# Patient Record
Sex: MALE | Race: WHITE | NOT HISPANIC OR LATINO | Employment: OTHER | URBAN - METROPOLITAN AREA
[De-identification: names, ages, dates, MRNs, and addresses within clinical notes are randomized per-mention and may not be internally consistent; named-entity substitution may affect disease eponyms.]

---

## 2020-06-05 ENCOUNTER — OFFICE VISIT (OUTPATIENT)
Dept: NEUROLOGY | Facility: CLINIC | Age: 45
End: 2020-06-05
Payer: MEDICARE

## 2020-06-05 VITALS — WEIGHT: 285 LBS | BODY MASS INDEX: 44.73 KG/M2 | HEIGHT: 67 IN

## 2020-06-05 DIAGNOSIS — G51.0 RIGHT-SIDED BELL'S PALSY: ICD-10-CM

## 2020-06-05 DIAGNOSIS — G40.309 NONINTRACTABLE GENERALIZED IDIOPATHIC EPILEPSY WITHOUT STATUS EPILEPTICUS (H): Primary | ICD-10-CM

## 2020-06-05 DIAGNOSIS — E66.01 MORBID OBESITY (H): ICD-10-CM

## 2020-06-05 PROCEDURE — 99214 OFFICE O/P EST MOD 30 MIN: CPT | Mod: 95 | Performed by: PSYCHIATRY & NEUROLOGY

## 2020-06-05 RX ORDER — LEVOCARNITINE 330 MG/1
TABLET ORAL
COMMUNITY
Start: 2020-02-25 | End: 2020-10-28

## 2020-06-05 ASSESSMENT — MIFFLIN-ST. JEOR: SCORE: 2136.38

## 2020-06-05 NOTE — LETTER
6/5/2020         RE: Tre Vazquez  [address] [place] [address]        Dear Colleague,    Thank you for referring your patient, Tre Vazquez, to the [facility] NEUROLOG[facility] [place]. Please see a copy of my visit note below.    NEUROLOGY NOTE      Assessment/Plan     Epilepsy. We didn't get outside records, and he deferred EEG study. Will check drug level on Depakote, Keppra, Lamictal, and zonisamide, LFT, and CBC. F/u in 6 months.    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 But seems to be highly functional but needs supervision for daily routines. No falls or injuries.    Obesity encouraged to exercise and diet control.    Left blindness.    Right Belll's palsy 2/2020    Psych issue need to follow-up with psychiatrist.     .    .     This is a virtual visit due to COVID-19 Pandemic to mitigate potential disease spreading. Consent obtained for charge with this visit. Total time spent about 30 minutes.       SUBJECTIVE     Tre Vazquez is a 44 year old male seen for seizure and other neurological issues  .   The patient is with manager from the group home where he esides.    Some behavior issues f/u with psych.     Developed right Bell's palsy 2/2020, mri brain ok at [facility]. No improvements yet.     He has a history of cerebral palsy, epilepsy, psychiatric condition. He is his own power of  but has his mom visited him regularly.    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    Epilepsy: He could not remember which neurologist he sees unaware he used has been managed. But he used to follow up neurologist regularly. The new group home care manager does not have a whole lot of information by reviewing his history he realized that he needs to see a neurologist.No seizures.     Seizure medications: Could not remember what type of seizure he has had and went with the last seizure spells. But he is taking  lamotrigine 100 mg in the morning and 400 at bedtime.    Keppra 500 mg 2 tablets in the morning and 3 tablets in the evening.   levocarnitine 330 mg 1 tablet 3 times a day.   zonisamide 100 mg 2 tablets at bedtime.      He has obesity but not exercising regularly. Eating well.    Psych: He has a history of psychiatric condition including oppositional defiant disorder, depression, PTSD. Mild intellectual disability, psychosis. Is a follow-up with a psychologist regularly.    Cerebral palsy: According to no documentation he had cerebral ventriculomegaly, cerebral palsy with mental retardation and spastic diplegia. He has some baseline of hand tremor as well. But he is living in a group home able to take care of himself independently except that he needs supervision of daily routines.    Psych medication: Is on BuSpar 10 mg twice a day, citalopram 40 mg a day.    Otherwise he has obesity, he is prediabetic, has asthma.    HCT 2/2018 stable compared to 1 month ago, enlarged ventricles. Otherwise NAD.      Problem List           Patient Active Problem List    Diagnosis Date Noted     Advanced directives, counseling/discussion 08/02/2012     Priority: High     Polst completed at [facility] 7/30/2012      Depression 03/10/2014     Priority: Medium     History of sepsis 12/10/2013     Priority: Medium     Septic shock (H) 11/27/2013     Priority: Medium     Problem list name updated by automated process. Provider to review      Pneumococcal septicemia(038.2) (H) 11/27/2013     Priority: Medium     Non-refractory epilepsy (H) 07/18/2012     Priority: Medium     Cerebral palsy (H) 07/18/2012     Priority: Medium     Encephalomalacia 07/18/2012     Priority: Medium     Benign hypertension 07/18/2012     Priority: Medium     Past medical history     Past Surgical History         Past Surgical History:   Procedure Laterality Date     ORTHOPEDIC SURGERY      pt stated past surgery on both ankles     Past Medical History        Past Medical History:   Diagnosis Date     Blindness of  left eye      Depression 3/10/2014     Hypertension      Pneumococcal septicemia(038.2) (H) 11/26/2013    Hospitalized     Pneumonia, organism unspecified(486) 11/26/2013    Hospitalized     Unspecified epilepsy without mention of intractable epilepsy      Family history     Family History   History reviewed. No pertinent family history.     Social history     Social History   Social History         Socioeconomic History     Marital status: Single     Spouse name: Not on file     Number of children: Not on file     Years of education: Not on file     Highest education level: Not on file   Occupational History     Not on file   Social Needs     Financial resource strain: Not on file     Food insecurity     Worry: Not on file     Inability: Not on file     Transportation needs     Medical: Not on file     Non-medical: Not on file   Tobacco Use     Smoking status: Former Smoker     Smokeless tobacco: Never Used   Substance and Sexual Activity     Alcohol use: No     Drug use: No     Sexual activity: Never   Lifestyle     Physical activity     Days per week: Not on file     Minutes per session: Not on file     Stress: Not on file   Relationships     Social connections     Talks on phone: Not on file     Gets together: Not on file     Attends Episcopalian service: Not on file     Active member of club or organization: Not on file     Attends meetings of clubs or organizations: Not on file     Relationship status: Not on file     Intimate partner violence     Fear of current or ex partner: Not on file     Emotionally abused: Not on file     Physically abused: Not on file     Forced sexual activity: Not on file   Other Topics Concern     Parent/sibling w/ CABG, MI or angioplasty before 65F 55M? Not Asked   Social History Narrative     Not on file     Allergy   Tomato and Vicodin [hydrocodone-acetaminophen]   MEDICATIONS List     Current Outpatient Prescriptions          Current Outpatient Medications   Medication Sig Dispense  "Refill     acetaminophen (TYLENOL) 500 MG tablet Take 500 mg by mouth every 4 hours as needed       ARTIFICIAL TEARS 0.1-0.3 % SOLN Apply 2 drops to eye every hour. As needed. 1 Bottle 11     citalopram (CELEXA) 20 MG tablet Take 1 tablet (20 mg) by mouth daily 30 tablet 5     clonazePAM (KLONOPIN) 0.5 MG tablet Take 1 tablet (0.5 mg) by mouth 3 times daily 90 tablet 1     divalproex (DEPAKOTE ER) 500 MG 24 hr tablet Take 1 tablet (500 mg) by mouth daily 500mg am every am 1000mg every pm 90 tablet 3     lamoTRIgine (LAMICTAL) 100 MG tablet Take 2 tablets (200 mg) by mouth daily In the AM 60 tablet 3     lamoTRIgine (LAMICTAL) 200 MG tablet Take 2 tablets (400 mg) by mouth daily IN the PM 60 tablet 3     levETIRAcetam (KEPPRA) 500 MG tablet Take 3 tablets (1,500 mg) by mouth 2 times daily 540 tablet 1     levOCARNitine (CARNITOR) 330 MG tablet Take by mouth 3 times daily (with meals)       lisinopril (PRINIVIL,ZESTRIL) 20 MG tablet Take 1 tablet (20 mg) by mouth daily 90 tablet 1     multivitamin, therapeutic with minerals (MULTI-VITAMIN) TABS Take 1 tablet by mouth daily. 100 tablet 3     zonisamide (ZONEGRAN) 100 MG capsule Take 2 capsules (200 mg) by mouth daily 60 capsule 3     Review of system   10 point system review o/w negative  PHYSICAL EXAMINATION   Vital signs in last 24 hours:   Vitals       Vitals:    06/05/20 1030   Weight: 129.3 kg (285 lb)   Height: 1.702 m (5' 7\")   New right complete VII palsy. Left side blind, Otherwise exam no change from my last note.    per patient report, similar to last visit note. Please refer to my last clinic note and and subjective report documentation of the current note      RESULTS REVIEW   MRI brain 2/2020 report reviewed.         Siobhan Ballard MD, MD, PhD   Neurology   Office tel: [phone]       Again, thank you for allowing me to participate in the care of your patient.        Sincerely,        Siobhan Ballard MD    "

## 2020-06-05 NOTE — PROGRESS NOTES
NEUROLOGY NOTE      Assessment/Plan     Epilepsy. We didn't get outside records, and he deferred EEG study. Will check drug level on Depakote, Keppra, Lamictal, and zonisamide, LFT, and CBC. F/u in 6 months.    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 But seems to be highly functional but needs supervision for daily routines. No falls or injuries.    Obesity encouraged to exercise and diet control.    Left blindness.    Right Belll's palsy 2/2020    Psych issue need to follow-up with psychiatrist.     .    .     This is a virtual visit due to COVID-19 Pandemic to mitigate potential disease spreading. Consent obtained for charge with this visit. Total time spent about 30 minutes.       SUBJECTIVE     Tre Vazquez is a 44 year old male seen for seizure and other neurological issues  .   The patient is with manager from the group home where he esides.    Some behavior issues f/u with psych.     Developed right Bell's palsy 2/2020, mri brain ok at [facility]. No improvements yet.     He has a history of cerebral palsy, epilepsy, psychiatric condition. He is his own power of  but has his mom visited him regularly.    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    Epilepsy: He could not remember which neurologist he sees unaware he used has been managed. But he used to follow up neurologist regularly. The new group home care manager does not have a whole lot of information by reviewing his history he realized that he needs to see a neurologist.No seizures.     Seizure medications: Could not remember what type of seizure he has had and went with the last seizure spells. But he is taking  lamotrigine 100 mg in the morning and 400 at bedtime.   Keppra 500 mg 2 tablets in the morning and 3 tablets in the evening.   levocarnitine 330 mg 1 tablet 3 times a day.   zonisamide 100 mg 2 tablets at bedtime.      He has obesity but not exercising regularly. Eating well.    Psych: He has a history of  psychiatric condition including oppositional defiant disorder, depression, PTSD. Mild intellectual disability, psychosis. Is a follow-up with a psychologist regularly.    Cerebral palsy: According to no documentation he had cerebral ventriculomegaly, cerebral palsy with mental retardation and spastic diplegia. He has some baseline of hand tremor as well. But he is living in a group home able to take care of himself independently except that he needs supervision of daily routines.    Psych medication: Is on BuSpar 10 mg twice a day, citalopram 40 mg a day.    Otherwise he has obesity, he is prediabetic, has asthma.    HCT 2/2018 stable compared to 1 month ago, enlarged ventricles. Otherwise NAD.      Problem List           Patient Active Problem List    Diagnosis Date Noted     Advanced directives, counseling/discussion 08/02/2012     Priority: High     Polst completed at [facility] 7/30/2012      Depression 03/10/2014     Priority: Medium     History of sepsis 12/10/2013     Priority: Medium     Septic shock (H) 11/27/2013     Priority: Medium     Problem list name updated by automated process. Provider to review      Pneumococcal septicemia(038.2) (H) 11/27/2013     Priority: Medium     Non-refractory epilepsy (H) 07/18/2012     Priority: Medium     Cerebral palsy (H) 07/18/2012     Priority: Medium     Encephalomalacia 07/18/2012     Priority: Medium     Benign hypertension 07/18/2012     Priority: Medium     Past medical history     Past Surgical History         Past Surgical History:   Procedure Laterality Date     ORTHOPEDIC SURGERY      pt stated past surgery on both ankles     Past Medical History        Past Medical History:   Diagnosis Date     Blindness of left eye      Depression 3/10/2014     Hypertension      Pneumococcal septicemia(038.2) (H) 11/26/2013    Hospitalized     Pneumonia, organism unspecified(486) 11/26/2013    Hospitalized     Unspecified epilepsy without mention of intractable epilepsy       Family history     Family History   History reviewed. No pertinent family history.     Social history     Social History   Social History         Socioeconomic History     Marital status: Single     Spouse name: Not on file     Number of children: Not on file     Years of education: Not on file     Highest education level: Not on file   Occupational History     Not on file   Social Needs     Financial resource strain: Not on file     Food insecurity     Worry: Not on file     Inability: Not on file     Transportation needs     Medical: Not on file     Non-medical: Not on file   Tobacco Use     Smoking status: Former Smoker     Smokeless tobacco: Never Used   Substance and Sexual Activity     Alcohol use: No     Drug use: No     Sexual activity: Never   Lifestyle     Physical activity     Days per week: Not on file     Minutes per session: Not on file     Stress: Not on file   Relationships     Social connections     Talks on phone: Not on file     Gets together: Not on file     Attends Taoist service: Not on file     Active member of club or organization: Not on file     Attends meetings of clubs or organizations: Not on file     Relationship status: Not on file     Intimate partner violence     Fear of current or ex partner: Not on file     Emotionally abused: Not on file     Physically abused: Not on file     Forced sexual activity: Not on file   Other Topics Concern     Parent/sibling w/ CABG, MI or angioplasty before 65F 55M? Not Asked   Social History Narrative     Not on file     Allergy   Tomato and Vicodin [hydrocodone-acetaminophen]   MEDICATIONS List     Current Outpatient Prescriptions          Current Outpatient Medications   Medication Sig Dispense Refill     acetaminophen (TYLENOL) 500 MG tablet Take 500 mg by mouth every 4 hours as needed       ARTIFICIAL TEARS 0.1-0.3 % SOLN Apply 2 drops to eye every hour. As needed. 1 Bottle 11     citalopram (CELEXA) 20 MG tablet Take 1 tablet (20 mg) by  "mouth daily 30 tablet 5     clonazePAM (KLONOPIN) 0.5 MG tablet Take 1 tablet (0.5 mg) by mouth 3 times daily 90 tablet 1     divalproex (DEPAKOTE ER) 500 MG 24 hr tablet Take 1 tablet (500 mg) by mouth daily 500mg am every am 1000mg every pm 90 tablet 3     lamoTRIgine (LAMICTAL) 100 MG tablet Take 2 tablets (200 mg) by mouth daily In the AM 60 tablet 3     lamoTRIgine (LAMICTAL) 200 MG tablet Take 2 tablets (400 mg) by mouth daily IN the PM 60 tablet 3     levETIRAcetam (KEPPRA) 500 MG tablet Take 3 tablets (1,500 mg) by mouth 2 times daily 540 tablet 1     levOCARNitine (CARNITOR) 330 MG tablet Take by mouth 3 times daily (with meals)       lisinopril (PRINIVIL,ZESTRIL) 20 MG tablet Take 1 tablet (20 mg) by mouth daily 90 tablet 1     multivitamin, therapeutic with minerals (MULTI-VITAMIN) TABS Take 1 tablet by mouth daily. 100 tablet 3     zonisamide (ZONEGRAN) 100 MG capsule Take 2 capsules (200 mg) by mouth daily 60 capsule 3     Review of system   10 point system review o/w negative  PHYSICAL EXAMINATION   Vital signs in last 24 hours:   Vitals       Vitals:    06/05/20 1030   Weight: 129.3 kg (285 lb)   Height: 1.702 m (5' 7\")   New right complete VII palsy. Left side blind, Otherwise exam no change from my last note.    per patient report, similar to last visit note. Please refer to my last clinic note and and subjective report documentation of the current note      RESULTS REVIEW   MRI brain 2/2020 report reviewed.         Siobhan Ballard MD, MD, PhD   Neurology   Office tel: [phone]     "

## 2020-09-28 DIAGNOSIS — G40.309 NONINTRACTABLE GENERALIZED IDIOPATHIC EPILEPSY WITHOUT STATUS EPILEPTICUS (H): Primary | ICD-10-CM

## 2020-09-28 DIAGNOSIS — G40.909 EPILEPSY (H): ICD-10-CM

## 2020-09-28 RX ORDER — ZONISAMIDE 100 MG/1
200 CAPSULE ORAL DAILY
Qty: 60 CAPSULE | Refills: 3 | Status: SHIPPED | OUTPATIENT
Start: 2020-09-28 | End: 2020-12-21

## 2020-10-27 DIAGNOSIS — G40.309 NONINTRACTABLE GENERALIZED IDIOPATHIC EPILEPSY WITHOUT STATUS EPILEPTICUS (H): ICD-10-CM

## 2020-10-28 RX ORDER — LEVETIRACETAM 500 MG/1
TABLET ORAL
Qty: 140 TABLET | Refills: 1 | Status: SHIPPED | OUTPATIENT
Start: 2020-10-28 | End: 2020-11-20

## 2020-10-28 RX ORDER — LEVOCARNITINE 330 MG/1
TABLET ORAL
Qty: 84 TABLET | Refills: 7 | Status: SHIPPED | OUTPATIENT
Start: 2020-10-28 | End: 2021-05-11

## 2020-11-20 DIAGNOSIS — G40.309 NONINTRACTABLE GENERALIZED IDIOPATHIC EPILEPSY WITHOUT STATUS EPILEPTICUS (H): ICD-10-CM

## 2020-11-20 RX ORDER — LEVETIRACETAM 500 MG/1
TABLET ORAL
Qty: 140 TABLET | Refills: 1 | Status: SHIPPED | OUTPATIENT
Start: 2020-11-20 | End: 2021-01-18

## 2020-12-18 DIAGNOSIS — G40.309 NONINTRACTABLE GENERALIZED IDIOPATHIC EPILEPSY WITHOUT STATUS EPILEPTICUS (H): ICD-10-CM

## 2020-12-21 RX ORDER — ZONISAMIDE 100 MG/1
CAPSULE ORAL
Qty: 56 CAPSULE | Refills: 3 | Status: SHIPPED | OUTPATIENT
Start: 2020-12-21 | End: 2021-04-19

## 2021-01-15 DIAGNOSIS — G40.309 NONINTRACTABLE GENERALIZED IDIOPATHIC EPILEPSY WITHOUT STATUS EPILEPTICUS (H): ICD-10-CM

## 2021-01-18 RX ORDER — LEVETIRACETAM 500 MG/1
TABLET ORAL
Qty: 140 TABLET | Refills: 1 | Status: SHIPPED | OUTPATIENT
Start: 2021-01-18 | End: 2021-03-17

## 2021-03-16 DIAGNOSIS — G40.309 NONINTRACTABLE GENERALIZED IDIOPATHIC EPILEPSY WITHOUT STATUS EPILEPTICUS (H): ICD-10-CM

## 2021-03-17 RX ORDER — LEVETIRACETAM 500 MG/1
TABLET ORAL
Qty: 140 TABLET | Refills: 4 | Status: ON HOLD | OUTPATIENT
Start: 2021-03-17 | End: 2021-08-31

## 2021-04-19 DIAGNOSIS — G40.309 NONINTRACTABLE GENERALIZED IDIOPATHIC EPILEPSY WITHOUT STATUS EPILEPTICUS (H): ICD-10-CM

## 2021-04-19 RX ORDER — ZONISAMIDE 100 MG/1
CAPSULE ORAL
Qty: 56 CAPSULE | Refills: 0 | Status: SHIPPED | OUTPATIENT
Start: 2021-04-19 | End: 2021-05-19

## 2021-04-19 NOTE — TELEPHONE ENCOUNTER
Refill request for Angelique  Previous Dr. Ballard patient seeing Dr. Huff 5/3/21  Medication T'd for review and signature  Rosa Corado CMA on 4/19/2021 at 4:21 PM

## 2021-05-11 DIAGNOSIS — G40.309 NONINTRACTABLE GENERALIZED IDIOPATHIC EPILEPSY WITHOUT STATUS EPILEPTICUS (H): ICD-10-CM

## 2021-05-11 RX ORDER — LEVOCARNITINE 330 MG/1
TABLET ORAL
Qty: 84 TABLET | Refills: 7 | Status: SHIPPED | OUTPATIENT
Start: 2021-05-11 | End: 2022-01-20

## 2021-05-18 RX ORDER — ZONISAMIDE 100 MG/1
CAPSULE ORAL
Qty: 56 CAPSULE | Refills: 0 | OUTPATIENT
Start: 2021-05-18

## 2021-05-18 NOTE — TELEPHONE ENCOUNTER
Refill request for zonisamide 100 mg.  Patient has a follow up appointment 05/28/2021.  28 day supply with 0 refills.   Medication T'd for review and signature    Davi Diop MA on 5/18/2021 at 2:30 PM

## 2021-05-19 RX ORDER — ZONISAMIDE 100 MG/1
CAPSULE ORAL
Qty: 56 CAPSULE | Refills: 0 | Status: ON HOLD | OUTPATIENT
Start: 2021-05-19 | End: 2021-08-31

## 2021-07-30 DIAGNOSIS — G40.309 NONINTRACTABLE GENERALIZED IDIOPATHIC EPILEPSY WITHOUT STATUS EPILEPTICUS (H): Primary | ICD-10-CM

## 2021-08-02 RX ORDER — LEVETIRACETAM 500 MG/1
TABLET ORAL
Qty: 35 TABLET | Refills: 0 | Status: ON HOLD | OUTPATIENT
Start: 2021-08-02 | End: 2021-08-31

## 2021-08-02 NOTE — TELEPHONE ENCOUNTER
Refill request for Keppra  Patient has no showed x3 and 1 cancel for our clinic  7 day supply with 0 refills Medication T'd for review and signature  Rosa Corado CMA on 8/2/2021 at 9:04 AM

## 2021-08-30 ENCOUNTER — HOSPITAL ENCOUNTER (OUTPATIENT)
Facility: CLINIC | Age: 46
Setting detail: OBSERVATION
Discharge: NURSING, REHAB OR LONG-TERM CARE | End: 2021-09-02
Attending: EMERGENCY MEDICINE | Admitting: STUDENT IN AN ORGANIZED HEALTH CARE EDUCATION/TRAINING PROGRAM
Payer: MEDICARE

## 2021-08-30 ENCOUNTER — APPOINTMENT (OUTPATIENT)
Dept: GENERAL RADIOLOGY | Facility: CLINIC | Age: 46
End: 2021-08-30
Attending: EMERGENCY MEDICINE
Payer: MEDICARE

## 2021-08-30 ENCOUNTER — APPOINTMENT (OUTPATIENT)
Dept: CT IMAGING | Facility: CLINIC | Age: 46
End: 2021-08-30
Attending: EMERGENCY MEDICINE
Payer: MEDICARE

## 2021-08-30 DIAGNOSIS — G40.309 NONINTRACTABLE GENERALIZED IDIOPATHIC EPILEPSY WITHOUT STATUS EPILEPTICUS (H): ICD-10-CM

## 2021-08-30 DIAGNOSIS — G40.919 BREAKTHROUGH SEIZURE (H): Primary | ICD-10-CM

## 2021-08-30 DIAGNOSIS — Z11.52 ENCOUNTER FOR SCREENING LABORATORY TESTING FOR SEVERE ACUTE RESPIRATORY SYNDROME CORONAVIRUS 2 (SARS-COV-2): ICD-10-CM

## 2021-08-30 DIAGNOSIS — G40.909 NON-REFRACTORY EPILEPSY (H): ICD-10-CM

## 2021-08-30 LAB
ALBUMIN UR-MCNC: 30 MG/DL
ANION GAP SERPL CALCULATED.3IONS-SCNC: 2 MMOL/L (ref 3–14)
APPEARANCE UR: CLEAR
BASOPHILS # BLD AUTO: 0 10E3/UL (ref 0–0.2)
BASOPHILS NFR BLD AUTO: 0 %
BILIRUB UR QL STRIP: NEGATIVE
BUN SERPL-MCNC: 12 MG/DL (ref 7–30)
CALCIUM SERPL-MCNC: 8.6 MG/DL (ref 8.5–10.1)
CHLORIDE BLD-SCNC: 104 MMOL/L (ref 94–109)
CO2 SERPL-SCNC: 33 MMOL/L (ref 20–32)
COLOR UR AUTO: YELLOW
CREAT SERPL-MCNC: 0.74 MG/DL (ref 0.66–1.25)
EOSINOPHIL # BLD AUTO: 0 10E3/UL (ref 0–0.7)
EOSINOPHIL NFR BLD AUTO: 0 %
ERYTHROCYTE [DISTWIDTH] IN BLOOD BY AUTOMATED COUNT: 13 % (ref 10–15)
GFR SERPL CREATININE-BSD FRML MDRD: >90 ML/MIN/1.73M2
GLUCOSE BLD-MCNC: 99 MG/DL (ref 70–99)
GLUCOSE UR STRIP-MCNC: NEGATIVE MG/DL
HCT VFR BLD AUTO: 41.4 % (ref 40–53)
HGB BLD-MCNC: 13.5 G/DL (ref 13.3–17.7)
HGB UR QL STRIP: NEGATIVE
IMM GRANULOCYTES # BLD: 0.1 10E3/UL
IMM GRANULOCYTES NFR BLD: 1 %
KETONES UR STRIP-MCNC: 100 MG/DL
LEUKOCYTE ESTERASE UR QL STRIP: NEGATIVE
LYMPHOCYTES # BLD AUTO: 1.3 10E3/UL (ref 0.8–5.3)
LYMPHOCYTES NFR BLD AUTO: 18 %
MAGNESIUM SERPL-MCNC: 1.5 MG/DL (ref 1.6–2.3)
MCH RBC QN AUTO: 29.8 PG (ref 26.5–33)
MCHC RBC AUTO-ENTMCNC: 32.6 G/DL (ref 31.5–36.5)
MCV RBC AUTO: 91 FL (ref 78–100)
MONOCYTES # BLD AUTO: 0.8 10E3/UL (ref 0–1.3)
MONOCYTES NFR BLD AUTO: 11 %
MUCOUS THREADS #/AREA URNS LPF: PRESENT /LPF
NEUTROPHILS # BLD AUTO: 5.1 10E3/UL (ref 1.6–8.3)
NEUTROPHILS NFR BLD AUTO: 70 %
NITRATE UR QL: NEGATIVE
NRBC # BLD AUTO: 0 10E3/UL
NRBC BLD AUTO-RTO: 0 /100
PH UR STRIP: 6.5 [PH] (ref 5–7)
PLATELET # BLD AUTO: 182 10E3/UL (ref 150–450)
POTASSIUM BLD-SCNC: 3.7 MMOL/L (ref 3.4–5.3)
RBC # BLD AUTO: 4.53 10E6/UL (ref 4.4–5.9)
RBC URINE: 3 /HPF
SARS-COV-2 RNA RESP QL NAA+PROBE: NEGATIVE
SODIUM SERPL-SCNC: 139 MMOL/L (ref 133–144)
SP GR UR STRIP: 1.02 (ref 1–1.03)
UROBILINOGEN UR STRIP-MCNC: NORMAL MG/DL
VALPROATE SERPL-MCNC: 30 MG/L
WBC # BLD AUTO: 7.3 10E3/UL (ref 4–11)
WBC URINE: 1 /HPF

## 2021-08-30 PROCEDURE — C9803 HOPD COVID-19 SPEC COLLECT: HCPCS | Performed by: EMERGENCY MEDICINE

## 2021-08-30 PROCEDURE — U0005 INFEC AGEN DETEC AMPLI PROBE: HCPCS | Performed by: EMERGENCY MEDICINE

## 2021-08-30 PROCEDURE — 96366 THER/PROPH/DIAG IV INF ADDON: CPT | Performed by: EMERGENCY MEDICINE

## 2021-08-30 PROCEDURE — 85025 COMPLETE CBC W/AUTO DIFF WBC: CPT | Performed by: EMERGENCY MEDICINE

## 2021-08-30 PROCEDURE — 81003 URINALYSIS AUTO W/O SCOPE: CPT | Performed by: EMERGENCY MEDICINE

## 2021-08-30 PROCEDURE — 80175 DRUG SCREEN QUAN LAMOTRIGINE: CPT | Performed by: EMERGENCY MEDICINE

## 2021-08-30 PROCEDURE — 70450 CT HEAD/BRAIN W/O DYE: CPT | Mod: 26 | Performed by: RADIOLOGY

## 2021-08-30 PROCEDURE — 99285 EMERGENCY DEPT VISIT HI MDM: CPT | Mod: 25 | Performed by: EMERGENCY MEDICINE

## 2021-08-30 PROCEDURE — 80177 DRUG SCRN QUAN LEVETIRACETAM: CPT | Performed by: EMERGENCY MEDICINE

## 2021-08-30 PROCEDURE — 80164 ASSAY DIPROPYLACETIC ACD TOT: CPT | Performed by: EMERGENCY MEDICINE

## 2021-08-30 PROCEDURE — G1004 CDSM NDSC: HCPCS | Performed by: RADIOLOGY

## 2021-08-30 PROCEDURE — 71045 X-RAY EXAM CHEST 1 VIEW: CPT

## 2021-08-30 PROCEDURE — 71045 X-RAY EXAM CHEST 1 VIEW: CPT | Mod: 26 | Performed by: RADIOLOGY

## 2021-08-30 PROCEDURE — 93005 ELECTROCARDIOGRAM TRACING: CPT | Performed by: EMERGENCY MEDICINE

## 2021-08-30 PROCEDURE — 250N000013 HC RX MED GY IP 250 OP 250 PS 637: Performed by: EMERGENCY MEDICINE

## 2021-08-30 PROCEDURE — 80048 BASIC METABOLIC PNL TOTAL CA: CPT | Performed by: EMERGENCY MEDICINE

## 2021-08-30 PROCEDURE — 70450 CT HEAD/BRAIN W/O DYE: CPT | Mod: ME

## 2021-08-30 PROCEDURE — 258N000003 HC RX IP 258 OP 636: Performed by: EMERGENCY MEDICINE

## 2021-08-30 PROCEDURE — G0378 HOSPITAL OBSERVATION PER HR: HCPCS

## 2021-08-30 PROCEDURE — 250N000011 HC RX IP 250 OP 636: Performed by: EMERGENCY MEDICINE

## 2021-08-30 PROCEDURE — 36415 COLL VENOUS BLD VENIPUNCTURE: CPT | Performed by: EMERGENCY MEDICINE

## 2021-08-30 PROCEDURE — 80203 DRUG SCREEN QUANT ZONISAMIDE: CPT | Performed by: EMERGENCY MEDICINE

## 2021-08-30 PROCEDURE — 93010 ELECTROCARDIOGRAM REPORT: CPT | Performed by: EMERGENCY MEDICINE

## 2021-08-30 PROCEDURE — 83735 ASSAY OF MAGNESIUM: CPT | Performed by: EMERGENCY MEDICINE

## 2021-08-30 PROCEDURE — 96365 THER/PROPH/DIAG IV INF INIT: CPT | Performed by: EMERGENCY MEDICINE

## 2021-08-30 PROCEDURE — 96367 TX/PROPH/DG ADDL SEQ IV INF: CPT | Performed by: EMERGENCY MEDICINE

## 2021-08-30 RX ORDER — IBUPROFEN 600 MG/1
600 TABLET, FILM COATED ORAL ONCE
Status: COMPLETED | OUTPATIENT
Start: 2021-08-30 | End: 2021-08-30

## 2021-08-30 RX ORDER — LEVETIRACETAM 15 MG/ML
1500 INJECTION INTRAVASCULAR ONCE
Status: COMPLETED | OUTPATIENT
Start: 2021-08-30 | End: 2021-08-30

## 2021-08-30 RX ORDER — ACETAMINOPHEN 325 MG/1
650 TABLET ORAL ONCE
Status: COMPLETED | OUTPATIENT
Start: 2021-08-30 | End: 2021-08-30

## 2021-08-30 RX ORDER — MAGNESIUM SULFATE HEPTAHYDRATE 500 MG/ML
2 INJECTION, SOLUTION INTRAMUSCULAR; INTRAVENOUS ONCE
Status: DISCONTINUED | OUTPATIENT
Start: 2021-08-30 | End: 2021-08-30

## 2021-08-30 RX ORDER — LORAZEPAM 2 MG/ML
2 INJECTION INTRAMUSCULAR ONCE
Status: COMPLETED | OUTPATIENT
Start: 2021-08-30 | End: 2021-08-30

## 2021-08-30 RX ORDER — MAGNESIUM SULFATE HEPTAHYDRATE 40 MG/ML
2 INJECTION, SOLUTION INTRAVENOUS ONCE
Status: COMPLETED | OUTPATIENT
Start: 2021-08-30 | End: 2021-08-30

## 2021-08-30 RX ORDER — SODIUM CHLORIDE 9 MG/ML
INJECTION, SOLUTION INTRAVENOUS CONTINUOUS
Status: DISCONTINUED | OUTPATIENT
Start: 2021-08-30 | End: 2021-09-02

## 2021-08-30 RX ADMIN — ACETAMINOPHEN 650 MG: 325 TABLET, FILM COATED ORAL at 17:05

## 2021-08-30 RX ADMIN — LORAZEPAM 2 MG: 2 INJECTION INTRAMUSCULAR; INTRAVENOUS at 20:52

## 2021-08-30 RX ADMIN — SODIUM CHLORIDE: 900 INJECTION, SOLUTION INTRAVENOUS at 22:03

## 2021-08-30 RX ADMIN — LEVETIRACETAM 1500 MG: 15 INJECTION INTRAVENOUS at 22:03

## 2021-08-30 RX ADMIN — MAGNESIUM SULFATE IN WATER 2 G: 40 INJECTION, SOLUTION INTRAVENOUS at 17:39

## 2021-08-30 RX ADMIN — IBUPROFEN 600 MG: 600 TABLET ORAL at 19:43

## 2021-08-30 ASSESSMENT — MIFFLIN-ST. JEOR: SCORE: 2131.38

## 2021-08-30 NOTE — ED TRIAGE NOTES
"BIBA from   Pt found unresponsive with seizurelike activity  H/o epilepsy, last known seizure years ago  EMS called, pt postictal and incontinent of urine  Admits to falling 3-4 times today in his room and also in the shower    Arrives alert, oriented but gives inappropriate responses. When asked the year, he states, \"payday\"  "
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
## 2021-08-31 ENCOUNTER — APPOINTMENT (OUTPATIENT)
Dept: PHYSICAL THERAPY | Facility: CLINIC | Age: 46
End: 2021-08-31
Attending: STUDENT IN AN ORGANIZED HEALTH CARE EDUCATION/TRAINING PROGRAM
Payer: MEDICARE

## 2021-08-31 LAB
ALBUMIN SERPL-MCNC: 2.8 G/DL (ref 3.4–5)
ALP SERPL-CCNC: 58 U/L (ref 40–150)
ALT SERPL W P-5'-P-CCNC: 28 U/L (ref 0–70)
ANION GAP SERPL CALCULATED.3IONS-SCNC: 1 MMOL/L (ref 3–14)
AST SERPL W P-5'-P-CCNC: 10 U/L (ref 0–45)
ATRIAL RATE - MUSE: 89 BPM
BILIRUB DIRECT SERPL-MCNC: <0.1 MG/DL (ref 0–0.2)
BILIRUB SERPL-MCNC: 0.4 MG/DL (ref 0.2–1.3)
BUN SERPL-MCNC: 11 MG/DL (ref 7–30)
CALCIUM SERPL-MCNC: 8.6 MG/DL (ref 8.5–10.1)
CHLORIDE BLD-SCNC: 106 MMOL/L (ref 94–109)
CO2 SERPL-SCNC: 34 MMOL/L (ref 20–32)
CREAT SERPL-MCNC: 0.59 MG/DL (ref 0.66–1.25)
DIASTOLIC BLOOD PRESSURE - MUSE: NORMAL MMHG
ERYTHROCYTE [DISTWIDTH] IN BLOOD BY AUTOMATED COUNT: 13.1 % (ref 10–15)
GFR SERPL CREATININE-BSD FRML MDRD: >90 ML/MIN/1.73M2
GLUCOSE BLD-MCNC: 101 MG/DL (ref 70–99)
HCT VFR BLD AUTO: 40 % (ref 40–53)
HGB BLD-MCNC: 13.1 G/DL (ref 13.3–17.7)
INTERPRETATION ECG - MUSE: NORMAL
MCH RBC QN AUTO: 30.2 PG (ref 26.5–33)
MCHC RBC AUTO-ENTMCNC: 32.8 G/DL (ref 31.5–36.5)
MCV RBC AUTO: 92 FL (ref 78–100)
P AXIS - MUSE: 63 DEGREES
PLATELET # BLD AUTO: 182 10E3/UL (ref 150–450)
POTASSIUM BLD-SCNC: 3.6 MMOL/L (ref 3.4–5.3)
PR INTERVAL - MUSE: 192 MS
PROT SERPL-MCNC: 5.9 G/DL (ref 6.8–8.8)
QRS DURATION - MUSE: 84 MS
QT - MUSE: 400 MS
QTC - MUSE: 486 MS
R AXIS - MUSE: 5 DEGREES
RBC # BLD AUTO: 4.34 10E6/UL (ref 4.4–5.9)
SODIUM SERPL-SCNC: 141 MMOL/L (ref 133–144)
SYSTOLIC BLOOD PRESSURE - MUSE: NORMAL MMHG
T AXIS - MUSE: 79 DEGREES
VALPROATE SERPL-MCNC: 37 MG/L
VENTRICULAR RATE- MUSE: 89 BPM
WBC # BLD AUTO: 10.3 10E3/UL (ref 4–11)

## 2021-08-31 PROCEDURE — 99215 OFFICE O/P EST HI 40 MIN: CPT | Mod: GC | Performed by: STUDENT IN AN ORGANIZED HEALTH CARE EDUCATION/TRAINING PROGRAM

## 2021-08-31 PROCEDURE — 85027 COMPLETE CBC AUTOMATED: CPT | Performed by: STUDENT IN AN ORGANIZED HEALTH CARE EDUCATION/TRAINING PROGRAM

## 2021-08-31 PROCEDURE — 36415 COLL VENOUS BLD VENIPUNCTURE: CPT | Performed by: STUDENT IN AN ORGANIZED HEALTH CARE EDUCATION/TRAINING PROGRAM

## 2021-08-31 PROCEDURE — 999N000007 HC SITE CHECK

## 2021-08-31 PROCEDURE — 97530 THERAPEUTIC ACTIVITIES: CPT | Mod: GP

## 2021-08-31 PROCEDURE — 97116 GAIT TRAINING THERAPY: CPT | Mod: GP

## 2021-08-31 PROCEDURE — 97161 PT EVAL LOW COMPLEX 20 MIN: CPT | Mod: GP

## 2021-08-31 PROCEDURE — 80165 DIPROPYLACETIC ACID FREE: CPT | Performed by: STUDENT IN AN ORGANIZED HEALTH CARE EDUCATION/TRAINING PROGRAM

## 2021-08-31 PROCEDURE — 82248 BILIRUBIN DIRECT: CPT | Performed by: STUDENT IN AN ORGANIZED HEALTH CARE EDUCATION/TRAINING PROGRAM

## 2021-08-31 PROCEDURE — G0378 HOSPITAL OBSERVATION PER HR: HCPCS

## 2021-08-31 PROCEDURE — 250N000013 HC RX MED GY IP 250 OP 250 PS 637: Performed by: STUDENT IN AN ORGANIZED HEALTH CARE EDUCATION/TRAINING PROGRAM

## 2021-08-31 PROCEDURE — 258N000003 HC RX IP 258 OP 636: Performed by: EMERGENCY MEDICINE

## 2021-08-31 PROCEDURE — 96361 HYDRATE IV INFUSION ADD-ON: CPT

## 2021-08-31 PROCEDURE — 80164 ASSAY DIPROPYLACETIC ACD TOT: CPT | Performed by: STUDENT IN AN ORGANIZED HEALTH CARE EDUCATION/TRAINING PROGRAM

## 2021-08-31 PROCEDURE — 80048 BASIC METABOLIC PNL TOTAL CA: CPT | Performed by: STUDENT IN AN ORGANIZED HEALTH CARE EDUCATION/TRAINING PROGRAM

## 2021-08-31 PROCEDURE — 250N000009 HC RX 250: Performed by: EMERGENCY MEDICINE

## 2021-08-31 PROCEDURE — 96367 TX/PROPH/DG ADDL SEQ IV INF: CPT | Performed by: EMERGENCY MEDICINE

## 2021-08-31 PROCEDURE — 999N000128 HC STATISTIC PERIPHERAL IV START W/O US GUIDANCE

## 2021-08-31 RX ORDER — LAMOTRIGINE 100 MG/1
100 TABLET ORAL 2 TIMES DAILY
Status: ON HOLD | COMMUNITY
End: 2021-09-01

## 2021-08-31 RX ORDER — ONDANSETRON 4 MG/1
4 TABLET, ORALLY DISINTEGRATING ORAL EVERY 6 HOURS PRN
Status: DISCONTINUED | OUTPATIENT
Start: 2021-08-31 | End: 2021-09-02 | Stop reason: HOSPADM

## 2021-08-31 RX ORDER — LAMOTRIGINE 200 MG/1
200 TABLET ORAL EVERY MORNING
Status: DISCONTINUED | OUTPATIENT
Start: 2021-08-31 | End: 2021-08-31

## 2021-08-31 RX ORDER — CITALOPRAM HYDROBROMIDE 20 MG/1
20 TABLET ORAL DAILY
Status: DISCONTINUED | OUTPATIENT
Start: 2021-08-31 | End: 2021-08-31

## 2021-08-31 RX ORDER — LEVOCARNITINE 330 MG/1
330 TABLET ORAL
Status: DISCONTINUED | OUTPATIENT
Start: 2021-08-31 | End: 2021-09-02 | Stop reason: HOSPADM

## 2021-08-31 RX ORDER — SODIUM CHLORIDE 0.65 %
1 AEROSOL, SPRAY (ML) NASAL
Status: ON HOLD | COMMUNITY
Start: 2021-08-25 | End: 2023-03-15

## 2021-08-31 RX ORDER — DIVALPROEX SODIUM 250 MG/1
500 TABLET, DELAYED RELEASE ORAL 3 TIMES DAILY
Status: DISCONTINUED | OUTPATIENT
Start: 2021-08-31 | End: 2021-09-02 | Stop reason: HOSPADM

## 2021-08-31 RX ORDER — LEVETIRACETAM 500 MG/1
1500 TABLET ORAL 2 TIMES DAILY
Status: DISCONTINUED | OUTPATIENT
Start: 2021-08-31 | End: 2021-08-31

## 2021-08-31 RX ORDER — VALPROIC ACID 250 MG/1
1000 CAPSULE, LIQUID FILLED ORAL EVERY 8 HOURS SCHEDULED
Status: DISCONTINUED | OUTPATIENT
Start: 2021-08-31 | End: 2021-08-31

## 2021-08-31 RX ORDER — CITALOPRAM HYDROBROMIDE 20 MG/1
40 TABLET ORAL DAILY
Status: DISCONTINUED | OUTPATIENT
Start: 2021-08-31 | End: 2021-09-02 | Stop reason: HOSPADM

## 2021-08-31 RX ORDER — LAMOTRIGINE 100 MG/1
200 TABLET ORAL AT BEDTIME
Status: DISCONTINUED | OUTPATIENT
Start: 2021-08-31 | End: 2021-09-02 | Stop reason: HOSPADM

## 2021-08-31 RX ORDER — LISINOPRIL 5 MG/1
20 TABLET ORAL DAILY
Status: DISCONTINUED | OUTPATIENT
Start: 2021-08-31 | End: 2021-08-31

## 2021-08-31 RX ORDER — VALPROIC ACID 250 MG/1
500 CAPSULE, LIQUID FILLED ORAL EVERY MORNING
Status: DISCONTINUED | OUTPATIENT
Start: 2021-08-31 | End: 2021-08-31

## 2021-08-31 RX ORDER — PROPRANOLOL HYDROCHLORIDE 10 MG/1
20 TABLET ORAL 2 TIMES DAILY
COMMUNITY
End: 2022-11-25

## 2021-08-31 RX ORDER — LEVETIRACETAM 500 MG/1
1000 TABLET ORAL EVERY MORNING
Status: DISCONTINUED | OUTPATIENT
Start: 2021-08-31 | End: 2021-09-02 | Stop reason: HOSPADM

## 2021-08-31 RX ORDER — ONDANSETRON 2 MG/ML
4 INJECTION INTRAMUSCULAR; INTRAVENOUS EVERY 6 HOURS PRN
Status: DISCONTINUED | OUTPATIENT
Start: 2021-08-31 | End: 2021-09-02 | Stop reason: HOSPADM

## 2021-08-31 RX ORDER — LAMOTRIGINE 100 MG/1
100 TABLET ORAL 2 TIMES DAILY
Status: DISCONTINUED | OUTPATIENT
Start: 2021-08-31 | End: 2021-09-02 | Stop reason: HOSPADM

## 2021-08-31 RX ORDER — ACETAMINOPHEN 650 MG/1
650 SUPPOSITORY RECTAL EVERY 6 HOURS PRN
Status: DISCONTINUED | OUTPATIENT
Start: 2021-08-31 | End: 2021-09-02

## 2021-08-31 RX ORDER — ACETAMINOPHEN 650 MG/1
650 SUPPOSITORY RECTAL EVERY 6 HOURS PRN
Status: DISCONTINUED | OUTPATIENT
Start: 2021-08-31 | End: 2021-08-31

## 2021-08-31 RX ORDER — RISPERIDONE 3 MG/1
3 TABLET ORAL AT BEDTIME
Status: DISCONTINUED | OUTPATIENT
Start: 2021-08-31 | End: 2021-09-02 | Stop reason: HOSPADM

## 2021-08-31 RX ORDER — CALCIUM POLYCARBOPHIL 625 MG 625 MG/1
625 TABLET ORAL 2 TIMES DAILY
Status: DISCONTINUED | OUTPATIENT
Start: 2021-08-31 | End: 2021-09-02 | Stop reason: HOSPADM

## 2021-08-31 RX ORDER — LIDOCAINE 40 MG/G
CREAM TOPICAL
Status: DISCONTINUED | OUTPATIENT
Start: 2021-08-31 | End: 2021-09-02 | Stop reason: HOSPADM

## 2021-08-31 RX ORDER — RISPERIDONE 3 MG/1
3 TABLET ORAL AT BEDTIME
COMMUNITY
End: 2022-11-25

## 2021-08-31 RX ORDER — ACETAMINOPHEN 325 MG/1
650 TABLET ORAL EVERY 4 HOURS PRN
Status: DISCONTINUED | OUTPATIENT
Start: 2021-08-31 | End: 2021-09-02

## 2021-08-31 RX ORDER — ZONISAMIDE 100 MG/1
200 CAPSULE ORAL AT BEDTIME
Status: DISCONTINUED | OUTPATIENT
Start: 2021-08-31 | End: 2021-09-02 | Stop reason: HOSPADM

## 2021-08-31 RX ORDER — CALCIUM POLYCARBOPHIL 625 MG 625 MG/1
1 TABLET ORAL 2 TIMES DAILY
Status: ON HOLD | COMMUNITY
End: 2023-03-15

## 2021-08-31 RX ORDER — DIVALPROEX SODIUM 500 MG/1
500 TABLET, DELAYED RELEASE ORAL 3 TIMES DAILY
Status: ON HOLD | COMMUNITY
End: 2021-09-01

## 2021-08-31 RX ORDER — FLUTICASONE PROPIONATE 50 MCG
1 SPRAY, SUSPENSION (ML) NASAL DAILY PRN
Status: ON HOLD | COMMUNITY
Start: 2021-08-25 | End: 2023-03-15

## 2021-08-31 RX ORDER — LEVETIRACETAM 500 MG/1
1500 TABLET ORAL AT BEDTIME
Qty: 90 TABLET | Refills: 0 | Status: SHIPPED | OUTPATIENT
Start: 2021-09-01 | End: 2021-09-01

## 2021-08-31 RX ORDER — BUSPIRONE HYDROCHLORIDE 5 MG/1
5 TABLET ORAL 2 TIMES DAILY
Status: DISCONTINUED | OUTPATIENT
Start: 2021-08-31 | End: 2021-09-02 | Stop reason: HOSPADM

## 2021-08-31 RX ORDER — MULTIVITAMIN WITH FOLIC ACID 400 MCG
1 TABLET ORAL EVERY MORNING
Status: ON HOLD | COMMUNITY
Start: 2021-07-30 | End: 2021-08-31

## 2021-08-31 RX ORDER — LEVETIRACETAM 750 MG/1
1500 TABLET ORAL AT BEDTIME
Status: DISCONTINUED | OUTPATIENT
Start: 2021-08-31 | End: 2021-09-02 | Stop reason: HOSPADM

## 2021-08-31 RX ORDER — RISPERIDONE 1 MG/1
1 TABLET ORAL 2 TIMES DAILY
Status: DISCONTINUED | OUTPATIENT
Start: 2021-08-31 | End: 2021-09-02 | Stop reason: HOSPADM

## 2021-08-31 RX ORDER — ZONISAMIDE 100 MG/1
200 CAPSULE ORAL AT BEDTIME
Status: DISCONTINUED | OUTPATIENT
Start: 2021-08-31 | End: 2021-08-31

## 2021-08-31 RX ORDER — RISPERIDONE 1 MG/1
1 TABLET ORAL 2 TIMES DAILY
COMMUNITY
End: 2022-11-25

## 2021-08-31 RX ORDER — LAMOTRIGINE 200 MG/1
200 TABLET ORAL AT BEDTIME
Status: ON HOLD | COMMUNITY
End: 2021-09-01

## 2021-08-31 RX ORDER — LAMOTRIGINE 200 MG/1
400 TABLET ORAL EVERY EVENING
Status: DISCONTINUED | OUTPATIENT
Start: 2021-08-31 | End: 2021-08-31

## 2021-08-31 RX ORDER — LEVETIRACETAM 500 MG/1
1000 TABLET ORAL EVERY MORNING
Qty: 60 TABLET | Refills: 0 | Status: SHIPPED | OUTPATIENT
Start: 2021-09-01 | End: 2021-09-01

## 2021-08-31 RX ORDER — ACETAMINOPHEN 500 MG
500 TABLET ORAL EVERY 4 HOURS PRN
Status: DISCONTINUED | OUTPATIENT
Start: 2021-08-31 | End: 2021-08-31 | Stop reason: DRUGHIGH

## 2021-08-31 RX ORDER — ACETAMINOPHEN 325 MG/1
650 TABLET ORAL EVERY 6 HOURS PRN
Status: DISCONTINUED | OUTPATIENT
Start: 2021-08-31 | End: 2021-08-31

## 2021-08-31 RX ORDER — ALBUTEROL SULFATE 90 UG/1
2 AEROSOL, METERED RESPIRATORY (INHALATION) EVERY 4 HOURS PRN
Status: ON HOLD | COMMUNITY
Start: 2020-12-14 | End: 2022-12-01

## 2021-08-31 RX ORDER — IBUPROFEN 600 MG/1
600 TABLET, FILM COATED ORAL ONCE
Status: COMPLETED | OUTPATIENT
Start: 2021-08-31 | End: 2021-08-31

## 2021-08-31 RX ORDER — BUSPIRONE HYDROCHLORIDE 5 MG/1
5 TABLET ORAL 2 TIMES DAILY
COMMUNITY
End: 2022-11-25

## 2021-08-31 RX ORDER — ZONISAMIDE 100 MG/1
200 CAPSULE ORAL AT BEDTIME
Qty: 60 CAPSULE | Refills: 0 | Status: SHIPPED | OUTPATIENT
Start: 2021-09-01 | End: 2021-09-01

## 2021-08-31 RX ORDER — PROPRANOLOL HYDROCHLORIDE 10 MG/1
10 TABLET ORAL 3 TIMES DAILY
Status: DISCONTINUED | OUTPATIENT
Start: 2021-08-31 | End: 2021-09-02 | Stop reason: HOSPADM

## 2021-08-31 RX ADMIN — BUSPIRONE HYDROCHLORIDE 5 MG: 5 TABLET ORAL at 09:53

## 2021-08-31 RX ADMIN — BUSPIRONE HYDROCHLORIDE 5 MG: 5 TABLET ORAL at 19:52

## 2021-08-31 RX ADMIN — LAMOTRIGINE 100 MG: 100 TABLET ORAL at 09:54

## 2021-08-31 RX ADMIN — RISPERIDONE 3 MG: 3 TABLET ORAL at 21:23

## 2021-08-31 RX ADMIN — DIVALPROEX SODIUM 500 MG: 250 TABLET, DELAYED RELEASE ORAL at 09:53

## 2021-08-31 RX ADMIN — ACETAMINOPHEN 650 MG: 325 TABLET, FILM COATED ORAL at 21:29

## 2021-08-31 RX ADMIN — CALCIUM POLYCARBOPHIL 625 MG: 625 TABLET, FILM COATED ORAL at 09:55

## 2021-08-31 RX ADMIN — SODIUM CHLORIDE: 900 INJECTION, SOLUTION INTRAVENOUS at 13:39

## 2021-08-31 RX ADMIN — ACETAMINOPHEN 650 MG: 325 TABLET, FILM COATED ORAL at 09:57

## 2021-08-31 RX ADMIN — ACETAMINOPHEN 650 MG: 325 TABLET, FILM COATED ORAL at 14:02

## 2021-08-31 RX ADMIN — LEVOCARNITINE 330 MG: 330 TABLET ORAL at 09:55

## 2021-08-31 RX ADMIN — SODIUM CHLORIDE: 900 INJECTION, SOLUTION INTRAVENOUS at 06:03

## 2021-08-31 RX ADMIN — RISPERIDONE 1 MG: 1 TABLET ORAL at 13:38

## 2021-08-31 RX ADMIN — LEVOCARNITINE 330 MG: 330 TABLET ORAL at 18:02

## 2021-08-31 RX ADMIN — RISPERIDONE 1 MG: 1 TABLET ORAL at 11:13

## 2021-08-31 RX ADMIN — PROPRANOLOL HYDROCHLORIDE 10 MG: 10 TABLET ORAL at 09:55

## 2021-08-31 RX ADMIN — DIVALPROEX SODIUM 500 MG: 250 TABLET, DELAYED RELEASE ORAL at 13:38

## 2021-08-31 RX ADMIN — IBUPROFEN 600 MG: 600 TABLET, FILM COATED ORAL at 14:02

## 2021-08-31 RX ADMIN — ZONISAMIDE 200 MG: 100 CAPSULE ORAL at 21:23

## 2021-08-31 RX ADMIN — LAMOTRIGINE 200 MG: 100 TABLET ORAL at 21:23

## 2021-08-31 RX ADMIN — LEVETIRACETAM 1500 MG: 750 TABLET ORAL at 21:23

## 2021-08-31 RX ADMIN — LEVETIRACETAM 1000 MG: 500 TABLET ORAL at 09:54

## 2021-08-31 RX ADMIN — CITALOPRAM HYDROBROMIDE 40 MG: 20 TABLET ORAL at 09:55

## 2021-08-31 RX ADMIN — SODIUM CHLORIDE: 900 INJECTION, SOLUTION INTRAVENOUS at 22:30

## 2021-08-31 RX ADMIN — PROPRANOLOL HYDROCHLORIDE 10 MG: 10 TABLET ORAL at 19:54

## 2021-08-31 RX ADMIN — DIVALPROEX SODIUM 500 MG: 250 TABLET, DELAYED RELEASE ORAL at 19:52

## 2021-08-31 RX ADMIN — LEVOCARNITINE 330 MG: 330 TABLET ORAL at 12:08

## 2021-08-31 RX ADMIN — VALPROATE SODIUM 600 MG: 100 INJECTION, SOLUTION INTRAVENOUS at 00:39

## 2021-08-31 RX ADMIN — LAMOTRIGINE 100 MG: 100 TABLET ORAL at 16:07

## 2021-08-31 RX ADMIN — CALCIUM POLYCARBOPHIL 625 MG: 625 TABLET, FILM COATED ORAL at 18:02

## 2021-08-31 RX ADMIN — PROPRANOLOL HYDROCHLORIDE 10 MG: 10 TABLET ORAL at 13:38

## 2021-08-31 ASSESSMENT — MIFFLIN-ST. JEOR: SCORE: 2062.63

## 2021-08-31 ASSESSMENT — ACTIVITIES OF DAILY LIVING (ADL): DEPENDENT_IADLS:: MEDICATION MANAGEMENT

## 2021-08-31 NOTE — H&P
[facility]  Neurology Admission Note    Patient Name:  Tre Vazquez    MRN:  [id number]   :  1975  Date of Service:  2021  Primary care provider:  Niko Delgado      History of Present Illness:   Tre Vazquez is a 45 year old man with PMH of cerebral palsy with mild spastic paraparesis, intellectual disability, congenital left eye blindness, previous right Bell's palsy, mood disturbance, epilepsy disorder.  He currently presents from his group home with a breakthrough seizure.  Neurology was consulted as he had a second event in the ED.    He is a poor historian in the setting of a recent seizure and he is now quite lethargic and uncooperative.  History is obtained from patient, chart review, and ED staff.  Group home was not immediately available as they are extremely limited in staffing overnight.    Little is documented in the setting of his epilepsy disorder, and it is not exactly known what his AED regimen is.  He is currently prescribed valproate, levetiracetam, lamotrigine, and zonisamide.  The dosages of these medications he is not sure of as he just takes what the group home gives him.  He does admit that the group home may have missed giving him several doses of medication, usually in the morning hours.  He admits that there is a fairly complex regimen as he is supposed to get medications at roughly 3-4 different times throughout the day.  He does not know or say why the group home has been less consistent with medications in recent weeks.  He otherwise does not say or answer many other questions regarding his regimen.  He is unsure of the exact names of the medications or the dosages he is currently taking.  The group home was contacted and they were unwilling to discuss his current regimen overnight due to lack of staffing at that hour.  Finally, he does admit that his last generalized seizure was about 1 year ago.  He does not quite  remember the last time he had 2 seizures in a day, but it has been probably several years.  He admits to seeing a neurologist but it has been at least a year ago, and the last documented note was on 6/5/2020.  At that time he was taking valproate, levetiracetam, lamotrigine, zonisamide although he claims the dosages have may be been slightly modified since that visit via phone appointments.      ROS: A 10-point ROS was performed as per HPI.    PMH:  Past Medical History:   Diagnosis Date     Blindness of left eye      Depression 3/10/2014     Hypertension      Pneumococcal septicemia(038.2) (H) 11/26/2013    Hospitalized     Pneumonia, organism unspecified(486) 11/26/2013    Hospitalized     Unspecified epilepsy without mention of intractable epilepsy      Past Surgical History:   Procedure Laterality Date     ORTHOPEDIC SURGERY      pt stated past surgery on both ankles       Allergies:  Allergies   Allergen Reactions     Tomato      Vicodin [Hydrocodone-Acetaminophen] GI Disturbance       Medications:      Current Facility-Administered Medications:      sodium chloride 0.9% infusion, , Intravenous, Continuous, Johanna Damon DO, Last Rate: 125 mL/hr at 08/30/21 2203, New Bag at 08/30/21 2203     valproate (DEPACON) 600 mg in sodium chloride 0.9 % 50 mL intermittent infusion, 600 mg, Intravenous, Once, Johanna Damon DO    Current Outpatient Medications:      acetaminophen (TYLENOL) 500 MG tablet, Take 500 mg by mouth every 4 hours as needed, Disp: , Rfl:      ARTIFICIAL TEARS 0.1-0.3 % SOLN, Apply 2 drops to eye every hour. As needed., Disp: 1 Bottle, Rfl: 11     citalopram (CELEXA) 20 MG tablet, Take 1 tablet (20 mg) by mouth daily, Disp: 30 tablet, Rfl: 5     clonazePAM (KLONOPIN) 0.5 MG tablet, Take 1 tablet (0.5 mg) by mouth 3 times daily, Disp: 90 tablet, Rfl: 1     divalproex (DEPAKOTE ER) 500 MG 24 hr tablet, Take 1 tablet (500 mg) by mouth daily 500mg am every am 1000mg every pm, Disp: 90 tablet, Rfl:  "3     lamoTRIgine (LAMICTAL) 100 MG tablet, Take 2 tablets (200 mg) by mouth daily In the AM, Disp: 60 tablet, Rfl: 3     lamoTRIgine (LAMICTAL) 200 MG tablet, Take 2 tablets (400 mg) by mouth daily IN the PM, Disp: 60 tablet, Rfl: 3     levETIRAcetam (KEPPRA) 500 MG tablet, TAKE 2 TABLETS PO QAM AND 3 TABLETS PO AT BEDTIME- MUST MAKE APPT AND NOT CANCEL, Disp: 35 tablet, Rfl: 0     levETIRAcetam (KEPPRA) 500 MG tablet, TAKE 2 TABLETS BY MOUTH EVERY MORNING AND 3 TABLETS BY MOUTH EVERY NIGHT AT BEDTIME, Disp: 140 tablet, Rfl: 4     levETIRAcetam (KEPPRA) 500 MG tablet, Take 3 tablets (1,500 mg) by mouth 2 times daily, Disp: 540 tablet, Rfl: 1     levOCARNitine (CARNITOR) 330 MG tablet, TAKE 1 TABLET BY MOUTH 3 TIMES DAILY WITH MEALS, Disp: 84 tablet, Rfl: 7     lisinopril (PRINIVIL,ZESTRIL) 20 MG tablet, Take 1 tablet (20 mg) by mouth daily, Disp: 90 tablet, Rfl: 1     multivitamin, therapeutic with minerals (MULTI-VITAMIN) TABS, Take 1 tablet by mouth daily., Disp: 100 tablet, Rfl: 3     zonisamide (ZONEGRAN) 100 MG capsule, TAKE 2 CAPSULES BY MOUTH EVERY NIGHT AT BEDTIME, Disp: 56 capsule, Rfl: 0    Social History:  Social History     Tobacco Use     Smoking status: Former Smoker     Smokeless tobacco: Never Used   Substance Use Topics     Alcohol use: No       Family History:    No family history on file.    Physical Examination  Vitals: /80   Pulse 112   Temp 99.7  F (37.6  C) (Axillary)   Resp 29   Ht 1.702 m (5' 7\")   Wt 129.3 kg (285 lb)   SpO2 94%   BMI 44.64 kg/m      General: Lethargic, aroused by loud voice, not very cooperative  HEENT: Slightly large head for habitus, atraumatic, no epistaxis, no oral lesions, MMM  Resp: No increased work of breathing  Cardio: RRR, S1/S2 appropriate  Abdomen: Soft, non-distended, non-tender  Extremities: Warm and well perfused, peripheral pulses present, no edema  Skin: Not jaundiced, no rash, no ecchymoses  Psych: Kept eyes shut, reserved, flat " affect  Neuro:  Mental status: Lethargic, arouses to loud voice. Oriented to self, month, place, situation. Follows some commands but not consistently due to lack of cooperation.  Bradyphrenic, with slow speech output. Reduced digit span/unable to spell WORLD backwards. No dysarthria.  Cranial nerves: Eyes conjugate, PERRL, EOMI, visual fields full to threat, face symmetric, hearing intact to conversation. Remainder of cranial nerves unable to be assessed due to effort  Motor: Increased tone of both lower extremities, with paratonia present in the upper extremities. Moving all extremities equally and strongly, although was not able to formally test strength due to effort. No abnormal movements noted (no myoclonus).   Reflexes: Toes mute bilaterally  Sensory: Withdraws to noxious stimuli in all four extremities.   Coordination: Unable to adequately assess due to mental status.  Gait: Unable to assess due to mental status.      IMAGING:  CT:   Impression:  No acute intracranial pathology. Chronic small vessel ischemic  disease. Mild to moderate lateral and third ventriculomegaly.      Pending Work-up:  AED levels of zonisamide, lamotrigine, levetiracetam      IMPRESSION/PLAN:  Tre Vazquez is a 45 year old man with PMH of cerebral palsy with mild spastic paraparesis, intellectual disability, congenital left eye blindness, previous right Bell's palsy, mood disturbance, epilepsy disorder.  He presents from his group home with a breakthrough generalized seizure and had a second event in the ED.  This is likely in the setting of missed doses as he claims he was not getting the adequate amount of medications in recent weeks.  Unfortunately, his regimen is complex and it is unclear exactly what medications and dosages he is on.  Further investigation is probably needed when the group home is willing to discuss his regimen further.      #Cerebral palsy  #Seizure disorder  #Breakthrough seizures, probably due to missed  doses  - Current regimen not completely known and will discuss with group home in AM as nighttime staff is very limited at night  - For now continue presumed regimen   - Valproate 500 / 1000    - Lamotrigine 200 / 400   - Levetiracetam 1500 BID   - Zonisamide 200mg qHS   - Clonazepam unclear, do not dose for now  - Continue levocarnitine 330mg TID  - AED levels  - No EEG currently indicated as he is returning to baseline appropriately  - Seizure precautions  - Loaded with Keppra 1500 mg after 2nd event  - Loaded with Valproate 600mg (partial load to therapeutic) due to level  - ED observation incapable of watching patient after events, will admit to Neurology    #Mood disturbances  - PTA Citalopram 20mg daily      Patient discussed with Attending Dr. Derrick Wilson MD  PGY-2 Neurology Resident  08/30/2021

## 2021-08-31 NOTE — PROGRESS NOTES
Care Management Follow Up    Observation Staus    Length of Stay (days): 1    Expected Discharge Date: 09/01/2021     Concerns to be Addressed:  discharge planning     Patient plan of care discussed at interdisciplinary rounds: Yes    Anticipated Discharge Disposition: Group Home     Anticipated Discharge Services: None  Anticipated Discharge DME: None    Patient/family educated on Medicare website which has current facility and service quality ratings: yes  Education Provided on the Discharge Plan:    Patient/Family in Agreement with the Plan: no    Referrals Placed by CM/SW:    Private pay costs discussed: Not applicable    Additional Information:  In 6A Discharge Rounds Dr Thomas reported pt was admitted with breakthrough seizures; group home didn't give meds properly. Pt is drowsy.  This AM informed by KARIN Valdez, TCU was recommended. She met with pt, he was not agreeable to TCU placement, wanted to return to group [facility]. Faina reported difficulty reaching group [facility].   This afternoon KARIN Eisenberg, reported she was able to speak with pt's ROX, Anyi Velásquez and Ricki, group home staff. They said the report of falls is not true (she checked with pt and he admitted it). CM & EMIL report pt is attention seeking and has a history of bad behavior. Ricki reported to KARIN the group home can provide pt with more assist if needed. Informed DARIN Lopez, the previous information regarding falls is not accurate, group home said pt has not been falling.   Introduced myself to pt. Explained the MOON form; pt declined to sign and didn't want a copy. I signed the form, left a copy in pt's room and placed original in chart.   Pt said the name of his group home is [facility]. There are 4 residents and at least 1 staff member there at all times.   Noted pt was wearing oxygen per nasal cannula. Pt said he has oxygen at home and wears it when his sats are low.   Pt said the group home staff administers his meds.   The group home  provides transportation; he does not use wheelchair transport.   Pt said his primary is Dr Mayo. The facesheet had Dr Niko Delgado at [facility] in [place] listed as primary; pt was not familiar with this doctor. I called Admissions and had Dr Mayo added. Per [facility] pt had recent encounters with Dr Siobhan Mayo at [facility] in [facility].   Dr Thomas arrived to speak with pt. Pt has an outpt Neurology appt scheduled for tomorrow. Dr Thomas encouraging pt to keep that appt. Will plan for discharge early tomorrow AM.     Spoke with Dr Thomas later; she had been in communication with Ricki at the group home and told him pt will be ready for discharge tomorrow AM. Informed her pt's group home & CM said pt's report of falls is not accurate.    Per Epic Encounters pt has been getting medication refills at [facility] on [address] [place] [address] in [place]. Informed Dr Thomas of the pharmacy and pt's primary, Dr Mayo.      Plan:  Anticipate discharge back to group home tomorrow AM. Pt has an outpt Neurology appt late tomorrow AM or around noon.       Ale Tapia, RN Care Coordinator  Unit 6A, [facility]      Per [facility] pt recently was getting medication refills at:   [facility]  [address]  [place], [place]  ________________________________________    Primary:  Dr Siobhan Mayo MD   [place], [place]  Phone:  [phone]    [facility]   Phone: [phone]    Ricki, group [facility] staff  Phone:  [phone]

## 2021-08-31 NOTE — ED NOTES
[facility]   ED Nurse to Floor Handoff     Tre Vazquez is a 45 year old male who speaks English and lives with others,  in a group home  They arrived in the ED by ambulance from home    ED Chief Complaint: Seizures    ED Dx;   Final diagnoses:   Nonintractable generalized idiopathic epilepsy without status epilepticus (H)         Needed?: No    Allergies:   Allergies   Allergen Reactions     Tomato      Vicodin [Hydrocodone-Acetaminophen] GI Disturbance   .  Past Medical Hx:   Past Medical History:   Diagnosis Date     Blindness of left eye      Depression 3/10/2014     Hypertension      Pneumococcal septicemia(038.2) (H) 11/26/2013    Hospitalized     Pneumonia, organism unspecified(486) 11/26/2013    Hospitalized     Unspecified epilepsy without mention of intractable epilepsy       Baseline Mental status: WDL  Current Mental Status changes: at basesline    Infection present or suspected this encounter: no  Sepsis suspected: No  Isolation type: No active isolations  Patient tested for COVID 19 prior to admission: YES     Activity level - Baseline/Home:  Independent  Activity Level - Current:   Stand with Assist    Bariatric equipment needed?: No    In the ED these meds were given:   Medications   sodium chloride 0.9% infusion ( Intravenous New Bag 8/30/21 2203)   acetaminophen (TYLENOL) tablet 500 mg (has no administration in time range)   citalopram (celeXA) tablet 20 mg (has no administration in time range)   valproic acid (DEPAKENE) capsule 500 mg (has no administration in time range)   valproic acid (DEPAKENE) capsule 1,000 mg (has no administration in time range)   lamoTRIgine (LaMICtal) tablet 200 mg (has no administration in time range)   lamoTRIgine (LaMICtal) tablet 400 mg (has no administration in time range)   levETIRAcetam (KEPPRA) tablet 1,500 mg (has no administration in time range)   levOCARNitine (CARNITOR) tablet 330 mg (has no  administration in time range)   lisinopril (ZESTRIL) tablet 20 mg (has no administration in time range)   zonisamide (ZONEGRAN) capsule 200 mg (has no administration in time range)   melatonin tablet 1 mg (has no administration in time range)   ondansetron (ZOFRAN-ODT) ODT tab 4 mg (has no administration in time range)     Or   ondansetron (ZOFRAN) injection 4 mg (has no administration in time range)   lidocaine 1 % 0.1-1 mL (has no administration in time range)   lidocaine (LMX4) cream (has no administration in time range)   sodium chloride (PF) 0.9% PF flush 3 mL (has no administration in time range)   sodium chloride (PF) 0.9% PF flush 3 mL (has no administration in time range)   acetaminophen (TYLENOL) tablet 650 mg (has no administration in time range)     Or   acetaminophen (TYLENOL) Suppository 650 mg (has no administration in time range)   acetaminophen (TYLENOL) tablet 650 mg (650 mg Oral Given 8/30/21 1705)   magnesium sulfate 2 g in water intermittent infusion (0 g Intravenous Stopped 8/30/21 1842)   ibuprofen (ADVIL/MOTRIN) tablet 600 mg (600 mg Oral Given 8/30/21 1943)   LORazepam (ATIVAN) injection 2 mg (2 mg Intravenous Given 8/30/21 2052)   levETIRAcetam (KEPPRA) intermittent infusion 1,500 mg (0 mg Intravenous Stopped 8/30/21 2350)   valproate (DEPACON) 600 mg in sodium chloride 0.9 % 50 mL intermittent infusion (0 mg Intravenous Stopped 8/31/21 0118)       Drips running?  Yes    Home pump  No    Current LDAs  Peripheral IV 08/30/21 Anterior;Distal;Left Upper arm (Active)   Number of days: 1       Labs results:   Labs Ordered and Resulted from Time of ED Arrival Up to the Time of Departure from the ED   BASIC METABOLIC PANEL - Abnormal; Notable for the following components:       Result Value    Carbon Dioxide (CO2) 33 (*)     Anion Gap 2 (*)     All other components within normal limits   MAGNESIUM - Abnormal; Notable for the following components:    Magnesium 1.5 (*)     All other components  within normal limits   ROUTINE UA WITH MICROSCOPIC REFLEX TO CULTURE - Abnormal; Notable for the following components:    Ketones Urine 100  (*)     Protein Albumin Urine 30  (*)     Mucus Urine Present (*)     RBC Urine 3 (*)     All other components within normal limits    Narrative:     Urine Culture not indicated   CBC WITH PLATELETS AND DIFFERENTIAL - Abnormal; Notable for the following components:    Absolute Immature Granulocytes 0.1 (*)     All other components within normal limits   VALPROIC ACID - Normal   COVID-19 VIRUS (CORONAVIRUS) BY PCR - Normal    Narrative:     Testing was performed using the Xpert Xpress SARS-CoV-2 Assay on the  Cepheid Gene-Xpert Instrument Systems. Additional information about  this Emergency Use Authorization (EUA) assay can be found via the Lab  Guide. This test should be ordered for the detection of SARS-CoV-2 in  individuals who meet SARS-CoV-2 clinical and/or epidemiological  criteria. Test performance is unknown in asymptomatic patients. This  test is for in vitro diagnostic use under the FDA EUA for  laboratories certified under CLIA to perform high complexity testing.  This test has not been FDA cleared or approved. A negative result  does not rule out the presence of PCR inhibitors in the specimen or  target RNA in concentration below the limit of detection for the  assay. The possibility of a false negative should be considered if  the patient's recent exposure or clinical presentation suggests  COVID-19. This test was validated by the [facility]. This laboratory is certified under  the Clinical Laboratory Improvement Amendments of 1988 (CLIA-88) as  qualified to perform high complexity laboratory testing.     CBC WITH PLATELETS & DIFFERENTIAL    Narrative:     The following orders were created for panel order CBC with platelets differential.  Procedure                               Abnormality         Status                      ---------                               -----------         ------                     CBC with platelets and d...[[id number]]  Abnormal            Final result                 Please view results for these tests on the individual orders.   KEPPRA (LEVETIRACETAM) LEVEL   LAMOTRIGINE LEVEL   ZONISAMIDE LEVEL QUANTITATIVE   IP ASSIGN PROVIDER TEAM TO TREATMENT TEAM   OBSERVATION GOALS   ASSESS   NOTIFY PHYSICIAN   MEASURE HEIGHT AND WEIGHT   VITAL SIGNS   NOTIFY   NOTIFY   PERIPHERAL IV CATHETER   ACTIVITY       Imaging Studies:   Recent Results (from the past 24 hour(s))   CT Head w/o Contrast    Narrative    CT HEAD W/O CONTRAST 8/30/2021 4:29 PM    History: Seizure, nontraumatic (Age >= 41y)     Comparison: Head CT 4/4/2012    Technique: Using multidetector thin collimation helical acquisition  technique, axial, coronal and sagittal CT images from the skull base  to the vertex were obtained without intravenous contrast.   (topogram) image(s) also obtained and reviewed.    Findings: There is no intracranial hemorrhage, mass effect, or midline  shift. Gray/white matter differentiation in both cerebral hemispheres  is preserved. Ventricles are proportionate to the cerebral sulci. The  basal cisterns are clear. Patchy periventricular hypodensity,  consistent with chronic small vessel ischemic disease. Mild diffuse  cerebral volume loss. Mild to moderate lateral and third  ventriculomegaly.    The bony calvaria and the bones of the skull base are normal. The  visualized portions of the paranasal sinuses and mastoid air cells are  clear. Hyperostosis frontalis interna.      Impression    Impression:  No acute intracranial pathology. Chronic small vessel ischemic  disease. Mild to moderate lateral and third ventriculomegaly.    JENNIE MEAD MD         SYSTEM ID:  [id number]   XR Chest Port 1 View    Narrative    EXAMINATION: XR CHEST PORT 1 VIEW, 8/30/2021 4:35 PM    INDICATION: seizure    COMPARISON: Chest  "radiograph 12/20/2013    FINDINGS: Single AP semiupright radiograph of the chest    Trachea is midline. Cardiomediastinal borders are clear. Cardiac  silhouette is not enlarged. No focal consolidative opacity. No pleural  effusion. No pneumothorax. No suspicious pulmonary. No acute osseous  abnormality.      Impression    IMPRESSION: No acute radiographic abnormalities.    I have personally reviewed the examination and initial interpretation  and I agree with the findings.    NARINDER FAUST MD         SYSTEM ID:  [id number]       Recent vital signs:   /81   Pulse 103   Temp 99.7  F (37.6  C) (Axillary)   Resp 27   Ht 1.702 m (5' 7\")   Wt 129.3 kg (285 lb)   SpO2 94%   BMI 44.64 kg/m      [place] Coma Scale Score: 15 (08/30/21 2020)       Cardiac Rhythm: Normal Sinus and Tachycardia  Pt needs tele? No  Skin/wound Issues: None    Code Status: Full Code    Pain control: good    Nausea control: pt had none    Abnormal labs/tests/findings requiring intervention: Witnessed tonic-clonic seizure activity at 2100, lasting about a minute.  Resolved with Ativan administration    Family present during ED course? No   Family Comments/Social Situation comments: None    Tasks needing completion: None    Lisa Saunders RN  [facility] -- [phone] [facility]      "

## 2021-08-31 NOTE — ED PROVIDER NOTES
[place] [facility]ERGENC[facility] DEPARTMENT ([facility])  August 30, 2021    ED Provider Note  [facility]      History     Chief Complaint   Patient presents with     Seizures     HPI  Tre Vazquez is a 45 year old male with a history of cerebral palsy, epilepsy who presents with seizure.  Patient reports episode of seizure today causing him to fall to the ground while in the shower.  Per group home staff, seizure activity was typical for the patient's known epilepsy.  He last had a seizure about 1 year ago.  No recent changes to medications.  He has not been sick recently.  He has felt unsteady on his feet during the day today and did have several falls.  He complains of a headache at this time.    Past Medical History  Past Medical History:   Diagnosis Date     Blindness of left eye      Depression 3/10/2014     Hypertension      Pneumococcal septicemia(038.2) (H) 11/26/2013    Hospitalized     Pneumonia, organism unspecified(486) 11/26/2013    Hospitalized     Unspecified epilepsy without mention of intractable epilepsy      Past Surgical History:   Procedure Laterality Date     ORTHOPEDIC SURGERY      pt stated past surgery on both ankles     acetaminophen (TYLENOL) 500 MG tablet  ARTIFICIAL TEARS 0.1-0.3 % SOLN  citalopram (CELEXA) 20 MG tablet  clonazePAM (KLONOPIN) 0.5 MG tablet  divalproex (DEPAKOTE ER) 500 MG 24 hr tablet  lamoTRIgine (LAMICTAL) 100 MG tablet  lamoTRIgine (LAMICTAL) 200 MG tablet  levETIRAcetam (KEPPRA) 500 MG tablet  levETIRAcetam (KEPPRA) 500 MG tablet  levETIRAcetam (KEPPRA) 500 MG tablet  levOCARNitine (CARNITOR) 330 MG tablet  lisinopril (PRINIVIL,ZESTRIL) 20 MG tablet  multivitamin, therapeutic with minerals (MULTI-VITAMIN) TABS  zonisamide (ZONEGRAN) 100 MG capsule      Allergies   Allergen Reactions     Tomato      Vicodin [Hydrocodone-Acetaminophen] GI Disturbance     Family History  No family history on file.  Social History   Social History  "    Tobacco Use     Smoking status: Former Smoker     Smokeless tobacco: Never Used   Substance Use Topics     Alcohol use: No     Drug use: No      Past medical history, past surgical history, medications, allergies, family history, and social history were reviewed with the patient. No additional pertinent items.       Review of Systems  A complete review of systems was performed with pertinent positives and negatives noted in the HPI, and all other systems negative.    Physical Exam   BP: 135/84  Pulse: 94  Temp: 97.7  F (36.5  C)  Resp: 16  Height: 170.2 cm (5' 7\")  Weight: 129.3 kg (285 lb)  SpO2: 95 %  Physical Exam  Vitals and nursing note reviewed.   Constitutional:       General: He is not in acute distress.     Appearance: Normal appearance. He is not diaphoretic.   HENT:      Head: Atraumatic.   Eyes:      General: No scleral icterus.     Pupils: Pupils are equal, round, and reactive to light.   Cardiovascular:      Heart sounds: Normal heart sounds.   Pulmonary:      Effort: No respiratory distress.      Breath sounds: Normal breath sounds.   Abdominal:      General: Bowel sounds are normal.      Palpations: Abdomen is soft.      Tenderness: There is no abdominal tenderness.   Musculoskeletal:         General: No tenderness.      Cervical back: Neck supple.   Skin:     General: Skin is warm.      Findings: No rash.   Neurological:      Mental Status: He is alert.         ED Course      Procedures            EKG Interpretation:      Interpreted by Johanna Damon DO  Time reviewed: 1628  Symptoms at time of EKG: Headache   Rhythm: normal sinus   Rate: Normal  Axis: Normal  Ectopy: none  Conduction: normal  ST Segments/ T Waves: Non-specific ST-T wave changes and QTc prolongation 486 ms  Q Waves: none  Comparison to prior: QT now prolonged compared to 4/5/12    Clinical Impression: prolonged QT interval                   Results for orders placed or performed during the hospital encounter of 08/30/21   XR " Chest Port 1 View     Status: None    Narrative    EXAMINATION: XR CHEST PORT 1 VIEW, 8/30/2021 4:35 PM    INDICATION: seizure    COMPARISON: Chest radiograph 12/20/2013    FINDINGS: Single AP semiupright radiograph of the chest    Trachea is midline. Cardiomediastinal borders are clear. Cardiac  silhouette is not enlarged. No focal consolidative opacity. No pleural  effusion. No pneumothorax. No suspicious pulmonary. No acute osseous  abnormality.      Impression    IMPRESSION: No acute radiographic abnormalities.    I have personally reviewed the examination and initial interpretation  and I agree with the findings.    NARINDER FAUST MD         SYSTEM ID:  [id number]   CT Head w/o Contrast     Status: None    Narrative    CT HEAD W/O CONTRAST 8/30/2021 4:29 PM    History: Seizure, nontraumatic (Age >= 41y)     Comparison: Head CT 4/4/2012    Technique: Using multidetector thin collimation helical acquisition  technique, axial, coronal and sagittal CT images from the skull base  to the vertex were obtained without intravenous contrast.   (topogram) image(s) also obtained and reviewed.    Findings: There is no intracranial hemorrhage, mass effect, or midline  shift. Gray/white matter differentiation in both cerebral hemispheres  is preserved. Ventricles are proportionate to the cerebral sulci. The  basal cisterns are clear. Patchy periventricular hypodensity,  consistent with chronic small vessel ischemic disease. Mild diffuse  cerebral volume loss. Mild to moderate lateral and third  ventriculomegaly.    The bony calvaria and the bones of the skull base are normal. The  visualized portions of the paranasal sinuses and mastoid air cells are  clear. Hyperostosis frontalis interna.      Impression    Impression:  No acute intracranial pathology. Chronic small vessel ischemic  disease. Mild to moderate lateral and third ventriculomegaly.    JENNIE MEAD MD         SYSTEM ID:  [id number]   CBC with platelets  differential     Status: Abnormal    Narrative    The following orders were created for panel order CBC with platelets differential.  Procedure                               Abnormality         Status                     ---------                               -----------         ------                     CBC with platelets and d...[[id number]]  Abnormal            Final result                 Please view results for these tests on the individual orders.   Basic metabolic panel     Status: Abnormal   Result Value Ref Range    Sodium 139 133 - 144 mmol/L    Potassium 3.7 3.4 - 5.3 mmol/L    Chloride 104 94 - 109 mmol/L    Carbon Dioxide (CO2) 33 (H) 20 - 32 mmol/L    Anion Gap 2 (L) 3 - 14 mmol/L    Urea Nitrogen 12 7 - 30 mg/dL    Creatinine 0.74 0.66 - 1.25 mg/dL    Calcium 8.6 8.5 - 10.1 mg/dL    Glucose 99 70 - 99 mg/dL    GFR Estimate >90 >60 mL/min/1.73m2   Magnesium     Status: Abnormal   Result Value Ref Range    Magnesium 1.5 (L) 1.6 - 2.3 mg/dL   UA with Microscopic reflex to Culture     Status: Abnormal    Specimen: Urine, Midstream   Result Value Ref Range    Color Urine Yellow Colorless, Straw, Light Yellow, Yellow    Appearance Urine Clear Clear    Glucose Urine Negative Negative mg/dL    Bilirubin Urine Negative Negative    Ketones Urine 100  (A) Negative mg/dL    Specific Gravity Urine 1.022 1.003 - 1.035    Blood Urine Negative Negative    pH Urine 6.5 5.0 - 7.0    Protein Albumin Urine 30  (A) Negative mg/dL    Urobilinogen Urine Normal Normal, 2.0 mg/dL    Nitrite Urine Negative Negative    Leukocyte Esterase Urine Negative Negative    Mucus Urine Present (A) None Seen /LPF    RBC Urine 3 (H) <=2 /HPF    WBC Urine 1 <=5 /HPF    Narrative    Urine Culture not indicated   CBC with platelets and differential     Status: Abnormal   Result Value Ref Range    WBC Count 7.3 4.0 - 11.0 10e3/uL    RBC Count 4.53 4.40 - 5.90 10e6/uL    Hemoglobin 13.5 13.3 - 17.7 g/dL    Hematocrit 41.4 40.0 - 53.0 %    MCV  91 78 - 100 fL    MCH 29.8 26.5 - 33.0 pg    MCHC 32.6 31.5 - 36.5 g/dL    RDW 13.0 10.0 - 15.0 %    Platelet Count 182 150 - 450 10e3/uL    % Neutrophils 70 %    % Lymphocytes 18 %    % Monocytes 11 %    % Eosinophils 0 %    % Basophils 0 %    % Immature Granulocytes 1 %    NRBCs per 100 WBC 0 <1 /100    Absolute Neutrophils 5.1 1.6 - 8.3 10e3/uL    Absolute Lymphocytes 1.3 0.8 - 5.3 10e3/uL    Absolute Monocytes 0.8 0.0 - 1.3 10e3/uL    Absolute Eosinophils 0.0 0.0 - 0.7 10e3/uL    Absolute Basophils 0.0 0.0 - 0.2 10e3/uL    Absolute Immature Granulocytes 0.1 (H) <=0.0 10e3/uL    Absolute NRBCs 0.0 10e3/uL   Valproic acid (Depakote level)     Status: Normal   Result Value Ref Range    Valproic acid 30   mg/L   Asymptomatic COVID-19 Virus (Coronavirus) by PCR Nasopharyngeal     Status: Normal    Specimen: Nasopharyngeal; Swab   Result Value Ref Range    SARS CoV2 PCR Negative Negative    Narrative    Testing was performed using the Xpert Xpress SARS-CoV-2 Assay on the  Cepheid Gene-Xpert Instrument Systems. Additional information about  this Emergency Use Authorization (EUA) assay can be found via the Lab  Guide. This test should be ordered for the detection of SARS-CoV-2 in  individuals who meet SARS-CoV-2 clinical and/or epidemiological  criteria. Test performance is unknown in asymptomatic patients. This  test is for in vitro diagnostic use under the FDA EUA for  laboratories certified under CLIA to perform high complexity testing.  This test has not been FDA cleared or approved. A negative result  does not rule out the presence of PCR inhibitors in the specimen or  target RNA in concentration below the limit of detection for the  assay. The possibility of a false negative should be considered if  the patient's recent exposure or clinical presentation suggests  COVID-19. This test was validated by the [facility]. This laboratory is certified under  the Clinical  Laboratory Improvement Amendments of 1988 (CLIA-88) as  qualified to perform high complexity laboratory testing.     EKG 12-lead, tracing only     Status: None (Preliminary result)   Result Value Ref Range    Systolic Blood Pressure  mmHg    Diastolic Blood Pressure  mmHg    Ventricular Rate 89 BPM    Atrial Rate 89 BPM    UT Interval 192 ms    QRS Duration 84 ms     ms    QTc 486 ms    P Axis 63 degrees    R AXIS 5 degrees    T Axis 79 degrees    Interpretation ECG       Sinus rhythm  Nonspecific T wave abnormality  Prolonged QT  Abnormal ECG       Medications   sodium chloride 0.9% infusion ( Intravenous New Bag 8/30/21 2203)   valproate (DEPACON) 600 mg in sodium chloride 0.9 % 50 mL intermittent infusion (has no administration in time range)   acetaminophen (TYLENOL) tablet 650 mg (650 mg Oral Given 8/30/21 1705)   magnesium sulfate 2 g in water intermittent infusion (0 g Intravenous Stopped 8/30/21 1842)   ibuprofen (ADVIL/MOTRIN) tablet 600 mg (600 mg Oral Given 8/30/21 1943)   LORazepam (ATIVAN) injection 2 mg (2 mg Intravenous Given 8/30/21 2052)   levETIRAcetam (KEPPRA) intermittent infusion 1,500 mg (1,500 mg Intravenous New Bag 8/30/21 2203)        Assessments & Plan (with Medical Decision Making)   Patient was seen and examined.  EKG shows prolonged QT, otherwise unremarkable.  Labs show a magnesium of 1.4.  Other electrolytes unremarkable.  No evidence of infection.  CT head shows no acute intracranial process.  Patient was observed in the emergency department for over 5 hours without any recurrence of symptoms.  We were unable to get in touch with the patient's group home.  Initial plan was to admit him to [facility] overnight for continued monitoring, check of medication levels, and discharged to group home tomorrow.  Prior to transfer to ED abs, the patient had a another generalized tonic-clonic seizure witnessed by his nurse.  This resolved within about 1 minute.  He was given a dose of Ativan.  I  also loaded him with IV Keppra.  I spoke with the neurology service who evaluated the patient.  His valproic acid level did result and was subtherapeutic.  Given the patient's recurrent seizure, ED abs did not feel comfortable managing this patient.  He will be admitted to the neurology service overnight.  They recommended also loading him with IV valproate given the subtherapeutic level. Admitted to neurology in stable condition.     I have reviewed the nursing notes. I have reviewed the findings, diagnosis, plan and need for follow up with the patient.    New Prescriptions    No medications on file       Final diagnoses:   Nonintractable generalized idiopathic epilepsy without status epilepticus (H)       --  Johanna MALIK [facility] EMERGENCY DEPARTMENT  8/30/2021     Johanna Damon DO  08/30/21 4728

## 2021-08-31 NOTE — PLAN OF CARE
"Status: Admitted 8/30 for breakthrough seizure  Vitals: VSS on 2.5L NC  Neuros: A&O x4. 5/5 throughout. L eye blind. R rotator cuff injury- limited ROM. Complaining of back numbness that \"started a few weeks ago\"- MD aware.  IV: L PIV leaking & pulled- was infusing NS at 125 mL/hr. Awaiting new PIV.  Resp/trach: 2.5 L NC sating in mid to upper 90's  Diet: Regular. Tolerating well  Bowel status: LBM 8/29 per pt. BS+  : Voids using urinal  Skin: WNL  Pain: Headache. PRN Tylenol given x2. Motrin given x1  Activity: A1 GB, walker  Social: Gave update to group home today  Plan: Continue to monitor and follow POC.  Updates this shift: SW consult. PT recommending rehab. Pt states he does not want to go to rehab and wants to go back to group home. Outpatient neurology appointment tomorrow- possible discharge in AM 9/1    Observation Goals:  1. Diagnostic tests and consults completed and resulted   2. Vital signs normal or at patient baseline   3. Determine and initiate anti-epileptic drug regimen and establish follow up     "

## 2021-08-31 NOTE — PROGRESS NOTES
"[facility]  General Neurology - Progress Note    Patient Name:  Tre Vazquez  Date of Service:  August 31, 2021    Subjective:    Pt reports that he still feels rather \"out of it\" today and that he's rather stay in the hospital today. He also endorses a generalized HA, which he attributes to falling on the bathroom floor during his initial seizure at the group home, and that he's been \"twitching\" all night. Otherwise, he denies any vision changes, difficulty w/ speech/swallowing, numbness/tingling, or weakness    Spoke with manager of pt's group home, Ricki, this morning. Verified medication list and it appears that pt is receiving different doses of his Keppra (500mg BID) than that which has been prescribed (1000mg Qam + 1500mg Qpm) and has apparently been unable to refill his Zonisamide since 8/1. Besides this, Ricki also notes that the pt has refused doses in recent days (the 25th and 26th). There is some concern that this may have been partially purposeful done to get into the hospital    Besides this,  manager describes the pt as being alert and oriented at baseline. Describes his cognition as \"normal\" and that he is able to complete all ADLs w/o assistance including toileting, dressing, and feeding himself. While his movements do tend to be slow and he is supposed to use a walker when ambulating, the pt often refuses to do so. He does not normally fall and when he does, seems more behavioral per  manager    Objective:    Vitals: BP 95/57 (BP Location: Right arm)   Pulse 87   Temp 98.1  F (36.7  C) (Axillary)   Resp 16   Ht 1.702 m (5' 7\")   Wt 122.4 kg (269 lb 13.5 oz)   SpO2 97%   BMI 42.26 kg/m    General: Lying in bed, NAD  Head: Atraumatic, normocephalic   Cardiac: All extremities are well-perfused, 2+ bilateral peripheral edema  Neurologic:  Mental Status: Awake, alert, oriented x4. Pt is able to provide basic hx and follows commands w/o issue. Naming is " "intact. Immediate recall is 3/3 while short-term recall is 2/3. Pt declines to spell WORLD but correctly states there are 9 quarters in $2.25. Overall, speech is fluent and clear but takes long pauses before answering questions and has slowed pseech  Cranial Nerves: PERRL, EOMI w/o nystagmus, R eye fields intact, pt refused L eye testing. Facial sensation intact. No facial asymmetry. Hearing intact to conversation. Palate rise symmetric, tongue is midline. No dysarthria  Motor: No abnormal movements.  Moving all 4 extremities antigravity but refuses to move R shoulder (states he has a \"shoulder cuff injury\"). 5/5 strength with BUE shoulder abduction/adduction, elbow flexions/extension, and . 5/5 strength with BLE hip flexion and dorsiflexion/plantarflexion  Sensory: Intact to light touch x 4 extremities   Coordination: FNF intact  Reflexes: 2+ reflexes in biceps, brachioradialis, and patellae. Mute at ankles. No clonus. Neg Babinski  Station/Gait: Deferred     Pertinent Investigations:    I have personally reviewed most recent and pertinent labs, tests, and radiological images.       Assessment:  Tre Vazquez is a 45 year old man with PMH of cerebral palsy with mild spastic paraparesis, intellectual disability, congenital left eye blindness, previous right Bell's palsy, mood disturbance, epilepsy disorder.  He presents from his group home with a breakthrough generalized seizure and had a second event in the ED.  This is likely in the setting of missed doses as his group home has it recorded that he refused doses on 8/25 and 8/26. Plus, his  reports that he is only getting Keppra 500mg BID (Rx is for Keppra 1000mg Qam + 1500mg Qpm) and that he has been out of Vimpat entirely since 8/1. With this in mind, pt is most likely experiencing breakthrough seizure due to medication non-compliance. There is no As such, will ensure he is sent home w/ the proper doses of all 4 of his AEDs. Should he continue to clear " appropriately, would suspect he would be able to discharge early tomorrow morning. This would be especially beneficial as he has an outpatient Neurology appointment scheduled for tomorrow and has not seen them for over a year. Overall, I believe more regular outpatient follow-up would be the most beneficial in this patient's case    #Cerebral Palsy  #Seizure Disorder  #Breakthrough Seizure, likely secondary to medication non-compliance/missed doses  - AED's have been switched to most recently prescribed doses:   *Depakote DR 500mg TID   *Lamotrigine 100mg Qam + 100mg Qpm + 200mg at bedtime   *Keppra 1000mg Qam + 1500mg Qpm   *Zonisamide 200mg at bedtime  - PTA Levocarnitine 330mg TID  - Nor further EEG or MRI is needed at this time as pt is clearing appropriately  - Seizure precautions  - PT/OT consults placed    #Mood Disturbance  - PTA Citalopram 40mg daily  - PTA Buspirone 5mg BID  - PTA Risperidone 1mg Qam + 1mg Qpm + 3mg at bedtime    #HTN  - PTA Propranolol 10mg TID    #Constipation  - PTA Fibercon 625mg BID    Diet: Regular  VTE PPx: SCD's  Code Status: FULL  Dispo: likely home pending PT/OT eval    Patient was seen and discussed with Dr. Marta Thomas MD  Neurology PGY2  P: [phone]
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
## 2021-08-31 NOTE — PROGRESS NOTES
"   08/31/21 1000   Quick Adds   Type of Visit Initial PT Evaluation   Living Environment   People in home facility resident   Current Living Arrangements group home   Home Accessibility no concerns   Living Environment Comments Pt lives in group home. Reports staff does not assist with much, only medication management.   Self-Care   Usual Activity Tolerance moderate   Current Activity Tolerance poor   Regular Exercise No   Equipment Currently Used at Home walker, rolling;grab bar, toilet;grab bar, tub/shower   Activity/Exercise/Self-Care Comment Pt reports he is typically Reena with FWW for household distances. Staff only assists with med management, otherwise no assist for mobility or ADLs. Walks to kitchen area for meals, reports he has to walk long distances within group home to get from room to room. Pt reports frequent falls 2/2 balance impairments, reports at least 2 falls per day.   Disability/Function   Hearing Difficulty or Deaf no   Wear Glasses or Blind yes   Vision Management reports he has been blind since birth   Concentrating, Remembering or Making Decisions Difficulty yes   Difficulty Communicating no   Difficulty Eating/Swallowing no   Walking or Climbing Stairs Difficulty yes   Walking or Climbing Stairs ambulation difficulty, requires equipment   Mobility Management walker   Dressing/Bathing Difficulty no   Toileting issues no   Doing Errands Independently Difficulty (such as shopping) yes   Errands Management staff assist   Fall history within last six months yes   Number of times patient has fallen within last six months 40  (\"at least 40\" pt reports typically falling 2x/day)   Change in Functional Status Since Onset of Current Illness/Injury yes   General Information   Onset of Illness/Injury or Date of Surgery 08/30/21   Referring Physician Fab Wilson MD   Patient/Family Therapy Goals Statement (PT) to feel better, reduce pain   Pertinent History of Current Problem (include " personal factors and/or comorbidities that impact the POC) Per chart, Tre Vazquez is a 45 year old man with PMH of cerebral palsy with mild spastic paraparesis, intellectual disability, congenital left eye blindness, previous right Bell's palsy, mood disturbance, epilepsy disorder.  He currently presents from his group home with a breakthrough seizure.  Neurology was consulted as he had a second event in the ED.   Existing Precautions/Restrictions fall;seizures   General Observations Activity orders: ambulate with assist   Cognition   Follows Commands (Cognition) follows multi-step commands   Pain Assessment   Patient Currently in Pain Yes, see Vital Sign flowsheet   Integumentary/Edema   Integumentary/Edema no deficits were identifed   Posture    Posture Protracted shoulders;Forward head position   Range of Motion (ROM)   ROM Quick Adds ROM WFL   Strength   Strength Comments Gross LE strength >3/5 per functional assessment   Bed Mobility   Comment (Bed Mobility) [place] with HOB elevated and use of bedrail   Transfers   Transfer Safety Comments SBA sit<>stand with FWW   Gait/Stairs (Locomotion)   Comment (Gait/Stairs) Celio and FWW for short distance gait <25 feet. Has to stop frequently 2/2 hip pain. Intermittent LOB requiring Celio to recover balance and prevent fall.   Balance   Balance Comments Impaired dynamic standing balance, requires Celio and FWW for safety   Sensory Examination   Sensory Perception patient reports no sensory changes   Clinical Impression   Criteria for Skilled Therapeutic Intervention yes, treatment indicated   PT Diagnosis (PT) impaired functional mobility   Influenced by the following impairments impaired balance, pain, decreased endurance   Functional limitations due to impairments Pt requires assist for safe functional mobility   Clinical Presentation Stable/Uncomplicated   Clinical Presentation Rationale PMH and clinical judgment   Clinical Decision Making (Complexity) low complexity    Therapy Frequency (PT) 5x/week   Predicted Duration of Therapy Intervention (days/wks) 1 week   Planned Therapy Interventions (PT) balance training;bed mobility training;gait training;home exercise program;neuromuscular re-education;strengthening;transfer training;progressive activity/exercise;home program guidelines   Risk & Benefits of therapy have been explained evaluation/treatment results reviewed;care plan/treatment goals reviewed;risks/benefits reviewed;current/potential barriers reviewed;participants voiced agreement with care plan;participants included;patient   PT Discharge Planning    PT Discharge Recommendation (DC Rec) Transitional Care Facility   PT Rationale for DC Rec Pt currently below functional baseline, requiring assist of 1 for safe functional mobility. Pt typically modified independent with walker at group home, only receives assist for medication management from group home staff. Pt currently not safe to return home as he is requiring more assist than is available at group home, would benefit from continued skilled rehab to maximize functional independence and safe return home. Of note, pt reports at least 40 falls in past 6 months and at least 1-2 falls per day.   PT Brief overview of current status  Assist of 1 and FWW   Total Evaluation Time   Total Evaluation Time (Minutes) 10

## 2021-08-31 NOTE — CONSULTS
Care Management Initial Consult    General Information  Assessment completed with: Patient (at bedside), Care Team Member (Bunny Velásquez [phone])  Type of CM/SW Visit: Initial Assessment  Primary Care Provider verified and updated as needed: Yes   Readmission within the last 30 days: no previous admission in last 30 days   Reason for Consult: discharge planning  Advance Care Planning: Advance Care Planning Reviewed: no concerns identified          Communication Assessment  Patient's communication style: spoken language (English or Bilingual)    Hearing Difficulty or Deaf: no   Wear Glasses or Blind: yes    Cognitive  Cognitive/Neuro/Behavioral: .WDL except  Level of Consciousness: alert  Arousal Level: opens eyes spontaneously  Orientation: oriented x 4  Mood/Behavior: calm, cooperative  Best Language: 0 - No aphasia  Speech: slow, clear    Living Environment:   People in home: facility resident     Current living Arrangements: group home      Able to return to prior arrangements: yes       Family/Social Support:  Care provided by: self  Provides care for: no one  Marital Status: Single  Facility resident(s)/Staff, Bunny De Santiago, Step-mother Ashlie   Description of Support System: Supportive, Involved         Current Resources:   Patient receiving home care services: No  Community Resources: County Worker (Anyi Velásquez), OP Mental Health ([facility])  Equipment currently used at home: walker, rolling, grab bar, toilet, grab bar, tub/shower  Supplies currently used at home:      Employment/Financial:  Employment Status: disabled        Financial Concerns: No concerns identified      Functional Status:  Prior to admission patient needed assistance:   Dependent ADLs:: Independent  Dependent IADLs:: Medication Management       Mental Health Status:  Mental Health Status: Current Concern       Chemical Dependency Status:  Chemical Dependency Status: No Current Concerns              Values/Beliefs:  Spiritual, Cultural Beliefs, Catholic Practices, Values that affect care: NA               Additional Information:  KARIN met with pt at bedside to introduce self and role on treatment team. Pt agreeable to discussing with KARIN. Pt reports he is coming from a group home and things have been going well. Pt gave permission to SW to call group home ([facility] [phone].) Pt reports his CM Anyi is a positive support as well and gave permission to SW to call her. SW discussed current TCU recommendation. Pt is not agreeable to TCU placement and requested to return to group home. SW informed pt this SW will contact group home to see if they can accommodate pt's needs at this time.    KARIN spoke with ROX Anyi. Anyi stated she has been working with pt for 8 years. Anyi stated pt has lived at group home for 2.5 years. Anyi stated that pt has some behavioral concerns (attention seeking behavior and social skills.)     SW attempted to call group home, no answer and voicemail box was full. SW will attempt at a later time.     Addend 1415: SW called again, no answer and voicemail box is full.    Addend 1500: KARIN called Group Home and spoke with Ricki, per Ricki pt is able to return to group home. KARIN inquired on pt's report of having 2 falls per day, per Ricki pt often makes statements like this and they are not accurate. Ricki has to end call with writer as a resident needed assistance.   KARIN updated RNCC.    Faina Mcgill, SKYLER, LGSW  6D Adult Acute Care KARIN  Ph:[phone]  Pager [phone]

## 2021-08-31 NOTE — ED NOTES
Seizure approx 1 minute, Dr Damon to bedside. Placed on oxymask, yankeur PRN for oral secretions. Airway maintained throughout.

## 2021-08-31 NOTE — PLAN OF CARE
"Pt was a new admission from ER. Pt is frolm a group home and had a seizure at 1600 and fell in the shower. Pt came to the ER via EMS. Pt was going to discharge home, but had a seizure at 2100 in the Er. Pt was given Ativan at this time. Seizure was tonic clonic lasting 1 minutes. Pt was lehergic post Ativan. Pt transferred to  at 045 with clothing. Pt tolerated transfer well. Admission complete. Dual RN skin check complete. No issues identified.      Observation Goals:     1. diagnostic tests and consults completed and resulted. Goal Not met  2. vital signs normal or at patient baseline. Goal Not Met  3. determine and initiate anti-epileptic drug regimen and establish follow up . Goal not Met.       Time: 6825-9458.  Reason for admission: Seizure  VS: /68   Pulse 95   Temp 99.7  F (37.6  C) (Axillary)   Resp 17   Ht 1.702 m (5' 7\")   Wt 122.4 kg (269 lb 13.5 oz)   SpO2 94%   BMI 42.26 kg/m      Activity: A1.  Neuros: A&Ox4. Denies N/T. R bells palsy. L eye blind.   Cardiac: VSS. Apical heart sound. Pt reported chest pain since last seizure. Writer notified Dr. Frederick Wilson and was ordered to administer Tyleonol .Writer went to give Tylenol and pt reproted chest pain resolved and refused Tylenol .  Respiratory: LS clear. Came up sating 88-89% RA. Writer placed pt on 2L NC now sating 94%. MD Frederick Wilson updated.   GI/: Voids with no difficulty. Abd rounded. BS active.  Diet: Regular.   Skin: No issues identified.   Lines: R PIV  ml/hr.   New changes this shift/Plan: Continue POC.   Will continue to monitor & follow POC.     "

## 2021-08-31 NOTE — PLAN OF CARE
6A PT: Per chart review of 's discussion with pt's group home and interdisciplinary discussion, it has been determined that pt's reports of 1-2 falls per day are not factual. Pt reportedly admitted that his previous statement to PT was false. Per chart review, pt often makes inaccurate statements like this. Anticipate pt will be safe to return to group home when medically stable for discharge.

## 2021-08-31 NOTE — PHARMACY-ADMISSION MEDICATION HISTORY
Admission Medication History Completed by Pharmacy    See [facility] Admission Navigator for allergy information, preferred outpatient pharmacy, prior to admission medications and immunization status.     Medication History Sources:     Pharmacy dispense record via Satago    Changes made to PTA medication list (reason):    Added:   o Risperidone 1 mg @ 8am - 1 mg @ 2pm - 3 mg @ HS  o Propranolol 10 mg TID  o Fibercon 625 mg BID @ 8am and 5pm  o Buspirone 5 mg BID  o Albuterol inhaler  o Saline nasal spray  o Fluticasone nasal spray      Deleted:   o Old dose of levetiracetam (current dose is 1000 mg AM - 1500 mg HS)  o Lisinopril (not on dispense report)  o Clonazepam (not on dispense report)      Changed:   o Citalopram changed to 40 mg (was 20 mg)  o Divalproex changed to  mg TID (was  mg AM - 1000 mg PM)  o Lamotrigine changed to 100 mg - 100 mg - 200 mg (although it is not entirely clear from the way the prescriptions were written whether this was meant to be 100 mg - 100 mg - 200 mg or 100 mg - 300 mg)    Additional Information:    Medication history is limited to information available via [employer], as patient is not sure what he is given at group home, and his group home wasn't able to provide a medication list due to limited staffing. Dispense history available on Satago is quite detailed and therefore reasonable reliable.    Prior to Admission medications    Medication Sig Last Dose Taking? Auth Provider   acetaminophen (TYLENOL) 500 MG tablet Take 500 mg by mouth every 4 hours as needed  Yes Sarkis Gill MD   ARTIFICIAL TEARS 0.1-0.3 % SOLN Apply 2 drops to eye every hour. As needed.  Yes Niko Delgado MD   busPIRone (BUSPAR) 5 MG tablet Take 5 mg by mouth 2 times daily  Yes Unknown, Entered By History   calcium polycarbophil (FIBERCON) 625 MG tablet Take 1 tablet by mouth 2 times daily At 8am and 5pm  Yes Unknown, Entered By History   citalopram (CELEXA) 40 MG tablet Take 40  mg by mouth daily  Yes Unknown, Entered By History   divalproex sodium delayed-release (DEPAKOTE) 500 MG DR tablet Take 500 mg by mouth 3 times daily  Yes Unknown, Entered By History   fluticasone (FLONASE) 50 MCG/ACT nasal spray Spray 1 spray in nostril daily as needed  Yes Unknown, Entered By History   lamoTRIgine (LAMICTAL) 100 MG tablet Take 100 mg by mouth 2 times daily  Yes Unknown, Entered By History   lamoTRIgine (LAMICTAL) 200 MG tablet Take 200 mg by mouth At Bedtime  Yes Unknown, Entered By History   levETIRAcetam (KEPPRA) 500 MG tablet TAKE 2 TABLETS PO QAM AND 3 TABLETS PO AT BEDTIME- MUST MAKE APPT AND NOT CANCEL  Yes Branden Chauhan MD   levOCARNitine (CARNITOR) 330 MG tablet TAKE 1 TABLET BY MOUTH 3 TIMES DAILY WITH MEALS  Yes Branden Chauhan MD   multivitamin, therapeutic with minerals (MULTI-VITAMIN) TABS Take 1 tablet by mouth daily.  Yes Niko Delgado MD   propranolol (INDERAL) 10 MG tablet Take 10 mg by mouth 3 times daily  Yes Unknown, Entered By History   risperiDONE (RISPERDAL) 1 MG tablet Take 1 mg by mouth 2 times daily At 8am and 2pm  Yes Unknown, Entered By History   risperiDONE (RISPERDAL) 3 MG tablet Take 3 mg by mouth At Bedtime  Yes Unknown, Entered By History   SM NASAL SPRAY SALINE 0.65 % nasal spray Spray 1 spray in nostril every hour as needed  Yes Unknown, Entered By History   VENTOLIN  (90 Base) MCG/ACT inhaler Inhale 2 puffs into the lungs every 4 hours as needed  Yes Unknown, Entered By History   zonisamide (ZONEGRAN) 100 MG capsule TAKE 2 CAPSULES BY MOUTH EVERY NIGHT AT BEDTIME  Yes Branden Chauhan MD   levETIRAcetam (KEPPRA) 500 MG tablet TAKE 2 TABLETS BY MOUTH EVERY MORNING AND 3 TABLETS BY MOUTH EVERY NIGHT AT BEDTIME   Siobhan Ballard MD       Date completed: 08/31/21    Medication history completed by: Michael McgeeD, BCPS  8/31/2021 8:52 AM

## 2021-09-01 LAB
ANION GAP SERPL CALCULATED.3IONS-SCNC: 2 MMOL/L (ref 3–14)
BUN SERPL-MCNC: 12 MG/DL (ref 7–30)
CALCIUM SERPL-MCNC: 8.7 MG/DL (ref 8.5–10.1)
CHLORIDE BLD-SCNC: 108 MMOL/L (ref 94–109)
CO2 SERPL-SCNC: 33 MMOL/L (ref 20–32)
CREAT SERPL-MCNC: 0.65 MG/DL (ref 0.66–1.25)
ERYTHROCYTE [DISTWIDTH] IN BLOOD BY AUTOMATED COUNT: 13.2 % (ref 10–15)
GFR SERPL CREATININE-BSD FRML MDRD: >90 ML/MIN/1.73M2
GLUCOSE BLD-MCNC: 88 MG/DL (ref 70–99)
HCT VFR BLD AUTO: 40.5 % (ref 40–53)
HGB BLD-MCNC: 13.3 G/DL (ref 13.3–17.7)
LAMOTRIGINE SERPL-MCNC: 9.2 UG/ML
MCH RBC QN AUTO: 30.8 PG (ref 26.5–33)
MCHC RBC AUTO-ENTMCNC: 32.8 G/DL (ref 31.5–36.5)
MCV RBC AUTO: 94 FL (ref 78–100)
PLATELET # BLD AUTO: 199 10E3/UL (ref 150–450)
POTASSIUM BLD-SCNC: 3.6 MMOL/L (ref 3.4–5.3)
RBC # BLD AUTO: 4.32 10E6/UL (ref 4.4–5.9)
SODIUM SERPL-SCNC: 143 MMOL/L (ref 133–144)
VALPROATE FREE SERPL-MCNC: <5 UG/ML
WBC # BLD AUTO: 8.3 10E3/UL (ref 4–11)

## 2021-09-01 PROCEDURE — G0378 HOSPITAL OBSERVATION PER HR: HCPCS

## 2021-09-01 PROCEDURE — 250N000013 HC RX MED GY IP 250 OP 250 PS 637: Performed by: STUDENT IN AN ORGANIZED HEALTH CARE EDUCATION/TRAINING PROGRAM

## 2021-09-01 PROCEDURE — 85027 COMPLETE CBC AUTOMATED: CPT | Performed by: STUDENT IN AN ORGANIZED HEALTH CARE EDUCATION/TRAINING PROGRAM

## 2021-09-01 PROCEDURE — 258N000003 HC RX IP 258 OP 636: Performed by: EMERGENCY MEDICINE

## 2021-09-01 PROCEDURE — 82565 ASSAY OF CREATININE: CPT | Performed by: STUDENT IN AN ORGANIZED HEALTH CARE EDUCATION/TRAINING PROGRAM

## 2021-09-01 PROCEDURE — 99213 OFFICE O/P EST LOW 20 MIN: CPT | Mod: GC | Performed by: STUDENT IN AN ORGANIZED HEALTH CARE EDUCATION/TRAINING PROGRAM

## 2021-09-01 PROCEDURE — 96361 HYDRATE IV INFUSION ADD-ON: CPT

## 2021-09-01 PROCEDURE — 36415 COLL VENOUS BLD VENIPUNCTURE: CPT | Performed by: STUDENT IN AN ORGANIZED HEALTH CARE EDUCATION/TRAINING PROGRAM

## 2021-09-01 PROCEDURE — 999N000128 HC STATISTIC PERIPHERAL IV START W/O US GUIDANCE

## 2021-09-01 RX ORDER — LEVETIRACETAM 500 MG/1
1500 TABLET ORAL AT BEDTIME
Qty: 90 TABLET | Refills: 3 | Status: SHIPPED | OUTPATIENT
Start: 2021-09-01 | End: 2022-11-25

## 2021-09-01 RX ORDER — LEVETIRACETAM 500 MG/1
1500 TABLET ORAL AT BEDTIME
Qty: 90 TABLET | Refills: 3 | Status: SHIPPED | OUTPATIENT
Start: 2021-09-01 | End: 2021-09-01

## 2021-09-01 RX ORDER — ZONISAMIDE 100 MG/1
200 CAPSULE ORAL AT BEDTIME
Qty: 60 CAPSULE | Refills: 3 | Status: SHIPPED | OUTPATIENT
Start: 2021-09-01 | End: 2022-04-19

## 2021-09-01 RX ORDER — LAMOTRIGINE 200 MG/1
200 TABLET ORAL AT BEDTIME
Qty: 90 TABLET | Refills: 3 | Status: SHIPPED | OUTPATIENT
Start: 2021-09-01 | End: 2023-01-03

## 2021-09-01 RX ORDER — ZONISAMIDE 100 MG/1
200 CAPSULE ORAL AT BEDTIME
Qty: 60 CAPSULE | Refills: 3 | Status: SHIPPED | OUTPATIENT
Start: 2021-09-01 | End: 2021-09-01

## 2021-09-01 RX ORDER — LEVETIRACETAM 500 MG/1
1000 TABLET ORAL EVERY MORNING
Qty: 60 TABLET | Refills: 3 | Status: SHIPPED | OUTPATIENT
Start: 2021-09-01 | End: 2023-01-03

## 2021-09-01 RX ORDER — DIVALPROEX SODIUM 500 MG/1
500 TABLET, DELAYED RELEASE ORAL 3 TIMES DAILY
Qty: 270 TABLET | Refills: 3 | Status: SHIPPED | OUTPATIENT
Start: 2021-09-01 | End: 2023-01-03

## 2021-09-01 RX ORDER — LAMOTRIGINE 200 MG/1
200 TABLET ORAL AT BEDTIME
Qty: 90 TABLET | Refills: 3 | Status: SHIPPED | OUTPATIENT
Start: 2021-09-01 | End: 2021-09-01

## 2021-09-01 RX ORDER — LAMOTRIGINE 100 MG/1
100 TABLET ORAL 2 TIMES DAILY
Qty: 180 TABLET | Refills: 3 | Status: SHIPPED | OUTPATIENT
Start: 2021-09-01 | End: 2021-09-01

## 2021-09-01 RX ORDER — DIVALPROEX SODIUM 500 MG/1
500 TABLET, DELAYED RELEASE ORAL 3 TIMES DAILY
Qty: 270 TABLET | Refills: 3 | Status: SHIPPED | OUTPATIENT
Start: 2021-09-01 | End: 2021-09-01

## 2021-09-01 RX ORDER — LEVETIRACETAM 500 MG/1
1000 TABLET ORAL EVERY MORNING
Qty: 60 TABLET | Refills: 3 | Status: SHIPPED | OUTPATIENT
Start: 2021-09-01 | End: 2021-09-01

## 2021-09-01 RX ORDER — LAMOTRIGINE 100 MG/1
100 TABLET ORAL 2 TIMES DAILY
Qty: 180 TABLET | Refills: 3 | Status: SHIPPED | OUTPATIENT
Start: 2021-09-01 | End: 2022-11-25

## 2021-09-01 RX ADMIN — BUSPIRONE HYDROCHLORIDE 5 MG: 5 TABLET ORAL at 20:22

## 2021-09-01 RX ADMIN — RISPERIDONE 1 MG: 1 TABLET ORAL at 13:57

## 2021-09-01 RX ADMIN — DIVALPROEX SODIUM 500 MG: 250 TABLET, DELAYED RELEASE ORAL at 07:30

## 2021-09-01 RX ADMIN — SODIUM CHLORIDE: 900 INJECTION, SOLUTION INTRAVENOUS at 23:01

## 2021-09-01 RX ADMIN — LAMOTRIGINE 100 MG: 100 TABLET ORAL at 07:29

## 2021-09-01 RX ADMIN — DIVALPROEX SODIUM 500 MG: 250 TABLET, DELAYED RELEASE ORAL at 20:22

## 2021-09-01 RX ADMIN — CALCIUM POLYCARBOPHIL 625 MG: 625 TABLET, FILM COATED ORAL at 16:52

## 2021-09-01 RX ADMIN — LEVOCARNITINE 330 MG: 330 TABLET ORAL at 07:30

## 2021-09-01 RX ADMIN — SODIUM CHLORIDE: 900 INJECTION, SOLUTION INTRAVENOUS at 16:00

## 2021-09-01 RX ADMIN — PROPRANOLOL HYDROCHLORIDE 10 MG: 10 TABLET ORAL at 20:22

## 2021-09-01 RX ADMIN — RISPERIDONE 3 MG: 3 TABLET ORAL at 21:54

## 2021-09-01 RX ADMIN — PROPRANOLOL HYDROCHLORIDE 10 MG: 10 TABLET ORAL at 07:30

## 2021-09-01 RX ADMIN — ACETAMINOPHEN 650 MG: 325 TABLET, FILM COATED ORAL at 07:30

## 2021-09-01 RX ADMIN — LEVOCARNITINE 330 MG: 330 TABLET ORAL at 12:01

## 2021-09-01 RX ADMIN — ACETAMINOPHEN 650 MG: 325 TABLET, FILM COATED ORAL at 16:52

## 2021-09-01 RX ADMIN — RISPERIDONE 1 MG: 1 TABLET ORAL at 07:29

## 2021-09-01 RX ADMIN — SODIUM CHLORIDE: 900 INJECTION, SOLUTION INTRAVENOUS at 06:35

## 2021-09-01 RX ADMIN — LAMOTRIGINE 200 MG: 100 TABLET ORAL at 21:52

## 2021-09-01 RX ADMIN — PROPRANOLOL HYDROCHLORIDE 10 MG: 10 TABLET ORAL at 13:57

## 2021-09-01 RX ADMIN — LEVOCARNITINE 330 MG: 330 TABLET ORAL at 18:48

## 2021-09-01 RX ADMIN — SALINE NASAL SPRAY 1 SPRAY: 1.5 SOLUTION NASAL at 12:02

## 2021-09-01 RX ADMIN — ACETAMINOPHEN 650 MG: 325 TABLET, FILM COATED ORAL at 21:52

## 2021-09-01 RX ADMIN — DIVALPROEX SODIUM 500 MG: 250 TABLET, DELAYED RELEASE ORAL at 13:57

## 2021-09-01 RX ADMIN — LEVETIRACETAM 1000 MG: 500 TABLET ORAL at 07:30

## 2021-09-01 RX ADMIN — LAMOTRIGINE 100 MG: 100 TABLET ORAL at 16:00

## 2021-09-01 RX ADMIN — CALCIUM POLYCARBOPHIL 625 MG: 625 TABLET, FILM COATED ORAL at 07:30

## 2021-09-01 RX ADMIN — ACETAMINOPHEN 650 MG: 325 TABLET, FILM COATED ORAL at 02:00

## 2021-09-01 RX ADMIN — SALINE NASAL SPRAY 1 SPRAY: 1.5 SOLUTION NASAL at 07:30

## 2021-09-01 RX ADMIN — BUSPIRONE HYDROCHLORIDE 5 MG: 5 TABLET ORAL at 07:30

## 2021-09-01 RX ADMIN — LEVETIRACETAM 1500 MG: 750 TABLET ORAL at 21:52

## 2021-09-01 RX ADMIN — ZONISAMIDE 200 MG: 100 CAPSULE ORAL at 21:53

## 2021-09-01 RX ADMIN — CITALOPRAM HYDROBROMIDE 40 MG: 20 TABLET ORAL at 07:29

## 2021-09-01 RX ADMIN — ACETAMINOPHEN 650 MG: 325 TABLET, FILM COATED ORAL at 12:01
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
## 2021-09-01 NOTE — PROGRESS NOTES
Observation Goals:  1. Diagnostic tests and consults completed and resulted Met  2. Vital signs normal or at patient baseline Met  3. Determine and initiate anti-epileptic drug regimen and establish follow up not met

## 2021-09-01 NOTE — PLAN OF CARE
OT-6A: OT to Defer. Pt is currently at baseline function and with no skilled OT needs at this time. PT following. Will complete OT order and defer.
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
## 2021-09-01 NOTE — PLAN OF CARE
Status: Admitted on 8/30 for breakthrough seizure  Vitals: VSS on 1L NC. Continuous pulse ox  Neuros: A&O x4. 5/5 strengths. Blind L eye. Limited ROM in R shoulder.   IV: L PIV infusing  mL/hr  Labs/Electrolytes: WNL  Resp/trach: 1L NC- sats in mid 90's. LS clear  Diet: Regular. Staff orders meals for pt  Bowel status: Large BM today  : Voiding in urinal/bathroom  Skin: Redness noted under armpits- pt says they are not itchy or bothersome.   Pain: Frontal head pain. Tylenol given x2 with some relief.  Activity: A1 GB, walker.  Plan: MD and social work working on discharge. SW discussed with patient SNF while waiting for new group home placement.    Observation Goals:  1. Diagnostic tests and consults completed and resulted Met  2. Vital signs normal or at patient baseline Met  3. Determine and initiate anti-epileptic drug regimen and establish follow up Met
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
## 2021-09-02 VITALS
DIASTOLIC BLOOD PRESSURE: 50 MMHG | OXYGEN SATURATION: 93 % | BODY MASS INDEX: 42.35 KG/M2 | RESPIRATION RATE: 18 BRPM | WEIGHT: 269.84 LBS | HEIGHT: 67 IN | HEART RATE: 76 BPM | SYSTOLIC BLOOD PRESSURE: 99 MMHG | TEMPERATURE: 97.8 F

## 2021-09-02 LAB
LEVETIRACETAM SERPL-MCNC: 2 UG/ML
ZONISAMIDE SERPL-MCNC: <2 UG/ML

## 2021-09-02 PROCEDURE — 250N000013 HC RX MED GY IP 250 OP 250 PS 637: Performed by: STUDENT IN AN ORGANIZED HEALTH CARE EDUCATION/TRAINING PROGRAM

## 2021-09-02 PROCEDURE — 99217 PR OBSERVATION CARE DISCHARGE: CPT | Mod: GC | Performed by: STUDENT IN AN ORGANIZED HEALTH CARE EDUCATION/TRAINING PROGRAM

## 2021-09-02 PROCEDURE — G0378 HOSPITAL OBSERVATION PER HR: HCPCS

## 2021-09-02 PROCEDURE — 96361 HYDRATE IV INFUSION ADD-ON: CPT

## 2021-09-02 RX ORDER — ACETAMINOPHEN 325 MG/1
650 TABLET ORAL EVERY 6 HOURS PRN
Status: DISCONTINUED | OUTPATIENT
Start: 2021-09-02 | End: 2021-09-02 | Stop reason: HOSPADM

## 2021-09-02 RX ORDER — ACETAMINOPHEN 650 MG/1
650 SUPPOSITORY RECTAL EVERY 6 HOURS PRN
Status: DISCONTINUED | OUTPATIENT
Start: 2021-09-02 | End: 2021-09-02 | Stop reason: HOSPADM

## 2021-09-02 RX ADMIN — CALCIUM POLYCARBOPHIL 625 MG: 625 TABLET, FILM COATED ORAL at 09:00

## 2021-09-02 RX ADMIN — DIVALPROEX SODIUM 500 MG: 250 TABLET, DELAYED RELEASE ORAL at 09:00

## 2021-09-02 RX ADMIN — CITALOPRAM HYDROBROMIDE 40 MG: 20 TABLET ORAL at 08:59

## 2021-09-02 RX ADMIN — LEVETIRACETAM 1000 MG: 500 TABLET ORAL at 08:59

## 2021-09-02 RX ADMIN — BUSPIRONE HYDROCHLORIDE 5 MG: 5 TABLET ORAL at 08:59

## 2021-09-02 RX ADMIN — PROPRANOLOL HYDROCHLORIDE 10 MG: 10 TABLET ORAL at 08:59

## 2021-09-02 RX ADMIN — LEVOCARNITINE 330 MG: 330 TABLET ORAL at 08:59

## 2021-09-02 RX ADMIN — LAMOTRIGINE 100 MG: 100 TABLET ORAL at 08:59

## 2021-09-02 RX ADMIN — RISPERIDONE 1 MG: 1 TABLET ORAL at 09:00

## 2021-09-02 NOTE — PLAN OF CARE
Status: Admitted on 8/30 for a breakthrough seizure  Vitals: VSS on 1LPM NC.  Neuros: A&Ox4, 5/5 strength throughout. Blind L eye, unable to open. Limited ROM in RUE r/t R rotator cuff injury. Denies N/T this shift. Baseline per pt.   IV: L PIV Infusing NS at 125 ml/hr. Paused this shift r/t losing 2 PIVs d/t pt baseline diaphoresis, adhesives do not stick.   Labs/Electrolytes: WDL  Resp/trach: On 1LPM NC, LS clear, equal bilaterally.   Diet: Regular, tolerating, preoccupied with food and snacks.   Bowel status: LBM 9/1 per pt report, BS+.   : Voids in urinal/bathroom.  Skin: WNL ex generalized edema.  Pain: Endorses lower back and right shoulder pain at this time, controlled with tylenol.   Activity: A1 Gb/walker.  Social: Flat affect at times, prefers to only be called Joel.   Plan: Continue to monitor for seizures, none noted this shift.   Updates this shift: Discharge to group home or nursing home per care coordinator.

## 2021-09-02 NOTE — PROGRESS NOTES
Observation Goals:  1. Diagnostic tests and consults completed and resulted Met  2. Vital signs normal or at patient baseline Met  3. Determine and initiate anti-epileptic drug regimen and establish follow up Met

## 2021-09-02 NOTE — PLAN OF CARE
Time of Nursing Care:7:00 AM-11:30 AM  Date of Admission:  Reason for Admission: Breakthrough seizures, missed taking doses of seizure medications  Procedure:  Neuro: A&Ox4.   Cardiac: Afebrile, VSS.   Respiratory: RA   GI/: Voiding spontaneously. No BM this shift.   Diet/appetite: Tolerating regular diet. Denies nausea   Activity: Up with stand by assist    Pain: Denies   Skin: No new deficits noted.  Lines: PIV removed at the time of discharge.  Drains:None  No new complaints.Pt has been resting comfortably throughout this shift. Plan:will continue to monitor and follow plan of care. Update provider/s with change/concern/question. Hand off report was given to receiving staff Ricki.Discharged

## 2021-09-02 NOTE — PROGRESS NOTES
Care Management Follow Up      Observation Status  Admission Date:  8-    Length of Stay (days): 1    Expected Discharge Date:  Medically ready for discharge     Concerns to be Addressed: discharge planning     Patient plan of care discussed at interdisciplinary rounds: Yes    Anticipated Discharge Disposition:  Discharged back to [facility].      Anticipated Discharge Services: Transportation, [facility].   Anticipated Discharge DME: None    Patient/family educated on Medicare website which has current facility and service quality ratings:  NA  Education Provided on the Discharge Plan:  Yes  Patient/Family in Agreement with the Plan:  Yes    Referrals Placed by CM/SW:  [facility]  Private pay costs discussed: Not applicable    Additional Information:  8:30am:  Pt sound asleep in bed. He woke up after I called his name a few times and lightly touched his arm. Informed pt he is medically ready for discharge today. Inquired if he had decided on a discharge plan and he said no. Informed him we need to work on a plan for discharge today. He inquired why and I repeated he is medically ready for discharge and insurance requires we work on a discharge plan. Informed pt I will return in 1 hour. Pt said he needed to use the bathroom and I pressed the nurse call button for him.     In 6A Discharge Rounds Dr Thomas reported she just spoke with pt and he is agreeable to discharge back to group home.  Called Ricki at the group home and informed him pt is discharging back to group home today. I will call him back with the time. He said there will be someone at the home. Will have nurse call report. Gave him the phone number for 6A.   Called [facility] and scheduled wheelchair ride for 11:30am.   Updated pt's nurse, Debrework on discharge plan and requested she call report to the group home.   At discharge pt agreeable, calm; up with assist & a walker to the wheelchair  for transport.   3:30pm:  Left a voice message for Anyi Velásquez CM, informed her pt discharged back to [facility] today.     Handoff sent to Dr Mayo, primary provider and Dr Ballard, Neurology.       Ale Tapia, RN Care Coordinator  Unit 6A, [facility]      Per [facility] pt recently was getting medication refills at:   [facility]  [address] [place] [address]     Primary:  Dr Siobhan Mayo MD   [place], [place]  Phone:  [phone]     [facility]   Phone: [phone]  Main Phone:  [phone]  [facility], a division of [facility] staff  Phone:  [phone]

## 2021-09-03 NOTE — PLAN OF CARE
Physical Therapy Discharge Summary    Reason for therapy discharge:    Discharged to back to group home    Progress towards therapy goal(s). See goals on Care Plan in [facility] electronic health record for goal details.  Goals partially met.  Barriers to achieving goals:   limited tolerance for therapy and discharge from facility.    Therapy recommendation(s):    No further therapy is recommended.

## 2021-12-08 ENCOUNTER — APPOINTMENT (OUTPATIENT)
Dept: PHYSICAL THERAPY | Facility: CLINIC | Age: 46
End: 2021-12-08
Attending: EMERGENCY MEDICINE
Payer: MEDICARE

## 2021-12-08 ENCOUNTER — APPOINTMENT (OUTPATIENT)
Dept: GENERAL RADIOLOGY | Facility: CLINIC | Age: 46
End: 2021-12-08
Attending: EMERGENCY MEDICINE
Payer: MEDICARE

## 2021-12-08 ENCOUNTER — HOSPITAL ENCOUNTER (EMERGENCY)
Facility: CLINIC | Age: 46
Discharge: HOME | End: 2021-12-08
Attending: EMERGENCY MEDICINE | Admitting: EMERGENCY MEDICINE
Payer: MEDICARE

## 2021-12-08 ENCOUNTER — APPOINTMENT (OUTPATIENT)
Dept: CT IMAGING | Facility: CLINIC | Age: 46
End: 2021-12-08
Attending: EMERGENCY MEDICINE
Payer: MEDICARE

## 2021-12-08 VITALS
OXYGEN SATURATION: 91 % | RESPIRATION RATE: 19 BRPM | HEART RATE: 74 BPM | TEMPERATURE: 97.7 F | BODY MASS INDEX: 40.18 KG/M2 | HEIGHT: 67 IN | WEIGHT: 256 LBS | DIASTOLIC BLOOD PRESSURE: 90 MMHG | SYSTOLIC BLOOD PRESSURE: 117 MMHG

## 2021-12-08 DIAGNOSIS — R09.02 HYPOXEMIA: Primary | ICD-10-CM

## 2021-12-08 LAB
ANION GAP SERPL CALCULATED.3IONS-SCNC: 4 MMOL/L (ref 3–14)
ATRIAL RATE - MUSE: 68 BPM
BASOPHILS # BLD AUTO: 0 10E3/UL (ref 0–0.2)
BASOPHILS NFR BLD AUTO: 1 %
BUN SERPL-MCNC: 16 MG/DL (ref 7–30)
CALCIUM SERPL-MCNC: 8.3 MG/DL (ref 8.5–10.1)
CHLORIDE BLD-SCNC: 107 MMOL/L (ref 94–109)
CO2 SERPL-SCNC: 32 MMOL/L (ref 20–32)
CREAT SERPL-MCNC: 0.7 MG/DL (ref 0.66–1.25)
DIASTOLIC BLOOD PRESSURE - MUSE: NORMAL MMHG
EOSINOPHIL # BLD AUTO: 0.1 10E3/UL (ref 0–0.7)
EOSINOPHIL NFR BLD AUTO: 1 %
ERYTHROCYTE [DISTWIDTH] IN BLOOD BY AUTOMATED COUNT: 13.3 % (ref 10–15)
FLUAV RNA SPEC QL NAA+PROBE: NEGATIVE
FLUBV RNA RESP QL NAA+PROBE: NEGATIVE
GFR SERPL CREATININE-BSD FRML MDRD: >90 ML/MIN/1.73M2
GLUCOSE BLD-MCNC: 100 MG/DL (ref 70–99)
HCT VFR BLD AUTO: 42.2 % (ref 40–53)
HGB BLD-MCNC: 13.4 G/DL (ref 13.3–17.7)
HOLD SPECIMEN: NORMAL
HOLD SPECIMEN: NORMAL
IMM GRANULOCYTES # BLD: 0 10E3/UL
IMM GRANULOCYTES NFR BLD: 1 %
INTERPRETATION ECG - MUSE: NORMAL
LYMPHOCYTES # BLD AUTO: 2.7 10E3/UL (ref 0.8–5.3)
LYMPHOCYTES NFR BLD AUTO: 42 %
MCH RBC QN AUTO: 30 PG (ref 26.5–33)
MCHC RBC AUTO-ENTMCNC: 31.8 G/DL (ref 31.5–36.5)
MCV RBC AUTO: 94 FL (ref 78–100)
MONOCYTES # BLD AUTO: 0.8 10E3/UL (ref 0–1.3)
MONOCYTES NFR BLD AUTO: 13 %
NEUTROPHILS # BLD AUTO: 2.6 10E3/UL (ref 1.6–8.3)
NEUTROPHILS NFR BLD AUTO: 42 %
NRBC # BLD AUTO: 0 10E3/UL
NRBC BLD AUTO-RTO: 0 /100
P AXIS - MUSE: 53 DEGREES
PLATELET # BLD AUTO: 157 10E3/UL (ref 150–450)
POTASSIUM BLD-SCNC: 3.9 MMOL/L (ref 3.4–5.3)
PR INTERVAL - MUSE: 190 MS
QRS DURATION - MUSE: 94 MS
QT - MUSE: 490 MS
QTC - MUSE: 521 MS
R AXIS - MUSE: 23 DEGREES
RBC # BLD AUTO: 4.47 10E6/UL (ref 4.4–5.9)
RSV RNA SPEC NAA+PROBE: NEGATIVE
SARS-COV-2 RNA RESP QL NAA+PROBE: NEGATIVE
SODIUM SERPL-SCNC: 143 MMOL/L (ref 133–144)
SYSTOLIC BLOOD PRESSURE - MUSE: NORMAL MMHG
T AXIS - MUSE: 34 DEGREES
TROPONIN I SERPL HS-MCNC: 8 NG/L
VENTRICULAR RATE- MUSE: 68 BPM
WBC # BLD AUTO: 6.3 10E3/UL (ref 4–11)

## 2021-12-08 PROCEDURE — 93005 ELECTROCARDIOGRAM TRACING: CPT | Performed by: EMERGENCY MEDICINE

## 2021-12-08 PROCEDURE — 97530 THERAPEUTIC ACTIVITIES: CPT | Mod: GP

## 2021-12-08 PROCEDURE — 84484 ASSAY OF TROPONIN QUANT: CPT | Performed by: EMERGENCY MEDICINE

## 2021-12-08 PROCEDURE — 97161 PT EVAL LOW COMPLEX 20 MIN: CPT | Mod: GP

## 2021-12-08 PROCEDURE — 71275 CT ANGIOGRAPHY CHEST: CPT | Mod: ME

## 2021-12-08 PROCEDURE — 71045 X-RAY EXAM CHEST 1 VIEW: CPT | Mod: 26 | Performed by: RADIOLOGY

## 2021-12-08 PROCEDURE — 85025 COMPLETE CBC W/AUTO DIFF WBC: CPT | Performed by: EMERGENCY MEDICINE

## 2021-12-08 PROCEDURE — 250N000011 HC RX IP 250 OP 636: Performed by: EMERGENCY MEDICINE

## 2021-12-08 PROCEDURE — 93010 ELECTROCARDIOGRAM REPORT: CPT | Performed by: EMERGENCY MEDICINE

## 2021-12-08 PROCEDURE — C9803 HOPD COVID-19 SPEC COLLECT: HCPCS | Performed by: EMERGENCY MEDICINE

## 2021-12-08 PROCEDURE — 71045 X-RAY EXAM CHEST 1 VIEW: CPT

## 2021-12-08 PROCEDURE — G1004 CDSM NDSC: HCPCS | Performed by: RADIOLOGY

## 2021-12-08 PROCEDURE — 71260 CT THORAX DX C+: CPT | Mod: 26 | Performed by: RADIOLOGY

## 2021-12-08 PROCEDURE — 87637 SARSCOV2&INF A&B&RSV AMP PRB: CPT | Performed by: EMERGENCY MEDICINE

## 2021-12-08 PROCEDURE — 250N000009 HC RX 250: Performed by: EMERGENCY MEDICINE

## 2021-12-08 PROCEDURE — 99285 EMERGENCY DEPT VISIT HI MDM: CPT | Mod: 25 | Performed by: EMERGENCY MEDICINE

## 2021-12-08 PROCEDURE — 36415 COLL VENOUS BLD VENIPUNCTURE: CPT | Performed by: EMERGENCY MEDICINE

## 2021-12-08 PROCEDURE — 80048 BASIC METABOLIC PNL TOTAL CA: CPT | Performed by: EMERGENCY MEDICINE

## 2021-12-08 PROCEDURE — 97116 GAIT TRAINING THERAPY: CPT | Mod: GP

## 2021-12-08 RX ORDER — IOPAMIDOL 755 MG/ML
84 INJECTION, SOLUTION INTRAVASCULAR ONCE
Status: COMPLETED | OUTPATIENT
Start: 2021-12-08 | End: 2021-12-08

## 2021-12-08 RX ADMIN — IOPAMIDOL 84 ML: 755 INJECTION, SOLUTION INTRAVENOUS at 06:08

## 2021-12-08 RX ADMIN — SODIUM CHLORIDE, PRESERVATIVE FREE 102 ML: 5 INJECTION INTRAVENOUS at 06:17

## 2021-12-08 ASSESSMENT — ENCOUNTER SYMPTOMS
COUGH: 1
SHORTNESS OF BREATH: 1
VOMITING: 0
FEVER: 0

## 2021-12-08 ASSESSMENT — ACTIVITIES OF DAILY LIVING (ADL)
DEPENDENT_IADLS:: CLEANING;COOKING;LAUNDRY;SHOPPING;MEAL PREPARATION;MEDICATION MANAGEMENT;MONEY MANAGEMENT;TRANSPORTATION

## 2021-12-08 ASSESSMENT — MIFFLIN-ST. JEOR: SCORE: 1994.84

## 2021-12-08 NOTE — PROGRESS NOTES
[facility]      OUTPATIENT PHYSICAL THERAPY EVALUATION  PLAN OF TREATMENT FOR OUTPATIENT REHABILITATION  (COMPLETE FOR INITIAL CLAIMS ONLY)  Patient's Last Name, First Name, M.I.  YOB: 1975  GeorgeTre  MAIRA                        Provider's Name  [facility] Medical Record No.  [id number]                               Onset Date:  12/08/21   Start of Care Date:  12/08/21      Type:     _X_PT   ___OT   ___SLP Medical Diagnosis:  shortness of breath                        PT Diagnosis:  impaired functional mobility   Visits from SOC:  1   _________________________________________________________________________________  Plan of Treatment/Functional Goals    Planned Interventions: balance training,bed mobility training,gait training,home exercise program,patient/family education,strengthening,transfer training,progressive activity/exercise,home program guidelines     Goals: See Physical Therapy Goals on Care Plan in [facility] electronic health record.    Therapy Frequency: 3x/week  Predicted Duration of Therapy Intervention: 2 days  _________________________________________________________________________________    I CERTIFY THE NEED FOR THESE SERVICES FURNISHED UNDER        THIS PLAN OF TREATMENT AND WHILE UNDER MY CARE     (Physician co-signature of this document indicates review and certification of the therapy plan).                Certification date from: 12/08/21, Certification date to: 12/10/21    Referring Physician: Jan Wesley,             Initial Assessment        See Physical Therapy evaluation dated 12/08/21 in Epic electronic health record.

## 2021-12-08 NOTE — CONSULTS
Care Management Initial Consult    General Information  Assessment completed with: Patient,Caregiver, Tre (pt) Ricki (caregiver)  Type of CM/SW Visit: Initial Assessment    Primary Care Provider verified and updated as needed: Yes   Readmission within the last 30 days:        Reason for Consult: discharge planning  Advance Care Planning:            Communication Assessment  Patient's communication style: spoken language (English or Bilingual)    Hearing Difficulty or Deaf: no   Wear Glasses or Blind: yes    Cognitive  Cognitive/Neuro/Behavioral:                        Living Environment:   People in home: facility resident     Current living Arrangements: group home      Able to return to prior arrangements: yes       Family/Social Support:  Care provided by:    Provides care for:    Marital Status: Single             Description of Support System:           Current Resources:   Patient receiving home care services: No     Community Resources: County Worker  Equipment currently used at home:    Supplies currently used at home: Oxygen Tubing/Supplies    Employment/Financial:  Employment Status:          Financial Concerns:             Lifestyle & Psychosocial Needs:  Social Determinants of Health     Tobacco Use: Medium Risk     Smoking Tobacco Use: Former Smoker     Smokeless Tobacco Use: Never Used   Alcohol Use: Not on file   Financial Resource Strain: Not on file   Food Insecurity: Not on file   Transportation Needs: Not on file   Physical Activity: Not on file   Stress: Not on file   Social Connections: Not on file   Intimate Partner Violence: Not on file   Depression: Not on file   Housing Stability: Not on file       Functional Status:  Prior to admission patient needed assistance:   Dependent ADLs:: Independent  Dependent IADLs:: Cleaning,Cooking,Laundry,Shopping,Meal Preparation,Medication Management,Money Management,Transportation          Additional Information:  Patient with SOB, tight chest. Patient is  from , [facility] in [place]. RNCC spoke with both patient and called Ricki (ph: [phone]),  caregiver for case management assessment as patient has discharge planning needs.  uses [facility] for pharmacy, updated in Epic.  provides housing, meals, help with medications, transportation including at discharge, needs 1 hour notice. Patient has O2 at home through [employer], has tanks at home, will need tank delivered for discharge, have Ricki from  bring one for transport home. Patient had had CPAP at home which had broken they said,  states he wasn't using it and they took it. Patient complained of feeling sick/weak, provider notified and patient to get PT/OT consult. RNCC and SW to continue to follow for discharge planning.      HUONG ChouN, BA, RN, RNCC  Pager: [phone]  Phone: [phone]  Weekend/Holiday Pager: [phone]

## 2021-12-08 NOTE — ED NOTES
I took signout on the patient from Dr. Samuels.  This is a 46-year-old male with hypoxemia.  Work-up was negative and it was noted that patient was previously on home oxygen but is currently not.  Patient was signed out to me pending Social Work consult for home oxygen.  This was obtained and it was noted that patient does have oxygen available at home but has not been using this.  Patient previously had CPAP for sleep but this has since broken.  They recommend PT OT evaluation.  They saw patient in the emergency department.  Replacement CPAP was ordered.  Patient will be discharged back to his group home with oxygen.     Jan Wesley, DO  12/08/21 8342

## 2021-12-08 NOTE — PROGRESS NOTES
"   12/08/21 1321   Quick Adds   Type of Visit Initial PT Evaluation   Living Environment   People in home facility resident   Current Living Arrangements group home  (4 residents, 1-2 staff there at a time (usually 1))   Home Accessibility stairs to enter home  (also has ramp to enter- pt uses ramp)   Number of Stairs, Main Entrance 5  (5 \"mini\" steps)   Stair Railings, Main Entrance railing on right side (ascending)   Transportation Anticipated other (see comments)  ([facility])   Living Environment Comments Once inside all needs met main level.    Self-Care   Current Activity Tolerance fair   Equipment Currently Used at Home grab bar, tub/shower  (walk in shower with grab bars)   Activity/Exercise/Self-Care Comment Patient reports he sometimes uses walker for ambulation (4WW); he uses it \"mainly when I'm going long distances outside and sometimes short distances when I can't walk (inside the house also)\"   Disability/Function   Wear Glasses or Blind yes   Vision Management pt reports baseline L eye blindness   Fall history within last six months yes   Number of times patient has fallen within last six months   (\"at least 70\"; pt reports he falls at least 3x/week)   Change in Functional Status Since Onset of Current Illness/Injury   (unclear; baseline unclear)   General Information   Onset of Illness/Injury or Date of Surgery 12/08/21   Referring Physician Jan Wesley, DO   Patient/Family Therapy Goals Statement (PT) not stated   Pertinent History of Current Problem (include personal factors and/or comorbidities that impact the POC) Patient ED roomed 12/8; presented with shortness of breath. PMH significant for cerebral palsy with mild spastic paraparesis, intellectual disability, asthma, congenital left eye blindness, previous right Bell's palsy w/ mild residual facial droop, mood disturbance, and epilepsy disorder on 4 AEDs. Per chart, pt informed provider that his O2 delivery at home has been broken for \"a " "while\" causing increasing shortness of breath.   Existing Precautions/Restrictions fall;oxygen therapy device and L/min  (On 2.5 L O2 via NC at time of eval, satting in mid 90s)   Weight-Bearing Status - LUE full weight-bearing   Weight-Bearing Status - RUE full weight-bearing   Weight-Bearing Status - LLE full weight-bearing   Weight-Bearing Status - RLE full weight-bearing   General Observations Patient is pleasant & agreeable to PT   Cognition   Orientation Status (Cognition) oriented to;person;time  (not oriented to situation/place)   Pain Assessment   Patient Currently in Pain Yes, see Vital Sign flowsheet  (reports back & hip pain 8/10)   Posture    Posture Forward head position;Protracted shoulders   Range of Motion (ROM)   ROM Comment ROM appears WFL for functional mobility   Strength   Strength Comments Patient appears to have some functional weakness with bed mobility and transfers; relies on UE pushoff & momentum for sit>stand;  seated resisted assessment: B hip flexion 4/5, B knee extension 5/5, ankle DF 4/5; patient reports feeling weak compared to baseline   Bed Mobility   Comment (Bed Mobility) Supine>sit from flat ED stretcher with heavy reliance on 1 rail and SBA. Unable to perform without rail   Transfers   Transfer Safety Comments Sit>stand from stretcher with no AD with SBA & light UE pushoff. Pt relies more heavily on UE pushoff to stand from recliner (lower surface) with SBA   Gait/Stairs (Locomotion)   Comment (Gait/Stairs) Ambulated in room and carter with no AD with CGA with short shuffling steps, not clearing floor rather sliding feet forward to progress; pt requests PT stand on his L due to L eye blindness   Balance   Balance Comments needs SBA for safe dynamic mobility. Pt has impaired balance at baseline, reports falling at least 3x/week; current balance is likely near baseline   Clinical Impression   Criteria for Skilled Therapeutic Intervention yes, treatment indicated   PT Diagnosis " (PT) impaired functional mobility   Influenced by the following impairments impaired balance, decreased activity tolerance, decreased strength   Functional limitations due to impairments decreased independence with bed mobility, transfers, ambulation   Clinical Presentation Stable/Uncomplicated   Clinical Presentation Rationale clinical judgement   Clinical Decision Making (Complexity) low complexity   Therapy Frequency (PT) 3x/week   Predicted Duration of Therapy Intervention (days/wks) 2 days   Planned Therapy Interventions (PT) balance training;bed mobility training;gait training;home exercise program;patient/family education;strengthening;transfer training;progressive activity/exercise;home program guidelines   Anticipated Equipment Needs at Discharge (PT)   (patient has 4WW, has shower chair available)   Risk & Benefits of therapy have been explained evaluation/treatment results reviewed;care plan/treatment goals reviewed;risks/benefits reviewed;current/potential barriers reviewed;participants included;patient   Clinical Impression Comments Patient likely near baseline mobility but baseline is somewhat unclear. Pt demos impaired gait with shuffling steps, slow pace. Recommend SBA of  staff for mobility and ADLs at this time. Recommend Home PT follow up to progress independence and safety with mobility   PT Discharge Planning    PT Discharge Recommendation (DC Rec) home with home care physical therapy;home with assist   PT Rationale for DC Rec Patient likely near baseline mobility but baseline is somewhat unclear. Patient is a fall risk at baseline as he reports at least 3 falls a week. Pt demos impaired gait with shuffling steps, slow pace. Recommend SBA of  staff for mobility and ADLs at this time. Recommend Home PT follow up to progress independence and safety with mobility. If  staff cannot provide this assist, TCU stay may be warranted.   PT Brief overview of current status  SBA bed mobility,  transfers; CGA ambulation without AD, SBA with walker   Total Evaluation Time   Total Evaluation Time (Minutes) 10

## 2021-12-08 NOTE — ED TRIAGE NOTES
"[facility] 1841    Patient comes in from group home, woke up short of breath, 2-6L of oxygen baseline, has asthma, did not have O2 on when EMS arrived because he does not find it comfortable. He was 94% on 3L. EMS gave douned for crackles heard in lungs, sleepy looking and slow to  Respond when EMS got there and has perked up a bit since, yet still slow, baseline unknown. He thinks he has a cold. He makes his own medical decisions per EMS. Covid status unknown, cough he has had it \"forever,\" congestion.   "

## 2021-12-08 NOTE — DISCHARGE INSTRUCTIONS
Return to the emergency department if symptoms continue, get worse, there are any new symptoms or any cause for concern.

## 2021-12-08 NOTE — ED PROVIDER NOTES
"ED Provider Note  [facility]      History     Chief Complaint   Patient presents with     Shortness of Breath     Cough     The history is provided by the patient and medical records.     Tre Vazquez is a 46 year old male with a history of cerebral palsy with mild spastic paraparesis, intellectual disability, asthma, congenital left eye blindness, previous right Bell's palsy w/ mild residual facial droop, mood disturbance, and epilepsy disorder on 4 AEDs who presents to the emergency department with cough and shortness of breath. Patient reports his oxygen delivery system being broken for \"a while\" which has gradually caused worsening shortness of breath. Patient denies fever, vomiting, or chest pain.  EMS reports his initial pulse ox was 88% at his group home.  He improved in the 90s with supplemental O2.  He denies any fevers.  No chest pain.  No abdominal pain.         Past Medical History  Past Medical History:   Diagnosis Date     Blindness of left eye      Depression 3/10/2014     Hypertension      Pneumococcal septicemia(038.2) (H) 11/26/2013    Hospitalized     Pneumonia, organism unspecified(486) 11/26/2013    Hospitalized     Unspecified epilepsy without mention of intractable epilepsy      Past Surgical History:   Procedure Laterality Date     ORTHOPEDIC SURGERY      pt stated past surgery on both ankles     acetaminophen (TYLENOL) 500 MG tablet  ARTIFICIAL TEARS 0.1-0.3 % SOLN  busPIRone (BUSPAR) 5 MG tablet  calcium polycarbophil (FIBERCON) 625 MG tablet  citalopram (CELEXA) 40 MG tablet  divalproex sodium delayed-release (DEPAKOTE) 500 MG DR tablet  fluticasone (FLONASE) 50 MCG/ACT nasal spray  lamoTRIgine (LAMICTAL) 100 MG tablet  lamoTRIgine (LAMICTAL) 200 MG tablet  levETIRAcetam (KEPPRA) 500 MG tablet  levETIRAcetam (KEPPRA) 500 MG tablet  levOCARNitine (CARNITOR) 330 MG tablet  multivitamin, therapeutic with minerals (MULTI-VITAMIN) TABS  propranolol (INDERAL) 10 MG " "tablet  risperiDONE (RISPERDAL) 1 MG tablet  risperiDONE (RISPERDAL) 3 MG tablet  SM NASAL SPRAY SALINE 0.65 % nasal spray  VENTOLIN  (90 Base) MCG/ACT inhaler  zonisamide (ZONEGRAN) 100 MG capsule      Allergies   Allergen Reactions     Tomato      Vicodin [Hydrocodone-Acetaminophen] GI Disturbance     Family History  No family history on file.  Social History   Social History     Tobacco Use     Smoking status: Former Smoker     Smokeless tobacco: Never Used   Substance Use Topics     Alcohol use: No     Drug use: No      Past medical history, past surgical history, medications, allergies, family history, and social history were reviewed with the patient. No additional pertinent items.        Review of Systems   Constitutional: Negative for fever.   Respiratory: Positive for cough and shortness of breath.    Cardiovascular: Negative for chest pain.   Gastrointestinal: Negative for vomiting.   All other systems reviewed and are negative.    A complete review of systems was performed with pertinent positives and negatives noted in the HPI, and all other systems negative.    Physical Exam   BP: 111/67  Pulse: 69  Temp: 98.5  F (36.9  C)  Resp: 19  Height: 170.2 cm (5' 7\")  Weight: 116.1 kg (256 lb)  SpO2: 97 %  Physical Exam  Vitals and nursing note reviewed.   Constitutional:       General: He is not in acute distress.     Appearance: He is well-developed. He is not diaphoretic.   HENT:      Head: Normocephalic.      Mouth/Throat:      Pharynx: No oropharyngeal exudate.   Eyes:      Extraocular Movements: Extraocular movements intact.   Cardiovascular:      Heart sounds: Normal heart sounds.   Pulmonary:      Effort: No respiratory distress.      Breath sounds: Wheezing present.   Abdominal:      General: There is no distension.      Palpations: Abdomen is soft.      Tenderness: There is no abdominal tenderness.   Musculoskeletal:         General: No deformity.      Cervical back: Neck supple.   Skin:     " General: Skin is dry.   Neurological:      Mental Status: He is alert.      Comments: Oriented   Psychiatric:         Behavior: Behavior normal.         ED Course      Procedures            EKG Interpretation:      Interpreted by Asad Samuels DO  Time reviewed: 0133  Symptoms at time of EKG: Dyspnea  Rhythm: normal sinus   Rate: normal  Axis: normal  Ectopy: none  Conduction: normal  ST Segments/ T Waves: No ST-T wave changes  Q Waves: none  Comparison to prior: No old EKG available    Clinical Impression: normal EKG      Results for orders placed or performed during the hospital encounter of 12/08/21   XR Chest Port 1 View     Status: None    Narrative    EXAM: CHEST SINGLE VIEW PORTABLE  LOCATION: [facility]  DATE/TIME: 12/8/2021 2:07 AM    INDICATION: Dyspnea.  COMPARISON: 08/30/2021.    FINDINGS: The lungs are clear. Normal size cardiac silhouette.      Impression    IMPRESSION: No evidence of active cardiopulmonary disease.    CT Chest Pulmonary Embolism w Contrast     Status: None    Narrative    EXAM: CT CHEST WITH CONTRAST - PULMONARY EMBOLISM PROTOCOL   LOCATION: [facility]  DATE/TIME: 12/08/2021, 5:59 AM    INDICATION: Dyspnea.  COMPARISON: 11/25/2013 - CT chest.    TECHNIQUE: CT angiogram chest during pulmonary arterial phase injection IV contrast. Dose reduction techniques were used.   CONTRAST: 84 mL Isovue 370.    FINDINGS:    ANGIOGRAM CHEST: No visualized pulmonary embolus. The thoracic aorta is normal in caliber without dissection.    LUNGS AND PLEURA: Minimal atelectasis in the dependent aspects of both lungs.    MEDIASTINUM/AXILLAE: Unremarkable.    CORONARY ARTERY CALCIFICATION: Absent.    MUSCULOSKELETAL: No acute findings.    UPPER ABDOMEN: Several gallstones in the gallbladder. A few nonobstructing calculi in both kidneys, measuring up to 0.6 cm in diameter. 2.2 cm cyst in the upper pole of the  left kidney containing a few small calculi and/or milk of calcium.      Impression    IMPRESSION:   1. No visualized pulmonary embolus.  2. No evidence of active pulmonary disease.      Basic metabolic panel     Status: Abnormal   Result Value Ref Range    Sodium 143 133 - 144 mmol/L    Potassium 3.9 3.4 - 5.3 mmol/L    Chloride 107 94 - 109 mmol/L    Carbon Dioxide (CO2) 32 20 - 32 mmol/L    Anion Gap 4 3 - 14 mmol/L    Urea Nitrogen 16 7 - 30 mg/dL    Creatinine 0.70 0.66 - 1.25 mg/dL    Calcium 8.3 (L) 8.5 - 10.1 mg/dL    Glucose 100 (H) 70 - 99 mg/dL    GFR Estimate >90 >60 mL/min/1.73m2   Troponin I     Status: Normal   Result Value Ref Range    Troponin I High Sensitivity 8 <79 ng/L   Symptomatic Influenza A/B & SARS-CoV2 (COVID-19) Virus PCR Multiplex Nasopharyngeal     Status: Normal    Specimen: Nasopharyngeal; Swab   Result Value Ref Range    Influenza A PCR Negative Negative    Influenza B PCR Negative Negative    RSV PCR Negative Negative    SARS CoV2 PCR Negative Negative, Testing sent to reference lab. Results will be returned via unsolicited result    Narrative    Testing was performed using the Xpert Xpress CoV2/Flu/RSV Assay on the [facility] GeneXpert Instrument. This test should be ordered for the detection of SARS-CoV-2 and influenza viruses in individuals who meet clinical and/or epidemiological criteria. Test performance is unknown in asymptomatic patients. This test is for in vitro diagnostic use under the FDA EUA for laboratories certified under CLIA to perform high or moderate complexity testing. This test has not been FDA cleared or approved. A negative result does not rule out the presence of PCR inhibitors in the specimen or target RNA in concentration below the limit of detection for the assay. If only one viral target is positive but coinfection with multiple targets is suspected, the sample should be re-tested with another FDA cleared, approved, or authorized test, if coinfection would  change clinical management. This test was validated by the [facility]. These laboratories are certified under the Clinical  Laboratory Improvement Amendments of 1988 (CLIA-88) as qualified to perform high complexity laboratory testing.   CBC with platelets and differential     Status: None   Result Value Ref Range    WBC Count 6.3 4.0 - 11.0 10e3/uL    RBC Count 4.47 4.40 - 5.90 10e6/uL    Hemoglobin 13.4 13.3 - 17.7 g/dL    Hematocrit 42.2 40.0 - 53.0 %    MCV 94 78 - 100 fL    MCH 30.0 26.5 - 33.0 pg    MCHC 31.8 31.5 - 36.5 g/dL    RDW 13.3 10.0 - 15.0 %    Platelet Count 157 150 - 450 10e3/uL    % Neutrophils 42 %    % Lymphocytes 42 %    % Monocytes 13 %    % Eosinophils 1 %    % Basophils 1 %    % Immature Granulocytes 1 %    NRBCs per 100 WBC 0 <1 /100    Absolute Neutrophils 2.6 1.6 - 8.3 10e3/uL    Absolute Lymphocytes 2.7 0.8 - 5.3 10e3/uL    Absolute Monocytes 0.8 0.0 - 1.3 10e3/uL    Absolute Eosinophils 0.1 0.0 - 0.7 10e3/uL    Absolute Basophils 0.0 0.0 - 0.2 10e3/uL    Absolute Immature Granulocytes 0.0 <=0.4 10e3/uL    Absolute NRBCs 0.0 10e3/uL   Extra Blue Top Tube     Status: None   Result Value Ref Range    Hold Specimen JIC    Extra Red Top Tube     Status: None   Result Value Ref Range    Hold Specimen JIC    EKG 12-lead, tracing only     Status: None   Result Value Ref Range    Systolic Blood Pressure  mmHg    Diastolic Blood Pressure  mmHg    Ventricular Rate 68 BPM    Atrial Rate 68 BPM    IN Interval 190 ms    QRS Duration 94 ms     ms    QTc 521 ms    P Axis 53 degrees    R AXIS 23 degrees    T Axis 34 degrees    Interpretation ECG Sinus rhythm  Prolonged QT  Abnormal ECG      Care Management / Social Work IP Consult     Status: None ()    Dillon Askew RN     12/8/2021 11:23 AM  Care Management Initial Consult    General Information  Assessment completed with: Patient,Caregiver, Tre (pt) Rikci   (caregiver)  Type of CM/SW Visit: Initial  Assessment    Primary Care Provider verified and updated as needed: Yes   Readmission within the last 30 days:        Reason for Consult: discharge planning  Advance Care Planning:            Communication Assessment  Patient's communication style: spoken language (English or   Bilingual)    Hearing Difficulty or Deaf: no   Wear Glasses or Blind: yes    Cognitive  Cognitive/Neuro/Behavioral:                        Living Environment:   People in home: facility resident     Current living Arrangements: group home      Able to return to prior arrangements: yes       Family/Social Support:  Care provided by:    Provides care for:    Marital Status: Single             Description of Support System:           Current Resources:   Patient receiving home care services: No     Community Resources: County Worker  Equipment currently used at home:    Supplies currently used at home: Oxygen Tubing/Supplies    Employment/Financial:  Employment Status:          Financial Concerns:             Lifestyle & Psychosocial Needs:  Social Determinants of Health     Tobacco Use: Medium Risk     Smoking Tobacco Use: Former Smoker     Smokeless Tobacco Use: Never Used   Alcohol Use: Not on file   Financial Resource Strain: Not on file   Food Insecurity: Not on file   Transportation Needs: Not on file   Physical Activity: Not on file   Stress: Not on file   Social Connections: Not on file   Intimate Partner Violence: Not on file   Depression: Not on file   Housing Stability: Not on file       Functional Status:  Prior to admission patient needed assistance:   Dependent ADLs:: Independent  Dependent IADLs:: Cleaning,Cooking,Laundry,Shopping,Meal   Preparation,Medication Management,Money Management,Transportation          Additional Information:  Patient with SOB, tight chest. Patient is from [facility]. RNCC spoke with both patient and called Ricki   (ph: [phone]),  caregiver for case management assessment    as patient has discharge planning needs.  uses [facility] for pharmacy, updated in Epic.  provides housing, meals,   help with medications, transportation including at discharge,   needs 1 hour notice. Patient has O2 at home through [employer], has   tanks at home, will need tank delivered for discharge, have Ricki   from  bring one for transport home. Patient had had CPAP at   home which had broken they said,  states he wasn't using it and   they took it. Patient complained of feeling sick/weak, provider   notified and patient to get PT/OT consult. RNCC and SW to   continue to follow for discharge planning.      HUONG ChouN, BA, RN, RNCC  Pager: [phone]  Phone: [phone]  Weekend/Holiday Pager: [phone]         CBC with platelets differential     Status: None    Narrative    The following orders were created for panel order CBC with platelets differential.  Procedure                               Abnormality         Status                     ---------                               -----------         ------                     CBC with platelets and d...[[id number]]                      Final result                 Please view results for these tests on the individual orders.   [place] Draw     Status: None    Narrative    The following orders were created for panel order [place] Draw.  Procedure                               Abnormality         Status                     ---------                               -----------         ------                     Extra Blue Top Tube[[id number]]                              Final result               Extra Red Top Tube[[id number]]                               Final result                 Please view results for these tests on the individual orders.                   Results for orders placed or performed during the hospital encounter of 12/08/21   XR Chest Port 1 View     Status: None    Narrative    EXAM: CHEST SINGLE VIEW  PORTABLE  LOCATION: [facility]  DATE/TIME: 12/8/2021 2:07 AM    INDICATION: Dyspnea.  COMPARISON: 08/30/2021.    FINDINGS: The lungs are clear. Normal size cardiac silhouette.      Impression    IMPRESSION: No evidence of active cardiopulmonary disease.    CT Chest Pulmonary Embolism w Contrast     Status: None    Narrative    EXAM: CT CHEST WITH CONTRAST - PULMONARY EMBOLISM PROTOCOL   LOCATION: [facility]  DATE/TIME: 12/08/2021, 5:59 AM    INDICATION: Dyspnea.  COMPARISON: 11/25/2013 - CT chest.    TECHNIQUE: CT angiogram chest during pulmonary arterial phase injection IV contrast. Dose reduction techniques were used.   CONTRAST: 84 mL Isovue 370.    FINDINGS:    ANGIOGRAM CHEST: No visualized pulmonary embolus. The thoracic aorta is normal in caliber without dissection.    LUNGS AND PLEURA: Minimal atelectasis in the dependent aspects of both lungs.    MEDIASTINUM/AXILLAE: Unremarkable.    CORONARY ARTERY CALCIFICATION: Absent.    MUSCULOSKELETAL: No acute findings.    UPPER ABDOMEN: Several gallstones in the gallbladder. A few nonobstructing calculi in both kidneys, measuring up to 0.6 cm in diameter. 2.2 cm cyst in the upper pole of the left kidney containing a few small calculi and/or milk of calcium.      Impression    IMPRESSION:   1. No visualized pulmonary embolus.  2. No evidence of active pulmonary disease.      Basic metabolic panel     Status: Abnormal   Result Value Ref Range    Sodium 143 133 - 144 mmol/L    Potassium 3.9 3.4 - 5.3 mmol/L    Chloride 107 94 - 109 mmol/L    Carbon Dioxide (CO2) 32 20 - 32 mmol/L    Anion Gap 4 3 - 14 mmol/L    Urea Nitrogen 16 7 - 30 mg/dL    Creatinine 0.70 0.66 - 1.25 mg/dL    Calcium 8.3 (L) 8.5 - 10.1 mg/dL    Glucose 100 (H) 70 - 99 mg/dL    GFR Estimate >90 >60 mL/min/1.73m2   Troponin I     Status: Normal   Result Value Ref Range    Troponin I High Sensitivity 8 <79 ng/L    Symptomatic Influenza A/B & SARS-CoV2 (COVID-19) Virus PCR Multiplex Nasopharyngeal     Status: Normal    Specimen: Nasopharyngeal; Swab   Result Value Ref Range    Influenza A PCR Negative Negative    Influenza B PCR Negative Negative    RSV PCR Negative Negative    SARS CoV2 PCR Negative Negative, Testing sent to reference lab. Results will be returned via unsolicited result    Narrative    Testing was performed using the Xpert Xpress CoV2/Flu/RSV Assay on the [employer] GeneXpert Instrument. This test should be ordered for the detection of SARS-CoV-2 and influenza viruses in individuals who meet clinical and/or epidemiological criteria. Test performance is unknown in asymptomatic patients. This test is for in vitro diagnostic use under the FDA EUA for laboratories certified under CLIA to perform high or moderate complexity testing. This test has not been FDA cleared or approved. A negative result does not rule out the presence of PCR inhibitors in the specimen or target RNA in concentration below the limit of detection for the assay. If only one viral target is positive but coinfection with multiple targets is suspected, the sample should be re-tested with another FDA cleared, approved, or authorized test, if coinfection would change clinical management. This test was validated by the [facility] labs. These laboratories are certified under the Clinical  Laboratory Improvement Amendments of 1988 (CLIA-88) as qualified to perform high complexity laboratory testing.   CBC with platelets and differential     Status: None   Result Value Ref Range    WBC Count 6.3 4.0 - 11.0 10e3/uL    RBC Count 4.47 4.40 - 5.90 10e6/uL    Hemoglobin 13.4 13.3 - 17.7 g/dL    Hematocrit 42.2 40.0 - 53.0 %    MCV 94 78 - 100 fL    MCH 30.0 26.5 - 33.0 pg    MCHC 31.8 31.5 - 36.5 g/dL    RDW 13.3 10.0 - 15.0 %    Platelet Count 157 150 - 450 10e3/uL    % Neutrophils 42 %    % Lymphocytes 42 %    % Monocytes 13 %    %  Eosinophils 1 %    % Basophils 1 %    % Immature Granulocytes 1 %    NRBCs per 100 WBC 0 <1 /100    Absolute Neutrophils 2.6 1.6 - 8.3 10e3/uL    Absolute Lymphocytes 2.7 0.8 - 5.3 10e3/uL    Absolute Monocytes 0.8 0.0 - 1.3 10e3/uL    Absolute Eosinophils 0.1 0.0 - 0.7 10e3/uL    Absolute Basophils 0.0 0.0 - 0.2 10e3/uL    Absolute Immature Granulocytes 0.0 <=0.4 10e3/uL    Absolute NRBCs 0.0 10e3/uL   Extra Blue Top Tube     Status: None   Result Value Ref Range    Hold Specimen JIC    Extra Red Top Tube     Status: None   Result Value Ref Range    Hold Specimen JIC    EKG 12-lead, tracing only     Status: None   Result Value Ref Range    Systolic Blood Pressure  mmHg    Diastolic Blood Pressure  mmHg    Ventricular Rate 68 BPM    Atrial Rate 68 BPM    ID Interval 190 ms    QRS Duration 94 ms     ms    QTc 521 ms    P Axis 53 degrees    R AXIS 23 degrees    T Axis 34 degrees    Interpretation ECG Sinus rhythm  Prolonged QT  Abnormal ECG      Care Management / Social Work IP Consult     Status: None ()    Dillon Askew RN     12/8/2021 11:23 AM  Care Management Initial Consult    General Information  Assessment completed with: Patient,Caregiver, Tre (pt) Ricki   (caregiver)  Type of CM/SW Visit: Initial Assessment    Primary Care Provider verified and updated as needed: Yes   Readmission within the last 30 days:        Reason for Consult: discharge planning  Advance Care Planning:            Communication Assessment  Patient's communication style: spoken language (English or   Bilingual)    Hearing Difficulty or Deaf: no   Wear Glasses or Blind: yes    Cognitive  Cognitive/Neuro/Behavioral:                        Living Environment:   People in home: facility resident     Current living Arrangements: group home      Able to return to prior arrangements: yes       Family/Social Support:  Care provided by:    Provides care for:    Marital Status: Single             Description of Support  System:           Current Resources:   Patient receiving home care services: No     Community Resources: County Worker  Equipment currently used at home:    Supplies currently used at home: Oxygen Tubing/Supplies    Employment/Financial:  Employment Status:          Financial Concerns:             Lifestyle & Psychosocial Needs:  Social Determinants of Health     Tobacco Use: Medium Risk     Smoking Tobacco Use: Former Smoker     Smokeless Tobacco Use: Never Used   Alcohol Use: Not on file   Financial Resource Strain: Not on file   Food Insecurity: Not on file   Transportation Needs: Not on file   Physical Activity: Not on file   Stress: Not on file   Social Connections: Not on file   Intimate Partner Violence: Not on file   Depression: Not on file   Housing Stability: Not on file       Functional Status:  Prior to admission patient needed assistance:   Dependent ADLs:: Independent  Dependent IADLs:: Cleaning,Cooking,Laundry,Shopping,Meal   Preparation,Medication Management,Money Management,Transportation          Additional Information:  Patient with SOB, tight chest. Patient is from [facility] in   [place]. RNCC spoke with both patient and called Ricki   (ph: [phone]),  caregiver for case management assessment   as patient has discharge planning needs.  uses [facility] for pharmacy, updated in Epic.  provides housing, meals,   help with medications, transportation including at discharge,   needs 1 hour notice. Patient has O2 at home through [employer], has   tanks at home, will need tank delivered for discharge, have Ricki   from  bring one for transport home. Patient had had CPAP at   home which had broken they said,  states he wasn't using it and   they took it. Patient complained of feeling sick/weak, provider   notified and patient to get PT/OT consult. RNCC and SW to   continue to follow for discharge planning.      HUONG ChouN, BA, RN, RNCC  Pager: [phone]  Phone:  [phone]  Weekend/Holiday Pager: [phone]         CBC with platelets differential     Status: None    Narrative    The following orders were created for panel order CBC with platelets differential.  Procedure                               Abnormality         Status                     ---------                               -----------         ------                     CBC with platelets and d...[[id number]]                      Final result                 Please view results for these tests on the individual orders.   [place] Draw     Status: None    Narrative    The following orders were created for panel order [place] Draw.  Procedure                               Abnormality         Status                     ---------                               -----------         ------                     Extra Blue Top Tube[[id number]]                              Final result               Extra Red Top Tube[[id number]]                               Final result                 Please view results for these tests on the individual orders.     Medications   iopamidol (ISOVUE-370) solution 84 mL (84 mLs Intravenous Given 12/8/21 0608)   sodium chloride 0.9 % bag 500mL for CT scan flush use (102 mLs Intravenous Given 12/8/21 0617)        Assessments & Plan (with Medical Decision Making)   46-year-old male presents to us with a chief complaint of shortness of breath.  Differential includes but not limited to asthma, pulmonary embolism, pneumonia, Covid, influenza.  Vital signs today were unremarkable.  Patient's sats were maintained on supplemental O2.  Influenza and Covid test were negative.  Chest x-ray was clear.  Patient was not anemic.  CT PE protocol shows no evidence of clots.  Patient did note to me that he previously has had home O2 but the machine has been broken.  As he is maintaining sats with supplemental O2 without issues here we will wait for  to start her day and see if they can assist us  in finding replacement home oxygen for the patient at his group home.  Patient care be signed out to the oncoming physician pending social work evaluation    I have reviewed the nursing notes. I have reviewed the findings, diagnosis, plan and need for follow up with the patient.    Discharge Medication List as of 12/8/2021  4:58 PM          Final diagnoses:   Hypoxemia     IRiya, am serving as a trained medical scribe to document services personally performed by Asad Samuels DO based on the provider's statements to me on December 8, 2021.  This document has been checked and approved by the attending provider.    I, Asad Samuels DO, was physically present and have reviewed and verified the accuracy of this note documented by Riya Gaona medical scribe.          --  Asad Samuels DO  [facility] [facility] EMERGENCY DEPARTMENT  12/8/2021     Asad Samuels DO  12/09/21 0119

## 2021-12-09 NOTE — PLAN OF CARE
Physical Therapy Discharge Summary    Reason for therapy discharge:    Discharged to home with home therapy.    Progress towards therapy goal(s). See goals on Care Plan in [facility] electronic health record for goal details.  Goals partially met.  Barriers to achieving goals:   discharge from facility.    Therapy recommendation(s):    Continued therapy is recommended.  Rationale/Recommendations:  [facility].

## 2021-12-20 ENCOUNTER — TELEPHONE (OUTPATIENT)
Dept: NEUROLOGY | Facility: CLINIC | Age: 46
End: 2021-12-20
Payer: MEDICARE

## 2021-12-20 NOTE — TELEPHONE ENCOUNTER
[facility] sent a refill request for Levocarnitine 330 mg 1 tab tid.  Pt has not been seen since 6-5-20 by . He has failed appts on 5-3-21, 4-19-21, and 2-17-21. He canceled on 5-28-21. No future appt has been made. I did not attempt to reach pt.

## 2021-12-20 NOTE — TELEPHONE ENCOUNTER
Denied rx- patient needs to be seen  Pharmacy informed  Rosa Corado CMA on 12/20/2021 at 11:26 AM

## 2022-01-20 ENCOUNTER — TELEPHONE (OUTPATIENT)
Dept: NEUROLOGY | Facility: CLINIC | Age: 47
End: 2022-01-20
Payer: MEDICARE

## 2022-01-20 DIAGNOSIS — G40.309 NONINTRACTABLE GENERALIZED IDIOPATHIC EPILEPSY WITHOUT STATUS EPILEPTICUS (H): ICD-10-CM

## 2022-01-20 RX ORDER — LEVOCARNITINE 330 MG/1
TABLET ORAL
Qty: 90 TABLET | Refills: 3 | Status: SHIPPED | OUTPATIENT
Start: 2022-01-20 | End: 2022-04-19

## 2022-01-20 NOTE — TELEPHONE ENCOUNTER
Refill request Levocarnitine 330mg. Pt last seen 6/5/20 by Dr. Ballard and has follow up scheduled for 4/19/22 with Dr. Griggs. Will send in enough refills to get pt through to this appt.     Jacquelyn Shultz RN on 1/20/2022 at 11:17 AM

## 2022-01-20 NOTE — TELEPHONE ENCOUNTER
[facility] sent a refill request for Levocarnitine 330 mg 1 tab tid # 84. Pt has a appt to see Dr Griggs in April

## 2022-04-12 DIAGNOSIS — G40.309 NONINTRACTABLE GENERALIZED IDIOPATHIC EPILEPSY WITHOUT STATUS EPILEPTICUS (H): ICD-10-CM

## 2022-04-12 RX ORDER — LEVOCARNITINE 330 MG/1
TABLET ORAL
Qty: 90 TABLET | Refills: 3 | Status: CANCELLED | OUTPATIENT
Start: 2022-04-12

## 2022-04-12 NOTE — TELEPHONE ENCOUNTER
Requested Prescriptions   Pending Prescriptions Disp Refills     levOCARNitine (CARNITOR) 330 MG tablet 90 tablet 3     Sig: TAKE 1 TABLET BY MOUTH 3 TIMES DAILY WITH MEALS       There is no refill protocol information for this order        Last office visit: Visit date not found with prescribing provider:  Dr. Griggs    Future Office Visit:          Sam Shankar  [facility]

## 2022-04-19 ENCOUNTER — OFFICE VISIT (OUTPATIENT)
Dept: NEUROLOGY | Facility: CLINIC | Age: 47
End: 2022-04-19
Payer: MEDICARE

## 2022-04-19 VITALS
HEIGHT: 67 IN | DIASTOLIC BLOOD PRESSURE: 69 MMHG | WEIGHT: 269.2 LBS | HEART RATE: 83 BPM | BODY MASS INDEX: 42.25 KG/M2 | SYSTOLIC BLOOD PRESSURE: 114 MMHG

## 2022-04-19 DIAGNOSIS — G40.309 NONINTRACTABLE GENERALIZED IDIOPATHIC EPILEPSY WITHOUT STATUS EPILEPTICUS (H): ICD-10-CM

## 2022-04-19 DIAGNOSIS — G40.919 BREAKTHROUGH SEIZURE (H): ICD-10-CM

## 2022-04-19 DIAGNOSIS — R62.50 DEVELOPMENTAL DELAY DISORDER: ICD-10-CM

## 2022-04-19 DIAGNOSIS — G40.909 NON-REFRACTORY EPILEPSY (H): ICD-10-CM

## 2022-04-19 DIAGNOSIS — G40.909 NON-REFRACTORY EPILEPSY (H): Primary | ICD-10-CM

## 2022-04-19 DIAGNOSIS — G40.909 SEIZURE DISORDER (H): Primary | ICD-10-CM

## 2022-04-19 DIAGNOSIS — Z79.899 ENCOUNTER FOR LONG-TERM (CURRENT) USE OF MEDICATIONS: ICD-10-CM

## 2022-04-19 PROCEDURE — 99215 OFFICE O/P EST HI 40 MIN: CPT | Performed by: PSYCHIATRY & NEUROLOGY

## 2022-04-19 RX ORDER — ZONISAMIDE 100 MG/1
200 CAPSULE ORAL AT BEDTIME
Qty: 60 CAPSULE | Refills: 5 | Status: SHIPPED | OUTPATIENT
Start: 2022-04-19 | End: 2022-11-16

## 2022-04-19 RX ORDER — CLOTRIMAZOLE 1 G/ML
SOLUTION TOPICAL 2 TIMES DAILY
Status: ON HOLD | COMMUNITY
End: 2023-12-28

## 2022-04-19 RX ORDER — POLYETHYLENE GLYCOL 3350 17 G/17G
1 POWDER, FOR SOLUTION ORAL DAILY
COMMUNITY
Start: 2022-04-18

## 2022-04-19 RX ORDER — LEVOCARNITINE 330 MG/1
TABLET ORAL
Qty: 90 TABLET | Refills: 5 | Status: SHIPPED | OUTPATIENT
Start: 2022-04-19 | End: 2022-05-16

## 2022-04-19 RX ORDER — CITALOPRAM HYDROBROMIDE 20 MG/1
20 TABLET ORAL DAILY
COMMUNITY
End: 2024-02-22

## 2022-04-19 RX ORDER — QUETIAPINE FUMARATE 300 MG/1
TABLET, FILM COATED ORAL
Status: ON HOLD | COMMUNITY
Start: 2022-04-14 | End: 2023-03-15

## 2022-04-19 RX ORDER — ZONISAMIDE 100 MG/1
100 CAPSULE ORAL DAILY
COMMUNITY
End: 2022-04-19

## 2022-04-19 RX ORDER — ZONISAMIDE 100 MG/1
100 CAPSULE ORAL EVERY MORNING
Qty: 30 CAPSULE | Refills: 5 | Status: SHIPPED | OUTPATIENT
Start: 2022-04-19 | End: 2022-11-16

## 2022-04-19 RX ORDER — BACLOFEN 10 MG/1
TABLET ORAL
Status: ON HOLD | COMMUNITY
Start: 2021-10-19 | End: 2023-03-15

## 2022-04-19 RX ORDER — BUSPIRONE HYDROCHLORIDE 15 MG/1
15 TABLET ORAL 2 TIMES DAILY
COMMUNITY
Start: 2022-04-13

## 2022-04-19 NOTE — PROGRESS NOTES
INITIAL NEUROLOGY CONSULTATION    DATE OF VISIT: 4/19/2022  MRN: [id number]  PATIENT NAME: Tre Vazquez  YOB: 1975    REFERRING PROVIDER: No ref. provider found    Chief Complaint   Patient presents with     Seizures     New patient        SUBJECTIVE:                                                      HPI:   Tre Vazquez is a 46 year old male here to establish care for epilepsy.  The patient has been seen by Dr. Ballard in this clinic in the past.  He also was recently seen at the [facility] clinic by Dr. Spence to establish care.  Notes indicate he had a breakthrough seizure in August/September 2021 and was hospitalized at the [facility].    Per discharge summary by Dr. Thomas (9.2.21):  Tre Vazquez is a 45 year old man with PMH of cerebral palsy with mild spastic paraparesis, intellectual disability, congenital left eye blindness, previous right Bell's palsy w/ mild residual facial droop, mood disturbance, and epilepsy disorder on 4 AEDs.  He currently presents from his group home with concern for a breakthrough seizure.  Neurology was consulted as he had a second event in the ED.     He is a poor historian in the setting of a recent seizure and he is now quite lethargic and uncooperative.  History is obtained from patient, chart review, and ED staff.  Group home was not immediately available as they are extremely limited in staffing overnight. There is also very little documented concerning his epilepsy disorder and confusion remains as to what his AED regimen currently is. He is currently prescribed valproate, levetiracetam, lamotrigine, and zonisamide.  The dosages of these medications he is not sure of as he just takes what the group home gives him.  He does admit that the group home may have missed giving him several doses of medication, usually in the morning hours.  He admits that there is a fairly complex regimen as he is supposed to get medications at roughly 3-4  different times throughout the day.  He does not know or say why the group home has been less consistent with medications in recent weeks.  He otherwise does not say or answer many other questions regarding his regimen.  He is unsure of the exact names of the medications or the dosages he is currently taking.  The group home was contacted and they were unwilling to discuss his current regimen overnight due to lack of staffing at that hour.  Finally, he does admit that his last generalized seizure was about 1 year ago.  He does not quite remember the last time he had 2 seizures in a day, but it has been probably several years.  He admits to seeing a neurologist but it has been at least a year ago, and the last documented note was on 6/5/2020.  At that time he was taking valproate, levetiracetam, lamotrigine, zonisamide although he claims the dosages have may be been slightly modified since that visit via phone appointments.    Current AED regimen:  Keppra 1000mg in the morning and 1500mg in the evening  Depakote DR: 500mg 3 times daily + levocarnitine: 330mg TID  Lamotrigine: 200mg BID  Zonisamide: 200mg nightly    Prior to the most recent seizure, apparently he had been seizure-free for about 10 years.  He has history of generalized tonic-clonic seizures.  No history of other seizure types.  Joel has additional history of cerebral palsy, nonepileptic events, schizoaffective disorder, oppositional defiant disorder, blindness Left, cerebral ventriculomegaly, prediabetes, sleep apnea and mild intellectual disability.  Head CT in 8.2021 showed lateral and third ventricle enlargement.  EEG from 2012 showed bilateral theta slowing, no epileptiform activity.    He did have a fall yesterday, passed out. He describes some lightheadedness and dizziness at times. Staff with him did not see this event.  Patient himself says that he has not had any seizures since the event last year and staff agrees.  Vague historian.    Past  Medical History:   Diagnosis Date     Blindness of left eye      Depression 3/10/2014     Hypertension      Pneumococcal septicemia(038.2) (H) 11/26/2013    Hospitalized     Pneumonia, organism unspecified(486) 11/26/2013    Hospitalized     Unspecified epilepsy without mention of intractable epilepsy      Past Surgical History:   Procedure Laterality Date     ORTHOPEDIC SURGERY      pt stated past surgery on both ankles       acetaminophen (TYLENOL) 500 MG tablet, Take 500 mg by mouth every 4 hours as needed  ARTIFICIAL TEARS 0.1-0.3 % SOLN, Apply 2 drops to eye every hour. As needed.  baclofen (LIORESAL) 10 MG tablet, 1/2 TO 1 TABLET BY MOUTH 3 TIMES DAILY AS NEEDED  busPIRone (BUSPAR) 15 MG tablet, Take 15 mg by mouth 2 times daily  calcium polycarbophil (FIBERCON) 625 MG tablet, Take 1 tablet by mouth 2 times daily At 8am and 5pm  citalopram (CELEXA) 20 MG tablet, Take 20 mg by mouth daily  citalopram (CELEXA) 40 MG tablet, Take 40 mg by mouth daily  clotrimazole (LOTRIMIN) 1 % external solution,   divalproex sodium delayed-release (DEPAKOTE) 500 MG DR tablet, Take 1 tablet (500 mg) by mouth 3 times daily  lamoTRIgine (LAMICTAL) 200 MG tablet, Take 1 tablet (200 mg) by mouth At Bedtime (Patient taking differently: Take 200 mg by mouth 2 times daily)  levETIRAcetam (KEPPRA) 500 MG tablet, Take 3 tablets (1,500 mg) by mouth At Bedtime  levETIRAcetam (KEPPRA) 500 MG tablet, Take 2 tablets (1,000 mg) by mouth every morning  levOCARNitine (CARNITOR) 330 MG tablet, TAKE 1 TABLET BY MOUTH 3 TIMES DAILY WITH MEALS  multivitamin, therapeutic with minerals (MULTI-VITAMIN) TABS, Take 1 tablet by mouth daily.  polyethylene glycol (MIRALAX) 17 GM/Dose powder,   propranolol (INDERAL) 10 MG tablet, Take 20 mg by mouth 2 times daily  QUEtiapine (SEROQUEL) 300 MG tablet, TAKE ONE TABLET BY MOUTH EVERY NIGHT AT BEDTIME  SM NASAL SPRAY SALINE 0.65 % nasal spray, Spray 1 spray in nostril every hour as needed  VENTOLIN  (90  "Base) MCG/ACT inhaler, Inhale 2 puffs into the lungs every 4 hours as needed  zonisamide (ZONEGRAN) 100 MG capsule, Take 100 mg by mouth daily  busPIRone (BUSPAR) 5 MG tablet, Take 5 mg by mouth 2 times daily (Patient not taking: Reported on 4/19/2022)  fluticasone (FLONASE) 50 MCG/ACT nasal spray, Spray 1 spray in nostril daily as needed (Patient not taking: Reported on 4/19/2022)  lamoTRIgine (LAMICTAL) 100 MG tablet, Take 1 tablet (100 mg) by mouth 2 times daily (Patient not taking: Reported on 4/19/2022)  risperiDONE (RISPERDAL) 1 MG tablet, Take 1 mg by mouth 2 times daily At 8am and 2pm (Patient not taking: Reported on 4/19/2022)  risperiDONE (RISPERDAL) 3 MG tablet, Take 3 mg by mouth At Bedtime (Patient not taking: Reported on 4/19/2022)    No current facility-administered medications on file prior to visit.    Allergies   Allergen Reactions     Tomato      Vicodin [Hydrocodone-Acetaminophen] GI Disturbance         Social History     Tobacco Use     Smoking status: Former Smoker     Smokeless tobacco: Never Used   Substance Use Topics     Alcohol use: No     Drug use: No       REVIEW OF SYSTEMS:                                                      10-point review of systems is negative except as mentioned above in HPI.     EXAM:                                                      Physical Exam:   Vitals: /69 (BP Location: Right arm, Patient Position: Sitting)   Pulse 83   Ht 1.702 m (5' 7\")   Wt 122.1 kg (269 lb 3.2 oz)   BMI 42.16 kg/m    BMI= Body mass index is 42.16 kg/m .  GENERAL: NAD.  HEENT: NC/AT.   CV: RRR. S1, S2.   NECK: No bruits.  PULM: Non-labored breathing.   Neurologic:  MENTAL STATUS: Alert, attentive. Speech is fluent. Normal comprehension.  Normal concentration. Adequate fund of knowledge.   CRANIAL NERVES: Right disc is difficult to visualize, left covered with tissue. Facial sensation and movement normal. EOM full. Hearing intact to conversation. Trapezius strength intact. " Palate moves symmetrically. Tongue midline.  MOTOR: 5/5 in proximal and distal muscle groups of upper and lower extremities. Tone and bulk normal.   DTRs: Intact and symmetric in biceps, BR, patellae.   SENSATION: Normal light touch throughout.  COORDINATION: Normal finger nose finger. Finger tapping normal.  STATION AND GAIT: Casual gait is wide-based.   Right hand-dominant.    Relevant Data:  Head CT (8.30.21):  Impression:  No acute intracranial pathology. Chronic small vessel ischemic  disease. Mild to moderate lateral and third ventriculomegaly.    Electroencephalogram (2.2012):  IMPRESSION:  Minimally abnormal waking record because of the appearance of mild increased abundance of the bitemporal theta slowing.  This is of doubtful clinical significance, and a nonspecific EEG finding.  No epileptiform activity is seen.     Imaging reviewed independently by me.  Agree with radiology read.    ASSESSMENT and PLAN:                                                      Assessment:     ICD-10-CM    1. Seizure disorder (H)  G40.909 Valproic Acid level     Ammonia     CBC with platelets     Comprehensive metabolic panel     Keppra (Levetiracetam) Level     Zonisamide Level Quantitative     Lamotrigine Level     EEG Routine   2. Developmental delay disorder  R62.50    3. Encounter for long-term (current) use of medications  Z79.899 Valproic Acid level     Ammonia     CBC with platelets     Comprehensive metabolic panel     Keppra (Levetiracetam) Level     Zonisamide Level Quantitative     Lamotrigine Level   4. Non-refractory epilepsy (H)  G40.909 zonisamide (ZONEGRAN) 100 MG capsule   5. Breakthrough seizure (H)  G40.919 zonisamide (ZONEGRAN) 100 MG capsule        Mr. Vazquez is a pleasant 46-year-old man with multiple medical and psychiatric comorbidities here to establish care for seizure disorder.  He and his group home staff reports no recurrence of seizures since the events last summer.  We will go ahead and check  levels of medications to make sure the doses are appropriate.  Unfortunately the history is somewhat vague.  I would like to get an updated EEG as well, given that there are gaps in the information I have.  We will plan to follow-up again in 6 months.  Joel and his group home staff expressed understanding and agreement with the plan.    Plan:  -- Continue current doses of Depakote, Keppra, lamotrigine and zonisamide.  -- We will check medication levels and get an updated EEG.  We will notify you of the results.  -- Follow-up in neurology in 6 months.  Please let us know if any concerns arise in the meantime.    Total Time: 40 minutes were spent with the patient and in chart review/documentation (as described above in Assessment and Plan) /coordinating the care on date of service.    Jojo Griggs MD  Neurology    CC: Siobhan Messer software used in the dictation of this note.

## 2022-04-19 NOTE — LETTER
4/19/2022         RE: Tre Vazquez  [address] [place] [address]        Dear Colleague,    Thank you for referring your patient, Tre Vazquez, to the [facility] [place]. Please see a copy of my visit note below.    INITIAL NEUROLOGY CONSULTATION    DATE OF VISIT: 4/19/2022  MRN: [id number]  PATIENT NAME: Tre Vazquez  YOB: 1975    REFERRING PROVIDER: No ref. provider found    Chief Complaint   Patient presents with     Seizures     New patient        SUBJECTIVE:                                                      HPI:   Tre Vazquez is a 46 year old male here to establish care for epilepsy.  The patient has been seen by Dr. Ballard in this clinic in the past.  He also was recently seen at the [facility] by Dr. Spence to establish care.  Notes indicate he had a breakthrough seizure in August/September 2021 and was hospitalized at the [facility].    Per discharge summary by Dr. Thomas (9.2.21):  Tre Vazquez is a 45 year old man with PMH of cerebral palsy with mild spastic paraparesis, intellectual disability, congenital left eye blindness, previous right Bell's palsy w/ mild residual facial droop, mood disturbance, and epilepsy disorder on 4 AEDs.  He currently presents from his group home with concern for a breakthrough seizure.  Neurology was consulted as he had a second event in the ED.     He is a poor historian in the setting of a recent seizure and he is now quite lethargic and uncooperative.  History is obtained from patient, chart review, and ED staff.  Group home was not immediately available as they are extremely limited in staffing overnight. There is also very little documented concerning his epilepsy disorder and confusion remains as to what his AED regimen currently is. He is currently prescribed valproate, levetiracetam, lamotrigine, and zonisamide.  The dosages of these medications he is not sure of as he  just takes what the group home gives him.  He does admit that the group home may have missed giving him several doses of medication, usually in the morning hours.  He admits that there is a fairly complex regimen as he is supposed to get medications at roughly 3-4 different times throughout the day.  He does not know or say why the group home has been less consistent with medications in recent weeks.  He otherwise does not say or answer many other questions regarding his regimen.  He is unsure of the exact names of the medications or the dosages he is currently taking.  The group home was contacted and they were unwilling to discuss his current regimen overnight due to lack of staffing at that hour.  Finally, he does admit that his last generalized seizure was about 1 year ago.  He does not quite remember the last time he had 2 seizures in a day, but it has been probably several years.  He admits to seeing a neurologist but it has been at least a year ago, and the last documented note was on 6/5/2020.  At that time he was taking valproate, levetiracetam, lamotrigine, zonisamide although he claims the dosages have may be been slightly modified since that visit via phone appointments.    Current AED regimen:  Keppra 1000mg in the morning and 1500mg in the evening  Depakote DR: 500mg 3 times daily + levocarnitine: 330mg TID  Lamotrigine: 200mg BID  Zonisamide: 200mg nightly    Prior to the most recent seizure, apparently he had been seizure-free for about 10 years.  He has history of generalized tonic-clonic seizures.  No history of other seizure types.  Joel has additional history of cerebral palsy, nonepileptic events, schizoaffective disorder, oppositional defiant disorder, blindness Left, cerebral ventriculomegaly, prediabetes, sleep apnea and mild intellectual disability.  Head CT in 8.2021 showed lateral and third ventricle enlargement.  EEG from 2012 showed bilateral theta slowing, no epileptiform  activity.    He did have a fall yesterday, passed out. He describes some lightheadedness and dizziness at times. Staff with him did not see this event.  Patient himself says that he has not had any seizures since the event last year and staff agrees.  Vague historian.    Past Medical History:   Diagnosis Date     Blindness of left eye      Depression 3/10/2014     Hypertension      Pneumococcal septicemia(038.2) (H) 11/26/2013    Hospitalized     Pneumonia, organism unspecified(486) 11/26/2013    Hospitalized     Unspecified epilepsy without mention of intractable epilepsy      Past Surgical History:   Procedure Laterality Date     ORTHOPEDIC SURGERY      pt stated past surgery on both ankles       acetaminophen (TYLENOL) 500 MG tablet, Take 500 mg by mouth every 4 hours as needed  ARTIFICIAL TEARS 0.1-0.3 % SOLN, Apply 2 drops to eye every hour. As needed.  baclofen (LIORESAL) 10 MG tablet, 1/2 TO 1 TABLET BY MOUTH 3 TIMES DAILY AS NEEDED  busPIRone (BUSPAR) 15 MG tablet, Take 15 mg by mouth 2 times daily  calcium polycarbophil (FIBERCON) 625 MG tablet, Take 1 tablet by mouth 2 times daily At 8am and 5pm  citalopram (CELEXA) 20 MG tablet, Take 20 mg by mouth daily  citalopram (CELEXA) 40 MG tablet, Take 40 mg by mouth daily  clotrimazole (LOTRIMIN) 1 % external solution,   divalproex sodium delayed-release (DEPAKOTE) 500 MG DR tablet, Take 1 tablet (500 mg) by mouth 3 times daily  lamoTRIgine (LAMICTAL) 200 MG tablet, Take 1 tablet (200 mg) by mouth At Bedtime (Patient taking differently: Take 200 mg by mouth 2 times daily)  levETIRAcetam (KEPPRA) 500 MG tablet, Take 3 tablets (1,500 mg) by mouth At Bedtime  levETIRAcetam (KEPPRA) 500 MG tablet, Take 2 tablets (1,000 mg) by mouth every morning  levOCARNitine (CARNITOR) 330 MG tablet, TAKE 1 TABLET BY MOUTH 3 TIMES DAILY WITH MEALS  multivitamin, therapeutic with minerals (MULTI-VITAMIN) TABS, Take 1 tablet by mouth daily.  polyethylene glycol (MIRALAX) 17 GM/Dose  "powder,   propranolol (INDERAL) 10 MG tablet, Take 20 mg by mouth 2 times daily  QUEtiapine (SEROQUEL) 300 MG tablet, TAKE ONE TABLET BY MOUTH EVERY NIGHT AT BEDTIME  SM NASAL SPRAY SALINE 0.65 % nasal spray, Spray 1 spray in nostril every hour as needed  VENTOLIN  (90 Base) MCG/ACT inhaler, Inhale 2 puffs into the lungs every 4 hours as needed  zonisamide (ZONEGRAN) 100 MG capsule, Take 100 mg by mouth daily  busPIRone (BUSPAR) 5 MG tablet, Take 5 mg by mouth 2 times daily (Patient not taking: Reported on 4/19/2022)  fluticasone (FLONASE) 50 MCG/ACT nasal spray, Spray 1 spray in nostril daily as needed (Patient not taking: Reported on 4/19/2022)  lamoTRIgine (LAMICTAL) 100 MG tablet, Take 1 tablet (100 mg) by mouth 2 times daily (Patient not taking: Reported on 4/19/2022)  risperiDONE (RISPERDAL) 1 MG tablet, Take 1 mg by mouth 2 times daily At 8am and 2pm (Patient not taking: Reported on 4/19/2022)  risperiDONE (RISPERDAL) 3 MG tablet, Take 3 mg by mouth At Bedtime (Patient not taking: Reported on 4/19/2022)    No current facility-administered medications on file prior to visit.    Allergies   Allergen Reactions     Tomato      Vicodin [Hydrocodone-Acetaminophen] GI Disturbance         Social History     Tobacco Use     Smoking status: Former Smoker     Smokeless tobacco: Never Used   Substance Use Topics     Alcohol use: No     Drug use: No       REVIEW OF SYSTEMS:                                                      10-point review of systems is negative except as mentioned above in HPI.     EXAM:                                                      Physical Exam:   Vitals: /69 (BP Location: Right arm, Patient Position: Sitting)   Pulse 83   Ht 1.702 m (5' 7\")   Wt 122.1 kg (269 lb 3.2 oz)   BMI 42.16 kg/m    BMI= Body mass index is 42.16 kg/m .  GENERAL: NAD.  HEENT: NC/AT.   CV: RRR. S1, S2.   NECK: No bruits.  PULM: Non-labored breathing.   Neurologic:  MENTAL STATUS: Alert, attentive. Speech " is fluent. Normal comprehension.  Normal concentration. Adequate fund of knowledge.   CRANIAL NERVES: Right disc is difficult to visualize, left covered with tissue. Facial sensation and movement normal. EOM full. Hearing intact to conversation. Trapezius strength intact. Palate moves symmetrically. Tongue midline.  MOTOR: 5/5 in proximal and distal muscle groups of upper and lower extremities. Tone and bulk normal.   DTRs: Intact and symmetric in biceps, BR, patellae.   SENSATION: Normal light touch throughout.  COORDINATION: Normal finger nose finger. Finger tapping normal.  STATION AND GAIT: Casual gait is wide-based.   Right hand-dominant.    Relevant Data:  Head CT (8.30.21):  Impression:  No acute intracranial pathology. Chronic small vessel ischemic  disease. Mild to moderate lateral and third ventriculomegaly.    Electroencephalogram (2.2012):  IMPRESSION:  Minimally abnormal waking record because of the appearance of mild increased abundance of the bitemporal theta slowing.  This is of doubtful clinical significance, and a nonspecific EEG finding.  No epileptiform activity is seen.     Imaging reviewed independently by me.  Agree with radiology read.    ASSESSMENT and PLAN:                                                      Assessment:     ICD-10-CM    1. Seizure disorder (H)  G40.909 Valproic Acid level     Ammonia     CBC with platelets     Comprehensive metabolic panel     Keppra (Levetiracetam) Level     Zonisamide Level Quantitative     Lamotrigine Level     EEG Routine   2. Developmental delay disorder  R62.50    3. Encounter for long-term (current) use of medications  Z79.899 Valproic Acid level     Ammonia     CBC with platelets     Comprehensive metabolic panel     Keppra (Levetiracetam) Level     Zonisamide Level Quantitative     Lamotrigine Level   4. Non-refractory epilepsy (H)  G40.909 zonisamide (ZONEGRAN) 100 MG capsule   5. Breakthrough seizure (H)  G40.919 zonisamide (ZONEGRAN) 100 MG  capsule        Mr. Vazquez is a pleasant 46-year-old man with multiple medical and psychiatric comorbidities here to establish care for seizure disorder.  He and his group home staff reports no recurrence of seizures since the events last summer.  We will go ahead and check levels of medications to make sure the doses are appropriate.  Unfortunately the history is somewhat vague.  I would like to get an updated EEG as well, given that there are gaps in the information I have.  We will plan to follow-up again in 6 months.  Joel and his group home staff expressed understanding and agreement with the plan.    Plan:  -- Continue current doses of Depakote, Keppra, lamotrigine and zonisamide.  -- We will check medication levels and get an updated EEG.  We will notify you of the results.  -- Follow-up in neurology in 6 months.  Please let us know if any concerns arise in the meantime.    Total Time: 40 minutes were spent with the patient and in chart review/documentation (as described above in Assessment and Plan) /coordinating the care on date of service.    Jojo Griggs MD  Neurology    CC: Siobhan Messer software used in the dictation of this note.        Again, thank you for allowing me to participate in the care of your patient.        Sincerely,        Jojo Griggs MD

## 2022-04-19 NOTE — PATIENT INSTRUCTIONS
Plan:  -- Continue current doses of Depakote, Keppra, lamotrigine and zonisamide.  -- We will check medication levels and get an updated EEG.  We will notify you of the results.  -- Follow-up in neurology in 6 months.  Please let us know if any concerns arise in the meantime.

## 2022-04-19 NOTE — NURSING NOTE
Chief Complaint   Patient presents with     Seizures     New patient      Patient is here with Ricki (Group home staff).  Alphonso Santos CMA@ on 4/19/2022 at 12:57 PM     Statement Selected

## 2022-05-05 ENCOUNTER — LAB (OUTPATIENT)
Dept: LAB | Facility: HOSPITAL | Age: 47
End: 2022-05-05
Payer: MEDICARE

## 2022-05-05 DIAGNOSIS — G40.909 SEIZURE DISORDER (H): ICD-10-CM

## 2022-05-05 DIAGNOSIS — Z79.899 ENCOUNTER FOR LONG-TERM (CURRENT) USE OF MEDICATIONS: ICD-10-CM

## 2022-05-05 LAB
ALBUMIN SERPL-MCNC: 3.5 G/DL (ref 3.5–5)
ALP SERPL-CCNC: 65 U/L (ref 45–120)
ALT SERPL W P-5'-P-CCNC: 23 U/L (ref 0–45)
AMMONIA PLAS-SCNC: 60 UMOL/L (ref 11–35)
ANION GAP SERPL CALCULATED.3IONS-SCNC: 9 MMOL/L (ref 5–18)
AST SERPL W P-5'-P-CCNC: 20 U/L (ref 0–40)
BILIRUB SERPL-MCNC: 0.4 MG/DL (ref 0–1)
BUN SERPL-MCNC: 14 MG/DL (ref 8–22)
CALCIUM SERPL-MCNC: 9.1 MG/DL (ref 8.5–10.5)
CHLORIDE BLD-SCNC: 103 MMOL/L (ref 98–107)
CO2 SERPL-SCNC: 33 MMOL/L (ref 22–31)
CREAT SERPL-MCNC: 0.84 MG/DL (ref 0.7–1.3)
ERYTHROCYTE [DISTWIDTH] IN BLOOD BY AUTOMATED COUNT: 14.5 % (ref 10–15)
GFR SERPL CREATININE-BSD FRML MDRD: >90 ML/MIN/1.73M2
GLUCOSE BLD-MCNC: 81 MG/DL (ref 70–125)
HCT VFR BLD AUTO: 43.8 % (ref 40–53)
HGB BLD-MCNC: 13.6 G/DL (ref 13.3–17.7)
MCH RBC QN AUTO: 30.3 PG (ref 26.5–33)
MCHC RBC AUTO-ENTMCNC: 31.1 G/DL (ref 31.5–36.5)
MCV RBC AUTO: 98 FL (ref 78–100)
PLATELET # BLD AUTO: 195 10E3/UL (ref 150–450)
POTASSIUM BLD-SCNC: 3.9 MMOL/L (ref 3.5–5)
PROT SERPL-MCNC: 6.6 G/DL (ref 6–8)
RBC # BLD AUTO: 4.49 10E6/UL (ref 4.4–5.9)
SODIUM SERPL-SCNC: 145 MMOL/L (ref 136–145)
VALPROATE SERPL-MCNC: 75 UG/ML
WBC # BLD AUTO: 6.5 10E3/UL (ref 4–11)

## 2022-05-05 PROCEDURE — 80164 ASSAY DIPROPYLACETIC ACD TOT: CPT

## 2022-05-05 PROCEDURE — 82140 ASSAY OF AMMONIA: CPT

## 2022-05-05 PROCEDURE — 80177 DRUG SCRN QUAN LEVETIRACETAM: CPT

## 2022-05-05 PROCEDURE — 85027 COMPLETE CBC AUTOMATED: CPT

## 2022-05-05 PROCEDURE — 80175 DRUG SCREEN QUAN LAMOTRIGINE: CPT

## 2022-05-05 PROCEDURE — 36415 COLL VENOUS BLD VENIPUNCTURE: CPT

## 2022-05-05 PROCEDURE — 80203 DRUG SCREEN QUANT ZONISAMIDE: CPT

## 2022-05-05 PROCEDURE — 80053 COMPREHEN METABOLIC PANEL: CPT

## 2022-05-06 LAB
LAMOTRIGINE SERPL-MCNC: 16.2 UG/ML
LEVETIRACETAM SERPL-MCNC: 47 UG/ML
ZONISAMIDE SERPL-MCNC: 15 UG/ML

## 2022-05-16 ENCOUNTER — TELEPHONE (OUTPATIENT)
Dept: NEUROLOGY | Facility: CLINIC | Age: 47
End: 2022-05-16
Payer: MEDICARE

## 2022-05-16 DIAGNOSIS — G40.309 NONINTRACTABLE GENERALIZED IDIOPATHIC EPILEPSY WITHOUT STATUS EPILEPTICUS (H): ICD-10-CM

## 2022-05-16 RX ORDER — LEVOCARNITINE 330 MG/1
660 TABLET ORAL 3 TIMES DAILY
Qty: 180 TABLET | Refills: 5 | Status: SHIPPED | OUTPATIENT
Start: 2022-05-16 | End: 2022-11-09

## 2022-05-16 NOTE — TELEPHONE ENCOUNTER
----- Message from Jojo Griggs MD sent at 5/16/2022  8:40 AM CDT -----  Please let Joel know that his zonisamide and levels are in therapeutic range, and the Keppra and lamotrigine levels are little bit high.  I do not think we need to make any dose changes as long as Joel is not experiencing side effects on the medications.  His ammonia is a little bit elevated which is probably due to the Depakote.  I recommend we increase the levocarnitine to 2 tablets (660mg) times daily.  We will put in a new order for the pharmacy.  Lab order is also in for updated ammonia level in 1 month    Thank you,  Dr. Griggs

## 2022-05-16 NOTE — TELEPHONE ENCOUNTER
Spoke with group home staff Tre. Patient's results and recommendation as per Dr. Griggs given to Tre(group home staffe). Tre verbalized understanding.     Alphonso Santos CMA@ on 5/16/2022 at 9:51 AM

## 2022-05-16 NOTE — RESULT ENCOUNTER NOTE
Please let Joel know that his zonisamide and levels are in therapeutic range, and the Keppra and lamotrigine levels are little bit high.  I do not think we need to make any dose changes as long as Joel is not experiencing side effects on the medications.  His ammonia is a little bit elevated which is probably due to the Depakote.  I recommend we increase the levocarnitine to 2 tablets (660mg) times daily.  We will put in a new order for the pharmacy.  Lab order is also in for updated ammonia level in 1 month    Thank you,  Dr. Griggs

## 2022-05-27 ENCOUNTER — MEDICAL CORRESPONDENCE (OUTPATIENT)
Dept: HEALTH INFORMATION MANAGEMENT | Facility: CLINIC | Age: 47
End: 2022-05-27

## 2022-06-20 ENCOUNTER — APPOINTMENT (OUTPATIENT)
Dept: GENERAL RADIOLOGY | Facility: CLINIC | Age: 47
End: 2022-06-20
Attending: EMERGENCY MEDICINE
Payer: MEDICARE

## 2022-06-20 ENCOUNTER — HOSPITAL ENCOUNTER (EMERGENCY)
Facility: CLINIC | Age: 47
Discharge: HOME | End: 2022-06-20
Attending: EMERGENCY MEDICINE | Admitting: EMERGENCY MEDICINE
Payer: MEDICARE

## 2022-06-20 VITALS
HEART RATE: 81 BPM | RESPIRATION RATE: 22 BRPM | SYSTOLIC BLOOD PRESSURE: 97 MMHG | TEMPERATURE: 98.3 F | OXYGEN SATURATION: 97 % | DIASTOLIC BLOOD PRESSURE: 63 MMHG

## 2022-06-20 DIAGNOSIS — J18.9 PNEUMONIA OF LEFT LOWER LOBE DUE TO INFECTIOUS ORGANISM: ICD-10-CM

## 2022-06-20 DIAGNOSIS — R19.7 NAUSEA VOMITING AND DIARRHEA: ICD-10-CM

## 2022-06-20 DIAGNOSIS — R11.2 NAUSEA VOMITING AND DIARRHEA: ICD-10-CM

## 2022-06-20 LAB
ALBUMIN SERPL-MCNC: 3.4 G/DL (ref 3.4–5)
ALP SERPL-CCNC: 58 U/L (ref 40–150)
ALT SERPL W P-5'-P-CCNC: 24 U/L (ref 0–70)
ANION GAP SERPL CALCULATED.3IONS-SCNC: 1 MMOL/L (ref 3–14)
AST SERPL W P-5'-P-CCNC: 18 U/L (ref 0–45)
BASOPHILS # BLD AUTO: 0 10E3/UL (ref 0–0.2)
BASOPHILS NFR BLD AUTO: 0 %
BILIRUB SERPL-MCNC: 0.4 MG/DL (ref 0.2–1.3)
BUN SERPL-MCNC: 17 MG/DL (ref 7–30)
CALCIUM SERPL-MCNC: 9.2 MG/DL (ref 8.5–10.1)
CHLORIDE BLD-SCNC: 103 MMOL/L (ref 94–109)
CO2 SERPL-SCNC: 37 MMOL/L (ref 20–32)
CREAT SERPL-MCNC: 0.81 MG/DL (ref 0.66–1.25)
EOSINOPHIL # BLD AUTO: 0 10E3/UL (ref 0–0.7)
EOSINOPHIL NFR BLD AUTO: 0 %
ERYTHROCYTE [DISTWIDTH] IN BLOOD BY AUTOMATED COUNT: 13.5 % (ref 10–15)
FLUAV RNA SPEC QL NAA+PROBE: NEGATIVE
FLUBV RNA RESP QL NAA+PROBE: NEGATIVE
GFR SERPL CREATININE-BSD FRML MDRD: >90 ML/MIN/1.73M2
GLUCOSE BLD-MCNC: 101 MG/DL (ref 70–99)
HCT VFR BLD AUTO: 44.1 % (ref 40–53)
HGB BLD-MCNC: 13.9 G/DL (ref 13.3–17.7)
IMM GRANULOCYTES # BLD: 0.1 10E3/UL
IMM GRANULOCYTES NFR BLD: 1 %
LIPASE SERPL-CCNC: 69 U/L (ref 73–393)
LYMPHOCYTES # BLD AUTO: 1.7 10E3/UL (ref 0.8–5.3)
LYMPHOCYTES NFR BLD AUTO: 15 %
MCH RBC QN AUTO: 31 PG (ref 26.5–33)
MCHC RBC AUTO-ENTMCNC: 31.5 G/DL (ref 31.5–36.5)
MCV RBC AUTO: 98 FL (ref 78–100)
MONOCYTES # BLD AUTO: 1.1 10E3/UL (ref 0–1.3)
MONOCYTES NFR BLD AUTO: 10 %
NEUTROPHILS # BLD AUTO: 8.2 10E3/UL (ref 1.6–8.3)
NEUTROPHILS NFR BLD AUTO: 74 %
NRBC # BLD AUTO: 0 10E3/UL
NRBC BLD AUTO-RTO: 0 /100
PLATELET # BLD AUTO: 181 10E3/UL (ref 150–450)
POTASSIUM BLD-SCNC: 4.4 MMOL/L (ref 3.4–5.3)
PROT SERPL-MCNC: 6.5 G/DL (ref 6.8–8.8)
RBC # BLD AUTO: 4.49 10E6/UL (ref 4.4–5.9)
RSV RNA SPEC NAA+PROBE: NEGATIVE
SARS-COV-2 RNA RESP QL NAA+PROBE: NEGATIVE
SODIUM SERPL-SCNC: 141 MMOL/L (ref 133–144)
WBC # BLD AUTO: 11.2 10E3/UL (ref 4–11)

## 2022-06-20 PROCEDURE — 99284 EMERGENCY DEPT VISIT MOD MDM: CPT | Mod: CS,25

## 2022-06-20 PROCEDURE — 258N000003 HC RX IP 258 OP 636: Performed by: EMERGENCY MEDICINE

## 2022-06-20 PROCEDURE — 80053 COMPREHEN METABOLIC PANEL: CPT | Performed by: EMERGENCY MEDICINE

## 2022-06-20 PROCEDURE — 36415 COLL VENOUS BLD VENIPUNCTURE: CPT | Performed by: EMERGENCY MEDICINE

## 2022-06-20 PROCEDURE — 83690 ASSAY OF LIPASE: CPT | Performed by: EMERGENCY MEDICINE

## 2022-06-20 PROCEDURE — 96374 THER/PROPH/DIAG INJ IV PUSH: CPT

## 2022-06-20 PROCEDURE — 96361 HYDRATE IV INFUSION ADD-ON: CPT

## 2022-06-20 PROCEDURE — 85025 COMPLETE CBC W/AUTO DIFF WBC: CPT | Performed by: EMERGENCY MEDICINE

## 2022-06-20 PROCEDURE — 250N000011 HC RX IP 250 OP 636: Performed by: EMERGENCY MEDICINE

## 2022-06-20 PROCEDURE — 71046 X-RAY EXAM CHEST 2 VIEWS: CPT

## 2022-06-20 PROCEDURE — 87637 SARSCOV2&INF A&B&RSV AMP PRB: CPT | Performed by: EMERGENCY MEDICINE

## 2022-06-20 PROCEDURE — 82040 ASSAY OF SERUM ALBUMIN: CPT | Performed by: EMERGENCY MEDICINE

## 2022-06-20 PROCEDURE — 250N000013 HC RX MED GY IP 250 OP 250 PS 637: Performed by: EMERGENCY MEDICINE

## 2022-06-20 PROCEDURE — C9803 HOPD COVID-19 SPEC COLLECT: HCPCS

## 2022-06-20 RX ORDER — DOXYCYCLINE 100 MG/1
100 CAPSULE ORAL 2 TIMES DAILY
Qty: 14 CAPSULE | Refills: 0 | Status: SHIPPED | OUTPATIENT
Start: 2022-06-20 | End: 2022-11-25

## 2022-06-20 RX ORDER — DOXYCYCLINE 100 MG/1
100 CAPSULE ORAL 2 TIMES DAILY
Qty: 14 CAPSULE | Refills: 0 | Status: SHIPPED | OUTPATIENT
Start: 2022-06-20 | End: 2022-06-20

## 2022-06-20 RX ORDER — ONDANSETRON 2 MG/ML
4 INJECTION INTRAMUSCULAR; INTRAVENOUS
Status: DISCONTINUED | OUTPATIENT
Start: 2022-06-20 | End: 2022-06-20 | Stop reason: HOSPADM

## 2022-06-20 RX ORDER — ONDANSETRON 4 MG/1
4 TABLET, ORALLY DISINTEGRATING ORAL EVERY 6 HOURS PRN
Qty: 10 TABLET | Refills: 0 | Status: SHIPPED | OUTPATIENT
Start: 2022-06-20 | End: 2022-06-20

## 2022-06-20 RX ORDER — DOXYCYCLINE 100 MG/1
100 CAPSULE ORAL ONCE
Status: COMPLETED | OUTPATIENT
Start: 2022-06-20 | End: 2022-06-20

## 2022-06-20 RX ORDER — ONDANSETRON 4 MG/1
4 TABLET, ORALLY DISINTEGRATING ORAL EVERY 6 HOURS PRN
Qty: 10 TABLET | Refills: 0 | Status: SHIPPED | OUTPATIENT
Start: 2022-06-20 | End: 2022-11-25

## 2022-06-20 RX ORDER — SODIUM CHLORIDE 9 MG/ML
1000 INJECTION, SOLUTION INTRAVENOUS CONTINUOUS
Status: DISCONTINUED | OUTPATIENT
Start: 2022-06-20 | End: 2022-06-20 | Stop reason: HOSPADM

## 2022-06-20 RX ADMIN — SODIUM CHLORIDE 1000 ML: 9 INJECTION, SOLUTION INTRAVENOUS at 11:25

## 2022-06-20 RX ADMIN — ONDANSETRON 4 MG: 2 INJECTION INTRAMUSCULAR; INTRAVENOUS at 11:25

## 2022-06-20 RX ADMIN — DOXYCYCLINE HYCLATE 100 MG: 100 CAPSULE ORAL at 13:38

## 2022-06-20 ASSESSMENT — ENCOUNTER SYMPTOMS
FEVER: 0
SHORTNESS OF BREATH: 0
COUGH: 1
FREQUENCY: 0
DIFFICULTY URINATING: 0
HEMATURIA: 0
ABDOMINAL PAIN: 0
VOMITING: 1
CHILLS: 0
DIARRHEA: 1
DYSURIA: 0
NAUSEA: 1

## 2022-06-20 NOTE — ED PROVIDER NOTES
"  History   Chief Complaint:  Nausea & Vomiting       HPI   Tre Vazquez is a 46 year old male with history of cerebral palsy, epilepsy, and hypertension who presents with nausea, vomiting and diarrhea that began a few days ago. His diarrhea is somewhat formed. Since the onset of these symptoms, the patient has not been able to keep anything down. Patient reports he also has a cough which he has had for \"a while\". Denies presence of blood in vomit or stool. No new abdominal pain, fever, chills, shortness of breath, and chest pain. He denies any urinary symptoms. Patient is always on 2L of oxygen.     Review of Systems   Constitutional: Negative for chills and fever.   Respiratory: Positive for cough. Negative for shortness of breath.    Cardiovascular: Negative for chest pain.   Gastrointestinal: Positive for diarrhea, nausea and vomiting. Negative for abdominal pain.   Genitourinary: Negative for decreased urine volume, difficulty urinating, dysuria, frequency, hematuria and urgency.   All other systems reviewed and are negative.      Allergies:  Tomato  Vicodin [Hydrocodone-Acetaminophen]  Cephalexin    Medications:  Celexa  Minipress  Seroquel  Ventolin  Depakote  Kera  [facility]    Past Medical History:     Non-refractory epilepsy  Cerebral palsy  Encephalomalacia  Benign hypertension  Septic shock  Pneumococcal se[ticemia  Depression  Breakthrough seizure    Schizoaffective disorder  Paraplegia  Right lower love pulmonary infiltrate  ANA  Prediabetes  Respiratory failure with hypercapnia  Lumbar disc disease  Chronic ankle pain  Cerebral ventriculomegaly  Psychosis  GERD  Oppositional defiant disorder  Blindness, one eye  Obesity  Spastic diplegia  Bronchitis  Acute serious krystina media  Borderline personality disorder  Cognitive impairment  Asthma  PTSD  QT prolongation  Bipolar depression  Cellulitis of right lower extremity    Past Surgical History:    Orthopedic surgery on " ankles, bilateral  Removal of iris of left eye  Colonscopy     Social History:  The patient presents to the ED alone via EMS.   PCP: Dr. Mayo    Physical Exam     Patient Vitals for the past 24 hrs:   BP Temp Temp src Pulse Resp SpO2   06/20/22 1056 114/56 98.3  F (36.8  C) Oral 78 22 97 %       Physical Exam  General: Alert, no acute distress; on 2L O2 by NC (chronic per pt)  Neuro:   EOMI.  No focal deficits  HEENT:  Moist mucous membranes. Conjunctiva normal.   CV:  RRR, no m/r/g, skin warm and well perfused  Pulm:  CTAB, no wheezes/ronchi/rales.  No acute distress, breathing comfortably  GI:  Soft, nontender, nondistended.  No rebound or guarding.   MSK:  Moving all extremities.  No focal areas of edema, erythema  Skin:  WWP, no rashes, skin color normal    Emergency Department Course       Imaging:  Chest XR,  PA & LAT   Preliminary Result   IMPRESSION: Patchy airspace opacities in the left lower lung are   suspicious for pneumonia. No pneumothorax. Heart size is stable.   Pulmonary vascularity is within normal limits. No pleural effusions   are identified.         Report per radiology    Laboratory:  Labs Ordered and Resulted from Time of ED Arrival to Time of ED Departure   COMPREHENSIVE METABOLIC PANEL - Abnormal       Result Value    Sodium 141      Potassium 4.4      Chloride 103      Carbon Dioxide (CO2) 37 (*)     Anion Gap 1 (*)     Urea Nitrogen 17      Creatinine 0.81      Calcium 9.2      Glucose 101 (*)     Alkaline Phosphatase 58      AST 18      ALT 24      Protein Total 6.5 (*)     Albumin 3.4      Bilirubin Total 0.4      GFR Estimate >90     LIPASE - Abnormal    Lipase 69 (*)    CBC WITH PLATELETS AND DIFFERENTIAL - Abnormal    WBC Count 11.2 (*)     RBC Count 4.49      Hemoglobin 13.9      Hematocrit 44.1      MCV 98      MCH 31.0      MCHC 31.5      RDW 13.5      Platelet Count 181      % Neutrophils 74      % Lymphocytes 15      % Monocytes 10      % Eosinophils 0      % Basophils 0      %  "Immature Granulocytes 1      NRBCs per 100 WBC 0      Absolute Neutrophils 8.2      Absolute Lymphocytes 1.7      Absolute Monocytes 1.1      Absolute Eosinophils 0.0      Absolute Basophils 0.0      Absolute Immature Granulocytes 0.1      Absolute NRBCs 0.0     INFLUENZA A/B & SARS-COV2 PCR MULTIPLEX - Normal    Influenza A PCR Negative      Influenza B PCR Negative      RSV PCR Negative      SARS CoV2 PCR Negative            Emergency Department Course:    Reviewed:  I reviewed nursing notes, vitals, past medical history and Care Everywhere    Assessments:  1108 I obtained history and examined the patient as noted above.   1259 I rechecked the patient and explained findings.         Interventions:  Medications   ondansetron (ZOFRAN) injection 4 mg (has no administration in time range)   sodium chloride 0.9% infusion (has no administration in time range)   ondansetron (ZOFRAN) injection 4 mg (4 mg Intravenous Given 6/20/22 1125)   0.9% sodium chloride BOLUS (0 mLs Intravenous Stopped 6/20/22 1337)   doxycycline hyclate (VIBRAMYCIN) capsule 100 mg (100 mg Oral Given 6/20/22 1338)         Disposition:  The patient was discharged to home.     Impression & Plan       Medical Decision Making:  Tre Vazquez is a 46 year old male history of cerebral palsy and reportedly chronically on 2 L O2 presenting to the ER for evaluation of nausea/vomiting and diarrhea.  Also reports cough \"for a while\" but denies any shortness of breath or chest pain.  He is afebrile vitally stable and not requiring increased supplemental oxygenation.  Abdominal exam is completely benign without tenderness.  No signs of acute abdomen or intra-abdominal catastrophe requiring imaging.  Basic lab studies above remarkable for mild leukocytosis.  COVID and influenza swabs are negative.  The rest of his basic lab studies are unremarkable.  Chest x-ray obtained shows patchy opacities in the left lower lung base concerning for pneumonia.  We will treat " for community-acquired pneumonia with doxycycline.  He remains vitally stable without respiratory distress or increased oxygen requirements.  With reasonable certainty, I feel he is safe to discharge home to follow-up with his PCP and he understands and agrees.  Nausea/vomiting and diarrhea likely gastroenteritis.  No concerns at this time for bacterial etiology given lack of fever or bloody stools or recent antibiotic use.  Reasons to return to the ER were discussed and all questions were answered prior to discharge.        Diagnosis:    ICD-10-CM    1. Pneumonia of left lower lobe due to infectious organism  J18.9    2. Nausea vomiting and diarrhea  R11.2     R19.7        Discharge Medications:  Current Discharge Medication List      START taking these medications    Details   doxycycline hyclate (VIBRAMYCIN) 100 MG capsule Take 1 capsule (100 mg) by mouth 2 times daily  Qty: 14 capsule, Refills: 0      ondansetron (ZOFRAN ODT) 4 MG ODT tab Take 1 tablet (4 mg) by mouth every 6 hours as needed for nausea or vomiting  Qty: 10 tablet, Refills: 0             Scribe Disclosure:  Stella NAILS, am serving as a scribe at 11:04 AM on 6/20/2022 to document services personally performed by Dominick Menjivar MD based on my observations and the provider's statements to me.            Dominick Menjivar MD  06/20/22 2134

## 2022-06-20 NOTE — ED TRIAGE NOTES
Arrives via EMS from group home for nausea and vomiting that started yesterday, denies pain, alert and oriented but poor historian, ABCs intact at this time.     Triage Assessment     Row Name 06/20/22 1058       Triage Assessment (Adult)    Airway WDL WDL       Respiratory WDL    Respiratory WDL WDL       Skin Circulation/Temperature WDL    Skin Circulation/Temperature WDL WDL       Cardiac WDL    Cardiac WDL WDL       Peripheral/Neurovascular WDL    Peripheral Neurovascular WDL WDL       Cognitive/Neuro/Behavioral WDL    Cognitive/Neuro/Behavioral WDL WDL

## 2022-06-20 NOTE — ED NOTES
Fanny cares provided, pt stood at bedside, stool debris in gluteal fold. Skin intact. PO challenging now.

## 2022-07-08 ENCOUNTER — ANCILLARY PROCEDURE (OUTPATIENT)
Dept: NEUROLOGY | Facility: CLINIC | Age: 47
End: 2022-07-08
Attending: PSYCHIATRY & NEUROLOGY
Payer: MEDICARE

## 2022-07-08 DIAGNOSIS — G40.909 SEIZURE DISORDER (H): ICD-10-CM

## 2022-07-08 PROCEDURE — 95816 EEG AWAKE AND DROWSY: CPT | Performed by: PSYCHIATRY & NEUROLOGY

## 2022-07-14 ENCOUNTER — TELEPHONE (OUTPATIENT)
Dept: NEUROLOGY | Facility: CLINIC | Age: 47
End: 2022-07-14

## 2022-07-14 NOTE — TELEPHONE ENCOUNTER
Tried to call patient's phone and it said the phone number is out of service. Called Ricki (Group home staff) and per Ricki, patient moved. Called [facility]  and the person answered the phone stated that they don't have a patient by that name.     Alphonso Santos CMA@ on 7/14/2022 at 8:21 AM    Got a hold of Ashlie (patient's step parents) and Ashlie gave patient's new place name and phone number. [facility] [phone].    Alphonso Santos CMA@ on 7/14/2022 at 8:37 AM

## 2022-07-14 NOTE — RESULT ENCOUNTER NOTE
Please let Tre know that his EEG looks okay.  No evidence of electrical seizures.    Thank you,  Dr. Griggs

## 2022-07-14 NOTE — TELEPHONE ENCOUNTER
----- Message from Jojo Griggs MD sent at 7/14/2022  7:46 AM CDT -----  Please let Tre know that his EEG looks okay.  No evidence of electrical seizures.    Thank you,  Dr. Griggs

## 2022-07-14 NOTE — TELEPHONE ENCOUNTER
Called [facility] and per staff, call Odilia to give result [phone]. Called Odilia Lara manager and given results as per Dr. Griggs. Odilia verbalized understanding. Odilia would like a seizure action form from Dr. Griggs. Will wait for Odilia to fax over the form so Dr. Griggs can fill it out.     Odilia phone#: [phone]  Fax#: [phone]    Alphonso Santos CMA@ on 7/14/2022 at 9:19 AM

## 2022-10-19 ENCOUNTER — TELEPHONE (OUTPATIENT)
Dept: NEUROLOGY | Facility: CLINIC | Age: 47
End: 2022-10-19

## 2022-10-19 DIAGNOSIS — G40.309 NONINTRACTABLE GENERALIZED IDIOPATHIC EPILEPSY WITHOUT STATUS EPILEPTICUS (H): ICD-10-CM

## 2022-10-19 RX ORDER — LEVOCARNITINE 330 MG/1
660 TABLET ORAL 3 TIMES DAILY
Qty: 180 TABLET | Refills: 5 | Status: CANCELLED | OUTPATIENT
Start: 2022-10-19

## 2022-10-19 NOTE — TELEPHONE ENCOUNTER
Pt has appt tomorrow. Will be refilled at appt.     Tito Hathaway RN, BSN  [facility]

## 2022-10-19 NOTE — TELEPHONE ENCOUNTER
[facility] called re Zonisamide. Rx was sent today for 100mg 2 tbs at bedtime. Pt has always been on 100 mg qam and 200 mg at bedtime. Pharm sets up this med for the group home. Pt is also in need of refills for [id number] mg 1 tab tid.  
Pharmacy requesting that Zonisamide 100mg be refilled as well as the 200 dose that was previously sent. Will clarify with Dr. Griggs that this is the dose she would like for the pt to be on. Pharmacy is also requesting a refill of the levocarnitine. Will also verify that Dr. Griggs would like this reordered as it was not noted in last visit note.     Jacquelyn Shultz RN on 4/19/2022 at 1:59 PM    
98

## 2022-10-25 ENCOUNTER — APPOINTMENT (OUTPATIENT)
Dept: CT IMAGING | Facility: CLINIC | Age: 47
End: 2022-10-25
Attending: EMERGENCY MEDICINE
Payer: MEDICARE

## 2022-10-25 VITALS
TEMPERATURE: 97.5 F | SYSTOLIC BLOOD PRESSURE: 101 MMHG | HEART RATE: 63 BPM | OXYGEN SATURATION: 93 % | DIASTOLIC BLOOD PRESSURE: 54 MMHG | RESPIRATION RATE: 18 BRPM

## 2022-10-25 PROCEDURE — 99284 EMERGENCY DEPT VISIT MOD MDM: CPT | Mod: 25

## 2022-10-25 PROCEDURE — G1010 CDSM STANSON: HCPCS

## 2022-10-26 ENCOUNTER — HOSPITAL ENCOUNTER (EMERGENCY)
Facility: CLINIC | Age: 47
Discharge: NURSING, REHAB OR LONG-TERM CARE | End: 2022-10-26
Attending: EMERGENCY MEDICINE | Admitting: EMERGENCY MEDICINE
Payer: MEDICARE
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
## 2022-10-26 DIAGNOSIS — G93.89 CEREBRAL VENTRICULOMEGALY: ICD-10-CM

## 2022-10-26 DIAGNOSIS — S09.90XA INJURY OF HEAD, INITIAL ENCOUNTER: ICD-10-CM

## 2022-10-26 ASSESSMENT — ACTIVITIES OF DAILY LIVING (ADL): ADLS_ACUITY_SCORE: 37

## 2022-10-26 NOTE — ED TRIAGE NOTES
Group home  Unwitnessed fall  Unknown LOC  Was grabbing for something  Abrasion to forehead  No neck pain  Sluggish speech  H/o seizure

## 2022-10-26 NOTE — ED PROVIDER NOTES
"  History     Chief Complaint:  Fall       HPI   Tre Vazquez is a 47 year old male with history of cerebral palsy, epilepsy who presents with head injury after a fall at his living facility.  Patient reports he was feeling \"twitchy\" earlier this evening and fell by the edge of his bed. No LOC. Abrasions to forehead but no HA, neck pain, back pain, CP, abdominal pain, extremity pain. Walks with walker at baseline. Feels gait is at baseline. Twitchy episodes not new for him. No oother recent illnesses.    ROS:  Review of Systems  A 10 point ROS was obtained and negative except as noted here and in HPI       Allergies:  Tomato  Vicodin [Hydrocodone-Acetaminophen]     Medications:    acetaminophen (TYLENOL) 500 MG tablet  ARTIFICIAL TEARS 0.1-0.3 % SOLN  baclofen (LIORESAL) 10 MG tablet  busPIRone (BUSPAR) 15 MG tablet  busPIRone (BUSPAR) 5 MG tablet  calcium polycarbophil (FIBERCON) 625 MG tablet  citalopram (CELEXA) 20 MG tablet  citalopram (CELEXA) 40 MG tablet  clotrimazole (LOTRIMIN) 1 % external solution  divalproex sodium delayed-release (DEPAKOTE) 500 MG DR tablet  doxycycline hyclate (VIBRAMYCIN) 100 MG capsule  fluticasone (FLONASE) 50 MCG/ACT nasal spray  lamoTRIgine (LAMICTAL) 100 MG tablet  lamoTRIgine (LAMICTAL) 200 MG tablet  levETIRAcetam (KEPPRA) 500 MG tablet  levETIRAcetam (KEPPRA) 500 MG tablet  levOCARNitine (CARNITOR) 330 MG tablet  multivitamin, therapeutic with minerals (MULTI-VITAMIN) TABS  ondansetron (ZOFRAN ODT) 4 MG ODT tab  polyethylene glycol (MIRALAX) 17 GM/Dose powder  propranolol (INDERAL) 10 MG tablet  QUEtiapine (SEROQUEL) 300 MG tablet  risperiDONE (RISPERDAL) 1 MG tablet  risperiDONE (RISPERDAL) 3 MG tablet  SM NASAL SPRAY SALINE 0.65 % nasal spray  VENTOLIN  (90 Base) MCG/ACT inhaler  zonisamide (ZONEGRAN) 100 MG capsule  zonisamide (ZONEGRAN) 100 MG capsule        Past Medical History:    Past Medical History:   Diagnosis Date     Blindness of left eye      Depression " 3/10/2014     Hypertension      Pneumococcal septicemia(038.2) (H) 11/26/2013     Pneumonia, organism unspecified(486) 11/26/2013     Unspecified epilepsy without mention of intractable epilepsy        Past Surgical History:    Past Surgical History:   Procedure Laterality Date     ORTHOPEDIC SURGERY      pt stated past surgery on both ankles        Family History:    family history is not on file.    Social History:   reports that he has quit smoking. He has never used smokeless tobacco. He reports that he does not drink alcohol and does not use drugs.  PCP: Siobhan Mayo     Physical Exam     Patient Vitals for the past 24 hrs:   BP Temp Temp src Pulse Resp SpO2   10/25/22 2318 101/54 -- -- -- -- --   10/25/22 2317 -- 97.5  F (36.4  C) Temporal 63 18 93 %        Physical Exam  VS: Reviewed per above  HENT: Mucous membranes moist, no nuchal rigidity  EYES: sclera anicteric  CV: Rate as noted, regular rhythm.   RESP: Effort normal. Breath sounds are normal bilaterally.  GI: no tenderness/rebound/guarding, not distended.  NEURO: GCS 15, cranial nerves II through XII are intact, 5 out of 5 strength in all 4 extremities, sensation is intact light touch in all 4 extremities.  Ambulates with steady gait.  MSK: No deformity of the extremities  SKIN: Warm and dry, forehead abrasion.    Emergency Department Course     Imaging:  Head CT w/o contrast   Final Result   IMPRESSION:   1.  No acute intracranial process.   2.  Ventriculomegaly is disproportionate to cerebral atrophy, primarily due to white matter volume loss. Correlate for symptoms of normal pressure/nonobstructive hydrocephalus.         Report per radiology      Emergency Department Course:             Reviewed:  I reviewed nursing notes, vitals and past medical history    Assessments:   I obtained history and examined the patient as noted above.    I rechecked the patient and explained findings.     Disposition:  The patient was discharged to home.     Impression &  Plan        Medical Decision Making:  Patient presents to the ER for evaluation of injuries after mechanical fall in his room at living facility.  On arrival vital signs reassuring.  On exam he has forehead abrasion.  CT head negative for acute pathology.  Low suspicion for other injury based on thorough exam and history.  Incidental ventriculomegaly identified on CT of the head.  Recommend primary care follow-up for ongoing evaluation and correlation.  Patient denies any other medical concerns at this time return precautions discussed prior to discharge via medical transport.       Diagnosis:    ICD-10-CM    1. Injury of head, initial encounter  S09.90XA       2. Fall, initial encounter  W19.XXXA       3. Cerebral ventriculomegaly  G93.89            Discharge Medications:  Discharge Medication List as of 10/26/2022  3:52 AM           10/26/2022   Thomas Liz MD Lindenbaum, Elan, MD  10/26/22 0637

## 2022-10-26 NOTE — ED TRIAGE NOTES
"Arrives via EMS for a fall. Lives in group home. Pt reports \"twitching\" while trying to turn off the radio and fell forward hitting head on carpet. Abrasion noted to forehead. Unwitnessed, unknown LOC. Denies neck pain. H/o seizure disorder. A/Ox4. Abc's intact. VSS.       "

## 2022-11-09 DIAGNOSIS — G40.309 NONINTRACTABLE GENERALIZED IDIOPATHIC EPILEPSY WITHOUT STATUS EPILEPTICUS (H): ICD-10-CM

## 2022-11-09 RX ORDER — LEVOCARNITINE 330 MG/1
660 TABLET ORAL 3 TIMES DAILY
Qty: 180 TABLET | Refills: 0 | Status: SHIPPED | OUTPATIENT
Start: 2022-11-09 | End: 2022-11-30

## 2022-11-09 NOTE — TELEPHONE ENCOUNTER
Phone: Miya           Fax: [phone]                           Outpatient Physical Therapy                                                                            Daily Note    Patient: Larry Barney : 1933  CSN #: [id number]   Referring Practitioner:  Gaudencio Wilson DO    Referral Date : 10/19/21     Date: 2021    Diagnosis: Leg weakness  Treatment Diagnosis: generalized weakness, difficulty walking    Onset Date: 10/19/21  PT Insurance Information: Medicare  Total # of Visits Approved: 12 Per Physician Order  Total # of Visits to Date: 10  No Show: 0  Canceled Appointment: 0      Pre-Treatment Pain:  0/10  Subjective: Pt caregiver states pt is more energetic today and has noticed he hasn't been sitting in his lift chair much. Caregiver states she has noticed a big improvement in mobility since starting PT. Exercises:  Exercise 1: HEP: sink ex-- without squats  Exercise 3: AQ; water walk x4 laps fwd, lateral, retro in shallow- occasionally without UE support  Exercise 4: AQ:  Sink ex x15  Exercise 7: AQ: blue bar pull down to waist; pink board push/pull x10 - both with posterior support. Exercise 9: AQ:  forward/ lateral step up x10 each deep water    Assessment  Assessment: Pt progressed to amb on shallow side (waist deep) and able to complete 12 laps all together before needing a short RB. Pt was able to maintain his trunk upright while ambulating. Will continue aquatics to increase strength/ROM in an unloaded environment. Activity Tolerance  Activity Tolerance: Patient Tolerated treatment well    Patient Education  Ex technique  Pt verbalized/demonstrated good understanding:     [x] Yes         [] No, pt required further clarification.        Post Treatment Pain: 0 /10      Plan  Times per week: 2  Plan weeks: 4-6      Goals  (Total # of Visits to Date: 8)      Short term goals  Time Frame for Short term goals: 3 weeks  Short term goal 1: Pt Refill request for: Levocarnitine    Directions: 2 tabs 3X daily     LOV: 4/19/22  NOV: None - No showed last appointment    30 day supply with 0 refills Medication T'd for review and signature       will initiate HEP. - met  Short term goal 2: Pt will initiate aquatic therapy program to assist with improving LE strengthening and endurance. -met    Long term goals  Time Frame for Long term goals : 6 weeks  Long term goal 1: Pt will be independent and compliant with HEP. Long term goal 2: Pt will safely perform TUG with LRAD in </= 13 seconds to decrease risk of falls. Long term goal 3: Pt will demonstrate ability to maintain tandem stance for >/= 10 seconds to improve balance with NBOS. Long term goal 4: Pt will tolerate >/= 40-45mins ther ex/act with minimal rest breaks to improve endurance and activity tolerance. Long term goal 5: Pt will safely ambulate household distances with LRAD to improve functional ambulation throughout house.     Minutes Tracking:  Time In: 1330  Time Out: 6005  Minutes: [facility]     Date: 12/17/2021

## 2022-11-16 DIAGNOSIS — G40.909 NON-REFRACTORY EPILEPSY (H): ICD-10-CM

## 2022-11-16 DIAGNOSIS — G40.919 BREAKTHROUGH SEIZURE (H): ICD-10-CM

## 2022-11-16 RX ORDER — ZONISAMIDE 100 MG/1
200 CAPSULE ORAL AT BEDTIME
Qty: 60 CAPSULE | Refills: 0 | Status: SHIPPED | OUTPATIENT
Start: 2022-11-16 | End: 2022-12-07

## 2022-11-16 RX ORDER — ZONISAMIDE 100 MG/1
100 CAPSULE ORAL EVERY MORNING
Qty: 30 CAPSULE | Refills: 0 | Status: SHIPPED | OUTPATIENT
Start: 2022-11-16 | End: 2023-01-03

## 2022-11-16 NOTE — TELEPHONE ENCOUNTER
Refill request for: Zonisamide    Directions: 1 capsule in the morning and 2 capsules at bedtime     LOV: 4/19/22  NOV: NONE - DUE 10/2022 ( Letter sent for reminder)     30 day supply with 0 refills Medication T'd for review and signature

## 2022-11-16 NOTE — LETTER
11/16/2022        RE: Tre Vazquez  [address] [place] [address]        We recently provided you with medication refills.  Many medications require routine follow-up with your doctor.    Your prescription(s) have been refilled for 30 days so you may have time for the above noted follow-up. Please call to schedule soon so we can assure you have an appointment before your next refills are needed. If you have already made a follow up appointment, please disregard this letter.           Sincerely,        Jojo Griggs MD

## 2022-11-25 RX ORDER — CALCIUM POLYCARBOPHIL 625 MG
TABLET ORAL EVERY 24 HOURS
Status: ON HOLD | COMMUNITY
Start: 2022-03-24 | End: 2023-03-15

## 2022-11-25 RX ORDER — PROPRANOLOL HYDROCHLORIDE 20 MG/1
20 TABLET ORAL 2 TIMES DAILY
Status: ON HOLD | COMMUNITY
Start: 2022-10-22 | End: 2023-12-28

## 2022-11-25 RX ORDER — IPRATROPIUM BROMIDE AND ALBUTEROL SULFATE 2.5; .5 MG/3ML; MG/3ML
3 SOLUTION RESPIRATORY (INHALATION)
Status: ON HOLD | COMMUNITY
End: 2023-03-15

## 2022-11-25 RX ORDER — POLYETHYLENE GLYCOL 3350, SODIUM SULFATE ANHYDROUS, SODIUM BICARBONATE, SODIUM CHLORIDE, POTASSIUM CHLORIDE 236; 22.74; 6.74; 5.86; 2.97 G/4L; G/4L; G/4L; G/4L; G/4L
POWDER, FOR SOLUTION ORAL
Status: ON HOLD | COMMUNITY
Start: 2022-10-26 | End: 2023-03-15

## 2022-11-25 RX ORDER — BISACODYL 5 MG
TABLET, DELAYED RELEASE (ENTERIC COATED) ORAL
Status: ON HOLD | COMMUNITY
Start: 2022-11-23 | End: 2023-03-15

## 2022-11-25 RX ORDER — PRAZOSIN HYDROCHLORIDE 2 MG/1
2 CAPSULE ORAL AT BEDTIME
COMMUNITY
Start: 2022-10-12

## 2022-11-29 ENCOUNTER — TELEPHONE (OUTPATIENT)
Dept: NEUROLOGY | Facility: CLINIC | Age: 47
End: 2022-11-29

## 2022-11-29 DIAGNOSIS — G40.309 NONINTRACTABLE GENERALIZED IDIOPATHIC EPILEPSY WITHOUT STATUS EPILEPTICUS (H): ICD-10-CM

## 2022-11-29 NOTE — LETTER
November 30, 2022      Tre Vazquez  [address] [place] [address]        Dear Tre,     We recently provided you with medication refills.   Many medications require routine follow-up with your doctor.     Your prescription(s) have been refilled for 30 days so you may have time to schedule the above noted follow-up.  Please call [phone] to schedule soon so we can assure you have an appointment before your next refills are needed.  If you have already made a follow up appointment, please disregard this letter.    We can continue to refill your medication until your scheduled follow up appointment, but please call to schedule soon as your provider's schedule is filling up into Spring 2023.     Sincerely,    [facility] [place]

## 2022-11-30 RX ORDER — LEVOCARNITINE 330 MG/1
660 TABLET ORAL 3 TIMES DAILY
Qty: 180 TABLET | Refills: 0 | Status: SHIPPED | OUTPATIENT
Start: 2022-11-30 | End: 2023-01-03

## 2022-11-30 NOTE — TELEPHONE ENCOUNTER
Signed Prescriptions:                        Disp   Refills    levOCARNitine (CARNITOR) 330 MG tablet     180 ta*0        Sig: Take 2 tablets (660 mg) by mouth 3 times daily  Authorizing Provider: JENN BARAJAS  Ordering User: KENIA PHILIPPE    Approved per review of chart notes and refill hx. Pt did not show to 6 mo f/u in Oct.     RN attempted to call both numbers on file, one is not in service and the other has a vm box that is full.     Letter sent asking pt to schedule f/u appt.     Kenia Philippe RN, BSN  [facility]

## 2022-12-01 ENCOUNTER — HOSPITAL ENCOUNTER (OUTPATIENT)
Facility: CLINIC | Age: 47
Discharge: NURSING, REHAB OR LONG-TERM CARE | End: 2022-12-01
Attending: INTERNAL MEDICINE | Admitting: INTERNAL MEDICINE
Payer: MEDICARE

## 2022-12-01 ENCOUNTER — ANESTHESIA EVENT (OUTPATIENT)
Dept: SURGERY | Facility: CLINIC | Age: 47
End: 2022-12-01
Payer: MEDICARE

## 2022-12-01 ENCOUNTER — ANESTHESIA (OUTPATIENT)
Dept: SURGERY | Facility: CLINIC | Age: 47
End: 2022-12-01
Payer: MEDICARE

## 2022-12-01 VITALS
WEIGHT: 272 LBS | BODY MASS INDEX: 43.71 KG/M2 | TEMPERATURE: 97 F | OXYGEN SATURATION: 93 % | SYSTOLIC BLOOD PRESSURE: 143 MMHG | HEIGHT: 66 IN | DIASTOLIC BLOOD PRESSURE: 97 MMHG | RESPIRATION RATE: 16 BRPM | HEART RATE: 76 BPM

## 2022-12-01 LAB
COLONOSCOPY: NORMAL
CREAT SERPL-MCNC: 0.88 MG/DL (ref 0.67–1.17)
GFR SERPL CREATININE-BSD FRML MDRD: >90 ML/MIN/1.73M2
HOLD SPECIMEN: NORMAL
UPPER GI ENDOSCOPY: NORMAL

## 2022-12-01 PROCEDURE — 272N000001 HC OR GENERAL SUPPLY STERILE: Performed by: INTERNAL MEDICINE

## 2022-12-01 PROCEDURE — 82565 ASSAY OF CREATININE: CPT | Performed by: ANESTHESIOLOGY

## 2022-12-01 PROCEDURE — 250N000013 HC RX MED GY IP 250 OP 250 PS 637

## 2022-12-01 PROCEDURE — 370N000017 HC ANESTHESIA TECHNICAL FEE, PER MIN: Performed by: INTERNAL MEDICINE

## 2022-12-01 PROCEDURE — 360N000076 HC SURGERY LEVEL 3, PER MIN: Performed by: INTERNAL MEDICINE

## 2022-12-01 PROCEDURE — 710N000012 HC RECOVERY PHASE 2, PER MINUTE: Performed by: INTERNAL MEDICINE

## 2022-12-01 PROCEDURE — 258N000003 HC RX IP 258 OP 636: Performed by: ANESTHESIOLOGY

## 2022-12-01 PROCEDURE — 88305 TISSUE EXAM BY PATHOLOGIST: CPT | Mod: TC | Performed by: INTERNAL MEDICINE

## 2022-12-01 PROCEDURE — 250N000011 HC RX IP 250 OP 636

## 2022-12-01 PROCEDURE — 999N000141 HC STATISTIC PRE-PROCEDURE NURSING ASSESSMENT: Performed by: INTERNAL MEDICINE

## 2022-12-01 PROCEDURE — 710N000009 HC RECOVERY PHASE 1, LEVEL 1, PER MIN: Performed by: INTERNAL MEDICINE

## 2022-12-01 PROCEDURE — 36415 COLL VENOUS BLD VENIPUNCTURE: CPT | Performed by: ANESTHESIOLOGY

## 2022-12-01 PROCEDURE — 250N000009 HC RX 250

## 2022-12-01 RX ORDER — NALOXONE HYDROCHLORIDE 0.4 MG/ML
0.2 INJECTION, SOLUTION INTRAMUSCULAR; INTRAVENOUS; SUBCUTANEOUS
Status: CANCELLED | OUTPATIENT
Start: 2022-12-01

## 2022-12-01 RX ORDER — LIDOCAINE HYDROCHLORIDE 10 MG/ML
INJECTION, SOLUTION INFILTRATION; PERINEURAL PRN
Status: DISCONTINUED | OUTPATIENT
Start: 2022-12-01 | End: 2022-12-01

## 2022-12-01 RX ORDER — LIDOCAINE 40 MG/G
CREAM TOPICAL
Status: DISCONTINUED | OUTPATIENT
Start: 2022-12-01 | End: 2022-12-01 | Stop reason: HOSPADM

## 2022-12-01 RX ORDER — ONDANSETRON 2 MG/ML
4 INJECTION INTRAMUSCULAR; INTRAVENOUS EVERY 30 MIN PRN
Status: DISCONTINUED | OUTPATIENT
Start: 2022-12-01 | End: 2022-12-01 | Stop reason: HOSPADM

## 2022-12-01 RX ORDER — NALOXONE HYDROCHLORIDE 0.4 MG/ML
0.4 INJECTION, SOLUTION INTRAMUSCULAR; INTRAVENOUS; SUBCUTANEOUS
Status: CANCELLED | OUTPATIENT
Start: 2022-12-01

## 2022-12-01 RX ORDER — ALBUTEROL SULFATE 90 UG/1
AEROSOL, METERED RESPIRATORY (INHALATION) PRN
Status: DISCONTINUED | OUTPATIENT
Start: 2022-12-01 | End: 2022-12-01

## 2022-12-01 RX ORDER — ONDANSETRON 4 MG/1
4 TABLET, ORALLY DISINTEGRATING ORAL EVERY 30 MIN PRN
Status: DISCONTINUED | OUTPATIENT
Start: 2022-12-01 | End: 2022-12-01 | Stop reason: HOSPADM

## 2022-12-01 RX ORDER — FLUMAZENIL 0.1 MG/ML
0.2 INJECTION, SOLUTION INTRAVENOUS
Status: CANCELLED | OUTPATIENT
Start: 2022-12-01 | End: 2022-12-01

## 2022-12-01 RX ORDER — PROCHLORPERAZINE MALEATE 10 MG
10 TABLET ORAL EVERY 6 HOURS PRN
Status: CANCELLED | OUTPATIENT
Start: 2022-12-01

## 2022-12-01 RX ORDER — ONDANSETRON 4 MG/1
4 TABLET, ORALLY DISINTEGRATING ORAL EVERY 6 HOURS PRN
Status: CANCELLED | OUTPATIENT
Start: 2022-12-01

## 2022-12-01 RX ORDER — FENTANYL CITRATE 50 UG/ML
INJECTION, SOLUTION INTRAMUSCULAR; INTRAVENOUS PRN
Status: DISCONTINUED | OUTPATIENT
Start: 2022-12-01 | End: 2022-12-01

## 2022-12-01 RX ORDER — MEPERIDINE HYDROCHLORIDE 25 MG/ML
12.5 INJECTION INTRAMUSCULAR; INTRAVENOUS; SUBCUTANEOUS
Status: DISCONTINUED | OUTPATIENT
Start: 2022-12-01 | End: 2022-12-01 | Stop reason: HOSPADM

## 2022-12-01 RX ORDER — SODIUM CHLORIDE, SODIUM LACTATE, POTASSIUM CHLORIDE, CALCIUM CHLORIDE 600; 310; 30; 20 MG/100ML; MG/100ML; MG/100ML; MG/100ML
INJECTION, SOLUTION INTRAVENOUS CONTINUOUS
Status: DISCONTINUED | OUTPATIENT
Start: 2022-12-01 | End: 2022-12-01 | Stop reason: HOSPADM

## 2022-12-01 RX ORDER — GLYCOPYRROLATE 0.2 MG/ML
INJECTION, SOLUTION INTRAMUSCULAR; INTRAVENOUS PRN
Status: DISCONTINUED | OUTPATIENT
Start: 2022-12-01 | End: 2022-12-01

## 2022-12-01 RX ORDER — DEXAMETHASONE SODIUM PHOSPHATE 4 MG/ML
INJECTION, SOLUTION INTRA-ARTICULAR; INTRALESIONAL; INTRAMUSCULAR; INTRAVENOUS; SOFT TISSUE PRN
Status: DISCONTINUED | OUTPATIENT
Start: 2022-12-01 | End: 2022-12-01

## 2022-12-01 RX ORDER — ONDANSETRON 2 MG/ML
4 INJECTION INTRAMUSCULAR; INTRAVENOUS
Status: DISCONTINUED | OUTPATIENT
Start: 2022-12-01 | End: 2022-12-01 | Stop reason: HOSPADM

## 2022-12-01 RX ORDER — DEXMEDETOMIDINE HYDROCHLORIDE 4 UG/ML
INJECTION, SOLUTION INTRAVENOUS CONTINUOUS PRN
Status: DISCONTINUED | OUTPATIENT
Start: 2022-12-01 | End: 2022-12-01

## 2022-12-01 RX ORDER — FENTANYL CITRATE 50 UG/ML
50 INJECTION, SOLUTION INTRAMUSCULAR; INTRAVENOUS EVERY 5 MIN PRN
Status: DISCONTINUED | OUTPATIENT
Start: 2022-12-01 | End: 2022-12-01 | Stop reason: HOSPADM

## 2022-12-01 RX ORDER — ONDANSETRON 2 MG/ML
INJECTION INTRAMUSCULAR; INTRAVENOUS PRN
Status: DISCONTINUED | OUTPATIENT
Start: 2022-12-01 | End: 2022-12-01

## 2022-12-01 RX ORDER — KETOROLAC TROMETHAMINE 15 MG/ML
15 INJECTION, SOLUTION INTRAMUSCULAR; INTRAVENOUS ONCE
Status: DISCONTINUED | OUTPATIENT
Start: 2022-12-01 | End: 2022-12-01 | Stop reason: HOSPADM

## 2022-12-01 RX ORDER — PROPOFOL 10 MG/ML
INJECTION, EMULSION INTRAVENOUS PRN
Status: DISCONTINUED | OUTPATIENT
Start: 2022-12-01 | End: 2022-12-01

## 2022-12-01 RX ORDER — FENTANYL CITRATE 50 UG/ML
25 INJECTION, SOLUTION INTRAMUSCULAR; INTRAVENOUS
Status: DISCONTINUED | OUTPATIENT
Start: 2022-12-01 | End: 2022-12-01 | Stop reason: HOSPADM

## 2022-12-01 RX ORDER — ONDANSETRON 2 MG/ML
4 INJECTION INTRAMUSCULAR; INTRAVENOUS EVERY 6 HOURS PRN
Status: CANCELLED | OUTPATIENT
Start: 2022-12-01

## 2022-12-01 RX ORDER — FENTANYL CITRATE 50 UG/ML
25 INJECTION, SOLUTION INTRAMUSCULAR; INTRAVENOUS EVERY 5 MIN PRN
Status: DISCONTINUED | OUTPATIENT
Start: 2022-12-01 | End: 2022-12-01 | Stop reason: HOSPADM

## 2022-12-01 RX ADMIN — PROPOFOL 150 MG: 10 INJECTION, EMULSION INTRAVENOUS at 08:44

## 2022-12-01 RX ADMIN — ALBUTEROL SULFATE 2 PUFF: 108 INHALANT RESPIRATORY (INHALATION) at 09:30

## 2022-12-01 RX ADMIN — GLYCOPYRROLATE 0.2 MG: 0.2 INJECTION, SOLUTION INTRAMUSCULAR; INTRAVENOUS at 08:44

## 2022-12-01 RX ADMIN — SODIUM CHLORIDE, POTASSIUM CHLORIDE, SODIUM LACTATE AND CALCIUM CHLORIDE: 600; 310; 30; 20 INJECTION, SOLUTION INTRAVENOUS at 08:30

## 2022-12-01 RX ADMIN — DEXAMETHASONE SODIUM PHOSPHATE 4 MG: 4 INJECTION, SOLUTION INTRA-ARTICULAR; INTRALESIONAL; INTRAMUSCULAR; INTRAVENOUS; SOFT TISSUE at 08:44

## 2022-12-01 RX ADMIN — MIDAZOLAM 2 MG: 1 INJECTION INTRAMUSCULAR; INTRAVENOUS at 08:30

## 2022-12-01 RX ADMIN — FENTANYL CITRATE 100 MCG: 50 INJECTION, SOLUTION INTRAMUSCULAR; INTRAVENOUS at 08:44

## 2022-12-01 RX ADMIN — DEXMEDETOMIDINE 0.6 MCG/KG/HR: 100 INJECTION, SOLUTION, CONCENTRATE INTRAVENOUS at 08:47

## 2022-12-01 RX ADMIN — ONDANSETRON HYDROCHLORIDE 4 MG: 2 INJECTION, SOLUTION INTRAVENOUS at 08:50

## 2022-12-01 RX ADMIN — LIDOCAINE HYDROCHLORIDE 50 MG: 10 INJECTION, SOLUTION INFILTRATION; PERINEURAL at 08:44

## 2022-12-01 RX ADMIN — ALBUTEROL SULFATE 6 PUFF: 108 INHALANT RESPIRATORY (INHALATION) at 08:47

## 2022-12-01 ASSESSMENT — ACTIVITIES OF DAILY LIVING (ADL)
ADLS_ACUITY_SCORE: 37
ADLS_ACUITY_SCORE: 39
ADLS_ACUITY_SCORE: 39

## 2022-12-01 ASSESSMENT — ENCOUNTER SYMPTOMS: SEIZURES: 1

## 2022-12-01 NOTE — ANESTHESIA PROCEDURE NOTES
Airway       Patient location during procedure: OR  Staff -        CRNA: Suellen Ramirez APRN CRNA       Performed By: CRNA  Consent for Airway        Urgency: elective  Indications and Patient Condition       Indications for airway management: armida-procedural       Induction type:intravenous       Mask difficulty assessment: 0 - not attempted    Final Airway Details       Final airway type: endotracheal airway       Successful airway: ETT - single  Endotracheal Airway Details        ETT size (mm): 7.5       Cuffed: yes       Successful intubation technique: video laryngoscopy       VL Blade Size: Glidescope 3       Grade View of Cords: 1       Adjucts: stylet       Position: Right       Measured from: gums/teeth       Secured at (cm): 22    Post intubation assessment        Placement verified by: capnometry, equal breath sounds and chest rise        Number of attempts at approach: 1       Number of other approaches attempted: 0       Secured with: plastic tape       Ease of procedure: easy       Dentition: Intact

## 2022-12-01 NOTE — ANESTHESIA PREPROCEDURE EVALUATION
Anesthesia Pre-Procedure Evaluation    Patient: Tre Vazquez   MRN: [id number] : 1975        Procedure : Procedure(s):  ESOPHAGOGASTRODUODENOSCOPY  COLONOSCOPY          Past Medical History:   Diagnosis Date     Blindness of left eye      Depression 03/10/2014     Hypertension      Pneumococcal septicemia(038.2) (H) 2013    Hospitalized     Pneumonia, organism unspecified(486) 2013    Hospitalized     Uncomplicated asthma      Unspecified epilepsy without mention of intractable epilepsy       Past Surgical History:   Procedure Laterality Date     ORTHOPEDIC SURGERY      pt stated past surgery on both ankles      Allergies   Allergen Reactions     Acetaminophen      Hydrocodone Bitartrate Er Unknown     Tomato      Cephalexin Rash     HUT Reaction: Rash; HUT Noted: [id number]       Vicodin [Hydrocodone-Acetaminophen] GI Disturbance      Social History     Tobacco Use     Smoking status: Former     Smokeless tobacco: Never   Substance Use Topics     Alcohol use: No      Wt Readings from Last 1 Encounters:   22 123.4 kg (272 lb)        Anesthesia Evaluation            ROS/MED HX  ENT/Pulmonary:     (+) Intermittent, asthma Treatment: Inhaler prn,      Neurologic:  - neg neurologic ROS   (+) seizures, Developmental delay,     Cardiovascular:     (+) hypertension-----    METS/Exercise Tolerance:     Hematologic: Comments: Lab Test        22                       1119          1137          0250          WBC          11.2*        6.5          6.3           HGB          13.9         13.6         13.4          MCV          98           98           94            PLT          181          195          157            Lab Test        22                       1119          1137          0250          NA           141          145          143           POTASSIUM    4.4          3.9          3.9           CHLORIDE     103          103           107           CO2          37*          33*          32            BUN          17           14           16            CR           0.81         0.84         0.70          ANIONGAP     1*           9            4             ARNOLD          9.2          9.1          8.3*          GLC          101*         81           100*                Musculoskeletal:  - neg musculoskeletal ROS     GI/Hepatic:  - neg GI/hepatic ROS     Renal/Genitourinary:  - neg Renal ROS     Endo:     (+) Obesity,     Psychiatric/Substance Use:     (+) psychiatric history depression     Infectious Disease:  - neg infectious disease ROS     Malignancy:  - neg malignancy ROS     Other:            Physical Exam    Airway        Mallampati: II   TM distance: > 3 FB   Neck ROM: full   Mouth opening: > 3 cm    Respiratory Devices and Support         Dental  no notable dental history         Cardiovascular   cardiovascular exam normal          Pulmonary   pulmonary exam normal                OUTSIDE LABS:  CBC:   Lab Results   Component Value Date    WBC 11.2 (H) 06/20/2022    WBC 6.5 05/05/2022    HGB 13.9 06/20/2022    HGB 13.6 05/05/2022    HCT 44.1 06/20/2022    HCT 43.8 05/05/2022     06/20/2022     05/05/2022     BMP:   Lab Results   Component Value Date     06/20/2022     05/05/2022    POTASSIUM 4.4 06/20/2022    POTASSIUM 3.9 05/05/2022    CHLORIDE 103 06/20/2022    CHLORIDE 103 05/05/2022    CO2 37 (H) 06/20/2022    CO2 33 (H) 05/05/2022    BUN 17 06/20/2022    BUN 14 05/05/2022    CR 0.81 06/20/2022    CR 0.84 05/05/2022     (H) 06/20/2022    GLC 81 05/05/2022     COAGS:   Lab Results   Component Value Date    INR 1.23 (H) 11/21/2013     POC:   Lab Results   Component Value Date    BGM 99 03/09/2012     HEPATIC:   Lab Results   Component Value Date    ALBUMIN 3.4 06/20/2022    PROTTOTAL 6.5 (L) 06/20/2022    ALT 24 06/20/2022    AST 18 06/20/2022    GGT 23 11/28/2011    ALKPHOS 58 06/20/2022    BILITOTAL 0.4  06/20/2022    JAYLA 60 (H) 05/05/2022     OTHER:   Lab Results   Component Value Date    PH 7.24 (L) 11/21/2013    LACT 1.0 11/22/2013    A1C 5.3 06/22/2008    ARNOLD 9.2 06/20/2022    MAG 1.5 (L) 08/30/2021    LIPASE 69 (L) 06/20/2022    TSH 1.94 02/22/2013       Anesthesia Plan    ASA Status:  3      Anesthesia Type: General.     - Airway: ETT   Induction: Intravenous, Propofol.   Maintenance: Balanced.        Consents    Anesthesia Plan(s) and associated risks, benefits, and realistic alternatives discussed. Questions answered and patient/representative(s) expressed understanding.    - Discussed:     - Discussed with:  Patient      - Extended Intubation/Ventilatory Support Discussed: No.      - Patient is DNR/DNI Status: No    Use of blood products discussed: Yes.     - Discussed with: Patient.     - Consented: consented to blood products            Reason for refusal: other.     Postoperative Care    Pain management: IV analgesics.   PONV prophylaxis: Ondansetron (or other 5HT-3), Dexamethasone or Solumedrol     Comments:                Kvng Morgan MD

## 2022-12-01 NOTE — DISCHARGE INSTRUCTIONS
GENERAL ANESTHESIA OR SEDATION ADULT DISCHARGE INSTRUCTIONS   SPECIAL PRECAUTIONS FOR 24 HOURS AFTER SURGERY    IT IS NOT UNUSUAL TO FEEL LIGHT-HEADED OR FAINT, UP TO 24 HOURS AFTER SURGERY OR WHILE TAKING PAIN MEDICATION.  IF YOU HAVE THESE SYMPTOMS; SIT FOR A FEW MINUTES BEFORE STANDING AND HAVE SOMEONE ASSIST YOU WHEN YOU GET UP TO WALK OR USE THE BATHROOM.    YOU SHOULD REST AND RELAX FOR THE NEXT 24 HOURS AND YOU MUST MAKE ARRANGEMENTS TO HAVE SOMEONE STAY WITH YOU FOR AT LEAST 24 HOURS AFTER YOUR DISCHARGE.  AVOID HAZARDOUS AND STRENUOUS ACTIVITIES.  DO NOT MAKE IMPORTANT DECISIONS FOR 24 HOURS.    DO NOT DRIVE ANY VEHICLE OR OPERATE MECHANICAL EQUIPMENT FOR 24 HOURS FOLLOWING THE END OF YOUR SURGERY.  EVEN THOUGH YOU MAY FEEL NORMAL, YOUR REACTIONS MAY BE AFFECTED BY THE MEDICATION YOU HAVE RECEIVED.    DO NOT DRINK ALCOHOLIC BEVERAGES FOR 24 HOURS FOLLOWING YOUR SURGERY.    DRINK CLEAR LIQUIDS (APPLE JUICE, GINGER ALE, 7-UP, BROTH, ETC.).  PROGRESS TO YOUR REGULAR DIET AS YOU FEEL ABLE.    YOU MAY HAVE A DRY MOUTH, A SORE THROAT, MUSCLES ACHES OR TROUBLE SLEEPING.  THESE SHOULD GO AWAY AFTER 24 HOURS.    CALL YOUR DOCTOR FOR ANY OF THE FOLLOWING:  SIGNS OF INFECTION (FEVER, GROWING TENDERNESS AT THE SURGERY SITE, A LARGE AMOUNT OF DRAINAGE OR BLEEDING, SEVERE PAIN, FOUL-SMELLING DRAINAGE, REDNESS OR SWELLING.    IT HAS BEEN OVER 8 TO 10 HOURS SINCE SURGERY AND YOU ARE STILL NOT ABLE TO URINATE (PASS WATER).     DR. CHRIS LAYTON M.D.             CLINIC PHONE NUMBER:  [phone]    [facility]

## 2022-12-01 NOTE — ANESTHESIA POSTPROCEDURE EVALUATION
Patient: Tre Vazquez    Procedure: Procedure(s):  ESOPHAGOGASTRODUODENOSCOPY with biopsy  COLONOSCOPY       Anesthesia Type:  General    Note:  Disposition: Inpatient   Postop Pain Control: Uneventful            Sign Out: Well controlled pain   PONV: No   Neuro/Psych: Uneventful            Sign Out: Acceptable/Baseline neuro status   Airway/Respiratory: Uneventful            Sign Out: Acceptable/Baseline resp. status   CV/Hemodynamics: Uneventful            Sign Out: Acceptable CV status; No obvious hypovolemia; No obvious fluid overload   Other NRE: NONE   DID A NON-ROUTINE EVENT OCCUR? No           Last vitals:  Vitals Value Taken Time   /94 12/01/22 1049   Temp 97.1  F (36.2  C) 12/01/22 1049   Pulse 74 12/01/22 1048   Resp 21 12/01/22 1049   SpO2 94 % 12/01/22 1055   Vitals shown include unvalidated device data.    Electronically Signed By: Kvng Morgan MD  December 1, 2022  4:31 PM

## 2022-12-01 NOTE — ANESTHESIA CARE TRANSFER NOTE
Patient: Tre Vazquez    Procedure: Procedure(s):  ESOPHAGOGASTRODUODENOSCOPY with biopsy  COLONOSCOPY       Diagnosis: Constipation [K59.00]  Acute diarrhea [R19.7]  Diagnosis Additional Information: No value filed.    Anesthesia Type:   General     Note:    Oropharynx: oral airway in place  Level of Consciousness: drowsy  Oxygen Supplementation: face mask  Level of Supplemental Oxygen (L/min / FiO2): 6  Independent Airway: airway patency satisfactory and stable  Dentition: dentition unchanged  Vital Signs Stable: post-procedure vital signs reviewed and stable  Report to RN Given: handoff report given  Patient transferred to: PACU    Handoff Report: Identifed the Patient, Identified the Reponsible Provider, Reviewed the pertinent medical history, Discussed the surgical course, Reviewed Intra-OP anesthesia mangement and issues during anesthesia, Set expectations for post-procedure period and Allowed opportunity for questions and acknowledgement of understanding      Vitals:  Vitals Value Taken Time   /76 12/01/22 0940   Temp     Pulse 74 12/01/22 0942   Resp 15 12/01/22 0942   SpO2 97 % 12/01/22 0942   Vitals shown include unvalidated device data.    Electronically Signed By: RON Hogan CRNA  December 1, 2022  9:44 AM

## 2022-12-02 LAB
PATH REPORT.COMMENTS IMP SPEC: NORMAL
PATH REPORT.COMMENTS IMP SPEC: NORMAL
PATH REPORT.FINAL DX SPEC: NORMAL
PATH REPORT.GROSS SPEC: NORMAL
PATH REPORT.MICROSCOPIC SPEC OTHER STN: NORMAL
PATH REPORT.RELEVANT HX SPEC: NORMAL
PHOTO IMAGE: NORMAL

## 2022-12-02 PROCEDURE — 88305 TISSUE EXAM BY PATHOLOGIST: CPT | Mod: 26 | Performed by: PATHOLOGY

## 2022-12-07 ENCOUNTER — TELEPHONE (OUTPATIENT)
Dept: NEUROLOGY | Facility: CLINIC | Age: 47
End: 2022-12-07

## 2022-12-07 DIAGNOSIS — G40.909 NON-REFRACTORY EPILEPSY (H): ICD-10-CM

## 2022-12-07 DIAGNOSIS — G40.919 BREAKTHROUGH SEIZURE (H): ICD-10-CM

## 2022-12-07 RX ORDER — ZONISAMIDE 100 MG/1
200 CAPSULE ORAL AT BEDTIME
Qty: 60 CAPSULE | Refills: 0 | Status: SHIPPED | OUTPATIENT
Start: 2022-12-07 | End: 2023-01-03

## 2022-12-07 NOTE — TELEPHONE ENCOUNTER
LM asking MARYJANE Ricardo from care home to return call to scheduled f/u with Lisa Merchant NP for return seizures, pt overdue for follow up.     RN reviewed patients chart notes and LOV note in April. Zonisamide 200mg HS refilled for 30 days, pt overdue for follow up.     Signed Prescriptions:                        Disp   Refills    zonisamide (ZONEGRAN) 100 MG capsule       60 cap*0        Sig: Take 2 capsules (200 mg) by mouth At Bedtime  Authorizing Provider: JENN BARAJAS  Ordering User: KENIA PHILIPPE, RN, BSN  [facility]

## 2023-01-02 NOTE — PROGRESS NOTES
Joel is a 47 year old who is being evaluated via a billable video visit.      Video-Visit Details    Type of service:  Video Visit   Video Start Time: 1108  Video End Time:1132    Originating Location (pt. Location): Other prison  Distant Location (provider location):  On-site  Platform used for Video Visit: [employer]      __________________________________  ESTABLISHED PATIENT NEUROLOGY NOTE    DATE OF VISIT: 1/3/2023  MRN: [id number]  PATIENT NAME: Tre Vazquez  YOB: 1975    Chief Complaint   Patient presents with     Seizures     SUBJECTIVE:                                                      HISTORY OF PRESENT ILLNESS:  Tre is here for follow up regarding seizures    Tre Vazquez is a 47 year old male with PMH of cerebral palsy with mild spastic paraparesis, intellectual disability, congenital left eye blindness, previous right Bell's palsy w/ mild residual facial droop, mood disturbance, and epilepsy disorder on 4 AEDs.  He follows Dr. Griggs in the clinic last seen, 4/19/2022.  Per chart review, Notes indicate he had a breakthrough seizure in August/September 2021 and was hospitalized at the [facility]. Prior to the most recent seizure, apparently he had been seizure-free for about 10 years.  He has history of generalized tonic-clonic seizures.  No history of other seizure types. Head CT in 8.2021 showed lateral and third ventricle enlargement.  EEG from 2012 showed bilateral theta slowing, no epileptiform activity.    Today 01/03/23  Joel arrived for his seizure follow up virtually today. He is accompanied by a care attendant, Jennifer. Patient denies any seizure activity since last visit, in fact he and Jennifer deny any seizure activity since 2021. No loss of consciousness, zoning out or starring spells. Denies adverse effects from medications. No increased fatigue, rashes, dizziness, changes in vision or speech. Mood has been stable.     Tre is interested in  coming off of his Depakote.  He denies any adverse effects but feels that this medication is not beneficial.  We discussed the possibility of discontinuing Depakote and decided against that at this time.  He will follow-up in the clinic for additional testing and education before discontinuing medication.  Staff thought it was better to stay on medication since his seizures are so well controlled.     Current Anti-Seizure Medications: Taking as prescribed  Keppra 1000mg in the morning and 1500mg in the evening  Depakote DR: 500mg 3 times daily + levocarnitine: 330mg TID  Lamotrigine: 200mg BID  Zonisamide: 200mg nightly    Perceived AED Side Effects: No       Current Medications:   baclofen (LIORESAL) 10 MG tablet, 1/2 TO 1 TABLET BY MOUTH 3 TIMES DAILY AS NEEDED  bisacodyl (DULCOLAX) 5 MG EC tablet, Take 2 tablets per colonoscopy prep instructions  busPIRone (BUSPAR) 15 MG tablet, Take 15 mg by mouth 2 times daily  calcium polycarbophil (FIBERCON) 625 MG tablet, Take by mouth every 24 hours  calcium polycarbophil (FIBERCON) 625 MG tablet, Take 1 tablet by mouth 2 times daily At 8am and 5pm  citalopram (CELEXA) 20 MG tablet, Take 20 mg by mouth daily  citalopram (CELEXA) 40 MG tablet, Take 40 mg by mouth daily  clotrimazole (LOTRIMIN) 1 % external solution,   fluticasone (FLONASE) 50 MCG/ACT nasal spray, Spray 1 spray in nostril daily as needed  GOLYTELY 236 g suspension,   ipratropium - albuterol 0.5 mg/2.5 mg/3 mL (DUONEB) 0.5-2.5 (3) MG/3ML neb solution, Inhale 3 mLs into the lungs  methylcellulose (CITRUCEL) powder, Take 2 g by mouth  polyethylene glycol (MIRALAX) 17 GM/Dose powder,   prazosin (MINIPRESS) 2 MG capsule,   propranolol (INDERAL) 20 MG tablet,   QUEtiapine (SEROQUEL) 300 MG tablet, TAKE ONE TABLET BY MOUTH EVERY NIGHT AT BEDTIME  SM NASAL SPRAY SALINE 0.65 % nasal spray, Spray 1 spray in nostril every hour as needed    No current facility-administered medications on file prior to visit.    Past  Medical History:   Patient  has a past medical history of Blindness of left eye, Depression (03/10/2014), Hypertension, Pneumococcal septicemia(038.2) (H) (11/26/2013), Pneumonia, organism unspecified(486) (11/26/2013), Uncomplicated asthma, and Unspecified epilepsy without mention of intractable epilepsy.  Surgical History:  He  has a past surgical history that includes orthopedic surgery; Esophagoscopy, gastroscopy, duodenoscopy (EGD), combined (N/A, 12/1/2022); and Colonoscopy (N/A, 12/1/2022).  Family and Social History:  Reviewed, and he  reports that he has quit smoking. He has never used smokeless tobacco. He reports that he does not drink alcohol and does not use drugs.  Reviewed, and family history is not on file.    RECENT DIAGNOSTIC STUDIES:   Labs:09/27/22  LEVETIRACETAM (KEPPRA) 6.0 - 46.0 ug/mL 53.0 High       LAMOTRIGINE 3.0 - 15.0 ug/mL 17.8 High       Imaging:   EXAM: CT HEAD W/O CONTRAST  DATE/TIME: 10/25/2022 11:38 PM  INDICATION: fall, abrasion to forehead  IMPRESSION:  1.  No acute intracranial process.  2.  Ventriculomegaly is disproportionate to cerebral atrophy, primarily due to white matter volume loss. Correlate for symptoms of normal pressure/nonobstructive hydrocephalus.  CT HEAD W/O CONTRAST 8/30/2021 4:29 PM  History: Seizure, nontraumatic (Age >= 41y)   Impression:  No acute intracranial pathology. Chronic small vessel ischemic  disease. Mild to moderate lateral and third ventriculomegaly.  EEG 07/08/22  Impression: This is an abnormal EEG due to paroxysmal slowing of the background and theta range that suggest nonspecific generalized cerebral dysfunction.  No sharp activity arrhythmic discharges were seen to suggest seizures  Classification: Dysrhythmia grade II generalized     REVIEW OF SYSTEMS:                                                      10-point review of systems is negative except as mentioned above in HPI.    EXAM:                                                       Physical Exam:   Vitals: No vitals were obtained today due to virtual visit.     MENTAL STATUS: Alert, attentive.   GENERAL: Healthy, alert and no distress  EYES: left eye sealed closed, blind.  No discharge   RESP: No audible wheeze, cough, or visible cyanosis.  No visible retractions or increased work of breathing.    MS: No gross musculoskeletal defects noted and normal range of motion of the upper extremities.   SKIN: Visible skin clear. No significant rash, abnormal pigmentation or lesions to face or neck.  NEURO: Cranial nerves grossly intact. Speech is fluent. Normal comprehension. Normal concentration. hearing not formally tested but intact to conversation,     ASSESSMENT and PLAN:                                                      Assessment:    ICD-10-CM    1. Nonintractable generalized idiopathic epilepsy without status epilepticus (H)  G40.309 Keppra (Levetiracetam) Level     Valproic Acid Free & Total     Lamotrigine Level     Zonisamide Level Quantitative     Ammonia     levOCARNitine (CARNITOR) 330 MG tablet      2. Non-refractory epilepsy (H)  G40.909 zonisamide (ZONEGRAN) 100 MG capsule     lamoTRIgine (LAMICTAL) 200 MG tablet     divalproex sodium delayed-release (DEPAKOTE) 500 MG DR tablet     zonisamide (ZONEGRAN) 100 MG capsule     levETIRAcetam (KEPPRA) 500 MG tablet      3. Breakthrough seizure (H)  G40.919 zonisamide (ZONEGRAN) 100 MG capsule     lamoTRIgine (LAMICTAL) 200 MG tablet     divalproex sodium delayed-release (DEPAKOTE) 500 MG DR tablet     zonisamide (ZONEGRAN) 100 MG capsule     levETIRAcetam (KEPPRA) 500 MG tablet        Tre Vazquez is a 47 year old male with PMH of cerebral palsy with mild spastic paraparesis, intellectual disability, congenital left eye blindness, previous right Bell's palsy w/ mild residual facial droop, mood disturbance, and epilepsy disorder on 4 AEDs.  He follows Dr. Griggs in the clinic last seen, 4/19/2022.  Per chart review, Notes indicate he  had a breakthrough seizure in August/September 2021. Patient has remained seizure free on current treatment plan of Keppra, Depakote, zonisamide and lamotrigine. I will order labs to monitor the drug parameters. We discussed interventions, will plan to see the patient back in 6 months. Joel and staff understands and agrees with this plan.     Plan:  --- Continue current doses of Depakote, Keppra, lamotrigine and zonisamide.  --- Labs: Drug levels  --- Take your medications as prescribed, and do not stop taking abruptly.  --- Try to take your anti-seizure medications at the same time every day  --- Avoid common triggers: sleep deprivation, excessive alcohol, stress, flashing lights or patterns, dehydration, recreational drug use, illness and missed medications.  --- Plan on follow up in the Neurology Clinic in 6 months.  --- Please feel free to reach out if you have any further questions or concerns.  --- Seek immediate medical attention if an emergency arises or if your health becomes progressively worse.     It was a pleasure to see you today!     Total Time: Total time spent for face to face visit, reviewing labs/imaging studies, counseling and coordination of care was: 45 Minutes spent on the date of the encounter doing chart review, review of test results, patient visit, documentation and staff at group home       This note was dictated using voice recognition software.  Any grammatical or context distortions are unintentional and inherent to the software.    Lisa Merchant, DNP, APRN, CNP  [facility]

## 2023-01-03 ENCOUNTER — VIRTUAL VISIT (OUTPATIENT)
Dept: NEUROLOGY | Facility: CLINIC | Age: 48
End: 2023-01-03
Payer: MEDICARE

## 2023-01-03 VITALS — BODY MASS INDEX: 43.58 KG/M2 | WEIGHT: 270 LBS

## 2023-01-03 DIAGNOSIS — G40.909 NON-REFRACTORY EPILEPSY (H): ICD-10-CM

## 2023-01-03 DIAGNOSIS — G40.309 NONINTRACTABLE GENERALIZED IDIOPATHIC EPILEPSY WITHOUT STATUS EPILEPTICUS (H): Primary | ICD-10-CM

## 2023-01-03 DIAGNOSIS — G40.919 BREAKTHROUGH SEIZURE (H): ICD-10-CM

## 2023-01-03 PROCEDURE — 99443 PR PHYSICIAN TELEPHONE EVALUATION 21-30 MIN: CPT | Mod: 95

## 2023-01-03 RX ORDER — ZONISAMIDE 100 MG/1
200 CAPSULE ORAL AT BEDTIME
Qty: 60 CAPSULE | Refills: 0 | Status: SHIPPED | OUTPATIENT
Start: 2023-01-03 | End: 2023-01-05 | Stop reason: DRUGHIGH

## 2023-01-03 RX ORDER — LEVETIRACETAM 500 MG/1
1000 TABLET ORAL EVERY MORNING
Qty: 60 TABLET | Refills: 11 | Status: SHIPPED | OUTPATIENT
Start: 2023-01-03 | End: 2023-01-05

## 2023-01-03 RX ORDER — DIVALPROEX SODIUM 500 MG/1
500 TABLET, DELAYED RELEASE ORAL 3 TIMES DAILY
Qty: 270 TABLET | Refills: 3 | Status: SHIPPED | OUTPATIENT
Start: 2023-01-03 | End: 2023-05-22

## 2023-01-03 RX ORDER — LAMOTRIGINE 200 MG/1
200 TABLET ORAL 2 TIMES DAILY
Qty: 60 TABLET | Refills: 11 | Status: ON HOLD | OUTPATIENT
Start: 2023-01-03 | End: 2023-12-28

## 2023-01-03 RX ORDER — ZONISAMIDE 100 MG/1
100 CAPSULE ORAL EVERY MORNING
Qty: 30 CAPSULE | Refills: 11 | Status: SHIPPED | OUTPATIENT
Start: 2023-01-03 | End: 2023-01-11

## 2023-01-03 RX ORDER — LEVOCARNITINE 330 MG/1
660 TABLET ORAL 3 TIMES DAILY
Qty: 180 TABLET | Refills: 11 | Status: SHIPPED | OUTPATIENT
Start: 2023-01-03 | End: 2024-01-09

## 2023-01-03 NOTE — NURSING NOTE
Chief Complaint   Patient presents with     Seizures     Magaly Bustillo on 1/3/2023 at 10:26 AM

## 2023-01-03 NOTE — LETTER
1/3/2023         RE: Tre Vazquez  [address] [place] [address]        Dear Colleague,    Thank you for referring your patient, Tre Vazquez, to the [facility] [place]. Please see a copy of my visit note below.    Joel is a 47 year old who is being evaluated via a billable video visit.      Video-Visit Details    Type of service:  Video Visit   Video Start Time: 1108  Video End Time:1132    Originating Location (pt. Location): Other skilled nursing  Distant Location (provider location):  On-site  Platform used for Video Visit: PISTIS Consult      __________________________________  ESTABLISHED PATIENT NEUROLOGY NOTE    DATE OF VISIT: 1/3/2023  MRN: [id number]  PATIENT NAME: Tre Vazquez  YOB: 1975    Chief Complaint   Patient presents with     Seizures     SUBJECTIVE:                                                      HISTORY OF PRESENT ILLNESS:  Tre is here for follow up regarding seizures    Tre Vazquez is a 47 year old male with PMH of cerebral palsy with mild spastic paraparesis, intellectual disability, congenital left eye blindness, previous right Bell's palsy w/ mild residual facial droop, mood disturbance, and epilepsy disorder on 4 AEDs.  He follows Dr. Griggs in the clinic last seen, 4/19/2022.  Per chart review, Notes indicate he had a breakthrough seizure in August/September 2021 and was hospitalized at the [facility]. Prior to the most recent seizure, apparently he had been seizure-free for about 10 years.  He has history of generalized tonic-clonic seizures.  No history of other seizure types. Head CT in 8.2021 showed lateral and third ventricle enlargement.  EEG from 2012 showed bilateral theta slowing, no epileptiform activity.    Today 01/03/23  Joel arrived for his seizure follow up virtually today. He is accompanied by a care attendant, Jennifer. Patient denies any seizure activity since last visit, in fact he and Jennifer  deny any seizure activity since 2021. No loss of consciousness, zoning out or starring spells. Denies adverse effects from medications. No increased fatigue, rashes, dizziness, changes in vision or speech. Mood has been stable.     Tre is interested in coming off of his Depakote.  He denies any adverse effects but feels that this medication is not beneficial.  We discussed the possibility of discontinuing Depakote and decided against that at this time.  He will follow-up in the clinic for additional testing and education before discontinuing medication.  Staff thought it was better to stay on medication since his seizures are so well controlled.     Current Anti-Seizure Medications: Taking as prescribed  Keppra 1000mg in the morning and 1500mg in the evening  Depakote DR: 500mg 3 times daily + levocarnitine: 330mg TID  Lamotrigine: 200mg BID  Zonisamide: 200mg nightly    Perceived AED Side Effects: No       Current Medications:   baclofen (LIORESAL) 10 MG tablet, 1/2 TO 1 TABLET BY MOUTH 3 TIMES DAILY AS NEEDED  bisacodyl (DULCOLAX) 5 MG EC tablet, Take 2 tablets per colonoscopy prep instructions  busPIRone (BUSPAR) 15 MG tablet, Take 15 mg by mouth 2 times daily  calcium polycarbophil (FIBERCON) 625 MG tablet, Take by mouth every 24 hours  calcium polycarbophil (FIBERCON) 625 MG tablet, Take 1 tablet by mouth 2 times daily At 8am and 5pm  citalopram (CELEXA) 20 MG tablet, Take 20 mg by mouth daily  citalopram (CELEXA) 40 MG tablet, Take 40 mg by mouth daily  clotrimazole (LOTRIMIN) 1 % external solution,   fluticasone (FLONASE) 50 MCG/ACT nasal spray, Spray 1 spray in nostril daily as needed  GOLYTELY 236 g suspension,   ipratropium - albuterol 0.5 mg/2.5 mg/3 mL (DUONEB) 0.5-2.5 (3) MG/3ML neb solution, Inhale 3 mLs into the lungs  methylcellulose (CITRUCEL) powder, Take 2 g by mouth  polyethylene glycol (MIRALAX) 17 GM/Dose powder,   prazosin (MINIPRESS) 2 MG capsule,   propranolol (INDERAL) 20 MG tablet,    QUEtiapine (SEROQUEL) 300 MG tablet, TAKE ONE TABLET BY MOUTH EVERY NIGHT AT BEDTIME  SM NASAL SPRAY SALINE 0.65 % nasal spray, Spray 1 spray in nostril every hour as needed    No current facility-administered medications on file prior to visit.    Past Medical History:   Patient  has a past medical history of Blindness of left eye, Depression (03/10/2014), Hypertension, Pneumococcal septicemia(038.2) (H) (11/26/2013), Pneumonia, organism unspecified(486) (11/26/2013), Uncomplicated asthma, and Unspecified epilepsy without mention of intractable epilepsy.  Surgical History:  He  has a past surgical history that includes orthopedic surgery; Esophagoscopy, gastroscopy, duodenoscopy (EGD), combined (N/A, 12/1/2022); and Colonoscopy (N/A, 12/1/2022).  Family and Social History:  Reviewed, and he  reports that he has quit smoking. He has never used smokeless tobacco. He reports that he does not drink alcohol and does not use drugs.  Reviewed, and family history is not on file.    RECENT DIAGNOSTIC STUDIES:   Labs:09/27/22  LEVETIRACETAM (KEPPRA) 6.0 - 46.0 ug/mL 53.0 High       LAMOTRIGINE 3.0 - 15.0 ug/mL 17.8 High       Imaging:   EXAM: CT HEAD W/O CONTRAST  DATE/TIME: 10/25/2022 11:38 PM  INDICATION: fall, abrasion to forehead  IMPRESSION:  1.  No acute intracranial process.  2.  Ventriculomegaly is disproportionate to cerebral atrophy, primarily due to white matter volume loss. Correlate for symptoms of normal pressure/nonobstructive hydrocephalus.  CT HEAD W/O CONTRAST 8/30/2021 4:29 PM  History: Seizure, nontraumatic (Age >= 41y)   Impression:  No acute intracranial pathology. Chronic small vessel ischemic  disease. Mild to moderate lateral and third ventriculomegaly.  EEG 07/08/22  Impression: This is an abnormal EEG due to paroxysmal slowing of the background and theta range that suggest nonspecific generalized cerebral dysfunction.  No sharp activity arrhythmic discharges were seen to suggest  seizures  Classification: Dysrhythmia grade II generalized     REVIEW OF SYSTEMS:                                                      10-point review of systems is negative except as mentioned above in HPI.    EXAM:                                                      Physical Exam:   Vitals: No vitals were obtained today due to virtual visit.     MENTAL STATUS: Alert, attentive.   GENERAL: Healthy, alert and no distress  EYES: left eye sealed closed, blind.  No discharge   RESP: No audible wheeze, cough, or visible cyanosis.  No visible retractions or increased work of breathing.    MS: No gross musculoskeletal defects noted and normal range of motion of the upper extremities.   SKIN: Visible skin clear. No significant rash, abnormal pigmentation or lesions to face or neck.  NEURO: Cranial nerves grossly intact. Speech is fluent. Normal comprehension. Normal concentration. hearing not formally tested but intact to conversation,     ASSESSMENT and PLAN:                                                      Assessment:    ICD-10-CM    1. Nonintractable generalized idiopathic epilepsy without status epilepticus (H)  G40.309 Keppra (Levetiracetam) Level     Valproic Acid Free & Total     Lamotrigine Level     Zonisamide Level Quantitative     Ammonia     levOCARNitine (CARNITOR) 330 MG tablet      2. Non-refractory epilepsy (H)  G40.909 zonisamide (ZONEGRAN) 100 MG capsule     lamoTRIgine (LAMICTAL) 200 MG tablet     divalproex sodium delayed-release (DEPAKOTE) 500 MG DR tablet     zonisamide (ZONEGRAN) 100 MG capsule     levETIRAcetam (KEPPRA) 500 MG tablet      3. Breakthrough seizure (H)  G40.919 zonisamide (ZONEGRAN) 100 MG capsule     lamoTRIgine (LAMICTAL) 200 MG tablet     divalproex sodium delayed-release (DEPAKOTE) 500 MG DR tablet     zonisamide (ZONEGRAN) 100 MG capsule     levETIRAcetam (KEPPRA) 500 MG tablet        DawsonMAIRA Vazquez is a 47 year old male with PMH of cerebral palsy with mild spastic  paraparesis, intellectual disability, congenital left eye blindness, previous right Bell's palsy w/ mild residual facial droop, mood disturbance, and epilepsy disorder on 4 AEDs.  He follows Dr. Griggs in the clinic last seen, 4/19/2022.  Per chart review, Notes indicate he had a breakthrough seizure in August/September 2021. Patient has remained seizure free on current treatment plan of Keppra, Depakote, zonisamide and lamotrigine. I will order labs to monitor the drug parameters. We discussed interventions, will plan to see the patient back in 6 months. Joel and staff understands and agrees with this plan.     Plan:  --- Continue current doses of Depakote, Keppra, lamotrigine and zonisamide.  --- Labs: Drug levels  --- Take your medications as prescribed, and do not stop taking abruptly.  --- Try to take your anti-seizure medications at the same time every day  --- Avoid common triggers: sleep deprivation, excessive alcohol, stress, flashing lights or patterns, dehydration, recreational drug use, illness and missed medications.  --- Plan on follow up in the Neurology Clinic in 6 months.  --- Please feel free to reach out if you have any further questions or concerns.  --- Seek immediate medical attention if an emergency arises or if your health becomes progressively worse.     It was a pleasure to see you today!     Total Time: Total time spent for face to face visit, reviewing labs/imaging studies, counseling and coordination of care was: 45 Minutes spent on the date of the encounter doing chart review, review of test results, patient visit, documentation and staff at group home       This note was dictated using voice recognition software.  Any grammatical or context distortions are unintentional and inherent to the software.    Lisa Merchant, DNP, APRN, CNP  [facility]               Again, thank you for allowing me to participate in the care of your patient.        Sincerely,        Lisa Merchant,  APRN CNP

## 2023-01-04 ENCOUNTER — TELEPHONE (OUTPATIENT)
Dept: NEUROLOGY | Facility: CLINIC | Age: 48
End: 2023-01-04

## 2023-01-04 DIAGNOSIS — G40.909 NON-REFRACTORY EPILEPSY (H): ICD-10-CM

## 2023-01-04 DIAGNOSIS — G40.919 BREAKTHROUGH SEIZURE (H): ICD-10-CM

## 2023-01-04 NOTE — TELEPHONE ENCOUNTER
[facility] Call Center    Phone Message    May a detailed message be left on voicemail: yes     Reason for Call: Medication Question or concern regarding medication   Prescription Clarification  Name of Medication: zonisamide (ZONEGRAN) 100 MG capsule and levETIRAcetam (KEPPRA) 500 MG tablet  Prescribing Provider: Lisa Merchant   Pharmacy: [facility]. [facility] [id number]   What on the order needs clarification?   zonisamide (ZONEGRAN) 100 MG capsule - needing clarification on the quantity sent, as they do not match the instructions.  levETIRAcetam (KEPPRA) 500 MG tablet - needing clarification on when the patient should be taking medication.    Please contact pharmacy back to advise.    Action Taken: Message routed to:  Other: [facility]    Travel Screening: Not Applicable

## 2023-01-04 NOTE — TELEPHONE ENCOUNTER
Lisa- can you clarify how you want pt taking zonegran and keppra? Prescriptions sent to pharmacy don't match what he was previously taking.    Two prescriptions sent for zonegran- 100mg in AM with 11 refills, and 200mg HS 1 month rx with no refills.     Keppra rx sent Take 1000mg every morning. No instructions for HS dose.     Per review of LOV Note:     Current Anti-Seizure Medications: Taking as prescribed  Keppra 1000mg in the morning and 1500mg in the evening  Depakote DR: 500mg 3 times daily + levocarnitine: 330mg TID  Lamotrigine: 200mg BID  Zonisamide: 200mg nightly    Tito Hathaway, RN, BSN  [facility]

## 2023-01-05 RX ORDER — LEVETIRACETAM 500 MG/1
TABLET ORAL
Qty: 150 TABLET | Refills: 11 | Status: SHIPPED | OUTPATIENT
Start: 2023-01-05 | End: 2024-01-09

## 2023-01-05 RX ORDER — ZONISAMIDE 100 MG/1
CAPSULE ORAL
Qty: 90 CAPSULE | Refills: 11 | Status: CANCELLED | OUTPATIENT
Start: 2023-01-05

## 2023-01-05 RX ORDER — LEVETIRACETAM 500 MG/1
1500 TABLET ORAL AT BEDTIME
Qty: 90 TABLET | Refills: 11 | Status: SHIPPED | OUTPATIENT
Start: 2023-01-05 | End: 2023-01-05

## 2023-01-05 NOTE — TELEPHONE ENCOUNTER
Tre Francis,     I added the evening dose of Keppra. Can we call the group home and verify how he is taking Zonisamide?    Thank you,   RON Oliveira CNP

## 2023-01-05 NOTE — TELEPHONE ENCOUNTER
[facility] Call Center    Phone Message    May a detailed message be left on voicemail: yes     Reason for Call: Medication Question or concern regarding medication   Prescription Clarification  Name of Medication:   Keppra 500 mg      Prescribing Provider: JOHN Merritt   Pharmacy: [facility]   What on the order needs clarification? Morning and evening dose together not   Call pharmacy if questions            Action Taken: Message routed to:  [facility]    Travel Screening: Not Applicable

## 2023-01-05 NOTE — TELEPHONE ENCOUNTER
RN resent one rx for keppra with AM/HS dosing per pharmacy request.     Pended rx for Zonisamide for provider to review and sign, per group home staff pt is taking 100mg AM and 200mg HS.     Tito Hathaway RN, BSN  [facility]

## 2023-01-10 ENCOUNTER — TELEPHONE (OUTPATIENT)
Dept: NEUROLOGY | Facility: CLINIC | Age: 48
End: 2023-01-10

## 2023-01-10 DIAGNOSIS — G40.919 BREAKTHROUGH SEIZURE (H): ICD-10-CM

## 2023-01-10 DIAGNOSIS — G40.909 NON-REFRACTORY EPILEPSY (H): ICD-10-CM

## 2023-01-10 NOTE — TELEPHONE ENCOUNTER
[facility] Call Center    Phone Message    May a detailed message be left on voicemail: yes     Reason for Call: Medication Question or concern regarding medication   Prescription Clarification  Name of Medication: zonisamide (ZONEGRAN) 100 MG capsule  Prescribing Provider: Lisa Merchant   Pharmacy: [facility]    What on the order needs clarification?     Desiree from [facility] called to speak to care team, please call [facility]  to clarify refills for PM dose. Per Desiree they only received a 1x fill for patients PM dose, call back number is [phone]. Ask for a pharmacist           Action Taken: Other: [facility]    Travel Screening: Not Applicable

## 2023-01-11 RX ORDER — ZONISAMIDE 100 MG/1
CAPSULE ORAL
Qty: 90 CAPSULE | Refills: 11 | Status: SHIPPED | OUTPATIENT
Start: 2023-01-11 | End: 2024-03-01

## 2023-01-11 NOTE — TELEPHONE ENCOUNTER
Per previous TE: per group home pt is taking zonisamide 100mg AM and 200mg HS.     RN called pharmacy- the rx sent 1/2/23 was only for AM dose. Need updated dose to include AM and PM dosing.     RN pended rx below for provider signature.     Tito Hathaway RN, BSN  [facility]

## 2023-03-09 ENCOUNTER — TELEPHONE (OUTPATIENT)
Dept: NEUROLOGY | Facility: CLINIC | Age: 48
End: 2023-03-09
Payer: MEDICARE

## 2023-03-09 NOTE — TELEPHONE ENCOUNTER
[facility] Call Center    Phone Message    May a detailed message be left on voicemail: yes     Reason for Call: Other: Jocelyne from Pt group home is calling because she is requesting a visit summary for Pt visits including video. She states that the Pt lives in a group home and due to the state coming in, they are needing these visits on file. Please call jocelyne with any questions    Fax [phone] attention jocelyne    Action Taken: Message routed to:  Other: [facility]    Travel Screening: Not Applicable

## 2023-03-09 NOTE — TELEPHONE ENCOUNTER
Faxed AVS from Lisa rodgerst 1/3/23 and Dr Griggs visit 4/19/22 to [phone]  Rosa [place]ado CMA on 3/9/2023 at 9:51 AM

## 2023-03-15 ENCOUNTER — APPOINTMENT (OUTPATIENT)
Dept: GENERAL RADIOLOGY | Facility: CLINIC | Age: 48
DRG: 208 | End: 2023-03-15
Attending: EMERGENCY MEDICINE
Payer: MEDICARE

## 2023-03-15 ENCOUNTER — APPOINTMENT (OUTPATIENT)
Dept: GENERAL RADIOLOGY | Facility: CLINIC | Age: 48
DRG: 208 | End: 2023-03-15
Attending: INTERNAL MEDICINE
Payer: MEDICARE

## 2023-03-15 ENCOUNTER — HOSPITAL ENCOUNTER (INPATIENT)
Facility: CLINIC | Age: 48
LOS: 9 days | Discharge: NURSING, REHAB OR LONG-TERM CARE | DRG: 208 | End: 2023-03-24
Attending: EMERGENCY MEDICINE | Admitting: INTERNAL MEDICINE
Payer: MEDICARE

## 2023-03-15 DIAGNOSIS — K21.00 GASTROESOPHAGEAL REFLUX DISEASE WITH ESOPHAGITIS WITHOUT HEMORRHAGE: Primary | ICD-10-CM

## 2023-03-15 DIAGNOSIS — J96.02 ACUTE RESPIRATORY FAILURE WITH HYPERCAPNIA (H): ICD-10-CM

## 2023-03-15 LAB
ALLEN'S TEST: YES
ANION GAP SERPL CALCULATED.3IONS-SCNC: 8 MMOL/L (ref 7–15)
BASE EXCESS BLDA CALC-SCNC: 9.4 MMOL/L (ref -9–1.8)
BASE EXCESS BLDV CALC-SCNC: 7.2 MMOL/L (ref -7.7–1.9)
BASOPHILS # BLD AUTO: 0 10E3/UL (ref 0–0.2)
BASOPHILS NFR BLD AUTO: 0 %
BUN SERPL-MCNC: 15.6 MG/DL (ref 6–20)
CALCIUM SERPL-MCNC: 9 MG/DL (ref 8.6–10)
CHLORIDE SERPL-SCNC: 103 MMOL/L (ref 98–107)
CK SERPL-CCNC: 85 U/L (ref 39–308)
CREAT SERPL-MCNC: 0.75 MG/DL (ref 0.67–1.17)
DEPRECATED HCO3 PLAS-SCNC: 33 MMOL/L (ref 22–29)
EOSINOPHIL # BLD AUTO: 0.1 10E3/UL (ref 0–0.7)
EOSINOPHIL NFR BLD AUTO: 1 %
ERYTHROCYTE [DISTWIDTH] IN BLOOD BY AUTOMATED COUNT: 13.8 % (ref 10–15)
FLUAV RNA SPEC QL NAA+PROBE: NEGATIVE
FLUBV RNA RESP QL NAA+PROBE: NEGATIVE
GFR SERPL CREATININE-BSD FRML MDRD: >90 ML/MIN/1.73M2
GLUCOSE BLDC GLUCOMTR-MCNC: 86 MG/DL (ref 70–99)
GLUCOSE SERPL-MCNC: 116 MG/DL (ref 70–99)
HCO3 BLD-SCNC: 33 MMOL/L (ref 21–28)
HCO3 BLDV-SCNC: 35 MMOL/L (ref 21–28)
HCO3 BLDV-SCNC: 37 MMOL/L (ref 21–28)
HCO3 BLDV-SCNC: 38 MMOL/L (ref 21–28)
HCO3 BLDV-SCNC: 38 MMOL/L (ref 21–28)
HCO3 BLDV-SCNC: 40 MMOL/L (ref 21–28)
HCT VFR BLD AUTO: 43 % (ref 40–53)
HGB BLD-MCNC: 13.2 G/DL (ref 13.3–17.7)
HOLD SPECIMEN: NORMAL
HOLD SPECIMEN: NORMAL
IMM GRANULOCYTES # BLD: 0.2 10E3/UL
IMM GRANULOCYTES NFR BLD: 2 %
LACTATE BLD-SCNC: 0.4 MMOL/L
LACTATE BLD-SCNC: 0.6 MMOL/L
LACTATE BLD-SCNC: 0.6 MMOL/L
LACTATE BLD-SCNC: 1 MMOL/L
LYMPHOCYTES # BLD AUTO: 2.7 10E3/UL (ref 0.8–5.3)
LYMPHOCYTES NFR BLD AUTO: 30 %
MCH RBC QN AUTO: 30.5 PG (ref 26.5–33)
MCHC RBC AUTO-ENTMCNC: 30.7 G/DL (ref 31.5–36.5)
MCV RBC AUTO: 99 FL (ref 78–100)
MONOCYTES # BLD AUTO: 0.9 10E3/UL (ref 0–1.3)
MONOCYTES NFR BLD AUTO: 10 %
NEUTROPHILS # BLD AUTO: 5.1 10E3/UL (ref 1.6–8.3)
NEUTROPHILS NFR BLD AUTO: 57 %
NRBC # BLD AUTO: 0 10E3/UL
NRBC BLD AUTO-RTO: 0 /100
NT-PROBNP SERPL-MCNC: 71 PG/ML (ref 0–450)
O2/TOTAL GAS SETTING VFR VENT: 40 %
O2/TOTAL GAS SETTING VFR VENT: 50 %
OXYHGB MFR BLDV: 55 % (ref 70–75)
PCO2 BLD: 39 MM HG (ref 35–45)
PCO2 BLDV: 74 MM HG (ref 40–50)
PCO2 BLDV: 82 MM HG (ref 40–50)
PCO2 BLDV: 82 MM HG (ref 40–50)
PCO2 BLDV: 83 MM HG (ref 40–50)
PCO2 BLDV: 93 MM HG (ref 40–50)
PH BLD: 7.53 [PH] (ref 7.35–7.45)
PH BLDV: 7.24 [PH] (ref 7.32–7.43)
PH BLDV: 7.26 [PH] (ref 7.32–7.43)
PH BLDV: 7.27 [PH] (ref 7.32–7.43)
PH BLDV: 7.27 [PH] (ref 7.32–7.43)
PH BLDV: 7.29 [PH] (ref 7.32–7.43)
PLATELET # BLD AUTO: 190 10E3/UL (ref 150–450)
PO2 BLD: 104 MM HG (ref 80–105)
PO2 BLDV: 33 MM HG (ref 25–47)
PO2 BLDV: 35 MM HG (ref 25–47)
PO2 BLDV: 36 MM HG (ref 25–47)
PO2 BLDV: 41 MM HG (ref 25–47)
PO2 BLDV: 74 MM HG (ref 25–47)
POTASSIUM SERPL-SCNC: 4 MMOL/L (ref 3.4–5.3)
PROCALCITONIN SERPL IA-MCNC: 0.05 NG/ML
RBC # BLD AUTO: 4.33 10E6/UL (ref 4.4–5.9)
RSV RNA SPEC NAA+PROBE: NEGATIVE
SAO2 % BLDV: 52 % (ref 94–100)
SAO2 % BLDV: 59 % (ref 94–100)
SAO2 % BLDV: 66 % (ref 94–100)
SAO2 % BLDV: 91 % (ref 94–100)
SARS-COV-2 RNA RESP QL NAA+PROBE: NEGATIVE
SODIUM SERPL-SCNC: 144 MMOL/L (ref 136–145)
TRIGL SERPL-MCNC: 239 MG/DL
TROPONIN T SERPL HS-MCNC: 21 NG/L
WBC # BLD AUTO: 9.1 10E3/UL (ref 4–11)

## 2023-03-15 PROCEDURE — 36600 WITHDRAWAL OF ARTERIAL BLOOD: CPT

## 2023-03-15 PROCEDURE — 85014 HEMATOCRIT: CPT | Performed by: EMERGENCY MEDICINE

## 2023-03-15 PROCEDURE — 82310 ASSAY OF CALCIUM: CPT | Performed by: EMERGENCY MEDICINE

## 2023-03-15 PROCEDURE — 82803 BLOOD GASES ANY COMBINATION: CPT

## 2023-03-15 PROCEDURE — 250N000009 HC RX 250: Performed by: EMERGENCY MEDICINE

## 2023-03-15 PROCEDURE — 36569 INSJ PICC 5 YR+ W/O IMAGING: CPT

## 2023-03-15 PROCEDURE — 250N000011 HC RX IP 250 OP 636: Performed by: EMERGENCY MEDICINE

## 2023-03-15 PROCEDURE — C9803 HOPD COVID-19 SPEC COLLECT: HCPCS

## 2023-03-15 PROCEDURE — 94640 AIRWAY INHALATION TREATMENT: CPT

## 2023-03-15 PROCEDURE — 83880 ASSAY OF NATRIURETIC PEPTIDE: CPT | Performed by: EMERGENCY MEDICINE

## 2023-03-15 PROCEDURE — 99291 CRITICAL CARE FIRST HOUR: CPT | Mod: CS,25

## 2023-03-15 PROCEDURE — 94002 VENT MGMT INPAT INIT DAY: CPT

## 2023-03-15 PROCEDURE — 999N000065 XR CHEST PORT 1 VIEW

## 2023-03-15 PROCEDURE — 250N000013 HC RX MED GY IP 250 OP 250 PS 637: Performed by: INTERNAL MEDICINE

## 2023-03-15 PROCEDURE — 82803 BLOOD GASES ANY COMBINATION: CPT | Performed by: INTERNAL MEDICINE

## 2023-03-15 PROCEDURE — 83605 ASSAY OF LACTIC ACID: CPT

## 2023-03-15 PROCEDURE — 36415 COLL VENOUS BLD VENIPUNCTURE: CPT | Performed by: EMERGENCY MEDICINE

## 2023-03-15 PROCEDURE — 999N000157 HC STATISTIC RCP TIME EA 10 MIN

## 2023-03-15 PROCEDURE — 99223 1ST HOSP IP/OBS HIGH 75: CPT | Mod: AI | Performed by: INTERNAL MEDICINE

## 2023-03-15 PROCEDURE — 999N000105 HC STATISTIC NO DOCUMENTATION TO SUPPORT CHARGE

## 2023-03-15 PROCEDURE — 272N000026 HC KIT POWER PICC TRIPLE LUMEN

## 2023-03-15 PROCEDURE — 84145 PROCALCITONIN (PCT): CPT | Performed by: EMERGENCY MEDICINE

## 2023-03-15 PROCEDURE — 87637 SARSCOV2&INF A&B&RSV AMP PRB: CPT | Performed by: EMERGENCY MEDICINE

## 2023-03-15 PROCEDURE — 200N000001 HC R&B ICU

## 2023-03-15 PROCEDURE — 5A1945Z RESPIRATORY VENTILATION, 24-96 CONSECUTIVE HOURS: ICD-10-PCS | Performed by: EMERGENCY MEDICINE

## 2023-03-15 PROCEDURE — 999N000065 XR ABDOMEN PORT 1 VIEW

## 2023-03-15 PROCEDURE — 84484 ASSAY OF TROPONIN QUANT: CPT | Performed by: EMERGENCY MEDICINE

## 2023-03-15 PROCEDURE — 5A09357 ASSISTANCE WITH RESPIRATORY VENTILATION, LESS THAN 24 CONSECUTIVE HOURS, CONTINUOUS POSITIVE AIRWAY PRESSURE: ICD-10-PCS | Performed by: EMERGENCY MEDICINE

## 2023-03-15 PROCEDURE — 71045 X-RAY EXAM CHEST 1 VIEW: CPT

## 2023-03-15 PROCEDURE — 99292 CRITICAL CARE ADDL 30 MIN: CPT | Mod: CS,25

## 2023-03-15 PROCEDURE — 250N000011 HC RX IP 250 OP 636: Performed by: INTERNAL MEDICINE

## 2023-03-15 PROCEDURE — 94660 CPAP INITIATION&MGMT: CPT

## 2023-03-15 PROCEDURE — 82550 ASSAY OF CK (CPK): CPT | Performed by: INTERNAL MEDICINE

## 2023-03-15 PROCEDURE — 82805 BLOOD GASES W/O2 SATURATION: CPT | Performed by: EMERGENCY MEDICINE

## 2023-03-15 PROCEDURE — 84478 ASSAY OF TRIGLYCERIDES: CPT | Performed by: INTERNAL MEDICINE

## 2023-03-15 RX ORDER — AZITHROMYCIN 250 MG/1
250 TABLET, FILM COATED ORAL DAILY
Status: DISCONTINUED | OUTPATIENT
Start: 2023-03-16 | End: 2023-03-16 | Stop reason: ALTCHOICE

## 2023-03-15 RX ORDER — CHLORHEXIDINE GLUCONATE ORAL RINSE 1.2 MG/ML
15 SOLUTION DENTAL EVERY 12 HOURS
Status: DISCONTINUED | OUTPATIENT
Start: 2023-03-15 | End: 2023-03-20

## 2023-03-15 RX ORDER — ETOMIDATE 2 MG/ML
40 INJECTION INTRAVENOUS ONCE
Status: COMPLETED | OUTPATIENT
Start: 2023-03-15 | End: 2023-03-15

## 2023-03-15 RX ORDER — AZITHROMYCIN 250 MG/1
500 TABLET, FILM COATED ORAL ONCE
Status: COMPLETED | OUTPATIENT
Start: 2023-03-15 | End: 2023-03-15

## 2023-03-15 RX ORDER — NICOTINE POLACRILEX 4 MG
15-30 LOZENGE BUCCAL
Status: DISCONTINUED | OUTPATIENT
Start: 2023-03-15 | End: 2023-03-24 | Stop reason: HOSPADM

## 2023-03-15 RX ORDER — DEXTROSE MONOHYDRATE 25 G/50ML
25-50 INJECTION, SOLUTION INTRAVENOUS
Status: DISCONTINUED | OUTPATIENT
Start: 2023-03-15 | End: 2023-03-24 | Stop reason: HOSPADM

## 2023-03-15 RX ORDER — NALOXONE HYDROCHLORIDE 0.4 MG/ML
0.4 INJECTION, SOLUTION INTRAMUSCULAR; INTRAVENOUS; SUBCUTANEOUS
Status: DISCONTINUED | OUTPATIENT
Start: 2023-03-15 | End: 2023-03-24 | Stop reason: HOSPADM

## 2023-03-15 RX ORDER — PROPOFOL 10 MG/ML
5-75 INJECTION, EMULSION INTRAVENOUS CONTINUOUS
Status: DISCONTINUED | OUTPATIENT
Start: 2023-03-15 | End: 2023-03-19

## 2023-03-15 RX ORDER — ALBUTEROL SULFATE 90 UG/1
6 AEROSOL, METERED RESPIRATORY (INHALATION) EVERY 4 HOURS
Status: DISCONTINUED | OUTPATIENT
Start: 2023-03-15 | End: 2023-03-19 | Stop reason: ALTCHOICE

## 2023-03-15 RX ORDER — ENOXAPARIN SODIUM 100 MG/ML
40 INJECTION SUBCUTANEOUS EVERY 24 HOURS
Status: DISCONTINUED | OUTPATIENT
Start: 2023-03-15 | End: 2023-03-16

## 2023-03-15 RX ORDER — AMMONIUM LACTATE 12 G/100G
CREAM TOPICAL 2 TIMES DAILY
COMMUNITY

## 2023-03-15 RX ORDER — QUETIAPINE 300 MG/1
300 TABLET, FILM COATED, EXTENDED RELEASE ORAL AT BEDTIME
Status: ON HOLD | COMMUNITY
End: 2023-05-22

## 2023-03-15 RX ORDER — NALOXONE HYDROCHLORIDE 0.4 MG/ML
0.2 INJECTION, SOLUTION INTRAMUSCULAR; INTRAVENOUS; SUBCUTANEOUS
Status: DISCONTINUED | OUTPATIENT
Start: 2023-03-15 | End: 2023-03-24 | Stop reason: HOSPADM

## 2023-03-15 RX ORDER — HYDROMORPHONE HYDROCHLORIDE 1 MG/ML
.3-.5 INJECTION, SOLUTION INTRAMUSCULAR; INTRAVENOUS; SUBCUTANEOUS
Status: DISCONTINUED | OUTPATIENT
Start: 2023-03-15 | End: 2023-03-24 | Stop reason: HOSPADM

## 2023-03-15 RX ORDER — PROPOFOL 10 MG/ML
40 INJECTION, EMULSION INTRAVENOUS ONCE
Status: COMPLETED | OUTPATIENT
Start: 2023-03-15 | End: 2023-03-15

## 2023-03-15 RX ORDER — ALBUTEROL SULFATE 0.83 MG/ML
2.5 SOLUTION RESPIRATORY (INHALATION)
Status: DISCONTINUED | OUTPATIENT
Start: 2023-03-15 | End: 2023-03-24

## 2023-03-15 RX ORDER — PROPOFOL 10 MG/ML
5-75 INJECTION, EMULSION INTRAVENOUS CONTINUOUS
Status: DISCONTINUED | OUTPATIENT
Start: 2023-03-15 | End: 2023-03-15

## 2023-03-15 RX ORDER — MULTIPLE VITAMINS W/ MINERALS TAB 9MG-400MCG
1 TAB ORAL DAILY
COMMUNITY
End: 2023-11-24

## 2023-03-15 RX ADMIN — ALBUTEROL SULFATE 6 PUFF: 90 AEROSOL, METERED RESPIRATORY (INHALATION) at 19:54

## 2023-03-15 RX ADMIN — SUCCINYLCHOLINE CHLORIDE 180 MG: 20 INJECTION, SOLUTION INTRAMUSCULAR; INTRAVENOUS at 16:42

## 2023-03-15 RX ADMIN — MIDAZOLAM HYDROCHLORIDE 2 MG: 1 INJECTION, SOLUTION INTRAMUSCULAR; INTRAVENOUS at 16:45

## 2023-03-15 RX ADMIN — ENOXAPARIN SODIUM 40 MG: 40 INJECTION SUBCUTANEOUS at 20:19

## 2023-03-15 RX ADMIN — PROPOFOL 10 MCG/KG/MIN: 10 INJECTION, EMULSION INTRAVENOUS at 16:47

## 2023-03-15 RX ADMIN — PROPOFOL 50 MCG/KG/MIN: 10 INJECTION, EMULSION INTRAVENOUS at 22:31

## 2023-03-15 RX ADMIN — CHLORHEXIDINE GLUCONATE 15 ML: 1.2 SOLUTION ORAL at 20:35

## 2023-03-15 RX ADMIN — AZITHROMYCIN MONOHYDRATE 500 MG: 250 TABLET ORAL at 21:50

## 2023-03-15 RX ADMIN — ETOMIDATE 40 MG: 2 INJECTION INTRAVENOUS at 16:42

## 2023-03-15 RX ADMIN — PROPOFOL 50 MCG/KG/MIN: 10 INJECTION, EMULSION INTRAVENOUS at 20:14

## 2023-03-15 RX ADMIN — PROPOFOL 40 MG: 10 INJECTION, EMULSION INTRAVENOUS at 16:54

## 2023-03-15 RX ADMIN — TAZOBACTAM SODIUM AND PIPERACILLIN SODIUM 3.38 G: 375; 3 INJECTION, SOLUTION INTRAVENOUS at 19:11

## 2023-03-15 ASSESSMENT — ACTIVITIES OF DAILY LIVING (ADL)
EQUIPMENT_CURRENTLY_USED_AT_HOME: WALKER, STANDARD
TOILETING_ASSISTANCE: TOILETING DIFFICULTY, REQUIRES EQUIPMENT
DIFFICULTY_EATING/SWALLOWING: YES
EATING: 0-->INDEPENDENT
WALKING_OR_CLIMBING_STAIRS_DIFFICULTY: YES
SWALLOWING: 0-->SWALLOWS FOODS/LIQUIDS WITHOUT DIFFICULTY
TRANSFERRING: 1-->ASSISTANCE (EQUIPMENT/PERSON) NEEDED
DRESS: 1-->ASSISTANCE (EQUIPMENT/PERSON) NEEDED
ADLS_ACUITY_SCORE: 37
TOILETING: 1-->ASSISTANCE (EQUIPMENT/PERSON) NEEDED (NOT DEVELOPMENTALLY APPROPRIATE)
DRESS: 1-->ASSISTANCE (EQUIPMENT/PERSON) NEEDED (NOT DEVELOPMENTALLY APPROPRIATE)
SWALLOWING: 0-->SWALLOWS FOODS/LIQUIDS WITHOUT DIFFICULTY (DEVELOPMENTALLY APPROPRIATE)
FALL_HISTORY_WITHIN_LAST_SIX_MONTHS: YES
DRESSING/BATHING: BATHING DIFFICULTY, ASSISTANCE 1 PERSON
CONCENTRATING,_REMEMBERING_OR_MAKING_DECISIONS_DIFFICULTY: YES
ADLS_ACUITY_SCORE: 37
NUMBER_OF_TIMES_PATIENT_HAS_FALLEN_WITHIN_LAST_SIX_MONTHS: 3
ADLS_ACUITY_SCORE: 37
WEAR_GLASSES_OR_BLIND: NO
ADLS_ACUITY_SCORE: 49
TOILETING_ISSUES: NO
DOING_ERRANDS_INDEPENDENTLY_DIFFICULTY: YES
WALKING_OR_CLIMBING_STAIRS: AMBULATION DIFFICULTY, ASSISTANCE 1 PERSON;STAIR CLIMBING DIFFICULTY, ASSISTANCE 1 PERSON
TRANSFERRING: 1-->ASSISTANCE (EQUIPMENT/PERSON) NEEDED (NOT DEVELOPMENTALLY APPROPRIATE)
ADLS_ACUITY_SCORE: 37
EATING: 0-->INDEPENDENT
TOILETING: 1-->ASSISTANCE (EQUIPMENT/PERSON) NEEDED
BATHING: 1-->ASSISTANCE NEEDED
EATING/SWALLOWING: EATING
DRESSING/BATHING_DIFFICULTY: YES
CHANGE_IN_FUNCTIONAL_STATUS_SINCE_ONSET_OF_CURRENT_ILLNESS/INJURY: YES
ADLS_ACUITY_SCORE: 37

## 2023-03-15 ASSESSMENT — ENCOUNTER SYMPTOMS
SHORTNESS OF BREATH: 1
FEVER: 0
COUGH: 0
MYALGIAS: 0

## 2023-03-15 NOTE — ED PROVIDER NOTES
History     Chief Complaint:  Shortness of Breath       HPI     Tre Vazquez is a 47 year old male with a history of asthma, HTN, and cerebral palsy who presents via EMS from a group home with shortness of breath. Patient is normally on 2-3L O2 but he reports that he was taken off of his oxygen last night by group home staff and has been off since this time. He became short of breath shortly after being taken off. He was satting at 85% upon EMS arrival today. Joel arrives to the ED on 4L NC with sats improving to 96%. He states that he still feels short of breath despite being on oxygen now. Denies recent cough, fever, or new pains. He is on oxygen due to his history of asthma.  Patient is overall a poor historian.    Independent Historian:   None - Patient Only    Review of External Notes: I evaluated discharge summary from 8/30/2021 which patient was seen for seizure disorder, cerebral palsy with spastic paraparesis and intellectual disability    ROS:  Review of Systems   Constitutional: Negative for fever.   Respiratory: Positive for shortness of breath. Negative for cough.    Musculoskeletal: Negative for myalgias.   All other systems reviewed and are negative.    Allergies:  Acetaminophen  Hydrocodone Bitartrate Er  Tomato  Cephalexin  Vicodin [Hydrocodone-Acetaminophen]     Medications:    Lioresal  Dulcolax  Buspar  Fibercon   Celexa  Depakote  Flonase  Duoneb  Lamictal   Keppra  Carnitor   Minipress  Inderal  Seroquel  Zonegran     Past Medical History:    Depression   HTN  Sepsis   Septic shock  Asthma   Non-refractory epilepsy   Cerebral palsy   Pneumonia   Encephalomalacia  Morbid obesity   Chronic hypercapnic respiratory failure  Prediabetes   Lumbar disc disease   Cerebral ventriculomegaly  Psychosis   Cognitive impairment   Personality change due to seizure disorder  Borderline personality disorder  GERD  ODD  PTSD  Schizoaffective disorder  ANA on BiPAP  Spastic diplegia     Past Surgical History:     Colonoscopy  EGD  Ankle surgery, bilateral    Heel lengthening, bilateral  Iris removal, left    Social History:  Arrives alone via EMS.   Lives in a group home  PCP: Siobhan Mayo MD, Family Medicine     Physical Exam     Patient Vitals for the past 24 hrs:   BP Temp Temp src Pulse Resp SpO2   03/15/23 1436 122/74 97  F (36.1  C) Temporal 61 16 92 %   03/15/23 1430 122/74 -- -- 63 15 94 %   03/15/23 1415 121/80 -- -- 64 11 91 %   03/15/23 1400 99/52 -- -- 63 12 92 %   03/15/23 1345 98/53 -- -- 65 27 95 %   03/15/23 1330 110/63 -- -- 68 (!) 36 94 %   03/15/23 1315 104/58 -- -- 68 19 94 %   03/15/23 1300 109/64 -- -- 70 (!) 41 95 %   03/15/23 1240 -- -- -- -- -- (!) 88 %        Physical Exam    General:   Somnolent  male resting comfortably in bed  HEENT:    Oropharynx is moist, without lesions or trismus.  Eyes:    Conjunctiva normal  Neck:    Supple, no meningismus.     CV:     Regular rate and rhythm.      No murmurs, rubs or gallops.       No unilateral leg swelling.       2+ radial pulses bilateral.    PULM:    Clear to auscultation bilateral.       No respiratory distress.      Diminished air exchange throughout.     No rales or wheezing.     No stridor.  ABD:    Soft, non-tender, non-distended.       No pulsatile masses.       No rebound, guarding or rigidity.  MSK:     No gross deformity to all four extremities.   LYMPH:   No cervical lymphadenopathy.  NEURO:   Somnolent but arouses to verbal command     Oriented to person and place     Follows commands     No tremor   Skin:    Warm, dry and intact.    Psych:    Flat affect        Emergency Department Course     Imaging:  XR Chest Port 1 View   Final Result   IMPRESSION: Single AP view of the chest was obtained.   Cardiomediastinal silhouette is within normal limits. Mild left   basilar pulmonary opacities, likely atelectasis. No significant   pleural effusion or pneumothorax.      SHANTAL OSBORNE MD            SYSTEM ID:  [id number]          Report per radiology    Laboratory:  Labs Ordered and Resulted from Time of ED Arrival to Time of ED Departure   BASIC METABOLIC PANEL - Abnormal       Result Value    Sodium 144      Potassium 4.0      Chloride 103      Carbon Dioxide (CO2) 33 (*)     Anion Gap 8      Urea Nitrogen 15.6      Creatinine 0.75      Calcium 9.0      Glucose 116 (*)     GFR Estimate >90     CBC WITH PLATELETS AND DIFFERENTIAL - Abnormal    WBC Count 9.1      RBC Count 4.33 (*)     Hemoglobin 13.2 (*)     Hematocrit 43.0      MCV 99      MCH 30.5      MCHC 30.7 (*)     RDW 13.8      Platelet Count 190      % Neutrophils 57      % Lymphocytes 30      % Monocytes 10      % Eosinophils 1      % Basophils 0      % Immature Granulocytes 2      NRBCs per 100 WBC 0      Absolute Neutrophils 5.1      Absolute Lymphocytes 2.7      Absolute Monocytes 0.9      Absolute Eosinophils 0.1      Absolute Basophils 0.0      Absolute Immature Granulocytes 0.2      Absolute NRBCs 0.0     ISTAT GASES LACTATE VENOUS POCT - Abnormal    Lactic Acid POCT 1.0      Bicarbonate Venous POCT 38 (*)     O2 Sat, Venous POCT 91 (*)     pCO2V Venous POCT 82 (*)     pH Venous POCT 7.27 (*)     pO2 Venous POCT 74 (*)    PROCALCITONIN - Abnormal    Procalcitonin 0.05 (*)    ISTAT GASES LACTATE VENOUS POCT - Abnormal    Lactic Acid POCT 0.6      Bicarbonate Venous POCT 35 (*)     O2 Sat, Venous POCT 59 (*)     pCO2V Venous POCT 74 (*)     pH Venous POCT 7.29 (*)     pO2 Venous POCT 36     TROPONIN T, HIGH SENSITIVITY - Normal    Troponin T, High Sensitivity 21     NT PROBNP INPATIENT - Normal    N terminal Pro BNP Inpatient 71     INFLUENZA A/B, RSV, & SARS-COV2 PCR - Normal    Influenza A PCR Negative      Influenza B PCR Negative      RSV PCR Negative      SARS CoV2 PCR Negative        Emergency Department Course & Assessments:  [facility]    -Intubation    Date/Time: 3/17/2023 7:02 AM  Performed by: Eloy Wood MD  Authorized by:  Eloy Wood MD     Risks, benefits and alternatives discussed.    ED EVALUATION:      I have performed an Emergency Department Evaluation including taking a history and physical examination, this evaluation will be documented in the electronic medical record for this ED encounter.      ASA Class: Class 4- Severe systemic disease, acute unstable problems    Mallampati: Grade 2- soft palate, base of uvula, tonsillar pillars, and portion of posterior pharyngeal wall visible    UNIVERSAL PROTOCOL   Site Marked: NA  Prior Images Obtained and Reviewed:  Yes  Required items: Required blood products, implants, devices and special equipment available    Patient identity confirmed:  Verbally with patient, arm band and provided demographic data  Patient was reevaluated immediately before administering moderate or deep sedation or anesthesia  Confirmation Checklist:  Patient's identity using two indicators, relevant allergies, procedure was appropriate and matched the consent or emergent situation and correct equipment/implants were available  Time out: Immediately prior to the procedure a time out was called    Universal Protocol: the Joint Commission Universal Protocol was followed      PRE-PROCEDURE DETAILS     Patient status:  Awake    Mallampati score:  II    Pretreatment medications:  None    Paralytics:  Succinylcholine        SEDATION  Patient Sedated: Yes    Sedation Type:  Deep  Sedation:  Etomidate  Vital signs: Vital signs monitored during sedation    PROCEDURE DETAILS     Preoxygenation:  BiPAP    CPR in progress: no      Intubation method:  Oral    Oral intubation technique:  Video-assisted    Laryngoscope blade:  Mac 4    Tube size (mm):  7.5    Tube type:  Cuffed    Number of attempts:  1    Cricoid pressure: yes      Tube visualized through cords: yes      PLACEMENT ASSESSMENT     ETT to teeth:  25    Tube secured with:  ETT wolf    Breath sounds:  Equal and absent over the epigastrium    Placement  verification: chest rise, condensation, CXR verification, direct visualization, equal breath sounds, ETCO2 detector and tube exhalation      CXR findings:  ETT in proper place    PROCEDURE    Patient Tolerance:  Patient tolerated the procedure well with no immediate complications  Length of time physician/provider present for 1:1 monitoring during sedation: 10         Interventions:  Medications - No data to display     Assessments:  1305 I obtained history and examined the patient as noted above.  1416 I rechecked the patient and explained findings. BiPAP has been changed from 12/5 to 14/8 based on recent blood gas.  1450 I rechecked the patient again. BiPAP was increased to 16/8 and the rate was increased from 16 to 20 at this time.     Independent Interpretation (X-rays, CTs, rhythm strip):  I independently reviewed chest x-ray which there is no pneumothorax or focal infiltrate    Consultations/Discussion of Management or Tests:  I spoke with Dr. March, hospitalist, who accepts the patient.        Social Determinants of Health affecting care:   None    Disposition:  The patient was admitted to the hospital under the care of Dr. March.     Impression & Plan    CMS Diagnoses: None     Medical Decision Makin-year-old male presented to the ED with shortness of breath.  He reports shortness of breath once his supplemental oxygen was discontinued but did not resolve with reapplication of supplemental oxygen.  Patient has not in distress nor is he hypoxic on his baseline O2.  Chest x-ray was unremarkable.  His basic laboratory studies were unrevealing outside VB revealing hypercapnia and acute respiratory acidosis.  Patient was placed on BiPAP but unfortunately his repeat venous gases did not show resolution of his hypercapnia and in fact his acidemia and hypercapnia only worsened despite increasing inspiratory pressures and respiratory rate on BiPAP.  Patient required endotracheal intubation due to failed  hypercapnic respiratory failure on BiPAP.  Patient will be transferred to intensive care unit.    Critical Care time:  was 45 minutes for this patient excluding procedures.    Diagnosis:    ICD-10-CM    1. Acute respiratory failure with hypercapnia (H)  J96.02                Scribe Disclosure:  I, Stella Sanchez, am serving as a scribe at 1:05 PM on 3/15/2023 to document services personally performed by Eloy Wood MD based on my observations and the provider's statements to me.   3/15/2023   Eloy Wood MD Matthews, Jeremiah R, MD  03/17/23 0705

## 2023-03-15 NOTE — ED TRIAGE NOTES
pt arrives from group home with SOB, unsteady on feet and weakness. C/O slurred and slowed speech related to diiculty breathing. Normal home O2 2-3L states he has not been on oxygen since last night. Satting 85% when EMS arrived. Arrives on 4L NC sats 96%.

## 2023-03-15 NOTE — ED NOTES
For vital signs and complete assessments, please see documentation flowsheets.      Pertinent assessments: RASS 13, CPOT 1.  Soft BP. Vent Mode: CMV/AC, FiO2 (%): 60 %, RR : 20 TV: 500 mL, PEEP 5 cm. Reid is iin placed for strict I&O. PIV X2. On propofol dripp.     Major Shift Events:     Intubated @ 6817

## 2023-03-15 NOTE — PROGRESS NOTES
RT Note:    A BiPAP of  12/6, 16 @ 30% was applied to the pt via the mask for an increase in WOB and/or SOB.  Skin integrity on the bridge of the nose is clean, dry, and intact. Patient is tolerating BiPAP well.     Update, 3pm: BiPAP settings adjusted to 16/8, 20 @ 30%, patient's position adjusted in bed and mask straps adjusted for better seal.    Update, 5pm:  Patient intubated by MD with 7.5 ETT, secured 23cm @ gums (initially 25cm @ gums, withdrawn to 23cm after CXR. Placed on ventilator with settings of:    Vent Mode: CMV/AC  (Continuous Mandatory Ventilation/ Assist Control)  FiO2 (%): 60 %  Resp Rate (Set): 20 breaths/min  Tidal Volume (Set, mL): 500 mL  PEEP (cm H2O): 5 cmH2O  Resp: 19

## 2023-03-15 NOTE — H&P
[facility]    History and Physical - Hospitalist Service       Date of Admission:  3/15/2023    Assessment & Plan      Tre Vazquez is a 47 year old male admitted on 3/15/2023.      Acute hypercarbic and hypoxic respiratory failure  BiPAP did not improve the PCO2 because of which patient was intubated  Ventilator management per intensive care service    Left lower lobe pneumonia  We will start patient on ceftriaxone and azithromycin for this which is probably    Cerebral palsy  Spastic paraparesis  Seizure disorder  We will start patient's antiepileptic medications once the medication reconciliation is completed which is not done as yet to be given through the NG tube  Seizure precautions will be taken    Hypertension  We will start antihypertensive medications once after the medication reconciliation is completed to be given through the NG tube    Obstructive sleep apnea  Patient is now intubated            Diet:  Patient is intubated at the present time we will keep n.p.o. except for medications  DVT Prophylaxis: Enoxaparin (Lovenox) SQ  Reid Catheter: Not present  Lines: None     Code Status:  Full code    Clinically Significant Risk Factors Present on Admission     # Hypertension: home medication list includes antihypertensive(s)   # Non-Invasive mechanical ventilation: current O2 Device: BiPAP/CPAP  # Acute hypoxic respiratory failure: continue supplemental O2 as needed             Disposition Plan   Patient will be admitted inpatient to the ICU           Ritesh March MD  [facility]  Securely message with the Vocera Web Console (learn more here)  Text page via [employer] Paging/Directory      ______________________________________________________________________    Chief Complaint   Shortness of breath    History is obtained from the patient, medical records and my discussion with emergency department physician        History of Present Illness   Tre Vazquez is a  47 year old male with seizure disorder, cerebral palsy with mild spastic paraparesis, mild intellectual disability, mood disturbance, hypertension, congenital left eye blindness, right Bell's palsy with residual facial droop, morbid obesity, borderline personality disorder, PTSD, schizoaffective disorder, obstructive sleep apnea on BiPAP therapy, cerebral ventriculomegaly, prediabetes mellitus, chronic hypercapnic respiratory failure, asthma and depression    Patient is chronically on 2 L of nasal cannula oxygen and lives in a group home according to him he has not been on oxygen in the night and in the morning his oxygen saturations were found to be low at 85% when EMS arrived because of which  he was put on 4 L of nasal cannula oxygen to keep saturations above 92%.  And his saturations were 96%.    In the emergency department he was put on BiPAP after his pH was found to be 7.27 with a PCO2 of 82 but within 2 hours his PCO2 had increased to 93 and pH decreased to 7.24 another VBG was done which showed a pH of 7.2 and a PCO2 of 82 when he was intubated in the emergency department and transferred to ICU          Review of Systems    Unable to obtain any significant history from the patient on BiPAP therefore history was collaborated as mentioned above and my discussion with the emergency department physician and medical records    Past Medical History    I have reviewed this patient's medical history and updated it with pertinent information if needed.   Past Medical History:   Diagnosis Date     Blindness of left eye      Depression 03/10/2014     Hypertension      Pneumococcal septicemia(038.2) (H) 11/26/2013    Hospitalized     Pneumonia, organism unspecified(486) 11/26/2013    Hospitalized     Uncomplicated asthma      Unspecified epilepsy without mention of intractable epilepsy        Past Surgical History   I have reviewed this patient's surgical history and updated it with pertinent information if  needed.  Past Surgical History:   Procedure Laterality Date     COLONOSCOPY N/A 2022    Procedure: COLONOSCOPY;  Surgeon: Michael aCldwell MD;  Location: [facility] OR     ESOPHAGOSCOPY, GASTROSCOPY, DUODENOSCOPY (EGD), COMBINED N/A 2022    Procedure: ESOPHAGOGASTRODUODENOSCOPY with biopsy;  Surgeon: Michael Caldwell MD;  Location: [facility] OR     ORTHOPEDIC SURGERY      pt stated past surgery on both ankles       Social History   I have reviewed this patient's social history and updated it with pertinent information if needed.  Social History     Tobacco Use     Smoking status: Former     Smokeless tobacco: Never   Substance Use Topics     Alcohol use: No     Drug use: No       Family History   Unable to obtain due to patient condition    Prior to Admission Medications   Prior to Admission Medications   Prescriptions Last Dose Informant Patient Reported? Taking?   GOLYTELY 236 g suspension   Yes No   QUEtiapine (SEROQUEL) 300 MG tablet   Yes No   Sig: TAKE ONE TABLET BY MOUTH EVERY NIGHT AT BEDTIME   SM NASAL SPRAY SALINE 0.65 % nasal spray  Pharmacy Yes No   Sig: [place] 1 spray in nostril every hour as needed   baclofen (LIORESAL) 10 MG tablet   Yes No   Si/2 TO 1 TABLET BY MOUTH 3 TIMES DAILY AS NEEDED   bisacodyl (DULCOLAX) 5 MG EC tablet   Yes No   Sig: Take 2 tablets per colonoscopy prep instructions   busPIRone (BUSPAR) 15 MG tablet   Yes No   Sig: Take 15 mg by mouth 2 times daily   calcium polycarbophil (FIBERCON) 625 MG tablet  Pharmacy Yes No   Sig: Take 1 tablet by mouth 2 times daily At 8am and 5pm   calcium polycarbophil (FIBERCON) 625 MG tablet   Yes No   Sig: Take by mouth every 24 hours   citalopram (CELEXA) 20 MG tablet   Yes No   Sig: Take 20 mg by mouth daily   citalopram (CELEXA) 40 MG tablet  Pharmacy Yes No   Sig: Take 40 mg by mouth daily   clotrimazole (LOTRIMIN) 1 % external solution   Yes No   divalproex sodium delayed-release (DEPAKOTE) 500 MG DR tablet   No No   Sig:  Take 1 tablet (500 mg) by mouth 3 times daily   fluticasone (FLONASE) 50 MCG/ACT nasal spray  Pharmacy Yes No   Sig: [place] 1 spray in nostril daily as needed   ipratropium - albuterol 0.5 mg/2.5 mg/3 mL (DUONEB) 0.5-2.5 (3) MG/3ML neb solution   Yes No   Sig: Inhale 3 mLs into the lungs   lamoTRIgine (LAMICTAL) 200 MG tablet   No No   Sig: Take 1 tablet (200 mg) by mouth 2 times daily   levETIRAcetam (KEPPRA) 500 MG tablet   No No   Sig: Take 2 tablets (1,000 mg) by mouth every morning AND 3 tablets (1,500 mg) At Bedtime.   levOCARNitine (CARNITOR) 330 MG tablet   No No   Sig: Take 2 tablets (660 mg) by mouth 3 times daily   methylcellulose (CITRUCEL) powder   Yes No   Sig: Take 2 g by mouth   polyethylene glycol (MIRALAX) 17 GM/Dose powder   Yes No   prazosin (MINIPRESS) 2 MG capsule   Yes No   propranolol (INDERAL) 20 MG tablet   Yes No   zonisamide (ZONEGRAN) 100 MG capsule   No No   Sig: Take 1 capsule (100 mg) by mouth every morning AND 2 capsules (200 mg) At Bedtime.      Facility-Administered Medications: None     Allergies   Allergies   Allergen Reactions     Acetaminophen      Hydrocodone Bitartrate Er Unknown     Tomato      Cephalexin Rash     HUT Reaction: Rash; HUT Noted: [id number]       Vicodin [Hydrocodone-Acetaminophen] GI Disturbance       Physical Exam   Vital Signs: Temp: 97  F (36.1  C) Temp src: Temporal BP: 115/79 Pulse: 63   Resp: 21 SpO2: 92 % O2 Device: BiPAP/CPAP Oxygen Delivery: 4 LPM  Weight: 0 lbs 0 oz      GENERAL: Patient does not look in any acute distress  HEENT:  Conjunctiva is clear   NECK:, no Jugular Venous distention  HEART: S1 S2 regular  Rate and Rhythm,  no murmur,    LUNGS: Respirations are laboured, Lungs have decreased breath sounds in lung bases  to auscultation without Crepitations or Wheezing   ABDOMEN: Soft, there is no tenderness , Bowel Sounds are decreased but Positive   LOWER LIMBS:  Pedal Edema is minimal Bilaterally   CNS: Sleepy yet arousable and spontaneously  speaks and mumbled words, Moving all the Four Limbs       Data   Data reviewed today: I reviewed all medications, new labs and imaging results over the last 24 hours     Imaging results reviewed over the past 24 hrs:   Recent Results (from the past 24 hour(s))   XR Chest Port 1 View    Narrative    CHEST ONE VIEW PORTABLE  3/15/2023 1:47 PM     HISTORY: Dyspnea.    COMPARISON: Chest x-ray on 6/20/2022.      Impression    IMPRESSION: Single AP view of the chest was obtained.  Cardiomediastinal silhouette is within normal limits. Mild left  basilar pulmonary opacities, likely atelectasis. No significant  pleural effusion or pneumothorax.    SHANTAL OSBORNE MD         SYSTEM ID:  [id number]   XR Chest Port 1 View    Narrative    EXAM: XR CHEST PORT 1 VIEW  LOCATION: [facility]  DATE/TIME: 3/15/2023 4:55 PM    INDICATION: Status post intubation.  COMPARISON: 03/15/2023      Impression    IMPRESSION:     New endotracheal tube with tip 1.6 cm above the lisa. New enteric tube coursing into the stomach with tip out of view.    No focal airspace disease. No pleural effusion or pneumothorax.    The cardiomediastinal silhouette is unremarkable.   XR Chest Port 1 View    Narrative    EXAM: XR CHEST PORT 1 VIEW  LOCATION: [facility]  DATE/TIME: 3/15/2023 5:04 PM    INDICATION: ETT retracted 1.5 cm   eval for position  COMPARISON: 3/15/2023      Impression    IMPRESSION: Endotracheal tube is 3.8 cm superior to the lisa. Enteric tube extends below the diaphragm. Clear lungs. The cardiac silhouette and pulmonary vasculature are normal.     Most Recent 3 CBC's:Recent Labs   Lab Test 03/15/23  1303 06/20/22  1119 05/05/22  1137   WBC 9.1 11.2* 6.5   HGB 13.2* 13.9 13.6   MCV 99 98 98    181 195     Most Recent 3 BMP's:Recent Labs   Lab Test 03/15/23  1303 12/01/22  0829 06/20/22  1119 05/05/22  1137     --  141 145   POTASSIUM 4.0  --  4.4 3.9   CHLORIDE 103  --  103 103    CO2 33*  --  37* 33*   BUN 15.6  --  17 14   CR 0.75 0.88 0.81 0.84   ANIONGAP 8  --  1* 9   ARNOLD 9.0  --  9.2 9.1   *  --  101* 81     Most Recent 2 LFT's:Recent Labs   Lab Test 06/20/22  1119 05/05/22  1137   AST 18 20   ALT 24 23   ALKPHOS 58 65   BILITOTAL 0.4 0.4     Most Recent 3 Troponin's:No lab results found.  Most Recent ABG:Recent Labs   Lab Test 03/15/23  1525   PH 7.24*        Latest Reference Range & Units 03/15/23 13:04 03/15/23 14:14 03/15/23 14:47 03/15/23 15:25 03/15/23 16:22   PCO2 Venous 40 - 50 mm Hg     82 (HH)   pCO2V Venous POCT 40 - 50 mm Hg 82 (HH) 74 (H) 83 (HH) 93 (HH)    PO2 Venous 25 - 47 mm Hg     33   pO2 Venous POCT 25 - 47 mm Hg 74 (H) 36 41 35    O2 Sat, Venous POCT 94 - 100 % 91 (L) 59 (L) 66 (L) 52 (L)    Bicarbonate Venous 21 - 28 mmol/L     37 (H)   Base Excess Venous -7.7 - 1.9 mmol/L     7.2 (H)   Oxyhemoglobin Venous 70 - 75 %     55 (L)   Bicarbonate Venous POCT 21 - 28 mmol/L 38 (H) 35 (H) 38 (H) 40 (H)    pH Venous POCT 7.32 - 7.43  7.27 (L) 7.29 (L) 7.27 (L) 7.24 (L)    (HH): Data is critically high  (H): Data is abnormally high  (L): Data is abnormally low    Addendum  I did discuss with the emergency department physician regarding marginal blood pressures and the need for a central line.  Emergency department physician would evaluate the patient again, give some IV fluids and if needed would put the central line before transferring the patient to ICU    Dr. Ritesh MOLINA.  Hospitalist [place], [place].

## 2023-03-16 LAB
ALBUMIN SERPL BCG-MCNC: 3.5 G/DL (ref 3.5–5.2)
ALLEN'S TEST: YES
ALP SERPL-CCNC: 62 U/L (ref 40–129)
ALT SERPL W P-5'-P-CCNC: 24 U/L (ref 10–50)
ANION GAP SERPL CALCULATED.3IONS-SCNC: 7 MMOL/L (ref 7–15)
AST SERPL W P-5'-P-CCNC: 33 U/L (ref 10–50)
BASE EXCESS BLDA CALC-SCNC: 7.7 MMOL/L (ref -9–1.8)
BASE EXCESS BLDV CALC-SCNC: 9.8 MMOL/L (ref -7.7–1.9)
BILIRUB SERPL-MCNC: 0.4 MG/DL
BUN SERPL-MCNC: 17.2 MG/DL (ref 6–20)
CALCIUM SERPL-MCNC: 8.7 MG/DL (ref 8.6–10)
CHLORIDE SERPL-SCNC: 101 MMOL/L (ref 98–107)
CREAT SERPL-MCNC: 0.74 MG/DL (ref 0.67–1.17)
DEPRECATED HCO3 PLAS-SCNC: 33 MMOL/L (ref 22–29)
GFR SERPL CREATININE-BSD FRML MDRD: >90 ML/MIN/1.73M2
GLUCOSE BLDC GLUCOMTR-MCNC: 103 MG/DL (ref 70–99)
GLUCOSE BLDC GLUCOMTR-MCNC: 103 MG/DL (ref 70–99)
GLUCOSE BLDC GLUCOMTR-MCNC: 108 MG/DL (ref 70–99)
GLUCOSE BLDC GLUCOMTR-MCNC: 112 MG/DL (ref 70–99)
GLUCOSE BLDC GLUCOMTR-MCNC: 80 MG/DL (ref 70–99)
GLUCOSE BLDC GLUCOMTR-MCNC: 96 MG/DL (ref 70–99)
GLUCOSE SERPL-MCNC: 96 MG/DL (ref 70–99)
HCO3 BLD-SCNC: 34 MMOL/L (ref 21–28)
HCO3 BLDV-SCNC: 36 MMOL/L (ref 21–28)
HOLD SPECIMEN: NORMAL
O2/TOTAL GAS SETTING VFR VENT: 45 %
O2/TOTAL GAS SETTING VFR VENT: 50 %
OXYHGB MFR BLDV: 73 % (ref 70–75)
PCO2 BLD: 56 MM HG (ref 35–45)
PCO2 BLDV: 53 MM HG (ref 40–50)
PH BLD: 7.39 [PH] (ref 7.35–7.45)
PH BLDV: 7.44 [PH] (ref 7.32–7.43)
PO2 BLD: 90 MM HG (ref 80–105)
PO2 BLDV: 39 MM HG (ref 25–47)
POTASSIUM SERPL-SCNC: 4.1 MMOL/L (ref 3.4–5.3)
PROT SERPL-MCNC: 5.7 G/DL (ref 6.4–8.3)
SODIUM SERPL-SCNC: 141 MMOL/L (ref 136–145)

## 2023-03-16 PROCEDURE — 87205 SMEAR GRAM STAIN: CPT | Performed by: INTERNAL MEDICINE

## 2023-03-16 PROCEDURE — 250N000013 HC RX MED GY IP 250 OP 250 PS 637: Performed by: INTERNAL MEDICINE

## 2023-03-16 PROCEDURE — 258N000003 HC RX IP 258 OP 636: Performed by: INTERNAL MEDICINE

## 2023-03-16 PROCEDURE — 200N000001 HC R&B ICU

## 2023-03-16 PROCEDURE — 94003 VENT MGMT INPAT SUBQ DAY: CPT

## 2023-03-16 PROCEDURE — 99232 SBSQ HOSP IP/OBS MODERATE 35: CPT | Performed by: INTERNAL MEDICINE

## 2023-03-16 PROCEDURE — 94640 AIRWAY INHALATION TREATMENT: CPT | Mod: 76

## 2023-03-16 PROCEDURE — 258N000003 HC RX IP 258 OP 636: Performed by: SURGERY

## 2023-03-16 PROCEDURE — 250N000011 HC RX IP 250 OP 636: Performed by: INTERNAL MEDICINE

## 2023-03-16 PROCEDURE — 82803 BLOOD GASES ANY COMBINATION: CPT | Performed by: INTERNAL MEDICINE

## 2023-03-16 PROCEDURE — C9113 INJ PANTOPRAZOLE SODIUM, VIA: HCPCS | Performed by: INTERNAL MEDICINE

## 2023-03-16 PROCEDURE — 83735 ASSAY OF MAGNESIUM: CPT | Performed by: INTERNAL MEDICINE

## 2023-03-16 PROCEDURE — 86140 C-REACTIVE PROTEIN: CPT | Performed by: INTERNAL MEDICINE

## 2023-03-16 PROCEDURE — 99291 CRITICAL CARE FIRST HOUR: CPT | Performed by: SURGERY

## 2023-03-16 PROCEDURE — 999N000157 HC STATISTIC RCP TIME EA 10 MIN

## 2023-03-16 PROCEDURE — 999N000040 HC STATISTIC CONSULT NO CHARGE VASC ACCESS

## 2023-03-16 PROCEDURE — 999N000178 HC STATISTIC SUCTION SPUTUM

## 2023-03-16 PROCEDURE — 80053 COMPREHEN METABOLIC PANEL: CPT | Performed by: INTERNAL MEDICINE

## 2023-03-16 PROCEDURE — 94640 AIRWAY INHALATION TREATMENT: CPT

## 2023-03-16 PROCEDURE — 82805 BLOOD GASES W/O2 SATURATION: CPT | Performed by: INTERNAL MEDICINE

## 2023-03-16 PROCEDURE — 36600 WITHDRAWAL OF ARTERIAL BLOOD: CPT

## 2023-03-16 PROCEDURE — 84100 ASSAY OF PHOSPHORUS: CPT | Performed by: INTERNAL MEDICINE

## 2023-03-16 RX ORDER — LEVETIRACETAM 100 MG/ML
1500 SOLUTION ORAL EVERY EVENING
Status: DISCONTINUED | OUTPATIENT
Start: 2023-03-16 | End: 2023-03-19

## 2023-03-16 RX ORDER — LEVETIRACETAM 100 MG/ML
1000 SOLUTION ORAL EVERY MORNING
Status: DISCONTINUED | OUTPATIENT
Start: 2023-03-16 | End: 2023-03-19

## 2023-03-16 RX ORDER — PROPRANOLOL HYDROCHLORIDE 20 MG/5ML
20 SOLUTION ORAL 2 TIMES DAILY
Status: DISCONTINUED | OUTPATIENT
Start: 2023-03-16 | End: 2023-03-19

## 2023-03-16 RX ORDER — CITALOPRAM HYDROBROMIDE 20 MG/1
20 TABLET ORAL DAILY
Status: DISCONTINUED | OUTPATIENT
Start: 2023-03-16 | End: 2023-03-24 | Stop reason: HOSPADM

## 2023-03-16 RX ORDER — LAMOTRIGINE 200 MG/1
200 TABLET ORAL 2 TIMES DAILY
Status: DISCONTINUED | OUTPATIENT
Start: 2023-03-16 | End: 2023-03-24 | Stop reason: HOSPADM

## 2023-03-16 RX ORDER — LEVOCARNITINE 330 MG/1
660 TABLET ORAL 3 TIMES DAILY
Status: DISCONTINUED | OUTPATIENT
Start: 2023-03-16 | End: 2023-03-24 | Stop reason: HOSPADM

## 2023-03-16 RX ORDER — QUETIAPINE FUMARATE 100 MG/1
100 TABLET, FILM COATED ORAL 3 TIMES DAILY
Status: DISCONTINUED | OUTPATIENT
Start: 2023-03-16 | End: 2023-03-24 | Stop reason: HOSPADM

## 2023-03-16 RX ORDER — DOXAZOSIN 1 MG/1
1 TABLET ORAL AT BEDTIME
Status: DISCONTINUED | OUTPATIENT
Start: 2023-03-16 | End: 2023-03-19 | Stop reason: ALTCHOICE

## 2023-03-16 RX ORDER — PROPRANOLOL HYDROCHLORIDE 10 MG/1
20 TABLET ORAL 2 TIMES DAILY
Status: DISCONTINUED | OUTPATIENT
Start: 2023-03-16 | End: 2023-03-16

## 2023-03-16 RX ORDER — ENOXAPARIN SODIUM 100 MG/ML
40 INJECTION SUBCUTANEOUS EVERY 12 HOURS
Status: DISCONTINUED | OUTPATIENT
Start: 2023-03-16 | End: 2023-03-24 | Stop reason: HOSPADM

## 2023-03-16 RX ORDER — AZITHROMYCIN 200 MG/5ML
250 POWDER, FOR SUSPENSION ORAL DAILY
Status: COMPLETED | OUTPATIENT
Start: 2023-03-16 | End: 2023-03-19

## 2023-03-16 RX ORDER — SODIUM CHLORIDE, SODIUM LACTATE, POTASSIUM CHLORIDE, CALCIUM CHLORIDE 600; 310; 30; 20 MG/100ML; MG/100ML; MG/100ML; MG/100ML
INJECTION, SOLUTION INTRAVENOUS CONTINUOUS
Status: DISCONTINUED | OUTPATIENT
Start: 2023-03-16 | End: 2023-03-20

## 2023-03-16 RX ADMIN — PROPOFOL 45 MCG/KG/MIN: 10 INJECTION, EMULSION INTRAVENOUS at 18:04

## 2023-03-16 RX ADMIN — ENOXAPARIN SODIUM 40 MG: 40 INJECTION SUBCUTANEOUS at 20:14

## 2023-03-16 RX ADMIN — TAZOBACTAM SODIUM AND PIPERACILLIN SODIUM 3.38 G: 375; 3 INJECTION, SOLUTION INTRAVENOUS at 12:16

## 2023-03-16 RX ADMIN — ALBUTEROL SULFATE 6 PUFF: 90 AEROSOL, METERED RESPIRATORY (INHALATION) at 19:47

## 2023-03-16 RX ADMIN — ALBUTEROL SULFATE 6 PUFF: 90 AEROSOL, METERED RESPIRATORY (INHALATION) at 04:52

## 2023-03-16 RX ADMIN — SODIUM CHLORIDE, POTASSIUM CHLORIDE, SODIUM LACTATE AND CALCIUM CHLORIDE: 600; 310; 30; 20 INJECTION, SOLUTION INTRAVENOUS at 12:41

## 2023-03-16 RX ADMIN — LEVOCARNITINE 660 MG: 330 TABLET ORAL at 15:46

## 2023-03-16 RX ADMIN — TAZOBACTAM SODIUM AND PIPERACILLIN SODIUM 3.38 G: 375; 3 INJECTION, SOLUTION INTRAVENOUS at 06:37

## 2023-03-16 RX ADMIN — PROPOFOL 45 MCG/KG/MIN: 10 INJECTION, EMULSION INTRAVENOUS at 08:17

## 2023-03-16 RX ADMIN — PROPOFOL 45 MCG/KG/MIN: 10 INJECTION, EMULSION INTRAVENOUS at 10:54

## 2023-03-16 RX ADMIN — PROPOFOL 45 MCG/KG/MIN: 10 INJECTION, EMULSION INTRAVENOUS at 20:43

## 2023-03-16 RX ADMIN — PANTOPRAZOLE SODIUM 40 MG: 40 INJECTION, POWDER, LYOPHILIZED, FOR SOLUTION INTRAVENOUS at 08:18

## 2023-03-16 RX ADMIN — LAMOTRIGINE 200 MG: 200 TABLET ORAL at 21:14

## 2023-03-16 RX ADMIN — SODIUM CHLORIDE, POTASSIUM CHLORIDE, SODIUM LACTATE AND CALCIUM CHLORIDE 500 ML: 600; 310; 30; 20 INJECTION, SOLUTION INTRAVENOUS at 16:52

## 2023-03-16 RX ADMIN — ALBUTEROL SULFATE 6 PUFF: 90 AEROSOL, METERED RESPIRATORY (INHALATION) at 23:42

## 2023-03-16 RX ADMIN — QUETIAPINE 100 MG: 100 TABLET ORAL at 21:14

## 2023-03-16 RX ADMIN — AZITHROMYCIN 250 MG: 200 POWDER, FOR SUSPENSION ORAL at 08:55

## 2023-03-16 RX ADMIN — CITALOPRAM HYDROBROMIDE 20 MG: 20 TABLET ORAL at 12:15

## 2023-03-16 RX ADMIN — VALPROIC ACID 500 MG: 250 SOLUTION ORAL at 21:14

## 2023-03-16 RX ADMIN — LAMOTRIGINE 200 MG: 200 TABLET ORAL at 12:15

## 2023-03-16 RX ADMIN — ALBUTEROL SULFATE 6 PUFF: 90 AEROSOL, METERED RESPIRATORY (INHALATION) at 07:14

## 2023-03-16 RX ADMIN — TAZOBACTAM SODIUM AND PIPERACILLIN SODIUM 3.38 G: 375; 3 INJECTION, SOLUTION INTRAVENOUS at 00:56

## 2023-03-16 RX ADMIN — PROPOFOL 40 MCG/KG/MIN: 10 INJECTION, EMULSION INTRAVENOUS at 23:34

## 2023-03-16 RX ADMIN — LEVETIRACETAM 1000 MG: 100 SOLUTION ORAL at 12:35

## 2023-03-16 RX ADMIN — CHLORHEXIDINE GLUCONATE 15 ML: 1.2 SOLUTION ORAL at 20:14

## 2023-03-16 RX ADMIN — PROPOFOL 45 MCG/KG/MIN: 10 INJECTION, EMULSION INTRAVENOUS at 15:50

## 2023-03-16 RX ADMIN — ENOXAPARIN SODIUM 40 MG: 40 INJECTION SUBCUTANEOUS at 08:18

## 2023-03-16 RX ADMIN — LEVOCARNITINE 660 MG: 330 TABLET ORAL at 21:14

## 2023-03-16 RX ADMIN — CHLORHEXIDINE GLUCONATE 15 ML: 1.2 SOLUTION ORAL at 08:17

## 2023-03-16 RX ADMIN — VALPROIC ACID 500 MG: 250 SOLUTION ORAL at 15:50

## 2023-03-16 RX ADMIN — QUETIAPINE 100 MG: 100 TABLET ORAL at 15:46

## 2023-03-16 RX ADMIN — LEVETIRACETAM 1500 MG: 100 SOLUTION ORAL at 21:14

## 2023-03-16 RX ADMIN — PROPRANOLOL HYDROCHLORIDE 20 MG: 10 TABLET ORAL at 12:15

## 2023-03-16 RX ADMIN — PROPOFOL 45 MCG/KG/MIN: 10 INJECTION, EMULSION INTRAVENOUS at 14:13

## 2023-03-16 RX ADMIN — SODIUM CHLORIDE, POTASSIUM CHLORIDE, SODIUM LACTATE AND CALCIUM CHLORIDE 500 ML: 600; 310; 30; 20 INJECTION, SOLUTION INTRAVENOUS at 02:45

## 2023-03-16 RX ADMIN — PROPOFOL 45 MCG/KG/MIN: 10 INJECTION, EMULSION INTRAVENOUS at 03:05

## 2023-03-16 RX ADMIN — SODIUM CHLORIDE, POTASSIUM CHLORIDE, SODIUM LACTATE AND CALCIUM CHLORIDE: 600; 310; 30; 20 INJECTION, SOLUTION INTRAVENOUS at 23:34

## 2023-03-16 RX ADMIN — PROPOFOL 40 MCG/KG/MIN: 10 INJECTION, EMULSION INTRAVENOUS at 05:29

## 2023-03-16 RX ADMIN — ALBUTEROL SULFATE 6 PUFF: 90 AEROSOL, METERED RESPIRATORY (INHALATION) at 15:31

## 2023-03-16 RX ADMIN — PROPOFOL 50 MCG/KG/MIN: 10 INJECTION, EMULSION INTRAVENOUS at 00:56

## 2023-03-16 RX ADMIN — TAZOBACTAM SODIUM AND PIPERACILLIN SODIUM 3.38 G: 375; 3 INJECTION, SOLUTION INTRAVENOUS at 18:37

## 2023-03-16 RX ADMIN — ALBUTEROL SULFATE 6 PUFF: 90 AEROSOL, METERED RESPIRATORY (INHALATION) at 11:11

## 2023-03-16 ASSESSMENT — ACTIVITIES OF DAILY LIVING (ADL)
ADLS_ACUITY_SCORE: 49
ADLS_ACUITY_SCORE: 45
DEPENDENT_IADLS:: CLEANING;COOKING;LAUNDRY;SHOPPING;MEAL PREPARATION;MEDICATION MANAGEMENT;TRANSPORTATION
ADLS_ACUITY_SCORE: 45

## 2023-03-16 NOTE — PHARMACY-ADMISSION MEDICATION HISTORY
Admission medication history interview status for this patient is complete. See [facility] admission navigator for allergy information, prior to admission medications and immunization status.     Medication history interview done, indicate source(s): Caregiver - Jocelyne from group home   Medication history resources (including written lists, pill bottles, clinic record):None    Changes made to PTA medication list:  Added: MVI, ammodium lactate   Changed: seroquel changed to Xr formulation, added sig to prazosin and miralax  Reported as Not Taking: -  Removed: baclofen, bisacodyl, fibercon, flonase, Golytely, duoneb     Actions taken by pharmacist (provider contacted, etc):None     Additional medication history information:None    Medication reconciliation/reorder completed by provider prior to medication history?  N   (Y/N)       Prior to Admission medications    Medication Sig Last Dose Taking? Auth Provider Long Term End Date   ammonium lactate (AMLACTIN) 12 % external cream Apply topically 2 times daily 3/15/2023 at am Yes Unknown, Entered By History     busPIRone (BUSPAR) 15 MG tablet Take 15 mg by mouth 2 times daily 3/15/2023 at am Yes Reported, Patient Yes    citalopram (CELEXA) 20 MG tablet Take 20 mg by mouth daily 3/15/2023 at am Yes Reported, Patient No    citalopram (CELEXA) 40 MG tablet Take 40 mg by mouth daily 3/15/2023 at am Yes Unknown, Entered By History No    clotrimazole (LOTRIMIN) 1 % external solution Apply topically 2 times daily 3/15/2023 at am Yes Reported, Patient     divalproex sodium delayed-release (DEPAKOTE) 500 MG DR tablet Take 1 tablet (500 mg) by mouth 3 times daily 3/15/2023 at am Yes Lisa Merchant APRN CNP Yes    lamoTRIgine (LAMICTAL) 200 MG tablet Take 1 tablet (200 mg) by mouth 2 times daily 3/15/2023 at am Yes Lisa Merchant APRN CNP Yes    levETIRAcetam (KEPPRA) 500 MG tablet Take 2 tablets (1,000 mg) by mouth every morning AND 3 tablets (1,500 mg) At Bedtime. 3/15/2023 at am Yes  Zasada, Lisa, APRN CNP Yes    levOCARNitine (CARNITOR) 330 MG tablet Take 2 tablets (660 mg) by mouth 3 times daily 3/15/2023 at am Yes Lisa Merchant APRN CNP Yes    methylcellulose (CITRUCEL) powder Take 2 g by mouth 2 times daily 3/15/2023 at am Yes Reported, Patient  10/21/23   multivitamin w/minerals (MULTI-VITAMIN) tablet Take 1 tablet by mouth daily 3/15/2023 at am Yes Unknown, Entered By History     polyethylene glycol (MIRALAX) 17 GM/Dose powder Take 1 capful. by mouth daily as needed Past Week at prn Yes Reported, Patient     prazosin (MINIPRESS) 2 MG capsule Take 2 mg by mouth At Bedtime 3/14/2023 at pm Yes Reported, Patient Yes    propranolol (INDERAL) 20 MG tablet Take 20 mg by mouth 2 times daily 3/15/2023 at am Yes Reported, Patient No    QUEtiapine ER (SEROQUEL XR) 300 MG 24 hr tablet Take 300 mg by mouth At Bedtime 3/14/2023 at hs Yes Unknown, Entered By History No    zonisamide (ZONEGRAN) 100 MG capsule Take 1 capsule (100 mg) by mouth every morning AND 2 capsules (200 mg) At Bedtime. 3/15/2023 at am Yes Lisa Merchant APRN CNP Yes

## 2023-03-16 NOTE — PLAN OF CARE
ICU End of Shift Summary.  For vital signs and complete assessments, please see documentation flowsheets.      Pertinent assessments: Pt admitted sedated on ventilator from ED. Sedated to a RASS score of -2, not following commands. Propofol weaned d/t low BPs with patient waking up more easily but still does not follow commands. Temp low at 35.9 via Reid temp probe. Improving with bear hugger in place. LSs diminished throughout. Suctioning small amounts of pale yellow sputum from ET tube. Culture sent. Tele SB with rates in the 50s. Reid in place with low urine output. No BM - spoke with manager from group home who is unaware when his last BM was as he takes care of his toileting on his own.   Major Shift Events: Notified Tele-Hub at 0237 about patient's low urine output of 43 mL in 2 hours. Order placed by Dr. Giordano for a 500 mL LR bolus and then IVF at 50 mL/hr after. Low urine output continued.         - 2130 - Spoke with patient's step-mom and updated on patient's condition/questions answered. She stated it was ok to give updates to Jocelyne.   Plan (Upcoming Events): Continue vent support and wean as able. Continue antibiotics for possible PNA. Monitor urine output closely.   Discharge/Transfer Needs: TBD. Continue ICU cares at this time.     Bedside Shift Report Completed   Bedside Safety Check Completed    Goal Outcome Evaluation:      Plan of Care Reviewed With: caregiver, step-parent(s)    Overall Patient Progress: no changeOverall Patient Progress: no change

## 2023-03-16 NOTE — PROGRESS NOTES
ICU staff  DOS 3/16/2023    Mr Vazquez was admitted overnight with acute on chronic mixed respiratory failure of uncertain origin. Working diagnosis was LLL pneumonia.    This 48 y/o man with a past medical history of cerebral palsy, spastic paraparesis, seizure disorder, obstructive sleep apnea and chronic hypoxemia presented to the ED yesterday with increasing dyspnea and hypoxemia noted at his group home. He is chronically on 2L oxygen, but in the morning of 3/15 was found to have saturations in the 80s. In the ED, he demonstrated respiratory acidosis as well as hypoxemia and was put on BiPAP. His VBG did not improve, leading to intubation.     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     Allergies reviewed in Epic and include APAP, hydrocodone, cephalexin and tomato. Home meds reviewed in Epic and include buspirone, citalopram, lamotrigine, divalproic acid, levocarnitine, levetiracetam, prazosin, propranolol and quetiapine.     shows he is a former smoker.     Vitals:   Temp:  [96.3  F (35.7  C)-98.6  F (37  C)] 98.6  F (37  C)  Pulse:  [50-70] 69  Resp:  [11-41] 18  BP: ()/(51-91) 122/78  FiO2 (%):  [30 %-60 %] 45 %  SpO2:  [88 %-100 %] 95 %     Vent Mode: CMV/AC  (Continuous Mandatory Ventilation/ Assist Control)  FiO2 (%): 45 %  Resp Rate (Set): 16 breaths/min  Tidal Volume (Set, mL): 400 mL  PEEP (cm H2O): 8 cmH2O  Resp: 18    I/O last 3 completed shifts:  In: 1158.1 [I.V.:628.1; NG/GT:30; IV Piggyback:500]  Out: 525 [Urine:525]    Propofol 45-50    Exam:  Gen: Intubated, sedated  HEENT: NC/AT, anicteric  Pulm: Clear, tolerating mechanical ventilatioin  Cor: RRR  Abdomen/GI: Soft, obese, nondistended  : Reid in place  with dark urine  Extremities: Warm, mild edema  Skin: Well-perfused  Neuro: Sedated and not responsive  Psych: Unable to assess    Data:   Labs reviewed  - 7.39/56/90/34  - Lactate 1  - Viral panel negative  Imaging reviewed  - CXR post-intubation without focal airspace disease; prior film had shown left basilar infiltrate that may have been atelectasis  - AXR showed OGT at GE junction  Nothing on cultures so far    Assessment/plan:  46 y/o man with multiple comorbid conditions admitted with acute on chronic hypoxemic and hypercarbic respiratory failure. Intubated 3/15.  CNS: Not responsive to my exam this morning.  # Pain/sedation  # Seizure disorder  # Cerebral palsy with spastic paraplegia  # Congenital blindness, left  # Depression, schizoaffective disorder, borderline personality disorder  - Propofol, prn analgesics  - Resume home behavioral and anti-epileptic meds  Pulm: Tolerating mechanical ventilation  # Acute on chronic mixed respiratory failure  # Chronic hypoxemia on home oxygen  # Suspect chronic hypercarbia  # ?Pneumonia  # Asthma  # Obstructive sleep apnea, probably obesity hypoventilation syndrome  - Continue ventilator support  - SBTs daily with goal of extubation  - BiPAP when asleep once extubated  - Continue albuterol  CV: No acute issues.  # Essential hypertension  - Resume antihypertensives as appropriate  GI: Abdomen benign.  # Nutrition  # Morbid obesity affecting cares, BMI 43  - NPO for today, TF if unable to extubate  : UOP slow this morning, urine a bit dark  - Continue LR 50/hr, bolus if needed  Heme: Hb 13.2  Msk: Extremities warm, well-perfused.   Endo: Glucose   ID: Afebrile, WBCs 9.1.  # ?Pneumonia  - On empiric azithromycin, pip/tazo  - Follow cultures  ICU: LMWH, PPI    This patient is critically ill to my assessment and requires ICU monitoring and cares. A total of 45 minutes critical care time spent on 3/16/2023, exclusive of procedures.     Rashad Santos MD,  PhD  Surgical critical care  3/16/2023, 11:04 AM

## 2023-03-16 NOTE — PROGRESS NOTES
Hospitalist Medicine Progress Note   [facility]       Tre Vazquez is a 47 year old male with seizure disorder, cerebral palsy with mild spastic paraparesis, mild intellectual disability, mood disturbance, hypertension, congenital left eye blindness, right Bell's palsy with residual facial droop, morbid obesity, borderline personality disorder, PTSD, schizoaffective disorder, obstructive sleep apnea on BiPAP therapy, cerebral ventriculomegaly, prediabetes mellitus, chronic hypercapnic respiratory failure, asthma and depression who is chronically on 2 L of nasal cannula oxygen and lives in a group home came to [facility] 3/15/2023 with Hypoxia - 85% on RA SiO2.  His PCO2 kept on increasing on treatment with BiPAP because of which she was intubated in the ER with chest x-ray showing left lower lobe pneumonia for which was started on ceftriaxone (changed to Zosyn) and azithromycin       Date of Admission:  3/15/2023  Assessment & Plan     Acute hypercarbic and hypoxic respiratory failure  BiPAP did not improve the PCO2 because of which patient was intubated  Ventilator management per intensive care service     Left lower lobe pneumonia  We will start patient on Zosyn and azithromycin      Cerebral palsy  Spastic paraparesis  Seizure disorder  We will start patient's antiepileptic medications once the medication reconciliation is completed which is not done as yet to be given through the NG tube  Seizure precautions will be taken     Hypertension  We will start antihypertensive medications - medication reconciliation is completed to be given through the NG tube     Obstructive sleep apnea  Patient is now intubated               Plan:   Medication reconciliation was completed and seizure medications are started through NG tube  We will keep n.p.o. for today and decide on nutrition tomorrow  Await cultures  Check CBC and BMP in a.m.    Diet:   will decide in am   DVT Prophylaxis: Enoxaparin  (Lovenox) SQ  Reid Catheter: PRESENT, indication: Strict 1-2 Hour I&O  Code Status: Full Code                Ritesh March MD  Hospitalist Service  [facility]    ______________________________________________________________________    Interval History     Symptoms   Patient is getting LR at 50 mL/h  PICC line was put in left arm  I will put restraints as patient is intubated and might pull out tubes         Data reviewed today: I reviewed all medications, new labs and imaging results over the last 24 hours.     Physical Exam   Vital Signs: Temp: 98.6  F (37  C) Temp src: Bladder BP: 119/67 Pulse: 65   Resp: 16 SpO2: 95 % O2 Device: Mechanical Ventilator Oxygen Delivery: 4 LPM  Weight: 303 lbs 1.6 oz      GENERAL: Patient is not in acute distress  HEENT: Conjunctiva is clear   NECK: no Jugular Venous distention  HEART: S1 S2 regular Rate and Rhythm, there is no murmur,   LUNGS: Respirations are not laboured, Lungs there are decreased breath sounds in the lung bases to auscultation without Crepitations or Wheezing   ABDOMEN: Soft,Bowel Sounds are  Positive   LOWER LIMBS: no Pedal Edema  Bilaterally   CNS: Patient is intubated and sedated    Data   Recent Labs   Lab 03/16/23  0511 03/16/23  0327 03/16/23  0019 03/15/23  1958 03/15/23  1303   WBC  --   --   --   --  9.1   HGB  --   --   --   --  13.2*   MCV  --   --   --   --  99   PLT  --   --   --   --  190     --   --   --  144   POTASSIUM 4.1  --   --   --  4.0   CHLORIDE 101  --   --   --  103   CO2 33*  --   --   --  33*   BUN 17.2  --   --   --  15.6   CR 0.74  --   --   --  0.75   ANIONGAP 7  --   --   --  8   ARNOLD 8.7  --   --   --  9.0   GLC 96 96 103*   < > 116*   ALBUMIN 3.5  --   --   --   --    PROTTOTAL 5.7*  --   --   --   --    BILITOTAL 0.4  --   --   --   --    ALKPHOS 62  --   --   --   --    ALT 24  --   --   --   --    AST 33  --   --   --   --     < > = values in this interval not displayed.         Recent Results  (from the past 24 hour(s))   XR Chest Port 1 View    Narrative    CHEST ONE VIEW PORTABLE  3/15/2023 1:47 PM     HISTORY: Dyspnea.    COMPARISON: Chest x-ray on 6/20/2022.      Impression    IMPRESSION: Single AP view of the chest was obtained.  Cardiomediastinal silhouette is within normal limits. Mild left  basilar pulmonary opacities, likely atelectasis. No significant  pleural effusion or pneumothorax.    SHANTAL OSBORNE MD         SYSTEM ID:  [id number]   XR Chest Port 1 View    Narrative    EXAM: XR CHEST PORT 1 VIEW  LOCATION: [facility]  DATE/TIME: 3/15/2023 4:55 PM    INDICATION: Status post intubation.  COMPARISON: 03/15/2023      Impression    IMPRESSION:     New endotracheal tube with tip 1.6 cm above the lisa. New enteric tube coursing into the stomach with tip out of view.    No focal airspace disease. No pleural effusion or pneumothorax.    The cardiomediastinal silhouette is unremarkable.   XR Chest Port 1 View    Narrative    EXAM: XR CHEST PORT 1 VIEW  LOCATION: [facility]  DATE/TIME: 3/15/2023 5:04 PM    INDICATION: ETT retracted 1.5 cm   eval for position  COMPARISON: 3/15/2023      Impression    IMPRESSION: Endotracheal tube is 3.8 cm superior to the lisa. Enteric tube extends below the diaphragm. Clear lungs. The cardiac silhouette and pulmonary vasculature are normal.   XR Abdomen Port 1 View    Narrative    EXAM: XR ABDOMEN PORT 1 VIEW  LOCATION: [facility]  DATE/TIME: 3/15/2023 9:17 PM    INDICATION: pls confirm og placement  COMPARISON: None.      Impression    IMPRESSION: NG tube side-port is just below the EG junction and I would advance approximately 5 cm. Visualized bowel gas pattern within normal limits. Mild hypertrophic changes in the spine, most marked in the lower lumbar spine.   XR Chest Port 1 View    Narrative    EXAM: XR CHEST PORT 1 VIEW  LOCATION: [facility]  DATE/TIME: 3/15/2023  11:50 PM    INDICATION: PICC line placement.  COMPARISON: Earlier same day chest radiograph.      Impression    IMPRESSION: Endotracheal tube tip overlies the mid upper thoracic trachea. Enteric suction tube tip terminates inferiorly beyond the field-of-view. Left upper extremity PICC catheter has tip overlying the superior cavoatrial junction.    Lungs are clear. No focal airspace consolidation. No pleural effusion or pneumothorax.    Cardiomediastinal silhouette is normal.

## 2023-03-16 NOTE — PROGRESS NOTES
"[facility] ICU VENTILATOR RESPIRATORY NOTE  Date of Admission: 3/15/2023  Date of Intubation (most recent): 3/15/2023  Reason for Mechanical Ventilation: respiratory failure  Number of Days on Mechanical Ventilation: 2  Significant Events Today: ABG obtained, weaned FiO2 to 45%.   ABG Results: 7.39/56/90/34  ETT appearance on chest x-ray: \"Endotracheal tube tip overlies the mid upper thoracic trachea\"    Plan: P/S trial tomorrow morning.    Pt on CMV 16/400/+8/45%. BS coarse/dim. Suctioned for scant to small amount of cloudy secretions from ETT. Continues to receive albuterol MDI Q4 inline.       "

## 2023-03-16 NOTE — PROGRESS NOTES
An ABG was completed on the pt's Right Radial @ 2238 on an FIO2 of 50%  with no complications noted during the procedure.

## 2023-03-16 NOTE — PROGRESS NOTES
TELE  47M pmh of htn, depression, asthma, epilepsy who now presented with respiratory failure and hypoxemia who required intubation after failing bipap.  Satting acceptably on 500/20/60%/5, but still with respiratory acidosis on these settings 7.26/82.  Hemodynamically stable for now.  Other labs with normal lactates; nl creat.  CXR with good tube positioning.  L basilar infiltrate.  1.  resp acidosis:  Decreased tv to 400 for lung protection.  Increased rate to 28.  He does have mild air trapping with this but not excessive.  Will decrease rate pending repeat abg.  2.  Agree with broad spectrum abx treatment for possible pna.  Sending sputum cx.  Covid, rsv and flu negative.  3.  picc line for access.    Addendum 2300  abg reviewed.  7.53/40.  Reducing vent rate to 20 to allow for re-accumulation of co2--apparently some of this is chronic

## 2023-03-16 NOTE — PROGRESS NOTES
[facility]   Procedure Note           Peripherally Inserted Central Line Catheter (PICC):       Tre Vazquez  MRN# [id number]   March 15, 2023, 11:35 PM Indication: Respiratory insufficiency           Pause for the cause: Consent for catheter placement procedure signed  Time out completed  Patient ID's verified using two distinct indicators  All necessary equipment is present  Site marked if extremity to be used has been predetermined   Type of line to be used: PICC   Full barrier precautions used: Yes   Skin preparation: Chloraprep   Date of insertion: March 15, 2023, 11:57 PM   Device type: Triple lumen, valved, 5.0   Catheter brand: [employer]   Lot number: [id number]   Insertion location: Left brachial vein (medial)   Method of placement: Venipuncture  MST  Ultrasound   Number of attempts: With ultrasound: 1   Without ultrasound: 0   Difficulty threading: No   Midline IV device: Dressing dry and intact  Transparent semmipermeable dressing applied  Chlorhexidine patch  Catheter securement device  Secure A Cath   Arm circumference: Adults 15 cm   PICC extremity circumference: 45 cm   Internal length: 50 cm   PICC visible catheter length: 5 cm   Total catheter length: 55 cm   Tip termination: Overlaying the superior CAJ   Method of verification: Chest x-ray   Midline patency post placement: Positive blood return  Flushes without difficulty  Saline locked   Line flush: Line flush documented on the eMAR Y   Placement verified by: Physician Ibarra   Catheter placed by: Ivon Jones RN   Discontinuation form initiated: No   Patient tolerance: Tolerated well      Summary:  This procedure was performed without difficulty and he tolerated the procedure well with no immediate complications.       5F triple lumen PICC line placed in the left medial brachial. 1 poke. Pt tolerated well, he was sedated and intubated. Brisk blood return and flushes well. CXR x 1. Tip overlaying the superior CAJ per MD  Fred. Secure A Cath in place. Staff RN notified ok to use as PICC.     Total length: 55 cm  In: 50 cm   Out: 5 cm

## 2023-03-16 NOTE — CONSULTS
Care Management Initial Consult    General Information  Assessment completed with: Care Team MemberJocelyne at   Type of CM/SW Visit: Initial Assessment    Primary Care Provider verified and updated as needed: Yes             Communication Assessment  Patient's communication style: spoken language (English or Bilingual)    Hearing Difficulty or Deaf: no   Wear Glasses or Blind: no (blind L eye)    Cognitive  Cognitive/Neuro/Behavioral: .WDL except  Level of Consciousness: sedated  Arousal Level: arouses to voice, arouses to touch/gentle shaking  Orientation: other (see comments) (JOSE MIGUEL)  Mood/Behavior: calm, cooperative     Speech: endotracheal tube    Living Environment:   People in home: facility resident     Current living Arrangements: group home      Able to return to prior arrangements:  (TBD)       Family/Social Support:  Care provided by: self, other (see comments) ( Staff)  Provides care for: no one     Step parent(s), Facility resident(s)/Staff          Description of Support System: Supportive, Involved    Support Assessment: Adequate family and caregiver support    Current Resources:   Patient receiving home care services: No     Community Resources: Group Home, DME  Equipment currently used at home: walker, rolling  Supplies currently used at home: Oxygen Tubing/Supplies           Lifestyle & Psychosocial Needs:  Social Determinants of Health     Tobacco Use: Medium Risk     Smoking Tobacco Use: Former     Smokeless Tobacco Use: Never     Passive Exposure: Not on file   Alcohol Use: Not on file   Financial Resource Strain: Not on file   Food Insecurity: Not on file   Transportation Needs: Not on file   Physical Activity: Not on file   Stress: Not on file   Social Connections: Not on file   Intimate Partner Violence: Not on file   Depression: Not on file   Housing Stability: Not on file       Functional Status:  Prior to admission patient needed assistance:   Dependent ADLs:: Ambulation-walker,  "Bathing, Dressing, Grooming, Toileting  Dependent IADLs:: Cleaning, Cooking, Laundry, Shopping, Meal Preparation, Medication Management, Transportation       Care Management Follow Up    Length of Stay (days): 1    Expected Discharge Date: 03/21/2023     Concerns to be Addressed: care coordination/care conferences, discharge planning     Patient plan of care discussed at interdisciplinary rounds: Yes    Anticipated Discharge Disposition: Group Home     Anticipated Discharge Services: None  Anticipated Discharge DME: Oxygen    Patient/family educated on Medicare website which has current facility and service quality ratings:  (NA)  Education Provided on the Discharge Plan:  Yes  Patient/Family in Agreement with the Plan: unable to assess    Referrals Placed by CM/SW: External Care Coordination  Private pay costs discussed: Not applicable    Additional Information:  RN CC/SW is following/consulted for discharge planning. Per chart review, pt resides in a Group Home. A call was placed to [facility] and services were verified.     Patient receives services as follows: SBA with mobility using a walker, assistance with showers and ADL's, medication administration takes medications whole with liquids    skilled nursing contact (name/number): Contact Marco Casanova [phone] [facility] Office [phone] Ex [phone]  Fax #: TBD  Barriers to pt returning: Medical Readiness  Baseline mobility: SBA with use of walker  Time of preferred return: No weekend returns  has RN available Monday thru Friday  New meds/prescriptions: [facility]  Transportation: TBD    Other: Patient requires oxygen via NC 2 LPM during the day and 3 LPM at night. Patient no longer has a CPAP as was returned as patient was not using or wearing the CPAP.   Patient will often refuse cares and will \"triangulate\" between  staff and nursing.    Patient is his own guardian. Patient's step mother, Ashlie, is emergency contact and involved.     RN " CC/SW will continue to follow for discharge planning and return to facility.          Malka Mir, RN      Malka Mir RN Case Manager  Inpatient Care Coordination  [facility]   [phone]

## 2023-03-16 NOTE — PROGRESS NOTES
Intensivist brief update  DOS: 3/16/2023    UOP remains on the slow side (~20/hr) through the day; will give a gentle fluid bolus and follow for effect.    Rashad Santos MD, PhD  Surgical critical care  March 16, 2023, 4:42 PM

## 2023-03-16 NOTE — PROGRESS NOTES
Care Management Follow Up    Length of Stay (days): 1    Expected Discharge Date: 03/21/2023     Concerns to be Addressed:    Discharge Planning   Patient plan of care discussed at interdisciplinary rounds: Yes    Anticipated Discharge Disposition:  TBD     Additional Information:  Care management following for plan of care and discharge needs.  Unable to reach [place]  at [phone] as no answer and mailbox is full.  Left VM with [facility] Office at [phone] Ex [phone], requesting a call back to verify services.       Malka Mir, RN      Malka Mir RN Case Manager  Inpatient Care Coordination  [facility]   [phone]

## 2023-03-16 NOTE — PROGRESS NOTES
Spoke with patient's step-mom, Ashlie, who is listed as his emergency contact. She states that is is his financial POA as patient is able to make his own decision, but she is his emergency contact so she would make decisions for him if he is unable to speak for himself. Updated on patient's condition and questions answered.     Asked if it is ok to update manager Jocelyne from the group home and she gave consent for updates.     Jocelyne's number - [phone]

## 2023-03-17 ENCOUNTER — APPOINTMENT (OUTPATIENT)
Dept: GENERAL RADIOLOGY | Facility: CLINIC | Age: 48
DRG: 208 | End: 2023-03-17
Attending: INTERNAL MEDICINE
Payer: MEDICARE

## 2023-03-17 LAB
CRP SERPL-MCNC: 13.87 MG/L
ERYTHROCYTE [DISTWIDTH] IN BLOOD BY AUTOMATED COUNT: 14 % (ref 10–15)
GLUCOSE BLDC GLUCOMTR-MCNC: 104 MG/DL (ref 70–99)
GLUCOSE BLDC GLUCOMTR-MCNC: 106 MG/DL (ref 70–99)
GLUCOSE BLDC GLUCOMTR-MCNC: 114 MG/DL (ref 70–99)
GLUCOSE BLDC GLUCOMTR-MCNC: 86 MG/DL (ref 70–99)
GLUCOSE BLDC GLUCOMTR-MCNC: 94 MG/DL (ref 70–99)
GLUCOSE BLDC GLUCOMTR-MCNC: 95 MG/DL (ref 70–99)
HCT VFR BLD AUTO: 38.2 % (ref 40–53)
HGB BLD-MCNC: 12.7 G/DL (ref 13.3–17.7)
MAGNESIUM SERPL-MCNC: 1.5 MG/DL (ref 1.7–2.3)
MCH RBC QN AUTO: 31.1 PG (ref 26.5–33)
MCHC RBC AUTO-ENTMCNC: 33.2 G/DL (ref 31.5–36.5)
MCV RBC AUTO: 94 FL (ref 78–100)
PHOSPHATE SERPL-MCNC: 3.3 MG/DL (ref 2.5–4.5)
PLATELET # BLD AUTO: 167 10E3/UL (ref 150–450)
RBC # BLD AUTO: 4.08 10E6/UL (ref 4.4–5.9)
WBC # BLD AUTO: 7.1 10E3/UL (ref 4–11)

## 2023-03-17 PROCEDURE — 99232 SBSQ HOSP IP/OBS MODERATE 35: CPT | Performed by: INTERNAL MEDICINE

## 2023-03-17 PROCEDURE — 999N000157 HC STATISTIC RCP TIME EA 10 MIN

## 2023-03-17 PROCEDURE — 94640 AIRWAY INHALATION TREATMENT: CPT

## 2023-03-17 PROCEDURE — 94003 VENT MGMT INPAT SUBQ DAY: CPT

## 2023-03-17 PROCEDURE — 999N000253 HC STATISTIC WEANING TRIALS

## 2023-03-17 PROCEDURE — 200N000001 HC R&B ICU

## 2023-03-17 PROCEDURE — 250N000013 HC RX MED GY IP 250 OP 250 PS 637: Performed by: INTERNAL MEDICINE

## 2023-03-17 PROCEDURE — 99291 CRITICAL CARE FIRST HOUR: CPT | Performed by: SURGERY

## 2023-03-17 PROCEDURE — C9113 INJ PANTOPRAZOLE SODIUM, VIA: HCPCS | Performed by: INTERNAL MEDICINE

## 2023-03-17 PROCEDURE — 85027 COMPLETE CBC AUTOMATED: CPT | Performed by: INTERNAL MEDICINE

## 2023-03-17 PROCEDURE — 258N000003 HC RX IP 258 OP 636: Performed by: INTERNAL MEDICINE

## 2023-03-17 PROCEDURE — 250N000011 HC RX IP 250 OP 636: Performed by: INTERNAL MEDICINE

## 2023-03-17 PROCEDURE — 999N000065 XR ABDOMEN PORT 1 VIEW

## 2023-03-17 PROCEDURE — 36416 COLLJ CAPILLARY BLOOD SPEC: CPT | Performed by: INTERNAL MEDICINE

## 2023-03-17 PROCEDURE — 94640 AIRWAY INHALATION TREATMENT: CPT | Mod: 76

## 2023-03-17 RX ORDER — DEXTROSE MONOHYDRATE 100 MG/ML
INJECTION, SOLUTION INTRAVENOUS CONTINUOUS PRN
Status: DISCONTINUED | OUTPATIENT
Start: 2023-03-17 | End: 2023-03-20

## 2023-03-17 RX ORDER — GUAIFENESIN 600 MG/1
15 TABLET, EXTENDED RELEASE ORAL DAILY
Status: DISCONTINUED | OUTPATIENT
Start: 2023-03-18 | End: 2023-03-20

## 2023-03-17 RX ADMIN — LEVETIRACETAM 1000 MG: 100 SOLUTION ORAL at 08:52

## 2023-03-17 RX ADMIN — PROPOFOL 35 MCG/KG/MIN: 10 INJECTION, EMULSION INTRAVENOUS at 21:19

## 2023-03-17 RX ADMIN — QUETIAPINE 100 MG: 100 TABLET ORAL at 21:20

## 2023-03-17 RX ADMIN — SODIUM CHLORIDE, POTASSIUM CHLORIDE, SODIUM LACTATE AND CALCIUM CHLORIDE: 600; 310; 30; 20 INJECTION, SOLUTION INTRAVENOUS at 18:54

## 2023-03-17 RX ADMIN — LEVOCARNITINE 660 MG: 330 TABLET ORAL at 21:20

## 2023-03-17 RX ADMIN — CHLORHEXIDINE GLUCONATE 15 ML: 1.2 SOLUTION ORAL at 20:00

## 2023-03-17 RX ADMIN — ALBUTEROL SULFATE 6 PUFF: 90 AEROSOL, METERED RESPIRATORY (INHALATION) at 03:06

## 2023-03-17 RX ADMIN — ENOXAPARIN SODIUM 40 MG: 40 INJECTION SUBCUTANEOUS at 21:20

## 2023-03-17 RX ADMIN — QUETIAPINE 100 MG: 100 TABLET ORAL at 17:18

## 2023-03-17 RX ADMIN — LEVETIRACETAM 1500 MG: 100 SOLUTION ORAL at 20:00

## 2023-03-17 RX ADMIN — PANTOPRAZOLE SODIUM 40 MG: 40 INJECTION, POWDER, LYOPHILIZED, FOR SOLUTION INTRAVENOUS at 08:38

## 2023-03-17 RX ADMIN — ALBUTEROL SULFATE 6 PUFF: 90 AEROSOL, METERED RESPIRATORY (INHALATION) at 10:58

## 2023-03-17 RX ADMIN — VALPROIC ACID 500 MG: 250 SOLUTION ORAL at 21:20

## 2023-03-17 RX ADMIN — CHLORHEXIDINE GLUCONATE 15 ML: 1.2 SOLUTION ORAL at 08:38

## 2023-03-17 RX ADMIN — ENOXAPARIN SODIUM 40 MG: 40 INJECTION SUBCUTANEOUS at 08:38

## 2023-03-17 RX ADMIN — PROPOFOL 40 MCG/KG/MIN: 10 INJECTION, EMULSION INTRAVENOUS at 02:41

## 2023-03-17 RX ADMIN — ALBUTEROL SULFATE 6 PUFF: 90 AEROSOL, METERED RESPIRATORY (INHALATION) at 16:13

## 2023-03-17 RX ADMIN — DOXAZOSIN 1 MG: 1 TABLET ORAL at 21:20

## 2023-03-17 RX ADMIN — LAMOTRIGINE 200 MG: 200 TABLET ORAL at 21:20

## 2023-03-17 RX ADMIN — PROPOFOL 40 MCG/KG/MIN: 10 INJECTION, EMULSION INTRAVENOUS at 11:36

## 2023-03-17 RX ADMIN — LEVOCARNITINE 660 MG: 330 TABLET ORAL at 17:18

## 2023-03-17 RX ADMIN — PROPRANOLOL HYDROCHLORIDE 20 MG: 20 SOLUTION ORAL at 08:52

## 2023-03-17 RX ADMIN — PROPRANOLOL HYDROCHLORIDE 20 MG: 20 SOLUTION ORAL at 21:20

## 2023-03-17 RX ADMIN — LEVOCARNITINE 660 MG: 330 TABLET ORAL at 08:38

## 2023-03-17 RX ADMIN — TAZOBACTAM SODIUM AND PIPERACILLIN SODIUM 3.38 G: 375; 3 INJECTION, SOLUTION INTRAVENOUS at 13:07

## 2023-03-17 RX ADMIN — ALBUTEROL SULFATE 6 PUFF: 90 AEROSOL, METERED RESPIRATORY (INHALATION) at 20:14

## 2023-03-17 RX ADMIN — TAZOBACTAM SODIUM AND PIPERACILLIN SODIUM 3.38 G: 375; 3 INJECTION, SOLUTION INTRAVENOUS at 00:34

## 2023-03-17 RX ADMIN — VALPROIC ACID 500 MG: 250 SOLUTION ORAL at 08:51

## 2023-03-17 RX ADMIN — CITALOPRAM HYDROBROMIDE 20 MG: 20 TABLET ORAL at 08:39

## 2023-03-17 RX ADMIN — TAZOBACTAM SODIUM AND PIPERACILLIN SODIUM 3.38 G: 375; 3 INJECTION, SOLUTION INTRAVENOUS at 06:31

## 2023-03-17 RX ADMIN — PROPOFOL 40 MCG/KG/MIN: 10 INJECTION, EMULSION INTRAVENOUS at 08:50

## 2023-03-17 RX ADMIN — PROPOFOL 35 MCG/KG/MIN: 10 INJECTION, EMULSION INTRAVENOUS at 14:51

## 2023-03-17 RX ADMIN — QUETIAPINE 100 MG: 100 TABLET ORAL at 08:39

## 2023-03-17 RX ADMIN — TAZOBACTAM SODIUM AND PIPERACILLIN SODIUM 3.38 G: 375; 3 INJECTION, SOLUTION INTRAVENOUS at 20:00

## 2023-03-17 RX ADMIN — PROPOFOL 35 MCG/KG/MIN: 10 INJECTION, EMULSION INTRAVENOUS at 18:38

## 2023-03-17 RX ADMIN — ALBUTEROL SULFATE 6 PUFF: 90 AEROSOL, METERED RESPIRATORY (INHALATION) at 07:49

## 2023-03-17 RX ADMIN — AZITHROMYCIN 250 MG: 200 POWDER, FOR SUSPENSION ORAL at 08:52

## 2023-03-17 RX ADMIN — PROPOFOL 40 MCG/KG/MIN: 10 INJECTION, EMULSION INTRAVENOUS at 23:42

## 2023-03-17 RX ADMIN — PROPOFOL 40 MCG/KG/MIN: 10 INJECTION, EMULSION INTRAVENOUS at 06:23

## 2023-03-17 RX ADMIN — VALPROIC ACID 500 MG: 250 SOLUTION ORAL at 17:16

## 2023-03-17 RX ADMIN — LAMOTRIGINE 200 MG: 200 TABLET ORAL at 08:38

## 2023-03-17 ASSESSMENT — ACTIVITIES OF DAILY LIVING (ADL)
ADLS_ACUITY_SCORE: 53
ADLS_ACUITY_SCORE: 45
ADLS_ACUITY_SCORE: 45
ADLS_ACUITY_SCORE: 49
ADLS_ACUITY_SCORE: 53
ADLS_ACUITY_SCORE: 45
ADLS_ACUITY_SCORE: 53
ADLS_ACUITY_SCORE: 53
ADLS_ACUITY_SCORE: 49
ADLS_ACUITY_SCORE: 45
ADLS_ACUITY_SCORE: 53
ADLS_ACUITY_SCORE: 45

## 2023-03-17 NOTE — CONSULTS
CLINICAL NUTRITION SERVICES - ASSESSMENT NOTE     Nutrition Prescription    Malnutrition Status:    % Intake: Unable to assess  % Weight Loss: None noted  Subcutaneous Fat Loss: None observed  Muscle Loss: None observed--somewhat difficult to assess d/t pt habitus as well as position  Fluid Accumulation/Edema: Mild  Malnutrition Diagnosis: Unable to determine due to lack of nutrition history    Recommendations already ordered by Registered Dietitian (RD):  Enteral Nutrition - Initiate via OGT- Vital High Protein @ goal of  35ml/hr  (840ml/day)  will provide: 840 kcals, 73 g PRO, 702 ml free H20, 93 g CHO, and 0 g fiber daily.    + Prosource TF 20 x 4 pkts/day= 320 kcal/80 g protein    Propofol at current rate provides 678 kcal/day    Total energy/protein provision, all sources= 1838 kcal (13 kcal/kg per .2 kg)/153 g protein (2 g/kg per IBW of 75.5)    Certavite daily to meet 100% DRIs    Future/Additional Recommendations:  Monitor TF rate, tolerance, labs, increase protein modular as able, monitor propofol     REASON FOR ASSESSMENT  Tre Vazquez is a/an 47 year old male assessed by the dietitian for Provider Order - Registered Dietitian to Assess and Order TF per Medical Nutrition Therapy Protocol    Pt admitted d/t respiratory failure secondary to PNA. PMH significant for seizure disorder, cerebral palsy with mild spastic paraparesis, mild intellectual disability, mood disturbance, hypertension, congenital left eye blindness, right Bell's palsy with residual facial droop, morbid obesity, borderline personality disorder, PTSD, schizoaffective disorder, obstructive sleep apnea on BiPAP therapy, cerebral ventriculomegaly, prediabetes mellitus, chronic hypercapnic respiratory failure, asthma and depression    NUTRITION HISTORY  - Unable to obtain nutrition history d/t patient's condition  - MST screen score=0  - Resides at group home  - Allergies: NKFA    CURRENT NUTRITION ORDERS  Diet: NPO    LABS   Trig  "239    MEDICATIONS  - levocarnitine  - pantoprazole  - valproic acid  - propanolol    lactated ringers 50 mL/hr at 03/17/23 1000     propofol 35 mcg/kg/min (03/17/23 1400)    And     - MEDICATION INSTRUCTIONS -          ANTHROPOMETRICS  Height: 177.8 cm (5' 10\")  Most Recent Weight: 138.1 kg (304 lb 6.4 oz) --all weights obtained on bedscale, accurate???  Vitals:    03/15/23 2014 03/16/23 0615 03/17/23 0615   Weight: 136.2 kg (300 lb 4.8 oz) 137.5 kg (303 lb 1.6 oz) 138.1 kg (304 lb 6.4 oz)     IBW: 75.5 kg  Body mass index is 43.68 kg/m .  BMI: Obesity Grade III BMI >40  Weight History: weight gain noted over past 11 months  Wt Readings from Last 20 Encounters:   03/17/23 138.1 kg (304 lb 6.4 oz)   01/03/23 122.5 kg (270 lb)   11/30/22 123.4 kg (272 lb)   04/19/22 122.1 kg (269 lb 3.2 oz)   12/08/21 116.1 kg (256 lb)   08/31/21 122.4 kg (269 lb 13.5 oz)   06/05/20 129.3 kg (285 lb)   04/05/12 106.6 kg (235 lb)   03/11/12 108.9 kg (240 lb)   02/23/12 108.9 kg (240 lb)   02/10/12 105.3 kg (232 lb 2.3 oz)   02/06/12 103.9 kg (229 lb)   02/03/12 58.5 kg (129 lb)   02/01/12 115.7 kg (255 lb)   02/01/12 108.9 kg (240 lb)   11/14/11 111.1 kg (245 lb)   06/28/11 114.3 kg (252 lb)     Dosing Weight: 136.2 kg for energy, 75.5 kg for protein    ASSESSED NUTRITION NEEDS  Estimated Energy Needs: 2378-2060 kcals/day (11 - 14 kcals/kg)  Justification: Obese and Vented  Estimated Protein Needs: 151-189+ grams protein/day (2 - 2.5+ grams of pro/kg)  Justification: Hypercatabolism with critical illness  Estimated Fluid Needs: Per provider pending fluid status    PHYSICAL FINDINGS  See malnutrition section above.    MALNUTRITION  % Intake: Unable to assess  % Weight Loss: None noted  Subcutaneous Fat Loss: None observed  Muscle Loss: None observed--somewhat difficult to assess d/t pt habitus as well as position  Fluid Accumulation/Edema: Mild  Malnutrition Diagnosis: Unable to determine due to lack of nutrition history    NUTRITION " DIAGNOSIS  Inadequate oral intake related to respiratory status requiring NPO status as evidenced by need for nutrition support to meet 100% of needs     INTERVENTIONS  Implementation  Nutrition Education: Not appropriate at this time due to patient condition   Enteral Nutrition - Initiate as above    Collaboration and Referral of Nutrition care: patient discussed during IDT rounds this morning    Goals  Total intake, all sources, to meet % estimated needs     Monitoring/Evaluation  Progress toward goals will be monitored and evaluated per protocol.  Leela Posey RD, LD, [facility]  Pager - 3rd floor/ICU: [phone]  Pager - All other floors: [phone]  Pager - Weekend/holiday: [phone]  Office: [phone]

## 2023-03-17 NOTE — PROGRESS NOTES
ICU staff  DOS 3/17/2023    No major events recorded overnight. UOP responded to fluid bolus yesterday. Weaning trial this morning lasted <5 minutes because of apnea.    Vitals:   Temp:  [96.8  F (36  C)-98.1  F (36.7  C)] 98.1  F (36.7  C)  Pulse:  [54-72] 64  Resp:  [12-28] 16  BP: ()/(48-78) 107/63  FiO2 (%):  [45 %] 45 %  SpO2:  [93 %-98 %] 95 %     Vent Mode: CMV/AC  (Continuous Mandatory Ventilation/ Assist Control)  FiO2 (%): 45 %  Resp Rate (Set): 16 breaths/min  Tidal Volume (Set, mL): 400 mL  PEEP (cm H2O): 8 cmH2O  Pressure Support (cm H2O): 10 cmH2O  Resp: 16    I/O last 3 completed shifts:  In: 2835.91 [I.V.:2565.91; NG/GT:270]  Out: 740 [Urine:740]    Propofol 40    Exam:  Gen: Intubated, sedated  HEENT: NC/AT, anicteric, chronic changes left eye  Pulm: Tolerating mechanical ventilation, symmetric chest rise  Cor: RRR  Abdomen/GI: Soft, nondistended  : Reid in place, urine still dark  Extremities: Warm, mild LE edema, good pulses  Skin: Well-perfused  Neuro: Sedated and not interacting for me  Psych: Unable to assess    Data:   Labs pending this AM still  No new imaging  Micro reviewed  - Sputum with >25 PMN, 1+ GPC on gram stain    Assessment/plan:  48 y/o man with multiple comorbidities admitted with acute on chronic mixed respiratory failure, query pneumonia vs ANA/OHS and hypercarbia. Intubated 3/15. No significant changes last 24 hours.  CNS: Intubated, sedated.  # Pain/sedation  # Seizure disorder  # Cerebral palsy with spastic paraplegia  # Congenital blindness, left  # Depression, schizoaffective disorder, borderline personality disorder  - Propofol, prn analgesics  - Resumed home medications  - Wean sedation as able to facilitate vent wean; might switch over to dex  Pulm: Tolerating ventilator. Turned PEEP down a bit at bedside this morning.  # Acute on chronic mixed respiratory failure  # Chronic hypoxemia on oxygen  # Suspect chronic hypercarbia  # ?Pneumonia  # Hx asthma  # ANA,  suspect OHS  - Continue ventilator support  - SBTs daily with goal of extubation when ready  - BiPAP after extubation while sleeping  CV: Not requiring pressors.  # Essential hypertension  - Resume meds as needed.  GI: Abdomen benign.  # Nutrition  # Morbid obesity affecting cares, BMI 43  - RD evaluation for tube feedings  : UOP improved with fluids, continue to follow and bolus if needed.  Heme: Hb 13.2   Msk: Extremities warm, well-perfused.   Endo: Glucose .  ID: Afebrile, cultures still in process.  # ?Pneumonia  - Continue azithromycin, pip/tazo for now  ICU: [facility], PPI.    This patient is critically ill to my assessment and requires ICU monitoring and cares. A total of 35 minutes critical care time spent on 3/17/2023, exclusive of procedures.     Rashad Santos MD, PhD  Surgical critical care  3/17/2023, 9:58 AM

## 2023-03-17 NOTE — PLAN OF CARE
ICU End of Shift Summary.  For vital signs and complete assessments, please see documentation flowsheets.      Pertinent assessments: Pt remains sedated to a RASS goal of -2 this shift. Intermittently following commands. CPOT 0. BP soft at times. Repositioned with improvement. LSs remain diminished throughout. Suctioning small amounts of thick, cloudy sputum from ET tube. Satting mid 90s on 45% FiO2. Tele SR in the 60s. Reid with 515 mL out this shift.   Major Shift Events: Spoke with step-mom Ashlie around 2130 and given update/questions answered. She would like to know when the plan for extubation would be. Writer informed her we would know more after SBT today.          - PICC drawing blood sluggishly this AM so unable to get blood sample. Changed labs to lab collect and will have PICC team come assess as one lumen is occluded and other 2 are flushing sluggishly.  Plan (Upcoming Events): Continue vent support and wean as able. Continue antibiotics. Vascular access to assess PICC line.   Discharge/Transfer Needs: TBD. Continue ICU cares at this time.     Bedside Shift Report Completed   Bedside Safety Check Completed    Goal Outcome Evaluation:      Plan of Care Reviewed With: patient    Overall Patient Progress: no changeOverall Patient Progress: no change     Right wrist and Left wrist restraints continued 3/17/2023    Clinical Justification: Pulling lines, pulling tubes, and pulling equipment  Less Restrictive Alternative: Repositioning, Disguise equipment, Alarm, Reorientation  Attending Physician Notified: MD ordered restraint, Attending Physician's Name: Dr. March   New orders placed Yes  Length of Order: 1 Day      Simona Peguero RN

## 2023-03-17 NOTE — PROGRESS NOTES
Hospitalist Medicine Progress Note   [facility]       Tre Vazquez is a 47 year old male with seizure disorder, cerebral palsy with mild spastic paraparesis, mild intellectual disability, mood disturbance, hypertension, congenital left eye blindness, right Bell's palsy with residual facial droop, morbid obesity, borderline personality disorder, PTSD, schizoaffective disorder, obstructive sleep apnea on BiPAP therapy, cerebral ventriculomegaly, prediabetes mellitus, chronic hypercapnic respiratory failure, asthma and depression who is chronically on 2 L of nasal cannula oxygen and lives in a group home came to [facility] 3/15/2023 with Hypoxia - 85% on RA SiO2.  His PCO2 kept on increasing on treatment with BiPAP because of which she was intubated in the ER with chest x-ray showing left lower lobe pneumonia for which was started on ceftriaxone (changed to Zosyn) and azithromycin       Date of Admission:  3/15/2023  Assessment & Plan     Acute hypercarbic and hypoxic respiratory failure  BiPAP did not improve the PCO2 because of which patient was intubated  Ventilator management per intensive care service     Left lower lobe pneumonia  on Zosyn and azithromycin   Growing GPC in the sputum     Cerebral palsy  Spastic paraparesis  Seizure disorder  On antiepileptic medications  through the NG tube  Seizure precautions will be taken     Hypertension  We will start antihypertensive medications - medication reconciliation is completed to be given through the NG tube     Obstructive sleep apnea  Patient is now intubated               Plan:   500 mL bolus for relatively soft blood pressures  Await cultures  Check CBC and BMP in a.m.    Diet:   will decide in am   DVT Prophylaxis: Enoxaparin (Lovenox) SQ  Reid Catheter: PRESENT, indication: Strict 1-2 Hour I&O  Code Status: Full Code                Ritesh March MD  Hospitalist Service  [facility]  Hospital    ______________________________________________________________________    Interval History     Symptoms   Patient is getting LR at 50 mL/h  PICC line was put in left arm  I will put restraints as patient is intubated and might pull out tubes         Data reviewed today: I reviewed all medications, new labs and imaging results over the last 24 hours.     Physical Exam   Vital Signs: Temp: 98.1  F (36.7  C) Temp src: Bladder BP: 107/63 Pulse: 64   Resp: 16 SpO2: 95 % O2 Device: Mechanical Ventilator    Weight: 304 lbs 6.4 oz      GENERAL: Patient is not in acute distress  HEENT: Conjunctiva is clear   NECK: no Jugular Venous distention  HEART: S1 S2 regular Rate and Rhythm, there is no murmur,   LUNGS: Respirations are not laboured, Lungs there are decreased breath sounds in the lung bases to auscultation without Crepitations or Wheezing   ABDOMEN: Soft,Bowel Sounds are  Positive   LOWER LIMBS: no Pedal Edema  Bilaterally   CNS: Patient is intubated and sedated    Data   Recent Labs   Lab 03/17/23  0808 03/17/23  0507 03/17/23  0002 03/16/23  0741 03/16/23  0511 03/15/23  1958 03/15/23  1303   WBC  --   --   --   --   --   --  9.1   HGB  --   --   --   --   --   --  13.2*   MCV  --   --   --   --   --   --  99   PLT  --   --   --   --   --   --  190   NA  --   --   --   --  141  --  144   POTASSIUM  --   --   --   --  4.1  --  4.0   CHLORIDE  --   --   --   --  101  --  103   CO2  --   --   --   --  33*  --  33*   BUN  --   --   --   --  17.2  --  15.6   CR  --   --   --   --  0.74  --  0.75   ANIONGAP  --   --   --   --  7  --  8   ARNOLD  --   --   --   --  8.7  --  9.0   GLC 95 86 114*   < > 96   < > 116*   ALBUMIN  --   --   --   --  3.5  --   --    PROTTOTAL  --   --   --   --  5.7*  --   --    BILITOTAL  --   --   --   --  0.4  --   --    ALKPHOS  --   --   --   --  62  --   --    ALT  --   --   --   --  24  --   --    AST  --   --   --   --  33  --   --     < > = values in this interval not displayed.          No results found for this or any previous visit (from the past 24 hour(s)).

## 2023-03-17 NOTE — PHARMACY-CONSULT NOTE
Pharmacy Tube Feeding Consult    Medication reviewed for administration by feeding tube and for potential food/drug interactions.    Recommendation: No further changes are needed at this time.  Pharmacy has modified the following for feeding tube administration:    Depakote  mg tab TID -> valproic acid oral solution 500 mg TID    Keppra tab -> oral solution     Quetiapine  mg HS -> quetiapine  mg TID    Prazosin 2 mg capsule -> doxazosin 1 mg tab    Propranolol tab -> oral solution    Pharmacy will continue to follow as new medications are ordered.    Sandy Charles, [facility]

## 2023-03-17 NOTE — PROGRESS NOTES
"Critical access hospital ICU VENTILATOR RESPIRATORY NOTE  Date of Admission: 3/15/2023  Date of Intubation (most recent): 3/15/2023  Reason for Mechanical Ventilation: respiratory failure  Number of Days on Mechanical Ventilation: 3  Significant Events Today: .   ABG Results: 7.39/56/90/34  ETT appearance on chest x-ray: \"Endotracheal tube tip overlies the mid upper thoracic trachea\"   P/S trial ended for RR < 6. Weaned for 5 minutes  Plan: wean as tolerated     Pt on CMV 16/400/+8/45%. BS coarse/dim. Suctioned for scant to small amount of cloudy secretions from ETT. Continues to receive albuterol MDI Q4 inline.                 "

## 2023-03-17 NOTE — PLAN OF CARE
ICU End of Shift Summary.  For vital signs and complete assessments, please see documentation flowsheets.      Pertinent assessments:  Afebrile, no pain this shift  Neuro: Rass -2  Cardiac: Tele SR-SB. BP soft map 61-65. Gave 500cc bolus.  Resp: LS diminished Vent: 45 Fio2, 400 TV, 16 RR, 8 Peep.   : Reid with low UO dark green  GI: NPO. OG in place. Bowel sounds present. 1 small BM this shift.   Lines: PICC, PIV x 2 on Antibiotics, LR and Propofol.   Restraints in place.     Bedside Shift Report Completed : Y  Bedside Safety Check Completed: Y

## 2023-03-18 LAB
ANION GAP SERPL CALCULATED.3IONS-SCNC: 5 MMOL/L (ref 7–15)
BACTERIA SPT CULT: ABNORMAL
BUN SERPL-MCNC: 13.2 MG/DL (ref 6–20)
CALCIUM SERPL-MCNC: 8.3 MG/DL (ref 8.6–10)
CHLORIDE SERPL-SCNC: 103 MMOL/L (ref 98–107)
CK SERPL-CCNC: 544 U/L (ref 39–308)
CREAT SERPL-MCNC: 0.7 MG/DL (ref 0.67–1.17)
DEPRECATED HCO3 PLAS-SCNC: 34 MMOL/L (ref 22–29)
GFR SERPL CREATININE-BSD FRML MDRD: >90 ML/MIN/1.73M2
GLUCOSE BLDC GLUCOMTR-MCNC: 102 MG/DL (ref 70–99)
GLUCOSE BLDC GLUCOMTR-MCNC: 107 MG/DL (ref 70–99)
GLUCOSE BLDC GLUCOMTR-MCNC: 107 MG/DL (ref 70–99)
GLUCOSE BLDC GLUCOMTR-MCNC: 110 MG/DL (ref 70–99)
GLUCOSE BLDC GLUCOMTR-MCNC: 94 MG/DL (ref 70–99)
GLUCOSE BLDC GLUCOMTR-MCNC: 96 MG/DL (ref 70–99)
GLUCOSE SERPL-MCNC: 98 MG/DL (ref 70–99)
GRAM STAIN RESULT: ABNORMAL
GRAM STAIN RESULT: ABNORMAL
MAGNESIUM SERPL-MCNC: 1.4 MG/DL (ref 1.7–2.3)
MAGNESIUM SERPL-MCNC: 2.3 MG/DL (ref 1.7–2.3)
PHOSPHATE SERPL-MCNC: 3.2 MG/DL (ref 2.5–4.5)
PLATELET # BLD AUTO: 149 10E3/UL (ref 150–450)
POTASSIUM SERPL-SCNC: 3.4 MMOL/L (ref 3.4–5.3)
POTASSIUM SERPL-SCNC: 3.7 MMOL/L (ref 3.4–5.3)
SODIUM SERPL-SCNC: 142 MMOL/L (ref 136–145)
TRIGL SERPL-MCNC: 146 MG/DL

## 2023-03-18 PROCEDURE — C9113 INJ PANTOPRAZOLE SODIUM, VIA: HCPCS | Performed by: INTERNAL MEDICINE

## 2023-03-18 PROCEDURE — 250N000011 HC RX IP 250 OP 636: Performed by: INTERNAL MEDICINE

## 2023-03-18 PROCEDURE — 99291 CRITICAL CARE FIRST HOUR: CPT | Performed by: SURGERY

## 2023-03-18 PROCEDURE — 250N000013 HC RX MED GY IP 250 OP 250 PS 637: Performed by: INTERNAL MEDICINE

## 2023-03-18 PROCEDURE — 94640 AIRWAY INHALATION TREATMENT: CPT | Mod: 76

## 2023-03-18 PROCEDURE — 999N000157 HC STATISTIC RCP TIME EA 10 MIN

## 2023-03-18 PROCEDURE — 200N000001 HC R&B ICU

## 2023-03-18 PROCEDURE — 85049 AUTOMATED PLATELET COUNT: CPT | Performed by: INTERNAL MEDICINE

## 2023-03-18 PROCEDURE — 80048 BASIC METABOLIC PNL TOTAL CA: CPT | Performed by: INTERNAL MEDICINE

## 2023-03-18 PROCEDURE — 82550 ASSAY OF CK (CPK): CPT | Performed by: INTERNAL MEDICINE

## 2023-03-18 PROCEDURE — 258N000003 HC RX IP 258 OP 636: Performed by: INTERNAL MEDICINE

## 2023-03-18 PROCEDURE — 94003 VENT MGMT INPAT SUBQ DAY: CPT

## 2023-03-18 PROCEDURE — 83735 ASSAY OF MAGNESIUM: CPT | Performed by: INTERNAL MEDICINE

## 2023-03-18 PROCEDURE — 84478 ASSAY OF TRIGLYCERIDES: CPT | Performed by: INTERNAL MEDICINE

## 2023-03-18 PROCEDURE — 999N000253 HC STATISTIC WEANING TRIALS

## 2023-03-18 PROCEDURE — 84100 ASSAY OF PHOSPHORUS: CPT | Performed by: INTERNAL MEDICINE

## 2023-03-18 PROCEDURE — 99232 SBSQ HOSP IP/OBS MODERATE 35: CPT | Performed by: INTERNAL MEDICINE

## 2023-03-18 PROCEDURE — 94640 AIRWAY INHALATION TREATMENT: CPT

## 2023-03-18 PROCEDURE — 84132 ASSAY OF SERUM POTASSIUM: CPT | Performed by: INTERNAL MEDICINE

## 2023-03-18 RX ORDER — CITALOPRAM HYDROBROMIDE 20 MG/1
40 TABLET ORAL DAILY
Status: DISCONTINUED | OUTPATIENT
Start: 2023-03-18 | End: 2023-03-24 | Stop reason: HOSPADM

## 2023-03-18 RX ORDER — POTASSIUM CHLORIDE 1.5 G/1.58G
20 POWDER, FOR SOLUTION ORAL ONCE
Status: COMPLETED | OUTPATIENT
Start: 2023-03-18 | End: 2023-03-18

## 2023-03-18 RX ORDER — MAGNESIUM SULFATE HEPTAHYDRATE 40 MG/ML
4 INJECTION, SOLUTION INTRAVENOUS ONCE
Status: COMPLETED | OUTPATIENT
Start: 2023-03-18 | End: 2023-03-18

## 2023-03-18 RX ORDER — POTASSIUM CHLORIDE 1.5 G/1.58G
40 POWDER, FOR SOLUTION ORAL ONCE
Status: COMPLETED | OUTPATIENT
Start: 2023-03-18 | End: 2023-03-18

## 2023-03-18 RX ORDER — ZONISAMIDE 100 MG/1
200 CAPSULE ORAL AT BEDTIME
Status: DISCONTINUED | OUTPATIENT
Start: 2023-03-18 | End: 2023-03-24 | Stop reason: HOSPADM

## 2023-03-18 RX ORDER — BUSPIRONE HYDROCHLORIDE 15 MG/1
15 TABLET ORAL 2 TIMES DAILY
Status: DISCONTINUED | OUTPATIENT
Start: 2023-03-18 | End: 2023-03-24 | Stop reason: HOSPADM

## 2023-03-18 RX ORDER — ZONISAMIDE 100 MG/1
100 CAPSULE ORAL DAILY
Status: DISCONTINUED | OUTPATIENT
Start: 2023-03-19 | End: 2023-03-24 | Stop reason: HOSPADM

## 2023-03-18 RX ADMIN — PROPOFOL 25 MCG/KG/MIN: 10 INJECTION, EMULSION INTRAVENOUS at 15:31

## 2023-03-18 RX ADMIN — PROPOFOL 35 MCG/KG/MIN: 10 INJECTION, EMULSION INTRAVENOUS at 05:44

## 2023-03-18 RX ADMIN — ALBUTEROL SULFATE 6 PUFF: 90 AEROSOL, METERED RESPIRATORY (INHALATION) at 07:42

## 2023-03-18 RX ADMIN — LEVETIRACETAM 1500 MG: 100 SOLUTION ORAL at 19:56

## 2023-03-18 RX ADMIN — VALPROIC ACID 500 MG: 250 SOLUTION ORAL at 15:38

## 2023-03-18 RX ADMIN — PROPOFOL 35 MCG/KG/MIN: 10 INJECTION, EMULSION INTRAVENOUS at 09:07

## 2023-03-18 RX ADMIN — CITALOPRAM HYDROBROMIDE 20 MG: 20 TABLET ORAL at 09:00

## 2023-03-18 RX ADMIN — SODIUM CHLORIDE, POTASSIUM CHLORIDE, SODIUM LACTATE AND CALCIUM CHLORIDE: 600; 310; 30; 20 INJECTION, SOLUTION INTRAVENOUS at 15:36

## 2023-03-18 RX ADMIN — AZITHROMYCIN 250 MG: 200 POWDER, FOR SUSPENSION ORAL at 09:03

## 2023-03-18 RX ADMIN — PROPOFOL 30 MCG/KG/MIN: 10 INJECTION, EMULSION INTRAVENOUS at 23:56

## 2023-03-18 RX ADMIN — LAMOTRIGINE 200 MG: 200 TABLET ORAL at 09:00

## 2023-03-18 RX ADMIN — MULTIVITAMIN 15 ML: LIQUID ORAL at 09:00

## 2023-03-18 RX ADMIN — DOXAZOSIN 1 MG: 1 TABLET ORAL at 21:20

## 2023-03-18 RX ADMIN — LEVOCARNITINE 660 MG: 330 TABLET ORAL at 15:31

## 2023-03-18 RX ADMIN — TAZOBACTAM SODIUM AND PIPERACILLIN SODIUM 3.38 G: 375; 3 INJECTION, SOLUTION INTRAVENOUS at 14:04

## 2023-03-18 RX ADMIN — ALBUTEROL SULFATE 6 PUFF: 90 AEROSOL, METERED RESPIRATORY (INHALATION) at 15:22

## 2023-03-18 RX ADMIN — CHLORHEXIDINE GLUCONATE 15 ML: 1.2 SOLUTION ORAL at 19:56

## 2023-03-18 RX ADMIN — ALBUTEROL SULFATE 6 PUFF: 90 AEROSOL, METERED RESPIRATORY (INHALATION) at 12:19

## 2023-03-18 RX ADMIN — ENOXAPARIN SODIUM 40 MG: 40 INJECTION SUBCUTANEOUS at 21:20

## 2023-03-18 RX ADMIN — VALPROIC ACID 500 MG: 250 SOLUTION ORAL at 09:07

## 2023-03-18 RX ADMIN — ENOXAPARIN SODIUM 40 MG: 40 INJECTION SUBCUTANEOUS at 09:06

## 2023-03-18 RX ADMIN — CHLORHEXIDINE GLUCONATE 15 ML: 1.2 SOLUTION ORAL at 09:00

## 2023-03-18 RX ADMIN — ALBUTEROL SULFATE 6 PUFF: 90 AEROSOL, METERED RESPIRATORY (INHALATION) at 19:24

## 2023-03-18 RX ADMIN — VALPROIC ACID 500 MG: 250 SOLUTION ORAL at 21:20

## 2023-03-18 RX ADMIN — LEVETIRACETAM 1000 MG: 100 SOLUTION ORAL at 09:06

## 2023-03-18 RX ADMIN — LEVOCARNITINE 660 MG: 330 TABLET ORAL at 21:20

## 2023-03-18 RX ADMIN — PANTOPRAZOLE SODIUM 40 MG: 40 INJECTION, POWDER, LYOPHILIZED, FOR SOLUTION INTRAVENOUS at 05:43

## 2023-03-18 RX ADMIN — LAMOTRIGINE 200 MG: 200 TABLET ORAL at 21:20

## 2023-03-18 RX ADMIN — TAZOBACTAM SODIUM AND PIPERACILLIN SODIUM 3.38 G: 375; 3 INJECTION, SOLUTION INTRAVENOUS at 01:54

## 2023-03-18 RX ADMIN — HYDROMORPHONE HYDROCHLORIDE 0.5 MG: 1 INJECTION, SOLUTION INTRAMUSCULAR; INTRAVENOUS; SUBCUTANEOUS at 18:12

## 2023-03-18 RX ADMIN — MAGNESIUM SULFATE HEPTAHYDRATE 4 G: 4 INJECTION, SOLUTION INTRAVENOUS at 09:26

## 2023-03-18 RX ADMIN — QUETIAPINE 100 MG: 100 TABLET ORAL at 15:31

## 2023-03-18 RX ADMIN — PROPOFOL 35 MCG/KG/MIN: 10 INJECTION, EMULSION INTRAVENOUS at 02:40

## 2023-03-18 RX ADMIN — PROPRANOLOL HYDROCHLORIDE 20 MG: 20 SOLUTION ORAL at 21:21

## 2023-03-18 RX ADMIN — QUETIAPINE 100 MG: 100 TABLET ORAL at 21:20

## 2023-03-18 RX ADMIN — TAZOBACTAM SODIUM AND PIPERACILLIN SODIUM 3.38 G: 375; 3 INJECTION, SOLUTION INTRAVENOUS at 09:02

## 2023-03-18 RX ADMIN — ZONISAMIDE 200 MG: 100 CAPSULE ORAL at 21:49

## 2023-03-18 RX ADMIN — CITALOPRAM HYDROBROMIDE 40 MG: 20 TABLET ORAL at 17:26

## 2023-03-18 RX ADMIN — QUETIAPINE 100 MG: 100 TABLET ORAL at 09:00

## 2023-03-18 RX ADMIN — HYDROMORPHONE HYDROCHLORIDE 0.5 MG: 1 INJECTION, SOLUTION INTRAMUSCULAR; INTRAVENOUS; SUBCUTANEOUS at 20:11

## 2023-03-18 RX ADMIN — ALBUTEROL SULFATE 6 PUFF: 90 AEROSOL, METERED RESPIRATORY (INHALATION) at 04:34

## 2023-03-18 RX ADMIN — POTASSIUM CHLORIDE 20 MEQ: 1.5 POWDER, FOR SOLUTION ORAL at 15:31

## 2023-03-18 RX ADMIN — PROPOFOL 30 MCG/KG/MIN: 10 INJECTION, EMULSION INTRAVENOUS at 20:11

## 2023-03-18 RX ADMIN — PROPRANOLOL HYDROCHLORIDE 20 MG: 20 SOLUTION ORAL at 09:06

## 2023-03-18 RX ADMIN — TAZOBACTAM SODIUM AND PIPERACILLIN SODIUM 3.38 G: 375; 3 INJECTION, SOLUTION INTRAVENOUS at 19:56

## 2023-03-18 RX ADMIN — LEVOCARNITINE 660 MG: 330 TABLET ORAL at 09:00

## 2023-03-18 RX ADMIN — PROPOFOL 30 MCG/KG/MIN: 10 INJECTION, EMULSION INTRAVENOUS at 12:01

## 2023-03-18 RX ADMIN — POTASSIUM CHLORIDE 40 MEQ: 1.5 POWDER, FOR SOLUTION ORAL at 11:05

## 2023-03-18 RX ADMIN — BUSPIRONE HYDROCHLORIDE 15 MG: 15 TABLET ORAL at 21:20

## 2023-03-18 ASSESSMENT — ACTIVITIES OF DAILY LIVING (ADL)
ADLS_ACUITY_SCORE: 49
ADLS_ACUITY_SCORE: 53
ADLS_ACUITY_SCORE: 49
ADLS_ACUITY_SCORE: 53
ADLS_ACUITY_SCORE: 49
ADLS_ACUITY_SCORE: 53

## 2023-03-18 NOTE — PROGRESS NOTES
Hospitalist Medicine Progress Note   [facility]       Tre Vazquez is a 47 year old male with seizure disorder, cerebral palsy with mild spastic paraparesis, mild intellectual disability, mood disturbance, hypertension, congenital left eye blindness, right Bell's palsy with residual facial droop, morbid obesity, borderline personality disorder, PTSD, schizoaffective disorder, obstructive sleep apnea on BiPAP therapy, cerebral ventriculomegaly, prediabetes mellitus, chronic hypercapnic respiratory failure, asthma and depression who is chronically on 2 L of nasal cannula oxygen and lives in a group home came to [facility] 3/15/2023 with Hypoxia - 85% on RA SiO2.  His PCO2 kept on increasing on treatment with BiPAP because of which she was intubated in the ER with chest x-ray showing left lower lobe pneumonia for which was started on ceftriaxone (changed to Zosyn) and azithromycin       Date of Admission:  3/15/2023  Assessment & Plan     Acute hypercarbic and hypoxic respiratory failure  Probably on the basis of left lower lobe pneumonia  BiPAP did not improve the PCO2 because of which patient was intubated  Ventilator management per intensive care service     Left lower lobe pneumonia  on Zosyn and azithromycin   Growing GPC in the sputum     Cerebral palsy  Spastic paraparesis  Seizure disorder  On antiepileptic medications  through the NG tube  Seizure precautions will be taken     Hypertension  We will start antihypertensive medications - medication reconciliation is completed to be given through the NG tube     Obstructive sleep apnea  Patient is now intubated               Plan:   Restart Zonegran, BuSpar, Celexa home medications  Nothing growing in the culture as yet  Check CBC and BMP in a.m.    Diet: Adult Formula Drip Feeding: Continuous Vital High Protein; Orogastric tube; Goal Rate: 35; mL/hr; Initial rate of 15 ml/hr advancing by 10 ml q 8 hrs to goal rate of 35 ml/hr; Do not  advance tube feeding rate unless K+ is = or > 3.0, Mg++ is = or ... will decide in am   DVT Prophylaxis: Enoxaparin (Lovenox) SQ  Reid Catheter: PRESENT, indication: Strict 1-2 Hour I&O  Code Status: Full Code                Ritesh March MD  Hospitalist Service  [facility]    ______________________________________________________________________    Interval History     Symptoms   No major events overnight  Had scheduled weaning trial in the morning       Data reviewed today: I reviewed all medications, new labs and imaging results over the last 24 hours.     Physical Exam   Vital Signs: Temp: 97.6  F (36.4  C) Temp src: Temporal BP: 120/71 Pulse: 67   Resp: 13 SpO2: 93 % O2 Device: Mechanical Ventilator    Weight: 305 lbs 1.87 oz      GENERAL: Patient is not in acute distress  HEENT: Conjunctiva is clear   NECK: no Jugular Venous distention  HEART: S1 S2 regular Rate and Rhythm, there is no murmur,   LUNGS: Respirations are not laboured, Lungs there are decreased breath sounds in the lung bases to auscultation without Crepitations or Wheezing   ABDOMEN: Soft,Bowel Sounds are  Positive   LOWER LIMBS: no Pedal Edema  Bilaterally   CNS: Patient is intubated and sedated    Data   Recent Labs   Lab 03/18/23  1354 03/18/23  1144 03/18/23  0800 03/18/23  0543 03/17/23  1152 03/17/23  1104 03/16/23  0741 03/16/23  0511 03/15/23  1958 03/15/23  1303   WBC  --   --   --   --   --  7.1  --   --   --  9.1   HGB  --   --   --   --   --  12.7*  --   --   --  13.2*   MCV  --   --   --   --   --  94  --   --   --  99   PLT  --   --   --  149*  --  167  --   --   --  190   NA  --   --   --  142  --   --   --  141  --  144   POTASSIUM 3.7  --   --  3.4  --   --   --  4.1  --  4.0   CHLORIDE  --   --   --  103  --   --   --  101  --  103   CO2  --   --   --  34*  --   --   --  33*  --  33*   BUN  --   --   --  13.2  --   --   --  17.2  --  15.6   CR  --   --   --  0.70  --   --   --  0.74  --  0.75   ANIONGAP   --   --   --  5*  --   --   --  7  --  8   ARNOLD  --   --   --  8.3*  --   --   --  8.7  --  9.0   GLC  --  94 96 98   < >  --    < > 96   < > 116*   ALBUMIN  --   --   --   --   --   --   --  3.5  --   --    PROTTOTAL  --   --   --   --   --   --   --  5.7*  --   --    BILITOTAL  --   --   --   --   --   --   --  0.4  --   --    ALKPHOS  --   --   --   --   --   --   --  62  --   --    ALT  --   --   --   --   --   --   --  24  --   --    AST  --   --   --   --   --   --   --  33  --   --     < > = values in this interval not displayed.         Recent Results (from the past 24 hour(s))   XR Abdomen Port 1 View    Narrative    EXAM: XR ABDOMEN PORT 1 VIEW  LOCATION: [facility]  DATE/TIME: 3/17/2023 5:14 PM    INDICATION: Advancement of OG tube. placement  COMPARISON: X-ray 03/15/2023      Impression    IMPRESSION: Interval advancement of the enteric tube with the tip now located near the gastroduodenal junction. Nonobstructive bowel gas pattern. No definite free air.

## 2023-03-18 NOTE — PLAN OF CARE
ICU End of Shift Summary.  For vital signs and complete assessments, please see documentation flowsheets.      Pertinent assessments:   Afebrile, Dilaudid x 1 for pain.  Neuro: Rass -1.   Cardiac: Tele SR. BP and HR stable  Resp: LS diminished and clear. Vent settings 400 TV, 40 Fio2, 16 RR, 7 Peep. PS trial lasted about 1hr, patient  became tachypneic 30-50's   :  Reid in place, 850 UO. Still green in color.   GI:  Bowel sounds present, 4 BM's this shift, TF increased to 35/hr now running at goal.   Lines: PICC, PIV x 2.   Propofol and LR running.    Major Shift Events:  Continuing to titrate the propofol down.   Replaced Magnesium and potassium.    Plan (Upcoming Events): extubation   Discharge/Transfer Needs: Return to group home.     Bedside Shift Report Completed : Y  Bedside Safety Check Completed: ANTHONY

## 2023-03-18 NOTE — PROGRESS NOTES
[facility] ICU VENTILATOR RESPIRATORY NOTE  Date of Admission: 3/15/23  Date of Intubation (most recent): 3/15/23  Reason for Mechanical Ventilation: Respiratory Failure  Number of Days on Mechanical Ventilation: 4  Met Criteria for Pressure Support Trial: Yes  Length of Pressure Support Trial: started weaning at 15:22 on 5/7 40%  ETT appearance on chest x-ray: In appropriate place      Respiratory Therapy  Patient remains intubated on mechanical ventilator with the following settings:    Vent Mode: CPAP/PS  (Continuous positive airway pressure with Pressure Support)  FiO2 (%): 40 %  Resp Rate (Set): 16 breaths/min  Tidal Volume (Set, mL): 400 mL  PEEP (cm H2O): 7 cmH2O  Pressure Support (cm H2O): 5 cmH2O  Resp: 21    Temp: 97.6  F (36.4  C) Temp src: Axillary BP: 132/77 Pulse: 67   Resp: 21 SpO2: 97 % O2 Device: Mechanical Ventilator      BS were diminished. Suctioned a moderate amount of cloudy thick secretions through the ETT.  Will continue to follow and assess  the patient's respiratory needs.    Karen Avalos, RT  3/18/2023 5:26 PM

## 2023-03-18 NOTE — PROGRESS NOTES
"[facility] ICU VENTILATOR RESPIRATORY NOTE    Date of Admission: 3/15/2023    Date of Intubation (most recent): 3/15/2023    Reason for Mechanical Ventilation:Respiratory Failure    Number of Days on Mechanical Ventilation: 4    Met Criteria for Pressure Support Trial: Yes    Length of Pressure Support Trial:     ABG Results:  Recent Labs   Lab 03/16/23  0830 03/16/23  0511 03/15/23  2242 03/15/23  1622 03/15/23  1525 03/15/23  1447   PH 7.39  --  7.53*  --  7.24* 7.27*   PCO2 56*  --  39  --   --   --    PO2 90  --  104  --   --   --    HCO3 34*  --  33*  --   --   --    O2PER 45 50 50 40  --   --          ETT appearance on chest x-ray: \"Endotracheal tube tip overlies the mid upper thoracic trachea\"    Vent Mode: CMV/AC  (Continuous Mandatory Ventilation/ Assist Control)  FiO2 (%): 40 %  Resp Rate (Set): 16 breaths/min  Tidal Volume (Set, mL): 400 mL  PEEP (cm H2O): 7 cmH2O  Pressure Support (cm H2O): 10 cmH2O  Resp: 16    Bs diminished. Suctioned for small cloudy thick secretions. 6p Alb given in line. Will continue to assess and monitor.    Filippo Lamb RT on 3/18/2023 at 4:52 AM        "

## 2023-03-18 NOTE — PLAN OF CARE
Neuro: RASS -2, sedated with propofol, follow commands sometimes  Cardiac: BP stable, Tele SR/SB,  Pulm: no changes to vent, clear secretions from ETT/Oral, LS clear/diminished  GI: started TF, tolerating, had 3 BMs  : collazo with adequate output, green in color  Skin: scabs to face and shin, blanchable to coccyx area, edema noted, hypothermic-mona hugger applied, CHG bath completed  ID: on zosyn for PNA  Will attempt SBT this morning. Wean O2 and sedation

## 2023-03-18 NOTE — PLAN OF CARE
ICU End of Shift Summary.  For vital signs and complete assessments, please see documentation flowsheets.      Pertinent assessments:   Afebrile, no pain this shift.  Neuro: Rass -2. Follows commands intermittent.   Cardiac: SR. BP stable.  Resp: LS diminished. Vent 400 TV, 45 Fio2, 16 RR, 7 Peep.  : Reid in place, Green adri color 650 UO this shift.  GI:  NPO. Bowel sounds active.   Lines PICC, PIV x 2.   On azithromycin.     Discharge/Transfer Needs: TBD     Bedside Shift Report Completed : Y  Bedside Safety Check Completed: Y

## 2023-03-18 NOTE — PLAN OF CARE
Problem: Enteral Nutrition  Goal: Feeding Tolerance  Outcome: Progressing     Goal Outcome Evaluation:    TF rate at 25ml/hr, Mg was replaced today and will be checked before increasing rate to 35ml/hr. Per nurse, pt found undigested peanuts in stool and concerned pt may be swallowing peanuts whole as he doesn't have the dentition to chew them.     Overall Patient Progress: improvingOverall Patient Progress: improving    Outcome Evaluation: Mg 1.4, consider replacing.

## 2023-03-18 NOTE — PROGRESS NOTES
ICU staff  DOS 3/18/2023    No major events reported. Had scheduled weaning trial this morning but did not meet extubation criteria. When I put him to 8/5 he maintained RR in the 20s but was pretty shallow.    Vitals:   Temp:  [96.6  F (35.9  C)-98.9  F (37.2  C)] 97.5  F (36.4  C)  Pulse:  [56-66] 60  Resp:  [13-30] 30  BP: ()/(46-94) 136/94  FiO2 (%):  [40 %] 40 %  SpO2:  [91 %-97 %] 95 %     Vent Mode: CMV/AC  (Continuous Mandatory Ventilation/ Assist Control)  FiO2 (%): 40 %  Resp Rate (Set): 16 breaths/min  Tidal Volume (Set, mL): 400 mL  PEEP (cm H2O): 7 cmH2O  Pressure Support (cm H2O): 10 cmH2O  Resp: 30    I/O last 3 completed shifts:  In: 2748.26 [I.V.:1943.26; NG/GT:610]  Out: 1435 [Urine:1435]    Propofol 35 this AM    Exam:  Gen: Intubated, sedated  HEENT: NC/AT  Pulm: Clear  Cor: RRR  Abdomen/GI: Soft, nondistended  : Reid in place, UOP picking up but still dark/green  Extremities: Warm, nonedematous  Skin: Well-perfused  Neuro: Sedated  Psych: Unable to assess    Data:   Labs reviewed  - Mag 1.4  Imaging reviewed  - AXR shows OGT at gastroduodenal junction  Micro reviewed  - Respiratory culture with normal blanco    Assessment/plan:  46 y/o man with multiple comorbid conditions admitted with acute on chronic mixed respiratory failure, thought to be pneumonia vs ANA/OHS. Intubated 3/15. Slow improvement on SBTs but still not ready for extubation.  CNS: Intubated, sedated this AM.  # Pain/sedation  # Seizure disorder  # Cerebral palsy with spastic quadriparesis  # Congenital blindness left eye  # Depression, schizoaffective disorder, borderline personality disorder  - Propofol, prn analgesics  - Home antiepileptic and behavioral meds  - Consider switching to dex if we can't wean propofol and keep patient comfortable, question whether HR would tolerate though  Pulm: Tolerating ventilation.   # Acute on chronic mixed respiratory failure  # Chronic hypoxemia on home oxygen  # Suspect chronic  hypercarbia  # Obstructive sleep apnea  # Suspect obesity hypoventilation syndrome  # Hx asthma  - Continue ventilator support  - SBTs at least daily with goal of extubating; is progressing  - BiPAP post extubation while asleep   CV: No new issues.  # Essential hypertension  - Resume antihypertensives when appropriate  GI: Abdomen benign  # Nutrition  # Morbid obesity affecting cares, BMI 43  - Tube feedings initiated  : UOP adequate.  # Hypokalemia  # Hypomagnesemia  - Lytes per protocol.  Heme: Hb 12.7.  Msk: Extremities warm, well-perfused.   Endo: Glucose .  ID: Afebrile, cultures without pathologic organisms.  # ?Pneumonia  - Continue abx for now; might be able to stop at day 5  ICU: LMWH, PPI    This patient is critically ill to my assessment and requires ICU monitoring and cares. A total of 35 minutes critical care time spent on 3/18/2023, exclusive of procedures.     Rashad Santos MD, PhD  Surgical critical care  3/18/2023, 10:55 AM

## 2023-03-19 ENCOUNTER — APPOINTMENT (OUTPATIENT)
Dept: SPEECH THERAPY | Facility: CLINIC | Age: 48
DRG: 208 | End: 2023-03-19
Attending: INTERNAL MEDICINE
Payer: MEDICARE

## 2023-03-19 LAB
ANION GAP SERPL CALCULATED.3IONS-SCNC: 4 MMOL/L (ref 7–15)
BUN SERPL-MCNC: 16.2 MG/DL (ref 6–20)
CALCIUM SERPL-MCNC: 8.3 MG/DL (ref 8.6–10)
CHLORIDE SERPL-SCNC: 105 MMOL/L (ref 98–107)
CREAT SERPL-MCNC: 0.57 MG/DL (ref 0.67–1.17)
DEPRECATED HCO3 PLAS-SCNC: 34 MMOL/L (ref 22–29)
GFR SERPL CREATININE-BSD FRML MDRD: >90 ML/MIN/1.73M2
GLUCOSE BLDC GLUCOMTR-MCNC: 108 MG/DL (ref 70–99)
GLUCOSE BLDC GLUCOMTR-MCNC: 80 MG/DL (ref 70–99)
GLUCOSE BLDC GLUCOMTR-MCNC: 81 MG/DL (ref 70–99)
GLUCOSE BLDC GLUCOMTR-MCNC: 83 MG/DL (ref 70–99)
GLUCOSE BLDC GLUCOMTR-MCNC: 90 MG/DL (ref 70–99)
GLUCOSE BLDC GLUCOMTR-MCNC: 96 MG/DL (ref 70–99)
GLUCOSE SERPL-MCNC: 98 MG/DL (ref 70–99)
MAGNESIUM SERPL-MCNC: 1.8 MG/DL (ref 1.7–2.3)
PHOSPHATE SERPL-MCNC: 2.6 MG/DL (ref 2.5–4.5)
POTASSIUM SERPL-SCNC: 3.6 MMOL/L (ref 3.4–5.3)
SODIUM SERPL-SCNC: 143 MMOL/L (ref 136–145)

## 2023-03-19 PROCEDURE — 94640 AIRWAY INHALATION TREATMENT: CPT | Mod: 76

## 2023-03-19 PROCEDURE — 94640 AIRWAY INHALATION TREATMENT: CPT

## 2023-03-19 PROCEDURE — 250N000013 HC RX MED GY IP 250 OP 250 PS 637: Performed by: INTERNAL MEDICINE

## 2023-03-19 PROCEDURE — 99291 CRITICAL CARE FIRST HOUR: CPT | Performed by: SURGERY

## 2023-03-19 PROCEDURE — C9113 INJ PANTOPRAZOLE SODIUM, VIA: HCPCS | Performed by: INTERNAL MEDICINE

## 2023-03-19 PROCEDURE — 94003 VENT MGMT INPAT SUBQ DAY: CPT

## 2023-03-19 PROCEDURE — 250N000011 HC RX IP 250 OP 636: Performed by: INTERNAL MEDICINE

## 2023-03-19 PROCEDURE — 83735 ASSAY OF MAGNESIUM: CPT | Performed by: INTERNAL MEDICINE

## 2023-03-19 PROCEDURE — 999N000157 HC STATISTIC RCP TIME EA 10 MIN

## 2023-03-19 PROCEDURE — 99232 SBSQ HOSP IP/OBS MODERATE 35: CPT | Performed by: INTERNAL MEDICINE

## 2023-03-19 PROCEDURE — 258N000003 HC RX IP 258 OP 636: Performed by: INTERNAL MEDICINE

## 2023-03-19 PROCEDURE — 84100 ASSAY OF PHOSPHORUS: CPT | Performed by: INTERNAL MEDICINE

## 2023-03-19 PROCEDURE — 80048 BASIC METABOLIC PNL TOTAL CA: CPT | Performed by: INTERNAL MEDICINE

## 2023-03-19 PROCEDURE — 92610 EVALUATE SWALLOWING FUNCTION: CPT | Mod: GN | Performed by: SPEECH-LANGUAGE PATHOLOGIST

## 2023-03-19 PROCEDURE — 200N000001 HC R&B ICU

## 2023-03-19 RX ORDER — DOCOSANOL 100 MG/G
CREAM TOPICAL
Status: DISCONTINUED | OUTPATIENT
Start: 2023-03-19 | End: 2023-03-19

## 2023-03-19 RX ORDER — LEVETIRACETAM 500 MG/1
1500 TABLET ORAL EVERY EVENING
Status: DISCONTINUED | OUTPATIENT
Start: 2023-03-19 | End: 2023-03-24 | Stop reason: HOSPADM

## 2023-03-19 RX ORDER — LEVETIRACETAM 500 MG/1
1500 TABLET ORAL EVERY EVENING
Status: DISCONTINUED | OUTPATIENT
Start: 2023-03-19 | End: 2023-03-19

## 2023-03-19 RX ORDER — MAGNESIUM SULFATE HEPTAHYDRATE 40 MG/ML
2 INJECTION, SOLUTION INTRAVENOUS ONCE
Status: COMPLETED | OUTPATIENT
Start: 2023-03-19 | End: 2023-03-19

## 2023-03-19 RX ORDER — PROPRANOLOL HYDROCHLORIDE 10 MG/1
20 TABLET ORAL 2 TIMES DAILY
Status: DISCONTINUED | OUTPATIENT
Start: 2023-03-19 | End: 2023-03-24 | Stop reason: HOSPADM

## 2023-03-19 RX ORDER — POTASSIUM CHLORIDE 1.5 G/1.58G
20 POWDER, FOR SOLUTION ORAL ONCE
Status: COMPLETED | OUTPATIENT
Start: 2023-03-19 | End: 2023-03-19

## 2023-03-19 RX ORDER — LEVETIRACETAM 500 MG/1
1000 TABLET ORAL EVERY MORNING
Status: DISCONTINUED | OUTPATIENT
Start: 2023-03-20 | End: 2023-03-24 | Stop reason: HOSPADM

## 2023-03-19 RX ORDER — PRAZOSIN HYDROCHLORIDE 1 MG/1
2 CAPSULE ORAL AT BEDTIME
Status: DISCONTINUED | OUTPATIENT
Start: 2023-03-19 | End: 2023-03-24 | Stop reason: HOSPADM

## 2023-03-19 RX ORDER — DIVALPROEX SODIUM 250 MG/1
500 TABLET, DELAYED RELEASE ORAL 3 TIMES DAILY
Status: DISCONTINUED | OUTPATIENT
Start: 2023-03-19 | End: 2023-03-24 | Stop reason: HOSPADM

## 2023-03-19 RX ADMIN — LEVOCARNITINE 660 MG: 330 TABLET ORAL at 21:23

## 2023-03-19 RX ADMIN — PROPRANOLOL HYDROCHLORIDE 20 MG: 20 SOLUTION ORAL at 08:42

## 2023-03-19 RX ADMIN — ALBUTEROL SULFATE 6 PUFF: 90 AEROSOL, METERED RESPIRATORY (INHALATION) at 04:25

## 2023-03-19 RX ADMIN — MULTIVITAMIN 15 ML: LIQUID ORAL at 08:41

## 2023-03-19 RX ADMIN — LEVETIRACETAM 1000 MG: 100 SOLUTION ORAL at 08:42

## 2023-03-19 RX ADMIN — LEVOCARNITINE 660 MG: 330 TABLET ORAL at 08:41

## 2023-03-19 RX ADMIN — ENOXAPARIN SODIUM 40 MG: 40 INJECTION SUBCUTANEOUS at 08:40

## 2023-03-19 RX ADMIN — ZONISAMIDE 100 MG: 100 CAPSULE ORAL at 08:41

## 2023-03-19 RX ADMIN — TAZOBACTAM SODIUM AND PIPERACILLIN SODIUM 3.38 G: 375; 3 INJECTION, SOLUTION INTRAVENOUS at 08:44

## 2023-03-19 RX ADMIN — ZONISAMIDE 200 MG: 100 CAPSULE ORAL at 21:22

## 2023-03-19 RX ADMIN — PRAZOSIN HYDROCHLORIDE 2 MG: 1 CAPSULE ORAL at 21:22

## 2023-03-19 RX ADMIN — LEVETIRACETAM 1500 MG: 500 TABLET, FILM COATED ORAL at 21:22

## 2023-03-19 RX ADMIN — CHLORHEXIDINE GLUCONATE 15 ML: 1.2 SOLUTION ORAL at 08:41

## 2023-03-19 RX ADMIN — BUSPIRONE HYDROCHLORIDE 15 MG: 15 TABLET ORAL at 08:41

## 2023-03-19 RX ADMIN — QUETIAPINE 100 MG: 100 TABLET ORAL at 08:40

## 2023-03-19 RX ADMIN — QUETIAPINE 100 MG: 100 TABLET ORAL at 17:37

## 2023-03-19 RX ADMIN — LAMOTRIGINE 200 MG: 200 TABLET ORAL at 08:40

## 2023-03-19 RX ADMIN — DIVALPROEX SODIUM 500 MG: 500 TABLET, DELAYED RELEASE ORAL at 21:23

## 2023-03-19 RX ADMIN — QUETIAPINE 100 MG: 100 TABLET ORAL at 21:22

## 2023-03-19 RX ADMIN — PROPRANOLOL HYDROCHLORIDE 20 MG: 10 TABLET ORAL at 21:23

## 2023-03-19 RX ADMIN — CITALOPRAM HYDROBROMIDE 40 MG: 20 TABLET ORAL at 08:41

## 2023-03-19 RX ADMIN — MAGNESIUM SULFATE HEPTAHYDRATE 2 G: 2 INJECTION, SOLUTION INTRAVENOUS at 07:25

## 2023-03-19 RX ADMIN — HYDROMORPHONE HYDROCHLORIDE 0.5 MG: 1 INJECTION, SOLUTION INTRAMUSCULAR; INTRAVENOUS; SUBCUTANEOUS at 06:14

## 2023-03-19 RX ADMIN — PROPOFOL 30 MCG/KG/MIN: 10 INJECTION, EMULSION INTRAVENOUS at 02:52

## 2023-03-19 RX ADMIN — TAZOBACTAM SODIUM AND PIPERACILLIN SODIUM 3.38 G: 375; 3 INJECTION, SOLUTION INTRAVENOUS at 13:49

## 2023-03-19 RX ADMIN — LEVOCARNITINE 660 MG: 330 TABLET ORAL at 17:37

## 2023-03-19 RX ADMIN — ALBUTEROL SULFATE 6 PUFF: 90 AEROSOL, METERED RESPIRATORY (INHALATION) at 07:37

## 2023-03-19 RX ADMIN — PANTOPRAZOLE SODIUM 40 MG: 40 INJECTION, POWDER, LYOPHILIZED, FOR SOLUTION INTRAVENOUS at 06:06

## 2023-03-19 RX ADMIN — LAMOTRIGINE 200 MG: 200 TABLET ORAL at 21:23

## 2023-03-19 RX ADMIN — ALBUTEROL SULFATE 6 PUFF: 90 AEROSOL, METERED RESPIRATORY (INHALATION) at 01:02

## 2023-03-19 RX ADMIN — HYDROMORPHONE HYDROCHLORIDE 0.5 MG: 1 INJECTION, SOLUTION INTRAMUSCULAR; INTRAVENOUS; SUBCUTANEOUS at 11:39

## 2023-03-19 RX ADMIN — CITALOPRAM HYDROBROMIDE 20 MG: 20 TABLET ORAL at 08:40

## 2023-03-19 RX ADMIN — SODIUM CHLORIDE, POTASSIUM CHLORIDE, SODIUM LACTATE AND CALCIUM CHLORIDE: 600; 310; 30; 20 INJECTION, SOLUTION INTRAVENOUS at 13:49

## 2023-03-19 RX ADMIN — POTASSIUM & SODIUM PHOSPHATES POWDER PACK 280-160-250 MG 1 PACKET: 280-160-250 PACK at 07:26

## 2023-03-19 RX ADMIN — POTASSIUM CHLORIDE 20 MEQ: 1.5 POWDER, FOR SOLUTION ORAL at 07:26

## 2023-03-19 RX ADMIN — POTASSIUM & SODIUM PHOSPHATES POWDER PACK 280-160-250 MG 1 PACKET: 280-160-250 PACK at 13:51

## 2023-03-19 RX ADMIN — TAZOBACTAM SODIUM AND PIPERACILLIN SODIUM 3.38 G: 375; 3 INJECTION, SOLUTION INTRAVENOUS at 20:05

## 2023-03-19 RX ADMIN — BUSPIRONE HYDROCHLORIDE 15 MG: 15 TABLET ORAL at 21:23

## 2023-03-19 RX ADMIN — HYDROMORPHONE HYDROCHLORIDE 0.5 MG: 1 INJECTION, SOLUTION INTRAMUSCULAR; INTRAVENOUS; SUBCUTANEOUS at 22:18

## 2023-03-19 RX ADMIN — AZITHROMYCIN 250 MG: 200 POWDER, FOR SUSPENSION ORAL at 08:41

## 2023-03-19 RX ADMIN — VALPROIC ACID 500 MG: 250 SOLUTION ORAL at 17:37

## 2023-03-19 RX ADMIN — VALPROIC ACID 500 MG: 250 SOLUTION ORAL at 08:41

## 2023-03-19 RX ADMIN — HYDROMORPHONE HYDROCHLORIDE 0.5 MG: 1 INJECTION, SOLUTION INTRAMUSCULAR; INTRAVENOUS; SUBCUTANEOUS at 18:40

## 2023-03-19 RX ADMIN — TAZOBACTAM SODIUM AND PIPERACILLIN SODIUM 3.38 G: 375; 3 INJECTION, SOLUTION INTRAVENOUS at 01:59

## 2023-03-19 RX ADMIN — PROPOFOL 25 MCG/KG/MIN: 10 INJECTION, EMULSION INTRAVENOUS at 06:16

## 2023-03-19 ASSESSMENT — ACTIVITIES OF DAILY LIVING (ADL)
ADLS_ACUITY_SCORE: 53
ADLS_ACUITY_SCORE: 53
ADLS_ACUITY_SCORE: 49
ADLS_ACUITY_SCORE: 53
ADLS_ACUITY_SCORE: 49
ADLS_ACUITY_SCORE: 53
ADLS_ACUITY_SCORE: 49

## 2023-03-19 NOTE — PROGRESS NOTES
Hospitalist Progress Note      Subjective:  Ambulating, tolerating diet    no clear aggravating or alleviating factors, or other associated symptoms.     [address] Carbohydrate Control Diabetic Diet -- 10/09/19 23:30:00         Objective:  Vitals:    Vital Signs (last 24 hrs)_____ Last Charted___________Minimum____________ Maximum____________  Temp    98.3  (OCT 14 13:22) 98  (OCT 13 16:07) 98.4  (OCT 14 08:28)  Heart Rate   78  (OCT 14 13:22) 78  (OCT 14 08:28) 94  (OCT 13 16:07)  Resp Rate       16  (OCT 14 13:22) 16  (OCT 14 13:22) 20  (OCT 13 16:07)  SBP    120  (OCT 14 13:22) 120  (OCT 14 01:45) H 143 (OCT 14 08:28)  DBP    72  (OCT 14 13:22) 60  (OCT 14 01:45) 76  (OCT 14 08:28)         Physical Exam:  General: alert, oriented. not in acute distress  Psychiatry: cooperative, normal mood and affect.   Neurology: cranial nerves grossly intact.   Eyes: pupils equal reactive to light, extraocular motion is intact. No jaundice. palor.   HEENT: Neck supple. Oral mucosa is moist.   no Jugular venous distension. no lymphadenopathy.   Lungs: Clear to auscultation bilaterally  Heart: regular rate and rhythm, I did not hear any murmurs, rubs or gallops  Abdomen: not tender, not distended, positive bowel sounds  Lower limbs: no edema.   A chaperone was present during my entire encounter with the patient today.               Labs:  Labs (Last four charted values)  WBC                  4.9 (OCT 14) 5.2 (OCT 13) 7.2 (OCT 12) 7.8 (OCT 11)   Hgb                  L 7.4 (OCT 14) L 7.8 (OCT 13) L 7.9 (OCT 12) L 7.9 (OCT 11)   Hct                  L 23 (OCT 14) L 24 (OCT 13) L 24 (OCT 12) L 24 (OCT 11)   Plt                  166 (OCT 14) 157 (OCT 13) L 124 (OCT 12) L 114 (OCT 11)   Na                   138 (OCT 14) 137 (OCT 13) 138 (OCT 12) 137 (OCT 11)   K                    4.1 (OCT 14) 4.3 (OCT 13) 4.0 (OCT 12) 4.2 (OCT 11)   CO2                  24 (OCT 14) 24 (OCT 13) 25 (OCT 12) 22 (OCT 11)   Cl                 Successfully extubated pt to 5L oxymask with sat's of 95%. BS were diminished    No complications were noted.    Karen Avalos, RT on 3/19/2023 at 12:17 PM        106 (OCT 14) 107 (OCT 13) 107 (OCT 12) H 110 (OCT 11)   Cr                   1.05 (OCT 14) 1.02 (OCT 13) 1.16 (OCT 12) H 1.31 (OCT 11)   BUN                  18 (OCT 14) 20 (OCT 13) 20 (OCT 12) H 23 (OCT 11)   Glucose              H 140 (OCT 14) H 108 (OCT 13) H 123 (OCT 12) H 166 (OCT 11)   Ca                   8.4 (OCT 14) L 8.3 (OCT 13) L 8.1 (OCT 12) L 7.6 (OCT 11)        Imaging          Encounter Type:  Inpatient     _                       Please review the assessment and plan for the diagosis supporting this type of admission.    Imaging:  reviewed.        Assessment and Plan:  Ongoing issues:        70 years old gentleman with past medical history of hypertension, diabetes, A. fib, asthma, osteoarthritis, posttraumatic stress disorder, anxiety, mitral valve prolapse, obstructive sleep apnea on CPAP.  Patient presented to the hospital for elective surgery.  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  Patient underwent  Right total hip revision all components, heterotopic bone excision, scar tissue excision.  Our service was asked to see the patient postoperatively.  Patient had pain at the surgery site, not radiating, 4/10 intensity, intermittent since surgery, relieved with pain medications, with no clear aggravating or alleviating factors, no other associated symptoms.    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  Status post Right total hip revision all components, heterotopic bone excision, scar tissue excision   Postoperative pain  Pain management as needed  ortho surgery service on consult and managing all postoperative issues including disposition, DVT prophylaxis, pain management, wound care, etc...  per  ortho: DVT prophylaxis with Eliquis 2.5mg PO BID, SCDs  Antibiotic prophylaxis with Cefadroxil 500mg PO BID x 14 days    Acute blood loss anemia  From expected postoperative blood losses  Monitor CBC    Acute Kidney Injury, resolved  Hypotension  Hyponatremia  Managed with IV fluids    Hypertension  Continue present management  Resume home medications as tolerated     Diabetes Mellitus, type 2, uncontrolled, with hyperglycemia.   patient was placed on accuchecks  Managed with Insulin sliding scale  adjust insulin accordingly    Atrial fibrillation  On eliquis. held for surgery  Resumed 10/10 as cleared by ortho      Dyslipidemia  on Pravastatin    asthma  At baseline  Continue present management  Duoneb q 4 PRN for wheezing added    posttraumatic stress disorder  anxiety  Continue current management    obstructive sleep apnea   on CPAP.     Overactive Bladder  Continue home bethanechol    DVT prophylaxis: eliquis  Full code      new developments/ plan    discharge planning to SNF. awaiting placement.   Hgb down to 7.4, continue to monitor            Morning labs ordered. monitor CBC, BMP.   Management discussed with other participating services/consultants.   Discussed with decision maker who verbalized agreement with plan of care.     Ordered Medications:  Medications (32) Active  Scheduled: (12)  apixaban 2.5 mg Tablet  2.5 mg 1 tab, PO, q12hr  atenolol 25 mg Tab  25 mg 1 tab, PO, q12hrS  bethanechol 25mg Tab  25 mg 1 tab, PO, TIDAC  cephalexin 250 mg Cap  250 mg 1 cap, PO, QID  escitalopram 10 mg Tab  10 mg 1 tab, PO, qAM  hydrochlorothiazide 25mg Tab  25 mg 1 tab, PO, qDay  insulin glargine 100 units/mL Vial (Lantus)  18 unit 0.18 mL, SubQ, qPM  insulin Regular Human 100 units/mL 3 mL Vial (Humulin R)  per Insulin Sliding Scale, SubQ, QIDACHS  lisinopril 10 mg Tab  10 mg 1 tab, PO, qAM  multiple vitamin Tab  1 tab, PO, BID  polyethylene glycol Powder 17 gram UD  17 g 1 dose, PO, qDay  primidone 50 mg Tab  50 mg  1 tab, PO, BID  Continuous: (0)  PRN: (20)  albuterol-ipratropium 2.5 mg-0.5 mg/3 mL Soln UD  3 mL, Nebulized, [id number]  bisacodyl 10 mg Supp  10 mg 1 supp, HI, qDay  Dextrose 50% Syringe  12.5 g 25 mL, IV Push, PRN  diphenhydrAMINE HCl 50 mg/mL Vial  12.5 mg 0.25 mL, IV Push, q4hr  glucagon recomb 1 mg Vial  1 mg, IM, PRN  HYDROcodone-APAP 10/325 Tab  1 tab, PO, q4hr  HYDROcodone-APAP 10/325 Tab  2 tab, PO, q4hr  HYDROmorphone 0.5 mg/0.5 mL iSecure Syringe  0.5 mg 0.5 mL, IV Push, q2hr  hydrOXYzine HCl 25 mg Tab  25 mg 1 tab, PO, QID  lidocaine 1% PF Solution  0.2 mL, Intradermal, One Time  LORazepam 0.5 mg Tablet  0.5 mg 1 tab, PO, q6hr  mag hydrox 2.4g/10mL Conc  10 mL, PO, qDay  morphine 2 mg/mL PF Inj 1 mL  1 mg 0.5 mL, IV Push, q2hr  ondansetron 4 mg/2 mL Vial  4 mg 2 mL, IV Push, q6hr  Oral Glucose Gel (40% dextrose)  15 g 37.6 mL, PO, PRN  oxyCODONE-acetaminophen 10/325 Tab  1 tab, PO, q4hr  senno/docusate 8.6/50 Tab  1 tab, PO, BID  temazepam 15 mg Cap  15 mg 1 cap, PO, qHS  tiZANidine 4 mg Tab  4 mg 1 tab, PO, TID  traMADol 50 mg Tab  50 mg 1 tab, PO, q4hr                Electronically Signed On 10.15.2019 00:07  ___________________________________________________   Bucky Pratt MD

## 2023-03-19 NOTE — PROGRESS NOTES
"[facility] ICU VENTILATOR RESPIRATORY NOTE     Date of Admission: 3/15/2023     Date of Intubation (most recent): 3/15/2023     Reason for Mechanical Ventilation:Respiratory Failure     Number of Days on Mechanical Ventilation: 4     Met Criteria for Pressure Support Trial: Yes     Length of Pressure Support Trial: Ongoing    ABG Results:  Recent Labs   Lab 03/16/23  0830 03/16/23  0511 03/15/23  2242 03/15/23  1622 03/15/23  1525 03/15/23  1447   PH 7.39  --  7.53*  --  7.24* 7.27*   PCO2 56*  --  39  --   --   --    PO2 90  --  104  --   --   --    HCO3 34*  --  33*  --   --   --    O2PER 45 50 50 40  --   --      ETT appearance on chest x-ray: \"Endotracheal tube tip overlies the mid upper thoracic trachea\"    Vent Mode: CMV/AC  (Continuous Mandatory Ventilation/ Assist Control)  FiO2 (%): 40 %  Resp Rate (Set): 16 breaths/min  Tidal Volume (Set, mL): 400 mL  PEEP (cm H2O): 7 cmH2O  Pressure Support (cm H2O): 5 cmH2O  Resp: 16    Bs diminished. 6p Alb given in line Q4. Will continue to assess and monitor.    Filippo Lamb RT on 3/19/2023 at 5:07 AM    "

## 2023-03-19 NOTE — PROGRESS NOTES
"Limited swallow evaluation duet to increased SOB and fatigue. Recommendations: 1) Moderately thick liquids by spoon if patient alert and requesting. Essential medications crushed in applesauce. 2) SLP will follow daily for dysphagia management.     IP Clinical Dysphagia Evaluation  [facility]  03/19/23 1164   Appointment Info   Signing Clinician's Name / Credentials (SLP) Michael Huffman MS CCC-SLP   General Information   Onset of Illness/Injury or Date of Surgery 03/15/23   Referring Physician Ritesh March MD   Patient/Family Therapy Goal Statement (SLP) Did not state   Pertinent History of Current Problem Per provider note \"Tre Vazquez is a 47 year old male with seizure disorder, cerebral palsy with mild spastic paraparesis, mild intellectual disability, mood disturbance, hypertension, congenital left eye blindness, right Bell's palsy with residual facial droop, morbid obesity, borderline personality disorder, PTSD, schizoaffective disorder, obstructive sleep apnea on BiPAP therapy, cerebral ventriculomegaly, prediabetes mellitus, chronic hypercapnic respiratory failure, asthma and depression.\"   General Observations Patient alert and cooperative; dysarthria present. RN giving patient liquids that was thickened with two packets of mild thickener.   Type of Evaluation   Type of Evaluation Swallow Evaluation   Oral Motor   Oral Musculature anomalies present   Structural Abnormalities present   Mucosal Quality adequate   Dentition (Oral Motor)   Dentition (Oral Motor) edentulous   Facial Symmetry (Oral Motor)   Facial Symmetry (Oral Motor) right side impairment  (history of Bell's Palsy per provider note)   Lip Function (Oral Motor)   Lip Range of Motion (Oral Motor) protrusion impairment;retraction impairment   Protrusion, Lip Range of Motion bilateral;moderate impairment   Retraction, Lip Range of Motion bilateral;moderate impairment   Tongue Function (Oral Motor)   Tongue ROM (Oral " Motor)   (large tongue)   Cough/Swallow/Gag Reflex (Oral Motor)   Soft Palate/Velum (Oral Motor) unable/difficult to assess   Volitional Swallow (Oral Motor) significantly delayed   Vocal Quality/Secretion Management (Oral Motor)   Vocal Quality (Oral Motor) hoarse   Secretion Management (Oral Motor) WNL   General Swallowing Observations   Past History of Dysphagia None per patient or in EMR   Respiratory Support (General Swallowing Observations) nasal cannula  (5L)   Current Diet/Method of Nutritional Intake (General Swallowing Observations, NIS)   (was on OG feeding tube while intubated but was taken out when extubated. No current means of nutrition.)   Swallowing Evaluation Clinical swallow evaluation   Clinical Swallow Evaluation   Feeding Assistance dependent   Clinical Swallow Evaluation Textures Trialed moderately thick liquids/liquidized   Clinical Swallow Eval: Moderately Thick Liquids   Mode of Presentation spoon   Volume Presented x5 teaspoons   Oral Phase WFL   Pharyngeal Phase impaired  (per RN, occassional coughing when she was giving liquids by cup)   Diagnostic Statement Tolerated by spoon, however, per RN, occassional coughing when she was giving liquids by cup. Timely swallow response and no c/o food sticking. Patient became SOB and fatigued.   Swallowing Recommendations   Diet Consistency Recommendations moderately thick liquids/liquidized (level 3)  (when patient upright and alert)   Supervision Level for Intake 1:1 supervision needed   Mode of Delivery Recommendations bolus size, small;slow rate of intake   Monitoring/Assistance Required (Eating/Swallowing) monitor for cough or change in vocal quality with intake   Recommended Feeding/Eating Techniques (Swallow Eval) maintain upright sitting position for eating   Medication Administration Recommendations, Swallowing (SLP) IV   Instrumental Assessment Recommendations instrumental evaluation not recommended at this time   General Therapy  Interventions   Planned Therapy Interventions Dysphagia Treatment   Dysphagia treatment Modified diet education;Instruction of safe swallow strategies   Clinical Impression   Criteria for Skilled Therapeutic Interventions Met (SLP Eval) Yes, treatment indicated   Risks & Benefits of therapy have been explained evaluation/treatment results reviewed;care plan/treatment goals reviewed;risks/benefits reviewed;current/potential barriers reviewed;participants voiced agreement with care plan;participants included;patient   Clinical Impression Comments Limited clinical dysphagia evaluation completed per provider orders. Limited oral motor revealed right facial droop related to history of Bell's Palsy, limited labial ROM, enlarged tongue resulting in poor oral control and dysarthria. Trials of moderately thick liquids given by spoon. No overt s/sx of aspiration, however, increased SOB and patient became fatigued and wanting to stop. O2 dropped to upper 80s and encouraged patient to take deep breaths through his nose and quickly recovered to about 90s.   SLP Total Evaluation Time   Eval: oral/pharyngeal swallow function, clinical swallow Minutes (24155) 14   Total Session Time   Total Session Time (sum of timed and untimed services) 14

## 2023-03-19 NOTE — PROGRESS NOTES
Right wrist and Left wrist restraints discontinued at 0800 AM on 3/19/2023.    Restraint discontinue criteria met, patient is calm, cooperative and safe. Restraints removed.     Patient's Response: No evidence of learning  Family Notification: Other  Attending Physician Notified: MD ordered restraint, Attending Physician's Name: Dr. Anca John, RN

## 2023-03-19 NOTE — PLAN OF CARE
ICU SHIFT NOTE     Pertinent assessments:    A/O x4    Speech slightly garbled at base w/ asymmetrical mouth movement     4-5LPM Oxymask    occ refuses cares such as B/P cuff, O2 monitor, O2 device, bladderscans    Has not voided since collazo removal- bladder scans highest marks were 23 and 57. Pt attempted to void unsuccessfully    Small drop of blood noted on penis    Major shift events:    SBT/SAT done this AM. PS for ~5 hrs    Collazo removed    Up to chair this AM- afternoon    Speech consult- will follow in AM, too tired this afternoon to meaningfully participate    WOC consult- bilat earlobe breakdown    PT & OT consulted for generalized weakness- pt reports he walks at baseline    Updated mother and     Bedside handoff: Y  Discharge plan: TBD

## 2023-03-19 NOTE — PROGRESS NOTES
Hospitalist Medicine Progress Note   [facility]       Tre Vazquez is a 47 year old male with seizure disorder, cerebral palsy with mild spastic paraparesis, mild intellectual disability, mood disturbance, hypertension, congenital left eye blindness, right Bell's palsy with residual facial droop, morbid obesity, borderline personality disorder, PTSD, schizoaffective disorder, obstructive sleep apnea on BiPAP therapy, cerebral ventriculomegaly, prediabetes mellitus, chronic hypercapnic respiratory failure, asthma and depression who is chronically on 2 L of nasal cannula oxygen and lives in a group home came to [facility] 3/15/2023 with Hypoxia - 85% on RA SiO2.  His PCO2 kept on increasing on treatment with BiPAP because of which she was intubated in the ER with chest x-ray showing left lower lobe pneumonia for which was started on ceftriaxone (changed to Zosyn) and azithromycin.  Patient was extubated on 3/19/2023       Date of Admission:  3/15/2023  Assessment & Plan     Acute hypercarbic and hypoxic respiratory failure  Probably on the basis of left lower lobe pneumonia  BiPAP did not improve the PCO2 because of which patient was intubated  Ventilator management per intensive care service     Left lower lobe pneumonia  on Zosyn and azithromycin   Growing GPC in the sputum     Cerebral palsy  Spastic paraparesis  Seizure disorder  On antiepileptic medications  through the NG tube  Seizure precautions will be taken     Hypertension  We will start antihypertensive medications - medication reconciliation is completed to be given through the NG tube     Obstructive sleep apnea  Patient is now intubated               Plan:   Speech to evaluate swallowing  [facility] consult for your ulcerations  Discontinue Reid's catheter  Check CBC and BMP in a.m.    Diet: Adult Formula Drip Feeding: Continuous Vital High Protein; Orogastric tube; Goal Rate: 35; mL/hr; Initial rate of 15 ml/hr advancing by 10  ml q 8 hrs to goal rate of 35 ml/hr; Do not advance tube feeding rate unless K+ is = or > 3.0, Mg++ is = or ... will decide in am   DVT Prophylaxis: Enoxaparin (Lovenox) SQ  Reid Catheter: PRESENT, indication: Strict 1-2 Hour I&O  Code Status: Full Code                Ritesh March MD  Hospitalist Service  [facility]    ______________________________________________________________________    Interval History     Symptoms   Patient was extubated today  He wants to drink up cool water glass       Data reviewed today: I reviewed all medications, new labs and imaging results over the last 24 hours.     Physical Exam   Vital Signs: Temp: 97.3  F (36.3  C) Temp src: Temporal BP: 116/73 Pulse: 59   Resp: 14 SpO2: 95 % O2 Device: Oxymask Oxygen Delivery: 5 LPM  Weight: 311 lbs 1.1 oz      GENERAL: Patient is not in acute distress  HEENT: Conjunctiva is clear   NECK: no Jugular Venous distention  HEART: S1 S2 regular Rate and Rhythm, there is no murmur,   LUNGS: Respirations are not laboured, Lungs there are decreased breath sounds in the lung bases to auscultation without Crepitations or Wheezing   ABDOMEN: Soft,Bowel Sounds are  Positive   LOWER LIMBS: no Pedal Edema  Bilaterally   CNS: Patient is i awake alert and oriented x3 moving all the 4 extremities he has a left facial droop    Data   Recent Labs   Lab 03/19/23  0757 03/19/23  0600 03/19/23  0355 03/18/23  1555 03/18/23  1354 03/18/23  0800 03/18/23  0543 03/17/23  1152 03/17/23  1104 03/16/23  0741 03/16/23  0511 03/15/23  1958 03/15/23  1303   WBC  --   --   --   --   --   --   --   --  7.1  --   --   --  9.1   HGB  --   --   --   --   --   --   --   --  12.7*  --   --   --  13.2*   MCV  --   --   --   --   --   --   --   --  94  --   --   --  99   PLT  --   --   --   --   --   --  149*  --  167  --   --   --  190   NA  --  143  --   --   --   --  142  --   --   --  141  --  144   POTASSIUM  --  3.6  --   --  3.7  --  3.4  --   --   --   4.1  --  4.0   CHLORIDE  --  105  --   --   --   --  103  --   --   --  101  --  103   CO2  --  34*  --   --   --   --  34*  --   --   --  33*  --  33*   BUN  --  16.2  --   --   --   --  13.2  --   --   --  17.2  --  15.6   CR  --  0.57*  --   --   --   --  0.70  --   --   --  0.74  --  0.75   ANIONGAP  --  4*  --   --   --   --  5*  --   --   --  7  --  8   ARNOLD  --  8.3*  --   --   --   --  8.3*  --   --   --  8.7  --  9.0   GLC 83 98 96   < >  --    < > 98   < >  --    < > 96   < > 116*   ALBUMIN  --   --   --   --   --   --   --   --   --   --  3.5  --   --    PROTTOTAL  --   --   --   --   --   --   --   --   --   --  5.7*  --   --    BILITOTAL  --   --   --   --   --   --   --   --   --   --  0.4  --   --    ALKPHOS  --   --   --   --   --   --   --   --   --   --  62  --   --    ALT  --   --   --   --   --   --   --   --   --   --  24  --   --    AST  --   --   --   --   --   --   --   --   --   --  33  --   --     < > = values in this interval not displayed.         No results found for this or any previous visit (from the past 24 hour(s)).

## 2023-03-19 NOTE — PROGRESS NOTES
ICU staff  DOS 3/19/2023    No major events reported overnight. This morning, Mr Vazquez is awake and alert and doing well on 8/8 with spo2 in the mid-90s, RSBI 24-60, and good strength.     Vitals:   Temp:  [96.7  F (35.9  C)-97.6  F (36.4  C)] 97.3  F (36.3  C)  Pulse:  [56-67] 59  Resp:  [9-24] 9  BP: ()/(46-82) 116/73  FiO2 (%):  [40 %] 40 %  SpO2:  [92 %-97 %] 95 %     Vent Mode: CPAP/PS  (Continuous positive airway pressure with Pressure Support)  FiO2 (%): 40 %  Resp Rate (Set): 16 breaths/min  Tidal Volume (Set, mL): 400 mL  PEEP (cm H2O): 7 cmH2O  Pressure Support (cm H2O): 7 cmH2O  Resp: (!) 9    I/O last 3 completed shifts:  In: 4122.24 [I.V.:2107.24; NG/GT:1275]  Out: 1380 [Urine:1380]    Propofol 25 -> off    Exam:  Gen: Alert, eyes open, interacts  HEENT: NC/AT, anicteric, blind left eye  Pulm: Vt 400s-500s on 8 cm H2O, RSBI ~60 at my visit  Cor: RRR  Abdomen/GI: Soft, nondistended  : Reid in place  Extremities: Warm, no pitting edema  Skin: Well-perfused  Neuro: Eyes open, answers questions, follows commands mostly correctly (stuck out tongue instead of held up thumb, for example)  Psych: Unable to assess    Data:   Labs reviewed, no significant changes  No new imaging or micro data    Assessment/plan:  46 y/o man with cerebral palsy, spastic paraplegia, chronic hypoxemia admitted with acute on chronic mixed respiratory failure, thought to be pneumonia vs hypercarbia from OHS/ANA. Looking much better on vent wean this morning, and meets clinical criteria for extubation.  CNS: Off sedation is awake and will follow commands.  # Pain/sedation  # Seizure disorder  # Cerebral palsy with spastic paraplegia  # Congenital blindness left eye  # Depression, schizoaffective disorder, borderline personality disorder  - Propofol off  - PRN analgesics  - Home antiepileptic and behavioral meds  Pulm: Did better on ventilator wean this morning -- RSBI in target, able to hold head off pillow steadily, follows  commands, didn't take Vcap but is otherwise moving air well. Meets criteria for trial of extubation.  # Acute on chronic mixed respiratory failure  # Chronic hypoxemia on home oxygen  # Obstructive sleep apnea  # Suspect obesity hypoventilation syndrome  # Suspect chronic hypercarbia  # Hx asthma  - Extubate -- order placed  - BiPAP while asleep  CV: No new issues.  # Essential hypertension  - Resume antihypertensives when needed  GI: Abdomen benign  # Morbid obesity affecting cares, BMI 43  # Nutritional support  - Tube feedings will stop with extubation  - Speech eval vs bedside swallow eval before resuming oral diet  : UOP adequate  # Hypokalemia  - Correct lytes on protocol  Heme: No new issues.  Msk: Extremities warm, well-perfused.   Endo: Glucose   ID: Afebrile, nothing on cultures.  # ?Pneumonia  - On azithro, pip/tazo since 3/15, might be able to stop tomorrow  ICU: LMWH, PPI    This patient is critically ill to my assessment and requires ICU monitoring and cares. A total of 35 minutes critical care time spent on 3/19/2023, exclusive of procedures.     Rashad Santos MD, PhD  Surgical critical care  3/19/2023, 9:52 AM

## 2023-03-19 NOTE — PLAN OF CARE
Neuro: RASS -2, sedated with propofol, follows commands inconsistently, does open eyes, moving arms  Cardiac: SR/SB borderline 1st degree AVB, BP stable, afebrile  Pulm: no changes to vent settings, LS diminished, moderate amount of oral secretions  GI: obese, rounds, loose stools  : collazo with green/adri UO, adequate amount  Pain: PRN dilaudid given  ID: receiving zosyn  Skin: scab to R face and shin, blanchable area on coccyx  Plan: attempt wean trial later, ? extubate

## 2023-03-20 ENCOUNTER — APPOINTMENT (OUTPATIENT)
Dept: PHYSICAL THERAPY | Facility: CLINIC | Age: 48
DRG: 208 | End: 2023-03-20
Attending: INTERNAL MEDICINE
Payer: MEDICARE

## 2023-03-20 ENCOUNTER — APPOINTMENT (OUTPATIENT)
Dept: SPEECH THERAPY | Facility: CLINIC | Age: 48
DRG: 208 | End: 2023-03-20
Payer: MEDICARE

## 2023-03-20 LAB
ANION GAP SERPL CALCULATED.3IONS-SCNC: 6 MMOL/L (ref 7–15)
BUN SERPL-MCNC: 14.7 MG/DL (ref 6–20)
CALCIUM SERPL-MCNC: 8.8 MG/DL (ref 8.6–10)
CHLORIDE SERPL-SCNC: 103 MMOL/L (ref 98–107)
CREAT SERPL-MCNC: 0.53 MG/DL (ref 0.67–1.17)
CRP SERPL-MCNC: 29.99 MG/L
DEPRECATED HCO3 PLAS-SCNC: 32 MMOL/L (ref 22–29)
ERYTHROCYTE [DISTWIDTH] IN BLOOD BY AUTOMATED COUNT: 13.6 % (ref 10–15)
GFR SERPL CREATININE-BSD FRML MDRD: >90 ML/MIN/1.73M2
GLUCOSE BLDC GLUCOMTR-MCNC: 75 MG/DL (ref 70–99)
GLUCOSE BLDC GLUCOMTR-MCNC: 88 MG/DL (ref 70–99)
GLUCOSE SERPL-MCNC: 95 MG/DL (ref 70–99)
HCT VFR BLD AUTO: 38.7 % (ref 40–53)
HGB BLD-MCNC: 12.3 G/DL (ref 13.3–17.7)
MAGNESIUM SERPL-MCNC: 1.5 MG/DL (ref 1.7–2.3)
MCH RBC QN AUTO: 30.8 PG (ref 26.5–33)
MCHC RBC AUTO-ENTMCNC: 31.8 G/DL (ref 31.5–36.5)
MCV RBC AUTO: 97 FL (ref 78–100)
PHOSPHATE SERPL-MCNC: 2.3 MG/DL (ref 2.5–4.5)
PLAT MORPH BLD: ABNORMAL
PLATELET # BLD AUTO: 149 10E3/UL (ref 150–450)
POTASSIUM SERPL-SCNC: 4.3 MMOL/L (ref 3.4–5.3)
RBC # BLD AUTO: 3.99 10E6/UL (ref 4.4–5.9)
RBC MORPH BLD: ABNORMAL
SODIUM SERPL-SCNC: 141 MMOL/L (ref 136–145)
VARIANT LYMPHS BLD QL SMEAR: PRESENT
WBC # BLD AUTO: 6.9 10E3/UL (ref 4–11)

## 2023-03-20 PROCEDURE — 36416 COLLJ CAPILLARY BLOOD SPEC: CPT | Performed by: INTERNAL MEDICINE

## 2023-03-20 PROCEDURE — 86140 C-REACTIVE PROTEIN: CPT | Performed by: INTERNAL MEDICINE

## 2023-03-20 PROCEDURE — 97530 THERAPEUTIC ACTIVITIES: CPT | Mod: GP | Performed by: PHYSICAL THERAPIST

## 2023-03-20 PROCEDURE — 250N000009 HC RX 250: Performed by: INTERNAL MEDICINE

## 2023-03-20 PROCEDURE — 99233 SBSQ HOSP IP/OBS HIGH 50: CPT | Performed by: INTERNAL MEDICINE

## 2023-03-20 PROCEDURE — 97161 PT EVAL LOW COMPLEX 20 MIN: CPT | Mod: GP | Performed by: PHYSICAL THERAPIST

## 2023-03-20 PROCEDURE — 94640 AIRWAY INHALATION TREATMENT: CPT

## 2023-03-20 PROCEDURE — 250N000011 HC RX IP 250 OP 636: Performed by: INTERNAL MEDICINE

## 2023-03-20 PROCEDURE — 272N000054 HC CANNULA HIGH FLOW, ADULT

## 2023-03-20 PROCEDURE — 82310 ASSAY OF CALCIUM: CPT | Performed by: INTERNAL MEDICINE

## 2023-03-20 PROCEDURE — 84100 ASSAY OF PHOSPHORUS: CPT | Performed by: INTERNAL MEDICINE

## 2023-03-20 PROCEDURE — G0463 HOSPITAL OUTPT CLINIC VISIT: HCPCS

## 2023-03-20 PROCEDURE — 120N000001 HC R&B MED SURG/OB

## 2023-03-20 PROCEDURE — 94640 AIRWAY INHALATION TREATMENT: CPT | Mod: 76

## 2023-03-20 PROCEDURE — 250N000013 HC RX MED GY IP 250 OP 250 PS 637: Performed by: INTERNAL MEDICINE

## 2023-03-20 PROCEDURE — 92526 ORAL FUNCTION THERAPY: CPT | Mod: GN | Performed by: SPEECH-LANGUAGE PATHOLOGIST

## 2023-03-20 PROCEDURE — 258N000003 HC RX IP 258 OP 636: Performed by: INTERNAL MEDICINE

## 2023-03-20 PROCEDURE — C9113 INJ PANTOPRAZOLE SODIUM, VIA: HCPCS | Performed by: INTERNAL MEDICINE

## 2023-03-20 PROCEDURE — 87506 IADNA-DNA/RNA PROBE TQ 6-11: CPT | Performed by: INTERNAL MEDICINE

## 2023-03-20 PROCEDURE — 999N000157 HC STATISTIC RCP TIME EA 10 MIN

## 2023-03-20 PROCEDURE — 999N000215 HC STATISTIC HFNC ADULT NON-CPAP

## 2023-03-20 PROCEDURE — 83735 ASSAY OF MAGNESIUM: CPT | Performed by: INTERNAL MEDICINE

## 2023-03-20 PROCEDURE — 85027 COMPLETE CBC AUTOMATED: CPT | Performed by: INTERNAL MEDICINE

## 2023-03-20 RX ORDER — SODIUM CHLORIDE, SODIUM LACTATE, POTASSIUM CHLORIDE, CALCIUM CHLORIDE 600; 310; 30; 20 MG/100ML; MG/100ML; MG/100ML; MG/100ML
INJECTION, SOLUTION INTRAVENOUS CONTINUOUS
Status: DISCONTINUED | OUTPATIENT
Start: 2023-03-20 | End: 2023-03-22

## 2023-03-20 RX ORDER — MAGNESIUM SULFATE HEPTAHYDRATE 40 MG/ML
4 INJECTION, SOLUTION INTRAVENOUS ONCE
Status: COMPLETED | OUTPATIENT
Start: 2023-03-20 | End: 2023-03-20

## 2023-03-20 RX ORDER — IPRATROPIUM BROMIDE AND ALBUTEROL SULFATE 2.5; .5 MG/3ML; MG/3ML
3 SOLUTION RESPIRATORY (INHALATION)
Status: DISCONTINUED | OUTPATIENT
Start: 2023-03-20 | End: 2023-03-24

## 2023-03-20 RX ADMIN — LEVOCARNITINE 660 MG: 330 TABLET ORAL at 08:46

## 2023-03-20 RX ADMIN — DIVALPROEX SODIUM 500 MG: 500 TABLET, DELAYED RELEASE ORAL at 08:46

## 2023-03-20 RX ADMIN — BUSPIRONE HYDROCHLORIDE 15 MG: 15 TABLET ORAL at 08:46

## 2023-03-20 RX ADMIN — IPRATROPIUM BROMIDE AND ALBUTEROL SULFATE 3 ML: 2.5; .5 SOLUTION RESPIRATORY (INHALATION) at 19:48

## 2023-03-20 RX ADMIN — Medication 1 PACKET: at 21:34

## 2023-03-20 RX ADMIN — HYDROMORPHONE HYDROCHLORIDE 0.5 MG: 1 INJECTION, SOLUTION INTRAMUSCULAR; INTRAVENOUS; SUBCUTANEOUS at 06:49

## 2023-03-20 RX ADMIN — ENOXAPARIN SODIUM 40 MG: 40 INJECTION SUBCUTANEOUS at 20:52

## 2023-03-20 RX ADMIN — ZONISAMIDE 100 MG: 100 CAPSULE ORAL at 08:45

## 2023-03-20 RX ADMIN — ALTEPLASE 2 MG: 2.2 INJECTION, POWDER, LYOPHILIZED, FOR SOLUTION INTRAVENOUS at 06:03

## 2023-03-20 RX ADMIN — ENOXAPARIN SODIUM 40 MG: 40 INJECTION SUBCUTANEOUS at 08:47

## 2023-03-20 RX ADMIN — MAGNESIUM SULFATE HEPTAHYDRATE 4 G: 4 INJECTION, SOLUTION INTRAVENOUS at 21:40

## 2023-03-20 RX ADMIN — PROPRANOLOL HYDROCHLORIDE 20 MG: 10 TABLET ORAL at 20:52

## 2023-03-20 RX ADMIN — LEVETIRACETAM 1500 MG: 500 TABLET, FILM COATED ORAL at 20:53

## 2023-03-20 RX ADMIN — HYDROMORPHONE HYDROCHLORIDE 0.5 MG: 1 INJECTION, SOLUTION INTRAMUSCULAR; INTRAVENOUS; SUBCUTANEOUS at 12:22

## 2023-03-20 RX ADMIN — TAZOBACTAM SODIUM AND PIPERACILLIN SODIUM 3.38 G: 375; 3 INJECTION, SOLUTION INTRAVENOUS at 15:07

## 2023-03-20 RX ADMIN — QUETIAPINE 100 MG: 100 TABLET ORAL at 08:45

## 2023-03-20 RX ADMIN — LEVOCARNITINE 660 MG: 330 TABLET ORAL at 21:34

## 2023-03-20 RX ADMIN — SODIUM CHLORIDE, POTASSIUM CHLORIDE, SODIUM LACTATE AND CALCIUM CHLORIDE: 600; 310; 30; 20 INJECTION, SOLUTION INTRAVENOUS at 11:30

## 2023-03-20 RX ADMIN — LAMOTRIGINE 200 MG: 200 TABLET ORAL at 20:53

## 2023-03-20 RX ADMIN — PRAZOSIN HYDROCHLORIDE 2 MG: 1 CAPSULE ORAL at 21:34

## 2023-03-20 RX ADMIN — PANTOPRAZOLE SODIUM 40 MG: 40 INJECTION, POWDER, LYOPHILIZED, FOR SOLUTION INTRAVENOUS at 06:49

## 2023-03-20 RX ADMIN — TAZOBACTAM SODIUM AND PIPERACILLIN SODIUM 3.38 G: 375; 3 INJECTION, SOLUTION INTRAVENOUS at 08:41

## 2023-03-20 RX ADMIN — LAMOTRIGINE 200 MG: 200 TABLET ORAL at 08:45

## 2023-03-20 RX ADMIN — HYDROMORPHONE HYDROCHLORIDE 0.5 MG: 1 INJECTION, SOLUTION INTRAMUSCULAR; INTRAVENOUS; SUBCUTANEOUS at 02:38

## 2023-03-20 RX ADMIN — PROPRANOLOL HYDROCHLORIDE 20 MG: 10 TABLET ORAL at 08:44

## 2023-03-20 RX ADMIN — IPRATROPIUM BROMIDE AND ALBUTEROL SULFATE 3 ML: 2.5; .5 SOLUTION RESPIRATORY (INHALATION) at 15:19

## 2023-03-20 RX ADMIN — LEVOCARNITINE 660 MG: 330 TABLET ORAL at 15:19

## 2023-03-20 RX ADMIN — DIVALPROEX SODIUM 500 MG: 500 TABLET, DELAYED RELEASE ORAL at 21:34

## 2023-03-20 RX ADMIN — QUETIAPINE 100 MG: 100 TABLET ORAL at 21:34

## 2023-03-20 RX ADMIN — CITALOPRAM HYDROBROMIDE 40 MG: 20 TABLET ORAL at 08:45

## 2023-03-20 RX ADMIN — TAZOBACTAM SODIUM AND PIPERACILLIN SODIUM 3.38 G: 375; 3 INJECTION, SOLUTION INTRAVENOUS at 20:52

## 2023-03-20 RX ADMIN — BUSPIRONE HYDROCHLORIDE 15 MG: 15 TABLET ORAL at 20:52

## 2023-03-20 RX ADMIN — LEVETIRACETAM 1000 MG: 500 TABLET, FILM COATED ORAL at 08:45

## 2023-03-20 RX ADMIN — DIVALPROEX SODIUM 500 MG: 500 TABLET, DELAYED RELEASE ORAL at 15:19

## 2023-03-20 RX ADMIN — TAZOBACTAM SODIUM AND PIPERACILLIN SODIUM 3.38 G: 375; 3 INJECTION, SOLUTION INTRAVENOUS at 02:14

## 2023-03-20 RX ADMIN — ZONISAMIDE 200 MG: 100 CAPSULE ORAL at 21:34

## 2023-03-20 RX ADMIN — QUETIAPINE 100 MG: 100 TABLET ORAL at 15:18

## 2023-03-20 RX ADMIN — IPRATROPIUM BROMIDE AND ALBUTEROL SULFATE 3 ML: 2.5; .5 SOLUTION RESPIRATORY (INHALATION) at 11:44

## 2023-03-20 RX ADMIN — MULTIVITAMIN 15 ML: LIQUID ORAL at 08:47

## 2023-03-20 ASSESSMENT — ACTIVITIES OF DAILY LIVING (ADL)
ADLS_ACUITY_SCORE: 53

## 2023-03-20 NOTE — PROGRESS NOTES
"         [facility]  Respiratory Care Note    The HFNC continues to be applied to the  Pt, and is currently @ 40 LPM and 55% for PEEP therapy. Pt is tolerating it well. Will continue to monitor and assess the pt's current respiratory status and needs.     Vital signs:  Temp: 98.7  F (37.1  C) Temp src: Oral BP: 110/67 Pulse: 77   Resp: 16 SpO2: 94 % O2 Device: High Flow Nasal Cannula (HFNC) Oxygen Delivery: 40 LPM Height: 177.8 cm (5' 10\") Weight: 139.6 kg (307 lb 12.2 oz)  Estimated body mass index is 44.16 kg/m  as calculated from the following:    Height as of this encounter: 1.778 m (5' 10\").    Weight as of this encounter: 139.6 kg (307 lb 12.2 oz).        Past Medical History:   Diagnosis Date     Blindness of left eye      Depression 03/10/2014     Hypertension      Pneumococcal septicemia(038.2) (H) 11/26/2013    Hospitalized     Pneumonia, organism unspecified(486) 11/26/2013    Hospitalized     Uncomplicated asthma      Unspecified epilepsy without mention of intractable epilepsy        Past Surgical History:   Procedure Laterality Date     COLONOSCOPY N/A 12/1/2022    Procedure: COLONOSCOPY;  Surgeon: Michael Caldwell MD;  Location:  OR     ESOPHAGOSCOPY, GASTROSCOPY, DUODENOSCOPY (EGD), COMBINED N/A 12/1/2022    Procedure: ESOPHAGOGASTRODUODENOSCOPY with biopsy;  Surgeon: Michael Caldwell MD;  Location:  OR     ORTHOPEDIC SURGERY      pt stated past surgery on both ankles       No family history on file.    Social History     Tobacco Use     Smoking status: Former     Smokeless tobacco: Never   Substance Use Topics     Alcohol use: No     Misbah Bell RT  [facility]  3/20/2023      "

## 2023-03-20 NOTE — CONSULTS
[facility]  WO Nurse Inpatient Assessment     Consulted for: Bilateral ears     Patient History (according to provider note(s):      Tre Vazquez is a 47 year old male with seizure disorder, cerebral palsy with mild spastic paraparesis, mild intellectual disability, mood disturbance, hypertension, congenital left eye blindness, right Bell's palsy with residual facial droop, morbid obesity, borderline personality disorder, PTSD, schizoaffective disorder, obstructive sleep apnea on BiPAP therapy, cerebral ventriculomegaly, prediabetes mellitus, chronic hypercapnic respiratory failure, asthma and depression who is chronically on 2 L of nasal cannula oxygen and lives in a group home came to [facility] 3/15/2023 with Hypoxia - 85% on RA SiO2.  His PCO2 kept on increasing on treatment with BiPAP because of which she was intubated in the ER with chest x-ray showing left lower lobe pneumonia for which was started on ceftriaxone (changed to Zosyn) and azithromycin.  Patient was extubated on 3/19/2023    Areas Assessed:      Areas visualized during today's visit: Focused:    Wound location: Left ear  Last photo: 3/20/23    Wound due to: Friction  Wound history/plan of care: Friction injury likely from straps rubbing while patient was intubated.  Wound base: 100 % dry drainage     Palpation of the wound bed: normal      Drainage: none     Description of drainage: none     Measurements (length x width x depth, in cm): 0.6  x 0.8 cm      Tunneling: N/A     Undermining: N/A  Periwound skin: Intact, barely blanchable erythema superior      Color: pink      Temperature: normal   Odor: none  Pain: moderate  Pain interventions prior to dressing change: slow and gentle cares   Treatment goal: Heal  and Increase moisture   STATUS: initial assessment  Supplies ordered: supplies stored on unit and discussed with RN     Right ear is intact.    Treatment Plan:     Left ear wound(s): BID   1. Cleanse with  wound cleanser and dry  2. Apply Sween cream to ear lobe  3. Apply Mepilex lite around high flow straps to pad and protect ears, lift straps as needed to keep off ears      Orders: Written    RECOMMEND PRIMARY TEAM ORDER: None, at this time  Education provided: plan of care  Discussed plan of care with: Patient and Nurse  WOC nurse follow-up plan: weekly  Notify WOC if wound(s) deteriorate.  Nursing to notify the Provider(s) and re-consult the WOC Nurse if new skin concern.    DATA:     Current support surface: Standard  Low air loss (LIZZY pump, Isolibrium, Pulsate, skin guard, etc)  BMI: Body mass index is 44.16 kg/m .   Active diet order: Orders Placed This Encounter      Pureed Diet (level 4) Thin Liquids (level 0)     Output: I/O last 3 completed shifts:  In: 1021.73 [I.V.:656.73; NG/GT:260]  Out: 1325 [Urine:1325]     Labs: Recent Labs   Lab 03/17/23  1104 03/16/23  0511   ALBUMIN  --  3.5   HGB 12.7*  --    WBC 7.1  --      Pressure injury risk assessment:   Sensory Perception: 3-->slightly limited  Moisture: 3-->occasionally moist  Activity: 1-->bedfast  Mobility: 2-->very limited  Nutrition: 2-->probably inadequate  Friction and Shear: 2-->potential problem  Julio César Score: 13    MARYJANE Telles [facility] [employer] [facility]  Dept. Office Number: [phone]

## 2023-03-20 NOTE — PLAN OF CARE
Neuro: speech garbled, slurred and difficult to understand at times.  Alert, oriented  Cardiac: SR, 1 AVB, inv T, BP stable, afebrile  Pulm: LS very diminished, had increased WOB so HFNC applied with improvement  GI: obese, BS present, liquid stools  : voiding without difficulty  ID: continue Zosyn  Skin: scabs to L ear, R face and shin, edema noted  Access: unable to draw from PICC and difficult to flush, redressed without improvement will use cathflo.    Plan: PT/OT/ST to evaluate today. Try to wean HFNC.

## 2023-03-20 NOTE — PROGRESS NOTES
SLP - Swallow tx. Improved alertness, PO tolerance.    Recommend:  1.  Cautious initiation of puree diet with thin liquids. Vocera text to Dr. March to order if in agreement.  2. Straws OK  3. Close monitoring/supervision  4. Slow rate, small bites/sips  5. Monitor s/sx of aspiration, changes in respiratory status  6. SLP to cont daily tx per POC

## 2023-03-20 NOTE — PROGRESS NOTES
CLINICAL NUTRITION SERVICES - BRIEF NOTE       Patient discussed during IDT rounds. Extubated yesterday, enteral access lost, currently on HFNC. Pt did not pass swallow evaluation yesterday--anticipate swallow will improve as he gets further from extubation. Discontinue tube feeding orders and monitor diet start.    Will continue to monitor and evaluate per protocol.    Leela Posey RD, LD, [facility]  Pager - 3rd floor/ICU: [phone]  Pager - All other floors: [phone]  Pager - Weekend/holiday: [phone]  Office: [phone]

## 2023-03-20 NOTE — PROGRESS NOTES
Hospitalist Medicine Progress Note   [facility]       Tre Vazquez is a 47 year old male with seizure disorder, cerebral palsy with mild spastic paraparesis, mild intellectual disability, mood disturbance, hypertension, congenital left eye blindness, right Bell's palsy with residual facial droop, morbid obesity, borderline personality disorder, PTSD, schizoaffective disorder, obstructive sleep apnea on BiPAP therapy, cerebral ventriculomegaly, prediabetes mellitus, chronic hypercapnic respiratory failure, asthma and depression who is chronically on 2 L of nasal cannula oxygen and lives in a group home came to [facility] 3/15/2023 with Hypoxia - 85% on RA SiO2.  His PCO2 kept on increasing on treatment with BiPAP because of which she was intubated in the ER with chest x-ray showing left lower lobe pneumonia for which was started on ceftriaxone (changed to Zosyn) and azithromycin.  Patient was extubated on 3/19/2023       Date of Admission:  3/15/2023  Assessment & Plan     Acute hypercarbic and hypoxic respiratory failure  Probably on the basis of left lower lobe pneumonia  BiPAP did not improve the PCO2 because of which patient was intubated on 3/15/2023 and extubated 3/19/2023    Left lower lobe pneumonia  on Zosyn and azithromycin   Growing GPC in the sputum  Has cough for which will start Duonebs     Diarrhea  On antibiotic therapy  We will check stool panel to rule out C. difficile on treatment with antibiotics     Cerebral palsy  Spastic paraparesis  Seizure disorder  On antiepileptic medications  through the NG tube  Seizure precautions will be taken     Hypertension  We will start antihypertensive medications - medication reconciliation is completed to be given through the NG tube     Obstructive sleep apnea  Patient is now intubated     Headache   Has history of Migraine   Will give 10 mg IV compazine x 1           Plan:   We will check stool panel to rule out C.  "difficile  DuoNebs for cough  Increase IV fluids 100 mL/h Ringer lactate with diarrhea for another day  Check CBC and BMP in a.m.  Can go to the floor  Will start patient on puréed diet with thin liquids as recommended by speech pathology    I discussed the plan of care with the patient and his mother who was present in the room    Diet: Adult Formula Drip Feeding: Continuous Vital High Protein; Orogastric tube; Goal Rate: 35; mL/hr; Initial rate of 15 ml/hr advancing by 10 ml q 8 hrs to goal rate of 35 ml/hr; Do not advance tube feeding rate unless K+ is = or > 3.0, Mg++ is = or ... will decide in am   DVT Prophylaxis: Enoxaparin (Lovenox) SQ  Reid Catheter: Not present  Code Status: Full Code                Ritesh March MD  Hospitalist Service  [facility]    ______________________________________________________________________    Interval History     Symptoms   Patient denies any symptoms  Does not give much history saying that I \"talk too loudly\"  Has had diarrhea  Has had cough        Data reviewed today: I reviewed all medications, new labs and imaging results over the last 24 hours.     Physical Exam   Vital Signs: Temp: 97.8  F (36.6  C) Temp src: Temporal BP: (!) 140/91 Pulse: 85   Resp: 26 SpO2: 94 % O2 Device: High Flow Nasal Cannula (HFNC) Oxygen Delivery: 40 LPM  Weight: 307 lbs 12.19 oz      GENERAL: Patient is not in acute distress  HEENT: Conjunctiva is clear   NECK: no Jugular Venous distention  HEART: S1 S2 regular Rate and Rhythm, there is no murmur,   LUNGS: Respirations are not laboured, Lungs there are decreased breath sounds in the lung bases to auscultation without Crepitations or Wheezing   ABDOMEN: Soft,Bowel Sounds are  Positive   LOWER LIMBS: no Pedal Edema  Bilaterally   CNS: Patient is awake alert and oriented x 3 moving all the 4 extremities he has a left facial droop    Data   Recent Labs   Lab 03/20/23  0811 03/20/23  0011 03/19/23  1944 03/19/23  0757 " 03/19/23  0600 03/18/23  1555 03/18/23  1354 03/18/23  0800 03/18/23  0543 03/17/23  1152 03/17/23  1104 03/16/23  0741 03/16/23  0511 03/15/23  1958 03/15/23  1303   WBC  --   --   --   --   --   --   --   --   --   --  7.1  --   --   --  9.1   HGB  --   --   --   --   --   --   --   --   --   --  12.7*  --   --   --  13.2*   MCV  --   --   --   --   --   --   --   --   --   --  94  --   --   --  99   PLT  --   --   --   --   --   --   --   --  149*  --  167  --   --   --  190   NA  --   --   --   --  143  --   --   --  142  --   --   --  141  --  144   POTASSIUM  --   --   --   --  3.6  --  3.7  --  3.4  --   --   --  4.1  --  4.0   CHLORIDE  --   --   --   --  105  --   --   --  103  --   --   --  101  --  103   CO2  --   --   --   --  34*  --   --   --  34*  --   --   --  33*  --  33*   BUN  --   --   --   --  16.2  --   --   --  13.2  --   --   --  17.2  --  15.6   CR  --   --   --   --  0.57*  --   --   --  0.70  --   --   --  0.74  --  0.75   ANIONGAP  --   --   --   --  4*  --   --   --  5*  --   --   --  7  --  8   ARNOLD  --   --   --   --  8.3*  --   --   --  8.3*  --   --   --  8.7  --  9.0   GLC 75 88 81   < > 98   < >  --    < > 98   < >  --    < > 96   < > 116*   ALBUMIN  --   --   --   --   --   --   --   --   --   --   --   --  3.5  --   --    PROTTOTAL  --   --   --   --   --   --   --   --   --   --   --   --  5.7*  --   --    BILITOTAL  --   --   --   --   --   --   --   --   --   --   --   --  0.4  --   --    ALKPHOS  --   --   --   --   --   --   --   --   --   --   --   --  62  --   --    ALT  --   --   --   --   --   --   --   --   --   --   --   --  24  --   --    AST  --   --   --   --   --   --   --   --   --   --   --   --  33  --   --     < > = values in this interval not displayed.         No results found for this or any previous visit (from the past 24 hour(s)).

## 2023-03-20 NOTE — PROGRESS NOTES
TELE:  Notified that patient gets more short of breath with speaking; nurse asking about high flow.  Extubated earlier today.  On chart review pt appears to be only mildly tachypnic at 25 not really changed from earlier today.  Does seem to be intermittently desatting on oximyzer.  High flow seems reasonable.  Order placed.
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
## 2023-03-20 NOTE — PROGRESS NOTES
Respiratory Care Note:    Pt placed on HFNC 50LPM, 55% for PEEP support. Flow decreased to 40LPM by RN due to intolerance of pressure. Bs diminished. Spo2 92%. Will continue to assess and monitor.    RT Saroj on 3/20/2023 at 3:33 AM

## 2023-03-21 ENCOUNTER — APPOINTMENT (OUTPATIENT)
Dept: SPEECH THERAPY | Facility: CLINIC | Age: 48
DRG: 208 | End: 2023-03-21
Payer: MEDICARE

## 2023-03-21 ENCOUNTER — APPOINTMENT (OUTPATIENT)
Dept: PHYSICAL THERAPY | Facility: CLINIC | Age: 48
DRG: 208 | End: 2023-03-21
Payer: MEDICARE

## 2023-03-21 LAB
C COLI+JEJUNI+LARI FUSA STL QL NAA+PROBE: NOT DETECTED
EC STX1 GENE STL QL NAA+PROBE: NOT DETECTED
EC STX2 GENE STL QL NAA+PROBE: NOT DETECTED
ERYTHROCYTE [DISTWIDTH] IN BLOOD BY AUTOMATED COUNT: 13.7 % (ref 10–15)
HCT VFR BLD AUTO: 40.3 % (ref 40–53)
HGB BLD-MCNC: 12.6 G/DL (ref 13.3–17.7)
MAGNESIUM SERPL-MCNC: 1.9 MG/DL (ref 1.7–2.3)
MAGNESIUM SERPL-MCNC: 2.1 MG/DL (ref 1.7–2.3)
MCH RBC QN AUTO: 31.1 PG (ref 26.5–33)
MCHC RBC AUTO-ENTMCNC: 31.3 G/DL (ref 31.5–36.5)
MCV RBC AUTO: 100 FL (ref 78–100)
NOROV GI+II ORF1-ORF2 JNC STL QL NAA+PR: NOT DETECTED
PHOSPHATE SERPL-MCNC: 2.6 MG/DL (ref 2.5–4.5)
PLATELET # BLD AUTO: 164 10E3/UL (ref 150–450)
POTASSIUM SERPL-SCNC: 3.7 MMOL/L (ref 3.4–5.3)
RBC # BLD AUTO: 4.05 10E6/UL (ref 4.4–5.9)
RVA NSP5 STL QL NAA+PROBE: NOT DETECTED
SALMONELLA SP RPOD STL QL NAA+PROBE: NOT DETECTED
SHIGELLA SP+EIEC IPAH STL QL NAA+PROBE: NOT DETECTED
V CHOL+PARA RFBL+TRKH+TNAA STL QL NAA+PR: NOT DETECTED
WBC # BLD AUTO: 6.1 10E3/UL (ref 4–11)
Y ENTERO RECN STL QL NAA+PROBE: NOT DETECTED

## 2023-03-21 PROCEDURE — 94640 AIRWAY INHALATION TREATMENT: CPT | Mod: 76

## 2023-03-21 PROCEDURE — C9113 INJ PANTOPRAZOLE SODIUM, VIA: HCPCS | Performed by: INTERNAL MEDICINE

## 2023-03-21 PROCEDURE — 999N000156 HC STATISTIC RCP CONSULT EA 30 MIN

## 2023-03-21 PROCEDURE — G0463 HOSPITAL OUTPT CLINIC VISIT: HCPCS

## 2023-03-21 PROCEDURE — 99233 SBSQ HOSP IP/OBS HIGH 50: CPT | Performed by: INTERNAL MEDICINE

## 2023-03-21 PROCEDURE — 250N000013 HC RX MED GY IP 250 OP 250 PS 637: Performed by: INTERNAL MEDICINE

## 2023-03-21 PROCEDURE — 258N000003 HC RX IP 258 OP 636: Performed by: INTERNAL MEDICINE

## 2023-03-21 PROCEDURE — 97530 THERAPEUTIC ACTIVITIES: CPT | Mod: GP | Performed by: PHYSICAL THERAPIST

## 2023-03-21 PROCEDURE — 97110 THERAPEUTIC EXERCISES: CPT | Mod: GP | Performed by: PHYSICAL THERAPIST

## 2023-03-21 PROCEDURE — 250N000011 HC RX IP 250 OP 636: Performed by: INTERNAL MEDICINE

## 2023-03-21 PROCEDURE — 92526 ORAL FUNCTION THERAPY: CPT | Mod: GN

## 2023-03-21 PROCEDURE — 250N000009 HC RX 250: Performed by: INTERNAL MEDICINE

## 2023-03-21 PROCEDURE — 120N000001 HC R&B MED SURG/OB

## 2023-03-21 PROCEDURE — 84100 ASSAY OF PHOSPHORUS: CPT | Performed by: INTERNAL MEDICINE

## 2023-03-21 PROCEDURE — 85027 COMPLETE CBC AUTOMATED: CPT | Performed by: INTERNAL MEDICINE

## 2023-03-21 PROCEDURE — 83735 ASSAY OF MAGNESIUM: CPT | Performed by: INTERNAL MEDICINE

## 2023-03-21 PROCEDURE — 36415 COLL VENOUS BLD VENIPUNCTURE: CPT | Performed by: INTERNAL MEDICINE

## 2023-03-21 PROCEDURE — 84132 ASSAY OF SERUM POTASSIUM: CPT | Performed by: INTERNAL MEDICINE

## 2023-03-21 PROCEDURE — 999N000215 HC STATISTIC HFNC ADULT NON-CPAP

## 2023-03-21 PROCEDURE — 94640 AIRWAY INHALATION TREATMENT: CPT

## 2023-03-21 PROCEDURE — 999N000157 HC STATISTIC RCP TIME EA 10 MIN

## 2023-03-21 RX ORDER — MAGNESIUM OXIDE 400 MG/1
400 TABLET ORAL EVERY 4 HOURS
Status: DISPENSED | OUTPATIENT
Start: 2023-03-21 | End: 2023-03-21

## 2023-03-21 RX ORDER — ACETAMINOPHEN 650 MG/1
650 SUPPOSITORY RECTAL EVERY 4 HOURS PRN
Status: DISCONTINUED | OUTPATIENT
Start: 2023-03-21 | End: 2023-03-24 | Stop reason: HOSPADM

## 2023-03-21 RX ORDER — MAGNESIUM SULFATE HEPTAHYDRATE 40 MG/ML
2 INJECTION, SOLUTION INTRAVENOUS ONCE
Status: COMPLETED | OUTPATIENT
Start: 2023-03-21 | End: 2023-03-21

## 2023-03-21 RX ORDER — ACETAMINOPHEN 325 MG/1
650 TABLET ORAL EVERY 4 HOURS PRN
Status: DISCONTINUED | OUTPATIENT
Start: 2023-03-21 | End: 2023-03-24 | Stop reason: HOSPADM

## 2023-03-21 RX ORDER — POTASSIUM CHLORIDE 1500 MG/1
20 TABLET, EXTENDED RELEASE ORAL ONCE
Status: COMPLETED | OUTPATIENT
Start: 2023-03-21 | End: 2023-03-21

## 2023-03-21 RX ADMIN — SODIUM CHLORIDE, POTASSIUM CHLORIDE, SODIUM LACTATE AND CALCIUM CHLORIDE: 600; 310; 30; 20 INJECTION, SOLUTION INTRAVENOUS at 06:46

## 2023-03-21 RX ADMIN — PANTOPRAZOLE SODIUM 40 MG: 40 INJECTION, POWDER, LYOPHILIZED, FOR SOLUTION INTRAVENOUS at 06:45

## 2023-03-21 RX ADMIN — IPRATROPIUM BROMIDE AND ALBUTEROL SULFATE 3 ML: 2.5; .5 SOLUTION RESPIRATORY (INHALATION) at 20:06

## 2023-03-21 RX ADMIN — PROPRANOLOL HYDROCHLORIDE 20 MG: 10 TABLET ORAL at 09:36

## 2023-03-21 RX ADMIN — QUETIAPINE 100 MG: 100 TABLET ORAL at 09:36

## 2023-03-21 RX ADMIN — LEVOCARNITINE 660 MG: 330 TABLET ORAL at 16:09

## 2023-03-21 RX ADMIN — DIVALPROEX SODIUM 500 MG: 500 TABLET, DELAYED RELEASE ORAL at 21:19

## 2023-03-21 RX ADMIN — HYDROMORPHONE HYDROCHLORIDE 0.5 MG: 1 INJECTION, SOLUTION INTRAMUSCULAR; INTRAVENOUS; SUBCUTANEOUS at 19:00

## 2023-03-21 RX ADMIN — BUSPIRONE HYDROCHLORIDE 15 MG: 15 TABLET ORAL at 20:55

## 2023-03-21 RX ADMIN — ENOXAPARIN SODIUM 40 MG: 40 INJECTION SUBCUTANEOUS at 09:37

## 2023-03-21 RX ADMIN — QUETIAPINE 100 MG: 100 TABLET ORAL at 16:09

## 2023-03-21 RX ADMIN — TAZOBACTAM SODIUM AND PIPERACILLIN SODIUM 3.38 G: 375; 3 INJECTION, SOLUTION INTRAVENOUS at 20:45

## 2023-03-21 RX ADMIN — QUETIAPINE 100 MG: 100 TABLET ORAL at 21:19

## 2023-03-21 RX ADMIN — HYDROMORPHONE HYDROCHLORIDE 0.5 MG: 1 INJECTION, SOLUTION INTRAMUSCULAR; INTRAVENOUS; SUBCUTANEOUS at 23:42

## 2023-03-21 RX ADMIN — CITALOPRAM HYDROBROMIDE 40 MG: 20 TABLET ORAL at 09:35

## 2023-03-21 RX ADMIN — ZONISAMIDE 100 MG: 100 CAPSULE ORAL at 09:36

## 2023-03-21 RX ADMIN — ACETAMINOPHEN 650 MG: 325 TABLET ORAL at 18:19

## 2023-03-21 RX ADMIN — BUSPIRONE HYDROCHLORIDE 15 MG: 15 TABLET ORAL at 09:36

## 2023-03-21 RX ADMIN — Medication 1 PACKET: at 01:09

## 2023-03-21 RX ADMIN — TAZOBACTAM SODIUM AND PIPERACILLIN SODIUM 3.38 G: 375; 3 INJECTION, SOLUTION INTRAVENOUS at 09:33

## 2023-03-21 RX ADMIN — TAZOBACTAM SODIUM AND PIPERACILLIN SODIUM 3.38 G: 375; 3 INJECTION, SOLUTION INTRAVENOUS at 14:36

## 2023-03-21 RX ADMIN — Medication 1 PACKET: at 05:53

## 2023-03-21 RX ADMIN — IPRATROPIUM BROMIDE AND ALBUTEROL SULFATE 3 ML: 2.5; .5 SOLUTION RESPIRATORY (INHALATION) at 12:32

## 2023-03-21 RX ADMIN — Medication 1 PACKET: at 09:34

## 2023-03-21 RX ADMIN — PROPRANOLOL HYDROCHLORIDE 20 MG: 10 TABLET ORAL at 20:55

## 2023-03-21 RX ADMIN — SODIUM CHLORIDE, POTASSIUM CHLORIDE, SODIUM LACTATE AND CALCIUM CHLORIDE: 600; 310; 30; 20 INJECTION, SOLUTION INTRAVENOUS at 19:00

## 2023-03-21 RX ADMIN — DIVALPROEX SODIUM 500 MG: 500 TABLET, DELAYED RELEASE ORAL at 16:09

## 2023-03-21 RX ADMIN — ZONISAMIDE 200 MG: 100 CAPSULE ORAL at 21:19

## 2023-03-21 RX ADMIN — ACETAMINOPHEN 650 MG: 325 TABLET ORAL at 14:37

## 2023-03-21 RX ADMIN — LEVOCARNITINE 660 MG: 330 TABLET ORAL at 21:19

## 2023-03-21 RX ADMIN — LEVETIRACETAM 1500 MG: 500 TABLET, FILM COATED ORAL at 20:55

## 2023-03-21 RX ADMIN — LAMOTRIGINE 200 MG: 200 TABLET ORAL at 09:36

## 2023-03-21 RX ADMIN — TAZOBACTAM SODIUM AND PIPERACILLIN SODIUM 3.38 G: 375; 3 INJECTION, SOLUTION INTRAVENOUS at 02:03

## 2023-03-21 RX ADMIN — PRAZOSIN HYDROCHLORIDE 2 MG: 1 CAPSULE ORAL at 21:19

## 2023-03-21 RX ADMIN — ENOXAPARIN SODIUM 40 MG: 40 INJECTION SUBCUTANEOUS at 20:44

## 2023-03-21 RX ADMIN — POTASSIUM CHLORIDE 20 MEQ: 1500 TABLET, EXTENDED RELEASE ORAL at 12:00

## 2023-03-21 RX ADMIN — IPRATROPIUM BROMIDE AND ALBUTEROL SULFATE 3 ML: 2.5; .5 SOLUTION RESPIRATORY (INHALATION) at 16:16

## 2023-03-21 RX ADMIN — LEVOCARNITINE 660 MG: 330 TABLET ORAL at 09:36

## 2023-03-21 RX ADMIN — CITALOPRAM HYDROBROMIDE 20 MG: 20 TABLET ORAL at 09:34

## 2023-03-21 RX ADMIN — LEVETIRACETAM 1000 MG: 500 TABLET, FILM COATED ORAL at 09:36

## 2023-03-21 RX ADMIN — LAMOTRIGINE 200 MG: 200 TABLET ORAL at 20:54

## 2023-03-21 RX ADMIN — DIVALPROEX SODIUM 500 MG: 500 TABLET, DELAYED RELEASE ORAL at 09:37

## 2023-03-21 RX ADMIN — MAGNESIUM SULFATE HEPTAHYDRATE 2 G: 2 INJECTION, SOLUTION INTRAVENOUS at 11:56

## 2023-03-21 RX ADMIN — Medication 1 PACKET: at 12:00

## 2023-03-21 ASSESSMENT — ACTIVITIES OF DAILY LIVING (ADL)
ADLS_ACUITY_SCORE: 51
ADLS_ACUITY_SCORE: 53
ADLS_ACUITY_SCORE: 51
ADLS_ACUITY_SCORE: 53
ADLS_ACUITY_SCORE: 52
ADLS_ACUITY_SCORE: 51
ADLS_ACUITY_SCORE: 53
ADLS_ACUITY_SCORE: 51
ADLS_ACUITY_SCORE: 51

## 2023-03-21 NOTE — PROGRESS NOTES
Patient remains on HFNC 40 L 45%, tolerating well. Skin integrity is intact. RT will continue to monitor.    Fina Lofton, RT

## 2023-03-21 NOTE — PROGRESS NOTES
Pt was on HFNC at 40L, FIO2: 45%.  1230: weaned off HFNC to 7L oxymizer. Sat 95%, RR 20, BS-diminish.

## 2023-03-21 NOTE — PROGRESS NOTES
"[facility] RCAT     Date: 03/15/2023   Admission Dx: Acute Resp Failure w/ Hypercapnia    Pulmonary History:  ANA on BIPAP, Asthma,   Home Nebulizer/MDI Use: None   Home Oxygen: None  Acuity Level (RCAT flow sheet): 3   Aerosol Therapy initiated: Duoneb QID      Pulmonary Hygiene initiated: Deep Breathe and Cough       Volume Expansion initiated: Incentive Spirometry       Current Oxygen Requirements: 7L oxymizer  Current SpO2: 95%    Re-evaluation date: 03/27/23    Patient Education: Deep Breathe and Cough.       See \"RT Assessments\" flow sheet for patient assessment scoring and Acuity Level Details.             "

## 2023-03-21 NOTE — PROGRESS NOTES
[facility]  Hospitalist Progress Note  Shabbir Vickers M.D., M.B.A.   03/21/2023    Reason for Stay/active problem list      Acute on chronic hypercapnic and hypoxic respiratory failure    Left lower lobe pneumonia    Diarrhea         Assessment and Plan:        Summary of Stay: Tre Vazquez is a 47 year old male with seizure disorder, cerebral palsy with mild spastic paraparesis, mild intellectual disability, mood disturbance, hypertension, congenital left eye blindness, right Bell's palsy with residual facial droop, morbid obesity, borderline personality disorder, PTSD, schizoaffective disorder, obstructive sleep apnea on BiPAP therapy, cerebral ventriculomegaly, prediabetes mellitus, chronic hypercapnic respiratory failure, asthma and depression who is chronically on 2 L of nasal cannula oxygen and lives in a group home came to [facility] 3/15/2023 with Hypoxia - 85% on RA SiO2.      His PCO2 kept on increasing on treatment with BiPAP because of which he was intubated in the ER with chest x-ray showing left lower lobe pneumonia for which was started on ceftriaxone (changed to Zosyn) and azithromycin.      Patient was extubated on 3/19/2023 and was transferred out of ICU on March 20.    Problem List with Assessment and Plan:      1. Acute on chronic hypoxic and hypercapnic respiratory failure: Multifactorial etiology including obesity, obstructive sleep apnea, left lower lobe pneumonia.    Patient was treated with respiratory support with BiPAP as well as high flow nasal oxygen.    Currently he is on 45% FiO2 at 40 L/min high flow nasal cannula.  Respiratory therapy consulted and plans to wean him off high flow nasal cannula and transition him to nasal cannula or oxygen mask.      2. Left lower lobe pneumonia-presumed community-acquired bacterial pneumonia      Was treated with Zosyn and azithromycin.    Sputum culture grew gram-positive cocci and normal blanco.  Blood culture was not  "obtained.    Currently patient is afebrile, WBC count is within normal limits.  Has been on antibiotics day #7 with Zosyn and completed course of azithromycin for 5 days.  We will stop antibiotic, monitor patient closely.    3.  Diarrhea  -- Negative enteric bacterial and viral panel.  C. difficile is pending.  Suspect antibiotic related.    4.Cerebral palsy  Spastic paraparesis  Seizure disorder  On antiepileptic medications  through the NG tube  Seizure precautions      5.  Hypertension  --Antihypertensives continued      6.  Obstructive sleep apnea  --May use home CPAP/BiPAP per RT     7.  Headache   Has history of Migraine   As needed Compazine, ibuprofen      Plan for today:    Try to wean him off high flow nasal oxygen.  Continue to monitor patient.    Discontinue antibiotic      VTE Prophylaxis: Enoxaparin (Lovenox) SQ  Code Status: Full Code  Diet: Combination Diet Soft and Bite Sized Diet (level 6); Thin Liquids (level 0)    Reid Catheter: Not present    Family updated today: No     Disposition: Likely to PTA setting in the next 2 days pending improvement of hypoxia back to his baseline oxygen requirement          Interval History (Subjective):        Patient is seen and examined by me today and medical record reviewed.Overnight events noted and care discussed with nursing staff.  Patient's care assumed by me today.  He has been having some diarrhea but denies any significant shortness of breath or chest pain.  No fever or chills.  Care plan discussed with bedside nurse and staff.  I spoke with respiratory therapy team                  Physical Exam:        Last Vital Signs:  BP (!) 143/72 (BP Location: Right arm)   Pulse 89   Temp 98.3  F (36.8  C) (Oral)   Resp 20   Ht 1.778 m (5' 10\")   Wt 139.6 kg (307 lb 12.2 oz)   SpO2 95%   BMI 44.16 kg/m      I/O last 3 completed shifts:  In: 480 [P.O.:480]  Out: 1450 [Urine:1450]    Wt Readings from Last 5 Encounters:   03/20/23 139.6 kg (307 lb 12.2 oz) "   01/03/23 122.5 kg (270 lb)   11/30/22 123.4 kg (272 lb)   04/19/22 122.1 kg (269 lb 3.2 oz)   12/08/21 116.1 kg (256 lb)        Constitutional: Awake, alert, cooperative, no apparent distress     Respiratory: Clear to auscultation bilaterally, no crackles or wheezing   Cardiovascular: Regular rate and rhythm, normal S1 and S2, and no murmur noted   Abdomen: Normal bowel sounds, soft, non-distended, non-tender   Skin: No new rashes, no cyanosis, dry to touch   Neuro: Alert with  no new focal weakness   Extremities: No edema   Other(s):        All other systems: Negative          Medications:        All current medications were reviewed with changes reflected in problem list.         Data:      All new lab and imaging data was reviewed.      Data reviewed today: I reviewed all new labs and imaging results over the last 24 hours. I personally reviewed       Recent Labs   Lab 03/21/23  0610 03/20/23 2022 03/18/23  0543 03/17/23  1104   WBC 6.1 6.9  --  7.1   HGB 12.6* 12.3*  --  12.7*   HCT 40.3 38.7*  --  38.2*    97  --  94    149* 149* 167     No results for input(s): CULT in the last 168 hours.  Recent Labs   Lab 03/21/23  0610 03/21/23  0315 03/20/23 2022 03/20/23  0811 03/20/23  0011 03/19/23  0757 03/19/23  0600 03/18/23  0800 03/18/23  0543 03/16/23  0741 03/16/23  0511   NA  --   --  141  --   --   --  143  --  142  --  141   POTASSIUM 3.7  --  4.3  --   --   --  3.6   < > 3.4  --  4.1   CHLORIDE  --   --  103  --   --   --  105  --  103  --  101   CO2  --   --  32*  --   --   --  34*  --  34*  --  33*   ANIONGAP  --   --  6*  --   --   --  4*  --  5*  --  7   GLC  --   --  95 75 88   < > 98   < > 98   < > 96   BUN  --   --  14.7  --   --   --  16.2  --  13.2  --  17.2   CR  --   --  0.53*  --   --   --  0.57*  --  0.70  --  0.74   GFRESTIMATED  --   --  >90  --   --   --  >90  --  >90  --  >90   ARNOLD  --   --  8.8  --   --   --  8.3*  --  8.3*  --  8.7   MAG 1.9 2.1 1.5*  --   --   --  1.8   <  > 1.4*  --  1.5*   PHOS 2.6  --  2.3*  --   --   --  2.6  --  3.2  --  3.3   PROTTOTAL  --   --   --   --   --   --   --   --   --   --  5.7*   ALBUMIN  --   --   --   --   --   --   --   --   --   --  3.5   BILITOTAL  --   --   --   --   --   --   --   --   --   --  0.4   ALKPHOS  --   --   --   --   --   --   --   --   --   --  62   AST  --   --   --   --   --   --   --   --   --   --  33   ALT  --   --   --   --   --   --   --   --   --   --  24    < > = values in this interval not displayed.       Recent Labs   Lab 03/20/23 2022 03/20/23  0811 03/20/23  0011 03/19/23  1944 03/19/23  1606   GLC 95 75 88 81 80       No results for input(s): INR in the last 168 hours.      No results for input(s): TROPONIN, TROPI, TROPR in the last 168 hours.    Invalid input(s): TROP, TROPONINIES    No results found for this or any previous visit (from the past 48 hour(s)).    COVID Status:  COVID-19 PCR Results    COVID-19 PCR Results 8/30/21 12/8/21 6/20/22 3/15/23   SARS CoV2 PCR Negative Negative Negative Negative      Comments are available for some flowsheets but are not being displayed.         COVID-19 Antibody Results, Testing for Immunity    COVID-19 Antibody Results, Testing for Immunity   No data to display.              Disclaimer: This note consists of symbols derived from keyboarding, dictation and/or voice recognition software. As a result, there may be errors in the script that have gone undetected. Please consider this when interpreting information found in this chart.

## 2023-03-21 NOTE — PLAN OF CARE
Goal Outcome Evaluation:    End of Shift Summary  For vital signs and complete assessments, please see documentation flowsheets.     Pertinent assessments:   A&Ox4, forgetful. VSS. Weaned from high flow to 7L oxymizer, sats in the mid 90s. C/o right shoulder/elbow pain, PRN tylenol given. Exp wheezes noted this afternoon, resolved after nebulizer. Incontinent, continues to have frequent watery stools, cdiff test pending. Assist of 2 w/ lift, PT to see this afternoon. Tolerating diet, taking meds whole. Tele - per tech NSR.     Major Shift Events: Oxygen weaned to 7L  oxymizer. SLP eval - cleared for soft/bite sized.  Treatment Plan: Wean oxygen as able, Zosyn, IVF, PT/OT/Speech consults

## 2023-03-21 NOTE — PLAN OF CARE
End of Shift Summary  For vital signs and complete assessments, please see documentation flowsheets.     Pertinent assessments: A&Ox4, forgetful. VSS on 40L 45%, sating in the mid 90's. Denies pain and SOB. No cough noted. Tolerating pureed diet with thin liquids, takes meds crushed in ice cream. Frequent watery stools, r/o c-diff. Needs assistance with urinal in bed. Ax2 with lift.     Major Shift Events: None  Treatment Plan: Monitor oxygen needs, Zosyn, IVF, PT/OT/Speech consults   Bedside Nurse: Elicia Crum RN

## 2023-03-21 NOTE — PROGRESS NOTES
Care Management Follow Up    Length of Stay (days): 6    Expected Discharge Date: 03/23/2023     Concerns to be Addressed: care coordination/care conferences, discharge planning     Patient plan of care discussed at interdisciplinary rounds: Yes    Anticipated Discharge Disposition: Group Home     Anticipated Discharge Services: None  Anticipated Discharge DME: Oxygen    Patient/family educated on Medicare website which has current facility and service quality ratings:  (NA)  Education Provided on the Discharge Plan:    Patient/Family in Agreement with the Plan: unable to assess    Referrals Placed by CM/SW: External Care Coordination  Private pay costs discussed: Not applicable    Additional Information:  Attempted to meet with patient this afternoon to discuss discharge planning but patient was busy with staff and unable to meet.  PT is currently recommending TCU vs LTACH depending on medical needs. HFNC was weaned off this afternoon to 7L oxymizer. Will monitor his oxygen requirements.  Patient is listed as his own decision maker. Mother Ashlie is his emergency contact.  Attempted to contact Jocelyne at [facility]. [facility] at [phone] to verify patients baseline services.    Will follow up with patient tomorrow to discuss TCU vs LTACH    Addendum: received call back from Jocelyne . She confirms patients baseline mobility is one and the walker and would need to be this to return. He is chronically on oxygen 2L daytime and 3L hs provided by [facility]. He has used a CPAP in the past but she does not believe he has been using it recently.   She was in to see patient today and thought he looked much better but weak. She had some concerns that his right arm looked weaker and this is his dominate side. She had noticed this in ICU as well and made them aware.   Discussed discharge planning and that he will need placement post hospitalization. She was inquiring about getting orders for therapies and a  wheelchair on discharge and explained that the facility he discharges too will assist with these when he is ready for discharge from them.      Olivia Arndt RN BSN OCN  Care Coordinator  [facility]  [phone]

## 2023-03-21 NOTE — PROGRESS NOTES
"SPIRITUAL HEALTH SERVICES Progress Note  RH Med/Surg 5    Saw pt Joel per his length of stay.  Oriented him to the role of a .  Joel denied that he had anything he wanted to talk about.  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  Joel expressed no interest in the music channels on the TV.    He asked for the number of his group home, a request that I conveyed to his RN.    Plan: Informed pt how he can request further  support.  This author and other chaplains remain available per pt/family request.    Romaine Ruiz M.Div., [facility]  Staff     [facility] routine referrals *[phone]  [facility] available 24/7 for emergent requests/referrals, either by paging the on-call  or by entering an ASAP/STAT consult in Epic (this will also page the on-call ).    "

## 2023-03-22 ENCOUNTER — APPOINTMENT (OUTPATIENT)
Dept: PHYSICAL THERAPY | Facility: CLINIC | Age: 48
DRG: 208 | End: 2023-03-22
Payer: MEDICARE

## 2023-03-22 ENCOUNTER — APPOINTMENT (OUTPATIENT)
Dept: SPEECH THERAPY | Facility: CLINIC | Age: 48
DRG: 208 | End: 2023-03-22
Payer: MEDICARE

## 2023-03-22 LAB
ANION GAP SERPL CALCULATED.3IONS-SCNC: 6 MMOL/L (ref 7–15)
BUN SERPL-MCNC: 12.7 MG/DL (ref 6–20)
CALCIUM SERPL-MCNC: 8.8 MG/DL (ref 8.6–10)
CHLORIDE SERPL-SCNC: 102 MMOL/L (ref 98–107)
CREAT SERPL-MCNC: 0.5 MG/DL (ref 0.67–1.17)
DEPRECATED HCO3 PLAS-SCNC: 33 MMOL/L (ref 22–29)
ERYTHROCYTE [DISTWIDTH] IN BLOOD BY AUTOMATED COUNT: 13.6 % (ref 10–15)
GFR SERPL CREATININE-BSD FRML MDRD: >90 ML/MIN/1.73M2
GLUCOSE SERPL-MCNC: 116 MG/DL (ref 70–99)
HCT VFR BLD AUTO: 41.7 % (ref 40–53)
HGB BLD-MCNC: 12.8 G/DL (ref 13.3–17.7)
MAGNESIUM SERPL-MCNC: 1.5 MG/DL (ref 1.7–2.3)
MAGNESIUM SERPL-MCNC: 1.9 MG/DL (ref 1.7–2.3)
MCH RBC QN AUTO: 30.6 PG (ref 26.5–33)
MCHC RBC AUTO-ENTMCNC: 30.7 G/DL (ref 31.5–36.5)
MCV RBC AUTO: 100 FL (ref 78–100)
PHOSPHATE SERPL-MCNC: 2.6 MG/DL (ref 2.5–4.5)
PLATELET # BLD AUTO: 191 10E3/UL (ref 150–450)
POTASSIUM SERPL-SCNC: 4.5 MMOL/L (ref 3.4–5.3)
RBC # BLD AUTO: 4.18 10E6/UL (ref 4.4–5.9)
SODIUM SERPL-SCNC: 141 MMOL/L (ref 136–145)
WBC # BLD AUTO: 7.8 10E3/UL (ref 4–11)

## 2023-03-22 PROCEDURE — 83735 ASSAY OF MAGNESIUM: CPT | Performed by: INTERNAL MEDICINE

## 2023-03-22 PROCEDURE — 94640 AIRWAY INHALATION TREATMENT: CPT | Mod: 76

## 2023-03-22 PROCEDURE — 99232 SBSQ HOSP IP/OBS MODERATE 35: CPT | Performed by: INTERNAL MEDICINE

## 2023-03-22 PROCEDURE — 250N000009 HC RX 250: Performed by: INTERNAL MEDICINE

## 2023-03-22 PROCEDURE — 120N000001 HC R&B MED SURG/OB

## 2023-03-22 PROCEDURE — 85027 COMPLETE CBC AUTOMATED: CPT | Performed by: INTERNAL MEDICINE

## 2023-03-22 PROCEDURE — 250N000013 HC RX MED GY IP 250 OP 250 PS 637: Performed by: INTERNAL MEDICINE

## 2023-03-22 PROCEDURE — 94640 AIRWAY INHALATION TREATMENT: CPT

## 2023-03-22 PROCEDURE — 97530 THERAPEUTIC ACTIVITIES: CPT | Mod: GP | Performed by: PHYSICAL THERAPIST

## 2023-03-22 PROCEDURE — 36416 COLLJ CAPILLARY BLOOD SPEC: CPT | Performed by: INTERNAL MEDICINE

## 2023-03-22 PROCEDURE — 250N000011 HC RX IP 250 OP 636: Performed by: INTERNAL MEDICINE

## 2023-03-22 PROCEDURE — 97116 GAIT TRAINING THERAPY: CPT | Mod: GP | Performed by: PHYSICAL THERAPIST

## 2023-03-22 PROCEDURE — 999N000157 HC STATISTIC RCP TIME EA 10 MIN

## 2023-03-22 PROCEDURE — 80048 BASIC METABOLIC PNL TOTAL CA: CPT | Performed by: INTERNAL MEDICINE

## 2023-03-22 PROCEDURE — 84100 ASSAY OF PHOSPHORUS: CPT | Performed by: INTERNAL MEDICINE

## 2023-03-22 PROCEDURE — 258N000003 HC RX IP 258 OP 636: Performed by: INTERNAL MEDICINE

## 2023-03-22 PROCEDURE — 92526 ORAL FUNCTION THERAPY: CPT | Mod: GN

## 2023-03-22 PROCEDURE — C9113 INJ PANTOPRAZOLE SODIUM, VIA: HCPCS | Performed by: INTERNAL MEDICINE

## 2023-03-22 RX ORDER — MAGNESIUM SULFATE HEPTAHYDRATE 40 MG/ML
4 INJECTION, SOLUTION INTRAVENOUS ONCE
Status: COMPLETED | OUTPATIENT
Start: 2023-03-22 | End: 2023-03-22

## 2023-03-22 RX ADMIN — IPRATROPIUM BROMIDE AND ALBUTEROL SULFATE 3 ML: 2.5; .5 SOLUTION RESPIRATORY (INHALATION) at 15:38

## 2023-03-22 RX ADMIN — CITALOPRAM HYDROBROMIDE 20 MG: 20 TABLET ORAL at 09:22

## 2023-03-22 RX ADMIN — CITALOPRAM HYDROBROMIDE 40 MG: 20 TABLET ORAL at 09:21

## 2023-03-22 RX ADMIN — PRAZOSIN HYDROCHLORIDE 2 MG: 1 CAPSULE ORAL at 21:33

## 2023-03-22 RX ADMIN — IPRATROPIUM BROMIDE AND ALBUTEROL SULFATE 3 ML: 2.5; .5 SOLUTION RESPIRATORY (INHALATION) at 19:17

## 2023-03-22 RX ADMIN — ZONISAMIDE 100 MG: 100 CAPSULE ORAL at 09:20

## 2023-03-22 RX ADMIN — DIVALPROEX SODIUM 500 MG: 500 TABLET, DELAYED RELEASE ORAL at 09:30

## 2023-03-22 RX ADMIN — SODIUM CHLORIDE, POTASSIUM CHLORIDE, SODIUM LACTATE AND CALCIUM CHLORIDE: 600; 310; 30; 20 INJECTION, SOLUTION INTRAVENOUS at 07:01

## 2023-03-22 RX ADMIN — LEVOCARNITINE 660 MG: 330 TABLET ORAL at 16:18

## 2023-03-22 RX ADMIN — IPRATROPIUM BROMIDE AND ALBUTEROL SULFATE 3 ML: 2.5; .5 SOLUTION RESPIRATORY (INHALATION) at 07:39

## 2023-03-22 RX ADMIN — LEVETIRACETAM 1000 MG: 500 TABLET, FILM COATED ORAL at 09:23

## 2023-03-22 RX ADMIN — LEVOCARNITINE 660 MG: 330 TABLET ORAL at 09:29

## 2023-03-22 RX ADMIN — PROPRANOLOL HYDROCHLORIDE 20 MG: 10 TABLET ORAL at 09:28

## 2023-03-22 RX ADMIN — DIVALPROEX SODIUM 500 MG: 500 TABLET, DELAYED RELEASE ORAL at 16:18

## 2023-03-22 RX ADMIN — PANTOPRAZOLE SODIUM 40 MG: 40 INJECTION, POWDER, LYOPHILIZED, FOR SOLUTION INTRAVENOUS at 06:55

## 2023-03-22 RX ADMIN — IPRATROPIUM BROMIDE AND ALBUTEROL SULFATE 3 ML: 2.5; .5 SOLUTION RESPIRATORY (INHALATION) at 11:56

## 2023-03-22 RX ADMIN — TAZOBACTAM SODIUM AND PIPERACILLIN SODIUM 3.38 G: 375; 3 INJECTION, SOLUTION INTRAVENOUS at 03:14

## 2023-03-22 RX ADMIN — BUSPIRONE HYDROCHLORIDE 15 MG: 15 TABLET ORAL at 09:21

## 2023-03-22 RX ADMIN — ENOXAPARIN SODIUM 40 MG: 40 INJECTION SUBCUTANEOUS at 21:33

## 2023-03-22 RX ADMIN — ENOXAPARIN SODIUM 40 MG: 40 INJECTION SUBCUTANEOUS at 09:23

## 2023-03-22 RX ADMIN — LAMOTRIGINE 200 MG: 200 TABLET ORAL at 09:23

## 2023-03-22 RX ADMIN — QUETIAPINE 100 MG: 100 TABLET ORAL at 09:35

## 2023-03-22 RX ADMIN — MAGNESIUM SULFATE HEPTAHYDRATE 4 G: 4 INJECTION, SOLUTION INTRAVENOUS at 17:11

## 2023-03-22 RX ADMIN — QUETIAPINE 100 MG: 100 TABLET ORAL at 16:18

## 2023-03-22 ASSESSMENT — ACTIVITIES OF DAILY LIVING (ADL)
ADLS_ACUITY_SCORE: 53

## 2023-03-22 NOTE — PROGRESS NOTES
Pt up in chair today with pt with 2 assist, tolerating diet and titration of o2 down to 2 liters nasal canula.Magnesium replaced today. Possible discharge tomorrow.

## 2023-03-22 NOTE — PROGRESS NOTES
Care Management Follow Up    Length of Stay (days): 7    Expected Discharge Date: 03/23/2023     Concerns to be Addressed: care coordination/care conferences, discharge planning     Patient plan of care discussed at interdisciplinary rounds: Yes    Anticipated Discharge Disposition: Group Home     Anticipated Discharge Services: None  Anticipated Discharge DME: Oxygen    Patient/family educated on Medicare website which has current facility and service quality ratings:  (NA)  Education Provided on the Discharge Plan:    Patient/Family in Agreement with the Plan: unable to assess    Referrals Placed by CM/SW: External Care Coordination  Private pay costs discussed: Not applicable    Additional Information:  CM attempted to met with patient, he is sleepy, awakens to voice yet falls asleep during conversation.     Per chart review, therapy recommending Home with home health services. Patient O2 also weaned down to 3 lpm.     CM called and spoke with Jocelyne , #[phone]. Notified  of PT updated discharge recommendation for patient to return to  with HHPT . Discussed patient progressing with therapy and oxygen needs. She expressed patient had previous fall and would need to ambulate with Ax1 in order to return to . Group Home can accept patient at an Ax1. They prefer patient return before 3 pm. No weekend returns. Patient has an [facility] PCP and had [facility]. Home care referral sent, per  process.     Radha Garcia  contact (name/number): # [phone]  Fax #: [phone]  Barriers to pt returning: Baseline Mobility  Baseline mobility: Assist of 1 with Walker  Time of preferred return: Prior to 1500 and not on weekends  New meds/prescriptions (fill here?): Check with  prior to discharge   Transportation: TBD    Home oxygen thru [employer].    CM will continue to follow for discharge planning.     Lyle Dumont RN Case Manager  Inpatient Care Coordination   [facility]  [facility]   [phone]        Lyle Dumont RN

## 2023-03-22 NOTE — PROGRESS NOTES
[facility]  Hospitalist Progress Note  Shabbir Vickers M.D., M.B.A.   03/22/2023    Reason for Stay/active problem list      Acute on chronic hypercapnic and hypoxic respiratory failure    Left lower lobe pneumonia    Diarrhea         Assessment and Plan:        Summary of Stay: Tre Vazquez is a 47 year old male with seizure disorder, cerebral palsy with mild spastic paraparesis, mild intellectual disability, mood disturbance, hypertension, congenital left eye blindness, right Bell's palsy with residual facial droop, morbid obesity, borderline personality disorder, PTSD, schizoaffective disorder, obstructive sleep apnea on BiPAP therapy, cerebral ventriculomegaly, prediabetes mellitus, chronic hypercapnic respiratory failure, asthma and depression who is chronically on 2 L of nasal cannula oxygen and lives in a group home came to [facility] 3/15/2023 with Hypoxia - 85% on RA SiO2.      His PCO2 kept on increasing on treatment with BiPAP because of which he was intubated in the ER with chest x-ray showing left lower lobe pneumonia for which was started on ceftriaxone (changed to Zosyn) and azithromycin.      Patient was extubated on 3/19/2023 and was transferred out of ICU on March 20.    Problem List with Assessment and Plan:      1. Acute on chronic hypoxic and hypercapnic respiratory failure: Multifactorial etiology including obesity, obstructive sleep apnea, left lower lobe pneumonia.    Patient was treated with respiratory support with BiPAP as well as high flow nasal oxygen.    Currently he is off high flow down to 5 L of nasal Oxymizer,.  Continue to wean down oxygen back to his normal oxygen requirement of 2 to 3 L    2. Left lower lobe pneumonia-presumed community-acquired bacterial pneumonia      Was treated with Zosyn and azithromycin.    Sputum culture grew gram-positive cocci and normal blanco.  Blood culture was not obtained.  Currently patient is afebrile, WBC count is  "within normal limits.  Completed antibiotic course of antibiotic.  Of antibiotic now  3.  Diarrhea  -- Negative enteric bacterial and viral panel.      4.Cerebral palsy  Spastic paraparesis  Seizure disorder  On antiepileptic medications  through the NG tube  Seizure precautions      5.  Hypertension  --Antihypertensives continued      6.  Obstructive sleep apnea  --May use home CPAP/BiPAP per RT     7.  Headache   Has history of Migraine   As needed Compazine, ibuprofen      Plan for today:  --Continue supplemental oxygen, wean down as able      VTE Prophylaxis: Enoxaparin (Lovenox) SQ  Code Status: Full Code  Diet: Combination Diet Soft and Bite Sized Diet (level 6); Thin Liquids (level 0)    Reid Catheter: Not present    Family updated today: No     Disposition: Likely back to his group home setting by tomorrow if he continues to do well and stable.          Interval History (Subjective):        Patient is seen and examined by me today and medical record reviewed.Overnight events noted and care discussed with nursing staff.  Patient is feeling better today however is complaining of some shoulder pain and requesting pain management.  Care plan discussed with bedside nurse to continue to treat his pain as needed.  No fever or chills.  On 5 L of oxygen earlier which is improving.  Off high flow nasal oxygen                Physical Exam:        Last Vital Signs:  /60 (BP Location: Right arm)   Pulse 86   Temp 99.1  F (37.3  C) (Axillary)   Resp 18   Ht 1.778 m (5' 10\")   Wt 139.6 kg (307 lb 12.2 oz)   SpO2 98%   BMI 44.16 kg/m      I/O last 3 completed shifts:  In: 9770 [P.O.:720; I.V.:9050]  Out: -     Wt Readings from Last 5 Encounters:   03/20/23 139.6 kg (307 lb 12.2 oz)   01/03/23 122.5 kg (270 lb)   11/30/22 123.4 kg (272 lb)   04/19/22 122.1 kg (269 lb 3.2 oz)   12/08/21 116.1 kg (256 lb)        Constitutional: Awake, alert, cooperative, no apparent distress     Respiratory: Clear to auscultation " bilaterally, no crackles or wheezing   Cardiovascular: Regular rate and rhythm, normal S1 and S2, and no murmur noted   Abdomen: Normal bowel sounds, soft, non-distended, non-tender   Skin: No new rashes, no cyanosis, dry to touch   Neuro: Alert with  no new focal weakness   Extremities: No edema   Other(s):        All other systems: Negative          Medications:        All current medications were reviewed with changes reflected in problem list.         Data:      All new lab and imaging data was reviewed.      Data reviewed today: I reviewed all new labs and imaging results over the last 24 hours. I personally reviewed       Recent Labs   Lab 03/21/23  0610 03/20/23 2022 03/18/23  0543 03/17/23  1104   WBC 6.1 6.9  --  7.1   HGB 12.6* 12.3*  --  12.7*   HCT 40.3 38.7*  --  38.2*    97  --  94    149* 149* 167     No results for input(s): CULT in the last 168 hours.  Recent Labs   Lab 03/21/23  0610 03/21/23  0315 03/20/23 2022 03/20/23  0811 03/20/23  0011 03/19/23  0757 03/19/23  0600 03/18/23  0800 03/18/23  0543 03/16/23  0741 03/16/23  0511   NA  --   --  141  --   --   --  143  --  142  --  141   POTASSIUM 3.7  --  4.3  --   --   --  3.6   < > 3.4  --  4.1   CHLORIDE  --   --  103  --   --   --  105  --  103  --  101   CO2  --   --  32*  --   --   --  34*  --  34*  --  33*   ANIONGAP  --   --  6*  --   --   --  4*  --  5*  --  7   GLC  --   --  95 75 88   < > 98   < > 98   < > 96   BUN  --   --  14.7  --   --   --  16.2  --  13.2  --  17.2   CR  --   --  0.53*  --   --   --  0.57*  --  0.70  --  0.74   GFRESTIMATED  --   --  >90  --   --   --  >90  --  >90  --  >90   ARNOLD  --   --  8.8  --   --   --  8.3*  --  8.3*  --  8.7   MAG 1.9 2.1 1.5*  --   --   --  1.8   < > 1.4*  --  1.5*   PHOS 2.6  --  2.3*  --   --   --  2.6  --  3.2  --  3.3   PROTTOTAL  --   --   --   --   --   --   --   --   --   --  5.7*   ALBUMIN  --   --   --   --   --   --   --   --   --   --  3.5   BILITOTAL  --   --   --    --   --   --   --   --   --   --  0.4   ALKPHOS  --   --   --   --   --   --   --   --   --   --  62   AST  --   --   --   --   --   --   --   --   --   --  33   ALT  --   --   --   --   --   --   --   --   --   --  24    < > = values in this interval not displayed.       Recent Labs   Lab 03/20/23 2022 03/20/23  0811 03/20/23  0011 03/19/23  1944 03/19/23  1606   GLC 95 75 88 81 80       No results for input(s): INR in the last 168 hours.      No results for input(s): TROPONIN, TROPI, TROPR in the last 168 hours.    Invalid input(s): TROP, TROPONINIES    No results found for this or any previous visit (from the past 48 hour(s)).    COVID Status:  COVID-19 PCR Results    COVID-19 PCR Results 8/30/21 12/8/21 6/20/22 3/15/23   SARS CoV2 PCR Negative Negative Negative Negative      Comments are available for some flowsheets but are not being displayed.         COVID-19 Antibody Results, Testing for Immunity    COVID-19 Antibody Results, Testing for Immunity   No data to display.              Disclaimer: This note consists of symbols derived from keyboarding, dictation and/or voice recognition software. As a result, there may be errors in the script that have gone undetected. Please consider this when interpreting information found in this chart.

## 2023-03-22 NOTE — PROGRESS NOTES
End of Shift Summary  For vital signs and complete assessments, please see documentation flowsheets.     Pertinent assessments: Pt A&O, forgetful at times. Slightly elevated BP, currently on 7L with oxy mizer NC. Pt c/o left shoulder/arm pain 10/10 IV Dilaudid x 1 given. LS diminished, denies any SOB. Incont of B&B, large incont stool x 3 this shift. On tele monitoring. Pt calls frequently throughout the shift     Major Shift Events: uneventful     Treatment Plan: IVF, IV Zosyn, wean from O2 as able, monitor electrolytes

## 2023-03-23 ENCOUNTER — APPOINTMENT (OUTPATIENT)
Dept: OCCUPATIONAL THERAPY | Facility: CLINIC | Age: 48
DRG: 208 | End: 2023-03-23
Attending: INTERNAL MEDICINE
Payer: MEDICARE

## 2023-03-23 ENCOUNTER — APPOINTMENT (OUTPATIENT)
Dept: SPEECH THERAPY | Facility: CLINIC | Age: 48
DRG: 208 | End: 2023-03-23
Payer: MEDICARE

## 2023-03-23 ENCOUNTER — APPOINTMENT (OUTPATIENT)
Dept: PHYSICAL THERAPY | Facility: CLINIC | Age: 48
DRG: 208 | End: 2023-03-23
Payer: MEDICARE

## 2023-03-23 LAB
MAGNESIUM SERPL-MCNC: 1.6 MG/DL (ref 1.7–2.3)
PHOSPHATE SERPL-MCNC: 2.5 MG/DL (ref 2.5–4.5)
POTASSIUM SERPL-SCNC: 3.9 MMOL/L (ref 3.4–5.3)

## 2023-03-23 PROCEDURE — 36415 COLL VENOUS BLD VENIPUNCTURE: CPT | Performed by: INTERNAL MEDICINE

## 2023-03-23 PROCEDURE — 120N000001 HC R&B MED SURG/OB

## 2023-03-23 PROCEDURE — 84132 ASSAY OF SERUM POTASSIUM: CPT | Performed by: INTERNAL MEDICINE

## 2023-03-23 PROCEDURE — 94640 AIRWAY INHALATION TREATMENT: CPT | Mod: 76

## 2023-03-23 PROCEDURE — 84100 ASSAY OF PHOSPHORUS: CPT | Performed by: INTERNAL MEDICINE

## 2023-03-23 PROCEDURE — 97530 THERAPEUTIC ACTIVITIES: CPT | Mod: GP | Performed by: PHYSICAL THERAPIST

## 2023-03-23 PROCEDURE — 250N000009 HC RX 250: Performed by: INTERNAL MEDICINE

## 2023-03-23 PROCEDURE — 999N000157 HC STATISTIC RCP TIME EA 10 MIN

## 2023-03-23 PROCEDURE — 97165 OT EVAL LOW COMPLEX 30 MIN: CPT | Mod: GO

## 2023-03-23 PROCEDURE — 83735 ASSAY OF MAGNESIUM: CPT | Performed by: INTERNAL MEDICINE

## 2023-03-23 PROCEDURE — 92526 ORAL FUNCTION THERAPY: CPT | Mod: GN

## 2023-03-23 PROCEDURE — 250N000011 HC RX IP 250 OP 636: Performed by: INTERNAL MEDICINE

## 2023-03-23 PROCEDURE — 250N000013 HC RX MED GY IP 250 OP 250 PS 637: Performed by: INTERNAL MEDICINE

## 2023-03-23 PROCEDURE — 94640 AIRWAY INHALATION TREATMENT: CPT

## 2023-03-23 PROCEDURE — 99232 SBSQ HOSP IP/OBS MODERATE 35: CPT | Performed by: INTERNAL MEDICINE

## 2023-03-23 RX ORDER — PANTOPRAZOLE SODIUM 40 MG/1
40 TABLET, DELAYED RELEASE ORAL DAILY
Qty: 30 TABLET | Refills: 0 | Status: SHIPPED | OUTPATIENT
Start: 2023-03-23 | End: 2023-04-22

## 2023-03-23 RX ORDER — MAGNESIUM OXIDE 400 MG/1
400 TABLET ORAL EVERY 4 HOURS
Status: COMPLETED | OUTPATIENT
Start: 2023-03-23 | End: 2023-03-23

## 2023-03-23 RX ADMIN — DIVALPROEX SODIUM 500 MG: 500 TABLET, DELAYED RELEASE ORAL at 09:41

## 2023-03-23 RX ADMIN — LEVETIRACETAM 1500 MG: 500 TABLET, FILM COATED ORAL at 22:30

## 2023-03-23 RX ADMIN — MAGNESIUM OXIDE TAB 400 MG (241.3 MG ELEMENTAL MG) 400 MG: 400 (241.3 MG) TAB at 17:41

## 2023-03-23 RX ADMIN — POTASSIUM & SODIUM PHOSPHATES POWDER PACK 280-160-250 MG 1 PACKET: 280-160-250 PACK at 13:00

## 2023-03-23 RX ADMIN — LAMOTRIGINE 200 MG: 200 TABLET ORAL at 02:02

## 2023-03-23 RX ADMIN — CITALOPRAM HYDROBROMIDE 20 MG: 20 TABLET ORAL at 09:44

## 2023-03-23 RX ADMIN — BUSPIRONE HYDROCHLORIDE 15 MG: 15 TABLET ORAL at 22:31

## 2023-03-23 RX ADMIN — ENOXAPARIN SODIUM 40 MG: 40 INJECTION SUBCUTANEOUS at 09:48

## 2023-03-23 RX ADMIN — IPRATROPIUM BROMIDE AND ALBUTEROL SULFATE 3 ML: 2.5; .5 SOLUTION RESPIRATORY (INHALATION) at 11:22

## 2023-03-23 RX ADMIN — ACETAMINOPHEN 650 MG: 325 TABLET ORAL at 06:16

## 2023-03-23 RX ADMIN — LAMOTRIGINE 200 MG: 200 TABLET ORAL at 09:40

## 2023-03-23 RX ADMIN — QUETIAPINE 100 MG: 100 TABLET ORAL at 17:41

## 2023-03-23 RX ADMIN — QUETIAPINE 100 MG: 100 TABLET ORAL at 09:39

## 2023-03-23 RX ADMIN — LEVETIRACETAM 1500 MG: 500 TABLET, FILM COATED ORAL at 02:05

## 2023-03-23 RX ADMIN — BUSPIRONE HYDROCHLORIDE 15 MG: 15 TABLET ORAL at 09:39

## 2023-03-23 RX ADMIN — LEVETIRACETAM 1000 MG: 500 TABLET, FILM COATED ORAL at 09:42

## 2023-03-23 RX ADMIN — DIVALPROEX SODIUM 500 MG: 500 TABLET, DELAYED RELEASE ORAL at 17:41

## 2023-03-23 RX ADMIN — QUETIAPINE 100 MG: 100 TABLET ORAL at 02:13

## 2023-03-23 RX ADMIN — DIVALPROEX SODIUM 500 MG: 500 TABLET, DELAYED RELEASE ORAL at 02:00

## 2023-03-23 RX ADMIN — ENOXAPARIN SODIUM 40 MG: 40 INJECTION SUBCUTANEOUS at 22:30

## 2023-03-23 RX ADMIN — LEVOCARNITINE 660 MG: 330 TABLET ORAL at 09:40

## 2023-03-23 RX ADMIN — PROPRANOLOL HYDROCHLORIDE 20 MG: 10 TABLET ORAL at 09:38

## 2023-03-23 RX ADMIN — LEVOCARNITINE 660 MG: 330 TABLET ORAL at 02:10

## 2023-03-23 RX ADMIN — BUSPIRONE HYDROCHLORIDE 15 MG: 15 TABLET ORAL at 01:59

## 2023-03-23 RX ADMIN — ZONISAMIDE 200 MG: 100 CAPSULE ORAL at 22:30

## 2023-03-23 RX ADMIN — POTASSIUM & SODIUM PHOSPHATES POWDER PACK 280-160-250 MG 1 PACKET: 280-160-250 PACK at 17:41

## 2023-03-23 RX ADMIN — LAMOTRIGINE 200 MG: 200 TABLET ORAL at 22:31

## 2023-03-23 RX ADMIN — LEVOCARNITINE 660 MG: 330 TABLET ORAL at 17:41

## 2023-03-23 RX ADMIN — IPRATROPIUM BROMIDE AND ALBUTEROL SULFATE 3 ML: 2.5; .5 SOLUTION RESPIRATORY (INHALATION) at 15:42

## 2023-03-23 RX ADMIN — PRAZOSIN HYDROCHLORIDE 2 MG: 1 CAPSULE ORAL at 22:30

## 2023-03-23 RX ADMIN — PROPRANOLOL HYDROCHLORIDE 20 MG: 10 TABLET ORAL at 02:12

## 2023-03-23 RX ADMIN — MAGNESIUM OXIDE TAB 400 MG (241.3 MG ELEMENTAL MG) 400 MG: 400 (241.3 MG) TAB at 13:00

## 2023-03-23 RX ADMIN — ZONISAMIDE 200 MG: 100 CAPSULE ORAL at 02:14

## 2023-03-23 RX ADMIN — DIVALPROEX SODIUM 500 MG: 500 TABLET, DELAYED RELEASE ORAL at 22:30

## 2023-03-23 RX ADMIN — PROPRANOLOL HYDROCHLORIDE 20 MG: 10 TABLET ORAL at 22:30

## 2023-03-23 RX ADMIN — QUETIAPINE 100 MG: 100 TABLET ORAL at 22:31

## 2023-03-23 RX ADMIN — CITALOPRAM HYDROBROMIDE 40 MG: 20 TABLET ORAL at 09:44

## 2023-03-23 RX ADMIN — LEVOCARNITINE 660 MG: 330 TABLET ORAL at 22:31

## 2023-03-23 RX ADMIN — IPRATROPIUM BROMIDE AND ALBUTEROL SULFATE 3 ML: 2.5; .5 SOLUTION RESPIRATORY (INHALATION) at 19:08

## 2023-03-23 RX ADMIN — ZONISAMIDE 100 MG: 100 CAPSULE ORAL at 09:40

## 2023-03-23 ASSESSMENT — ACTIVITIES OF DAILY LIVING (ADL)
ADLS_ACUITY_SCORE: 57
ADLS_ACUITY_SCORE: 57
ADLS_ACUITY_SCORE: 59
ADLS_ACUITY_SCORE: 53
ADLS_ACUITY_SCORE: 56
ADLS_ACUITY_SCORE: 55
ADLS_ACUITY_SCORE: 56
ADLS_ACUITY_SCORE: 53
ADLS_ACUITY_SCORE: 57
ADLS_ACUITY_SCORE: 53
ADLS_ACUITY_SCORE: 56
ADLS_ACUITY_SCORE: 59

## 2023-03-23 NOTE — PLAN OF CARE
For vital signs and complete assessments, please see documentation flowsheets.     Pertinent assessments: Pt AO, forgetful at times. Elevated BP, currently on 2L NC. Up  Ax2 w/lift. Denies pain. LS diminished, denies any SOB. Incont of Bladder. No BM this shift. On tele monitoring.     Major Shift Events:     Treatment Plan: IVF, IV Zosyn, wean from O2 as able, monitor electrolytes, home w/oxygen    Bedside Nurse: Malka Brandt RN

## 2023-03-23 NOTE — PROGRESS NOTES
03/23/23 1200   Appointment Info   Signing Clinician's Name / Credentials (OT) Martha Ware, OTR/L   Rehab Comments (OT) check to see if OT has clearance from hospitalist or ortho for ROM on R elbow   Living Environment   People in Home facility resident   Current Living Arrangements group home   Transportation Anticipated health plan transportation   Self-Care   Usual Activity Tolerance fair   Current Activity Tolerance fair   Equipment Currently Used at Home walker, rolling   Number of times patient has fallen within last six months 1   Activity/Exercise/Self-Care Comment Pt reports he is typically indep with dressing and toileting. He reports he does have assist with showers and IADLs. Pt does not drive.   General Information   Onset of Illness/Injury or Date of Surgery 03/15/23   Referring Physician Ritesh March MD   Patient/Family Therapy Goal Statement (OT) return home; pt is open to therapy   Additional Occupational Profile Info/Pertinent History of Current Problem per chart: Tre Vazquez is a 47 year old male with seizure disorder, cerebral palsy with mild spastic paraparesis, mild intellectual disability, mood disturbance, hypertension, congenital left eye blindness, right Bell's palsy with residual facial droop, morbid obesity, borderline personality disorder, PTSD, schizoaffective disorder, obstructive sleep apnea on BiPAP therapy, cerebral ventriculomegaly, prediabetes mellitus, chronic hypercapnic respiratory failure, asthma and depression who is chronically on 2 L of nasal cannula oxygen and lives in a group home came to [facility] 3/15/2023 with Hypoxia - 85% on RA SiO2.   Existing Precautions/Restrictions fall;oxygen therapy device and L/min   Visual Perception   Impact of Vision Impairment on Function (Vision) blind in L eye   Pain Assessment   Patient Currently in Pain Yes, see Vital Sign flowsheet  (pt notes 9/10 pain in R elbow with ROM)   Range of Motion Comprehensive   General Range  of Motion upper extremity range of motion deficits identified   General Upper Extremity Assessment (Range of Motion)   Comment: Upper Extremity ROM deficits noted in B shoulder flexion; pt had notable R elbow ROM deficits in flexion and extension.   Upper Extremity: Range of Motion shoulder, left: UE ROM;shoulder, right: UE ROM;elbow/forearm, right: UE ROM   Transfers   Transfers sit-stand transfer   Sit-Stand Transfer   Sit-Stand [place] (Transfers) supervision   Clinical Impression   Criteria for Skilled Therapeutic Interventions Met (OT) Evaluation only   OT Diagnosis weakness; limited ROM   OT Problem List-Impairments impacting ADL activity tolerance impaired;post-surgical precautions;range of motion (ROM)   Assessment of Occupational Performance 3-5 Performance Deficits   Identified Performance Deficits dressing; self feeding; functional transfers   Planned Therapy Interventions (OT) ADL retraining;ROM;progressive activity/exercise   Clinical Decision Making Complexity (OT) low complexity   Risk & Benefits of therapy have been explained evaluation/treatment results reviewed;care plan/treatment goals reviewed;risks/benefits reviewed;current/potential barriers reviewed;participants voiced agreement with care plan;participants included;patient   Clinical Impression Comments At this time, OT notes deficits in RUE ROM, specifically with elbow. Pt has increased pain with ROM. Pt not able to tolerate full PROM due to pain and can't complete actively. Pt has some mild swelling in RUE.   OT Total Evaluation Time   OT Eval, Low Complexity Minutes (67840) 20   OT Goals   Therapy Frequency (OT) 5 times/wk   OT Predicted Duration/Target Date for Goal Attainment 03/30/23   OT Goals Hygiene/Grooming;Upper Body Dressing;Lower Body Dressing;Toilet Transfer/Toileting;OT Goal 1   OT: Hygiene/Grooming supervision/stand-by assist   OT: Upper Body Dressing Supervision/stand-by assist   OT: Lower Body Dressing  Supervision/stand-by assist   OT: Toilet Transfer/Toileting Supervision/stand-by assist   OT: Goal 1 Pt will be SBA with RUE ROM HEP (check to see if provider is cleared ROM)   OT Discharge Planning   OT Plan toileting; dressing; possible RUE ROM pending clearance   OT Discharge Recommendation (DC Rec) home with assist;home with outpatient occupational therapy   OT Rationale for DC Rec At this time, anticipate pt would be safe to d/c home with assist of 1 for ADLs. Pt demonstrating deficits in RUE ROM and having increased pain in elbow making self feeding difficult. Recommend outpatient OT for ongoing therapy with RUE to increase ROM, decrease pain and decrease swelling to promote indep with ADLs such as self-feeding.   OT Brief overview of current status SBA STS; limited ROM in RUE; able to complete self feeding but had difficulties; denies it being difficult   Total Session Time   Total Session Time (sum of timed and untimed services) 20

## 2023-03-23 NOTE — PROGRESS NOTES
[facility]  Hospitalist Progress Note  Shabbir Vickers M.D., M.B.A.   03/23/2023    Reason for Stay/active problem list      Acute on chronic hypercapnic and hypoxic respiratory failure    Left lower lobe pneumonia    Diarrhea         Assessment and Plan:        Summary of Stay: Tre Vazquez is a 47 year old male with seizure disorder, cerebral palsy with mild spastic paraparesis, mild intellectual disability, mood disturbance, hypertension, congenital left eye blindness, right Bell's palsy with residual facial droop, morbid obesity, borderline personality disorder, PTSD, schizoaffective disorder, obstructive sleep apnea on BiPAP therapy, cerebral ventriculomegaly, prediabetes mellitus, chronic hypercapnic respiratory failure, asthma and depression who is chronically on 2 L of nasal cannula oxygen and lives in a group home came to [facility] 3/15/2023 with Hypoxia - 85% on RA SiO2.      His PCO2 kept on increasing on treatment with BiPAP because of which he was intubated in the ER with chest x-ray showing left lower lobe pneumonia for which was started on ceftriaxone (changed to Zosyn) and azithromycin.      Patient was extubated on 3/19/2023 and was transferred out of ICU on March 20.    Problem List with Assessment and Plan:      1. Acute on chronic hypoxic and hypercapnic respiratory failure: Multifactorial etiology including obesity, obstructive sleep apnea, left lower lobe pneumonia.    Patient was treated with respiratory support with BiPAP as well as high flow nasal oxygen.    Currently he is off high flow down to 3-5 L of nasal Oxymizer,.  Continue to wean down oxygen back to his normal oxygen requirement of 2 to 3 L    2. Left lower lobe pneumonia-presumed community-acquired bacterial pneumonia      Was treated with Zosyn and azithromycin.    Sputum culture grew gram-positive cocci and normal blanco.  Blood culture was not obtained.  Currently patient is afebrile, WBC count is  "within normal limits.  Completed antibiotic course of antibiotic.  Off antibiotic now  3.  Diarrhea  -- Negative enteric bacterial and viral panel.      4.Cerebral palsy  Spastic paraparesis  Seizure disorder  On antiepileptic medications  through the NG tube  Seizure precautions      5.  Hypertension  --Antihypertensives continued      6.  Obstructive sleep apnea  --May use home CPAP/BiPAP per RT     7.  Headache   Has history of Migraine   As needed Compazine, ibuprofen          VTE Prophylaxis: Enoxaparin (Lovenox) SQ  Code Status: Full Code  Diet: Combination Diet Soft and Bite Sized Diet (level 6); Thin Liquids (level 0)    Reid Catheter: Not present    Family updated today: No     Disposition: Likely back to his group home setting by tomorrow  If his oxygen requirement remains stable             Interval History (Subjective):        Patient is seen and examined by me today and medical record reviewed.Overnight events noted and care discussed with nursing staff.He denies any symptoms. He was seem by rehab team. No fever or chills . Oxygen requirement better     Last Vital Signs:  BP (!) 145/80 (BP Location: Left arm)   Pulse 73   Temp 98  F (36.7  C) (Axillary)   Resp 17   Ht 1.778 m (5' 10\")   Wt 139.6 kg (307 lb 12.2 oz)   SpO2 96%   BMI 44.16 kg/m      I/O last 3 completed shifts:  In: 419 [P.O.:130; I.V.:289]  Out: 1150 [Urine:1150]    Wt Readings from Last 5 Encounters:   03/20/23 139.6 kg (307 lb 12.2 oz)   01/03/23 122.5 kg (270 lb)   11/30/22 123.4 kg (272 lb)   04/19/22 122.1 kg (269 lb 3.2 oz)   12/08/21 116.1 kg (256 lb)        Constitutional: Awake, alert, cooperative, no apparent distress     Respiratory: Clear to auscultation bilaterally, no crackles or wheezing   Cardiovascular: Regular rate and rhythm, normal S1 and S2, and no murmur noted   Abdomen: Normal bowel sounds, soft, non-distended, non-tender   Skin: No new rashes, no cyanosis, dry to touch   Neuro: Alert with  no new focal " weakness   Extremities: No edema   Other(s):        All other systems: Negative          Medications:        All current medications were reviewed with changes reflected in problem list.         Data:      All new lab and imaging data was reviewed.      Data reviewed today: I reviewed all new labs and imaging results over the last 24 hours. I personally reviewed       Recent Labs   Lab 03/22/23  1456 03/21/23  0610 03/20/23 2022   WBC 7.8 6.1 6.9   HGB 12.8* 12.6* 12.3*   HCT 41.7 40.3 38.7*    100 97    164 149*     No results for input(s): CULT in the last 168 hours.  Recent Labs   Lab 03/23/23  1110 03/22/23  2246 03/22/23  1456 03/21/23  0610 03/21/23  0315 03/20/23 2022 03/20/23  0811 03/19/23  0757 03/19/23  0600   NA  --   --  141  --   --  141  --   --  143   POTASSIUM 3.9  --  4.5 3.7  --  4.3  --   --  3.6   CHLORIDE  --   --  102  --   --  103  --   --  105   CO2  --   --  33*  --   --  32*  --   --  34*   ANIONGAP  --   --  6*  --   --  6*  --   --  4*   GLC  --   --  116*  --   --  95 75   < > 98   BUN  --   --  12.7  --   --  14.7  --   --  16.2   CR  --   --  0.50*  --   --  0.53*  --   --  0.57*   GFRESTIMATED  --   --  >90  --   --  >90  --   --  >90   ARNOLD  --   --  8.8  --   --  8.8  --   --  8.3*   MAG 1.6* 1.9 1.5* 1.9   < > 1.5*  --   --  1.8   PHOS 2.5  --  2.6 2.6  --  2.3*  --   --  2.6    < > = values in this interval not displayed.       Recent Labs   Lab 03/22/23  1456 03/20/23 2022 03/20/23  0811 03/20/23  0011 03/19/23  1944   * 95 75 88 81       No results for input(s): INR in the last 168 hours.      No results for input(s): TROPONIN, TROPI, TROPR in the last 168 hours.    Invalid input(s): TROP, TROPONINIES    No results found for this or any previous visit (from the past 48 hour(s)).    COVID Status:  COVID-19 PCR Results    COVID-19 PCR Results 8/30/21 12/8/21 6/20/22 3/15/23   SARS CoV2 PCR Negative Negative Negative Negative      Comments are available for  some flowsheets but are not being displayed.         COVID-19 Antibody Results, Testing for Immunity    COVID-19 Antibody Results, Testing for Immunity   No data to display.              Disclaimer: This note consists of symbols derived from keyboarding, dictation and/or voice recognition software. As a result, there may be errors in the script that have gone undetected. Please consider this when interpreting information found in this chart.

## 2023-03-23 NOTE — PLAN OF CARE
To Do:  End of Shift Summary  For vital signs and complete assessments, please see documentation flowsheets.     Pertinent assessments: Pt AxO, forgetful at times, currently on 2L NC. Up Ax1, Denies pain. LS diminished, SOB with exertion. Incont of B/B.  On tele monitoring SR    Major Shift Events: none    Treatment Plan: wean from O2 as able, monitor electrolytes, home w/oxygen. Possible discharge tomorrow.     Bedside Nurse: Sanaz Arrington RN

## 2023-03-23 NOTE — PLAN OF CARE
INPATIENT NOTE FROM: 1500 - 1900    PRIMARY PROBLEM: Shortness of Breath     Vital Signs: Stable      Orientation: A/O x 4, with intermittent confusions    Neuro: Intact    Pain status: Denies    Resp: Lung sounds, CTA, denies any SOB    Cardiac: WDL, Tele: SR 82     GI: Bowel sounds, active. Last BM, 3/23    : Continent of bladder x 2    Skin: Intact    LDA: Peripheral IV to LLA    Pertinent Labs/Imaging: Phos; 2.5, Mag; 1.6 replaced today,   redraw tomorrow AM    Diet: Combined Soft diet    Activity: A x1/WG    Alarms/Safety: All alarms on and audible     Consults: OT, PT, SL & Respiratory     Discharge Plan: Abx done, wean O2,monitor electrolytes    Discharge Date: TBD     Will continue to monitor and provide cares.     Raeann Robles RN

## 2023-03-23 NOTE — PROGRESS NOTES
Care Management Follow Up    Length of Stay (days): 8    Expected Discharge Date: 03/24/2023     Concerns to be Addressed: care coordination/care conferences, discharge planning     Patient plan of care discussed at interdisciplinary rounds: Yes    Anticipated Discharge Disposition: Group Home     Anticipated Discharge Services: County Worker  Anticipated Discharge DME: Oxygen    Patient/family educated on Medicare website which has current facility and service quality ratings: yes  Education Provided on the Discharge Plan:    Patient/Family in Agreement with the Plan: unable to assess    Referrals Placed by CM/SW: Homecare  Private pay costs discussed: transportation costs    Additional Information:  Patient may be medically ready for discharge tomorrow. Patient has been transferring with one assist and the walker. [employer] [facility] has accepted for PT/OT.  Contacted  Jocelyne [phone] with update and to discuss discharge planning for tomorrow.  Will need for discharge:  * Orders for home care PT/OT  * DME order for a wheelchair  * oxygen orders for 3L  * New medications e-scribed to [facility]    Fax orders to  [phone]  Notify [facility]  Group home contact tomorrow 3/24 will be Sugar [phone] ext [phone]  [facility] transport set up for 1230 3/24 in anticipation of discharge    Olivia Arndt RN BSN OCN  Care Coordinator  [facility]  [phone]

## 2023-03-24 ENCOUNTER — APPOINTMENT (OUTPATIENT)
Dept: SPEECH THERAPY | Facility: CLINIC | Age: 48
DRG: 208 | End: 2023-03-24
Payer: MEDICARE

## 2023-03-24 VITALS
DIASTOLIC BLOOD PRESSURE: 65 MMHG | TEMPERATURE: 98.7 F | OXYGEN SATURATION: 94 % | RESPIRATION RATE: 16 BRPM | BODY MASS INDEX: 44.06 KG/M2 | HEART RATE: 80 BPM | SYSTOLIC BLOOD PRESSURE: 114 MMHG | WEIGHT: 307.76 LBS | HEIGHT: 70 IN

## 2023-03-24 LAB
CREAT SERPL-MCNC: 0.56 MG/DL (ref 0.67–1.17)
GFR SERPL CREATININE-BSD FRML MDRD: >90 ML/MIN/1.73M2
MAGNESIUM SERPL-MCNC: 1.4 MG/DL (ref 1.7–2.3)
PHOSPHATE SERPL-MCNC: 4.1 MG/DL (ref 2.5–4.5)
PLATELET # BLD AUTO: 215 10E3/UL (ref 150–450)
POTASSIUM SERPL-SCNC: 4.4 MMOL/L (ref 3.4–5.3)

## 2023-03-24 PROCEDURE — 82565 ASSAY OF CREATININE: CPT | Performed by: INTERNAL MEDICINE

## 2023-03-24 PROCEDURE — 250N000009 HC RX 250: Performed by: INTERNAL MEDICINE

## 2023-03-24 PROCEDURE — 250N000013 HC RX MED GY IP 250 OP 250 PS 637: Performed by: INTERNAL MEDICINE

## 2023-03-24 PROCEDURE — 36415 COLL VENOUS BLD VENIPUNCTURE: CPT | Performed by: INTERNAL MEDICINE

## 2023-03-24 PROCEDURE — 99239 HOSP IP/OBS DSCHRG MGMT >30: CPT | Performed by: INTERNAL MEDICINE

## 2023-03-24 PROCEDURE — 250N000011 HC RX IP 250 OP 636: Performed by: INTERNAL MEDICINE

## 2023-03-24 PROCEDURE — 84132 ASSAY OF SERUM POTASSIUM: CPT | Performed by: INTERNAL MEDICINE

## 2023-03-24 PROCEDURE — 92526 ORAL FUNCTION THERAPY: CPT | Mod: GN

## 2023-03-24 PROCEDURE — 85049 AUTOMATED PLATELET COUNT: CPT | Performed by: INTERNAL MEDICINE

## 2023-03-24 PROCEDURE — 83735 ASSAY OF MAGNESIUM: CPT | Performed by: INTERNAL MEDICINE

## 2023-03-24 PROCEDURE — 999N000156 HC STATISTIC RCP CONSULT EA 30 MIN

## 2023-03-24 PROCEDURE — 999N000157 HC STATISTIC RCP TIME EA 10 MIN

## 2023-03-24 PROCEDURE — 84100 ASSAY OF PHOSPHORUS: CPT | Performed by: INTERNAL MEDICINE

## 2023-03-24 PROCEDURE — 94640 AIRWAY INHALATION TREATMENT: CPT | Mod: 76

## 2023-03-24 RX ORDER — ALBUTEROL SULFATE 0.83 MG/ML
2.5 SOLUTION RESPIRATORY (INHALATION) EVERY 6 HOURS PRN
Status: DISCONTINUED | OUTPATIENT
Start: 2023-03-24 | End: 2023-03-24 | Stop reason: HOSPADM

## 2023-03-24 RX ORDER — MAGNESIUM SULFATE HEPTAHYDRATE 40 MG/ML
4 INJECTION, SOLUTION INTRAVENOUS ONCE
Status: COMPLETED | OUTPATIENT
Start: 2023-03-24 | End: 2023-03-24

## 2023-03-24 RX ADMIN — IPRATROPIUM BROMIDE AND ALBUTEROL SULFATE 3 ML: 2.5; .5 SOLUTION RESPIRATORY (INHALATION) at 07:31

## 2023-03-24 RX ADMIN — LEVOCARNITINE 660 MG: 330 TABLET ORAL at 09:56

## 2023-03-24 RX ADMIN — CITALOPRAM HYDROBROMIDE 20 MG: 20 TABLET ORAL at 11:22

## 2023-03-24 RX ADMIN — ZONISAMIDE 100 MG: 100 CAPSULE ORAL at 09:54

## 2023-03-24 RX ADMIN — DIVALPROEX SODIUM 500 MG: 500 TABLET, DELAYED RELEASE ORAL at 09:54

## 2023-03-24 RX ADMIN — CITALOPRAM HYDROBROMIDE 40 MG: 20 TABLET ORAL at 10:00

## 2023-03-24 RX ADMIN — BUSPIRONE HYDROCHLORIDE 15 MG: 15 TABLET ORAL at 09:55

## 2023-03-24 RX ADMIN — MAGNESIUM SULFATE HEPTAHYDRATE 4 G: 4 INJECTION, SOLUTION INTRAVENOUS at 10:10

## 2023-03-24 RX ADMIN — PROPRANOLOL HYDROCHLORIDE 20 MG: 10 TABLET ORAL at 10:01

## 2023-03-24 RX ADMIN — LAMOTRIGINE 200 MG: 200 TABLET ORAL at 09:54

## 2023-03-24 RX ADMIN — LEVETIRACETAM 1000 MG: 500 TABLET, FILM COATED ORAL at 09:54

## 2023-03-24 RX ADMIN — Medication 40 MG: at 09:56

## 2023-03-24 RX ADMIN — QUETIAPINE 100 MG: 100 TABLET ORAL at 09:55

## 2023-03-24 RX ADMIN — ENOXAPARIN SODIUM 40 MG: 40 INJECTION SUBCUTANEOUS at 09:56

## 2023-03-24 ASSESSMENT — ACTIVITIES OF DAILY LIVING (ADL)
ADLS_ACUITY_SCORE: 51

## 2023-03-24 NOTE — PLAN OF CARE
Physical Therapy Discharge Summary    Reason for therapy discharge:    Discharged to group home; PT recommended home PT    Progress towards therapy goal(s). See goals on Care Plan in [facility] electronic health record for goal details.  Goals not met.  Barriers to achieving goals:   discharge from facility.    Therapy recommendation(s):    Continued therapy is recommended.  Rationale/Recommendations:  Recommend home PT to continue to improve inde with ambulation, otherwise currently A x 1 for 20 feet of ambulation.

## 2023-03-24 NOTE — PROGRESS NOTES
Care Management Discharge Note    Discharge Date: 03/24/2023       Discharge Disposition: Group Home    Discharge Services: County Worker    Discharge DME: Oxygen    Discharge Transportation: agency    Private pay costs discussed: transportation costs    PAS Confirmation Code:    Patient/family educated on Medicare website which has current facility and service quality ratings: yes    Education Provided on the Discharge Plan:  yes  Persons Notified of Discharge Plans: [facility] Sugar  Patient/Family in Agreement with the Plan: unable to assess    Handoff Referral Completed: No    Additional Information:  Medically ready for discharge today. Contacted Sugar at [facility] [phone] ext [phone]. She is aware of patient discharging today. Confirmed fax [phone] and orders sent. Medications sent to [facility].  Contacted [facility] and notified them of discharge.  [facility] transport with oxygen set up for 1230    Olivia Arndt RN BSN OCN  Care Coordinator  [facility]  [phone]

## 2023-03-24 NOTE — PLAN OF CARE
Speech Language Therapy Discharge Summary    Reason for therapy discharge:    Discharged to group home with home therapy    Progress towards therapy goal(s). See goals on Care Plan in [facility] electronic health record for goal details.  Goals partially met.  Barriers to achieving goals:   discharge from facility.    Therapy recommendation(s):    Continued therapy is recommended.  Rationale/Recommendations:  Group home staff provide pt with OP SLP if believed that swallowing is or becomes below baseline level..    Goal Outcome Evaluation:

## 2023-03-24 NOTE — PLAN OF CARE
For vital signs and complete assessments, please see documentation flowsheets.     Pertinent assessments: Pt AxO, forgetful at times, VSS on 2L mask. Up  Ax1, Denies pain. LS diminished, SOB with exertion. Incont of B/B. No BM this shift.  On tele monitoring SR    Major Shift Events:      Treatment Plan: Wean from O2 as able, monitor electrolytes, home w/oxygen. Possible discharge today    Bedside Nurse: Malka Brandt RN

## 2023-03-24 NOTE — PLAN OF CARE
"Occupational Therapy Discharge Summary    Reason for therapy discharge:    Discharged to group home; OT recommended OP OT    Progress towards therapy goal(s). See goals on Care Plan in [facility] electronic health record for goal details.  Goals not met.  Barriers to achieving goals:   discharge from facility.    Therapy recommendation(s):    Continued therapy is recommended.  Rationale/Recommendations:  Per last treating OT \"At this time, anticipate pt would be safe to d/c home with assist of 1 for ADLs. Pt demonstrating deficits in RUE ROM and having increased pain in elbow making self feeding difficult. Recommend outpatient OT for ongoing therapy with RUE to increase ROM, decrease pain and decrease swelling to promote indep with ADLs such as self-feeding.\".      **Pt not seen by discharging therapist on this date, note written based on previous treating therapist's notes and recommendations     "

## 2023-03-24 NOTE — DISCHARGE SUMMARY
Patient discharged back to group home with belongings via wheelchair transport.   Transfer to wheelchair with Ax1 with gaitbelt /Walker. On 2L oxygen Nasal cannula 95%. Magnesium replaced.

## 2023-03-24 NOTE — PROGRESS NOTES
"                                                            [facility] RCAT    Date:3/24/23    Admission Dx: Acute on chronic hypoxic respiratory failure    Pulmonary History: ANA    Home Nebulizer/MDI Use:none    Home Oxygen: 2 L    Acuity Level (RCAT flow sheet):4    Aerosol Therapy initiated: Albuterol Q6 prn    Pulmonary Hygiene initiated: coughing techniques    Volume Expansion initiated: incentive spirometry    Current Oxygen Requirements: 3L NC    Current SpO2: 98%    Re-evaluation date: 3/27/23    Patient Education: Will educate pt on the indications and benefits of nebulizer's as well as deep breathing and coughing techniques.    Karen Avalos, RT on 3/24/2023 at 8:55 AM      See \"RT Assessments\" flow sheet for patient assessment scoring and Acuity Level Details.             "

## 2023-03-25 ENCOUNTER — HOSPITAL ENCOUNTER (INPATIENT)
Facility: CLINIC | Age: 48
LOS: 4 days | Discharge: HOME HEALTH | DRG: 189 | End: 2023-03-29
Attending: EMERGENCY MEDICINE | Admitting: INTERNAL MEDICINE
Payer: MEDICARE

## 2023-03-25 ENCOUNTER — APPOINTMENT (OUTPATIENT)
Dept: GENERAL RADIOLOGY | Facility: CLINIC | Age: 48
DRG: 189 | End: 2023-03-25
Attending: EMERGENCY MEDICINE
Payer: MEDICARE

## 2023-03-25 ENCOUNTER — APPOINTMENT (OUTPATIENT)
Dept: GENERAL RADIOLOGY | Facility: CLINIC | Age: 48
DRG: 189 | End: 2023-03-25
Attending: INTERNAL MEDICINE
Payer: MEDICARE

## 2023-03-25 DIAGNOSIS — G80.9 CEREBRAL PALSY, UNSPECIFIED TYPE (H): ICD-10-CM

## 2023-03-25 DIAGNOSIS — M62.81 GENERALIZED MUSCLE WEAKNESS: ICD-10-CM

## 2023-03-25 DIAGNOSIS — J96.02 ACUTE RESPIRATORY FAILURE WITH HYPERCAPNIA (H): ICD-10-CM

## 2023-03-25 LAB
ANION GAP SERPL CALCULATED.3IONS-SCNC: 7 MMOL/L (ref 7–15)
BASE EXCESS BLDV CALC-SCNC: 5.1 MMOL/L (ref -7.7–1.9)
BASE EXCESS BLDV CALC-SCNC: 5.1 MMOL/L (ref -7.7–1.9)
BASE EXCESS BLDV CALC-SCNC: 5.5 MMOL/L (ref -7.7–1.9)
BASOPHILS # BLD AUTO: 0 10E3/UL (ref 0–0.2)
BASOPHILS NFR BLD AUTO: 1 %
BUN SERPL-MCNC: 14.5 MG/DL (ref 6–20)
CALCIUM SERPL-MCNC: 9.2 MG/DL (ref 8.6–10)
CHLORIDE SERPL-SCNC: 101 MMOL/L (ref 98–107)
CREAT SERPL-MCNC: 0.63 MG/DL (ref 0.67–1.17)
DEPRECATED HCO3 PLAS-SCNC: 35 MMOL/L (ref 22–29)
EOSINOPHIL # BLD AUTO: 0.1 10E3/UL (ref 0–0.7)
EOSINOPHIL NFR BLD AUTO: 1 %
ERYTHROCYTE [DISTWIDTH] IN BLOOD BY AUTOMATED COUNT: 13.4 % (ref 10–15)
FLUAV RNA SPEC QL NAA+PROBE: NEGATIVE
FLUBV RNA RESP QL NAA+PROBE: NEGATIVE
GFR SERPL CREATININE-BSD FRML MDRD: >90 ML/MIN/1.73M2
GLUCOSE BLDC GLUCOMTR-MCNC: 103 MG/DL (ref 70–99)
GLUCOSE BLDC GLUCOMTR-MCNC: 103 MG/DL (ref 70–99)
GLUCOSE BLDC GLUCOMTR-MCNC: 106 MG/DL (ref 70–99)
GLUCOSE BLDC GLUCOMTR-MCNC: 113 MG/DL (ref 70–99)
GLUCOSE BLDC GLUCOMTR-MCNC: 96 MG/DL (ref 70–99)
GLUCOSE SERPL-MCNC: 92 MG/DL (ref 70–99)
HCO3 BLDV-SCNC: 33 MMOL/L (ref 21–28)
HCO3 BLDV-SCNC: 34 MMOL/L (ref 21–28)
HCO3 BLDV-SCNC: 34 MMOL/L (ref 21–28)
HCT VFR BLD AUTO: 42.3 % (ref 40–53)
HGB BLD-MCNC: 13.1 G/DL (ref 13.3–17.7)
IMM GRANULOCYTES # BLD: 0.1 10E3/UL
IMM GRANULOCYTES NFR BLD: 2 %
LYMPHOCYTES # BLD AUTO: 1.8 10E3/UL (ref 0.8–5.3)
LYMPHOCYTES NFR BLD AUTO: 22 %
MAGNESIUM SERPL-MCNC: 1.5 MG/DL (ref 1.7–2.3)
MAGNESIUM SERPL-MCNC: 2 MG/DL (ref 1.7–2.3)
MCH RBC QN AUTO: 30.5 PG (ref 26.5–33)
MCHC RBC AUTO-ENTMCNC: 31 G/DL (ref 31.5–36.5)
MCV RBC AUTO: 99 FL (ref 78–100)
MONOCYTES # BLD AUTO: 1.1 10E3/UL (ref 0–1.3)
MONOCYTES NFR BLD AUTO: 14 %
NEUTROPHILS # BLD AUTO: 5 10E3/UL (ref 1.6–8.3)
NEUTROPHILS NFR BLD AUTO: 60 %
NRBC # BLD AUTO: 0 10E3/UL
NRBC BLD AUTO-RTO: 0 /100
NT-PROBNP SERPL-MCNC: 161 PG/ML (ref 0–450)
O2/TOTAL GAS SETTING VFR VENT: 100 %
O2/TOTAL GAS SETTING VFR VENT: 100 %
O2/TOTAL GAS SETTING VFR VENT: 30 %
OXYHGB MFR BLDV: 80 % (ref 70–75)
PCO2 BLDV: 65 MM HG (ref 40–50)
PCO2 BLDV: 69 MM HG (ref 40–50)
PCO2 BLDV: 73 MM HG (ref 40–50)
PH BLDV: 7.28 [PH] (ref 7.32–7.43)
PH BLDV: 7.3 [PH] (ref 7.32–7.43)
PH BLDV: 7.32 [PH] (ref 7.32–7.43)
PLATELET # BLD AUTO: 228 10E3/UL (ref 150–450)
PO2 BLDV: 45 MM HG (ref 25–47)
PO2 BLDV: 49 MM HG (ref 25–47)
PO2 BLDV: 69 MM HG (ref 25–47)
POTASSIUM SERPL-SCNC: 4.2 MMOL/L (ref 3.4–5.3)
RBC # BLD AUTO: 4.29 10E6/UL (ref 4.4–5.9)
RSV RNA SPEC NAA+PROBE: NEGATIVE
SARS-COV-2 RNA RESP QL NAA+PROBE: NEGATIVE
SODIUM SERPL-SCNC: 143 MMOL/L (ref 136–145)
TROPONIN T SERPL HS-MCNC: 19 NG/L
WBC # BLD AUTO: 8.1 10E3/UL (ref 4–11)

## 2023-03-25 PROCEDURE — 36415 COLL VENOUS BLD VENIPUNCTURE: CPT | Performed by: INTERNAL MEDICINE

## 2023-03-25 PROCEDURE — 36415 COLL VENOUS BLD VENIPUNCTURE: CPT | Performed by: EMERGENCY MEDICINE

## 2023-03-25 PROCEDURE — 99291 CRITICAL CARE FIRST HOUR: CPT | Mod: CS | Performed by: INTERNAL MEDICINE

## 2023-03-25 PROCEDURE — 250N000013 HC RX MED GY IP 250 OP 250 PS 637: Performed by: INTERNAL MEDICINE

## 2023-03-25 PROCEDURE — 82805 BLOOD GASES W/O2 SATURATION: CPT | Performed by: EMERGENCY MEDICINE

## 2023-03-25 PROCEDURE — 200N000001 HC R&B ICU

## 2023-03-25 PROCEDURE — C9803 HOPD COVID-19 SPEC COLLECT: HCPCS

## 2023-03-25 PROCEDURE — 80048 BASIC METABOLIC PNL TOTAL CA: CPT | Performed by: EMERGENCY MEDICINE

## 2023-03-25 PROCEDURE — 82803 BLOOD GASES ANY COMBINATION: CPT | Performed by: INTERNAL MEDICINE

## 2023-03-25 PROCEDURE — 250N000011 HC RX IP 250 OP 636: Performed by: INTERNAL MEDICINE

## 2023-03-25 PROCEDURE — 82803 BLOOD GASES ANY COMBINATION: CPT | Performed by: EMERGENCY MEDICINE

## 2023-03-25 PROCEDURE — 93005 ELECTROCARDIOGRAM TRACING: CPT

## 2023-03-25 PROCEDURE — 87637 SARSCOV2&INF A&B&RSV AMP PRB: CPT | Performed by: EMERGENCY MEDICINE

## 2023-03-25 PROCEDURE — 258N000003 HC RX IP 258 OP 636: Performed by: EMERGENCY MEDICINE

## 2023-03-25 PROCEDURE — 73070 X-RAY EXAM OF ELBOW: CPT | Mod: RT

## 2023-03-25 PROCEDURE — 99285 EMERGENCY DEPT VISIT HI MDM: CPT | Mod: CS,25

## 2023-03-25 PROCEDURE — 250N000011 HC RX IP 250 OP 636: Performed by: EMERGENCY MEDICINE

## 2023-03-25 PROCEDURE — 83735 ASSAY OF MAGNESIUM: CPT | Performed by: INTERNAL MEDICINE

## 2023-03-25 PROCEDURE — 999N000157 HC STATISTIC RCP TIME EA 10 MIN

## 2023-03-25 PROCEDURE — 5A09357 ASSISTANCE WITH RESPIRATORY VENTILATION, LESS THAN 24 CONSECUTIVE HOURS, CONTINUOUS POSITIVE AIRWAY PRESSURE: ICD-10-PCS | Performed by: INTERNAL MEDICINE

## 2023-03-25 PROCEDURE — 84484 ASSAY OF TROPONIN QUANT: CPT | Performed by: EMERGENCY MEDICINE

## 2023-03-25 PROCEDURE — 71045 X-RAY EXAM CHEST 1 VIEW: CPT

## 2023-03-25 PROCEDURE — 83880 ASSAY OF NATRIURETIC PEPTIDE: CPT | Performed by: EMERGENCY MEDICINE

## 2023-03-25 PROCEDURE — 94660 CPAP INITIATION&MGMT: CPT

## 2023-03-25 PROCEDURE — 85025 COMPLETE CBC W/AUTO DIFF WBC: CPT | Performed by: EMERGENCY MEDICINE

## 2023-03-25 RX ORDER — BUSPIRONE HYDROCHLORIDE 15 MG/1
15 TABLET ORAL 2 TIMES DAILY
Status: DISCONTINUED | OUTPATIENT
Start: 2023-03-25 | End: 2023-03-29 | Stop reason: HOSPADM

## 2023-03-25 RX ORDER — MULTIPLE VITAMINS W/ MINERALS TAB 9MG-400MCG
1 TAB ORAL DAILY
Status: DISCONTINUED | OUTPATIENT
Start: 2023-03-25 | End: 2023-03-29 | Stop reason: HOSPADM

## 2023-03-25 RX ORDER — DIVALPROEX SODIUM 250 MG/1
500 TABLET, DELAYED RELEASE ORAL 3 TIMES DAILY
Status: DISCONTINUED | OUTPATIENT
Start: 2023-03-25 | End: 2023-03-29 | Stop reason: HOSPADM

## 2023-03-25 RX ORDER — ONDANSETRON 4 MG/1
4 TABLET, ORALLY DISINTEGRATING ORAL EVERY 6 HOURS PRN
Status: DISCONTINUED | OUTPATIENT
Start: 2023-03-25 | End: 2023-03-29 | Stop reason: HOSPADM

## 2023-03-25 RX ORDER — LAMOTRIGINE 200 MG/1
200 TABLET ORAL 2 TIMES DAILY
Status: DISCONTINUED | OUTPATIENT
Start: 2023-03-25 | End: 2023-03-29 | Stop reason: HOSPADM

## 2023-03-25 RX ORDER — PROCHLORPERAZINE 25 MG
25 SUPPOSITORY, RECTAL RECTAL EVERY 12 HOURS PRN
Status: DISCONTINUED | OUTPATIENT
Start: 2023-03-25 | End: 2023-03-29 | Stop reason: HOSPADM

## 2023-03-25 RX ORDER — LEVETIRACETAM 500 MG/1
1500 TABLET ORAL AT BEDTIME
Status: DISCONTINUED | OUTPATIENT
Start: 2023-03-25 | End: 2023-03-29 | Stop reason: HOSPADM

## 2023-03-25 RX ORDER — MAGNESIUM SULFATE HEPTAHYDRATE 40 MG/ML
4 INJECTION, SOLUTION INTRAVENOUS ONCE
Status: COMPLETED | OUTPATIENT
Start: 2023-03-25 | End: 2023-03-25

## 2023-03-25 RX ORDER — IPRATROPIUM BROMIDE AND ALBUTEROL SULFATE 2.5; .5 MG/3ML; MG/3ML
3 SOLUTION RESPIRATORY (INHALATION)
Status: ACTIVE | OUTPATIENT
Start: 2023-03-25 | End: 2023-03-25

## 2023-03-25 RX ORDER — AZITHROMYCIN 500 MG/5ML
500 INJECTION, POWDER, LYOPHILIZED, FOR SOLUTION INTRAVENOUS ONCE
Status: COMPLETED | OUTPATIENT
Start: 2023-03-25 | End: 2023-03-25

## 2023-03-25 RX ORDER — POLYETHYLENE GLYCOL 3350 17 G/17G
17 POWDER, FOR SOLUTION ORAL DAILY PRN
Status: DISCONTINUED | OUTPATIENT
Start: 2023-03-25 | End: 2023-03-29 | Stop reason: HOSPADM

## 2023-03-25 RX ORDER — CITALOPRAM HYDROBROMIDE 20 MG/1
40 TABLET ORAL DAILY
Status: DISCONTINUED | OUTPATIENT
Start: 2023-03-25 | End: 2023-03-28

## 2023-03-25 RX ORDER — ENOXAPARIN SODIUM 100 MG/ML
40 INJECTION SUBCUTANEOUS 2 TIMES DAILY
Status: DISCONTINUED | OUTPATIENT
Start: 2023-03-25 | End: 2023-03-29 | Stop reason: HOSPADM

## 2023-03-25 RX ORDER — LEVOCARNITINE 330 MG/1
660 TABLET ORAL 3 TIMES DAILY
Status: DISCONTINUED | OUTPATIENT
Start: 2023-03-25 | End: 2023-03-29 | Stop reason: HOSPADM

## 2023-03-25 RX ORDER — PRAZOSIN HYDROCHLORIDE 1 MG/1
2 CAPSULE ORAL AT BEDTIME
Status: DISCONTINUED | OUTPATIENT
Start: 2023-03-25 | End: 2023-03-29 | Stop reason: HOSPADM

## 2023-03-25 RX ORDER — PROCHLORPERAZINE MALEATE 10 MG
10 TABLET ORAL EVERY 6 HOURS PRN
Status: DISCONTINUED | OUTPATIENT
Start: 2023-03-25 | End: 2023-03-29 | Stop reason: HOSPADM

## 2023-03-25 RX ORDER — PROPRANOLOL HYDROCHLORIDE 20 MG/1
20 TABLET ORAL 2 TIMES DAILY
Status: DISCONTINUED | OUTPATIENT
Start: 2023-03-25 | End: 2023-03-29 | Stop reason: HOSPADM

## 2023-03-25 RX ORDER — CITALOPRAM HYDROBROMIDE 20 MG/1
20 TABLET ORAL DAILY
Status: DISCONTINUED | OUTPATIENT
Start: 2023-03-25 | End: 2023-03-28

## 2023-03-25 RX ORDER — ONDANSETRON 2 MG/ML
4 INJECTION INTRAMUSCULAR; INTRAVENOUS EVERY 6 HOURS PRN
Status: DISCONTINUED | OUTPATIENT
Start: 2023-03-25 | End: 2023-03-29 | Stop reason: HOSPADM

## 2023-03-25 RX ORDER — PANTOPRAZOLE SODIUM 40 MG/1
40 TABLET, DELAYED RELEASE ORAL DAILY
Status: DISCONTINUED | OUTPATIENT
Start: 2023-03-25 | End: 2023-03-29 | Stop reason: HOSPADM

## 2023-03-25 RX ORDER — AMMONIUM LACTATE 12 G/100G
LOTION TOPICAL 2 TIMES DAILY PRN
Status: DISCONTINUED | OUTPATIENT
Start: 2023-03-25 | End: 2023-03-29 | Stop reason: HOSPADM

## 2023-03-25 RX ORDER — DEXTROSE MONOHYDRATE 25 G/50ML
25-50 INJECTION, SOLUTION INTRAVENOUS
Status: DISCONTINUED | OUTPATIENT
Start: 2023-03-25 | End: 2023-03-29 | Stop reason: HOSPADM

## 2023-03-25 RX ORDER — QUETIAPINE 300 MG/1
300 TABLET, FILM COATED, EXTENDED RELEASE ORAL AT BEDTIME
Status: DISCONTINUED | OUTPATIENT
Start: 2023-03-25 | End: 2023-03-29 | Stop reason: HOSPADM

## 2023-03-25 RX ORDER — ZONISAMIDE 100 MG/1
100 CAPSULE ORAL EVERY MORNING
Status: DISCONTINUED | OUTPATIENT
Start: 2023-03-25 | End: 2023-03-29 | Stop reason: HOSPADM

## 2023-03-25 RX ORDER — LIDOCAINE 40 MG/G
CREAM TOPICAL
Status: DISCONTINUED | OUTPATIENT
Start: 2023-03-25 | End: 2023-03-29 | Stop reason: HOSPADM

## 2023-03-25 RX ORDER — ZONISAMIDE 100 MG/1
200 CAPSULE ORAL AT BEDTIME
Status: DISCONTINUED | OUTPATIENT
Start: 2023-03-25 | End: 2023-03-29 | Stop reason: HOSPADM

## 2023-03-25 RX ORDER — NICOTINE POLACRILEX 4 MG
15-30 LOZENGE BUCCAL
Status: DISCONTINUED | OUTPATIENT
Start: 2023-03-25 | End: 2023-03-29 | Stop reason: HOSPADM

## 2023-03-25 RX ORDER — DEXAMETHASONE SODIUM PHOSPHATE 10 MG/ML
10 INJECTION, SOLUTION INTRAMUSCULAR; INTRAVENOUS ONCE
Status: COMPLETED | OUTPATIENT
Start: 2023-03-25 | End: 2023-03-25

## 2023-03-25 RX ADMIN — BUSPIRONE HYDROCHLORIDE 15 MG: 15 TABLET ORAL at 21:37

## 2023-03-25 RX ADMIN — LEVETIRACETAM 1500 MG: 500 TABLET, FILM COATED ORAL at 21:38

## 2023-03-25 RX ADMIN — LEVOCARNITINE 660 MG: 330 TABLET ORAL at 17:21

## 2023-03-25 RX ADMIN — DIVALPROEX SODIUM 500 MG: 500 TABLET, DELAYED RELEASE ORAL at 21:37

## 2023-03-25 RX ADMIN — ZONISAMIDE 100 MG: 100 CAPSULE ORAL at 12:12

## 2023-03-25 RX ADMIN — ZONISAMIDE 200 MG: 100 CAPSULE ORAL at 21:37

## 2023-03-25 RX ADMIN — CITALOPRAM HYDROBROMIDE 40 MG: 20 TABLET ORAL at 11:40

## 2023-03-25 RX ADMIN — QUETIAPINE FUMARATE 300 MG: 300 TABLET, EXTENDED RELEASE ORAL at 21:37

## 2023-03-25 RX ADMIN — LEVOCARNITINE 660 MG: 330 TABLET ORAL at 11:37

## 2023-03-25 RX ADMIN — AZITHROMYCIN MONOHYDRATE 500 MG: 500 INJECTION, POWDER, LYOPHILIZED, FOR SOLUTION INTRAVENOUS at 08:15

## 2023-03-25 RX ADMIN — ENOXAPARIN SODIUM 40 MG: 40 INJECTION SUBCUTANEOUS at 12:12

## 2023-03-25 RX ADMIN — MAGNESIUM SULFATE HEPTAHYDRATE 4 G: 4 INJECTION, SOLUTION INTRAVENOUS at 12:08

## 2023-03-25 RX ADMIN — BUSPIRONE HYDROCHLORIDE 15 MG: 15 TABLET ORAL at 11:37

## 2023-03-25 RX ADMIN — DIVALPROEX SODIUM 500 MG: 500 TABLET, DELAYED RELEASE ORAL at 11:37

## 2023-03-25 RX ADMIN — PSYLLIUM HUSK 1 PACKET: 3.4 POWDER ORAL at 21:35

## 2023-03-25 RX ADMIN — LAMOTRIGINE 200 MG: 200 TABLET ORAL at 11:37

## 2023-03-25 RX ADMIN — PROPRANOLOL HYDROCHLORIDE 20 MG: 20 TABLET ORAL at 11:36

## 2023-03-25 RX ADMIN — DEXAMETHASONE SODIUM PHOSPHATE 10 MG: 10 INJECTION INTRAMUSCULAR; INTRAVENOUS at 08:14

## 2023-03-25 RX ADMIN — MULTIPLE VITAMINS W/ MINERALS TAB 1 TABLET: TAB at 11:37

## 2023-03-25 RX ADMIN — LEVOCARNITINE 660 MG: 330 TABLET ORAL at 21:36

## 2023-03-25 RX ADMIN — PROPRANOLOL HYDROCHLORIDE 20 MG: 20 TABLET ORAL at 21:37

## 2023-03-25 RX ADMIN — CITALOPRAM HYDROBROMIDE 20 MG: 20 TABLET ORAL at 11:39

## 2023-03-25 RX ADMIN — LAMOTRIGINE 200 MG: 200 TABLET ORAL at 21:37

## 2023-03-25 RX ADMIN — DIVALPROEX SODIUM 500 MG: 500 TABLET, DELAYED RELEASE ORAL at 17:21

## 2023-03-25 RX ADMIN — ENOXAPARIN SODIUM 40 MG: 40 INJECTION SUBCUTANEOUS at 21:38

## 2023-03-25 RX ADMIN — PRAZOSIN HYDROCHLORIDE 2 MG: 1 CAPSULE ORAL at 21:37

## 2023-03-25 RX ADMIN — PANTOPRAZOLE SODIUM 40 MG: 40 TABLET, DELAYED RELEASE ORAL at 11:37

## 2023-03-25 ASSESSMENT — ACTIVITIES OF DAILY LIVING (ADL)
TOILETING: 1-->ASSISTANCE (EQUIPMENT/PERSON) NEEDED (NOT DEVELOPMENTALLY APPROPRIATE)
ADLS_ACUITY_SCORE: 58
DRESSING/BATHING_DIFFICULTY: YES
SWALLOWING: 0-->SWALLOWS FOODS/LIQUIDS WITHOUT DIFFICULTY
EATING: 0-->INDEPENDENT
ADLS_ACUITY_SCORE: 58
ADLS_ACUITY_SCORE: 48
ADLS_ACUITY_SCORE: 37
EATING: 0-->INDEPENDENT
DOING_ERRANDS_INDEPENDENTLY_DIFFICULTY: YES
ADLS_ACUITY_SCORE: 58
WALKING_OR_CLIMBING_STAIRS: AMBULATION DIFFICULTY, ASSISTANCE 1 PERSON;STAIR CLIMBING DIFFICULTY, ASSISTANCE 1 PERSON;TRANSFERRING DIFFICULTY, ASSISTANCE 1 PERSON
TOILETING: 1-->ASSISTANCE (EQUIPMENT/PERSON) NEEDED
ADLS_ACUITY_SCORE: 58
DRESS: 1-->ASSISTANCE (EQUIPMENT/PERSON) NEEDED (NOT DEVELOPMENTALLY APPROPRIATE)
ADLS_ACUITY_SCORE: 37
DRESSING/BATHING: BATHING DIFFICULTY, ASSISTANCE 1 PERSON;DRESSING DIFFICULTY, ASSISTANCE 1 PERSON
WALKING_OR_CLIMBING_STAIRS_DIFFICULTY: YES
TRANSFERRING: 1-->ASSISTANCE (EQUIPMENT/PERSON) NEEDED (NOT DEVELOPMENTALLY APPROPRIATE)
ADLS_ACUITY_SCORE: 58
FALL_HISTORY_WITHIN_LAST_SIX_MONTHS: YES
CHANGE_IN_FUNCTIONAL_STATUS_SINCE_ONSET_OF_CURRENT_ILLNESS/INJURY: YES
ADLS_ACUITY_SCORE: 37
EATING/SWALLOWING: EATING
WEAR_GLASSES_OR_BLIND: YES
DRESS: 1-->ASSISTANCE (EQUIPMENT/PERSON) NEEDED
ADLS_ACUITY_SCORE: 58
TOILETING_ISSUES: YES
DIFFICULTY_EATING/SWALLOWING: YES
SWALLOWING: 0-->SWALLOWS FOODS/LIQUIDS WITHOUT DIFFICULTY (DEVELOPMENTALLY APPROPRIATE)
BATHING: 1-->ASSISTANCE NEEDED
DEPENDENT_IADLS:: CLEANING;COOKING;LAUNDRY;SHOPPING;MEAL PREPARATION;MEDICATION MANAGEMENT;TRANSPORTATION
NUMBER_OF_TIMES_PATIENT_HAS_FALLEN_WITHIN_LAST_SIX_MONTHS: 3
CONCENTRATING,_REMEMBERING_OR_MAKING_DECISIONS_DIFFICULTY: YES
TOILETING_ASSISTANCE: TOILETING DIFFICULTY, REQUIRES EQUIPMENT
TRANSFERRING: 1-->ASSISTANCE (EQUIPMENT/PERSON) NEEDED

## 2023-03-25 NOTE — CONSULTS
Care Management Initial Consult    General Information  Assessment completed with: VM-chart review, Chart  review  Type of CM/SW Visit: Initial Assessment    Primary Care Provider verified and updated as needed: No   Readmission within the last 30 days: previous discharge plan unsuccessful      Reason for Consult: discharge planning  Advance Care Planning:            Communication Assessment  Patient's communication style: spoken language (English or Bilingual)             Cognitive  Cognitive/Neuro/Behavioral: .WDL except                      Living Environment:   People in home: facility resident     Current living Arrangements: group home      Able to return to prior arrangements:         Family/Social Support:  Care provided by: self, other (see comments)  Provides care for: no one     Facility resident(s)/Staff          Description of Support System:           Current Resources:   Patient receiving home care services: Yes  Skilled Home Care Services: Skilled Nursing, Physical Therapy, Occupational Therapy  Community Resources: Group Home, [facility]  Equipment currently used at home: walker, rolling  Supplies currently used at home: Oxygen Tubing/Supplies    Employment/Financial:  Employment Status:          Financial Concerns:             Lifestyle & Psychosocial Needs:  Social Determinants of Health     Tobacco Use: Medium Risk     Smoking Tobacco Use: Former     Smokeless Tobacco Use: Never     Passive Exposure: Not on file   Alcohol Use: Not on file   Financial Resource Strain: Not on file   Food Insecurity: Not on file   Transportation Needs: Not on file   Physical Activity: Not on file   Stress: Not on file   Social Connections: Not on file   Intimate Partner Violence: Not on file   Depression: Not on file   Housing Stability: Not on file       Functional Status:  Prior to admission patient needed assistance:   Dependent ADLs:: Ambulation-walker, Bathing, Dressing, Grooming, Toileting  Dependent IADLs::  "Cleaning, Cooking, Laundry, Shopping, Meal Preparation, Medication Management, Transportation         Chemical Dependency Status:  Chemical Dependency Status: No Current Concerns               Additional Information:  Patient was admitted from 3/15/23-3/24/23 for acute respiratory failure with hypercapnia. Patient discharged with Home Care with [facility]. Updated [facility] home care.     SW attempted to call  but there was no answer. Requested a call back. Per RN report,  is wondering about TCU. Patient is on a BIPAP which would note be appropriate for TCU at this time. Once off a BIPAP on 5L O2 or less, then patient may be appropriate contingent on PT OT recs.     Per chart review, \", pt resides in a Group Home.      Patient receives services as follows: SBA with mobility using a walker, assistance with showers and ADL's, medication administration takes medications whole with liquids     BEN contact (name/number): Contact Marco Casanova [phone] [facility] [phone] Ex [phone]  Fax #: [phone]  Barriers to pt returning: Medical Readiness, BIPAP  Baseline mobility: SBA with use of walker  Time of preferred return: No weekend returns  has RN available Monday thru Friday  New meds/prescriptions: [facility]  Transportation: TBD     Other:   Patient will often refuse cares and will \"triangulate\" between  staff and nursing.     Patient is his own guardian. Patient's step mother, Ashlie, is emergency contact and involved.\"     RN CC/SW will continue to follow for discharge planning and return to facility.    SKYLER Stroud, [facility]  Emergency Room   [phone]-Please contact the SW on the floor in which the patient is staying for any questions or concerns    "

## 2023-03-25 NOTE — ED TRIAGE NOTES
Pt arrives via EMS from group home. Recently discharged on Friday for pneumonia. Spent 5 days in ICU. Lift assist of heavy 2. Normally sleeps in recliner. Has slid out of recliner x2 today. Unable to be cared for at group home due to generalized weakness. History of COPD, bipolar, epilepsy.     Pt was placed in wheelchair by EMS     Triage Assessment     Row Name 03/25/23 0431       Triage Assessment (Adult)    Airway WDL X       Respiratory WDL    Respiratory WDL X       Skin Circulation/Temperature WDL    Skin Circulation/Temperature WDL X       Cardiac WDL    Cardiac WDL WDL       Peripheral/Neurovascular WDL    Peripheral Neurovascular WDL X       Cognitive/Neuro/Behavioral WDL    Cognitive/Neuro/Behavioral WDL X

## 2023-03-25 NOTE — PLAN OF CARE
Shift Events: admitted to ICU for BiPAP, VBGs improved throughout the day    Neuro: lethargic, confused to time, garbled speech, L facial droop (missing eye)  CV: SR, BP WNL, good pulses  ID: afebrile  Pulm: LS very diminished, tolerating BiPAP 12/6 30% while sleeping and 2L NC while awake  GI: soft stool x1, has an appetite but is currently NPO  : good UO, mostly continent  Lines/gtts: PIV SL  Skin: multiple abrasions  Labs: VBG pH corrected to 7.32, CO2 improved to 65Mg replaced, BGs WNL    For vital signs and complete assessments, see documentation flowsheets.     Plan: continue BiPAP while sleeping, encourage activity as tolerated, needs PT for strengthening, SW consult in for likely TCU needs at discharge

## 2023-03-25 NOTE — ED NOTES
Jocelyne,  from group home, would like to participate in care planning and discuss whether a TCU would be appropriate for patient. Please contact her at [phone]

## 2023-03-25 NOTE — H&P
[facility]    Hospitalist Admission Note    Name: Tre Vazquez    MRN: [id number]  YOB: 1975    Age: 47 year old  Date of admission: 3/25/2023  Primary care provider: Siobhan Mayo  Admitting physician : Shabbir Vickers M.D. ,M.B.A.       Brief summary of admission assessment/MDM:    Tre Vazquez is a 47 year old male with seizure disorder, cerebral palsy with mild spastic paraparesis, mild intellectual disability, mood disturbance, hypertension, congenital left eye blindness, right Bell's palsy with residual facial droop, morbid obesity, borderline personality disorder, PTSD, schizoaffective disorder, obstructive sleep apnea on BiPAP therapy, cerebral ventriculomegaly, prediabetes mellitus, chronic hypercapnic respiratory failure, asthma and depression who is chronically on 2 L of nasal cannula oxygen and lives in a group home came to [facility]   Tre Vazquez on March 25 a day after  he was discharged from [facility] on March 24, 2023.    Patient reportedly fell twice at the group home and had generalized weakness and failure to thrive per .    On presentation to emergency department, patient was hypoxic with oxygen saturation of 83% on room air, blood pressure 166/102, heart rate 91, temperature 98.1 and respiratory rate of 24.    Patient was placed on 6 L of oxygen and subsequently started on BiPAP due to hypoxic and hypercarbic respiratory failure.    ICU admission was requested and patient was seen and examined in the emergency department.      Assessment and Plan       #1.  Acute on chronic hypoxic and hypercapnic respiratory failure: Multifactorial etiology including severe morbid obesity, recent multifocal pneumonia, obstructive sleep apnea  -- Patient has chronic respiratory failure with hypoxia requiring 2 to 3 L of oxygen via nasal cannula at home.  -- Patient was recently admitted to [facility] and  treated for multifocal pneumonia and respiratory failure requiring intubation and subsequently treated with high flow nasal oxygen which was weaned down to his baseline oxygen requirement.  He was adequately treated with IV antibiotic last admission.  -- Patient was afebrile on this presentation to the emergency department, and WBC count 8.1, chest x-ray showed hyperinflated lungs with no new airspace opacity.  Mild vascular congestion noted.  Patient is given azithromycin 500 mg IV infusion, 10 mg of IV dexamethasone and DuoNeb in the emergency department.  -- Initial VBG showed pH of 7.28, PCO2 73, PO2 45, bicarb 34, base excess venous 5.1.  Patient was placed on BiPAP and subsequent VBG showed improvement.      Plan :   -- Continue BiPAP therapy, check VBG and few hours, patient may be transitioned to nasal oxygen as tolerated.  ICU admission and close monitoring.  Hold antibiotic for now    #2.  Physical deconditioning, generalized weakness and failure to thrive at group home.  -- Patient was very weak at this group home and fell twice at group home staff.  No report of loss of consciousness.  Patient was unable to be cared for at group home due to generalized weakness and more assistance requirement.  --  Jocelyne was contacted at [phone] who felt that patient needs TCU rather than returning back to group home.    Plan :  -- PT, OT and social service consultation to assist with further TCU versus long-term care placement on discharge.  No indication of loss of consciousness but patient was complaining of right elbow pain which was x-rayed and showed no acute fracture.    #3.  Recent treatment for multifocal pneumonia completed treatment with Zosyn and azithromycin.  -- No further antibiotic needed at this time    #4.Cerebral palsy  Spastic paraparesis  Seizure disorder  On antiepileptic medications  -to be continued when off BiPAP  Seizure precautions     #5.  Hypertension  --Antihypertensives continued      #6. Obstructive sleep apnea  --May use home CPAP/BiPAP per RT       Addendum  60 minutes of critical care time spent excluding procedures.    # Admission Status: Will admit patient to hospitalist service as inpatient as patient likely need over two mid night stay  in the hospital.    # Diet:Keep NPO for now    # Activity:Advance activity as tolerated    # Education/Counseling :Discussed treatment plan with the patient    # Consults:Inpatient consult with     # VTE prophylactic measures:prophylaxis against venous thromboembolism with Lovenox       # Code Status:Full Code    # Disposition:anticipate discharge to skilled care facility and Anticipate discharge in 3 days        Disclaimer: This note consists of symbols derived from keyboarding, dictation and/or voice recognition software. As a result, there may be errors in the script that have gone undetected. Please consider this when interpreting information found in this chart.             Chief Complaint:     Generalized weakness, failure to care for group home, fall           History of Present Illness:      This patient is a 47 year old  male with a significant past medical history of complex medical problems as above who presents with the following condition requiring a hospital admission:    Generalized weakness, fall and failure to thrive at group home.  Acute on chronic hypoxic and hypercapnic respiratory failure    Patient was just discharged yesterday after hospitalization for respiratory failure following multifocal pneumonia in the setting of chronic respiratory failure, obstructive sleep apnea and cerebral palsy.  Patient's condition improved after ICU stay when he required intubation and subsequently treated with high flow nasal oxygen wean down to his supplemental nasal oxygen at baseline.  Patient improved and sent back to his group home but he did not do well on discharge from the  hospital.  Patient fell twice but no loss of consciousness or head trauma.  There is also report that patient slipped out of his chair.  I spoke with  by the name Jocelyne who stated that her team is not able to take care of him as he needs more assistance and she recommends he goes to TCU.  On presentation to the emergency department evaluation revealed worsening of his hypoxia but chest x-ray did not reveal any new infiltrates.  Patient required BiPAP and was admitted to ICU for close monitoring and care.           Past Medical History:     Past Medical History:   Diagnosis Date     Blindness of left eye      Depression 03/10/2014     Hypertension      Pneumococcal septicemia(038.2) (H) 11/26/2013    Hospitalized     Pneumonia, organism unspecified(486) 11/26/2013    Hospitalized     Uncomplicated asthma      Unspecified epilepsy without mention of intractable epilepsy             Past Surgical History:     Past Surgical History:   Procedure Laterality Date     COLONOSCOPY N/A 12/1/2022    Procedure: COLONOSCOPY;  Surgeon: Michael Caldwell MD;  Location:  OR     ESOPHAGOSCOPY, GASTROSCOPY, DUODENOSCOPY (EGD), COMBINED N/A 12/1/2022    Procedure: ESOPHAGOGASTRODUODENOSCOPY with biopsy;  Surgeon: Michael Caldwell MD;  Location: [facility] OR     ORTHOPEDIC SURGERY      pt stated past surgery on both ankles             Social History:     Social History     Tobacco Use     Smoking status: Former     Smokeless tobacco: Never   Substance Use Topics     Alcohol use: No             Family History:   Reviewed and non contributory        Allergies:     Allergies   Allergen Reactions     Hydrocodone Bitartrate Er Unknown     Tomato      Cephalexin Rash     HUT Reaction: Rash; HUT Noted: [id number]       Vicodin [Hydrocodone-Acetaminophen] GI Disturbance             Medications:        Prior to Admission medications    Medication Sig Last Dose Taking? Auth Provider Long Term End Date   ammonium  lactate (AMLACTIN) 12 % external cream Apply topically 2 times daily Past Month at - Yes Unknown, Entered By History     busPIRone (BUSPAR) 15 MG tablet Take 15 mg by mouth 2 times daily 3/24/2023 at pm Yes Reported, Patient Yes    citalopram (CELEXA) 20 MG tablet Take 20 mg by mouth daily 3/24/2023 at am Yes Reported, Patient No    citalopram (CELEXA) 40 MG tablet Take 40 mg by mouth daily 3/24/2023 at am Yes Unknown, Entered By History No    clotrimazole (LOTRIMIN) 1 % external solution Apply topically 2 times daily Past Month Yes Reported, Patient     divalproex sodium delayed-release (DEPAKOTE) 500 MG DR tablet Take 1 tablet (500 mg) by mouth 3 times daily 3/24/2023 at pm Yes Lisa Merchant APRN CNP Yes    lamoTRIgine (LAMICTAL) 200 MG tablet Take 1 tablet (200 mg) by mouth 2 times daily 3/24/2023 at pm Yes Lisa Merchant APRN CNP Yes    levETIRAcetam (KEPPRA) 500 MG tablet Take 2 tablets (1,000 mg) by mouth every morning AND 3 tablets (1,500 mg) At Bedtime. 3/24/2023 at hs Yes Lisa Merchant APRN CNP Yes    levOCARNitine (CARNITOR) 330 MG tablet Take 2 tablets (660 mg) by mouth 3 times daily 3/24/2023 at pm Yes Lisa Merchant APRN CNP Yes    methylcellulose (CITRUCEL) powder Take 2 g by mouth 2 times daily Past Month at - Yes Reported, Patient  10/21/23   multivitamin w/minerals (THERA-VIT-M) tablet Take 1 tablet by mouth daily Past Month at - Yes Unknown, Entered By History     pantoprazole (PROTONIX) 40 MG EC tablet Take 1 tablet (40 mg) by mouth daily for 30 days 3/24/2023 at am Yes Shabbir Vickers MD  4/22/23   prazosin (MINIPRESS) 2 MG capsule Take 2 mg by mouth At Bedtime 3/24/2023 at hs Yes Reported, Patient Yes    propranolol (INDERAL) 20 MG tablet Take 20 mg by mouth 2 times daily 3/24/2023 at pm Yes Reported, Patient No    QUEtiapine ER (SEROQUEL XR) 300 MG 24 hr tablet Take 300 mg by mouth At Bedtime 3/24/2023 at hs Yes Unknown, Entered By History No    zonisamide (ZONEGRAN) 100 MG capsule Take 1  capsule (100 mg) by mouth every morning AND 2 capsules (200 mg) At Bedtime. 3/24/2023 at hs Yes Zasada, Lisa, APRN CNP Yes    polyethylene glycol (MIRALAX) 17 GM/Dose powder Take 1 capful. by mouth daily as needed prn at prn  Reported, Patient            Review of Systems:     A Comprehensive greater than 10 system review of systems was carried out.  Pertinent positives and negatives are noted above in HPI.  Otherwise negative for contributory information.           Physical Exam:     Vital signs were reviewed    Temp:  [97.9  F (36.6  C)-98.1  F (36.7  C)] 97.9  F (36.6  C)  Pulse:  [81-91] 86  Resp:  [11-28] 19  BP: (116-166)/() 139/88  FiO2 (%):  [30 %] 30 %  SpO2:  [83 %-100 %] 96 %        GEN: Awake, acutely sick looking on BiPAP.  Patient is irritable and has cerebral palsy at baseline.  NECK:Supple ,no mass or thyromegaly     HEENT:  Normocephalic/atraumatic, no scleral icterus, no nasal discharge, mouth moist.    CV:  Regular rate and rhythm, no murmur or JVD.  S1 + S2 noted, no S3 or S4.    LUNGS: Diminished air entry with no crackles or wheezing.  ABD:  Active bowel sounds, soft, non-tender/non-distended.  No rebound/guarding/rigidity.    EXT:  No edema.  No cyanosis.  No joint synovitis noted.Lower extremity pulses are normal bilaterally and     LGS: No cervical or axillary lymphadenopathy     SKIN:  Dry to touch, warm ,no exanthems noted in the visualized areas.    Neurologic:Grossly intact,non focal . No acute focal neurologic deficit     Psychaitric exam: Mood and affect normal                Data:       All laboratory and imaging data in the past 24 hours reviewed              Recent Results (from the past 48 hour(s))   XR Chest Port 1 View    Narrative    EXAM: XR CHEST PORTABLE 1 VIEW  LOCATION: [facility]  DATE/TIME: 03/25/2023, 6:46 AM    INDICATION: Shortness of breath.  COMPARISON: 03/15/2023.      Impression    IMPRESSION: Removal of ET tube and nasogastric tube.  Hypoinflated lungs. No new airspace disease. Mild vascular congestion. Stable cardiac silhouette. No visible effusion.     XR Elbow Port Right 2 Views    Narrative    EXAM: XR ELBOW PORT RIGHT 2 VIEWS  LOCATION: [facility]  DATE/TIME: 3/25/2023 10:54 AM    INDICATION: fall. pain  COMPARISON: None.      Impression    IMPRESSION: No acute fracture or malalignment. Mild elbow joint degenerative changes. No elbow joint effusion. Mild chronic deformity at the radial head.       EKG results: Normal sinus rhythm nonspecific changes in ST segments in inferior leads.    Chest x-ray showed no new airspace disease.  Mild vascular congestion reported.  Stable cardiac silhouette no visible effusion.    Patient`s old medical records reviewed and case discussed with the ED physician.    ED course-Reviewed  and care plan discussed with Dr. Raymond

## 2023-03-25 NOTE — ED NOTES
[facility]  ED Nurse Handoff Report    Tre Vazquez is a 47 year old male   ED Chief complaint: Generalized Weakness  . ED Diagnosis:   Final diagnoses:   None     Allergies:   Allergies   Allergen Reactions     Hydrocodone Bitartrate Er Unknown     Tomato      Cephalexin Rash     HUT Reaction: Rash; HUT Noted: [id number]       Vicodin [Hydrocodone-Acetaminophen] GI Disturbance       Code Status: Full Code  Activity level - Baseline/Home:  Stand by Assist. Activity Level - Current:   Assist X 2. Lift room needed: Yes. Bariatric: No   Needed: No   Isolation: No. Infection: Not Applicable.     Vital Signs:   Vitals:    03/25/23 0500 03/25/23 0515 03/25/23 0545 03/25/23 0607   BP: 131/74 133/65 133/52    Pulse: 88 87 90 88   Resp: 24 28 25 24   Temp:       SpO2: 100% 100% 97% 99%       Cardiac Rhythm:  ,      Pain level:    Patient confused: No. Patient Falls Risk: Yes.   Elimination Status: Has voided   Patient Report - Initial Complaint: shortness of breath/fatigue. Focused Assessment: Tre Vazquez is a 47 year old male with a history of cerebral palsy, hypertension, asthma, and seizure disorder who presents with generalized weakness. The patient reports that he recently was discharged from the hospital. He states that he fell twice yesterday 3/24. He states he has been having increased weakness and fatigue as well. The patient states he thinks he fell because his legs feel weak. He denies any head injury and states he just fell backwards on his bottom. The patient denies any chest pain or increased shortness of breath. The patient is baseline on 2 L of oxygen. The patient denies any increase in cough but states that he does have productive phlegm. He notes he is able to ambulate independently normally with a walker.   Tests Performed:   XR Chest Port 1 View    (Results Pending)    Abnormal Results:   Abnormal Labs Resulted from Time of ED Arrival to Time of ED Departure   BASIC METABOLIC  PANEL - Abnormal       Result Value    Sodium 143      Potassium 4.2      Chloride 101      Carbon Dioxide (CO2) 35 (*)     Anion Gap 7      Urea Nitrogen 14.5      Creatinine 0.63 (*)     Calcium 9.2      Glucose 92      GFR Estimate >90     BLOOD GAS VENOUS - Abnormal    pH Venous 7.28 (*)     pCO2 Venous 73 (*)     pO2 Venous 45      Bicarbonate Venous 34 (*)     Base Excess/Deficit (+/-) 5.1 (*)     FIO2 100     CBC WITH PLATELETS AND DIFFERENTIAL - Abnormal    WBC Count 8.1      RBC Count 4.29 (*)     Hemoglobin 13.1 (*)     Hematocrit 42.3      MCV 99      MCH 30.5      MCHC 31.0 (*)     RDW 13.4      Platelet Count 228      % Neutrophils 60      % Lymphocytes 22      % Monocytes 14      % Eosinophils 1      % Basophils 1      % Immature Granulocytes 2      NRBCs per 100 WBC 0      Absolute Neutrophils 5.0      Absolute Lymphocytes 1.8      Absolute Monocytes 1.1      Absolute Eosinophils 0.1      Absolute Basophils 0.0      Absolute Immature Granulocytes 0.1      Absolute NRBCs 0.0       Treatments provided: O2/Bipap  Family Comments: none  OBS brochure/video discussed/provided to patient:  No  ED Medications: Medications - No data to display  Drips infusing:  No  For the majority of the shift, the patient's behavior Green. Interventions performed were None.    Sepsis treatment initiated: No     Patient tested for COVID 19 prior to admission: NO    ED Nurse Name/Phone Number: Gracie Lloyd RN,   6:28 AM

## 2023-03-25 NOTE — PHARMACY-ADMISSION MEDICATION HISTORY
Admission medication history interview status for this patient is complete. See [facility] admission navigator for allergy information, prior to admission medications and immunization status.     Medication history interview done, indicate source(s): [facility] Discharge summary 3/24 and prev med hx completed during that admission.   Medication history resources (including written lists, pill bottles, clinic record):[facility]  Pharmacy: [facility] [place] - [place], [place] - [address]    Changes made to PTA medication list:  Added: none  Changed: none  Reported as Not Taking: none  Removed: none    Actions taken by pharmacist (provider contacted, etc):Left provider sticky note     Additional medication history information: Pt just discharged yesterday @ 1200, did not review medications with group home as pt returned to [facility] within 24hr of discharge.     Quetiapine: Seems strange they have pt on XR formulation of quetiapine, considering cannot crush. [facility] [employer] looks like [facility] is dispensing the immediate release formulation (300mg tabs), but prev med hx was completed by talking to group home and that is when the quetiapine was changed to XR formulation. Maybe at group home can take some meds orally?     Medication reconciliation/reorder completed by provider prior to medication history?  N   (Y/N)     Medication Affordability: Not assessed.     Prior to Admission medications    Medication Sig Last Dose Taking? Auth Provider Long Term End Date   ammonium lactate (AMLACTIN) 12 % external cream Apply topically 2 times daily Past Month at - Yes Unknown, Entered By History     busPIRone (BUSPAR) 15 MG tablet Take 15 mg by mouth 2 times daily 3/24/2023 at pm Yes Reported, Patient Yes    citalopram (CELEXA) 20 MG tablet Take 20 mg by mouth daily 3/24/2023 at am Yes Reported, Patient No    citalopram (CELEXA) 40 MG tablet Take 40 mg by mouth daily 3/24/2023 at am Yes Unknown, Entered By History No    clotrimazole  (LOTRIMIN) 1 % external solution Apply topically 2 times daily Past Month Yes Reported, Patient     divalproex sodium delayed-release (DEPAKOTE) 500 MG DR tablet Take 1 tablet (500 mg) by mouth 3 times daily 3/24/2023 at pm Yes Lias Merchant APRN CNP Yes    lamoTRIgine (LAMICTAL) 200 MG tablet Take 1 tablet (200 mg) by mouth 2 times daily 3/24/2023 at pm Yes Lisa Merchant APRN CNP Yes    levETIRAcetam (KEPPRA) 500 MG tablet Take 2 tablets (1,000 mg) by mouth every morning AND 3 tablets (1,500 mg) At Bedtime. 3/24/2023 at hs Yes Lisa Merchant APRN CNP Yes    levOCARNitine (CARNITOR) 330 MG tablet Take 2 tablets (660 mg) by mouth 3 times daily 3/24/2023 at pm Yes Lisa Merchant APRN CNP Yes    multivitamin w/minerals (THERA-VIT-M) tablet Take 1 tablet by mouth daily Past Month Yes Unknown, Entered By History     pantoprazole (PROTONIX) 40 MG EC tablet Take 1 tablet (40 mg) by mouth daily for 30 days 3/24/2023 at am Yes Shabbir Vickers MD  4/22/23   prazosin (MINIPRESS) 2 MG capsule Take 2 mg by mouth At Bedtime 3/24/2023 at hs Yes Reported, Patient Yes    propranolol (INDERAL) 20 MG tablet Take 20 mg by mouth 2 times daily 3/24/2023 at pm Yes Reported, Patient No    QUEtiapine ER (SEROQUEL XR) 300 MG 24 hr tablet Take 300 mg by mouth At Bedtime 3/24/2023 at hs Yes Unknown, Entered By History No    zonisamide (ZONEGRAN) 100 MG capsule Take 1 capsule (100 mg) by mouth every morning AND 2 capsules (200 mg) At Bedtime. 3/24/2023 at hs Yes Lisa Merchant APRN CNP Yes    methylcellulose (CITRUCEL) powder Take 2 g by mouth 2 times daily   Reported, Patient  10/21/23   polyethylene glycol (MIRALAX) 17 GM/Dose powder Take 1 capful. by mouth daily as needed prn at prn  Reported, Patient

## 2023-03-25 NOTE — PROGRESS NOTES
A BiPAP of  12/6 @ 30% was applied to the pt via the mask for an increase in WOB and/or SOB.  The bridge of the nose is clean and dry. Pt is tolerating it well. Will continue to monitor and assess the pt's current respiratory status and needs.    Praveen Christensen, RT on 3/25/2023 at 6:12 AM

## 2023-03-25 NOTE — ED PROVIDER NOTES
History     Chief Complaint:  Generalized Weakness       HPI   Tre Vazquez is a 47 year old male with a history of cerebral palsy, hypertension, asthma, and seizure disorder who presents with generalized weakness. The patient reports that he recently was discharged from the hospital. He states that he fell twice yesterday 3/24. He states he has been having increased weakness and fatigue as well. The patient states he thinks he fell because his legs feel weak. He denies any head injury and states he just fell backwards on his bottom. The patient denies any chest pain or increased shortness of breath. The patient is baseline on 2 L of oxygen. The patient denies any increase in cough but states that he does have productive phlegm. He notes he is able to ambulate independently normally with a walker.     Independent Historian:   None - Patient Only    Review of External Notes: Admission note 3/15-3/22 for respiratory failure and left lower lobe pneumonia      ROS:  Review of Systems  ROS: ROS neg other than the symptoms noted above in the HPI.    Allergies:  Hydrocodone Bitartrate Er  Tomato  Cephalexin  Vicodin     Medications:    Buspar  Citalopram   Depakote  Lamictal   Keppra  Carnitor   Pantoprazole   Prazosin   Propranolol   Seroquel  Zonisamide     Past Medical History:    Blindness of left eye   Depression   Hypertension   Pneumococcal septicemia  Asthma   Epilepsy   Cerebral palsy   Spastic paraparesis   ANA    Past Surgical History:    Orthopedic surgery bilateral ankles     Social History:  The patient presents alone via EMS.    reports that he has quit smoking. He has never used smokeless tobacco. He reports that he does not drink alcohol and does not use drugs.  PCP: Siobhan Mayo     Physical Exam     Patient Vitals for the past 24 hrs:   BP Temp Pulse Resp SpO2   03/25/23 0737 -- -- 84 17 99 %   03/25/23 0719 128/71 -- 84 18 96 %   03/25/23 0700 130/71 -- 85 22 96 %   03/25/23 0615 116/65 -- 87 18 100  %   03/25/23 0607 -- -- 88 24 99 %   03/25/23 0545 133/52 -- 90 25 97 %   03/25/23 0515 133/65 -- 87 28 100 %   03/25/23 0500 131/74 -- 88 24 100 %   03/25/23 0445 134/66 -- -- -- 100 %   03/25/23 0431 (!) 143/74 -- 90 -- 92 %   03/25/23 0418 (!) 166/102 98.1  F (36.7  C) 91 24 (!) 83 %        Physical Exam  General: Patient is awake, alert, chronically ill appearing   Head: The scalp, face, and head appear normal  Eyes: The pupils are equal, round, and reactive to light. Conjunctivae and sclerae are normal  Neck: Normal range of motion.   CV: Regular rate and rhythm.   Resp: tachypnea, mild wheezing   GI: Abdomen is soft, no rigidity, guarding, or rebound. No distension. No tenderness to palpation in any quadrant  MS: Normal tone. Joints grossly normal without effusions. No asymmetric leg swelling, calf or thigh tenderness.    Skin: No rash or lesions noted. Normal capillary refill noted  Neuro: Speech is normal and fluent. Face is symmetric. Moving all extremities.   Psych:  Normal affect.  Appropriate interactions.    Emergency Department Course   ECG  ECG results from 03/25/23   EKG 12-lead, tracing only     Value    Systolic Blood Pressure     Diastolic Blood Pressure     Ventricular Rate 88    Atrial Rate 88    RI Interval 186    QRS Duration 90        QTc 423    P Axis 60    R AXIS 27    T Axis 27    Interpretation ECG      Sinus rhythm  When compared with ECG of 08-DEC-2021 01:33,  Non-specific change in ST segment in Inferior leads  ST no longer elevated in Lateral leads  Nonspecific T wave abnormality now evident in Lateral leads  QT has shortened         Imaging:  XR Chest Port 1 View   Preliminary Result   IMPRESSION: Removal of ET tube and nasogastric tube. Hypoinflated lungs. No new airspace disease. Mild vascular congestion. Stable cardiac silhouette. No visible effusion.            Report per radiology    Laboratory:  Labs Ordered and Resulted from Time of ED Arrival to Time of ED Departure    BASIC METABOLIC PANEL - Abnormal       Result Value    Sodium 143      Potassium 4.2      Chloride 101      Carbon Dioxide (CO2) 35 (*)     Anion Gap 7      Urea Nitrogen 14.5      Creatinine 0.63 (*)     Calcium 9.2      Glucose 92      GFR Estimate >90     BLOOD GAS VENOUS - Abnormal    pH Venous 7.28 (*)     pCO2 Venous 73 (*)     pO2 Venous 45      Bicarbonate Venous 34 (*)     Base Excess/Deficit (+/-) 5.1 (*)     FIO2 100     CBC WITH PLATELETS AND DIFFERENTIAL - Abnormal    WBC Count 8.1      RBC Count 4.29 (*)     Hemoglobin 13.1 (*)     Hematocrit 42.3      MCV 99      MCH 30.5      MCHC 31.0 (*)     RDW 13.4      Platelet Count 228      % Neutrophils 60      % Lymphocytes 22      % Monocytes 14      % Eosinophils 1      % Basophils 1      % Immature Granulocytes 2      NRBCs per 100 WBC 0      Absolute Neutrophils 5.0      Absolute Lymphocytes 1.8      Absolute Monocytes 1.1      Absolute Eosinophils 0.1      Absolute Basophils 0.0      Absolute Immature Granulocytes 0.1      Absolute NRBCs 0.0     BLOOD GAS VENOUS WITH OXYHEMOGLOBIN - Abnormal    pH Venous 7.30 (*)     pCO2 Venous 69 (*)     pO2 Venous 49 (*)     Bicarbonate Venous 34 (*)     FIO2 100      Oxyhemoglobin Venous 80 (*)     Base Excess/Deficit (+/-) 5.5 (*)    TROPONIN T, HIGH SENSITIVITY - Normal    Troponin T, High Sensitivity 19     NT PROBNP INPATIENT - Normal    N terminal Pro BNP Inpatient 161          Procedures     Emergency Department Course & Assessments:  Medications   ipratropium - albuterol 0.5 mg/2.5 mg/3 mL (DUONEB) neb solution 3 mL (has no administration in time range)   dexamethasone PF (DECADRON) injection 10 mg (has no administration in time range)   azithromycin 500 mg (ZITHROMAX) in 0.9% NaCl 250 mL intermittent infusion 500 mg (has no administration in time range)      Assessments:  0335 I obtained history and examined the patient as noted above.     Independent Interpretation (X-rays, CTs, rhythm  strip):  None    Consultations/Discussion of Management or Tests:  Spoke with Dr. Vickers regarding admission     Social Determinants of Health affecting care:   None    Disposition:  The patient was admitted to the hospital under the care of Dr. Vickers.     Impression & Plan      Medical Decision Making:  Tre Vazquez is a 47 year old male with a history of cerebral palsy, hypertension, asthma, and seizure disorder who presents with generalized weakness.  Patient with recent admission for acute respiratory failure and left lower lobe pneumonia and was recently discharged.  Since going home he notes feeling increasingly weak and has had several falls but no significant injuries from these falls.  Continues to feel quite fatigued as well.  Notes his breathing is at baseline and denies any significant chest pain.  Upon initial evaluation here he is hypoxic on room air but is post to be on 2 L of nasal cannula.  Once on 2 L of nasal cannula he is oxygenating above 99%.  Blood work was obtained which shows a respiratory acidosis with pH of 7.28 and significant retainment of PCO2 at 74.  For this reason patient was started on BiPAP for hypercapnia.  Repeat blood gases show some slight improvement.  Will require admission for ongoing respiratory hypercapnia and significant generalized weakness.  EMS reports that patient was a 3 person assist to get him out of his home and when we were settling him in the emergency department he required significant assistance to get to the bed.  No focal neurological symptoms to suggest stroke.  Likely still recovering from recent admission and pneumonia and ongoing respiratory failure.  Will be admitted to ICU given his needs for BiPAP.    Diagnosis:    ICD-10-CM    1. Acute respiratory failure with hypercapnia (H)  J96.02       2. Generalized muscle weakness  M62.81       3. Cerebral palsy, unspecified type (H)  G80.9            Scribe Disclosure:  ISarah, am serving as a  scribe at 4:28 AM on 3/25/2023 to document services personally performed by Phill Jett MD based on my observations and the provider's statements to me.     3/25/2023   Phill Jett MD Battista, Christopher Joseph, MD  03/25/23 0750

## 2023-03-26 LAB
ALBUMIN SERPL BCG-MCNC: 3.7 G/DL (ref 3.5–5.2)
ALP SERPL-CCNC: 80 U/L (ref 40–129)
ALT SERPL W P-5'-P-CCNC: 47 U/L (ref 10–50)
ANION GAP SERPL CALCULATED.3IONS-SCNC: 8 MMOL/L (ref 7–15)
AST SERPL W P-5'-P-CCNC: 31 U/L (ref 10–50)
BILIRUB SERPL-MCNC: 0.4 MG/DL
BUN SERPL-MCNC: 13.8 MG/DL (ref 6–20)
CALCIUM SERPL-MCNC: 8.8 MG/DL (ref 8.6–10)
CHLORIDE SERPL-SCNC: 101 MMOL/L (ref 98–107)
CREAT SERPL-MCNC: 0.61 MG/DL (ref 0.67–1.17)
DEPRECATED HCO3 PLAS-SCNC: 32 MMOL/L (ref 22–29)
ERYTHROCYTE [DISTWIDTH] IN BLOOD BY AUTOMATED COUNT: 13.4 % (ref 10–15)
GFR SERPL CREATININE-BSD FRML MDRD: >90 ML/MIN/1.73M2
GLUCOSE BLDC GLUCOMTR-MCNC: 100 MG/DL (ref 70–99)
GLUCOSE BLDC GLUCOMTR-MCNC: 72 MG/DL (ref 70–99)
GLUCOSE BLDC GLUCOMTR-MCNC: 90 MG/DL (ref 70–99)
GLUCOSE SERPL-MCNC: 90 MG/DL (ref 70–99)
HCT VFR BLD AUTO: 36.6 % (ref 40–53)
HGB BLD-MCNC: 11.3 G/DL (ref 13.3–17.7)
MAGNESIUM SERPL-MCNC: 1.7 MG/DL (ref 1.7–2.3)
MCH RBC QN AUTO: 30.5 PG (ref 26.5–33)
MCHC RBC AUTO-ENTMCNC: 30.9 G/DL (ref 31.5–36.5)
MCV RBC AUTO: 99 FL (ref 78–100)
PLATELET # BLD AUTO: 204 10E3/UL (ref 150–450)
POTASSIUM SERPL-SCNC: 3.8 MMOL/L (ref 3.4–5.3)
PROT SERPL-MCNC: 6 G/DL (ref 6.4–8.3)
RBC # BLD AUTO: 3.7 10E6/UL (ref 4.4–5.9)
SODIUM SERPL-SCNC: 141 MMOL/L (ref 136–145)
WBC # BLD AUTO: 6.5 10E3/UL (ref 4–11)

## 2023-03-26 PROCEDURE — 99233 SBSQ HOSP IP/OBS HIGH 50: CPT | Performed by: INTERNAL MEDICINE

## 2023-03-26 PROCEDURE — 36415 COLL VENOUS BLD VENIPUNCTURE: CPT | Performed by: INTERNAL MEDICINE

## 2023-03-26 PROCEDURE — 250N000011 HC RX IP 250 OP 636: Performed by: INTERNAL MEDICINE

## 2023-03-26 PROCEDURE — 83735 ASSAY OF MAGNESIUM: CPT | Performed by: INTERNAL MEDICINE

## 2023-03-26 PROCEDURE — 250N000013 HC RX MED GY IP 250 OP 250 PS 637: Performed by: INTERNAL MEDICINE

## 2023-03-26 PROCEDURE — 999N000157 HC STATISTIC RCP TIME EA 10 MIN

## 2023-03-26 PROCEDURE — 80053 COMPREHEN METABOLIC PANEL: CPT | Performed by: INTERNAL MEDICINE

## 2023-03-26 PROCEDURE — 85027 COMPLETE CBC AUTOMATED: CPT | Performed by: INTERNAL MEDICINE

## 2023-03-26 PROCEDURE — 94660 CPAP INITIATION&MGMT: CPT

## 2023-03-26 PROCEDURE — 120N000001 HC R&B MED SURG/OB

## 2023-03-26 RX ORDER — ACETAMINOPHEN 325 MG/1
975 TABLET ORAL EVERY 6 HOURS PRN
Status: DISCONTINUED | OUTPATIENT
Start: 2023-03-26 | End: 2023-03-29 | Stop reason: HOSPADM

## 2023-03-26 RX ADMIN — ZONISAMIDE 100 MG: 100 CAPSULE ORAL at 08:25

## 2023-03-26 RX ADMIN — ENOXAPARIN SODIUM 40 MG: 40 INJECTION SUBCUTANEOUS at 08:27

## 2023-03-26 RX ADMIN — QUETIAPINE FUMARATE 300 MG: 300 TABLET, EXTENDED RELEASE ORAL at 22:01

## 2023-03-26 RX ADMIN — LAMOTRIGINE 200 MG: 200 TABLET ORAL at 22:00

## 2023-03-26 RX ADMIN — PROPRANOLOL HYDROCHLORIDE 20 MG: 20 TABLET ORAL at 22:00

## 2023-03-26 RX ADMIN — MULTIPLE VITAMINS W/ MINERALS TAB 1 TABLET: TAB at 08:26

## 2023-03-26 RX ADMIN — PRAZOSIN HYDROCHLORIDE 2 MG: 1 CAPSULE ORAL at 22:01

## 2023-03-26 RX ADMIN — PANTOPRAZOLE SODIUM 40 MG: 40 TABLET, DELAYED RELEASE ORAL at 08:26

## 2023-03-26 RX ADMIN — PSYLLIUM HUSK 1 PACKET: 3.4 POWDER ORAL at 08:24

## 2023-03-26 RX ADMIN — PSYLLIUM HUSK 1 PACKET: 3.4 POWDER ORAL at 22:01

## 2023-03-26 RX ADMIN — PROPRANOLOL HYDROCHLORIDE 20 MG: 20 TABLET ORAL at 08:26

## 2023-03-26 RX ADMIN — ACETAMINOPHEN 975 MG: 325 TABLET ORAL at 01:04

## 2023-03-26 RX ADMIN — LEVETIRACETAM 1500 MG: 500 TABLET, FILM COATED ORAL at 22:00

## 2023-03-26 RX ADMIN — LEVOCARNITINE 660 MG: 330 TABLET ORAL at 08:26

## 2023-03-26 RX ADMIN — ZONISAMIDE 200 MG: 100 CAPSULE ORAL at 22:00

## 2023-03-26 RX ADMIN — DIVALPROEX SODIUM 500 MG: 500 TABLET, DELAYED RELEASE ORAL at 16:32

## 2023-03-26 RX ADMIN — CITALOPRAM HYDROBROMIDE 20 MG: 20 TABLET ORAL at 08:26

## 2023-03-26 RX ADMIN — CITALOPRAM HYDROBROMIDE 40 MG: 20 TABLET ORAL at 08:26

## 2023-03-26 RX ADMIN — LEVOCARNITINE 660 MG: 330 TABLET ORAL at 22:01

## 2023-03-26 RX ADMIN — ACETAMINOPHEN 975 MG: 325 TABLET ORAL at 08:26

## 2023-03-26 RX ADMIN — LEVOCARNITINE 660 MG: 330 TABLET ORAL at 16:33

## 2023-03-26 RX ADMIN — LAMOTRIGINE 200 MG: 200 TABLET ORAL at 08:26

## 2023-03-26 RX ADMIN — BUSPIRONE HYDROCHLORIDE 15 MG: 15 TABLET ORAL at 22:00

## 2023-03-26 RX ADMIN — DIVALPROEX SODIUM 500 MG: 500 TABLET, DELAYED RELEASE ORAL at 22:01

## 2023-03-26 RX ADMIN — ENOXAPARIN SODIUM 40 MG: 40 INJECTION SUBCUTANEOUS at 22:01

## 2023-03-26 RX ADMIN — BUSPIRONE HYDROCHLORIDE 15 MG: 15 TABLET ORAL at 08:26

## 2023-03-26 RX ADMIN — DIVALPROEX SODIUM 500 MG: 500 TABLET, DELAYED RELEASE ORAL at 08:26

## 2023-03-26 ASSESSMENT — ACTIVITIES OF DAILY LIVING (ADL)
ADLS_ACUITY_SCORE: 58
ADLS_ACUITY_SCORE: 56
ADLS_ACUITY_SCORE: 58
ADLS_ACUITY_SCORE: 56
ADLS_ACUITY_SCORE: 64
ADLS_ACUITY_SCORE: 58
ADLS_ACUITY_SCORE: 58
ADLS_ACUITY_SCORE: 56
ADLS_ACUITY_SCORE: 64
ADLS_ACUITY_SCORE: 58
ADLS_ACUITY_SCORE: 56
ADLS_ACUITY_SCORE: 56

## 2023-03-26 NOTE — PLAN OF CARE
Goal Outcome Evaluation:      Plan of Care Reviewed With: patient    Overall Patient Progress: no changeOverall Patient Progress: no change     ICU End of Shift Summary.  For vital signs and complete assessments, please see documentation flowsheets.     Neuro: Alert, disoriented to time, able to make needs known.    Cardiac: tele SR, bp soft but stable.  Resp: Lungs dim. 2L NC while awake, bipap 12/6 30% while resting. Tolerated well.  GI: Obese, 2 soft/loose stools. Frequently requesting bedpan and falling asleep while on it. Feeling hungry- requesting diet order today.  : urinal with assistance.  Skin: see flowsheets  Lines: piv    Plan (Upcoming Events): continue supportive cares. Pt would benefit from pt/ot

## 2023-03-26 NOTE — PROGRESS NOTES
SLP consult received. This patient is known to the SLP caseload, most recently seen 3/24/23 with recommendation for a soft and bite sized diet and thin liquids. The patient reports poor compliance with diet recommendation at home, admits he frequently chokes on regular food but does not want a soft diet, nor does he want to eat more carefully. He is currently on a regular diet/thin liquids per his wishes. He states he does not want SLP tx or a modified diet. SLP consulted with RN and with MD (Dr. Vickers) who agreed that SLP can discontinue consult at this time per the patient's wishes.

## 2023-03-26 NOTE — PROGRESS NOTES
[facility]  Hospitalist Progress Note  Shabbir Vickers M.D., M.B.A.   03/26/2023    Reason for Stay/active problem list      Acute on chronic hypoxic and hypercapnic respiratory failure    Failure to thrive at group home         Assessment and Plan:        Summary of Stay:     Tre Vazquez is a 47 year old male with seizure disorder, cerebral palsy with mild spastic paraparesis, mild intellectual disability, mood disturbance, hypertension, congenital left eye blindness, right Bell's palsy with residual facial droop, morbid obesity, borderline personality disorder, PTSD, schizoaffective disorder, obstructive sleep apnea on BiPAP therapy, cerebral ventriculomegaly, prediabetes mellitus, chronic hypercapnic respiratory failure, asthma and depression who is chronically on 2 L of nasal cannula oxygen and lives in a group home came to [facility]   Tre Vazquez on March 25 a day after  he was discharged from [facility] on March 24, 2023.     Patient reportedly fell twice at the group home and had generalized weakness and failure to thrive per .     On presentation to emergency department, patient was hypoxic with oxygen saturation of 83% on room air, blood pressure 166/102, heart rate 91, temperature 98.1 and respiratory rate of 24.     Patient was placed on 6 L of oxygen and subsequently started on BiPAP due to hypoxic and hypercarbic respiratory failure.     Patient was initially admitted to ICU for BiPAP treatment        Problem List with Assessment and Plan:    #1.  Acute on chronic hypoxic and hypercapnic respiratory failure: Multifactorial etiology including severe morbid obesity, recent multifocal pneumonia, obstructive sleep apnea  -- Patient has chronic respiratory failure with hypoxia requiring 2 to 3 L of oxygen via nasal cannula at home.  -- Patient was recently admitted to [facility] and treated for multifocal pneumonia and respiratory  failure requiring intubation and subsequently treated with high flow nasal oxygen which was weaned down to his baseline oxygen requirement.  He was adequately treated with IV antibiotic last admission.  -- Patient was afebrile on this presentation to the emergency department, and WBC count 8.1, chest x-ray showed hyperinflated lungs with no new airspace opacity.  Mild vascular congestion noted.  Patient is given azithromycin 500 mg IV infusion, 10 mg of IV dexamethasone and DuoNeb in the emergency department.  -- Initial VBG showed pH of 7.28, PCO2 73, PO2 45, bicarb 34, base excess venous 5.1.  Patient was placed on BiPAP and subsequent VBG showed improvement.  -- Patient was treated with continuous BiPAP in the ICU with gradual improvement and weaning of BiPAP during the day back to his nasal supplemental oxygen.  --Currently patient is alert and comfortable with nasal oxygen.  Continue nasal supplemental oxygen during the day, CPAP/BiPAP overnight for sleep apnea.  PT and OT consulted for possible discharge needs likely to TCU or long-term care facility.        #2.  Physical deconditioning, generalized weakness and failure to thrive at group home.  -- Patient was very weak at this group home and fell twice at group home staff.  No report of loss of consciousness.  Patient was unable to be cared for at group home due to generalized weakness and more assistance requirement.  --  Jocelyne was contacted at [phone] who felt that patient needs TCU rather than returning back to group home.  --PT, OT and social service consulted        #3.  Recent treatment for multifocal pneumonia completed treatment with Zosyn and azithromycin.  -- No further antibiotic needed at this time     #4.Cerebral palsy  Spastic paraparesis  Seizure disorder  On antiepileptic medications  -to be continued when off BiPAP  Seizure precautions      #5. Hypertension  --Antihypertensives continued      #6. Obstructive sleep  apnea  --May use home CPAP/BiPAP per RT       Plan for today:    May transfer out of  ICU to medical surgical floor.      VTE Prophylaxis: Enoxaparin (Lovenox) SQ  Code Status: Full Code  Diet: Regular Diet Adult    Reid Catheter: Not present    Family updated today: No     Disposition: May transfer to the medical floor and anticipate discharge to TCU/long-term care in the next 2 to 3 days          Interval History (Subjective):        Patient is seen and examined by me today and medical record reviewed.Overnight events noted and care discussed with nursing staff.  Patient continues to do well and tolerated off BiPAP.  On supplemental oxygen.  He required BiPAP overnight.  Care plan discussed with bedside nurse.  PT OT consulted.         Physical Exam:        Last Vital Signs:  /63   Pulse 76   Temp 97.9  F (36.6  C) (Oral)   Resp 12   Wt 134.9 kg (297 lb 6.4 oz)   SpO2 96%   BMI 42.67 kg/m      Wt Readings from Last 1 Encounters:   03/26/23 134.9 kg (297 lb 6.4 oz)       Wt Readings from Last 5 Encounters:   03/26/23 134.9 kg (297 lb 6.4 oz)   03/20/23 139.6 kg (307 lb 12.2 oz)   01/03/23 122.5 kg (270 lb)   11/30/22 123.4 kg (272 lb)   04/19/22 122.1 kg (269 lb 3.2 oz)        Constitutional: Awake, alert, cooperative, no apparent distress     Respiratory: Clear to auscultation bilaterally, no crackles or wheezing   Cardiovascular: Regular rate and rhythm, normal S1 and S2, and no murmur noted   Abdomen: Normal bowel sounds, soft, non-distended, non-tender   Skin: No new rashes, no cyanosis, dry to touch   Neuro: Alert with  no new focal weakness   Extremities: No edema   Other(s):        All other systems: Negative          Medications:        All current medications were reviewed with changes reflected in problem list.         Data:      All new lab and imaging data was reviewed.      Data reviewed today: I reviewed all new labs and imaging results over the last 24 hours. I personally reviewed        Recent Labs   Lab 03/26/23  0625 03/25/23  0450 03/24/23  0622 03/22/23  1456   WBC 6.5 8.1  --  7.8   HGB 11.3* 13.1*  --  12.8*   HCT 36.6* 42.3  --  41.7   MCV 99 99  --  100    228 215 191     No results for input(s): CULT in the last 168 hours.  Recent Labs   Lab 03/26/23  0805 03/26/23  0625 03/26/23  0330 03/25/23 2004 03/25/23  1718 03/25/23  1003 03/25/23  0450 03/24/23  0622 03/23/23  1110 03/22/23  2246 03/22/23  1456   NA  --  141  --   --   --   --  143  --   --   --  141   POTASSIUM  --  3.8  --   --   --   --  4.2 4.4 3.9  --  4.5   CHLORIDE  --  101  --   --   --   --  101  --   --   --  102   CO2  --  32*  --   --   --   --  35*  --   --   --  33*   ANIONGAP  --  8  --   --   --   --  7  --   --   --  6*   GLC 72 90 90   < >  --    < > 92  --   --   --  116*   BUN  --  13.8  --   --   --   --  14.5  --   --   --  12.7   CR  --  0.61*  --   --   --   --  0.63* 0.56*  --   --  0.50*   GFRESTIMATED  --  >90  --   --   --   --  >90 >90  --   --  >90   ARNOLD  --  8.8  --   --   --   --  9.2  --   --   --  8.8   MAG  --  1.7  --   --  2.0  --  1.5* 1.4* 1.6*   < > 1.5*   PHOS  --   --   --   --   --   --   --  4.1 2.5  --  2.6   PROTTOTAL  --  6.0*  --   --   --   --   --   --   --   --   --    ALBUMIN  --  3.7  --   --   --   --   --   --   --   --   --    BILITOTAL  --  0.4  --   --   --   --   --   --   --   --   --    ALKPHOS  --  80  --   --   --   --   --   --   --   --   --    AST  --  31  --   --   --   --   --   --   --   --   --    ALT  --  47  --   --   --   --   --   --   --   --   --     < > = values in this interval not displayed.       Recent Labs   Lab 03/26/23  0805 03/26/23  0625 03/26/23  0330 03/25/23  2351 03/25/23  2004   GLC 72 90 90 103* 106*       No results for input(s): INR in the last 168 hours.      No results for input(s): TROPONIN, TROPI, TROPR in the last 168 hours.    Invalid input(s): TROP, TROPONINIES    Recent Results (from the past 48 hour(s))   XR Chest  Port 1 View    Narrative    EXAM: XR CHEST PORTABLE 1 VIEW  LOCATION: [facility]  DATE/TIME: 03/25/2023, 6:46 AM    INDICATION: Shortness of breath.  COMPARISON: 03/15/2023.      Impression    IMPRESSION: Removal of ET tube and nasogastric tube. Hypoinflated lungs. No new airspace disease. Mild vascular congestion. Stable cardiac silhouette. No visible effusion.     XR Elbow Port Right 2 Views    Narrative    EXAM: XR ELBOW PORT RIGHT 2 VIEWS  LOCATION: [facility]  DATE/TIME: 3/25/2023 10:54 AM    INDICATION: fall. pain  COMPARISON: None.      Impression    IMPRESSION: No acute fracture or malalignment. Mild elbow joint degenerative changes. No elbow joint effusion. Mild chronic deformity at the radial head.       COVID Status:  COVID-19 PCR Results    COVID-19 PCR Results 8/30/21 12/8/21 6/20/22 3/15/23 3/25/23   SARS CoV2 PCR Negative Negative Negative Negative Negative      Comments are available for some flowsheets but are not being displayed.         COVID-19 Antibody Results, Testing for Immunity    COVID-19 Antibody Results, Testing for Immunity   No data to display.              Disclaimer: This note consists of symbols derived from keyboarding, dictation and/or voice recognition software. As a result, there may be errors in the script that have gone undetected. Please consider this when interpreting information found in this chart.

## 2023-03-26 NOTE — PLAN OF CARE
Shift Events: off BiPAP when awake this morning, up to recliner chair with lift, advanced to reg diet    Neuro: A&O, generalized weakness  CV: SR, good pulses  ID: afebrile, no sxs infection  Pulm: LS dim, 2L NC, BiPAP while sleeping  GI: tolerating reg diet, no BM yet today  : good UO, asks for urinal  Lines/gtts: PIV SL  Skin: abrasions covered with mepilex  Labs: BG this AM was 72 so pt drank some juice and ate all of breakfast    For vital signs and complete assessments, see documentation flowsheets.     Plan: transfer to medical floor while awaiting placement for TCU

## 2023-03-26 NOTE — PROGRESS NOTES
A BiPAP of  12/6 @ 30% was applied to the pt via the mask for an increase in WOB and/or SOB.  The bridge of the nose is clean and dry. Pt is tolerating it well. Will continue to monitor and assess the pt's current respiratory status and needs.

## 2023-03-27 ENCOUNTER — APPOINTMENT (OUTPATIENT)
Dept: PHYSICAL THERAPY | Facility: CLINIC | Age: 48
DRG: 189 | End: 2023-03-27
Attending: INTERNAL MEDICINE
Payer: MEDICARE

## 2023-03-27 LAB
ANION GAP SERPL CALCULATED.3IONS-SCNC: 6 MMOL/L (ref 7–15)
ATRIAL RATE - MUSE: 88 BPM
BUN SERPL-MCNC: 17.8 MG/DL (ref 6–20)
CALCIUM SERPL-MCNC: 8.7 MG/DL (ref 8.6–10)
CHLORIDE SERPL-SCNC: 103 MMOL/L (ref 98–107)
CREAT SERPL-MCNC: 0.6 MG/DL (ref 0.67–1.17)
DEPRECATED HCO3 PLAS-SCNC: 34 MMOL/L (ref 22–29)
DIASTOLIC BLOOD PRESSURE - MUSE: NORMAL MMHG
ERYTHROCYTE [DISTWIDTH] IN BLOOD BY AUTOMATED COUNT: 13.6 % (ref 10–15)
GFR SERPL CREATININE-BSD FRML MDRD: >90 ML/MIN/1.73M2
GLUCOSE SERPL-MCNC: 120 MG/DL (ref 70–99)
HCT VFR BLD AUTO: 38.4 % (ref 40–53)
HGB BLD-MCNC: 11.5 G/DL (ref 13.3–17.7)
INTERPRETATION ECG - MUSE: NORMAL
MAGNESIUM SERPL-MCNC: 1.6 MG/DL (ref 1.7–2.3)
MCH RBC QN AUTO: 30.2 PG (ref 26.5–33)
MCHC RBC AUTO-ENTMCNC: 29.9 G/DL (ref 31.5–36.5)
MCV RBC AUTO: 101 FL (ref 78–100)
P AXIS - MUSE: 60 DEGREES
PLATELET # BLD AUTO: 224 10E3/UL (ref 150–450)
POTASSIUM SERPL-SCNC: 3.7 MMOL/L (ref 3.4–5.3)
PR INTERVAL - MUSE: 186 MS
QRS DURATION - MUSE: 90 MS
QT - MUSE: 350 MS
QTC - MUSE: 423 MS
R AXIS - MUSE: 27 DEGREES
RBC # BLD AUTO: 3.81 10E6/UL (ref 4.4–5.9)
SODIUM SERPL-SCNC: 143 MMOL/L (ref 136–145)
SYSTOLIC BLOOD PRESSURE - MUSE: NORMAL MMHG
T AXIS - MUSE: 27 DEGREES
VENTRICULAR RATE- MUSE: 88 BPM
WBC # BLD AUTO: 5.8 10E3/UL (ref 4–11)

## 2023-03-27 PROCEDURE — 250N000013 HC RX MED GY IP 250 OP 250 PS 637: Performed by: INTERNAL MEDICINE

## 2023-03-27 PROCEDURE — 80048 BASIC METABOLIC PNL TOTAL CA: CPT | Performed by: INTERNAL MEDICINE

## 2023-03-27 PROCEDURE — 85027 COMPLETE CBC AUTOMATED: CPT | Performed by: INTERNAL MEDICINE

## 2023-03-27 PROCEDURE — 36415 COLL VENOUS BLD VENIPUNCTURE: CPT | Performed by: INTERNAL MEDICINE

## 2023-03-27 PROCEDURE — 94660 CPAP INITIATION&MGMT: CPT

## 2023-03-27 PROCEDURE — 999N000157 HC STATISTIC RCP TIME EA 10 MIN

## 2023-03-27 PROCEDURE — 99232 SBSQ HOSP IP/OBS MODERATE 35: CPT | Performed by: INTERNAL MEDICINE

## 2023-03-27 PROCEDURE — 83735 ASSAY OF MAGNESIUM: CPT | Performed by: INTERNAL MEDICINE

## 2023-03-27 PROCEDURE — 97530 THERAPEUTIC ACTIVITIES: CPT | Mod: GP | Performed by: PHYSICAL THERAPIST

## 2023-03-27 PROCEDURE — 250N000011 HC RX IP 250 OP 636: Performed by: INTERNAL MEDICINE

## 2023-03-27 PROCEDURE — 97161 PT EVAL LOW COMPLEX 20 MIN: CPT | Mod: GP | Performed by: PHYSICAL THERAPIST

## 2023-03-27 PROCEDURE — 120N000001 HC R&B MED SURG/OB

## 2023-03-27 RX ADMIN — PSYLLIUM HUSK 1 PACKET: 3.4 POWDER ORAL at 10:17

## 2023-03-27 RX ADMIN — DIVALPROEX SODIUM 500 MG: 500 TABLET, DELAYED RELEASE ORAL at 10:15

## 2023-03-27 RX ADMIN — QUETIAPINE FUMARATE 300 MG: 300 TABLET, EXTENDED RELEASE ORAL at 21:02

## 2023-03-27 RX ADMIN — LEVOCARNITINE 660 MG: 330 TABLET ORAL at 21:01

## 2023-03-27 RX ADMIN — PROPRANOLOL HYDROCHLORIDE 20 MG: 20 TABLET ORAL at 10:16

## 2023-03-27 RX ADMIN — BUSPIRONE HYDROCHLORIDE 15 MG: 15 TABLET ORAL at 21:02

## 2023-03-27 RX ADMIN — MULTIPLE VITAMINS W/ MINERALS TAB 1 TABLET: TAB at 10:16

## 2023-03-27 RX ADMIN — PROPRANOLOL HYDROCHLORIDE 20 MG: 20 TABLET ORAL at 21:02

## 2023-03-27 RX ADMIN — ZONISAMIDE 200 MG: 100 CAPSULE ORAL at 21:02

## 2023-03-27 RX ADMIN — LAMOTRIGINE 200 MG: 200 TABLET ORAL at 21:02

## 2023-03-27 RX ADMIN — ZONISAMIDE 100 MG: 100 CAPSULE ORAL at 10:16

## 2023-03-27 RX ADMIN — LAMOTRIGINE 200 MG: 200 TABLET ORAL at 10:16

## 2023-03-27 RX ADMIN — LEVOCARNITINE 660 MG: 330 TABLET ORAL at 15:09

## 2023-03-27 RX ADMIN — LEVOCARNITINE 660 MG: 330 TABLET ORAL at 10:15

## 2023-03-27 RX ADMIN — CITALOPRAM HYDROBROMIDE 20 MG: 20 TABLET ORAL at 10:16

## 2023-03-27 RX ADMIN — ACETAMINOPHEN 975 MG: 325 TABLET ORAL at 15:09

## 2023-03-27 RX ADMIN — CITALOPRAM HYDROBROMIDE 40 MG: 20 TABLET ORAL at 10:16

## 2023-03-27 RX ADMIN — BUSPIRONE HYDROCHLORIDE 15 MG: 15 TABLET ORAL at 10:16

## 2023-03-27 RX ADMIN — LEVETIRACETAM 1500 MG: 500 TABLET, FILM COATED ORAL at 21:02

## 2023-03-27 RX ADMIN — DIVALPROEX SODIUM 500 MG: 500 TABLET, DELAYED RELEASE ORAL at 21:02

## 2023-03-27 RX ADMIN — DIVALPROEX SODIUM 500 MG: 500 TABLET, DELAYED RELEASE ORAL at 15:09

## 2023-03-27 RX ADMIN — ENOXAPARIN SODIUM 40 MG: 40 INJECTION SUBCUTANEOUS at 21:01

## 2023-03-27 RX ADMIN — PRAZOSIN HYDROCHLORIDE 2 MG: 1 CAPSULE ORAL at 21:03

## 2023-03-27 RX ADMIN — PSYLLIUM HUSK 1 PACKET: 3.4 POWDER ORAL at 21:01

## 2023-03-27 RX ADMIN — Medication: at 16:26

## 2023-03-27 RX ADMIN — ENOXAPARIN SODIUM 40 MG: 40 INJECTION SUBCUTANEOUS at 10:17

## 2023-03-27 RX ADMIN — PANTOPRAZOLE SODIUM 40 MG: 40 TABLET, DELAYED RELEASE ORAL at 10:16

## 2023-03-27 RX ADMIN — ACETAMINOPHEN 975 MG: 325 TABLET ORAL at 21:01

## 2023-03-27 ASSESSMENT — ACTIVITIES OF DAILY LIVING (ADL)
ADLS_ACUITY_SCORE: 60
ADLS_ACUITY_SCORE: 64
ADLS_ACUITY_SCORE: 61
ADLS_ACUITY_SCORE: 58
ADLS_ACUITY_SCORE: 61
ADLS_ACUITY_SCORE: 60
ADLS_ACUITY_SCORE: 60
ADLS_ACUITY_SCORE: 61
ADLS_ACUITY_SCORE: 58
ADLS_ACUITY_SCORE: 58

## 2023-03-27 NOTE — PROGRESS NOTES
Care Management Follow Up    Length of Stay (days): 2    Expected Discharge Date: 03/27/2023     Concerns to be Addressed:       Patient plan of care discussed at interdisciplinary rounds: Yes    Anticipated Discharge Disposition:  TBD return to  with home care vs TCU     Anticipated Discharge Services:    Anticipated Discharge DME:      Patient/family educated on Medicare website which has current facility and service quality ratings:    Education Provided on the Discharge Plan:  yes  Patient/Family in Agreement with the Plan:      Referrals Placed by CM/SW:  None yet, may change once PT/OT evaluates  Private pay costs discussed: transportation costs    Additional Information:  Contacted Jocelyne  manager [phone](cell) to discuss patients readmission and discharge planning. Patient had two falls at the  that precipitated his re-admission. Discussed with Jocelyne that therapies will evaluate today and provide recommendations.  Unsure if there will be a need for home cpap or bipap on discharge. Currently oxygen is supplied through [employer].  Will continue to follow for discharge needs.    Olivia Arndt RN BSN OCN  Care Coordinator  [facility]  [phone]

## 2023-03-27 NOTE — PLAN OF CARE
Goal Outcome Evaluation:     Pertinent assessments: A&Ox4, VSS, 2L O2 NC. Left eye blindness. Generalized weakness. Assist 2 w/GB. Pt had BM. Sinus rhythm per tele. Tolerating regular diet. BiPap at bedtime.     Major Shift Events none    Treatment Plan: PT, speech, waiting for placement.     Bedside Nurse: Mansi Torrez RN

## 2023-03-27 NOTE — PLAN OF CARE
Occupational Therapy: Orders received. Chart reviewed and discussed with care team.? Occupational Therapy not indicated as pt receives assist with all ADLs at group home.? Defer discharge recommendations to PT.? Will complete orders.
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
## 2023-03-27 NOTE — PROGRESS NOTES
A BiPAP of  12/6 @ 30% was applied to the pt via the mask for an increase in WOB and/or SOB. Pt is tolerating it well. Will continue to monitor and assess the pt's current respiratory status and needs.     Michelle Jones, RT

## 2023-03-27 NOTE — PROGRESS NOTES
[facility]  Hospitalist Progress Note  Shabbir Vickers M.D., M.B.A.   03/27/2023    Reason for Stay/active problem list      Acute on chronic hypoxic and hypercapnic respiratory failure    Failure to thrive at group home         Assessment and Plan:        Summary of Stay:     Tre Vazquez is a 47 year old male with seizure disorder, cerebral palsy with mild spastic paraparesis, mild intellectual disability, mood disturbance, hypertension, congenital left eye blindness, right Bell's palsy with residual facial droop, morbid obesity, borderline personality disorder, PTSD, schizoaffective disorder, obstructive sleep apnea on BiPAP therapy, cerebral ventriculomegaly, prediabetes mellitus, chronic hypercapnic respiratory failure, asthma and depression who is chronically on 2 L of nasal cannula oxygen and lives in a group home came to [facility]   Tre Vazquez on March 25 a day after  he was discharged from [facility] on March 24, 2023.     Patient reportedly fell twice at the group home and had generalized weakness and failure to thrive per .     On presentation to emergency department, patient was hypoxic with oxygen saturation of 83% on room air, blood pressure 166/102, heart rate 91, temperature 98.1 and respiratory rate of 24.     Patient was placed on 6 L of oxygen and subsequently started on BiPAP due to hypoxic and hypercarbic respiratory failure.     Patient was initially admitted to ICU for BiPAP treatment        Problem List with Assessment and Plan:    #1.  Acute on chronic hypoxic and hypercapnic respiratory failure: Multifactorial etiology including severe morbid obesity, recent multifocal pneumonia, obstructive sleep apnea  -- Patient has chronic respiratory failure with hypoxia requiring 2 to 3 L of oxygen via nasal cannula at home.  -- Patient was recently admitted to [facility] and treated for multifocal pneumonia and respiratory  failure requiring intubation and subsequently treated with high flow nasal oxygen which was weaned down to his baseline oxygen requirement.  He was adequately treated with IV antibiotic last admission.  -- Patient was afebrile on this presentation to the emergency department, and WBC count 8.1, chest x-ray showed hyperinflated lungs with no new airspace opacity.  Mild vascular congestion noted.  Patient is given azithromycin 500 mg IV infusion, 10 mg of IV dexamethasone and DuoNeb in the emergency department.  -- Initial VBG showed pH of 7.28, PCO2 73, PO2 45, bicarb 34, base excess venous 5.1.  Patient was placed on BiPAP and subsequent VBG showed improvement.  -- Patient was treated with continuous BiPAP in the ICU with gradual improvement and weaning of BiPAP during the day back to his nasal supplemental oxygen.  --Currently patient is alert and comfortable with nasal oxygen.  Continue nasal supplemental oxygen during the day, CPAP/BiPAP overnight for sleep apnea.  PT and OT consulted for possible discharge needs likely to TCU or long-term care facility.        #2.  Physical deconditioning, generalized weakness and failure to thrive at group home.  -- Patient was very weak at this group home and fell twice at group home staff.  No report of loss of consciousness.  Patient was unable to be cared for at group home due to generalized weakness and more assistance requirement.  --  Jocelyne was contacted at [phone] who felt that patient needs TCU rather than returning back to group home.  --PT, OT and social service consulted        #3.  Recent treatment for multifocal pneumonia completed treatment with Zosyn and azithromycin.  -- No further antibiotic needed at this time     #4.Cerebral palsy  Spastic paraparesis  Seizure disorder  On antiepileptic medications  -to be continued when off BiPAP  Seizure precautions      #5. Hypertension  --Antihypertensives continued      #6. Obstructive sleep  apnea  --May use home CPAP/BiPAP per RT       Plan for today:  - discharge planning     VTE Prophylaxis: Enoxaparin (Lovenox) SQ  Code Status: Full Code  Diet: Regular Diet Adult    Reid Catheter: Not present    Family updated today: No     Disposition: May discharge to TCU in next one day if bed is avaialble           Interval History (Subjective):        Patient is seen and examined by me today and medical record reviewed.Overnight events noted and care discussed with nursing staff.  Patient continues to do well and tolerated off BiPAP.  On supplemental oxygen.  He required BiPAP overnight.  Care plan discussed with bedside nurse.  PT OT consulted.         Physical Exam:        Last Vital Signs:  /59 (BP Location: Right arm)   Pulse 70   Temp 97.7  F (36.5  C) (Oral)   Resp 20   Wt 141.8 kg (312 lb 11.2 oz)   SpO2 96%   BMI 44.87 kg/m      Wt Readings from Last 1 Encounters:   03/27/23 141.8 kg (312 lb 11.2 oz)       Wt Readings from Last 5 Encounters:   03/27/23 141.8 kg (312 lb 11.2 oz)   03/20/23 139.6 kg (307 lb 12.2 oz)   01/03/23 122.5 kg (270 lb)   11/30/22 123.4 kg (272 lb)   04/19/22 122.1 kg (269 lb 3.2 oz)        Constitutional: Awake, alert, cooperative, no apparent distress     Respiratory: Clear to auscultation bilaterally, no crackles or wheezing   Cardiovascular: Regular rate and rhythm, normal S1 and S2, and no murmur noted   Abdomen: Normal bowel sounds, soft, non-distended, non-tender   Skin: No new rashes, no cyanosis, dry to touch   Neuro: Alert with  no new focal weakness   Extremities: No edema   Other(s):        All other systems: Negative          Medications:        All current medications were reviewed with changes reflected in problem list.         Data:      All new lab and imaging data was reviewed.      Data reviewed today: I reviewed all new labs and imaging results over the last 24 hours. I personally reviewed       Recent Labs   Lab 03/27/23  0643 03/26/23  0629  03/25/23  0450   WBC 5.8 6.5 8.1   HGB 11.5* 11.3* 13.1*   HCT 38.4* 36.6* 42.3   * 99 99    204 228     No results for input(s): CULT in the last 168 hours.  Recent Labs   Lab 03/27/23  0643 03/26/23  1559 03/26/23  0805 03/26/23  0625 03/25/23 2004 03/25/23  1718 03/25/23  1003 03/25/23  0450 03/24/23  0622 03/23/23  1110 03/22/23  2246 03/22/23  1456     --   --  141  --   --   --  143  --   --   --  141   POTASSIUM 3.7  --   --  3.8  --   --   --  4.2 4.4 3.9  --  4.5   CHLORIDE 103  --   --  101  --   --   --  101  --   --   --  102   CO2 34*  --   --  32*  --   --   --  35*  --   --   --  33*   ANIONGAP 6*  --   --  8  --   --   --  7  --   --   --  6*   * 100* 72 90   < >  --    < > 92  --   --   --  116*   BUN 17.8  --   --  13.8  --   --   --  14.5  --   --   --  12.7   CR 0.60*  --   --  0.61*  --   --   --  0.63* 0.56*  --   --  0.50*   GFRESTIMATED >90  --   --  >90  --   --   --  >90 >90  --   --  >90   ARNOLD 8.7  --   --  8.8  --   --   --  9.2  --   --   --  8.8   MAG 1.6*  --   --  1.7  --  2.0  --  1.5* 1.4* 1.6*   < > 1.5*   PHOS  --   --   --   --   --   --   --   --  4.1 2.5  --  2.6   PROTTOTAL  --   --   --  6.0*  --   --   --   --   --   --   --   --    ALBUMIN  --   --   --  3.7  --   --   --   --   --   --   --   --    BILITOTAL  --   --   --  0.4  --   --   --   --   --   --   --   --    ALKPHOS  --   --   --  80  --   --   --   --   --   --   --   --    AST  --   --   --  31  --   --   --   --   --   --   --   --    ALT  --   --   --  47  --   --   --   --   --   --   --   --     < > = values in this interval not displayed.       Recent Labs   Lab 03/27/23  0643 03/26/23  1559 03/26/23  0805 03/26/23  0625 03/26/23  0330   * 100* 72 90 90       No results for input(s): INR in the last 168 hours.      No results for input(s): TROPONIN, TROPI, TROPR in the last 168 hours.    Invalid input(s): TROP, TROPONINIES    Recent Results (from the past 48 hour(s))   XR  Chest Port 1 View    Narrative    EXAM: XR CHEST PORTABLE 1 VIEW  LOCATION: [facility]  DATE/TIME: 03/25/2023, 6:46 AM    INDICATION: Shortness of breath.  COMPARISON: 03/15/2023.      Impression    IMPRESSION: Removal of ET tube and nasogastric tube. Hypoinflated lungs. No new airspace disease. Mild vascular congestion. Stable cardiac silhouette. No visible effusion.     XR Elbow Port Right 2 Views    Narrative    EXAM: XR ELBOW PORT RIGHT 2 VIEWS  LOCATION: [facility]  DATE/TIME: 3/25/2023 10:54 AM    INDICATION: fall. pain  COMPARISON: None.      Impression    IMPRESSION: No acute fracture or malalignment. Mild elbow joint degenerative changes. No elbow joint effusion. Mild chronic deformity at the radial head.       COVID Status:  COVID-19 PCR Results    COVID-19 PCR Results 8/30/21 12/8/21 6/20/22 3/15/23 3/25/23   SARS CoV2 PCR Negative Negative Negative Negative Negative      Comments are available for some flowsheets but are not being displayed.         COVID-19 Antibody Results, Testing for Immunity    COVID-19 Antibody Results, Testing for Immunity   No data to display.              Disclaimer: This note consists of symbols derived from keyboarding, dictation and/or voice recognition software. As a result, there may be errors in the script that have gone undetected. Please consider this when interpreting information found in this chart.

## 2023-03-27 NOTE — PROGRESS NOTES
A&Ox4, VSS. Takes off Bipap at 0300. Consulted with respiratory at 0300 who encouraged to connect him to 2L N.C. O2 93% when on 2L N.C. Left eye droop. Generalized weakness. Assist 2 w/GB. Sinus rhythm 80 per tele. Tolerating regular diet.

## 2023-03-28 ENCOUNTER — TELEPHONE (OUTPATIENT)
Dept: NEUROLOGY | Facility: CLINIC | Age: 48
End: 2023-03-28

## 2023-03-28 ENCOUNTER — APPOINTMENT (OUTPATIENT)
Dept: PHYSICAL THERAPY | Facility: CLINIC | Age: 48
DRG: 189 | End: 2023-03-28
Payer: MEDICARE

## 2023-03-28 LAB
MAGNESIUM SERPL-MCNC: 1.4 MG/DL (ref 1.7–2.3)
MAGNESIUM SERPL-MCNC: 2.1 MG/DL (ref 1.7–2.3)
PLATELET # BLD AUTO: 202 10E3/UL (ref 150–450)
POTASSIUM SERPL-SCNC: 3.9 MMOL/L (ref 3.4–5.3)

## 2023-03-28 PROCEDURE — 85049 AUTOMATED PLATELET COUNT: CPT | Performed by: INTERNAL MEDICINE

## 2023-03-28 PROCEDURE — 999N000157 HC STATISTIC RCP TIME EA 10 MIN

## 2023-03-28 PROCEDURE — 83735 ASSAY OF MAGNESIUM: CPT | Performed by: INTERNAL MEDICINE

## 2023-03-28 PROCEDURE — 120N000001 HC R&B MED SURG/OB

## 2023-03-28 PROCEDURE — 84132 ASSAY OF SERUM POTASSIUM: CPT | Performed by: INTERNAL MEDICINE

## 2023-03-28 PROCEDURE — 250N000013 HC RX MED GY IP 250 OP 250 PS 637: Performed by: INTERNAL MEDICINE

## 2023-03-28 PROCEDURE — 97530 THERAPEUTIC ACTIVITIES: CPT | Mod: GP | Performed by: PHYSICAL THERAPIST

## 2023-03-28 PROCEDURE — 250N000011 HC RX IP 250 OP 636: Performed by: INTERNAL MEDICINE

## 2023-03-28 PROCEDURE — 94660 CPAP INITIATION&MGMT: CPT

## 2023-03-28 PROCEDURE — 36415 COLL VENOUS BLD VENIPUNCTURE: CPT | Performed by: INTERNAL MEDICINE

## 2023-03-28 PROCEDURE — 99232 SBSQ HOSP IP/OBS MODERATE 35: CPT | Performed by: INTERNAL MEDICINE

## 2023-03-28 RX ORDER — CITALOPRAM HYDROBROMIDE 20 MG/1
60 TABLET ORAL DAILY
Status: DISCONTINUED | OUTPATIENT
Start: 2023-03-29 | End: 2023-03-29 | Stop reason: HOSPADM

## 2023-03-28 RX ORDER — MAGNESIUM SULFATE HEPTAHYDRATE 40 MG/ML
4 INJECTION, SOLUTION INTRAVENOUS ONCE
Status: COMPLETED | OUTPATIENT
Start: 2023-03-28 | End: 2023-03-28

## 2023-03-28 RX ADMIN — LEVOCARNITINE 660 MG: 330 TABLET ORAL at 10:16

## 2023-03-28 RX ADMIN — PANTOPRAZOLE SODIUM 40 MG: 40 TABLET, DELAYED RELEASE ORAL at 10:16

## 2023-03-28 RX ADMIN — ZONISAMIDE 100 MG: 100 CAPSULE ORAL at 10:16

## 2023-03-28 RX ADMIN — PROPRANOLOL HYDROCHLORIDE 20 MG: 20 TABLET ORAL at 10:16

## 2023-03-28 RX ADMIN — DIVALPROEX SODIUM 500 MG: 500 TABLET, DELAYED RELEASE ORAL at 10:16

## 2023-03-28 RX ADMIN — ZONISAMIDE 200 MG: 100 CAPSULE ORAL at 21:18

## 2023-03-28 RX ADMIN — LAMOTRIGINE 200 MG: 200 TABLET ORAL at 21:18

## 2023-03-28 RX ADMIN — DIVALPROEX SODIUM 500 MG: 500 TABLET, DELAYED RELEASE ORAL at 21:18

## 2023-03-28 RX ADMIN — PROPRANOLOL HYDROCHLORIDE 20 MG: 20 TABLET ORAL at 21:18

## 2023-03-28 RX ADMIN — CITALOPRAM HYDROBROMIDE 20 MG: 20 TABLET ORAL at 10:16

## 2023-03-28 RX ADMIN — PSYLLIUM HUSK 1 PACKET: 3.4 POWDER ORAL at 21:19

## 2023-03-28 RX ADMIN — LEVETIRACETAM 1500 MG: 500 TABLET, FILM COATED ORAL at 21:18

## 2023-03-28 RX ADMIN — LAMOTRIGINE 200 MG: 200 TABLET ORAL at 10:17

## 2023-03-28 RX ADMIN — BUSPIRONE HYDROCHLORIDE 15 MG: 15 TABLET ORAL at 21:18

## 2023-03-28 RX ADMIN — MAGNESIUM SULFATE HEPTAHYDRATE 4 G: 4 INJECTION, SOLUTION INTRAVENOUS at 10:32

## 2023-03-28 RX ADMIN — PSYLLIUM HUSK 1 PACKET: 3.4 POWDER ORAL at 10:17

## 2023-03-28 RX ADMIN — CITALOPRAM HYDROBROMIDE 40 MG: 20 TABLET ORAL at 10:17

## 2023-03-28 RX ADMIN — ENOXAPARIN SODIUM 40 MG: 40 INJECTION SUBCUTANEOUS at 21:17

## 2023-03-28 RX ADMIN — QUETIAPINE FUMARATE 300 MG: 300 TABLET, EXTENDED RELEASE ORAL at 21:18

## 2023-03-28 RX ADMIN — MULTIPLE VITAMINS W/ MINERALS TAB 1 TABLET: TAB at 10:17

## 2023-03-28 RX ADMIN — DIVALPROEX SODIUM 500 MG: 500 TABLET, DELAYED RELEASE ORAL at 16:16

## 2023-03-28 RX ADMIN — PRAZOSIN HYDROCHLORIDE 2 MG: 1 CAPSULE ORAL at 21:18

## 2023-03-28 RX ADMIN — LEVOCARNITINE 660 MG: 330 TABLET ORAL at 16:16

## 2023-03-28 RX ADMIN — BUSPIRONE HYDROCHLORIDE 15 MG: 15 TABLET ORAL at 10:16

## 2023-03-28 RX ADMIN — LEVOCARNITINE 660 MG: 330 TABLET ORAL at 21:18

## 2023-03-28 RX ADMIN — ENOXAPARIN SODIUM 40 MG: 40 INJECTION SUBCUTANEOUS at 10:17

## 2023-03-28 ASSESSMENT — ACTIVITIES OF DAILY LIVING (ADL)
ADLS_ACUITY_SCORE: 53
ADLS_ACUITY_SCORE: 54
ADLS_ACUITY_SCORE: 53
ADLS_ACUITY_SCORE: 53
ADLS_ACUITY_SCORE: 54
ADLS_ACUITY_SCORE: 53
ADLS_ACUITY_SCORE: 54
ADLS_ACUITY_SCORE: 53
ADLS_ACUITY_SCORE: 54
ADLS_ACUITY_SCORE: 54

## 2023-03-28 NOTE — PROGRESS NOTES
[facility]  Hospitalist Progress Note  Shabbir Vickers M.D., M.B.A.   03/28/2023    Reason for Stay/active problem list      Acute on chronic hypoxic and hypercapnic respiratory failure    Failure to thrive at group home         Assessment and Plan:        Summary of Stay:     Tre Vazquez is a 47 year old male with seizure disorder, cerebral palsy with mild spastic paraparesis, mild intellectual disability, mood disturbance, hypertension, congenital left eye blindness, right Bell's palsy with residual facial droop, morbid obesity, borderline personality disorder, PTSD, schizoaffective disorder, obstructive sleep apnea on BiPAP therapy, cerebral ventriculomegaly, prediabetes mellitus, chronic hypercapnic respiratory failure, asthma and depression who is chronically on 2 L of nasal cannula oxygen and lives in a group home came to [facility]   Tre Vazquez on March 25 a day after  he was discharged from [facility] on March 24, 2023.     Patient reportedly fell twice at the group home and had generalized weakness and failure to thrive per .     On presentation to emergency department, patient was hypoxic with oxygen saturation of 83% on room air, blood pressure 166/102, heart rate 91, temperature 98.1 and respiratory rate of 24.     Patient was placed on 6 L of oxygen and subsequently started on BiPAP due to hypoxic and hypercarbic respiratory failure.     Patient was initially admitted to ICU for BiPAP treatment        Problem List with Assessment and Plan:    #1.  Acute on chronic hypoxic and hypercapnic respiratory failure: Multifactorial etiology including severe morbid obesity, recent multifocal pneumonia, obstructive sleep apnea  -- Patient has chronic respiratory failure with hypoxia requiring 2 to 3 L of oxygen via nasal cannula at home.  -- Patient was recently admitted to [facility] and treated for multifocal pneumonia and respiratory  failure requiring intubation and subsequently treated with high flow nasal oxygen which was weaned down to his baseline oxygen requirement.  He was adequately treated with IV antibiotic last admission.  -- Patient was afebrile on this presentation to the emergency department, and WBC count 8.1, chest x-ray showed hyperinflated lungs with no new airspace opacity.  Mild vascular congestion noted.  Patient is given azithromycin 500 mg IV infusion, 10 mg of IV dexamethasone and DuoNeb in the emergency department.  -- Initial VBG showed pH of 7.28, PCO2 73, PO2 45, bicarb 34, base excess venous 5.1.  Patient was placed on BiPAP and subsequent VBG showed improvement.  -- Patient was treated with continuous BiPAP in the ICU with gradual improvement and weaning of BiPAP during the day back to his nasal supplemental oxygen.  --Currently patient is alert and comfortable with nasal oxygen.  Continue nasal supplemental oxygen during the day, CPAP/BiPAP overnight for sleep apnea.  PT and OT consulted for possible discharge needs likely to TCU or long-term care facility versus going back to his group home.  Discharge plan was discussed with  and further input from PT and OT service  anticipated        #2.  Physical deconditioning, generalized weakness and failure to thrive at group home.  -- Patient was very weak at this group home and fell twice at group home staff.  No report of loss of consciousness.  Patient was unable to be cared for at group home due to generalized weakness and more assistance requirement.  --  Jocelyne was contacted at [phone] who felt that patient needs TCU rather than returning back to group home.  --PT, OT and social service consulted        #3.  Recent treatment for multifocal pneumonia completed treatment with Zosyn and azithromycin.  -- No further antibiotic needed at this time     #4.Cerebral palsy  Spastic paraparesis  Seizure disorder  On antiepileptic  medications  -to be continued when off BiPAP  Seizure precautions      #5. Hypertension  --Antihypertensives continued      #6. Obstructive sleep apnea  --May use home CPAP/BiPAP per RT       Plan for today:  - discharge planning     VTE Prophylaxis: Enoxaparin (Lovenox) SQ  Code Status: Full Code  Diet: Regular Diet Adult    Reid Catheter: Not present    Family updated today: Unable to reach the mother for update      Disposition: May discharge to TCU versus back to his facility() with home care . Unable to reach  manager who is concerned that patient may need TCU.I would recommend disposition based on rehab team         Interval History (Subjective):        Patient is seen and examined by me today and medical record reviewed.Overnight events noted and care discussed with nursing staff. He continues to do well with no complaint. Discussed with           Physical Exam:        Last Vital Signs:  /65 (BP Location: Right arm)   Pulse 88   Temp 98.6  F (37  C) (Axillary)   Resp 20   Wt 141.8 kg (312 lb 11.2 oz)   SpO2 94%   BMI 44.87 kg/m      Wt Readings from Last 1 Encounters:   03/27/23 141.8 kg (312 lb 11.2 oz)       Wt Readings from Last 5 Encounters:   03/27/23 141.8 kg (312 lb 11.2 oz)   03/20/23 139.6 kg (307 lb 12.2 oz)   01/03/23 122.5 kg (270 lb)   11/30/22 123.4 kg (272 lb)   04/19/22 122.1 kg (269 lb 3.2 oz)        Constitutional: Awake, alert, cooperative, no apparent distress     Respiratory: Clear to auscultation bilaterally, no crackles or wheezing   Cardiovascular: Regular rate and rhythm, normal S1 and S2, and no murmur noted   Abdomen: Normal bowel sounds, soft, non-distended, non-tender   Skin: No new rashes, no cyanosis, dry to touch   Neuro: Alert with  no new focal weakness   Extremities: No edema   Other(s):        All other systems: Negative          Medications:        All current medications were reviewed with changes reflected in problem list.         Data:      All new  lab and imaging data was reviewed.      Data reviewed today: I reviewed all new labs and imaging results over the last 24 hours. I personally reviewed       Recent Labs   Lab 03/28/23  0810 03/27/23  0643 03/26/23  0625 03/25/23  0450   WBC  --  5.8 6.5 8.1   HGB  --  11.5* 11.3* 13.1*   HCT  --  38.4* 36.6* 42.3   MCV  --  101* 99 99    224 204 228     No results for input(s): CULT in the last 168 hours.  Recent Labs   Lab 03/28/23  0810 03/27/23  0643 03/26/23  1559 03/26/23  0805 03/26/23  0625 03/25/23  1003 03/25/23  0450 03/24/23  0622 03/23/23  1110 03/22/23  2246 03/22/23  1456   NA  --  143  --   --  141  --  143  --   --   --  141   POTASSIUM 3.9 3.7  --   --  3.8  --  4.2 4.4 3.9  --  4.5   CHLORIDE  --  103  --   --  101  --  101  --   --   --  102   CO2  --  34*  --   --  32*  --  35*  --   --   --  33*   ANIONGAP  --  6*  --   --  8  --  7  --   --   --  6*   GLC  --  120* 100* 72 90   < > 92  --   --   --  116*   BUN  --  17.8  --   --  13.8  --  14.5  --   --   --  12.7   CR  --  0.60*  --   --  0.61*  --  0.63* 0.56*  --   --  0.50*   GFRESTIMATED  --  >90  --   --  >90  --  >90 >90  --   --  >90   ARNOLD  --  8.7  --   --  8.8  --  9.2  --   --   --  8.8   MAG 1.4* 1.6*  --   --  1.7   < > 1.5* 1.4* 1.6*   < > 1.5*   PHOS  --   --   --   --   --   --   --  4.1 2.5  --  2.6   PROTTOTAL  --   --   --   --  6.0*  --   --   --   --   --   --    ALBUMIN  --   --   --   --  3.7  --   --   --   --   --   --    BILITOTAL  --   --   --   --  0.4  --   --   --   --   --   --    ALKPHOS  --   --   --   --  80  --   --   --   --   --   --    AST  --   --   --   --  31  --   --   --   --   --   --    ALT  --   --   --   --  47  --   --   --   --   --   --     < > = values in this interval not displayed.       Recent Labs   Lab 03/27/23  0643 03/26/23  1559 03/26/23  0805 03/26/23  0625 03/26/23  0330   * 100* 72 90 90       No results for input(s): INR in the last 168 hours.      No results for  input(s): TROPONIN, TROPI, TROPR in the last 168 hours.    Invalid input(s): TROP, TROPONINIES    Recent Results (from the past 48 hour(s))   XR Chest Port 1 View    Narrative    EXAM: XR CHEST PORTABLE 1 VIEW  LOCATION: [facility]  DATE/TIME: 03/25/2023, 6:46 AM    INDICATION: Shortness of breath.  COMPARISON: 03/15/2023.      Impression    IMPRESSION: Removal of ET tube and nasogastric tube. Hypoinflated lungs. No new airspace disease. Mild vascular congestion. Stable cardiac silhouette. No visible effusion.     XR Elbow Port Right 2 Views    Narrative    EXAM: XR ELBOW PORT RIGHT 2 VIEWS  LOCATION: [facility]  DATE/TIME: 3/25/2023 10:54 AM    INDICATION: fall. pain  COMPARISON: None.      Impression    IMPRESSION: No acute fracture or malalignment. Mild elbow joint degenerative changes. No elbow joint effusion. Mild chronic deformity at the radial head.       COVID Status:  COVID-19 PCR Results    COVID-19 PCR Results 8/30/21 12/8/21 6/20/22 3/15/23 3/25/23   SARS CoV2 PCR Negative Negative Negative Negative Negative      Comments are available for some flowsheets but are not being displayed.         COVID-19 Antibody Results, Testing for Immunity    COVID-19 Antibody Results, Testing for Immunity   No data to display.              Disclaimer: This note consists of symbols derived from keyboarding, dictation and/or voice recognition software. As a result, there may be errors in the script that have gone undetected. Please consider this when interpreting information found in this chart.

## 2023-03-28 NOTE — PROGRESS NOTES
A BiPAP of  12/6 @ 30% was applied to the pt via the mask for NOC use.  The bridge of the nose is clean and dry. Pt is tolerating it well. Will continue to monitor and assess the pt's current respiratory status and needs.    Praveen Christensen, RT on 3/27/2023 at 9:52 PM

## 2023-03-28 NOTE — PLAN OF CARE
Pertinent assessments: pt is a/o.  O2 2L NC in place.  Denies pain.  Pt up walking with walker and gaitbelt SBA.  Noted pt walked in hallway with PT gait steady.  Good appetite. Mag 1.4 replaced 1 bump recheck at 1600.     Major Shift Events Ambulated with walker in hallway  Treatment Plan: PT recommends back to Group home with home nurse/pt. SW working with  for discharge.  Bedside Nurse: Indu Pacheco RN

## 2023-03-28 NOTE — PLAN OF CARE
Goal Outcome Evaluation:    Pertinent assessments: [facility] [id number]. Pt A&Ox3. C/o pain in R arm, and received prn Tylenol with improvement. Vitals stable. LS dim on 2 L O2. CPAP on at night. Noted to have some dyspnea on exertion after ambulation and bed mobility. Continent. SBA with a walker in the room. Walked from a chair to a bed. Slept ok.

## 2023-03-28 NOTE — PROGRESS NOTES
"PT: Received call from KARIN that Jocelyne, pt's  manager would like to speak to PT.  Called Jocelyne who added in Haleigh from pt's .   staff report concern over pt's mobility and state that pt needs to be \"independent with his mobility since he does not call for help at times and he is stubborn.\"  Both staff members express concern that pt removes his O2 \"whenever he wants\" and doesn't call for help from staff all the time and \"gets up whenever he wants.\"  PT reported that PT was under the impression pt had SBA for mobility at  as this is what's documented in the chart.   staff report that d/t pt's behaviors SBA is not sufficient at times as pt doesn't wait for staff or willing to let staff assist.       PT offered to have  staff come to hospital to see pt's mobility for themselves.  Staff report that if someone calls early tomorrow morning they can try to arrange  staff to come to hospital to see pt's mobility for themselves.  PT will call Jocelyne at [phone] early tomorrow morning to attempt to arrange a time for PT and  staff to work with pt.  "

## 2023-03-28 NOTE — PROGRESS NOTES
Care Management Follow Up    Length of Stay (days): 3    Expected Discharge Date: 03/29/2023     Concerns to be Addressed: discharge planning  Patient plan of care discussed at interdisciplinary rounds: Yes    Anticipated Discharge Disposition:  Group home     Anticipated Discharge Services: home care  Anticipated Discharge DME:  Walker - uses at baseline    Patient/family educated on Medicare website which has current facility and service quality ratings:  N/A  Education Provided on the Discharge Plan:  yes  Patient/Family in Agreement with the Plan:  yes    Referrals Placed by CM/SW:  Home care  Private pay costs discussed: transportation costs    Additional Information:  Steven received a vm from Jocelyne,  manager, requesting a call back to discuss the pt's discharge status P: [phone].    Steven left a vm for the  manager Jocelyne P: [phone], requesting a call back to discuss the pt's return for tomorrow.  Steven contacted the pt's GH, [phone], and provided them with an update.      SKYLER Diaz, [facility]  Inpatient Care Coordination  [facility]  [phone]      ADDENDUM 1427:  Steven received a call from Jocelyne regarding the pt's discharge.  Steven informed Jocelyne that the pt will be ready for discharge tomorrow and that the discharge plan is for him to discharge with HC PT/OT.  Jocelyne said that she wants to make sure that the pt is back to his baseline, so that he will not discharge and return to the hospital the next day.  Steven informed her that he is back to his baseline O2 needs and that he is walking independently (with supervision per policy).  Steven informed her that therapies at the hospital and other staff cannot help that the pt locked himself in the bathroom at the facility.  Joceylne asked if the pt needs TCU and Steven again told her that HC is the recommendation.  Steven told Jocelyne that the pt has walked approximately 100 feet (per PT).  Steven also suggested that the staff at the  facility provide extra supervision overnight to make sure that he is wearing his CPAP.  Jocelyne then asked Steven to have PT call her and further discuss the discharge plan and pt's needs.  Steven again informed that the pt will be ready for discharge tomorrow and that a referral will again be sent to [facility] for PT/OT services.    Steven updated PT on Jocelyne's request.    ADDENDUM 1505:  Steven met with the pt to discuss the discharge plan for tomorrow.  Pt was sitting up in his chair when Sw arrived.  The pt is agreeable to return to the [facility] tomorrow with [facility] PT/OT through [facility].  Pt requests [facility] w/c transport.  Steven arranged w/c transport for tomorrow at 1000.

## 2023-03-28 NOTE — TELEPHONE ENCOUNTER
[facility] Call Center    Phone Message    May a detailed message be left on voicemail: yes     Reason for Call: Other: Jocelyne is calling and wanted to update provider that patient is currently admitted to [facility]. Upcoming appt with provider has been cancelled due to hospitalization. Patient will be going to TCU after hospitalization. If needing any more information please call Jocelyne at [phone]      Action Taken: Message routed to:  Other: [facility]    Travel Screening: Not Applicable

## 2023-03-29 ENCOUNTER — APPOINTMENT (OUTPATIENT)
Dept: PHYSICAL THERAPY | Facility: CLINIC | Age: 48
DRG: 189 | End: 2023-03-29
Payer: MEDICARE

## 2023-03-29 VITALS
WEIGHT: 312.7 LBS | OXYGEN SATURATION: 94 % | HEART RATE: 86 BPM | RESPIRATION RATE: 20 BRPM | BODY MASS INDEX: 44.87 KG/M2 | DIASTOLIC BLOOD PRESSURE: 80 MMHG | TEMPERATURE: 98.2 F | SYSTOLIC BLOOD PRESSURE: 141 MMHG

## 2023-03-29 LAB
MAGNESIUM SERPL-MCNC: 1.6 MG/DL (ref 1.7–2.3)
POTASSIUM SERPL-SCNC: 4 MMOL/L (ref 3.4–5.3)

## 2023-03-29 PROCEDURE — 36415 COLL VENOUS BLD VENIPUNCTURE: CPT | Performed by: INTERNAL MEDICINE

## 2023-03-29 PROCEDURE — 250N000011 HC RX IP 250 OP 636: Performed by: INTERNAL MEDICINE

## 2023-03-29 PROCEDURE — 83735 ASSAY OF MAGNESIUM: CPT | Performed by: INTERNAL MEDICINE

## 2023-03-29 PROCEDURE — 250N000013 HC RX MED GY IP 250 OP 250 PS 637: Performed by: INTERNAL MEDICINE

## 2023-03-29 PROCEDURE — 99239 HOSP IP/OBS DSCHRG MGMT >30: CPT | Performed by: HOSPITALIST

## 2023-03-29 PROCEDURE — 84132 ASSAY OF SERUM POTASSIUM: CPT | Performed by: INTERNAL MEDICINE

## 2023-03-29 PROCEDURE — 97530 THERAPEUTIC ACTIVITIES: CPT | Mod: GP | Performed by: PHYSICAL THERAPIST

## 2023-03-29 RX ADMIN — PROPRANOLOL HYDROCHLORIDE 20 MG: 20 TABLET ORAL at 10:48

## 2023-03-29 RX ADMIN — LAMOTRIGINE 200 MG: 200 TABLET ORAL at 10:48

## 2023-03-29 RX ADMIN — PSYLLIUM HUSK 1 PACKET: 3.4 POWDER ORAL at 10:50

## 2023-03-29 RX ADMIN — CITALOPRAM HYDROBROMIDE 60 MG: 20 TABLET ORAL at 10:47

## 2023-03-29 RX ADMIN — ZONISAMIDE 100 MG: 100 CAPSULE ORAL at 10:48

## 2023-03-29 RX ADMIN — ENOXAPARIN SODIUM 40 MG: 40 INJECTION SUBCUTANEOUS at 10:51

## 2023-03-29 RX ADMIN — LEVOCARNITINE 660 MG: 330 TABLET ORAL at 10:48

## 2023-03-29 RX ADMIN — MULTIPLE VITAMINS W/ MINERALS TAB 1 TABLET: TAB at 10:48

## 2023-03-29 RX ADMIN — DIVALPROEX SODIUM 500 MG: 500 TABLET, DELAYED RELEASE ORAL at 10:48

## 2023-03-29 RX ADMIN — BUSPIRONE HYDROCHLORIDE 15 MG: 15 TABLET ORAL at 10:48

## 2023-03-29 RX ADMIN — PANTOPRAZOLE SODIUM 40 MG: 40 TABLET, DELAYED RELEASE ORAL at 10:48

## 2023-03-29 ASSESSMENT — ACTIVITIES OF DAILY LIVING (ADL)
ADLS_ACUITY_SCORE: 53
ADLS_ACUITY_SCORE: 54

## 2023-03-29 NOTE — PLAN OF CARE
Goal Outcome Evaluation:       End of Shift Summary  For vital signs and complete assessments, please see documentation flowsheets.     Pertinent assessments:  Pt A&O. Vss with BP elevated. LS clear and no sob noted. Denied pain this shift. Ambulated to the BR A1 with some DE [place] F[place]E noted. Has a good appetite and no concerns for nausea.  Mg recheck was wnl.    Major Shift Events :None    Treatment Plan: Monitor symptom, possile discharge tomm  to .

## 2023-03-29 NOTE — DISCHARGE SUMMARY
[facility]    Discharge Summary  Hospitalist    Date of Admission:  3/25/2023  Date of Discharge:  3/29/2023  Provider:  Anthony Mercer MD  Date of Service (when I last saw the patient): 03/29/23    Discharge Diagnoses    Acute on chronic hypoxic and hypercapnic respiratory failure.   -Multifactorial etiology including severe morbid obesity, recent multifocal pneumonia, obstructive sleep apnea  Physical deconditioning, generalized weakness and failure to thrive at group home.  Recent treatment for multifocal pneumonia completed treatment with Zosyn and azithromycin.  Cerebral palsy  Spastic paraparesis  Seizure disorder  Hypertension  Obstructive sleep apnea on CPAP/BiPAP  BMI 44.9  Other medical issues:  Past Medical History:   Diagnosis Date     Blindness of left eye      Depression 03/10/2014     Hypertension      Pneumococcal septicemia(038.2) (H) 11/26/2013    Hospitalized     Pneumonia, organism unspecified(486) 11/26/2013    Hospitalized     Uncomplicated asthma      Unspecified epilepsy without mention of intractable epilepsy        History of Present Illness   Tre Vazquez is an 47 year old male who presented with weakness, hypoxemic respiratory failure.  Please see the admission history and physical for full details.    Hospital Course   Tre Vazquez is a 47 year old male with seizure disorder, cerebral palsy with mild spastic paraparesis, mild intellectual disability, mood disturbance, hypertension, congenital left eye blindness, right Bell's palsy with residual facial droop, morbid obesity, borderline personality disorder, PTSD, schizoaffective disorder, obstructive sleep apnea on BiPAP therapy, cerebral ventriculomegaly, prediabetes mellitus, chronic hypercapnic respiratory failure, asthma and depression who is chronically on 2 L of nasal cannula oxygen and lives in a group home, returned to [facility]   on March 25 a day after  he was discharged from here.     Patient  reportedly fell twice at the group home and had generalized weakness and failure to thrive per .  On presentation to emergency department, patient was hypoxic with oxygen saturation of 83% on room air, blood pressure 166/102, heart rate 91, temperature 98.1 and respiratory rate of 24.  Patient was placed on 6 L of oxygen and subsequently started on BiPAP due to hypoxic and hypercarbic respiratory failure.  Patient was initially admitted to ICU for BiPAP treatment.  He then transitioning to the [facility] p[facility] for continuity of treatment.  His respiratory symptoms stabilized and he is back to baseline, however he stays in the hospital for physical therapy occupational therapy given advanced deconditioning.  He is much improved in condition to go back to group [facility] with home health care of PT and OT to complete treatment and full r recovery.        Problem List with Assessment and Plan:     #1.  Acute on chronic hypoxic and hypercapnic respiratory failure: Multifactorial etiology including severe morbid obesity, recent multifocal pneumonia, obstructive sleep apnea  -- Patient has chronic respiratory failure with hypoxia requiring 2 to 3 L of oxygen via nasal cannula at home.  -- Patient was recently admitted to [facility] and treated for multifocal pneumonia and respiratory failure requiring intubation and subsequently treated with high flow nasal oxygen which was weaned down to his baseline oxygen requirement.  He was adequately treated with IV antibiotic last admission.  -- Patient was afebrile on this presentation to the emergency department, and WBC count 8.1, chest x-ray showed hyperinflated lungs with no new airspace opacity.  Mild vascular congestion noted.  Patient is given azithromycin 500 mg IV infusion, 10 mg of IV dexamethasone and DuoNeb in the emergency department.  -- Initial VBG showed pH of 7.28, PCO2 73, PO2 45, bicarb 34, base excess venous 5.1.  Patient was placed on BiPAP  and subsequent VBG showed improvement.  -- Patient was treated with continuous BiPAP in the ICU with gradual improvement and weaning of BiPAP during the day back to his nasal supplemental oxygen.  --Currently patient is alert and comfortable with nasal oxygen.  Continue nasal supplemental oxygen during the day, CPAP/BiPAP overnight for sleep apnea.  PT and OT consulted for possible discharge needs likely to TCU or long-term care facility versus going back to his group home.  Discharge plan was discussed with  and further input from PT and OT service  anticipated        #2.  Physical deconditioning, generalized weakness and failure to thrive at group home.  -- Patient was very weak at this group home and fell twice at group home staff.  No report of loss of consciousness.  Patient was unable to be cared for at group home due to generalized weakness and more assistance requirement.  --  Jocelyne was contacted at [phone] who felt that patient needs TCU rather than returning back to group home.  --PT, OT and social service consulted        #3.  Recent treatment for multifocal pneumonia completed treatment with Zosyn and azithromycin.  -- No further antibiotic needed at this time     #4.Cerebral palsy  Spastic paraparesis  Seizure disorder  On antiepileptic medications  -to be continued when off BiPAP  Seizure precautions      #5. Hypertension  --Antihypertensives continued      #6. Obstructive sleep apnea  --May use home CPAP/BiPAP per RT  # Discharge Pain Plan:    - Patient currently has NO PAIN and is not being prescribed pain medications on discharge.      Significant Results and Procedures   See below     Pending Results      Unresulted Labs Ordered in the Past 30 Days of this Admission     No orders found from 2/23/2023 to 3/26/2023.          Code Status   Full Code       Primary Care Physician   Siobhan Mayo    GEN:  Alert, cooperative, appears comfortable, NAD.  HEENT:   Normocephalic/atraumatic, no scleral icterus, no nasal discharge, mouth moist.  CV:  Regular rate and rhythm, no murmur or JVD.  S1 + S2 noted, no S3 or S4.  LUNGS:  Clear to auscultation bilaterally without rales/rhonchi/wheezing/retractions.  Symmetric chest rise on inhalation noted.  ABD:  Active bowel sounds, soft, non-tender/non-distended.  No rebound/guarding/rigidity.  EXT:  No edema or cyanosis.  No joint synovitis noted.  SKIN:  Dry to touch, no exanthems noted in the visualized areas.     Discharge Disposition   Discharged to home with [facility]    Consultations This Hospital Stay   CARE MANAGEMENT / SOCIAL WORK IP CONSULT  PHYSICAL THERAPY ADULT IP CONSULT  OCCUPATIONAL THERAPY ADULT IP CONSULT  SOCIAL WORK IP CONSULT  SPEECH LANGUAGE PATH ADULT IP CONSULT    Time Spent on this Encounter   I, Anthony Mercer MD, personally saw the patient today and spent greater than 30 minutes discharging this patient.     Discharge Orders      Home Care Referral      Reason for your hospital stay    Acute hypoxemic respiratory infection     Follow-up and recommended labs and tests     Follow up with primary care provider, Siobhan Mayo, within 7 days for hospital follow- up.  No follow up labs or test are needed.     Activity    Your activity upon discharge: activity as tolerated     Diet    Follow this diet upon discharge:       Regular Diet Adult     Discharge Medications   Current Discharge Medication List      CONTINUE these medications which have NOT CHANGED    Details   ammonium lactate (AMLACTIN) 12 % external cream Apply topically 2 times daily      busPIRone (BUSPAR) 15 MG tablet Take 15 mg by mouth 2 times daily      !! citalopram (CELEXA) 20 MG tablet Take 20 mg by mouth daily      !! citalopram (CELEXA) 40 MG tablet Take 40 mg by mouth daily      clotrimazole (LOTRIMIN) 1 % external solution Apply topically 2 times daily      divalproex sodium delayed-release (DEPAKOTE) 500 MG DR tablet Take 1 tablet (500 mg) by  mouth 3 times daily  Qty: 270 tablet, Refills: 3    Associated Diagnoses: Non-refractory epilepsy (H); Breakthrough seizure (H)      lamoTRIgine (LAMICTAL) 200 MG tablet Take 1 tablet (200 mg) by mouth 2 times daily  Qty: 60 tablet, Refills: 11    Associated Diagnoses: Non-refractory epilepsy (H); Breakthrough seizure (H)      levETIRAcetam (KEPPRA) 500 MG tablet Take 2 tablets (1,000 mg) by mouth every morning AND 3 tablets (1,500 mg) At Bedtime.  Qty: 150 tablet, Refills: 11    Associated Diagnoses: Non-refractory epilepsy (H)      levOCARNitine (CARNITOR) 330 MG tablet Take 2 tablets (660 mg) by mouth 3 times daily  Qty: 180 tablet, Refills: 11    Associated Diagnoses: Nonintractable generalized idiopathic epilepsy without status epilepticus (H)      methylcellulose (CITRUCEL) powder Take 2 g by mouth 2 times daily      multivitamin w/minerals (THERA-VIT-M) tablet Take 1 tablet by mouth daily      pantoprazole (PROTONIX) 40 MG EC tablet Take 1 tablet (40 mg) by mouth daily for 30 days  Qty: 30 tablet, Refills: 0    Associated Diagnoses: Gastroesophageal reflux disease with esophagitis without hemorrhage      prazosin (MINIPRESS) 2 MG capsule Take 2 mg by mouth At Bedtime      propranolol (INDERAL) 20 MG tablet Take 20 mg by mouth 2 times daily      QUEtiapine ER (SEROQUEL XR) 300 MG 24 hr tablet Take 300 mg by mouth At Bedtime      zonisamide (ZONEGRAN) 100 MG capsule Take 1 capsule (100 mg) by mouth every morning AND 2 capsules (200 mg) At Bedtime.  Qty: 90 capsule, Refills: 11    Associated Diagnoses: Non-refractory epilepsy (H); Breakthrough seizure (H)      polyethylene glycol (MIRALAX) 17 GM/Dose powder Take 1 capful. by mouth daily as needed       !! - Potential duplicate medications found. Please discuss with provider.        Allergies   Allergies   Allergen Reactions     Hydrocodone Bitartrate Er Unknown     Tomato      Cephalexin Rash     HUT Reaction: Rash; HUT Noted: [id number]       Vicodin  [Hydrocodone-Acetaminophen] GI Disturbance     Data   Most Recent 3 CBC's:Recent Labs   Lab Test 03/28/23  0810 03/27/23  0643 03/26/23  0625 03/25/23  0450   WBC  --  5.8 6.5 8.1   HGB  --  11.5* 11.3* 13.1*   MCV  --  101* 99 99    224 204 228      Most Recent 3 BMP's:  Recent Labs   Lab Test 03/29/23  0758 03/28/23  0810 03/27/23  0643 03/26/23  1559 03/26/23  0805 03/26/23  0625 03/25/23  1003 03/25/23  0450   NA  --   --  143  --   --  141  --  143   POTASSIUM 4.0 3.9 3.7  --   --  3.8  --  4.2   CHLORIDE  --   --  103  --   --  101  --  101   CO2  --   --  34*  --   --  32*  --  35*   BUN  --   --  17.8  --   --  13.8  --  14.5   CR  --   --  0.60*  --   --  0.61*  --  0.63*   ANIONGAP  --   --  6*  --   --  8  --  7   ARNOLD  --   --  8.7  --   --  8.8  --  9.2   GLC  --   --  120* 100* 72 90   < > 92    < > = values in this interval not displayed.     Most Recent 2 LFT's:  Recent Labs   Lab Test 03/26/23  0625 03/16/23  0511   AST 31 33   ALT 47 24   ALKPHOS 80 62   BILITOTAL 0.4 0.4     Most Recent INR's and Anticoagulation Dosing History:  Anticoagulation Dose History     Recent Dosing and Labs Latest Ref Rng & Units 3/8/2011 11/21/2013    INR 0.86 - 1.14 1.05 1.23(H)        Most Recent 3 Troponin's:No lab results found.  Most Recent Cholesterol Panel:  Recent Labs   Lab Test 03/18/23  0543   TRIG 146     Most Recent 6 Bacteria Isolates From Any Culture (See EPIC Reports for Culture Details):No lab results found.  Most Recent TSH, T4 and A1c Labs:No lab results found.  Results for orders placed or performed during the hospital encounter of 03/25/23   XR Chest Port 1 View    Narrative    EXAM: XR CHEST PORTABLE 1 VIEW  LOCATION: [facility]  DATE/TIME: 03/25/2023, 6:46 AM    INDICATION: Shortness of breath.  COMPARISON: 03/15/2023.      Impression    IMPRESSION: Removal of ET tube and nasogastric tube. Hypoinflated lungs. No new airspace disease. Mild vascular congestion. Stable  cardiac silhouette. No visible effusion.     XR Elbow Port Right 2 Views    Narrative    EXAM: XR ELBOW PORT RIGHT 2 VIEWS  LOCATION: [facility]  DATE/TIME: 3/25/2023 10:54 AM    INDICATION: fall. pain  COMPARISON: None.      Impression    IMPRESSION: No acute fracture or malalignment. Mild elbow joint degenerative changes. No elbow joint effusion. Mild chronic deformity at the radial head.         Disclaimer: This note consists of symbols derived from keyboarding, dictation and/or voice recognition software. As a result, there may be errors in the script that have gone undetected. Please consider this when interpreting information found in this chart.

## 2023-03-29 NOTE — PROGRESS NOTES
Care Management Follow Up    Length of Stay (days): 4    Expected Discharge Date: 03/29/2023     Concerns to be Addressed:  discharge planning  Patient plan of care discussed at interdisciplinary rounds: Yes    Anticipated Discharge Disposition:       Anticipated Discharge Services:  Home care  Anticipated Discharge DME:  O2 - uses at baseline    Patient/family educated on Medicare website which has current facility and service quality ratings:  N/A  Education Provided on the Discharge Plan:  yes  Patient/Family in Agreement with the Plan:  yes    Referrals Placed by CM/SW:  Home care  Private pay costs discussed: transportation costs    Additional Information:  Steven called and rescheduled the pt's transport for 1430 today.  Pt's  staff wants to come and observe the pt working with therapy, prior to his discharge.  Steven updated the pt's  manager Jocelyne P: [phone].      SKYLER Diaz, [facility]  Inpatient Care Coordination  [facility]  [phone]    ADDENDUM 1200:  Care Management Discharge Note    Steven spoke with Haleigh RN at the , who came to the hospital to do an onsite assessment and watch the pt work with PT.  Haleigh said that the pt appears to be doing well and looks stronger than what he did his previous discharge.  Haleigh has no concerns with the pt returning at 1430 today.  She will update Jocelyne.  Steven faxed the pt's discharge orders to Jocelyne F: [phone].

## 2023-03-29 NOTE — PLAN OF CARE
Physical Therapy Discharge Summary    Reason for therapy discharge:    Discharged to home.    Progress towards therapy goal(s). See goals on Care Plan in [facility] electronic health record for goal details.  Goals met    Therapy recommendation(s):    Continued therapy is recommended.  Rationale/Recommendations:  HHPT to maximize safety and indep in home environment. .

## 2023-03-29 NOTE — PLAN OF CARE
End of Shift Summary  For vital signs and complete assessments, please see documentation flowsheets.     Pertinent assessments: VSS. Afebrile. LS diminished. BS x4. Pt denies pain and nausea. Regular diet. Ax1 with Belt and Walker. PIV - SL. A&O, alarm on bed for safety. Pt refused to wear CPAP tonight. Slept well.    Major Shift Events: none    Treatment Plan: Encourage activity, Plan to discharge back to  today.

## 2023-03-29 NOTE — DISCHARGE SUMMARY
PT dc'd to . PT provided with discharge instructions, including new medications, when medications were last given, and when to take them again. PT also informed that PT with be F/U and providing services. PT verbalize understanding of all discharge and f/u instructions.  All questions were answered at this time. Copy of paperwork sent with pt. No medications sent with pt. EMS provided transport. All personal belongings sent with pt.

## 2023-03-30 ENCOUNTER — TRANSFERRED RECORDS (OUTPATIENT)
Dept: HEALTH INFORMATION MANAGEMENT | Facility: CLINIC | Age: 48
End: 2023-03-30

## 2023-03-31 NOTE — DISCHARGE SUMMARY
Physician Discharge Summary           [facility]  Hospitalist Discharge Summary-Critical access hospital    Name: Tre Vazquez    MRN: [id number]     YOB: 1975    Age: 47 year old                                                     Primary care provider: Siobhan Mayo    Admit date:  3/15/2023    Discharge date and time: 3/24/2023 12:46 PM    Discharge Physician: Shabbir Vickers M.D., M.B.A.       Primary Discharge Diagnosis          Acute on chronic hypoxic and hypercapnic respiratory failure    Failure to thrive at group home    Secondary Diagnosis /chronic medical conditions     Past Medical History:   Diagnosis Date     Blindness of left eye      Depression 03/10/2014     Hypertension      Pneumococcal septicemia(038.2) (H) 11/26/2013    Hospitalized     Pneumonia, organism unspecified(486) 11/26/2013    Hospitalized     Uncomplicated asthma      Unspecified epilepsy without mention of intractable epilepsy      Past Surgical History:  Past Surgical History:   Procedure Laterality Date     COLONOSCOPY N/A 12/1/2022    Procedure: COLONOSCOPY;  Surgeon: Michael Caldwell MD;  Location:  OR     ESOPHAGOSCOPY, GASTROSCOPY, DUODENOSCOPY (EGD), COMBINED N/A 12/1/2022    Procedure: ESOPHAGOGASTRODUODENOSCOPY with biopsy;  Surgeon: Michael Caldwell MD;  Location:  OR     ORTHOPEDIC SURGERY      pt stated past surgery on both ankles           Brief Summary of Hospital stay :       Please refer to  Admission H&P note  and subsequent progress notes in EMR for full details of patient care.    Reason for Hospitalization(C/C,HPI and brief patient summary):generalized weakness and failure to thrive per .      Significant findings(Primary diagnosis )Procedures and treatments provided(Hospital course ,consults, procedures):Please see below for details    Tre Vazquez is a 47 year old male with seizure disorder, cerebral palsy with mild spastic paraparesis, mild  intellectual disability, mood disturbance, hypertension, congenital left eye blindness, right Bell's palsy with residual facial droop, morbid obesity, borderline personality disorder, PTSD, schizoaffective disorder, obstructive sleep apnea on BiPAP therapy, cerebral ventriculomegaly, prediabetes mellitus, chronic hypercapnic respiratory failure, asthma and depression who is chronically on 2 L of nasal cannula oxygen and lives in a group home came to [facility]   Tre Vazquez on March 25 a day after  he was discharged from [facility] on March 24, 2023.     Patient reportedly fell twice at the group home and had generalized weakness and failure to thrive per .     On presentation to emergency department, patient was hypoxic with oxygen saturation of 83% on room air, blood pressure 166/102, heart rate 91, temperature 98.1 and respiratory rate of 24.     Patient was placed on 6 L of oxygen and subsequently started on BiPAP due to hypoxic and hypercarbic respiratory failure.     Patient was initially admitted to ICU for BiPAP treatment        Problem list (medical problems addressed during hospital stay):    #1.  Acute on chronic hypoxic and hypercapnic respiratory failure: Multifactorial etiology including severe morbid obesity, recent multifocal pneumonia, obstructive sleep apnea  -- Patient has chronic respiratory failure with hypoxia requiring 2 to 3 L of oxygen via nasal cannula at home.  -- Patient was recently admitted to [facility] and treated for multifocal pneumonia and respiratory failure requiring intubation and subsequently treated with high flow nasal oxygen which was weaned down to his baseline oxygen requirement.  He was adequately treated with IV antibiotic last admission.  -- Patient was afebrile on this presentation to the emergency department, and WBC count 8.1, chest x-ray showed hyperinflated lungs with no new airspace opacity.  Mild vascular  congestion noted.  Patient is given azithromycin 500 mg IV infusion, 10 mg of IV dexamethasone and DuoNeb in the emergency department.  -- Initial VBG showed pH of 7.28, PCO2 73, PO2 45, bicarb 34, base excess venous 5.1.  Patient was placed on BiPAP and subsequent VBG showed improvement.  -- Patient was treated with continuous BiPAP in the ICU with gradual improvement and weaning of BiPAP during the day back to his nasal supplemental oxygen.       #2.  Physical deconditioning, generalized weakness and failure to thrive at group home.  -- Patient was very weak at this group home and fell twice at group home staff.  No report of loss of consciousness.  Patient was unable to be cared for at group home due to generalized weakness and more assistance requirement.  --  Jocelyne was contacted at [phone] who felt that patient needs TCU rather than returning back to group home.  --PT, OT and social service consulted        #3.  Recent treatment for multifocal pneumonia completed treatment with Zosyn and azithromycin.  -- No further antibiotic needed at this time     #4.Cerebral palsy  Spastic paraparesis  Seizure disorder  On antiepileptic medications  -to be continued when off BiPAP  Seizure precautions      #5. Hypertension  --Antihypertensives continued      #6. Obstructive sleep apnea  --May use home CPAP/BiPAP per RT        Consultations during hospital stay:       RESPIRATORY CARE IP CONSULT  VASCULAR ACCESS ADULT IP CONSULT  VASCULAR ACCESS ADULT IP CONSULT  CARE MANAGEMENT / SOCIAL WORK IP CONSULT  NUTRITION SERVICES ADULT IP CONSULT  PHARMACY IP CONSULT  SPEECH LANGUAGE PATH ADULT IP CONSULT  WOUND OSTOMY CONTINENCE NURSE  IP CONSULT  PHYSICAL THERAPY ADULT IP CONSULT  OCCUPATIONAL THERAPY ADULT IP CONSULT  CARE MANAGEMENT / SOCIAL WORK IP CONSULT      Patient discharge Condition:     stable    /65 (BP Location: Left arm)   Pulse 80   Temp 98.7  F (37.1  C) (Oral)   Resp 16   Ht 1.778  "m (5' 10\")   Wt 139.6 kg (307 lb 12.2 oz)   SpO2 94%   BMI 44.16 kg/m       Discharge Instructions:       Patient/family instructions: Written discharge instruction given to patient/family    Discharge Medications:       Review of your medicines      START taking      Dose / Directions   pantoprazole 40 MG EC tablet  Commonly known as: PROTONIX  Used for: Gastroesophageal reflux disease with esophagitis without hemorrhage      Dose: 40 mg  Take 1 tablet (40 mg) by mouth daily for 30 days  Quantity: 30 tablet  Refills: 0        CONTINUE these medicines which have NOT CHANGED      Dose / Directions   ammonium lactate 12 % external cream  Commonly known as: AMLACTIN      Apply topically 2 times daily  Refills: 0     busPIRone 15 MG tablet  Commonly known as: BUSPAR      Dose: 15 mg  Take 15 mg by mouth 2 times daily  Refills: 0     * citalopram 40 MG tablet  Commonly known as: celeXA      Dose: 40 mg  Take 40 mg by mouth daily  Refills: 0     * citalopram 20 MG tablet  Commonly known as: celeXA      Dose: 20 mg  Take 20 mg by mouth daily  Refills: 0     clotrimazole 1 % external solution  Commonly known as: LOTRIMIN      Apply topically 2 times daily  Refills: 0     divalproex sodium delayed-release 500 MG DR tablet  Commonly known as: DEPAKOTE  Used for: Non-refractory epilepsy (H), Breakthrough seizure (H)      Dose: 500 mg  Take 1 tablet (500 mg) by mouth 3 times daily  Quantity: 270 tablet  Refills: 3     lamoTRIgine 200 MG tablet  Commonly known as: LaMICtal  Used for: Non-refractory epilepsy (H), Breakthrough seizure (H)      Dose: 200 mg  Take 1 tablet (200 mg) by mouth 2 times daily  Quantity: 60 tablet  Refills: 11     levETIRAcetam 500 MG tablet  Commonly known as: KEPPRA  Used for: Non-refractory epilepsy (H)      Take 2 tablets (1,000 mg) by mouth every morning AND 3 tablets (1,500 mg) At Bedtime.  Quantity: 150 tablet  Refills: 11     levOCARNitine 330 MG tablet  Commonly known as: CARNITOR  Used for: " Nonintractable generalized idiopathic epilepsy without status epilepticus (H)      Dose: 660 mg  Take 2 tablets (660 mg) by mouth 3 times daily  Quantity: 180 tablet  Refills: 11     methylcellulose powder  Commonly known as: CITRUCEL      Dose: 2 g  Take 2 g by mouth 2 times daily  Refills: 0     multivitamin w/minerals tablet      Dose: 1 tablet  Take 1 tablet by mouth daily  Refills: 0     polyethylene glycol 17 GM/Dose powder  Commonly known as: MIRALAX      Dose: 1 capful.  Take 1 capful. by mouth daily as needed  Refills: 0     prazosin 2 MG capsule  Commonly known as: MINIPRESS      Dose: 2 mg  Take 2 mg by mouth At Bedtime  Refills: 0     propranolol 20 MG tablet  Commonly known as: INDERAL      Dose: 20 mg  Take 20 mg by mouth 2 times daily  Refills: 0     QUEtiapine  MG 24 hr tablet  Commonly known as: SEROquel XR      Dose: 300 mg  Take 300 mg by mouth At Bedtime  Refills: 0     zonisamide 100 MG capsule  Commonly known as: ZONEGRAN  Used for: Non-refractory epilepsy (H), Breakthrough seizure (H)      Take 1 capsule (100 mg) by mouth every morning AND 2 capsules (200 mg) At Bedtime.  Quantity: 90 capsule  Refills: 11         * This list has 2 medication(s) that are the same as other medications prescribed for you. Read the directions carefully, and ask your doctor or other care provider to review them with you.               Where to get your medicines      These medications were sent to [facility] - [place], [place] - [address]  [address] [facility] [address]    Phone: [phone]     pantoprazole 40 MG EC tablet          Discharge diet:Orders Placed This Encounter      Diet    regular diet      Discharge activity:Activity as tolerated      Discharge follow-up:    Follow up with primary care provider in 7 days or earlier if symptoms return or gets worse.      Other instructions:    We discussed with patient/family about detail discharge instructions as well as  discharge medications above including potential risks,side effects and benefits.Patient/family understood benefits and potential serious side effects of taking these medications and need to follow up with PCP if the patient develops complications.  Patient is also advised to see a doctor immediately for severe symptoms.        Major procedure performed/  Significant Diagnostic Studies:       Results for orders placed or performed during the hospital encounter of 03/15/23   XR Chest Port 1 View    Narrative    CHEST ONE VIEW PORTABLE  3/15/2023 1:47 PM     HISTORY: Dyspnea.    COMPARISON: Chest x-ray on 6/20/2022.      Impression    IMPRESSION: Single AP view of the chest was obtained.  Cardiomediastinal silhouette is within normal limits. Mild left  basilar pulmonary opacities, likely atelectasis. No significant  pleural effusion or pneumothorax.    SHANTAL OSBORNE MD         SYSTEM ID:  [id number]   XR Chest Port 1 View    Narrative    EXAM: XR CHEST PORT 1 VIEW  LOCATION: [facility]  DATE/TIME: 3/15/2023 4:55 PM    INDICATION: Status post intubation.  COMPARISON: 03/15/2023      Impression    IMPRESSION:     New endotracheal tube with tip 1.6 cm above the lisa. New enteric tube coursing into the stomach with tip out of view.    No focal airspace disease. No pleural effusion or pneumothorax.    The cardiomediastinal silhouette is unremarkable.   XR Chest Port 1 View    Narrative    EXAM: XR CHEST PORT 1 VIEW  LOCATION: [facility]  DATE/TIME: 3/15/2023 5:04 PM    INDICATION: ETT retracted 1.5 cm   eval for position  COMPARISON: 3/15/2023      Impression    IMPRESSION: Endotracheal tube is 3.8 cm superior to the lisa. Enteric tube extends below the diaphragm. Clear lungs. The cardiac silhouette and pulmonary vasculature are normal.   XR Abdomen Port 1 View    Narrative    EXAM: XR ABDOMEN PORT 1 VIEW  LOCATION: [facility]  DATE/TIME: 3/15/2023  9:17 PM    INDICATION: pls confirm og placement  COMPARISON: None.      Impression    IMPRESSION: NG tube side-port is just below the EG junction and I would advance approximately 5 cm. Visualized bowel gas pattern within normal limits. Mild hypertrophic changes in the spine, most marked in the lower lumbar spine.   XR Chest Port 1 View    Narrative    EXAM: XR CHEST PORT 1 VIEW  LOCATION: [facility]  DATE/TIME: 3/15/2023 11:50 PM    INDICATION: PICC line placement.  COMPARISON: Earlier same day chest radiograph.      Impression    IMPRESSION: Endotracheal tube tip overlies the mid upper thoracic trachea. Enteric suction tube tip terminates inferiorly beyond the field-of-view. Left upper extremity PICC catheter has tip overlying the superior cavoatrial junction.    Lungs are clear. No focal airspace consolidation. No pleural effusion or pneumothorax.    Cardiomediastinal silhouette is normal.   XR Abdomen Port 1 View    Narrative    EXAM: XR ABDOMEN PORT 1 VIEW  LOCATION: [facility]  DATE/TIME: 3/17/2023 5:14 PM    INDICATION: Advancement of OG tube. placement  COMPARISON: X-ray 03/15/2023      Impression    IMPRESSION: Interval advancement of the enteric tube with the tip now located near the gastroduodenal junction. Nonobstructive bowel gas pattern. No definite free air.       Recent Labs   Lab 03/28/23  0810 03/27/23  0643 03/26/23  0625 03/25/23  0450   WBC  --  5.8 6.5 8.1   HGB  --  11.5* 11.3* 13.1*   HCT  --  38.4* 36.6* 42.3   MCV  --  101* 99 99    224 204 228     No results for input(s): CULT in the last 168 hours.  Recent Labs   Lab 03/29/23  0758 03/28/23  1549 03/28/23  0810 03/27/23  0643 03/26/23  1559 03/26/23  0805 03/26/23  0625 03/25/23  1003 03/25/23  0450   NA  --   --   --  143  --   --  141  --  143   POTASSIUM 4.0  --  3.9 3.7  --   --  3.8  --  4.2   CHLORIDE  --   --   --  103  --   --  101  --  101   CO2  --   --   --  34*  --   --  32*   --  35*   ANIONGAP  --   --   --  6*  --   --  8  --  7   GLC  --   --   --  120* 100* 72 90   < > 92   BUN  --   --   --  17.8  --   --  13.8  --  14.5   CR  --   --   --  0.60*  --   --  0.61*  --  0.63*   GFRESTIMATED  --   --   --  >90  --   --  >90  --  >90   ARNOLD  --   --   --  8.7  --   --  8.8  --  9.2   MAG 1.6* 2.1 1.4* 1.6*  --   --  1.7   < > 1.5*   PROTTOTAL  --   --   --   --   --   --  6.0*  --   --    ALBUMIN  --   --   --   --   --   --  3.7  --   --    BILITOTAL  --   --   --   --   --   --  0.4  --   --    ALKPHOS  --   --   --   --   --   --  80  --   --    AST  --   --   --   --   --   --  31  --   --    ALT  --   --   --   --   --   --  47  --   --     < > = values in this interval not displayed.       Recent Labs   Lab 03/27/23  0643 03/26/23  1559 03/26/23  0805 03/26/23  0625 03/26/23  0330   * 100* 72 90 90         Pending Results:       Unresulted Labs Ordered in the Past 30 Days of this Admission     No orders found from 2/13/2023 to 3/16/2023.             Patient Allergies:       Allergies   Allergen Reactions     Hydrocodone Bitartrate Er Unknown     Tomato      Cephalexin Rash     [facility] Reaction: Rash; [facility] Noted: [id number]       Vicodin [Hydrocodone-Acetaminophen] GI Disturbance         Disposition:     Disposition: home      I saw and evaluated the patient on day of discharge and  discharge instructions reviewed  and  all the patient's questions and concerns addressed. Over 30 minutes spent on discharge and coordination of discharge process for this patient.      Disclaimer: This note consists of symbols derived from keyboarding, dictation and/or voice recognition software. As a result, there may be errors in the script that have gone undetected. Please consider this when interpreting information found in this chart

## 2023-04-11 ENCOUNTER — TELEPHONE (OUTPATIENT)
Dept: NEUROLOGY | Facility: CLINIC | Age: 48
End: 2023-04-11
Payer: MEDICARE

## 2023-04-11 DIAGNOSIS — G40.309 NONINTRACTABLE GENERALIZED IDIOPATHIC EPILEPSY WITHOUT STATUS EPILEPTICUS (H): Primary | ICD-10-CM

## 2023-04-11 NOTE — TELEPHONE ENCOUNTER
"Jocelyne care coordinator reports staring episodes that last a few seconds, pt will also glare (glazed look). She is unsure if these are seizures, but suspecting they are.    She has known pt for about a month, when she observes pt during conversations she will noticed these \"spacey\" episodes. Speech slurred afterwards, confused briefly, sometimes doesn't remember the conversation that was happening before. First noticed last Thursday, care coordinator states he spends a lot of time in his room- hard to estimate how frequent these episodes are occurring, she has noticed them most days she has seen him since last Thursday.     He has been hospitalized recently for respiratory failure, is on O2 all the time. Group home working to get him CPAP, he has not worn this in the past.     List of antiepileptic medications is current, taking as directed.     Please advise if you have any recommendations prior to 4/19 appt.     Tito Hathaway, RN, BSN  [facility]          "

## 2023-04-11 NOTE — TELEPHONE ENCOUNTER
I would like to check his drug levels. I will also order and updated EEG.  Orders placed  Thank you,   RON Oliveira CNP

## 2023-04-11 NOTE — TELEPHONE ENCOUNTER
Health Call Center    Phone Message    May a detailed message be left on voicemail: yes     Reason for Call: Symptoms or Concerns     If patient has red-flag symptoms, warm transfer to triage line    Current symptom or concern: Jocelyne noticed increase of possible seizers, states pt will become real spacey and then come to... wondering what they should do until his appointment on 04/19/2023?      Please follow up with Jocelyne [phone].      Action Taken: Message routed to: [facility]    Travel Screening: Not Applicable    Cj Gallardo on 4/11/2023 at 9:00 AM   - Neurology

## 2023-04-11 NOTE — TELEPHONE ENCOUNTER
Discussed plan for labs and EEG with Jocelyne care coordinator she will ensure that these are completed and will track any future episodes.     Tito Hathaway RN, BSN  [facility]

## 2023-04-12 ENCOUNTER — LAB (OUTPATIENT)
Dept: LAB | Facility: CLINIC | Age: 48
End: 2023-04-12
Payer: MEDICARE

## 2023-04-12 DIAGNOSIS — G40.309 NONINTRACTABLE GENERALIZED IDIOPATHIC EPILEPSY WITHOUT STATUS EPILEPTICUS (H): ICD-10-CM

## 2023-04-12 LAB — AMMONIA PLAS-SCNC: 82 UMOL/L (ref 16–60)

## 2023-04-12 PROCEDURE — 82140 ASSAY OF AMMONIA: CPT | Performed by: INTERNAL MEDICINE

## 2023-04-12 PROCEDURE — 80164 ASSAY DIPROPYLACETIC ACD TOT: CPT | Mod: 90 | Performed by: INTERNAL MEDICINE

## 2023-04-12 PROCEDURE — 80165 DIPROPYLACETIC ACID FREE: CPT | Mod: 90 | Performed by: INTERNAL MEDICINE

## 2023-04-12 PROCEDURE — 80177 DRUG SCRN QUAN LEVETIRACETAM: CPT | Performed by: INTERNAL MEDICINE

## 2023-04-12 PROCEDURE — 36415 COLL VENOUS BLD VENIPUNCTURE: CPT | Performed by: INTERNAL MEDICINE

## 2023-04-12 PROCEDURE — 80203 DRUG SCREEN QUANT ZONISAMIDE: CPT | Mod: 90 | Performed by: INTERNAL MEDICINE

## 2023-04-12 PROCEDURE — 99000 SPECIMEN HANDLING OFFICE-LAB: CPT | Performed by: INTERNAL MEDICINE

## 2023-04-12 PROCEDURE — 80175 DRUG SCREEN QUAN LAMOTRIGINE: CPT | Mod: 90 | Performed by: INTERNAL MEDICINE

## 2023-04-13 LAB
LAMOTRIGINE SERPL-MCNC: 14.6 UG/ML
LEVETIRACETAM SERPL-MCNC: 33 ΜG/ML (ref 10–40)

## 2023-04-14 LAB
VALPROATE FREE MFR SERPL: 15 %
VALPROATE FREE MFR SERPL: 17 %
VALPROATE FREE SERPL-MCNC: 12 UG/ML
VALPROATE FREE SERPL-MCNC: 13 UG/ML
VALPROATE SERPL-MCNC: 78 UG/ML
VALPROATE SERPL-MCNC: 78 UG/ML
ZONISAMIDE SERPL-MCNC: 11 UG/ML

## 2023-05-04 ENCOUNTER — APPOINTMENT (OUTPATIENT)
Dept: GENERAL RADIOLOGY | Facility: CLINIC | Age: 48
DRG: 091 | End: 2023-05-04
Attending: EMERGENCY MEDICINE
Payer: MEDICARE

## 2023-05-04 ENCOUNTER — HOSPITAL ENCOUNTER (INPATIENT)
Facility: CLINIC | Age: 48
LOS: 7 days | Discharge: HOME HEALTH | DRG: 091 | End: 2023-05-11
Attending: EMERGENCY MEDICINE | Admitting: STUDENT IN AN ORGANIZED HEALTH CARE EDUCATION/TRAINING PROGRAM
Payer: MEDICARE

## 2023-05-04 DIAGNOSIS — E66.2 OBESITY HYPOVENTILATION SYNDROME (H): Primary | ICD-10-CM

## 2023-05-04 DIAGNOSIS — M62.81 GENERALIZED MUSCLE WEAKNESS: ICD-10-CM

## 2023-05-04 DIAGNOSIS — G80.9 CEREBRAL PALSY (H): ICD-10-CM

## 2023-05-04 DIAGNOSIS — Z99.81 OXYGEN DEPENDENT: ICD-10-CM

## 2023-05-04 DIAGNOSIS — J96.22 ACUTE AND CHRONIC RESPIRATORY FAILURE WITH HYPERCAPNIA (H): ICD-10-CM

## 2023-05-04 LAB
ALBUMIN UR-MCNC: NEGATIVE MG/DL
ANION GAP SERPL CALCULATED.3IONS-SCNC: 6 MMOL/L (ref 7–15)
APPEARANCE UR: CLEAR
BASE EXCESS BLDV CALC-SCNC: 10.5 MMOL/L (ref -7.7–1.9)
BASE EXCESS BLDV CALC-SCNC: 8.3 MMOL/L (ref -7.7–1.9)
BASE EXCESS BLDV CALC-SCNC: 9.7 MMOL/L (ref -7.7–1.9)
BASOPHILS # BLD AUTO: 0.1 10E3/UL (ref 0–0.2)
BASOPHILS NFR BLD AUTO: 1 %
BILIRUB UR QL STRIP: NEGATIVE
BUN SERPL-MCNC: 16.2 MG/DL (ref 6–20)
CALCIUM SERPL-MCNC: 9.3 MG/DL (ref 8.6–10)
CHLORIDE SERPL-SCNC: 98 MMOL/L (ref 98–107)
COLOR UR AUTO: YELLOW
CREAT SERPL-MCNC: 0.77 MG/DL (ref 0.67–1.17)
D DIMER PPP FEU-MCNC: 0.31 UG/ML FEU (ref 0–0.5)
DEPRECATED HCO3 PLAS-SCNC: 39 MMOL/L (ref 22–29)
EOSINOPHIL # BLD AUTO: 0.1 10E3/UL (ref 0–0.7)
EOSINOPHIL NFR BLD AUTO: 2 %
ERYTHROCYTE [DISTWIDTH] IN BLOOD BY AUTOMATED COUNT: 14.1 % (ref 10–15)
GFR SERPL CREATININE-BSD FRML MDRD: >90 ML/MIN/1.73M2
GLUCOSE SERPL-MCNC: 99 MG/DL (ref 70–99)
GLUCOSE UR STRIP-MCNC: NEGATIVE MG/DL
HCO3 BLDV-SCNC: 39 MMOL/L (ref 21–28)
HCO3 BLDV-SCNC: 39 MMOL/L (ref 21–28)
HCO3 BLDV-SCNC: 41 MMOL/L (ref 21–28)
HCT VFR BLD AUTO: 41.3 % (ref 40–53)
HGB BLD-MCNC: 12.8 G/DL (ref 13.3–17.7)
HGB UR QL STRIP: NEGATIVE
HOLD SPECIMEN: NORMAL
HOLD SPECIMEN: NORMAL
IMM GRANULOCYTES # BLD: 0.2 10E3/UL
IMM GRANULOCYTES NFR BLD: 3 %
KETONES UR STRIP-MCNC: NEGATIVE MG/DL
LACTATE SERPL-SCNC: 1.6 MMOL/L (ref 0.7–2)
LEUKOCYTE ESTERASE UR QL STRIP: NEGATIVE
LYMPHOCYTES # BLD AUTO: 2.2 10E3/UL (ref 0.8–5.3)
LYMPHOCYTES NFR BLD AUTO: 37 %
MCH RBC QN AUTO: 31.1 PG (ref 26.5–33)
MCHC RBC AUTO-ENTMCNC: 31 G/DL (ref 31.5–36.5)
MCV RBC AUTO: 100 FL (ref 78–100)
MONOCYTES # BLD AUTO: 0.6 10E3/UL (ref 0–1.3)
MONOCYTES NFR BLD AUTO: 10 %
NEUTROPHILS # BLD AUTO: 2.8 10E3/UL (ref 1.6–8.3)
NEUTROPHILS NFR BLD AUTO: 47 %
NITRATE UR QL: NEGATIVE
NRBC # BLD AUTO: 0 10E3/UL
NRBC BLD AUTO-RTO: 0 /100
NT-PROBNP SERPL-MCNC: 69 PG/ML (ref 0–450)
O2/TOTAL GAS SETTING VFR VENT: 3 %
O2/TOTAL GAS SETTING VFR VENT: 30 %
O2/TOTAL GAS SETTING VFR VENT: 50 %
OXYHGB MFR BLDV: 65 % (ref 70–75)
PCO2 BLDV: 79 MM HG (ref 40–50)
PCO2 BLDV: 81 MM HG (ref 40–50)
PCO2 BLDV: 86 MM HG (ref 40–50)
PH BLDV: 7.29 [PH] (ref 7.32–7.43)
PH BLDV: 7.29 [PH] (ref 7.32–7.43)
PH BLDV: 7.3 [PH] (ref 7.32–7.43)
PH UR STRIP: 7 [PH] (ref 5–7)
PLATELET # BLD AUTO: 192 10E3/UL (ref 150–450)
PO2 BLDV: 32 MM HG (ref 25–47)
PO2 BLDV: 35 MM HG (ref 25–47)
PO2 BLDV: 37 MM HG (ref 25–47)
POTASSIUM SERPL-SCNC: 4.1 MMOL/L (ref 3.4–5.3)
RBC # BLD AUTO: 4.12 10E6/UL (ref 4.4–5.9)
RBC URINE: 2 /HPF
SODIUM SERPL-SCNC: 143 MMOL/L (ref 136–145)
SP GR UR STRIP: 1.02 (ref 1–1.03)
TROPONIN T SERPL HS-MCNC: 19 NG/L
UROBILINOGEN UR STRIP-MCNC: NORMAL MG/DL
WBC # BLD AUTO: 6 10E3/UL (ref 4–11)
WBC URINE: 2 /HPF

## 2023-05-04 PROCEDURE — 84484 ASSAY OF TROPONIN QUANT: CPT | Performed by: EMERGENCY MEDICINE

## 2023-05-04 PROCEDURE — 82803 BLOOD GASES ANY COMBINATION: CPT | Performed by: EMERGENCY MEDICINE

## 2023-05-04 PROCEDURE — 99223 1ST HOSP IP/OBS HIGH 75: CPT | Mod: AI | Performed by: STUDENT IN AN ORGANIZED HEALTH CARE EDUCATION/TRAINING PROGRAM

## 2023-05-04 PROCEDURE — G0378 HOSPITAL OBSERVATION PER HR: HCPCS

## 2023-05-04 PROCEDURE — 36415 COLL VENOUS BLD VENIPUNCTURE: CPT | Performed by: STUDENT IN AN ORGANIZED HEALTH CARE EDUCATION/TRAINING PROGRAM

## 2023-05-04 PROCEDURE — 83605 ASSAY OF LACTIC ACID: CPT | Performed by: EMERGENCY MEDICINE

## 2023-05-04 PROCEDURE — 96374 THER/PROPH/DIAG INJ IV PUSH: CPT

## 2023-05-04 PROCEDURE — 250N000013 HC RX MED GY IP 250 OP 250 PS 637: Performed by: EMERGENCY MEDICINE

## 2023-05-04 PROCEDURE — 87040 BLOOD CULTURE FOR BACTERIA: CPT | Performed by: EMERGENCY MEDICINE

## 2023-05-04 PROCEDURE — 81001 URINALYSIS AUTO W/SCOPE: CPT | Performed by: EMERGENCY MEDICINE

## 2023-05-04 PROCEDURE — 94640 AIRWAY INHALATION TREATMENT: CPT | Mod: 76

## 2023-05-04 PROCEDURE — 250N000009 HC RX 250: Performed by: EMERGENCY MEDICINE

## 2023-05-04 PROCEDURE — 250N000011 HC RX IP 250 OP 636: Performed by: EMERGENCY MEDICINE

## 2023-05-04 PROCEDURE — 94660 CPAP INITIATION&MGMT: CPT

## 2023-05-04 PROCEDURE — 82805 BLOOD GASES W/O2 SATURATION: CPT | Performed by: EMERGENCY MEDICINE

## 2023-05-04 PROCEDURE — 36415 COLL VENOUS BLD VENIPUNCTURE: CPT | Performed by: EMERGENCY MEDICINE

## 2023-05-04 PROCEDURE — 85379 FIBRIN DEGRADATION QUANT: CPT | Performed by: EMERGENCY MEDICINE

## 2023-05-04 PROCEDURE — 120N000001 HC R&B MED SURG/OB

## 2023-05-04 PROCEDURE — 85025 COMPLETE CBC W/AUTO DIFF WBC: CPT | Performed by: EMERGENCY MEDICINE

## 2023-05-04 PROCEDURE — 93005 ELECTROCARDIOGRAM TRACING: CPT

## 2023-05-04 PROCEDURE — 82803 BLOOD GASES ANY COMBINATION: CPT | Performed by: STUDENT IN AN ORGANIZED HEALTH CARE EDUCATION/TRAINING PROGRAM

## 2023-05-04 PROCEDURE — 5A09457 ASSISTANCE WITH RESPIRATORY VENTILATION, 24-96 CONSECUTIVE HOURS, CONTINUOUS POSITIVE AIRWAY PRESSURE: ICD-10-PCS | Performed by: INTERNAL MEDICINE

## 2023-05-04 PROCEDURE — 999N000157 HC STATISTIC RCP TIME EA 10 MIN

## 2023-05-04 PROCEDURE — 71046 X-RAY EXAM CHEST 2 VIEWS: CPT

## 2023-05-04 PROCEDURE — 80048 BASIC METABOLIC PNL TOTAL CA: CPT | Performed by: EMERGENCY MEDICINE

## 2023-05-04 PROCEDURE — 99285 EMERGENCY DEPT VISIT HI MDM: CPT | Mod: 25

## 2023-05-04 PROCEDURE — 83880 ASSAY OF NATRIURETIC PEPTIDE: CPT | Performed by: EMERGENCY MEDICINE

## 2023-05-04 RX ORDER — IPRATROPIUM BROMIDE AND ALBUTEROL SULFATE 2.5; .5 MG/3ML; MG/3ML
3 SOLUTION RESPIRATORY (INHALATION) ONCE
Status: COMPLETED | OUTPATIENT
Start: 2023-05-04 | End: 2023-05-04

## 2023-05-04 RX ORDER — LEVETIRACETAM 500 MG/1
1000 TABLET ORAL EVERY MORNING
Status: DISCONTINUED | OUTPATIENT
Start: 2023-05-05 | End: 2023-05-11 | Stop reason: HOSPADM

## 2023-05-04 RX ORDER — AZITHROMYCIN 250 MG/1
500 TABLET, FILM COATED ORAL EVERY EVENING
Status: DISCONTINUED | OUTPATIENT
Start: 2023-05-05 | End: 2023-05-06

## 2023-05-04 RX ORDER — LEVETIRACETAM 500 MG/1
1500 TABLET ORAL AT BEDTIME
Status: DISCONTINUED | OUTPATIENT
Start: 2023-05-04 | End: 2023-05-11 | Stop reason: HOSPADM

## 2023-05-04 RX ORDER — AZITHROMYCIN 250 MG/1
500 TABLET, FILM COATED ORAL ONCE
Status: COMPLETED | OUTPATIENT
Start: 2023-05-04 | End: 2023-05-04

## 2023-05-04 RX ORDER — PRAZOSIN HYDROCHLORIDE 1 MG/1
2 CAPSULE ORAL AT BEDTIME
Status: DISCONTINUED | OUTPATIENT
Start: 2023-05-04 | End: 2023-05-11 | Stop reason: HOSPADM

## 2023-05-04 RX ORDER — DIVALPROEX SODIUM 500 MG/1
500 TABLET, DELAYED RELEASE ORAL 3 TIMES DAILY
Status: DISCONTINUED | OUTPATIENT
Start: 2023-05-04 | End: 2023-05-11 | Stop reason: HOSPADM

## 2023-05-04 RX ORDER — IPRATROPIUM BROMIDE AND ALBUTEROL SULFATE 2.5; .5 MG/3ML; MG/3ML
6 SOLUTION RESPIRATORY (INHALATION) EVERY 5 MIN PRN
Status: DISCONTINUED | OUTPATIENT
Start: 2023-05-04 | End: 2023-05-11 | Stop reason: HOSPADM

## 2023-05-04 RX ORDER — CITALOPRAM HYDROBROMIDE 20 MG/1
40 TABLET ORAL DAILY
Status: DISCONTINUED | OUTPATIENT
Start: 2023-05-05 | End: 2023-05-04

## 2023-05-04 RX ORDER — LAMOTRIGINE 100 MG/1
200 TABLET ORAL 2 TIMES DAILY
Status: DISCONTINUED | OUTPATIENT
Start: 2023-05-04 | End: 2023-05-11 | Stop reason: HOSPADM

## 2023-05-04 RX ORDER — POLYETHYLENE GLYCOL 3350 17 G/17G
17 POWDER, FOR SOLUTION ORAL DAILY
Status: DISCONTINUED | OUTPATIENT
Start: 2023-05-05 | End: 2023-05-11 | Stop reason: HOSPADM

## 2023-05-04 RX ORDER — ONDANSETRON 2 MG/ML
4 INJECTION INTRAMUSCULAR; INTRAVENOUS EVERY 6 HOURS PRN
Status: DISCONTINUED | OUTPATIENT
Start: 2023-05-04 | End: 2023-05-11 | Stop reason: HOSPADM

## 2023-05-04 RX ORDER — CITALOPRAM HYDROBROMIDE 20 MG/1
60 TABLET ORAL DAILY
Status: DISCONTINUED | OUTPATIENT
Start: 2023-05-05 | End: 2023-05-11 | Stop reason: HOSPADM

## 2023-05-04 RX ORDER — METHYLPREDNISOLONE SODIUM SUCCINATE 125 MG/2ML
125 INJECTION, POWDER, LYOPHILIZED, FOR SOLUTION INTRAMUSCULAR; INTRAVENOUS ONCE
Status: COMPLETED | OUTPATIENT
Start: 2023-05-04 | End: 2023-05-04

## 2023-05-04 RX ORDER — PROPRANOLOL HYDROCHLORIDE 20 MG/1
20 TABLET ORAL 2 TIMES DAILY
Status: DISCONTINUED | OUTPATIENT
Start: 2023-05-04 | End: 2023-05-11 | Stop reason: HOSPADM

## 2023-05-04 RX ORDER — LEVOCARNITINE 330 MG/1
660 TABLET ORAL 3 TIMES DAILY
Status: DISCONTINUED | OUTPATIENT
Start: 2023-05-04 | End: 2023-05-11 | Stop reason: HOSPADM

## 2023-05-04 RX ORDER — ONDANSETRON 4 MG/1
4 TABLET, ORALLY DISINTEGRATING ORAL EVERY 6 HOURS PRN
Status: DISCONTINUED | OUTPATIENT
Start: 2023-05-04 | End: 2023-05-11 | Stop reason: HOSPADM

## 2023-05-04 RX ORDER — BUSPIRONE HYDROCHLORIDE 15 MG/1
15 TABLET ORAL 2 TIMES DAILY
Status: DISCONTINUED | OUTPATIENT
Start: 2023-05-04 | End: 2023-05-11 | Stop reason: HOSPADM

## 2023-05-04 RX ORDER — ZONISAMIDE 100 MG/1
100 CAPSULE ORAL EVERY MORNING
Status: DISCONTINUED | OUTPATIENT
Start: 2023-05-05 | End: 2023-05-11 | Stop reason: HOSPADM

## 2023-05-04 RX ORDER — PANTOPRAZOLE SODIUM 40 MG/1
40 TABLET, DELAYED RELEASE ORAL DAILY
Status: DISCONTINUED | OUTPATIENT
Start: 2023-05-05 | End: 2023-05-11 | Stop reason: HOSPADM

## 2023-05-04 RX ORDER — QUETIAPINE 300 MG/1
300 TABLET, FILM COATED, EXTENDED RELEASE ORAL AT BEDTIME
Status: DISCONTINUED | OUTPATIENT
Start: 2023-05-04 | End: 2023-05-11 | Stop reason: HOSPADM

## 2023-05-04 RX ORDER — ZONISAMIDE 100 MG/1
200 CAPSULE ORAL AT BEDTIME
Status: DISCONTINUED | OUTPATIENT
Start: 2023-05-04 | End: 2023-05-11 | Stop reason: HOSPADM

## 2023-05-04 RX ORDER — CARBOXYMETHYLCELLULOSE SODIUM 5 MG/ML
1 SOLUTION/ DROPS OPHTHALMIC
Status: DISCONTINUED | OUTPATIENT
Start: 2023-05-04 | End: 2023-05-11 | Stop reason: HOSPADM

## 2023-05-04 RX ORDER — PANTOPRAZOLE SODIUM 40 MG/1
40 TABLET, DELAYED RELEASE ORAL DAILY
COMMUNITY

## 2023-05-04 RX ADMIN — AZITHROMYCIN MONOHYDRATE 500 MG: 250 TABLET ORAL at 17:41

## 2023-05-04 RX ADMIN — METHYLPREDNISOLONE SODIUM SUCCINATE 125 MG: 125 INJECTION, POWDER, FOR SOLUTION INTRAMUSCULAR; INTRAVENOUS at 17:43

## 2023-05-04 RX ADMIN — IPRATROPIUM BROMIDE AND ALBUTEROL SULFATE 6 ML: .5; 3 SOLUTION RESPIRATORY (INHALATION) at 19:32

## 2023-05-04 RX ADMIN — IPRATROPIUM BROMIDE AND ALBUTEROL SULFATE 3 ML: .5; 3 SOLUTION RESPIRATORY (INHALATION) at 17:43

## 2023-05-04 ASSESSMENT — ACTIVITIES OF DAILY LIVING (ADL)
ADLS_ACUITY_SCORE: 37

## 2023-05-04 NOTE — ED NOTES
"[facility]  ED Nurse Handoff Report    Tre Vazquez is a 47 year old male   ED Chief complaint: Generalized Weakness  . ED Diagnosis:   Final diagnoses:   Acute and chronic respiratory failure with hypercapnia (H)   Oxygen dependent   Generalized muscle weakness   Cerebral palsy (H)     Allergies:   Allergies   Allergen Reactions     Hydrocodone Bitartrate Er Unknown     Tomato      Cephalexin Rash     [facility] Reaction: Rash; [facility] Noted: [id number]       Vicodin [Hydrocodone-Acetaminophen] GI Disturbance       Code Status: Full Code  Activity level - Baseline/Home:  Assist X 1. Activity Level - Current:   Assist X 1. Lift room needed: No. Bariatric: No   Needed: No   Isolation: No. Infection: Not Applicable.     Vital Signs:   Vitals:    05/04/23 1510   BP: (!) 140/87   Pulse: 83   Resp: 22   SpO2: 94%   Weight: 142.9 kg (315 lb)   Height: 1.715 m (5' 7.5\")       Cardiac Rhythm:  ,      Pain level:    Patient confused: No. Patient Falls Risk: Yes.   Elimination Status: Unable to void and waiting on urine for lab     Patient Report - Initial Complaint: SOB. Focused Assessment:    Briefly, the patient presented with generalized weakness and shortness of breath.  PMH notbale for cerebral palsy, obesity, epilepsy who presents with generalized weakness and shortness of breath.  On home O2 chronically. Staff noticed patient was desating with exertion.   Tests Performed: labs, imaging. Abnormal Results:   Labs Ordered and Resulted from Time of ED Arrival to Time of ED Departure   BASIC METABOLIC PANEL - Abnormal       Result Value    Sodium 143      Potassium 4.1      Chloride 98      Carbon Dioxide (CO2) 39 (*)     Anion Gap 6 (*)     Urea Nitrogen 16.2      Creatinine 0.77      Calcium 9.3      Glucose 99      GFR Estimate >90     BLOOD GAS VENOUS - Abnormal    pH Venous 7.29 (*)     pCO2 Venous 86 (*)     pO2 Venous 35      Bicarbonate Venous 41 (*)     Base Excess/Deficit (+/-) 10.5 (*)     FIO2 50  "    CBC WITH PLATELETS AND DIFFERENTIAL - Abnormal    WBC Count 6.0      RBC Count 4.12 (*)     Hemoglobin 12.8 (*)     Hematocrit 41.3            MCH 31.1      MCHC 31.0 (*)     RDW 14.1      Platelet Count 192      % Neutrophils 47      % Lymphocytes 37      % Monocytes 10      % Eosinophils 2      % Basophils 1      % Immature Granulocytes 3      NRBCs per 100 WBC 0      Absolute Neutrophils 2.8      Absolute Lymphocytes 2.2      Absolute Monocytes 0.6      Absolute Eosinophils 0.1      Absolute Basophils 0.1      Absolute Immature Granulocytes 0.2      Absolute NRBCs 0.0     LACTIC ACID WHOLE BLOOD - Normal    Lactic Acid 1.6     TROPONIN T, HIGH SENSITIVITY - Normal    Troponin T, High Sensitivity 19     NT PROBNP INPATIENT - Normal    N terminal Pro BNP Inpatient 69     ROUTINE UA WITH MICROSCOPIC REFLEX TO CULTURE   BLOOD CULTURE   BLOOD CULTURE     Chest XR,  PA & LAT   Final Result   IMPRESSION: Heart size and pulmonary vascularity normal. Minimal atelectasis left base, lungs otherwise clear.           Treatments provided: on BiPap, see MAR   Family Comments: Patient's  notified and updated   OBS brochure/video discussed/provided to patient:  N/A  ED Medications:   Medications   No lozenges or gum should be given while patient on BIPAP/AVAPS/AVAPS AE (has no administration in time range)   carboxymethylcellulose PF (REFRESH PLUS) 0.5 % ophthalmic solution 1 drop (has no administration in time range)   Patient may continue current oral medications (has no administration in time range)   ipratropium - albuterol 0.5 mg/2.5 mg/3 mL (DUONEB) neb solution 6 mL (has no administration in time range)   ipratropium - albuterol 0.5 mg/2.5 mg/3 mL (DUONEB) neb solution 3 mL (3 mLs Nebulization $Given 5/4/23 1743)   azithromycin (ZITHROMAX) tablet 500 mg (500 mg Oral $Given 5/4/23 1741)   methylPREDNISolone sodium succinate (solu-MEDROL) injection 125 mg (125 mg Intravenous $Given 5/4/23 1743)      Drips infusing:  No  For the majority of the shift, the patient's behavior Green. Interventions performed were see MAR.    Sepsis treatment initiated: No     Patient tested for COVID 19 prior to admission: NO    ED Nurse Name/Phone Number: Malka Love RN,   6:32 PM    RECEIVING UNIT ED HANDOFF REVIEW    Above ED Nurse Handoff Report was reviewed: Yes  Reviewed by: Roseanne Dowd RN on May 5, 2023 at 4:40 PM

## 2023-05-04 NOTE — PROGRESS NOTES
RT BiPAP NOTE;      A BiPAP of  12/6 @ 30% was applied to the pt via the mask for an increase in WOB and/or SOB.    Skin integrity is good.  Bridge of nose looks good.   Pt is tolerating it well.   Will continue to monitor and assess the pt's current respiratory status and needs.        Chhaya Arteaga, RT

## 2023-05-04 NOTE — ED TRIAGE NOTES
Pt arrives via EMS from group home. Weakness since yesterday and can't walk to the restroom like normal. Feeling SOB. History COPD (has COPD nasal cannula PRN at home). 90% RA at home and 97% on 2L NC for EMS. History seizure disorder and bipolar and blind in left eye. Left AC 18g placed and blood sugar 108.

## 2023-05-04 NOTE — ED PROVIDER NOTES
History     Chief Complaint:  Generalized Weakness       HPI   Tre Vazquez is a 47 year old male with a history of cerebral palsy, obesity, generalized muscle weakness, epilepsy, who presents with generalized weakness and shortness of breath. Main complaint is shortness of breath. Reports gradual worsening. Says that he has difficulty keeping the nasal cannula in place and it often falls out when he moves his head. He does not like BIPAP / CPAP because he says he tosses and turns too much at night. No chest pain. No palpitations. Does report chronic phlegm with mild increase recently. No new cough. No fever or chills.     Patient reports gradual worsening of his generalized weakness. Denies focal deficit. Says he requires ambulatory assistance with walker or wall railings to ambulate; he still can walk but chooses not to, reportedly because he reportedly has had more seizures.     Patient is a poor historian. Does have baseline of mild cognitive deficit.     Independent Historian:    Caregiver    Review of External Notes:  Hospital note 3/29 discharge summary     ROS:  Review of Systems   See HPI.    Allergies:  Hydrocodone Bitartrate Er  Tomato  Cephalexin  Vicodin [Hydrocodone-Acetaminophen]     Medications:    ammonium lactate (AMLACTIN) 12 % external cream  busPIRone (BUSPAR) 15 MG tablet  citalopram (CELEXA) 20 MG tablet  citalopram (CELEXA) 40 MG tablet  clotrimazole (LOTRIMIN) 1 % external solution  divalproex sodium delayed-release (DEPAKOTE) 500 MG DR tablet  lamoTRIgine (LAMICTAL) 200 MG tablet  levETIRAcetam (KEPPRA) 500 MG tablet  levOCARNitine (CARNITOR) 330 MG tablet  methylcellulose (CITRUCEL) powder  multivitamin w/minerals (THERA-VIT-M) tablet  polyethylene glycol (MIRALAX) 17 GM/Dose powder  prazosin (MINIPRESS) 2 MG capsule  propranolol (INDERAL) 20 MG tablet  QUEtiapine ER (SEROQUEL XR) 300 MG 24 hr tablet  zonisamide (ZONEGRAN) 100 MG capsule        Past Medical History:    Past Medical  "History:   Diagnosis Date     Blindness of left eye      Depression 03/10/2014     Hypertension      Pneumococcal septicemia(038.2) (H) 11/26/2013     Pneumonia, organism unspecified(486) 11/26/2013     Uncomplicated asthma      Unspecified epilepsy without mention of intractable epilepsy    Mild cognitive disability  Mood disturbance  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   Borderline personality disorder   PTSD  Schizoaffective disorder   Sleep apnea  Cerebral ventriculomegaly  Prediabetes mellitus       Past Surgical History:    Past Surgical History:   Procedure Laterality Date     COLONOSCOPY N/A 12/1/2022    Procedure: COLONOSCOPY;  Surgeon: Michael Caldwell MD;  Location: [facility] OR     ESOPHAGOSCOPY, GASTROSCOPY, DUODENOSCOPY (EGD), COMBINED N/A 12/1/2022    Procedure: ESOPHAGOGASTRODUODENOSCOPY with biopsy;  Surgeon: Michael Caldwell MD;  Location:  OR     ORTHOPEDIC SURGERY      pt stated past surgery on both ankles        Family History:    family history is not on file.    Social History:   reports that he has quit smoking. He has never used smokeless tobacco. He reports that he does not drink alcohol and does not use drugs.  PCP: Siobhan Mayo     Physical Exam     Patient Vitals for the past 24 hrs:   BP Pulse Resp SpO2 Height Weight   05/04/23 1932 111/80 74 -- 92 % -- --   05/04/23 1900 -- -- -- 92 % -- --   05/04/23 1845 -- -- -- 93 % -- --   05/04/23 1830 -- -- -- 91 % -- --   05/04/23 1745 -- -- -- 92 % -- --   05/04/23 1645 -- 75 -- 93 % -- --   05/04/23 1630 -- 75 -- 93 % -- --   05/04/23 1615 -- 76 -- 92 % -- --   05/04/23 1545 128/78 77 -- 94 % -- --   05/04/23 1530 125/73 78 -- 93 % -- --   05/04/23 1510 (!) 140/87 83 22 94 % 1.715 m (5' 7.5\") 142.9 kg (315 lb)        Physical Exam  General: awake, alert   HENT:   Cardiovascular: normal rate, regular rhythm, normal S1/S2, bilateral lower extremity edema 2+  Pulmonary: end expiratory wheeze, no crackles, breath sounds " bilaterally but diminished bilaterally, no crackles, on 2L nasal cannula  Abdomen: soft, non-distended, non-tender, BMI >30  Musculoskeletal: no gross deformity   Skin: no rash, intact   Neurological: facial asymmetry noted        Emergency Department Course   ECG  ECG taken at 1729, ECG read at 1740  Normal sinus rhythm. Narrow complex. Normal intervals. No ST segment or T wave changes concerning for ischemia.   When compared with prior ECG, dated 3/25/2023, no change.  Rate 74 bpm. WY interval 206 ms. QRS duration 84 ms. QT/QTc 140/455 ms. P-R-T axes 40 7 67.    Imaging:  Chest XR,  PA & LAT   Final Result   IMPRESSION: Heart size and pulmonary vascularity normal. Minimal atelectasis left base, lungs otherwise clear.        Report per radiology    Laboratory:  Labs Ordered and Resulted from Time of ED Arrival to Time of ED Departure   BASIC METABOLIC PANEL - Abnormal       Result Value    Sodium 143      Potassium 4.1      Chloride 98      Carbon Dioxide (CO2) 39 (*)     Anion Gap 6 (*)     Urea Nitrogen 16.2      Creatinine 0.77      Calcium 9.3      Glucose 99      GFR Estimate >90     BLOOD GAS VENOUS - Abnormal    pH Venous 7.29 (*)     pCO2 Venous 86 (*)     pO2 Venous 35      Bicarbonate Venous 41 (*)     Base Excess/Deficit (+/-) 10.5 (*)     FIO2 50     CBC WITH PLATELETS AND DIFFERENTIAL - Abnormal    WBC Count 6.0      RBC Count 4.12 (*)     Hemoglobin 12.8 (*)     Hematocrit 41.3            MCH 31.1      MCHC 31.0 (*)     RDW 14.1      Platelet Count 192      % Neutrophils 47      % Lymphocytes 37      % Monocytes 10      % Eosinophils 2      % Basophils 1      % Immature Granulocytes 3      NRBCs per 100 WBC 0      Absolute Neutrophils 2.8      Absolute Lymphocytes 2.2      Absolute Monocytes 0.6      Absolute Eosinophils 0.1      Absolute Basophils 0.1      Absolute Immature Granulocytes 0.2      Absolute NRBCs 0.0     BLOOD GAS VENOUS WITH OXYHEMOGLOBIN - Abnormal    pH Venous 7.30 (*)      pCO2 Venous 79 (*)     pO2 Venous 37      Bicarbonate Venous 39 (*)     FIO2 30      Oxyhemoglobin Venous 65 (*)     Base Excess/Deficit (+/-) 9.7 (*)    LACTIC ACID WHOLE BLOOD - Normal    Lactic Acid 1.6     TROPONIN T, HIGH SENSITIVITY - Normal    Troponin T, High Sensitivity 19     NT PROBNP INPATIENT - Normal    N terminal Pro BNP Inpatient 69     D DIMER QUANTITATIVE - Normal    D-Dimer Quantitative 0.31     ROUTINE UA WITH MICROSCOPIC REFLEX TO CULTURE   BLOOD CULTURE   BLOOD CULTURE        Emergency Department Course & Assessments:    Interventions:  Medications   No lozenges or gum should be given while patient on BIPAP/AVAPS/AVAPS AE (has no administration in time range)   carboxymethylcellulose PF (REFRESH PLUS) 0.5 % ophthalmic solution 1 drop (has no administration in time range)   Patient may continue current oral medications (has no administration in time range)   ipratropium - albuterol 0.5 mg/2.5 mg/3 mL (DUONEB) neb solution 6 mL (6 mLs Nebulization $Given 5/4/23 1932)   ipratropium - albuterol 0.5 mg/2.5 mg/3 mL (DUONEB) neb solution 3 mL (3 mLs Nebulization $Given 5/4/23 1743)   azithromycin (ZITHROMAX) tablet 500 mg (500 mg Oral $Given 5/4/23 1741)   methylPREDNISolone sodium succinate (solu-MEDROL) injection 125 mg (125 mg Intravenous $Given 5/4/23 1743)        Consultations/Discussion of Management or Tests:    ED Course as of 05/04/23 1953   Thu May 04, 2023   1630 Evaluated the patient. Spoke with nurse regarding care plan.    1842 Re-assessed patient. Lung sounds much improved, no wheeze. Patient says he feels better. Will recheck VBG.   1938 Spoke with Dr. Pittman with hospital team who accepted the patient for admission.      Disposition:  The patient was admitted to the hospital under the care of Dr. Pittman.     Impression & Plan      Medical Decision Making:  Joel is a 47 year old male here with shortness of breath and generalized weakness. Regarding SOB, ddx includes: acute coronary  syndrome, acute hypoxic / hypercapnic respiratory failure, device non-adherence, pneumonia, pneumothorax, pleural effusion, sepsis, among others. Given high CO2, will initiate BiPAP. Patient is on 2L nasal cannula which is his baseline. Per EMS report, patient was satting high 80s / low 90s on room air despite the fact that the patient is supposed to be on 2-3L 24/7. Per group home, the patient used to be on CPAP for sleep apnea but patient did not tolerate and refused to use it so they do not have it at home. Patient says he would not use the CPAP at home. Patient was treated with zosyn and azithromycin for multifocal pneumonia. Will obtain chest x-ray for evaluation of pneumonia or complication. Low concern for sepsis given normal lactate and normal temperature and normal blood pressure and normal heart rate. Obtaining EKG and high sensitivity troponin to evaluate for myocardial injury.    Regarding generalized weakness, ddx includes: ischemic / hemorrhagic stroke, sepsis, electrolyte derangement, hypercapnia, deconditioning, urinary tract infection, seizure / post-ictal state, among others. On exam patient has weakness to left lower extremity more than right. However patient says this has been for his entire life. No acute change recently. Reports increased seizures at [facility] though is taking all of his medications, no concern for seizure or post ictal state here in the ED.     Initial plan: BiPAP, repeat VBG, UA, blood cultures, EKG, troponin, chest x-ray, duoneb, steroid, azithro.     Repeat VBG minimally improved after BiPAP. EKG and troponin non-ischemic. D-dimer negative, reassuring against pulmonary embolism. Patient reassessed and lungs sound much improved after interventions. Still feel the patient would benefit from admission given hypercapnia and generalized weakness / deconditioning. Discussed the patient with the hospital medicine team who accepted the patient for admission.       Diagnosis:    ICD-10-CM     1. Acute and chronic respiratory failure with hypercapnia (H)  J96.22       2. Oxygen dependent  Z99.81       3. Generalized muscle weakness  M62.81       4. Cerebral palsy (H)  G80.9           5/4/2023   Sandy Lewis MD PGY2  Kvng Obrien MD

## 2023-05-04 NOTE — ED PROVIDER NOTES
Emergency Department Attending Supervision Note  5/4/2023  4:45 PM      I evaluated this patient in conjunction with Dr. Lewis      Briefly, the patient presented with generalized weakness and shortness of breath.  PMH notbale for cerebral palsy, obesity, epilepsy who presents with generalized weakness and shortness of breath.  Patient reports worsening shortness of breath over the past 3 days.  Previous records reviewed including hospitalization at this facility from 3/15/2023- 3/24/2023 for acute on chronic hypoxic and hypercapnic respiratory failure requiring BiPAP administration until ultimately improved back to his baseline oxygen requirements, as well as subsequent hospital admission from 3/25/2023 - 3/29/2023, again for acute on chronic hypoxic and hypercapnic respiratory failure felt to be multifactorial including obstructive sleep apnea, obesity hypoventilation syndrome, and previous multifocal pneumonia.  Previous plans for discharge included CPAP, the patient notes he has not been using this as he does not do well with it.  He has reported ongoing cough, productive of sputum that has mildly increased.  Denies fevers nor chills.  Notes associated generalized weakness.    On my exam:  Obese, middle-age male resting comfortably on gurney  Decreased air movement throughout  No audible wheezing  Regular rate and rhythm, S1/S2 present  No gross lower extremity asymmetry  No calf tenderness    Results:  Chest XR,  PA & LAT   Final Result   IMPRESSION: Heart size and pulmonary vascularity normal. Minimal atelectasis left base, lungs otherwise clear.        Labs Ordered and Resulted from Time of ED Arrival to Time of ED Departure   BASIC METABOLIC PANEL - Abnormal       Result Value    Sodium 143      Potassium 4.1      Chloride 98      Carbon Dioxide (CO2) 39 (*)     Anion Gap 6 (*)     Urea Nitrogen 16.2      Creatinine 0.77      Calcium 9.3      Glucose 99      GFR Estimate >90     BLOOD GAS VENOUS - Abnormal     pH Venous 7.29 (*)     pCO2 Venous 86 (*)     pO2 Venous 35      Bicarbonate Venous 41 (*)     Base Excess/Deficit (+/-) 10.5 (*)     FIO2 50     CBC WITH PLATELETS AND DIFFERENTIAL - Abnormal    WBC Count 6.0      RBC Count 4.12 (*)     Hemoglobin 12.8 (*)     Hematocrit 41.3            MCH 31.1      MCHC 31.0 (*)     RDW 14.1      Platelet Count 192      % Neutrophils 47      % Lymphocytes 37      % Monocytes 10      % Eosinophils 2      % Basophils 1      % Immature Granulocytes 3      NRBCs per 100 WBC 0      Absolute Neutrophils 2.8      Absolute Lymphocytes 2.2      Absolute Monocytes 0.6      Absolute Eosinophils 0.1      Absolute Basophils 0.1      Absolute Immature Granulocytes 0.2      Absolute NRBCs 0.0     BLOOD GAS VENOUS WITH OXYHEMOGLOBIN - Abnormal    pH Venous 7.30 (*)     pCO2 Venous 79 (*)     pO2 Venous 37      Bicarbonate Venous 39 (*)     FIO2 30      Oxyhemoglobin Venous 65 (*)     Base Excess/Deficit (+/-) 9.7 (*)    LACTIC ACID WHOLE BLOOD - Normal    Lactic Acid 1.6     ROUTINE UA WITH MICROSCOPIC REFLEX TO CULTURE - Normal    Color Urine Yellow      Appearance Urine Clear      Glucose Urine Negative      Bilirubin Urine Negative      Ketones Urine Negative      Specific Gravity Urine 1.020      Blood Urine Negative      pH Urine 7.0      Protein Albumin Urine Negative      Urobilinogen Urine Normal      Nitrite Urine Negative      Leukocyte Esterase Urine Negative      RBC Urine 2      WBC Urine 2     TROPONIN T, HIGH SENSITIVITY - Normal    Troponin T, High Sensitivity 19     NT PROBNP INPATIENT - Normal    N terminal Pro BNP Inpatient 69     D DIMER QUANTITATIVE - Normal    D-Dimer Quantitative 0.31     BLOOD CULTURE   BLOOD CULTURE         ED course:    My impression is generalized weakness, shortness of breath, and acute hypoxic hypercapnic respiratory failure.  History obtained from patient, and previous records reviewed through EMR.  Suspect acute on chronic respiratory  failure, likely secondary to noncompliance with BiPAP/CPAP.  Initial VBG demonstrates respiratory acidosis, with PCO2 of 92.  He was placed on BiPAP, and repeat VBG with improvements in PCO2.  Chest x-ray negative for acute infiltrate, and patient afebrile with normal WBC count, arguing against acute infectious process.  Patient without chest pain, EKG without acute ischemic changes compared with previous and high-sensitivity troponin within normal limits.  PE also considered though felt unlikely as patient has normal D-dimer.  Do feel CT chest can be deferred safely at this time.    Results and clinical impression discussed with patient.  Plan for hospital admission for further monitoring and BiPAP support.        Diagnosis    ICD-10-CM    1. Acute and chronic respiratory failure with hypercapnia (H)  J96.22       2. Oxygen dependent  Z99.81       3. Generalized muscle weakness  M62.81       4. Cerebral palsy (H)  G80.9             Kvng Obrien MD Roach, Brian Donald, MD  05/04/23 6013

## 2023-05-05 PROBLEM — E66.2 OBESITY HYPOVENTILATION SYNDROME (H): Status: ACTIVE | Noted: 2023-05-05

## 2023-05-05 LAB
ANION GAP SERPL CALCULATED.3IONS-SCNC: 6 MMOL/L (ref 7–15)
ATRIAL RATE - MUSE: 74 BPM
BASE EXCESS BLDV CALC-SCNC: 9.2 MMOL/L (ref -7.7–1.9)
BUN SERPL-MCNC: 19.8 MG/DL (ref 6–20)
CALCIUM SERPL-MCNC: 8.9 MG/DL (ref 8.6–10)
CHLORIDE SERPL-SCNC: 98 MMOL/L (ref 98–107)
CREAT SERPL-MCNC: 0.69 MG/DL (ref 0.67–1.17)
DEPRECATED HCO3 PLAS-SCNC: 35 MMOL/L (ref 22–29)
DIASTOLIC BLOOD PRESSURE - MUSE: NORMAL MMHG
ERYTHROCYTE [DISTWIDTH] IN BLOOD BY AUTOMATED COUNT: 13.9 % (ref 10–15)
GFR SERPL CREATININE-BSD FRML MDRD: >90 ML/MIN/1.73M2
GLUCOSE SERPL-MCNC: 129 MG/DL (ref 70–99)
HCO3 BLDV-SCNC: 38 MMOL/L (ref 21–28)
HCT VFR BLD AUTO: 40.3 % (ref 40–53)
HGB BLD-MCNC: 12.5 G/DL (ref 13.3–17.7)
HOLD SPECIMEN: NORMAL
HOLD SPECIMEN: NORMAL
INTERPRETATION ECG - MUSE: NORMAL
MCH RBC QN AUTO: 30.5 PG (ref 26.5–33)
MCHC RBC AUTO-ENTMCNC: 31 G/DL (ref 31.5–36.5)
MCV RBC AUTO: 98 FL (ref 78–100)
O2/TOTAL GAS SETTING VFR VENT: 30 %
P AXIS - MUSE: 40 DEGREES
PCO2 BLDV: 72 MM HG (ref 40–50)
PH BLDV: 7.33 [PH] (ref 7.32–7.43)
PLATELET # BLD AUTO: 146 10E3/UL (ref 150–450)
PO2 BLDV: 53 MM HG (ref 25–47)
POTASSIUM SERPL-SCNC: 4.4 MMOL/L (ref 3.4–5.3)
PR INTERVAL - MUSE: 206 MS
QRS DURATION - MUSE: 84 MS
QT - MUSE: 410 MS
QTC - MUSE: 455 MS
R AXIS - MUSE: 7 DEGREES
RBC # BLD AUTO: 4.1 10E6/UL (ref 4.4–5.9)
SODIUM SERPL-SCNC: 139 MMOL/L (ref 136–145)
SYSTOLIC BLOOD PRESSURE - MUSE: NORMAL MMHG
T AXIS - MUSE: 67 DEGREES
VENTRICULAR RATE- MUSE: 74 BPM
WBC # BLD AUTO: 7.9 10E3/UL (ref 4–11)

## 2023-05-05 PROCEDURE — 36415 COLL VENOUS BLD VENIPUNCTURE: CPT | Performed by: STUDENT IN AN ORGANIZED HEALTH CARE EDUCATION/TRAINING PROGRAM

## 2023-05-05 PROCEDURE — 82803 BLOOD GASES ANY COMBINATION: CPT | Performed by: STUDENT IN AN ORGANIZED HEALTH CARE EDUCATION/TRAINING PROGRAM

## 2023-05-05 PROCEDURE — 85018 HEMOGLOBIN: CPT | Performed by: STUDENT IN AN ORGANIZED HEALTH CARE EDUCATION/TRAINING PROGRAM

## 2023-05-05 PROCEDURE — 99233 SBSQ HOSP IP/OBS HIGH 50: CPT | Performed by: INTERNAL MEDICINE

## 2023-05-05 PROCEDURE — 250N000013 HC RX MED GY IP 250 OP 250 PS 637: Performed by: STUDENT IN AN ORGANIZED HEALTH CARE EDUCATION/TRAINING PROGRAM

## 2023-05-05 PROCEDURE — 94660 CPAP INITIATION&MGMT: CPT

## 2023-05-05 PROCEDURE — 82310 ASSAY OF CALCIUM: CPT | Performed by: STUDENT IN AN ORGANIZED HEALTH CARE EDUCATION/TRAINING PROGRAM

## 2023-05-05 PROCEDURE — 120N000001 HC R&B MED SURG/OB

## 2023-05-05 PROCEDURE — 999N000157 HC STATISTIC RCP TIME EA 10 MIN

## 2023-05-05 PROCEDURE — 99222 1ST HOSP IP/OBS MODERATE 55: CPT | Performed by: INTERNAL MEDICINE

## 2023-05-05 RX ADMIN — BUSPIRONE HYDROCHLORIDE 15 MG: 15 TABLET ORAL at 00:30

## 2023-05-05 RX ADMIN — LEVETIRACETAM 1000 MG: 500 TABLET, FILM COATED ORAL at 10:07

## 2023-05-05 RX ADMIN — LAMOTRIGINE 200 MG: 200 TABLET ORAL at 20:11

## 2023-05-05 RX ADMIN — PRAZOSIN HYDROCHLORIDE 2 MG: 1 CAPSULE ORAL at 23:03

## 2023-05-05 RX ADMIN — ZONISAMIDE 100 MG: 100 CAPSULE ORAL at 10:50

## 2023-05-05 RX ADMIN — BUSPIRONE HYDROCHLORIDE 15 MG: 15 TABLET ORAL at 10:07

## 2023-05-05 RX ADMIN — DIVALPROEX SODIUM 500 MG: 500 TABLET, DELAYED RELEASE ORAL at 10:07

## 2023-05-05 RX ADMIN — CITALOPRAM HYDROBROMIDE 60 MG: 20 TABLET ORAL at 10:07

## 2023-05-05 RX ADMIN — PRAZOSIN HYDROCHLORIDE 2 MG: 1 CAPSULE ORAL at 00:28

## 2023-05-05 RX ADMIN — ZONISAMIDE 200 MG: 100 CAPSULE ORAL at 00:40

## 2023-05-05 RX ADMIN — LAMOTRIGINE 200 MG: 200 TABLET ORAL at 10:08

## 2023-05-05 RX ADMIN — DIVALPROEX SODIUM 500 MG: 500 TABLET, DELAYED RELEASE ORAL at 13:24

## 2023-05-05 RX ADMIN — ZONISAMIDE 200 MG: 100 CAPSULE ORAL at 23:02

## 2023-05-05 RX ADMIN — PANTOPRAZOLE SODIUM 40 MG: 40 TABLET, DELAYED RELEASE ORAL at 10:07

## 2023-05-05 RX ADMIN — LEVETIRACETAM 1500 MG: 500 TABLET, FILM COATED ORAL at 00:29

## 2023-05-05 RX ADMIN — LEVOCARNITINE 660 MG: 330 TABLET ORAL at 13:24

## 2023-05-05 RX ADMIN — DIVALPROEX SODIUM 500 MG: 500 TABLET, DELAYED RELEASE ORAL at 20:11

## 2023-05-05 RX ADMIN — PROPRANOLOL HYDROCHLORIDE 20 MG: 20 TABLET ORAL at 10:07

## 2023-05-05 RX ADMIN — QUETIAPINE FUMARATE 300 MG: 300 TABLET, EXTENDED RELEASE ORAL at 00:30

## 2023-05-05 RX ADMIN — LAMOTRIGINE 200 MG: 200 TABLET ORAL at 00:30

## 2023-05-05 RX ADMIN — PROPRANOLOL HYDROCHLORIDE 20 MG: 20 TABLET ORAL at 00:31

## 2023-05-05 RX ADMIN — BUSPIRONE HYDROCHLORIDE 15 MG: 15 TABLET ORAL at 20:11

## 2023-05-05 RX ADMIN — LEVOCARNITINE 660 MG: 330 TABLET ORAL at 00:29

## 2023-05-05 RX ADMIN — LEVOCARNITINE 660 MG: 330 TABLET ORAL at 20:12

## 2023-05-05 RX ADMIN — PROPRANOLOL HYDROCHLORIDE 20 MG: 20 TABLET ORAL at 20:09

## 2023-05-05 RX ADMIN — QUETIAPINE FUMARATE 300 MG: 300 TABLET, EXTENDED RELEASE ORAL at 23:02

## 2023-05-05 RX ADMIN — POLYETHYLENE GLYCOL 3350 17 G: 17 POWDER, FOR SOLUTION ORAL at 10:07

## 2023-05-05 RX ADMIN — AZITHROMYCIN MONOHYDRATE 500 MG: 250 TABLET ORAL at 20:11

## 2023-05-05 RX ADMIN — DIVALPROEX SODIUM 500 MG: 500 TABLET, DELAYED RELEASE ORAL at 00:30

## 2023-05-05 RX ADMIN — LEVETIRACETAM 1500 MG: 500 TABLET, FILM COATED ORAL at 23:02

## 2023-05-05 RX ADMIN — LEVOCARNITINE 660 MG: 330 TABLET ORAL at 10:07

## 2023-05-05 ASSESSMENT — ACTIVITIES OF DAILY LIVING (ADL)
ADLS_ACUITY_SCORE: 37
ADLS_ACUITY_SCORE: 43
DEPENDENT_IADLS:: CLEANING;COOKING;MEAL PREPARATION;MEDICATION MANAGEMENT;TRANSPORTATION
ADLS_ACUITY_SCORE: 37
ADLS_ACUITY_SCORE: 37
ADLS_ACUITY_SCORE: 47
ADLS_ACUITY_SCORE: 40
ADLS_ACUITY_SCORE: 43
ADLS_ACUITY_SCORE: 37
ADLS_ACUITY_SCORE: 43
ADLS_ACUITY_SCORE: 37
ADLS_ACUITY_SCORE: 47
ADLS_ACUITY_SCORE: 43

## 2023-05-05 NOTE — PROGRESS NOTES
In ED pt frequently on call light. Ate meal, tolerated. On room air. Transferred to [facility]. Discharge pending.

## 2023-05-05 NOTE — PROGRESS NOTES
Patient remains on a BiPAP of  16/7 @ 30% via the mask for an increase in WOB and/or SOB. The bridge of the nose is intact. Pt is tolerating it well. Will continue to monitor and assess the pt's current respiratory status and needs.        Ritchie Guaman, RT

## 2023-05-05 NOTE — H&P
[facility]    History and Physical - Hospitalist Service       Date of Admission:  5/4/2023    Assessment & Plan      Tre Vazquez is a 47 year old male with past medical history significant for cerebral palsy with mild spastic paraparesis, mild intellectual disability, congenital left eye blindness, seizure disorder, hypertension, morbid obesity, borderline personality disorder, PTSD, schizoaffective disorder, obstructive sleep apnea with BiPAP noncompliance, and chronic hypercapnic respiratory failure who presented to [facility] on 5/4/2023 with increased shortness of breath and was found to have acute on chronic hypercapnic respiratory failure.    Acute on chronic hypercapnic respiratory failure  Obstructive sleep apnea  Possible COPD  Morbid obesity  Patient recently admitted 3/25/2023 - 3/29/2023 for multifocal pneumonia requiring antibiosis, as well as acute on chronic hypercapnic respiratory failure.  Since returning home, he noted that he has had his continued level of chronic shortness of breath.  On 5/4/2023 he believes this was slightly worse and presented to care.  Vitals were largely stable, and he was not noted to be hypoxic upon arrival.  A blood gas was obtained, however, which showed increase of PCO2 to 86 and had previously been 65 on 3/25/2023.  pH was not compensated and was 7.29.  CXR negative for opacities, but with decreased inspiratory volume.  He was afebrile and there is no evidence for infection.  I suspect that his shortness of breath and hypercapnia is mostly from BiPAP, and occasionally supplemental oxygen, noncompliance.  His body habitus almost certainly plays a large role in this, as well.  Patient stated that he has been told that he may have COPD, and that is why he is on chronic oxygen therapy, though I believe there may also be a component of obesity hypoventilation syndrome here.  Ultimately the patient needs to wear his BiPAP.  We will  continue the BiPAP overnight while he sleeps, and consider return to group home if CO2 has normalized in the morning.  -BiPAP at night  -Recheck VBG now and again in morning  -Supplemental O2 continuously  Consider pulmonology consult if does not improve-    Physical deconditioning  Generalized weakness  Failure to thrive  Cerebral palsy w/ spastic paresis  Reports overall generalized weakness, though this does appear to be somewhat chronic for him.  Has a history of cerebral palsy with spastic paresis.  Weakness may also be related to buildup of CO2, as well as physical deconditioning.  Consider PT OT in the morning.    Seizure disorder: Continue PTA antiepileptics.  Hypertension: Continue PTA antihypertensives.       Diet: NPO for Medical/Clinical Reasons Except for: Ice Chips    DVT Prophylaxis: Pneumatic Compression Devices  Reid Catheter: Not present  Lines: None     Cardiac Monitoring: None  Code Status:   Full       Disposition Plan      Expected Discharge Date: 05/05/2023                  Emeterio Pittman MD, S  Hospitalist Service  [facility]  Securely message with [email or web address] (more info)  Text page via [email or web address] Paging/Directory     ______________________________________________________________________    Chief Complaint   Shortness of Breath    History is obtained from the patient    History of Present Illness   Tre Vazquez is a 47 year old male with past medical history significant for cerebral palsy with mild spastic paraparesis, mild intellectual disability, congenital left eye blindness, seizure disorder, hypertension, morbid obesity, borderline personality disorder, PTSD, schizoaffective disorder, obstructive sleep apnea with BiPAP noncompliance, and chronic hypercapnic respiratory failure who presented to [facility] on 5/4/2023 mild worsening of chronic shortness of breath.    Patient stated that he wears oxygen 2-3 L continuously at his group home.  He states that he does  usually keep the oxygen on, but very frequently the nasal cannula falls out and there comes a point in the day where he does not replace it.  He endorsed that he also has obstructive sleep apnea and has been to wear BiPAP at night, but very frequently does not.  He sleeps for the majority of the day not wearing his BiPAP.    On 5/4/2023 he stated that he had a slight worsening of his chronic shortness of breath, which he stated has been persistent for many years, causing him to present to the emergency department.  Upon arrival, his SPO2 was 94% on 2 L via nasal cannula.  He was afebrile, with heart rate in the 70s, and normal blood pressure.  Laboratory studies revealed chronically low hemoglobin, and PCO2 of 86 with pH 7.29.  CXR was obtained without opacities, but with slightly decreased lung volume.  He was started on BiPAP for acute on chronic hypercapnic respiratory failure, and patient states that he does feel slightly less tired.    Patient denies additional symptoms; no fever, chills, nausea, vomiting,  cough, chest pain.      Past Medical History    Past Medical History:   Diagnosis Date     Blindness of left eye      Depression 03/10/2014     Hypertension      Pneumococcal septicemia(038.2) (H) 11/26/2013    Hospitalized     Pneumonia, organism unspecified(486) 11/26/2013    Hospitalized     Uncomplicated asthma      Unspecified epilepsy without mention of intractable epilepsy        Past Surgical History   Past Surgical History:   Procedure Laterality Date     COLONOSCOPY N/A 12/1/2022    Procedure: COLONOSCOPY;  Surgeon: Michael Caldwell MD;  Location:  OR     ESOPHAGOSCOPY, GASTROSCOPY, DUODENOSCOPY (EGD), COMBINED N/A 12/1/2022    Procedure: ESOPHAGOGASTRODUODENOSCOPY with biopsy;  Surgeon: Michael Caldwell MD;  Location:  OR     ORTHOPEDIC SURGERY      pt stated past surgery on both ankles       Prior to Admission Medications   Prior to Admission Medications   Prescriptions  Last Dose Informant Patient Reported? Taking?   QUEtiapine ER (SEROQUEL XR) 300 MG 24 hr tablet   Yes No   Sig: Take 300 mg by mouth At Bedtime   ammonium lactate (AMLACTIN) 12 % external cream   Yes No   Sig: Apply topically 2 times daily   busPIRone (BUSPAR) 15 MG tablet   Yes No   Sig: Take 15 mg by mouth 2 times daily   citalopram (CELEXA) 20 MG tablet   Yes No   Sig: Take 20 mg by mouth daily   citalopram (CELEXA) 40 MG tablet  [facility] Yes No   Sig: Take 40 mg by mouth daily   clotrimazole (LOTRIMIN) 1 % external solution   Yes No   Sig: Apply topically 2 times daily   divalproex sodium delayed-release (DEPAKOTE) 500 MG DR tablet   No No   Sig: Take 1 tablet (500 mg) by mouth 3 times daily   lamoTRIgine (LAMICTAL) 200 MG tablet   No No   Sig: Take 1 tablet (200 mg) by mouth 2 times daily   levETIRAcetam (KEPPRA) 500 MG tablet   No No   Sig: Take 2 tablets (1,000 mg) by mouth every morning AND 3 tablets (1,500 mg) At Bedtime.   levOCARNitine (CARNITOR) 330 MG tablet   No No   Sig: Take 2 tablets (660 mg) by mouth 3 times daily   methylcellulose (CITRUCEL) powder   Yes No   Sig: Take 2 g by mouth 2 times daily   multivitamin w/minerals (THERA-VIT-M) tablet   Yes No   Sig: Take 1 tablet by mouth daily   polyethylene glycol (MIRALAX) 17 GM/Dose powder   Yes No   Sig: Take 1 capful. by mouth daily as needed   prazosin (MINIPRESS) 2 MG capsule   Yes No   Sig: Take 2 mg by mouth At Bedtime   propranolol (INDERAL) 20 MG tablet   Yes No   Sig: Take 20 mg by mouth 2 times daily   zonisamide (ZONEGRAN) 100 MG capsule   No No   Sig: Take 1 capsule (100 mg) by mouth every morning AND 2 capsules (200 mg) At Bedtime.      Facility-Administered Medications: None      A full 10+ point review of systems was performed and found to be negative with the exception of those items noted in the HPI above.    Physical Exam       BP: 117/71 Pulse: 81   Resp: 22 SpO2: 93 % O2 Device: Nasal cannula Oxygen Delivery: 3 LPM (For  "eating/drinking. Okay with MD) Height: 171.5 cm (5' 7.5\") Weight: 142.9 kg (315 lb)  Estimated body mass index is 48.61 kg/m  as calculated from the following:    Height as of this encounter: 1.715 m (5' 7.5\").    Weight as of this encounter: 142.9 kg (315 lb).    General: Very pleasant male resting comfortably in hospital bed.  Awake, alert, interactive.  HEENT: Normocephalic, atraumatic.  PERRL, EOMI.  Conjunctiva clear, sclerae anicteric.  Mucous membranes moist.  Cardiac: Regular rate and rhythm without murmur, gallop, or rub.  No peripheral edema.  Respiratory: Normal work of breathing.  Clear to auscultation bilaterally without wheezing, rales, or rhonchi.  BiPAP in place.  GI: Normal, active bowel sounds.  Abdomen soft, nontender, nondistended.  : Deferred.  Musculoskeletal: Moving all extremities appropriately.  Skin: No rashes or abrasions on exposed skin.  Neurologic: Alert and oriented x4.  Cranial nerves II through XII grossly intact.  Psychologic: Appropriate mood and affect.    Medical Decision Making       75 MINUTES SPENT BY ME on the date of service doing chart review, history, exam, documentation & further activities per the note.      Data     I have personally reviewed the following data over the past 24 hrs:    6.0  \   12.8 (L)   / 192     143 98 16.2 /  99   4.1 39 (H) 0.77 \       Trop: 19 BNP: 69       Procal: N/A CRP: N/A Lactic Acid: 1.6       INR:  N/A PTT:  N/A   D-dimer:  0.31 Fibrinogen:  N/A       Imaging results reviewed over the past 24 hrs:   Recent Results (from the past 24 hour(s))   Chest XR,  PA & LAT    Narrative    EXAM: XR CHEST 2 VIEWS  LOCATION: [facility]  DATE/TIME: 5/4/2023 5:04 PM CDT    INDICATION: SOB  COMPARISON: 03/25/2023      Impression    IMPRESSION: Heart size and pulmonary vascularity normal. Minimal atelectasis left base, lungs otherwise clear.     "

## 2023-05-05 NOTE — CONSULTS
Care Management Initial Consult    General Information  Assessment completed with: Patient, Care Team Member,         Primary Care Provider verified and updated as needed:     Readmission within the last 30 days: no previous admission in last 30 days      Reason for Consult: discharge planning  Advance Care Planning:            Communication Assessment  Patient's communication style: spoken language (English or Bilingual)    Hearing Difficulty or Deaf: no   Wear Glasses or Blind: yes    Cognitive  Cognitive/Neuro/Behavioral: WDL                      Living Environment:   People in home: facility resident     Current living Arrangements: group home      Able to return to prior arrangements:         Family/Social Support:  Care provided by: self  Provides care for: no one, unable/limited ability to care for self                Description of Support System:           Current Resources:   Patient receiving home care services: Yes     Community Resources: Group Home, DME  Equipment currently used at home: walker, rolling  Supplies currently used at home: Oxygen Tubing/Supplies    Employment/Financial:  Employment Status:          Financial Concerns:             Does the patient's insurance plan have a 3 day qualifying hospital stay waiver?  No    Lifestyle & Psychosocial Needs:  Social Determinants of Health     Tobacco Use: Medium Risk (5/5/2023)    Patient History      Smoking Tobacco Use: Former      Smokeless Tobacco Use: Never      Passive Exposure: Not on file   Alcohol Use: Not on file   Financial Resource Strain: Not on file   Food Insecurity: Not on file   Transportation Needs: Not on file   Physical Activity: Not on file   Stress: Not on file   Social Connections: Not on file   Intimate Partner Violence: Not on file   Depression: Not on file   Housing Stability: Not on file       Functional Status:  Prior to admission patient needed assistance:   Dependent ADLs:: Ambulation-walker, Bathing, Grooming, Dressing,  Toileting  Dependent IADLs:: Cleaning, Cooking, Meal Preparation, Medication Management, Transportation  Assesssment of Functional Status: Not at baseline with ADL Functioning    Mental Health Status:          Chemical Dependency Status:                Values/Beliefs:  Spiritual, Cultural Beliefs, Restorationist Practices, Values that affect care:                 Additional Information:    SW following for discharge planning. Met with pt at bedside to confirm that pt is from [facility]. Pt confirms and gave this writer permission to speak with [facility].    Spoke with Jocelyne (P: [phone] F: [phone]) who explains that pt comes from their  and is SBA with cares at baseline. Jocelyne explains that pt does not have teeth and is on a soft diet but feeds himself at baseline. Pt uses a walker at baseline. Jocelyne explains that they have been working with pt PCP to obtain a WC and has been having difficulty, she explains that if possible, it would be helpful to include an order for a manual WC in the discharge instructions so that she can have additional documentation as she works on ordering it. [facility] does not accept back on weekends. Jocelyne reports that pt does better if he has boundaries set and can be behavioral at times. Pt has home PT/OT with [facility]. SW following.     SKYLER Finney, KARIN  Inpatient Care Coordination  Emergency Room /Float  [phone]   Riddhi Ervin, [facility]

## 2023-05-05 NOTE — PROVIDER NOTIFICATION
DATE:  5/5/2023   TIME OF RECEIPT FROM LAB:  6316  LAB TEST:  co2   LAB VALUE:  81  RESULTS GIVEN WITH READ-BACK TO (PROVIDER):  Dr Pittman text paged  TIME LAB VALUE REPORTED TO PROVIDER:   0004

## 2023-05-05 NOTE — CONSULTS
Care Management Follow Up    Length of Stay (days): 1    Expected Discharge Date: 05/05/2023     Concerns to be Addressed:       Patient plan of care discussed at interdisciplinary rounds: No    Anticipated Discharge Disposition:  Group Home      Anticipated Discharge Services:    Anticipated Discharge DME:  Bipap follow up     Referrals Placed by CM/SW:    Private pay costs discussed: Not applicable    Additional Information:   contacted social work to inquire if outpatient respiratory follow up assessment at home is possible to assess patient's BiPAP. KARIN contacted [facility] ([phone]) who confirmed they provide patient's BiPAP. [facility] representative shared they are not currently doing home assessments until after May 11th when the public [facility] emergency ends. She shared they can provide a virtual assessment for patient to see if he needs a different fitting mask but that the earliest available is Tuesday, May 9th. [facility] will reach out to patient on Tuesday, May 9th to complete assessment. KARIN sent message to update physician.       ELVIRA Gaspar

## 2023-05-05 NOTE — PROGRESS NOTES
"Called to pt's room. Pt states \" I think I just had a seizure\". Pt is alert and oriented, states \"I have these at home\". No further measures needed.   "

## 2023-05-05 NOTE — UTILIZATION REVIEW
Admission Status; Secondary Review Determination    Under the authority of the Utilization Management Committee, the utilization review process indicated a secondary review on the above patient. The review outcome is based on review of the medical records, discussions with staff, and applying clinical experience noted on the date of the review.    (x) Inpatient Status Appropriate - This patient's medical care is consistent with medical management for inpatient care and reasonable inpatient medical practice.    RATIONALE FOR DETERMINATION: 47-year-old male with complex medical history including cerebral palsy with mild spastic paraparesis, morbid obesity with obstructive sleep apnea with BiPAP noncompliance associated with chronic hypercapnic respiratory failure.  Patient presented to the hospital with increasing shortness of breath and found to have acute on chronic hypercapnic respiratory failure.  Patient has comorbidities including intellectual disability, PTSD, schizoaffective disorder, borderline personality disorder as well as seizure disorder.  The severity of patient's acute on chronic hypercapnic respiratory failure is requiring multiple nights in the hospital appropriate for inpatient care.    At the time of admission with the information available to the attending physician more than 2 nights Hospital complex care was anticipated, based on patient risk of adverse outcome if treated as outpatient and complex care required. Inpatient admission is appropriate based on the Medicare guidelines.    This document was produced using voice recognition software    The information on this document is developed by the utilization review team in order for the business office to ensure compliance. This only denotes the appropriateness of proper admission status and does not reflect the quality of care rendered.    The definitions of Inpatient Status and Observation Status used in making the determination above are  those provided in the CMS Coverage Manual, Chapter 1 and Chapter 6, section 70.4.    Sincerely,    Anuj Guaman MD  Utilization Review  Physician Advisor  [facility].

## 2023-05-05 NOTE — DISCHARGE INSTRUCTIONS
[facility] ([phone]) will be contacting you at home on Tuesday, May 9th to complete a virtual assessment of your BiPAP machine. Please reach out to [employer] with any questions.

## 2023-05-05 NOTE — CONSULTS
[facility]    Pulmonology Consultation     Date of Admission:  5/4/2023  Date of Consult (When I saw the patient): 05/05/23    Assessment & Plan   Tre Vazquez is a 47 year old male who was admitted on 5/4/2023. I was asked to see the patient for acute on chronic hypoxic and hypercapnic respiratory failure.    Acute on chronic hypercapnic respiratory failure-due to obesity hypoventilation syndrome and severe obstructive sleep apnea.  It is unclear if he has any obstructive lung disease as well.  Significant improvement in his CO2 level with positive airway pressure.  He exhibited this back in March when he was hospitalized as well.  He previously used [employer].  A sleep study in 2019 did show severe obstructive sleep apnea and bilevel positive airway pressure was insufficient to treat the apnea.  Notes from his group home in October to his pulmonologist indicate that he was using supplemental oxygen at that time but would drop below 90% when he took it off.    I am not quite sure how the criteria below would apply to him since he does have obstructive sleep apnea and he previously had a device that was taken away due to noncompliance.  It might be helpful to discuss with his pulmonologist at [facility] or have care coordination contact [employer] and see what they think is the best option..    A RAD (, ) is covered for those beneficiaries with one of the following clinical disorders: restrictive thoracic disorders (i.e., neuromuscular diseases or severe thoracic cage abnormalities), severe chronic obstructive pulmonary disease (COPD), CSA or CompSA, or hypoventilation syndrome, as described in the following     Hypoventilation Syndrome    An  device is covered if both criteria A and B and either criterion C or D are met.    A An initial arterial blood gas PaCO2, done while awake and breathing the beneficiary s prescribed FIO2, is greater than or equal to 45 mm Hg OR  B Spirometry shows an  FEV1/FVC greater than or equal to 70%. (Refer to SEVERE COPD (above) for information about device coverage for beneficiaries with FEV1/FVC less than 70%.)     AND    C An arterial blood gas PaCO2, done during sleep or immediately upon awakening, and breathing the beneficiary s prescribed FIO2, shows the beneficiary's PaCO2 worsened greater than or equal to 7 mm Hg compared to the original result in criterion A (above). OR  D A facility-based PSG or HST demonstrates oxygen saturation less than or equal to 88% for greater than or equal to 5 minutes of nocturnal recording time (minimum recording time of 2 hours) that is not caused by obstructive upper airway events - i.e., AHI less than 5. (Refer to the Positive Airway Pressure (PAP) Devices for the Treatment of Obstructive Sleep Apnea LCD for information about  coverage for obstructive sleep apnea.)      Source 2021 CMS coverage determination https://www.cms.gov/medicare-coverage-database/view/lcd.aspx?bslHb=70335&shanika=26    Elise Fisher MD, MD  Interventional Pulmonary  Text page  Securely message with the Vocera Web Console (learn more here)    Code Status    Full Code    Reason for Consult   Reason for consult: I was asked by Dr. Bhatia to evaluate this patient for acute on chronic hypercapnic respiratory failure and shortness of breath.     Primary Care Physician   Siobhan Mayo    Chief Complaint   Tre presented to the emergency department yesterday with increased shortness of breath    History is obtained from the patient and the medical record    History of Present Illness   Tre Vazquez is a 47 year old male who presents with shortness of breath.  He lives in a group home and they called EMS because he was sleepy and experiencing jerking.  He tells me that his mean problem using positive airway pressure is that the facemask makes his beard itchy.  But he is also telling me that he really wants to feel better and is motivated to use the machine if  he needs to.  He did use BiPAP overnight last night.  He is really eager to leave the hospital today.  Dr. Bhatia and I have dug into his history and it looks like he has previously seen a pulmonologist from [facility] about a year ago.  According to their notes he had obesity hypoventilation syndrome and previously had a trilogy but it was taken away due to noncompliance.  Right now, his group home does have an oxygen concentrator for him but no positive airway pressure device.    Past Medical History   I have reviewed this patient's medical history and updated it with pertinent information if needed.   Past Medical History:   Diagnosis Date     Blindness of left eye      Depression 03/10/2014     Hypertension      Pneumococcal septicemia(038.2) (H) 11/26/2013    Hospitalized     Pneumonia, organism unspecified(486) 11/26/2013    Hospitalized     Uncomplicated asthma      Unspecified epilepsy without mention of intractable epilepsy        Past Surgical History   I have reviewed this patient's surgical history and updated it with pertinent information if needed.  Past Surgical History:   Procedure Laterality Date     COLONOSCOPY N/A 12/1/2022    Procedure: COLONOSCOPY;  Surgeon: Michael Caldwell MD;  Location:  OR     ESOPHAGOSCOPY, GASTROSCOPY, DUODENOSCOPY (EGD), COMBINED N/A 12/1/2022    Procedure: ESOPHAGOGASTRODUODENOSCOPY with biopsy;  Surgeon: Michael Caldwell MD;  Location:  OR     ORTHOPEDIC SURGERY      pt stated past surgery on both ankles       Prior to Admission Medications   Prior to Admission Medications   Prescriptions Last Dose Informant Patient Reported? Taking?   QUEtiapine ER (SEROQUEL XR) 300 MG 24 hr tablet 5/3/2023  Yes Yes   Sig: Take 300 mg by mouth At Bedtime   ammonium lactate (AMLACTIN) 12 % external cream   Yes Yes   Sig: Apply topically 2 times daily   busPIRone (BUSPAR) 15 MG tablet 5/4/2023 at am  Yes Yes   Sig: Take 15 mg by mouth 2 times daily   citalopram  (CELEXA) 20 MG tablet 5/4/2023 at am  Yes Yes   Sig: Take 20 mg by mouth daily   citalopram (CELEXA) 40 MG tablet 5/4/2023 at am Pharmacy Yes Yes   Sig: Take 40 mg by mouth daily   clotrimazole (LOTRIMIN) 1 % external solution 5/4/2023 at am  Yes Yes   Sig: Apply topically 2 times daily   divalproex sodium delayed-release (DEPAKOTE) 500 MG DR tablet 5/4/2023 at am  No Yes   Sig: Take 1 tablet (500 mg) by mouth 3 times daily   lamoTRIgine (LAMICTAL) 200 MG tablet 5/4/2023 at am  No Yes   Sig: Take 1 tablet (200 mg) by mouth 2 times daily   levETIRAcetam (KEPPRA) 500 MG tablet 5/4/2023 at am  No Yes   Sig: Take 2 tablets (1,000 mg) by mouth every morning AND 3 tablets (1,500 mg) At Bedtime.   levOCARNitine (CARNITOR) 330 MG tablet 5/4/2023 at x2  No Yes   Sig: Take 2 tablets (660 mg) by mouth 3 times daily   methylcellulose (CITRUCEL) powder 5/4/2023 at am  Yes Yes   Sig: Take 2 g by mouth 2 times daily   multivitamin w/minerals (THERA-VIT-M) tablet 5/4/2023 at am  Yes Yes   Sig: Take 1 tablet by mouth daily   pantoprazole (PROTONIX) 40 MG EC tablet 5/4/2023 at am  Yes Yes   Sig: Take 40 mg by mouth daily   polyethylene glycol (MIRALAX) 17 GM/Dose powder 5/4/2023 at am  Yes Yes   Sig: Take 1 capful. by mouth daily   prazosin (MINIPRESS) 2 MG capsule 5/3/2023  Yes Yes   Sig: Take 2 mg by mouth At Bedtime   propranolol (INDERAL) 20 MG tablet 5/4/2023 at am  Yes Yes   Sig: Take 20 mg by mouth 2 times daily   zonisamide (ZONEGRAN) 100 MG capsule 5/4/2023 at am  No Yes   Sig: Take 1 capsule (100 mg) by mouth every morning AND 2 capsules (200 mg) At Bedtime.      Facility-Administered Medications: None     Allergies   Allergies   Allergen Reactions     Hydrocodone Bitartrate Er Unknown     Tomato      Cephalexin Rash     HUT Reaction: Rash; HUT Noted: [id number]       Vicodin [Hydrocodone-Acetaminophen] GI Disturbance       Social History   I have reviewed this patient's social history and updated it with pertinent  information if needed. Tre Vazquez  reports that he has quit smoking. He has never used smokeless tobacco. He reports that he does not drink alcohol and does not use drugs.      Review of Systems   Review of systems not obtained due to patient factors - mental status    Physical Exam   Temp: 98  F (36.7  C) Temp src: Temporal BP: 116/73 Pulse: 82   Resp: 18 SpO2: 96 % O2 Device: None (Room air) Oxygen Delivery: 3 LPM (For eating/drinking. Okay with MD)  Vital Signs with Ranges  Temp:  [97  F (36.1  C)-98  F (36.7  C)] 98  F (36.7  C)  Pulse:  [74-84] 82  Resp:  [16-28] 18  BP: (103-141)/(71-93) 116/73  FiO2 (%):  [30 %] 30 %  SpO2:  [91 %-97 %] 96 %  315 lbs 0 oz    Constitutional: awake, alert, cooperative, no apparent distress, and appears stated age  Eyes: Lids and lashes normal, pupils equal, round and reactive to light, extra ocular muscles intact, sclera clear, conjunctiva normal  Respiratory: No increased work of breathing, good air exchange, clear to auscultation bilaterally, no crackles or wheezing  Skin: Some flaky skin and red scaly patches scattered over his face and shoulders  Musculoskeletal: There is no redness, warmth, or swelling of the joints.  Full range of motion noted.  Motor strength is 5 out of 5 all extremities bilaterally.  Tone is normal.       View : No data to display.                All cultures:  No results for input(s): CULT in the last 168 hours.      Data   Results for orders placed or performed during the hospital encounter of 05/04/23 (from the past 24 hour(s))   Blood Culture Peripheral Blood    Specimen: Peripheral Blood   Result Value Ref Range    Culture No growth after 12 hours    Blood Culture Peripheral Blood    Specimen: Peripheral Blood   Result Value Ref Range    Culture No growth after 12 hours    Chest XR,  PA & LAT    Narrative    EXAM: XR CHEST 2 VIEWS  LOCATION: [facility]  DATE/TIME: 5/4/2023 5:04 PM CDT    INDICATION: SOB  COMPARISON:  03/25/2023      Impression    IMPRESSION: Heart size and pulmonary vascularity normal. Minimal atelectasis left base, lungs otherwise clear.   EKG 12-lead, tracing only   Result Value Ref Range    Systolic Blood Pressure  mmHg    Diastolic Blood Pressure  mmHg    Ventricular Rate 74 BPM    Atrial Rate 74 BPM    NV Interval 206 ms    QRS Duration 84 ms     ms    QTc 455 ms    P Axis 40 degrees    R AXIS 7 degrees    T Axis 67 degrees    Interpretation ECG       Sinus rhythm  Normal ECG  When compared with ECG of 04-MAY-2023 17:27, (unconfirmed)  Questionable change in QRS duration  Confirmed by - EMERGENCY ROOM, PHYSICIAN ([id number]),  MANUEL LEHMAN ([id number]) on 5/5/2023 7:11:34 AM     Blood gas venous and oxyhgb   Result Value Ref Range    pH Venous 7.30 (L) 7.32 - 7.43    pCO2 Venous 79 (HH) 40 - 50 mm Hg    pO2 Venous 37 25 - 47 mm Hg    Bicarbonate Venous 39 (H) 21 - 28 mmol/L    FIO2 30     Oxyhemoglobin Venous 65 (L) 70 - 75 %    Base Excess/Deficit (+/-) 9.7 (H) -7.7 - 1.9 mmol/L   UA with Microscopic reflex to Culture    Specimen: Urine, Midstream   Result Value Ref Range    Color Urine Yellow Colorless, Straw, Light Yellow, Yellow    Appearance Urine Clear Clear    Glucose Urine Negative Negative mg/dL    Bilirubin Urine Negative Negative    Ketones Urine Negative Negative mg/dL    Specific Gravity Urine 1.020 1.003 - 1.035    Blood Urine Negative Negative    pH Urine 7.0 5.0 - 7.0    Protein Albumin Urine Negative Negative mg/dL    Urobilinogen Urine Normal Normal, 2.0 mg/dL    Nitrite Urine Negative Negative    Leukocyte Esterase Urine Negative Negative    RBC Urine 2 <=2 /HPF    WBC Urine 2 <=5 /HPF    Narrative    Urine Culture not indicated   Blood gas venous   Result Value Ref Range    pH Venous 7.29 (L) 7.32 - 7.43    pCO2 Venous 81 (HH) 40 - 50 mm Hg    pO2 Venous 32 25 - 47 mm Hg    Bicarbonate Venous 39 (H) 21 - 28 mmol/L    Base Excess/Deficit (+/-) 8.3 (H) -7.7 - 1.9 mmol/L    FIO2 3     Basic metabolic panel   Result Value Ref Range    Sodium 139 136 - 145 mmol/L    Potassium 4.4 3.4 - 5.3 mmol/L    Chloride 98 98 - 107 mmol/L    Carbon Dioxide (CO2) 35 (H) 22 - 29 mmol/L    Anion Gap 6 (L) 7 - 15 mmol/L    Urea Nitrogen 19.8 6.0 - 20.0 mg/dL    Creatinine 0.69 0.67 - 1.17 mg/dL    Calcium 8.9 8.6 - 10.0 mg/dL    Glucose 129 (H) 70 - 99 mg/dL    GFR Estimate >90 >60 mL/min/1.73m2   CBC with platelets   Result Value Ref Range    WBC Count 7.9 4.0 - 11.0 10e3/uL    RBC Count 4.10 (L) 4.40 - 5.90 10e6/uL    Hemoglobin 12.5 (L) 13.3 - 17.7 g/dL    Hematocrit 40.3 40.0 - 53.0 %    MCV 98 78 - 100 fL    MCH 30.5 26.5 - 33.0 pg    MCHC 31.0 (L) 31.5 - 36.5 g/dL    RDW 13.9 10.0 - 15.0 %    Platelet Count 146 (L) 150 - 450 10e3/uL   Blood gas venous   Result Value Ref Range    pH Venous 7.33 7.32 - 7.43    pCO2 Venous 72 (H) 40 - 50 mm Hg    pO2 Venous 53 (H) 25 - 47 mm Hg    Bicarbonate Venous 38 (H) 21 - 28 mmol/L    Base Excess/Deficit (+/-) 9.2 (H) -7.7 - 1.9 mmol/L    FIO2 30    Extra Tube    Narrative    The following orders were created for panel order Extra Tube.  Procedure                               Abnormality         Status                     ---------                               -----------         ------                     Extra Red Top Tube[[id number]]                               Final result                 Please view results for these tests on the individual orders.   Extra Red Top Tube   Result Value Ref Range    Hold Specimen JIC    Extra Tube    Narrative    The following orders were created for panel order Extra Tube.  Procedure                               Abnormality         Status                     ---------                               -----------         ------                     Extra Blue Top Tube[[id number]]                              Final result                 Please view results for these tests on the individual orders.   Extra Blue Top Tube   Result Value  Ref Range    Hold Specimen JIC        Elise Fisher MD  Text page     Pulmonary will continue to follow. We are in house at [facility] on Monday, Wednesday, and Friday. For follow up questions on previously seen patients, please use text page link or page the on-call pulmonologist through [facility] or the .

## 2023-05-05 NOTE — PROGRESS NOTES
Adjusted BiPAP settings from 12/6 @ 30% to 16/+7 @ 30%. Will continue to monitor and assess.     Venous Blood Gas  Recent Labs   Lab 05/04/23  1857 05/04/23  1517   PHV 7.30* 7.29*   PCO2V 79* 86*   PO2V 37 35   HCO3V 39* 41*   YOUSIF 9.7* 10.5*   O2PER 30 50         Ritchie Guaman, RT

## 2023-05-05 NOTE — ED NOTES
"Patient asking for help using the urinal. Per information from previous RN, is able to move well in bed and able to sit on side of bed to hold urinal himself. Patient educated that he has a urinal in reach and he is able to move himself to edge of bed if needed to hold and use urinal. Patient stated he was unable to do himself. Patient also refused using a commode. Male nursing assistant sent into room to assist patient with urinal. Patient refused help, stating he needed help from female specifically. RN called , Jocelyne, to confirm that patient is able to utilize urinal or toilet independently, which she did confirm. She also stated that patient does have a history of being inappropriate with females and often needs to be redirected when this happens. Patient educated that he can use urinal himself or we can facilitate a male nursing assistant helping him. Patient states, \"well I'll just hold it until I can get a female to help me.\" RN excused herself from room at this time after ensuring urinal was within reach. MD notified.   "

## 2023-05-05 NOTE — PROGRESS NOTES
Patient Transfer Information  Patient connected to monitoring equipment on arrival: yes Vital signs monitor     Patient connected to wall oxygen on arrival: Yes    Belongings: Transferred with patient    Safety check completed: Yes

## 2023-05-05 NOTE — PROGRESS NOTES
[facility]  Patient is currently open to home care services with [facility]. The patient is currently receiving     PT and OT services.  [facility]  and team have been notified of patient admission.  [facility] liaison will continue to follow patient during stay.  If appropriate provide orders to resume home care at time of discharge.

## 2023-05-05 NOTE — PHARMACY-ADMISSION MEDICATION HISTORY
Pharmacist Admission Medication History    Admission medication history is complete. The information provided in this note is only as accurate as the sources available at the time of the update.    Medication reconciliation/reorder completed by provider prior to medication history? No    Information Source(s): Caregiver and MARYJANE Casanova from [employer] @ [phone] via phone    Pertinent Information: none    Changes made to PTA medication list:    Added: protonix    Deleted: None    Changed: miralax from prn to schedule    Medication Affordability:  Not including over the counter (OTC) medications, was there a time in the past 12 months when you did not take your medications as prescribed because of cost?: No    Allergies reviewed with patient and updates made in EHR: yes    Medication History Completed By: Praveen Marte RPH 5/4/2023 10:01 PM    Prior to Admission medications    Medication Sig Last Dose Taking? Auth Provider Long Term End Date   ammonium lactate (AMLACTIN) 12 % external cream Apply topically 2 times daily  Yes Unknown, Entered By History     busPIRone (BUSPAR) 15 MG tablet Take 15 mg by mouth 2 times daily 5/4/2023 at am Yes Reported, Patient Yes    citalopram (CELEXA) 20 MG tablet Take 20 mg by mouth daily 5/4/2023 at am Yes Reported, Patient No    citalopram (CELEXA) 40 MG tablet Take 40 mg by mouth daily 5/4/2023 at am Yes Unknown, Entered By History No    clotrimazole (LOTRIMIN) 1 % external solution Apply topically 2 times daily 5/4/2023 at am Yes Reported, Patient     divalproex sodium delayed-release (DEPAKOTE) 500 MG DR tablet Take 1 tablet (500 mg) by mouth 3 times daily 5/4/2023 at am Yes Lisa Merchant APRN CNP Yes    lamoTRIgine (LAMICTAL) 200 MG tablet Take 1 tablet (200 mg) by mouth 2 times daily 5/4/2023 at am Yes Lisa Merchant APRN CNP Yes    levETIRAcetam (KEPPRA) 500 MG tablet Take 2 tablets (1,000 mg) by mouth every morning AND 3 tablets (1,500 mg) At Bedtime. 5/4/2023 at am Yes  Zasada, Lisa, APRN CNP Yes    levOCARNitine (CARNITOR) 330 MG tablet Take 2 tablets (660 mg) by mouth 3 times daily 5/4/2023 at x2 Yes Lisa Merchant APRN CNP Yes    methylcellulose (CITRUCEL) powder Take 2 g by mouth 2 times daily 5/4/2023 at am Yes Reported, Patient  10/21/23   multivitamin w/minerals (THERA-VIT-M) tablet Take 1 tablet by mouth daily 5/4/2023 at am Yes Unknown, Entered By History     pantoprazole (PROTONIX) 40 MG EC tablet Take 40 mg by mouth daily 5/4/2023 at am Yes Unknown, Entered By History     polyethylene glycol (MIRALAX) 17 GM/Dose powder Take 1 capful. by mouth daily 5/4/2023 at am Yes Reported, Patient     prazosin (MINIPRESS) 2 MG capsule Take 2 mg by mouth At Bedtime 5/3/2023 Yes Reported, Patient Yes    propranolol (INDERAL) 20 MG tablet Take 20 mg by mouth 2 times daily 5/4/2023 at am Yes Reported, Patient No    QUEtiapine ER (SEROQUEL XR) 300 MG 24 hr tablet Take 300 mg by mouth At Bedtime 5/3/2023 Yes Unknown, Entered By History No    zonisamide (ZONEGRAN) 100 MG capsule Take 1 capsule (100 mg) by mouth every morning AND 2 capsules (200 mg) At Bedtime. 5/4/2023 at am Yes Lisa Merchant APRN CNP Yes

## 2023-05-05 NOTE — PROGRESS NOTES
"[facility]    Medicine Progress Note - Hospitalist Service    Date of Admission:  5/4/2023    Assessment & Plan   Tre Vazquez is a 47 year old male who resides in a group home and came to attention on 5/4/2023 due to worsening shortness of breath.  Notably, the patient has chronic shortness of breath with associated chronic hypoxic and hypercapnic respiratory failure managed on 2 to 3 L/min supplemental oxygen at baseline.    Past medical history is significant for cerebral palsy with mild spastic paraparesis, mild intellectual disability, congenital left eye blindness, seizure disorder, hypertension, morbid obesity, borderline personality disorder, PTSD, schizoaffective disorder, obstructive sleep apnea with BiPAP noncompliance and chronic hypercapnic respiratory failure.    Today, I had initially planned to discharge Mr. Vazquez, but chart review reveals a long history of respiratory insufficiency.  I spoke with Dr. Elise Fisher, Pulmonary Medicine who identified the most essential history from the patient's chart. Mr. Vazquez' records includes a summary of his past evaluations and treatments for ANA/OHS.  That was from Dr. Hoff from [facility] in [place], dated 5/4/2022.    I spoke several times with the pt's mother, who is his guardian and with Jocelyne, a supervisor from his Group Home. Jocelyne had sent Mr. Vazquez over due to her concern for the patient's recently worsened DE LA FUENTE, which she describes as \"severe - gasping for air\".  Through these conversations, I found that the patient does have an oxygen concentrator, butdue to his failure to use CPAP, BiPAP, AVAPS and ultimately a Trilogy, he had all of these various devices taken away. Fortunately, he has an appointment with Dr. Hoff again this week on 5/11.  His resp equipment has been through [facility].    DX:  1.  Recurrent acute on chronic hypoxic and hypercapnic respiratory failure related to obesity and ANA.  He is " "at high risk of decompensating without an appropriate suppartive device at home, and for that reason the patient was not discharged today.   2.  Pulmonary hypertension presumably due to persistent and under-treated ANA. Unclear whether this has worsened significantly. RV systolic pressure was estimated at 39 mmHg in 2019 by echocardiogram.  3.  Cognitive delay.  4.  Morbid obesity and decreased mobility complicating cares. Functional restrictive physiology.    PLAN:  1.  Empirically resume CPAP.  2.  Requested formal consult from Dr. Fisher (Pulmonary medicine)  3.  Echocardiogram.    4.  Cont O2 at 2-3 LPM by NC while awake.  5.  I requested that Bruno evaluate the patient's set up at home, but have subsequently found that he no longer has a device for ANA, such as BiPAP.  That follow up should be cancelled and follow up planned for after he sees his primary Pulmonologist.       Diet: Regular Diet Adult    DVT Prophylaxis: Low Risk/Ambulatory with no VTE prophylaxis indicated  Reid Catheter: Not present  Lines: None     Cardiac Monitoring: None  Code Status: Full Code      Clinically Significant Risk Factors Present on Admission                  # Hypertension: home medication list includes antihypertensive(s)   # Non-Invasive mechanical ventilation: current O2 Device: BiPAP/CPAP  # Acute hypoxic respiratory failure: continue supplemental O2 as needed     # Severe Obesity: Estimated body mass index is 48.61 kg/m  as calculated from the following:    Height as of this encounter: 1.715 m (5' 7.5\").    Weight as of this encounter: 142.9 kg (315 lb).           Disposition Plan      Expected Discharge Date: 05/06/2023                  Wesley Bhatia MD  Hospitalist Service  [facility]  Securely message with "[employer] (more info)  Text page via [employer] Paging/Directory   ______________________________________________________________________    Interval History   Chart reviewed, pt interviewed.   I " assumed care of Mr. Vazquez this am.     The patient is pleasant and interactive.  States he wants to return home. No cough or SOB at rest. Unclear how well he understands the need for him to wear a mask to help with his breathing.     I spoke with his mother who indicated that she understood that if he refuses to wear an appropriate respiratory support device that he would likely need to return to the hospital serially until he decompensates.     Physical Exam   Vital Signs: Temp: 97  F (36.1  C) Temp src: Axillary BP: 103/73 Pulse: 84   Resp: 22 SpO2: 93 % O2 Device: BiPAP/CPAP Oxygen Delivery: 3 LPM (For eating/drinking. Okay with MD)  Weight: 315 lbs 0 oz    Constitutional: awake, maintains his left eye closed, alert, cooperative, no apparent distress and morbidly obese  Eyes: right eye appears normal without scleral icterus.  Right Bell's palsy (?).  Does not open the left eye.  ENT: right facial droop  Respiratory: no increased WOB, very distant breath sounds. Poor air entry throughout.  Cardiovascular: distant heart sounds.  GI: obese, soft, NTND  Skin: no bruising or bleeding and normal skin color, texture, turgor  Musculoskeletal: no lower extremity pitting edema present  Neurologic: alert and interactive. Word choice and thought processes are appropriate.    Medical Decision Making       60 MINUTES SPENT BY ME on the date of service doing chart review, history, exam, documentation & further activities per the note.      Data   Results for orders placed or performed during the hospital encounter of 05/04/23 (from the past 24 hour(s))   UA with Microscopic reflex to Culture    Specimen: Urine, Midstream   Result Value Ref Range    Color Urine Yellow Colorless, Straw, Light Yellow, Yellow    Appearance Urine Clear Clear    Glucose Urine Negative Negative mg/dL    Bilirubin Urine Negative Negative    Ketones Urine Negative Negative mg/dL    Specific Gravity Urine 1.020 1.003 - 1.035    Blood Urine Negative  Negative    pH Urine 7.0 5.0 - 7.0    Protein Albumin Urine Negative Negative mg/dL    Urobilinogen Urine Normal Normal, 2.0 mg/dL    Nitrite Urine Negative Negative    Leukocyte Esterase Urine Negative Negative    RBC Urine 2 <=2 /HPF    WBC Urine 2 <=5 /HPF    Narrative    Urine Culture not indicated   Blood gas venous   Result Value Ref Range    pH Venous 7.29 (L) 7.32 - 7.43    pCO2 Venous 81 (HH) 40 - 50 mm Hg    pO2 Venous 32 25 - 47 mm Hg    Bicarbonate Venous 39 (H) 21 - 28 mmol/L    Base Excess/Deficit (+/-) 8.3 (H) -7.7 - 1.9 mmol/L    FIO2 3    Basic metabolic panel   Result Value Ref Range    Sodium 139 136 - 145 mmol/L    Potassium 4.4 3.4 - 5.3 mmol/L    Chloride 98 98 - 107 mmol/L    Carbon Dioxide (CO2) 35 (H) 22 - 29 mmol/L    Anion Gap 6 (L) 7 - 15 mmol/L    Urea Nitrogen 19.8 6.0 - 20.0 mg/dL    Creatinine 0.69 0.67 - 1.17 mg/dL    Calcium 8.9 8.6 - 10.0 mg/dL    Glucose 129 (H) 70 - 99 mg/dL    GFR Estimate >90 >60 mL/min/1.73m2   CBC with platelets   Result Value Ref Range    WBC Count 7.9 4.0 - 11.0 10e3/uL    RBC Count 4.10 (L) 4.40 - 5.90 10e6/uL    Hemoglobin 12.5 (L) 13.3 - 17.7 g/dL    Hematocrit 40.3 40.0 - 53.0 %    MCV 98 78 - 100 fL    MCH 30.5 26.5 - 33.0 pg    MCHC 31.0 (L) 31.5 - 36.5 g/dL    RDW 13.9 10.0 - 15.0 %    Platelet Count 146 (L) 150 - 450 10e3/uL   Blood gas venous   Result Value Ref Range    pH Venous 7.33 7.32 - 7.43    pCO2 Venous 72 (H) 40 - 50 mm Hg    pO2 Venous 53 (H) 25 - 47 mm Hg    Bicarbonate Venous 38 (H) 21 - 28 mmol/L    Base Excess/Deficit (+/-) 9.2 (H) -7.7 - 1.9 mmol/L    FIO2 30    Extra Tube    Narrative    The following orders were created for panel order Extra Tube.  Procedure                               Abnormality         Status                     ---------                               -----------         ------                     Extra Red Top Tube[[id number]]                               Final result                 Please view results for  these tests on the individual orders.   Extra Red Top Tube   Result Value Ref Range    Hold Specimen JIC    Extra Tube    Narrative    The following orders were created for panel order Extra Tube.  Procedure                               Abnormality         Status                     ---------                               -----------         ------                     Extra Blue Top Tube[[id number]]                              Final result                 Please view results for these tests on the individual orders.   Extra Blue Top Tube   Result Value Ref Range    Hold Specimen JIC

## 2023-05-05 NOTE — PROGRESS NOTES
Oxygen Documentation  I certify that this patient, Tre Vazquez has been under my care (or a nurse practitioner or physican's assistant working with me). This is the face-to-face encounter for oxygen medical necessity.      At the time of this encounter supplemental oxygen is reasonable and necessary and is expected to improve the patient's condition in a home setting.       Patient has continued oxygen desaturation due to obesity-related hypoventilation.    If portability is ordered, is the patient mobile within the home? yes

## 2023-05-05 NOTE — PROGRESS NOTES
[facility]    ED Boarding Nurse Handoff Addendum Report:    Date/time: 5/5/2023, 5:48 AM    Activity Level: Up with assist, walker vs wheel chair at baseline.     Fall Risk: yes, arm band in place    Active Infusions: none    Current Meds Due: none    Current care needs: none    Oxygen requirements (liters/min and/or FiO2): 3 liters nasal cannula vs bipap at 30%    Respiratory status: BiPap    Vital signs (within last 30 minutes):    Vitals:    05/04/23 2256 05/05/23 0000 05/05/23 0400 05/05/23 0445   BP:  (!) 141/93 103/73    Pulse:  83 84 84   Resp:  22     Temp:  98  F (36.7  C) 97  F (36.1  C)    TempSrc:  Temporal Axillary    SpO2: 93% 96% 93% 93%   Weight:       Height:           Focused assessment within last 30 minutes:    Pt is alert and oriented, forgetful. Pt calls frequently, many repetitive requests. Needs boundaries set. From a group home. Frequent hospitalizations for hypercapnia as pt refuses cpap at home.      ED Boarding Nurse name: Leela Azar RN

## 2023-05-06 ENCOUNTER — APPOINTMENT (OUTPATIENT)
Dept: CARDIOLOGY | Facility: CLINIC | Age: 48
DRG: 091 | End: 2023-05-06
Attending: INTERNAL MEDICINE
Payer: MEDICARE

## 2023-05-06 LAB
ALBUMIN SERPL BCG-MCNC: 3.6 G/DL (ref 3.5–5.2)
ALP SERPL-CCNC: 61 U/L (ref 40–129)
ALT SERPL W P-5'-P-CCNC: 21 U/L (ref 10–50)
AMMONIA PLAS-SCNC: 62 UMOL/L (ref 16–60)
AST SERPL W P-5'-P-CCNC: 30 U/L (ref 10–50)
BASE EXCESS BLDV CALC-SCNC: 9.4 MMOL/L (ref -7.7–1.9)
BILIRUB DIRECT SERPL-MCNC: <0.2 MG/DL (ref 0–0.3)
BILIRUB SERPL-MCNC: 0.2 MG/DL
HCO3 BLDV-SCNC: 38 MMOL/L (ref 21–28)
HOLD SPECIMEN: NORMAL
HOLD SPECIMEN: NORMAL
INR PPP: 0.99 (ref 0.85–1.15)
LVEF ECHO: NORMAL
O2/TOTAL GAS SETTING VFR VENT: 30 %
PCO2 BLDV: 74 MM HG (ref 40–50)
PH BLDV: 7.32 [PH] (ref 7.32–7.43)
PO2 BLDV: 84 MM HG (ref 25–47)
PROT SERPL-MCNC: 5.9 G/DL (ref 6.4–8.3)

## 2023-05-06 PROCEDURE — 120N000001 HC R&B MED SURG/OB

## 2023-05-06 PROCEDURE — 999N000157 HC STATISTIC RCP TIME EA 10 MIN

## 2023-05-06 PROCEDURE — 250N000013 HC RX MED GY IP 250 OP 250 PS 637: Performed by: INTERNAL MEDICINE

## 2023-05-06 PROCEDURE — 99233 SBSQ HOSP IP/OBS HIGH 50: CPT | Performed by: INTERNAL MEDICINE

## 2023-05-06 PROCEDURE — 82140 ASSAY OF AMMONIA: CPT | Performed by: INTERNAL MEDICINE

## 2023-05-06 PROCEDURE — 255N000002 HC RX 255 OP 636: Performed by: INTERNAL MEDICINE

## 2023-05-06 PROCEDURE — 94660 CPAP INITIATION&MGMT: CPT

## 2023-05-06 PROCEDURE — 36415 COLL VENOUS BLD VENIPUNCTURE: CPT | Performed by: INTERNAL MEDICINE

## 2023-05-06 PROCEDURE — 250N000013 HC RX MED GY IP 250 OP 250 PS 637: Performed by: STUDENT IN AN ORGANIZED HEALTH CARE EDUCATION/TRAINING PROGRAM

## 2023-05-06 PROCEDURE — 999N000208 ECHOCARDIOGRAM COMPLETE

## 2023-05-06 PROCEDURE — 80076 HEPATIC FUNCTION PANEL: CPT | Performed by: INTERNAL MEDICINE

## 2023-05-06 PROCEDURE — 93306 TTE W/DOPPLER COMPLETE: CPT | Mod: 26 | Performed by: INTERNAL MEDICINE

## 2023-05-06 PROCEDURE — 85610 PROTHROMBIN TIME: CPT | Performed by: INTERNAL MEDICINE

## 2023-05-06 PROCEDURE — 82803 BLOOD GASES ANY COMBINATION: CPT | Performed by: INTERNAL MEDICINE

## 2023-05-06 RX ORDER — IBUPROFEN 200 MG
200 TABLET ORAL EVERY 6 HOURS PRN
Status: DISCONTINUED | OUTPATIENT
Start: 2023-05-06 | End: 2023-05-11 | Stop reason: HOSPADM

## 2023-05-06 RX ADMIN — PRAZOSIN HYDROCHLORIDE 2 MG: 1 CAPSULE ORAL at 21:10

## 2023-05-06 RX ADMIN — POLYETHYLENE GLYCOL 3350 17 G: 17 POWDER, FOR SOLUTION ORAL at 09:31

## 2023-05-06 RX ADMIN — DIVALPROEX SODIUM 500 MG: 500 TABLET, DELAYED RELEASE ORAL at 09:39

## 2023-05-06 RX ADMIN — QUETIAPINE FUMARATE 300 MG: 300 TABLET, EXTENDED RELEASE ORAL at 21:11

## 2023-05-06 RX ADMIN — CITALOPRAM HYDROBROMIDE 60 MG: 20 TABLET ORAL at 09:38

## 2023-05-06 RX ADMIN — PANTOPRAZOLE SODIUM 40 MG: 40 TABLET, DELAYED RELEASE ORAL at 09:33

## 2023-05-06 RX ADMIN — LEVOCARNITINE 660 MG: 330 TABLET ORAL at 09:32

## 2023-05-06 RX ADMIN — LAMOTRIGINE 200 MG: 200 TABLET ORAL at 09:32

## 2023-05-06 RX ADMIN — HUMAN ALBUMIN MICROSPHERES AND PERFLUTREN 3 ML: 10; .22 INJECTION, SOLUTION INTRAVENOUS at 09:03

## 2023-05-06 RX ADMIN — LAMOTRIGINE 200 MG: 200 TABLET ORAL at 20:15

## 2023-05-06 RX ADMIN — DIVALPROEX SODIUM 500 MG: 500 TABLET, DELAYED RELEASE ORAL at 20:15

## 2023-05-06 RX ADMIN — LEVETIRACETAM 1500 MG: 500 TABLET, FILM COATED ORAL at 21:10

## 2023-05-06 RX ADMIN — IBUPROFEN 200 MG: 200 TABLET, FILM COATED ORAL at 20:33

## 2023-05-06 RX ADMIN — BUSPIRONE HYDROCHLORIDE 15 MG: 15 TABLET ORAL at 20:15

## 2023-05-06 RX ADMIN — LEVETIRACETAM 1000 MG: 500 TABLET, FILM COATED ORAL at 09:38

## 2023-05-06 RX ADMIN — PROPRANOLOL HYDROCHLORIDE 20 MG: 20 TABLET ORAL at 20:15

## 2023-05-06 RX ADMIN — LEVOCARNITINE 660 MG: 330 TABLET ORAL at 14:24

## 2023-05-06 RX ADMIN — LEVOCARNITINE 660 MG: 330 TABLET ORAL at 20:15

## 2023-05-06 RX ADMIN — BUSPIRONE HYDROCHLORIDE 15 MG: 15 TABLET ORAL at 09:33

## 2023-05-06 RX ADMIN — ZONISAMIDE 200 MG: 100 CAPSULE ORAL at 21:10

## 2023-05-06 RX ADMIN — PROPRANOLOL HYDROCHLORIDE 20 MG: 20 TABLET ORAL at 09:34

## 2023-05-06 RX ADMIN — ZONISAMIDE 100 MG: 100 CAPSULE ORAL at 09:35

## 2023-05-06 RX ADMIN — DIVALPROEX SODIUM 500 MG: 500 TABLET, DELAYED RELEASE ORAL at 14:24

## 2023-05-06 ASSESSMENT — ACTIVITIES OF DAILY LIVING (ADL)
ADLS_ACUITY_SCORE: 48
ADLS_ACUITY_SCORE: 44
ADLS_ACUITY_SCORE: 48
ADLS_ACUITY_SCORE: 48
ADLS_ACUITY_SCORE: 40
ADLS_ACUITY_SCORE: 42
ADLS_ACUITY_SCORE: 48
ADLS_ACUITY_SCORE: 42

## 2023-05-06 NOTE — PROGRESS NOTES
A BiPAP of  16/7 @ 30% was applied to the pt via the mask for NOC use. Skin is intact. Pt is tolerating it well. Will continue to monitor and assess the pt's current respiratory status and needs.    RT Saroj on 5/6/2023 at 5:48 AM

## 2023-05-06 NOTE — PROGRESS NOTES
"[facility]    Medicine Progress Note - Hospitalist Service    Date of Admission:  5/4/2023    Assessment & Plan   Tre Vazquez is a 47 year old male who resides in a group home and came to attention on 5/4/2023 due to worsening shortness of breath.  Notably, the patient has chronic shortness of breath with associated chronic hypoxic and hypercapnic respiratory failure managed on 2 to 3 L/min supplemental oxygen at baseline.    Past medical history is significant for cerebral palsy with mild spastic paraparesis, mild intellectual disability, congenital left eye blindness, seizure disorder, hypertension, morbid obesity, borderline personality disorder, PTSD, schizoaffective disorder, obstructive sleep apnea with BiPAP noncompliance and chronic hypercapnic respiratory failure.    Today, I had initially planned to discharge Mr. Vazquez, but chart review reveals a long history of respiratory insufficiency.  I spoke with Dr. Elise Fisher, Pulmonary Medicine who identified the most essential history from the patient's chart. Mr. Vazquez' records includes a summary of his past evaluations and treatments for ANA/OHS.  That was from Dr. Hoff from [facility] in [place], dated 5/4/2022.    On 5/6, I spoke several times with the pt's mother, who is his guardian and with Jocelyne, a supervisor from his Group Home. Jocelyne had sent Mr. Vazquez over due to her concern for the patient's recently worsened DE LA FUENTE, which she describes as \"severe - gasping for air\".  Through these conversations, I found that the patient does have an oxygen concentrator, butdue to his failure to use CPAP, BiPAP, AVAPS and ultimately a Trilogy, he had all of these various devices taken away. Fortunately, he has an appointment with Dr. Hoff again this week on 5/11.  His resp equipment has been through [facility].    Today, 5/7, the pt is noted to be more somnolent than previous despite improved CO2 level. He also had a " "\"staring fit\" when I was talking with him, though that resolved in about 30 seconds. Chart review indicates that his ammonia level in April, 2022 was 82 (and the value from 5/5/22 was 60).      DX:  1.  Recurrent acute on chronic hypoxic and hypercapnic respiratory failure related to obesity and ANA.  He is at high risk of decompensating without an appropriate supportive device at home, and for that reason the patient was admitted to inpatient status.    2.  Pulmonary hypertension presumably due to persistent and under-treated ANA. (Pt is non-compliant with recommended CPAP, BiPAP, AVAPS, Trilogy.)  Unclear whether this has worsened significantly. RV systolic pressure was estimated at 39 mmHg in 2019 by echocardiogram and RVSP today is 37 mmHg +RA (essentially unchanged).  3.  Seizure disorder.  Pt appears to be having absence events. On 4 antisz meds in addition to Levocarnitine.  Also noted, his ammonia level was elevated in April.    4.  Cognitive delay.  5.  Morbid obesity and decreased mobility complicating cares. Functional restrictive physiology.  6.  Significantly somnolent.  Not as hypercapnic as previous.  Elevated ammonia today at 62.  LFTs and INR are normal.     PLAN:  1.  Empirically resume BiPAP with sleep.  2.  Requested formal consult from Dr. Fisher (Pulmonary medicine)  3.  Echocardiogram indicates stability in RV pressures relative to echo from 2019.    4.  Cont O2 at 2-3 LPM by NC while awake.  5.  I requested that [employer] evaluate the patient's set up at home, but have subsequently found that he no longer has a device for ANA, such as BiPAP.  That follow up should be cancelled and follow up planned for after he sees his primary Pulmonologist.  6.  RUQ US for eval of poss cirrhosis.  7.  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       Diet: Regular Diet Adult  Diet    DVT Prophylaxis: Low Risk/Ambulatory with no VTE " "prophylaxis indicated  Reid Catheter: Not present  Lines: None     Cardiac Monitoring: None  Code Status: Full Code      Clinically Significant Risk Factors                         # Severe Obesity: Estimated body mass index is 48.61 kg/m  as calculated from the following:    Height as of this encounter: 1.715 m (5' 7.5\").    Weight as of this encounter: 142.9 kg (315 lb)., PRESENT ON ADMISSION         Disposition Plan      Expected Discharge Date: 05/07/2023      Destination: group home            Wesley Bhatia MD  Hospitalist Service  [facility]  Securely message with [employer] (more info)  Text page via [facility] Paging/Directory   ______________________________________________________________________    Interval History   Mr. Vazquez indicates that he is doing quite well, though he seemed to abruptly fall asleep or \"space out\" in mid sentence. He also appeared to trail off topic when he was talking to me, unable to keep focus on his thought.      Family also confirms that the pt has been having these sorts of episodes when he becomes abruptly disoriented.  Mother states that he seems to have these types of events about 2-3 times per week while he is speaking with his mother by phone over the past 6 months. She also states that he has NOT had grand mal sz in the past 4 years.  Very inactive now over the past year or so and sleeps much more than usual in the past year.    Physical Exam   Vital Signs: Temp: 98.2  F (36.8  C) Temp src: Oral BP: 123/58 Pulse: 82   Resp: 15 SpO2: 92 % O2 Device: BiPAP/CPAP Oxygen Delivery: 2 LPM  Weight: 315 lbs 0 oz    Constitutional: awake but sleepy and falls asleep mid-sentence.  Maintains his left eye closed, alert, cooperative, no apparent distress and morbidly obese.  Dysarthric speech.  Eyes: right eye appears normal without scleral icterus.  Right Bell's palsy.  Does not open the left eye.  (Congenitally blind and without a visible pupil.)   ENT: right " facial droop.    Respiratory: no increased WOB, very distant breath sounds. Poor air entry throughout.  Cardiovascular: distant heart sounds.  GI: obese, soft, NTND  Skin: no bruising or bleeding and normal skin color, texture, turgor  Musculoskeletal: no lower extremity pitting edema present  Neurologic: alert and interactive. Word choice and thought processes are appropriate.    Medical Decision Making       50 MINUTES SPENT BY ME on the date of service doing chart review, history, exam, documentation & further activities per the note.      Data   Results for orders placed or performed during the hospital encounter of 05/04/23 (from the past 24 hour(s))   Basic metabolic panel   Result Value Ref Range    Sodium 139 136 - 145 mmol/L    Potassium 4.4 3.4 - 5.3 mmol/L    Chloride 98 98 - 107 mmol/L    Carbon Dioxide (CO2) 35 (H) 22 - 29 mmol/L    Anion Gap 6 (L) 7 - 15 mmol/L    Urea Nitrogen 19.8 6.0 - 20.0 mg/dL    Creatinine 0.69 0.67 - 1.17 mg/dL    Calcium 8.9 8.6 - 10.0 mg/dL    Glucose 129 (H) 70 - 99 mg/dL    GFR Estimate >90 >60 mL/min/1.73m2   CBC with platelets   Result Value Ref Range    WBC Count 7.9 4.0 - 11.0 10e3/uL    RBC Count 4.10 (L) 4.40 - 5.90 10e6/uL    Hemoglobin 12.5 (L) 13.3 - 17.7 g/dL    Hematocrit 40.3 40.0 - 53.0 %    MCV 98 78 - 100 fL    MCH 30.5 26.5 - 33.0 pg    MCHC 31.0 (L) 31.5 - 36.5 g/dL    RDW 13.9 10.0 - 15.0 %    Platelet Count 146 (L) 150 - 450 10e3/uL   Blood gas venous   Result Value Ref Range    pH Venous 7.33 7.32 - 7.43    pCO2 Venous 72 (H) 40 - 50 mm Hg    pO2 Venous 53 (H) 25 - 47 mm Hg    Bicarbonate Venous 38 (H) 21 - 28 mmol/L    Base Excess/Deficit (+/-) 9.2 (H) -7.7 - 1.9 mmol/L    FIO2 30    Extra Tube    Narrative    The following orders were created for panel order Extra Tube.  Procedure                               Abnormality         Status                     ---------                               -----------         ------                     Extra Red  Top Tube[[id number]]                               Final result                 Please view results for these tests on the individual orders.   Extra Red Top Tube   Result Value Ref Range    Hold Specimen JIC    Extra Tube    Narrative    The following orders were created for panel order Extra Tube.  Procedure                               Abnormality         Status                     ---------                               -----------         ------                     Extra Blue Top Tube[[id number]]                              Final result                 Please view results for these tests on the individual orders.   Extra Blue Top Tube   Result Value Ref Range    Hold Specimen JIC    Blood gas venous   Result Value Ref Range    pH Venous 7.32 7.32 - 7.43    pCO2 Venous 74 (H) 40 - 50 mm Hg    pO2 Venous 84 (H) 25 - 47 mm Hg    Bicarbonate Venous 38 (H) 21 - 28 mmol/L    Base Excess/Deficit (+/-) 9.4 (H) -7.7 - 1.9 mmol/L    FIO2 30    Extra Tube    Narrative    The following orders were created for panel order Extra Tube.  Procedure                               Abnormality         Status                     ---------                               -----------         ------                     Extra Green Top (Lithium...[[id number]]                      In process                 Extra Purple Top Tube[[id number]]                            In process                   Please view results for these tests on the individual orders.

## 2023-05-06 NOTE — PLAN OF CARE
Goal Outcome Evaluation:    AO x4. 2L NC when awake, BIPAP at night. Frequently uses call light. Assist x2 with walker, pt refuses gait belt. Plan: Possible discharge Monday 5/8 to [facility]

## 2023-05-06 NOTE — PLAN OF CARE
End of shift summary ([id number])    Pt lethargic but arousable to repeated stimuli w/ improved alertness t/o shift. Ox4. VSS on BiPAP when asleep, 2L when awake. Denied pain, CP, SOB. LPIV intact, SL. Up to bathroom and chair Ax2 GB W. Pulsate mattress set up, PIP education provided. Seizure precautions initiated and maintained. Possible discharge back to  5/8. Will continue POC.     Goal Outcome Evaluation:      Plan of Care Reviewed With: patient    Overall Patient Progress: decliningOverall Patient Progress: declining

## 2023-05-07 ENCOUNTER — APPOINTMENT (OUTPATIENT)
Dept: ULTRASOUND IMAGING | Facility: CLINIC | Age: 48
DRG: 091 | End: 2023-05-07
Attending: INTERNAL MEDICINE
Payer: MEDICARE

## 2023-05-07 ENCOUNTER — APPOINTMENT (OUTPATIENT)
Dept: PHYSICAL THERAPY | Facility: CLINIC | Age: 48
DRG: 091 | End: 2023-05-07
Attending: INTERNAL MEDICINE
Payer: MEDICARE

## 2023-05-07 LAB
ANION GAP SERPL CALCULATED.3IONS-SCNC: 4 MMOL/L (ref 7–15)
BASE EXCESS BLDV CALC-SCNC: 8.8 MMOL/L (ref -7.7–1.9)
BUN SERPL-MCNC: 21.7 MG/DL (ref 6–20)
CALCIUM SERPL-MCNC: 8.7 MG/DL (ref 8.6–10)
CHLORIDE SERPL-SCNC: 99 MMOL/L (ref 98–107)
CREAT SERPL-MCNC: 0.68 MG/DL (ref 0.67–1.17)
DEPRECATED HCO3 PLAS-SCNC: 39 MMOL/L (ref 22–29)
GFR SERPL CREATININE-BSD FRML MDRD: >90 ML/MIN/1.73M2
GLUCOSE SERPL-MCNC: 103 MG/DL (ref 70–99)
HCO3 BLDV-SCNC: 39 MMOL/L (ref 21–28)
O2/TOTAL GAS SETTING VFR VENT: 30 %
PCO2 BLDV: 85 MM HG (ref 40–50)
PH BLDV: 7.27 [PH] (ref 7.32–7.43)
PO2 BLDV: 56 MM HG (ref 25–47)
POTASSIUM SERPL-SCNC: 4.1 MMOL/L (ref 3.4–5.3)
SODIUM SERPL-SCNC: 142 MMOL/L (ref 136–145)

## 2023-05-07 PROCEDURE — 250N000013 HC RX MED GY IP 250 OP 250 PS 637: Performed by: STUDENT IN AN ORGANIZED HEALTH CARE EDUCATION/TRAINING PROGRAM

## 2023-05-07 PROCEDURE — 120N000001 HC R&B MED SURG/OB

## 2023-05-07 PROCEDURE — 80048 BASIC METABOLIC PNL TOTAL CA: CPT | Performed by: INTERNAL MEDICINE

## 2023-05-07 PROCEDURE — 94660 CPAP INITIATION&MGMT: CPT

## 2023-05-07 PROCEDURE — 36415 COLL VENOUS BLD VENIPUNCTURE: CPT | Performed by: INTERNAL MEDICINE

## 2023-05-07 PROCEDURE — 99233 SBSQ HOSP IP/OBS HIGH 50: CPT | Performed by: INTERNAL MEDICINE

## 2023-05-07 PROCEDURE — 999N000157 HC STATISTIC RCP TIME EA 10 MIN

## 2023-05-07 PROCEDURE — 97161 PT EVAL LOW COMPLEX 20 MIN: CPT | Mod: GP | Performed by: PHYSICAL THERAPIST

## 2023-05-07 PROCEDURE — 82803 BLOOD GASES ANY COMBINATION: CPT | Performed by: INTERNAL MEDICINE

## 2023-05-07 PROCEDURE — 76705 ECHO EXAM OF ABDOMEN: CPT

## 2023-05-07 PROCEDURE — 97530 THERAPEUTIC ACTIVITIES: CPT | Mod: GP | Performed by: PHYSICAL THERAPIST

## 2023-05-07 RX ADMIN — ZONISAMIDE 100 MG: 100 CAPSULE ORAL at 08:08

## 2023-05-07 RX ADMIN — DIVALPROEX SODIUM 500 MG: 500 TABLET, DELAYED RELEASE ORAL at 15:38

## 2023-05-07 RX ADMIN — LAMOTRIGINE 200 MG: 200 TABLET ORAL at 20:37

## 2023-05-07 RX ADMIN — POLYETHYLENE GLYCOL 3350 17 G: 17 POWDER, FOR SOLUTION ORAL at 08:06

## 2023-05-07 RX ADMIN — CITALOPRAM HYDROBROMIDE 60 MG: 20 TABLET ORAL at 08:08

## 2023-05-07 RX ADMIN — PROPRANOLOL HYDROCHLORIDE 20 MG: 20 TABLET ORAL at 08:08

## 2023-05-07 RX ADMIN — LEVETIRACETAM 1500 MG: 500 TABLET, FILM COATED ORAL at 21:36

## 2023-05-07 RX ADMIN — LEVETIRACETAM 1000 MG: 500 TABLET, FILM COATED ORAL at 08:08

## 2023-05-07 RX ADMIN — LAMOTRIGINE 200 MG: 200 TABLET ORAL at 08:07

## 2023-05-07 RX ADMIN — LEVOCARNITINE 660 MG: 330 TABLET ORAL at 15:38

## 2023-05-07 RX ADMIN — BUSPIRONE HYDROCHLORIDE 15 MG: 15 TABLET ORAL at 20:37

## 2023-05-07 RX ADMIN — DIVALPROEX SODIUM 500 MG: 500 TABLET, DELAYED RELEASE ORAL at 08:09

## 2023-05-07 RX ADMIN — PANTOPRAZOLE SODIUM 40 MG: 40 TABLET, DELAYED RELEASE ORAL at 08:08

## 2023-05-07 RX ADMIN — PROPRANOLOL HYDROCHLORIDE 20 MG: 20 TABLET ORAL at 20:37

## 2023-05-07 RX ADMIN — ZONISAMIDE 200 MG: 100 CAPSULE ORAL at 21:36

## 2023-05-07 RX ADMIN — DIVALPROEX SODIUM 500 MG: 500 TABLET, DELAYED RELEASE ORAL at 20:37

## 2023-05-07 RX ADMIN — PRAZOSIN HYDROCHLORIDE 2 MG: 1 CAPSULE ORAL at 21:36

## 2023-05-07 RX ADMIN — LEVOCARNITINE 660 MG: 330 TABLET ORAL at 08:07

## 2023-05-07 RX ADMIN — QUETIAPINE FUMARATE 300 MG: 300 TABLET, EXTENDED RELEASE ORAL at 21:36

## 2023-05-07 RX ADMIN — BUSPIRONE HYDROCHLORIDE 15 MG: 15 TABLET ORAL at 08:08

## 2023-05-07 RX ADMIN — LEVOCARNITINE 660 MG: 330 TABLET ORAL at 20:37

## 2023-05-07 ASSESSMENT — ACTIVITIES OF DAILY LIVING (ADL)
ADLS_ACUITY_SCORE: 48

## 2023-05-07 NOTE — PLAN OF CARE
A&O x3, disoriented to time. Blind in left eye. Baseline 2L O2 NC when awake, BIPAP when sleeping, changed today due to elevated PCO2, MD ordered BIPAP continuously, this has been difficult to maintain as patient frequently removes. Assist x2 with walker and gait belt.

## 2023-05-07 NOTE — PLAN OF CARE
Goal Outcome Evaluation:    AO x3, disoriented to time. Frequently on call light. 2L O2 NC when awake, BIPAP when sleeping. PRN ibuprofen given for arm pain. NPO at midnight for US this AM. Assist x2 with walker, refuses gait belt. Plan: US, neuro consult

## 2023-05-07 NOTE — PROGRESS NOTES
05/07/23 1200   Appointment Info   Signing Clinician's Name / Credentials (PT) Laney Camarena,  PT   Living Environment   People in Home facility resident   Current Living Arrangements group home   Home Accessibility wheelchair accessible   Transportation Anticipated agency   Living Environment Comments Pt reports living in a group home with a ramp to enter.  Bedroom on main level.  Pt reports there is an elevator in the home.   Self-Care   Usual Activity Tolerance moderate   Current Activity Tolerance moderate   Equipment Currently Used at Home walker, rolling   Activity/Exercise/Self-Care Comment Pt reports ambulating throughout the group home to the kitchen and other areas.  Pt is independent with toileting.   General Information   Onset of Illness/Injury or Date of Surgery 05/04/23   Referring Physician Dr. Wesley Bhatia   Patient/Family Therapy Goals Statement (PT) pt wanting to return home   Pertinent History of Current Problem (include personal factors and/or comorbidities that impact the POC) Tre Vazquez is a 47 year old male who resides in a group home and came to attention on 5/4/2023 due to worsening shortness of breath.  Notably, the patient has chronic shortness of breath with associated chronic hypoxic and hypercapnic respiratory failure managed on 2 to 3 L/min supplemental oxygen at baseline.     Past medical history is significant for cerebral palsy with mild spastic paraparesis, mild intellectual disability, congenital left eye blindness, seizure disorder, hypertension, morbid obesity, borderline personality disorder, PTSD, schizoaffective disorder, obstructive sleep apnea with BiPAP noncompliance and chronic hypercapnic respiratory failure.   Existing Precautions/Restrictions fall;oxygen therapy device and L/min   General Observations Pt lying in bed on 2L O2.   Cognition   Affect/Mental Status (Cognition) flat/blunted affect   Orientation Status (Cognition) oriented to;person;place   Follows  "Commands (Cognition) follows one-step commands;75-90% accuracy   Cognitive Status Comments Pt oriented to month and being at \"[facility]\".  Pt states it's Saturday (actually day is Sunday).  Pt particular about mobility and doing it himself with space around him.   Pain Assessment   Patient Currently in Pain Yes, see Vital Sign flowsheet  (chronic LBP)   Posture    Posture Forward head position;Protracted shoulders   Range of Motion (ROM)   ROM Comment LE appear WFL, observed with mobility   Strength (Manual Muscle Testing)   Strength Comments Pt demonstrates decreased LE and core strength and deconditioning, observed with mobility.   Bed Mobility   Bed Mobility supine-sit   Supine-Sit [place] (Bed Mobility) supervision   Comment, (Bed Mobility) pt transferred sup > sit with bedrail and HOB elevated. Pt reports having rails/handholds on or near bed at home that he uses.   Transfers   Transfers sit-stand transfer   Sit-Stand Transfer   Sit-Stand [place] (Transfers) contact guard;verbal cues   Assistive Device (Sit-Stand Transfers) walker, front-wheeled   Comment, (Sit-Stand Transfer) sit <> stand to FWW with CGA   Gait/Stairs (Locomotion)   [place] Level (Gait) supervision   Assistive Device (Gait) walker, front-wheeled   Distance in Feet 5' (eval) + 15' (treatment)   Comment, (Gait/Stairs) Pt amb with FWW and close SBA, pt not wanting therapist to hold onto him   Balance   Balance Comments impaired dynamic standing balance requiring B UE support on FWW for safe mobility   Sensory Examination   Sensory Perception Comments Pt didn't state   Coordination   Coordination no deficits were identified   Muscle Tone   Muscle Tone no deficits were identified   Clinical Impression   Criteria for Skilled Therapeutic Intervention Yes, treatment indicated   PT Diagnosis (PT) impaired functional mobility   Influenced by the following impairments decreased LE and core strength, decreased activity tolerance   Functional " limitations due to impairments unable to amb longer household or community distances, increased falls risk, pt desaturates on 2L with mobility.   Clinical Presentation (PT Evaluation Complexity) Stable/Uncomplicated   Clinical Presentation Rationale Pt medically stable, multiple comorbidities   Clinical Decision Making (Complexity) low complexity   Planned Therapy Interventions (PT) balance training;bed mobility training;gait training;patient/family education;strengthening;transfer training;progressive activity/exercise;risk factor education;home program guidelines   Risk & Benefits of therapy have been explained evaluation/treatment results reviewed;care plan/treatment goals reviewed;risks/benefits reviewed;current/potential barriers reviewed;participants voiced agreement with care plan;participants included;patient   PT Total Evaluation Time   PT Eval, Low Complexity Minutes (62187) 10   Physical Therapy Goals   PT Frequency Daily   PT Predicted Duration/Target Date for Goal Attainment 05/09/23   PT Goals Bed Mobility;Transfers;Gait   PT: Bed Mobility Supine to/from sit;Modified independent   PT: Transfers Supervision/stand-by assist;Sit to/from stand;Assistive device   PT: Gait Supervision/stand-by assist;Rolling walker;50 feet  (with SpO2 > 90% on O2 as needed)   PT Discharge Planning   PT Plan Monitor O2 with activity, progress gait distance, repeated STS   PT Discharge Recommendation (DC Rec) home with assist   PT Rationale for DC Rec Pt appears to be slightly below his baseline with mobility.  Pt currently SBA with bed mobility and SBA/CGA with transfers and amb atleast 15' with FWW and SBA.  Pt desaturates to 86% on 2L.  I anticipate that with further mobility in hospital environment pt will be able to return home at S/MI level.   PT Brief overview of current status A x 1 with FWW   Total Session Time   Total Session Time (sum of timed and untimed services) 10

## 2023-05-07 NOTE — PLAN OF CARE
Goal Outcome Evaluation:      Plan of Care Reviewed With: patient    Overall Patient Progress: no changeOverall Patient Progress: no change       A&OX4.Forgetful. oxygen 94% on 2L NC. Afebrile. Up to the recliner with assist of 2 with gait belt and walker. Could not keep NPO for US procedure because patient want to eat dinner. NPO from 10 pm to morning for the procedure to occur at 0530 to 0600 am. Patient is updated on this plan. Reported of R upper arm pain. Md was notified because patient does not have orders ibuprofen. Continue POC and monitoring.

## 2023-05-07 NOTE — PROGRESS NOTES
A BiPAP of  16/7 @ 30% was applied to the pt via the mask for NOC use. Skin is intact. Pt is tolerating it well. Will continue to monitor and assess the pt's current respiratory status and needs.    Allyson Mendez, RT

## 2023-05-07 NOTE — PROGRESS NOTES
"[facility]    Medicine Progress Note - Hospitalist Service    Date of Admission:  5/4/2023    Assessment & Plan   Tre Vazquez is a 47 year old male who resides in a group home and came to attention on 5/4/2023 due to worsening shortness of breath.  Notably, the patient has chronic shortness of breath with associated chronic hypoxic and hypercapnic respiratory failure managed on 2 to 3 L/min supplemental oxygen at baseline.    Past medical history is significant for cerebral palsy with mild spastic paraparesis, mild intellectual disability, congenital left eye blindness, seizure disorder, hypertension, morbid obesity, borderline personality disorder, PTSD, schizoaffective disorder, obstructive sleep apnea with BiPAP noncompliance and chronic hypercapnic respiratory failure.   Notably, the patient was admitted here at [facility] with a similar presentation but was intubated for several days.      I had initially planned to discharge Mr. Vazquez, but chart review reveals a long history of respiratory insufficiency.  I spoke the pt's  supervisor, Jocelyne and confirmed that she had more global concerns for pt with a decrease in activity as elsewhere documented, etc.  I then spoke with Dr. Elise Fisher, Pulmonary Medicine who identified the most essential history from the patient's chart. Mr. Vazquez' records includes a summary of his past evaluations and treatments for ANA/OHS.  That was from Dr. Hoff from [facility] in [place], dated 5/4/2022.    On 5/6, I spoke several times with the pt's mother, who is his guardian and with Jocelyne, a supervisor from his Group Home. Jocelyne had sent Mr. Vazquez over due to her concern for the patient's recently worsened [place], which she describes as \"severe - gasping for air\".  Through these conversations, I found that the patient does have an oxygen concentrator, butdue to his failure to use CPAP, BiPAP, AVAPS and ultimately a Trilogy, he had all of " "these various devices taken away. Fortunately, he has an appointment with Dr. Hoff again this week on 5/11.  His resp equipment has been through [employer].    Today, 5/7, the pt is noted to be more somnolent than previous despite improved CO2 level. He also had a \"staring fit\" when I was talking with him, though that resolved in about 30 seconds. Chart review indicates that his ammonia level in April, 2022 was 82 (and the value from 5/5/22 was 60).      DX:  1.  Recurrent acute on chronic hypoxic and hypercapnic respiratory failure related to obesity and ANA.  He is at high risk of decompensating without an appropriate supportive device at home, and for that reason the patient was admitted to inpatient status.    2.  Significantly somnolent suspect encephalopathy, multifactorial.  Hypercapnia fluctuating.  Elevated ammonia at 62 has been very elevated in the past year at least.  US shows \"moderate\" Hepatic Steatosis.  LFTs and INR are normal, suspect Valproic acid-related.   3.  Pulmonary hypertension presumably due to persistent and under-treated ANA. (Pt is non-compliant with recommended CPAP, BiPAP, AVAPS, Trilogy.)  Unclear whether this has worsened significantly. RV systolic pressure was estimated at 39 mmHg in 2019 by echocardiogram and RVSP today is 37 mmHg +RA (essentially unchanged).  4.  Seizure disorder.  Pt appears to be having absence events. On 4 antisz meds in addition to Levocarnitine.  Also noted, his ammonia level was elevated in April.    5.  Cognitive delay.  6.  Morbid obesity and decreased mobility complicating cares. Functional restrictive physiology.    PLAN:  1.  Empirically resume BiPAP with sleep.  2.  Requested formal consult from Dr. Fisher (Pulmonary medicine)  3.  Echocardiogram indicates stability in RV pressures relative to echo from 2019.    4.  Cont O2 at 2-3 LPM by NC while awake.  5.  I requested that [employer] evaluate the patient's set up at home, but have subsequently found that " "he no longer has a device for ANA, such as BiPAP.  That follow up should be cancelled and follow up planned for after he sees his primary Pulmonologist.  6.  Neuro consultant indicates that the chronic hyperammonemia is contributing to his cognitive dysfunction and presumably his encephalopathy.  I have been asked to contact his primary Neurologist through [facility].         Diet: Regular Diet Adult  Diet    DVT Prophylaxis: Low Risk/Ambulatory with no VTE prophylaxis indicated  Reid Catheter: Not present  Lines: None     Cardiac Monitoring: None  Code Status: Full Code      Clinically Significant Risk Factors                         # Severe Obesity: Estimated body mass index is 48.61 kg/m  as calculated from the following:    Height as of this encounter: 1.715 m (5' 7.5\").    Weight as of this encounter: 142.9 kg (315 lb)., PRESENT ON ADMISSION         Disposition Plan     Expected Discharge Date: 05/08/2023      Destination: group home            Wesley Bhatia MD  Hospitalist Service  [facility]  Securely message with [employer] (more info)  Text page via [employer] Paging/Directory   ______________________________________________________________________    Interval History   Stable night. Ambulated with nursing. No documentation of recurrent episodes of suspected absence sz activity. Active all night, unclear how much he slept and used BiPAP.      Noted by RN this am to have elevated CO2 and no clear documentation about how much he wore his BiPAP.      I spoke with Dr. Conner, Neurology, about this patient and the issue of persistent hyperammonemia which he agrees is probably due to valproic acid exposure.    Physical Exam   Vital Signs: Temp: 97.5  F (36.4  C) Temp src: Axillary BP: (!) 141/78 Pulse: 79   Resp: 18 SpO2: 96 % O2 Device: BiPAP/CPAP Oxygen Delivery: 2 LPM  Weight: 315 lbs 0 oz    Constitutional: awake but sleepy and falls asleep mid-sentence.  Maintains his left eye closed, alert, " cooperative, no apparent distress and morbidly obese.  Dysarthric speech.  Eyes: right eye appears normal without scleral icterus.  Right Bell's palsy.  Does not open the left eye.  (Congenitally blind and without a visible pupil.)   ENT: right facial droop.    Respiratory: no increased WOB, very distant breath sounds. Poor air entry throughout.  Cardiovascular: distant heart sounds.  GI: obese, soft, NTND  Skin: no bruising or bleeding and normal skin color, texture, turgor  Musculoskeletal: no lower extremity pitting edema present  Neurologic: alert and interactive. Word choice and thought processes are appropriate.    Medical Decision Making       50 MINUTES SPENT BY ME on the date of service doing chart review, history, exam, documentation & further activities per the note.      Data   Results for orders placed or performed during the hospital encounter of 23 (from the past 24 hour(s))   Echocardiogram Complete   Result Value Ref Range    LVEF  60-65%     Narrative    [id number]  [id number]  [id number]  [id number]^LUCIAN^KEEGAN^JOHN     [facility]  Echocardiography Laboratory  [address] [place] [address]     Name: KOJO BROWN  MRN: [id number]  : 1975  Study Date: 2023 08:37 AM  Age: 47 yrs  Gender: Male  Patient Location: [facility]  Reason For Study: Cor Pulmonale  Ordering Physician: KEEGAN EPSTEIN  Performed By: Desiree Snyder     BSA: 2.5 m2  Height: 67 in  Weight: 315 lb  HR: 75  BP: 130/61 mmHg  ______________________________________________________________________________  Procedure  Complete Portable Echo Adult. Optison (NDC #[id number]) given intravenously.  ______________________________________________________________________________  Interpretation Summary     Left ventricular systolic function is normal.The visual ejection fraction is  60-65%.  The right ventricular systolic function is normal.  Pulmonary hypertension- RVSP 37 mm hg +RA.  The inferior vena cava was  normal in size with preserved respiratory  variability.  ______________________________________________________________________________  Left Ventricle  The left ventricle is normal in size. There is concentric remodeling present.  Diastolic Doppler findings (E/E' ratio and/or other parameters) suggest left  ventricular filling pressures are indeterminate. Left ventricular systolic  function is normal. The visual ejection fraction is 60-65%. No regional wall  motion abnormalities noted.     Right Ventricle  The right ventricle is mildly dilated. The right ventricular systolic function  is normal. The right ventricle is not well visualized.     Atria  Normal left atrial size. Right atrial size is normal. There is no color  Doppler evidence of an atrial shunt.     Mitral Valve  There is trace mitral regurgitation.     Tricuspid Valve  There is trace tricuspid regurgitation. The right ventricular systolic  pressure is approximated at 36.7 mmHg plus the right atrial pressure.  Pulmonary hypertension.     Aortic Valve  No aortic regurgitation is present. No aortic stenosis is present.     Pulmonic Valve  There is trace pulmonic valvular regurgitation. There is no pulmonic valvular  stenosis.     Vessels  The aortic root is normal size. Normal size ascending aorta. The inferior vena  cava was normal in size with preserved respiratory variability.     Pericardium  There is no pericardial effusion.     Rhythm  Sinus rhythm was noted.  ______________________________________________________________________________  MMode/2D Measurements & Calculations     IVSd: 1.2 cm  LVIDd: 4.7 cm  LVIDs: 3.1 cm  LVPWd: 1.0 cm  IVC diam: 1.9 cm  FS: 34.7 %  LV mass(C)d: 191.1 grams  LV mass(C)dI: 77.9 grams/m2  Ao root diam: 2.9 cm  asc Aorta Diam: 3.2 cm  LVOT diam: 2.1 cm  LVOT area: 3.5 cm2  LA Volume (BP): 48.1 ml  LA Volume Index (BP): 19.6 ml/m2  RWT: 0.44     Doppler Measurements & Calculations  MV E max taj: 79.6 cm/sec  MV A max  jarret: 57.9 cm/sec  MV E/A: 1.4  MV max P.8 mmHg  MV mean PG: 3.0 mmHg  MV V2 VTI: 32.5 cm  MV dec time: 0.21 sec  TR max jarret: 303.0 cm/sec  TR max P.7 mmHg  E/E' av.5  Lateral E/e': 8.9  Medial E/e': 10.2  RV S Jarret: 15.9 cm/sec     ______________________________________________________________________________  Report approved by: Pedro Casillas 2023 12:31 PM         Ammonia   Result Value Ref Range    Ammonia 62 (H) 16 - 60 umol/L   INR   Result Value Ref Range    INR 0.99 0.85 - 1.15   US Abdomen Limited    Narrative    EXAM: US ABDOMEN LIMITED  LOCATION: [facility]  DATE/TIME: 2023 6:31 AM CDT    INDICATION: Elevated ammonia and somnolence.    COMPARISON: None.  TECHNIQUE: Limited abdominal ultrasound.    FINDINGS: Exam compromised by habitus.    GALLBLADDER: Gallstones throughout the gallbladder. Shadowing compromises evaluation of gallbladder wall thickening. Some visualized portions of the gallbladder wall are upper normal in thickness at 3 mm. Negative ultrasonic Marin's sign.    BILE DUCTS: No biliary dilatation. The common duct measures 4 mm.    LIVER: Moderately echogenic parenchyma with smooth contour. No focal mass.    RIGHT KIDNEY: No hydronephrosis.    PANCREAS: The pancreas is largely obscured by overlying gas.    No ascites.      Impression    IMPRESSION:    1.  Diffuse cholelithiasis throughout the gallbladder, compromising evaluation for wall thickening and acute cholecystitis secondary to extensive gallstone shadowing. Portions of the visualized gallbladder wall are upper normal in thickness.    2.  No obvious biliary dilatation.    3.  Moderate hepatic steatosis.     Blood gas venous   Result Value Ref Range    pH Venous 7.27 (L) 7.32 - 7.43    pCO2 Venous 85 (HH) 40 - 50 mm Hg    pO2 Venous 56 (H) 25 - 47 mm Hg    Bicarbonate Venous 39 (H) 21 - 28 mmol/L    Base Excess/Deficit (+/-) 8.8 (H) -7.7 - 1.9 mmol/L    FIO2 30

## 2023-05-07 NOTE — CONSULTS
Neurology Consult Note  [facility]       [May 7, 2023]                                                                                       Admission Date: 2023  Hospital Day: 4  Code Status: Full Code    Patient: Tre Vazquez      : 1975  MRN:  [id number]       CC:      Chief Complaint   Patient presents with     Generalized Weakness       Consult Request:  Referring Provider:  Emeterio Pittman MD    Indication for Consultation:   Which Neurology Group will follow this patient? [facility]   Patient to be seen Routine within 24 hrs   Reason for Consult pt with sz d/o and CP.  Here with decreased mentation in addition to question increased absence sz.  hyperammonemia on divalproex   Note: Specific question for consultant   Consultant may enter orders Yes   Requesting provider? Hospitalist (if different from attending physician)     Primary Care Provider:  Siobhan Mayo MD        HPI:  Tre Vazquez is a 47 year old yo [facility] male admitted for acute respiratory decompensation. He has a reported history of epilepsy since childhood and is presently treated with Depakote, zonisamide, and Keppra, given to him at his care facility. He has a diagnosis of cerebral palsy, but I can find no reference to a prior diagnosis of absence epilepsy. The records I have been able to review date back to , and the patient reports a history of generalized TC epilepsy since childhood, apparently with partial seizures as well, but I can find no prior neurology note describing partial seizures. He has a history of frequent ER visits/hospitalizations for reported breakthrough seizures as well as respiratory failure. His past two reported EEG's have documented no electrophysiological evidence of epilepsy, though an EEG performed in  reportedly showed activity consistent with underlying epilepsy. He has been followed in the past at [facility] [place], but he fired at least two neurologists there,  including Dr. Cox, and has been recently followed in the Neuro clinic at the [facility]. Dr. Cox at [facility] reportedly felt that he had genuine as well as non-epileptic seizures. He has been given diagnoses of oppositional/defiant disorder, cerebral palsy, static cognitive dysfunction since childhood, and severe apnea, likely primary central apnea, but has not been compliant with previously prescribed treatment for apnea. He has been admitted for encephalopathy in the setting of notable hypercapnia. His most recent ammonia level was elevated at 62, but a month ago was as high as 86 micromoles/L. He presented with a pCO2 notably elevated at 86 mm Hg, and his most recent pCO2 remains elevated at 82 mmg Hg. He is very somnolent, though easily aroused, but tends to fall asleep during his evaluation.        A complete review of symptoms was performed including vascular, infectious, cardiovascular, pulmonary, gastrointestinal, endocrinological, hematologic, dermatologic, musculoskeletal, and neurological. All were normal except as above.    CURRENT PROBLEM LIST:  Patient Active Problem List    Diagnosis Date Noted     Advanced directives, counseling/discussion 08/02/2012     Priority: High     Polst completed at [facility] 7/30/2012       Obesity hypoventilation syndrome (H) 05/05/2023     Priority: Medium     Oxygen dependent 05/04/2023     Priority: Medium     Acute and chronic respiratory failure with hypercapnia (H) 05/04/2023     Priority: Medium     Generalized muscle weakness 03/25/2023     Priority: Medium     Cerebral palsy, unspecified type (H) 03/25/2023     Priority: Medium     Acute respiratory failure with hypercapnia (H) 03/15/2023     Priority: Medium     Breakthrough seizure (H) 08/30/2021     Priority: Medium     Nonintractable generalized idiopathic epilepsy without status epilepticus (H) 08/30/2021     Priority: Medium     Morbid obesity (H) 06/05/2020     Priority: Medium     Depression 03/10/2014      Priority: Medium     History of sepsis 12/10/2013     Priority: Medium     Septic shock (H) 11/27/2013     Priority: Medium     Problem list name updated by automated process. Provider to review       Pneumococcal septicemia(038.2) (H) 11/27/2013     Priority: Medium     Non-refractory epilepsy (H) 07/18/2012     Priority: Medium     Cerebral palsy (H) 07/18/2012     Priority: Medium     Encephalomalacia 07/18/2012     Priority: Medium     Benign hypertension 07/18/2012     Priority: Medium       PAST MEDICAL HISTORY:  ALLERGIES:   Allergies   Allergen Reactions     Hydrocodone Bitartrate Er Unknown     Tomato      Cephalexin Rash     HUT Reaction: Rash; HUT Noted: [id number]       Vicodin [Hydrocodone-Acetaminophen] GI Disturbance     Tobacco:    History   Smoking Status     Former     Types: Cigarettes     Start date: 2000     Quit date: 2010   Smokeless Tobacco     Never     Alcohol:  Social History    Substance and Sexual Activity      Alcohol use: No    MEDICATIONS:       CURRENTLY SCHEDULED MEDICATIONS     busPIRone  15 mg Oral BID     citalopram  60 mg Oral Daily     divalproex sodium delayed-release  500 mg Oral TID     lamoTRIgine  200 mg Oral BID     levETIRAcetam  1,000 mg Oral QAM     levETIRAcetam  1,500 mg Oral At Bedtime     levOCARNitine  660 mg Oral TID     pantoprazole  40 mg Oral Daily     polyethylene glycol  17 g Oral Daily     prazosin  2 mg Oral At Bedtime     propranolol  20 mg Oral BID     QUEtiapine ER  300 mg Oral At Bedtime     zonisamide  100 mg Oral QAM     zonisamide  200 mg Oral At Bedtime          HOME MEDICATIONS  Medications Prior to Admission   Medication Sig Dispense Refill Last Dose     ammonium lactate (AMLACTIN) 12 % external cream Apply topically 2 times daily        busPIRone (BUSPAR) 15 MG tablet Take 15 mg by mouth 2 times daily   5/4/2023 at am     citalopram (CELEXA) 20 MG tablet Take 20 mg by mouth daily   5/4/2023 at am     citalopram (CELEXA) 40 MG tablet Take 40 mg by  mouth daily   5/4/2023 at am     clotrimazole (LOTRIMIN) 1 % external solution Apply topically 2 times daily   5/4/2023 at am     divalproex sodium delayed-release (DEPAKOTE) 500 MG DR tablet Take 1 tablet (500 mg) by mouth 3 times daily 270 tablet 3 5/4/2023 at am     lamoTRIgine (LAMICTAL) 200 MG tablet Take 1 tablet (200 mg) by mouth 2 times daily 60 tablet 11 5/4/2023 at am     levETIRAcetam (KEPPRA) 500 MG tablet Take 2 tablets (1,000 mg) by mouth every morning AND 3 tablets (1,500 mg) At Bedtime. 150 tablet 11 5/4/2023 at am     levOCARNitine (CARNITOR) 330 MG tablet Take 2 tablets (660 mg) by mouth 3 times daily 180 tablet 11 5/4/2023 at x2     methylcellulose (CITRUCEL) powder Take 2 g by mouth 2 times daily   5/4/2023 at am     multivitamin w/minerals (THERA-VIT-M) tablet Take 1 tablet by mouth daily   5/4/2023 at am     pantoprazole (PROTONIX) 40 MG EC tablet Take 40 mg by mouth daily   5/4/2023 at am     polyethylene glycol (MIRALAX) 17 GM/Dose powder Take 1 capful. by mouth daily   5/4/2023 at am     prazosin (MINIPRESS) 2 MG capsule Take 2 mg by mouth At Bedtime   5/3/2023     propranolol (INDERAL) 20 MG tablet Take 20 mg by mouth 2 times daily   5/4/2023 at am     QUEtiapine ER (SEROQUEL XR) 300 MG 24 hr tablet Take 300 mg by mouth At Bedtime   5/3/2023     zonisamide (ZONEGRAN) 100 MG capsule Take 1 capsule (100 mg) by mouth every morning AND 2 capsules (200 mg) At Bedtime. 90 capsule 11 5/4/2023 at am     MEDICAL HISTORY  Past Medical History:   Diagnosis Date     Blindness of left eye      Depression 03/10/2014     Hypertension      Pneumococcal septicemia(038.2) (H) 11/26/2013    Hospitalized     Pneumonia, organism unspecified(486) 11/26/2013    Hospitalized     Uncomplicated asthma      Unspecified epilepsy without mention of intractable epilepsy      SURGICAL HISTORY  Past Surgical History:   Procedure Laterality Date     COLONOSCOPY N/A 12/1/2022    Procedure: COLONOSCOPY;  Surgeon: Paulette  "Michael Corona MD;  Location: [facility] OR     ESOPHAGOSCOPY, GASTROSCOPY, DUODENOSCOPY (EGD), COMBINED N/A 2022    Procedure: ESOPHAGOGASTRODUODENOSCOPY with biopsy;  Surgeon: Michael Caldwell MD;  Location: [facility] OR     ORTHOPEDIC SURGERY      pt stated past surgery on both ankles     FAMILY HISTORY    No family history on file.  SOCIAL HISTORY  Social History     Socioeconomic History     Marital status: Single   Tobacco Use     Smoking status: Former     Types: Cigarettes     Start date:      Quit date:      Years since quittin.3     Smokeless tobacco: Never   Substance and Sexual Activity     Alcohol use: No     Drug use: No     Sexual activity: Never         GENERAL EXAMINATION:    He is a middle-aged, well-developed, obese man, 5' 7.5\" and 315 lbs 0 oz. Blood pressure is 133/76. His peripheral pulses are intact at 73 and regular. He has no carotid bruits. His conjunctivae are normal. His oropharynx is without lesions or inflammation. Skin examination is unremarkable. He has no pretibial edema, rashes, or unusual lesions. Nail examination shows no clubbing, cyanosis, or deformities. His respiratory examination is unremarkable, with rate of 18, unlabored. He is afebrile. He has NC O2 on, and is satting at ~97-98%.        Height: 171.5 cm (5' 7.5\")     Temp: 98  F (36.7  C)   Weight: 142.9 kg (315 lb)    Temp src: Oral         BP: 133/76         Estimated body mass index is 48.61 kg/m  as calculated from the following:    Height as of this encounter: 1.715 m (5' 7.5\").    Weight as of this encounter: 142.9 kg (315 lb).    Resp: 18   SpO2: 98 %   O2 Device: Nasal cannula     Blood Pressure:   BP Readings from Last 3 Encounters:   23 133/76   23 (!) 141/80   23 114/65       T24 : Temp (24hrs), Av.7  F (36.5  C), Min:97.5  F (36.4  C), Max:98  F (36.7  C)       NEUROLOGICAL EXAMINATION  Mental Status:  He is arouseable but sleepy. He speaks in short sentences, and is slow " responding to questions. He knows that he is in the hospital, and reports that he was told that he is scheduled for discharge from the hospital tomorrow. He has no aphasia. He fell asleep twice during the interview.    Station and Gait: He is confined to bed. He has a lunch tray in front of him, but has difficulty feeding himself, and asked me to help him place the straw of his drink in his mouth. He has notable course tremor in both arms making movements difficult for him.    Skull and Spine: His head is atraumatic. His spine is non-tender.    Cranial Nerves: He reportedly has left eye blindness, but he seems unable to see anything on his tray, and uses his hands to search for objects (food or drinks). His face is symmetric at rest and with movement.  Hearing is intact.    Motor: Muscle bulk is preserved. He has no weakness in his hands, and is able to manipulate objects with both hands.    Sensory: Sensation is symmetric in his arms.    Coordination: He has no resting tremor, but has a course postural and action tremor bilaterally.    .  LABORATORY RESULTS      SMA-7:  Recent Labs   Lab Test 05/07/23  0723 05/05/23  0805 05/04/23  1517 03/29/23  0758 03/28/23  0810 03/27/23  0643    139 143  --   --  143   POTASSIUM 4.1 4.4 4.1 4.0   < > 3.7   CHLORIDE 99 98 98  --   --  103   CO2 39* 35* 39*  --   --  34*   * 129* 99  --   --  120*   BUN 21.7* 19.8 16.2  --   --  17.8   CR 0.68 0.69 0.77  --   --  0.60*   ARNOLD 8.7 8.9 9.3  --   --  8.7    < > = values in this interval not displayed.     Recent Labs   Lab Test 03/29/23  0758 03/28/23  1549 03/28/23  0810   MAG 1.6* 2.1 1.4*     Recent Labs   Lab Test 03/24/23  0622 03/23/23  1110 03/22/23  1456   PHOS 4.1 2.5 2.6   1.43 mg (actual weight)  CMP:  Recent Labs   Lab 05/06/23  0637   PROTTOTAL 5.9*   ALBUMIN 3.6   ALKPHOS 61   AST 30   ALT 21   BILITOTAL 0.2     CBC:    Recent Labs   Lab 05/05/23  0805 05/04/23  1517   WBC 7.9 6.0   RBC 4.10* 4.12*   HGB  12.5* 12.8*   HCT 40.3 41.3   MCV 98 100   * 192   U/A:    Recent Labs   Lab Test 05/04/23  2130   COLOR Yellow   APPEARANCE Clear   URINEGLC Negative   URINEBILI Negative   URINEKETONE Negative   SG 1.020   UBLD Negative   URINEPH 7.0   PROTEIN Negative   NITRITE Negative   LEUKEST Negative   RBCU 2   WBCU 2     Cholesterol Panel:   Lab Results   Component Value Date    CHOL 155 02/22/2013    HDL 51 02/22/2013    LDL 80 02/22/2013    TRIG 146 03/18/2023    TRIG 119 02/22/2013    CHOLHDLRATIO 3.0 02/22/2013    VLDL 24 02/22/2013     Lipase:   Lab Results   Component Value Date    LIPASE 69 06/20/2022    LIPASE 38 03/11/2012     HgA1c:   Hemoglobin A1C   Date Value Ref Range Status   06/22/2008 5.3 4.3 - 6.0 % Final     TSH: No results for input(s): TSH in the last 168 hours.  CK: No results found for: CKTOTAL  No results found for: CKTOTAL, CKMB, TROPN  Ammonia:    Recent Labs   Lab Test 05/06/23  1218 04/12/23  1517 05/05/22  1137   JAYLA 62* 82* 60*     Vitamin B12: No lab results found.  Vitamin D: No results for input(s): VITDT in the last 168 hours.  ESR: No lab results found.  CRP: No results found for: CRP  Recent Labs   Lab 05/04/23  1517   NTBNPI 69   INR:    Recent Labs   Lab 05/06/23  1235   INR 0.99     ABG:   Recent Labs   Lab 05/07/23  0723 05/06/23  0637 05/05/23  0805 05/04/23  2351   O2PER 30 30 30 3     Blood Cultures: No results for input(s): CULT in the last 168 hours.    Depakote level:     Recent Labs   Lab Test 08/31/21  1007 08/30/21  1619   LAUREN 37 30   Keppra  Level:    No lab results found.    IMAGING RESULTS   US Abdomen Limited    Result Date: 5/7/2023  EXAM: US ABDOMEN LIMITED LOCATION: [facility] DATE/TIME: 5/7/2023 6:31 AM CDT INDICATION: Elevated ammonia and somnolence.  COMPARISON: None. TECHNIQUE: Limited abdominal ultrasound. FINDINGS: Exam compromised by habitus. GALLBLADDER: Gallstones throughout the gallbladder. Shadowing compromises evaluation of  gallbladder wall thickening. Some visualized portions of the gallbladder wall are upper normal in thickness at 3 mm. Negative ultrasonic Marin's sign. BILE DUCTS: No biliary dilatation. The common duct measures 4 mm. LIVER: Moderately echogenic parenchyma with smooth contour. No focal mass. RIGHT KIDNEY: No hydronephrosis. PANCREAS: The pancreas is largely obscured by overlying gas. No ascites.     IMPRESSION: 1.  Diffuse cholelithiasis throughout the gallbladder, compromising evaluation for wall thickening and acute cholecystitis secondary to extensive gallstone shadowing. Portions of the visualized gallbladder wall are upper normal in thickness. 2.  No obvious biliary dilatation. 3.  Moderate hepatic steatosis.     Echocardiogram Complete    Result Date: 2023  [id number]^LUCIAN^KEEGAN^JOHN  [facility] [address] [place] [address]  Name: KOJO BROWN MRN: [id number] : 1975 Study Date: 2023 08:37 AM Age: 47 yrs Gender: Male Patient Location: [facility] Reason For Study: Cor Pulmonale Ordering Physician: KEEGAN EPSTEIN Performed By: Desiree Snyder  BSA: 2.5 m2 Height: 67 in Weight: 315 lb HR: 75 BP: 130/61 mmHg ______________________________________________________________________________ Procedure Complete Portable Echo Adult. Optison (NDC #[id number]) given intravenously. ______________________________________________________________________________ Interpretation Summary  Left ventricular systolic function is normal.The visual ejection fraction is 60-65%. The right ventricular systolic function is normal. Pulmonary hypertension- RVSP 37 mm hg +RA. The inferior vena cava was normal in size with preserved respiratory variability. ______________________________________________________________________________ Left Ventricle The left ventricle is normal in size. There is concentric remodeling present. Diastolic Doppler findings  (E/E' ratio and/or other parameters) suggest left ventricular filling pressures are indeterminate. Left ventricular systolic function is normal. The visual ejection fraction is 60-65%. No regional wall motion abnormalities noted.  Right Ventricle The right ventricle is mildly dilated. The right ventricular systolic function is normal. The right ventricle is not well visualized.  Atria Normal left atrial size. Right atrial size is normal. There is no color Doppler evidence of an atrial shunt.  Mitral Valve There is trace mitral regurgitation.  Tricuspid Valve There is trace tricuspid regurgitation. The right ventricular systolic pressure is approximated at 36.7 mmHg plus the right atrial pressure. Pulmonary hypertension.  Aortic Valve No aortic regurgitation is present. No aortic stenosis is present.  Pulmonic Valve There is trace pulmonic valvular regurgitation. There is no pulmonic valvular stenosis.  Vessels The aortic root is normal size. Normal size ascending aorta. The inferior vena cava was normal in size with preserved respiratory variability.  Pericardium There is no pericardial effusion.  Rhythm Sinus rhythm was noted. ______________________________________________________________________________ MMode/2D Measurements & Calculations  IVSd: 1.2 cm LVIDd: 4.7 cm LVIDs: 3.1 cm LVPWd: 1.0 cm IVC diam: 1.9 cm FS: 34.7 % LV mass(C)d: 191.1 grams LV mass(C)dI: 77.9 grams/m2 Ao root diam: 2.9 cm asc Aorta Diam: 3.2 cm LVOT diam: 2.1 cm LVOT area: 3.5 cm2 LA Volume (BP): 48.1 ml LA Volume Index (BP): 19.6 ml/m2 RWT: 0.44  Doppler Measurements & Calculations MV E max jarret: 79.6 cm/sec MV A max jarret: 57.9 cm/sec MV E/A: 1.4 MV max P.8 mmHg MV mean PG: 3.0 mmHg MV V2 VTI: 32.5 cm MV dec time: 0.21 sec TR max jarret: 303.0 cm/sec TR max P.7 mmHg E/E' av.5 Lateral E/e': 8.9 Medial E/e': 10.2 RV S Jarret: 15.9 cm/sec  ______________________________________________________________________________ Report approved by:  Pedro Casillas 05/06/2023 12:31 PM       Chest XR,  PA & LAT    Result Date: 5/4/2023  EXAM: XR CHEST 2 VIEWS LOCATION: [facility] DATE/TIME: 5/4/2023 5:04 PM CDT INDICATION: SOB COMPARISON: 03/25/2023     IMPRESSION: Heart size and pulmonary vascularity normal. Minimal atelectasis left base, lungs otherwise clear.      Recent Results (from the past 24 hour(s))   US Abdomen Limited    Narrative    EXAM: US ABDOMEN LIMITED  LOCATION: [facility]  DATE/TIME: 5/7/2023 6:31 AM CDT    INDICATION: Elevated ammonia and somnolence.    COMPARISON: None.  TECHNIQUE: Limited abdominal ultrasound.    FINDINGS: Exam compromised by habitus.    GALLBLADDER: Gallstones throughout the gallbladder. Shadowing compromises evaluation of gallbladder wall thickening. Some visualized portions of the gallbladder wall are upper normal in thickness at 3 mm. Negative ultrasonic Marin's sign.    BILE DUCTS: No biliary dilatation. The common duct measures 4 mm.    LIVER: Moderately echogenic parenchyma with smooth contour. No focal mass.    RIGHT KIDNEY: No hydronephrosis.    PANCREAS: The pancreas is largely obscured by overlying gas.    No ascites.      Impression    IMPRESSION:    1.  Diffuse cholelithiasis throughout the gallbladder, compromising evaluation for wall thickening and acute cholecystitis secondary to extensive gallstone shadowing. Portions of the visualized gallbladder wall are upper normal in thickness.    2.  No obvious biliary dilatation.    3.  Moderate hepatic steatosis.       _____________________________________________________________________________    EKG:        ASSESSMENT     1. Encephalopathy. He appears to have a metabolic encephalopathy, most likely related to his hypercapnia. His O2 levels by pulse ox are adequate, but he has had notable hypercapnia on serial blood gases. Given his baseline static cognitive impairment, I suspect that his chronic hyperammonemia is  contributing to his encephalopathy. His liver enzymes are normal, making cirrhosis less likely a cause of his hyperammonemia. He likely has hyperammonemia as a result of the Depakote he takes. He appears to have mild anemia, raising the possibility that GI bleeding is contributing, but this seems less likely.  2. Epilepsy. He has a history of generalized epilepsy with likely pseudoseizures, but I suspect he has non-epileptic spells that he considers to be seizures. Under the circumstances it is challenging to treat the underlying cause of his spells without the risk of over medication/side effects from the seizure medications.  3. Severe apnea with hypercapnia. He likely has central sleep apnea with chronic hypoventilation syndrome exacerbated by body habitus. He requires ventilatory support (BiPAP) but has not tolerated his prescribed treatment to now. He is tolerating NC O2, but it remains unclear if the O2 is exacerbating his hypoventilation/CO2 retention.  4. Anemia, cause uncertain.  5. Prerenal azotemia.    RECOMMENDATIONS   1. I am concerned that his chronic hyperammonemia, likely due to his Depakote use, is contributing to his underlying chronic cognitive dysfunction. As it has been difficult to determine if his spells of falling/loss of consciousness are due to seizure activity versus other causes, I recommend he be [facility] If he does not require the Depakote, his hyperammonemia would likely resolve after discontinuing the medication. I recommend continuing his present medications, unchanged, but I recommend contacting his treating neurologist at the [facility]  for recommendations regarding potentially reducing his Depakote dose while hospitalized..  2. Work to encourage him to use BiPAP for his hypoventilation syndrome, which is a serious contributor to his inability to interact during the day.    One of my colleagues can be contacted tomorrow if further questions  arise.        Hector Conner M.D., Ph.D.    [facility], [place].

## 2023-05-08 ENCOUNTER — APPOINTMENT (OUTPATIENT)
Dept: PHYSICAL THERAPY | Facility: CLINIC | Age: 48
DRG: 091 | End: 2023-05-08
Payer: MEDICARE

## 2023-05-08 LAB
ANION GAP SERPL CALCULATED.3IONS-SCNC: 6 MMOL/L (ref 7–15)
BASE EXCESS BLDV CALC-SCNC: 8.7 MMOL/L (ref -7.7–1.9)
BUN SERPL-MCNC: 15.1 MG/DL (ref 6–20)
CALCIUM SERPL-MCNC: 9 MG/DL (ref 8.6–10)
CHLORIDE SERPL-SCNC: 99 MMOL/L (ref 98–107)
CREAT SERPL-MCNC: 0.62 MG/DL (ref 0.67–1.17)
DEPRECATED HCO3 PLAS-SCNC: 38 MMOL/L (ref 22–29)
GFR SERPL CREATININE-BSD FRML MDRD: >90 ML/MIN/1.73M2
GLUCOSE SERPL-MCNC: 92 MG/DL (ref 70–99)
HCO3 BLDV-SCNC: 39 MMOL/L (ref 21–28)
O2/TOTAL GAS SETTING VFR VENT: 30 %
PCO2 BLDV: 83 MM HG (ref 40–50)
PH BLDV: 7.28 [PH] (ref 7.32–7.43)
PO2 BLDV: 42 MM HG (ref 25–47)
POTASSIUM SERPL-SCNC: 4.1 MMOL/L (ref 3.4–5.3)
SODIUM SERPL-SCNC: 143 MMOL/L (ref 136–145)

## 2023-05-08 PROCEDURE — 94660 CPAP INITIATION&MGMT: CPT

## 2023-05-08 PROCEDURE — 250N000013 HC RX MED GY IP 250 OP 250 PS 637: Performed by: INTERNAL MEDICINE

## 2023-05-08 PROCEDURE — 99233 SBSQ HOSP IP/OBS HIGH 50: CPT | Performed by: INTERNAL MEDICINE

## 2023-05-08 PROCEDURE — 36415 COLL VENOUS BLD VENIPUNCTURE: CPT | Performed by: INTERNAL MEDICINE

## 2023-05-08 PROCEDURE — 250N000013 HC RX MED GY IP 250 OP 250 PS 637: Performed by: STUDENT IN AN ORGANIZED HEALTH CARE EDUCATION/TRAINING PROGRAM

## 2023-05-08 PROCEDURE — 999N000157 HC STATISTIC RCP TIME EA 10 MIN

## 2023-05-08 PROCEDURE — 80048 BASIC METABOLIC PNL TOTAL CA: CPT | Performed by: INTERNAL MEDICINE

## 2023-05-08 PROCEDURE — 82803 BLOOD GASES ANY COMBINATION: CPT | Performed by: INTERNAL MEDICINE

## 2023-05-08 PROCEDURE — 97530 THERAPEUTIC ACTIVITIES: CPT | Mod: GP | Performed by: PHYSICAL THERAPIST

## 2023-05-08 PROCEDURE — 120N000001 HC R&B MED SURG/OB

## 2023-05-08 RX ADMIN — DIVALPROEX SODIUM 500 MG: 500 TABLET, DELAYED RELEASE ORAL at 15:53

## 2023-05-08 RX ADMIN — ZONISAMIDE 100 MG: 100 CAPSULE ORAL at 09:46

## 2023-05-08 RX ADMIN — BUSPIRONE HYDROCHLORIDE 15 MG: 15 TABLET ORAL at 09:46

## 2023-05-08 RX ADMIN — PANTOPRAZOLE SODIUM 40 MG: 40 TABLET, DELAYED RELEASE ORAL at 09:46

## 2023-05-08 RX ADMIN — LEVETIRACETAM 1500 MG: 500 TABLET, FILM COATED ORAL at 21:38

## 2023-05-08 RX ADMIN — CITALOPRAM HYDROBROMIDE 60 MG: 20 TABLET ORAL at 09:44

## 2023-05-08 RX ADMIN — PROPRANOLOL HYDROCHLORIDE 20 MG: 20 TABLET ORAL at 20:03

## 2023-05-08 RX ADMIN — LEVOCARNITINE 660 MG: 330 TABLET ORAL at 09:45

## 2023-05-08 RX ADMIN — BUSPIRONE HYDROCHLORIDE 15 MG: 15 TABLET ORAL at 20:03

## 2023-05-08 RX ADMIN — PROPRANOLOL HYDROCHLORIDE 20 MG: 20 TABLET ORAL at 09:46

## 2023-05-08 RX ADMIN — LAMOTRIGINE 200 MG: 200 TABLET ORAL at 20:02

## 2023-05-08 RX ADMIN — PRAZOSIN HYDROCHLORIDE 2 MG: 1 CAPSULE ORAL at 21:38

## 2023-05-08 RX ADMIN — QUETIAPINE FUMARATE 300 MG: 300 TABLET, EXTENDED RELEASE ORAL at 21:37

## 2023-05-08 RX ADMIN — POLYETHYLENE GLYCOL 3350 17 G: 17 POWDER, FOR SOLUTION ORAL at 09:44

## 2023-05-08 RX ADMIN — DIVALPROEX SODIUM 500 MG: 500 TABLET, DELAYED RELEASE ORAL at 20:03

## 2023-05-08 RX ADMIN — ZONISAMIDE 200 MG: 100 CAPSULE ORAL at 21:37

## 2023-05-08 RX ADMIN — LEVOCARNITINE 660 MG: 330 TABLET ORAL at 15:53

## 2023-05-08 RX ADMIN — IBUPROFEN 200 MG: 200 TABLET, FILM COATED ORAL at 05:00

## 2023-05-08 RX ADMIN — LEVOCARNITINE 660 MG: 330 TABLET ORAL at 20:02

## 2023-05-08 RX ADMIN — LEVETIRACETAM 1000 MG: 500 TABLET, FILM COATED ORAL at 09:45

## 2023-05-08 RX ADMIN — DIVALPROEX SODIUM 500 MG: 500 TABLET, DELAYED RELEASE ORAL at 09:46

## 2023-05-08 RX ADMIN — LAMOTRIGINE 200 MG: 200 TABLET ORAL at 09:45

## 2023-05-08 ASSESSMENT — ACTIVITIES OF DAILY LIVING (ADL)
ADLS_ACUITY_SCORE: 48
ADLS_ACUITY_SCORE: 49
ADLS_ACUITY_SCORE: 49
ADLS_ACUITY_SCORE: 48
ADLS_ACUITY_SCORE: 50
ADLS_ACUITY_SCORE: 49
ADLS_ACUITY_SCORE: 48
ADLS_ACUITY_SCORE: 50
ADLS_ACUITY_SCORE: 48
ADLS_ACUITY_SCORE: 49
ADLS_ACUITY_SCORE: 48
ADLS_ACUITY_SCORE: 53

## 2023-05-08 NOTE — PROVIDER NOTIFICATION
"Dr. Bhatia paged [phone]: Pt reports having a seizure. He says he was \"twitching\". He is currently eating breakfast and talking alright.     Addendum: Spoke w/ Dr. Bhatia, no new orders at this time.       "

## 2023-05-08 NOTE — PLAN OF CARE
Shift Summary [id number]:    VSS on 2L O2 NC. Oriented to self and place. Denies pain. LS dim, DE LA FUENTE. PCO2 83, MD aware. Up A1 walker GB. Had 2 BM this shift. L PIV SL. Discharge back to [facility].     Goal Outcome Evaluation:      Plan of Care Reviewed With: patient    Overall Patient Progress: improving

## 2023-05-08 NOTE — PLAN OF CARE
Goal Outcome Evaluation:    Lethargic. Disoriented to situation. Frequently on call light. On continuous BIPAP, compliant. PRN ibuprofen given for right arm pain. Redness in groin. Elyssa alarm in bed. Assist x2 with walker, refuses gait belt. Plan: neurology recommending epilepsy monitoring, contacting pt neurologist about reducing Depakote.

## 2023-05-08 NOTE — PROGRESS NOTES
This writer spoke with Jyoti Bradley in Lab. Pt PC02 is 83. This information was given to Primary MARYJANE MALIK and Dr. Bhatia.

## 2023-05-08 NOTE — PROGRESS NOTES
A BiPAP of  16/7 @ 30% was applied to the pt via the mask for NOC use. Skin is intact. Pt is tolerating it well. Will continue to monitor and assess the pt's current respiratory status and needs.     Allyson Mendez, RT              4

## 2023-05-08 NOTE — PROGRESS NOTES
[facility]    Medicine Progress Note - Hospitalist Service    Date of Admission:  5/4/2023    Assessment & Plan   Tre Vazquez is a 47 year old male who resides in a group home and came to attention on 5/4/2023 due to worsening shortness of breath.  Notably, the patient has chronic shortness of breath with associated chronic hypoxic and hypercapnic respiratory failure managed on 2 to 3 L/min supplemental oxygen at baseline.    Past medical history is significant for cerebral palsy with mild spastic paraparesis, mild intellectual disability, congenital left eye blindness, seizure disorder, hypertension, morbid obesity, borderline personality disorder, PTSD, schizoaffective disorder, obstructive sleep apnea with BiPAP noncompliance and chronic hypercapnic respiratory failure.   Notably, the patient was admitted here at [facility] with a similar presentation but was intubated for several days.      At this time, 5/8, I had an opportunity to speak with Dr. Hoff from [facility] as well as with Dr. Johnson.  I called [employer] in order to get records of the device settings that previously have been beneficial for the patient.  It is about 4:45 at this time and I note that they committed to calling back tomorrow.  The phone number I called was [phone].    I am currently awaiting a callback from his neurologist Dr. Merchant who works in [place] for [facility].    It is notable that the patient's mother and the supervisor of his group home are both advocating on behalf of Mr. Vazquez.  They both also have been important in assisting with history.    Consults: Neuro (Ubaldo) and Pulmonary (Phillip)    DX:  1.  Multifactorial toxic/metabolic encephalopathy.  Suspect components of both chronic hypercapnia and elevated ammonia levels.  Hypercapnia fluctuating between about 70 and 85 while the pH is roughly 7.27-7.32.  Elevated ammonia at 62 has been very elevated in the past year at least.   Suspect Valproic acid-related.   2.   Recurrent acute on chronic hypoxic and hypercapnic respiratory failure related to obesity and ANA.  He is at high risk of decompensating without an appropriate supportive device at home, and for that reason the patient was admitted to inpatient status.  Patient has been noncompliant in the past with multiple different devices.  With his cognitive issues, his compliance needs to be the responsibility of others around him.  3.  Pulmonary hypertension presumably due to persistent and under-treated ANA.   4.  Seizure disorder on 4 antisz meds in addition to Levocarnitine.  Despite a persistently elevated ammonia level, the patient has not had a change in his Depakote.    5.  Cognitive delay.  6.  Morbid obesity and decreased mobility complicating cares. Functional restrictive physiology.    PLAN:  1.  Empirically resume BiPAP with sleep.  I am hoping that Bruno will be able to give us the settings that previously were felt to be appropriate for the patient.  They supply the patient's oxygen condenser and previously have given him various machines that subsequently were taken away because of noncompliance.  The hope is to advocate with them to again offer the patient a respiratory device to help with chronic hypercapnia/hypoventilation.  2.  Formal consult from Dr. Fisher.  Follow-up this week with Dr. Johnson.  3.  Echocardiogram indicates stability in RV pressures relative to echo from 2019.    4.  Cont O2 at 2-3 LPM by NC while awake.  5.  Neuro consultant indicates that the chronic hyperammonemia is contributing to his cognitive dysfunction and presumably his encephalopathy.  See Dr. Conner's consult note for recommendations.       Diet: Regular Diet Adult  Diet    DVT Prophylaxis: Low Risk/Ambulatory with no VTE prophylaxis indicated  Reid Catheter: Not present  Lines: None     Cardiac Monitoring: None  Code Status: Full Code      Clinically Significant Risk Factors              "            # Severe Obesity: Estimated body mass index is 48.61 kg/m  as calculated from the following:    Height as of this encounter: 1.715 m (5' 7.5\").    Weight as of this encounter: 142.9 kg (315 lb)., PRESENT ON ADMISSION         Disposition Plan     Expected Discharge Date: 05/08/2023      Destination: group home            Wesley Bhatia MD  Hospitalist Service    Securely message with [employer] (more info)  Text page via [employer] Paging/Directory   ______________________________________________________________________    Interval History   Stable night. Ambulated with nursing. No documentation of recurrent episodes of suspected absence sz activity. Active all night, unclear how much he slept and used BiPAP.      Clinically stable.  The patient is eating well.  Reports he is having normal bowel movements and urinating normally.  He is not being very active but indicates that he is comfortable.  He sleeps much of the day.    I made multiple calls today on behalf of this patient including to Mariama Hoff, Elizabeth and Shonda.  I also called Bruno.    Physical Exam   Vital Signs: Temp: 97  F (36.1  C) Temp src: Oral BP: 129/81 Pulse: 66   Resp: 18 SpO2: 92 % O2 Device: BiPAP/CPAP Oxygen Delivery: 2 LPM  Weight: 315 lbs 0 oz    Constitutional: awake but sleepy and falls asleep mid-sentence.  Maintains his left eye closed, alert, cooperative, no apparent distress and morbidly obese.  Dysarthric speech.  Eyes: right eye appears normal without scleral icterus.  Right Bell's palsy.  Does not open the left eye.  (Congenitally blind and without a visible pupil.)   ENT: right facial droop.    Respiratory: no increased WOB, very distant breath sounds. Poor air entry throughout.  Cardiovascular: distant heart sounds.  GI: obese, soft, NTND  Skin: no bruising or bleeding and normal skin color, texture, turgor  Musculoskeletal: no lower extremity pitting edema present  Neurologic: alert and " interactive. Word choice and thought processes are appropriate.    Medical Decision Making       50 MINUTES SPENT BY ME on the date of service doing chart review, history, exam, documentation & further activities per the note.      Data   No results found for this or any previous visit (from the past 24 hour(s)).

## 2023-05-08 NOTE — PLAN OF CARE
Goal Outcome Evaluation:      Plan of Care Reviewed With: patient      A&OX3. Disoriented to situation and time sometimes. LS diminished, but clear. VSS oxygen 94% on 2L NC. Patient is started back on BIPAP. Refused BIPAP at times. Up with assist of 2 with gait belt and walker. Continent of B&B. Redness to coccyx and groin area. Applied barrier cream after pericare. Reg diet. Patient is in bed, with Posy alarm intact because the bariatric bed alarm is faulty.  Reported off the on coming nurse.

## 2023-05-09 ENCOUNTER — APPOINTMENT (OUTPATIENT)
Dept: PHYSICAL THERAPY | Facility: CLINIC | Age: 48
DRG: 091 | End: 2023-05-09
Payer: MEDICARE

## 2023-05-09 ENCOUNTER — TELEPHONE (OUTPATIENT)
Dept: NEUROLOGY | Facility: CLINIC | Age: 48
End: 2023-05-09

## 2023-05-09 PROBLEM — T59.891A HYPERAMMONEMIC ENCEPHALOPATHY: Status: ACTIVE | Noted: 2023-05-09

## 2023-05-09 PROBLEM — G92.8 HYPERAMMONEMIC ENCEPHALOPATHY: Status: ACTIVE | Noted: 2023-05-09

## 2023-05-09 PROBLEM — G92.8 TOXIC METABOLIC ENCEPHALOPATHY: Status: ACTIVE | Noted: 2023-05-09

## 2023-05-09 LAB
ANION GAP SERPL CALCULATED.3IONS-SCNC: 5 MMOL/L (ref 7–15)
BACTERIA BLD CULT: NO GROWTH
BACTERIA BLD CULT: NO GROWTH
BASE EXCESS BLDV CALC-SCNC: 11 MMOL/L (ref -7.7–1.9)
BUN SERPL-MCNC: 18.1 MG/DL (ref 6–20)
CALCIUM SERPL-MCNC: 9.1 MG/DL (ref 8.6–10)
CHLORIDE SERPL-SCNC: 98 MMOL/L (ref 98–107)
CREAT SERPL-MCNC: 0.63 MG/DL (ref 0.67–1.17)
DEPRECATED HCO3 PLAS-SCNC: 38 MMOL/L (ref 22–29)
GFR SERPL CREATININE-BSD FRML MDRD: >90 ML/MIN/1.73M2
GLUCOSE SERPL-MCNC: 102 MG/DL (ref 70–99)
HCO3 BLDV-SCNC: 41 MMOL/L (ref 21–28)
O2/TOTAL GAS SETTING VFR VENT: 30 %
PCO2 BLDV: 88 MM HG (ref 40–50)
PH BLDV: 7.28 [PH] (ref 7.32–7.43)
PO2 BLDV: 55 MM HG (ref 25–47)
POTASSIUM SERPL-SCNC: 4.4 MMOL/L (ref 3.4–5.3)
SODIUM SERPL-SCNC: 141 MMOL/L (ref 136–145)

## 2023-05-09 PROCEDURE — 99231 SBSQ HOSP IP/OBS SF/LOW 25: CPT | Performed by: INTERNAL MEDICINE

## 2023-05-09 PROCEDURE — 120N000001 HC R&B MED SURG/OB

## 2023-05-09 PROCEDURE — 36415 COLL VENOUS BLD VENIPUNCTURE: CPT | Performed by: INTERNAL MEDICINE

## 2023-05-09 PROCEDURE — 94660 CPAP INITIATION&MGMT: CPT

## 2023-05-09 PROCEDURE — 250N000013 HC RX MED GY IP 250 OP 250 PS 637: Performed by: STUDENT IN AN ORGANIZED HEALTH CARE EDUCATION/TRAINING PROGRAM

## 2023-05-09 PROCEDURE — 99233 SBSQ HOSP IP/OBS HIGH 50: CPT | Performed by: INTERNAL MEDICINE

## 2023-05-09 PROCEDURE — 999N000157 HC STATISTIC RCP TIME EA 10 MIN

## 2023-05-09 PROCEDURE — 82803 BLOOD GASES ANY COMBINATION: CPT | Performed by: INTERNAL MEDICINE

## 2023-05-09 PROCEDURE — 80048 BASIC METABOLIC PNL TOTAL CA: CPT | Performed by: INTERNAL MEDICINE

## 2023-05-09 PROCEDURE — 97530 THERAPEUTIC ACTIVITIES: CPT | Mod: GP

## 2023-05-09 RX ADMIN — DIVALPROEX SODIUM 500 MG: 500 TABLET, DELAYED RELEASE ORAL at 09:16

## 2023-05-09 RX ADMIN — LEVOCARNITINE 660 MG: 330 TABLET ORAL at 20:07

## 2023-05-09 RX ADMIN — PROPRANOLOL HYDROCHLORIDE 20 MG: 20 TABLET ORAL at 20:08

## 2023-05-09 RX ADMIN — BUSPIRONE HYDROCHLORIDE 15 MG: 15 TABLET ORAL at 20:07

## 2023-05-09 RX ADMIN — PANTOPRAZOLE SODIUM 40 MG: 40 TABLET, DELAYED RELEASE ORAL at 09:16

## 2023-05-09 RX ADMIN — LAMOTRIGINE 200 MG: 200 TABLET ORAL at 09:16

## 2023-05-09 RX ADMIN — ZONISAMIDE 200 MG: 100 CAPSULE ORAL at 22:00

## 2023-05-09 RX ADMIN — ZONISAMIDE 100 MG: 100 CAPSULE ORAL at 09:16

## 2023-05-09 RX ADMIN — LEVETIRACETAM 1500 MG: 500 TABLET, FILM COATED ORAL at 22:00

## 2023-05-09 RX ADMIN — CITALOPRAM HYDROBROMIDE 60 MG: 20 TABLET ORAL at 09:16

## 2023-05-09 RX ADMIN — LEVETIRACETAM 1000 MG: 500 TABLET, FILM COATED ORAL at 09:16

## 2023-05-09 RX ADMIN — LEVOCARNITINE 660 MG: 330 TABLET ORAL at 09:16

## 2023-05-09 RX ADMIN — PRAZOSIN HYDROCHLORIDE 2 MG: 1 CAPSULE ORAL at 22:00

## 2023-05-09 RX ADMIN — QUETIAPINE FUMARATE 300 MG: 300 TABLET, EXTENDED RELEASE ORAL at 22:01

## 2023-05-09 RX ADMIN — BUSPIRONE HYDROCHLORIDE 15 MG: 15 TABLET ORAL at 09:16

## 2023-05-09 RX ADMIN — LAMOTRIGINE 200 MG: 200 TABLET ORAL at 20:07

## 2023-05-09 RX ADMIN — PROPRANOLOL HYDROCHLORIDE 20 MG: 20 TABLET ORAL at 09:17

## 2023-05-09 RX ADMIN — POLYETHYLENE GLYCOL 3350 17 G: 17 POWDER, FOR SOLUTION ORAL at 09:17

## 2023-05-09 RX ADMIN — DIVALPROEX SODIUM 500 MG: 500 TABLET, DELAYED RELEASE ORAL at 20:07

## 2023-05-09 RX ADMIN — LEVOCARNITINE 660 MG: 330 TABLET ORAL at 13:19

## 2023-05-09 RX ADMIN — DIVALPROEX SODIUM 500 MG: 500 TABLET, DELAYED RELEASE ORAL at 13:18

## 2023-05-09 ASSESSMENT — ACTIVITIES OF DAILY LIVING (ADL)
ADLS_ACUITY_SCORE: 46
ADLS_ACUITY_SCORE: 46
ADLS_ACUITY_SCORE: 53
ADLS_ACUITY_SCORE: 53
ADLS_ACUITY_SCORE: 50
ADLS_ACUITY_SCORE: 50
ADLS_ACUITY_SCORE: 53
ADLS_ACUITY_SCORE: 50
ADLS_ACUITY_SCORE: 53
ADLS_ACUITY_SCORE: 53
ADLS_ACUITY_SCORE: 50
ADLS_ACUITY_SCORE: 50

## 2023-05-09 NOTE — PLAN OF CARE
Shift Summary [id number]:    VSS on 2L O2 NC. Bipap when sleeping. Oriented to self and place. Denies pain. LS dim, DE LA FUENTE. PCO2 88, MD aware. Up A1 walker GB. No PIV. Discharge back to [facility].     Goal Outcome Evaluation:      Plan of Care Reviewed With: patient    Overall Patient Progress: no change

## 2023-05-09 NOTE — PROGRESS NOTES
Care Management Follow Up    Length of Stay (days): 5    Expected Discharge Date: 05/09/2023     Concerns to be Addressed: return to  with trilogy      Patient plan of care discussed at interdisciplinary rounds: Yes    Anticipated Discharge Disposition:  Return to the [facility]     Anticipated Discharge Services: Trilogy   Anticipated Discharge DME: Oxygen    Patient/family educated on Medicare website which has current facility and service quality ratings: yes    Patient/Family in Agreement with the Plan: yes    Referrals Placed by CM/SW:  DME referral to [facility] for Trilogy      Additional Information:  CM following for discharge planning needs. Patient will return to his  on discharge. Per MD, patient will need Trilogy arranged for discharge. Patient could return to  tomorrow if trilogy can be arranged.     Call received from Wesley [phone] from [facility] regarding patient's need for Trilogy. He confirmed that patient follows with them for home O2. He has had a trilogy in the past but the machine was taken back because patient did not want to wear it. Wesley states they are able to provide his home Trilogy back again on discharge and have worked with the  in the past with set up. Confirmed from MD and Pulm that Bruno should have last settings on his trilogy which can be re-prescribed as before. Wesley emailed a new prescription form for Trilogy NIV. MD signed and form was emailed back. Updated Wesley that anticipated discharge will be tomorrow. IF patient should not leave until after Thurs, MD will need to include this statement in a discharge summary or progress note,  The Pt requires NIV therapy to treat underlying conditions of COPD and subsequent Chronic Respiratory Failure- other therapies have been considered/ tried and ruled out.     Attempted to call Joc[facility]ne [phone]  Manager to update on patient's discharge plan of care. Message left requesting a call back.     ADDENDUM 1350:  Call again placed to  [facility]oc[facility] for update. She in formed CM that she is no longer at that , the director there is Sugar [phone]. Call placed and spoke to Sugar about patient returning tomorrow. She can accept him back and would like him to return by 1500. Updated her that patient would return on Trilogy as he had before and that Bruno would be reaching out to them to set up his home equipment and answer any questions they may have. She is agreeable to this plan. She verified that they use [facility] for any new meds. Patient had [facility]OT prior and will need orders for this again on discharge. Orders can be faxed to them at fax: [phone].    Call placed to [facility] transport and wheelchair transport was arranged for  time between 7221-1920 tomorrow. Updated bedside RN, charge RN, MD and .                   Kathy Blanton RN BSN   Inpatient Care Coordination  [facility]  [phone]

## 2023-05-09 NOTE — PROVIDER NOTIFICATION
05/09/23 0749   Critical Test Results/Notification   Critical Lab Result (Lab Name and Value) PCO2 88   What Time Did The Lab Notify You? 0748   What Provider Did You Notify? Dr. Bhatia   Was the Provider Notified Within 30 Min?  Yes

## 2023-05-09 NOTE — TELEPHONE ENCOUNTER
Dr Bhatia called re current inpatient stay at [facility].Pt has encephalopathy /confusion. He has a high ammonia level, and the concern that is it is related to his Valproic Acid medication. Please call to discuss taking patient off of Valproic Acid.  [phone]

## 2023-05-09 NOTE — PROGRESS NOTES
[facility]    Medicine Progress Note - Hospitalist Service    Date of Admission:  5/4/2023    Assessment & Plan   Tre Vazquez is a 47 year old male who resides in a group home and came to attention on 5/4/2023 due to worsening shortness of breath.  Notably, the patient has chronic shortness of breath with associated chronic hypoxic and hypercapnic respiratory failure managed on 2 to 3 L/min supplemental oxygen at baseline.    Past medical history is significant for cerebral palsy with mild spastic paraparesis, mild intellectual disability, congenital left eye blindness, seizure disorder, hypertension, morbid obesity, borderline personality disorder, PTSD, schizoaffective disorder, obstructive sleep apnea with BiPAP noncompliance and chronic hypercapnic respiratory failure.   Notably, the patient was admitted here at [facility] with a similar presentation but was intubated for several days.      It is notable that the patient's mother and the supervisor of his group home are both advocating on behalf of Mr. Vazquez.  They both also have been important in assisting with history.    Consults: Neuro (Ubaldo) and Pulmonary (Phillip)    DX:  1.  Multifactorial toxic/metabolic encephalopathy.  Suspect components of both chronic hypercapnia and elevated ammonia levels.  Hypercapnia fluctuating between about 70 and 85 while the pH is roughly 7.27-7.32.  Elevated ammonia at 62 has been very elevated in the past year at least.  Suspect Valproic acid-related.   2.   Recurrent acute on chronic hypoxic and hypercapnic respiratory failure related to obesity and ANA.  He is at high risk of decompensating without an appropriate supportive device at home, and for that reason the patient was admitted to inpatient status.  Patient has been noncompliant in the past with multiple different devices.  With his cognitive issues, his compliance needs to be the responsibility of others around him.  3.  Pulmonary hypertension  presumably due to persistent ANA. (Pt non-compliance)  4.  Seizure disorder on 4 antisz meds in addition to Levocarnitine.  Despite a persistently elevated ammonia level, the patient has not had a change in his Depakote.    5.  Cognitive delay.  6.  Morbid obesity and decreased mobility complicating cares. Functional restrictive physiology.    PLAN:  1.  Empirically resume BiPAP with sleep.     With the guidance of the pulmonary consultants, I contacted Bruno who had provided the patient with respiratory devices in the past.  I spoke with Wesley from [facility] at [phone].  He indicated that he would be able to get the patient set up again with a trilogy as early as tomorrow.  He gave me the following settings which I also communicated to Dr. Johnson: UT max 25, PS minimum 5, PS maximum 20, EPAP minimum 8, EPAP maximum 15, tidal volume 300, rate 16, I-, time auto, FiO2 6 L/min while sleeping.  2.  Formal consult from Dr. Fisher.  Follow-up this week with Dr. Johnson.  The patient follows with Dr. Hoff at [facility] as an outpatient.  3.  Echocardiogram indicates stability in RV pressures relative to echo from 2019.    4.  Cont O2 at 2-3 LPM by NC while awake.  5.  Neuro consultant indicates that the chronic hyperammonemia is contributing to his cognitive dysfunction and presumably his encephalopathy.  See Dr. Conner's consult note for recommendations.   I was able to speak with Dr. Griggs, from [facility] neurology in the [facility].  She indicated that her team would be happy to contact Mr. Vazquez' team in order to set up a follow-up appointment.  She feels that they need to address whether the patient should remain on valproic acid, thinking that the hyperammonemia is almost certainly due to the Depakote and that it is probably contributing to encephalopathy for this gentleman.       Diet: Regular Diet Adult  Diet    DVT Prophylaxis: Low Risk/Ambulatory with no VTE prophylaxis indicated  [place]  "Catheter: Not present  Lines: None     Cardiac Monitoring: None  Code Status: Full Code      Clinically Significant Risk Factors                         # Severe Obesity: Estimated body mass index is 48.61 kg/m  as calculated from the following:    Height as of this encounter: 1.715 m (5' 7.5\").    Weight as of this encounter: 142.9 kg (315 lb).          Disposition Plan     Expected Discharge Date: 05/09/2023      Destination: group home     Anticipating discharge to home tomorrow 5/10/23.       Wesley Bhatia MD  Hospitalist Service  [facility]  Securely message with [employer] (more info)  Text page via [facility] Paging/Directory   ______________________________________________________________________    Interval History   Stable night.  No new complaints.  Eating well.      Physical Exam   Vital Signs: Temp: 97.8  F (36.6  C) Temp src: Axillary BP: (!) 152/83 Pulse: 75   Resp: 20 SpO2: 93 % O2 Device: BiPAP/CPAP Oxygen Delivery: 2 LPM  Weight: 315 lbs 0 oz    Constitutional: awake but sleepy and falls asleep mid-sentence.  Maintains his left eye closed, alert, cooperative, no apparent distress and morbidly obese.  Dysarthric speech.  Eyes: right eye appears normal without scleral icterus.  Right Bell's palsy.  Does not open the left eye.  (Congenitally blind and without a visible pupil.)   ENT: right facial droop.    Respiratory: no increased WOB, very distant breath sounds. Poor air entry throughout.  Cardiovascular: distant heart sounds.  GI: obese, soft, NTND  Skin: no bruising or bleeding and normal skin color, texture, turgor  Musculoskeletal: no lower extremity pitting edema present  Neurologic: alert and interactive. Word choice and thought processes are appropriate.    Medical Decision Making       50 MINUTES SPENT BY ME on the date of service doing chart review, history, exam, documentation & further activities per the note.      Data   Results for orders placed or performed during the " hospital encounter of 05/04/23 (from the past 24 hour(s))   Basic metabolic panel   Result Value Ref Range    Sodium 143 136 - 145 mmol/L    Potassium 4.1 3.4 - 5.3 mmol/L    Chloride 99 98 - 107 mmol/L    Carbon Dioxide (CO2) 38 (H) 22 - 29 mmol/L    Anion Gap 6 (L) 7 - 15 mmol/L    Urea Nitrogen 15.1 6.0 - 20.0 mg/dL    Creatinine 0.62 (L) 0.67 - 1.17 mg/dL    Calcium 9.0 8.6 - 10.0 mg/dL    Glucose 92 70 - 99 mg/dL    GFR Estimate >90 >60 mL/min/1.73m2   Blood gas venous   Result Value Ref Range    pH Venous 7.28 (L) 7.32 - 7.43    pCO2 Venous 83 (HH) 40 - 50 mm Hg    pO2 Venous 42 25 - 47 mm Hg    Bicarbonate Venous 39 (H) 21 - 28 mmol/L    Base Excess/Deficit (+/-) 8.7 (H) -7.7 - 1.9 mmol/L    FIO2 30    Blood gas venous   Result Value Ref Range    pH Venous 7.28 (L) 7.32 - 7.43    pCO2 Venous 88 (HH) 40 - 50 mm Hg    pO2 Venous 55 (H) 25 - 47 mm Hg    Bicarbonate Venous 41 (H) 21 - 28 mmol/L    Base Excess/Deficit (+/-) 11.0 (H) -7.7 - 1.9 mmol/L    FIO2 30    Basic metabolic panel   Result Value Ref Range    Sodium 141 136 - 145 mmol/L    Potassium 4.4 3.4 - 5.3 mmol/L    Chloride 98 98 - 107 mmol/L    Carbon Dioxide (CO2) 38 (H) 22 - 29 mmol/L    Anion Gap 5 (L) 7 - 15 mmol/L    Urea Nitrogen 18.1 6.0 - 20.0 mg/dL    Creatinine 0.63 (L) 0.67 - 1.17 mg/dL    Calcium 9.1 8.6 - 10.0 mg/dL    Glucose 102 (H) 70 - 99 mg/dL    GFR Estimate >90 >60 mL/min/1.73m2

## 2023-05-09 NOTE — PROGRESS NOTES
F/u pulmonary.    Patient tolerated bipap 16/7 overnight.  Patient states he can tolerate the mask.  Says he can wear it at night. On 2  LPM during day.      Patient is awake and can answer questions but slight delay in responses.  Lying on side in bed.  Thick neck, short neck.  Crowded oropharynx.  Lungs clear    Venous Blood Gas  Recent Labs   Lab 05/09/23  0733 05/08/23  1221 05/07/23  0723 05/06/23  0637   PHV 7.28* 7.28* 7.27* 7.32   PCO2V 88* 83* 85* 74*   PO2V 55* 42 56* 84*   HCO3V 41* 39* 39* 38*   YOUSIF 11.0* 8.7* 8.8* 9.4*   O2PER 30 30 30 30     A: chronic hypercapneic respiratory failure, known severe ANA plus OHS plus both seizure and pseudo seizure disorder.  Had been prescribed Trilogy machine on various settings but eventually taken back seconday to non compliance.  Notes indicate more family members are willing to ensure more compliance. Elevated ammonia may contribute to encephalopathy.        Rec:  Does not need routine VBG unless significant change in LOC  [employer] should have last settings on his trilogy which can be re-prescribed as this was based on the PSG. If we cannot obtain those, then bipap 18/6 should be adequate empirical therapy for discharge.      D/w Dr Bhatia.

## 2023-05-09 NOTE — PLAN OF CARE
Goal Outcome Evaluation:    Lethargic. Disoriented to place and situation. Frequently on call light. 2L O2 NC when awake, BIPAP when asleep, compliant. Redness in groin and perineum. Assist x1 with walker and gait belt. Plan: eventual discharge back to [facility]

## 2023-05-10 LAB
BASE EXCESS BLDV CALC-SCNC: 13.2 MMOL/L (ref -7.7–1.9)
BASE EXCESS BLDV CALC-SCNC: 9.2 MMOL/L (ref -7.7–1.9)
HCO3 BLDV-SCNC: 40 MMOL/L (ref 21–28)
HCO3 BLDV-SCNC: 44 MMOL/L (ref 21–28)
HOLD SPECIMEN: NORMAL
O2/TOTAL GAS SETTING VFR VENT: 3 %
O2/TOTAL GAS SETTING VFR VENT: 30 %
PCO2 BLDV: 91 MM HG (ref 40–50)
PCO2 BLDV: 92 MM HG (ref 40–50)
PH BLDV: 7.25 [PH] (ref 7.32–7.43)
PH BLDV: 7.28 [PH] (ref 7.32–7.43)
PO2 BLDV: 26 MM HG (ref 25–47)
PO2 BLDV: 28 MM HG (ref 25–47)

## 2023-05-10 PROCEDURE — 36415 COLL VENOUS BLD VENIPUNCTURE: CPT | Performed by: INTERNAL MEDICINE

## 2023-05-10 PROCEDURE — 82803 BLOOD GASES ANY COMBINATION: CPT | Performed by: INTERNAL MEDICINE

## 2023-05-10 PROCEDURE — 36415 COLL VENOUS BLD VENIPUNCTURE: CPT | Performed by: STUDENT IN AN ORGANIZED HEALTH CARE EDUCATION/TRAINING PROGRAM

## 2023-05-10 PROCEDURE — 99232 SBSQ HOSP IP/OBS MODERATE 35: CPT | Performed by: STUDENT IN AN ORGANIZED HEALTH CARE EDUCATION/TRAINING PROGRAM

## 2023-05-10 PROCEDURE — 250N000013 HC RX MED GY IP 250 OP 250 PS 637: Performed by: STUDENT IN AN ORGANIZED HEALTH CARE EDUCATION/TRAINING PROGRAM

## 2023-05-10 PROCEDURE — 120N000001 HC R&B MED SURG/OB

## 2023-05-10 PROCEDURE — 82803 BLOOD GASES ANY COMBINATION: CPT | Performed by: STUDENT IN AN ORGANIZED HEALTH CARE EDUCATION/TRAINING PROGRAM

## 2023-05-10 RX ADMIN — PROPRANOLOL HYDROCHLORIDE 20 MG: 20 TABLET ORAL at 10:23

## 2023-05-10 RX ADMIN — LEVOCARNITINE 660 MG: 330 TABLET ORAL at 15:00

## 2023-05-10 RX ADMIN — LAMOTRIGINE 200 MG: 200 TABLET ORAL at 10:22

## 2023-05-10 RX ADMIN — LAMOTRIGINE 200 MG: 200 TABLET ORAL at 21:21

## 2023-05-10 RX ADMIN — BUSPIRONE HYDROCHLORIDE 15 MG: 15 TABLET ORAL at 21:23

## 2023-05-10 RX ADMIN — DIVALPROEX SODIUM 500 MG: 500 TABLET, DELAYED RELEASE ORAL at 10:22

## 2023-05-10 RX ADMIN — DIVALPROEX SODIUM 500 MG: 500 TABLET, DELAYED RELEASE ORAL at 21:22

## 2023-05-10 RX ADMIN — PROPRANOLOL HYDROCHLORIDE 20 MG: 20 TABLET ORAL at 21:23

## 2023-05-10 RX ADMIN — DIVALPROEX SODIUM 500 MG: 500 TABLET, DELAYED RELEASE ORAL at 15:00

## 2023-05-10 RX ADMIN — PRAZOSIN HYDROCHLORIDE 2 MG: 1 CAPSULE ORAL at 21:22

## 2023-05-10 RX ADMIN — LEVOCARNITINE 660 MG: 330 TABLET ORAL at 10:23

## 2023-05-10 RX ADMIN — POLYETHYLENE GLYCOL 3350 17 G: 17 POWDER, FOR SOLUTION ORAL at 10:23

## 2023-05-10 RX ADMIN — CITALOPRAM HYDROBROMIDE 60 MG: 20 TABLET ORAL at 10:23

## 2023-05-10 RX ADMIN — PANTOPRAZOLE SODIUM 40 MG: 40 TABLET, DELAYED RELEASE ORAL at 10:23

## 2023-05-10 RX ADMIN — ZONISAMIDE 100 MG: 100 CAPSULE ORAL at 10:23

## 2023-05-10 RX ADMIN — QUETIAPINE FUMARATE 300 MG: 300 TABLET, EXTENDED RELEASE ORAL at 21:23

## 2023-05-10 RX ADMIN — LEVETIRACETAM 1000 MG: 500 TABLET, FILM COATED ORAL at 10:23

## 2023-05-10 RX ADMIN — LEVETIRACETAM 1500 MG: 500 TABLET, FILM COATED ORAL at 21:21

## 2023-05-10 RX ADMIN — ZONISAMIDE 200 MG: 100 CAPSULE ORAL at 21:22

## 2023-05-10 RX ADMIN — LEVOCARNITINE 660 MG: 330 TABLET ORAL at 21:23

## 2023-05-10 RX ADMIN — BUSPIRONE HYDROCHLORIDE 15 MG: 15 TABLET ORAL at 10:23

## 2023-05-10 ASSESSMENT — ACTIVITIES OF DAILY LIVING (ADL)
ADLS_ACUITY_SCORE: 49

## 2023-05-10 NOTE — PROGRESS NOTES
[facility]    Medicine Progress Note - Hospitalist Service    Date of Admission:  5/4/2023  Date of Service: 05/10/2023      Assessment & Plan      Tre Vazquez is a 47 year old male who resides in a group home and came to attention on 5/4/2023 due to worsening shortness of breath.  Notably, the patient has chronic shortness of breath with associated chronic hypoxic and hypercapnic respiratory failure managed on 2 to 3 L/min supplemental oxygen at baseline.    Past medical history is significant for cerebral palsy with mild spastic paraparesis, mild intellectual disability, congenital left eye blindness, seizure disorder, hypertension, morbid obesity, borderline personality disorder, PTSD, schizoaffective disorder, obstructive sleep apnea with BiPAP noncompliance and chronic hypercapnic respiratory failure.   Notably, the patient was admitted here at [facility] with a similar presentation but was intubated for several days.      It is notable that the patient's mother and the supervisor of his group home are both advocating on behalf of Mr. Vazquez.  They both also have been important in assisting with history.    Consults: Neuro (Ubaldo) and Pulmonary (Phillip)    DX:  1.  Multifactorial toxic/metabolic encephalopathy.  Suspect components of both chronic hypercapnia and elevated ammonia levels.  Hypercapnia fluctuating between about 70 and 85 while the pH is roughly 7.27-7.32.  Elevated ammonia at 62 has been very elevated in the past year at least.  Suspect Valproic acid-related.   2.   Recurrent acute on chronic hypoxic and hypercapnic respiratory failure related to obesity and ANA.  He is at high risk of decompensating without an appropriate supportive device at home, and for that reason the patient was admitted to inpatient status.  Patient has been noncompliant in the past with multiple different devices.  With his cognitive issues, his compliance needs to be the responsibility of others around  him.  3.  Pulmonary hypertension presumably due to persistent ANA. (Pt non-compliance)  4.  Seizure disorder on 4 antisz meds in addition to Levocarnitine.  Despite a persistently elevated ammonia level, the patient has not had a change in his Depakote.    5.  Cognitive delay.  6.  Morbid obesity and decreased mobility complicating cares. Functional restrictive physiology.    PLAN:  1.  Empirically resume BiPAP with sleep.     With the guidance of the pulmonary consultants, I contacted [facility] who had provided the patient with respiratory devices in the past.  I spoke with Wesley from [employer] at [phone].  He indicated that he would be able to get the patient set up again with a trilogy as early as tomorrow.  He gave me the following settings which I also communicated to Dr. Johnson: NJ max 25, PS minimum 5, PS maximum 20, EPAP minimum 8, EPAP maximum 15, tidal volume 300, rate 16, I-, time auto, FiO2 6 L/min while sleeping.  2.  Formal consult from Dr. Fisher.  Follow-up this week with Dr. Johnson.  The patient follows with Dr. Hoff at [facility] as an outpatient.  3.  Echocardiogram indicates stability in RV pressures relative to echo from 2019.    4.  Cont O2 at 2-3 LPM by NC while awake.  5.  Neuro consultant indicates that the chronic hyperammonemia is contributing to his cognitive dysfunction and presumably his encephalopathy.  See Dr. Conner's consult note for recommendations.  - Zina Griggs, from [facility] neurology in the [facility] -> indicated that her team would be happy to contact Mr. Vazquez' team in order to set up a follow-up appointment.  She feels that they need to address whether the patient should remain on valproic acid, thinking that the hyperammonemia is almost certainly due to the Depakote and that it is probably contributing to encephalopathy for this gentleman.       Diet: Regular Diet Adult  Diet    DVT Prophylaxis: Low Risk/Ambulatory with no VTE prophylaxis  "indicated  Reid Catheter: Not present  Lines: None     Cardiac Monitoring: None  Code Status: Full Code      Clinically Significant Risk Factors                         # Severe Obesity: Estimated body mass index is 48.61 kg/m  as calculated from the following:    Height as of this encounter: 1.715 m (5' 7.5\").    Weight as of this encounter: 142.9 kg (315 lb).          Disposition Plan      Expected Discharge Date: 05/11/2023, 12:00 PM    Destination: group home     Anticipating discharge to home tomorrow 5/11/23.       Frederick Curry MD  Hospitalist Service  [facility]  Securely message with [employer] (more info)  Text page via [employer] Paging/Directory   ______________________________________________________________________    Interval History      Stable night.  No new complaints.  Eating well.  [facility] planned for 5/11 at [phone].     Physical Exam   Vital Signs: Temp: 98.6  F (37  C) Temp src: Axillary BP: 134/79 (FOREARM) Pulse: 74   Resp: 18 SpO2: 94 % O2 Device: Nasal cannula Oxygen Delivery: 2 LPM  Weight: 315 lbs 0 oz    Constitutional: no apparent distress. Dysarthric speech.  Eyes: right eye appears normal without scleral icterus.  Right Bell's palsy.  Does not open the left eye.  (Congenitally blind and without a visible pupil.)   ENT: right facial droop.    Respiratory: no increased WOB, very distant breath sounds. Poor air entry throughout.  Cardiovascular: distant heart sounds.  GI: obese, soft, NTND  Skin: no bruising or bleeding and normal skin color, texture, turgor  Musculoskeletal: no lower extremity pitting edema present  Neurologic: alert and interactive. Word choice and thought processes are appropriate.    Medical Decision Making       35 MINUTES SPENT BY ME on the date of service doing chart review, history, exam, documentation & further activities per the note.      Data   Results for orders placed or performed during the hospital encounter of 05/04/23 " (from the past 24 hour(s))   Blood gas venous   Result Value Ref Range    pH Venous 7.28 (L) 7.32 - 7.43    pCO2 Venous 92 (HH) 40 - 50 mm Hg    pO2 Venous 26 25 - 47 mm Hg    Bicarbonate Venous 44 (H) 21 - 28 mmol/L    Base Excess/Deficit (+/-) 13.2 (H) -7.7 - 1.9 mmol/L    FIO2 30

## 2023-05-10 NOTE — PLAN OF CARE
"Orientation: A/O x4  VS: /68 (BP Location: Left arm)   Pulse 71   Temp 98.2  F (36.8  C) (Axillary)   Resp 18   Ht 1.715 m (5' 7.5\")   Wt 142.9 kg (315 lb)   SpO2 95%   BMI 48.61 kg/m    LS: dim, on/off BiPAP and NC at 2LPM  GI:obese, tolerating diet, BM this shift  :voiding w/o difficulties  Skin: pink bottom, pt able to move self well  Activity: up w/ 1 + walker and GB  Pain: denies  Lines: none  Plan:d/c tmrw if Co2 improves. Md aware of repeat VBG and maintains as long as he wears bipap this rhonda he should be able to discharge       "

## 2023-05-10 NOTE — PROVIDER NOTIFICATION
DATE:  5/10/2023   TIME OF RECEIPT FROM LAB:  727  LAB TEST:  PCO2  LAB VALUE: 92  RESULTS GIVEN WITH READ-BACK TO (PROVIDER):  {Choose the provider you called     Primary [facility]  TIME LAB VALUE REPORTED TO PROVIDER:   730

## 2023-05-10 NOTE — PLAN OF CARE
" Summary: [id number]    Problem: Plan of Care - These are the overarching goals to be used throughout the patient stay.    Goal: Patient-Specific Goal (Individualized)  Description: You can add care plan individualizations to a care plan. Examples of Individualization might be:  \"Parent requests to be called daily at 9am for status\", \"I have a hard time hearing out of my right ear\", or \"Do not touch me to wake me up as it startles me\".  Outcome: Progressing   Goal Outcome Evaluation:    Pt is disoriented to date and situation, A x 1 walker and GB, regular diet, on 2L NC, BIPAP at HS, denies SOB. Possible discharge to his  tomorrow.    BP (!) 146/67   Pulse 87   Temp 98.3  F (36.8  C)   Resp 20   Ht 1.715 m (5' 7.5\")   Wt 142.9 kg (315 lb)   SpO2 90%   BMI 48.61 kg/m      "

## 2023-05-10 NOTE — PLAN OF CARE
"  Pt alert to self, lethargic, denies pain, no indications of pain. VSS pt on bipap at night, No PIV, appeared sleeping/eye closed during shift, rise and fall of chest noted, safety checks completed, will continue to follow plan of care until hand off @ 0700.   /73   Pulse 75   Temp 98  F (36.7  C)   Resp 14   Ht 1.715 m (5' 7.5\")   Wt 142.9 kg (315 lb)   SpO2 94%   BMI 48.61 kg/m    "

## 2023-05-10 NOTE — PROGRESS NOTES
Care Management Follow Up    Length of Stay (days): 6    Expected Discharge Date: 05/10/2023     Concerns to be Addressed:  discharge planning    Patient plan of care discussed at interdisciplinary rounds: Yes    Anticipated Discharge Disposition: return to       Anticipated Discharge Services:  [facility] wheelchair transport 5/11 [id number], [facility] Physical Therapy/OT  Anticipated Discharge DME: Oxygen    Patient/family educated on Medicare website which has current facility and service quality ratings: yes     Patient/Family in Agreement with the Plan: yes        Additional Information:  CM following for discharge planning. Patient was originally planned to discharge today. Per MD, will keep patient one more day due to his PCO2 increasing. Call placed to [facility] and transport was changed to 5/11 at [phone].     Call placed to  director Sugar [phone] to update her that patient would not be discharging today and his transport time was changed to tomorrow, same time.    Message sent to Wesley from [employer] [phone] as well to update him of changed discharge plan of tomorrow for Trilogy delivery. He will work with the  to arranged delivery of equipment. He again reiterates that IF patient should not leave until after Thurs 5/11, MD will need to include this statement in a discharge summary or progress note,  The Pt requires NIV therapy to treat underlying conditions of COPD and subsequent Chronic Respiratory Failure- other therapies have been considered/ tried and ruled out.  If he should leave on Thursday 5/11, everything is already arranged for Trilogy at home.     Patient will need orders for HC Physical Therapy/OT on discharge. He is already followed by [facility].     CM will cont to follow for return to . Discharge orders should be faxed to the  at fax: [phone]. New meds should be sent to [facility].           Kathy Blanton RN BSN CM  Inpatient Care Coordination  [facility]  [facility]  [phone]

## 2023-05-11 VITALS
HEIGHT: 68 IN | DIASTOLIC BLOOD PRESSURE: 69 MMHG | RESPIRATION RATE: 24 BRPM | WEIGHT: 315 LBS | SYSTOLIC BLOOD PRESSURE: 114 MMHG | TEMPERATURE: 98.5 F | HEART RATE: 92 BPM | OXYGEN SATURATION: 95 % | BODY MASS INDEX: 47.74 KG/M2

## 2023-05-11 LAB
BASE EXCESS BLDV CALC-SCNC: 10.6 MMOL/L (ref -7.7–1.9)
HCO3 BLDV-SCNC: 40 MMOL/L (ref 21–28)
O2/TOTAL GAS SETTING VFR VENT: 2 %
PCO2 BLDV: 76 MM HG (ref 40–50)
PH BLDV: 7.33 [PH] (ref 7.32–7.43)
PO2 BLDV: 62 MM HG (ref 25–47)

## 2023-05-11 PROCEDURE — 36415 COLL VENOUS BLD VENIPUNCTURE: CPT | Performed by: INTERNAL MEDICINE

## 2023-05-11 PROCEDURE — 250N000013 HC RX MED GY IP 250 OP 250 PS 637: Performed by: STUDENT IN AN ORGANIZED HEALTH CARE EDUCATION/TRAINING PROGRAM

## 2023-05-11 PROCEDURE — 99239 HOSP IP/OBS DSCHRG MGMT >30: CPT | Performed by: INTERNAL MEDICINE

## 2023-05-11 PROCEDURE — 250N000013 HC RX MED GY IP 250 OP 250 PS 637: Performed by: INTERNAL MEDICINE

## 2023-05-11 PROCEDURE — 82803 BLOOD GASES ANY COMBINATION: CPT | Performed by: INTERNAL MEDICINE

## 2023-05-11 RX ADMIN — CITALOPRAM HYDROBROMIDE 60 MG: 20 TABLET ORAL at 09:33

## 2023-05-11 RX ADMIN — PANTOPRAZOLE SODIUM 40 MG: 40 TABLET, DELAYED RELEASE ORAL at 09:34

## 2023-05-11 RX ADMIN — LAMOTRIGINE 200 MG: 200 TABLET ORAL at 09:33

## 2023-05-11 RX ADMIN — BUSPIRONE HYDROCHLORIDE 15 MG: 15 TABLET ORAL at 09:35

## 2023-05-11 RX ADMIN — DIVALPROEX SODIUM 500 MG: 500 TABLET, DELAYED RELEASE ORAL at 09:35

## 2023-05-11 RX ADMIN — ZONISAMIDE 100 MG: 100 CAPSULE ORAL at 09:35

## 2023-05-11 RX ADMIN — LEVETIRACETAM 1000 MG: 500 TABLET, FILM COATED ORAL at 09:34

## 2023-05-11 RX ADMIN — POLYETHYLENE GLYCOL 3350 17 G: 17 POWDER, FOR SOLUTION ORAL at 09:35

## 2023-05-11 RX ADMIN — LEVOCARNITINE 660 MG: 330 TABLET ORAL at 09:34

## 2023-05-11 RX ADMIN — PROPRANOLOL HYDROCHLORIDE 20 MG: 20 TABLET ORAL at 09:35

## 2023-05-11 RX ADMIN — IBUPROFEN 200 MG: 200 TABLET, FILM COATED ORAL at 05:13

## 2023-05-11 ASSESSMENT — ACTIVITIES OF DAILY LIVING (ADL)
ADLS_ACUITY_SCORE: 49

## 2023-05-11 NOTE — PLAN OF CARE
Physical Therapy Discharge Summary    Reason for therapy discharge:    Discharged to home with home therapy.    Progress towards therapy goal(s). See goals on Care Plan in [facility] electronic health record for goal details.  Goals partially met.  Barriers to achieving goals:   discharge from facility.  Pt at [facility] with all mobility with limited distance ambulation in room with FWW.    Therapy recommendation(s):    Continued therapy is recommended.  Rationale/Recommendations:   .  Pt did not fully meet goals.  Pt would benefit from continued PT in the home environment to return to PLOF.  Goal Outcome Evaluation:

## 2023-05-11 NOTE — PLAN OF CARE
Pt is alert is alert and oriented x4. Pt given Ibuprofen pain or discomfort. VSS. No acute distress noted. Pt wears BIPAP a couple of hours during the shift but refused and switch to 02 2L NC. Pt is non compliance with cares. Pt denies shortness of breath. Pt had a large BM during the shift. Pt has no IV access.  Pt is sleeping in bed. Bed alarm in place and call light within reach. Will continue to monitor and assess pt.

## 2023-05-11 NOTE — DISCHARGE SUMMARY
[facility]  Discharge Summary  Name: Tre Vazquez    MRN: [id number]  YOB: 1975    Age: 47 year old  Date of Discharge:  5/11/2023 12:50 PM  Date of Admission: 5/4/2023  Primary Care Provider: Siobhan Mayo  Discharge Physician:  Jaskaran Veliz DO  Discharging Service:  Hospitalist      Discharge Diagnosis:  Tre Vazquez is a 47 year old male who resides in a group home and came to attention on 5/4/2023 due to worsening shortness of breath.  Notably, the patient has chronic shortness of breath with associated chronic hypoxic and hypercapnic respiratory failure managed on 2 to 3 L/min supplemental oxygen at baseline.     Past medical history is significant for cerebral palsy with mild spastic paraparesis, mild intellectual disability, congenital left eye blindness, seizure disorder, hypertension, morbid obesity, borderline personality disorder, PTSD, schizoaffective disorder, obstructive sleep apnea with BiPAP noncompliance and chronic hypercapnic respiratory failure.   Notably, the patient was admitted here at [facility] with a similar presentation but was intubated for several days.       It is notable that the patient's mother and the supervisor of his group home are both advocating on behalf of Mr. Vazquez.  They both also have been important in assisting with history.     Consults: Neuro (Ubaldo) and Pulmonary (Phillip)     1.  Multifactorial toxic/metabolic encephalopathy.  Suspect components of both chronic hypercapnia and elevated ammonia levels.  Hypercapnia fluctuating between about 70 and 85 while the pH is roughly 7.27-7.32.  Elevated ammonia at 62 has been very elevated in the past year at least.  Suspect Valproic acid-related.   2.   Recurrent acute on chronic hypoxic and hypercapnic respiratory failure related to obesity and ANA.  He is at high risk of decompensating without an appropriate supportive device at home, and for that reason the patient was admitted to inpatient status.   Patient has been noncompliant in the past with multiple different devices.  With his cognitive issues, his compliance needs to be the responsibility of others around him.  3.  Pulmonary hypertension presumably due to persistent ANA. (Pt non-compliance)  4.  Seizure disorder on 4 antisz meds in addition to Levocarnitine.  Despite a persistently elevated ammonia level, the patient has not had a change in his Depakote.    5.  Cognitive delay.  6.  Morbid obesity and decreased mobility complicating cares. Functional restrictive physiology.     Plan on discharge.   1.  Empirically resume BiPAP with sleep. With the guidance of the pulmonary consultants, [facility] who had provided the patient with respiratory devices in the past wss contacted. MY colleague spoke with Wesley from [facility] at [phone].  He indicated that he would be able to get the patient set up again with a trilogy by today, with the following setting ID max 25, PS minimum 5, PS maximum 20, EPAP minimum 8, EPAP maximum 15, tidal volume 300, rate 16, I-, time auto, FiO2 6 L/min while sleeping.  2.  Patient will follow up with with Dr. Hoff at [facility] as an outpatient.  3.  Echocardiogram indicates stability in RV pressures relative to echo from 2019.    4.  He will remain on  O2 at 2-3 LPM by NC while awake.  5.  He was also Neuro consultant indicates that the chronic hyperammonemia is contributing to his cognitive dysfunction and presumably his encephalopathy.  See Dr. Conner's consult note for recommendations.  - Dr. Griggs, from [facility] neurology in the [facility] -> indicated that her team would be happy to contact Mr. Vazquez' team in order to set up a follow-up appointment.  She feels that they need to address whether the patient should remain on valproic acid, thinking that the hyperammonemia is almost certainly due to the Depakote and that it is probably contributing to encephalopathy for this gentleman.    Patient fell I th bathroom  briefly, did not lose consciousness, nursing staff was close by at the time. I went in to evaluate him and there was no trauma and he bumped his  Right arm/shoulder, but there was no busing or swelling. There was no change in his range of motion.    I also discussed with patient the plan of care, and discharge.  .     Other Diagnosis:  None     Discharge Disposition:  Discharged to home     Allergies:  Allergies   Allergen Reactions     Hydrocodone Bitartrate Er Unknown     Tomato      Cephalexin Rash     HUT Reaction: Rash; HUT Noted: [id number]       Vicodin [Hydrocodone-Acetaminophen] GI Disturbance        Discharge Medications:   Discharge Medication List as of 5/11/2023 12:40 PM      CONTINUE these medications which have NOT CHANGED    Details   ammonium lactate (AMLACTIN) 12 % external cream Apply topically 2 times dailyHistorical      busPIRone (BUSPAR) 15 MG tablet Take 15 mg by mouth 2 times daily, Historical      !! citalopram (CELEXA) 20 MG tablet Take 20 mg by mouth daily, Historical      !! citalopram (CELEXA) 40 MG tablet Take 40 mg by mouth daily, Historical      clotrimazole (LOTRIMIN) 1 % external solution Apply topically 2 times dailyHistorical      divalproex sodium delayed-release (DEPAKOTE) 500 MG DR tablet Take 1 tablet (500 mg) by mouth 3 times daily, Disp-270 tablet, R-3, E-Prescribe      lamoTRIgine (LAMICTAL) 200 MG tablet Take 1 tablet (200 mg) by mouth 2 times daily, Disp-60 tablet, R-11, E-Prescribe      levETIRAcetam (KEPPRA) 500 MG tablet Take 2 tablets (1,000 mg) by mouth every morning AND 3 tablets (1,500 mg) At Bedtime., Disp-150 tablet, R-11, E-Prescribe      levOCARNitine (CARNITOR) 330 MG tablet Take 2 tablets (660 mg) by mouth 3 times daily, Disp-180 tablet, R-11, E-Prescribe      methylcellulose (CITRUCEL) powder Take 2 g by mouth 2 times daily, Historical      multivitamin w/minerals (THERA-VIT-M) tablet Take 1 tablet by mouth daily, Historical      pantoprazole (PROTONIX) 40 MG  "EC tablet Take 40 mg by mouth daily, Historical      polyethylene glycol (MIRALAX) 17 GM/Dose powder Take 1 capful. by mouth daily, Historical      prazosin (MINIPRESS) 2 MG capsule Take 2 mg by mouth At Bedtime, Historical      propranolol (INDERAL) 20 MG tablet Take 20 mg by mouth 2 times daily, Historical      QUEtiapine ER (SEROQUEL XR) 300 MG 24 hr tablet Take 300 mg by mouth At Bedtime, Historical      zonisamide (ZONEGRAN) 100 MG capsule Take 1 capsule (100 mg) by mouth every morning AND 2 capsules (200 mg) At Bedtime., Disp-90 capsule, R-11, E-Prescribe       !! - Potential duplicate medications found. Please discuss with provider.           Condition on Discharge:  Discharge condition: Stable   Discharge vitals: Blood pressure 114/69, pulse 92, temperature 98.5  F (36.9  C), temperature source Oral, resp. rate 24, height 1.715 m (5' 7.5\"), weight 142.9 kg (315 lb), SpO2 95 %.   Code status on discharge: Full Code     History of Illness:  See detailed admission note for full details.    Significant Physical Exam Findings:  Constitutional: no apparent distress. Dysarthric speech.  Eyes: right eye appears normal without scleral icterus.  Right Bell's palsy.  Does not open the left eye.  (Congenitally blind and without a visible pupil.)   ENT: right facial droop.    Respiratory: no increased WOB, very distant breath sounds. Poor air entry throughout.  Cardiovascular: distant heart sounds.  GI: obese, soft, NTND  Skin: no bruising or bleeding and normal skin color, texture, turgor  Musculoskeletal: no lower extremity pitting edema present  Neurologic: alert and interactive. Word choice and thought processes are appropriate.       Procedures other than Imaging:  none     Imaging:  Results for orders placed or performed during the hospital encounter of 05/04/23   Chest XR,  PA & LAT    Narrative    EXAM: XR CHEST 2 VIEWS  LOCATION: [facility]  DATE/TIME: 5/4/2023 5:04 PM CDT    INDICATION: " SOB  COMPARISON: 2023      Impression    IMPRESSION: Heart size and pulmonary vascularity normal. Minimal atelectasis left base, lungs otherwise clear.   US Abdomen Limited    Narrative    EXAM: US ABDOMEN LIMITED  LOCATION: [facility]  DATE/TIME: 2023 6:31 AM CDT    INDICATION: Elevated ammonia and somnolence.    COMPARISON: None.  TECHNIQUE: Limited abdominal ultrasound.    FINDINGS: Exam compromised by habitus.    GALLBLADDER: Gallstones throughout the gallbladder. Shadowing compromises evaluation of gallbladder wall thickening. Some visualized portions of the gallbladder wall are upper normal in thickness at 3 mm. Negative ultrasonic Marin's sign.    BILE DUCTS: No biliary dilatation. The common duct measures 4 mm.    LIVER: Moderately echogenic parenchyma with smooth contour. No focal mass.    RIGHT KIDNEY: No hydronephrosis.    PANCREAS: The pancreas is largely obscured by overlying gas.    No ascites.      Impression    IMPRESSION:    1.  Diffuse cholelithiasis throughout the gallbladder, compromising evaluation for wall thickening and acute cholecystitis secondary to extensive gallstone shadowing. Portions of the visualized gallbladder wall are upper normal in thickness.    2.  No obvious biliary dilatation.    3.  Moderate hepatic steatosis.     Echocardiogram Complete     Value    LVEF  60-65%    Narrative    [id number]  [id number]  [id number]  [id number]^LUCIAN^KEEGAN^JOHN     [facility]  Echocardiography Laboratory  [address] [place] [address]     Name: KOJO BROWN  MRN: [id number]  : 1975  Study Date: 2023 08:37 AM  Age: 47 yrs  Gender: Male  Patient Location: [facility]  Reason For Study: Cor Pulmonale  Ordering Physician: KEEGAN EPSTEIN  Performed By: Desiree Snyder     BSA: 2.5 m2  Height: 67 in  Weight: 315 lb  HR: 75  BP: 130/61 mmHg  ______________________________________________________________________________  Procedure  Complete  Portable Echo Adult. Optison (NDC #2017-4363) given intravenously.  ______________________________________________________________________________  Interpretation Summary     Left ventricular systolic function is normal.The visual ejection fraction is  60-65%.  The right ventricular systolic function is normal.  Pulmonary hypertension- RVSP 37 mm hg +RA.  The inferior vena cava was normal in size with preserved respiratory  variability.  ______________________________________________________________________________  Left Ventricle  The left ventricle is normal in size. There is concentric remodeling present.  Diastolic Doppler findings (E/E' ratio and/or other parameters) suggest left  ventricular filling pressures are indeterminate. Left ventricular systolic  function is normal. The visual ejection fraction is 60-65%. No regional wall  motion abnormalities noted.     Right Ventricle  The right ventricle is mildly dilated. The right ventricular systolic function  is normal. The right ventricle is not well visualized.     Atria  Normal left atrial size. Right atrial size is normal. There is no color  Doppler evidence of an atrial shunt.     Mitral Valve  There is trace mitral regurgitation.     Tricuspid Valve  There is trace tricuspid regurgitation. The right ventricular systolic  pressure is approximated at 36.7 mmHg plus the right atrial pressure.  Pulmonary hypertension.     Aortic Valve  No aortic regurgitation is present. No aortic stenosis is present.     Pulmonic Valve  There is trace pulmonic valvular regurgitation. There is no pulmonic valvular  stenosis.     Vessels  The aortic root is normal size. Normal size ascending aorta. The inferior vena  cava was normal in size with preserved respiratory variability.     Pericardium  There is no pericardial effusion.     Rhythm  Sinus rhythm was noted.  ______________________________________________________________________________  MMode/2D Measurements &  Calculations     IVSd: 1.2 cm  LVIDd: 4.7 cm  LVIDs: 3.1 cm  LVPWd: 1.0 cm  IVC diam: 1.9 cm  FS: 34.7 %  LV mass(C)d: 191.1 grams  LV mass(C)dI: 77.9 grams/m2  Ao root diam: 2.9 cm  asc Aorta Diam: 3.2 cm  LVOT diam: 2.1 cm  LVOT area: 3.5 cm2  LA Volume (BP): 48.1 ml  LA Volume Index (BP): 19.6 ml/m2  RWT: 0.44     Doppler Measurements & Calculations  MV E max jarret: 79.6 cm/sec  MV A max jarret: 57.9 cm/sec  MV E/A: 1.4  MV max P.8 mmHg  MV mean PG: 3.0 mmHg  MV V2 VTI: 32.5 cm  MV dec time: 0.21 sec  TR max jarret: 303.0 cm/sec  TR max P.7 mmHg  E/E' av.5  Lateral E/e': 8.9  Medial E/e': 10.2  RV S Jarret: 15.9 cm/sec     ______________________________________________________________________________  Report approved by: Pedro Casillas 2023 12:31 PM                Consultations:  Consultation during this admission received from neurology and pulmonary medicine.     Recent Lab Results:  Recent Labs   Lab 23  0805 23  1517   WBC 7.9 6.0   HGB 12.5* 12.8*   HCT 40.3 41.3   MCV 98 100   * 192     Recent Labs   Lab 23  0733 23  1221 23  0723    143 142   POTASSIUM 4.4 4.1 4.1   CHLORIDE 98 99 99   CO2 38* 38* 39*   ANIONGAP 5* 6* 4*   * 92 103*   BUN 18.1 15.1 21.7*   CR 0.63* 0.62* 0.68   GFRESTIMATED >90 >90 >90   ARNOLD 9.1 9.0 8.7     Recent Labs   Lab 23  0733 23  1221 23  0723 23  0805 23  1517   * 92 103* 129* 99          Pending Results:    Unresulted Labs Ordered in the Past 30 Days of this Admission     No orders found from 2023 to 2023.              Home Care Referral      Reason for your hospital stay    You had come in due to increased shortness of breath.  This seems to have been at least partly due your BiPAP mask coming off.     Follow-up and recommended labs and tests     Follow up with primary care provider, Siobhan Mayo, as needed.     Activity    Your activity upon discharge: activity as tolerated      Oxygen Adult/Peds     Diet    Follow this diet upon discharge: Regular         Total time spent in face to face contact with the patient and coordinating discharge was:  >30 Minutes.

## 2023-05-11 NOTE — PROVIDER NOTIFICATION
Dr. Fontaine paged [phone]: Pt fell while in the bathroom. Getting VS right now. Pt reports hitting his head.     Addendum: Spoke w/ bedside RN, Dr Fontaine stopped in the room and saw pt. No new orders placed.

## 2023-05-12 ENCOUNTER — APPOINTMENT (OUTPATIENT)
Dept: CT IMAGING | Facility: CLINIC | Age: 48
DRG: 189 | End: 2023-05-12
Attending: EMERGENCY MEDICINE
Payer: MEDICARE

## 2023-05-12 ENCOUNTER — HOSPITAL ENCOUNTER (INPATIENT)
Facility: CLINIC | Age: 48
LOS: 10 days | Discharge: NURSING, REHAB OR LONG-TERM CARE | DRG: 189 | End: 2023-05-22
Attending: EMERGENCY MEDICINE | Admitting: INTERNAL MEDICINE
Payer: MEDICARE

## 2023-05-12 DIAGNOSIS — J96.21 ACUTE ON CHRONIC RESPIRATORY FAILURE WITH HYPOXIA (H): ICD-10-CM

## 2023-05-12 DIAGNOSIS — E72.20 HYPERAMMONEMIA (H): ICD-10-CM

## 2023-05-12 DIAGNOSIS — G80.9 CEREBRAL PALSY, UNSPECIFIED TYPE (H): Primary | ICD-10-CM

## 2023-05-12 DIAGNOSIS — J96.22 ACUTE AND CHRONIC RESPIRATORY FAILURE WITH HYPERCAPNIA (H): ICD-10-CM

## 2023-05-12 DIAGNOSIS — S80.11XA CONTUSION OF RIGHT LOWER EXTREMITY, INITIAL ENCOUNTER: ICD-10-CM

## 2023-05-12 DIAGNOSIS — R74.01 TRANSAMINITIS: ICD-10-CM

## 2023-05-12 LAB
ALBUMIN SERPL BCG-MCNC: 4 G/DL (ref 3.5–5.2)
ALBUMIN UR-MCNC: 20 MG/DL
ALP SERPL-CCNC: 103 U/L (ref 40–129)
ALT SERPL W P-5'-P-CCNC: 117 U/L (ref 10–50)
AMMONIA PLAS-SCNC: 81 UMOL/L (ref 16–60)
AMMONIA PLAS-SCNC: 85 UMOL/L (ref 16–60)
ANION GAP SERPL CALCULATED.3IONS-SCNC: 3 MMOL/L (ref 7–15)
APPEARANCE UR: CLEAR
AST SERPL W P-5'-P-CCNC: 105 U/L (ref 10–50)
ATRIAL RATE - MUSE: 84 BPM
BASE EXCESS BLDV CALC-SCNC: 10.5 MMOL/L (ref -7.7–1.9)
BASE EXCESS BLDV CALC-SCNC: 10.7 MMOL/L (ref -7.7–1.9)
BASE EXCESS BLDV CALC-SCNC: 7.8 MMOL/L (ref -7.7–1.9)
BASOPHILS # BLD AUTO: 0 10E3/UL (ref 0–0.2)
BASOPHILS NFR BLD AUTO: 1 %
BILIRUB DIRECT SERPL-MCNC: <0.2 MG/DL (ref 0–0.3)
BILIRUB SERPL-MCNC: 0.3 MG/DL
BILIRUB UR QL STRIP: NEGATIVE
BUN SERPL-MCNC: 11.3 MG/DL (ref 6–20)
CALCIUM SERPL-MCNC: 8.6 MG/DL (ref 8.6–10)
CHLORIDE SERPL-SCNC: 100 MMOL/L (ref 98–107)
COLOR UR AUTO: YELLOW
CREAT BLD-MCNC: 0.8 MG/DL (ref 0.7–1.3)
CREAT SERPL-MCNC: 0.59 MG/DL (ref 0.67–1.17)
CREAT SERPL-MCNC: 0.61 MG/DL (ref 0.67–1.17)
DEPRECATED HCO3 PLAS-SCNC: 39 MMOL/L (ref 22–29)
DIASTOLIC BLOOD PRESSURE - MUSE: NORMAL MMHG
EOSINOPHIL # BLD AUTO: 0.2 10E3/UL (ref 0–0.7)
EOSINOPHIL NFR BLD AUTO: 3 %
ERYTHROCYTE [DISTWIDTH] IN BLOOD BY AUTOMATED COUNT: 14.1 % (ref 10–15)
ETHANOL SERPL-MCNC: <0.01 G/DL
FLUAV RNA SPEC QL NAA+PROBE: NEGATIVE
FLUBV RNA RESP QL NAA+PROBE: NEGATIVE
GFR SERPL CREATININE-BSD FRML MDRD: >60 ML/MIN/1.73M2
GFR SERPL CREATININE-BSD FRML MDRD: >90 ML/MIN/1.73M2
GFR SERPL CREATININE-BSD FRML MDRD: >90 ML/MIN/1.73M2
GLUCOSE BLDC GLUCOMTR-MCNC: 101 MG/DL (ref 70–99)
GLUCOSE BLDC GLUCOMTR-MCNC: 104 MG/DL (ref 70–99)
GLUCOSE BLDC GLUCOMTR-MCNC: 111 MG/DL (ref 70–99)
GLUCOSE SERPL-MCNC: 132 MG/DL (ref 70–99)
GLUCOSE UR STRIP-MCNC: NEGATIVE MG/DL
HCO3 BLDV-SCNC: 37 MMOL/L (ref 21–28)
HCO3 BLDV-SCNC: 40 MMOL/L (ref 21–28)
HCO3 BLDV-SCNC: 41 MMOL/L (ref 21–28)
HCO3 BLDV-SCNC: 41 MMOL/L (ref 21–28)
HCO3 BLDV-SCNC: 43 MMOL/L (ref 21–28)
HCT VFR BLD AUTO: 37.8 % (ref 40–53)
HGB BLD-MCNC: 11.7 G/DL (ref 13.3–17.7)
HGB UR QL STRIP: NEGATIVE
HOLD SPECIMEN: NORMAL
IMM GRANULOCYTES # BLD: 0.1 10E3/UL
IMM GRANULOCYTES NFR BLD: 2 %
INTERPRETATION ECG - MUSE: NORMAL
KETONES UR STRIP-MCNC: NEGATIVE MG/DL
LACTATE BLD-SCNC: 0.5 MMOL/L
LACTATE BLD-SCNC: 0.9 MMOL/L
LACTATE BLD-SCNC: 1 MMOL/L
LACTATE BLD-SCNC: 1.8 MMOL/L
LEUKOCYTE ESTERASE UR QL STRIP: NEGATIVE
LIPASE SERPL-CCNC: 18 U/L (ref 13–60)
LYMPHOCYTES # BLD AUTO: 1.5 10E3/UL (ref 0.8–5.3)
LYMPHOCYTES NFR BLD AUTO: 23 %
MCH RBC QN AUTO: 30.9 PG (ref 26.5–33)
MCHC RBC AUTO-ENTMCNC: 31 G/DL (ref 31.5–36.5)
MCV RBC AUTO: 100 FL (ref 78–100)
MONOCYTES # BLD AUTO: 0.9 10E3/UL (ref 0–1.3)
MONOCYTES NFR BLD AUTO: 14 %
MUCOUS THREADS #/AREA URNS LPF: PRESENT /LPF
NEUTROPHILS # BLD AUTO: 3.7 10E3/UL (ref 1.6–8.3)
NEUTROPHILS NFR BLD AUTO: 57 %
NITRATE UR QL: NEGATIVE
NRBC # BLD AUTO: 0 10E3/UL
NRBC BLD AUTO-RTO: 0 /100
NT-PROBNP SERPL-MCNC: 309 PG/ML (ref 0–450)
O2/TOTAL GAS SETTING VFR VENT: 30 %
OXYHGB MFR BLDV: 80 % (ref 70–75)
OXYHGB MFR BLDV: 89 % (ref 70–75)
P AXIS - MUSE: 69 DEGREES
PCO2 BLDV: 79 MM HG (ref 40–50)
PCO2 BLDV: 81 MM HG (ref 40–50)
PCO2 BLDV: 82 MM HG (ref 40–50)
PCO2 BLDV: 90 MM HG (ref 40–50)
PCO2 BLDV: 90 MM HG (ref 40–50)
PCO2 BLDV: 92 MM HG (ref 40–50)
PCO2 BLDV: 96 MM HG (ref 40–50)
PH BLDV: 7.23 [PH] (ref 7.32–7.43)
PH BLDV: 7.25 [PH] (ref 7.32–7.43)
PH BLDV: 7.27 [PH] (ref 7.32–7.43)
PH BLDV: 7.27 [PH] (ref 7.32–7.43)
PH BLDV: 7.29 [PH] (ref 7.32–7.43)
PH BLDV: 7.3 [PH] (ref 7.32–7.43)
PH BLDV: 7.31 [PH] (ref 7.32–7.43)
PH UR STRIP: 6 [PH] (ref 5–7)
PLATELET # BLD AUTO: 158 10E3/UL (ref 150–450)
PO2 BLDV: 33 MM HG (ref 25–47)
PO2 BLDV: 37 MM HG (ref 25–47)
PO2 BLDV: 39 MM HG (ref 25–47)
PO2 BLDV: 42 MM HG (ref 25–47)
PO2 BLDV: 49 MM HG (ref 25–47)
PO2 BLDV: 59 MM HG (ref 25–47)
PO2 BLDV: 62 MM HG (ref 25–47)
POTASSIUM SERPL-SCNC: 4.2 MMOL/L (ref 3.4–5.3)
PR INTERVAL - MUSE: 184 MS
PROT SERPL-MCNC: 6.1 G/DL (ref 6.4–8.3)
QRS DURATION - MUSE: 86 MS
QT - MUSE: 396 MS
QTC - MUSE: 467 MS
R AXIS - MUSE: 60 DEGREES
RBC # BLD AUTO: 3.79 10E6/UL (ref 4.4–5.9)
RBC URINE: 2 /HPF
RSV RNA SPEC NAA+PROBE: NEGATIVE
SAO2 % BLDV: 49 % (ref 94–100)
SAO2 % BLDV: 56 % (ref 94–100)
SAO2 % BLDV: 67 % (ref 94–100)
SAO2 % BLDV: 86 % (ref 94–100)
SARS-COV-2 RNA RESP QL NAA+PROBE: NEGATIVE
SODIUM SERPL-SCNC: 142 MMOL/L (ref 136–145)
SP GR UR STRIP: 1.02 (ref 1–1.03)
SYSTOLIC BLOOD PRESSURE - MUSE: NORMAL MMHG
T AXIS - MUSE: 60 DEGREES
TROPONIN T SERPL HS-MCNC: 18 NG/L
UROBILINOGEN UR STRIP-MCNC: NORMAL MG/DL
VENTRICULAR RATE- MUSE: 84 BPM
WBC # BLD AUTO: 6.3 10E3/UL (ref 4–11)
WBC URINE: 1 /HPF

## 2023-05-12 PROCEDURE — C9803 HOPD COVID-19 SPEC COLLECT: HCPCS

## 2023-05-12 PROCEDURE — G1010 CDSM STANSON: HCPCS

## 2023-05-12 PROCEDURE — 250N000013 HC RX MED GY IP 250 OP 250 PS 637: Performed by: INTERNAL MEDICINE

## 2023-05-12 PROCEDURE — 258N000003 HC RX IP 258 OP 636: Performed by: INTERNAL MEDICINE

## 2023-05-12 PROCEDURE — 87637 SARSCOV2&INF A&B&RSV AMP PRB: CPT | Performed by: EMERGENCY MEDICINE

## 2023-05-12 PROCEDURE — 84484 ASSAY OF TROPONIN QUANT: CPT | Performed by: EMERGENCY MEDICINE

## 2023-05-12 PROCEDURE — 82565 ASSAY OF CREATININE: CPT | Performed by: INTERNAL MEDICINE

## 2023-05-12 PROCEDURE — 258N000003 HC RX IP 258 OP 636: Performed by: EMERGENCY MEDICINE

## 2023-05-12 PROCEDURE — 83605 ASSAY OF LACTIC ACID: CPT

## 2023-05-12 PROCEDURE — 85025 COMPLETE CBC W/AUTO DIFF WBC: CPT | Performed by: EMERGENCY MEDICINE

## 2023-05-12 PROCEDURE — 36415 COLL VENOUS BLD VENIPUNCTURE: CPT | Performed by: EMERGENCY MEDICINE

## 2023-05-12 PROCEDURE — 200N000001 HC R&B ICU

## 2023-05-12 PROCEDURE — 250N000013 HC RX MED GY IP 250 OP 250 PS 637: Performed by: EMERGENCY MEDICINE

## 2023-05-12 PROCEDURE — 250N000011 HC RX IP 250 OP 636: Performed by: INTERNAL MEDICINE

## 2023-05-12 PROCEDURE — 82077 ASSAY SPEC XCP UR&BREATH IA: CPT | Performed by: EMERGENCY MEDICINE

## 2023-05-12 PROCEDURE — 82140 ASSAY OF AMMONIA: CPT | Performed by: INTERNAL MEDICINE

## 2023-05-12 PROCEDURE — 83690 ASSAY OF LIPASE: CPT | Performed by: EMERGENCY MEDICINE

## 2023-05-12 PROCEDURE — 82805 BLOOD GASES W/O2 SATURATION: CPT | Performed by: INTERNAL MEDICINE

## 2023-05-12 PROCEDURE — 82140 ASSAY OF AMMONIA: CPT | Performed by: EMERGENCY MEDICINE

## 2023-05-12 PROCEDURE — 36415 COLL VENOUS BLD VENIPUNCTURE: CPT | Performed by: INTERNAL MEDICINE

## 2023-05-12 PROCEDURE — 82803 BLOOD GASES ANY COMBINATION: CPT | Performed by: EMERGENCY MEDICINE

## 2023-05-12 PROCEDURE — 83880 ASSAY OF NATRIURETIC PEPTIDE: CPT | Performed by: EMERGENCY MEDICINE

## 2023-05-12 PROCEDURE — 82962 GLUCOSE BLOOD TEST: CPT

## 2023-05-12 PROCEDURE — 250N000011 HC RX IP 250 OP 636: Performed by: EMERGENCY MEDICINE

## 2023-05-12 PROCEDURE — 93005 ELECTROCARDIOGRAM TRACING: CPT

## 2023-05-12 PROCEDURE — 81001 URINALYSIS AUTO W/SCOPE: CPT | Performed by: EMERGENCY MEDICINE

## 2023-05-12 PROCEDURE — 82805 BLOOD GASES W/O2 SATURATION: CPT | Performed by: EMERGENCY MEDICINE

## 2023-05-12 PROCEDURE — 82565 ASSAY OF CREATININE: CPT

## 2023-05-12 PROCEDURE — 999N000157 HC STATISTIC RCP TIME EA 10 MIN

## 2023-05-12 PROCEDURE — 82310 ASSAY OF CALCIUM: CPT | Performed by: EMERGENCY MEDICINE

## 2023-05-12 PROCEDURE — 82248 BILIRUBIN DIRECT: CPT | Performed by: EMERGENCY MEDICINE

## 2023-05-12 PROCEDURE — 82803 BLOOD GASES ANY COMBINATION: CPT

## 2023-05-12 PROCEDURE — 99285 EMERGENCY DEPT VISIT HI MDM: CPT | Mod: 25

## 2023-05-12 PROCEDURE — 87040 BLOOD CULTURE FOR BACTERIA: CPT | Performed by: EMERGENCY MEDICINE

## 2023-05-12 PROCEDURE — 96360 HYDRATION IV INFUSION INIT: CPT | Mod: 59

## 2023-05-12 PROCEDURE — 5A09357 ASSISTANCE WITH RESPIRATORY VENTILATION, LESS THAN 24 CONSECUTIVE HOURS, CONTINUOUS POSITIVE AIRWAY PRESSURE: ICD-10-PCS | Performed by: INTERNAL MEDICINE

## 2023-05-12 PROCEDURE — 94660 CPAP INITIATION&MGMT: CPT

## 2023-05-12 PROCEDURE — 99291 CRITICAL CARE FIRST HOUR: CPT | Performed by: INTERNAL MEDICINE

## 2023-05-12 RX ORDER — IOPAMIDOL 755 MG/ML
500 INJECTION, SOLUTION INTRAVASCULAR ONCE
Status: COMPLETED | OUTPATIENT
Start: 2023-05-12 | End: 2023-05-12

## 2023-05-12 RX ORDER — POLYETHYLENE GLYCOL 3350 17 G/17G
17 POWDER, FOR SOLUTION ORAL DAILY
Status: DISCONTINUED | OUTPATIENT
Start: 2023-05-13 | End: 2023-05-22 | Stop reason: HOSPADM

## 2023-05-12 RX ORDER — PROPRANOLOL HYDROCHLORIDE 20 MG/1
20 TABLET ORAL 2 TIMES DAILY
Status: DISCONTINUED | OUTPATIENT
Start: 2023-05-12 | End: 2023-05-22 | Stop reason: HOSPADM

## 2023-05-12 RX ORDER — ONDANSETRON 4 MG/1
4 TABLET, ORALLY DISINTEGRATING ORAL EVERY 6 HOURS PRN
Status: DISCONTINUED | OUTPATIENT
Start: 2023-05-12 | End: 2023-05-22 | Stop reason: HOSPADM

## 2023-05-12 RX ORDER — LEVETIRACETAM 500 MG/1
1000 TABLET ORAL EVERY MORNING
Status: DISCONTINUED | OUTPATIENT
Start: 2023-05-12 | End: 2023-05-22 | Stop reason: HOSPADM

## 2023-05-12 RX ORDER — SODIUM CHLORIDE, SODIUM LACTATE, POTASSIUM CHLORIDE, CALCIUM CHLORIDE 600; 310; 30; 20 MG/100ML; MG/100ML; MG/100ML; MG/100ML
INJECTION, SOLUTION INTRAVENOUS CONTINUOUS
Status: DISCONTINUED | OUTPATIENT
Start: 2023-05-12 | End: 2023-05-13

## 2023-05-12 RX ORDER — CITALOPRAM HYDROBROMIDE 20 MG/1
60 TABLET ORAL DAILY
Status: DISCONTINUED | OUTPATIENT
Start: 2023-05-13 | End: 2023-05-22 | Stop reason: HOSPADM

## 2023-05-12 RX ORDER — NICOTINE POLACRILEX 4 MG
15-30 LOZENGE BUCCAL
Status: DISCONTINUED | OUTPATIENT
Start: 2023-05-12 | End: 2023-05-22 | Stop reason: HOSPADM

## 2023-05-12 RX ORDER — ONDANSETRON 2 MG/ML
4 INJECTION INTRAMUSCULAR; INTRAVENOUS EVERY 6 HOURS PRN
Status: DISCONTINUED | OUTPATIENT
Start: 2023-05-12 | End: 2023-05-22 | Stop reason: HOSPADM

## 2023-05-12 RX ORDER — BUSPIRONE HYDROCHLORIDE 15 MG/1
15 TABLET ORAL 2 TIMES DAILY
Status: DISCONTINUED | OUTPATIENT
Start: 2023-05-12 | End: 2023-05-22 | Stop reason: HOSPADM

## 2023-05-12 RX ORDER — LEVETIRACETAM 500 MG/1
1500 TABLET ORAL AT BEDTIME
Status: DISCONTINUED | OUTPATIENT
Start: 2023-05-12 | End: 2023-05-22 | Stop reason: HOSPADM

## 2023-05-12 RX ORDER — ZONISAMIDE 100 MG/1
200 CAPSULE ORAL AT BEDTIME
Status: DISCONTINUED | OUTPATIENT
Start: 2023-05-12 | End: 2023-05-22 | Stop reason: HOSPADM

## 2023-05-12 RX ORDER — ACETAMINOPHEN 325 MG/1
650 TABLET ORAL EVERY 4 HOURS PRN
Status: DISCONTINUED | OUTPATIENT
Start: 2023-05-12 | End: 2023-05-22 | Stop reason: HOSPADM

## 2023-05-12 RX ORDER — DEXTROSE MONOHYDRATE 25 G/50ML
25-50 INJECTION, SOLUTION INTRAVENOUS
Status: DISCONTINUED | OUTPATIENT
Start: 2023-05-12 | End: 2023-05-22 | Stop reason: HOSPADM

## 2023-05-12 RX ORDER — PANTOPRAZOLE SODIUM 40 MG/1
40 TABLET, DELAYED RELEASE ORAL DAILY
Status: DISCONTINUED | OUTPATIENT
Start: 2023-05-12 | End: 2023-05-22 | Stop reason: HOSPADM

## 2023-05-12 RX ORDER — PRAZOSIN HYDROCHLORIDE 1 MG/1
2 CAPSULE ORAL AT BEDTIME
Status: DISCONTINUED | OUTPATIENT
Start: 2023-05-12 | End: 2023-05-22 | Stop reason: HOSPADM

## 2023-05-12 RX ORDER — ZONISAMIDE 100 MG/1
100 CAPSULE ORAL EVERY MORNING
Status: DISCONTINUED | OUTPATIENT
Start: 2023-05-12 | End: 2023-05-22 | Stop reason: HOSPADM

## 2023-05-12 RX ORDER — ENOXAPARIN SODIUM 100 MG/ML
40 INJECTION SUBCUTANEOUS EVERY 24 HOURS
Status: DISCONTINUED | OUTPATIENT
Start: 2023-05-12 | End: 2023-05-22 | Stop reason: HOSPADM

## 2023-05-12 RX ORDER — LAMOTRIGINE 100 MG/1
200 TABLET ORAL 2 TIMES DAILY
Status: DISCONTINUED | OUTPATIENT
Start: 2023-05-12 | End: 2023-05-22 | Stop reason: HOSPADM

## 2023-05-12 RX ORDER — QUETIAPINE 300 MG/1
300 TABLET, FILM COATED, EXTENDED RELEASE ORAL AT BEDTIME
Status: DISCONTINUED | OUTPATIENT
Start: 2023-05-12 | End: 2023-05-13

## 2023-05-12 RX ORDER — ACETAMINOPHEN 325 MG/10.15ML
650 LIQUID ORAL EVERY 4 HOURS PRN
Status: DISCONTINUED | OUTPATIENT
Start: 2023-05-12 | End: 2023-05-22 | Stop reason: HOSPADM

## 2023-05-12 RX ORDER — LEVOCARNITINE 330 MG/1
660 TABLET ORAL 3 TIMES DAILY
Status: DISCONTINUED | OUTPATIENT
Start: 2023-05-12 | End: 2023-05-22 | Stop reason: HOSPADM

## 2023-05-12 RX ORDER — LIDOCAINE HYDROCHLORIDE 20 MG/ML
6 JELLY TOPICAL ONCE
Status: DISCONTINUED | OUTPATIENT
Start: 2023-05-12 | End: 2023-05-22 | Stop reason: HOSPADM

## 2023-05-12 RX ADMIN — LEVETIRACETAM 1500 MG: 500 TABLET, FILM COATED ORAL at 22:15

## 2023-05-12 RX ADMIN — IOPAMIDOL 77 ML: 755 INJECTION, SOLUTION INTRAVENOUS at 04:09

## 2023-05-12 RX ADMIN — QUETIAPINE FUMARATE 300 MG: 300 TABLET, EXTENDED RELEASE ORAL at 22:15

## 2023-05-12 RX ADMIN — LAMOTRIGINE 200 MG: 200 TABLET ORAL at 20:13

## 2023-05-12 RX ADMIN — ZONISAMIDE 100 MG: 100 CAPSULE ORAL at 11:39

## 2023-05-12 RX ADMIN — LEVOCARNITINE 660 MG: 330 TABLET ORAL at 11:39

## 2023-05-12 RX ADMIN — PRAZOSIN HYDROCHLORIDE 2 MG: 1 CAPSULE ORAL at 22:15

## 2023-05-12 RX ADMIN — LEVETIRACETAM 1000 MG: 500 TABLET, FILM COATED ORAL at 11:40

## 2023-05-12 RX ADMIN — ZONISAMIDE 200 MG: 100 CAPSULE ORAL at 22:14

## 2023-05-12 RX ADMIN — LEVOCARNITINE 660 MG: 330 TABLET ORAL at 22:15

## 2023-05-12 RX ADMIN — PROPRANOLOL HYDROCHLORIDE 20 MG: 20 TABLET ORAL at 20:13

## 2023-05-12 RX ADMIN — ENOXAPARIN SODIUM 40 MG: 40 INJECTION SUBCUTANEOUS at 20:13

## 2023-05-12 RX ADMIN — SODIUM CHLORIDE 500 ML: 9 INJECTION, SOLUTION INTRAVENOUS at 01:26

## 2023-05-12 RX ADMIN — BUSPIRONE HYDROCHLORIDE 15 MG: 15 TABLET ORAL at 20:13

## 2023-05-12 RX ADMIN — SODIUM CHLORIDE, POTASSIUM CHLORIDE, SODIUM LACTATE AND CALCIUM CHLORIDE: 600; 310; 30; 20 INJECTION, SOLUTION INTRAVENOUS at 18:14

## 2023-05-12 RX ADMIN — SODIUM CHLORIDE, POTASSIUM CHLORIDE, SODIUM LACTATE AND CALCIUM CHLORIDE: 600; 310; 30; 20 INJECTION, SOLUTION INTRAVENOUS at 10:05

## 2023-05-12 RX ADMIN — PANTOPRAZOLE SODIUM 40 MG: 40 TABLET, DELAYED RELEASE ORAL at 20:14

## 2023-05-12 RX ADMIN — LAMOTRIGINE 200 MG: 200 TABLET ORAL at 11:40

## 2023-05-12 ASSESSMENT — ACTIVITIES OF DAILY LIVING (ADL)
ADLS_ACUITY_SCORE: 53
ADLS_ACUITY_SCORE: 37
ADLS_ACUITY_SCORE: 49
ADLS_ACUITY_SCORE: 37
ADLS_ACUITY_SCORE: 53
ADLS_ACUITY_SCORE: 37

## 2023-05-12 ASSESSMENT — ENCOUNTER SYMPTOMS
ABDOMINAL PAIN: 0
SHORTNESS OF BREATH: 1
HEADACHES: 0
WOUND: 1
FEVER: 0
COUGH: 0

## 2023-05-12 NOTE — ED NOTES
[facility]  ED Nurse Handoff Report    ED Chief complaint: Fall and Shortness of Breath  . ED Diagnosis:   Final diagnoses:   Acute on chronic respiratory failure with hypoxia (H)       Allergies:   Allergies   Allergen Reactions     Hydrocodone Bitartrate Er Unknown     Tomato      Cephalexin Rash     HUT Reaction: Rash; HUT Noted: [id number]       Vicodin [Hydrocodone-Acetaminophen] GI Disturbance       Code Status: Full Code    Activity level - Baseline/Home:  Activity restricted to Wheelchair.   Activity Level - Current:   Advance activity as tolerated.   Lift room needed: Yes.   Bariatric: no   Needed: No   Isolation: No.   Infection: Not Applicable.     Respiratory status: BiPap    Vital Signs (within 30 minutes):   Vitals:    05/12/23 0300 05/12/23 0415 05/12/23 0445 05/12/23 0500   BP: 112/54 95/62 99/64 104/63   Pulse: 76  73 74   Resp:   17 25   Temp:       TempSrc:       SpO2: 94% 98% 96% 94%       Cardiac Rhythm:  ,      Pain level:    Patient confused: No.   Patient Falls Risk: arm band in place, patient and family education, assistive device/personal items within reach, activity supervised and room door open.   Elimination Status:  Bladder scan 0500am, 50mL      Patient Report - Initial Complaint: Fall, SOB, Hypoxia.   Focused Assessment:  Tre Vazquez is a 47 year old male with a history of hypertension, cerebral palsy, and obesity hypoventilation syndrome who presents for evaluation following a fall. Joel states that earlier this evening as he was getting up from the toilet, he turned around and started walking out of the bathroom when the next thing he remembers is waking up on the floor. Upon EMS arrival, they note that Joel was 81% on room air and put him on 3L of oxygen via nasal cannula. During evaluation, Joel reports that he does still feel short of breath and he reports that he did hit his head. He also notes a wound to his right buttock that he sustained  from his fall. He otherwise denies any fever, cough, chest pain, abdominal pain, headache, alcohol, or drug use.        Abnormal Results:   Labs Ordered and Resulted from Time of ED Arrival to Time of ED Departure   BASIC METABOLIC PANEL - Abnormal       Result Value    Sodium 142      Potassium 4.2      Chloride 100      Carbon Dioxide (CO2) 39 (*)     Anion Gap 3 (*)     Urea Nitrogen 11.3      Creatinine 0.59 (*)     Calcium 8.6      Glucose 132 (*)     GFR Estimate >90     CBC WITH PLATELETS AND DIFFERENTIAL - Abnormal    WBC Count 6.3      RBC Count 3.79 (*)     Hemoglobin 11.7 (*)     Hematocrit 37.8 (*)           MCH 30.9      MCHC 31.0 (*)     RDW 14.1      Platelet Count 158      % Neutrophils 57      % Lymphocytes 23      % Monocytes 14      % Eosinophils 3      % Basophils 1      % Immature Granulocytes 2      NRBCs per 100 WBC 0      Absolute Neutrophils 3.7      Absolute Lymphocytes 1.5      Absolute Monocytes 0.9      Absolute Eosinophils 0.2      Absolute Basophils 0.0      Absolute Immature Granulocytes 0.1      Absolute NRBCs 0.0     ISTAT GASES LACTATE VENOUS POCT - Abnormal    Lactic Acid POCT 1.0      Bicarbonate Venous POCT 41 (*)     O2 Sat, Venous POCT 86 (*)     pCO2V Venous POCT 90 (*)     pH Venous POCT 7.27 (*)     pO2 Venous POCT 62 (*)    ISTAT GASES LACTATE VENOUS POCT - Abnormal    Lactic Acid POCT 0.9      Bicarbonate Venous POCT 43 (*)     O2 Sat, Venous POCT 67 (*)     pCO2V Venous POCT 90 (*)     pH Venous POCT 7.29 (*)     pO2 Venous POCT 42     HEPATIC FUNCTION PANEL - Abnormal    Protein Total 6.1 (*)     Albumin 4.0      Bilirubin Total 0.3      Alkaline Phosphatase 103       (*)      (*)     Bilirubin Direct <0.20     INFLUENZA A/B, RSV, & SARS-COV2 PCR - Normal    Influenza A PCR Negative      Influenza B PCR Negative      RSV PCR Negative      SARS CoV2 PCR Negative     TROPONIN T, HIGH SENSITIVITY - Normal    Troponin T, High Sensitivity 18     NT  PROBNP INPATIENT - Normal    N terminal Pro BNP Inpatient 309     ISTAT CREATININE POCT - Normal    Creatinine POCT 0.8      GFR, ESTIMATED POCT >60     URINE MACROSCOPIC WITH REFLEX TO MICRO   AMMONIA   BLOOD CULTURE   BLOOD CULTURE        CT Chest (PE) Abdomen Pelvis w Contrast   Final Result   IMPRESSION:   1.  Several images are degraded due to motion artifact. Suboptimal opacification of the pulmonary vasculature. No obvious pulmonary emboli on either side. Mild diffuse thickening of the bronchi. Dependent atelectasis in the posterior lungs. No adenopathy    or effusion.      2.  Normal caliber thoracic aorta without aneurysm or dissection. Normal cardiac size. No pericardial effusion.      3.  Cholelithiasis. Fatty liver. Milk of calcium cyst upper pole of the left kidney posteriorly, no specific follow-up required. Nonobstructive stones in the renal collecting system, 1 on the right and 3 on the left.      4.  Scattered colonic diverticulosis without acute inflammation. No mechanical bowel obstruction, free gas or free fluid. Normal appendix.                    Cervical spine CT w/o contrast   Final Result   IMPRESSION:   1.  No fracture or posttraumatic subluxation.   2.  Reversal of the usual cervical lordosis.   3.  Relatively severe central canal stenosis at C4-C5, C5-C6 and C6-C7 with moderate to severe multilevel neural foraminal narrowing.         Head CT w/o contrast   Final Result   IMPRESSION:   1.  No acute intracranial process.   2.  No change from prior.             Treatments provided: bipap, fluids  Family Comments: none  OBS brochure/video discussed/provided to patient:  Yes  ED Medications:   Medications   lidocaine (XYLOCAINE) 2 % external gel 6 mL (has no administration in time range)   0.9% sodium chloride BOLUS (0 mLs Intravenous Stopped 5/12/23 2275)   iopamidol (ISOVUE-370) solution 500 mL (77 mLs Intravenous $Given 5/12/23 6280)   sodium chloride (PF) 0.9% PF flush 100 mL (90 mLs  Intravenous $Given 5/12/23 2234)       Drips infusing:  No  For the majority of the shift this patient was Green.   Interventions performed were see MAR.    Sepsis treatment initiated: No    Cares/treatment/interventions/medications to be completed following ED care: see MAR    ED Nurse Name: Emely Nur RN  6:10 AM

## 2023-05-12 NOTE — PROGRESS NOTES
[facility]  Patient is currently open to home care services with [facility] [place].  [facility]  and team have been notified of patient admission.  [facility] liaison will continue to follow patient during stay.  Please provide orders for RN PT and OT for home care at time of discharge.

## 2023-05-12 NOTE — H&P
[facility] [place]  Hospitalist Admission Note  Name: Tre Vazquez    MRN: [id number]  YOB: 1975    Age: 47 year old  Date of admission: 5/12/2023  Primary care provider: Siobhan Mayo    Chief Complaint: Fall, hypercapnic respiratory failure.    Tre Vazquez is a 47 year old male with PMH including palsy, mild intellectual disability, congenital left eye blindness, seizure disorder with chronically elevated ammonia who remains on Depakote among other antiepileptics, borderline personality disorder, schizoaffective and ANA as well as obesity hypoventilation syndrome chronically on BiPAP who was recently admitted from 5/4 to 5/11 for hypercapnic respiratory failure and encephalopathy who presents with a fall not long after arriving back at his group home.  The patient does not recall much but just recalls winding up on the ground.  He was brought back to the ER and placed on BiPAP on route.  Here he underwent lab work-up which was notable for grossly stable BMP with carbon dioxide level of 39, stable CBC with mild anemia, normal lactic acid but venous blood gas showing PCO2 of approximately 90 with pH of 7.27.  His baseline PCO2 is around 80.  Influenza and COVID-19 testing were negative as well as troponin and BNP.  CT head and neck were negative for traumatic injury and CT PE protocol was negative for PE or pneumonia.    Notably, mental status normalized after briefly being on BiPAP.  However, he was unable to be off BiPAP as PCO2 rapidly concepción and he seemed to decline somewhat.    The initial plan after talking with neurology [facility]  However, there were no beds available and mental status is now normal.  He is being admitted for acute on chronic hypercapnic respiratory failure as well as pulmonary medicine and neurology consultations.  His ammonia here is again elevated at 80 which is similar to the range during  his last admission.  I am holding his Depakote pending neurology consultation    Assessment and Plan:   1. Acute on chronic hypercapnic respiratory failure: Baseline PCO2 seems to be in the 70s to 80 range.  He was seen by pulmonary medicine last admission.  it appears that the rep from [facility] was contacted last admission, and that the plan was for him to resume trilogy at home with the following settings: (Welsey from [facility] was contacted at [phone]). CO max 25, PS minimum 5, PS maximum 20, EPAP minimum 8, EPAP maximum 15, tidal volume 300, rate 16, I-, time auto, FiO2 6 L/min while sleeping.  --Admit to the ICU, under closely there.  Continuous EEG monitoring not available over the weekend at [facility] and there are no beds at the [facility].  --Continue BiPAP support here  -- Pulmonary medicine consult  -- Unclear to what extent hyperammonemia might be playing a role.  Given normalization of mental status with BiPAP I doubt that is the primary issue.  --Anticipate he will need to resume trilogy at home, it is unclear to me if he used it the night that he fell.  He has a history of noncompliance in the past.      2.   History of seizure disorder, hyperammonemia while on Depakote:   --He is also on numerous other antiepileptic medications including Lamictal 200 mg twice daily, Keppra 1000 mg in the morning and 1500 mg at bedtime and zonisamide 100 mg in the morning and 200 mg at bedtime.  --Holding home Depakote overnight but continue his other antiseizure medications  --Repeat ammonia in the morning  -- Formal neurology consult  -- Seizure precautions    3.   History of schizoaffective disorder, borderline personality: Plan to continue his home Seroquel 300 mg in the evening as well as BuSpar and Celexa.    4.   Toxic metabolic encephalopathy: Mental status seems to be at baseline though certainly being on BiPAP makes it more difficult for him to communicate    5.   Social: Reportedly he is his own  "decision maker though I note that the manager of his group home and mother were present and supportive last admission.  Compliance seems to be an issue to some extent.  -- Likely will need social work consult during this admission    6..  Fall: Obesity limits his mobility at baseline.  We will consult physical therapy.      DVT Prophylaxis: Enoxaparin (Lovenox) SQ  Code Status: Full Code  Discharge Dispo: Admit under inpatient status    Clinically Significant Risk Factors Present on Admission                  # Hypertension: Noted on problem list   # Non-Invasive mechanical ventilation: current O2 Device: BiPAP/CPAP  # Acute hypoxic respiratory failure: continue supplemental O2 as needed     # Severe Obesity: Estimated body mass index is 48.61 kg/m  as calculated from the following:    Height as of 5/4/23: 1.715 m (5' 7.5\").    Weight as of 5/4/23: 142.9 kg (315 lb).                  History of Present Illness:  Tre Vazquez is a 47 year old male with PMH including palsy, mild intellectual disability, congenital left eye blindness, seizure disorder with chronically elevated ammonia who remains on Depakote among other antiepileptics, borderline personality disorder, schizoaffective and ANA chronically on BiPAP who was recently admitted from 5/4 to 5/10 for hypercapnic respiratory failure and encephalopathy who presents with a fall not long after arriving back at his group home.  The patient does not recall much but just recalls winding up on the ground.  He was brought back to the ER and placed on BiPAP on route.    he was seen by neurology and pulmonology during his last admission.  It appears the hope was for him to follow-up with his primary neurologist at the [facility] and consider transitioning away from Depakote but that of course had not happened yet.  The decision was made during his last admission to keep him on Depakote despite the persistently elevated ammonia level.  It seems that Dr. Rogers's, " from [facility] neurology at the [facility] was contacted to help discuss this decision.     Past Medical History:  Past Medical History:   Diagnosis Date     Blindness of left eye      Depression 03/10/2014     Hypertension      Pneumococcal septicemia(038.2) (H) 2013    Hospitalized     Pneumonia, organism unspecified(486) 2013    Hospitalized     Uncomplicated asthma      Unspecified epilepsy without mention of intractable epilepsy      Past Surgical History:  Past Surgical History:   Procedure Laterality Date     COLONOSCOPY N/A 2022    Procedure: COLONOSCOPY;  Surgeon: Michael Caldwell MD;  Location:  OR     ESOPHAGOSCOPY, GASTROSCOPY, DUODENOSCOPY (EGD), COMBINED N/A 2022    Procedure: ESOPHAGOGASTRODUODENOSCOPY with biopsy;  Surgeon: Michael Caldwell MD;  Location:  OR     ORTHOPEDIC SURGERY      pt stated past surgery on both ankles     Social History:  Social History     Tobacco Use     Smoking status: Former     Types: Cigarettes     Start date:      Quit date:      Years since quittin.3     Smokeless tobacco: Never   Vaping Use     Vaping status: Not on file   Substance Use Topics     Alcohol use: No     Social History     Social History Narrative     Not on file     Family History:  No family history on file.  Allergies:  Allergies   Allergen Reactions     Hydrocodone Bitartrate Er Unknown     Tomato      Cephalexin Rash     HUT Reaction: Rash; HUT Noted: [id number]       Vicodin [Hydrocodone-Acetaminophen] GI Disturbance     Medications:  No current facility-administered medications on file prior to encounter.  ammonium lactate (AMLACTIN) 12 % external cream, Apply topically 2 times daily  busPIRone (BUSPAR) 15 MG tablet, Take 15 mg by mouth 2 times daily  citalopram (CELEXA) 20 MG tablet, Take 20 mg by mouth daily  citalopram (CELEXA) 40 MG tablet, Take 40 mg by mouth daily  clotrimazole (LOTRIMIN) 1 % external solution, Apply topically 2 times  daily  divalproex sodium delayed-release (DEPAKOTE) 500 MG DR tablet, Take 1 tablet (500 mg) by mouth 3 times daily  lamoTRIgine (LAMICTAL) 200 MG tablet, Take 1 tablet (200 mg) by mouth 2 times daily  levETIRAcetam (KEPPRA) 500 MG tablet, Take 2 tablets (1,000 mg) by mouth every morning AND 3 tablets (1,500 mg) At Bedtime.  levOCARNitine (CARNITOR) 330 MG tablet, Take 2 tablets (660 mg) by mouth 3 times daily  methylcellulose (CITRUCEL) powder, Take 2 g by mouth 2 times daily  multivitamin w/minerals (THERA-VIT-M) tablet, Take 1 tablet by mouth daily  pantoprazole (PROTONIX) 40 MG EC tablet, Take 40 mg by mouth daily  polyethylene glycol (MIRALAX) 17 GM/Dose powder, Take 1 capful. by mouth daily  prazosin (MINIPRESS) 2 MG capsule, Take 2 mg by mouth At Bedtime  propranolol (INDERAL) 20 MG tablet, Take 20 mg by mouth 2 times daily  QUEtiapine ER (SEROQUEL XR) 300 MG 24 hr tablet, Take 300 mg by mouth At Bedtime  zonisamide (ZONEGRAN) 100 MG capsule, Take 1 capsule (100 mg) by mouth every morning AND 2 capsules (200 mg) At Bedtime.        Review of Systems:  A Comprehensive greater than 10 system review of systems was carried out.  Pertinent positives and negatives are noted above.  Otherwise negative for contributory information.     Physical Exam:  Blood pressure 118/84, pulse 77, temperature 97.7  F (36.5  C), temperature source Oral, resp. rate 27, SpO2 93 %.  Wt Readings from Last 1 Encounters:   05/04/23 142.9 kg (315 lb)       Exam:  General: Alert, awake, BiPAP in place.  HEENT: NC/AT, eyes anicteric, external occular movements intact, face symmetric.    Cardiac: RRR, S1, S2.  No murmurs appreciated.  Pulmonary: Normal chest rise, normal work of breathing.  Lungs CTA BL  Abdomen: soft, non-tender, non-distended.  Bowel Sounds Present.  No guarding.  Extremities: no deformities.  Warm, well perfused.  Skin: no rashes or lesions noted.  Warm and Dry.  Neuro: No focal deficits noted.  Speech clear.  Coordination  and strength grossly normal.  Psych: Appropriate affect.    I have personally reviewed the following data including lab tests and imaging:  EKG: Sinus rhythm  Imaging:  Results for orders placed or performed during the hospital encounter of 05/12/23   Head CT w/o contrast    Narrative    EXAM: CT HEAD W/O CONTRAST  LOCATION: [facility]  DATE/TIME: 5/12/2023 4:26 AM CDT    INDICATION: Traumatic injury. Pain.  COMPARISON: 10/25/2022, 04/05/2012  TECHNIQUE: Routine CT Head without IV contrast. Multiplanar reformats. Dose reduction techniques were used.    FINDINGS:  INTRACRANIAL CONTENTS: No intracranial hemorrhage, extraaxial collection, or mass effect.  No CT evidence of acute infarct. Mild to moderate presumed chronic small vessel ischemic changes. Extensive white matter volume loss. Stable ventriculomegaly.     VISUALIZED ORBITS/SINUSES/MASTOIDS: No intraorbital abnormality. No paranasal sinus mucosal disease. No middle ear or mastoid effusion.    BONES/SOFT TISSUES: No acute abnormality.      Impression    IMPRESSION:  1.  No acute intracranial process.  2.  No change from prior.     Cervical spine CT w/o contrast    Narrative    EXAM: CT CERVICAL SPINE W/O CONTRAST  LOCATION: [facility]  DATE/TIME: 5/12/2023 4:31 AM CDT    INDICATION: fall; fall; Neck pain; Trauma; Mild moderate trauma; None of the following: Spondyloarthropathy, cervical x ray with negative result, questionable finding, or inadequate coverage  COMPARISON: None.  TECHNIQUE: Routine CT Cervical Spine without IV contrast. Multiplanar reformats. Dose reduction techniques were used.    FINDINGS:  VERTEBRA: Reversal of usual cervical lordosis. Vertebral body heights and alignment is preserved. Fusion of the C4-C5 vertebra. Normal prevertebral soft tissues. No fracture or posttraumatic subluxation.     CANAL/FORAMINA: Relatively severe central canal stenosis at C4-C5, C5-C6 and C6-C7 with moderate to severe  multilevel neural foraminal narrowing.    PARASPINAL: No extraspinal abnormality.      Impression    IMPRESSION:  1.  No fracture or posttraumatic subluxation.  2.  Reversal of the usual cervical lordosis.  3.  Relatively severe central canal stenosis at C4-C5, C5-C6 and C6-C7 with moderate to severe multilevel neural foraminal narrowing.     CT Chest (PE) Abdomen Pelvis w Contrast    Narrative    EXAM: CT CHEST PE ABDOMEN PELVIS W CONTRAST  LOCATION: [facility]  DATE/TIME: 5/12/2023 4:32 AM CDT    INDICATION: Shortness of breath. Syncope.  COMPARISON:   1. Chest x-ray 2 views 05/04/2023.  2. Ultrasound abdomen limited 05/07/2023.  TECHNIQUE: CT chest pulmonary angiogram and routine CT abdomen pelvis with IV contrast. Arterial phase through the chest and venous phase through the abdomen and pelvis. Multiplanar reformats and MIP reconstructions were performed. Dose reduction   techniques were used.   CONTRAST: 77 mL Isovue 370.    FINDINGS:  ANGIOGRAM CHEST: Motion artifact degrades several images. Suboptimal opacification of the distal pulmonary vessels. No obvious pulmonary emboli on either side. Normal caliber thoracic aorta without aneurysm or dissection. No CT evidence of right heart   strain.     LUNGS AND PLEURA: Numerous images are degraded due to motion artifact. Mild diffuse thickening of the bronchi. Dependent atelectasis in the posterior lungs. No pleural effusion on either side.    MEDIASTINUM/AXILLAE: Normal cardiac size. No pericardial effusion. No suspicious adenopathy. Trachea is midline.    CORONARY ARTERY CALCIFICATION: None.    HEPATOBILIARY: Gallstones without biliary dilatation or adjacent inflammation. Diffuse fatty infiltration of the liver. Patent hepatic and portal veins.    PANCREAS: Normal.    SPLEEN: Normal.    ADRENAL GLANDS: Normal.    KIDNEYS/BLADDER: Milk of calcium cyst upper pole of the left kidney posteriorly containing dependent mineralization measures 3.5 x  3.5 cm (image 93, series 18), no specific follow-up required. Tiny punctate stones in the collecting system of both   kidneys, one on the right (image 47, series 17), and 3 on the left (images 40, 42 and 46, series 17). Both kidneys are well perfused without hydronephrosis or hydroureter. Normal urinary bladder.    BOWEL: Scattered colonic diverticulosis, without acute inflammation. Formed stool material within normal caliber colon. Normal appendix. No obstruction, free gas or free fluid.    LYMPH NODES: No suspicious abdominopelvic adenopathy. Subcentimeter retroperitoneal and bilateral pelvic nodes, likely reactive.    VASCULATURE: Slightly atherosclerotic normal caliber distal abdominal aorta measuring 1.9 x 1.9 cm (image 57, series 17). Normal caliber IVC.    PELVIC ORGANS: Stool-filled rectosigmoid. Normal appendix. No free fluid. No suspicious adenopathy.    MUSCULOSKELETAL: Degenerative changes both shoulders, spine and joints of the pelvis. Slight right convex thoracic curve.      Impression    IMPRESSION:  1.  Several images are degraded due to motion artifact. Suboptimal opacification of the pulmonary vasculature. No obvious pulmonary emboli on either side. Mild diffuse thickening of the bronchi. Dependent atelectasis in the posterior lungs. No adenopathy   or effusion.    2.  Normal caliber thoracic aorta without aneurysm or dissection. Normal cardiac size. No pericardial effusion.    3.  Cholelithiasis. Fatty liver. Milk of calcium cyst upper pole of the left kidney posteriorly, no specific follow-up required. Nonobstructive stones in the renal collecting system, 1 on the right and 3 on the left.    4.  Scattered colonic diverticulosis without acute inflammation. No mechanical bowel obstruction, free gas or free fluid. Normal appendix.                 Labs:  Recent Labs   Lab 05/12/23  0114   WBC 6.3   HGB 11.7*   HCT 37.8*                Lab Results   Component Value Date      05/12/2023     05/09/2023     05/08/2023     12/20/2013     12/10/2013     11/25/2013    Lab Results   Component Value Date    CHLORIDE 100 05/12/2023    CHLORIDE 98 05/09/2023    CHLORIDE 99 05/08/2023    CHLORIDE 103 06/20/2022    CHLORIDE 103 05/05/2022    CHLORIDE 107 12/08/2021    CHLORIDE 100 12/20/2013    CHLORIDE 99 12/10/2013    CHLORIDE 102 11/25/2013    Lab Results   Component Value Date    BUN 11.3 05/12/2023    BUN 18.1 05/09/2023    BUN 15.1 05/08/2023    BUN 17 06/20/2022    BUN 14 05/05/2022    BUN 16 12/08/2021    BUN 19 12/20/2013    BUN 19 12/10/2013    BUN 11 11/25/2013      Lab Results   Component Value Date    POTASSIUM 4.2 05/12/2023    POTASSIUM 4.4 05/09/2023    POTASSIUM 4.1 05/08/2023    POTASSIUM 4.4 06/20/2022    POTASSIUM 3.9 05/05/2022    POTASSIUM 3.9 12/08/2021    POTASSIUM 4.5 12/20/2013    POTASSIUM 4.3 12/10/2013    POTASSIUM 3.7 11/25/2013    Lab Results   Component Value Date    CO2 39 05/12/2023    CO2 38 05/09/2023    CO2 38 05/08/2023    CO2 37 06/20/2022    CO2 33 05/05/2022    CO2 32 12/08/2021    CO2 30 12/20/2013    CO2 30 12/10/2013    CO2 37 11/25/2013    Lab Results   Component Value Date    CR 0.59 05/12/2023    CR 0.8 05/12/2023    CR 0.63 05/09/2023    CR 0.62 05/08/2023    CR 1.03 12/20/2013    CR 1.14 12/10/2013    CR 0.75 11/25/2013        Recent Labs   Lab 05/12/23  0513 05/12/23  0114 05/06/23  1218 05/06/23  0637   AST  --  105*  --  30   ALT  --  117*  --  21   ALKPHOS  --  103  --  61   BILITOTAL  --  0.3  --  0.2   JAYLA 81*  --  62*  --      Recent Labs   Lab 05/06/23  1235   INR 0.99     Recent Labs   Lab 05/12/23  1306 05/12/23  0816 05/12/23  0323   LACT 1.8 0.5 0.9       Evaluation and management time exclusive of procedures was 70 minutes critical care time including: urgent examination and evaluation of the patient, discussion of the patient's condition with other physicians and members of the care team, reviewing data  and chart related to the patient, discussion of patient's condition with the family and time utilizing the EMR for documentation of this patient's care.          Vladimir Levy MD  Hospitalist  [facility]

## 2023-05-12 NOTE — PHARMACY-ADMISSION MEDICATION HISTORY
Pharmacist Admission Medication History    NO CHANGES to admission medication history as patient was discharged from [facility] yesterday.     Medication History Completed By: Javier Morris [facility] 5/12/2023 11:07 AM    Prior to Admission medications    Medication Sig Last Dose Taking? Auth Provider Long Term End Date   ammonium lactate (AMLACTIN) 12 % external cream Apply topically 2 times daily Past Week Yes Unknown, Entered By History     busPIRone (BUSPAR) 15 MG tablet Take 15 mg by mouth 2 times daily 5/11/2023 at x 1 Yes Reported, Patient Yes    citalopram (CELEXA) 20 MG tablet Take 20 mg by mouth daily 5/11/2023 at am Yes Reported, Patient No    citalopram (CELEXA) 40 MG tablet Take 40 mg by mouth daily 5/11/2023 at am Yes Unknown, Entered By History No    clotrimazole (LOTRIMIN) 1 % external solution Apply topically 2 times daily Past Week Yes Reported, Patient     divalproex sodium delayed-release (DEPAKOTE) 500 MG DR tablet Take 1 tablet (500 mg) by mouth 3 times daily 5/11/2023 at x 1 Yes Lisa Merchant APRN CNP Yes    lamoTRIgine (LAMICTAL) 200 MG tablet Take 1 tablet (200 mg) by mouth 2 times daily 5/11/2023 at x 1 Yes Lisa Merchant APRN CNP Yes    levETIRAcetam (KEPPRA) 500 MG tablet Take 2 tablets (1,000 mg) by mouth every morning AND 3 tablets (1,500 mg) At Bedtime. 5/11/2023 at am Yes Lisa Merchant APRN CNP Yes    levOCARNitine (CARNITOR) 330 MG tablet Take 2 tablets (660 mg) by mouth 3 times daily 5/11/2023 at x 1 Yes Lsia Merchant APRN CNP Yes    methylcellulose (CITRUCEL) powder Take 2 g by mouth 2 times daily Past Week Yes Reported, Patient  10/21/23   multivitamin w/minerals (THERA-VIT-M) tablet Take 1 tablet by mouth daily Past Week Yes Unknown, Entered By History     pantoprazole (PROTONIX) 40 MG EC tablet Take 40 mg by mouth daily 5/11/2023 at am Yes Unknown, Entered By History     polyethylene glycol (MIRALAX) 17 GM/Dose powder Take 1 capful. by mouth daily 5/11/2023 at am Yes  Reported, Patient     prazosin (MINIPRESS) 2 MG capsule Take 2 mg by mouth At Bedtime Past Week Yes Reported, Patient Yes    propranolol (INDERAL) 20 MG tablet Take 20 mg by mouth 2 times daily 5/11/2023 at x 1 Yes Reported, Patient No    QUEtiapine ER (SEROQUEL XR) 300 MG 24 hr tablet Take 300 mg by mouth At Bedtime Past Week Yes Unknown, Entered By History No    zonisamide (ZONEGRAN) 100 MG capsule Take 1 capsule (100 mg) by mouth every morning AND 2 capsules (200 mg) At Bedtime. 5/11/2023 at x 1 Yes Lisa Merchant, RON CNP Yes

## 2023-05-12 NOTE — ED TRIAGE NOTES
Pt arrives via EMS from home. EMS reports pt is wheel chair bound; pt had a fall at the group home; pt was transferring from toilet to wheel chair, felt weak and slipped and fell. EMS also found pt to be SOB and O2 to be 81% on room air. EMS placed the pt on 3L O2 NC and pt sats came up to 100%. BG check for .     Triage Assessment       Row Name 05/12/23 0012       Triage Assessment (Adult)    Airway WDL WDL       Respiratory WDL    Respiratory WDL X;rhythm/pattern    Rhythm/Pattern, Respiratory shortness of breath       Cardiac WDL    Cardiac WDL WDL       Cognitive/Neuro/Behavioral WDL    Cognitive/Neuro/Behavioral WDL X;mood/behavior    Arousal Level opens eyes spontaneously    Speech slow    Mood/Behavior cooperative       Michele Coma Scale    Best Eye Response 4-->(E4) spontaneous    Best Motor Response 6-->(M6) obeys commands    Best Verbal Response 5-->(V5) oriented    [place] Coma Scale Score 15

## 2023-05-12 NOTE — ED PROVIDER NOTES
This patient's care was signed out to me by Dr. Fuentes.  He was just discharged yesterday from [facility].  The initial plan was that he be transferred to the [facility] since it is recommended that he have continuous EEG monitoring however the [facility] did not have any beds available for the patient.  We attempted to transfer him to [facility] also but they could not accept him for transfer and stated they could not do the EEG monitoring over the weekend anyway.  Ultimately the patient was admitted to the ICU here at [facility] and the care of Dr. Levy.  He was kept on BiPAP periodic venous blood gases performed which had worsened with a CO2 of 92 however repeat venous blood gases on BiPAP showed decrease in CO2 down to 81 which is in the range of his baseline CO2.  He remained arousable and answering questions.     Janett Castano MD  05/12/23 [id number]

## 2023-05-12 NOTE — ED PROVIDER NOTES
History     Chief Complaint:  Fall and Shortness of Breath       HPI   Tre Vazquez is a 47 year old male with a history of hypertension, cerebral palsy, and obesity hypoventilation syndrome who presents for evaluation following a fall. Joel states that earlier this evening as he was getting up from the toilet, he turned around and started walking out of the bathroom when the next thing he remembers is waking up on the floor. Upon EMS arrival, they note that Joel was 81% on room air and put him on 3L of oxygen via nasal cannula. During evaluation, Joel reports that he does still feel short of breath and he reports that he did hit his head. He also notes a wound to his right buttock that he sustained from his fall. He otherwise denies any fever, cough, chest pain, abdominal pain, headache, alcohol, or drug use.       Independent Historian:   EMS report as above    Review of External Notes: I reviewed the patient's discharge summary from 5/11/23    ROS:  Review of Systems   Constitutional: Negative for fever.   Respiratory: Positive for shortness of breath. Negative for cough.    Cardiovascular: Negative for chest pain.   Gastrointestinal: Negative for abdominal pain.   Skin: Positive for wound (right buttock).   Neurological: Positive for syncope. Negative for headaches.        (+) head trauma   All other systems reviewed and are negative.      Allergies:  Hydrocodone  Cephalexin  Hydrocodone-Acetaminophen    Medications:    Methylcellulose   Pantoprazole   Seroquel   Buspirone   Depakote   Lamotrigine   Keppra   Zonisamide   Citalopram   Prazosin   Propranolol   Levocarnitine     Past Medical History:    Pulmonary hypertension   Obesity hypoventilation syndrome   Schizoaffective disorder  Paraplegia, unspecified   ANA  Lumbar disc disease  Cerebral ventriculomegaly   Cognitive impairment  Epilepsy   Asthma  GERD  Oppositional defiant disorder   Hypertension   Obesity   Spastic diplegia   Depression  Cerebral  palsy   PTSD  QT prolongation  Bipolar depression   Encephalomalacia      Past Surgical History:    Removal of iris of left eye  Heel lengthening, bilateral  Colonoscopy   EGD     Social History:  Patient arrived via EMS.  Patient is unaccompanied in the ED.  Patient ambulates with a walker at baseline.   Patient lives in a group home.   Patient has history of tobacco use.  Patient denies drug and alcohol use.   PCP: Siobhan Mayo     Physical Exam     Patient Vitals for the past 24 hrs:   BP Temp Temp src Pulse Resp SpO2   05/12/23 0500 104/63 -- -- 74 25 94 %   05/12/23 0445 99/64 -- -- 73 17 96 %   05/12/23 0415 95/62 -- -- -- -- 98 %   05/12/23 0300 112/54 -- -- 76 -- 94 %   05/12/23 0230 112/70 -- -- 78 -- 95 %   05/12/23 0200 111/65 -- -- 77 -- 94 %   05/12/23 0145 113/77 -- -- 78 -- 91 %   05/12/23 0130 102/80 -- -- 86 -- 94 %   05/12/23 0128 -- -- -- 82 27 94 %   05/12/23 0040 117/78 -- -- -- -- 98 %   05/12/23 0020 108/49 -- -- 73 -- 98 %   05/12/23 0009 -- 97.7  F (36.5  C) Oral -- 24 100 %   05/12/23 0005 124/62 -- -- 89 -- --        Physical Exam  General: Alert.   Head:  The scalp, face, and head appear normal without trauma  Eyes:  Sclera white; Pupils are equal and round  ENT:    External ears normal.  No hemotympanum.      External nares normal.  No septal hematoma.    Neck:  No midline tenderness or pain with full ROM.  CV:  Rate as above with regular rhythm   2/2 radial and dorsal pedal pulses  Resp:  Breath sounds clear and equal bilaterally    Non-labored  GI:  Abdomen soft, non-tender, non-distended    No rebound tenderness or guarding  MSK:  No midline tenderness or bony step-off    Moves all extremities equally and symmetrically  Skin:  No rash or lesions noted. Abrasion and ecchymosis noted to R posterior upper thigh  Neuro:   Somnolent though arouses to verbal stimuli    Moves all extremities    GCS: 14  Psych:  Flat affect      Emergency Department Course   ECG  ECG taken at 0021, ECG read at  0025  Normal sinus rhythm  Normal ECG   Rate 84 bpm. IN interval 184 ms. QRS duration 86 ms. QT/QTc 396/467 ms. P-R-T axes 69 60 60.     Imaging:  CT Chest (PE) Abdomen Pelvis w Contrast   Final Result   IMPRESSION:   1.  Several images are degraded due to motion artifact. Suboptimal opacification of the pulmonary vasculature. No obvious pulmonary emboli on either side. Mild diffuse thickening of the bronchi. Dependent atelectasis in the posterior lungs. No adenopathy    or effusion.      2.  Normal caliber thoracic aorta without aneurysm or dissection. Normal cardiac size. No pericardial effusion.      3.  Cholelithiasis. Fatty liver. Milk of calcium cyst upper pole of the left kidney posteriorly, no specific follow-up required. Nonobstructive stones in the renal collecting system, 1 on the right and 3 on the left.      4.  Scattered colonic diverticulosis without acute inflammation. No mechanical bowel obstruction, free gas or free fluid. Normal appendix.                    Cervical spine CT w/o contrast   Final Result   IMPRESSION:   1.  No fracture or posttraumatic subluxation.   2.  Reversal of the usual cervical lordosis.   3.  Relatively severe central canal stenosis at C4-C5, C5-C6 and C6-C7 with moderate to severe multilevel neural foraminal narrowing.         Head CT w/o contrast   Final Result   IMPRESSION:   1.  No acute intracranial process.   2.  No change from prior.            Report per radiology    Laboratory:  Labs Ordered and Resulted from Time of ED Arrival to Time of ED Departure   BASIC METABOLIC PANEL - Abnormal       Result Value    Sodium 142      Potassium 4.2      Chloride 100      Carbon Dioxide (CO2) 39 (*)     Anion Gap 3 (*)     Urea Nitrogen 11.3      Creatinine 0.59 (*)     Calcium 8.6      Glucose 132 (*)     GFR Estimate >90     CBC WITH PLATELETS AND DIFFERENTIAL - Abnormal    WBC Count 6.3      RBC Count 3.79 (*)     Hemoglobin 11.7 (*)     Hematocrit 37.8 (*)            MCH 30.9      MCHC 31.0 (*)     RDW 14.1      Platelet Count 158      % Neutrophils 57      % Lymphocytes 23      % Monocytes 14      % Eosinophils 3      % Basophils 1      % Immature Granulocytes 2      NRBCs per 100 WBC 0      Absolute Neutrophils 3.7      Absolute Lymphocytes 1.5      Absolute Monocytes 0.9      Absolute Eosinophils 0.2      Absolute Basophils 0.0      Absolute Immature Granulocytes 0.1      Absolute NRBCs 0.0     ISTAT GASES LACTATE VENOUS POCT - Abnormal    Lactic Acid POCT 1.0      Bicarbonate Venous POCT 41 (*)     O2 Sat, Venous POCT 86 (*)     pCO2V Venous POCT 90 (*)     pH Venous POCT 7.27 (*)     pO2 Venous POCT 62 (*)    ISTAT GASES LACTATE VENOUS POCT - Abnormal    Lactic Acid POCT 0.9      Bicarbonate Venous POCT 43 (*)     O2 Sat, Venous POCT 67 (*)     pCO2V Venous POCT 90 (*)     pH Venous POCT 7.29 (*)     pO2 Venous POCT 42     AMMONIA - Abnormal    Ammonia 81 (*)    HEPATIC FUNCTION PANEL - Abnormal    Protein Total 6.1 (*)     Albumin 4.0      Bilirubin Total 0.3      Alkaline Phosphatase 103       (*)      (*)     Bilirubin Direct <0.20     INFLUENZA A/B, RSV, & SARS-COV2 PCR - Normal    Influenza A PCR Negative      Influenza B PCR Negative      RSV PCR Negative      SARS CoV2 PCR Negative     TROPONIN T, HIGH SENSITIVITY - Normal    Troponin T, High Sensitivity 18     NT PROBNP INPATIENT - Normal    N terminal Pro BNP Inpatient 309     ISTAT CREATININE POCT - Normal    Creatinine POCT 0.8      GFR, ESTIMATED POCT >60     URINE MACROSCOPIC WITH REFLEX TO MICRO   ETHYL ALCOHOL LEVEL   LIPASE   BLOOD CULTURE   BLOOD CULTURE        Procedures   none    Emergency Department Course & Assessments:       Interventions:  Medications   lidocaine (XYLOCAINE) 2 % external gel 6 mL (has no administration in time range)   0.9% sodium chloride BOLUS (0 mLs Intravenous Stopped 5/12/23 6553)   iopamidol (ISOVUE-370) solution 500 mL (77 mLs Intravenous $Given 5/12/23 8546)    sodium chloride (PF) 0.9% PF flush 100 mL (90 mLs Intravenous $Given 5/12/23 0381)        Assessments:  0048 I obtained history and examined the patient as noted above.   0148 I rechecked the patient.   0507 I rechecked and updated the patient. We discussed admission and he agreed to the plan of care.     Consultations/Discussion of Management or Tests:  0610 I consulted with Dr. March of the hospitalist service and discussed patient admission. They accepted care of the patient.        Social Determinants of Health affecting care:   Stress/Adjustment Disorders    Disposition:  The patient was admitted to the hospital under the care of Dr. Macrh.     Impression & Plan        Medical Decision Making:  Patient is a 47-year-old male presenting status post a reported fall.  He was hypoxic in the field.  On exam he is noted to have trauma to his right posterior thigh though the remainder of his trauma exam is unremarkable.  He is quite somnolent on arrival.  He underwent extensive work-up during his time in the ED.  Patient was notably hypercarbic on VBG, placed on BiPAP.  Labs overall reassuring without profound anemia or significant electrolyte derangements; the patient is noted to have transaminitis as well as hyperammonemia today.  During patient's recent admission it was thought that his hyperammonemia was secondary to his valproic acid.  At that point in time it was recommended by neurology that patient be considered for EEG monitoring at the [facility] and possible discontinuation of his valproic acid.  CT head, cervical spine, chest/abdomen and pelvis without serious traumatic injury.  Patient remained hemodynamically stable on BiPAP.  He did become more alert and no need for further advanced airway support at this point in time.  I do suspect his encephalopathy is secondary to hypercarbia in addition to hyperammonemia.  He is to benefit from ICU placement at this point in time.  We did attempt to  obtain a UA sample during patient's time in the ED as well though patient adamantly declined catheterization.      Diagnosis:    ICD-10-CM    1. Acute on chronic respiratory failure with hypoxia (H)  J96.21       2. Hyperammonemia (H)  E72.20       3. Transaminitis  R74.01       4. Contusion of right lower extremity, initial encounter  S80.11XA              Scribe Disclosure:  I, Marcela Harley, am serving as a scribe at 12:59 AM on 5/12/2023 to document services personally performed by Lillian Alexander DO based on my observations and the provider's statements to me.     5/12/2023   Lillian Alexander DO McDonald, Lindsey E, DO  05/12/23 0688       Janett Castano MD  05/12/23 8917

## 2023-05-13 ENCOUNTER — APPOINTMENT (OUTPATIENT)
Dept: PHYSICAL THERAPY | Facility: CLINIC | Age: 48
DRG: 189 | End: 2023-05-13
Attending: INTERNAL MEDICINE
Payer: MEDICARE

## 2023-05-13 LAB
ANION GAP SERPL CALCULATED.3IONS-SCNC: 3 MMOL/L (ref 7–15)
BASE EXCESS BLDV CALC-SCNC: 11.4 MMOL/L (ref -7.7–1.9)
BUN SERPL-MCNC: 11 MG/DL (ref 6–20)
CALCIUM SERPL-MCNC: 8.6 MG/DL (ref 8.6–10)
CHLORIDE SERPL-SCNC: 101 MMOL/L (ref 98–107)
CREAT SERPL-MCNC: 0.6 MG/DL (ref 0.67–1.17)
DEPRECATED HCO3 PLAS-SCNC: 40 MMOL/L (ref 22–29)
GFR SERPL CREATININE-BSD FRML MDRD: >90 ML/MIN/1.73M2
GLUCOSE BLDC GLUCOMTR-MCNC: 104 MG/DL (ref 70–99)
GLUCOSE BLDC GLUCOMTR-MCNC: 129 MG/DL (ref 70–99)
GLUCOSE BLDC GLUCOMTR-MCNC: 83 MG/DL (ref 70–99)
GLUCOSE BLDC GLUCOMTR-MCNC: 96 MG/DL (ref 70–99)
GLUCOSE SERPL-MCNC: 88 MG/DL (ref 70–99)
HCO3 BLDV-SCNC: 39 MMOL/L (ref 21–28)
O2/TOTAL GAS SETTING VFR VENT: 30 %
PCO2 BLDV: 69 MM HG (ref 40–50)
PH BLDV: 7.36 [PH] (ref 7.32–7.43)
PO2 BLDV: 67 MM HG (ref 25–47)
POTASSIUM SERPL-SCNC: 4.3 MMOL/L (ref 3.4–5.3)
SODIUM SERPL-SCNC: 144 MMOL/L (ref 136–145)

## 2023-05-13 PROCEDURE — 250N000013 HC RX MED GY IP 250 OP 250 PS 637: Performed by: INTERNAL MEDICINE

## 2023-05-13 PROCEDURE — 250N000011 HC RX IP 250 OP 636: Performed by: INTERNAL MEDICINE

## 2023-05-13 PROCEDURE — 999N000157 HC STATISTIC RCP TIME EA 10 MIN

## 2023-05-13 PROCEDURE — 200N000001 HC R&B ICU

## 2023-05-13 PROCEDURE — 36415 COLL VENOUS BLD VENIPUNCTURE: CPT | Performed by: INTERNAL MEDICINE

## 2023-05-13 PROCEDURE — 258N000003 HC RX IP 258 OP 636: Performed by: INTERNAL MEDICINE

## 2023-05-13 PROCEDURE — 82803 BLOOD GASES ANY COMBINATION: CPT | Performed by: INTERNAL MEDICINE

## 2023-05-13 PROCEDURE — 99233 SBSQ HOSP IP/OBS HIGH 50: CPT | Performed by: INTERNAL MEDICINE

## 2023-05-13 PROCEDURE — 97161 PT EVAL LOW COMPLEX 20 MIN: CPT | Mod: GP | Performed by: PHYSICAL THERAPIST

## 2023-05-13 PROCEDURE — 94660 CPAP INITIATION&MGMT: CPT

## 2023-05-13 PROCEDURE — 80048 BASIC METABOLIC PNL TOTAL CA: CPT | Performed by: INTERNAL MEDICINE

## 2023-05-13 PROCEDURE — 97110 THERAPEUTIC EXERCISES: CPT | Mod: GP | Performed by: PHYSICAL THERAPIST

## 2023-05-13 PROCEDURE — 99221 1ST HOSP IP/OBS SF/LOW 40: CPT | Performed by: INTERNAL MEDICINE

## 2023-05-13 RX ORDER — QUETIAPINE FUMARATE 50 MG/1
150 TABLET, EXTENDED RELEASE ORAL AT BEDTIME
Status: DISCONTINUED | OUTPATIENT
Start: 2023-05-13 | End: 2023-05-22 | Stop reason: HOSPADM

## 2023-05-13 RX ADMIN — ENOXAPARIN SODIUM 40 MG: 40 INJECTION SUBCUTANEOUS at 20:02

## 2023-05-13 RX ADMIN — LEVETIRACETAM 1500 MG: 500 TABLET, FILM COATED ORAL at 21:32

## 2023-05-13 RX ADMIN — LAMOTRIGINE 200 MG: 200 TABLET ORAL at 20:02

## 2023-05-13 RX ADMIN — BUSPIRONE HYDROCHLORIDE 15 MG: 15 TABLET ORAL at 20:02

## 2023-05-13 RX ADMIN — PANTOPRAZOLE SODIUM 40 MG: 40 TABLET, DELAYED RELEASE ORAL at 08:28

## 2023-05-13 RX ADMIN — PROPRANOLOL HYDROCHLORIDE 20 MG: 20 TABLET ORAL at 20:02

## 2023-05-13 RX ADMIN — POLYETHYLENE GLYCOL 3350 17 G: 17 POWDER, FOR SOLUTION ORAL at 08:28

## 2023-05-13 RX ADMIN — ZONISAMIDE 200 MG: 100 CAPSULE ORAL at 21:33

## 2023-05-13 RX ADMIN — SODIUM CHLORIDE, POTASSIUM CHLORIDE, SODIUM LACTATE AND CALCIUM CHLORIDE: 600; 310; 30; 20 INJECTION, SOLUTION INTRAVENOUS at 03:14

## 2023-05-13 RX ADMIN — LEVOCARNITINE 660 MG: 330 TABLET ORAL at 14:04

## 2023-05-13 RX ADMIN — CITALOPRAM HYDROBROMIDE 60 MG: 20 TABLET ORAL at 08:29

## 2023-05-13 RX ADMIN — LEVETIRACETAM 1000 MG: 500 TABLET, FILM COATED ORAL at 08:28

## 2023-05-13 RX ADMIN — LEVOCARNITINE 660 MG: 330 TABLET ORAL at 20:02

## 2023-05-13 RX ADMIN — ZONISAMIDE 100 MG: 100 CAPSULE ORAL at 08:29

## 2023-05-13 RX ADMIN — BUSPIRONE HYDROCHLORIDE 15 MG: 15 TABLET ORAL at 08:29

## 2023-05-13 RX ADMIN — QUETIAPINE FUMARATE 150 MG: 50 TABLET, EXTENDED RELEASE ORAL at 21:32

## 2023-05-13 RX ADMIN — PRAZOSIN HYDROCHLORIDE 2 MG: 1 CAPSULE ORAL at 21:33

## 2023-05-13 RX ADMIN — LEVOCARNITINE 660 MG: 330 TABLET ORAL at 08:29

## 2023-05-13 RX ADMIN — LAMOTRIGINE 200 MG: 200 TABLET ORAL at 08:29

## 2023-05-13 ASSESSMENT — ACTIVITIES OF DAILY LIVING (ADL)
ADLS_ACUITY_SCORE: 50
ADLS_ACUITY_SCORE: 51
ADLS_ACUITY_SCORE: 52
ADLS_ACUITY_SCORE: 51
ADLS_ACUITY_SCORE: 50
ADLS_ACUITY_SCORE: 51
ADLS_ACUITY_SCORE: 50
ADLS_ACUITY_SCORE: 52

## 2023-05-13 NOTE — PROGRESS NOTES
05/13/23 6153   Appointment Info   Signing Clinician's Name / Credentials (PT) Gurdeep Trevino DPT   Living Environment   People in Home facility resident   Current Living Arrangements group home   Home Accessibility wheelchair accessible   Transportation Anticipated agency   Living Environment Comments Patient reports living in a group home with a ramp to enter.  Bedroom on main level.  Patient reports there is an elevator in the home.   Self-Care   Usual Activity Tolerance moderate   Current Activity Tolerance moderate   Regular Exercise No   Equipment Currently Used at Home walker, rolling   Fall history within last six months yes   Number of times patient has fallen within last six months 2   Activity/Exercise/Self-Care Comment Patient reports ambulating throughout the group home to the kitchen and other areas.  Patient is independent with toileting.   General Information   Onset of Illness/Injury or Date of Surgery 05/12/23   Referring Physician Alexandre Levy MD   Patient/Family Therapy Goals Statement (PT) Patient wants to return home to his group home   Pertinent History of Current Problem (include personal factors and/or comorbidities that impact the POC) Patient  is a 47 year old male with PMH including palsy, mild intellectual disability, congenital left eye blindness, seizure disorder with chronically elevated ammonia who remains on Depakote among other antiepileptics, borderline personality disorder, schizoaffective and ANA as well as obesity hypoventilation syndrome chronically on BiPAP who was recently admitted from 5/4 to 5/11 for hypercapnic respiratory failure and encephalopathy who presents with a fall not long after arriving back at his group home.   Existing Precautions/Restrictions fall;oxygen therapy device and L/min   General Observations Patient in bed on 2L of O2   Cognition   Affect/Mental Status (Cognition) flat/blunted affect   Orientation Status (Cognition) oriented x  3;person;place;situation   Follows Commands (Cognition) follows one-step commands;75-90% accuracy   Cognitive Status Comments Patient is particular about his mobility and has his own routine   Pain Assessment   Patient Currently in Pain No   Posture    Posture Forward head position;Protracted shoulders   Range of Motion (ROM)   Range of Motion ROM deficits secondary to swelling;ROM deficits secondary to weakness   Strength (Manual Muscle Testing)   Strength (Manual Muscle Testing) strength is WFL   Strength Comments Stregth tested in supine, 4-/5 bilateral LE, patient states he feels his strength is at his baseline   Bed Mobility   Bed Mobility rolling left;rolling right   Rolling Left [place] (Bed Mobility) verbal cues   Rolling Right [place] (Bed Mobility) verbal cues   Supine-Sit [place] (Bed Mobility) supervision   Assistive Device (Bed Mobility) bed rails   Transfers   Comment, (Transfers) Patient refused OOB mobility today   Clinical Impression   Criteria for Skilled Therapeutic Intervention Yes, treatment indicated   PT Diagnosis (PT) Impaired functional mobility   Influenced by the following impairments weakness, decreased activity tolerance, gait, and balance   Functional limitations due to impairments Impaired safe functional mobility   Clinical Presentation (PT Evaluation Complexity) Stable/Uncomplicated   Clinical Presentation Rationale Clinical judgement   Clinical Decision Making (Complexity) low complexity   Planned Therapy Interventions (PT) balance training;bed mobility training;gait training;patient/family education;strengthening;transfer training;progressive activity/exercise;risk factor education;home program guidelines   Anticipated Equipment Needs at Discharge (PT) other (see comments)  (Patient has all DME)   Risk & Benefits of therapy have been explained evaluation/treatment results reviewed;care plan/treatment goals reviewed;risks/benefits reviewed;current/potential barriers  reviewed;participants voiced agreement with care plan;participants included;patient   Clinical Impression Comments Patient presents near functional baseline with functional strength and bed mobility. Declined out of bed mobility this afternoon.

## 2023-05-13 NOTE — PROGRESS NOTES
A BiPAP of  16/6 @ 30% was applied to the pt via the mask for NOC use. Skin is intact. Pt is tolerating it well. Will continue to monitor and assess the pt's current respiratory status and needs.    Fina Lofton, RT

## 2023-05-13 NOTE — PLAN OF CARE
Goal Outcome Evaluation: Ongoing    Goal Outcome Evaluation: ongoing     ICU End of Shift Summary.  For vital signs and complete assessments, please see documentation flowsheets.      Pertinent assessments:   Neuro: Alert and oriented x 4.  Forgetful of exact date.  Blind left eye, hx CP, borderline personality, schizoaffective.  CV: Tele is stable SR. BP is WDL.  Generalized edema   RESP: Shallow respirations in general, unable to wean off 2L nc.  Bipap needed at night.  Desats with exertion.  GI: Medium liquid stool x1 today. Tolerates a regular diet  : Voids without difficulty  SKIN: R buttock abrasion from fall on 5/11 at group home, is intact.     Major Shift Events:  Weaned off bipap during the day.  Able to ambulate with gait belt and assist of 1-2 , walker to bathroom.                            Plan (Upcoming Events): PT, SW consults  Discharge/Transfer Needs: TBD, may transfer out of ICU as bed available     Bedside Shift Report Completed : Yes  Bedside Safety Check Completed:Yes

## 2023-05-13 NOTE — PROGRESS NOTES
Pulmonary consult     A 64-frpg-rt[age]-old male with known severe obstructive sleep apnea, chronic hypercapnic respiratory failure due to obesity hypoventilation syndrome complicated by seizures and pseudoseizures on multiple antiepileptic medications.  I had seen him in pulmonary consultation earlier this week when he was on the medical floor and after much effort was able to restart the patient on nocturnal ventilatory support via a trilogy machine based on settings from his polysomnogram a couple years ago.  His baseline PCO2 is anywhere between the high 70s and low 90s and even nocturnal ventilation does not decrease this very much.  This is all compensated.  Intermittent encephalopathy also complicated by elevated ammonia in the 70 to 90 range probably related to Depakote therapy for his seizures.  He is also on 3 other antiepileptic medications.  Discharge plan was created and he was sent back to his group home where within 24 hours he had a syncopal episode when ambulating and was brought back to the emergency room with mildly decreased level of consciousness and carbon dioxide level slightly above his baseline.  He was placed on BiPAP.  His level of consciousness improved.  He has been observed overnight in the intensive care unit on BiPAP.  There were no new other laboratory abnormalities.  Imaging was unremarkable.  There is no signs of seizure.    This morning he is awake and alert.  He wants breakfast.  He notes that he has a tomato allergy listed and he wants that removed so we can have a regular diet.  No complaints of shortness of breath.  He says he has no problem tolerating the BiPAP at night.    Patient is afebrile.  Heart rate 80.  Respiratory rate 16 on facemask.  Sats are 95%.  He is awake and alert a little discursive but oriented.  Cooperative.  Left eye is blind.  Right pupil okay.  Thick neck.  Crowded oropharynx as before.  Lungs are clear.  Heart regular rate.  Abdomen soft nontender but  significant central obesity.  Hands and feet are well-perfused.  No rash.  No edema.    Chemistry panel unremarkable except for elevated bicarb which is a compensation for the chronic respiratory acidosis.  VBG 7.31/79 last night at 6:00 currently 7.36/69.  Ammonia of 85.    Assessment: Chronic respiratory acidosis, compensated, secondary to severe obstructive sleep apnea and OHS.  No signs that this was a seizure.  I am concerned whether he is medically capable of being discharged to a group home.  He is tolerating his nocturnal BiPAP.  There has been a discussions regarding his Depakote therapy and, in my opinion, considering he's on 3 other antiepileptic medications the elevated ammonia in combination with a chronic respiratory acidosis is a bad combination and I would stop the Tegretol.  Patient is also on Seroquel 300 mg at night which is a high dose, I would decrease that to 150 mg.  Continue his other 3 antiepileptic medications.  Advance diet.  Patient can be moved to the floor.  Discussions regarding safe discharge back to group home versus long-term nursing home therapy can be started .

## 2023-05-13 NOTE — PROGRESS NOTES
[facility]  Hospitalist Progress Note  Shabbir Vickers M.D., M.B.A.   05/13/2023    Reason for Stay/active problem list      Generalized weakness and mechanical fall at the group home, failure to thrive at group home    Acute on chronic hypoxic and hypercarbic respiratory failure         Assessment and Plan:        Summary of Stay:   Tre Vazquez is a 47 year old male with PMH including palsy, mild intellectual disability, congenital left eye blindness, seizure disorder with chronically elevated ammonia who remains on Depakote among other antiepileptics, borderline personality disorder, schizoaffective and ANA as well as obesity hypoventilation syndrome chronically on BiPAP who was recently admitted from 5/4 to 5/11 for hypercapnic respiratory failure and encephalopathy who presents with a fall not long after arriving back at his group home.      The patient does not recall much but just recalls winding up on the ground.  He was brought back to the ER and placed on BiPAP on route.  Here he underwent lab work-up which was notable for grossly stable BMP with carbon dioxide level of 39, stable CBC with mild anemia, normal lactic acid but venous blood gas showing PCO2 of approximately 90 with pH of 7.27.  His baseline PCO2 is around 80.  Influenza and COVID-19 testing were negative as well as troponin and BNP.  CT head and neck were negative for traumatic injury and CT PE protocol was negative for PE or pneumonia.       Problem List with Assessment and Plan:    Assessment and Plan:   1. Acute on chronic hypercapnic respiratory failure:  -- Baseline PCO2 seems to be in the 70s to 80 range.  He was seen by pulmonary medicine last admission.  it appears that the rep from [facility] was contacted last admission, and that the plan was for him to resume trilogy at home with the following settings: (Wesley from [facility] was contacted at [phone]). NC max 25, PS minimum 5, PS maximum 20, EPAP minimum 8, EPAP  maximum 15, tidal volume 300, rate 16, I-, time auto, FiO2 6 L/min while sleeping.  -- Patient was admitted to ICU as he was requiring continuous BiPAP for close monitoring and treatment.  --Patient was able to wean off the BiPAP and his mental status significantly improved.  -- Currently patient is on 2 L of oxygen via nasal cannula, doing well, alert and eating.  His VBG showed pH of 7.36, venous PCO2 of 69 down from 79 yesterday.  Patient was seen by Dr. Johnson who stated that patient is at his baseline tolerating nocturnal BiPAP.  He suggested seizure medication changes as below.  Dr. Johnson felt that the patient is not able to be cared for at group home facility and may need alternative placement.  We will continue supplemental oxygen as needed, nocturnal BiPAP, may transfer out of the ICU.      2.   History of seizure disorder, hyperammonemia while on Depakote:   --He is also on numerous other antiepileptic medications including Lamictal 200 mg twice daily, Keppra 1000 mg in the morning and 1500 mg at bedtime and zonisamide 100 mg in the morning and 200 mg at bedtime.  --His Depakote was on hold and his ammonia was monitored.  Serum ammonia was 85 on May 12.  --Currently no evidence of seizure activity.  Patient is back to his baseline.  No evidence of encephalopathy.  -- Patient was seen by Dr. Zimmerman's who recommended to stop Tegretol in the setting of chronic respiratory acidosis.  Plan to continue other antiepileptic medications-Lamictal, Keppra, Zonegran.  His Seroquel was decreased to 150 mg  -- Patient may need neurology consultation at this time.  Consider neurology consultation if he has recurrent seizures.  Monitor ammonia level, continue seizure precautions.     3.   History of schizoaffective disorder, borderline personality: Plan to continue his home Seroquel at 150 mg in the evening as well as BuSpar and Celexa.     4.   Toxic metabolic encephalopathy: Mental status seems to be at  "baseline     5.   Social: Reportedly he is his own decision maker though I note that the manager of his group home and mother were present and supportive last admission.  Compliance seems to be an issue to some extent.     6..  Fall: Obesity limits his mobility at baseline.    Physical therapy consulted      VTE Prophylaxis: Enoxaparin (Lovenox) SQ  Code Status: Full Code  Diet: Regular Diet Adult    Reid Catheter: Not present    Family updated today: his mother Ashlie was update.     Disposition: Tre is doing very well.  May transfer out of ICU.  He does not need monitored bed but needs nocturnal BiPAP.        Interval History (Subjective):        Patient is seen and examined by me today and medical record reviewed.Overnight events noted and care discussed with nursing staff.  Patient feels much better today.  She is alert and oriented x3.  He was eating.  Off BiPAP and able to tolerate few liters of oxygen through nasal cannula.  No fevers or chills.  Patient was seen by Dr. Johnson of pulmonary medicine and his medications were reviewed as well as recommendations obtained.  I spoke with patient's mother Ashlie about his care plan.                  Physical Exam:        Last Vital Signs:  /81   Pulse 76   Temp 98  F (36.7  C) (Temporal)   Resp 24   Ht 1.727 m (5' 8\")   Wt 147.8 kg (325 lb 12.8 oz)   SpO2 98%   BMI 49.54 kg/m      I/O last 3 completed shifts:  In: 2871.66 [P.O.:480; I.V.:2391.66]  Out: 740 [Urine:740]    Wt Readings from Last 5 Encounters:   05/12/23 147.8 kg (325 lb 12.8 oz)   05/04/23 142.9 kg (315 lb)   03/27/23 141.8 kg (312 lb 11.2 oz)   03/20/23 139.6 kg (307 lb 12.2 oz)   01/03/23 122.5 kg (270 lb)        Constitutional: wake, alert, cooperative, no apparent distress     Respiratory: Clear to auscultation bilaterally, no crackles or wheezing   Cardiovascular: Regular rate and rhythm, normal S1 and S2, and no murmur noted   Abdomen: Normal bowel sounds, soft, non-distended, " non-tender   Skin: No new rashes, no cyanosis, dry to touch   Neuro: Alert with  no new focal weakness   Extremities: No edema   Other(s):        All other systems: Negative          Medications:        All current medications were reviewed with changes reflected in problem list.         Data:      All new lab and imaging data was reviewed.      Data reviewed today: I reviewed all new labs and imaging results over the last 24 hours. I personally reviewed       Recent Labs   Lab 05/12/23 0114   WBC 6.3   HGB 11.7*   HCT 37.8*           No results for input(s): CULT in the last 168 hours.  Recent Labs   Lab 05/13/23  1202 05/13/23  0806 05/13/23  0516 05/12/23  0851 05/12/23  0513 05/12/23  0114 05/12/23  0112 05/09/23  0733   NA  --   --  144  --   --  142  --  141   POTASSIUM  --   --  4.3  --   --  4.2  --  4.4   CHLORIDE  --   --  101  --   --  100  --  98   CO2  --   --  40*  --   --  39*  --  38*   ANIONGAP  --   --  3*  --   --  3*  --  5*   * 83 88   < >  --  132*  --  102*   BUN  --   --  11.0  --   --  11.3  --  18.1   CR  --   --  0.60*  --  0.61* 0.59*   < > 0.63*   GFRESTIMATED  --   --  >90  --  >90 >90   < > >90   ARNOLD  --   --  8.6  --   --  8.6  --  9.1   PROTTOTAL  --   --   --   --   --  6.1*  --   --    ALBUMIN  --   --   --   --   --  4.0  --   --    BILITOTAL  --   --   --   --   --  0.3  --   --    ALKPHOS  --   --   --   --   --  103  --   --    AST  --   --   --   --   --  105*  --   --    ALT  --   --   --   --   --  117*  --   --     < > = values in this interval not displayed.       Recent Labs   Lab 05/13/23 1202 05/13/23 0806 05/13/23  0516 05/13/23  0023 05/12/23  1953   * 83 88 96 104*       No results for input(s): INR in the last 168 hours.      No results for input(s): TROPONIN, TROPI, TROPR in the last 168 hours.    Invalid input(s): TROP, TROPONINIES    Recent Results (from the past 48 hour(s))   Head CT w/o contrast    Narrative    EXAM: CT HEAD W/O  CONTRAST  LOCATION: [facility]  DATE/TIME: 5/12/2023 4:26 AM CDT    INDICATION: Traumatic injury. Pain.  COMPARISON: 10/25/2022, 04/05/2012  TECHNIQUE: Routine CT Head without IV contrast. Multiplanar reformats. Dose reduction techniques were used.    FINDINGS:  INTRACRANIAL CONTENTS: No intracranial hemorrhage, extraaxial collection, or mass effect.  No CT evidence of acute infarct. Mild to moderate presumed chronic small vessel ischemic changes. Extensive white matter volume loss. Stable ventriculomegaly.     VISUALIZED ORBITS/SINUSES/MASTOIDS: No intraorbital abnormality. No paranasal sinus mucosal disease. No middle ear or mastoid effusion.    BONES/SOFT TISSUES: No acute abnormality.      Impression    IMPRESSION:  1.  No acute intracranial process.  2.  No change from prior.     Cervical spine CT w/o contrast    Narrative    EXAM: CT CERVICAL SPINE W/O CONTRAST  LOCATION: [facility]  DATE/TIME: 5/12/2023 4:31 AM CDT    INDICATION: fall; fall; Neck pain; Trauma; Mild moderate trauma; None of the following: Spondyloarthropathy, cervical x ray with negative result, questionable finding, or inadequate coverage  COMPARISON: None.  TECHNIQUE: Routine CT Cervical Spine without IV contrast. Multiplanar reformats. Dose reduction techniques were used.    FINDINGS:  VERTEBRA: Reversal of usual cervical lordosis. Vertebral body heights and alignment is preserved. Fusion of the C4-C5 vertebra. Normal prevertebral soft tissues. No fracture or posttraumatic subluxation.     CANAL/FORAMINA: Relatively severe central canal stenosis at C4-C5, C5-C6 and C6-C7 with moderate to severe multilevel neural foraminal narrowing.    PARASPINAL: No extraspinal abnormality.      Impression    IMPRESSION:  1.  No fracture or posttraumatic subluxation.  2.  Reversal of the usual cervical lordosis.  3.  Relatively severe central canal stenosis at C4-C5, C5-C6 and C6-C7 with moderate to severe  multilevel neural foraminal narrowing.     CT Chest (PE) Abdomen Pelvis w Contrast    Narrative    EXAM: CT CHEST PE ABDOMEN PELVIS W CONTRAST  LOCATION: [facility]  DATE/TIME: 5/12/2023 4:32 AM CDT    INDICATION: Shortness of breath. Syncope.  COMPARISON:   1. Chest x-ray 2 views 05/04/2023.  2. Ultrasound abdomen limited 05/07/2023.  TECHNIQUE: CT chest pulmonary angiogram and routine CT abdomen pelvis with IV contrast. Arterial phase through the chest and venous phase through the abdomen and pelvis. Multiplanar reformats and MIP reconstructions were performed. Dose reduction   techniques were used.   CONTRAST: 77 mL Isovue 370.    FINDINGS:  ANGIOGRAM CHEST: Motion artifact degrades several images. Suboptimal opacification of the distal pulmonary vessels. No obvious pulmonary emboli on either side. Normal caliber thoracic aorta without aneurysm or dissection. No CT evidence of right heart   strain.     LUNGS AND PLEURA: Numerous images are degraded due to motion artifact. Mild diffuse thickening of the bronchi. Dependent atelectasis in the posterior lungs. No pleural effusion on either side.    MEDIASTINUM/AXILLAE: Normal cardiac size. No pericardial effusion. No suspicious adenopathy. Trachea is midline.    CORONARY ARTERY CALCIFICATION: None.    HEPATOBILIARY: Gallstones without biliary dilatation or adjacent inflammation. Diffuse fatty infiltration of the liver. Patent hepatic and portal veins.    PANCREAS: Normal.    SPLEEN: Normal.    ADRENAL GLANDS: Normal.    KIDNEYS/BLADDER: Milk of calcium cyst upper pole of the left kidney posteriorly containing dependent mineralization measures 3.5 x 3.5 cm (image 93, series 18), no specific follow-up required. Tiny punctate stones in the collecting system of both   kidneys, one on the right (image 47, series 17), and 3 on the left (images 40, 42 and 46, series 17). Both kidneys are well perfused without hydronephrosis or hydroureter. Normal  urinary bladder.    BOWEL: Scattered colonic diverticulosis, without acute inflammation. Formed stool material within normal caliber colon. Normal appendix. No obstruction, free gas or free fluid.    LYMPH NODES: No suspicious abdominopelvic adenopathy. Subcentimeter retroperitoneal and bilateral pelvic nodes, likely reactive.    VASCULATURE: Slightly atherosclerotic normal caliber distal abdominal aorta measuring 1.9 x 1.9 cm (image 57, series 17). Normal caliber IVC.    PELVIC ORGANS: Stool-filled rectosigmoid. Normal appendix. No free fluid. No suspicious adenopathy.    MUSCULOSKELETAL: Degenerative changes both shoulders, spine and joints of the pelvis. Slight right convex thoracic curve.      Impression    IMPRESSION:  1.  Several images are degraded due to motion artifact. Suboptimal opacification of the pulmonary vasculature. No obvious pulmonary emboli on either side. Mild diffuse thickening of the bronchi. Dependent atelectasis in the posterior lungs. No adenopathy   or effusion.    2.  Normal caliber thoracic aorta without aneurysm or dissection. Normal cardiac size. No pericardial effusion.    3.  Cholelithiasis. Fatty liver. Milk of calcium cyst upper pole of the left kidney posteriorly, no specific follow-up required. Nonobstructive stones in the renal collecting system, 1 on the right and 3 on the left.    4.  Scattered colonic diverticulosis without acute inflammation. No mechanical bowel obstruction, free gas or free fluid. Normal appendix.                 COVID Status:  COVID-19 PCR Results        8/30/2021    20:40 12/8/2021    02:51 6/20/2022    11:20 3/15/2023    13:14 3/25/2023    08:33   COVID-19 PCR Results   SARS CoV2 PCR Negative   Negative   Negative   Negative   Negative             5/12/2023    01:20   COVID-19 PCR Results   SARS CoV2 PCR Negative     COVID-19 Antibody Results, Testing for Immunity         No data to display                 Disclaimer: This note consists of symbols  derived from keyboarding, dictation and/or voice recognition software. As a result, there may be errors in the script that have gone undetected. Please consider this when interpreting information found in this chart.

## 2023-05-13 NOTE — PROVIDER NOTIFICATION
DATE:  5/12/2023   TIME OF RECEIPT FROM LAB:  1840  LAB TEST:  PC02  LAB VALUE:  79  RESULTS GIVEN WITH READ-BACK TO (PROVIDER):  X- cover paged, Dr Mercer  TIME LAB VALUE REPORTED TO PROVIDER:   [id number]

## 2023-05-14 LAB
AMMONIA PLAS-SCNC: 67 UMOL/L (ref 16–60)
ANION GAP SERPL CALCULATED.3IONS-SCNC: 5 MMOL/L (ref 7–15)
BASE EXCESS BLDV CALC-SCNC: 9.9 MMOL/L (ref -7.7–1.9)
BUN SERPL-MCNC: 9.6 MG/DL (ref 6–20)
CALCIUM SERPL-MCNC: 8.7 MG/DL (ref 8.6–10)
CHLORIDE SERPL-SCNC: 102 MMOL/L (ref 98–107)
CREAT SERPL-MCNC: 0.64 MG/DL (ref 0.67–1.17)
DEPRECATED HCO3 PLAS-SCNC: 36 MMOL/L (ref 22–29)
ERYTHROCYTE [DISTWIDTH] IN BLOOD BY AUTOMATED COUNT: 14.1 % (ref 10–15)
GFR SERPL CREATININE-BSD FRML MDRD: >90 ML/MIN/1.73M2
GLUCOSE BLDC GLUCOMTR-MCNC: 116 MG/DL (ref 70–99)
GLUCOSE SERPL-MCNC: 114 MG/DL (ref 70–99)
HCO3 BLDV-SCNC: 38 MMOL/L (ref 21–28)
HCT VFR BLD AUTO: 34.8 % (ref 40–53)
HGB BLD-MCNC: 10.5 G/DL (ref 13.3–17.7)
MCH RBC QN AUTO: 30.5 PG (ref 26.5–33)
MCHC RBC AUTO-ENTMCNC: 30.2 G/DL (ref 31.5–36.5)
MCV RBC AUTO: 101 FL (ref 78–100)
O2/TOTAL GAS SETTING VFR VENT: 30 %
PCO2 BLDV: 69 MM HG (ref 40–50)
PH BLDV: 7.35 [PH] (ref 7.32–7.43)
PLATELET # BLD AUTO: 166 10E3/UL (ref 150–450)
PO2 BLDV: 79 MM HG (ref 25–47)
POTASSIUM SERPL-SCNC: 4 MMOL/L (ref 3.4–5.3)
RBC # BLD AUTO: 3.44 10E6/UL (ref 4.4–5.9)
SODIUM SERPL-SCNC: 143 MMOL/L (ref 136–145)
WBC # BLD AUTO: 6.1 10E3/UL (ref 4–11)

## 2023-05-14 PROCEDURE — 120N000001 HC R&B MED SURG/OB

## 2023-05-14 PROCEDURE — 85027 COMPLETE CBC AUTOMATED: CPT | Performed by: INTERNAL MEDICINE

## 2023-05-14 PROCEDURE — 250N000013 HC RX MED GY IP 250 OP 250 PS 637: Performed by: INTERNAL MEDICINE

## 2023-05-14 PROCEDURE — 999N000157 HC STATISTIC RCP TIME EA 10 MIN

## 2023-05-14 PROCEDURE — 99233 SBSQ HOSP IP/OBS HIGH 50: CPT | Performed by: INTERNAL MEDICINE

## 2023-05-14 PROCEDURE — 82310 ASSAY OF CALCIUM: CPT | Performed by: INTERNAL MEDICINE

## 2023-05-14 PROCEDURE — 82140 ASSAY OF AMMONIA: CPT | Performed by: INTERNAL MEDICINE

## 2023-05-14 PROCEDURE — 250N000011 HC RX IP 250 OP 636: Performed by: INTERNAL MEDICINE

## 2023-05-14 PROCEDURE — 82803 BLOOD GASES ANY COMBINATION: CPT | Performed by: INTERNAL MEDICINE

## 2023-05-14 PROCEDURE — 36415 COLL VENOUS BLD VENIPUNCTURE: CPT | Performed by: INTERNAL MEDICINE

## 2023-05-14 PROCEDURE — 94660 CPAP INITIATION&MGMT: CPT

## 2023-05-14 RX ADMIN — CITALOPRAM HYDROBROMIDE 60 MG: 20 TABLET ORAL at 09:54

## 2023-05-14 RX ADMIN — ENOXAPARIN SODIUM 40 MG: 40 INJECTION SUBCUTANEOUS at 20:24

## 2023-05-14 RX ADMIN — MICONAZOLE NITRATE: 20 POWDER TOPICAL at 12:55

## 2023-05-14 RX ADMIN — LEVETIRACETAM 1000 MG: 500 TABLET, FILM COATED ORAL at 09:54

## 2023-05-14 RX ADMIN — ZONISAMIDE 100 MG: 100 CAPSULE ORAL at 09:54

## 2023-05-14 RX ADMIN — LEVOCARNITINE 660 MG: 330 TABLET ORAL at 20:24

## 2023-05-14 RX ADMIN — LAMOTRIGINE 200 MG: 200 TABLET ORAL at 20:23

## 2023-05-14 RX ADMIN — BUSPIRONE HYDROCHLORIDE 15 MG: 15 TABLET ORAL at 09:55

## 2023-05-14 RX ADMIN — QUETIAPINE FUMARATE 150 MG: 50 TABLET, EXTENDED RELEASE ORAL at 20:31

## 2023-05-14 RX ADMIN — LAMOTRIGINE 200 MG: 200 TABLET ORAL at 09:55

## 2023-05-14 RX ADMIN — LEVETIRACETAM 1500 MG: 500 TABLET, FILM COATED ORAL at 20:25

## 2023-05-14 RX ADMIN — PROPRANOLOL HYDROCHLORIDE 20 MG: 20 TABLET ORAL at 20:24

## 2023-05-14 RX ADMIN — PROPRANOLOL HYDROCHLORIDE 20 MG: 20 TABLET ORAL at 09:55

## 2023-05-14 RX ADMIN — MICONAZOLE NITRATE: 20 POWDER TOPICAL at 20:24

## 2023-05-14 RX ADMIN — ZONISAMIDE 200 MG: 100 CAPSULE ORAL at 20:26

## 2023-05-14 RX ADMIN — POLYETHYLENE GLYCOL 3350 17 G: 17 POWDER, FOR SOLUTION ORAL at 09:53

## 2023-05-14 RX ADMIN — LEVOCARNITINE 660 MG: 330 TABLET ORAL at 14:16

## 2023-05-14 RX ADMIN — LEVOCARNITINE 660 MG: 330 TABLET ORAL at 09:54

## 2023-05-14 RX ADMIN — PRAZOSIN HYDROCHLORIDE 2 MG: 1 CAPSULE ORAL at 20:31

## 2023-05-14 RX ADMIN — PANTOPRAZOLE SODIUM 40 MG: 40 TABLET, DELAYED RELEASE ORAL at 09:54

## 2023-05-14 RX ADMIN — BUSPIRONE HYDROCHLORIDE 15 MG: 15 TABLET ORAL at 20:24

## 2023-05-14 ASSESSMENT — ACTIVITIES OF DAILY LIVING (ADL)
ADLS_ACUITY_SCORE: 48
ADLS_ACUITY_SCORE: 54
ADLS_ACUITY_SCORE: 48
ADLS_ACUITY_SCORE: 52
ADLS_ACUITY_SCORE: 48
ADLS_ACUITY_SCORE: 48
ADLS_ACUITY_SCORE: 54
ADLS_ACUITY_SCORE: 54
ADLS_ACUITY_SCORE: 48
ADLS_ACUITY_SCORE: 48
ADLS_ACUITY_SCORE: 52
ADLS_ACUITY_SCORE: 48
DEPENDENT_IADLS:: CLEANING;COOKING;LAUNDRY;SHOPPING;MONEY MANAGEMENT;MEDICATION MANAGEMENT;MEAL PREPARATION;TRANSPORTATION

## 2023-05-14 NOTE — PROGRESS NOTES
[facility]  Hospitalist Progress Note  Shabbir Vickers M.D., M.B.A.   05/14/2023    Reason for Stay/active problem list      Generalized weakness and mechanical fall at the group home, failure to thrive at group home    Acute on chronic hypoxic and hypercarbic respiratory failure         Assessment and Plan:        Summary of Stay:   Tre Vazquez is a 47 year old male with PMH including palsy, mild intellectual disability, congenital left eye blindness, seizure disorder with chronically elevated ammonia who remains on Depakote among other antiepileptics, borderline personality disorder, schizoaffective and ANA as well as obesity hypoventilation syndrome chronically on BiPAP who was recently admitted from 5/4 to 5/11 for hypercapnic respiratory failure and encephalopathy who presents with a fall not long after arriving back at his group home.      The patient does not recall much but just recalls winding up on the ground.  He was brought back to the ER and placed on BiPAP on route.  Here he underwent lab work-up which was notable for grossly stable BMP with carbon dioxide level of 39, stable CBC with mild anemia, normal lactic acid but venous blood gas showing PCO2 of approximately 90 with pH of 7.27.  His baseline PCO2 is around 80.  Influenza and COVID-19 testing were negative as well as troponin and BNP.  CT head and neck were negative for traumatic injury and CT PE protocol was negative for PE or pneumonia.       Problem List with Assessment and Plan:    Assessment and Plan:   1. Acute on chronic hypercapnic respiratory failure:  -- Baseline PCO2 seems to be in the 70s to 80 range.  He was seen by pulmonary medicine last admission.  it appears that the rep from [facility] was contacted last admission, and that the plan was for him to resume trilogy at home with the following settings: (Wesley from [facility] was contacted at [phone]). MD max 25, PS minimum 5, PS maximum 20, EPAP minimum 8, EPAP  maximum 15, tidal volume 300, rate 16, I-, time auto, FiO2 6 L/min while sleeping.  -- Patient was admitted to ICU as he was requiring continuous BiPAP for close monitoring and treatment.  --Patient was able to wean off the BiPAP and his mental status significantly improved.  -- Currently patient is on 3 L of oxygen via nasal cannula, doing well, alert and eating.  His VBG showed pH of 7.35, venous PCO2 of 69 down.  Patient was seen by Dr. Johnson who stated that patient is at his baseline tolerating nocturnal BiPAP.  He suggested seizure medication changes as below.  Dr. Johnson felt that the patient is not able to be cared for at group home facility and may need alternative placement.  We will continue supplemental oxygen as needed, nocturnal BiPAP, may transfer out of the ICU.      2.   History of seizure disorder, hyperammonemia while on Depakote:   --He is also on numerous other antiepileptic medications including Lamictal 200 mg twice daily, Keppra 1000 mg in the morning and 1500 mg at bedtime and zonisamide 100 mg in the morning and 200 mg at bedtime.  --His Depakote was on hold and his ammonia was monitored.  Serum ammonia was 85 on May 12.  --Currently no evidence of seizure activity.  Patient is back to his baseline.  No evidence of encephalopathy.  -- Patient was seen by Dr. Zimmerman's who recommended to stop Tegretol in the setting of chronic respiratory acidosis.  Plan to continue other antiepileptic medications-Lamictal, Keppra, Zonegran.  His Seroquel was decreased to 150 mg  -- Patient may not  need neurology consultation at this time.  Consider neurology consultation if he has recurrent seizures.  Monitor ammonia level, continue seizure precautions.     3.   History of schizoaffective disorder, borderline personality: Plan to continue his home Seroquel at 150 mg in the evening as well as BuSpar and Celexa.     4.   Toxic metabolic encephalopathy: Mental status seems to be at baseline     5.   Social:  "Reportedly he is his own decision maker   Compliance seems to be an issue to some extent.     6..  Fall: Obesity limits his mobility at baseline.    Physical therapy consulted    7. Diarrhea - Few loose stools today but not water , no fever   -- monitor for now     VTE Prophylaxis: Enoxaparin (Lovenox) SQ  Code Status: Full Code  Diet: Regular Diet Adult    Reid Catheter: Not present    Family updated today: his mother Ashlie was update.     Disposition: Tre is doing very well.  May transfer out of ICU.  He does not need monitored bed but needs nocturnal BiPAP.  Given that Tre was readmitted the next day after discharge from [facility] it is not clear if he is able to return back to his group home.  He may need increased level of service.  Social service assisting with exploring need for placement to alternative facility.        Interval History (Subjective):        Patient is seen and examined by me today and medical record reviewed.Overnight events noted and care discussed with nursing staff.  Patient feels much better with no complaint today.  He stated that he had few loose stools today. No fever or chills.  Had nocturnal bipap , off now back to his 3 litre of oxygen                   Physical Exam:        Last Vital Signs:  /79 (BP Location: Left arm, Cuff Size: Adult Regular)   Pulse 72   Temp 97.4  F (36.3  C) (Temporal)   Resp 24   Ht 1.727 m (5' 8\")   Wt 147.8 kg (325 lb 12.8 oz)   SpO2 95%   BMI 49.54 kg/m      I/O last 3 completed shifts:  In: 720 [P.O.:720]  Out: 1050 [Urine:1050]    Wt Readings from Last 5 Encounters:   05/12/23 147.8 kg (325 lb 12.8 oz)   05/04/23 142.9 kg (315 lb)   03/27/23 141.8 kg (312 lb 11.2 oz)   03/20/23 139.6 kg (307 lb 12.2 oz)   01/03/23 122.5 kg (270 lb)        Constitutional: wake, alert, cooperative, no apparent distress     Respiratory: Clear to auscultation bilaterally, no crackles or wheezing   Cardiovascular: Regular rate and rhythm, normal " S1 and S2, and no murmur noted   Abdomen: Normal bowel sounds, soft, non-distended, non-tender   Skin: No new rashes, no cyanosis, dry to touch   Neuro: Alert with  no new focal weakness   Extremities: No edema   Other(s):        All other systems: Negative          Medications:        All current medications were reviewed with changes reflected in problem list.         Data:      All new lab and imaging data was reviewed.      Data reviewed today: I reviewed all new labs and imaging results over the last 24 hours. I personally reviewed       Recent Labs   Lab 05/14/23 0517 05/12/23 0114   WBC 6.1 6.3   HGB 10.5* 11.7*   HCT 34.8* 37.8*   * 100    158     No results for input(s): CULT in the last 168 hours.  Recent Labs   Lab 05/14/23  0836 05/14/23  0517 05/13/23  1616 05/13/23  0806 05/13/23  0516 05/12/23  0851 05/12/23  0513 05/12/23  0114   NA  --  143  --   --  144  --   --  142   POTASSIUM  --  4.0  --   --  4.3  --   --  4.2   CHLORIDE  --  102  --   --  101  --   --  100   CO2  --  36*  --   --  40*  --   --  39*   ANIONGAP  --  5*  --   --  3*  --   --  3*   * 114* 129*   < > 88   < >  --  132*   BUN  --  9.6  --   --  11.0  --   --  11.3   CR  --  0.64*  --   --  0.60*  --  0.61* 0.59*   GFRESTIMATED  --  >90  --   --  >90  --  >90 >90   ARNOLD  --  8.7  --   --  8.6  --   --  8.6   PROTTOTAL  --   --   --   --   --   --   --  6.1*   ALBUMIN  --   --   --   --   --   --   --  4.0   BILITOTAL  --   --   --   --   --   --   --  0.3   ALKPHOS  --   --   --   --   --   --   --  103   AST  --   --   --   --   --   --   --  105*   ALT  --   --   --   --   --   --   --  117*    < > = values in this interval not displayed.       Recent Labs   Lab 05/14/23  0836 05/14/23  0517 05/13/23  1616 05/13/23  1202 05/13/23  0806   * 114* 129* 104* 83       No results for input(s): INR in the last 168 hours.      No results for input(s): TROPONIN, TROPI, TROPR in the last 168 hours.    Invalid  input(s): TROP, TROPONINIES    Recent Results (from the past 48 hour(s))   Head CT w/o contrast    Narrative    EXAM: CT HEAD W/O CONTRAST  LOCATION: [facility]  DATE/TIME: 5/12/2023 4:26 AM CDT    INDICATION: Traumatic injury. Pain.  COMPARISON: 10/25/2022, 04/05/2012  TECHNIQUE: Routine CT Head without IV contrast. Multiplanar reformats. Dose reduction techniques were used.    FINDINGS:  INTRACRANIAL CONTENTS: No intracranial hemorrhage, extraaxial collection, or mass effect.  No CT evidence of acute infarct. Mild to moderate presumed chronic small vessel ischemic changes. Extensive white matter volume loss. Stable ventriculomegaly.     VISUALIZED ORBITS/SINUSES/MASTOIDS: No intraorbital abnormality. No paranasal sinus mucosal disease. No middle ear or mastoid effusion.    BONES/SOFT TISSUES: No acute abnormality.      Impression    IMPRESSION:  1.  No acute intracranial process.  2.  No change from prior.     Cervical spine CT w/o contrast    Narrative    EXAM: CT CERVICAL SPINE W/O CONTRAST  LOCATION: [facility]  DATE/TIME: 5/12/2023 4:31 AM CDT    INDICATION: fall; fall; Neck pain; Trauma; Mild moderate trauma; None of the following: Spondyloarthropathy, cervical x ray with negative result, questionable finding, or inadequate coverage  COMPARISON: None.  TECHNIQUE: Routine CT Cervical Spine without IV contrast. Multiplanar reformats. Dose reduction techniques were used.    FINDINGS:  VERTEBRA: Reversal of usual cervical lordosis. Vertebral body heights and alignment is preserved. Fusion of the C4-C5 vertebra. Normal prevertebral soft tissues. No fracture or posttraumatic subluxation.     CANAL/FORAMINA: Relatively severe central canal stenosis at C4-C5, C5-C6 and C6-C7 with moderate to severe multilevel neural foraminal narrowing.    PARASPINAL: No extraspinal abnormality.      Impression    IMPRESSION:  1.  No fracture or posttraumatic subluxation.  2.  Reversal of  the usual cervical lordosis.  3.  Relatively severe central canal stenosis at C4-C5, C5-C6 and C6-C7 with moderate to severe multilevel neural foraminal narrowing.     CT Chest (PE) Abdomen Pelvis w Contrast    Narrative    EXAM: CT CHEST PE ABDOMEN PELVIS W CONTRAST  LOCATION: [facility]  DATE/TIME: 5/12/2023 4:32 AM CDT    INDICATION: Shortness of breath. Syncope.  COMPARISON:   1. Chest x-ray 2 views 05/04/2023.  2. Ultrasound abdomen limited 05/07/2023.  TECHNIQUE: CT chest pulmonary angiogram and routine CT abdomen pelvis with IV contrast. Arterial phase through the chest and venous phase through the abdomen and pelvis. Multiplanar reformats and MIP reconstructions were performed. Dose reduction   techniques were used.   CONTRAST: 77 mL Isovue 370.    FINDINGS:  ANGIOGRAM CHEST: Motion artifact degrades several images. Suboptimal opacification of the distal pulmonary vessels. No obvious pulmonary emboli on either side. Normal caliber thoracic aorta without aneurysm or dissection. No CT evidence of right heart   strain.     LUNGS AND PLEURA: Numerous images are degraded due to motion artifact. Mild diffuse thickening of the bronchi. Dependent atelectasis in the posterior lungs. No pleural effusion on either side.    MEDIASTINUM/AXILLAE: Normal cardiac size. No pericardial effusion. No suspicious adenopathy. Trachea is midline.    CORONARY ARTERY CALCIFICATION: None.    HEPATOBILIARY: Gallstones without biliary dilatation or adjacent inflammation. Diffuse fatty infiltration of the liver. Patent hepatic and portal veins.    PANCREAS: Normal.    SPLEEN: Normal.    ADRENAL GLANDS: Normal.    KIDNEYS/BLADDER: Milk of calcium cyst upper pole of the left kidney posteriorly containing dependent mineralization measures 3.5 x 3.5 cm (image 93, series 18), no specific follow-up required. Tiny punctate stones in the collecting system of both   kidneys, one on the right (image 47, series 17), and 3 on  the left (images 40, 42 and 46, series 17). Both kidneys are well perfused without hydronephrosis or hydroureter. Normal urinary bladder.    BOWEL: Scattered colonic diverticulosis, without acute inflammation. Formed stool material within normal caliber colon. Normal appendix. No obstruction, free gas or free fluid.    LYMPH NODES: No suspicious abdominopelvic adenopathy. Subcentimeter retroperitoneal and bilateral pelvic nodes, likely reactive.    VASCULATURE: Slightly atherosclerotic normal caliber distal abdominal aorta measuring 1.9 x 1.9 cm (image 57, series 17). Normal caliber IVC.    PELVIC ORGANS: Stool-filled rectosigmoid. Normal appendix. No free fluid. No suspicious adenopathy.    MUSCULOSKELETAL: Degenerative changes both shoulders, spine and joints of the pelvis. Slight right convex thoracic curve.      Impression    IMPRESSION:  1.  Several images are degraded due to motion artifact. Suboptimal opacification of the pulmonary vasculature. No obvious pulmonary emboli on either side. Mild diffuse thickening of the bronchi. Dependent atelectasis in the posterior lungs. No adenopathy   or effusion.    2.  Normal caliber thoracic aorta without aneurysm or dissection. Normal cardiac size. No pericardial effusion.    3.  Cholelithiasis. Fatty liver. Milk of calcium cyst upper pole of the left kidney posteriorly, no specific follow-up required. Nonobstructive stones in the renal collecting system, 1 on the right and 3 on the left.    4.  Scattered colonic diverticulosis without acute inflammation. No mechanical bowel obstruction, free gas or free fluid. Normal appendix.                 COVID Status:  COVID-19 PCR Results        8/30/2021    20:40 12/8/2021    02:51 6/20/2022    11:20 3/15/2023    13:14 3/25/2023    08:33   COVID-19 PCR Results   SARS CoV2 PCR Negative   Negative   Negative   Negative   Negative             5/12/2023    01:20   COVID-19 PCR Results   SARS CoV2 PCR Negative     COVID-19 Antibody  Results, Testing for Immunity         No data to display                 Disclaimer: This note consists of symbols derived from keyboarding, dictation and/or voice recognition software. As a result, there may be errors in the script that have gone undetected. Please consider this when interpreting information found in this chart.

## 2023-05-14 NOTE — CONSULTS
Care Management Initial Consult    General Information  Assessment completed with: Patient, VM-chart review,         Primary Care Provider verified and updated as needed:     Readmission within the last 30 days: previous discharge plan unsuccessful   Return Category: Exacerbation of disease  Reason for Consult: discharge planning  Advance Care Planning:            Communication Assessment  Patient's communication style: spoken language (English or Bilingual)             Cognitive  Cognitive/Neuro/Behavioral: .WDL except, mood/behavior  Level of Consciousness: alert  Arousal Level: opens eyes spontaneously  Orientation: oriented x 4  Mood/Behavior: cooperative  Best Language: 0 - No aphasia  Speech: slow, spontaneous, logical, clear    Living Environment:   People in home: facility resident     Current living Arrangements: group home      Able to return to prior arrangements: yes       Family/Social Support:  Care provided by: self, other (see comments) (facility staff)  Provides care for: no one, unable/limited ability to care for self  Marital Status: Single             Description of Support System:           Current Resources:   Patient receiving home care services: Yes  Skilled Home Care Services: Skilled Nursing, Physical Therapy  Community Resources: Group Home  Equipment currently used at home: walker, rolling  Supplies currently used at home: Oxygen Tubing/Supplies    Employment/Financial:  Employment Status:          Financial Concerns:             Does the patient's insurance plan have a 3 day qualifying hospital stay waiver?  No    Lifestyle & Psychosocial Needs:  Social Determinants of Health     Tobacco Use: Medium Risk (5/5/2023)    Patient History      Smoking Tobacco Use: Former      Smokeless Tobacco Use: Never      Passive Exposure: Not on file   Alcohol Use: Not on file   Financial Resource Strain: Not on file   Food Insecurity: Not on file   Transportation Needs: Not on file   Physical Activity:  Not on file   Stress: Not on file   Social Connections: Not on file   Intimate Partner Violence: Not on file   Depression: Not on file   Housing Stability: Not on file       Functional Status:  Prior to admission patient needed assistance:   Dependent ADLs:: Ambulation-walker, Bathing, Dressing, Grooming, Transfers, Wheelchair-with assist, Toileting  Dependent IADLs:: Cleaning, Cooking, Laundry, Shopping, Money Management, Medication Management, Meal Preparation, Transportation               Additional Information:  Consult placed for discharge planning. Patient re-admitted less than 24 hours from discharge on 5/11/23 with generalized weakness, fall and respiratory failure.  Per chart review patient is a resident of [facility].  Contacted [facility]  manager to verify services. Patient did discharge home with a trilogy unit arranged and this was delivered to the . Sugar stated he had used it for a nap and bedtime when he came home before his fall.     Patient receives services as follows: SBA with walker, feeds self soft diet as does not have dentation    [facility]EN contact (name/number): [facility] [phone]  Fax #: [phone]  Barriers to pt returning: No weekend returns  Baseline mobility: SBA with a walker  Time of preferred return: before 1500  New meds/prescriptions/Pharmacy: [facility]  Transportation: WC no stairs and ramp in garage    Other:   * [facility]PT[facility]OT  [facility] Home trilogy unit is not at the hospital    RN CC/SW will continue to follow for discharge planning and return to facility.    Olivia Arndt RN BSN OCN  Care Coordinator  [facility]  [phone]

## 2023-05-14 NOTE — PLAN OF CARE
ICU End of Shift Summary.  For vital signs and complete assessments, please see documentation flowsheets.      Pertinent assessments: Patient A&Ox4. Forgetful, requires frequent reminders, especially regarding behaviors. Often repeats requests to multiple staff members. VSS. LS diminished. Voiding. BM x1 this shift. Tolerating diet. Anticipate transition to floor. Ambulating with GB, walker, Ax2.   Major Shift Events: N/A  Plan (Upcoming Events): TBD  Discharge/Transfer Needs: TBD     Bedside Shift Report Completed: Y   Bedside Safety Check Completed: Y

## 2023-05-14 NOTE — PROGRESS NOTES
[id number]: Assuming care   Pt in chair with call light WNR. He is on 2L supplemental oxygen. Vitally stable. Plan to transfer out of the unit once bed available.

## 2023-05-15 ENCOUNTER — APPOINTMENT (OUTPATIENT)
Dept: PHYSICAL THERAPY | Facility: CLINIC | Age: 48
DRG: 189 | End: 2023-05-15
Payer: MEDICARE

## 2023-05-15 LAB
AMMONIA PLAS-SCNC: 69 UMOL/L (ref 16–60)
ANION GAP SERPL CALCULATED.3IONS-SCNC: 4 MMOL/L (ref 7–15)
BASE EXCESS BLDV CALC-SCNC: 8.2 MMOL/L (ref -7.7–1.9)
BUN SERPL-MCNC: 13.2 MG/DL (ref 6–20)
CALCIUM SERPL-MCNC: 8.6 MG/DL (ref 8.6–10)
CHLORIDE SERPL-SCNC: 101 MMOL/L (ref 98–107)
CREAT SERPL-MCNC: 0.65 MG/DL (ref 0.67–1.17)
DEPRECATED HCO3 PLAS-SCNC: 37 MMOL/L (ref 22–29)
GFR SERPL CREATININE-BSD FRML MDRD: >90 ML/MIN/1.73M2
GLUCOSE SERPL-MCNC: 115 MG/DL (ref 70–99)
HCO3 BLDV-SCNC: 37 MMOL/L (ref 21–28)
O2/TOTAL GAS SETTING VFR VENT: 3 %
PCO2 BLDV: 78 MM HG (ref 40–50)
PH BLDV: 7.29 [PH] (ref 7.32–7.43)
PLATELET # BLD AUTO: 190 10E3/UL (ref 150–450)
PO2 BLDV: 115 MM HG (ref 25–47)
POTASSIUM SERPL-SCNC: 4.2 MMOL/L (ref 3.4–5.3)
SODIUM SERPL-SCNC: 142 MMOL/L (ref 136–145)

## 2023-05-15 PROCEDURE — 99233 SBSQ HOSP IP/OBS HIGH 50: CPT | Performed by: INTERNAL MEDICINE

## 2023-05-15 PROCEDURE — 85049 AUTOMATED PLATELET COUNT: CPT | Performed by: INTERNAL MEDICINE

## 2023-05-15 PROCEDURE — 999N000157 HC STATISTIC RCP TIME EA 10 MIN

## 2023-05-15 PROCEDURE — 36415 COLL VENOUS BLD VENIPUNCTURE: CPT | Performed by: INTERNAL MEDICINE

## 2023-05-15 PROCEDURE — 82140 ASSAY OF AMMONIA: CPT | Performed by: INTERNAL MEDICINE

## 2023-05-15 PROCEDURE — 99232 SBSQ HOSP IP/OBS MODERATE 35: CPT | Performed by: STUDENT IN AN ORGANIZED HEALTH CARE EDUCATION/TRAINING PROGRAM

## 2023-05-15 PROCEDURE — 97530 THERAPEUTIC ACTIVITIES: CPT | Mod: GP | Performed by: PHYSICAL THERAPIST

## 2023-05-15 PROCEDURE — 250N000013 HC RX MED GY IP 250 OP 250 PS 637: Performed by: INTERNAL MEDICINE

## 2023-05-15 PROCEDURE — 120N000001 HC R&B MED SURG/OB

## 2023-05-15 PROCEDURE — 250N000011 HC RX IP 250 OP 636: Performed by: INTERNAL MEDICINE

## 2023-05-15 PROCEDURE — 82310 ASSAY OF CALCIUM: CPT | Performed by: INTERNAL MEDICINE

## 2023-05-15 PROCEDURE — 82803 BLOOD GASES ANY COMBINATION: CPT | Performed by: INTERNAL MEDICINE

## 2023-05-15 PROCEDURE — 94660 CPAP INITIATION&MGMT: CPT

## 2023-05-15 RX ADMIN — BUSPIRONE HYDROCHLORIDE 15 MG: 15 TABLET ORAL at 21:01

## 2023-05-15 RX ADMIN — PRAZOSIN HYDROCHLORIDE 2 MG: 1 CAPSULE ORAL at 21:00

## 2023-05-15 RX ADMIN — PROPRANOLOL HYDROCHLORIDE 20 MG: 20 TABLET ORAL at 10:02

## 2023-05-15 RX ADMIN — MICONAZOLE NITRATE: 20 POWDER TOPICAL at 21:15

## 2023-05-15 RX ADMIN — LEVOCARNITINE 660 MG: 330 TABLET ORAL at 21:00

## 2023-05-15 RX ADMIN — ZONISAMIDE 200 MG: 100 CAPSULE ORAL at 21:00

## 2023-05-15 RX ADMIN — ENOXAPARIN SODIUM 40 MG: 40 INJECTION SUBCUTANEOUS at 21:01

## 2023-05-15 RX ADMIN — PANTOPRAZOLE SODIUM 40 MG: 40 TABLET, DELAYED RELEASE ORAL at 10:02

## 2023-05-15 RX ADMIN — MICONAZOLE NITRATE: 20 POWDER TOPICAL at 10:03

## 2023-05-15 RX ADMIN — LAMOTRIGINE 200 MG: 200 TABLET ORAL at 10:02

## 2023-05-15 RX ADMIN — CITALOPRAM HYDROBROMIDE 60 MG: 20 TABLET ORAL at 10:02

## 2023-05-15 RX ADMIN — PROPRANOLOL HYDROCHLORIDE 20 MG: 20 TABLET ORAL at 21:01

## 2023-05-15 RX ADMIN — LEVOCARNITINE 660 MG: 330 TABLET ORAL at 14:39

## 2023-05-15 RX ADMIN — ZONISAMIDE 100 MG: 100 CAPSULE ORAL at 10:03

## 2023-05-15 RX ADMIN — LEVETIRACETAM 1500 MG: 500 TABLET, FILM COATED ORAL at 21:00

## 2023-05-15 RX ADMIN — LEVOCARNITINE 660 MG: 330 TABLET ORAL at 10:02

## 2023-05-15 RX ADMIN — LEVETIRACETAM 1000 MG: 500 TABLET, FILM COATED ORAL at 10:02

## 2023-05-15 RX ADMIN — LAMOTRIGINE 200 MG: 200 TABLET ORAL at 21:01

## 2023-05-15 RX ADMIN — POLYETHYLENE GLYCOL 3350 17 G: 17 POWDER, FOR SOLUTION ORAL at 10:01

## 2023-05-15 RX ADMIN — BUSPIRONE HYDROCHLORIDE 15 MG: 15 TABLET ORAL at 10:02

## 2023-05-15 RX ADMIN — QUETIAPINE FUMARATE 150 MG: 50 TABLET, EXTENDED RELEASE ORAL at 21:01

## 2023-05-15 ASSESSMENT — ACTIVITIES OF DAILY LIVING (ADL)
ADLS_ACUITY_SCORE: 48
ADLS_ACUITY_SCORE: 49
ADLS_ACUITY_SCORE: 48
ADLS_ACUITY_SCORE: 49
ADLS_ACUITY_SCORE: 46
ADLS_ACUITY_SCORE: 49
ADLS_ACUITY_SCORE: 49
ADLS_ACUITY_SCORE: 46
ADLS_ACUITY_SCORE: 48
ADLS_ACUITY_SCORE: 46

## 2023-05-15 NOTE — PROGRESS NOTES
A BiPAP of  16/6 @ 30% was applied to the pt via the mask for NOC use. Skin is intact. Pt is tolerating it well. Will continue to monitor and assess the pt's current respiratory status and needs.    RT Saroj on 5/15/2023 at 5:01 AM

## 2023-05-15 NOTE — PLAN OF CARE
Goal Outcome Evaluation:        Pt up SBA with walker & belt. A&Ox4. Blind in L eye. VSS LS dim. On 4L NC, [place], freq asks for O2 to be turned up even though sats are >92%, freq takes O2 off. Stated had 2 sz episodes @ 1230  & 1630, (first episode) stated had spasms from belly button on up. Then blacked out & woke up lightheaded, dizzy & eyes rolled back in head. Then one minute later felt weak and shaking all over, MD updated. Calls freq on call light for various things.

## 2023-05-15 NOTE — PLAN OF CARE
"Care from [id number]    Inpatient Progress Note:  For complete assessment see flow sheet documentation.    BP (!) 161/86   Pulse 85   Temp 98.1  F (36.7  C) (Oral)   Resp 24   Ht 1.727 m (5' 8\")   Wt 147.8 kg (325 lb 12.8 oz)   SpO2 94%   BMI 49.54 kg/m         Orientation: A&OX4  Neuro: hx of cerebral palsy, Left eye congenital blindness  Pain status: Pain free  Activity: SBA with walker and gait belt  Peripheral edema: Generalized mild to moderate edema  Resp: LS diminished, 3L O2 while awake, BiPAP @ HS  Cardiac: WNL  GI: Abdomen obese and round, LBM 5/13  : WNL  LDA: PIV x1  Diet: Regular  Consults: PT, SW, Pulm, Speech  Discharge Plan: TBD    Will continue to monitor and provide cares.     Vanessa Núñez RN    "
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
## 2023-05-15 NOTE — CONSULTS
[facility]  Pulmonary Inpatient Note-- follow-up--    May 15, 2023    Chief complaint:  Tre Vazquez is a 47 year old male seen for   Chief Complaint   Patient presents with     Fall     Shortness of Breath       Reason for clinic visit / Chief complaint:   Hypercapnic respiratory failure, chronic    Assessment and Plan:  Chronic hypercapnic respiratory failure due to obesity hypoventilation syndrome.  Patient was followed in ICU and transferred to regular floor yesterday.  Up in chair eating comfortably.  Has been using his BiPAP at night and uses trilogy at home.  I suggested he also uses his trilogy while in the hospital in case we need to make any adjustments..  On 3 to 4 L/min oxygen while sitting up.    History of seizure disorder recently adjusted his antiseizure medications    Patient is planned to go to TCU.    History of Present Illness:  47 years old gentleman with history of chronic hypercapnic and hypoxic respiratory failure, from group home, generalized weakness mechanical fall at the group home failure to thrive has been followed mainly for respiratory issues.  He has past medical history of palsy, mild intellectual disability, congenital left eye blindness, seizure disorder with chronic elevated ammonia, borderline personality disorder, schizoaffective disorder, obstructive sleep apnea with obesity hypoventilation syndrome.  He was recently hospitalized and discharged between May 4 to May 11 for hypercapnic respiratory failure and encephalopathy presented with a fall on this admission.      Allergies   Allergen Reactions     Hydrocodone Bitartrate Er Unknown     Cephalexin Rash     HUT Reaction: Rash; HUT Noted: [id number]       Vicodin [Hydrocodone-Acetaminophen] GI Disturbance        Past Medical History:   Diagnosis Date     Blindness of left eye      Depression 03/10/2014     Hypertension      Pneumococcal septicemia(038.2) (H) 11/26/2013    Hospitalized     Pneumonia, organism unspecified(486)  2013    Hospitalized     Uncomplicated asthma      Unspecified epilepsy without mention of intractable epilepsy         Past Surgical History:   Procedure Laterality Date     COLONOSCOPY N/A 2022    Procedure: COLONOSCOPY;  Surgeon: Michael Caldwell MD;  Location:  OR     ESOPHAGOSCOPY, GASTROSCOPY, DUODENOSCOPY (EGD), COMBINED N/A 2022    Procedure: ESOPHAGOGASTRODUODENOSCOPY with biopsy;  Surgeon: Michael Caldwell MD;  Location:  OR     ORTHOPEDIC SURGERY      pt stated past surgery on both ankles        Social History     Socioeconomic History     Marital status: Single     Spouse name: Not on file     Number of children: Not on file     Years of education: Not on file     Highest education level: Not on file   Occupational History     Not on file   Tobacco Use     Smoking status: Former     Types: Cigarettes     Start date:      Quit date:      Years since quittin.3     Smokeless tobacco: Never   Vaping Use     Vaping status: Not on file   Substance and Sexual Activity     Alcohol use: No     Drug use: No     Sexual activity: Never   Other Topics Concern     Parent/sibling w/ CABG, MI or angioplasty before 65F 55M? Not Asked   Social History Narrative     Not on file     Social Determinants of Health     Financial Resource Strain: Not on file   Food Insecurity: Not on file   Transportation Needs: Not on file   Physical Activity: Not on file   Stress: Not on file   Social Connections: Not on file   Intimate Partner Violence: Not on file   Housing Stability: Not on file        No family history on file.     Immunization History   Administered Date(s) Administered     COVID-19 Monovalent 12+ (Pfizer ) 2022     COVID-19 Monovalent 18+ (Moderna) 2021     Influenza (IIV3) PF 2013     Mantoux Tuberculin Skin Test 02/15/2012     Pneumococcal 23 valent 2013     TDAP Vaccine (Adacel) 2011       Current Facility-Administered  "Medications   Medication     acetaminophen (TYLENOL) tablet 650 mg    Or     acetaminophen (TYLENOL) solution 650 mg     busPIRone (BUSPAR) tablet 15 mg     citalopram (celeXA) tablet 60 mg     glucose gel 15-30 g    Or     dextrose 50 % injection 25-50 mL    Or     glucagon injection 1 mg     enoxaparin ANTICOAGULANT (LOVENOX) injection 40 mg     lamoTRIgine (LaMICtal) tablet 200 mg     levETIRAcetam (KEPPRA) tablet 1,000 mg     levETIRAcetam (KEPPRA) tablet 1,500 mg     levOCARNitine (CARNITOR) tablet 660 mg     lidocaine (XYLOCAINE) 2 % external gel 6 mL     miconazole (MICATIN) 2 % powder     ondansetron (ZOFRAN ODT) ODT tab 4 mg    Or     ondansetron (ZOFRAN) injection 4 mg     pantoprazole (PROTONIX) EC tablet 40 mg     polyethylene glycol (MIRALAX) Packet 17 g     prazosin (MINIPRESS) capsule 2 mg     propranolol (INDERAL) tablet 20 mg     QUEtiapine (SEROquel XR) 24 hr tablet 150 mg     zonisamide (ZONEGRAN) capsule 100 mg     zonisamide (ZONEGRAN) capsule 200 mg        Review of Systems:  I have done 10 points of review systems and all negative except for those mentioned in HPI    Physical examination  Constitutional: Oriented, not in distress  /78   Pulse 81   Temp 97.6  F (36.4  C) (Oral)   Resp 24   Ht 1.727 m (5' 8\")   Wt 147.8 kg (325 lb 12.8 oz)   SpO2 98%   BMI 49.54 kg/m    Head and neck: normal posture and movements  Respiratory: Normal tidal breathing, no shortness of breath, no audible wheezing or stridor   Integumentary:  No rash on visible skin areas   Neurological: Alert, orientedx3  Psychiatric:  Mood and affect are appropriate with insight into his/her medical condition    Data:  Lab Results   Component Value Date    WBC 6.1 05/14/2023    WBC 7.4 12/20/2013     Lab Results   Component Value Date    RBC 3.44 05/14/2023    RBC 4.35 12/20/2013     Lab Results   Component Value Date    HGB 10.5 05/14/2023    HGB 13.3 12/20/2013     Lab Results   Component Value Date    HCT 34.8 " 05/14/2023    HCT 40.5 12/20/2013     Lab Results   Component Value Date     05/14/2023    MCV 93 12/20/2013     Lab Results   Component Value Date    MCH 30.5 05/14/2023    MCH 30.6 12/20/2013     Lab Results   Component Value Date    MCHC 30.2 05/14/2023    MCHC 32.8 12/20/2013     Lab Results   Component Value Date    RDW 14.1 05/14/2023    RDW 14.0 12/20/2013     Lab Results   Component Value Date     05/15/2023     12/20/2013       Lab Results   Component Value Date     05/15/2023     12/20/2013      Lab Results   Component Value Date    POTASSIUM 4.2 05/15/2023    POTASSIUM 4.4 06/20/2022    POTASSIUM 4.5 12/20/2013     Lab Results   Component Value Date    CHLORIDE 101 05/15/2023    CHLORIDE 103 06/20/2022    CHLORIDE 100 12/20/2013     Lab Results   Component Value Date    ARNOLD 8.6 05/15/2023    ARNOLD 9.8 12/20/2013     Lab Results   Component Value Date    CO2 37 05/15/2023    CO2 37 06/20/2022    CO2 30 12/20/2013     Lab Results   Component Value Date    BUN 13.2 05/15/2023    BUN 17 06/20/2022    BUN 19 12/20/2013     Lab Results   Component Value Date    CR 0.65 05/15/2023    CR 1.03 12/20/2013     Lab Results   Component Value Date     05/15/2023     05/14/2023     06/20/2022    GLC 94 12/20/2013         BRITTON Horton MD

## 2023-05-16 PROCEDURE — 999N000157 HC STATISTIC RCP TIME EA 10 MIN

## 2023-05-16 PROCEDURE — 250N000013 HC RX MED GY IP 250 OP 250 PS 637: Performed by: INTERNAL MEDICINE

## 2023-05-16 PROCEDURE — 120N000001 HC R&B MED SURG/OB

## 2023-05-16 PROCEDURE — 94660 CPAP INITIATION&MGMT: CPT

## 2023-05-16 PROCEDURE — 99231 SBSQ HOSP IP/OBS SF/LOW 25: CPT | Performed by: STUDENT IN AN ORGANIZED HEALTH CARE EDUCATION/TRAINING PROGRAM

## 2023-05-16 PROCEDURE — 250N000011 HC RX IP 250 OP 636: Performed by: INTERNAL MEDICINE

## 2023-05-16 RX ADMIN — BUSPIRONE HYDROCHLORIDE 15 MG: 15 TABLET ORAL at 21:02

## 2023-05-16 RX ADMIN — LEVETIRACETAM 1000 MG: 500 TABLET, FILM COATED ORAL at 08:31

## 2023-05-16 RX ADMIN — CITALOPRAM HYDROBROMIDE 60 MG: 20 TABLET ORAL at 08:32

## 2023-05-16 RX ADMIN — LEVOCARNITINE 660 MG: 330 TABLET ORAL at 13:25

## 2023-05-16 RX ADMIN — MICONAZOLE NITRATE: 20 POWDER TOPICAL at 21:09

## 2023-05-16 RX ADMIN — LAMOTRIGINE 200 MG: 200 TABLET ORAL at 21:02

## 2023-05-16 RX ADMIN — PRAZOSIN HYDROCHLORIDE 2 MG: 1 CAPSULE ORAL at 21:02

## 2023-05-16 RX ADMIN — LEVOCARNITINE 660 MG: 330 TABLET ORAL at 08:31

## 2023-05-16 RX ADMIN — BUSPIRONE HYDROCHLORIDE 15 MG: 15 TABLET ORAL at 08:31

## 2023-05-16 RX ADMIN — PROPRANOLOL HYDROCHLORIDE 20 MG: 20 TABLET ORAL at 21:01

## 2023-05-16 RX ADMIN — POLYETHYLENE GLYCOL 3350 17 G: 17 POWDER, FOR SOLUTION ORAL at 08:29

## 2023-05-16 RX ADMIN — PANTOPRAZOLE SODIUM 40 MG: 40 TABLET, DELAYED RELEASE ORAL at 08:32

## 2023-05-16 RX ADMIN — LAMOTRIGINE 200 MG: 200 TABLET ORAL at 08:31

## 2023-05-16 RX ADMIN — ZONISAMIDE 100 MG: 100 CAPSULE ORAL at 08:31

## 2023-05-16 RX ADMIN — ENOXAPARIN SODIUM 40 MG: 40 INJECTION SUBCUTANEOUS at 21:02

## 2023-05-16 RX ADMIN — QUETIAPINE FUMARATE 150 MG: 50 TABLET, EXTENDED RELEASE ORAL at 21:01

## 2023-05-16 RX ADMIN — MICONAZOLE NITRATE: 20 POWDER TOPICAL at 08:32

## 2023-05-16 RX ADMIN — PROPRANOLOL HYDROCHLORIDE 20 MG: 20 TABLET ORAL at 08:32

## 2023-05-16 RX ADMIN — ZONISAMIDE 200 MG: 100 CAPSULE ORAL at 21:02

## 2023-05-16 RX ADMIN — LEVOCARNITINE 660 MG: 330 TABLET ORAL at 21:01

## 2023-05-16 RX ADMIN — LEVETIRACETAM 1500 MG: 500 TABLET, FILM COATED ORAL at 21:01

## 2023-05-16 ASSESSMENT — ACTIVITIES OF DAILY LIVING (ADL)
ADLS_ACUITY_SCORE: 50
ADLS_ACUITY_SCORE: 46
ADLS_ACUITY_SCORE: 50
ADLS_ACUITY_SCORE: 46
ADLS_ACUITY_SCORE: 51
ADLS_ACUITY_SCORE: 51
ADLS_ACUITY_SCORE: 50
ADLS_ACUITY_SCORE: 46
ADLS_ACUITY_SCORE: 50
ADLS_ACUITY_SCORE: 46

## 2023-05-16 NOTE — PLAN OF CARE
Goal Outcome Evaluation:        Pt up Ax1 with walker & belt. A&O. Blind in L eye. LS dim. DE LA FUENTE. On 4L NC, freq asks O2 to be increased even with sats >92%, Shannon diet, denies nausea, requests to order large amounts of food. Denies pain. Calls freq for various things. Pt called @ 1630 stating he had a seizure. Stated was in bathroom when became very out of it, light headed, dizzy, weak. Pt was already back in recliner in room when alerted nurse of this.

## 2023-05-16 NOTE — PROGRESS NOTES
[facility]  Hospitalist Progress Note  Shabbir Vickers M.D., M.B.A.   05/16/2023    Reason for Stay/active problem list      Generalized weakness and mechanical fall at the group home, failure to thrive at group home    Acute on chronic hypoxic and hypercarbic respiratory failure         Assessment and Plan:        Summary of Stay:   Tre Vazquez is a 47 year old male with PMH including palsy, mild intellectual disability, congenital left eye blindness, seizure disorder with chronically elevated ammonia who remains on Depakote among other antiepileptics, borderline personality disorder, schizoaffective and ANA as well as obesity hypoventilation syndrome chronically on BiPAP who was recently admitted from 5/4 to 5/11 for hypercapnic respiratory failure and encephalopathy who presents with a fall not long after arriving back at his group home.      The patient does not recall much but just recalls winding up on the ground.  He was brought back to the ER and placed on BiPAP on route.  Here he underwent lab work-up which was notable for grossly stable BMP with carbon dioxide level of 39, stable CBC with mild anemia, normal lactic acid but venous blood gas showing PCO2 of approximately 90 with pH of 7.27.  His baseline PCO2 is around 80.  Influenza and COVID-19 testing were negative as well as troponin and BNP.  CT head and neck were negative for traumatic injury and CT PE protocol was negative for PE or pneumonia.       Problem List with Assessment and Plan:    Assessment and Plan:   1. Acute on chronic hypercapnic respiratory failure:  -- Baseline PCO2 seems to be in the 70s to 80 range.  He was seen by pulmonary medicine last admission.  it appears that the rep from [facility] was contacted last admission, and that the plan was for him to resume trilogy at home with the following settings: (Wesley from [facility] was contacted at [phone]). DE max 25, PS minimum 5, PS maximum 20, EPAP minimum 8, EPAP  [facility] Patient Status:  Outpatient in a Bed   Age/Gender 27year old female MRN [id number]   Location [facility]. Attending Eufemia Jennings MD   Hosp Day # 0 PCP Gaurang Thompson       Anesthesia Post-op Note maximum 15, tidal volume 300, rate 16, I-, time auto, FiO2 6 L/min while sleeping.  -- Patient was admitted to ICU as he was requiring continuous BiPAP for close monitoring and treatment.  --Patient was able to wean off the BiPAP and his mental status significantly improved.  -- Currently patient is on 3 L of oxygen via nasal cannula, doing well, alert and eating.  His VBG showed pH of 7.35, venous PCO2 of 69 down.  Patient was seen by Dr. Johnson who stated that patient is at his baseline tolerating nocturnal BiPAP.  He suggested seizure medication changes as below.  Dr. Johnson felt that the patient is not able to be cared for at group home facility and may need alternative placement. Looking into TCU. We will continue supplemental oxygen as needed, nocturnal BiPAP, may transfer out of the ICU.  -- Will request group home to bring trilogy device to hospital      2.   History of seizure disorder, hyperammonemia while on Depakote:   --He is also on numerous other antiepileptic medications including Lamictal 200 mg twice daily, Keppra 1000 mg in the morning and 1500 mg at bedtime and zonisamide 100 mg in the morning and 200 mg at bedtime.  --His Depakote was on hold and his ammonia was monitored.  Serum ammonia was 85 on May 12.  --Currently no evidence of seizure activity.  Patient is back to his baseline.  No evidence of encephalopathy.  -- Patient was seen by Dr. Zimmerman's who recommended to stop Tegretol in the setting of chronic respiratory acidosis.  Plan to continue other antiepileptic medications-Lamictal, Keppra, Zonegran.  His Seroquel was decreased to 150 mg  -- Patient may not  need neurology consultation at this time.  Consider neurology consultation if he has recurrent seizures.  Monitor ammonia level, continue seizure precautions.  -- Patient reported to nurse today that he remembered having a 20 minute seizure. Was able to explain everything that happened during it.  -- Had previously been scheduled  "for outpatient EEG, which we can do here since he is pending placement     3.   History of schizoaffective disorder, borderline personality: Plan to continue his home Seroquel at 150 mg in the evening as well as BuSpar and Celexa.     4.   Toxic metabolic encephalopathy: Mental status seems to be at baseline     5.   Social: Reportedly he is his own decision maker   Compliance seems to be an issue to some extent.     6..  Fall: Obesity limits his mobility at baseline.    Physical therapy consulted    7. Diarrhea - Few loose stools today but not water , no fever   -- monitor for now     VTE Prophylaxis: Enoxaparin (Lovenox) SQ  Code Status: Full Code  Diet: Regular Diet Adult    Reid Catheter: Not present     Disposition: Pending TCU placement        Interval History (Subjective):        Nursing notes reviewed; no acute events overnight. No new symptoms. Reports that he no longer wishes for information to be provided to some family members.                  Physical Exam:        Last Vital Signs:  Temp: 98.4  F (36.9  C) Temp src: Axillary BP: 132/68 Pulse: 82   Resp: 20 SpO2: 94 % O2 Device: BiPAP/CPAP Oxygen Delivery: 6 LPM Height: 172.7 cm (5' 8\") Weight: 147.8 kg (325 lb 12.8 oz)  Estimated body mass index is 49.54 kg/m  as calculated from the following:    Height as of this encounter: 1.727 m (5' 8\").    Weight as of this encounter: 147.8 kg (325 lb 12.8 oz).    General: Very pleasant male resting comfortably in bedside chair with briefs but no shirt.  Awake, alert, interactive.  HEENT: Normocephalic, atraumatic.  PERRL, EOMI.  Conjunctiva clear, sclerae anicteric.  Mucous membranes moist.  Cardiac: Regular rate and rhythm without murmur, gallop, or rub.  No peripheral edema.  Respiratory: On supplemental O2. Normal work of breathing. Lungs clear.  GI: Normal, active bowel sounds.  Abdomen soft, nontender, nondistended.  : Deferred.  Musculoskeletal: Moving all extremities appropriately.  Skin: No rashes or " abrasions on exposed skin.  Neurologic: Alert and oriented x4.  Cranial nerves II through XII grossly intact.  Psychologic: Appropriate mood and affect.           Medications:        All current medications were reviewed with changes reflected in problem list.         Data:      All new lab and imaging data was reviewed.      Data reviewed today: I reviewed all new labs and imaging results over the last 24 hours. I personally reviewed       Recent Labs   Lab 05/15/23  0717 05/14/23  0517 05/12/23  0114   WBC  --  6.1 6.3   HGB  --  10.5* 11.7*   HCT  --  34.8* 37.8*   MCV  --  101* 100    166 158     No results for input(s): CULT in the last 168 hours.  Recent Labs   Lab 05/15/23  0717 05/14/23  0836 05/14/23  0517 05/13/23  0806 05/13/23  0516 05/12/23  0513 05/12/23  0114     --  143  --  144  --  142   POTASSIUM 4.2  --  4.0  --  4.3  --  4.2   CHLORIDE 101  --  102  --  101  --  100   CO2 37*  --  36*  --  40*  --  39*   ANIONGAP 4*  --  5*  --  3*  --  3*   * 116* 114*   < > 88   < > 132*   BUN 13.2  --  9.6  --  11.0  --  11.3   CR 0.65*  --  0.64*  --  0.60*   < > 0.59*   GFRESTIMATED >90  --  >90  --  >90   < > >90   ARNOLD 8.6  --  8.7  --  8.6  --  8.6   PROTTOTAL  --   --   --   --   --   --  6.1*   ALBUMIN  --   --   --   --   --   --  4.0   BILITOTAL  --   --   --   --   --   --  0.3   ALKPHOS  --   --   --   --   --   --  103   AST  --   --   --   --   --   --  105*   ALT  --   --   --   --   --   --  117*    < > = values in this interval not displayed.       Recent Labs   Lab 05/15/23  0717 05/14/23  0836 05/14/23  0517 05/13/23  1616 05/13/23  1202   * 116* 114* 129* 104*       No results for input(s): INR in the last 168 hours.      No results for input(s): TROPONIN, TROPI, TROPR in the last 168 hours.    Invalid input(s): TROP, TROPONINIES    Recent Results (from the past 48 hour(s))   Head CT w/o contrast    Narrative    EXAM: CT HEAD W/O CONTRAST  LOCATION: [facility]  DATE/TIME: 5/12/2023 4:26 AM CDT    INDICATION: Traumatic injury. Pain.  COMPARISON: 10/25/2022, 04/05/2012  TECHNIQUE: Routine CT Head without IV contrast. Multiplanar reformats. Dose reduction techniques were used.    FINDINGS:  INTRACRANIAL CONTENTS: No intracranial hemorrhage, extraaxial collection, or mass effect.  No CT evidence of acute infarct. Mild to moderate presumed chronic small vessel ischemic changes. Extensive white matter volume loss. Stable ventriculomegaly.     VISUALIZED ORBITS/SINUSES/MASTOIDS: No intraorbital abnormality. No paranasal sinus mucosal disease. No middle ear or mastoid effusion.    BONES/SOFT TISSUES: No acute abnormality.      Impression    IMPRESSION:  1.  No acute intracranial process.  2.  No change from prior.     Cervical spine CT w/o contrast    Narrative    EXAM: CT CERVICAL SPINE W/O CONTRAST  LOCATION: [facility]  DATE/TIME: 5/12/2023 4:31 AM CDT    INDICATION: fall; fall; Neck pain; Trauma; Mild moderate trauma; None of the following: Spondyloarthropathy, cervical x ray with negative result, questionable finding, or inadequate coverage  COMPARISON: None.  TECHNIQUE: Routine CT Cervical Spine without IV contrast. Multiplanar reformats. Dose reduction techniques were used.    FINDINGS:  VERTEBRA: Reversal of usual cervical lordosis. Vertebral body heights and alignment is preserved. Fusion of the C4-C5 vertebra. Normal prevertebral soft tissues. No fracture or posttraumatic subluxation.     CANAL/FORAMINA: Relatively severe central canal stenosis at C4-C5, C5-C6 and C6-C7 with moderate to severe multilevel neural foraminal narrowing.    PARASPINAL: No extraspinal abnormality.      Impression    IMPRESSION:  1.  No fracture or posttraumatic subluxation.  2.  Reversal of the usual cervical lordosis.  3.  Relatively severe central canal stenosis at C4-C5, C5-C6 and C6-C7 with moderate to severe multilevel neural foraminal  narrowing.     CT Chest (PE) Abdomen Pelvis w Contrast    Narrative    EXAM: CT CHEST PE ABDOMEN PELVIS W CONTRAST  LOCATION: [facility]  DATE/TIME: 5/12/2023 4:32 AM CDT    INDICATION: Shortness of breath. Syncope.  COMPARISON:   1. Chest x-ray 2 views 05/04/2023.  2. Ultrasound abdomen limited 05/07/2023.  TECHNIQUE: CT chest pulmonary angiogram and routine CT abdomen pelvis with IV contrast. Arterial phase through the chest and venous phase through the abdomen and pelvis. Multiplanar reformats and MIP reconstructions were performed. Dose reduction   techniques were used.   CONTRAST: 77 mL Isovue 370.    FINDINGS:  ANGIOGRAM CHEST: Motion artifact degrades several images. Suboptimal opacification of the distal pulmonary vessels. No obvious pulmonary emboli on either side. Normal caliber thoracic aorta without aneurysm or dissection. No CT evidence of right heart   strain.     LUNGS AND PLEURA: Numerous images are degraded due to motion artifact. Mild diffuse thickening of the bronchi. Dependent atelectasis in the posterior lungs. No pleural effusion on either side.    MEDIASTINUM/AXILLAE: Normal cardiac size. No pericardial effusion. No suspicious adenopathy. Trachea is midline.    CORONARY ARTERY CALCIFICATION: None.    HEPATOBILIARY: Gallstones without biliary dilatation or adjacent inflammation. Diffuse fatty infiltration of the liver. Patent hepatic and portal veins.    PANCREAS: Normal.    SPLEEN: Normal.    ADRENAL GLANDS: Normal.    KIDNEYS/BLADDER: Milk of calcium cyst upper pole of the left kidney posteriorly containing dependent mineralization measures 3.5 x 3.5 cm (image 93, series 18), no specific follow-up required. Tiny punctate stones in the collecting system of both   kidneys, one on the right (image 47, series 17), and 3 on the left (images 40, 42 and 46, series 17). Both kidneys are well perfused without hydronephrosis or hydroureter. Normal urinary bladder.    BOWEL:  Scattered colonic diverticulosis, without acute inflammation. Formed stool material within normal caliber colon. Normal appendix. No obstruction, free gas or free fluid.    LYMPH NODES: No suspicious abdominopelvic adenopathy. Subcentimeter retroperitoneal and bilateral pelvic nodes, likely reactive.    VASCULATURE: Slightly atherosclerotic normal caliber distal abdominal aorta measuring 1.9 x 1.9 cm (image 57, series 17). Normal caliber IVC.    PELVIC ORGANS: Stool-filled rectosigmoid. Normal appendix. No free fluid. No suspicious adenopathy.    MUSCULOSKELETAL: Degenerative changes both shoulders, spine and joints of the pelvis. Slight right convex thoracic curve.      Impression    IMPRESSION:  1.  Several images are degraded due to motion artifact. Suboptimal opacification of the pulmonary vasculature. No obvious pulmonary emboli on either side. Mild diffuse thickening of the bronchi. Dependent atelectasis in the posterior lungs. No adenopathy   or effusion.    2.  Normal caliber thoracic aorta without aneurysm or dissection. Normal cardiac size. No pericardial effusion.    3.  Cholelithiasis. Fatty liver. Milk of calcium cyst upper pole of the left kidney posteriorly, no specific follow-up required. Nonobstructive stones in the renal collecting system, 1 on the right and 3 on the left.    4.  Scattered colonic diverticulosis without acute inflammation. No mechanical bowel obstruction, free gas or free fluid. Normal appendix.                 COVID Status:  COVID-19 PCR Results        8/30/2021    20:40 12/8/2021    02:51 6/20/2022    11:20 3/15/2023    13:14 3/25/2023    08:33   COVID-19 PCR Results   SARS CoV2 PCR Negative   Negative   Negative   Negative   Negative             5/12/2023    01:20   COVID-19 PCR Results   SARS CoV2 PCR Negative     COVID-19 Antibody Results, Testing for Immunity         No data to display

## 2023-05-16 NOTE — PROGRESS NOTES
Care Management Follow Up    Length of Stay (days): 4    Expected Discharge Date: 05/19/2023     Concerns to be Addressed: care coordination/care conferences     Patient plan of care discussed at interdisciplinary rounds: Yes    Anticipated Discharge Disposition: Skilled Nursing Facility, Transitional Care     Anticipated Discharge Services:    Anticipated Discharge DME: Oxygen    Patient/family educated on Medicare website which has current facility and service quality ratings: yes  Education Provided on the Discharge Plan:    Patient/Family in Agreement with the Plan:      Referrals Placed by CM/SW: Post Acute Facilities  Private pay costs discussed: Not applicable    Additional Information:  Patient is scheduled for a sleep deprived EEG as an outpatient. Contacted MD and will order EEG to be completed here.  Discussed with EEG tech and orders placed for EEG that will be done on Thursday 5/18 am. Patient should be awoken no later that 0200 Thursday am and kept awake until test completed.  Attempted to contact [facility] to cancel  outpatient EEG but unable to reach. Contacted mother Ashlie with update on EEG scheduling.  Attempted to update patient but currently sleeping.    Olivia Arndt RN BSN OCN  Care Coordinator  [facility]  [phone]

## 2023-05-16 NOTE — PROGRESS NOTES
"A BiPAP of  16/6 @ 30% was applied to the pt via the mask for NOC use. Skin is intact. Pt is tolerating it well. Will continue to monitor and assess the pt's current respiratory status and needs.    Vital signs:  Temp: 97.6  F (36.4  C) Temp src: Oral BP: 132/78 Pulse: 85   Resp: 27 SpO2: 98 % O2 Device: BiPAP/CPAP Oxygen Delivery: 6 LPM (per pt request) Height: 172.7 cm (5' 8\") Weight: 147.8 kg (325 lb 12.8 oz)  Estimated body mass index is 49.54 kg/m  as calculated from the following:    Height as of this encounter: 1.727 m (5' 8\").    Weight as of this encounter: 147.8 kg (325 lb 12.8 oz).          "

## 2023-05-16 NOTE — PLAN OF CARE
Pertinent assessments: Pt A&Ox4, VSS, on 4L and bipap at night. up SBA with walker & belt. Blind in L eye. LS dim. BS active. Denied pain. Slept well.   Major Shift Events none  Treatment Plan: Wean O2 as able  Bedside Nurse: Stephanie Blackmon RN

## 2023-05-16 NOTE — PLAN OF CARE
Pertinent assessments:  Pt A&Ox4, VSS, on 4L and bipap at night LS dim. Up SBA with walker refuses belt gait is steay.  Blind in L eye.  BS active. Denied pain.Up in chair for breakfast.  Mult calls for assist.    Major Shift Events none  Treatment Plan: Wean O2 as able plan for TCU then back to .  Bedside Nurse: Indu Pacheco RN

## 2023-05-17 ENCOUNTER — APPOINTMENT (OUTPATIENT)
Dept: PHYSICAL THERAPY | Facility: CLINIC | Age: 48
DRG: 189 | End: 2023-05-17
Payer: MEDICARE

## 2023-05-17 LAB
BACTERIA BLD CULT: NO GROWTH
BACTERIA BLD CULT: NO GROWTH
GLUCOSE BLDC GLUCOMTR-MCNC: 125 MG/DL (ref 70–99)

## 2023-05-17 PROCEDURE — 250N000013 HC RX MED GY IP 250 OP 250 PS 637: Performed by: INTERNAL MEDICINE

## 2023-05-17 PROCEDURE — 250N000011 HC RX IP 250 OP 636: Performed by: INTERNAL MEDICINE

## 2023-05-17 PROCEDURE — 97530 THERAPEUTIC ACTIVITIES: CPT | Mod: GP | Performed by: PHYSICAL THERAPIST

## 2023-05-17 PROCEDURE — 94660 CPAP INITIATION&MGMT: CPT

## 2023-05-17 PROCEDURE — 99231 SBSQ HOSP IP/OBS SF/LOW 25: CPT | Performed by: STUDENT IN AN ORGANIZED HEALTH CARE EDUCATION/TRAINING PROGRAM

## 2023-05-17 PROCEDURE — 120N000001 HC R&B MED SURG/OB

## 2023-05-17 PROCEDURE — 999N000157 HC STATISTIC RCP TIME EA 10 MIN

## 2023-05-17 RX ORDER — IPRATROPIUM BROMIDE AND ALBUTEROL SULFATE 2.5; .5 MG/3ML; MG/3ML
3 SOLUTION RESPIRATORY (INHALATION) EVERY 4 HOURS PRN
Status: DISCONTINUED | OUTPATIENT
Start: 2023-05-17 | End: 2023-05-22 | Stop reason: HOSPADM

## 2023-05-17 RX ADMIN — LAMOTRIGINE 200 MG: 200 TABLET ORAL at 10:03

## 2023-05-17 RX ADMIN — CITALOPRAM HYDROBROMIDE 60 MG: 20 TABLET ORAL at 10:03

## 2023-05-17 RX ADMIN — LEVOCARNITINE 660 MG: 330 TABLET ORAL at 21:13

## 2023-05-17 RX ADMIN — LEVETIRACETAM 1500 MG: 500 TABLET, FILM COATED ORAL at 21:12

## 2023-05-17 RX ADMIN — PROPRANOLOL HYDROCHLORIDE 20 MG: 20 TABLET ORAL at 10:04

## 2023-05-17 RX ADMIN — ZONISAMIDE 200 MG: 100 CAPSULE ORAL at 21:12

## 2023-05-17 RX ADMIN — LEVOCARNITINE 660 MG: 330 TABLET ORAL at 15:33

## 2023-05-17 RX ADMIN — LAMOTRIGINE 200 MG: 200 TABLET ORAL at 21:12

## 2023-05-17 RX ADMIN — ACETAMINOPHEN 650 MG: 325 TABLET ORAL at 15:40

## 2023-05-17 RX ADMIN — MICONAZOLE NITRATE: 20 POWDER TOPICAL at 10:05

## 2023-05-17 RX ADMIN — ENOXAPARIN SODIUM 40 MG: 40 INJECTION SUBCUTANEOUS at 21:19

## 2023-05-17 RX ADMIN — PROPRANOLOL HYDROCHLORIDE 20 MG: 20 TABLET ORAL at 21:13

## 2023-05-17 RX ADMIN — ZONISAMIDE 100 MG: 100 CAPSULE ORAL at 10:04

## 2023-05-17 RX ADMIN — LEVOCARNITINE 660 MG: 330 TABLET ORAL at 10:04

## 2023-05-17 RX ADMIN — BUSPIRONE HYDROCHLORIDE 15 MG: 15 TABLET ORAL at 10:04

## 2023-05-17 RX ADMIN — LEVETIRACETAM 1000 MG: 500 TABLET, FILM COATED ORAL at 10:03

## 2023-05-17 RX ADMIN — QUETIAPINE FUMARATE 150 MG: 50 TABLET, EXTENDED RELEASE ORAL at 21:12

## 2023-05-17 RX ADMIN — PANTOPRAZOLE SODIUM 40 MG: 40 TABLET, DELAYED RELEASE ORAL at 06:51

## 2023-05-17 RX ADMIN — MICONAZOLE NITRATE: 20 POWDER TOPICAL at 21:19

## 2023-05-17 RX ADMIN — PRAZOSIN HYDROCHLORIDE 2 MG: 1 CAPSULE ORAL at 21:12

## 2023-05-17 RX ADMIN — BUSPIRONE HYDROCHLORIDE 15 MG: 15 TABLET ORAL at 21:13

## 2023-05-17 ASSESSMENT — ACTIVITIES OF DAILY LIVING (ADL)
ADLS_ACUITY_SCORE: 50

## 2023-05-17 NOTE — PROGRESS NOTES
[facility]  Hospitalist Progress Note  Shabbir Vickers M.D., M.B.A.   05/17/2023    Reason for Stay/active problem list      Generalized weakness and mechanical fall at the group home, failure to thrive at group home    Acute on chronic hypoxic and hypercarbic respiratory failure         Assessment and Plan:        Summary of Stay:   Tre Vazquez is a 47 year old male with PMH including palsy, mild intellectual disability, congenital left eye blindness, seizure disorder with chronically elevated ammonia who remains on Depakote among other antiepileptics, borderline personality disorder, schizoaffective and ANA as well as obesity hypoventilation syndrome chronically on BiPAP who was recently admitted from 5/4 to 5/11 for hypercapnic respiratory failure and encephalopathy who presents with a fall not long after arriving back at his group home.      The patient does not recall much but just recalls winding up on the ground.  He was brought back to the ER and placed on BiPAP on route.  Here he underwent lab work-up which was notable for grossly stable BMP with carbon dioxide level of 39, stable CBC with mild anemia, normal lactic acid but venous blood gas showing PCO2 of approximately 90 with pH of 7.27.  His baseline PCO2 is around 80.  Influenza and COVID-19 testing were negative as well as troponin and BNP.  CT head and neck were negative for traumatic injury and CT PE protocol was negative for PE or pneumonia.    5/17/2023: Awaiting placement.        Problem List with Assessment and Plan:    Assessment and Plan:   1. Acute on chronic hypercapnic respiratory failure:  -- Baseline PCO2 seems to be in the 70s to 80 range.  He was seen by pulmonary medicine last admission.  it appears that the rep from [facility] was contacted last admission, and that the plan was for him to resume trilogy at home with the following settings: (Wesley from [facility] was contacted at [phone]). IL max 25, PS minimum 5, PS  maximum 20, EPAP minimum 8, EPAP maximum 15, tidal volume 300, rate 16, I-, time auto, FiO2 6 L/min while sleeping.  -- Patient was admitted to ICU as he was requiring continuous BiPAP for close monitoring and treatment.  --Patient was able to wean off the BiPAP and his mental status significantly improved.  -- Currently patient is on 3 L of oxygen via nasal cannula, doing well, alert and eating.  His VBG showed pH of 7.35, venous PCO2 of 69 down.  Patient was seen by Dr. Johnson who stated that patient is at his baseline tolerating nocturnal BiPAP.  He suggested seizure medication changes as below.  Dr. Johnson felt that the patient is not able to be cared for at group home facility and may need alternative placement. Looking into TCU. We will continue supplemental oxygen as needed, nocturnal BiPAP, may transfer out of the ICU.  -- Will request group home to bring trilogy device to hospital      2.   History of seizure disorder, hyperammonemia while on Depakote:   --He is also on numerous other antiepileptic medications including Lamictal 200 mg twice daily, Keppra 1000 mg in the morning and 1500 mg at bedtime and zonisamide 100 mg in the morning and 200 mg at bedtime.  --His Depakote was on hold and his ammonia was monitored.  Serum ammonia was 85 on May 12.  --Currently no evidence of seizure activity.  Patient is back to his baseline.  No evidence of encephalopathy.  -- Patient was seen by Dr. Zimmerman's who recommended to stop Tegretol in the setting of chronic respiratory acidosis.  Plan to continue other antiepileptic medications-Lamictal, Keppra, Zonegran.  His Seroquel was decreased to 150 mg  -- Patient may not  need neurology consultation at this time.  Consider neurology consultation if he has recurrent seizures.  Monitor ammonia level, continue seizure precautions.  -- Patient reported to nurse today that he remembered having a 20 minute seizure. Was able to explain everything that happened during it.  --  "Had previously been scheduled for outpatient EEG, which we can do here since he is pending placement  -- Analilia PRN     3.   History of schizoaffective disorder, borderline personality: Plan to continue his home Seroquel at 150 mg in the evening as well as BuSpar and Celexa.     4.   Toxic metabolic encephalopathy: Mental status seems to be at baseline     5.   Social: Reportedly he is his own decision maker   Compliance seems to be an issue to some extent.     6..  Fall: Obesity limits his mobility at baseline.    Physical therapy consulted    7. Diarrhea - Few loose stools today but not water , no fever   -- monitor for now     VTE Prophylaxis: Enoxaparin (Lovenox) SQ  Code Status: Full Code  Diet: Regular Diet Adult    Reid Catheter: Not present     Disposition: Pending TCU placement        Interval History (Subjective):        Nursing notes reviewed; no acute events overnight. No new symptoms. Reports that he no longer wishes for information to be provided to some family members.                  Physical Exam:        Last Vital Signs:  Temp: 98.6  F (37  C) Temp src: Oral BP: 138/86 Pulse: 92   Resp: 18 SpO2: 94 % O2 Device: Nasal cannula Oxygen Delivery: 4 LPM Height: 172.7 cm (5' 8\") Weight: 144.7 kg (319 lb)  Estimated body mass index is 48.5 kg/m  as calculated from the following:    Height as of this encounter: 1.727 m (5' 8\").    Weight as of this encounter: 144.7 kg (319 lb).    General: Very pleasant male resting comfortably in bedside chair with briefs but no shirt.  Awake, alert, interactive.  HEENT: Normocephalic, atraumatic.  PERRL, EOMI.  Conjunctiva clear, sclerae anicteric.  Mucous membranes moist.  Cardiac: Regular rate and rhythm without murmur, gallop, or rub.  No peripheral edema.  Respiratory: On supplemental O2. Normal work of breathing. Lungs clear.  GI: Normal, active bowel sounds.  Abdomen soft, nontender, nondistended.  : Deferred.  Musculoskeletal: Moving all extremities " appropriately.  Skin: No rashes or abrasions on exposed skin.  Neurologic: Alert and oriented x4.  Cranial nerves II through XII grossly intact.  Psychologic: Appropriate mood and affect.           Medications:        All current medications were reviewed with changes reflected in problem list.         Data:      All new lab and imaging data was reviewed.      Data reviewed today: I reviewed all new labs and imaging results over the last 24 hours. I personally reviewed       Recent Labs   Lab 05/15/23  0717 05/14/23  0517 05/12/23  0114   WBC  --  6.1 6.3   HGB  --  10.5* 11.7*   HCT  --  34.8* 37.8*   MCV  --  101* 100    166 158     No results for input(s): CULT in the last 168 hours.  Recent Labs   Lab 05/15/23  0717 05/14/23  0836 05/14/23  0517 05/13/23  0806 05/13/23 0516 05/12/23 0513 05/12/23  0114     --  143  --  144  --  142   POTASSIUM 4.2  --  4.0  --  4.3  --  4.2   CHLORIDE 101  --  102  --  101  --  100   CO2 37*  --  36*  --  40*  --  39*   ANIONGAP 4*  --  5*  --  3*  --  3*   * 116* 114*   < > 88   < > 132*   BUN 13.2  --  9.6  --  11.0  --  11.3   CR 0.65*  --  0.64*  --  0.60*   < > 0.59*   GFRESTIMATED >90  --  >90  --  >90   < > >90   ARNOLD 8.6  --  8.7  --  8.6  --  8.6   PROTTOTAL  --   --   --   --   --   --  6.1*   ALBUMIN  --   --   --   --   --   --  4.0   BILITOTAL  --   --   --   --   --   --  0.3   ALKPHOS  --   --   --   --   --   --  103   AST  --   --   --   --   --   --  105*   ALT  --   --   --   --   --   --  117*    < > = values in this interval not displayed.       Recent Labs   Lab 05/15/23  0717 05/14/23  0836 05/14/23  0517 05/13/23  1616 05/13/23  1202   * 116* 114* 129* 104*       No results for input(s): INR in the last 168 hours.      No results for input(s): TROPONIN, TROPI, TROPR in the last 168 hours.    Invalid input(s): TROP, TROPONINIES    Recent Results (from the past 48 hour(s))   Head CT w/o contrast    Narrative    EXAM: CT HEAD W/O  CONTRAST  LOCATION: [facility]  DATE/TIME: 5/12/2023 4:26 AM CDT    INDICATION: Traumatic injury. Pain.  COMPARISON: 10/25/2022, 04/05/2012  TECHNIQUE: Routine CT Head without IV contrast. Multiplanar reformats. Dose reduction techniques were used.    FINDINGS:  INTRACRANIAL CONTENTS: No intracranial hemorrhage, extraaxial collection, or mass effect.  No CT evidence of acute infarct. Mild to moderate presumed chronic small vessel ischemic changes. Extensive white matter volume loss. Stable ventriculomegaly.     VISUALIZED ORBITS/SINUSES/MASTOIDS: No intraorbital abnormality. No paranasal sinus mucosal disease. No middle ear or mastoid effusion.    BONES/SOFT TISSUES: No acute abnormality.      Impression    IMPRESSION:  1.  No acute intracranial process.  2.  No change from prior.     Cervical spine CT w/o contrast    Narrative    EXAM: CT CERVICAL SPINE W/O CONTRAST  LOCATION: [facility]  DATE/TIME: 5/12/2023 4:31 AM CDT    INDICATION: fall; fall; Neck pain; Trauma; Mild moderate trauma; None of the following: Spondyloarthropathy, cervical x ray with negative result, questionable finding, or inadequate coverage  COMPARISON: None.  TECHNIQUE: Routine CT Cervical Spine without IV contrast. Multiplanar reformats. Dose reduction techniques were used.    FINDINGS:  VERTEBRA: Reversal of usual cervical lordosis. Vertebral body heights and alignment is preserved. Fusion of the C4-C5 vertebra. Normal prevertebral soft tissues. No fracture or posttraumatic subluxation.     CANAL/FORAMINA: Relatively severe central canal stenosis at C4-C5, C5-C6 and C6-C7 with moderate to severe multilevel neural foraminal narrowing.    PARASPINAL: No extraspinal abnormality.      Impression    IMPRESSION:  1.  No fracture or posttraumatic subluxation.  2.  Reversal of the usual cervical lordosis.  3.  Relatively severe central canal stenosis at C4-C5, C5-C6 and C6-C7 with moderate to severe  multilevel neural foraminal narrowing.     CT Chest (PE) Abdomen Pelvis w Contrast    Narrative    EXAM: CT CHEST PE ABDOMEN PELVIS W CONTRAST  LOCATION: [facility]  DATE/TIME: 5/12/2023 4:32 AM CDT    INDICATION: Shortness of breath. Syncope.  COMPARISON:   1. Chest x-ray 2 views 05/04/2023.  2. Ultrasound abdomen limited 05/07/2023.  TECHNIQUE: CT chest pulmonary angiogram and routine CT abdomen pelvis with IV contrast. Arterial phase through the chest and venous phase through the abdomen and pelvis. Multiplanar reformats and MIP reconstructions were performed. Dose reduction   techniques were used.   CONTRAST: 77 mL Isovue 370.    FINDINGS:  ANGIOGRAM CHEST: Motion artifact degrades several images. Suboptimal opacification of the distal pulmonary vessels. No obvious pulmonary emboli on either side. Normal caliber thoracic aorta without aneurysm or dissection. No CT evidence of right heart   strain.     LUNGS AND PLEURA: Numerous images are degraded due to motion artifact. Mild diffuse thickening of the bronchi. Dependent atelectasis in the posterior lungs. No pleural effusion on either side.    MEDIASTINUM/AXILLAE: Normal cardiac size. No pericardial effusion. No suspicious adenopathy. Trachea is midline.    CORONARY ARTERY CALCIFICATION: None.    HEPATOBILIARY: Gallstones without biliary dilatation or adjacent inflammation. Diffuse fatty infiltration of the liver. Patent hepatic and portal veins.    PANCREAS: Normal.    SPLEEN: Normal.    ADRENAL GLANDS: Normal.    KIDNEYS/BLADDER: Milk of calcium cyst upper pole of the left kidney posteriorly containing dependent mineralization measures 3.5 x 3.5 cm (image 93, series 18), no specific follow-up required. Tiny punctate stones in the collecting system of both   kidneys, one on the right (image 47, series 17), and 3 on the left (images 40, 42 and 46, series 17). Both kidneys are well perfused without hydronephrosis or hydroureter. Normal  urinary bladder.    BOWEL: Scattered colonic diverticulosis, without acute inflammation. Formed stool material within normal caliber colon. Normal appendix. No obstruction, free gas or free fluid.    LYMPH NODES: No suspicious abdominopelvic adenopathy. Subcentimeter retroperitoneal and bilateral pelvic nodes, likely reactive.    VASCULATURE: Slightly atherosclerotic normal caliber distal abdominal aorta measuring 1.9 x 1.9 cm (image 57, series 17). Normal caliber IVC.    PELVIC ORGANS: Stool-filled rectosigmoid. Normal appendix. No free fluid. No suspicious adenopathy.    MUSCULOSKELETAL: Degenerative changes both shoulders, spine and joints of the pelvis. Slight right convex thoracic curve.      Impression    IMPRESSION:  1.  Several images are degraded due to motion artifact. Suboptimal opacification of the pulmonary vasculature. No obvious pulmonary emboli on either side. Mild diffuse thickening of the bronchi. Dependent atelectasis in the posterior lungs. No adenopathy   or effusion.    2.  Normal caliber thoracic aorta without aneurysm or dissection. Normal cardiac size. No pericardial effusion.    3.  Cholelithiasis. Fatty liver. Milk of calcium cyst upper pole of the left kidney posteriorly, no specific follow-up required. Nonobstructive stones in the renal collecting system, 1 on the right and 3 on the left.    4.  Scattered colonic diverticulosis without acute inflammation. No mechanical bowel obstruction, free gas or free fluid. Normal appendix.                 COVID Status:  COVID-19 PCR Results        8/30/2021    20:40 12/8/2021    02:51 6/20/2022    11:20 3/15/2023    13:14 3/25/2023    08:33   COVID-19 PCR Results   SARS CoV2 PCR Negative   Negative   Negative   Negative   Negative             5/12/2023    01:20   COVID-19 PCR Results   SARS CoV2 PCR Negative     COVID-19 Antibody Results, Testing for Immunity         No data to display

## 2023-05-17 NOTE — PLAN OF CARE
Pertinent assessments: Pt A&Ox4, VSS, on 4L, BIPAP at night. up Ax1 with walker & belt. Blind in L eye. LS dim. DE LA FUENTE. Denies pain. Slept well.  Major Shift Events none  Treatment Plan: Wean O2 as able plan for TCU then back to .  Bedside Nurse: Stephanie Blackmon RN

## 2023-05-17 NOTE — PROVIDER NOTIFICATION
[phone] paged Zain via [employer]: Pt reported at 1625 that he had a seizure that was unwitnessed while he was on the toilet moments before. He couldn't describe what the seizure felt like, but he told writer and support staff that he had suddenly felt weaker than he usually does and requested additional assistance getting back to the chair. Pts vitals are stable, neuros appear to be within baseline, no PRNs ordered or given, mobility was normal, blood sugar 125.

## 2023-05-17 NOTE — PROGRESS NOTES
A BiPAP of  16/6 @ 30% was applied to the pt via the mask for NOC use.  The bridge of the nose looks good and remains intact. Pt is tolerating it well. Will continue to monitor and assess the pt's current respiratory status and needs.    Praveen Christensen, RT on 5/16/2023 at 9:26 PM

## 2023-05-17 NOTE — PLAN OF CARE
End of Shift Summary  For vital signs and complete assessments, please see documentation flowsheets.     Pertinent assessments: Pt A&Ox4, VSS, on 3L via NC, BIPAP at night. Up Ax1/SBA with walker & belt. Blind in L eye. LS dim. DE LA FUENTE. Denies pain.     Major Shift Events: Pt reported having an unwitnessed seizure, see provider notification note.     Treatment Plan: Wean O2 as able. Plan for TCU. EEG tomorrow and has to be awake at 0200 tonight to be drowsy/sleep deprived.     Bedside Nurse: Stella Fernandez RN

## 2023-05-18 LAB
CREAT SERPL-MCNC: 0.62 MG/DL (ref 0.67–1.17)
GFR SERPL CREATININE-BSD FRML MDRD: >90 ML/MIN/1.73M2
PLATELET # BLD AUTO: 204 10E3/UL (ref 150–450)

## 2023-05-18 PROCEDURE — 85049 AUTOMATED PLATELET COUNT: CPT | Performed by: INTERNAL MEDICINE

## 2023-05-18 PROCEDURE — 250N000013 HC RX MED GY IP 250 OP 250 PS 637: Performed by: INTERNAL MEDICINE

## 2023-05-18 PROCEDURE — 36415 COLL VENOUS BLD VENIPUNCTURE: CPT | Performed by: INTERNAL MEDICINE

## 2023-05-18 PROCEDURE — 99231 SBSQ HOSP IP/OBS SF/LOW 25: CPT | Performed by: STUDENT IN AN ORGANIZED HEALTH CARE EDUCATION/TRAINING PROGRAM

## 2023-05-18 PROCEDURE — 120N000001 HC R&B MED SURG/OB

## 2023-05-18 PROCEDURE — 82565 ASSAY OF CREATININE: CPT | Performed by: INTERNAL MEDICINE

## 2023-05-18 PROCEDURE — 250N000011 HC RX IP 250 OP 636: Performed by: INTERNAL MEDICINE

## 2023-05-18 RX ADMIN — ENOXAPARIN SODIUM 40 MG: 40 INJECTION SUBCUTANEOUS at 20:31

## 2023-05-18 RX ADMIN — LEVOCARNITINE 660 MG: 330 TABLET ORAL at 10:22

## 2023-05-18 RX ADMIN — PANTOPRAZOLE SODIUM 40 MG: 40 TABLET, DELAYED RELEASE ORAL at 10:20

## 2023-05-18 RX ADMIN — LEVOCARNITINE 660 MG: 330 TABLET ORAL at 13:58

## 2023-05-18 RX ADMIN — CITALOPRAM HYDROBROMIDE 60 MG: 20 TABLET ORAL at 10:20

## 2023-05-18 RX ADMIN — ACETAMINOPHEN 650 MG: 325 TABLET ORAL at 15:45

## 2023-05-18 RX ADMIN — MICONAZOLE NITRATE: 20 POWDER TOPICAL at 10:22

## 2023-05-18 RX ADMIN — LAMOTRIGINE 200 MG: 200 TABLET ORAL at 10:20

## 2023-05-18 RX ADMIN — PROPRANOLOL HYDROCHLORIDE 20 MG: 20 TABLET ORAL at 10:20

## 2023-05-18 RX ADMIN — POLYETHYLENE GLYCOL 3350 17 G: 17 POWDER, FOR SOLUTION ORAL at 10:20

## 2023-05-18 RX ADMIN — PRAZOSIN HYDROCHLORIDE 2 MG: 1 CAPSULE ORAL at 22:22

## 2023-05-18 RX ADMIN — BUSPIRONE HYDROCHLORIDE 15 MG: 15 TABLET ORAL at 20:31

## 2023-05-18 RX ADMIN — QUETIAPINE FUMARATE 150 MG: 50 TABLET, EXTENDED RELEASE ORAL at 22:22

## 2023-05-18 RX ADMIN — LAMOTRIGINE 200 MG: 200 TABLET ORAL at 20:31

## 2023-05-18 RX ADMIN — LEVETIRACETAM 1000 MG: 500 TABLET, FILM COATED ORAL at 10:20

## 2023-05-18 RX ADMIN — LEVETIRACETAM 1500 MG: 500 TABLET, FILM COATED ORAL at 22:22

## 2023-05-18 RX ADMIN — LEVOCARNITINE 660 MG: 330 TABLET ORAL at 20:31

## 2023-05-18 RX ADMIN — ZONISAMIDE 200 MG: 100 CAPSULE ORAL at 22:22

## 2023-05-18 RX ADMIN — MICONAZOLE NITRATE: 20 POWDER TOPICAL at 20:31

## 2023-05-18 RX ADMIN — BUSPIRONE HYDROCHLORIDE 15 MG: 15 TABLET ORAL at 10:20

## 2023-05-18 RX ADMIN — PROPRANOLOL HYDROCHLORIDE 20 MG: 20 TABLET ORAL at 20:31

## 2023-05-18 RX ADMIN — ZONISAMIDE 100 MG: 100 CAPSULE ORAL at 10:20

## 2023-05-18 ASSESSMENT — ACTIVITIES OF DAILY LIVING (ADL)
ADLS_ACUITY_SCORE: 46
ADLS_ACUITY_SCORE: 47
ADLS_ACUITY_SCORE: 50
ADLS_ACUITY_SCORE: 46
ADLS_ACUITY_SCORE: 47
ADLS_ACUITY_SCORE: 50
ADLS_ACUITY_SCORE: 46
ADLS_ACUITY_SCORE: 47

## 2023-05-18 NOTE — PROGRESS NOTES
[facility]  Hospitalist Progress Note  Shabbir Vickers M.D., M.B.A.   05/18/2023    Reason for Stay/active problem list      Generalized weakness and mechanical fall at the group home, failure to thrive at group home    Acute on chronic hypoxic and hypercarbic respiratory failure         Assessment and Plan:        Summary of Stay:   Tre Vazquez is a 47 year old male with PMH including palsy, mild intellectual disability, congenital left eye blindness, seizure disorder with chronically elevated ammonia who remains on Depakote among other antiepileptics, borderline personality disorder, schizoaffective and ANA as well as obesity hypoventilation syndrome chronically on BiPAP who was recently admitted from 5/4 to 5/11 for hypercapnic respiratory failure and encephalopathy who presents with a fall not long after arriving back at his group home.      The patient does not recall much but just recalls winding up on the ground.  He was brought back to the ER and placed on BiPAP on route.  Here he underwent lab work-up which was notable for grossly stable BMP with carbon dioxide level of 39, stable CBC with mild anemia, normal lactic acid but venous blood gas showing PCO2 of approximately 90 with pH of 7.27.  His baseline PCO2 is around 80.  Influenza and COVID-19 testing were negative as well as troponin and BNP.  CT head and neck were negative for traumatic injury and CT PE protocol was negative for PE or pneumonia.    5/17/2023-5/18/2023: Awaiting placement.        Problem List with Assessment and Plan:    Assessment and Plan:   1. Acute on chronic hypercapnic respiratory failure:  -- Baseline PCO2 seems to be in the 70s to 80 range.  He was seen by pulmonary medicine last admission.  it appears that the rep from [facility] was contacted last admission, and that the plan was for him to resume trilogy at home with the following settings: (Wesley from [facility] was contacted at [phone]). WV max 25, PS  minimum 5, PS maximum 20, EPAP minimum 8, EPAP maximum 15, tidal volume 300, rate 16, I-, time auto, FiO2 6 L/min while sleeping.  -- Patient was admitted to ICU as he was requiring continuous BiPAP for close monitoring and treatment.  --Patient was able to wean off the BiPAP and his mental status significantly improved.  -- Currently patient is on 3 L of oxygen via nasal cannula, doing well, alert and eating.  His VBG showed pH of 7.35, venous PCO2 of 69 down.  Patient was seen by Dr. Johnson who stated that patient is at his baseline tolerating nocturnal BiPAP.  He suggested seizure medication changes as below.  Dr. Johnson felt that the patient is not able to be cared for at group home facility and may need alternative placement. Looking into TCU. We will continue supplemental oxygen as needed, nocturnal BiPAP, may transfer out of the ICU.  -- Will request group home to bring trilogy device to hospital      2.   History of seizure disorder, hyperammonemia while on Depakote:   --He is also on numerous other antiepileptic medications including Lamictal 200 mg twice daily, Keppra 1000 mg in the morning and 1500 mg at bedtime and zonisamide 100 mg in the morning and 200 mg at bedtime.  --His Depakote was on hold and his ammonia was monitored.  Serum ammonia was 85 on May 12.  --Currently no evidence of seizure activity.  Patient is back to his baseline.  No evidence of encephalopathy.  -- Patient was seen by Dr. Zimmerman's who recommended to stop Tegretol in the setting of chronic respiratory acidosis.  Plan to continue other antiepileptic medications-Lamictal, Keppra, Zonegran.  His Seroquel was decreased to 150 mg  -- Patient may not  need neurology consultation at this time.  Consider neurology consultation if he has recurrent seizures.  Monitor ammonia level, continue seizure precautions.  -- Patient reported to nurse today that he remembered having a 20 minute seizure. Was able to explain everything that happened  "during it.  -- Had previously been scheduled for outpatient EEG, which we can do here since he is pending placement. Pending read of EEG.  -- Analilia PRN     3.   History of schizoaffective disorder, borderline personality: Plan to continue his home Seroquel at 150 mg in the evening as well as BuSpar and Celexa.     4.   Toxic metabolic encephalopathy: Mental status seems to be at baseline     5.   Social: Reportedly he is his own decision maker   Compliance seems to be an issue to some extent.     6..  Fall: Obesity limits his mobility at baseline.    Physical therapy consulted    7. Diarrhea - Few loose stools today but not water , no fever   -- monitor for now     VTE Prophylaxis: Enoxaparin (Lovenox) SQ  Code Status: Full Code  Diet: Regular Diet Adult    Reid Catheter: Not present     Disposition: Pending TCU placement        Interval History (Subjective):        Nursing notes reviewed; no acute events overnight. No new symptoms. Has a small headache today. EEG done this afternoon.                  Physical Exam:        Last Vital Signs:  Temp: 98.7  F (37.1  C) Temp src: Oral BP: 131/74 Pulse: 87   Resp: 24 SpO2: 93 % O2 Device: Nasal cannula Oxygen Delivery: 3 LPM Height: 172.7 cm (5' 8\") Weight: 145.8 kg (321 lb 6.4 oz)  Estimated body mass index is 48.87 kg/m  as calculated from the following:    Height as of this encounter: 1.727 m (5' 8\").    Weight as of this encounter: 145.8 kg (321 lb 6.4 oz).    General: Very pleasant male resting comfortably in bedside chair with briefs but no shirt.  Awake, alert, interactive.  HEENT: Normocephalic, atraumatic.  PERRL, EOMI.  Conjunctiva clear, sclerae anicteric.  Mucous membranes moist.  Cardiac: Regular rate and rhythm without murmur, gallop, or rub.  No peripheral edema.  Respiratory: On supplemental O2. Normal work of breathing. Lungs clear.  GI: Normal, active bowel sounds.  Abdomen soft, nontender, nondistended.  : Deferred.  Musculoskeletal: Moving all " extremities appropriately.  Skin: No rashes or abrasions on exposed skin.  Neurologic: Alert and oriented x4.  Cranial nerves II through XII grossly intact.  Psychologic: Appropriate mood and affect.           Medications:        All current medications were reviewed with changes reflected in problem list.         Data:      All new lab and imaging data was reviewed.      Data reviewed today: I reviewed all new labs and imaging results over the last 24 hours. I personally reviewed       Recent Labs   Lab 05/18/23  0614 05/15/23  0717 05/14/23  0517 05/12/23  0114   WBC  --   --  6.1 6.3   HGB  --   --  10.5* 11.7*   HCT  --   --  34.8* 37.8*   MCV  --   --  101* 100    190 166 158     No results for input(s): CULT in the last 168 hours.  Recent Labs   Lab 05/18/23 0614 05/17/23  1638 05/15/23  0717 05/14/23  0836 05/14/23 0517 05/13/23  0806 05/13/23 0516 05/12/23 0513 05/12/23 0114   NA  --   --  142  --  143  --  144  --  142   POTASSIUM  --   --  4.2  --  4.0  --  4.3  --  4.2   CHLORIDE  --   --  101  --  102  --  101  --  100   CO2  --   --  37*  --  36*  --  40*  --  39*   ANIONGAP  --   --  4*  --  5*  --  3*  --  3*   GLC  --  125* 115* 116* 114*   < > 88   < > 132*   BUN  --   --  13.2  --  9.6  --  11.0  --  11.3   CR 0.62*  --  0.65*  --  0.64*  --  0.60*   < > 0.59*   GFRESTIMATED >90  --  >90  --  >90  --  >90   < > >90   ARNOLD  --   --  8.6  --  8.7  --  8.6  --  8.6   PROTTOTAL  --   --   --   --   --   --   --   --  6.1*   ALBUMIN  --   --   --   --   --   --   --   --  4.0   BILITOTAL  --   --   --   --   --   --   --   --  0.3   ALKPHOS  --   --   --   --   --   --   --   --  103   AST  --   --   --   --   --   --   --   --  105*   ALT  --   --   --   --   --   --   --   --  117*    < > = values in this interval not displayed.       Recent Labs   Lab 05/17/23  1638 05/15/23  0717 05/14/23  0836 05/14/23  0517 05/13/23  1616   * 115* 116* 114* 129*       No results for input(s): INR  in the last 168 hours.      No results for input(s): TROPONIN, TROPI, TROPR in the last 168 hours.    Invalid input(s): TROP, TROPONINIES    Recent Results (from the past 48 hour(s))   Head CT w/o contrast    Narrative    EXAM: CT HEAD W/O CONTRAST  LOCATION: [facility]  DATE/TIME: 5/12/2023 4:26 AM CDT    INDICATION: Traumatic injury. Pain.  COMPARISON: 10/25/2022, 04/05/2012  TECHNIQUE: Routine CT Head without IV contrast. Multiplanar reformats. Dose reduction techniques were used.    FINDINGS:  INTRACRANIAL CONTENTS: No intracranial hemorrhage, extraaxial collection, or mass effect.  No CT evidence of acute infarct. Mild to moderate presumed chronic small vessel ischemic changes. Extensive white matter volume loss. Stable ventriculomegaly.     VISUALIZED ORBITS/SINUSES/MASTOIDS: No intraorbital abnormality. No paranasal sinus mucosal disease. No middle ear or mastoid effusion.    BONES/SOFT TISSUES: No acute abnormality.      Impression    IMPRESSION:  1.  No acute intracranial process.  2.  No change from prior.     Cervical spine CT w/o contrast    Narrative    EXAM: CT CERVICAL SPINE W/O CONTRAST  LOCATION: [facility]  DATE/TIME: 5/12/2023 4:31 AM CDT    INDICATION: fall; fall; Neck pain; Trauma; Mild moderate trauma; None of the following: Spondyloarthropathy, cervical x ray with negative result, questionable finding, or inadequate coverage  COMPARISON: None.  TECHNIQUE: Routine CT Cervical Spine without IV contrast. Multiplanar reformats. Dose reduction techniques were used.    FINDINGS:  VERTEBRA: Reversal of usual cervical lordosis. Vertebral body heights and alignment is preserved. Fusion of the C4-C5 vertebra. Normal prevertebral soft tissues. No fracture or posttraumatic subluxation.     CANAL/FORAMINA: Relatively severe central canal stenosis at C4-C5, C5-C6 and C6-C7 with moderate to severe multilevel neural foraminal narrowing.    PARASPINAL: No  extraspinal abnormality.      Impression    IMPRESSION:  1.  No fracture or posttraumatic subluxation.  2.  Reversal of the usual cervical lordosis.  3.  Relatively severe central canal stenosis at C4-C5, C5-C6 and C6-C7 with moderate to severe multilevel neural foraminal narrowing.     CT Chest (PE) Abdomen Pelvis w Contrast    Narrative    EXAM: CT CHEST PE ABDOMEN PELVIS W CONTRAST  LOCATION: [facility]  DATE/TIME: 5/12/2023 4:32 AM CDT    INDICATION: Shortness of breath. Syncope.  COMPARISON:   1. Chest x-ray 2 views 05/04/2023.  2. Ultrasound abdomen limited 05/07/2023.  TECHNIQUE: CT chest pulmonary angiogram and routine CT abdomen pelvis with IV contrast. Arterial phase through the chest and venous phase through the abdomen and pelvis. Multiplanar reformats and MIP reconstructions were performed. Dose reduction   techniques were used.   CONTRAST: 77 mL Isovue 370.    FINDINGS:  ANGIOGRAM CHEST: Motion artifact degrades several images. Suboptimal opacification of the distal pulmonary vessels. No obvious pulmonary emboli on either side. Normal caliber thoracic aorta without aneurysm or dissection. No CT evidence of right heart   strain.     LUNGS AND PLEURA: Numerous images are degraded due to motion artifact. Mild diffuse thickening of the bronchi. Dependent atelectasis in the posterior lungs. No pleural effusion on either side.    MEDIASTINUM/AXILLAE: Normal cardiac size. No pericardial effusion. No suspicious adenopathy. Trachea is midline.    CORONARY ARTERY CALCIFICATION: None.    HEPATOBILIARY: Gallstones without biliary dilatation or adjacent inflammation. Diffuse fatty infiltration of the liver. Patent hepatic and portal veins.    PANCREAS: Normal.    SPLEEN: Normal.    ADRENAL GLANDS: Normal.    KIDNEYS/BLADDER: Milk of calcium cyst upper pole of the left kidney posteriorly containing dependent mineralization measures 3.5 x 3.5 cm (image 93, series 18), no specific follow-up  required. Tiny punctate stones in the collecting system of both   kidneys, one on the right (image 47, series 17), and 3 on the left (images 40, 42 and 46, series 17). Both kidneys are well perfused without hydronephrosis or hydroureter. Normal urinary bladder.    BOWEL: Scattered colonic diverticulosis, without acute inflammation. Formed stool material within normal caliber colon. Normal appendix. No obstruction, free gas or free fluid.    LYMPH NODES: No suspicious abdominopelvic adenopathy. Subcentimeter retroperitoneal and bilateral pelvic nodes, likely reactive.    VASCULATURE: Slightly atherosclerotic normal caliber distal abdominal aorta measuring 1.9 x 1.9 cm (image 57, series 17). Normal caliber IVC.    PELVIC ORGANS: Stool-filled rectosigmoid. Normal appendix. No free fluid. No suspicious adenopathy.    MUSCULOSKELETAL: Degenerative changes both shoulders, spine and joints of the pelvis. Slight right convex thoracic curve.      Impression    IMPRESSION:  1.  Several images are degraded due to motion artifact. Suboptimal opacification of the pulmonary vasculature. No obvious pulmonary emboli on either side. Mild diffuse thickening of the bronchi. Dependent atelectasis in the posterior lungs. No adenopathy   or effusion.    2.  Normal caliber thoracic aorta without aneurysm or dissection. Normal cardiac size. No pericardial effusion.    3.  Cholelithiasis. Fatty liver. Milk of calcium cyst upper pole of the left kidney posteriorly, no specific follow-up required. Nonobstructive stones in the renal collecting system, 1 on the right and 3 on the left.    4.  Scattered colonic diverticulosis without acute inflammation. No mechanical bowel obstruction, free gas or free fluid. Normal appendix.                 COVID Status:  COVID-19 PCR Results        8/30/2021    20:40 12/8/2021    02:51 6/20/2022    11:20 3/15/2023    13:14 3/25/2023    08:33   COVID-19 PCR Results   SARS CoV2 PCR Negative   Negative   Negative    Negative   Negative             5/12/2023    01:20   COVID-19 PCR Results   SARS CoV2 PCR Negative     COVID-19 Antibody Results, Testing for Immunity         No data to display

## 2023-05-18 NOTE — PROGRESS NOTES
.Care Management Follow Up    Length of Stay (days): 6    Expected Discharge Date: 05/19/2023     Concerns to be Addressed: care coordination/care conferences     Patient plan of care discussed at interdisciplinary rounds: Yes    Anticipated Discharge Disposition: Skilled Nursing Facility, Transitional Care     Anticipated Discharge Services:    Anticipated Discharge DME: Oxygen    Patient/family educated on Medicare website which has current facility and service quality ratings: yes  Education Provided on the Discharge Plan: Ignacia STEIN manager  Patient/Family in Agreement with the Plan:      Referrals Placed by CM/SW: Post Acute Facilities  Private pay costs discussed: transportation costs    Additional Information:  Continue to pursue TCU placement. Received a call from [facility] and they are reviewing. They had questions regarding level 2 screen and respiratory equipment.  Contacted Sugar  manager to see if they could bring in patients home trilogy unit to be used here and she will attempt to get it here today or tomorrow. She was also updated on patients current status.  Will wait to hear back from [facility] admissions on whether they can accept patient.    Olivia Arndt RN BSN OCN  Care Coordinator  [facility]  [phone]

## 2023-05-18 NOTE — PLAN OF CARE
Pertinent assessments: Pt A&Ox4, VSS, on 3L via NC, BIPAP at night. Up Ax1/SBA with walker & belt. Blind in L eye. LS dim. [place]. Denies pain.     Major Shift Events: Pt reported at 0400 that he had a seizure while sitting in the recliner, he described feeling weaker and went back to bed. Vitals stable.     Treatment Plan: Wean O2 as able. Plan for TCU. EEG today was awaken at 0200 but went back to bed at 0400.   Bedside Nurse: Stephanie Blackmon RN

## 2023-05-19 PROCEDURE — 250N000013 HC RX MED GY IP 250 OP 250 PS 637: Performed by: INTERNAL MEDICINE

## 2023-05-19 PROCEDURE — 99231 SBSQ HOSP IP/OBS SF/LOW 25: CPT | Performed by: STUDENT IN AN ORGANIZED HEALTH CARE EDUCATION/TRAINING PROGRAM

## 2023-05-19 PROCEDURE — 94660 CPAP INITIATION&MGMT: CPT

## 2023-05-19 PROCEDURE — 120N000001 HC R&B MED SURG/OB

## 2023-05-19 PROCEDURE — 250N000011 HC RX IP 250 OP 636: Performed by: INTERNAL MEDICINE

## 2023-05-19 PROCEDURE — 999N000157 HC STATISTIC RCP TIME EA 10 MIN

## 2023-05-19 RX ADMIN — PRAZOSIN HYDROCHLORIDE 2 MG: 1 CAPSULE ORAL at 21:57

## 2023-05-19 RX ADMIN — ENOXAPARIN SODIUM 40 MG: 40 INJECTION SUBCUTANEOUS at 21:59

## 2023-05-19 RX ADMIN — LEVOCARNITINE 660 MG: 330 TABLET ORAL at 08:37

## 2023-05-19 RX ADMIN — PROPRANOLOL HYDROCHLORIDE 20 MG: 20 TABLET ORAL at 08:37

## 2023-05-19 RX ADMIN — MICONAZOLE NITRATE: 20 POWDER TOPICAL at 21:59

## 2023-05-19 RX ADMIN — LEVOCARNITINE 660 MG: 330 TABLET ORAL at 13:12

## 2023-05-19 RX ADMIN — LEVETIRACETAM 1500 MG: 500 TABLET, FILM COATED ORAL at 21:57

## 2023-05-19 RX ADMIN — PROPRANOLOL HYDROCHLORIDE 20 MG: 20 TABLET ORAL at 21:58

## 2023-05-19 RX ADMIN — ZONISAMIDE 100 MG: 100 CAPSULE ORAL at 08:37

## 2023-05-19 RX ADMIN — LAMOTRIGINE 200 MG: 200 TABLET ORAL at 21:58

## 2023-05-19 RX ADMIN — BUSPIRONE HYDROCHLORIDE 15 MG: 15 TABLET ORAL at 21:58

## 2023-05-19 RX ADMIN — MICONAZOLE NITRATE: 20 POWDER TOPICAL at 08:38

## 2023-05-19 RX ADMIN — POLYETHYLENE GLYCOL 3350 17 G: 17 POWDER, FOR SOLUTION ORAL at 08:37

## 2023-05-19 RX ADMIN — CITALOPRAM HYDROBROMIDE 60 MG: 20 TABLET ORAL at 08:36

## 2023-05-19 RX ADMIN — LEVOCARNITINE 660 MG: 330 TABLET ORAL at 21:57

## 2023-05-19 RX ADMIN — LEVETIRACETAM 1000 MG: 500 TABLET, FILM COATED ORAL at 08:37

## 2023-05-19 RX ADMIN — ZONISAMIDE 200 MG: 100 CAPSULE ORAL at 21:58

## 2023-05-19 RX ADMIN — PANTOPRAZOLE SODIUM 40 MG: 40 TABLET, DELAYED RELEASE ORAL at 08:36

## 2023-05-19 RX ADMIN — QUETIAPINE FUMARATE 150 MG: 50 TABLET, EXTENDED RELEASE ORAL at 21:57

## 2023-05-19 RX ADMIN — LAMOTRIGINE 200 MG: 200 TABLET ORAL at 08:37

## 2023-05-19 RX ADMIN — ACETAMINOPHEN 650 MG: 325 TABLET ORAL at 10:33

## 2023-05-19 RX ADMIN — BUSPIRONE HYDROCHLORIDE 15 MG: 15 TABLET ORAL at 08:36

## 2023-05-19 ASSESSMENT — ACTIVITIES OF DAILY LIVING (ADL)
ADLS_ACUITY_SCORE: 48
ADLS_ACUITY_SCORE: 46
ADLS_ACUITY_SCORE: 48
ADLS_ACUITY_SCORE: 48
ADLS_ACUITY_SCORE: 46

## 2023-05-19 NOTE — CONSULTS
"BRIEF NUTRITION ASSESSMENT      REASON FOR ASSESSMENT:  Tre Vazquez is a 47 year old male seen by Registered Dietitian for LOS.    NUTRITION HISTORY:  - PMH: Intellectual disability, palsy, L eye blindness, seizure disorder, personality disorder, schizoaffective disorder, ANA, obesity hypoventilation syndrome.  - Admit 2/2: Acute on chronic respiratory failure.  - Admitted from group home and noted recent admit as well.    - NKFA.    CURRENT DIET AND INTAKE:  Diet: Regular          % intakes per flowsheet review since admission.  Is consistently ordering meals per meal system review.    ANTHROPOMETRICS:  Height: 5' 8\"  Weight:  321 lbs 10.42 oz  Body mass index is 48.91 kg/m .   Weight Status: Obesity Grade III BMI >40  Weight History:   Wt Readings from Last 10 Encounters:   05/19/23 145.9 kg (321 lb 10.4 oz)   05/04/23 142.9 kg (315 lb)   03/27/23 141.8 kg (312 lb 11.2 oz)   03/20/23 139.6 kg (307 lb 12.2 oz)   01/03/23 122.5 kg (270 lb)   11/30/22 123.4 kg (272 lb)   04/19/22 122.1 kg (269 lb 3.2 oz)   12/08/21 116.1 kg (256 lb)   08/31/21 122.4 kg (269 lb 13.5 oz)   06/05/20 129.3 kg (285 lb)   - Wt gain?  Currently has trace to mild/generalized edema and is above what appears to be his UBW.    LABS:  Labs noted    MALNUTRITION:  Patient does not meet 2 malnutrition criteria.     NUTRITION INTERVENTION:  Nutrition Diagnosis:  No nutrition diagnosis at this time.    Implementation:  Nutrition Education: Per provider order, if indicated.     FOLLOW UP/MONITORING:   Will re-evaluate in 7 - 10 days, or sooner, if formally consulted.        Darya Pierre RDN, LD  Clinical Dietitian  3rd floor/ICU: [phone]  All other floors: [phone]  Weekend/holiday: [phone]  Office: [phone]  "

## 2023-05-19 NOTE — PLAN OF CARE
End of Shift Summary  For vital signs and complete assessments, please see documentation flowsheets.     Pertinent assessments: A&Ox4. VSS on 3L NC, afebrile. Wore BiPAP overnight. L eye blindness. Pt states he had 2 seizures overnight, seizure pads in place & no loss of consciousness per pt. Denies pain. Up SBA with walker.     Major Shift Events: Reported seizures x2 this shift.   Treatment Plan: Wean O2 as able. Bedside Nurse: Elicia Crum RN

## 2023-05-19 NOTE — PROGRESS NOTES
Care Management Follow Up    Length of Stay (days): 7    Expected Discharge Date: 05/22/2023     Concerns to be Addressed: care coordination/care conferences     Patient plan of care discussed at interdisciplinary rounds: Yes    Anticipated Discharge Disposition: Skilled Nursing Facility     Anticipated Discharge Services:    Anticipated Discharge DME: Oxygen    Patient/family educated on Medicare website which has current facility and service quality ratings: yes  Education Provided on the Discharge Plan:  Yes  Patient/Family in Agreement with the Plan:  Yes    Referrals Placed by CM/SW: Post Acute Facilities    PAS  [phone]    Additional Information:  Pt has been accepted at [facility] for admission 5/22/2023.  PAS has been completed, will need wheelchair transportation arranged.   Updated pt with plan. Call to Sugar from the Group Home with the update and requested pt's Trilogy be delivered today. Stated she is working to get one of the staff members to bring to the hospital.  Will continue to follow along for final discharge plan.    Cherelle Carpenter RN   Inpatient Care Coordination  [facility]   Phone: [phone]

## 2023-05-19 NOTE — PROGRESS NOTES
"A BiPAP of  16/6 @ 30% was applied to the pt via the mask for NOC use.  The bridge of the nose looks good and remains intact. Pt is tolerating it well. Will continue to monitor and assess the pt's current respiratory status and needs.    Vital signs:  Temp: 97.8  F (36.6  C) Temp src: Oral BP: 139/83 Pulse: 88   Resp: 25 SpO2: 93 % O2 Device: BiPAP/CPAP Oxygen Delivery: 3 LPM Height: 172.7 cm (5' 8\") Weight: 145.8 kg (321 lb 6.4 oz)  Estimated body mass index is 48.87 kg/m  as calculated from the following:    Height as of this encounter: 1.727 m (5' 8\").    Weight as of this encounter: 145.8 kg (321 lb 6.4 oz).          "

## 2023-05-19 NOTE — PROGRESS NOTES
"SPIRITUAL HEALTH SERVICES (SHS) Progress Note  [facility].  Unit [address] 5      Tre Vazquez  Declined visit. Changed demographics to \"No Sikh.\"      Plan of Care - Mr Vazquez declined visited. SHS remain available should Mr Vazquez change his mind.        Ana Sellers, Ph.D.,[facility]  , Spiritual Health Services      SHS available 24/7 for emergency requests/referrals, either by having the on-call  paged or by entering an ASAP/STAT consult in Epic (this will also page the on-call ).       "

## 2023-05-19 NOTE — PLAN OF CARE
"End of Shift Summary  For vital signs and complete assessments, please see documentation flowsheets.     Pertinent assessments: Pt is A&Ox4, ambulates with walker and SBA. VSS on 3L O2. C/o headache, tylenol given. Reports 2 seizures this shift, both unwitnessed, all vital signs stable. Frequently requesting staff in room for a multitude of trivial tasks, both seizures reported to staff after pt felt like he had been \"ignored for too long\". Tolerating regular diet, no skin issues. No PIV in place.     Major Shift Events: none    Treatment Plan: Continue supportive cares, pain control, neuro consult, discharge to  or TCU?  "

## 2023-05-19 NOTE — PROGRESS NOTES
[facility]  Hospitalist Progress Note  Shabbir Vickers M.D., M.B.A.   05/19/2023    Reason for Stay/active problem list      Generalized weakness and mechanical fall at the group home, failure to thrive at group home    Acute on chronic hypoxic and hypercarbic respiratory failure         Assessment and Plan:        Summary of Stay:   Tre Vazquez is a 47 year old male with PMH including palsy, mild intellectual disability, congenital left eye blindness, seizure disorder with chronically elevated ammonia who remains on Depakote among other antiepileptics, borderline personality disorder, schizoaffective and ANA as well as obesity hypoventilation syndrome chronically on BiPAP who was recently admitted from 5/4 to 5/11 for hypercapnic respiratory failure and encephalopathy who presents with a fall not long after arriving back at his group home.      The patient does not recall much but just recalls winding up on the ground.  He was brought back to the ER and placed on BiPAP on route.  Here he underwent lab work-up which was notable for grossly stable BMP with carbon dioxide level of 39, stable CBC with mild anemia, normal lactic acid but venous blood gas showing PCO2 of approximately 90 with pH of 7.27.  His baseline PCO2 is around 80.  Influenza and COVID-19 testing were negative as well as troponin and BNP.  CT head and neck were negative for traumatic injury and CT PE protocol was negative for PE or pneumonia.    5/17/2023-5/18/2023: Awaiting placement.        Problem List with Assessment and Plan:    Assessment and Plan:   1. Acute on chronic hypercapnic respiratory failure:  -- Baseline PCO2 seems to be in the 70s to 80 range.  He was seen by pulmonary medicine last admission.  it appears that the rep from [facility] was contacted last admission, and that the plan was for him to resume trilogy at home with the following settings: (Wesley from [facility] was contacted at [phone]). W[place] max 25, PS  "minimum 5, PS maximum 20, EPAP minimum 8, EPAP maximum 15, tidal volume 300, rate 16, I-, time auto, FiO2 6 L/min while sleeping.  -- Patient was admitted to ICU as he was requiring continuous BiPAP for close monitoring and treatment.  --Patient was able to wean off the BiPAP and his mental status significantly improved.  -- Currently patient is on 3 L of oxygen via nasal cannula, doing well, alert and eating.  His VBG showed pH of 7.35, venous PCO2 of 69 down.  Patient was seen by Dr. Johnson who stated that patient is at his baseline tolerating nocturnal BiPAP.  He suggested seizure medication changes as below.  Dr. Johnson felt that the patient is not able to be cared for at group home facility and may need alternative placement. Looking into TCU. We will continue supplemental oxygen as needed, nocturnal BiPAP, may transfer out of the ICU.  -- Will request group home to bring trilogy device to hospital  -- Analilia PRN    2.   History of seizure disorder, hyperammonemia while on Depakote:   --He is also on numerous other antiepileptic medications including Lamictal 200 mg twice daily, Keppra 1000 mg in the morning and 1500 mg at bedtime and zonisamide 100 mg in the morning and 200 mg at bedtime.  --His Depakote was on hold and his ammonia was monitored.  Serum ammonia was 85 on May 12.  --Currently no evidence of seizure activity.  Patient is back to his baseline.  No evidence of encephalopathy.  -- Patient was seen by Dr. Zimmerman's who recommended to stop Tegretol in the setting of chronic respiratory acidosis.  Plan to continue other antiepileptic medications-Lamictal, Keppra, Zonegran.  His Seroquel was decreased to 150 mg  --Patient continues to report \"seizure activity.\"  This has not been witnessed by staff.  The patient, at least once daily, will call the nurse and inform her that he \"had a seizure.\"  He is able to describe this in entirety.  He states that his eyes rolled back to the back of his head, and " "then he begins to shake uncontrollably.  He is sometimes a little confused, but remembers the entirety of the \"seizure.\"  --EEG performed on 5/18/2023 showing generalized slowing, nonspecific generalized liver cerebral dysfunction, and occasional focal spikes that could possibly indicate focal cortical irritation.  He did not have any seizures that were noted.  --Neurology consultation to r/o patient is having focal seizure activity  --May benefit from video EEG    3.   History of schizoaffective disorder, borderline personality: Plan to continue his home Seroquel at 150 mg in the evening as well as BuSpar and Celexa.     4.   Toxic metabolic encephalopathy: Mental status seems to be at baseline     5.   Social: Reportedly he is his own decision maker   Compliance seems to be an issue to some extent.     6..  Fall: Obesity limits his mobility at baseline.    Physical therapy consulted    7. Diarrhea - Few loose stools today but not water , no fever   -- monitor for now     VTE Prophylaxis: Enoxaparin (Lovenox) SQ  Code Status: Full Code  Diet: Regular Diet Adult    Reid Catheter: Not present     Disposition: Pending TCU placement        Interval History (Subjective):        Nursing notes reviewed; no acute events overnight. No new symptoms. Does continue to reports what he believes is seizure activity, which has not been witnessed.                  Physical Exam:        Last Vital Signs:  Temp: 97.6  F (36.4  C) Temp src: Oral BP: 116/55 Pulse: 88   Resp: 16 SpO2: 91 % O2 Device: Nasal cannula Oxygen Delivery: 3 LPM Height: 172.7 cm (5' 8\") Weight: 145.9 kg (321 lb 10.4 oz)  Estimated body mass index is 48.91 kg/m  as calculated from the following:    Height as of this encounter: 1.727 m (5' 8\").    Weight as of this encounter: 145.9 kg (321 lb 10.4 oz).    General: Very pleasant male resting comfortably in bedside chair.  Awake, alert, interactive.  HEENT: Normocephalic, atraumatic.  PERRL, EOMI.  Conjunctiva " clear, sclerae anicteric.  Mucous membranes moist.  Cardiac: Regular rate and rhythm without murmur, gallop, or rub.  No peripheral edema.  Respiratory: On supplemental O2. Normal work of breathing. Lungs clear.  GI: Normal, active bowel sounds.  Abdomen soft, nontender, nondistended.  : Deferred.  Musculoskeletal: Moving all extremities appropriately.  Skin: No rashes or abrasions on exposed skin.  Neurologic: Alert and oriented x4.  Cranial nerves II through XII grossly intact.  Psychologic: Appropriate mood and affect.           Medications:        All current medications were reviewed with changes reflected in problem list.         Data:      All new lab and imaging data was reviewed.      Data reviewed today: I reviewed all new labs and imaging results over the last 24 hours. I personally reviewed       Recent Labs   Lab 05/18/23  0614 05/15/23  0717 05/14/23  0517   WBC  --   --  6.1   HGB  --   --  10.5*   HCT  --   --  34.8*   MCV  --   --  101*    190 166     No results for input(s): CULT in the last 168 hours.  Recent Labs   Lab 05/18/23 0614 05/17/23  1638 05/15/23  0717 05/14/23  0836 05/14/23 0517 05/13/23  0806 05/13/23  0516   NA  --   --  142  --  143  --  144   POTASSIUM  --   --  4.2  --  4.0  --  4.3   CHLORIDE  --   --  101  --  102  --  101   CO2  --   --  37*  --  36*  --  40*   ANIONGAP  --   --  4*  --  5*  --  3*   GLC  --  125* 115* 116* 114*   < > 88   BUN  --   --  13.2  --  9.6  --  11.0   CR 0.62*  --  0.65*  --  0.64*  --  0.60*   GFRESTIMATED >90  --  >90  --  >90  --  >90   ARNOLD  --   --  8.6  --  8.7  --  8.6    < > = values in this interval not displayed.       Recent Labs   Lab 05/17/23  1638 05/15/23  0717 05/14/23  0836 05/14/23  0517 05/13/23  1616   * 115* 116* 114* 129*       No results for input(s): INR in the last 168 hours.      No results for input(s): TROPONIN, TROPI, TROPR in the last 168 hours.    Invalid input(s): TROP, TROPONINIES    Recent Results  (from the past 48 hour(s))   Head CT w/o contrast    Narrative    EXAM: CT HEAD W/O CONTRAST  LOCATION: [facility]  DATE/TIME: 5/12/2023 4:26 AM CDT    INDICATION: Traumatic injury. Pain.  COMPARISON: 10/25/2022, 04/05/2012  TECHNIQUE: Routine CT Head without IV contrast. Multiplanar reformats. Dose reduction techniques were used.    FINDINGS:  INTRACRANIAL CONTENTS: No intracranial hemorrhage, extraaxial collection, or mass effect.  No CT evidence of acute infarct. Mild to moderate presumed chronic small vessel ischemic changes. Extensive white matter volume loss. Stable ventriculomegaly.     VISUALIZED ORBITS/SINUSES/MASTOIDS: No intraorbital abnormality. No paranasal sinus mucosal disease. No middle ear or mastoid effusion.    BONES/SOFT TISSUES: No acute abnormality.      Impression    IMPRESSION:  1.  No acute intracranial process.  2.  No change from prior.     Cervical spine CT w/o contrast    Narrative    EXAM: CT CERVICAL SPINE W/O CONTRAST  LOCATION: [facility]  DATE/TIME: 5/12/2023 4:31 AM CDT    INDICATION: fall; fall; Neck pain; Trauma; Mild moderate trauma; None of the following: Spondyloarthropathy, cervical x ray with negative result, questionable finding, or inadequate coverage  COMPARISON: None.  TECHNIQUE: Routine CT Cervical Spine without IV contrast. Multiplanar reformats. Dose reduction techniques were used.    FINDINGS:  VERTEBRA: Reversal of usual cervical lordosis. Vertebral body heights and alignment is preserved. Fusion of the C4-C5 vertebra. Normal prevertebral soft tissues. No fracture or posttraumatic subluxation.     CANAL/FORAMINA: Relatively severe central canal stenosis at C4-C5, C5-C6 and C6-C7 with moderate to severe multilevel neural foraminal narrowing.    PARASPINAL: No extraspinal abnormality.      Impression    IMPRESSION:  1.  No fracture or posttraumatic subluxation.  2.  Reversal of the usual cervical lordosis.  3.  Relatively  severe central canal stenosis at C4-C5, C5-C6 and C6-C7 with moderate to severe multilevel neural foraminal narrowing.     CT Chest (PE) Abdomen Pelvis w Contrast    Narrative    EXAM: CT CHEST PE ABDOMEN PELVIS W CONTRAST  LOCATION: [facility]  DATE/TIME: 5/12/2023 4:32 AM CDT    INDICATION: Shortness of breath. Syncope.  COMPARISON:   1. Chest x-ray 2 views 05/04/2023.  2. Ultrasound abdomen limited 05/07/2023.  TECHNIQUE: CT chest pulmonary angiogram and routine CT abdomen pelvis with IV contrast. Arterial phase through the chest and venous phase through the abdomen and pelvis. Multiplanar reformats and MIP reconstructions were performed. Dose reduction   techniques were used.   CONTRAST: 77 mL Isovue 370.    FINDINGS:  ANGIOGRAM CHEST: Motion artifact degrades several images. Suboptimal opacification of the distal pulmonary vessels. No obvious pulmonary emboli on either side. Normal caliber thoracic aorta without aneurysm or dissection. No CT evidence of right heart   strain.     LUNGS AND PLEURA: Numerous images are degraded due to motion artifact. Mild diffuse thickening of the bronchi. Dependent atelectasis in the posterior lungs. No pleural effusion on either side.    MEDIASTINUM/AXILLAE: Normal cardiac size. No pericardial effusion. No suspicious adenopathy. Trachea is midline.    CORONARY ARTERY CALCIFICATION: None.    HEPATOBILIARY: Gallstones without biliary dilatation or adjacent inflammation. Diffuse fatty infiltration of the liver. Patent hepatic and portal veins.    PANCREAS: Normal.    SPLEEN: Normal.    ADRENAL GLANDS: Normal.    KIDNEYS/BLADDER: Milk of calcium cyst upper pole of the left kidney posteriorly containing dependent mineralization measures 3.5 x 3.5 cm (image 93, series 18), no specific follow-up required. Tiny punctate stones in the collecting system of both   kidneys, one on the right (image 47, series 17), and 3 on the left (images 40, 42 and 46, series 17).  Both kidneys are well perfused without hydronephrosis or hydroureter. Normal urinary bladder.    BOWEL: Scattered colonic diverticulosis, without acute inflammation. Formed stool material within normal caliber colon. Normal appendix. No obstruction, free gas or free fluid.    LYMPH NODES: No suspicious abdominopelvic adenopathy. Subcentimeter retroperitoneal and bilateral pelvic nodes, likely reactive.    VASCULATURE: Slightly atherosclerotic normal caliber distal abdominal aorta measuring 1.9 x 1.9 cm (image 57, series 17). Normal caliber IVC.    PELVIC ORGANS: Stool-filled rectosigmoid. Normal appendix. No free fluid. No suspicious adenopathy.    MUSCULOSKELETAL: Degenerative changes both shoulders, spine and joints of the pelvis. Slight right convex thoracic curve.      Impression    IMPRESSION:  1.  Several images are degraded due to motion artifact. Suboptimal opacification of the pulmonary vasculature. No obvious pulmonary emboli on either side. Mild diffuse thickening of the bronchi. Dependent atelectasis in the posterior lungs. No adenopathy   or effusion.    2.  Normal caliber thoracic aorta without aneurysm or dissection. Normal cardiac size. No pericardial effusion.    3.  Cholelithiasis. Fatty liver. Milk of calcium cyst upper pole of the left kidney posteriorly, no specific follow-up required. Nonobstructive stones in the renal collecting system, 1 on the right and 3 on the left.    4.  Scattered colonic diverticulosis without acute inflammation. No mechanical bowel obstruction, free gas or free fluid. Normal appendix.                 COVID Status:  COVID-19 PCR Results        8/30/2021    20:40 12/8/2021    02:51 6/20/2022    11:20 3/15/2023    13:14 3/25/2023    08:33   COVID-19 PCR Results   SARS CoV2 PCR Negative   Negative   Negative   Negative   Negative             5/12/2023    01:20   COVID-19 PCR Results   SARS CoV2 PCR Negative     COVID-19 Antibody Results, Testing for Immunity         No  data to display

## 2023-05-19 NOTE — PROGRESS NOTES
Neurology Follow Up Note  [facility]       [May 19, 2023]                                                                                       Admission Date: 2023  Hospital Day: 8  Code Status: Full Code    Patient: Tre Vazquez      : 1975  MRN:  [id number]       Which Neurology Group will follow this patient? [facility]   Patient to be seen Routine within 24 hrs   Reason for Consult r/o partial seizures based on EEG findings   Note: Specific question for consultant   Consultant may enter orders Yes   Requesting provider? Hospitalist (if different from attending physician)     I initially evaluated this patient 2023.    He underwent EEG yesterday:    IMPRESSION:      This electroencephalogram is abnormal due to the presence of generalized slowing that indicates some non-specific generalized cerebral dysfunction.  Additionally, there appear to be occasional focal spikes that could indicate focal cortical irritation.  This may be suggestive of a patient with epilepsy.  No seizures were present on this recording.      The EEG shows no evidence of seizure activity. The spikes noted in his EEG are diagnostic of his known underlying epilepsy, initially diagnosed in childhood. This confirms his diagnosis.    He is presently treated with Keppra and Lamictal.     I am not recommending changes in his present medications for treatment of his epilepsy.    Venous blood gas from four days ago showed persistently elevated pCO2, notably elevated at 78 mm Hg [ULN 50] that likely plays a role in his variable alertness.        Hector Conner M.D., Ph.D.    [facility]       ALLERGIES:   Allergies   Allergen Reactions     Hydrocodone Bitartrate Er Unknown     Cephalexin Rash     HUT Reaction: Rash; HUT Noted: [id number]       Vicodin [Hydrocodone-Acetaminophen] GI Disturbance       MEDICATIONS:       CURRENTLY SCHEDULED MEDICATIONS  "    busPIRone  15 mg Oral BID     citalopram  60 mg Oral Daily     enoxaparin ANTICOAGULANT  40 mg Subcutaneous Q24H     lamoTRIgine  200 mg Oral BID     levETIRAcetam  1,000 mg Oral QAM     levETIRAcetam  1,500 mg Oral At Bedtime     levOCARNitine  660 mg Oral TID     lidocaine  6 mL Urethral Once     miconazole   Topical BID     pantoprazole  40 mg Oral Daily     polyethylene glycol  17 g Oral Daily     prazosin  2 mg Oral At Bedtime     propranolol  20 mg Oral BID     QUEtiapine ER  150 mg Oral At Bedtime     zonisamide  100 mg Oral QAM     zonisamide  200 mg Oral At Bedtime         GENERAL EXAMINATION:    Height: 172.7 cm (5' 8\")     Temp: 97.6  F (36.4  C)   Weight: 145.9 kg (321 lb 10.4 oz)    Temp src: Oral         BP: 116/55         Estimated body mass index is 48.91 kg/m  as calculated from the following:    Height as of this encounter: 1.727 m (5' 8\").    Weight as of this encounter: 145.9 kg (321 lb 10.4 oz).    Resp: 16   SpO2: 91 %   O2 Device: Nasal cannula   Blood Pressure:   BP Readings from Last 3 Encounters:   23 116/55   23 114/69   23 (!) 141/80     T24 : Temp (24hrs), Av  F (36.7  C), Min:97.6  F (36.4  C), Max:98.5  F (36.9  C)       LABORATORY RESULTS      SMA-7:  Recent Labs   Lab Test 23  0614 23  1638 05/15/23  0717 23  0836 23  0517 23  0806 23  0516 23  0513 23  0114   NA  --   --  142  --  143  --  144  --  142   POTASSIUM  --   --  4.2  --  4.0  --  4.3  --  4.2   CHLORIDE  --   --  101  --  102  --  101  --  100   CO2  --   --  37*  --  36*  --  40*  --  39*   GLC  --  125* 115* 116* 114*   < > 88   < > 132*   BUN  --   --  13.2  --  9.6  --  11.0  --  11.3   CR 0.62*  --  0.65*  --  0.64*  --  0.60*   < > 0.59*   ARNOLD  --   --  8.6  --  8.7  --  8.6  --  8.6    < > = values in this interval not displayed.     Recent Labs   Lab Test 23  0758 23  1549 23  0810   MAG 1.6* 2.1 1.4*     Recent Labs   Lab " Test 03/24/23  0622 03/23/23  1110 03/22/23  1456   PHOS 4.1 2.5 2.6   1.46 mg (actual weight)  CMP:  No lab results found in last 7 days.  CBC:    Recent Labs   Lab 05/18/23  0614 05/15/23  0717 05/14/23  0517   WBC  --   --  6.1   RBC  --   --  3.44*   HGB  --   --  10.5*   HCT  --   --  34.8*   MCV  --   --  101*    190 166     No results for input(s): IRON, IRONSAT, RETICABSCT, RETP, FEB, VERÓNICA, FOLIC, EPOE, MORPH in the last 168 hours.  U/A:    Recent Labs   Lab Test 05/12/23  1629   COLOR Yellow   APPEARANCE Clear   URINEGLC Negative   URINEBILI Negative   URINEKETONE Negative   SG 1.020   UBLD Negative   URINEPH 6.0   PROTEIN 20*   NITRITE Negative   LEUKEST Negative   RBCU 2   WBCU 1     No results found for: MICROALBUMIN, CREATCONC    Cholesterol Panel:   Lab Results   Component Value Date    CHOL 155 02/22/2013    HDL 51 02/22/2013    LDL 80 02/22/2013    TRIG 146 03/18/2023    TRIG 119 02/22/2013    CHOLHDLRATIO 3.0 02/22/2013    VLDL 24 02/22/2013     Lipase:   Lab Results   Component Value Date    LIPASE 18 05/12/2023    LIPASE 69 06/20/2022    LIPASE 38 03/11/2012     HgA1c:   Hemoglobin A1C   Date Value Ref Range Status   06/22/2008 5.3 4.3 - 6.0 % Final     TSH: No results for input(s): TSH in the last 168 hours.  CK: No results found for: CKTOTAL  No results found for: CKTOTAL, CKMB, TROPN  Ammonia:    Recent Labs   Lab Test 05/15/23  0717 05/14/23  0517 05/12/23  1838 05/12/23  0513 05/06/23  1218   JAYLA 69* 67* 85* 81* 62*       Lab Results   Component Value Date    TROPI <0.012 04/05/2012    TROPI <0.012 03/11/2012    TROPI <0.012 02/06/2012      ABG:   Recent Labs   Lab 05/15/23  0819 05/14/23  0517 05/13/23  0736 05/12/23  1838   O2PER 3 30 30 30     Depakote level:     Recent Labs   Lab Test 08/31/21  1007 08/30/21  1619   LAUREN 37 30     Dilantin Level:    No lab results found.  Phenobarbital Level:   No lab results found.  Lamotrigine  Level:    No lab results found.  Lamotrigine Free  Level:   No lab results found.  Tegretol level:    No lab results found.  Keppra  Level:    No lab results found.  IMAGING RESULTS   EEG Awake & Sleep Portable    Result Date: 2023  EEG Awake & Sleep Portable Result EEG DATE: 23 EEG LO-54 EEG DAY#: 0 EEG SOURCE FILE DURATION: 34 minutes PATIENT INFORMATION: Tre Vazquez is a 47 year old year old male with developmental delay and seizure disorder admitted to the hospital after a fall. There have been concerns regarding ongoing seizure activity. TECHNICAL SUMMARY: This routine EEG was recorded from 23 scalp electrodes placed according to the 10-20 international system. Additional electrodes were used for referencing, EKG, and to record from other cerebral regions as appropriate. BACKGROUND ACTIVITY:  During wakefulness, the background activity consists of synchronous and symmetric, well-modulated, 6-7 Hz posterior dominant rhythm. The posterior dominant rhythm attenuated with eye opening. During drowsiness, the background activity waxed and waned and there were periods of theta slowing and attenuation of the posterior dominant rhythm. Noted presence of occasional isolated spikes in the left frontotemporal and right temporal leads that could indicate areas of cortical irritation.  No seizures were appreciated on this recording IMPRESSION: This electroencephalogram is abnormal due to the presence of generalized slowing that indicates some non-specific generalized cerebral dysfunction.  Additionally, there appear to be occasional focal spikes that could indicate focal cortical irritation.  This may be suggestive of a patient with epilepsy.  No seizures were present on this recording.  Sixto Langley MD [facility] Neurology Office # [phone]     CT Chest (PE) Abdomen Pelvis w Contrast    Result Date: 2023  EXAM: CT CHEST PE ABDOMEN PELVIS W CONTRAST LOCATION: [facility] DATE/TIME: 2023 4:32 AM CDT INDICATION: Shortness  of breath. Syncope. COMPARISON: 1. Chest x-ray 2 views 05/04/2023. 2. Ultrasound abdomen limited 05/07/2023. TECHNIQUE: CT chest pulmonary angiogram and routine CT abdomen pelvis with IV contrast. Arterial phase through the chest and venous phase through the abdomen and pelvis. Multiplanar reformats and MIP reconstructions were performed. Dose reduction techniques were used. CONTRAST: 77 mL Isovue 370. FINDINGS: ANGIOGRAM CHEST: Motion artifact degrades several images. Suboptimal opacification of the distal pulmonary vessels. No obvious pulmonary emboli on either side. Normal caliber thoracic aorta without aneurysm or dissection. No CT evidence of right heart strain. LUNGS AND PLEURA: Numerous images are degraded due to motion artifact. Mild diffuse thickening of the bronchi. Dependent atelectasis in the posterior lungs. No pleural effusion on either side. MEDIASTINUM/AXILLAE: Normal cardiac size. No pericardial effusion. No suspicious adenopathy. Trachea is midline. CORONARY ARTERY CALCIFICATION: None. HEPATOBILIARY: Gallstones without biliary dilatation or adjacent inflammation. Diffuse fatty infiltration of the liver. Patent hepatic and portal veins. PANCREAS: Normal. SPLEEN: Normal. ADRENAL GLANDS: Normal. KIDNEYS/BLADDER: Milk of calcium cyst upper pole of the left kidney posteriorly containing dependent mineralization measures 3.5 x 3.5 cm (image 93, series 18), no specific follow-up required. Tiny punctate stones in the collecting system of both kidneys, one on the right (image 47, series 17), and 3 on the left (images 40, 42 and 46, series 17). Both kidneys are well perfused without hydronephrosis or hydroureter. Normal urinary bladder. BOWEL: Scattered colonic diverticulosis, without acute inflammation. Formed stool material within normal caliber colon. Normal appendix. No obstruction, free gas or free fluid. LYMPH NODES: No suspicious abdominopelvic adenopathy. Subcentimeter retroperitoneal and bilateral  pelvic nodes, likely reactive. VASCULATURE: Slightly atherosclerotic normal caliber distal abdominal aorta measuring 1.9 x 1.9 cm (image 57, series 17). Normal caliber IVC. PELVIC ORGANS: Stool-filled rectosigmoid. Normal appendix. No free fluid. No suspicious adenopathy. MUSCULOSKELETAL: Degenerative changes both shoulders, spine and joints of the pelvis. Slight right convex thoracic curve.     IMPRESSION: 1.  Several images are degraded due to motion artifact. Suboptimal opacification of the pulmonary vasculature. No obvious pulmonary emboli on either side. Mild diffuse thickening of the bronchi. Dependent atelectasis in the posterior lungs. No adenopathy  or effusion. 2.  Normal caliber thoracic aorta without aneurysm or dissection. Normal cardiac size. No pericardial effusion. 3.  Cholelithiasis. Fatty liver. Milk of calcium cyst upper pole of the left kidney posteriorly, no specific follow-up required. Nonobstructive stones in the renal collecting system, 1 on the right and 3 on the left. 4.  Scattered colonic diverticulosis without acute inflammation. No mechanical bowel obstruction, free gas or free fluid. Normal appendix.      Cervical spine CT w/o contrast    Result Date: 5/12/2023  EXAM: CT CERVICAL SPINE W/O CONTRAST LOCATION: [facility] DATE/TIME: 5/12/2023 4:31 AM CDT INDICATION: fall; fall; Neck pain; Trauma; Mild moderate trauma; None of the following: Spondyloarthropathy, cervical x ray with negative result, questionable finding, or inadequate coverage COMPARISON: None. TECHNIQUE: Routine CT Cervical Spine without IV contrast. Multiplanar reformats. Dose reduction techniques were used. FINDINGS: VERTEBRA: Reversal of usual cervical lordosis. Vertebral body heights and alignment is preserved. Fusion of the C4-C5 vertebra. Normal prevertebral soft tissues. No fracture or posttraumatic subluxation. CANAL/FORAMINA: Relatively severe central canal stenosis at C4-C5, C5-C6 and C6-C7  with moderate to severe multilevel neural foraminal narrowing. PARASPINAL: No extraspinal abnormality.     IMPRESSION: 1.  No fracture or posttraumatic subluxation. 2.  Reversal of the usual cervical lordosis. 3.  Relatively severe central canal stenosis at C4-C5, C5-C6 and C6-C7 with moderate to severe multilevel neural foraminal narrowing.     Head CT w/o contrast    Result Date: 5/12/2023  EXAM: CT HEAD W/O CONTRAST LOCATION: [facility] DATE/TIME: 5/12/2023 4:26 AM CDT INDICATION: Traumatic injury. Pain. COMPARISON: 10/25/2022, 04/05/2012 TECHNIQUE: Routine CT Head without IV contrast. Multiplanar reformats. Dose reduction techniques were used. FINDINGS: INTRACRANIAL CONTENTS: No intracranial hemorrhage, extraaxial collection, or mass effect.  No CT evidence of acute infarct. Mild to moderate presumed chronic small vessel ischemic changes. Extensive white matter volume loss. Stable ventriculomegaly. VISUALIZED ORBITS/SINUSES/MASTOIDS: No intraorbital abnormality. No paranasal sinus mucosal disease. No middle ear or mastoid effusion. BONES/SOFT TISSUES: No acute abnormality.     IMPRESSION: 1.  No acute intracranial process. 2.  No change from prior.     US Abdomen Limited    Result Date: 5/7/2023  EXAM: US ABDOMEN LIMITED LOCATION: [facility] DATE/TIME: 5/7/2023 6:31 AM CDT INDICATION: Elevated ammonia and somnolence.  COMPARISON: None. TECHNIQUE: Limited abdominal ultrasound. FINDINGS: Exam compromised by habitus. GALLBLADDER: Gallstones throughout the gallbladder. Shadowing compromises evaluation of gallbladder wall thickening. Some visualized portions of the gallbladder wall are upper normal in thickness at 3 mm. Negative ultrasonic Marin's sign. BILE DUCTS: No biliary dilatation. The common duct measures 4 mm. LIVER: Moderately echogenic parenchyma with smooth contour. No focal mass. RIGHT KIDNEY: No hydronephrosis. PANCREAS: The pancreas is largely obscured by  overlying gas. No ascites.     IMPRESSION: 1.  Diffuse cholelithiasis throughout the gallbladder, compromising evaluation for wall thickening and acute cholecystitis secondary to extensive gallstone shadowing. Portions of the visualized gallbladder wall are upper normal in thickness. 2.  No obvious biliary dilatation. 3.  Moderate hepatic steatosis.     Echocardiogram Complete    Result Date: 2023  [id number]^LUCIAN^KEEGAN^JOHN  [facility] Echocardiography Laboratory [address] [place] [address]  Name: KOJO BROWN MRN: [id number] : 1975 Study Date: 2023 08:37 AM Age: 47 yrs Gender: Male Patient Location: [facility] Reason For Study: Cor Pulmonale Ordering Physician: KEEGAN EPSTEIN Performed By: Desiree Snyder  BSA: 2.5 m2 Height: 67 in Weight: 315 lb HR: 75 BP: 130/61 mmHg ______________________________________________________________________________ Procedure Complete Portable Echo Adult. Optison (NDC #2463-5403) given intravenously. ______________________________________________________________________________ Interpretation Summary  Left ventricular systolic function is normal.The visual ejection fraction is 60-65%. The right ventricular systolic function is normal. Pulmonary hypertension- RVSP 37 mm hg +RA. The inferior vena cava was normal in size with preserved respiratory variability. ______________________________________________________________________________ Left Ventricle The left ventricle is normal in size. There is concentric remodeling present. Diastolic Doppler findings (E/E' ratio and/or other parameters) suggest left ventricular filling pressures are indeterminate. Left ventricular systolic function is normal. The visual ejection fraction is 60-65%. No regional wall motion abnormalities noted.  Right Ventricle The right ventricle is mildly dilated. The right ventricular systolic function is normal. The right ventricle is not well  visualized.  Atria Normal left atrial size. Right atrial size is normal. There is no color Doppler evidence of an atrial shunt.  Mitral Valve There is trace mitral regurgitation.  Tricuspid Valve There is trace tricuspid regurgitation. The right ventricular systolic pressure is approximated at 36.7 mmHg plus the right atrial pressure. Pulmonary hypertension.  Aortic Valve No aortic regurgitation is present. No aortic stenosis is present.  Pulmonic Valve There is trace pulmonic valvular regurgitation. There is no pulmonic valvular stenosis.  Vessels The aortic root is normal size. Normal size ascending aorta. The inferior vena cava was normal in size with preserved respiratory variability.  Pericardium There is no pericardial effusion.  Rhythm Sinus rhythm was noted. ______________________________________________________________________________ MMode/2D Measurements & Calculations  IVSd: 1.2 cm LVIDd: 4.7 cm LVIDs: 3.1 cm LVPWd: 1.0 cm IVC diam: 1.9 cm FS: 34.7 % LV mass(C)d: 191.1 grams LV mass(C)dI: 77.9 grams/m2 Ao root diam: 2.9 cm asc Aorta Diam: 3.2 cm LVOT diam: 2.1 cm LVOT area: 3.5 cm2 LA Volume (BP): 48.1 ml LA Volume Index (BP): 19.6 ml/m2 RWT: 0.44  Doppler Measurements & Calculations MV E max jarret: 79.6 cm/sec MV A max jarret: 57.9 cm/sec MV E/A: 1.4 MV max P.8 mmHg MV mean PG: 3.0 mmHg MV V2 VTI: 32.5 cm MV dec time: 0.21 sec TR max jarret: 303.0 cm/sec TR max P.7 mmHg E/E' av.5 Lateral E/e': 8.9 Medial E/e': 10.2 RV S Jarret: 15.9 cm/sec  ______________________________________________________________________________ Report approved by: Pedro Casillas 2023 12:31 PM       Chest XR,  PA & LAT    Result Date: 2023  EXAM: XR CHEST 2 VIEWS LOCATION: [facility] DATE/TIME: 2023 5:04 PM CDT INDICATION: SOB COMPARISON: 2023     IMPRESSION: Heart size and pulmonary vascularity normal. Minimal atelectasis left base, lungs otherwise clear.      No results found for  this or any previous visit (from the past 24 hour(s)).      EKG:

## 2023-05-19 NOTE — PLAN OF CARE
Goal Outcome Evaluation:         Pt up SBA with walker. VSS A&O. Blind L eye. States seizures x2 today. Shannon diet, denies nausea, requests large amounts of food. On 3L NC, [place], LS dim.  Denies pain. Calls freq for various things.

## 2023-05-20 PROCEDURE — 250N000013 HC RX MED GY IP 250 OP 250 PS 637: Performed by: INTERNAL MEDICINE

## 2023-05-20 PROCEDURE — 999N000157 HC STATISTIC RCP TIME EA 10 MIN

## 2023-05-20 PROCEDURE — 94660 CPAP INITIATION&MGMT: CPT

## 2023-05-20 PROCEDURE — 99231 SBSQ HOSP IP/OBS SF/LOW 25: CPT | Performed by: INTERNAL MEDICINE

## 2023-05-20 PROCEDURE — 250N000011 HC RX IP 250 OP 636: Performed by: INTERNAL MEDICINE

## 2023-05-20 PROCEDURE — 120N000001 HC R&B MED SURG/OB

## 2023-05-20 RX ADMIN — BUSPIRONE HYDROCHLORIDE 15 MG: 15 TABLET ORAL at 10:11

## 2023-05-20 RX ADMIN — LEVETIRACETAM 1000 MG: 500 TABLET, FILM COATED ORAL at 10:11

## 2023-05-20 RX ADMIN — LAMOTRIGINE 200 MG: 200 TABLET ORAL at 10:11

## 2023-05-20 RX ADMIN — ZONISAMIDE 100 MG: 100 CAPSULE ORAL at 10:12

## 2023-05-20 RX ADMIN — PRAZOSIN HYDROCHLORIDE 2 MG: 1 CAPSULE ORAL at 22:02

## 2023-05-20 RX ADMIN — LAMOTRIGINE 200 MG: 200 TABLET ORAL at 22:02

## 2023-05-20 RX ADMIN — PANTOPRAZOLE SODIUM 40 MG: 40 TABLET, DELAYED RELEASE ORAL at 10:11

## 2023-05-20 RX ADMIN — ZONISAMIDE 200 MG: 100 CAPSULE ORAL at 22:03

## 2023-05-20 RX ADMIN — ENOXAPARIN SODIUM 40 MG: 40 INJECTION SUBCUTANEOUS at 22:03

## 2023-05-20 RX ADMIN — PROPRANOLOL HYDROCHLORIDE 20 MG: 20 TABLET ORAL at 10:11

## 2023-05-20 RX ADMIN — QUETIAPINE FUMARATE 150 MG: 50 TABLET, EXTENDED RELEASE ORAL at 22:01

## 2023-05-20 RX ADMIN — LEVOCARNITINE 660 MG: 330 TABLET ORAL at 14:27

## 2023-05-20 RX ADMIN — MICONAZOLE NITRATE: 20 POWDER TOPICAL at 10:17

## 2023-05-20 RX ADMIN — LEVETIRACETAM 1500 MG: 500 TABLET, FILM COATED ORAL at 22:02

## 2023-05-20 RX ADMIN — LEVOCARNITINE 660 MG: 330 TABLET ORAL at 10:11

## 2023-05-20 RX ADMIN — CITALOPRAM HYDROBROMIDE 60 MG: 20 TABLET ORAL at 10:11

## 2023-05-20 RX ADMIN — PROPRANOLOL HYDROCHLORIDE 20 MG: 20 TABLET ORAL at 22:03

## 2023-05-20 RX ADMIN — BUSPIRONE HYDROCHLORIDE 15 MG: 15 TABLET ORAL at 22:02

## 2023-05-20 RX ADMIN — LEVOCARNITINE 660 MG: 330 TABLET ORAL at 22:02

## 2023-05-20 RX ADMIN — MICONAZOLE NITRATE: 20 POWDER TOPICAL at 22:10

## 2023-05-20 RX ADMIN — POLYETHYLENE GLYCOL 3350 17 G: 17 POWDER, FOR SOLUTION ORAL at 10:10

## 2023-05-20 RX ADMIN — ACETAMINOPHEN 650 MG: 325 TABLET ORAL at 03:58

## 2023-05-20 ASSESSMENT — ACTIVITIES OF DAILY LIVING (ADL)
ADLS_ACUITY_SCORE: 50
ADLS_ACUITY_SCORE: 48
ADLS_ACUITY_SCORE: 50
ADLS_ACUITY_SCORE: 48
ADLS_ACUITY_SCORE: 50
ADLS_ACUITY_SCORE: 50
ADLS_ACUITY_SCORE: 48
ADLS_ACUITY_SCORE: 54
ADLS_ACUITY_SCORE: 54
ADLS_ACUITY_SCORE: 50
ADLS_ACUITY_SCORE: 48
ADLS_ACUITY_SCORE: 48

## 2023-05-20 NOTE — PLAN OF CARE
"Pertinent assessments: Pt a/o x4. VSS, on 3 L nasal canula. Patient  states he had seizure that lasted approximately 1 minute. The seizure was unwitnessed. IV placed. SBA with walker. Up in chair for lunch. BM x1, incontinent. Stool loose. Calls frequently for repositioning, staff seeking.     Major Shift Events: Pt had assisted fall/lowered himself to the ground, stated he had a seizure (see note from 5/20 @1956)  Treatment Plan: Continue supportive cares, pain control, discharge to care center 5/22    /55 (BP Location: Left arm)   Pulse 84   Temp 97.5  F (36.4  C) (Oral)   Resp 24   Ht 1.727 m (5' 8\")   Wt 145.9 kg (321 lb 10.4 oz)   SpO2 92%   BMI 48.91 kg/m        Bedside Nurse: Bonnie Aguirre RN                              "

## 2023-05-20 NOTE — PROGRESS NOTES
Igor messaged Dr. Marhc that pt called stated he had a seizure and that it lasted one minute. It was unwitnessed. Pt is asymptomatic.

## 2023-05-20 NOTE — PROGRESS NOTES
Hospitalist Medicine Progress Note   [facility]       Tre Vazquez is a 47 year old male with PMH including palsy, mild intellectual disability, congenital left eye blindness, seizure disorder with chronically elevated ammonia who remains on Depakote among other antiepileptics, borderline personality disorder, schizoaffective and ANA as well as obesity hypoventilation syndrome chronically on BiPAP who was recently admitted from 5/4 to 5/11 for hypercapnic respiratory failure and encephalopathy who presents with a fall not long after arriving back at his group home.       The patient does not recall much but just recalls winding up on the ground.  He was brought back to the ER and placed on BiPAP on route.  Here he underwent lab work-up which was notable for grossly stable BMP with carbon dioxide level of 39, stable CBC with mild anemia, normal lactic acid but venous blood gas showing PCO2 of approximately 90 with pH of 7.27.  His baseline PCO2 is around 80.  Influenza and COVID-19 testing were negative as well as troponin and BNP.  CT head and neck were negative for traumatic injury and CT PE protocol was negative for PE or pneumonia.  At the present time he is awaiting placement       Date of Admission:  5/12/2023  Assessment & Plan     1. Acute on chronic hypercapnic respiratory failure:  -- Baseline PCO2 seems to be in the 70s to 80 range.  He was seen by pulmonary medicine last admission.  it appears that the rep from [facility] was contacted last admission, and that the plan was for him to resume trilogy at home with the following settings: (Wesley from [facility] was contacted at [phone]). CA max 25, PS minimum 5, PS maximum 20, EPAP minimum 8, EPAP maximum 15, tidal volume 300, rate 16, I-, time auto, FiO2 6 L/min while sleeping.  -- Patient was admitted to ICU as he was requiring continuous BiPAP for close monitoring and treatment.  --Patient was able to wean off the BiPAP and his mental  "status significantly improved.  -- Currently patient is on 3 L of oxygen via nasal cannula, doing well, alert and eating.  His VBG showed pH of 7.35, venous PCO2 of 69 down.  Patient was seen by Dr. Johnson who stated that patient is at his baseline tolerating nocturnal BiPAP.  He suggested seizure medication changes as below.  Dr. Johnson felt that the patient is not able to be cared for at group home facility and may need alternative placement. Looking into TCU. We will continue supplemental oxygen as needed, nocturnal BiPAP, may transfer out of the ICU.  -- Will request group home to bring trilogy device to hospital  -- Analilia PRN     2.   History of seizure disorder, hyperammonemia while on Depakote:   --He is also on numerous other antiepileptic medications including Lamictal 200 mg twice daily, Keppra 1000 mg in the morning and 1500 mg at bedtime and zonisamide 100 mg in the morning and 200 mg at bedtime.  --His Depakote was on hold and his ammonia was monitored.  Serum ammonia was 85 on May 12.  --Currently no evidence of seizure activity.  Patient is back to his baseline.  No evidence of encephalopathy.  -- Patient was seen by Dr. Zimmerman's who recommended to stop Tegretol in the setting of chronic respiratory acidosis.  Plan to continue other antiepileptic medications-Lamictal, Keppra, Zonegran.  His Seroquel was decreased to 150 mg  --Patient continues to report \"seizure activity.\"  This has not been witnessed by staff.  The patient, at least once daily, will call the nurse and inform her that he \"had a seizure.\"  He is able to describe this in entirety.  He states that his eyes rolled back to the back of his head, and then he begins to shake uncontrollably.  He is sometimes a little confused, but remembers the entirety of the \"seizure.\"  --EEG performed on 5/18/2023 showing generalized slowing, nonspecific generalized liver cerebral dysfunction, and occasional focal spikes that could possibly indicate " focal cortical irritation.  He did not have any seizures that were noted.  --Neurology consultation to r/o patient is having focal seizure activity  --May benefit from video EEG     3.   History of schizoaffective disorder, borderline personality: Plan to continue his home Seroquel at 150 mg in the evening as well as BuSpar and Celexa.     4.   Toxic metabolic encephalopathy: Mental status seems to be at baseline     5.   Social: Reportedly he is his own decision maker   Compliance seems to be an issue to some extent.     6..  Fall: Obesity limits his mobility at baseline.    Physical therapy consulted     7. Diarrhea - Few loose stools today but not water , no fever   -- monitor for now             Plan:   Awaiting placement    Diet: Regular Diet Adult    DVT Prophylaxis: Enoxaparin (Lovenox) SQ  Reid Catheter: Not present  Code Status: Full Code         .    Ritesh March MD  Hospitalist Service  [facility]    ______________________________________________________________________    Interval History     Symptoms   Patient states that he has been having episodes of seizures though he is awake and alert when saying this and during these spells    Review of Systems:   No new symptoms to me    Data reviewed today: I reviewed all medications, new labs and imaging results over the last 24 hours.     Physical Exam   Vital Signs: Temp: 98.5  F (36.9  C) Temp src: Oral BP: (!) 136/91 Pulse: 85   Resp: 16 SpO2: 90 % O2 Device: Nasal cannula Oxygen Delivery: 3 LPM  Weight: 321 lbs 10.42 oz      GENERAL: Patient is not  in acute distress  HEENT:  EOM+,Conjunctiva is clear    NECK:   no Jugular Venous distention  HEART: S1 S2  regular Rate and Rhythm,  there is   no murmur,   LUNGS: Respirations are   not laboured, Lungs are   clear to auscultation  without Crepitations or Wheezing   ABDOMEN: Soft  , there is no  tenderness  ,Bowel Sounds are   Positive   LOWER LIMBS: no   Pedal Edema   Bilaterally    CNS: Patient is sleepy/not interested in talking though answers questions in yes and knows Oriented x 3, Moving all the Four Limbs     Data   Recent Labs   Lab 05/18/23  0614 05/17/23  1638 05/15/23  0717 05/14/23  0836 05/14/23  0517   WBC  --   --   --   --  6.1   HGB  --   --   --   --  10.5*   MCV  --   --   --   --  101*     --  190  --  166   NA  --   --  142  --  143   POTASSIUM  --   --  4.2  --  4.0   CHLORIDE  --   --  101  --  102   CO2  --   --  37*  --  36*   BUN  --   --  13.2  --  9.6   CR 0.62*  --  0.65*  --  0.64*   ANIONGAP  --   --  4*  --  5*   ARNOLD  --   --  8.6  --  8.7   GLC  --  125* 115* 116* 114*         No results found for this or any previous visit (from the past 24 hour(s)).

## 2023-05-20 NOTE — PROGRESS NOTES
Web base paged admitting pager that pt had assisted fall/ lowered himself to the floor. Nursing assistant was in the room with pt while he was using the urinal. Pt started moving to the ground and as he was lowering himself to the ground he stated that he was having a seizure. When I arrived to the room pt do not show any symptoms or signs of a seizure. Nursing assistant said that he did not hit his head and was not injured. Got pt back in chair. Chair alarm on. Charge nurse aware.     Compass placed.

## 2023-05-20 NOTE — PLAN OF CARE
Goal Outcome Evaluation:         End of Shift Summary  For vital signs and complete assessments, please see documentation flowsheets.     Pertinent assessments: alert  and  oriented. Patient  states he had seizure  that lasted approximately 2 minutes. Not witnessed.  Patient  then states he  is having pain 10/10 all over. Tylenol given. Patient awake much of shift, wanting help  with repositioning,  adjusting  blankets and adjusting straps  on Bi-pap.     Major Shift Events: none    Treatment Plan: Continue supportive cares, pain control, neuro consult, discharge to  or TCU?    Bedside Nurse: Kimmy Art

## 2023-05-20 NOTE — PROGRESS NOTES
A BiPAP of  16/6 @ 30% was applied to the pt via the mask for NOC use.  The bridge of the nose looks good and remains intact. Pt is tolerating it well. Will continue to monitor and assess the pt's current respiratory status and needs.       Allyson Mendez, RT

## 2023-05-21 LAB
CREAT SERPL-MCNC: 0.63 MG/DL (ref 0.67–1.17)
GFR SERPL CREATININE-BSD FRML MDRD: >90 ML/MIN/1.73M2
PLATELET # BLD AUTO: 216 10E3/UL (ref 150–450)

## 2023-05-21 PROCEDURE — 250N000013 HC RX MED GY IP 250 OP 250 PS 637: Performed by: INTERNAL MEDICINE

## 2023-05-21 PROCEDURE — 82565 ASSAY OF CREATININE: CPT | Performed by: INTERNAL MEDICINE

## 2023-05-21 PROCEDURE — 99231 SBSQ HOSP IP/OBS SF/LOW 25: CPT | Performed by: INTERNAL MEDICINE

## 2023-05-21 PROCEDURE — 120N000001 HC R&B MED SURG/OB

## 2023-05-21 PROCEDURE — 36415 COLL VENOUS BLD VENIPUNCTURE: CPT | Performed by: INTERNAL MEDICINE

## 2023-05-21 PROCEDURE — 250N000011 HC RX IP 250 OP 636: Performed by: INTERNAL MEDICINE

## 2023-05-21 PROCEDURE — 85049 AUTOMATED PLATELET COUNT: CPT | Performed by: INTERNAL MEDICINE

## 2023-05-21 RX ADMIN — POLYETHYLENE GLYCOL 3350 17 G: 17 POWDER, FOR SOLUTION ORAL at 09:57

## 2023-05-21 RX ADMIN — LAMOTRIGINE 200 MG: 200 TABLET ORAL at 09:51

## 2023-05-21 RX ADMIN — LEVETIRACETAM 1000 MG: 500 TABLET, FILM COATED ORAL at 09:51

## 2023-05-21 RX ADMIN — BUSPIRONE HYDROCHLORIDE 15 MG: 15 TABLET ORAL at 09:50

## 2023-05-21 RX ADMIN — LEVOCARNITINE 660 MG: 330 TABLET ORAL at 20:17

## 2023-05-21 RX ADMIN — PROPRANOLOL HYDROCHLORIDE 20 MG: 20 TABLET ORAL at 20:14

## 2023-05-21 RX ADMIN — QUETIAPINE FUMARATE 150 MG: 50 TABLET, EXTENDED RELEASE ORAL at 20:18

## 2023-05-21 RX ADMIN — LEVOCARNITINE 660 MG: 330 TABLET ORAL at 14:50

## 2023-05-21 RX ADMIN — LEVOCARNITINE 660 MG: 330 TABLET ORAL at 09:51

## 2023-05-21 RX ADMIN — ZONISAMIDE 200 MG: 100 CAPSULE ORAL at 20:19

## 2023-05-21 RX ADMIN — ENOXAPARIN SODIUM 40 MG: 40 INJECTION SUBCUTANEOUS at 20:14

## 2023-05-21 RX ADMIN — PROPRANOLOL HYDROCHLORIDE 20 MG: 20 TABLET ORAL at 09:51

## 2023-05-21 RX ADMIN — CITALOPRAM HYDROBROMIDE 60 MG: 20 TABLET ORAL at 09:50

## 2023-05-21 RX ADMIN — ZONISAMIDE 100 MG: 100 CAPSULE ORAL at 09:51

## 2023-05-21 RX ADMIN — BUSPIRONE HYDROCHLORIDE 15 MG: 15 TABLET ORAL at 20:17

## 2023-05-21 RX ADMIN — PRAZOSIN HYDROCHLORIDE 2 MG: 1 CAPSULE ORAL at 20:20

## 2023-05-21 RX ADMIN — LAMOTRIGINE 200 MG: 200 TABLET ORAL at 20:17

## 2023-05-21 RX ADMIN — PANTOPRAZOLE SODIUM 40 MG: 40 TABLET, DELAYED RELEASE ORAL at 09:50

## 2023-05-21 RX ADMIN — LEVETIRACETAM 1500 MG: 500 TABLET, FILM COATED ORAL at 20:20

## 2023-05-21 RX ADMIN — MICONAZOLE NITRATE: 20 POWDER TOPICAL at 09:58

## 2023-05-21 ASSESSMENT — ACTIVITIES OF DAILY LIVING (ADL)
ADLS_ACUITY_SCORE: 54
ADLS_ACUITY_SCORE: 54
ADLS_ACUITY_SCORE: 50
ADLS_ACUITY_SCORE: 54
ADLS_ACUITY_SCORE: 50
ADLS_ACUITY_SCORE: 54
ADLS_ACUITY_SCORE: 50
ADLS_ACUITY_SCORE: 50
ADLS_ACUITY_SCORE: 54
ADLS_ACUITY_SCORE: 50

## 2023-05-21 NOTE — PROGRESS NOTES
Hospitalist Medicine Progress Note   [facility]       Tre Vazquez is a 47 year old male with PMH including palsy, mild intellectual disability, congenital left eye blindness, seizure disorder with chronically elevated ammonia who remains on Depakote among other antiepileptics, borderline personality disorder, schizoaffective and ANA as well as obesity hypoventilation syndrome chronically on BiPAP who was recently admitted from 5/4 to 5/11 for hypercapnic respiratory failure and encephalopathy who presents with a fall not long after arriving back at his group home.       The patient does not recall much but just recalls winding up on the ground.  He was brought back to the ER and placed on BiPAP on route.  Here he underwent lab work-up which was notable for grossly stable BMP with carbon dioxide level of 39, stable CBC with mild anemia, normal lactic acid but venous blood gas showing PCO2 of approximately 90 with pH of 7.27.  His baseline PCO2 is around 80.  Influenza and COVID-19 testing were negative as well as troponin and BNP.  CT head and neck were negative for traumatic injury and CT PE protocol was negative for PE or pneumonia.  At the present time he is awaiting placement       Date of Admission:  5/12/2023  Assessment & Plan     1. Acute on chronic hypercapnic respiratory failure:  -- Baseline PCO2 seems to be in the 70s to 80 range.  He was seen by pulmonary medicine last admission.  it appears that the rep from [facility] was contacted last admission, and that the plan was for him to resume trilogy at home with the following settings: (Wesley from [facility] was contacted at [phone]). DC max 25, PS minimum 5, PS maximum 20, EPAP minimum 8, EPAP maximum 15, tidal volume 300, rate 16, I-, time auto, FiO2 6 L/min while sleeping.  -- Patient was admitted to ICU as he was requiring continuous BiPAP for close monitoring and treatment.  --Patient was able to wean off the BiPAP and his mental  "status significantly improved.  -- Currently patient is on 3 L of oxygen via nasal cannula, doing well, alert and eating.  His VBG showed pH of 7.35, venous PCO2 of 69 down.  Patient was seen by Dr. Johnson who stated that patient is at his baseline tolerating nocturnal BiPAP.  He suggested seizure medication changes as below.  Dr. Johnson felt that the patient is not able to be cared for at group home facility and may need alternative placement. Looking into TCU. We will continue supplemental oxygen as needed, nocturnal BiPAP, may transfer out of the ICU.  -- Will request group home to bring trilogy device to hospital  -- Analilia PRN     2.   History of seizure disorder, hyperammonemia while on Depakote:   --He is also on numerous other antiepileptic medications including Lamictal 200 mg twice daily, Keppra 1000 mg in the morning and 1500 mg at bedtime and zonisamide 100 mg in the morning and 200 mg at bedtime.  --His Depakote was on hold and his ammonia was monitored.  Serum ammonia was 85 on May 12.  --Currently no evidence of seizure activity.  Patient is back to his baseline.  No evidence of encephalopathy.  -- Patient was seen by Dr. Zimmerman's who recommended to stop Tegretol in the setting of chronic respiratory acidosis.  Plan to continue other antiepileptic medications-Lamictal, Keppra, Zonegran.  His Seroquel was decreased to 150 mg  --Patient continues to report \"seizure activity.\"  This has not been witnessed by staff.  The patient, at least once daily, will call the nurse and inform her that he \"had a seizure.\"  He is able to describe this in entirety.  He states that his eyes rolled back to the back of his head, and then he begins to shake uncontrollably.  He is sometimes a little confused, but remembers the entirety of the \"seizure.\"  --EEG performed on 5/18/2023 showing generalized slowing, nonspecific generalized liver cerebral dysfunction, and occasional focal spikes that could possibly indicate " focal cortical irritation.  He did not have any seizures that were noted.  --Neurology consultation to r/o patient is having focal seizure activity  --May benefit from video EEG     3.   History of schizoaffective disorder, borderline personality: Plan to continue his home Seroquel at 150 mg in the evening as well as BuSpar and Celexa.     4.   Toxic metabolic encephalopathy: Mental status seems to be at baseline     5.   Social: Reportedly he is his own decision maker   Compliance seems to be an issue to some extent.     6..  Fall: Obesity limits his mobility at baseline.    Physical therapy consulted     7. Diarrhea - Few loose stools today but not water , no fever   -- monitor for now             Plan:   Check EEG because of the patient complaining of seizure-like activity  Awaiting placement    Diet: Regular Diet Adult    DVT Prophylaxis: Enoxaparin (Lovenox) SQ  Reid Catheter: Not present  Code Status: Full Code         .    Ritesh March MD  Hospitalist Service  [facility]    ______________________________________________________________________    Interval History     Symptoms   Patient states that he has been having episodes of seizures though he is awake and alert when saying this and during these spells    Review of Systems:   No new symptoms to me    Data reviewed today: I reviewed all medications, new labs and imaging results over the last 24 hours.     Physical Exam   Vital Signs: Temp: (P) 98.4  F (36.9  C) Temp src: (P) Oral BP: (!) (P) 143/76 Pulse: (P) 81   Resp: (P) 20 SpO2: (P) 93 % O2 Device: (P) Nasal cannula Oxygen Delivery: (P) 3 LPM  Weight: 319 lbs 3.62 oz      GENERAL: Patient is not  in acute distress  HEENT:  EOM+,Conjunctiva is clear    NECK:   no Jugular Venous distention  HEART: S1 S2  regular Rate and Rhythm,  there is   no murmur,   LUNGS: Respirations are   not laboured, Lungs are   clear to auscultation  without Crepitations or Wheezing   ABDOMEN: Soft  ,  there is no  tenderness  ,Bowel Sounds are   Positive   LOWER LIMBS: no   Pedal Edema   Bilaterally   CNS: Patient is sleepy/not interested in talking though answers questions in yes and knows Oriented x 3, Moving all the Four Limbs     Data   Recent Labs   Lab 05/21/23  0920 05/18/23  0614 05/17/23  1638 05/15/23  0717    204  --  190   NA  --   --   --  142   POTASSIUM  --   --   --  4.2   CHLORIDE  --   --   --  101   CO2  --   --   --  37*   BUN  --   --   --  13.2   CR 0.63* 0.62*  --  0.65*   ANIONGAP  --   --   --  4*   ARNOLD  --   --   --  8.6   GLC  --   --  125* 115*         No results found for this or any previous visit (from the past 24 hour(s)).

## 2023-05-21 NOTE — PLAN OF CARE
"Goal Outcome Evaluation:         End of Shift Summary  For vital signs and complete assessments, please see documentation flowsheets.     Pertinent assessments: Alert and oriented. Patient states he is having multiple seizures. Patient will have sudden jerking movement of arms then states,\" I just had another one.\" Became very agitated during night, stating he is tired of all the seizures. Speech is a little slurred at times. Continent and incontinent of bowel and bladder. MD notified of patient reports.   Major Shift Events: Patient states having multiple seizures     Treatment Plan: Continue supportive cares, pain control, discharge to care center 5/22    Bedside Nurse: Kimmy Molina RN                 "

## 2023-05-21 NOTE — PROGRESS NOTES
Patient states he had 2 more seizures while returning to bed. Ambulated to  with assist of 1 gait belt and walker. Patient did become very flushed and tremor in right hand at time stating he had the seizure, the other stated seizure, no visible changes. MD notified. Will continue to monitor

## 2023-05-21 NOTE — PROGRESS NOTES
Care Management Follow Up    Length of Stay (days): 9    Expected Discharge Date: 05/22/2023     Concerns to be Addressed: care coordination/care conferences     Patient plan of care discussed at interdisciplinary rounds: Yes    Anticipated Discharge Disposition: Skilled Nursing Facility     Anticipated Discharge Services:    Anticipated Discharge DME: Oxygen    Patient/family educated on Medicare website which has current facility and service quality ratings: yes  Education Provided on the Discharge Plan:    Patient/Family in Agreement with the Plan:      Referrals Placed by CM/SW: Post Acute Facilities    Additional Information:  Wheelchair transportation is set for 5/22/2023 from 1240 to 1320 through [facility]. Pt is discharging to [facility]. [facility] has not delivered pt's Trilogy as of this time. Message left for Sugar ([phone]) at the [facility] that pt will need to have prior to discharge to TCU.      Cherelle Carpenter RN

## 2023-05-21 NOTE — PROVIDER NOTIFICATION
Patient requesting to see MD. States he is having seizures every few minutes. Patient has occasional tremors in hands but otherwise asymptomatic.

## 2023-05-21 NOTE — PLAN OF CARE
To Do:  End of Shift Summary  For vital signs and complete assessments, please see documentation flowsheets.     Pertinent assessments: AxO4. VSS on 3L of O2. Patient states having 1 seizure, witness by writer. Speech slurred at times. Continent and incontinent of bowel and bladder.    Major Shift Events: [facility] has not delivered pt's Trilogy as of this time. Message left for Sugar. Need before he can discharge tomorrow.     Treatment Plan: Continue supportive cares, pain control, discharge to care center 5/22    Bedside Nurse: Sanaz Arrington RN

## 2023-05-22 VITALS
SYSTOLIC BLOOD PRESSURE: 109 MMHG | RESPIRATION RATE: 24 BRPM | HEIGHT: 68 IN | TEMPERATURE: 98.6 F | WEIGHT: 315 LBS | BODY MASS INDEX: 47.74 KG/M2 | DIASTOLIC BLOOD PRESSURE: 67 MMHG | HEART RATE: 85 BPM | OXYGEN SATURATION: 91 %

## 2023-05-22 LAB
ANION GAP SERPL CALCULATED.3IONS-SCNC: 6 MMOL/L (ref 7–15)
BUN SERPL-MCNC: 14.8 MG/DL (ref 6–20)
CALCIUM SERPL-MCNC: 9 MG/DL (ref 8.6–10)
CHLORIDE SERPL-SCNC: 98 MMOL/L (ref 98–107)
CREAT SERPL-MCNC: 0.63 MG/DL (ref 0.67–1.17)
DEPRECATED HCO3 PLAS-SCNC: 36 MMOL/L (ref 22–29)
GFR SERPL CREATININE-BSD FRML MDRD: >90 ML/MIN/1.73M2
GLUCOSE SERPL-MCNC: 94 MG/DL (ref 70–99)
MAGNESIUM SERPL-MCNC: 1.4 MG/DL (ref 1.7–2.3)
POTASSIUM SERPL-SCNC: 4 MMOL/L (ref 3.4–5.3)
SODIUM SERPL-SCNC: 140 MMOL/L (ref 136–145)

## 2023-05-22 PROCEDURE — 999N000157 HC STATISTIC RCP TIME EA 10 MIN

## 2023-05-22 PROCEDURE — 94660 CPAP INITIATION&MGMT: CPT

## 2023-05-22 PROCEDURE — 80048 BASIC METABOLIC PNL TOTAL CA: CPT | Performed by: INTERNAL MEDICINE

## 2023-05-22 PROCEDURE — 83735 ASSAY OF MAGNESIUM: CPT | Performed by: INTERNAL MEDICINE

## 2023-05-22 PROCEDURE — 36415 COLL VENOUS BLD VENIPUNCTURE: CPT | Performed by: INTERNAL MEDICINE

## 2023-05-22 PROCEDURE — 250N000013 HC RX MED GY IP 250 OP 250 PS 637: Performed by: INTERNAL MEDICINE

## 2023-05-22 RX ORDER — QUETIAPINE 150 MG/1
150 TABLET, FILM COATED, EXTENDED RELEASE ORAL AT BEDTIME
Start: 2023-05-22 | End: 2023-11-24

## 2023-05-22 RX ADMIN — BUSPIRONE HYDROCHLORIDE 15 MG: 15 TABLET ORAL at 09:47

## 2023-05-22 RX ADMIN — CITALOPRAM HYDROBROMIDE 60 MG: 20 TABLET ORAL at 09:48

## 2023-05-22 RX ADMIN — ZONISAMIDE 100 MG: 100 CAPSULE ORAL at 09:47

## 2023-05-22 RX ADMIN — LEVOCARNITINE 660 MG: 330 TABLET ORAL at 09:47

## 2023-05-22 RX ADMIN — LAMOTRIGINE 200 MG: 200 TABLET ORAL at 09:47

## 2023-05-22 RX ADMIN — PROPRANOLOL HYDROCHLORIDE 20 MG: 20 TABLET ORAL at 09:48

## 2023-05-22 RX ADMIN — POLYETHYLENE GLYCOL 3350 17 G: 17 POWDER, FOR SOLUTION ORAL at 09:52

## 2023-05-22 RX ADMIN — LEVOCARNITINE 660 MG: 330 TABLET ORAL at 13:27

## 2023-05-22 RX ADMIN — MICONAZOLE NITRATE: 20 POWDER TOPICAL at 09:52

## 2023-05-22 RX ADMIN — PANTOPRAZOLE SODIUM 40 MG: 40 TABLET, DELAYED RELEASE ORAL at 09:47

## 2023-05-22 RX ADMIN — LEVETIRACETAM 1000 MG: 500 TABLET, FILM COATED ORAL at 09:47

## 2023-05-22 ASSESSMENT — ACTIVITIES OF DAILY LIVING (ADL)
ADLS_ACUITY_SCORE: 50

## 2023-05-22 NOTE — DISCHARGE SUMMARY
"[facility]  Hospitalist Discharge Summary      Date of Admission:  5/12/2023  Date of Discharge:  5/22/2023  Discharging Provider: Ritesh March MD  Discharge Service: Hospitalist Service    Discharge Diagnoses   Acute on chronic hypercapnic respiratory failure: -- Baseline PCO2 seems to be in the 70s to 80 range  seizure disorder - hyperammonemia while on Depakote  Schizoaffective disorder  Borderline personality      Clinically Significant Risk Factors     # Severe Obesity: Estimated body mass index is 48.54 kg/m  as calculated from the following:    Height as of this encounter: 1.727 m (5' 8\").    Weight as of this encounter: 144.8 kg (319 lb 3.6 oz).       Follow-ups Needed After Discharge   Follow-up Appointments     Follow Up and recommended labs and tests      Follow up with assisted physician.  The following labs/tests are   recommended: CMP and VBG    Need to follow-up with neurologist (epileptologist) regarding seizure   versus pseudoseizure and adjustment of antiseizure medications in 2 to 4   weeks.             Discharge Disposition   Discharged to TCU  Condition at discharge: Stable    Hospital Course   Tre Vazquez is a 47 year old male with PMH including palsy, mild intellectual disability, congenital left eye blindness, seizure disorder with chronically elevated ammonia who remains on Depakote among other antiepileptics, borderline personality disorder, schizoaffective and ANA as well as obesity hypoventilation syndrome chronically on BiPAP who was recently admitted from 5/4 to 5/11 for hypercapnic respiratory failure and encephalopathy who presents with a fall not long after arriving back at his group home.       The patient does not recall much but just recalls winding up on the ground.  He was brought back to the ER and placed on BiPAP on route.  Here he underwent lab work-up which was notable for grossly stable BMP with carbon dioxide level of 39, stable CBC with mild " anemia, normal lactic acid but venous blood gas showing PCO2 of approximately 90 with pH of 7.27.  His baseline PCO2 is around 80.  Influenza and COVID-19 testing were negative as well as troponin and BNP.  CT head and neck were negative for traumatic injury and CT PE protocol was negative for PE or pneumonia.  At the present time he is awaiting placement    Consultations This Hospital Stay   PHARMACY IP CONSULT  PHYSICAL THERAPY ADULT IP CONSULT  NEUROLOGY IP CONSULT  PULMONARY IP CONSULT  PHYSICAL THERAPY ADULT IP CONSULT  SOCIAL WORK IP CONSULT  SOCIAL WORK IP CONSULT  CARE MANAGEMENT / SOCIAL WORK IP CONSULT  NEUROLOGY IP CONSULT  PHYSICAL THERAPY ADULT IP CONSULT  OCCUPATIONAL THERAPY ADULT IP CONSULT    Code Status   Full Code    Time Spent on this Encounter   I, Ritesh March MD, personally saw the patient today and spent greater than 30 minutes discharging this patient.       Ritesh March MD  [address] SURG[facility]  [address] [place] [address]  Phone: [phone]  Fax: [phone]  ______________________________________________________________________    Physical Exam   Vital Signs: Temp: 98.6  F (37  C) Temp src: Oral BP: 109/67 Pulse: 85   Resp: 24 SpO2: 91 % O2 Device: Nasal cannula Oxygen Delivery: 3 LPM  Weight: 319 lbs 3.62 oz  GENERAL: Patient is not  in acute distress  HEENT:  EOM+,Conjunctiva is clear    NECK:   no Jugular Venous distention  HEART: S1 S2  regular Rate and Rhythm,  there is   no murmur,   LUNGS: Respirations are   not laboured, Lungs are   clear to auscultation  without Crepitations or Wheezing   ABDOMEN: Soft  , there is no  tenderness  ,Bowel Sounds are   Positive   LOWER LIMBS: no   Pedal Edema   Bilaterally   CNS: Patient is sleepy/not interested in talking though answers questions in yes and knows Oriented x 3, Moving all the Four Limbs        Primary Care Physician   Siobhan Mayo    Discharge Orders      General info for SNF    Length of  Stay Estimate: Short Term Care: Estimated # of Days <30  Condition at Discharge: Improving  Level of care:skilled   Rehabilitation Potential: Good  Admission H&P remains valid and up-to-date: Yes  Recent Chemotherapy: N/A  Use Nursing Home Standing Orders: Yes     Mantoux instructions    Give two-step Mantoux (PPD) Per Facility Policy Yes     Reason for your hospital stay    Patient came in with hypercapnic respiratory failure and encephalopathy with a fall shortly after going to his group home     Daily weights    Call Provider for weight gain of more than 2 pounds per day or 5 pounds per week.     Activity - Up ad debora     Additional Discharge Instructions    Okay to use current homes settings trilogy noninvasive ventilator     Follow Up and recommended labs and tests    Follow up with jail physician.  The following labs/tests are recommended: CMP and VBG    Need to follow-up with neurologist (epileptologist) regarding seizure versus pseudoseizure and adjustment of antiseizure medications in 2 to 4 weeks.     Physical Therapy Adult Consult    Evaluate and treat as clinically indicated.    Reason: Weakness     Occupational Therapy Adult Consult    Evaluate and treat as clinically indicated.    Reason: ADL help     Fall precautions     Seizure precautions     Non Invasive Ventilator    Non-Invasive Vent Documentation  I attest that I have seen and evaluated Tre Vazquez face to face. He has a chronic illness of J96.12 - Chronic respiratory failure with hypercapnia. This patient would greatly benefit from a home Non Invasive Ventilator device for nocturnal and daytime use with portability for recovery breaths. This device would aide in ventilator support to improve pulmonary status and decrease work of breathing. I strongly believe therapy is necessary to improve future outcomes and decreased hospital readmissions.     I, the undersigned, certify that the above prescribed supplies are medically necessary for  this patient and is both reasonable and necessary in reference to accepted standards of medical and necessary in reference to accepted standards of medical practice in the treatment of this patient's condition and is not prescribed as a convenience.     Diet    Follow this diet upon discharge: Orders Placed This Encounter      Regular Diet Adult       Significant Results and Procedures   Most Recent 3 CBC's:Recent Labs   Lab Test 05/21/23 0920 05/18/23 0614 05/15/23  0717 05/14/23  0517 05/12/23  0114 05/05/23  0805   WBC  --   --   --  6.1 6.3 7.9   HGB  --   --   --  10.5* 11.7* 12.5*   MCV  --   --   --  101* 100 98    204 190 166 158 146*     Most Recent 3 BMP's:Recent Labs   Lab Test 06/02/23 0615 05/29/23 0942 05/22/23  0640 05/21/23 0920 05/18/23 0614 05/17/23  1638 05/15/23  0717   NA  --  144 140  --   --   --  142   POTASSIUM 4.1 3.4 4.0  --   --   --  4.2   CHLORIDE  --  98 98  --   --   --  101   CO2  --  34* 36*  --   --   --  37*   BUN  --  13.0 14.8  --   --   --  13.2   CR  --  0.72 0.63* 0.63*   < >  --  0.65*   ANIONGAP  --  12 6*  --   --   --  4*   ARNOLD  --  9.3 9.0  --   --   --  8.6   GLC  --  76 94  --   --  125* 115*    < > = values in this interval not displayed.     Most Recent 2 LFT's:Recent Labs   Lab Test 05/29/23 0942 05/12/23 0114   AST 22 105*   ALT 37 117*   ALKPHOS 96 103   BILITOTAL 0.5 0.3     7-Day Micro Results     No results found for the last 168 hours.        Most Recent Urinalysis:Recent Labs   Lab Test 05/12/23  1629   COLOR Yellow   APPEARANCE Clear   URINEGLC Negative   URINEBILI Negative   URINEKETONE Negative   SG 1.020   UBLD Negative   URINEPH 6.0   PROTEIN 20*   NITRITE Negative   LEUKEST Negative   RBCU 2   WBCU 1   ,   Results for orders placed or performed during the hospital encounter of 05/12/23   Head CT w/o contrast    Narrative    EXAM: CT HEAD W/O CONTRAST  LOCATION: [facility]  DATE/TIME: 5/12/2023 4:26 AM  CDT    INDICATION: Traumatic injury. Pain.  COMPARISON: 10/25/2022, 04/05/2012  TECHNIQUE: Routine CT Head without IV contrast. Multiplanar reformats. Dose reduction techniques were used.    FINDINGS:  INTRACRANIAL CONTENTS: No intracranial hemorrhage, extraaxial collection, or mass effect.  No CT evidence of acute infarct. Mild to moderate presumed chronic small vessel ischemic changes. Extensive white matter volume loss. Stable ventriculomegaly.     VISUALIZED ORBITS/SINUSES/MASTOIDS: No intraorbital abnormality. No paranasal sinus mucosal disease. No middle ear or mastoid effusion.    BONES/SOFT TISSUES: No acute abnormality.      Impression    IMPRESSION:  1.  No acute intracranial process.  2.  No change from prior.     Cervical spine CT w/o contrast    Narrative    EXAM: CT CERVICAL SPINE W/O CONTRAST  LOCATION: [facility]  DATE/TIME: 5/12/2023 4:31 AM CDT    INDICATION: fall; fall; Neck pain; Trauma; Mild moderate trauma; None of the following: Spondyloarthropathy, cervical x ray with negative result, questionable finding, or inadequate coverage  COMPARISON: None.  TECHNIQUE: Routine CT Cervical Spine without IV contrast. Multiplanar reformats. Dose reduction techniques were used.    FINDINGS:  VERTEBRA: Reversal of usual cervical lordosis. Vertebral body heights and alignment is preserved. Fusion of the C4-C5 vertebra. Normal prevertebral soft tissues. No fracture or posttraumatic subluxation.     CANAL/FORAMINA: Relatively severe central canal stenosis at C4-C5, C5-C6 and C6-C7 with moderate to severe multilevel neural foraminal narrowing.    PARASPINAL: No extraspinal abnormality.      Impression    IMPRESSION:  1.  No fracture or posttraumatic subluxation.  2.  Reversal of the usual cervical lordosis.  3.  Relatively severe central canal stenosis at C4-C5, C5-C6 and C6-C7 with moderate to severe multilevel neural foraminal narrowing.     CT Chest (PE) Abdomen Pelvis w Contrast     Narrative    EXAM: CT CHEST PE ABDOMEN PELVIS W CONTRAST  LOCATION: [facility]  DATE/TIME: 5/12/2023 4:32 AM CDT    INDICATION: Shortness of breath. Syncope.  COMPARISON:   1. Chest x-ray 2 views 05/04/2023.  2. Ultrasound abdomen limited 05/07/2023.  TECHNIQUE: CT chest pulmonary angiogram and routine CT abdomen pelvis with IV contrast. Arterial phase through the chest and venous phase through the abdomen and pelvis. Multiplanar reformats and MIP reconstructions were performed. Dose reduction   techniques were used.   CONTRAST: 77 mL Isovue 370.    FINDINGS:  ANGIOGRAM CHEST: Motion artifact degrades several images. Suboptimal opacification of the distal pulmonary vessels. No obvious pulmonary emboli on either side. Normal caliber thoracic aorta without aneurysm or dissection. No CT evidence of right heart   strain.     LUNGS AND PLEURA: Numerous images are degraded due to motion artifact. Mild diffuse thickening of the bronchi. Dependent atelectasis in the posterior lungs. No pleural effusion on either side.    MEDIASTINUM/AXILLAE: Normal cardiac size. No pericardial effusion. No suspicious adenopathy. Trachea is midline.    CORONARY ARTERY CALCIFICATION: None.    HEPATOBILIARY: Gallstones without biliary dilatation or adjacent inflammation. Diffuse fatty infiltration of the liver. Patent hepatic and portal veins.    PANCREAS: Normal.    SPLEEN: Normal.    ADRENAL GLANDS: Normal.    KIDNEYS/BLADDER: Milk of calcium cyst upper pole of the left kidney posteriorly containing dependent mineralization measures 3.5 x 3.5 cm (image 93, series 18), no specific follow-up required. Tiny punctate stones in the collecting system of both   kidneys, one on the right (image 47, series 17), and 3 on the left (images 40, 42 and 46, series 17). Both kidneys are well perfused without hydronephrosis or hydroureter. Normal urinary bladder.    BOWEL: Scattered colonic diverticulosis, without acute inflammation.  Formed stool material within normal caliber colon. Normal appendix. No obstruction, free gas or free fluid.    LYMPH NODES: No suspicious abdominopelvic adenopathy. Subcentimeter retroperitoneal and bilateral pelvic nodes, likely reactive.    VASCULATURE: Slightly atherosclerotic normal caliber distal abdominal aorta measuring 1.9 x 1.9 cm (image 57, series 17). Normal caliber IVC.    PELVIC ORGANS: Stool-filled rectosigmoid. Normal appendix. No free fluid. No suspicious adenopathy.    MUSCULOSKELETAL: Degenerative changes both shoulders, spine and joints of the pelvis. Slight right convex thoracic curve.      Impression    IMPRESSION:  1.  Several images are degraded due to motion artifact. Suboptimal opacification of the pulmonary vasculature. No obvious pulmonary emboli on either side. Mild diffuse thickening of the bronchi. Dependent atelectasis in the posterior lungs. No adenopathy   or effusion.    2.  Normal caliber thoracic aorta without aneurysm or dissection. Normal cardiac size. No pericardial effusion.    3.  Cholelithiasis. Fatty liver. Milk of calcium cyst upper pole of the left kidney posteriorly, no specific follow-up required. Nonobstructive stones in the renal collecting system, 1 on the right and 3 on the left.    4.  Scattered colonic diverticulosis without acute inflammation. No mechanical bowel obstruction, free gas or free fluid. Normal appendix.                   Discharge Medications   Discharge Medication List as of 5/22/2023  1:55 PM      CONTINUE these medications which have CHANGED    Details   QUEtiapine (SEROQUEL XR) 150 MG TB24 24 hr tablet Take 1 tablet (150 mg) by mouth At Bedtime, No Print Out         CONTINUE these medications which have NOT CHANGED    Details   ammonium lactate (AMLACTIN) 12 % external cream Apply topically 2 times dailyHistorical      busPIRone (BUSPAR) 15 MG tablet Take 15 mg by mouth 2 times daily, Historical      !! citalopram (CELEXA) 20 MG tablet Take 20 mg  by mouth daily, Historical      !! citalopram (CELEXA) 40 MG tablet Take 40 mg by mouth daily, Historical      clotrimazole (LOTRIMIN) 1 % external solution Apply topically 2 times dailyHistorical      lamoTRIgine (LAMICTAL) 200 MG tablet Take 1 tablet (200 mg) by mouth 2 times daily, Disp-60 tablet, R-11, E-Prescribe      levETIRAcetam (KEPPRA) 500 MG tablet Take 2 tablets (1,000 mg) by mouth every morning AND 3 tablets (1,500 mg) At Bedtime., Disp-150 tablet, R-11, E-Prescribe      levOCARNitine (CARNITOR) 330 MG tablet Take 2 tablets (660 mg) by mouth 3 times daily, Disp-180 tablet, R-11, E-Prescribe      methylcellulose (CITRUCEL) powder Take 2 g by mouth 2 times daily, Historical      multivitamin w/minerals (THERA-VIT-M) tablet Take 1 tablet by mouth daily, Historical      pantoprazole (PROTONIX) 40 MG EC tablet Take 40 mg by mouth daily, Historical      polyethylene glycol (MIRALAX) 17 GM/Dose powder Take 1 capful. by mouth daily, Historical      prazosin (MINIPRESS) 2 MG capsule Take 2 mg by mouth At Bedtime, Historical      propranolol (INDERAL) 20 MG tablet Take 20 mg by mouth 2 times daily, Historical      zonisamide (ZONEGRAN) 100 MG capsule Take 1 capsule (100 mg) by mouth every morning AND 2 capsules (200 mg) At Bedtime., Disp-90 capsule, R-11, E-Prescribe      divalproex sodium delayed-release (DEPAKOTE) 500 MG DR tablet Take 1 tablet (500 mg) by mouth 3 times daily, Disp-270 tablet, R-3, E-Prescribe       !! - Potential duplicate medications found. Please discuss with provider.        Allergies   Allergies   Allergen Reactions     Hydrocodone Bitartrate Er Unknown     Cephalexin Rash     HUT Reaction: Rash; HUT Noted: [id number]       Vicodin [Hydrocodone-Acetaminophen] GI Disturbance

## 2023-05-22 NOTE — PLAN OF CARE
Goal Outcome Evaluation:    Pertinent assessments: Assumed cares 0901-0642. Patient A&Ox4. Denies pain. Reported some dyspnea on exertion. Vitals stable. LS dim on RA. Patient reported about 4 seizures, and a couple of them while the writer was in the room. However, no seizure activity observed. Patient in bed. Calling frequently. BiPap on for sleep. Slept about 6 hours, though.

## 2023-05-22 NOTE — PROGRESS NOTES
Care Management Discharge Note    Discharge Date: 05/22/2023       Discharge Disposition: Skilled Nursing Facility, Transitional Care    Discharge Services:      Discharge DME: Oxygen    Discharge Transportation: agency    Private pay costs discussed: transportation costs    Does the patient's insurance plan have a 3 day qualifying hospital stay waiver?  No    PAS Confirmation Code: [id number]  Patient/family educated on Medicare website which has current facility and service quality ratings: yes    Education Provided on the Discharge Plan:  yes  Persons Notified of Discharge Plans: patient and facility  Patient/Family in Agreement with the Plan:      Handoff Referral Completed: No    Additional Information:  Medically ready for discharge. [facility] TCU has accepted.   Contacted Wesley from [facility] [phone] to verify Trilogy NIV settings for the discharge orders and relayed to MD. Orders completed and faxed to [place] and left message for their admissions coordinator.  [facility] transport set for 1500.  Home Trilogy unit brought in by GH manager and bedside RN will send unit with patient to TCU.  Updated [facility] regarding discharge plan    Addendum: Orders updated by MD regarding Keppra dosing. Re-faxed to central intake for [facility] and left message with their admissions coordinator.      Olivia Arndt RN BSN OCN  Care Coordinator  [facility]  [phone]

## 2023-05-22 NOTE — DISCHARGE INSTRUCTIONS
If any concerns or issues with Trilogy can contact Wesley at [facility] [phone]

## 2023-05-22 NOTE — PLAN OF CARE
Pt. discharging to [facility] TCU with wheelchair transportation  @ 1435. VSS on 3L of O2. Pt. Transporting with O2.Trilogy machine went with pt. IV removed. Denies pain. Luis Enrique talked with Sugar and RN case manager Chinyere prior to discharge. Everyone is onboard with the next steps of the plan of care. Pt. Medically stable for TCU discharge.

## 2023-05-22 NOTE — PLAN OF CARE
"Physical Therapy Discharge Summary     Reason for therapy discharge:    Discharged to transitional care facility.     Progress towards therapy goal(s). See goals on Care Plan in [facility] electronic health record for goal details.  Goals not met.  Barriers to achieving goals:  discharge to TCU   Therapy recommendation(s):    Continued therapy is recommended.  Rationale/Recommendations:  \"Pt slightly below baseline from last admission.  Pt with history of falls at group home.  Recommend TCU to increase strength and activity tolerance and progress independence with all mobility to enable pt to return to group home with increased safety and decreased risk of falls.\" Per last PT notes  "

## 2023-05-22 NOTE — PLAN OF CARE
"Goal Outcome Evaluation:         Vitals stable and afebrile. Continues to state he just had a seizure, I just had 3 seizures and no seizure activity witnessed. Pt flexed his arms once and stated, \"See, I just had another seizure.\" Ate well at supper. Drank 2 sodas with supper. Fed himself. Continent and calls to use urinal. Insists that nurse holds urinal for him to urinate. Able to transfer from recliner to bed with standby assist. Staff insisted he use walker and gait belt for transfer. Able to pull himself up in bed independently using the upper side rails. Seizure pads in place and bed alarm on. Denies pain.                "

## 2023-05-23 ENCOUNTER — PATIENT OUTREACH (OUTPATIENT)
Dept: CARE COORDINATION | Facility: CLINIC | Age: 48
End: 2023-05-23

## 2023-05-23 NOTE — PROGRESS NOTES
[facility]    Background: Transitional Care Management program identified per system criteria and reviewed by [facility] team for possible outreach.    Assessment: Upon chart review, CCRC Team member will not proceed with patient outreach related to this episode of Transitional Care Management program due to reason below:    Non-[facility] TCU: CCRC team member noted patient discharged to TCU/ARU/LTACH. Patient is not established with a [facility] currently supported by Primary Care-Care Coordination therefore handoff to Primary Care-Care Coordination is not appropriate at this time.    Plan: Transitional Care Management episode addressed appropriately per reason noted above.      Meg Sanchez MA  [facility], [facility]    *Connected Care Resource Team does NOT follow patient ongoing. Referrals are identified based on internal discharge reports and the outreach is to ensure patient has an understanding of their discharge instructions.

## 2023-05-26 ENCOUNTER — LAB REQUISITION (OUTPATIENT)
Dept: LAB | Facility: CLINIC | Age: 48
End: 2023-05-26
Payer: MEDICARE

## 2023-05-26 DIAGNOSIS — J96.12 CHRONIC RESPIRATORY FAILURE WITH HYPERCAPNIA (H): ICD-10-CM

## 2023-05-29 LAB
ALBUMIN SERPL BCG-MCNC: 4.1 G/DL (ref 3.5–5.2)
ALP SERPL-CCNC: 96 U/L (ref 40–129)
ALT SERPL W P-5'-P-CCNC: 37 U/L (ref 10–50)
ANION GAP SERPL CALCULATED.3IONS-SCNC: 12 MMOL/L (ref 7–15)
AST SERPL W P-5'-P-CCNC: 22 U/L (ref 10–50)
BILIRUB SERPL-MCNC: 0.5 MG/DL
BUN SERPL-MCNC: 13 MG/DL (ref 6–20)
CALCIUM SERPL-MCNC: 9.3 MG/DL (ref 8.6–10)
CHLORIDE SERPL-SCNC: 98 MMOL/L (ref 98–107)
CREAT SERPL-MCNC: 0.72 MG/DL (ref 0.67–1.17)
DEPRECATED HCO3 PLAS-SCNC: 34 MMOL/L (ref 22–29)
GFR SERPL CREATININE-BSD FRML MDRD: >90 ML/MIN/1.73M2
GLUCOSE SERPL-MCNC: 76 MG/DL (ref 70–99)
POTASSIUM SERPL-SCNC: 3.4 MMOL/L (ref 3.4–5.3)
PROT SERPL-MCNC: 6.7 G/DL (ref 6.4–8.3)
SODIUM SERPL-SCNC: 144 MMOL/L (ref 136–145)

## 2023-05-29 PROCEDURE — 36415 COLL VENOUS BLD VENIPUNCTURE: CPT

## 2023-05-29 PROCEDURE — P9603 ONE-WAY ALLOW PRORATED MILES: HCPCS

## 2023-05-29 PROCEDURE — 80053 COMPREHEN METABOLIC PANEL: CPT

## 2023-06-01 ENCOUNTER — LAB REQUISITION (OUTPATIENT)
Dept: LAB | Facility: CLINIC | Age: 48
End: 2023-06-01
Payer: MEDICARE

## 2023-06-01 DIAGNOSIS — E87.6 HYPOKALEMIA: ICD-10-CM

## 2023-06-02 LAB — POTASSIUM SERPL-SCNC: 4.1 MMOL/L (ref 3.4–5.3)

## 2023-06-02 PROCEDURE — 84132 ASSAY OF SERUM POTASSIUM: CPT

## 2023-06-02 PROCEDURE — P9603 ONE-WAY ALLOW PRORATED MILES: HCPCS

## 2023-06-02 PROCEDURE — 36415 COLL VENOUS BLD VENIPUNCTURE: CPT

## 2023-06-03 ENCOUNTER — LAB REQUISITION (OUTPATIENT)
Dept: LAB | Facility: CLINIC | Age: 48
End: 2023-06-03
Payer: MEDICARE

## 2023-06-03 DIAGNOSIS — E87.6 HYPOKALEMIA: ICD-10-CM

## 2023-06-05 LAB — POTASSIUM SERPL-SCNC: 4 MMOL/L (ref 3.4–5.3)

## 2023-06-05 PROCEDURE — 84132 ASSAY OF SERUM POTASSIUM: CPT | Performed by: FAMILY MEDICINE

## 2023-06-05 PROCEDURE — 36415 COLL VENOUS BLD VENIPUNCTURE: CPT | Performed by: FAMILY MEDICINE

## 2023-06-05 PROCEDURE — P9604 ONE-WAY ALLOW PRORATED TRIP: HCPCS | Performed by: FAMILY MEDICINE

## 2023-07-07 ENCOUNTER — HOSPITAL ENCOUNTER (EMERGENCY)
Facility: CLINIC | Age: 48
Discharge: HOME | End: 2023-07-07
Attending: EMERGENCY MEDICINE | Admitting: EMERGENCY MEDICINE
Payer: MEDICARE

## 2023-07-07 ENCOUNTER — APPOINTMENT (OUTPATIENT)
Dept: CT IMAGING | Facility: CLINIC | Age: 48
End: 2023-07-07
Attending: EMERGENCY MEDICINE
Payer: MEDICARE

## 2023-07-07 ENCOUNTER — TELEPHONE (OUTPATIENT)
Dept: WOUND CARE | Facility: CLINIC | Age: 48
End: 2023-07-07
Payer: MEDICARE

## 2023-07-07 VITALS
DIASTOLIC BLOOD PRESSURE: 77 MMHG | HEART RATE: 74 BPM | TEMPERATURE: 98.3 F | SYSTOLIC BLOOD PRESSURE: 135 MMHG | RESPIRATION RATE: 17 BRPM | OXYGEN SATURATION: 93 %
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
## 2023-07-07 DIAGNOSIS — S01.01XA SCALP LACERATION, INITIAL ENCOUNTER: ICD-10-CM

## 2023-07-07 DIAGNOSIS — L98.491: ICD-10-CM

## 2023-07-07 PROCEDURE — 12002 RPR S/N/AX/GEN/TRNK2.6-7.5CM: CPT

## 2023-07-07 PROCEDURE — G1010 CDSM STANSON: HCPCS

## 2023-07-07 PROCEDURE — 99284 EMERGENCY DEPT VISIT MOD MDM: CPT | Mod: 25

## 2023-07-07 RX ORDER — LIDOCAINE HYDROCHLORIDE AND EPINEPHRINE 10; 10 MG/ML; UG/ML
INJECTION, SOLUTION INFILTRATION; PERINEURAL
Status: DISCONTINUED
Start: 2023-07-07 | End: 2023-07-07 | Stop reason: HOSPADM

## 2023-07-07 ASSESSMENT — ACTIVITIES OF DAILY LIVING (ADL)
ADLS_ACUITY_SCORE: 37

## 2023-07-07 NOTE — ED TRIAGE NOTES
"Pt arrives via EMS from group home after falling in attempt to go to the bathroom. Pt normally ambulates with walker. EMS reports pt fell from standing and hit head on railing. Pt has laceration on head. EMS reports no LOC, no thinners. PT reports \"Blacking out\" and pt reports history of seizures.     "

## 2023-07-07 NOTE — ED PROVIDER NOTES
History     Chief Complaint:  Fall     HPI   Tre Vazquez is a 47 year old male with PMH including palsy, mild intellectual disability, congenital left eye blindness, seizure disorder with chronically elevated ammonia who remains on Depakote among other antiepileptics, borderline personality disorder, schizoaffective and ANA as well as obesity hypoventilation syndrome chronically on BiPAP. He presents to the ED today for evaluation of injuries sustained in recent fall. The patient reports he was attempting to walk to the bathroom this morning when he lost his balance and fell, hitting his head and losing consciousness.  He sustained a laceration to the top of his head. The patient is on supplemental oxygen at baseline. He denies neck pain or other injuries. The patient resides in a group home.    Independent Historian:   None - Patient Only    Review of External Notes:   I reviewed patient's admission notes from 5/4 and 5/12 which he was admitted for obesity hypoventilation syndrome and acute respiratory failure.    Medications:    Carnitor  Douneb  Depakote  Flonase  Lamictal  Keppra  Buspar  Minipress  Ventolin  Zonegran  Citrucil  Celexa  Miralax  Protonix  Seroquel    Past Medical History:    Blindness of left eye  Depression  Hypertension  Pneumococcal septicemia  Pneumonia infection  Asthma  Epilepsy  Pulmonary hypertension  Schizoaffective disorder  Paraplegia  ANA  Prediabetes  Respiratory Failure  Lumbar disc disease  Physical Deconditioning  Cerebral Ventriculomegaly  Cognitive impairment  Psychosis  GERD  Oppositional [place] disorder  Borderline personality disorder    Past Surgical History:    Bilateral Ankle surgery    Physical Exam     Patient Vitals for the past 24 hrs:   BP Temp Temp src Pulse Resp SpO2   07/07/23 0300 128/82 -- -- 73 17 94 %   07/07/23 0256 -- 98.3  F (36.8  C) Oral 72 20 97 %        Physical Exam  General: Patient is alert, awake and interactive when I enter the room  Head: 6  "cm centimeter linear laceration to the crown of the scalp.  This is superficial in nature.  Does not involve the galea.  Eyes: The pupils are equal, round, and reactive to light. Conjunctivae and sclerae are normal  ENT: No hemotympanum or signs basilar skull fracture. The oropharynx is normal without erythema.  There appears to be a pressure wound to the posterior aspect of the pinna along the left ear where his oxygen tubing rubs against the skin.  Neck: Normal range of motion. No cervical midline tenderness.   CV: Regular rate. S1/S2. No murmurs.   Resp: Lungs are clear without wheezes or rales. No distress. No crepitance.   GI: Abdomen is soft, no rigidity, guarding, or rebound. No contusion. No distension. No tenderness to palpation in any quadrant.     MS: Normal tone. Joints grossly normal without effusions. No asymmetric leg swelling, calf or thigh tenderness. Normal motor assessment of all extremities. PROM performed of all major joints without pain. No C/T/L tenderness in midline  Skin: Normal capillary refill noted. 6 cm linear laceration to the crown of the scalp.  Neuro: GCS 15.  CN\"s II-XII intact. Face is symmetric. Strength is normal and symmetric.   Psych: Normal affect.  Appropriate interactions.    Emergency Department Course   Imaging:  CT Head w/o Contrast   Final Result   IMPRESSION:   1.  No CT evidence for acute intracranial process.   2.  Brain atrophy and presumed chronic microvascular ischemic changes as above.   3.  Right maxillary sinus disease with an air-fluid level. Correlate for symptoms of acute sinusitis.         Report per radiology    Procedures     Narrative: Procedure: Laceration Repair      LACERATION:  A simple clean 6 cm laceration.    LOCATION:  scalp    FUNCTION:  Distally sensation and circulation are intact.    ANESTHESIA:  LET - Topical    PREPARATION:  Irrigation with Normal Saline    DEBRIDEMENT:  no debridement    CLOSURE:  Wound was closed with One Layer.  Skin " closed with nine x 4.0 Ethylon using interrupted sutures.    Emergency Department Course & Assessments:     Interventions:  Medications   lido-EPINEPHrine-tetracaine (LET) 4-0.18-0.5 % topical gel GEL (has no administration in time range)   lidocaine 1% with EPINEPHrine 1:100,000 1 %-1:[id number] injection (has no administration in time range)      Assessments:  0255 Initial Examination  0435 Recheck  0445 Suture repair    Independent Interpretation (X-rays, CTs, rhythm strip):  None    Consultations/Discussion of Management or Tests:  None      Social Determinants of Health affecting care:   None    Disposition:  The patient was discharged to group home.     Impression & Plan    Medical Decision Making:  Tre Vazquez is a 47 year old male presents after a fall from standing, and has a history/exam consistent with a closed head injury. He did lose consciousness with the injury. Differential diagnosis is broad, and it includes skull fracture, epidural hematoma, subdural hematoma, intracerebral hemorrhage, and traumatic subarachnoid hemorrhage, all of which are highly unlikely in this clinical setting. This patient denies severe headache, seizure, and has no focal neurological findings. The patient did not have sleepiness, repeated emesis, poor orientation, or significant irritability. However, given the mechanism of injury, [place] Head CT rules cannot rule out this patient for a head CT. I have discussed the risk/benefit analysis with the patient regarding CT imaging. Ultimately, CT scan shows no evidence of the above worrisome pathologies.     The patient's laceration was cleaned, evaluated, and repaired, as noted above. Risks/signs of infection and scarring have been discussed with the patient and family. Sutures should be removed in 5-7 days to limit the chances of scarring. The patient will watch for signs of infection, including erythema surrounding the wound, streaking, purulent drainage, or fever/chills.   "There also appears to be a pressure wound to the pinna of the left ear where his oxygen tubing is rubbing.  Respiratory therapy came down and added some additional padding behind the ear so that wound does not progress.  We will also refer the patient to wound care clinic.  Additionally, I have noted \"red flag\" symptoms that would indicate immediate return to Emergency Department for further workup. These include headaches that get worse, increased drowsiness, strange behavior, repetitive speech, seizures, repeated vomiting, growing confusion, increased irritability, slurred speech, weakness or numbness, and loss of responsiveness. This information will also be provided in writing at discharge. I have discussed the second impact syndrome, and the importance of not sustaining repeated concussion in the next 1-2 weeks.    Diagnosis:    ICD-10-CM    1. Scalp laceration, initial encounter  S01.01XA       2. Fall, initial encounter  W19.XXXA       3. External ear ulcer, limited to breakdown of skin (H)  L98.491 Wound Care Referral          Scribe Disclosure:  I, Tre Melo, am serving as a scribe at 3:00 AM on 7/7/2023 to document services personally performed by Phill Jett MD based on my observations and the provider's statements to me.     7/7/2023   Phill Jett MD Battista, Christopher Joseph, MD  07/07/23 0542    "

## 2023-07-07 NOTE — TELEPHONE ENCOUNTER
Consult received via Workqueue from Phill Jett MD for pressure injury of the left ear due to oxygen tubing.  Patient also had ED visit today in which laceration of scalp was sutured.    Please schedule with providers Horacio Clemens, Guille, or Viridiana at [facility] for next available appointment.  If unable to schedule within 3 weeks, please add to cancellation list.    **For all providers, Akosua Montano PA-C, Dr. Cook, Shannon Clemens NP or Dr. Leonard, please schedule a follow up 2-3 weeks after initial appointment.**    Is the patient able to make their own medical decisions? No ACP documents on file.  History mild intellectual disability and mental health diagnoses, and patient lives in [place] group home. Called group home and they were unsure about decision-maker. Spoke with stepmother Ashlie and she states he is his own decision maker.    Is patient a MANDY lift? N/a - ear wound    Routing to  Wound Healing Scheduling.

## 2023-07-07 NOTE — ED NOTES
Writer called group home and spoke to Lavinia, care giver and gave employee an update. Lavinia will be there upon pt arrival

## 2023-07-07 NOTE — ED NOTES
Pt is changed into paper scrubs. Pt pivoted to the chair with assist x 1. Pt is given apple juice and meliton crackers. Waiting for ambulance ride home. Will continue to monitor. Call light within reach.

## 2023-07-15 ENCOUNTER — APPOINTMENT (OUTPATIENT)
Dept: GENERAL RADIOLOGY | Facility: CLINIC | Age: 48
End: 2023-07-15
Attending: STUDENT IN AN ORGANIZED HEALTH CARE EDUCATION/TRAINING PROGRAM
Payer: MEDICARE

## 2023-07-15 ENCOUNTER — HOSPITAL ENCOUNTER (EMERGENCY)
Facility: CLINIC | Age: 48
Discharge: HOME | End: 2023-07-15
Attending: EMERGENCY MEDICINE | Admitting: EMERGENCY MEDICINE
Payer: MEDICARE

## 2023-07-15 VITALS
HEART RATE: 68 BPM | TEMPERATURE: 96.6 F | SYSTOLIC BLOOD PRESSURE: 122 MMHG | DIASTOLIC BLOOD PRESSURE: 64 MMHG | OXYGEN SATURATION: 94 % | RESPIRATION RATE: 16 BRPM

## 2023-07-15 DIAGNOSIS — M25.561 RIGHT KNEE PAIN: ICD-10-CM
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
## 2023-07-15 PROCEDURE — 250N000013 HC RX MED GY IP 250 OP 250 PS 637: Performed by: STUDENT IN AN ORGANIZED HEALTH CARE EDUCATION/TRAINING PROGRAM

## 2023-07-15 PROCEDURE — 73560 X-RAY EXAM OF KNEE 1 OR 2: CPT | Mod: RT

## 2023-07-15 PROCEDURE — 99283 EMERGENCY DEPT VISIT LOW MDM: CPT

## 2023-07-15 RX ORDER — IBUPROFEN 600 MG/1
600 TABLET, FILM COATED ORAL ONCE
Status: COMPLETED | OUTPATIENT
Start: 2023-07-15 | End: 2023-07-15

## 2023-07-15 RX ADMIN — IBUPROFEN 600 MG: 600 TABLET ORAL at 09:19

## 2023-07-15 ASSESSMENT — ACTIVITIES OF DAILY LIVING (ADL)
ADLS_ACUITY_SCORE: 37

## 2023-07-15 NOTE — ED NOTES
Writer able to contact Kaylene with group home, all questions answered, update provided. Will call for transport back to facility.     [phone]

## 2023-07-15 NOTE — ED PROVIDER NOTES
History     Chief Complaint:  Knee Pain       HPI   Tre Vazquez is a 47 year old male who presents with right knee pain. Patient reports falling last night in bathroom. Uncertain as to cause of fall. Denies LOC or hitting his head. States that he fell forward and landed on bilateral knees but now has pain in only the right knee. Reports worsening pain with ambulating and bending of the knee. Left knee without pain when ambulating and bending. Denies any other injuries from fall. Not on blood thinners.    Independent Historian:   None - Patient Only    Review of External Notes:   Reviewed ED visit from 7/7/23 - fall with head injury      Medications:    ammonium lactate (AMLACTIN) 12 % external cream  busPIRone (BUSPAR) 15 MG tablet  citalopram (CELEXA) 20 MG tablet  citalopram (CELEXA) 40 MG tablet  clotrimazole (LOTRIMIN) 1 % external solution  lamoTRIgine (LAMICTAL) 200 MG tablet  levETIRAcetam (KEPPRA) 500 MG tablet  levOCARNitine (CARNITOR) 330 MG tablet  methylcellulose (CITRUCEL) powder  multivitamin w/minerals (THERA-VIT-M) tablet  pantoprazole (PROTONIX) 40 MG EC tablet  polyethylene glycol (MIRALAX) 17 GM/Dose powder  prazosin (MINIPRESS) 2 MG capsule  propranolol (INDERAL) 20 MG tablet  QUEtiapine (SEROQUEL XR) 150 MG TB24 24 hr tablet  zonisamide (ZONEGRAN) 100 MG capsule        Past Medical History:    Past Medical History:   Diagnosis Date     Blindness of left eye      Depression 03/10/2014     Hypertension      Pneumococcal septicemia(038.2) (H) 11/26/2013     Pneumonia, organism unspecified(486) 11/26/2013     Uncomplicated asthma      Unspecified epilepsy without mention of intractable epilepsy        Past Surgical History:    Past Surgical History:   Procedure Laterality Date     COLONOSCOPY N/A 12/1/2022    Procedure: COLONOSCOPY;  Surgeon: Michael Caldwell MD;  Location:  OR     ESOPHAGOSCOPY, GASTROSCOPY, DUODENOSCOPY (EGD), COMBINED N/A 12/1/2022    Procedure:  ESOPHAGOGASTRODUODENOSCOPY with biopsy;  Surgeon: Michael Caldwell MD;  Location: [facility] OR     ORTHOPEDIC SURGERY      pt stated past surgery on both ankles        Physical Exam     Patient Vitals for the past 24 hrs:   BP Temp Temp src Pulse Resp SpO2   07/15/23 0845 -- -- -- -- -- 93 %   07/15/23 0830 -- -- -- -- -- 94 %   07/15/23 0815 -- -- -- -- -- 94 %   07/15/23 0615 122/64 -- -- 74 -- 94 %   07/15/23 0559 -- (!) 96.6  F (35.9  C) Axillary -- -- --   07/15/23 0553 99/68 -- -- 76 18 97 %        Physical Exam  General: Patient is alert, awake and interactive when I enter the room  Head:  Healing laceration from fall on 7/7/23 on crown of scalp present  Eyes:  No scleral icterus, PERRL  ENT:  The external nose and ears are normal.   Neck:  Normal range of motion without rigidity.  CV:  Regular rate and rhythm    No pathologic murmur   Resp:  Breath sounds are clear bilaterally    Non-labored, no retractions or accessory muscle use  GI:  Abdomen is soft, no distension, no tenderness. No peritoneal signs  MS:  Pain in right knee with flexion. No pain in right thigh or calf with palpation. No posterior knee pain with palpation. No appreciable effusion noted. Full ROM of right ankle. Full ROM in left knee without pain.   Skin:  Bruising noted to left anterior knee  Neuro:  Oriented x 3. No gross motor deficits.      Emergency Department Course     Imaging:  XR Knee Right 1/2 Views   Final Result   IMPRESSION: The right knee is negative for fracture or compartmental narrowing. No significant effusion.      Ankle XR, G/E 3 views, right    (Results Pending)   XR Knee Left 3 Views    (Results Pending)      Report per radiology    Laboratory:  Labs Ordered and Resulted from Time of ED Arrival to Time of ED Departure - No data to display     Procedures   None    Emergency Department Course & Assessments:    Interventions:  Medications   ibuprofen (ADVIL/MOTRIN) tablet 600 mg (600 mg Oral $Given 7/15/23 0915)         Independent Interpretation (X-rays, CTs, rhythm strip):  Right knee - no fractures or dislocations    Consultations/Discussion of Management or Tests:  ED Course as of 07/15/23 0925   Sat Jul 15, 2023   0616 Performed initial assessment   0807 Ambulatory test attempted   0829 Attempted to call patient's facility, [facility], left VM   0925 Repeat call to patient's facility, left second VM       Social Determinants of Health affecting care:   None    Disposition:  The patient was discharged to home.     Impression & Plan      Medical Decision Making:  Tre Vazquez is a 47 year old male who presents with right knee pain. Patient also on chronic oxygen, however was found off oxygen by EMS at his facility. Patient placed on 3 L here via nasal canula to maintain oxygen saturations above 90%. Patient unsure of his chronic oxygen level requirement at home, however on chart review noted to be on 3 L during previous admissions and office visits. On exam, patient endorsed pain with flexion of right knee. Left knee bruising also identified along with pain with right ankle palpation. Discussed recommendation of imaging of bilateral knees and right ankle with patient for which he was agreeable with right knee, however denied left knee and right ankle imaging. Encouraged completion of imaging today to rule out possible injury outside of right knee, patient continued to decline. Right knee imaging completed and without signs of fracture, dislocation, or effusion. Offered patient tylenol or ibuprofen for his pain while here, he declined. Ambulatory testing completed with difficulty, however patient reported using wheelchair and walker at baseline. He was able to maneuver to seated position and transition himself. Ibuprofen provided for pain relief while here prior to discharge. Encouraged patient to continue NSAIDS/tylenol at home for ongoing symptoms. If pain continued, recommend he follow up with PCP within the week.  Discussed reasons to return to ER. All questions answered.      Diagnosis:    ICD-10-CM    1. Right knee pain  M25.561       2. Fall at home, initial encounter  W19.XXXA     Y92.009            Discharge Medications:  New Prescriptions    No medications on file        7/15/2023   CHANTEL Snyder Lauren R, PA-C  07/15/23 0925

## 2023-07-15 NOTE — DISCHARGE INSTRUCTIONS
Please follow up with your primary care provider regarding your ER visit today and to reassess your right knee pain. Thankfully no fractures or dislocations today. You did have some pain on exam to your right ankle but since you were not agreeable to imaging today, we were unable to determine if this has any injuries. If pain in the ankle worsens, please return for further work up. Continue use of tylenol and ibuprofen for pain management at home. Ice or heat to the area may alleviate symptoms.     If symptoms worsen, please return to ER.

## 2023-07-15 NOTE — ED PROVIDER NOTES
Emergency Department Attending Supervision Note  7/15/2023  6:02 AM      I evaluated this patient in conjunction with Rosemarie Trinidad PA-C.        Briefly, the patient presented with knee pain.  Patient sustained fall while in the bathroom last night.  Noting pain to right knee.  Denies head injury nor LOC.  Denies injury to any other location.  Hx of chronic hypoxemic and hypercapnic respiratory failure, currently living at assisted living facility.  Initial O2 saturations in 80s per EMS, which patient relates to having taken off his BiPAP device.        On my exam:  Awake, lying on gurney  Limited historian  Even, non-labored respirations  Lungs are CTAB  RRR, S1 and S2 present, no M/G/R  Abd soft  Tenderness diffusely about R knee, no obvious deformity  Tenderness to palpation about R ankle, though patient subjectively denies pain  No deformity to ankle  Ecchymoses to left knee, AROM in place      Results:  XR Knee Right 1/2 Views   Final Result   IMPRESSION: The right knee is negative for fracture or compartmental narrowing. No significant effusion.            ED course:  XR obtained.  Patient refused contralateral extremity XR (where ecchymoses present), as well as Ankle XR.    My impression is right knee pain following fall.  X-ray negative for fracture nor dislocation.  Able to flex/extend at the knee.  No evidence of neurovascular compromise distally.  Patient declined additional imaging.  No other traumatic injuries noted other than lower extremities.  He was provided ibuprofen.  Rosemarie Trinidad PA-C, confirmed that patient is on baseline respiratory requirements, and is wheelchair bound at baseline.  Patient feels comfortable returning home at this time.  We will plan discharge home to care facility.  Awaiting transport.        Diagnosis    ICD-10-CM    1. Right knee pain  M25.561       2. Fall at home, initial encounter  W19.XXXA     Y92.009             Kvng Obrien MD Roach, Brian Donald,  MD  07/15/23 2697

## 2023-07-15 NOTE — ED NOTES
Pt was unable to stabilize self at side of bed, assist of two to sit up. Pt unable to stand from sitting position.

## 2023-07-15 NOTE — ED TRIAGE NOTES
Pt BIBA from [facility]. Pt called 911, did not inform staff. Pt had fall a few days ago, was fine, now with 10/10 knee pain. Pt was found in chair without his oxygen, per pt, he is supposed to wear it all the time. Unable to say how many LPM he uses. On 3 LPM here. Drops to 89% on RA.

## 2023-07-24 NOTE — PROGRESS NOTES
[facility] ICU VENTILATOR RESPIRATORY NOTE  Date of Admission: 3/15/23  Date of Intubation (most recent): 3/15/23  Reason for Mechanical Ventilation: Respiratory failure  Number of Days on Mechanical Ventilation: 3  Met Criteria for Pressure Support Trial: yes  Length of Pressure Support Trial: 5 min  Reason for Stopping Pressure Support Trial: apnea    Significant Events Today:   ABG Results:   Recent Labs   Lab 03/16/23  0830 03/16/23  0511 03/15/23  2242 03/15/23  1622 03/15/23  1525 03/15/23  1447   PH 7.39  --  7.53*  --  7.24* 7.27*   PCO2 56*  --  39  --   --   --    PO2 90  --  104  --   --   --    HCO3 34*  --  33*  --   --   --    O2PER 45 50 50 40  --   --      Vent Mode: CMV/AC  (Continuous Mandatory Ventilation/ Assist Control)  FiO2 (%): 40 %  Resp Rate (Set): 16 breaths/min  Tidal Volume (Set, mL): 400 mL  PEEP (cm H2O): 7 cmH2O  Pressure Support (cm H2O): 10 cmH2O  Resp: 16      ETT appearance on chest x-ray: tip overlies mid upper thoracic trachea    Plan:  Continue to wean sedation and attempt wean again tomorrow.       Nostril Rim Text: The closure involved the nostril rim.

## 2023-08-23 ENCOUNTER — HOSPITAL ENCOUNTER (EMERGENCY)
Facility: CLINIC | Age: 48
Discharge: NURSING, REHAB OR LONG-TERM CARE | End: 2023-08-23
Attending: EMERGENCY MEDICINE | Admitting: EMERGENCY MEDICINE
Payer: MEDICARE

## 2023-08-23 VITALS
DIASTOLIC BLOOD PRESSURE: 84 MMHG | HEART RATE: 75 BPM | TEMPERATURE: 98.7 F | SYSTOLIC BLOOD PRESSURE: 133 MMHG | OXYGEN SATURATION: 93 % | RESPIRATION RATE: 16 BRPM

## 2023-08-23 DIAGNOSIS — B37.9 CANDIDIASIS: ICD-10-CM

## 2023-08-23 DIAGNOSIS — L89.322: ICD-10-CM

## 2023-08-23 LAB
ALBUMIN SERPL BCG-MCNC: 4.3 G/DL (ref 3.5–5.2)
ALP SERPL-CCNC: 79 U/L (ref 40–129)
ALT SERPL W P-5'-P-CCNC: 25 U/L (ref 0–70)
ANION GAP SERPL CALCULATED.3IONS-SCNC: 4 MMOL/L (ref 7–15)
AST SERPL W P-5'-P-CCNC: 26 U/L (ref 0–45)
BASOPHILS # BLD AUTO: 0 10E3/UL (ref 0–0.2)
BASOPHILS NFR BLD AUTO: 1 %
BILIRUB SERPL-MCNC: 0.2 MG/DL
BUN SERPL-MCNC: 16.4 MG/DL (ref 6–20)
CALCIUM SERPL-MCNC: 9.3 MG/DL (ref 8.6–10)
CHLORIDE SERPL-SCNC: 100 MMOL/L (ref 98–107)
CREAT SERPL-MCNC: 0.76 MG/DL (ref 0.67–1.17)
DEPRECATED HCO3 PLAS-SCNC: 41 MMOL/L (ref 22–29)
EOSINOPHIL # BLD AUTO: 0.1 10E3/UL (ref 0–0.7)
EOSINOPHIL NFR BLD AUTO: 2 %
ERYTHROCYTE [DISTWIDTH] IN BLOOD BY AUTOMATED COUNT: 13.3 % (ref 10–15)
GFR SERPL CREATININE-BSD FRML MDRD: >90 ML/MIN/1.73M2
GLUCOSE SERPL-MCNC: 101 MG/DL (ref 70–99)
HCT VFR BLD AUTO: 43.4 % (ref 40–53)
HGB BLD-MCNC: 13.1 G/DL (ref 13.3–17.7)
IMM GRANULOCYTES # BLD: 0.3 10E3/UL
IMM GRANULOCYTES NFR BLD: 4 %
LYMPHOCYTES # BLD AUTO: 2.1 10E3/UL (ref 0.8–5.3)
LYMPHOCYTES NFR BLD AUTO: 31 %
MCH RBC QN AUTO: 30.2 PG (ref 26.5–33)
MCHC RBC AUTO-ENTMCNC: 30.2 G/DL (ref 31.5–36.5)
MCV RBC AUTO: 100 FL (ref 78–100)
MONOCYTES # BLD AUTO: 0.7 10E3/UL (ref 0–1.3)
MONOCYTES NFR BLD AUTO: 11 %
NEUTROPHILS # BLD AUTO: 3.4 10E3/UL (ref 1.6–8.3)
NEUTROPHILS NFR BLD AUTO: 51 %
NRBC # BLD AUTO: 0 10E3/UL
NRBC BLD AUTO-RTO: 0 /100
PLATELET # BLD AUTO: 237 10E3/UL (ref 150–450)
POTASSIUM SERPL-SCNC: 4.3 MMOL/L (ref 3.4–5.3)
PROT SERPL-MCNC: 6.6 G/DL (ref 6.4–8.3)
RBC # BLD AUTO: 4.34 10E6/UL (ref 4.4–5.9)
SODIUM SERPL-SCNC: 145 MMOL/L (ref 136–145)
WBC # BLD AUTO: 6.6 10E3/UL (ref 4–11)

## 2023-08-23 PROCEDURE — 36415 COLL VENOUS BLD VENIPUNCTURE: CPT

## 2023-08-23 PROCEDURE — 80053 COMPREHEN METABOLIC PANEL: CPT

## 2023-08-23 PROCEDURE — 99283 EMERGENCY DEPT VISIT LOW MDM: CPT

## 2023-08-23 PROCEDURE — 85025 COMPLETE CBC W/AUTO DIFF WBC: CPT

## 2023-08-23 ASSESSMENT — ACTIVITIES OF DAILY LIVING (ADL)
ADLS_ACUITY_SCORE: 37

## 2023-08-23 NOTE — ED PROVIDER NOTES
ED ATTENDING PHYSICIAN NOTE:   I evaluated this patient in conjunction with Mabel Arce PA-C  I have participated in the care of the patient and personally performed key elements of the history, exam, and medical decision making.      HPI:   Tre Vazquez is a 47 year old male presenting with an ulcer on the left buttocks. He does report he had a fall last night as well attempting to go to the bathroom. He was unable to stand after this occurred. Reports he did lose control of his bladder at the time of the fall. Denies head injury or syncope.  He also has a rash of the left axilla.    Independent Historian:   None - Patient Only    Review of External Notes:   Reviewed nurse triage note from yesterday regarding patient's symptoms and their recommendation to go to the ER or urgent care.     EXAM:   Physical Exam  Vitals and nursing note reviewed.   Constitutional:       General: He is not in acute distress.     Appearance: He is not ill-appearing.   HENT:      Head: Normocephalic and atraumatic. No abrasion, contusion or laceration.      Right Ear: External ear normal.      Left Ear: External ear normal.      Nose: Nose normal.      Mouth/Throat:      Mouth: Mucous membranes are moist.   Pulmonary:      Effort: Pulmonary effort is normal. No respiratory distress.   Chest:      Chest wall: No tenderness.   Abdominal:      General: There is no distension.      Palpations: Abdomen is soft.      Tenderness: There is no abdominal tenderness. There is no guarding or rebound.   Musculoskeletal:         General: No deformity or signs of injury.      Cervical back: Normal range of motion and neck supple. No tenderness.   Skin:     General: Skin is warm and dry.      Findings: Rash (Erythematous maculopapular rash to the left axilla) present.      Comments: Small nickel sized stage II pressure ulcer to the left medial buttocks   Neurological:      Mental Status: He is alert and oriented to person, place, and time.            Laboratory  Labs Ordered and Resulted from Time of ED Arrival to Time of ED Departure   COMPREHENSIVE METABOLIC PANEL - Abnormal       Result Value    Sodium 145      Potassium 4.3      Chloride 100      Carbon Dioxide (CO2) 41 (*)     Anion Gap 4 (*)     Urea Nitrogen 16.4      Creatinine 0.76      Calcium 9.3      Glucose 101 (*)     Alkaline Phosphatase 79      AST 26      ALT 25      Protein Total 6.6      Albumin 4.3      Bilirubin Total 0.2      GFR Estimate >90     CBC WITH PLATELETS AND DIFFERENTIAL - Abnormal    WBC Count 6.6      RBC Count 4.34 (*)     Hemoglobin 13.1 (*)     Hematocrit 43.4            MCH 30.2      MCHC 30.2 (*)     RDW 13.3      Platelet Count 237      % Neutrophils 51      % Lymphocytes 31      % Monocytes 11      % Eosinophils 2      % Basophils 1      % Immature Granulocytes 4      NRBCs per 100 WBC 0      Absolute Neutrophils 3.4      Absolute Lymphocytes 2.1      Absolute Monocytes 0.7      Absolute Eosinophils 0.1      Absolute Basophils 0.0      Absolute Immature Granulocytes 0.3      Absolute NRBCs 0.0       Independent Interpretation (X-rays, CTs, rhythm strip):  None    Consultations/Discussion of Management or Tests:  None     Social Determinants of Health affecting care:   None     MEDICAL DECISION MAKING/ASSESSMENT AND PLAN:   47-year-old male with left buttock wound and left axillary rash.  Left buttock wound appears to be a pressure ulcer.  There is no signs of any associated infection.  This does not appear acutely traumatic.  Wound was cleaned and dressed with Mepilex.  Recommend primary care follow-up for additional wound management.  Left axillary rash is likely fungal.  He appears to have clotrimazole cream on his medication list for groin rash, so we recommend that he start using this for his left axilla as well.     DIAGNOSIS:     ICD-10-CM    1. Decubitus ulcer of ischial area, left, stage II (H)  L89.322       2. Candidiasis  B37.9          DISPOSITION:   The patient was discharged home.     Scribe Disclosure:  I, Coltjose enrique Hired, am serving as a scribe at 10:50 AM on 8/23/2023 to document services personally performed by Jomar Whitfield MD based on my observations and the provider's statements to me.   8/23/2023  [facility] EMERGENCY DEPT     Jomar Whitfield MD  08/23/23 [id number]

## 2023-08-23 NOTE — ED TRIAGE NOTES
Pt. Arrived vis EMS from [facility].  C/O sore on his left buttocks since Monday and discomfort to lay on left side. EMS reports pt. Is wheel chair bound. Afebrile.    ABCs intact. VSS.      Triage Assessment       Row Name 08/23/23 0908       Triage Assessment (Adult)    Airway WDL WDL       Cardiac WDL    Cardiac WDL WDL       Peripheral/Neurovascular WDL    Peripheral Neurovascular WDL WDL

## 2023-08-23 NOTE — ED PROVIDER NOTES
History     Chief Complaint:  Sore on Left buttocks (Sore on Left buttocks)       HPI   Tre Vazquez is a 47 year old male with history of epilepsy, cerebral palsy, encephalomalacia, hypertension, sepsis, respiratory failure, obesity hypoventilation syndrome, toxic metabolic encephalopathy, and hyperammonemic encephalopathy who presents for evaluation of a wound.  Patient states that last night he was walking with his walker in the bathroom when he missed stepped and fell to the floor. He states that he was unable to get up on his own secondary to balance difficulty. EMS was called who evaluated him, but he refused to go to the hospital at the time. Last night, one of the patient's nurses also noticed a wound over his left buttock and wanted to set up an appointment with his primary for evaluation of this. This morning, the patient was complaining of increased pain over this area and EMS was called for more urgent evaluation. He otherwise does not have any complaints of pain and denies hitting his head or loss of consciousness. He denies any other injuries from the fall or incontinence and is not anticoagulated. We discussed the case with one of the staff members from his group home who state that they have not noticed any recent behavior changes. The patient denies fever, upper respiratory symptoms, neck pain, back pain, chest pain, shortness of breath, abdominal pain, nausea, vomiting, diarrhea, or new numbness, tingling, or weakness.    Independent Historian:   The patient and a staff member provide the history via phone.    Review of External Notes:   7/15/23: Evaluation of fall and right knee pain, no fracture or dislocation.  7/7/23: Fall with head injury, scalp laceration, CT head showing no acute intracranial process with brain atrophy and presumed chronic microvascular ischemic changes.    Medications:    ammonium lactate (AMLACTIN) 12 % external cream  busPIRone (BUSPAR) 15 MG tablet  citalopram  (CELEXA) 20 MG tablet  citalopram (CELEXA) 40 MG tablet  clotrimazole (LOTRIMIN) 1 % external solution  lamoTRIgine (LAMICTAL) 200 MG tablet  levETIRAcetam (KEPPRA) 500 MG tablet  levOCARNitine (CARNITOR) 330 MG tablet  methylcellulose (CITRUCEL) powder  multivitamin w/minerals (THERA-VIT-M) tablet  pantoprazole (PROTONIX) 40 MG EC tablet  polyethylene glycol (MIRALAX) 17 GM/Dose powder  prazosin (MINIPRESS) 2 MG capsule  propranolol (INDERAL) 20 MG tablet  QUEtiapine (SEROQUEL XR) 150 MG TB24 24 hr tablet  zonisamide (ZONEGRAN) 100 MG capsule      Past Medical History:    Past Medical History:   Diagnosis Date    Blindness of left eye     Depression 03/10/2014    Hypertension     Pneumococcal septicemia(038.2) (H) 11/26/2013    Pneumonia, organism unspecified(486) 11/26/2013    Uncomplicated asthma     Unspecified epilepsy without mention of intractable epilepsy        Past Surgical History:    Past Surgical History:   Procedure Laterality Date    COLONOSCOPY N/A 12/1/2022    Procedure: COLONOSCOPY;  Surgeon: Michael Caldwell MD;  Location:  OR    ESOPHAGOSCOPY, GASTROSCOPY, DUODENOSCOPY (EGD), COMBINED N/A 12/1/2022    Procedure: ESOPHAGOGASTRODUODENOSCOPY with biopsy;  Surgeon: Michael Caldwell MD;  Location:  OR    ORTHOPEDIC SURGERY      pt stated past surgery on both ankles        Physical Exam   Patient Vitals for the past 24 hrs:   BP Temp Temp src Pulse Resp SpO2   08/23/23 1300 133/84 -- -- 75 -- --   08/23/23 0930 (!) 155/87 -- -- 82 -- 93 %   08/23/23 0906 (!) 166/11 98.7  F (37.1  C) Oral 80 16 98 %   08/23/23 0900 (!) 166/101 -- -- -- -- --        Physical Exam  General: Alert, appears well-developed and well-nourished. Cooperative.     In mild distress. On 4L oxygen, chronic.  HEENT:  Head:  Atraumatic  Ears:  External ears are normal  Mouth/Throat:  Oropharynx is without erythema or exudate and mucous membranes are moist.   Eyes:   Conjunctivae normal and EOM are normal of  right eye. No scleral icterus. Unable to open left eye, chronic.     Right pupil equal, round, and reactive to light.   Neck:   Normal range of motion. Neck supple.  CV:  Normal rate, regular rhythm, normal heart sounds and radial and dorsalis pedis pulses are 2+ and symmetric.  No murmur.  Resp:  Breath sounds are clear bilaterally    Non-labored, no retractions or accessory muscle use  GI:  Abdomen is soft, no distension, no tenderness. No rebound or guarding.  MS:  Decreased range of motion and strength of bilateral lower extremities, chronic. Bilateral edema.    Back atraumatic.  Skin:  1 cm ischial stage II decubitus ulcer of left buttock. Erythematous rash with satellite lesions of left underarm, blanchable. Warm and dry.    Neuro:   Alert. Normal strength.  Sensation intact in all 4 extremities. GCS: 15    Cranial nerves 2-12 intact. Right sided facial droop, chronic per patient.  Psych: Normal mood and affect.    Emergency Department Course   Laboratory:  Labs Ordered and Resulted from Time of ED Arrival to Time of ED Departure   COMPREHENSIVE METABOLIC PANEL - Abnormal       Result Value    Sodium 145      Potassium 4.3      Chloride 100      Carbon Dioxide (CO2) 41 (*)     Anion Gap 4 (*)     Urea Nitrogen 16.4      Creatinine 0.76      Calcium 9.3      Glucose 101 (*)     Alkaline Phosphatase 79      AST 26      ALT 25      Protein Total 6.6      Albumin 4.3      Bilirubin Total 0.2      GFR Estimate >90     CBC WITH PLATELETS AND DIFFERENTIAL - Abnormal    WBC Count 6.6      RBC Count 4.34 (*)     Hemoglobin 13.1 (*)     Hematocrit 43.4            MCH 30.2      MCHC 30.2 (*)     RDW 13.3      Platelet Count 237      % Neutrophils 51      % Lymphocytes 31      % Monocytes 11      % Eosinophils 2      % Basophils 1      % Immature Granulocytes 4      NRBCs per 100 WBC 0      Absolute Neutrophils 3.4      Absolute Lymphocytes 2.1      Absolute Monocytes 0.7      Absolute Eosinophils 0.1      Absolute  Basophils 0.0      Absolute Immature Granulocytes 0.3      Absolute NRBCs 0.0          Procedures   None    Emergency Department Course & Assessments:       Interventions:  Medications - No data to display     Assessments:  0951: Initial evaluation and assessment.  1310: Patient reassessed, discussed mepilex for wound coverage and follow up with family medicine for reevaluation and ongoing wound care. Advised to use clotrimazole of left underarm for     Independent Interpretation (X-rays, CTs, rhythm strip):  None    Consultations/Discussion of Management or Tests:  Patient was seen in conjunction with Dr. Whitfield        Social Determinants of Health affecting care:   None    Disposition:  The patient was discharged to home.     Impression & Plan    CMS Diagnoses: None    Medical Decision Making:  Tre Vazquez is a 47 year old male with history of epilepsy, cerebral palsy, encephalomalacia, hypertension, sepsis, respiratory failure, obesity hypoventilation syndrome, toxic metabolic encephalopathy, and hyperammonemic encephalopathy who presents for evaluation of a wound.  On exam, the patient is noted to have a 1 cm stage II decubitus ulcer of the left buttock and an erythematous rash with satellite lesions over the left axilla.  He does not have any concerning findings for head injury or other injury from the fall.  Patient was hypertensive upon arrival to the ED which improved throughout ED course.  He is afebrile without tachycardia or hypoxia.  Patient is chronically on 4 L of oxygen which was provided in the ER with sufficient O2 saturations.  Blood work was obtained which is notable for an elevated carbon dioxide and a low anion gap, which is consistent with previous findings.  Patient has a very mildly low hemoglobin without any leukocytosis or other concerning electrolyte abnormalities.  At this time, there are no concerning findings for surrounding infection of the ulcer and axillary rash is consistent  with candidiasis.  There are no concerning focal deficits or findings for significant injury from the fall.  We applied a Mepilex bandage over the ulcer and recommended the patient follow-up with his primary care provider for reevaluation and ongoing wound management.   We also recommended he use clotrimazole over his left axilla for treatment of candidiasis.  He was advised to return to the ED for fevers, headache, vision changes, syncope, chest pain, shortness of breath, abdominal pain, nausea, vomiting, diarrhea, worsening pain, or new numbness, tingling, or weakness.  The patient was in agreement with this plan and all questions were answered.      Diagnosis:    ICD-10-CM    1. Decubitus ulcer of ischial area, left, stage II (H)  L89.322       2. Candidiasis  B37.9            Mabel Arce PA-C  8/23/2023        Mabel Arce PA-C  08/23/23 1356

## 2023-08-23 NOTE — DISCHARGE INSTRUCTIONS
-Follow-up with your primary care provider as soon as you are able to for ongoing wound care, reevaluation of symptoms  -Return to ED for fevers, worsening pain, vomiting, diarrhea, numbness, tingling, weakness or any other new concerns.  -Use clotrimazole over left axilla for candidiasis

## 2023-09-11 ENCOUNTER — APPOINTMENT (OUTPATIENT)
Dept: CT IMAGING | Facility: CLINIC | Age: 48
End: 2023-09-11
Attending: EMERGENCY MEDICINE
Payer: MEDICARE

## 2023-09-11 PROCEDURE — 72125 CT NECK SPINE W/O DYE: CPT | Mod: ME

## 2023-09-11 PROCEDURE — G1010 CDSM STANSON: HCPCS

## 2023-09-11 PROCEDURE — 99284 EMERGENCY DEPT VISIT MOD MDM: CPT | Mod: 25

## 2023-09-12 ENCOUNTER — HOSPITAL ENCOUNTER (EMERGENCY)
Facility: CLINIC | Age: 48
Discharge: HOME | End: 2023-09-12
Attending: EMERGENCY MEDICINE | Admitting: EMERGENCY MEDICINE
Payer: MEDICARE

## 2023-09-12 VITALS
HEIGHT: 67 IN | WEIGHT: 275 LBS | BODY MASS INDEX: 43.16 KG/M2 | HEART RATE: 85 BPM | RESPIRATION RATE: 20 BRPM | TEMPERATURE: 98 F | DIASTOLIC BLOOD PRESSURE: 78 MMHG | SYSTOLIC BLOOD PRESSURE: 125 MMHG | OXYGEN SATURATION: 98 %
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
## 2023-09-12 ASSESSMENT — ACTIVITIES OF DAILY LIVING (ADL)
ADLS_ACUITY_SCORE: 35
ADLS_ACUITY_SCORE: 37
ADLS_ACUITY_SCORE: 37

## 2023-09-12 NOTE — ED NOTES
I called the number listed for patient Group home 7 times over the course of several hours with no answer. This number was confirmed by patient and the patients mother. PD was called to go check on group home due to this, PD called back and stated they will being setting up transport. After not hearing from the group [place] for an hour after this we called again and they answered. Report was given and EMS transport set up. Education provided to group home staff about need to answer phone, especially when one of their clients is in the ER. Message was left for the group home director to be made aware of this.

## 2023-09-12 NOTE — ED NOTES
Bed: ED03  Expected date: 9/12/23  Expected time: 12:14 AM  Means of arrival:   Comments:  [facility]

## 2023-09-12 NOTE — ED TRIAGE NOTES
Pt arrives to the ED via EMS due to a fall at his group home. Pt states that he hit his head on the corner of a bed frame. Pt states that he is now having a headache and feeling dizzy/lightheaded. Denies thinners. No LOC. Pt states feeling nauseous as well. H/o seizures. Pt is on 4 liters NC at baseline.

## 2023-09-19 ENCOUNTER — HOSPITAL ENCOUNTER (EMERGENCY)
Facility: CLINIC | Age: 48
Discharge: NURSING, REHAB OR LONG-TERM CARE | End: 2023-09-19
Payer: MEDICARE

## 2023-09-19 ENCOUNTER — APPOINTMENT (OUTPATIENT)
Dept: ULTRASOUND IMAGING | Facility: CLINIC | Age: 48
End: 2023-09-19
Payer: MEDICARE

## 2023-09-19 VITALS
RESPIRATION RATE: 14 BRPM | OXYGEN SATURATION: 97 % | SYSTOLIC BLOOD PRESSURE: 136 MMHG | HEART RATE: 71 BPM | TEMPERATURE: 98.4 F | DIASTOLIC BLOOD PRESSURE: 101 MMHG

## 2023-09-19 DIAGNOSIS — L02.414 ABSCESS OF LEFT FOREARM: ICD-10-CM

## 2023-09-19 LAB
ANION GAP SERPL CALCULATED.3IONS-SCNC: 3 MMOL/L (ref 7–15)
BASOPHILS # BLD AUTO: 0 10E3/UL (ref 0–0.2)
BASOPHILS NFR BLD AUTO: 1 %
BUN SERPL-MCNC: 16.6 MG/DL (ref 6–20)
CALCIUM SERPL-MCNC: 9.4 MG/DL (ref 8.6–10)
CHLORIDE SERPL-SCNC: 99 MMOL/L (ref 98–107)
CREAT SERPL-MCNC: 0.69 MG/DL (ref 0.67–1.17)
DEPRECATED HCO3 PLAS-SCNC: 39 MMOL/L (ref 22–29)
EGFRCR SERPLBLD CKD-EPI 2021: >90 ML/MIN/1.73M2
EOSINOPHIL # BLD AUTO: 0.1 10E3/UL (ref 0–0.7)
EOSINOPHIL NFR BLD AUTO: 2 %
ERYTHROCYTE [DISTWIDTH] IN BLOOD BY AUTOMATED COUNT: 13.2 % (ref 10–15)
GLUCOSE SERPL-MCNC: 106 MG/DL (ref 70–99)
HCT VFR BLD AUTO: 42.4 % (ref 40–53)
HGB BLD-MCNC: 12.7 G/DL (ref 13.3–17.7)
IMM GRANULOCYTES # BLD: 0.1 10E3/UL
IMM GRANULOCYTES NFR BLD: 2 %
LYMPHOCYTES # BLD AUTO: 1.8 10E3/UL (ref 0.8–5.3)
LYMPHOCYTES NFR BLD AUTO: 27 %
MCH RBC QN AUTO: 30 PG (ref 26.5–33)
MCHC RBC AUTO-ENTMCNC: 30 G/DL (ref 31.5–36.5)
MCV RBC AUTO: 100 FL (ref 78–100)
MONOCYTES # BLD AUTO: 0.7 10E3/UL (ref 0–1.3)
MONOCYTES NFR BLD AUTO: 11 %
NEUTROPHILS # BLD AUTO: 3.9 10E3/UL (ref 1.6–8.3)
NEUTROPHILS NFR BLD AUTO: 57 %
NRBC # BLD AUTO: 0 10E3/UL
NRBC BLD AUTO-RTO: 0 /100
PLATELET # BLD AUTO: 170 10E3/UL (ref 150–450)
POTASSIUM SERPL-SCNC: 4.4 MMOL/L (ref 3.4–5.3)
RBC # BLD AUTO: 4.24 10E6/UL (ref 4.4–5.9)
SODIUM SERPL-SCNC: 141 MMOL/L (ref 136–145)
WBC # BLD AUTO: 6.7 10E3/UL (ref 4–11)

## 2023-09-19 PROCEDURE — 36415 COLL VENOUS BLD VENIPUNCTURE: CPT | Performed by: EMERGENCY MEDICINE

## 2023-09-19 PROCEDURE — 10060 I&D ABSCESS SIMPLE/SINGLE: CPT

## 2023-09-19 PROCEDURE — 99284 EMERGENCY DEPT VISIT MOD MDM: CPT | Mod: 25

## 2023-09-19 PROCEDURE — 85025 COMPLETE CBC W/AUTO DIFF WBC: CPT | Performed by: EMERGENCY MEDICINE

## 2023-09-19 PROCEDURE — 82374 ASSAY BLOOD CARBON DIOXIDE: CPT | Performed by: EMERGENCY MEDICINE

## 2023-09-19 PROCEDURE — 76882 US LMTD JT/FCL EVL NVASC XTR: CPT | Mod: LT

## 2023-09-19 RX ORDER — LIDOCAINE HYDROCHLORIDE 10 MG/ML
INJECTION, SOLUTION EPIDURAL; INFILTRATION; INTRACAUDAL; PERINEURAL
Status: DISCONTINUED
Start: 2023-09-19 | End: 2023-09-19 | Stop reason: HOSPADM

## 2023-09-19 RX ORDER — DOXYCYCLINE 100 MG/1
100 CAPSULE ORAL 2 TIMES DAILY
Qty: 14 CAPSULE | Refills: 0 | Status: SHIPPED | OUTPATIENT
Start: 2023-09-19 | End: 2023-09-26

## 2023-09-19 ASSESSMENT — ACTIVITIES OF DAILY LIVING (ADL)
ADLS_ACUITY_SCORE: 37

## 2023-09-19 NOTE — DISCHARGE INSTRUCTIONS
You were seen and evaluated today for a wound on your left forearm.  The ultrasound showed you to have a fluid collection concerning for an abscess.  We performed incision and drainage at bedside and were able to get out quite a bit of purulent drainage.  We sent the contents of the wound for a wound culture and you will get a call if your antibiotic needs to be changed.  We will place you on a course of doxycycline twice a day for 7 days.  Follow-up with your primary care provider closely and call tomorrow for recheck appointment.  In the meantime if you develop spreading redness up the arm, worsening swelling, worsening pain or fever we would want to see you back in the emergency department immediately.

## 2023-09-19 NOTE — ED TRIAGE NOTES
Arrival via EMS from group home. Pt came in with wound to left forearm, worsening past few days. Denies fevers however he 'felt warm this morning.' Antibiotics were ordered at group home but never picked up. Uses 4LNC at group home. Non-ambulatory, uses wheelchair. A&OX4, hx of epilepsy, bipolar, COPD     Triage Assessment       Row Name 09/19/23 1522       Triage Assessment (Adult)    Airway WDL WDL       Respiratory WDL    Respiratory WDL WDL       Skin Circulation/Temperature WDL    Skin Circulation/Temperature WDL WDL       Cardiac WDL    Cardiac WDL WDL       Peripheral/Neurovascular WDL    Peripheral Neurovascular WDL WDL       Cognitive/Neuro/Behavioral WDL    Cognitive/Neuro/Behavioral WDL WDL

## 2023-09-19 NOTE — ED NOTES
Report called to Ashlie at group home. A she also talked with provider Chhaya Schmidt about plan of care.

## 2023-09-19 NOTE — ED PROVIDER NOTES
History     Chief Complaint:  Wound Infection     The history is provided by the patient.      Tre Vazquez is a 48 year old male with history of epilepsy, cerebral palsy, encephalomalacia, hypertension, spastic diplegia, among others presenting via EMS from group home for evaluation of a wound infection. Joel states that he is unsure when the wound appeared on his left arm but says that he noticed the crusting a couple of weeks ago. He denies drainage and pus. He denies a fever. He endorses pain at the site. He notes a history of boils and speaking with Ashlie from his group home she said he is currently on a course of Bactrim for 1 that had been in his axillary area. He states that he does not receive wound care at his group home.    Independent Historian:   None - Patient Only    Review of External Notes:   I reviewed the 9/12/23 ED note where he was seen for a fall.  I reviewed the 9/14/23 [facility] note for a wound check. He was prescribed Bactrim for skin infection in his axillary area.    Medications:    Buspar  Celexa  Lamictal  Keppra  Carnitor  Protonix  Minipress  Inderal  Seroquel  Zonegran  Duoneb  Risperdal  Depakote  Albuterol    Past Medical History:    Blindness of left eye  Hypertension  Pneumococcal septicemia  Pneumonia  Uncomplicated asthma  Unspecified epilepsy  Encephalomalacia  Septic shock  Sepsis  Depression  Acute respiratory failure with hypercapnia  Cerebral palsy  Hyperammonemia  Transaminitis  Schizoaffective disorder  Cerebral ventriculomegaly  GERD  Spastic diplegia  Respiratory acidosis  Psychosis  Dysthymia  Mild intellectual disability    Past Surgical History:    Colonoscopy  EGD  Orthopedic surgery, both ankles    Physical Exam   Patient Vitals for the past 24 hrs:   BP Temp Temp src Pulse Resp SpO2   09/19/23 1639 (!) 136/101 -- -- 71 -- 97 %   09/19/23 1520 125/66 98.4  F (36.9  C) Oral 74 14 96 %      Physical Exam  General: Nontoxic-appearing middle aged male  HENT:   Head:  Atraumatic.  Mouth/Throat: Oropharynx clear and moist.  Eyes: Conjunctive and EOM normal. PERRLA.  Neck: Normal ROM. No rigidity.  CV: Regular rate and rhythm.   Resp: Lungs clear to auscultation bilaterally. Normal respiratory effort.   GI: Abdomen soft, non distended and nontender.   MSK: Normal range of motion.  Skin:   Patient has an area of erythema and fluctuance to the left forearm with some crusting.  Tender to touch and warm.  Photo above.  Neuro: Awake, alert, oriented x 3.  Psych: Normal mood and affect.    Emergency Department Course     Imaging:  1.3 x 1.1 x 0.8cm anechoic area noted on left forearm.  Report per US tech, final read pending    Laboratory:  Labs Ordered and Resulted from Time of ED Arrival to Time of ED Departure   BASIC METABOLIC PANEL - Abnormal       Result Value    Sodium 141      Potassium 4.4      Chloride 99      Carbon Dioxide (CO2) 39 (*)     Anion Gap 3 (*)     Urea Nitrogen 16.6      Creatinine 0.69      Calcium 9.4      Glucose 106 (*)     GFR Estimate >90     CBC WITH PLATELETS AND DIFFERENTIAL - Abnormal    WBC Count 6.7      RBC Count 4.24 (*)     Hemoglobin 12.7 (*)     Hematocrit 42.4            MCH 30.0      MCHC 30.0 (*)     RDW 13.2      Platelet Count 170      % Neutrophils 57      % Lymphocytes 27      % Monocytes 11      % Eosinophils 2      % Basophils 1      % Immature Granulocytes 2      NRBCs per 100 WBC 0      Absolute Neutrophils 3.9      Absolute Lymphocytes 1.8      Absolute Monocytes 0.7      Absolute Eosinophils 0.1      Absolute Basophils 0.0      Absolute Immature Granulocytes 0.1      Absolute NRBCs 0.0     AEROBIC BACTERIAL CULTURE ROUTINE        Procedures     Incision and Drainage     Procedure: Incision and Drainage     Consent: Verbal    Indication: Abscess    Location: Extremity, upper    Size: 2 cm    Ultrasound Guidance: No    Preparation: Povidone-iodine     Anesthesia/Sedation: Lidocaine - 1%     Procedure Detail:    Aspiration:  No  Incision Type: Single straight  Scalpel: 11  Lesion Management: Probed and deloculated   Wound Management: Left open   Packing: None     Patient Status: The patient tolerated the procedure well: Yes. There were no complications.  Culture sent.      Emergency Department Course & Assessments:       Interventions:  Medications   lidocaine (PF) (XYLOCAINE) 1 % injection (has no administration in time range)        Assessments:  1542 I obtained history and examined the patient as noted above.  1725    I performed incision and drainage per procedure noted above.    Independent Interpretation (X-rays, CTs, rhythm strip):  US shows a fluid pocket that I believe would be amenable to I&D.    Consultations/Discussion of Management or Tests:  ED Course as of 09/19/23 1745   Tue Sep 19, 2023   1639 I spoke with Ashlie from the patient's group home. She states that Joel has been taking prescribed Bactrim since 9/14 for a wound on his armpit. The area on his left forearm seemed worse and she believes that he has been picking at it. She does not believe that he has a history of MRSA.   1715      I spoke with the ED pharmacist to agreed with I&D and placement on Doxy.  Will obtain wound culture.    Social Determinants of Health affecting care:   Patient is living in a group home and has cerebral palsy, seizure disorder among other medical problems.    Disposition:  The patient was discharged to home.     Impression & Plan      Medical Decision Making:  Tre is a 48-year-old male with a history of epilepsy, cerebral palsy, encephalomalacia, hypertension, spastic diplegia among other medical issues that presents to the ED for evaluation of a wound on his left forearm per HPI above.  On arrival here he is afebrile and nontoxic-appearing.  No leukocytosis on CBC.  Ultrasound obtained of the left upper extremity showing anechoic fluid collection that I felt was amenable to I&D.  I&D was performed at bedside and purulent drainage was  obtained per above.  Wound was sent for culture.  I discussed the patient's case with the pharmacist who agreed placement on a course of doxycycline is a good choice and we can change the antibiotic in case wound culture tells otherwise.  In the meantime, patient will return to the ED if he develops spreading redness, worsening pain and swelling, fever or any other new concerns.    Diagnosis:    ICD-10-CM    1. Abscess of left forearm  L02.414            Discharge Medications:  New Prescriptions    DOXYCYCLINE HYCLATE (VIBRAMYCIN) 100 MG CAPSULE    Take 1 capsule (100 mg) by mouth 2 times daily for 7 days      Scribe Disclosure:  IEva, am serving as a scribe at 3:50 PM on 9/19/2023 to document services personally performed by Chhaya Schmidt PA-C based on my observations and the provider's statements to me.   9/19/2023   Chhaya Schmidt PA-C Dewing, Jennifer C, PA-C  09/19/23 1744       Chhaya Schmidt PA-C  09/19/23 1741

## 2023-09-21 ENCOUNTER — HOSPITAL ENCOUNTER (EMERGENCY)
Facility: CLINIC | Age: 48
Discharge: NURSING, REHAB OR LONG-TERM CARE | End: 2023-09-21
Attending: EMERGENCY MEDICINE | Admitting: EMERGENCY MEDICINE
Payer: MEDICARE

## 2023-09-21 VITALS
OXYGEN SATURATION: 96 % | SYSTOLIC BLOOD PRESSURE: 125 MMHG | RESPIRATION RATE: 20 BRPM | TEMPERATURE: 97 F | HEART RATE: 66 BPM | DIASTOLIC BLOOD PRESSURE: 70 MMHG

## 2023-09-21 DIAGNOSIS — Z51.89 VISIT FOR WOUND CHECK: ICD-10-CM

## 2023-09-21 PROCEDURE — 99284 EMERGENCY DEPT VISIT MOD MDM: CPT | Mod: 25

## 2023-09-21 PROCEDURE — 76882 US LMTD JT/FCL EVL NVASC XTR: CPT | Mod: LT

## 2023-09-21 RX ORDER — BACITRACIN ZINC 500 [USP'U]/G
OINTMENT TOPICAL 2 TIMES DAILY
Qty: 14 G | Refills: 0 | Status: ON HOLD | OUTPATIENT
Start: 2023-09-21 | End: 2023-12-28

## 2023-09-21 ASSESSMENT — ACTIVITIES OF DAILY LIVING (ADL): ADLS_ACUITY_SCORE: 37

## 2023-09-21 NOTE — ED PROVIDER NOTES
"    History     Chief Complaint:  Wound Check       HPI     Tre Vazquez is a 48 year old male presents with evaluation of left arm wound.  Patient was seen in the ED 2 days prior to arrival with a left arm abscess.  He underwent incision and drainage and discharged home with doxycycline.  He returns to the ED as the Coban that was placed is \"too tight and itches.\"  He is also requesting antibiotic ointment to place of the time.  He reports that his pain is otherwise improved and denies fever, vomiting or novel symptoms.      Independent Historian:    None    Review of External Notes:  None      Medications:    bacitracin 500 UNIT/GM external ointment  ammonium lactate (AMLACTIN) 12 % external cream  busPIRone (BUSPAR) 15 MG tablet  citalopram (CELEXA) 20 MG tablet  citalopram (CELEXA) 40 MG tablet  clotrimazole (LOTRIMIN) 1 % external solution  doxycycline hyclate (VIBRAMYCIN) 100 MG capsule  lamoTRIgine (LAMICTAL) 200 MG tablet  levETIRAcetam (KEPPRA) 500 MG tablet  levOCARNitine (CARNITOR) 330 MG tablet  methylcellulose (CITRUCEL) powder  multivitamin w/minerals (THERA-VIT-M) tablet  pantoprazole (PROTONIX) 40 MG EC tablet  polyethylene glycol (MIRALAX) 17 GM/Dose powder  prazosin (MINIPRESS) 2 MG capsule  propranolol (INDERAL) 20 MG tablet  QUEtiapine (SEROQUEL XR) 150 MG TB24 24 hr tablet  zonisamide (ZONEGRAN) 100 MG capsule        Past Medical History:    Past Medical History:   Diagnosis Date    Blindness of left eye     Depression 03/10/2014    Hypertension     Pneumococcal septicemia(038.2) (H) 11/26/2013    Pneumonia, organism unspecified(486) 11/26/2013    Uncomplicated asthma     Unspecified epilepsy without mention of intractable epilepsy        Past Surgical History:    Past Surgical History:   Procedure Laterality Date    COLONOSCOPY N/A 12/1/2022    Procedure: COLONOSCOPY;  Surgeon: Michael Caldwell MD;  Location:  OR    ESOPHAGOSCOPY, GASTROSCOPY, DUODENOSCOPY (EGD), COMBINED N/A " 12/1/2022    Procedure: ESOPHAGOGASTRODUODENOSCOPY with biopsy;  Surgeon: Michael Caldwell MD;  Location: [facility] OR    ORTHOPEDIC SURGERY      pt stated past surgery on both ankles          Physical Exam   Patient Vitals for the past 24 hrs:   BP Temp Temp src Pulse Resp SpO2   09/21/23 0030 113/81 -- -- 68 -- 94 %   09/21/23 0028 (!) 137/108 97  F (36.1  C) Temporal 68 20 94 %        Physical Exam      Eyes:    Conjunctiva normal  Neck:     Supple, no meningismus.     CV:     Regular rate and rhythm.      No murmurs, rubs or gallops.    PULM:    Clear to auscultation bilateral.       No respiratory distress.      Good air exchange.  MSK:     No gross deformity to all four extremities.   LYMPH:   No cervical lymphadenopathy.  NEURO:   Alert     Speech is clear  Skin:    Warm, dry     Left forearm      Well-healing forearm cellulitis with blanching erythema without induration, fluctuance and minimal tenderness       Non warm to touch, no purulent drainage  Psych:    Mood is good and affect is appropriate.      Emergency Department Course     Imaging:  POC US SOFT TISSUE   Preliminary Result   [facility] Procedure Note       Limited Bedside ED Ultrasound of Soft Tissue:      PROCEDURE: PERFORMED BY: Dr. Eloy Wood MD   INDICATIONS/SYMPTOM: Skin redness, evaluate for abscess, cellulitis or foreign body   PROBE: High frequency linear probe   BODY LOCATION: Soft tissue located on extremity       FINDINGS: Cobblestoning of soft tissue: absent    Hypoechoic fluid (ie abscess) identified: absent       INTERPRETATION:  The soft tissue and muscle layers were evaluated.       Findings indicate no residual abscess      IMAGE DOCUMENTATION: Images were archived to PACs system.              Report per myself        Emergency Department Course & Assessments:      Independent Interpretation (X-rays, CTs, rhythm strip):  None    Consultations/Discussion of Management or Tests:  None       Social Determinants  "of Health affecting care:  None     Disposition:  The patient was discharged to home.     Impression & Plan      Medical Decision Makin-year-old male presents with wound recheck of his left arm in which he had a left forearm abscess and underwent I&D 2 days prior.  There is no residual abscess clinically or on bedside ultrasound.  Cellulitis is clearly improving.  Patient's primary concern is the Coban was \"too tight and itchy.\"  Bandage was removed and novel dressing placed.  He is also requesting antibiotic ointment.  Bacitracin ordered.  Patient safe for discharge back home.    Diagnosis:    ICD-10-CM    1. Visit for wound check  Z51.89            Discharge Medications:  New Prescriptions    BACITRACIN 500 UNIT/GM EXTERNAL OINTMENT    Apply topically 2 times daily          2023   Eloy Wood MD Matthews, Jeremiah R, MD  23 0045    "

## 2023-09-21 NOTE — ED NOTES
Group home numbers as provided by EMS are [phone]. Report called to Ashlie at this number and gave report.

## 2023-09-21 NOTE — ED TRIAGE NOTES
Pt BIBA from group home for a known abscess which was itching and peeling. Pt also states that the coban which was applied on Monday, his last visit, was too tight and nobody from his group hoe would remove it. Abscess is quarter sized, no noted warmness, pink and oozing. VSS on 4lpm NC which is pt baseline.

## 2023-10-03 ENCOUNTER — APPOINTMENT (OUTPATIENT)
Dept: CT IMAGING | Facility: CLINIC | Age: 48
End: 2023-10-03
Payer: MEDICARE

## 2023-10-03 ENCOUNTER — HOSPITAL ENCOUNTER (EMERGENCY)
Facility: CLINIC | Age: 48
Discharge: NURSING, REHAB OR LONG-TERM CARE | End: 2023-10-03
Attending: EMERGENCY MEDICINE | Admitting: EMERGENCY MEDICINE
Payer: MEDICARE

## 2023-10-03 VITALS
RESPIRATION RATE: 16 BRPM | SYSTOLIC BLOOD PRESSURE: 114 MMHG | OXYGEN SATURATION: 96 % | DIASTOLIC BLOOD PRESSURE: 78 MMHG | HEART RATE: 66 BPM | TEMPERATURE: 97.8 F

## 2023-10-03 DIAGNOSIS — S09.90XA CLOSED HEAD INJURY, INITIAL ENCOUNTER: ICD-10-CM
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
## 2023-10-03 LAB
ATRIAL RATE - MUSE: 72 BPM
DIASTOLIC BLOOD PRESSURE - MUSE: NORMAL MMHG
INTERPRETATION ECG - MUSE: NORMAL
P AXIS - MUSE: 67 DEGREES
PR INTERVAL - MUSE: 194 MS
QRS DURATION - MUSE: 90 MS
QT - MUSE: 424 MS
QTC - MUSE: 464 MS
R AXIS - MUSE: 30 DEGREES
SYSTOLIC BLOOD PRESSURE - MUSE: NORMAL MMHG
T AXIS - MUSE: 50 DEGREES
VENTRICULAR RATE- MUSE: 72 BPM

## 2023-10-03 PROCEDURE — 70450 CT HEAD/BRAIN W/O DYE: CPT | Mod: MF

## 2023-10-03 PROCEDURE — 93005 ELECTROCARDIOGRAM TRACING: CPT

## 2023-10-03 PROCEDURE — 72125 CT NECK SPINE W/O DYE: CPT | Mod: MF

## 2023-10-03 PROCEDURE — 99285 EMERGENCY DEPT VISIT HI MDM: CPT | Mod: 25

## 2023-10-03 ASSESSMENT — ACTIVITIES OF DAILY LIVING (ADL)
ADLS_ACUITY_SCORE: 37
ADLS_ACUITY_SCORE: 37

## 2023-10-03 NOTE — ED NOTES
Bed: DILLON  Expected date: 10/3/23  Expected time: 9:26 AM  Means of arrival:   Comments:  EMS 48M to [facility]

## 2023-10-03 NOTE — ED PROVIDER NOTES
Emergency Department Attending Supervision Note  10/3/2023  11:56 AM      I evaluated this patient in conjunction with Mabel Nowak PA-C.      Briefly, the patient presented with with a fall.  He has a history of epilepsy and cervical palsy his right transition from the recliner to wheelchair when he tripped and fell hitting his head.  No loss of consciousness.  He is not on blood thinners.  He does state he has some mild headache and neck pain.  He is oxygen dependent at home.  No numbness tingling or other concerns.      On my exam,   Constitutional: Vital signs reviewed.  Pleasant.  HEENT: Moist mucous membranes, no hematoma or bony deformities of the scalp.  No lacerations.  Cardiovascular: Regular rate and rhythm  Pulmonary/Chest: Breathing comfortably on room air.  No audible wheezing  Musculoskeletal/Extremities: No bony deformities.  Moves all 4 extremities without difficulty.  Neurological: Alert.  No focal deficits.  Cranial nerves 2 through 12 intact bilaterally.  No midline neck tenderness.  Normal strength throughout all 4 extremities.  Endo: No pitting edema  Skin: No visible rash.  Psychiatric: Pleasant.      Results: CT scan of the head and C-spine's were negative.  EKG shows sinus rhythm with a rate of 72 and no ischemia.    ED course:    My impression is mechanical fall without loss of consciousness.  Given his comorbidities and his history of cerebral palsy we did obtain CT scan of the head and neck.  Both these were negative.  I was able to clinically clear his C-spine's.  EKG was unremarkable.  Patient's only concern is getting something to drink.  He will be discharged back to his facility.  Ibuprofen Tylenol as needed.        Diagnosis    ICD-10-CM    1. Fall, initial encounter  W19.XXXA       2. Closed head injury, initial encounter  S09.90XA             Romaine Gr MD Walters, Brent Aaron, MD  10/03/23 1205

## 2023-10-03 NOTE — ED TRIAGE NOTES
Arrives via EMS for fall out of bed PTA. Comes from group home. Rolled out of bed. Hit top of head on headboard of bed. No lac or hematoma noted. Not on thinners. No LOC. Chronically on 4 liters oximask. VSS. ABCs intact. A/Ox4

## 2023-10-03 NOTE — ED PROVIDER NOTES
History     Chief Complaint:  Fall       HPI   Tre Vazquez is a 48 year old male with a history of epilepsy, cerebral palsy, encephalomalacia, HTN, acute respiratory failure, obesity hypoventilation syndrome, and encephalopathy, who presents for evaluation of a fall. The patient states that this morning he was at his group home trying to transition from the recliner to a wheelchair when he tripped and fell hitting his head. He did not lose consciousness, denies vision changes, or any new lightheadedness or dizziness. He notes that he has had neck pain from the incident as well, but denies any other new areas of pain. Of note, the patient is on 3L of oxygen at home. He denies any recent fevers, upper respiratory symptoms, chest pain, shortness of breath, abdominal pain, or new numbness, tingling, or weakness of his upper/lower extremities.     Independent Historian:   None - Patient Only    Review of External Notes:   9/12/2023: Patient evaluated for fall.  Obtained head and neck CT which showed no acute abnormalities, patient discharged home.    Medications:    ammonium lactate (AMLACTIN) 12 % external cream  bacitracin 500 UNIT/GM external ointment  busPIRone (BUSPAR) 15 MG tablet  citalopram (CELEXA) 20 MG tablet  citalopram (CELEXA) 40 MG tablet  clotrimazole (LOTRIMIN) 1 % external solution  lamoTRIgine (LAMICTAL) 200 MG tablet  levETIRAcetam (KEPPRA) 500 MG tablet  levOCARNitine (CARNITOR) 330 MG tablet  methylcellulose (CITRUCEL) powder  multivitamin w/minerals (THERA-VIT-M) tablet  pantoprazole (PROTONIX) 40 MG EC tablet  polyethylene glycol (MIRALAX) 17 GM/Dose powder  prazosin (MINIPRESS) 2 MG capsule  propranolol (INDERAL) 20 MG tablet  QUEtiapine (SEROQUEL XR) 150 MG TB24 24 hr tablet  zonisamide (ZONEGRAN) 100 MG capsule      Past Medical History:    Past Medical History:   Diagnosis Date    Blindness of left eye     Depression 03/10/2014    Hypertension     Pneumococcal septicemia(038.2) (H)  11/26/2013    Pneumonia, organism unspecified(486) 11/26/2013    Uncomplicated asthma     Unspecified epilepsy without mention of intractable epilepsy        Past Surgical History:    Past Surgical History:   Procedure Laterality Date    COLONOSCOPY N/A 12/1/2022    Procedure: COLONOSCOPY;  Surgeon: Michael Caldwell MD;  Location: [facility] OR    ESOPHAGOSCOPY, GASTROSCOPY, DUODENOSCOPY (EGD), COMBINED N/A 12/1/2022    Procedure: ESOPHAGOGASTRODUODENOSCOPY with biopsy;  Surgeon: Michael Caldwell MD;  Location: [facility] OR    ORTHOPEDIC SURGERY      pt stated past surgery on both ankles        Physical Exam   Patient Vitals for the past 24 hrs:   BP Temp Temp src Pulse Resp SpO2   10/03/23 1330 114/78 -- -- 66 -- --   10/03/23 1157 118/65 -- -- 66 16 96 %   10/03/23 1135 133/78 -- -- 68 16 96 %   10/03/23 1015 128/69 -- -- 69 -- 99 %   10/03/23 0939 119/71 97.8  F (36.6  C) Temporal 72 20 98 %        Physical Exam  General: Alert, appears well-developed and well-nourished. Cooperative.     In mild distress  HEENT:  Head:  Atraumatic  Ears:  External ears are normal  Mouth/Throat:  Oropharynx is without erythema or exudate and mucous membranes are dry.   Eyes:   Conjunctivae normal and EOM are normal of right. Unable to open left eye, chronic. No scleral icterus.    Pupil is round and reactive to light.   Neck:   Pain with Normal range of motion. Neck supple.  CV:  Normal rate, regular rhythm, normal heart sounds and radial and dorsalis pedis pulses are 2+ and symmetric.  No murmur.  Resp:  Breath sounds are clear bilaterally    Non-labored, no retractions or accessory muscle use  GI:  Mild TTP throughout, states this is due to needing to urinate. Abdomen is soft, no distension. No rebound or guarding.  MS:  Back atraumatic.    Mild TTP over cervical spine, pain with range of motion. No TTP throughout thoracic or lumbar spine. Well-healed lumbar surgical scar in the midline.  Anterior chest wall and posterior  thorax nontender to palpation.  Pelvis stable.  Extremities x4 without bony deformity, no instability, tenderness to palpation, or painful range of motion.   Skin:  Warm and dry.  No rash or lesions noted.  Neuro:   Alert. Normal strength.  Sensation intact in all 4 extremities. GCS: 15    Sensorimotor intact extremities x 4.  Psych: Normal mood and affect.    Emergency Department Course   ECG  ECG results from 10/03/23   EKG 12-lead, tracing only     Value    Systolic Blood Pressure     Diastolic Blood Pressure     Ventricular Rate 72    Atrial Rate 72    WA Interval 194    QRS Duration 90        QTc 464    P Axis 67    R AXIS 30    T Axis 50    Interpretation ECG      Sinus rhythm  Normal ECG  When compared with ECG of 12-MAY-2023 00:21,  No significant change was found  Interpreted by Dr. Gr     Imaging:  CT Cervical Spine w/o Contrast   Final Result   IMPRESSION: No acute cervical spine fracture.       MEG TENORIO MD            SYSTEM ID:  [id number]      CT Head w/o Contrast   Final Result   IMPRESSION:   No intracranial hemorrhage, mass, or definite CT evidence of recent   ischemia.      MEG TENORIO MD            SYSTEM ID:  [id number]         Report per radiology    Procedures   None    Emergency Department Course & Assessments:    Interventions:  Medications - No data to display     Assessments:  1008: Initial evaluation and assessment.    Independent Interpretation (X-rays, CTs, rhythm strip):  None    Consultations/Discussion of Management or Tests:  Patient was seen in conjunction with Dr. Gr     Social Determinants of Health affecting care:   None    Disposition:  The patient was discharged to home.     Impression & Plan    CMS Diagnoses: None    Medical Decision Making:  Tre Vazquez is a 48 year old male with a history of epilepsy, cerebral palsy, encephalomalacia, HTN, acute respiratory failure, obesity hypoventilation syndrome, and encephalopathy, who presents for  evaluation of a fall.  On exam, the patient has no obvious focal neurologic deficits in setting of history of cerebral palsy.  He has tenderness palpation over the cervical spine, without any other areas of pain over his bilateral upper or lower extremities.  Vital signs show no evidence of hypotension, fever, tachycardia, or hypoxia on 4 L/min, which is consistent with home oxygen.  EKG shows normal sinus rhythm without any ischemic changes.  Given head injury and neck pain that occurred from the fall, we elected to proceed with CT of the cervical spine and head, which fortunately showed no acute fractures or intracranial abnormalities respectively.  At this time, there are no other indications for further testing, and patient's main concern is getting soda to drink.  We discussed that he should return to the ER in the interim for any new or worsening symptoms.  The patient was in agreement with this plan and all questions were answered.      Diagnosis:    ICD-10-CM    1. Fall, initial encounter  W19.XXXA       2. Closed head injury, initial encounter  S09.90XA          Mabel Arce PA-C  10/3/2023        Mabel Arce PA-C  10/03/23 1759

## 2023-10-04 NOTE — ED NOTES
Received call from lab today stating that they lost the swab for the abscess aerobic bacterial culture- gram stain. I asked Dr. Avla if pt needs to come back since swab was from a couple of weeks ago and patient was here in ED yesterday with a different complaint. Dr. Alva denied need for patient to come back.

## 2023-10-25 ENCOUNTER — LAB (OUTPATIENT)
Dept: LAB | Facility: CLINIC | Age: 48
End: 2023-10-25
Payer: MEDICARE

## 2023-10-25 DIAGNOSIS — G40.909 SEIZURE DISORDER (H): Primary | ICD-10-CM

## 2023-10-25 DIAGNOSIS — F31.32 MODERATE BIPOLAR I DISORDER, MOST RECENT EPISODE DEPRESSED (H): ICD-10-CM

## 2023-10-25 LAB
HOLD SPECIMEN: NORMAL
VALPROATE SERPL-MCNC: 72.8 UG/ML

## 2023-10-25 PROCEDURE — 80164 ASSAY DIPROPYLACETIC ACD TOT: CPT

## 2023-10-25 PROCEDURE — 36415 COLL VENOUS BLD VENIPUNCTURE: CPT

## 2023-10-29 ENCOUNTER — HEALTH MAINTENANCE LETTER (OUTPATIENT)
Age: 48
End: 2023-10-29

## 2023-11-07 ENCOUNTER — MYC MEDICAL ADVICE (OUTPATIENT)
Dept: NEUROLOGY | Facility: CLINIC | Age: 48
End: 2023-11-07
Payer: MEDICARE

## 2023-11-18 ENCOUNTER — APPOINTMENT (OUTPATIENT)
Dept: CT IMAGING | Facility: CLINIC | Age: 48
End: 2023-11-18
Payer: MEDICARE

## 2023-11-18 ENCOUNTER — HOSPITAL ENCOUNTER (EMERGENCY)
Facility: CLINIC | Age: 48
Discharge: NURSING, REHAB OR LONG-TERM CARE | End: 2023-11-18
Payer: MEDICARE

## 2023-11-18 VITALS
OXYGEN SATURATION: 95 % | RESPIRATION RATE: 16 BRPM | TEMPERATURE: 98 F | HEART RATE: 72 BPM | DIASTOLIC BLOOD PRESSURE: 92 MMHG | SYSTOLIC BLOOD PRESSURE: 128 MMHG

## 2023-11-18 DIAGNOSIS — M54.2 NECK PAIN: ICD-10-CM

## 2023-11-18 DIAGNOSIS — S09.90XA INJURY OF HEAD, INITIAL ENCOUNTER: ICD-10-CM
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
## 2023-11-18 PROCEDURE — G1010 CDSM STANSON: HCPCS

## 2023-11-18 PROCEDURE — 99284 EMERGENCY DEPT VISIT MOD MDM: CPT | Mod: 25

## 2023-11-18 ASSESSMENT — ACTIVITIES OF DAILY LIVING (ADL)
ADLS_ACUITY_SCORE: 37
ADLS_ACUITY_SCORE: 37

## 2023-11-18 NOTE — ED TRIAGE NOTES
Fall when moving from chair to wheelchair. Possibly fell foreword into a bookcase. Current mentation is baseline for pt.     On home oxygen 3L. At arrival, was on 4L/NC. Placed on Oxymask 3L at arrival with sats 95%. Lungs clear. Respirations are even and easy.    Pt has a known ulcer left side of buttocks.

## 2023-11-18 NOTE — ED PROVIDER NOTES
History     Chief Complaint:  Fall       The history is limited by a developmental delay.      Tre Vazquez is a wheel chair bound 48 year old male with a past medical history of HTN, epilepsy, spastic diplegia, chronic respiratory failure, encephalomalacia, and cerebral palsy who presents by EMS from his group home after an unwitnessed fall  prior to arrival. Patient states that he fell out of his wheelchair this morning in a unwitnessed fall.  This was a mechanical fall and transitioning out of his wheelchair.  He states that he hit the back of head on a bookcase. He is having neck pain and headache. He denies vomiting or increased confusion. Patient states that he lives at a group home.  He has a history of frequent visits to the emergency department and frequent falls.  Patient is normally on 3L of home oxygen for his chronic hypoxic respiratory failure.      Independent Historian:   Patient supplies history.  I called his group home and they said that fall was mechanical and hit the back of his head into a bookcase. They sent him to be evaluated for injury.    Review of External Notes:   Patient has been a frequent flyer to the ED-  10/3/23 Seen for unwitnessed fall, had negative CT head and neck  9/21/23 Seen for wound recheck  9/19/23 Seen for forearm abscess  9/11/23 See for fall    Medications:    Buspar  Celexa  Lamictal  Keppra  Carnitor  Protonix  Minipress  Inderal  Seroquel  Zonegran  Duoneb  Risperdal  Depakote  Albuterol     Past Medical History:    Blindness of left eye  Hypertension  Pneumococcal septicemia  Uncomplicated asthma  Unspecified epilepsy  Encephalomalacia  Septic shock  Sepsis  Depression  Acute respiratory failure with hypercapnia  Cerebral palsy  Hyperammonemia  Transaminitis  Schizoaffective disorder  Cerebral ventriculomegaly  GERD  Spastic diplegia  Respiratory acidosis  Psychosis  Dysthymia  Mild intellectual disability  Bipolar disorder  Prediabetes  ANA     Past Surgical  History:    Colonoscopy  EGD  Orthopedic surgery, both ankles       Physical Exam   Patient Vitals for the past 24 hrs:   BP Temp Temp src Pulse Resp SpO2   11/18/23 1216 (!) 128/92 98  F (36.7  C) Temporal 72 16 95 %        Physical Exam  General: Nontoxic-appearing adult male.  Elevated BMI.  HENT:   Ears: No hemotympanum.  Head: No raccoon eyes or michel signs.  No wounds or lacerations.  Mouth/Throat: Oropharynx clear and moist.  Eyes: Conjunctive and EOM normal. PERRLA.  Neck: Patient has mild tenderness to palpation over the C-spine and complains of pain on range of motion.  CV: Regular rate and rhythm. Normal S1, S2. No appreciable murmurs.  Resp: Lungs clear to auscultation bilaterally. No cough observed.  Patient is on baseline home oxygen and has nasal cannula in place.  GI: Abdomen soft, non distended and nontender.  MSK: Wheelchair-bound at baseline.  No bony tenderness in extremities.  No bony tenderness over chest wall.  Skin: Warm and dry.    Neuro: Awake, alert, oriented x 3.  Cerebral palsy at baseline and wheelchair-bound.  Cranial nerves II through XII intact.  Psych: Normal mood and affect.      Emergency Department Course     Imaging:  Cervical spine CT w/o contrast   Final Result   IMPRESSION:   HEAD CT:   1.  No CT evidence for acute intracranial process.   2.  Brain atrophy and presumed chronic microvascular ischemic changes similar to the previous exam.      CERVICAL SPINE CT:   1.  No CT evidence for acute fracture or post traumatic subluxation.   2.  Advanced multilevel cervical spondylosis similar to findings on the previous exam as above.         Head CT w/o contrast   Final Result   IMPRESSION:   HEAD CT:   1.  No CT evidence for acute intracranial process.   2.  Brain atrophy and presumed chronic microvascular ischemic changes similar to the previous exam.      CERVICAL SPINE CT:   1.  No CT evidence for acute fracture or post traumatic subluxation.   2.  Advanced multilevel cervical  spondylosis similar to findings on the previous exam as above.              Emergency Department Course & Assessments:    Assessments:  1pm Obtained the patients history and performed initial exam  1407 Rechecked the patient and updated him on findings    Independent Interpretation (X-rays, CTs, rhythm strip):  No bleed on head CT    Consultations/Discussion of Management or Tests:  ED Course as of 11/18/23 1406   Sat Nov 18, 2023   1311 Called patients group home, left a message at the group home and told them to call back       Social Determinants of Health affecting care:   None    Disposition:  The patient was discharged to home.     Impression & Plan      Medical Decision Making:  Tre Vazquez is a wheel chair bound 48 year old male with a past medical history of HTN, epilepsy, spastic diplegia, chronic respiratory failure, encephalomalacia, and cerebral palsy who presented by EMS from his group home after an unwitnessed fall  prior to arrival.  He is a frequent visitor to the emergency department.  He complained of a headache and neck pain.  He has not anticoagulated.  He is neurologically intact without focal deficits.  GCS 15.  CT head negative for bleed.  CT C-spine negative for any acute fracture or subluxation.  No other injuries incurred in this event.  Patient is neurologically intact with normal vital signs.  At this juncture, patient safe for discharge home.  Was sent back to his group home by EMS after reassuring imaging.  Return precautions for head injuries discussed at time of discharge and provided in writing.      Diagnosis:    ICD-10-CM    1. Fall, initial encounter  W19.XXXA       2. Injury of head, initial encounter  S09.90XA       3. Neck pain  M54.2            Scribe Disclosure:  I, Luis Armando Hayes, am serving as a scribe at 12:48 PM on 11/18/2023 to document services personally performed by Chhaya Schmidt PA-C based on my observations and the provider's statements to me.      11/18/2023   Chhaya Schmidt PA-C Dewing, Jennifer C, PA-C  11/18/23 1822

## 2023-11-20 ENCOUNTER — APPOINTMENT (OUTPATIENT)
Dept: CT IMAGING | Facility: CLINIC | Age: 48
End: 2023-11-20
Attending: EMERGENCY MEDICINE
Payer: MEDICARE

## 2023-11-20 ENCOUNTER — HOSPITAL ENCOUNTER (EMERGENCY)
Facility: CLINIC | Age: 48
Discharge: HOME | End: 2023-11-20
Attending: EMERGENCY MEDICINE | Admitting: EMERGENCY MEDICINE
Payer: MEDICARE

## 2023-11-20 VITALS
SYSTOLIC BLOOD PRESSURE: 175 MMHG | TEMPERATURE: 97.9 F | HEART RATE: 80 BPM | OXYGEN SATURATION: 97 % | RESPIRATION RATE: 18 BRPM | DIASTOLIC BLOOD PRESSURE: 80 MMHG

## 2023-11-20 DIAGNOSIS — S06.0X0A CONCUSSION WITHOUT LOSS OF CONSCIOUSNESS, INITIAL ENCOUNTER: ICD-10-CM

## 2023-11-20 DIAGNOSIS — L03.317 ABSCESS AND CELLULITIS OF GLUTEAL REGION: ICD-10-CM

## 2023-11-20 DIAGNOSIS — L02.31 ABSCESS AND CELLULITIS OF GLUTEAL REGION: ICD-10-CM

## 2023-11-20 LAB
ANION GAP SERPL CALCULATED.3IONS-SCNC: 7 MMOL/L (ref 7–15)
BASOPHILS # BLD AUTO: 0 10E3/UL (ref 0–0.2)
BASOPHILS NFR BLD AUTO: 0 %
BUN SERPL-MCNC: 15.1 MG/DL (ref 6–20)
CALCIUM SERPL-MCNC: 8.7 MG/DL (ref 8.6–10)
CHLORIDE SERPL-SCNC: 98 MMOL/L (ref 98–107)
CREAT SERPL-MCNC: 0.69 MG/DL (ref 0.67–1.17)
DEPRECATED HCO3 PLAS-SCNC: 35 MMOL/L (ref 22–29)
EGFRCR SERPLBLD CKD-EPI 2021: >90 ML/MIN/1.73M2
EOSINOPHIL # BLD AUTO: 0.1 10E3/UL (ref 0–0.7)
EOSINOPHIL NFR BLD AUTO: 1 %
ERYTHROCYTE [DISTWIDTH] IN BLOOD BY AUTOMATED COUNT: 13.6 % (ref 10–15)
GLUCOSE SERPL-MCNC: 98 MG/DL (ref 70–99)
HCT VFR BLD AUTO: 40.9 % (ref 40–53)
HGB BLD-MCNC: 12.4 G/DL (ref 13.3–17.7)
IMM GRANULOCYTES # BLD: 0.1 10E3/UL
IMM GRANULOCYTES NFR BLD: 1 %
LYMPHOCYTES # BLD AUTO: 2.5 10E3/UL (ref 0.8–5.3)
LYMPHOCYTES NFR BLD AUTO: 23 %
MCH RBC QN AUTO: 30.2 PG (ref 26.5–33)
MCHC RBC AUTO-ENTMCNC: 30.3 G/DL (ref 31.5–36.5)
MCV RBC AUTO: 100 FL (ref 78–100)
MONOCYTES # BLD AUTO: 1.3 10E3/UL (ref 0–1.3)
MONOCYTES NFR BLD AUTO: 12 %
NEUTROPHILS # BLD AUTO: 6.7 10E3/UL (ref 1.6–8.3)
NEUTROPHILS NFR BLD AUTO: 63 %
NRBC # BLD AUTO: 0 10E3/UL
NRBC BLD AUTO-RTO: 0 /100
PLATELET # BLD AUTO: 177 10E3/UL (ref 150–450)
POTASSIUM SERPL-SCNC: 4.2 MMOL/L (ref 3.4–5.3)
RBC # BLD AUTO: 4.11 10E6/UL (ref 4.4–5.9)
SODIUM SERPL-SCNC: 140 MMOL/L (ref 135–145)
WBC # BLD AUTO: 10.7 10E3/UL (ref 4–11)

## 2023-11-20 PROCEDURE — 70450 CT HEAD/BRAIN W/O DYE: CPT | Mod: MA

## 2023-11-20 PROCEDURE — 99284 EMERGENCY DEPT VISIT MOD MDM: CPT | Mod: 25

## 2023-11-20 PROCEDURE — 36415 COLL VENOUS BLD VENIPUNCTURE: CPT | Performed by: EMERGENCY MEDICINE

## 2023-11-20 PROCEDURE — 82310 ASSAY OF CALCIUM: CPT | Performed by: EMERGENCY MEDICINE

## 2023-11-20 PROCEDURE — 85014 HEMATOCRIT: CPT | Performed by: EMERGENCY MEDICINE

## 2023-11-20 RX ORDER — DOXYCYCLINE 100 MG/1
100 CAPSULE ORAL 2 TIMES DAILY
Qty: 20 CAPSULE | Refills: 0 | Status: SHIPPED | OUTPATIENT
Start: 2023-11-20 | End: 2023-11-30

## 2023-11-20 RX ORDER — ONDANSETRON 2 MG/ML
4 INJECTION INTRAMUSCULAR; INTRAVENOUS ONCE
Status: COMPLETED | OUTPATIENT
Start: 2023-11-20 | End: 2023-11-20

## 2023-11-20 RX ORDER — ONDANSETRON 4 MG/1
4 TABLET, ORALLY DISINTEGRATING ORAL EVERY 6 HOURS PRN
Qty: 10 TABLET | Refills: 0 | Status: ON HOLD | OUTPATIENT
Start: 2023-11-20 | End: 2023-12-28

## 2023-11-20 ASSESSMENT — ACTIVITIES OF DAILY LIVING (ADL)
ADLS_ACUITY_SCORE: 37

## 2023-11-20 NOTE — ED PROVIDER NOTES
History     Chief Complaint:  Dizziness     The history is provided by the patient.      Tre Vazquez is a 48 year old male with a history of mental illness, cerebral palsy, and chronic respiratory failure on home oxygen who presents with dizziness.  He tells me he fell yesterday and since has felt confused and dizzy which he describes to be lightheaded.  He was seen in this emergency department for fall 2 days ago and, when reminded of this, he recalls that the fall was actually 2 days ago and not yesterday.  He denies that he had a second fall.  He describes nausea with poor oral intake but no vomiting.  He denies headache, vision changes, or chest pain.  He remarks his symptoms feel similar to when he has had a concussion diagnosed in the past.    Later he remarks that he has had pain in his left buttocks for the last 2 months and sometimes sees bloody discharge in his recliner.  He requests I evaluate this.  He has not had fever.    Independent Historian:   None - Patient Only    Review of External Notes:   I reviewed his ED note for an unwitnessed fall 11/18/23. I also reviewed paperwork provided by [facility].    Medications:    Buspar  Celexa  Lamictal  Keppra  Carnitor  Protonix  Minipress  Inderal  Seroquel  Zonegran  Duoneb  Risperdal  Depakote  Albuterol     Past Medical History:    Cerebral palsy  Blindness of left eye  Hypertension  Pneumococcal septicemia  Uncomplicated asthma  Unspecified epilepsy  Encephalomalacia  Septic shock  Sepsis  Depression  Acute respiratory failure with hypercapnia  Cerebral palsy  Hyperammonemia  Transaminitis  Schizoaffective disorder  Cerebral ventriculomegaly  GERD  Spastic diplegia  Respiratory acidosis  Psychosis  Dysthymia  Mild intellectual disability  Bipolar disorder  Prediabetes  ANA     Past Surgical History:    Colonoscopy  EGD  Orthopedic surgery, both ankles    Physical Exam   Patient Vitals for the past 24 hrs:   BP Temp Pulse Resp SpO2   11/20/23 1445  (!) 151/89 -- 80 -- 100 %   11/20/23 1400 133/85 -- 76 -- 97 %   11/20/23 1100 133/82 -- 79 -- 96 %   11/20/23 1030 (!) 126/97 -- 82 -- 90 %   11/20/23 1027 110/85 97.9  F (36.6  C) 74 18 96 %      Physical Exam  General: Well-developed and well-nourished. Well appearing middle aged  man. Cooperative.  Head:  Atraumatic.  Eyes:  Conjunctivae, lids, and sclerae are normal.  ENT:    Normal nose. Moist mucous membranes.  Nasal cannula oxygen in place.  Neck:  Supple. Normal range of motion.  CV:  Regular rate and rhythm. Normal heart sounds with no murmurs, rubs, or gallops detected.  Resp:  No respiratory distress. Clear to auscultation bilaterally without decreased breath sounds, wheezing, rales, or rhonchi.  GI:  Soft. Non-distended. Non-tender.    Skin:  Warm. Non-diaphoretic. No pallor.  On the medial left gluteus there is cellulitis with induration.  The center is open and draining some blood-tinged purulent fluid.  Photograph below.  Neuro:  Awake. A&Ox3.   Psych: Normal mood and affect. Normal speech.  Vitals reviewed.        Emergency Department Course     Imaging:  Head CT w/o contrast   Final Result   IMPRESSION: Diffuse cerebral volume loss and cerebral white matter   changes consistent with chronic small vessel ischemic disease. No   evidence for acute intracranial pathology.      Radiation dose for this scan was reduced using automated exposure   control, adjustment of the mA and/or kV according to patient size, or   iterative reconstruction technique.        TAMIKA MULLINS MD            SYSTEM ID:  [id number]         Report per radiology    Laboratory:  Labs Ordered and Resulted from Time of ED Arrival to Time of ED Departure   BASIC METABOLIC PANEL - Abnormal       Result Value    Sodium 140      Potassium 4.2      Chloride 98      Carbon Dioxide (CO2) 35 (*)     Anion Gap 7      Urea Nitrogen 15.1      Creatinine 0.69      GFR Estimate >90      Calcium 8.7      Glucose 98     CBC WITH PLATELETS  AND DIFFERENTIAL - Abnormal    WBC Count 10.7      RBC Count 4.11 (*)     Hemoglobin 12.4 (*)     Hematocrit 40.9            MCH 30.2      MCHC 30.3 (*)     RDW 13.6      Platelet Count 177      % Neutrophils 63      % Lymphocytes 23      % Monocytes 12      % Eosinophils 1      % Basophils 0      % Immature Granulocytes 1      NRBCs per 100 WBC 0      Absolute Neutrophils 6.7      Absolute Lymphocytes 2.5      Absolute Monocytes 1.3      Absolute Eosinophils 0.1      Absolute Basophils 0.0      Absolute Immature Granulocytes 0.1      Absolute NRBCs 0.0       Emergency Department Course & Assessments:  Assessments:  1145 I obtained history and examined the patient as noted above.   1421 I rechecked the patient and explained findings. He is tolerating PO and is comfortable going home. I performed US on his buttocks.    Independent Interpretation (X-rays, CTs, rhythm strip):  I independently reviewed his head CT and note no intracranial hemorrhage.  I performed bedside US which showed no abscess.     Consultations/Discussion of Management or Tests:  Not applicable    Social Determinants of Health affecting care:   Lives in a group home.    Disposition:  The patient was discharged.     Impression & Plan    Medical Decision Making:  Joel is a 48 year old man with cerebral palsy and chronic respiratory failure on home oxygen who had a fall 2 days ago and hit his head.  He was seen and had a negative head CT but has since felt confused and dizzy which prompted his return visit.  He also describes nausea with poor oral intake without headache or vision changes.  Later in his emergency department course he mentions pain in his left buttocks over the last 2 months, sometimes with bloody discharge.  He is well-appearing on exam.  On his medial left gluteus there is cellulitis with induration.  The center has already opened and is draining blood-tinged purulent fluid.    Regarding his dizziness, this most likely  represents a concussion.  The patient himself tells me it still similar to concussions he has had in the past.  Repeat head CT does not reveal delayed intracranial hemorrhage or other acute pathology.  Basic laboratory studies are unremarkable.  We discussed avoiding any repeat injury to his head and supportive care.  He did not require Zofran here and was tolerating PO, but we will discharge with Zofran as needed for nausea.    Regarding his buttocks infection, I performed bedside ultrasound.  This reveals cellulitis and maybe some small areas of phlegmon but no drainable abscess.  This is likely because the abscess has already opened and drained spontaneously.  He does not require I&D but does require antibiotics and is comfortable with initiation of doxycycline.    Indication for return for both issues was reviewed.  I encouraged primary care follow-up.  All questions answered.    Diagnosis:    ICD-10-CM    1. Concussion without loss of consciousness, initial encounter  S06.0X0A       2. Abscess and cellulitis of gluteal region  L02.31     L03.317           Discharge Medications:  New Prescriptions    DOXYCYCLINE HYCLATE (VIBRAMYCIN) 100 MG CAPSULE    Take 1 capsule (100 mg) by mouth 2 times daily for 10 days    ONDANSETRON (ZOFRAN ODT) 4 MG ODT TAB    Take 1 tablet (4 mg) by mouth every 6 hours as needed for nausea or vomiting        Scribe Disclosure:  Alondra NAILS Hailie, am serving as a scribe at 11:50 AM on 11/20/2023 to document services personally performed by Lis Lagunas MD based on my observations and the provider's statements to me.   11/20/2023   Lis Lagunas MD Dixson, Kylie S, MD  12/02/23 [id number]

## 2023-11-20 NOTE — ED NOTES
Bed: [facility]  Expected date:   Expected time:   Means of arrival: Ambulance  Comments:  bv2

## 2023-11-20 NOTE — ED NOTES
Called number on file to attempt to give report and was told by staff that he moved somewhere else 1 year ago. Writer called step mother Ashlie who stated that patient lives at [facility] and gave the phone number [phone]. Ashlie was also updated about discharge and plan of care. No answer at that number, mailbox is full. [facility] is working on finding another number to attempt to contact group home staff to give report.

## 2023-11-20 NOTE — ED NOTES
Answered call light. Patient speaking on the phone. Refused to answer writer when asked if he needed something.   Exited room to assist with assigned patient.   Patient then placed the call light on again. Entered room, patient asking about whom staff talk to at his group home.

## 2023-11-20 NOTE — ED TRIAGE NOTES
Patient reports dizziness since Friday. Staff report he is more lethargic than usual. Patient was seen on Saturday for a fall and had head CT. Patient appears to be at the same mentation as Saturday. ABCs intact in triage.        
Cephalic

## 2023-11-20 NOTE — ED NOTES
Writer calls [facility] to give an update on pt's arrival back to group [place]. [facility] [place] acknowledges. [phone]

## 2023-11-20 NOTE — DISCHARGE INSTRUCTIONS
For concussion you should not engage in any activities where you may reinjure your head until your symptoms are completely resolved.  Tylenol as needed if you are having headache.  Zofran as needed if you are having nausea or vomiting.  This was sent to your pharmacy.    For your bottom pain, this appears to be an infection.  You should take antibiotics as prescribed to your pharmacy even if you are feeling better.  It will likely continue to drain which is okay.  Tylenol as needed for pain.  Return with worsening pain, redness, fever, or any other new or concerning symptoms.    Please follow-up with your primary care provider to make sure both of these problems are improving as expected.

## 2023-11-22 ENCOUNTER — MEDICAL CORRESPONDENCE (OUTPATIENT)
Dept: HEALTH INFORMATION MANAGEMENT | Facility: CLINIC | Age: 48
End: 2023-11-22
Payer: MEDICARE

## 2023-11-24 ENCOUNTER — HOSPITAL ENCOUNTER (INPATIENT)
Facility: CLINIC | Age: 48
LOS: 24 days | Discharge: ANOTHER HOSPITAL | DRG: 871 | End: 2023-12-18
Attending: EMERGENCY MEDICINE | Admitting: HOSPITALIST
Payer: MEDICARE

## 2023-11-24 ENCOUNTER — APPOINTMENT (OUTPATIENT)
Dept: CT IMAGING | Facility: CLINIC | Age: 48
DRG: 871 | End: 2023-11-24
Attending: EMERGENCY MEDICINE
Payer: MEDICARE

## 2023-11-24 DIAGNOSIS — R06.02 SHORTNESS OF BREATH: ICD-10-CM

## 2023-11-24 DIAGNOSIS — J96.01 ACUTE RESPIRATORY FAILURE WITH HYPOXIA AND HYPERCARBIA (H): ICD-10-CM

## 2023-11-24 DIAGNOSIS — Z91.148 NON COMPLIANCE W MEDICATION REGIMEN: ICD-10-CM

## 2023-11-24 DIAGNOSIS — J96.02 ACUTE RESPIRATORY FAILURE WITH HYPOXIA AND HYPERCARBIA (H): ICD-10-CM

## 2023-11-24 DIAGNOSIS — L89.322 PRESSURE INJURY OF LEFT BUTTOCK, STAGE 2 (H): ICD-10-CM

## 2023-11-24 DIAGNOSIS — L89.312 PRESSURE INJURY OF RIGHT BUTTOCK, STAGE 2 (H): ICD-10-CM

## 2023-11-24 DIAGNOSIS — R78.81 BACTEREMIA: ICD-10-CM

## 2023-11-24 DIAGNOSIS — L89.320 PRESSURE INJURY OF LEFT ISCHIUM, UNSTAGEABLE (H): Primary | ICD-10-CM

## 2023-11-24 DIAGNOSIS — K76.82 HEPATIC ENCEPHALOPATHY (H): ICD-10-CM

## 2023-11-24 LAB
ALBUMIN SERPL BCG-MCNC: 3.8 G/DL (ref 3.5–5.2)
ALP SERPL-CCNC: 123 U/L (ref 40–150)
ALT SERPL W P-5'-P-CCNC: 57 U/L (ref 0–70)
AMMONIA PLAS-SCNC: 129 UMOL/L (ref 16–60)
AMMONIA PLAS-SCNC: 68 UMOL/L (ref 16–60)
ANION GAP SERPL CALCULATED.3IONS-SCNC: 8 MMOL/L (ref 7–15)
AST SERPL W P-5'-P-CCNC: 47 U/L (ref 0–45)
ATRIAL RATE - MUSE: 82 BPM
BASOPHILS # BLD AUTO: 0.1 10E3/UL (ref 0–0.2)
BASOPHILS NFR BLD AUTO: 1 %
BILIRUB SERPL-MCNC: 0.4 MG/DL
BUN SERPL-MCNC: 17.9 MG/DL (ref 6–20)
CALCIUM SERPL-MCNC: 8.9 MG/DL (ref 8.6–10)
CHLORIDE SERPL-SCNC: 94 MMOL/L (ref 98–107)
CREAT SERPL-MCNC: 0.67 MG/DL (ref 0.67–1.17)
D DIMER PPP FEU-MCNC: 0.94 UG/ML FEU (ref 0–0.5)
DEPRECATED HCO3 PLAS-SCNC: 37 MMOL/L (ref 22–29)
DIASTOLIC BLOOD PRESSURE - MUSE: NORMAL MMHG
EGFRCR SERPLBLD CKD-EPI 2021: >90 ML/MIN/1.73M2
EOSINOPHIL # BLD AUTO: 0.1 10E3/UL (ref 0–0.7)
EOSINOPHIL NFR BLD AUTO: 2 %
ERYTHROCYTE [DISTWIDTH] IN BLOOD BY AUTOMATED COUNT: 13.5 % (ref 10–15)
FLUAV RNA SPEC QL NAA+PROBE: NEGATIVE
FLUBV RNA RESP QL NAA+PROBE: NEGATIVE
GLUCOSE SERPL-MCNC: 109 MG/DL (ref 70–99)
HCO3 BLDV-SCNC: 42 MMOL/L (ref 21–28)
HCO3 BLDV-SCNC: 44 MMOL/L (ref 21–28)
HCT VFR BLD AUTO: 39.8 % (ref 40–53)
HGB BLD-MCNC: 12 G/DL (ref 13.3–17.7)
IMM GRANULOCYTES # BLD: 0.3 10E3/UL
IMM GRANULOCYTES NFR BLD: 4 %
INTERPRETATION ECG - MUSE: NORMAL
LACTATE BLD-SCNC: 0.6 MMOL/L
LACTATE BLD-SCNC: 1.5 MMOL/L
LYMPHOCYTES # BLD AUTO: 1.3 10E3/UL (ref 0.8–5.3)
LYMPHOCYTES NFR BLD AUTO: 17 %
MCH RBC QN AUTO: 30.1 PG (ref 26.5–33)
MCHC RBC AUTO-ENTMCNC: 30.2 G/DL (ref 31.5–36.5)
MCV RBC AUTO: 100 FL (ref 78–100)
MONOCYTES # BLD AUTO: 0.9 10E3/UL (ref 0–1.3)
MONOCYTES NFR BLD AUTO: 13 %
NEUTROPHILS # BLD AUTO: 4.7 10E3/UL (ref 1.6–8.3)
NEUTROPHILS NFR BLD AUTO: 63 %
NRBC # BLD AUTO: 0 10E3/UL
NRBC BLD AUTO-RTO: 0 /100
P AXIS - MUSE: 44 DEGREES
PCO2 BLDV: 92 MM HG (ref 40–50)
PCO2 BLDV: 97 MM HG (ref 40–50)
PH BLDV: 7.24 [PH] (ref 7.32–7.43)
PH BLDV: 7.29 [PH] (ref 7.32–7.43)
PLATELET # BLD AUTO: 239 10E3/UL (ref 150–450)
PO2 BLDV: 25 MM HG (ref 25–47)
PO2 BLDV: 29 MM HG (ref 25–47)
POTASSIUM SERPL-SCNC: 3.9 MMOL/L (ref 3.4–5.3)
PR INTERVAL - MUSE: 200 MS
PROCALCITONIN SERPL IA-MCNC: 0.1 NG/ML
PROT SERPL-MCNC: 7.1 G/DL (ref 6.4–8.3)
QRS DURATION - MUSE: 92 MS
QT - MUSE: 358 MS
QTC - MUSE: 418 MS
R AXIS - MUSE: 49 DEGREES
RBC # BLD AUTO: 3.99 10E6/UL (ref 4.4–5.9)
RSV RNA SPEC NAA+PROBE: NEGATIVE
SAO2 % BLDV: 35 % (ref 94–100)
SAO2 % BLDV: 41 % (ref 94–100)
SARS-COV-2 RNA RESP QL NAA+PROBE: NEGATIVE
SODIUM SERPL-SCNC: 139 MMOL/L (ref 135–145)
SYSTOLIC BLOOD PRESSURE - MUSE: NORMAL MMHG
T AXIS - MUSE: 66 DEGREES
TROPONIN T SERPL HS-MCNC: 17 NG/L
VALPROATE SERPL-MCNC: 62.8 UG/ML
VENTRICULAR RATE- MUSE: 82 BPM
WBC # BLD AUTO: 7.4 10E3/UL (ref 4–11)

## 2023-11-24 PROCEDURE — 250N000011 HC RX IP 250 OP 636: Performed by: STUDENT IN AN ORGANIZED HEALTH CARE EDUCATION/TRAINING PROGRAM

## 2023-11-24 PROCEDURE — 36415 COLL VENOUS BLD VENIPUNCTURE: CPT | Performed by: EMERGENCY MEDICINE

## 2023-11-24 PROCEDURE — 5A09357 ASSISTANCE WITH RESPIRATORY VENTILATION, LESS THAN 24 CONSECUTIVE HOURS, CONTINUOUS POSITIVE AIRWAY PRESSURE: ICD-10-PCS | Performed by: EMERGENCY MEDICINE

## 2023-11-24 PROCEDURE — 82803 BLOOD GASES ANY COMBINATION: CPT

## 2023-11-24 PROCEDURE — 83605 ASSAY OF LACTIC ACID: CPT

## 2023-11-24 PROCEDURE — 94660 CPAP INITIATION&MGMT: CPT

## 2023-11-24 PROCEDURE — 87077 CULTURE AEROBIC IDENTIFY: CPT | Performed by: EMERGENCY MEDICINE

## 2023-11-24 PROCEDURE — 85379 FIBRIN DEGRADATION QUANT: CPT | Performed by: EMERGENCY MEDICINE

## 2023-11-24 PROCEDURE — 99291 CRITICAL CARE FIRST HOUR: CPT | Mod: 25

## 2023-11-24 PROCEDURE — 84145 PROCALCITONIN (PCT): CPT | Performed by: EMERGENCY MEDICINE

## 2023-11-24 PROCEDURE — 80164 ASSAY DIPROPYLACETIC ACD TOT: CPT | Performed by: EMERGENCY MEDICINE

## 2023-11-24 PROCEDURE — 80053 COMPREHEN METABOLIC PANEL: CPT | Performed by: EMERGENCY MEDICINE

## 2023-11-24 PROCEDURE — 85004 AUTOMATED DIFF WBC COUNT: CPT | Performed by: EMERGENCY MEDICINE

## 2023-11-24 PROCEDURE — 999N000157 HC STATISTIC RCP TIME EA 10 MIN

## 2023-11-24 PROCEDURE — 87637 SARSCOV2&INF A&B&RSV AMP PRB: CPT | Performed by: EMERGENCY MEDICINE

## 2023-11-24 PROCEDURE — 82140 ASSAY OF AMMONIA: CPT | Performed by: EMERGENCY MEDICINE

## 2023-11-24 PROCEDURE — 87149 DNA/RNA DIRECT PROBE: CPT | Performed by: EMERGENCY MEDICINE

## 2023-11-24 PROCEDURE — 93005 ELECTROCARDIOGRAM TRACING: CPT

## 2023-11-24 PROCEDURE — 99292 CRITICAL CARE ADDL 30 MIN: CPT

## 2023-11-24 PROCEDURE — 250N000013 HC RX MED GY IP 250 OP 250 PS 637: Performed by: HOSPITALIST

## 2023-11-24 PROCEDURE — 250N000009 HC RX 250: Performed by: STUDENT IN AN ORGANIZED HEALTH CARE EDUCATION/TRAINING PROGRAM

## 2023-11-24 PROCEDURE — 36415 COLL VENOUS BLD VENIPUNCTURE: CPT | Performed by: HOSPITALIST

## 2023-11-24 PROCEDURE — 99223 1ST HOSP IP/OBS HIGH 75: CPT | Mod: AI | Performed by: HOSPITALIST

## 2023-11-24 PROCEDURE — 84484 ASSAY OF TROPONIN QUANT: CPT | Performed by: EMERGENCY MEDICINE

## 2023-11-24 PROCEDURE — 71275 CT ANGIOGRAPHY CHEST: CPT | Mod: MA

## 2023-11-24 PROCEDURE — 120N000001 HC R&B MED SURG/OB

## 2023-11-24 PROCEDURE — 82140 ASSAY OF AMMONIA: CPT | Performed by: HOSPITALIST

## 2023-11-24 PROCEDURE — 250N000013 HC RX MED GY IP 250 OP 250 PS 637: Performed by: EMERGENCY MEDICINE

## 2023-11-24 RX ORDER — LEVETIRACETAM 500 MG/1
1500 TABLET ORAL AT BEDTIME
Status: DISCONTINUED | OUTPATIENT
Start: 2023-11-24 | End: 2023-12-18 | Stop reason: HOSPADM

## 2023-11-24 RX ORDER — PRAZOSIN HYDROCHLORIDE 1 MG/1
2 CAPSULE ORAL AT BEDTIME
Status: DISCONTINUED | OUTPATIENT
Start: 2023-11-24 | End: 2023-12-18 | Stop reason: HOSPADM

## 2023-11-24 RX ORDER — LEVETIRACETAM 500 MG/1
1000 TABLET ORAL EVERY MORNING
Status: DISCONTINUED | OUTPATIENT
Start: 2023-11-25 | End: 2023-12-18 | Stop reason: HOSPADM

## 2023-11-24 RX ORDER — METHYLCELLULOSE 4000CPS 30 %
2 POWDER (GRAM) MISCELLANEOUS 2 TIMES DAILY
COMMUNITY

## 2023-11-24 RX ORDER — CITALOPRAM HYDROBROMIDE 20 MG/1
20 TABLET ORAL DAILY
Status: DISCONTINUED | OUTPATIENT
Start: 2023-11-25 | End: 2023-11-26

## 2023-11-24 RX ORDER — POLYETHYLENE GLYCOL 3350 17 G/17G
17 POWDER, FOR SOLUTION ORAL DAILY
Status: DISCONTINUED | OUTPATIENT
Start: 2023-11-25 | End: 2023-12-18 | Stop reason: HOSPADM

## 2023-11-24 RX ORDER — LEVOCARNITINE 330 MG/1
660 TABLET ORAL 3 TIMES DAILY
Status: DISCONTINUED | OUTPATIENT
Start: 2023-11-24 | End: 2023-12-18 | Stop reason: HOSPADM

## 2023-11-24 RX ORDER — ZONISAMIDE 100 MG/1
100 CAPSULE ORAL EVERY MORNING
Status: DISCONTINUED | OUTPATIENT
Start: 2023-11-25 | End: 2023-12-18 | Stop reason: HOSPADM

## 2023-11-24 RX ORDER — FLUTICASONE PROPIONATE 50 MCG
1 SPRAY, SUSPENSION (ML) NASAL 2 TIMES DAILY
COMMUNITY

## 2023-11-24 RX ORDER — PANTOPRAZOLE SODIUM 40 MG/1
40 TABLET, DELAYED RELEASE ORAL DAILY
Status: DISCONTINUED | OUTPATIENT
Start: 2023-11-25 | End: 2023-12-18 | Stop reason: HOSPADM

## 2023-11-24 RX ORDER — CITALOPRAM HYDROBROMIDE 20 MG/1
40 TABLET ORAL DAILY
Status: DISCONTINUED | OUTPATIENT
Start: 2023-11-25 | End: 2023-11-26

## 2023-11-24 RX ORDER — FLUTICASONE PROPIONATE 50 MCG
1 SPRAY, SUSPENSION (ML) NASAL 2 TIMES DAILY
Status: DISCONTINUED | OUTPATIENT
Start: 2023-11-25 | End: 2023-12-18 | Stop reason: HOSPADM

## 2023-11-24 RX ORDER — LACTULOSE 10 G/15ML
30 SOLUTION ORAL ONCE
Status: COMPLETED | OUTPATIENT
Start: 2023-11-24 | End: 2023-11-24

## 2023-11-24 RX ORDER — PROPRANOLOL HYDROCHLORIDE 20 MG/1
20 TABLET ORAL 2 TIMES DAILY
Status: DISCONTINUED | OUTPATIENT
Start: 2023-11-24 | End: 2023-12-18 | Stop reason: HOSPADM

## 2023-11-24 RX ORDER — QUETIAPINE 300 MG/1
300 TABLET, FILM COATED, EXTENDED RELEASE ORAL AT BEDTIME
Status: ON HOLD | COMMUNITY
End: 2024-02-28

## 2023-11-24 RX ORDER — LAMOTRIGINE 200 MG/1
200 TABLET ORAL 2 TIMES DAILY
Status: DISCONTINUED | OUTPATIENT
Start: 2023-11-24 | End: 2023-12-18 | Stop reason: HOSPADM

## 2023-11-24 RX ORDER — MULTIVITAMIN,THERAPEUTIC
1 TABLET ORAL DAILY
COMMUNITY

## 2023-11-24 RX ORDER — BUSPIRONE HYDROCHLORIDE 15 MG/1
15 TABLET ORAL 2 TIMES DAILY
Status: DISCONTINUED | OUTPATIENT
Start: 2023-11-24 | End: 2023-12-18 | Stop reason: HOSPADM

## 2023-11-24 RX ORDER — QUETIAPINE 300 MG/1
300 TABLET, FILM COATED, EXTENDED RELEASE ORAL AT BEDTIME
Status: DISCONTINUED | OUTPATIENT
Start: 2023-11-24 | End: 2023-12-18 | Stop reason: HOSPADM

## 2023-11-24 RX ORDER — DIVALPROEX SODIUM 500 MG/1
500 TABLET, DELAYED RELEASE ORAL 3 TIMES DAILY
Status: DISCONTINUED | OUTPATIENT
Start: 2023-11-24 | End: 2023-11-25

## 2023-11-24 RX ORDER — METHYLCELLULOSE 4000CPS 30 %
2 POWDER (GRAM) MISCELLANEOUS 2 TIMES DAILY
Status: DISCONTINUED | OUTPATIENT
Start: 2023-11-24 | End: 2023-11-24

## 2023-11-24 RX ORDER — MULTIVITAMIN,THERAPEUTIC
1 TABLET ORAL DAILY
Status: DISCONTINUED | OUTPATIENT
Start: 2023-11-25 | End: 2023-12-18 | Stop reason: HOSPADM

## 2023-11-24 RX ORDER — ONDANSETRON 4 MG/1
4 TABLET, ORALLY DISINTEGRATING ORAL EVERY 6 HOURS PRN
Status: DISCONTINUED | OUTPATIENT
Start: 2023-11-24 | End: 2023-12-18 | Stop reason: HOSPADM

## 2023-11-24 RX ORDER — DIVALPROEX SODIUM 500 MG/1
500 TABLET, DELAYED RELEASE ORAL 3 TIMES DAILY
Status: ON HOLD | COMMUNITY
End: 2023-12-28

## 2023-11-24 RX ORDER — ZONISAMIDE 100 MG/1
200 CAPSULE ORAL AT BEDTIME
Status: DISCONTINUED | OUTPATIENT
Start: 2023-11-24 | End: 2023-12-18 | Stop reason: HOSPADM

## 2023-11-24 RX ORDER — IOPAMIDOL 755 MG/ML
500 INJECTION, SOLUTION INTRAVASCULAR ONCE
Status: COMPLETED | OUTPATIENT
Start: 2023-11-24 | End: 2023-11-24

## 2023-11-24 RX ADMIN — PRAZOSIN HYDROCHLORIDE 2 MG: 1 CAPSULE ORAL at 23:49

## 2023-11-24 RX ADMIN — IOPAMIDOL 88 ML: 755 INJECTION, SOLUTION INTRAVENOUS at 15:29

## 2023-11-24 RX ADMIN — BUSPIRONE HYDROCHLORIDE 15 MG: 15 TABLET ORAL at 23:50

## 2023-11-24 RX ADMIN — QUETIAPINE FUMARATE 300 MG: 300 TABLET, EXTENDED RELEASE ORAL at 23:49

## 2023-11-24 RX ADMIN — LACTULOSE 30 G: 20 SOLUTION ORAL at 17:12

## 2023-11-24 RX ADMIN — PROPRANOLOL HYDROCHLORIDE 20 MG: 20 TABLET ORAL at 23:49

## 2023-11-24 RX ADMIN — LAMOTRIGINE 200 MG: 200 TABLET ORAL at 23:51

## 2023-11-24 RX ADMIN — SODIUM CHLORIDE 96 ML: 9 INJECTION, SOLUTION INTRAVENOUS at 15:29

## 2023-11-24 RX ADMIN — LEVETIRACETAM 1500 MG: 500 TABLET, FILM COATED ORAL at 23:50

## 2023-11-24 RX ADMIN — ZONISAMIDE 200 MG: 100 CAPSULE ORAL at 23:50

## 2023-11-24 RX ADMIN — LEVOCARNITINE 660 MG: 330 TABLET ORAL at 23:51

## 2023-11-24 RX ADMIN — DIVALPROEX SODIUM 500 MG: 500 TABLET, DELAYED RELEASE ORAL at 23:49

## 2023-11-24 ASSESSMENT — ACTIVITIES OF DAILY LIVING (ADL)
ADLS_ACUITY_SCORE: 37
ADLS_ACUITY_SCORE: 42
ADLS_ACUITY_SCORE: 37

## 2023-11-24 NOTE — ED NOTES
[facility]  ED Nurse Handoff Report    ED Chief complaint: Shortness of Breath and Fatigue  . ED Diagnosis:   Final diagnoses:   Acute respiratory failure with hypoxia and hypercarbia (H)   Hepatic encephalopathy (H)   Shortness of breath   Non compliance w medication regimen       Allergies:   Allergies   Allergen Reactions    Hydrocodone Bitartrate Er Unknown    Cephalexin Rash     HUT Reaction: Rash; HUT Noted: [id number]      Vicodin [Hydrocodone-Acetaminophen] GI Disturbance       Code Status: Full Code    Activity level - Baseline/Home:   Wheelchair lives in group home has walker but doesn't use it much as he need assistance. .  Activity Level - Current:   PT is able to follow commands and help with moving in bed. Hasn't ambulated in ED   Lift room needed: Yes.   Bariatric: No   Needed: No   Isolation: No.   Infection: Not Applicable.     Respiratory status: BiPap    Vital Signs (within 30 minutes):   Vitals:    11/24/23 1330 11/24/23 1331 11/24/23 1345 11/24/23 1355   BP: 120/70  115/76    Pulse: 78 77 77 77   Resp: 15 12 18 12   SpO2:   95% 96%       Cardiac Rhythm:  ,      Pain level:    Patient confused: Yes. Pt answers questions but inappropriate at times. Poor historian   Patient Falls Risk: bed/chair alarm on, arm band in place, and activity supervised.   Elimination Status: Has voided     Patient Report - Initial Complaint:  Tre Vazquez is a 48 year old male past medical history of cerebral palsy, intellectual disability, congenital left eye blindness, seizure disorder with chronically elevated ammonia who remains on Depakote among other antiepileptics, borderline personality disorder, schizoaffective disorder, obstructive sleep apnea and hypoventilation syndrome chronically on BiPAP who presents with hypoxia after being in a outpatient clinic for wound check of his buttocks.  Patient was with his  at this outpatient visit and when they were doing  vital signs to do a wound check earlier today he was found to have SpO2 saturations in the 70s.  EMS was contacted and brought the patient to the emergency department for further evaluation of hypoxia.  Patient did feel short of breath and feel short of breath at bedside.  He denies any chest pain or abdominal pain.  He states the wound on his bottom hurts although this has been a chronic wound an issue for him historically.  He denies any fevers.  He does state that shortness of breath seems worse than baseline today.  He does have a mild cough.   is not here with the patient at this time.  Patient does state he is his own guardian and makes his own medical decisions.  He does normally use oxygen at home and he is not sure how much oxygen he typically uses.  Denies any swelling in lower extremities that is new.  He states he has been taking all of his medications as prescribed   Focused Assessment:      Abnormal Results:   Labs Ordered and Resulted from Time of ED Arrival to Time of ED Departure   COMPREHENSIVE METABOLIC PANEL - Abnormal       Result Value    Sodium 139      Potassium 3.9      Carbon Dioxide (CO2) 37 (*)     Anion Gap 8      Urea Nitrogen 17.9      Creatinine 0.67      GFR Estimate >90      Calcium 8.9      Chloride 94 (*)     Glucose 109 (*)     Alkaline Phosphatase 123      AST 47 (*)     ALT 57      Protein Total 7.1      Albumin 3.8      Bilirubin Total 0.4     D DIMER QUANTITATIVE - Abnormal    D-Dimer Quantitative 0.94 (*)    AMMONIA - Abnormal    Ammonia 129 (*)    CBC WITH PLATELETS AND DIFFERENTIAL - Abnormal    WBC Count 7.4      RBC Count 3.99 (*)     Hemoglobin 12.0 (*)     Hematocrit 39.8 (*)           MCH 30.1      MCHC 30.2 (*)     RDW 13.5      Platelet Count 239      % Neutrophils 63      % Lymphocytes 17      % Monocytes 13      % Eosinophils 2      % Basophils 1      % Immature Granulocytes 4      NRBCs per 100 WBC 0      Absolute Neutrophils 4.7       Absolute Lymphocytes 1.3      Absolute Monocytes 0.9      Absolute Eosinophils 0.1      Absolute Basophils 0.1      Absolute Immature Granulocytes 0.3      Absolute NRBCs 0.0     ISTAT GASES LACTATE VENOUS POCT - Abnormal    Lactic Acid POCT 1.5      Bicarbonate Venous POCT 42 (*)     O2 Sat, Venous POCT 41 (*)     pCO2 Venous POCT 97 (*)     pH Venous POCT 7.24 (*)     pO2 Venous POCT 29     TROPONIN T, HIGH SENSITIVITY - Normal    Troponin T, High Sensitivity 17     PROCALCITONIN - Normal    Procalcitonin 0.10     INFLUENZA A/B, RSV, & SARS-COV2 PCR - Normal    Influenza A PCR Negative      Influenza B PCR Negative      RSV PCR Negative      SARS CoV2 PCR Negative     VALPROIC ACID   ISTAT GASES LACTATE VENOUS POCT   BLOOD CULTURE   BLOOD CULTURE        CT Chest Pulmonary Embolism w Contrast    (Results Pending)       Treatments provided: Bipap   Family Comments: not yet  OBS brochure/video discussed/provided to patient:  N/A  ED Medications:   Medications   sodium chloride (PF) 0.9% PF flush 3 mL (has no administration in time range)   sodium chloride (PF) 0.9% PF flush 3 mL (has no administration in time range)   lactulose (CHRONULAC) solution 30 g (has no administration in time range)       Drips infusing:  No  For the majority of the shift this patient was Green.   Interventions performed were N/A.    Sepsis treatment initiated: No    Cares/treatment/interventions/medications to be completed following ED care: N/A    ED Nurse Name: Emeli Pretty RN  1:56 PM    RECEIVING UNIT ED HANDOFF REVIEW    Above ED Nurse Handoff Report was reviewed: Yes  Reviewed by: Debo Vargas RN on November 24, 2023 at 8:47 PM

## 2023-11-24 NOTE — H&P
[facility]    History and Physical  Hospitalist     Date of Admission:  11/24/2023  Date of Service (when I saw the patient): 11/24/23  Provider: Anthony Mercer MD      Chief Complaint   Clouding of mentation, elevated CO2 and ammonia.    History is obtained from the patient    History of Present Illness   Tre Vazquez is a 48 year old male who who has a very complicated past medical as noted in the list below.  He lives in a group home.  He has some developmental delay, morbid obesity, ANA and OHS using chronically BiPAP at night apparently.  History of seizure disorder and chronic hyperammonemia while on Depakote.  Schizoaffective disorder, borderline personality and left eye blindness. He presents with clouding of mentation, elevated CO2 and ammonia.  (This patient normally has a CO2 between 70 and 80 and his level of ammonia is in the 60s). From my conversation with Dr. العلي, EDt physician who requested admission, he suspects that current presentation is a consequence of possible nonadherence to treatment, though it is difficult to prove.  Patient has been started on NIPPV in the emergency department and he has had excellent response.  Looking in the electronic medical record I found notes from May of 2022, where his level of ammonia was elevated and considered the consequence of the use of Depakote for seizures.  There is no official diagnosis of cirrhosis in this patient, he is not taking lactulose and likely not needed though he had one dose in ED.  In any case I will consult pharmacy for better understanding of his laboratory finding and possible correlation (if any) with current clinical status.  CT of the head negative for any acute intracranial event.  CT of the chest with PE protocol negative for embolization, pneumonia, pleural effusion or pneumothorax.    Past Medical History    I have reviewed this patient's medical history and updated it with pertinent information if  needed.   Past Medical History:   Diagnosis Date    Blindness of left eye     Depression 03/10/2014    Hypertension     Pneumococcal septicemia(038.2) (H) 11/26/2013    Hospitalized    Pneumonia, organism unspecified(486) 11/26/2013    Hospitalized    Uncomplicated asthma     Unspecified epilepsy without mention of intractable epilepsy        Assessment & Plan   Tre Vazquez is a 48 year old male who has severe/chronic CO2 retention secondary to ANA/OHS for which the patient uses BiPAP at nighttime.  He has many other comorbidities including seizures, developmental delay and he resides in a group home.  He presents with declining of mentation, elevated CO2 / low O2 and elevated ammonia.    Acute on chronic hypercarbic and hypoxemic respiratory failure.  Patient on chronic BiPAP at night time and napping.   Obesity/OHS.    At the moment of this dictation no infection or any other acute event has been proven to cause his change of mentation and abnormal blood gasses on top of his already retention.  It is suspected that he might not be using his BiPAP at nighttime in recent days.  He is improving with the therapy here, will continue helping him with the noninvasive positive pressure ventilation.  RT has been requested to manage settings and supportive care.    Hyperammonemia.  Unclear cause (thought to be secondary to Valproic acid therapy).  Patient had one-time lactulose in the emergency department.  I am rechecking ammonia this evening.  In any case I will consult pharmacy for better understanding of his laboratory finding and possible correlation (if any) with current clinical status.    Seizure disorder.  Stable on current medication.   Continue Depakote, Lamictal and Keppra as prior to admission.    Essential hypertension.  On propranolol and prazosin.    Depression/anxiety.  On Celexa and Seroquel.    GERD.   On pantoprazole.    Clinically Significant Risk Factors Present on Admission                  #  Hypertension: Noted on problem list   # Acute Respiratory Failure: based on blood gas results.  Continue supplemental oxygen as needed                Code Status   Full Code    Primary Care Physician   Siobhan Mayo      Past Surgical History   I have reviewed this patient's surgical history and updated it with pertinent information if needed.  Past Surgical History:   Procedure Laterality Date    COLONOSCOPY N/A 12/1/2022    Procedure: COLONOSCOPY;  Surgeon: Michael Caldwell MD;  Location:  OR    ESOPHAGOSCOPY, GASTROSCOPY, DUODENOSCOPY (EGD), COMBINED N/A 12/1/2022    Procedure: ESOPHAGOGASTRODUODENOSCOPY with biopsy;  Surgeon: Michael Caldwell MD;  Location:  OR    ORTHOPEDIC SURGERY      pt stated past surgery on both ankles       Prior to Admission Medications   Prior to Admission Medications   Prescriptions Last Dose Informant Patient Reported? Taking?   QUEtiapine ER (SEROQUEL XR) 300 MG 24 hr tablet 11/23/2023 at 2000  Yes Yes   Sig: Take 300 mg by mouth at bedtime   ammonium lactate (AMLACTIN) 12 % external cream 11/24/2023 at 0800  Yes Yes   Sig: Apply topically 2 times daily   bacitracin 500 UNIT/GM external ointment 11/24/2023 at 0800  No Yes   Sig: Apply topically 2 times daily   busPIRone (BUSPAR) 15 MG tablet 11/24/2023 at 0800  Yes Yes   Sig: Take 15 mg by mouth 2 times daily   citalopram (CELEXA) 20 MG tablet 11/24/2023 at 0800  Yes Yes   Sig: Take 20 mg by mouth daily   citalopram (CELEXA) 40 MG tablet 11/24/2023 at 0800 Pharmacy Yes Yes   Sig: Take 40 mg by mouth daily   clotrimazole (LOTRIMIN) 1 % external solution 11/24/2023 at 0800  Yes Yes   Sig: Apply topically 2 times daily   divalproex sodium delayed-release (DEPAKOTE) 500 MG DR tablet 11/24/2023 at 0800  Yes Yes   Sig: Take 500 mg by mouth 3 times daily   doxycycline hyclate (VIBRAMYCIN) 100 MG capsule 11/24/2023 at day 4/10  No Yes   Sig: Take 1 capsule (100 mg) by mouth 2 times daily for 10 days   fluticasone  (FLONASE) 50 MCG/ACT nasal spray 11/24/2023 at 0800  Yes Yes   Sig: Spray 1 spray in nostril 2 times daily   lamoTRIgine (LAMICTAL) 200 MG tablet 11/24/2023 at 0800  No Yes   Sig: Take 1 tablet (200 mg) by mouth 2 times daily   levETIRAcetam (KEPPRA) 500 MG tablet 11/24/2023 at 0800  No Yes   Sig: Take 2 tablets (1,000 mg) by mouth every morning AND 3 tablets (1,500 mg) At Bedtime.   levOCARNitine (CARNITOR) 330 MG tablet 11/24/2023 at 0800  No Yes   Sig: Take 2 tablets (660 mg) by mouth 3 times daily   methylcellulose POWD powder 11/24/2023 at 0800  Yes Yes   Sig: Apply 2 mg topically 2 times daily Mix 1 rounded tablespoon (2 mg) in 8 oz glass of water and take by mouth twice daily   multivitamin, therapeutic (THERA-VIT) TABS tablet 11/24/2023 at 0800  Yes Yes   Sig: Take 1 tablet by mouth daily   ondansetron (ZOFRAN ODT) 4 MG ODT tab More than a month  No Yes   Sig: Take 1 tablet (4 mg) by mouth every 6 hours as needed for nausea or vomiting   pantoprazole (PROTONIX) 40 MG EC tablet 11/24/2023 at 0800  Yes Yes   Sig: Take 40 mg by mouth daily   polyethylene glycol (MIRALAX) 17 GM/Dose powder 11/24/2023 at 0900  Yes Yes   Sig: Take 1 capful. by mouth daily   prazosin (MINIPRESS) 2 MG capsule 11/23/2023 at 2000  Yes Yes   Sig: Take 2 mg by mouth At Bedtime   propranolol (INDERAL) 20 MG tablet 11/24/2023 at 0800  Yes Yes   Sig: Take 20 mg by mouth 2 times daily   zonisamide (ZONEGRAN) 100 MG capsule 11/24/2023 at 0800  No Yes   Sig: Take 1 capsule (100 mg) by mouth every morning AND 2 capsules (200 mg) At Bedtime.      Facility-Administered Medications: None     Allergies   Allergies   Allergen Reactions    Hydrocodone Bitartrate Er Unknown    Cephalexin Rash     HUT Reaction: Rash; HUT Noted: 20190724      Vicodin [Hydrocodone-Acetaminophen] GI Disturbance       Social History   I have personally reviewed the social history with the patient showing.  Social History     Tobacco Use    Smoking status: Former      Types: Cigarettes     Start date:      Quit date:      Years since quittin.9    Smokeless tobacco: Never   Substance Use Topics    Alcohol use: No         Family History   I have reviewed this patient's family history and it is not contributory to the admission .        Review of Systems   Except as noted in the HPI, a 12-system Review of Systems was found to be negative.      Physical Exam   Vital Signs with Ranges  Pulse:  [79-83] 79  Resp:  [14-24] 18  BP: (123-131)/(50-82) 123/50  SpO2:  [88 %-98 %] 98 %  0 lbs 0 oz    GEN:  Obese. On BIPAP, mask  in place. Alert, cooperative, appears comfortable, NAD.  HEENT:  Normocephalic/atraumatic, no scleral icterus, no nasal discharge, mouth moist.  CV:  Regular rate and rhythm, no murmur or JVD.  S1 + S2 noted, no S3 or S4.  LUNGS:  Clear to auscultation bilaterally without rales/rhonchi/wheezing/retractions.  Symmetric chest rise on inhalation noted.  ABD:  Active bowel sounds, soft, non-tender/non-distended.  No rebound/guarding/rigidity.  EXT:  No edema or cyanosis.  No joint synovitis noted.  SKIN:  Dry to touch, no exanthems noted in the visualized areas.       Data   I personally reviewed the EKG tracing showing normal sinus rhythm in the monitor .  Results for orders placed or performed during the hospital encounter of 23 (from the past 24 hour(s))   EKG 12 lead   Result Value Ref Range    Systolic Blood Pressure  mmHg    Diastolic Blood Pressure  mmHg    Ventricular Rate 82 BPM    Atrial Rate 82 BPM    HI Interval 200 ms    QRS Duration 92 ms     ms    QTc 418 ms    P Axis 44 degrees    R AXIS 49 degrees    T Axis 66 degrees    Interpretation ECG       Sinus rhythm  Nonspecific ST abnormality  Abnormal ECG  When compared with ECG of 03-OCT-2023 09:43,  No significant change was found     CBC with platelets differential    Narrative    The following orders were created for panel order CBC with platelets differential.  Procedure                                Abnormality         Status                     ---------                               -----------         ------                     CBC with platelets and d...[[id number]]  Abnormal            Final result                 Please view results for these tests on the individual orders.   Comprehensive metabolic panel   Result Value Ref Range    Sodium 139 135 - 145 mmol/L    Potassium 3.9 3.4 - 5.3 mmol/L    Carbon Dioxide (CO2) 37 (H) 22 - 29 mmol/L    Anion Gap 8 7 - 15 mmol/L    Urea Nitrogen 17.9 6.0 - 20.0 mg/dL    Creatinine 0.67 0.67 - 1.17 mg/dL    GFR Estimate >90 >60 mL/min/1.73m2    Calcium 8.9 8.6 - 10.0 mg/dL    Chloride 94 (L) 98 - 107 mmol/L    Glucose 109 (H) 70 - 99 mg/dL    Alkaline Phosphatase 123 40 - 150 U/L    AST 47 (H) 0 - 45 U/L    ALT 57 0 - 70 U/L    Protein Total 7.1 6.4 - 8.3 g/dL    Albumin 3.8 3.5 - 5.2 g/dL    Bilirubin Total 0.4 <=1.2 mg/dL   Troponin T, High Sensitivity   Result Value Ref Range    Troponin T, High Sensitivity 17 <=22 ng/L   D dimer quantitative   Result Value Ref Range    D-Dimer Quantitative 0.94 (H) 0.00 - 0.50 ug/mL FEU    Narrative    This D-dimer assay is intended for use in conjunction with a clinical pretest probability assessment model to exclude pulmonary embolism (PE) and deep venous thrombosis (DVT) in outpatients suspected of PE or DVT. The cut-off value is 0.50 ug/mL FEU.   Procalcitonin   Result Value Ref Range    Procalcitonin 0.10 <0.50 ng/mL   Ammonia (on ice)   Result Value Ref Range    Ammonia 129 (HH) 16 - 60 umol/L   CBC with platelets and differential   Result Value Ref Range    WBC Count 7.4 4.0 - 11.0 10e3/uL    RBC Count 3.99 (L) 4.40 - 5.90 10e6/uL    Hemoglobin 12.0 (L) 13.3 - 17.7 g/dL    Hematocrit 39.8 (L) 40.0 - 53.0 %     78 - 100 fL    MCH 30.1 26.5 - 33.0 pg    MCHC 30.2 (L) 31.5 - 36.5 g/dL    RDW 13.5 10.0 - 15.0 %    Platelet Count 239 150 - 450 10e3/uL    % Neutrophils 63 %    % Lymphocytes 17 %    %  Monocytes 13 %    % Eosinophils 2 %    % Basophils 1 %    % Immature Granulocytes 4 %    NRBCs per 100 WBC 0 <1 /100    Absolute Neutrophils 4.7 1.6 - 8.3 10e3/uL    Absolute Lymphocytes 1.3 0.8 - 5.3 10e3/uL    Absolute Monocytes 0.9 0.0 - 1.3 10e3/uL    Absolute Eosinophils 0.1 0.0 - 0.7 10e3/uL    Absolute Basophils 0.1 0.0 - 0.2 10e3/uL    Absolute Immature Granulocytes 0.3 <=0.4 10e3/uL    Absolute NRBCs 0.0 10e3/uL   Symptomatic Influenza A/B, RSV, & SARS-CoV2 PCR (COVID-19) Nasopharyngeal    Specimen: Nasopharyngeal; Swab   Result Value Ref Range    Influenza A PCR Negative Negative    Influenza B PCR Negative Negative    RSV PCR Negative Negative    SARS CoV2 PCR Negative Negative    Narrative    Testing was performed using the Xpert Xpress CoV2/Flu/RSV Assay on the [employer] GeneXpert Instrument. This test should be ordered for the detection of SARS-CoV-2, influenza, and RSV viruses in individuals who meet clinical and/or epidemiological criteria. Test performance is unknown in asymptomatic patients. This test is for in vitro diagnostic use under the FDA EUA for laboratories certified under CLIA to perform high or moderate complexity testing. This test has not been FDA cleared or approved. A negative result does not rule out the presence of PCR inhibitors in the specimen or target RNA in concentration below the limit of detection for the assay. If only one viral target is positive but coinfection with multiple targets is suspected, the sample should be re-tested with another FDA cleared, approved, or authorized test, if coinfection would change clinical management. This test was validated by the [facility] [employer]. These laboratories are certified under the Clinical Laboratory Improvement Amendments of 1988 (CLIA-88) as qualified to perform high complexity laboratory testing.   iStat Gases (lactate) venous, POCT   Result Value Ref Range    Lactic Acid POCT 1.5 <=2.0 mmol/L    Bicarbonate Venous  POCT 42 (H) 21 - 28 mmol/L    O2 Sat, Venous POCT 41 (L) 94 - 100 %    pCO2 Venous POCT 97 (HH) 40 - 50 mm Hg    pH Venous POCT 7.24 (L) 7.32 - 7.43    pO2 Venous POCT 29 25 - 47 mm Hg       Securely message with the Vocera Web Console (learn more here)  Text page via [employer] Paging/Directory        Disclaimer: This note consists of symbols derived from keyboarding, dictation and/or voice recognition software. As a result, there may be errors in the script that have gone undetected. Please consider this when interpreting information found in this chart.

## 2023-11-24 NOTE — ED PROVIDER NOTES
History   Chief Complaint:  Shortness of Breath and Fatigue     HPI   Tre Vazquez is a 48 year old male past medical history of cerebral palsy, intellectual disability, congenital left eye blindness, seizure disorder with chronically elevated ammonia who remains on Depakote among other antiepileptics, borderline personality disorder, schizoaffective disorder, obstructive sleep apnea and hypoventilation syndrome chronically on BiPAP who presents with hypoxia after being in a outpatient clinic for wound check of his buttocks.  Patient was with his  at this outpatient visit and when they were doing vital signs to do a wound check earlier today he was found to have SpO2 saturations in the 70s.  EMS was contacted and brought the patient to the emergency department for further evaluation of hypoxia.  Patient did feel short of breath and feel short of breath at bedside.  He denies any chest pain or abdominal pain.  He states the wound on his bottom hurts although this has been a chronic wound an issue for him historically.  He denies any fevers.  He does state that shortness of breath seems worse than baseline today.  He does have a mild cough.   is not here with the patient at this time.  Patient does state he is his own guardian and makes his own medical decisions.  He does normally use oxygen at home and he is not sure how much oxygen he typically uses.  Denies any swelling in lower extremities that is new.  He states he has been taking all of his medications as prescribed    Independent Historian:   None - Patient Only    Review of External Notes:   Reviewed prior discharge summary from 5/22/2023.  Patient was admitted for acute on chronic hypercapnic respiratory failure with a baseline pCO2 within the 70 to 80s.  Patient also has a history of chronic hyperammonemia.    Medications:    ammonium lactate (AMLACTIN) 12 % external cream  bacitracin 500 UNIT/GM external  ointment  busPIRone (BUSPAR) 15 MG tablet  citalopram (CELEXA) 20 MG tablet  citalopram (CELEXA) 40 MG tablet  clotrimazole (LOTRIMIN) 1 % external solution  divalproex sodium delayed-release (DEPAKOTE) 500 MG DR tablet  doxycycline hyclate (VIBRAMYCIN) 100 MG capsule  fluticasone (FLONASE) 50 MCG/ACT nasal spray  lamoTRIgine (LAMICTAL) 200 MG tablet  levETIRAcetam (KEPPRA) 500 MG tablet  levOCARNitine (CARNITOR) 330 MG tablet  methylcellulose POWD powder  multivitamin, therapeutic (THERA-VIT) TABS tablet  ondansetron (ZOFRAN ODT) 4 MG ODT tab  pantoprazole (PROTONIX) 40 MG EC tablet  polyethylene glycol (MIRALAX) 17 GM/Dose powder  prazosin (MINIPRESS) 2 MG capsule  propranolol (INDERAL) 20 MG tablet  QUEtiapine ER (SEROQUEL XR) 300 MG 24 hr tablet  zonisamide (ZONEGRAN) 100 MG capsule      Past Medical History:    Past Medical History:   Diagnosis Date    Blindness of left eye     Depression 03/10/2014    Hypertension     Pneumococcal septicemia(038.2) (H) 11/26/2013    Pneumonia, organism unspecified(486) 11/26/2013    Uncomplicated asthma     Unspecified epilepsy without mention of intractable epilepsy      Past Surgical History:    Past Surgical History:   Procedure Laterality Date    COLONOSCOPY N/A 12/1/2022    Procedure: COLONOSCOPY;  Surgeon: Michael Caldwell MD;  Location:  OR    ESOPHAGOSCOPY, GASTROSCOPY, DUODENOSCOPY (EGD), COMBINED N/A 12/1/2022    Procedure: ESOPHAGOGASTRODUODENOSCOPY with biopsy;  Surgeon: Michael Caldwell MD;  Location:  OR    ORTHOPEDIC SURGERY      pt stated past surgery on both ankles      Physical Exam   Patient Vitals for the past 24 hrs:   BP Pulse Resp SpO2   11/24/23 1355 -- 77 12 96 %   11/24/23 1345 115/76 77 18 95 %   11/24/23 1331 -- 77 12 --   11/24/23 1330 120/70 78 15 --   11/24/23 1243 -- 79 18 98 %   11/24/23 1231 -- 79 18 96 %   11/24/23 1230 123/50 79 -- --   11/24/23 1130 129/82 81 15 --   11/24/23 1052 -- 81 14 98 %   11/24/23 1049  131/82 80 -- --   11/24/23 1021 -- 83 24 (!) 88 %      General: Alert, appears well-developed and well-nourished. Cooperative.     In mild respiratory distress  HEENT:  Head:  Atraumatic  Ears:  External ears are normal  Mouth/Throat:  Oropharynx is without erythema or exudate and mucous membranes are moist.   Eyes:   Conjunctivae normal and EOM are normal. No scleral icterus.  CV:  Normal rate, regular rhythm, normal heart sounds and radial pulses are 2+ and symmetric.  No murmur.  Resp:  Breath sounds are clear bilaterally, no wheezing.  On oximask between 8-10 LPM O2.      Non-labored, no retractions or accessory muscle use  GI:  Abdomen is soft, no distension, no tenderness. No rebound or guarding.  No CVA tenderness bilaterally  MS:  Normal range of motion. Non pitting BLE edema.    Normal strength in all 4 extremities.     Back atraumatic.    No midline cervical, thoracic, or lumbar tenderness  Skin:  Warm and dry.  There are chronic wounds to left buttock consistent with prior abscesses.  No significant induration or active purulent drainage.    Neuro:   Alert. Normal strength.  GCS: 15  Psych: Normal mood and affect.    Emergency Department Course     ECG  ECG taken at 1032, ECG read at 1038  Normal sinus rhythm  Nonspecific ST abnormality    No significant change as compared to prior, dated 10/03/2023.  Rate 82 bpm. PA interval 200 ms. QRS duration 92 ms. QT/QTc 358/418 ms. P-R-T axes 44 49 66.       Imaging:  CT Chest Pulmonary Embolism w Contrast    (Results Pending)      Report per radiology    Laboratory:  Labs Ordered and Resulted from Time of ED Arrival to Time of ED Departure   COMPREHENSIVE METABOLIC PANEL - Abnormal       Result Value    Sodium 139      Potassium 3.9      Carbon Dioxide (CO2) 37 (*)     Anion Gap 8      Urea Nitrogen 17.9      Creatinine 0.67      GFR Estimate >90      Calcium 8.9      Chloride 94 (*)     Glucose 109 (*)     Alkaline Phosphatase 123      AST 47 (*)     ALT 57       Protein Total 7.1      Albumin 3.8      Bilirubin Total 0.4     D DIMER QUANTITATIVE - Abnormal    D-Dimer Quantitative 0.94 (*)    AMMONIA - Abnormal    Ammonia 129 (*)    CBC WITH PLATELETS AND DIFFERENTIAL - Abnormal    WBC Count 7.4      RBC Count 3.99 (*)     Hemoglobin 12.0 (*)     Hematocrit 39.8 (*)           MCH 30.1      MCHC 30.2 (*)     RDW 13.5      Platelet Count 239      % Neutrophils 63      % Lymphocytes 17      % Monocytes 13      % Eosinophils 2      % Basophils 1      % Immature Granulocytes 4      NRBCs per 100 WBC 0      Absolute Neutrophils 4.7      Absolute Lymphocytes 1.3      Absolute Monocytes 0.9      Absolute Eosinophils 0.1      Absolute Basophils 0.1      Absolute Immature Granulocytes 0.3      Absolute NRBCs 0.0     ISTAT GASES LACTATE VENOUS POCT - Abnormal    Lactic Acid POCT 1.5      Bicarbonate Venous POCT 42 (*)     O2 Sat, Venous POCT 41 (*)     pCO2 Venous POCT 97 (*)     pH Venous POCT 7.24 (*)     pO2 Venous POCT 29     ISTAT GASES LACTATE VENOUS POCT - Abnormal    Lactic Acid POCT 0.6      Bicarbonate Venous POCT 44 (*)     O2 Sat, Venous POCT 35 (*)     pCO2 Venous POCT 92 (*)     pH Venous POCT 7.29 (*)     pO2 Venous POCT 25     TROPONIN T, HIGH SENSITIVITY - Normal    Troponin T, High Sensitivity 17     PROCALCITONIN - Normal    Procalcitonin 0.10     INFLUENZA A/B, RSV, & SARS-COV2 PCR - Normal    Influenza A PCR Negative      Influenza B PCR Negative      RSV PCR Negative      SARS CoV2 PCR Negative     VALPROIC ACID - Normal    Valproic acid 62.8     BLOOD CULTURE   BLOOD CULTURE   Procedures     Emergency Department Course & Assessments:       Interventions:  Medications   sodium chloride (PF) 0.9% PF flush 3 mL (has no administration in time range)   sodium chloride (PF) 0.9% PF flush 3 mL (has no administration in time range)   lactulose (CHRONULAC) solution 30 g (has no administration in time range)        Independent Interpretation (X-rays, CTs, rhythm  strip):  None    Consultations/Discussion of Management or Tests:    ED Course as of 11/24/23 1450   Fri Nov 24, 2023   1349 Spoke with Dr. Mercer the hospitalist service who agreed to admission       Social Determinants of Health affecting care:   Healthcare Access/Compliance and Education/Literacy    Disposition:  The patient was admitted to the hospital under the care of Dr. Mercer.     Impression & Plan    CMS Diagnoses: None    Medical Decision Making:  Patient is a 48-year-old male who presents from outpatient clinic with shortness of breath and hypoxia.  Patient was in clinic this morning for wound evaluation of his buttock.  He was with his  at the time.  When they were doing intake vital signs in clinic he was found to be hypoxic with SpO2 sats in the 70s.  Patient does have a chronic history of obesity hypoventilation and chronic hypercarbia.  It is well-documented that his pCO2 typically is in the range between 60 and 70.  Additionally he has a history of hyperammonemia with ammonia levels typically between 60 and 70.  Unfortunate the patient appears to be severely hyperammonemic today with an ammonia of 129.  Additionally he has quite a bit of hypercarbia with a pCO2 of 92.  Is mildly acidotic.  No evidence of diabetic ketoacidosis.  Renal function electrolytes appear normal.  D-dimer elevated so CT scan of the chest was obtained and still pending at time of admission.  Thankfully no infection symptoms such as cough or fever.  Influenza, RSV, COVID-negative.  Patient does take Depakote in the outpatient setting for his history of elevated ammonia.  His Depakote level appears normal at 62.8 today.  Will plan to add lactulose given hyperammonemia.  Patient was initiated on BiPAP as well given severe hypercarbia in the setting of acidosis and hypoxia.  He is tolerating BiPAP well here.  He is alert and oriented and so low concern for severe encephalopathy given the elevated ammonia.   Will continue to manage in the inpatient setting.  I spoke with Dr. Mercer the hospitalist service who is agreeable to inpatient admission.  Of note the patient states he is compliant with his Depakote but it seems that he is not overly compliant with his regularly scheduled BiPAP to use particularly at nighttime.  Patient awaits availability for [facility] bed.  While awaiting final results from CT scan of the chest care was signed out to my colleague Dr. Ortega    Critical Care time:  was 35 minutes for this patient excluding procedures.    Diagnosis:    ICD-10-CM    1. Acute respiratory failure with hypoxia and hypercarbia (H)  J96.01     J96.02       2. Hepatic encephalopathy (H)  K76.82       3. Shortness of breath  R06.02       4. Non compliance w medication regimen  Z91.148          Discharge Medications:  New Prescriptions    No medications on file      11/24/2023   Ubaldo العلي MD White, Scott, MD  11/24/23 [id number]

## 2023-11-24 NOTE — ED TRIAGE NOTES
BIB per ems FOR HYPOXIA MIS 70'S IN CLINIC, pt is from group home usually on O2 but went to clinic with it. Pt was ion clinic for wounds on buttocks that aren't healing. Pt is WC bound most of the time but does have a walker that isnt used often.

## 2023-11-24 NOTE — ED NOTES
Bed: ED01  Expected date: 11/24/23  Expected time: 10:09 AM  Means of arrival: Ambulance  Comments:  BV 2 - 48 M

## 2023-11-24 NOTE — PHARMACY-ADMISSION MEDICATION HISTORY
Pharmacist Admission Medication History    Admission medication history is complete. The information provided in this note is only as accurate as the sources available at the time of the update.    Info Source(s): Facility (U/NH/) medication list/MAR via  [facility]  Pertinent Information: none    Changes made to PTA medication list:  Added: methylcellulose, divalproex, fluticasone nasal  Deleted: None  Changed: quetiapine (150 mg --> 300 mg)    Medication Affordability:  Not including over the counter (OTC) medications, was there a time in the past 3 months when you did not take your medications as prescribed because of cost?: No    Allergies reviewed with patient and updates made in EHR: no  Medication History Completed By: Javier Morris RPH 11/24/2023 1:39 PM    Prior to Admission medications    Medication Sig Last Dose Taking? Auth Provider Long Term End Date   ammonium lactate (AMLACTIN) 12 % external cream Apply topically 2 times daily 11/24/2023 at 0800 Yes Unknown, Entered By History     bacitracin 500 UNIT/GM external ointment Apply topically 2 times daily 11/24/2023 at 0800 Yes Eloy Wood MD     busPIRone (BUSPAR) 15 MG tablet Take 15 mg by mouth 2 times daily 11/24/2023 at 0800 Yes Reported, Patient Yes    citalopram (CELEXA) 20 MG tablet Take 20 mg by mouth daily 11/24/2023 at 0800 Yes Reported, Patient No    citalopram (CELEXA) 40 MG tablet Take 40 mg by mouth daily 11/24/2023 at 0800 Yes Unknown, Entered By History No    clotrimazole (LOTRIMIN) 1 % external solution Apply topically 2 times daily 11/24/2023 at 0800 Yes Reported, Patient     divalproex sodium delayed-release (DEPAKOTE) 500 MG DR tablet Take 500 mg by mouth 3 times daily 11/24/2023 at 0800 Yes Unknown, Entered By History No    doxycycline hyclate (VIBRAMYCIN) 100 MG capsule Take 1 capsule (100 mg) by mouth 2 times daily for 10 days 11/24/2023 at day 4/10 Yes Lis Lagunas MD  11/30/23   fluticasone (FLONASE) 50  MCG/ACT nasal spray Spray 1 spray in nostril 2 times daily 11/24/2023 at 0800 Yes Unknown, Entered By History     lamoTRIgine (LAMICTAL) 200 MG tablet Take 1 tablet (200 mg) by mouth 2 times daily 11/24/2023 at 0800 Yes Lisa Merchant APRN CNP Yes    levETIRAcetam (KEPPRA) 500 MG tablet Take 2 tablets (1,000 mg) by mouth every morning AND 3 tablets (1,500 mg) At Bedtime. 11/24/2023 at 0800 Yes Lisa Merchant APRN CNP Yes    levOCARNitine (CARNITOR) 330 MG tablet Take 2 tablets (660 mg) by mouth 3 times daily 11/24/2023 at 0800 Yes Lisa Merchant APRN CNP Yes    methylcellulose POWD powder Apply 2 mg topically 2 times daily Mix 1 rounded tablespoon (2 mg) in 8 oz glass of water and take by mouth twice daily 11/24/2023 at 0800 Yes Unknown, Entered By History     multivitamin, therapeutic (THERA-VIT) TABS tablet Take 1 tablet by mouth daily 11/24/2023 at 0800 Yes Unknown, Entered By History     ondansetron (ZOFRAN ODT) 4 MG ODT tab Take 1 tablet (4 mg) by mouth every 6 hours as needed for nausea or vomiting More than a month Yes Lis Lagunas MD     pantoprazole (PROTONIX) 40 MG EC tablet Take 40 mg by mouth daily 11/24/2023 at 0800 Yes Unknown, Entered By History     polyethylene glycol (MIRALAX) 17 GM/Dose powder Take 1 capful. by mouth daily 11/24/2023 at 0900 Yes Reported, Patient     prazosin (MINIPRESS) 2 MG capsule Take 2 mg by mouth At Bedtime 11/23/2023 at 2000 Yes Reported, Patient Yes    propranolol (INDERAL) 20 MG tablet Take 20 mg by mouth 2 times daily 11/24/2023 at 0800 Yes Reported, Patient No    QUEtiapine ER (SEROQUEL XR) 300 MG 24 hr tablet Take 300 mg by mouth at bedtime 11/23/2023 at 2000 Yes Unknown, Entered By History No    zonisamide (ZONEGRAN) 100 MG capsule Take 1 capsule (100 mg) by mouth every morning AND 2 capsules (200 mg) At Bedtime. 11/24/2023 at 0800 Yes Lisa Merchant APRN CNP Yes

## 2023-11-25 LAB
AMMONIA PLAS-SCNC: 82 UMOL/L (ref 16–60)
CREAT SERPL-MCNC: 0.61 MG/DL (ref 0.67–1.17)
EGFRCR SERPLBLD CKD-EPI 2021: >90 ML/MIN/1.73M2
ENTEROCOCCUS FAECALIS: NOT DETECTED
ENTEROCOCCUS FAECIUM: NOT DETECTED
LEVETIRACETAM SERPL-MCNC: 32.6 ΜG/ML (ref 10–40)
LISTERIA SPECIES (DETECTED/NOT DETECTED): NOT DETECTED
STAPHYLOCOCCUS AUREUS: DETECTED
STAPHYLOCOCCUS EPIDERMIDIS: NOT DETECTED
STAPHYLOCOCCUS LUGDUNENSIS: NOT DETECTED
STREPTOCOCCUS AGALACTIAE: NOT DETECTED
STREPTOCOCCUS ANGINOSUS GROUP: NOT DETECTED
STREPTOCOCCUS PNEUMONIAE: NOT DETECTED
STREPTOCOCCUS PYOGENES: NOT DETECTED
STREPTOCOCCUS SPECIES: NOT DETECTED

## 2023-11-25 PROCEDURE — 87077 CULTURE AEROBIC IDENTIFY: CPT | Performed by: STUDENT IN AN ORGANIZED HEALTH CARE EDUCATION/TRAINING PROGRAM

## 2023-11-25 PROCEDURE — 36415 COLL VENOUS BLD VENIPUNCTURE: CPT | Performed by: STUDENT IN AN ORGANIZED HEALTH CARE EDUCATION/TRAINING PROGRAM

## 2023-11-25 PROCEDURE — 258N000003 HC RX IP 258 OP 636: Performed by: STUDENT IN AN ORGANIZED HEALTH CARE EDUCATION/TRAINING PROGRAM

## 2023-11-25 PROCEDURE — 250N000013 HC RX MED GY IP 250 OP 250 PS 637: Performed by: HOSPITALIST

## 2023-11-25 PROCEDURE — 80175 DRUG SCREEN QUAN LAMOTRIGINE: CPT | Performed by: INTERNAL MEDICINE

## 2023-11-25 PROCEDURE — 250N000011 HC RX IP 250 OP 636: Performed by: HOSPITALIST

## 2023-11-25 PROCEDURE — 80164 ASSAY DIPROPYLACETIC ACD TOT: CPT | Performed by: INTERNAL MEDICINE

## 2023-11-25 PROCEDURE — 82565 ASSAY OF CREATININE: CPT | Performed by: STUDENT IN AN ORGANIZED HEALTH CARE EDUCATION/TRAINING PROGRAM

## 2023-11-25 PROCEDURE — 82140 ASSAY OF AMMONIA: CPT | Performed by: INTERNAL MEDICINE

## 2023-11-25 PROCEDURE — 99233 SBSQ HOSP IP/OBS HIGH 50: CPT | Performed by: INTERNAL MEDICINE

## 2023-11-25 PROCEDURE — 250N000011 HC RX IP 250 OP 636: Performed by: STUDENT IN AN ORGANIZED HEALTH CARE EDUCATION/TRAINING PROGRAM

## 2023-11-25 PROCEDURE — 999N000157 HC STATISTIC RCP TIME EA 10 MIN

## 2023-11-25 PROCEDURE — 94660 CPAP INITIATION&MGMT: CPT

## 2023-11-25 PROCEDURE — 80177 DRUG SCRN QUAN LEVETIRACETAM: CPT | Performed by: INTERNAL MEDICINE

## 2023-11-25 PROCEDURE — 36415 COLL VENOUS BLD VENIPUNCTURE: CPT | Performed by: INTERNAL MEDICINE

## 2023-11-25 PROCEDURE — 250N000013 HC RX MED GY IP 250 OP 250 PS 637: Performed by: INTERNAL MEDICINE

## 2023-11-25 PROCEDURE — 120N000001 HC R&B MED SURG/OB

## 2023-11-25 RX ORDER — DIVALPROEX SODIUM 500 MG/1
500 TABLET, DELAYED RELEASE ORAL EVERY 12 HOURS SCHEDULED
Status: DISCONTINUED | OUTPATIENT
Start: 2023-11-25 | End: 2023-12-18 | Stop reason: HOSPADM

## 2023-11-25 RX ORDER — CEFAZOLIN SODIUM 1 G/50ML
2500 SOLUTION INTRAVENOUS ONCE
Status: COMPLETED | OUTPATIENT
Start: 2023-11-25 | End: 2023-11-25

## 2023-11-25 RX ADMIN — THERA TABS 1 TABLET: TAB at 11:04

## 2023-11-25 RX ADMIN — ZONISAMIDE 100 MG: 100 CAPSULE ORAL at 11:05

## 2023-11-25 RX ADMIN — QUETIAPINE FUMARATE 300 MG: 300 TABLET, EXTENDED RELEASE ORAL at 21:50

## 2023-11-25 RX ADMIN — LEVOCARNITINE 660 MG: 330 TABLET ORAL at 16:45

## 2023-11-25 RX ADMIN — ONDANSETRON 4 MG: 4 TABLET, ORALLY DISINTEGRATING ORAL at 21:49

## 2023-11-25 RX ADMIN — CITALOPRAM HYDROBROMIDE 40 MG: 20 TABLET ORAL at 11:03

## 2023-11-25 RX ADMIN — BUSPIRONE HYDROCHLORIDE 15 MG: 15 TABLET ORAL at 21:50

## 2023-11-25 RX ADMIN — LEVETIRACETAM 1000 MG: 500 TABLET, FILM COATED ORAL at 11:02

## 2023-11-25 RX ADMIN — LAMOTRIGINE 200 MG: 200 TABLET ORAL at 21:49

## 2023-11-25 RX ADMIN — VANCOMYCIN HYDROCHLORIDE 1500 MG: 10 INJECTION, POWDER, LYOPHILIZED, FOR SOLUTION INTRAVENOUS at 18:22

## 2023-11-25 RX ADMIN — ZONISAMIDE 200 MG: 100 CAPSULE ORAL at 21:50

## 2023-11-25 RX ADMIN — LEVOCARNITINE 660 MG: 330 TABLET ORAL at 11:04

## 2023-11-25 RX ADMIN — BUSPIRONE HYDROCHLORIDE 15 MG: 15 TABLET ORAL at 11:04

## 2023-11-25 RX ADMIN — LAMOTRIGINE 200 MG: 200 TABLET ORAL at 11:04

## 2023-11-25 RX ADMIN — MICONAZOLE NITRATE: 20 POWDER TOPICAL at 16:47

## 2023-11-25 RX ADMIN — FLUTICASONE PROPIONATE 1 SPRAY: 50 SPRAY, METERED NASAL at 21:53

## 2023-11-25 RX ADMIN — PROPRANOLOL HYDROCHLORIDE 20 MG: 20 TABLET ORAL at 11:04

## 2023-11-25 RX ADMIN — LEVOCARNITINE 660 MG: 330 TABLET ORAL at 21:49

## 2023-11-25 RX ADMIN — PANTOPRAZOLE SODIUM 40 MG: 40 TABLET, DELAYED RELEASE ORAL at 11:04

## 2023-11-25 RX ADMIN — PRAZOSIN HYDROCHLORIDE 2 MG: 1 CAPSULE ORAL at 21:50

## 2023-11-25 RX ADMIN — CITALOPRAM HYDROBROMIDE 20 MG: 20 TABLET ORAL at 11:05

## 2023-11-25 RX ADMIN — MICONAZOLE NITRATE: 20 POWDER TOPICAL at 21:54

## 2023-11-25 RX ADMIN — PROPRANOLOL HYDROCHLORIDE 20 MG: 20 TABLET ORAL at 21:50

## 2023-11-25 RX ADMIN — DIVALPROEX SODIUM 500 MG: 500 TABLET, DELAYED RELEASE ORAL at 21:50

## 2023-11-25 RX ADMIN — VANCOMYCIN HYDROCHLORIDE 2500 MG: 10 INJECTION, POWDER, LYOPHILIZED, FOR SOLUTION INTRAVENOUS at 05:14

## 2023-11-25 RX ADMIN — LEVETIRACETAM 1500 MG: 500 TABLET, FILM COATED ORAL at 21:50

## 2023-11-25 ASSESSMENT — ACTIVITIES OF DAILY LIVING (ADL)
ADLS_ACUITY_SCORE: 42
ADLS_ACUITY_SCORE: 46
ADLS_ACUITY_SCORE: 46
ADLS_ACUITY_SCORE: 42

## 2023-11-25 NOTE — PROGRESS NOTES
Notified of positive blood culture with GPCs in cluster.  So far, this is only in 1 of 2 blood cultures but the patient was just admitted yesterday afternoon so this is resulting quite quickly.  Given severity of illness on presentation and unclear history I will opt to start vancomycin while we await blood culture results.  Will repeat blood cultures in the AM.      Victor Manuel Brown, DO

## 2023-11-25 NOTE — PHARMACY-VANCOMYCIN DOSING SERVICE
Pharmacy Vancomycin Initial Note  Date of Service 2023  Patient's  1975  48 year old, male    Indication: Bacteremia    Current estimated CrCl = Estimated Creatinine Clearance: 168.8 mL/min (based on SCr of 0.67 mg/dL).    Creatinine for last 3 days  2023: 10:42 AM Creatinine 0.67 mg/dL    Recent Vancomycin Level(s) for last 3 days  No results found for requested labs within last 3 days.      Vancomycin IV Administrations (past 72 hours)        No vancomycin orders with administrations in past 72 hours.                    Nephrotoxins and other renal medications (From now, onward)      Start     Dose/Rate Route Frequency Ordered Stop    23 1800  vancomycin (VANCOCIN) 1,500 mg in 0.9% NaCl 250 mL intermittent infusion         1,500 mg  over 90 Minutes Intravenous EVERY 12 HOURS 23 0435      23 0430  vancomycin (VANCOCIN) 2,500 mg in sodium chloride 0.9 % 500 mL intermittent infusion         2,500 mg  over 2 Hours Intravenous ONCE 23 0423              Contrast Orders - past 72 hours (72h ago, onward)      Start     Dose/Rate Route Frequency Stop    23 1530  iopamidol (ISOVUE-370) solution 500 mL         500 mL Intravenous ONCE 23 1529            [employer] Prediction of Planned Initial Vancomycin Regimen  Loading dose: 2500 mg at 04:30 5023.  Regimen: 1500 mg IV every 12 hours.  Start time: 16:30 on 5023  Exposure target: AUC24 (range)400-600 mg/L.hr   AUC24,ss: 538 mg/L.hr  Probability of AUC24 > 400: 71 %  Ctrough,ss: 13.6 mg/L  Probability of Ctrough,ss > 20: 33 %  Probability of nephrotoxicity (Lodise RAMOS ): 9 %          Plan:  Start vancomycin  2500 mg loading dose, then 1500 mg IV q12h.   Vancomycin monitoring method: AUC  Vancomycin therapeutic monitoring goal: 400-600 mg*h/L  Pharmacy will check vancomycin levels as appropriate in 1-3 Days.    Serum creatinine levels will be ordered daily for the first week of therapy and at least  twice weekly for subsequent weeks.      Nita Ervin, [facility]

## 2023-11-25 NOTE — PROGRESS NOTES
Cross Cover    Called for 1/2 bottles in 2 sets of blood cultures (the old 2/2 BCx positive) positive-one for staph aureus by Taggedigene panel.  Initiated on vanco by my partner earlier last night

## 2023-11-25 NOTE — PHARMACY-CONSULT NOTE
Briefly describe the reason for this consult: presence of hyperammonemia that in the past has been considered 2/2 to the use of Depakote. Is there proven evidence of this?       Valproate products may increase plasma ammonia concentrations. Upon literature review, valproic acid-induced hyperammonemia has a reported incidence of up to 20-40% in patients utilizing Depakote therapy (majority of cases are asymptomatic).    Sandy Charles, [facility]      References:  T Sheldon, A Elvin, L Rios, URIEL Chappell. Hyperammonemia in patients receiving valproic acid in the hospital setting: A retrospective review. [facility] (2021) 11 (4): 243-247.   E Maria Guadalupe, AIDE Jauregui. Valproic acid-induced hyperammonemia: Incidence, clinical significance, and treatment management. [facility] (2018) 8 (2): 73-77.  Radha R, Mandeep M, Ammon RG. Valproic Acid Induced Hyperammonemia in a Long Time Treated Patient. Case Rep Psychiatry. 2016;2016:[id number].   Laish I, Virgilio Jozef HUMPHREY. Noncirrhotic hyperammonaemic encephalopathy. Liver Int. 2011 Oct;31(9):1259-70.   Joshua JN, Gary MARTIN, Kenroy DL, Nahomy J. Adult nonhepatic hyperammonemia: a case report and differential diagnosis. Am J Med. 2010 Oct;123(10):885-91.

## 2023-11-25 NOTE — PROGRESS NOTES
[facility]    Hospitalist Progress Note  Provider : Slime Lynn MD, MD  Date of Service (when I saw the patient): 11/25/2023    Assessment & Plan   Tre Vazquez is a 48 year old male who who has a very complicated past medical as noted in the list below.  He lives in a group home.  He has some developmental delay, morbid obesity, ANA and OHS using chronically BiPAP at night apparently.  History of seizure disorder and chronic hyperammonemia while on Depakote.  Schizoaffective disorder, borderline personality and left eye blindness. He presented with clouding of mentation and elevated CO2 and pneumonia.  (This patient normally has a CO2 between 70 and 80 and he is ammonia is in the 60s). His current presentation is suspected to be due to possible nonadherence to treatment, though it is difficult to prove.  Patient has been a started on NIPPV in the emergency department and he has had excellent response.  Per records from May of 2022, his ammonia level was elevated and considered the consequence of the use of Depakote for seizures.  There is no official diagnosis of cirrhosis in this patient, he is not taking lactulose and likely not needed though he had one dose in ED.    CT of the head negative for any acute intracranial event.  CT of the chest with PE protocol negative for embolization, pneumonia, pleural effusion or pneumothorax.       Acute on chronic hypercarbic and hypoxemic respiratory failure.  Patient on chronic BiPAP at night time and napping.   Obesity/OHS.   -At the moment of this dictation no infection or any other acute event has been proven to cause his change in mentation and abnormal blood gasses on top of his already retention.  -It is suspected that he might not be using his BiPAP at nighttime in recent days.  -He is improving with the therapy here, will continue helping him with the noninvasive positive pressure ventilation.   -RT has been requested to manage  settings and supportive care.     Hyperammonemia, chronic.  Unclear cause.  Patient had one-time lactulose in the emergency department.    -No known liver disease  -Initial ammonia level was 129. He received a dose of lactulose in the ER. Ammonia level elevated at 82. Suspect related to seizure medication(Depakote)  -Depakote was initially held.  Discussed with neurology and decreased Depakote to 500 mg p.o. twice daily.  Appreciate input    Acute toxic/metabolic encephalopathy  Likely due to acute on chronic respiratory failure, hyeperammonemia  -Will continue BiPAP  -Will hold Depakote for now due to high ammonia level  -neurology consulted and recommended to decrease Depakote to 500 mg twice daily     Seizure disorder. Stable on current medication.   -Will hold Depakote for now due to hyperammonemia. Will continue Lamictal and Keppra as prior to admission.  -Discussed with neurology.  Neurology recommending to decrease Depakote to 500 mg twice daily, check free Depakote level, Keppra level, Lamictal level.  Orders placed.  Patient needs repeat drug levels in 1 to 2 weeks and follow-up with neurology as outpatient for further adjustment of his medications.  Appreciate neurology input     Essential hypertension.  On propranolol and prazosin.     Depression/anxiety.  On Celexa and Seroquel.     GERD.   On pantoprazole.    DVT Prophylaxis: Pneumatic Compression Devices  Code Status: Full Code    Disposition: Expected discharge in 1-2 days if mental status improved and respiratory status stable   Slime Lynn MD    Interval History   Patient admitted for confusion. Currently on BiPAP. Difficult to get complete history from the patient. Confused.     -Data reviewed today: I reviewed all new labs and imaging results over the last 24 hours. I personally reviewed no images or EKG's today.    Physical Exam   Temp: 99.3  F (37.4  C) Temp src: Axillary BP: 122/69 Pulse: 83   Resp: 16 SpO2: 93 % O2 Device: Nasal cannula  Oxygen Delivery: 6 LPM  There were no vitals filed for this visit.  Vital Signs with Ranges  Temp:  [98  F (36.7  C)-99.5  F (37.5  C)] 99.3  F (37.4  C)  Pulse:  [77-84] 83  Resp:  [12-21] 16  BP: (114-163)/(50-86) 122/69  FiO2 (%):  [30 %-45 %] 45 %  SpO2:  [85 %-99 %] 93 %  I/O last 3 completed shifts:  In: -   Out: 1000 [Urine:1000]    GEN:  Alert, confused.  appears comfortable, NAD.  HEENT:  Normocephalic/atraumatic, no scleral icterus, no nasal discharge, mouth moist.  CV:  Regular rate and rhythm, no murmur or JVD.  S1 + S2 noted, no S3 or S4.  LUNGS:  Clear to auscultation bilaterally without rales/rhonchi/wheezing/retractions.  Symmetric chest rise on inhalation noted.  ABD:  Active bowel sounds, soft, non-tender/non-distended.  No rebound/guarding/rigidity.  EXT:  No edema or cyanosis.  Hands/feet warm to touch with good signs of peripheral perfusion.  No joint synovitis noted.  SKIN:  Dry to touch, no exanthems noted in the visualized areas.  NEURO:  Symmetric muscle strength, sensation to touch grossly intact.  No new focal deficits appreciated.    Medications      busPIRone  15 mg Oral BID    citalopram  20 mg Oral Daily    citalopram  40 mg Oral Daily    divalproex sodium delayed-release  500 mg Oral TID    fluticasone  1 spray Nasal BID    lamoTRIgine  200 mg Oral BID    levETIRAcetam  1,000 mg Oral QAM    And    levETIRAcetam  1,500 mg Oral At Bedtime    levOCARNitine  660 mg Oral TID    multivitamin, therapeutic  1 tablet Oral Daily    pantoprazole  40 mg Oral Daily    polyethylene glycol  17 g Oral Daily    prazosin  2 mg Oral At Bedtime    propranolol  20 mg Oral BID    QUEtiapine ER  300 mg Oral At Bedtime    vancomycin  1,500 mg Intravenous Q12H    zonisamide  100 mg Oral QAM    And    zonisamide  200 mg Oral At Bedtime       Data   Recent Labs   Lab 11/25/23  0558 11/24/23  1042 11/20/23  1258   WBC  --  7.4 10.7   HGB  --  12.0* 12.4*   MCV  --  100 100   PLT  --  239 177   NA  --  139 140    POTASSIUM  --  3.9 4.2   CHLORIDE  --  94* 98   CO2  --  37* 35*   BUN  --  17.9 15.1   CR 0.61* 0.67 0.69   ANIONGAP  --  8 7   ARNOLD  --  8.9 8.7   GLC  --  109* 98   ALBUMIN  --  3.8  --    PROTTOTAL  --  7.1  --    BILITOTAL  --  0.4  --    ALKPHOS  --  123  --    ALT  --  57  --    AST  --  47*  --        Recent Results (from the past 24 hour(s))   CT Chest Pulmonary Embolism w Contrast    Narrative    CT CHEST PULMONARY EMBOLISM WITH CONTRAST 11/24/2023 3:43 PM    CLINICAL HISTORY: Shortness of breath. Hypoxia. Elevated D-dimer.  TECHNIQUE: CT angiogram chest during arterial phase injection IV  contrast. 2D and 3D MIP reconstructions were performed by the CT  technologist. Dose reduction techniques were used.   CONTRAST: 88mL Isovue-370  COMPARISON: CT of the chest, abdomen, and pelvis performed 5/12/2023.    FINDINGS:  ANGIOGRAM CHEST: Pulmonary arteries are normal caliber and negative  for pulmonary emboli. Thoracic aorta is negative for dissection.    LUNGS AND PLEURA: There are a few part solid nodular densities in the  right lung, with the largest in the right middle lobe measuring 1.2 cm  (series 7 image 107), new since the previous exam. No pleural  effusions.    MEDIASTINUM/AXILLAE: No enlarged lymph nodes in the chest. No  pericardial effusion.    CORONARY ARTERY CALCIFICATION: Mild.    UPPER ABDOMEN: Cholelithiasis. Nonobstructing 0.8 cm stone in the  upper pole of the left kidney is partially included on this exam.  There are also multiple small calcifications noted within a cyst in  the upper pole of the left kidney.    MUSCULOSKELETAL: Degenerative changes in the thoracic spine.      Impression    IMPRESSION:  1.  No evidence for pulmonary embolism.  2.  A few part-solid nodular densities in the right lung are new since  the previous exam. These may be infectious or inflammatory in  etiology, but are considered indeterminate, and a follow-up CT in 3  months is recommended to ensure resolution.  3.   Cholelithiasis.  4.  Left nephrolithiasis is partially included on this exam.    TITI FELDER MD         SYSTEM ID:  [id number]

## 2023-11-25 NOTE — CONSULTS
Care Management Initial Consult    General Information  Assessment completed with: Patient,    Type of CM/SW Visit: Initial Assessment    Primary Care Provider verified and updated as needed: Yes   Readmission within the last 30 days: no previous admission in last 30 days      Reason for Consult: discharge planning  Advance Care Planning:            Communication Assessment  Patient's communication style: spoken language (English or Bilingual)    Hearing Difficulty or Deaf: no   Wear Glasses or Blind: yes    Cognitive  Cognitive/Neuro/Behavioral: .WDL except  Level of Consciousness: intermittent confusion  Arousal Level: arouses to voice, opens eyes spontaneously  Orientation: oriented x 4  Mood/Behavior: cooperative     Speech: slurred    Living Environment:   People in home: facility resident     Current living Arrangements: group home      Able to return to prior arrangements: yes       Family/Social Support:  Care provided by: other (see comments) (halfway staff)  Provides care for: no one, unable/limited ability to care for self  Marital Status: Single    Community Resources: Group Home  Equipment currently used at home: walker, rolling  Supplies currently used at home: Oxygen Tubing/Supplies    Employment/Financial:  Employment Status: disabled       Does the patient's insurance plan have a 3 day qualifying hospital stay waiver?  No    Lifestyle & Psychosocial Needs:  Social Determinants of Health     Food Insecurity: Not on file   Depression: Not on file   Housing Stability: Not on file   Tobacco Use: Medium Risk (5/5/2023)    Patient History     Smoking Tobacco Use: Former     Smokeless Tobacco Use: Never     Passive Exposure: Not on file   Financial Resource Strain: Not on file   Alcohol Use: Not on file   Transportation Needs: Not on file   Physical Activity: Not on file   Interpersonal Safety: Not on file   Stress: Not on file   Social Connections: Not on file       Additional Information:  Care  coordination consult for discharge planning and elevated unplanned rehospitalization risk score. Patient admitted with non-compliance with medication regimen, acute respiratory failure, hepatic encephalopathy. Met with patient at bedside, he was able to confirm he lives in a group home. He is on oxygen continuous at baseline. He has limited ability to answer many questions. Called his step mother Ashlie and she was able to provide phone number to group home and stated that the best contact would be the director [facility] who is only there M-F 7-2pm.   Ph# [phone]  She stated pt will need wheelchair transport at discharge to return to group home. Reviewed out of pocket cost for [facility] transport, $89.98 base and $5.79 per mile to the destination. She expressed understanding and are agreeable to this.    Did speak to someone at group home to let them know patient here and possibly not discharging back until Monday.   Care coordination to continue to follow for discharge planning.     Zoie Aranda RN  Care Coordinator  [facility]

## 2023-11-25 NOTE — PROGRESS NOTES
"A BiPAP of  12/6 @ 45% was applied to the pt via the mask for an increase in WOB and/or SOB.  The bridge of the nose looks good and remains intact. Pt is tolerating it well. Will continue to monitor and assess the pt's current respiratory status and needs.    Vital signs:  Temp: 99.5  F (37.5  C) Temp src: Axillary BP: (!) 160/77 Pulse: 83   Resp: 19 SpO2: 93 % O2 Device: BiPAP/CPAP Oxygen Delivery: 6 LPM      Estimated body mass index is 43.07 kg/m  as calculated from the following:    Height as of 9/11/23: 1.702 m (5' 7\").    Weight as of 9/11/23: 124.7 kg (275 lb).          "

## 2023-11-25 NOTE — PLAN OF CARE
/77 (BP Location: Left arm)   Pulse 82   Temp 98.8  F (37.1  C) (Oral)   Resp 20   SpO2 100%     Pt A&O. Intermittent confusion. Arouses to voice. A2 GB/W. On BiPAP at HS and for napping. 6L NC when off BiPAP. On vanco q12h, positive blood cultures. Depakote dose decreased d/t increased ammonia. Ammonia 82 - recheck in AM. Miconazole ordered for rash in arm pits and folds. Open area on L buttock with yellow drainage. Wound cleansed and covered with mepilex - WOC consulted. Barrier cream applied to sacrum. External catheter in place. Neurology and SW consulted. Lamotrigine, keppra, and valproic acid  levels checked this afternoon. Discharge TBD.           Goal Outcome Evaluation:      Plan of Care Reviewed With: patient    Overall Patient Progress: no changeOverall Patient Progress: no change

## 2023-11-25 NOTE — PLAN OF CARE
Goal Outcome Evaluation:      Plan of Care Reviewed With: patient    Overall Patient Progress: no changeOverall Patient Progress: no change     Care from [id number]  Inpatient Progress Note  Pt transferred to the unit from the ED, stable. Pt was placed on Oxymask upon arrival. Pt was oriented to the room and staff.    ORIENTATION: Aox4.  VSS.  PAIN: Denies pain, CP, n/v. DE [place] FUENTE evident when ambulating to restroom. Pt reports discomfort on his buttocks for old sacral wounds that are scabbed; Mepliex placed this shift; no obvious drainage.  O2: Placed on BiPap later this evening, tolerating well. Pt has an oxymask in the room when not on BiPap.  GI/: Ambulates to restroom. Continent. No BM this shift.  SKIN: Old sacral wound on bottom covered with Mepilex.  ACTIVITY: A2 GB/walker.  DIET: Reg  LINES/DRAINS: L PIV. Currently infusing Vanco; a little positional since IV is placed in the AC.  PLAN: Continue BiPap, watch pending blood cultures.

## 2023-11-25 NOTE — CONSULTS
[facility]  Neurology Consultation         Fina Martinez MD    Tre Vazquez MRN# [id number]   YOB: 1975 Age: 48 year old      Date of Admission:  11/24/2023  Date of Consult: 11/25/2023                    Chief Complaint:   Seizure med management          History of Present Illness:   Tre Vazquez is a 48 year old male with complicated past history (see admitting note) but significant for CP with history of seizures and follows with [facility] neurology ( Dr. Griggs and SEBASTIEN Merchant since 4/2022) last seen 1/2023) with concern for chronic hyperammonia related to chronic Depakote use, admitted with decreased mental status and decreased oxygen in concern ofr setting of decreased compliance with BiPAP/O2 at group home with chronic hypercarbic.  During eval in ER labs with elevated ammonia beyond baseline to 129 and did receive lactulose.  Also with elevated pCo2 to 92.  D dimer elevated but PE CT neg.  Now admitted and improving with BiPAP.  Neurology consulted for management of seizure meds in setting of elevated ammonia.  Depakote is being held. Now started on Vanco for 1/2 positive blood cultures.   Concern for chronic buttock wound.     Reviewed chart and last seen by neuro as above.  While hyperammonia not specifically addressed in notes it appears on levocarnitine in 330 TID in addition to depakote 500mg TID and this is used to lower ammonia with depakote.  In 1/2023 visit patient asked for Depakote to be weaned and given seizure free since 2021 elected not to wean.  Levels in 4/2023 reassuring.    Current meds for seizures   Depakote 500mg TID with levocarnitine 330 TID   Keppra 1000/55304  Lamictal 200/200  Zonisamide 200 at bedtime              Past Medical History:     Patient Active Problem List   Diagnosis    Non-refractory epilepsy (H)    Cerebral palsy (H)    Encephalomalacia    Benign hypertension    Advanced directives, counseling/discussion    Septic shock (H)     Pneumococcal septicemia(038.2) (H)    History of sepsis    Depression    Morbid obesity (H)    Breakthrough seizure (H)    Nonintractable generalized idiopathic epilepsy without status epilepticus (H)    Acute respiratory failure with hypercapnia (H)    Generalized muscle weakness    Cerebral palsy, unspecified type (H)    Oxygen dependent    Acute and chronic respiratory failure with hypercapnia (H)    Obesity hypoventilation syndrome (H)    Toxic metabolic encephalopathy    Hyperammonemic encephalopathy    Acute on chronic respiratory failure with hypoxia (H)    Hyperammonemia (H24)    Transaminitis    Contusion of right lower extremity, initial encounter    Hepatic encephalopathy (H)    Shortness of breath    Non compliance w medication regimen    Acute respiratory failure with hypoxia and hypercarbia (H)      Past Medical History:   Diagnosis Date    Blindness of left eye     Depression 03/10/2014    Hypertension     Pneumococcal septicemia(038.2) (H) 11/26/2013    Hospitalized    Pneumonia, organism unspecified(486) 11/26/2013    Hospitalized    Uncomplicated asthma     Unspecified epilepsy without mention of intractable epilepsy           Past Surgical History:     Past Surgical History:   Procedure Laterality Date    COLONOSCOPY N/A 12/1/2022    Procedure: COLONOSCOPY;  Surgeon: Michael Caldwell MD;  Location:  OR    ESOPHAGOSCOPY, GASTROSCOPY, DUODENOSCOPY (EGD), COMBINED N/A 12/1/2022    Procedure: ESOPHAGOGASTRODUODENOSCOPY with biopsy;  Surgeon: Michael Caldwell MD;  Location:  OR    ORTHOPEDIC SURGERY      pt stated past surgery on both ankles              Home Medications:     Prior to Admission medications    Medication Sig Last Dose Taking? Auth Provider Long Term End Date   ammonium lactate (AMLACTIN) 12 % external cream Apply topically 2 times daily 11/24/2023 at 0800 Yes Unknown, Entered By History     bacitracin 500 UNIT/GM external ointment Apply topically 2 times  daily 11/24/2023 at 0800 Yes Eloy Wood MD     busPIRone (BUSPAR) 15 MG tablet Take 15 mg by mouth 2 times daily 11/24/2023 at 0800 Yes Reported, Patient Yes    citalopram (CELEXA) 20 MG tablet Take 20 mg by mouth daily 11/24/2023 at 0800 Yes Reported, Patient No    citalopram (CELEXA) 40 MG tablet Take 40 mg by mouth daily 11/24/2023 at 0800 Yes Unknown, Entered By History No    clotrimazole (LOTRIMIN) 1 % external solution Apply topically 2 times daily 11/24/2023 at 0800 Yes Reported, Patient     divalproex sodium delayed-release (DEPAKOTE) 500 MG DR tablet Take 500 mg by mouth 3 times daily 11/24/2023 at 0800 Yes Unknown, Entered By History No    doxycycline hyclate (VIBRAMYCIN) 100 MG capsule Take 1 capsule (100 mg) by mouth 2 times daily for 10 days 11/24/2023 at day 4/10 Yes Lis Lagunas MD  11/30/23   fluticasone (FLONASE) 50 MCG/ACT nasal spray Spray 1 spray in nostril 2 times daily 11/24/2023 at 0800 Yes Unknown, Entered By History     lamoTRIgine (LAMICTAL) 200 MG tablet Take 1 tablet (200 mg) by mouth 2 times daily 11/24/2023 at 0800 Yes Lisa Mercahnt APRN CNP Yes    levETIRAcetam (KEPPRA) 500 MG tablet Take 2 tablets (1,000 mg) by mouth every morning AND 3 tablets (1,500 mg) At Bedtime. 11/24/2023 at 0800 Yes Lisa Merchant APRN CNP Yes    levOCARNitine (CARNITOR) 330 MG tablet Take 2 tablets (660 mg) by mouth 3 times daily 11/24/2023 at 0800 Yes Lisa Merchant APRN CNP Yes    methylcellulose POWD powder Apply 2 mg topically 2 times daily Mix 1 rounded tablespoon (2 mg) in 8 oz glass of water and take by mouth twice daily 11/24/2023 at 0800 Yes Unknown, Entered By History     multivitamin, therapeutic (THERA-VIT) TABS tablet Take 1 tablet by mouth daily 11/24/2023 at 0800 Yes Unknown, Entered By History     ondansetron (ZOFRAN ODT) 4 MG ODT tab Take 1 tablet (4 mg) by mouth every 6 hours as needed for nausea or vomiting More than a month Yes Lis Lagunas MD     pantoprazole (PROTONIX)  40 MG EC tablet Take 40 mg by mouth daily 11/24/2023 at 0800 Yes Unknown, Entered By History     polyethylene glycol (MIRALAX) 17 GM/Dose powder Take 1 capful. by mouth daily 11/24/2023 at 0900 Yes Reported, Patient     prazosin (MINIPRESS) 2 MG capsule Take 2 mg by mouth At Bedtime 11/23/2023 at 2000 Yes Reported, Patient Yes    propranolol (INDERAL) 20 MG tablet Take 20 mg by mouth 2 times daily 11/24/2023 at 0800 Yes Reported, Patient No    QUEtiapine ER (SEROQUEL XR) 300 MG 24 hr tablet Take 300 mg by mouth at bedtime 11/23/2023 at 2000 Yes Unknown, Entered By History No    zonisamide (ZONEGRAN) 100 MG capsule Take 1 capsule (100 mg) by mouth every morning AND 2 capsules (200 mg) At Bedtime. 11/24/2023 at 0800 Yes Lisa Merchant, RON CNP Yes             Current Medications:          busPIRone  15 mg Oral BID    citalopram  20 mg Oral Daily    citalopram  40 mg Oral Daily    [Held by provider] divalproex sodium delayed-release  500 mg Oral TID    fluticasone  1 spray Nasal BID    lamoTRIgine  200 mg Oral BID    levETIRAcetam  1,000 mg Oral QAM    And    levETIRAcetam  1,500 mg Oral At Bedtime    levOCARNitine  660 mg Oral TID    miconazole   Topical BID    multivitamin, therapeutic  1 tablet Oral Daily    pantoprazole  40 mg Oral Daily    polyethylene glycol  17 g Oral Daily    prazosin  2 mg Oral At Bedtime    propranolol  20 mg Oral BID    QUEtiapine ER  300 mg Oral At Bedtime    vancomycin  1,500 mg Intravenous Q12H    zonisamide  100 mg Oral QAM    And    zonisamide  200 mg Oral At Bedtime     ondansetron         Allergies:     Allergies   Allergen Reactions    Hydrocodone Bitartrate Er Unknown    Cephalexin Rash     HUT Reaction: Rash; HUT Noted: [id number]      Vicodin [Hydrocodone-Acetaminophen] GI Disturbance            Social History:     Lives in group home.  Own medical decision makers            Family History:   No family history on file.            Review of Systems:   The 10 point Review of Systems  is negative other than noted in the HPI.             Physical Exam:    , Blood pressure 121/77, pulse 82, temperature 98.8  F (37.1  C), temperature source Oral, resp. rate 20, SpO2 100%.  0 lbs 0 oz     Cardio - Heart Rate Reg, Distal pulses palpable  Resp - Breathing non labored    Neurological Exam:  General: Mental status -Alert and orientated and able to follow conversation about seizure meds relatively well. - Had O2 on   Cranial Nerves- tended to keep left eye shut with more narrow fissure,  Face otherwise symmetric   Motor: moved all extremities against gravity   Sensation: intact to light touch   Reflexes:  deferred  Cerebellar: deferred  Gait: in bed         Data:   All new lab and imaging data was reviewed.  Recent Labs   Lab 11/25/23  0558 11/24/23  1042 11/20/23  1258   WBC  --  7.4 10.7   HGB  --  12.0* 12.4*   MCV  --  100 100   PLT  --  239 177   NA  --  139 140   POTASSIUM  --  3.9 4.2   CHLORIDE  --  94* 98   CO2  --  37* 35*   BUN  --  17.9 15.1   CR 0.61* 0.67 0.69   ANIONGAP  --  8 7   ARNOLD  --  8.9 8.7   GLC  --  109* 98   ALBUMIN  --  3.8  --    PROTTOTAL  --  7.1  --    BILITOTAL  --  0.4  --    ALKPHOS  --  123  --    ALT  --  57  --    AST  --  47*  --      Significant labs    Labs at admit -   pC02 - 92,   Depakote level total - 62.8   Ammonia 129 - repeat level 68 then 82 this am     Prior labs  Baseline ammonia - 60-80 on review  Last seizure meds - 4/12/2023 - see chart - free VPA 13  10/25/2023 VPA - 72.8                Assessment and Plan:         Tre Vazquez is a 48 year old male with complicated past history developmental delay, morbid obesity, ANA and OHS using chronically BiPAP at night apparently, also significant for cerebral palsy with history of seizures and follows with [facility] neurology ( Dr. Griggs and SEBASTIEN Merchant since 4/2022) last seen 1/2023, with concern for chronic hyperammonia related to chronic Depakote use, admitted with decreased mental status and decreased  oxygen in concern for setting of decreased compliance with BiPAP/O2 at group home with chronic hypercarbic.  During eval in ER labs with elevated ammonia beyond baseline to 129 and did receive lactulose.  Also with elevated pCo2 to 92.  Now admitted and improving with BiPAP.  Neurology consulted for management of seizure meds in setting of elevated ammonia.  Depakote is being held.      Concern for ongoing use with Depakote and chronic elevated ammonia levels.  Is on levocarnitine 330 TID in hopes to help reduce ammonia levels.  Given concern for seizures and possible nonepileptic spell events and on 4 seizure meds changing medication regimine may best be done by outpatient neurology provider and may need to consider epilepsy monitoring unit.  Lowering Depakote can lower Lamictal levels as well further complicating med changes.   Given now ammonia back to baseline levels would obtain updated level of other seizure meds ( aka lamictal, keppra, zonisamide, and obtain free VPA level).  Restart Depakote at 500mg BID (vs prior TID) dose.  Plan to repeat levels of depakote,  Lamictal and ammonia in about  10 days (pending timing of discharge ).   Follow up with outpatient neurology provider. Dr. Griggs at [facility].     Neurology will sign off    74 min spent on date of service reviewing chart, care coordination, evaluation and documentation.

## 2023-11-26 LAB
ALBUMIN SERPL BCG-MCNC: 3.4 G/DL (ref 3.5–5.2)
ALBUMIN UR-MCNC: 20 MG/DL
ALP SERPL-CCNC: 113 U/L (ref 40–150)
ALT SERPL W P-5'-P-CCNC: 53 U/L (ref 0–70)
AMMONIA PLAS-SCNC: 56 UMOL/L (ref 16–60)
ANION GAP SERPL CALCULATED.3IONS-SCNC: 3 MMOL/L (ref 7–15)
ANION GAP SERPL CALCULATED.3IONS-SCNC: 8 MMOL/L (ref 7–15)
APPEARANCE UR: CLEAR
AST SERPL W P-5'-P-CCNC: 47 U/L (ref 0–45)
BASE EXCESS BLDV CALC-SCNC: 10.5 MMOL/L (ref -7.7–1.9)
BASOPHILS # BLD AUTO: ABNORMAL 10*3/UL
BASOPHILS # BLD MANUAL: 0 10E3/UL (ref 0–0.2)
BASOPHILS NFR BLD AUTO: ABNORMAL %
BASOPHILS NFR BLD MANUAL: 0 %
BILIRUB SERPL-MCNC: 0.5 MG/DL
BILIRUB UR QL STRIP: NEGATIVE
BUN SERPL-MCNC: 11.4 MG/DL (ref 6–20)
BUN SERPL-MCNC: 12 MG/DL (ref 6–20)
CALCIUM SERPL-MCNC: 8.5 MG/DL (ref 8.6–10)
CALCIUM SERPL-MCNC: 8.6 MG/DL (ref 8.6–10)
CHLORIDE SERPL-SCNC: 94 MMOL/L (ref 98–107)
CHLORIDE SERPL-SCNC: 95 MMOL/L (ref 98–107)
COLOR UR AUTO: ABNORMAL
CREAT SERPL-MCNC: 0.58 MG/DL (ref 0.67–1.17)
CREAT SERPL-MCNC: 0.61 MG/DL (ref 0.67–1.17)
DEPRECATED HCO3 PLAS-SCNC: 38 MMOL/L (ref 22–29)
DEPRECATED HCO3 PLAS-SCNC: 42 MMOL/L (ref 22–29)
EGFRCR SERPLBLD CKD-EPI 2021: >90 ML/MIN/1.73M2
EGFRCR SERPLBLD CKD-EPI 2021: >90 ML/MIN/1.73M2
EOSINOPHIL # BLD AUTO: ABNORMAL 10*3/UL
EOSINOPHIL # BLD MANUAL: 0 10E3/UL (ref 0–0.7)
EOSINOPHIL NFR BLD AUTO: ABNORMAL %
EOSINOPHIL NFR BLD MANUAL: 0 %
ERYTHROCYTE [DISTWIDTH] IN BLOOD BY AUTOMATED COUNT: 13.6 % (ref 10–15)
ERYTHROCYTE [DISTWIDTH] IN BLOOD BY AUTOMATED COUNT: 13.6 % (ref 10–15)
GLUCOSE SERPL-MCNC: 126 MG/DL (ref 70–99)
GLUCOSE SERPL-MCNC: 143 MG/DL (ref 70–99)
GLUCOSE UR STRIP-MCNC: NEGATIVE MG/DL
HCO3 BLDV-SCNC: 41 MMOL/L (ref 21–28)
HCT VFR BLD AUTO: 39.4 % (ref 40–53)
HCT VFR BLD AUTO: 40.9 % (ref 40–53)
HGB BLD-MCNC: 11.8 G/DL (ref 13.3–17.7)
HGB BLD-MCNC: 12.1 G/DL (ref 13.3–17.7)
HGB UR QL STRIP: NEGATIVE
IMM GRANULOCYTES # BLD: ABNORMAL 10*3/UL
IMM GRANULOCYTES NFR BLD: ABNORMAL %
KETONES UR STRIP-MCNC: NEGATIVE MG/DL
LACTATE SERPL-SCNC: 0.6 MMOL/L (ref 0.7–2)
LEUKOCYTE ESTERASE UR QL STRIP: NEGATIVE
LIPASE SERPL-CCNC: 20 U/L (ref 13–60)
LYMPHOCYTES # BLD AUTO: ABNORMAL 10*3/UL
LYMPHOCYTES # BLD MANUAL: 0.9 10E3/UL (ref 0.8–5.3)
LYMPHOCYTES NFR BLD AUTO: ABNORMAL %
LYMPHOCYTES NFR BLD MANUAL: 12 %
MCH RBC QN AUTO: 30.1 PG (ref 26.5–33)
MCH RBC QN AUTO: 30.5 PG (ref 26.5–33)
MCHC RBC AUTO-ENTMCNC: 29.6 G/DL (ref 31.5–36.5)
MCHC RBC AUTO-ENTMCNC: 29.9 G/DL (ref 31.5–36.5)
MCV RBC AUTO: 102 FL (ref 78–100)
MCV RBC AUTO: 102 FL (ref 78–100)
MONOCYTES # BLD AUTO: ABNORMAL 10*3/UL
MONOCYTES # BLD MANUAL: 0.8 10E3/UL (ref 0–1.3)
MONOCYTES NFR BLD AUTO: ABNORMAL %
MONOCYTES NFR BLD MANUAL: 11 %
MUCOUS THREADS #/AREA URNS LPF: PRESENT /LPF
NEUTROPHILS # BLD AUTO: ABNORMAL 10*3/UL
NEUTROPHILS # BLD MANUAL: 5.6 10E3/UL (ref 1.6–8.3)
NEUTROPHILS NFR BLD AUTO: ABNORMAL %
NEUTROPHILS NFR BLD MANUAL: 77 %
NITRATE UR QL: NEGATIVE
NRBC # BLD AUTO: 0 10E3/UL
NRBC BLD AUTO-RTO: 0 /100
O2/TOTAL GAS SETTING VFR VENT: 7 %
PCO2 BLDV: 85 MM HG (ref 40–50)
PH BLDV: 7.29 [PH] (ref 7.32–7.43)
PH UR STRIP: 6 [PH] (ref 5–7)
PLAT MORPH BLD: NORMAL
PLATELET # BLD AUTO: 224 10E3/UL (ref 150–450)
PLATELET # BLD AUTO: 229 10E3/UL (ref 150–450)
PO2 BLDV: 50 MM HG (ref 25–47)
POTASSIUM SERPL-SCNC: 3.8 MMOL/L (ref 3.4–5.3)
POTASSIUM SERPL-SCNC: 3.9 MMOL/L (ref 3.4–5.3)
PROCALCITONIN SERPL IA-MCNC: 0.07 NG/ML
PROT SERPL-MCNC: 6.2 G/DL (ref 6.4–8.3)
RBC # BLD AUTO: 3.87 10E6/UL (ref 4.4–5.9)
RBC # BLD AUTO: 4.02 10E6/UL (ref 4.4–5.9)
RBC MORPH BLD: NORMAL
RBC URINE: 1 /HPF
SODIUM SERPL-SCNC: 140 MMOL/L (ref 135–145)
SODIUM SERPL-SCNC: 140 MMOL/L (ref 135–145)
SP GR UR STRIP: 1.03 (ref 1–1.03)
UROBILINOGEN UR STRIP-MCNC: 12 MG/DL
WBC # BLD AUTO: 6.7 10E3/UL (ref 4–11)
WBC # BLD AUTO: 7.3 10E3/UL (ref 4–11)
WBC URINE: 1 /HPF

## 2023-11-26 PROCEDURE — 120N000001 HC R&B MED SURG/OB

## 2023-11-26 PROCEDURE — 36415 COLL VENOUS BLD VENIPUNCTURE: CPT | Performed by: INTERNAL MEDICINE

## 2023-11-26 PROCEDURE — 258N000003 HC RX IP 258 OP 636: Performed by: STUDENT IN AN ORGANIZED HEALTH CARE EDUCATION/TRAINING PROGRAM

## 2023-11-26 PROCEDURE — 99233 SBSQ HOSP IP/OBS HIGH 50: CPT | Performed by: STUDENT IN AN ORGANIZED HEALTH CARE EDUCATION/TRAINING PROGRAM

## 2023-11-26 PROCEDURE — 87040 BLOOD CULTURE FOR BACTERIA: CPT | Performed by: STUDENT IN AN ORGANIZED HEALTH CARE EDUCATION/TRAINING PROGRAM

## 2023-11-26 PROCEDURE — 85027 COMPLETE CBC AUTOMATED: CPT | Performed by: INTERNAL MEDICINE

## 2023-11-26 PROCEDURE — 82803 BLOOD GASES ANY COMBINATION: CPT | Performed by: STUDENT IN AN ORGANIZED HEALTH CARE EDUCATION/TRAINING PROGRAM

## 2023-11-26 PROCEDURE — 85007 BL SMEAR W/DIFF WBC COUNT: CPT | Performed by: STUDENT IN AN ORGANIZED HEALTH CARE EDUCATION/TRAINING PROGRAM

## 2023-11-26 PROCEDURE — 36415 COLL VENOUS BLD VENIPUNCTURE: CPT | Performed by: STUDENT IN AN ORGANIZED HEALTH CARE EDUCATION/TRAINING PROGRAM

## 2023-11-26 PROCEDURE — 250N000013 HC RX MED GY IP 250 OP 250 PS 637: Performed by: INTERNAL MEDICINE

## 2023-11-26 PROCEDURE — 81001 URINALYSIS AUTO W/SCOPE: CPT | Performed by: STUDENT IN AN ORGANIZED HEALTH CARE EDUCATION/TRAINING PROGRAM

## 2023-11-26 PROCEDURE — 94660 CPAP INITIATION&MGMT: CPT

## 2023-11-26 PROCEDURE — 250N000011 HC RX IP 250 OP 636: Performed by: STUDENT IN AN ORGANIZED HEALTH CARE EDUCATION/TRAINING PROGRAM

## 2023-11-26 PROCEDURE — 80053 COMPREHEN METABOLIC PANEL: CPT | Performed by: STUDENT IN AN ORGANIZED HEALTH CARE EDUCATION/TRAINING PROGRAM

## 2023-11-26 PROCEDURE — 85027 COMPLETE CBC AUTOMATED: CPT | Performed by: STUDENT IN AN ORGANIZED HEALTH CARE EDUCATION/TRAINING PROGRAM

## 2023-11-26 PROCEDURE — 83690 ASSAY OF LIPASE: CPT | Performed by: STUDENT IN AN ORGANIZED HEALTH CARE EDUCATION/TRAINING PROGRAM

## 2023-11-26 PROCEDURE — 84145 PROCALCITONIN (PCT): CPT | Performed by: STUDENT IN AN ORGANIZED HEALTH CARE EDUCATION/TRAINING PROGRAM

## 2023-11-26 PROCEDURE — 250N000013 HC RX MED GY IP 250 OP 250 PS 637: Performed by: HOSPITALIST

## 2023-11-26 PROCEDURE — 999N000157 HC STATISTIC RCP TIME EA 10 MIN

## 2023-11-26 PROCEDURE — 83605 ASSAY OF LACTIC ACID: CPT | Performed by: STUDENT IN AN ORGANIZED HEALTH CARE EDUCATION/TRAINING PROGRAM

## 2023-11-26 PROCEDURE — 82140 ASSAY OF AMMONIA: CPT | Performed by: INTERNAL MEDICINE

## 2023-11-26 RX ORDER — NALOXONE HYDROCHLORIDE 0.4 MG/ML
0.2 INJECTION, SOLUTION INTRAMUSCULAR; INTRAVENOUS; SUBCUTANEOUS
Status: DISCONTINUED | OUTPATIENT
Start: 2023-11-26 | End: 2023-12-18 | Stop reason: HOSPADM

## 2023-11-26 RX ORDER — NALOXONE HYDROCHLORIDE 0.4 MG/ML
0.4 INJECTION, SOLUTION INTRAMUSCULAR; INTRAVENOUS; SUBCUTANEOUS
Status: DISCONTINUED | OUTPATIENT
Start: 2023-11-26 | End: 2023-12-18 | Stop reason: HOSPADM

## 2023-11-26 RX ORDER — CITALOPRAM HYDROBROMIDE 20 MG/1
60 TABLET ORAL DAILY
Status: DISCONTINUED | OUTPATIENT
Start: 2023-11-27 | End: 2023-12-18 | Stop reason: HOSPADM

## 2023-11-26 RX ORDER — HYDROMORPHONE HCL IN WATER/PF 6 MG/30 ML
0.2 PATIENT CONTROLLED ANALGESIA SYRINGE INTRAVENOUS
Status: DISCONTINUED | OUTPATIENT
Start: 2023-11-26 | End: 2023-12-01

## 2023-11-26 RX ADMIN — VANCOMYCIN HYDROCHLORIDE 1500 MG: 10 INJECTION, POWDER, LYOPHILIZED, FOR SOLUTION INTRAVENOUS at 06:05

## 2023-11-26 RX ADMIN — ZONISAMIDE 200 MG: 100 CAPSULE ORAL at 21:35

## 2023-11-26 RX ADMIN — BUSPIRONE HYDROCHLORIDE 15 MG: 15 TABLET ORAL at 21:34

## 2023-11-26 RX ADMIN — PRAZOSIN HYDROCHLORIDE 2 MG: 1 CAPSULE ORAL at 21:28

## 2023-11-26 RX ADMIN — PROPRANOLOL HYDROCHLORIDE 20 MG: 20 TABLET ORAL at 21:34

## 2023-11-26 RX ADMIN — THERA TABS 1 TABLET: TAB at 08:42

## 2023-11-26 RX ADMIN — LEVETIRACETAM 1500 MG: 500 TABLET, FILM COATED ORAL at 21:28

## 2023-11-26 RX ADMIN — ZONISAMIDE 100 MG: 100 CAPSULE ORAL at 08:42

## 2023-11-26 RX ADMIN — LEVOCARNITINE 660 MG: 330 TABLET ORAL at 21:28

## 2023-11-26 RX ADMIN — MICONAZOLE NITRATE: 20 POWDER TOPICAL at 13:31

## 2023-11-26 RX ADMIN — CITALOPRAM HYDROBROMIDE 20 MG: 20 TABLET ORAL at 08:42

## 2023-11-26 RX ADMIN — PROPRANOLOL HYDROCHLORIDE 20 MG: 20 TABLET ORAL at 08:42

## 2023-11-26 RX ADMIN — LAMOTRIGINE 200 MG: 200 TABLET ORAL at 08:42

## 2023-11-26 RX ADMIN — LEVOCARNITINE 660 MG: 330 TABLET ORAL at 14:58

## 2023-11-26 RX ADMIN — PANTOPRAZOLE SODIUM 40 MG: 40 TABLET, DELAYED RELEASE ORAL at 08:42

## 2023-11-26 RX ADMIN — LAMOTRIGINE 200 MG: 200 TABLET ORAL at 21:35

## 2023-11-26 RX ADMIN — LEVOCARNITINE 660 MG: 330 TABLET ORAL at 08:41

## 2023-11-26 RX ADMIN — LEVETIRACETAM 1000 MG: 500 TABLET, FILM COATED ORAL at 08:41

## 2023-11-26 RX ADMIN — VANCOMYCIN HYDROCHLORIDE 1500 MG: 10 INJECTION, POWDER, LYOPHILIZED, FOR SOLUTION INTRAVENOUS at 18:13

## 2023-11-26 RX ADMIN — DIVALPROEX SODIUM 500 MG: 500 TABLET, DELAYED RELEASE ORAL at 21:35

## 2023-11-26 RX ADMIN — MICONAZOLE NITRATE: 20 POWDER TOPICAL at 21:48

## 2023-11-26 RX ADMIN — DIVALPROEX SODIUM 500 MG: 500 TABLET, DELAYED RELEASE ORAL at 08:41

## 2023-11-26 RX ADMIN — FLUTICASONE PROPIONATE 1 SPRAY: 50 SPRAY, METERED NASAL at 13:31

## 2023-11-26 RX ADMIN — BUSPIRONE HYDROCHLORIDE 15 MG: 15 TABLET ORAL at 08:41

## 2023-11-26 RX ADMIN — CITALOPRAM HYDROBROMIDE 40 MG: 20 TABLET ORAL at 08:41

## 2023-11-26 RX ADMIN — QUETIAPINE FUMARATE 300 MG: 300 TABLET, EXTENDED RELEASE ORAL at 21:34

## 2023-11-26 RX ADMIN — FLUTICASONE PROPIONATE 1 SPRAY: 50 SPRAY, METERED NASAL at 21:42

## 2023-11-26 ASSESSMENT — ACTIVITIES OF DAILY LIVING (ADL)
ADLS_ACUITY_SCORE: 46
ADLS_ACUITY_SCORE: 46
ADLS_ACUITY_SCORE: 51
ADLS_ACUITY_SCORE: 51
ADLS_ACUITY_SCORE: 47
ADLS_ACUITY_SCORE: 46
ADLS_ACUITY_SCORE: 47
ADLS_ACUITY_SCORE: 46
ADLS_ACUITY_SCORE: 47

## 2023-11-26 NOTE — PLAN OF CARE
Goal Outcome Evaluation:      Plan of Care Reviewed With: patient    Overall Patient Progress: no changeOverall Patient Progress: no change     Care from [id number]  Inpatient Progress Note    ORIENTATION: Aox4  VSS  PAIN: See RN note regarding pain pt endorsed in his abdomen. Dr. Chin came to assess the patient and added PRN Dilaudid. Pt felt like the pain subsided and pt declined the PRN dilaudid. Pt reported no CP, SOB. Pt had intermittent nausea. Zofran was administered x1 this shift.   O2: Pt was on BiPap starting around 1am. Pt was on NC/Oxymask in the evening.  GI/: Ambulated to restroom. Had at least 3 soft BM this shift. Was unable to collect a urine sample this shift dt incontinence.  ACTIVITY: A1/A2 GB/W  DIET: Reg  LINES/DRAINS: L PIV  PLAN: Eventually neuro to rearrange all meds outpatient, miconazole powder applied to rash underarms, collect UA if able.

## 2023-11-26 NOTE — PLAN OF CARE
/61 (BP Location: Right arm, Patient Position: Semi-Chávez's, Cuff Size: Adult Regular)   Pulse 80   Temp 97  F (36.1  C) (Axillary)   Resp 16   SpO2 98%     Pt A&O. Intermittent confusion. Lethargic. Arouses to voice. A2 GB/W. On BiPAP at HS and for napping. 9L NC when off BiPAP. On vanco q12h, positive blood cultures. Depakote dose decreased d/t increased ammonia. Ammonia 56. Miconazole applied to rash in arm pits and folds. Open area on L buttock with serosanguineous drainage. Wound cleansed and covered with mepilex - WOC consulted. Barrier cream applied to sacrum. Neurology and SW consulted. ID consulted. Discharge TBD.        Goal Outcome Evaluation:      Plan of Care Reviewed With: patient    Overall Patient Progress: no changeOverall Patient Progress: no change

## 2023-11-26 NOTE — PROGRESS NOTES
[facility]    Hospitalist Progress Note  Provider : Eric Robertson MD  Date of Service (when I saw the patient): 11/26/2023    Assessment & Plan   Tre Vazquez is a 48 year old male who who has a very complicated past medical as noted in the list below.  He lives in a group home.  He has some developmental delay, morbid obesity, ANA and OHS using chronically BiPAP at night apparently.  History of seizure disorder and chronic hyperammonemia while on Depakote.  Schizoaffective disorder, borderline personality and left eye blindness. He presented with clouding of mentation and elevated CO2 and pneumonia.  (This patient normally has a CO2 between 70 and 80 and he is ammonia is in the 60s). His current presentation is suspected to be due to possible nonadherence to treatment, though it is difficult to prove.  Patient has been a started on NIPPV in the emergency department and he has had excellent response.  Per records from May of 2022, his ammonia level was elevated and considered the consequence of the use of Depakote for seizures.  There is no official diagnosis of cirrhosis in this patient, he is not taking lactulose and likely not needed though he had one dose in ED.    CT of the head negative for any acute intracranial event.  CT of the chest with PE protocol negative for embolization, pneumonia, pleural effusion or pneumothorax.       Acute on chronic hypercarbic and hypoxemic respiratory failure.  Patient on chronic BiPAP at night time and napping.   Obesity/OHS.  Breathing comfortably morning 11/26.  Tolerated BiPAP overnight, on nasal cannula throughout the morning.  PCO2 remained elevated to 85 on venous gas, but mentation appears good.  Alert and oriented, able to answer questions.  For now, continue BiPAP overnight and with naps during the day.  Supplemental oxygen as needed.  Blood cultures from admission positive for Staph aureus, repeat cultures again growing gram-positive cocci in  clusters.  Currently on IV vancomycin.  Chest imaging not overly convincing for pneumonia, however this is a consideration in respiratory failure and bacteremia.  Continue IV vancomycin for now.  - BiPAP at night and with naps  - RT following  - Continue IV vancomycin     Staph aureus bacteremia  Initial blood cultures positive for Staph aureus, sensitivities pending.  Repeat blood cultures again growing gram-positive cocci in clusters, no further speciation.  Started on IV vancomycin empirically, will continue this for now consult ID for further recommendations.  As far as source, has had respiratory issues prior to admission, but CT chest did not necessarily appear convincing for active pneumonia.  At this point this does appear to be possible source.  Next  - Continue IV vancomycin  - Consult ID    Hyperammonemia, chronic.  Unclear cause.  Patient had one-time lactulose in the emergency department.    -No known liver disease  -Initial ammonia level was 129. He received a dose of lactulose in the ER. Ammonia level elevated at 82. Suspect related to seizure medication(Depakote)  -Depakote was initially held.  Discussed with neurology and decreased Depakote to 500 mg p.o. twice daily.     Acute toxic/metabolic encephalopathy  Likely due to acute on chronic respiratory failure, hyeperammonemia  -Will continue BiPAP  -Adjusted Depakote level as above to twice daily dosing from 3 times daily     Seizure disorder. Stable on current medication.   -Discussed with neurology.  Neurology recommending to decrease Depakote to 500 mg twice daily, check free Depakote level, Keppra level, Lamictal level.  Orders placed.  Patient needs repeat drug levels in 1 to 2 weeks and follow-up with neurology as outpatient for further adjustment of his medications.  Appreciate neurology input.     Essential hypertension.  On propranolol and prazosin.     Depression/anxiety.  On Celexa and Seroquel.     GERD.   On pantoprazole.    DVT  Prophylaxis: Pneumatic Compression Devices  Code Status: Full Code    Disposition: TBD given presence of staph bacteremia.    Interval History   Seen by overnight physician for new onset abdominal pain.  Was on BiPAP at the time, but was able to confirm that he was having pain in his abdomen.  Mild tenderness appreciated throughout abdomen, but no acute abdominal signs.  Number of labs are drawn which were essentially unremarkable.  Pain self resolved-declined additional pain medications.  No ongoing pain in his abdomen this morning, but did discuss that his blood cultures were positive for staph that he would need to talk to infectious disease as he would likely need course of antibiotics.    -Data reviewed today: I reviewed all new labs and imaging results over the last 24 hours. I personally reviewed no images or EKG's today.    Physical Exam   Temp: 97  F (36.1  C) Temp src: Axillary BP: 120/61 Pulse: 80   Resp: 16 SpO2: 98 % O2 Device: BiPAP/CPAP Oxygen Delivery: 10 LPM  There were no vitals filed for this visit.  Vital Signs with Ranges  Temp:  [97  F (36.1  C)-100  F (37.8  C)] 97  F (36.1  C)  Pulse:  [78-86] 80  Resp:  [12-21] 16  BP: (120-163)/(61-87) 120/61  FiO2 (%):  [45 %] 45 %  SpO2:  [90 %-100 %] 98 %  I/O last 3 completed shifts:  In: 740 [P.O.:240; I.V.:500]  Out: -     GEN:  Alert, following conversation but still responding difficult to understand at times.  appears comfortable, NAD.  HEENT:  Normocephalic/atraumatic, no scleral icterus, no nasal discharge, mouth moist.  CV:  Regular rate and rhythm, no murmur or JVD.  S1 + S2 noted, no S3 or S4.  LUNGS:  Clear to auscultation bilaterally without rales/rhonchi/wheezing/retractions.  Symmetric chest rise on inhalation noted.  ABD:  Active bowel sounds, soft, non-tender/non-distended.  No rebound/guarding/rigidity.  EXT:  No edema or cyanosis.  Hands/feet warm to touch with good signs of peripheral perfusion.  No joint synovitis noted.  SKIN:  Dry  to touch, no exanthems noted in the visualized areas.  NEURO:  Symmetric muscle strength, sensation to touch grossly intact.  No new focal deficits appreciated.    Medications      busPIRone  15 mg Oral BID    [START ON 11/27/2023] citalopram  60 mg Oral Daily    divalproex sodium delayed-release  500 mg Oral Q12H [facility] (08/20)    fluticasone  1 spray Nasal BID    lamoTRIgine  200 mg Oral BID    levETIRAcetam  1,000 mg Oral QAM    And    levETIRAcetam  1,500 mg Oral At Bedtime    levOCARNitine  660 mg Oral TID    miconazole   Topical BID    multivitamin, therapeutic  1 tablet Oral Daily    pantoprazole  40 mg Oral Daily    polyethylene glycol  17 g Oral Daily    prazosin  2 mg Oral At Bedtime    propranolol  20 mg Oral BID    QUEtiapine ER  300 mg Oral At Bedtime    vancomycin  1,500 mg Intravenous Q12H    zonisamide  100 mg Oral QAM    And    zonisamide  200 mg Oral At Bedtime       Data   Recent Labs   Lab 11/26/23  0648 11/26/23  0056 11/25/23  0558 11/24/23  1042   WBC 6.7 7.3  --  7.4   HGB 11.8* 12.1*  --  12.0*   * 102*  --  100    224  --  239    140  --  139   POTASSIUM 3.9 3.8  --  3.9   CHLORIDE 95* 94*  --  94*   CO2 42* 38*  --  37*   BUN 11.4 12.0  --  17.9   CR 0.61* 0.58* 0.61* 0.67   ANIONGAP 3* 8  --  8   ARNOLD 8.5* 8.6  --  8.9   * 143*  --  109*   ALBUMIN  --  3.4*  --  3.8   PROTTOTAL  --  6.2*  --  7.1   BILITOTAL  --  0.5  --  0.4   ALKPHOS  --  113  --  123   ALT  --  53  --  57   AST  --  47*  --  47*   LIPASE  --  20  --   --        No results found for this or any previous visit (from the past 24 hour(s)).

## 2023-11-26 NOTE — PHARMACY
Pt with h/o seizure disorder.  Per MD notes pt follows with [facility] neurology (dr almanzar and SEBASTIEN Merchant).    PTA pt on Depakote 500mg tid, lamictal 200mg bid, keppra 1000mg qam and 1500mg qpm.    Pt cont on seizure meds at PTA doses as inpt.  Note: pt depakote held briefly due to increased ammonia levels (pt only missed 1 dose on 11/25 am during this hold).    Levels to date:  Depakote (11/24 13:30) 62.8 mcg/ml (WNL, range  mcg/ml)  Keppra (11/25 15:30) 32.6 mcg/ml (WNL, range 10-40 mcg/ml).    Lamotrigine level from 11/25 1530 still pending.    Given above levels, no rec change to regimen at this point in time.

## 2023-11-26 NOTE — PROVIDER NOTIFICATION
"Contacted MD regarding: \"Pt is reporting a very sharp stomach pain, band across his abdomen. Denies CP, says he hasn't had a pain like this before. Has had 3 soft bowel movements this shift, and has urinated. Has had intermittent nausea this shift, Zofran given x1. Is there a PRN med you could order? Please advise.\"    MD came up to visit the pt. Told the RN that once the pt is off BiPap, a CT scan can possibly be done. PRN dilaudid was ordered.    RN reassessed the pt after the PRN Dilaudid was ordered and the pt reported that the pain was getting better and did not want to PRN Dilaudid at this time. RN assessed that the pt visually looks more comfortable in bed, the pt is in no apparent distress like when the RN first assessed the initial onset of pain. Pt is satting well on BiPap at this time.  "

## 2023-11-26 NOTE — PROGRESS NOTES
A BiPAP of  12/6 @ 45% was applied to the pt via the mask for an increase in WOB and/or SOB.  The bridge of the nose looks good and remains intact. Pt is tolerating it well. Will continue to monitor and assess the pt's current respiratory status and needs.

## 2023-11-26 NOTE — PROGRESS NOTES
Cross cover note    Patient is seen at bedside for new onset of abdominal pain.  He is on BiPAP, awake but somnolent and hard to understand due to being on BiPAP.  He nodded yes when asked if he has abdominal pain.  On examination his mildly tender diffusely in the upper abdomen with no guarding or rebound.  Will check CMP, CBC, procalcitonin, VBG and lactate.  Can take him for a CT as he is on BiPAP right now but I am not even sure if he needs it at this time.  We will follow-up on labs and monitor vitals closely.  Discussed with MRAYJANE.    Timbo Chin MD  Internal Medicine Hospitalist

## 2023-11-27 ENCOUNTER — APPOINTMENT (OUTPATIENT)
Dept: CARDIOLOGY | Facility: CLINIC | Age: 48
DRG: 871 | End: 2023-11-27
Attending: STUDENT IN AN ORGANIZED HEALTH CARE EDUCATION/TRAINING PROGRAM
Payer: MEDICARE

## 2023-11-27 LAB
ANION GAP SERPL CALCULATED.3IONS-SCNC: 3 MMOL/L (ref 7–15)
BASE EXCESS BLDV CALC-SCNC: 11 MMOL/L (ref -7.7–1.9)
BUN SERPL-MCNC: 12.1 MG/DL (ref 6–20)
CALCIUM SERPL-MCNC: 8.6 MG/DL (ref 8.6–10)
CHLORIDE SERPL-SCNC: 100 MMOL/L (ref 98–107)
CREAT SERPL-MCNC: 0.68 MG/DL (ref 0.67–1.17)
CREAT SERPL-MCNC: 0.68 MG/DL (ref 0.67–1.17)
DEPRECATED HCO3 PLAS-SCNC: 42 MMOL/L (ref 22–29)
EGFRCR SERPLBLD CKD-EPI 2021: >90 ML/MIN/1.73M2
EGFRCR SERPLBLD CKD-EPI 2021: >90 ML/MIN/1.73M2
GLUCOSE SERPL-MCNC: 104 MG/DL (ref 70–99)
HCO3 BLDV-SCNC: 40 MMOL/L (ref 21–28)
LAMOTRIGINE SERPL-MCNC: 14.5 UG/ML
LVEF ECHO: NORMAL
O2/TOTAL GAS SETTING VFR VENT: 6 %
PCO2 BLDV: 84 MM HG (ref 40–50)
PH BLDV: 7.29 [PH] (ref 7.32–7.43)
PO2 BLDV: 61 MM HG (ref 25–47)
POTASSIUM SERPL-SCNC: 4 MMOL/L (ref 3.4–5.3)
SODIUM SERPL-SCNC: 145 MMOL/L (ref 135–145)
VALPROATE FREE MFR SERPL: ABNORMAL %
VALPROATE FREE SERPL-MCNC: <7 UG/ML
VALPROATE SERPL-MCNC: 38 UG/ML
VANCOMYCIN SERPL-MCNC: 8.8 UG/ML

## 2023-11-27 PROCEDURE — 250N000013 HC RX MED GY IP 250 OP 250 PS 637: Performed by: HOSPITALIST

## 2023-11-27 PROCEDURE — 94660 CPAP INITIATION&MGMT: CPT

## 2023-11-27 PROCEDURE — 36415 COLL VENOUS BLD VENIPUNCTURE: CPT | Performed by: INTERNAL MEDICINE

## 2023-11-27 PROCEDURE — 93306 TTE W/DOPPLER COMPLETE: CPT | Mod: 26 | Performed by: INTERNAL MEDICINE

## 2023-11-27 PROCEDURE — 82565 ASSAY OF CREATININE: CPT | Performed by: STUDENT IN AN ORGANIZED HEALTH CARE EDUCATION/TRAINING PROGRAM

## 2023-11-27 PROCEDURE — 255N000002 HC RX 255 OP 636: Performed by: STUDENT IN AN ORGANIZED HEALTH CARE EDUCATION/TRAINING PROGRAM

## 2023-11-27 PROCEDURE — 80048 BASIC METABOLIC PNL TOTAL CA: CPT | Performed by: STUDENT IN AN ORGANIZED HEALTH CARE EDUCATION/TRAINING PROGRAM

## 2023-11-27 PROCEDURE — 97602 WOUND(S) CARE NON-SELECTIVE: CPT

## 2023-11-27 PROCEDURE — 120N000001 HC R&B MED SURG/OB

## 2023-11-27 PROCEDURE — 250N000009 HC RX 250: Performed by: INTERNAL MEDICINE

## 2023-11-27 PROCEDURE — 99232 SBSQ HOSP IP/OBS MODERATE 35: CPT | Performed by: STUDENT IN AN ORGANIZED HEALTH CARE EDUCATION/TRAINING PROGRAM

## 2023-11-27 PROCEDURE — 250N000013 HC RX MED GY IP 250 OP 250 PS 637: Performed by: INTERNAL MEDICINE

## 2023-11-27 PROCEDURE — 999N000157 HC STATISTIC RCP TIME EA 10 MIN

## 2023-11-27 PROCEDURE — 36415 COLL VENOUS BLD VENIPUNCTURE: CPT | Performed by: STUDENT IN AN ORGANIZED HEALTH CARE EDUCATION/TRAINING PROGRAM

## 2023-11-27 PROCEDURE — 80202 ASSAY OF VANCOMYCIN: CPT | Performed by: INTERNAL MEDICINE

## 2023-11-27 PROCEDURE — 99222 1ST HOSP IP/OBS MODERATE 55: CPT | Performed by: INTERNAL MEDICINE

## 2023-11-27 PROCEDURE — 82803 BLOOD GASES ANY COMBINATION: CPT | Performed by: STUDENT IN AN ORGANIZED HEALTH CARE EDUCATION/TRAINING PROGRAM

## 2023-11-27 PROCEDURE — G0463 HOSPITAL OUTPT CLINIC VISIT: HCPCS | Mod: 25

## 2023-11-27 RX ORDER — NAFCILLIN SODIUM 2 G/8ML
2 INJECTION, POWDER, FOR SOLUTION INTRAMUSCULAR; INTRAVENOUS EVERY 4 HOURS
Status: DISCONTINUED | OUTPATIENT
Start: 2023-11-27 | End: 2023-12-12

## 2023-11-27 RX ADMIN — LEVOCARNITINE 660 MG: 330 TABLET ORAL at 21:26

## 2023-11-27 RX ADMIN — CITALOPRAM HYDROBROMIDE 60 MG: 20 TABLET, FILM COATED ORAL at 09:12

## 2023-11-27 RX ADMIN — DIVALPROEX SODIUM 500 MG: 500 TABLET, DELAYED RELEASE ORAL at 21:26

## 2023-11-27 RX ADMIN — LEVOCARNITINE 660 MG: 330 TABLET ORAL at 09:13

## 2023-11-27 RX ADMIN — NAFCILLIN SODIUM 2 G: 2 INJECTION, POWDER, LYOPHILIZED, FOR SOLUTION INTRAMUSCULAR; INTRAVENOUS at 13:10

## 2023-11-27 RX ADMIN — DIVALPROEX SODIUM 500 MG: 500 TABLET, DELAYED RELEASE ORAL at 09:13

## 2023-11-27 RX ADMIN — NAFCILLIN SODIUM 2 G: 2 INJECTION, POWDER, LYOPHILIZED, FOR SOLUTION INTRAMUSCULAR; INTRAVENOUS at 17:40

## 2023-11-27 RX ADMIN — LEVOCARNITINE 660 MG: 330 TABLET ORAL at 15:00

## 2023-11-27 RX ADMIN — ZONISAMIDE 200 MG: 100 CAPSULE ORAL at 21:25

## 2023-11-27 RX ADMIN — PERFLUTREN 2 ML: 6.52 INJECTION, SUSPENSION INTRAVENOUS at 14:35

## 2023-11-27 RX ADMIN — BUSPIRONE HYDROCHLORIDE 15 MG: 15 TABLET ORAL at 09:13

## 2023-11-27 RX ADMIN — PROPRANOLOL HYDROCHLORIDE 20 MG: 20 TABLET ORAL at 21:25

## 2023-11-27 RX ADMIN — PROPRANOLOL HYDROCHLORIDE 20 MG: 20 TABLET ORAL at 09:13

## 2023-11-27 RX ADMIN — ZONISAMIDE 100 MG: 100 CAPSULE ORAL at 09:13

## 2023-11-27 RX ADMIN — LEVETIRACETAM 1500 MG: 500 TABLET, FILM COATED ORAL at 21:25

## 2023-11-27 RX ADMIN — FLUTICASONE PROPIONATE 1 SPRAY: 50 SPRAY, METERED NASAL at 21:27

## 2023-11-27 RX ADMIN — THERA TABS 1 TABLET: TAB at 09:12

## 2023-11-27 RX ADMIN — LAMOTRIGINE 200 MG: 200 TABLET ORAL at 21:26

## 2023-11-27 RX ADMIN — FLUTICASONE PROPIONATE 1 SPRAY: 50 SPRAY, METERED NASAL at 12:23

## 2023-11-27 RX ADMIN — MICONAZOLE NITRATE: 20 POWDER TOPICAL at 12:23

## 2023-11-27 RX ADMIN — LEVETIRACETAM 1000 MG: 500 TABLET, FILM COATED ORAL at 09:13

## 2023-11-27 RX ADMIN — QUETIAPINE FUMARATE 300 MG: 300 TABLET, EXTENDED RELEASE ORAL at 21:25

## 2023-11-27 RX ADMIN — NAFCILLIN SODIUM 2 G: 2 INJECTION, POWDER, LYOPHILIZED, FOR SOLUTION INTRAMUSCULAR; INTRAVENOUS at 21:25

## 2023-11-27 RX ADMIN — BUSPIRONE HYDROCHLORIDE 15 MG: 15 TABLET ORAL at 21:26

## 2023-11-27 RX ADMIN — LAMOTRIGINE 200 MG: 200 TABLET ORAL at 09:13

## 2023-11-27 RX ADMIN — PRAZOSIN HYDROCHLORIDE 2 MG: 1 CAPSULE ORAL at 21:25

## 2023-11-27 RX ADMIN — MICONAZOLE NITRATE: 20 POWDER TOPICAL at 21:27

## 2023-11-27 RX ADMIN — PANTOPRAZOLE SODIUM 40 MG: 40 TABLET, DELAYED RELEASE ORAL at 09:12

## 2023-11-27 ASSESSMENT — ACTIVITIES OF DAILY LIVING (ADL)
ADLS_ACUITY_SCORE: 51

## 2023-11-27 NOTE — CONSULTS
[facility]    Infectious Disease Consultation     Date of Admission:  11/24/2023  Date of Consult (When I saw the patient): 11/27/23    Assessment & Plan   Tre Vazquez is a 48 year old who was admitted on 11/24/2023.     Impression:  49 yo coming in from the  with mentation changes   PMH is very complicated He lives in a group home.  He has some developmental delay, morbid obesity, ANA and OHS using chronically BiPAP at night apparently.  History of seizure disorder and chronic hyperammonemia while on Depakote.  Schizoaffective disorder, borderline personality and left eye blindness.   Blood cultures with MSSA   Cephalosporin listed as an allergy allergy listed as a rash per chart reviewed patient has been placed on zosyn before no reaction listed   He is on vancomycin   He has a documented picture in the chart from 11/ 20 with a wound on the buttocks he was seen in the ER for this and prescribed doxycycline on 11/ 20, ? Source   Repeat blood cultures from 11/ 26 are negative so far     Recommendations:   Switch from vancomycin to Nafcillin   Await the blood cultures from 11/ 26   Get TTE   Buttocks wound improved noted [facility] nurse sera Page MD    Reason for Consult   Reason for consult: I was asked to evaluate this patient for MSSA bacteremia.    Primary Care Physician   Siobhan Mayo    Chief Complaint   Change in mentation     History is obtained from the patient and medical records    History of Present Illness   Tre Vazquez is a 48 year old male who presents with lethargy changes in the mental status when presenting from    Then blood cultures came back positive for MSSA here     Past Medical History   I have reviewed this patient's medical history and updated it with pertinent information if needed.   Past Medical History:   Diagnosis Date    Blindness of left eye     Depression 03/10/2014    Hypertension     Pneumococcal septicemia(038.2) (H) 11/26/2013    Hospitalized     Pneumonia, organism unspecified(486) 11/26/2013    Hospitalized    Uncomplicated asthma     Unspecified epilepsy without mention of intractable epilepsy        Past Surgical History   I have reviewed this patient's surgical history and updated it with pertinent information if needed.  Past Surgical History:   Procedure Laterality Date    COLONOSCOPY N/A 12/1/2022    Procedure: COLONOSCOPY;  Surgeon: Michael Caldwell MD;  Location:  OR    ESOPHAGOSCOPY, GASTROSCOPY, DUODENOSCOPY (EGD), COMBINED N/A 12/1/2022    Procedure: ESOPHAGOGASTRODUODENOSCOPY with biopsy;  Surgeon: Michael Caldwell MD;  Location:  OR    ORTHOPEDIC SURGERY      pt stated past surgery on both ankles       Prior to Admission Medications   Prior to Admission Medications   Prescriptions Last Dose Informant Patient Reported? Taking?   QUEtiapine ER (SEROQUEL XR) 300 MG 24 hr tablet 11/23/2023 at 2000  Yes Yes   Sig: Take 300 mg by mouth at bedtime   ammonium lactate (AMLACTIN) 12 % external cream 11/24/2023 at 0800  Yes Yes   Sig: Apply topically 2 times daily   bacitracin 500 UNIT/GM external ointment 11/24/2023 at 0800  No Yes   Sig: Apply topically 2 times daily   busPIRone (BUSPAR) 15 MG tablet 11/24/2023 at 0800  Yes Yes   Sig: Take 15 mg by mouth 2 times daily   citalopram (CELEXA) 20 MG tablet 11/24/2023 at 0800  Yes Yes   Sig: Take 20 mg by mouth daily   citalopram (CELEXA) 40 MG tablet 11/24/2023 at 0800 Pharmacy Yes Yes   Sig: Take 40 mg by mouth daily   clotrimazole (LOTRIMIN) 1 % external solution 11/24/2023 at 0800  Yes Yes   Sig: Apply topically 2 times daily   divalproex sodium delayed-release (DEPAKOTE) 500 MG DR tablet 11/24/2023 at 0800  Yes Yes   Sig: Take 500 mg by mouth 3 times daily   doxycycline hyclate (VIBRAMYCIN) 100 MG capsule 11/24/2023 at day 4/10  No Yes   Sig: Take 1 capsule (100 mg) by mouth 2 times daily for 10 days   fluticasone (FLONASE) 50 MCG/ACT nasal spray 11/24/2023 at 0800  Yes Yes    Sig: Spray 1 spray in nostril 2 times daily   lamoTRIgine (LAMICTAL) 200 MG tablet 11/24/2023 at 0800  No Yes   Sig: Take 1 tablet (200 mg) by mouth 2 times daily   levETIRAcetam (KEPPRA) 500 MG tablet 11/24/2023 at 0800  No Yes   Sig: Take 2 tablets (1,000 mg) by mouth every morning AND 3 tablets (1,500 mg) At Bedtime.   levOCARNitine (CARNITOR) 330 MG tablet 11/24/2023 at 0800  No Yes   Sig: Take 2 tablets (660 mg) by mouth 3 times daily   methylcellulose POWD powder 11/24/2023 at 0800  Yes Yes   Sig: Apply 2 mg topically 2 times daily Mix 1 rounded tablespoon (2 mg) in 8 oz glass of water and take by mouth twice daily   multivitamin, therapeutic (THERA-VIT) TABS tablet 11/24/2023 at 0800  Yes Yes   Sig: Take 1 tablet by mouth daily   ondansetron (ZOFRAN ODT) 4 MG ODT tab More than a month  No Yes   Sig: Take 1 tablet (4 mg) by mouth every 6 hours as needed for nausea or vomiting   pantoprazole (PROTONIX) 40 MG EC tablet 11/24/2023 at 0800  Yes Yes   Sig: Take 40 mg by mouth daily   polyethylene glycol (MIRALAX) 17 GM/Dose powder 11/24/2023 at 0900  Yes Yes   Sig: Take 1 capful. by mouth daily   prazosin (MINIPRESS) 2 MG capsule 11/23/2023 at 2000  Yes Yes   Sig: Take 2 mg by mouth At Bedtime   propranolol (INDERAL) 20 MG tablet 11/24/2023 at 0800  Yes Yes   Sig: Take 20 mg by mouth 2 times daily   zonisamide (ZONEGRAN) 100 MG capsule 11/24/2023 at 0800  No Yes   Sig: Take 1 capsule (100 mg) by mouth every morning AND 2 capsules (200 mg) At Bedtime.      Facility-Administered Medications: None     Allergies   Allergies   Allergen Reactions    Hydrocodone Bitartrate Er Unknown    Cephalexin Rash     HUT Reaction: Rash; HUT Noted: [id number]      Vicodin [Hydrocodone-Acetaminophen] GI Disturbance       Immunization History   Immunization History   Administered Date(s) Administered    COVID-19 Monovalent 12+ (Pfizer 2022) 02/04/2022    COVID-19 Monovalent 18+ (Moderna) 03/23/2021    Influenza (IIV3) PF 11/22/2013     "Mantoux Tuberculin Skin Test 02/15/2012    Pneumococcal 23 valent 11/22/2013    TDAP Vaccine (Adacel) 11/26/2011       Social History   I have reviewed this patient's social history and updated it with pertinent information if needed. Tre Vazquez  reports that he quit smoking about 13 years ago. His smoking use included cigarettes. He started smoking about 23 years ago. He has never used smokeless tobacco. He reports that he does not drink alcohol and does not use drugs.    Family History   I have reviewed this patient's family history and updated it with pertinent information if needed.   No family history on file.    Review of Systems   The 10 point Review of Systems is negative    Physical Exam   Temp: 99.2  F (37.3  C) Temp src: Axillary BP: 111/74 Pulse: 82   Resp: 16 SpO2: 97 % (ETCO2 38) O2 Device: Nasal cannula Oxygen Delivery: 6 LPM  Vital Signs with Ranges  Temp:  [97  F (36.1  C)-99.2  F (37.3  C)] 99.2  F (37.3  C)  Pulse:  [77-88] 82  Resp:  [12-16] 16  BP: (110-137)/(55-79) 111/74  FiO2 (%):  [45 %] 45 %  SpO2:  [93 %-98 %] 97 %  0 lbs 0 oz  There is no height or weight on file to calculate BMI.    GENERAL APPEARANCE:  awake  NECK: no adenopathy  RESP: lungs clear   CV: regular rates and rhythm  LYMPHATICS: normal ant/post cervical and supraclavicular nodes  ABDOMEN: soft, nontender  MS: extremities normal  SKIN: see WOC pic from today         Data   All laboratory and imaging data in the past 24 hours reviewed  No results for input(s): \"CULT\" in the last 168 hours.  No lab results found.    Invalid input(s): \"UC\"       All cultures:  Recent Labs   Lab 11/26/23  1314 11/26/23  1309 11/25/23  0608 11/25/23  0558 11/24/23  1254 11/24/23  1042   CULTURE No growth after 12 hours No growth after 12 hours No growth after 2 days Positive on the 1st day of incubation*  Gram positive cocci in clusters* Positive on the 1st day of incubation*  Staphylococcus aureus* Positive on the 1st day of incubation*  " Staphylococcus aureus*      Blood culture:  Results for orders placed or performed during the hospital encounter of 11/24/23   Blood Culture Hand, Left    Specimen: Hand, Left; Blood   Result Value Ref Range    Culture No growth after 12 hours    Blood Culture Arm, Right    Specimen: Arm, Right; Blood   Result Value Ref Range    Culture No growth after 12 hours    Blood Culture Arm, Right    Specimen: Arm, Right; Blood   Result Value Ref Range    Culture No growth after 2 days    Blood Culture Arm, Right    Specimen: Arm, Right; Blood   Result Value Ref Range    Culture Positive on the 1st day of incubation (A)     Culture Gram positive cocci in clusters (AA)    Blood Culture Hand, Left    Specimen: Hand, Left; Blood   Result Value Ref Range    Culture Positive on the 1st day of incubation (A)     Culture Staphylococcus aureus (AA)    Blood Culture Peripheral Blood    Specimen: Peripheral Blood   Result Value Ref Range    Culture Positive on the 1st day of incubation (A)     Culture Staphylococcus aureus (AA)        Susceptibility    Staphylococcus aureus - JOMAR     Oxacillin* <=0.25 Susceptible ug/mL      * Oxacillin susceptible isolates are susceptible to cephalosporins (example: cefazolin and cephalexin) and beta lactam combination agents. Oxacillin resistant isolates are resistant to these agents.     Gentamicin <=0.5 Susceptible ug/mL     Erythromycin <=0.25 Susceptible ug/mL     Clindamycin 0.25 Susceptible ug/mL     Vancomycin 1 Susceptible ug/mL     Daptomycin 0.5 Susceptible ug/mL     Tetracycline <=1 Susceptible ug/mL     Doxycycline <=0.5 Susceptible ug/mL     Trimethoprim/Sulfamethoxazole 8/152 Resistant ug/mL   Results for orders placed or performed during the hospital encounter of 05/12/23   Blood Culture Hand, Left    Specimen: Hand, Left; Blood   Result Value Ref Range    Culture No Growth    Blood Culture Line, venous    Specimen: Line, venous; Blood   Result Value Ref Range    Culture No Growth     Results for orders placed or performed during the hospital encounter of 05/04/23   Blood Culture Peripheral Blood    Specimen: Peripheral Blood   Result Value Ref Range    Culture No Growth    Blood Culture Peripheral Blood    Specimen: Peripheral Blood   Result Value Ref Range    Culture No Growth       Urine culture:  Results for orders placed or performed in visit on 12/19/08   Urine culture   Result Value Ref Range    Specimen Description Midstream Urine     Culture Micro       10 to 50,000 colonies/mL Multiple species present, probable perineal    Micro Report Status FINAL 12/21/2008

## 2023-11-27 NOTE — PLAN OF CARE
Goal Outcome Evaluation:      Plan of Care Reviewed With: patient    Overall Patient Progress: improvingOverall Patient Progress: improving     Care from [id number]  Inpatient Progress Note    ORIENTATION: Aox4.  VSS  PAIN: Denies pain, CP, SOB, n/v. Some discomfort on pt's bottom from old sacal wound; positioned pillows under pt and he expressed relief of discomfort.  O2: BiPap on overnight.  GI/: Continent. No BM this shift. Used urinal.  SKIN: Open area on L buttocks, small serosanguineous drainage; cleansed; replaced mepilex. WOC is consulted.  ACTIVITY: Not OOB this shift. Pt can ambulate to the restroom GB/walker; liftsheet is also under the pt.  DIET: Reg  LINES/DRAINS: L AC PIV  PLAN: WOC consulted, ID and neuro following. Keep pt on BiPap overnight and throughout naps during the day per MD notes.

## 2023-11-27 NOTE — PHARMACY-VANCOMYCIN DOSING SERVICE
Pharmacy Vancomycin Note  Date of Service 2023  Patient's  1975   48 year old, male    Indication: Bacteremia  Day of Therapy: 3  Current vancomycin regimen:  1500 mg IV q12h  Current vancomycin monitoring method: AUC  Current vancomycin therapeutic monitoring goal: 400-600 mg*h/L    Insight[employer] Prediction of Current Vancomycin Regimen  Loading dose: 2500 mg at 04:30 5023.  Regimen: 1500 mg IV every 12 hours.  Start time: 16:30 on 5023  Exposure target: AUC24 (range)400-600 mg/L.hr   AUC24,ss: 538 mg/L.hr  Probability of AUC24 > 400: 71 %  Ctrough,ss: 13.6 mg/L  Probability of Ctrough,ss > 20: 33 %  Probability of nephrotoxicity (Lodise RAMOS ): 9 %    Current estimated CrCl = Estimated Creatinine Clearance: 166.4 mL/min (based on SCr of 0.68 mg/dL).    Creatinine for last 3 days  2023:  5:58 AM Creatinine 0.61 mg/dL  2023: 12:56 AM Creatinine 0.58 mg/dL;  6:48 AM Creatinine 0.61 mg/dL  2023:  7:59 AM Creatinine 0.68 mg/dL;  7:59 AM Creatinine 0.68 mg/dL    Recent Vancomycin Levels (past 3 days)  2023:  7:59 AM Vancomycin 8.8 ug/mL    Vancomycin IV Administrations (past 72 hours)                     vancomycin (VANCOCIN) 1,500 mg in 0.9% NaCl 250 mL intermittent infusion (mg) 1,500 mg New Bag 23 1813     1,500 mg New Bag  0605     1,500 mg New Bag 23 1822    vancomycin (VANCOCIN) 2,500 mg in sodium chloride 0.9 % 500 mL intermittent infusion (mg) 2,500 mg New Bag 23 0514                    Nephrotoxins and other renal medications (From now, onward)      Start     Dose/Rate Route Frequency Ordered Stop    23 1130  vancomycin (VANCOCIN) 1,750 mg in 0.9% NaCl 500 mL intermittent infusion         1,750 mg  over 2 Hours Intravenous EVERY 12 HOURS 23 1105                 Contrast Orders - past 72 hours (72h ago, onward)      Start     Dose/Rate Route Frequency Stop    23 4998  iopamidol (ISOVUE-370) solution 500 mL         500 mL  Intravenous ONCE 11/24/23 1529            Interpretation of levels and current regimen:  Vancomycin level is reflective of -600    Has serum creatinine changed greater than 50% in last 72 hours: No    Urine output:  unable to determine    Renal Function: Stable    InsightRX Prediction of Planned New Vancomycin Regimen  Loading dose: N/A  Regimen: 1750 mg IV every 12 hours.  Start time: 11:03 on 11/27/2023  Exposure target: AUC24 (range)400-600 mg/L.hr   AUC24,ss: 567 mg/L.hr  Probability of AUC24 > 400: 92 %  Ctrough,ss: 12.6 mg/L  Probability of Ctrough,ss > 20: 0 %  Probability of nephrotoxicity (Lodise RAMOS 2009): 8 %    Plan:  Increase Dose to 1750 mg q12h  Vancomycin monitoring method: AUC  Vancomycin therapeutic monitoring goal: 400-600 mg*h/L  Pharmacy will check vancomycin levels as appropriate in 1-3 Days.  Serum creatinine levels will be ordered daily for the first week of therapy and at least twice weekly for subsequent weeks.    Sergio Michel, [facility]

## 2023-11-27 NOTE — PROGRESS NOTES
[facility]    Hospitalist Progress Note  Provider : Eric Robertson MD  Date of Service (when I saw the patient): 11/27/2023    Assessment & Plan   Tre Vazquez is a 48 year old male who who has a very complicated past medical as noted in the list below.  He lives in a group home.  He has some developmental delay, morbid obesity, ANA and OHS using chronically BiPAP at night apparently.  History of seizure disorder and chronic hyperammonemia while on Depakote.  Schizoaffective disorder, borderline personality and left eye blindness. He presented with clouding of mentation and elevated CO2 and pneumonia.  (This patient normally has a CO2 between 70 and 80 and he is ammonia is in the 60s). His current presentation is suspected to be due to possible nonadherence to treatment, though it is difficult to prove.  Patient has been a started on NIPPV in the emergency department and he has had excellent response. Per records from May of 2022, his ammonia level was elevated and considered the consequence of the use of Depakote for seizures.  There is no official diagnosis of cirrhosis in this patient, he is not taking lactulose and likely not needed though he had one dose in ED.    CT of the head negative for any acute intracranial event.  CT of the chest with PE protocol negative for embolization, pneumonia, pleural effusion or pneumothorax.    Acute on chronic hypercarbic and hypoxemic respiratory failure.  Patient on chronic BiPAP at night time and napping.   Obesity/OHS.  Breathing comfortably morning 11/26.  Tolerated BiPAP overnight, on nasal cannula throughout the morning.  PCO2 remained elevated to 85 on venous gas, but mentation appears good.  Alert and oriented, able to answer questions.  For now, continue BiPAP overnight and with naps during the day.  Supplemental oxygen as needed.  Blood cultures from admission positive for Staph aureus, repeat cultures again growing gram-positive cocci in  clusters.  Started on IV vancomycin.  Overall respiratory status appears to be improved, doing well with BiPAP as needed.  - BiPAP at night and with naps  - RT following     Staph aureus bacteremia  Left buttock wound  Initial blood cultures positive for Staph aureus, sensitivities pending.  Repeat blood cultures again growing gram-positive cocci in clusters, no further speciation.  Started on IV vancomycin empirically, consulted infectious disease.  As far as source, has had respiratory issues prior to admission, but CT chest did not necessarily appear convincing for active pneumonia.  Also has a wound on left buttock that is open, having some serosanguineous drainage.  Somewhat firm to palpation underneath the opening on the skin, but no purulent discharge and does not necessarily feel like there is an abscess underneath the skin.  We will continue to monitor the wound as potential source as well.  ID consulted for antibiotic recommendations.  - Continue IV vancomycin now  - Consult ID, appreciate recommendations  - Monitor respiratory status, wound on right buttock    Hyperammonemia, chronic.  Unclear cause.  Patient had one-time lactulose in the emergency department.    -No known liver disease  -Initial ammonia level was 129. He received a dose of lactulose in the ER. Ammonia level elevated at 82. Suspect related to seizure medication(Depakote)  -Depakote was initially held.  Discussed with neurology and decreased Depakote to 500 mg p.o. twice daily.     Acute toxic/metabolic encephalopathy-improving  Likely due to acute on chronic respiratory failure, hyeperammonemia.  Now engaged in conversation, able to follow discussion and asked questions.  -Will continue BiPAP  -Adjusted Depakote level as above to twice daily dosing from 3 times daily     Seizure disorder. Stable on current medication.   -Discussed with neurology.  Neurology recommending to decrease Depakote to 500 mg twice daily, check free Depakote  level, Keppra level, Lamictal level.  Orders placed.  Patient needs repeat drug levels in 1 to 2 weeks and follow-up with neurology as outpatient for further adjustment of his medications.  Appreciate neurology input.     Essential hypertension.  On propranolol and prazosin.     Depression/anxiety.  On Celexa and Seroquel.     GERD.   On pantoprazole.    DVT Prophylaxis: Pneumatic Compression Devices  Code Status: Full Code    Disposition: TBD given presence of staph bacteremia.    Interval History   No acute events overnight.  Tolerating BiPAP well.  At time of my exam patient had BiPAP in place, was able to follow discussion and answer questions.  Wondering about timing of his discharge, discussed that this would be better known when we have repeat culture results felt good that he was responding well to his treatment.  Patient was agreeable.    -Data reviewed today: I reviewed all new labs and imaging results over the last 24 hours. I personally reviewed no images or EKG's today.    Physical Exam   Temp: 99.2  F (37.3  C) Temp src: Axillary BP: 114/55 Pulse: 81   Resp: 16 SpO2: 96 % O2 Device: BiPAP/CPAP Oxygen Delivery: 6 LPM  There were no vitals filed for this visit.  Vital Signs with Ranges  Temp:  [98  F (36.7  C)-99.2  F (37.3  C)] 99.2  F (37.3  C)  Pulse:  [77-88] 81  Resp:  [12-16] 16  BP: (110-137)/(55-79) 114/55  FiO2 (%):  [45 %] 45 %  SpO2:  [93 %-97 %] 96 %  I/O last 3 completed shifts:  In: 480 [P.O.:480]  Out: 450 [Urine:450]    GEN:  Alert, following conversation and asking questions appropriately.  No acute distress  HEENT:  Normocephalic/atraumatic, no scleral icterus, no nasal discharge, mouth moist.  CV:  Regular rate and rhythm, no murmur or JVD.  S1 + S2 noted, no S3 or S4.  LUNGS:  Clear to auscultation bilaterally without rales/rhonchi/wheezing/retractions.  Symmetric chest rise on inhalation noted.  BiPAP in place during exam.  ABD:  Active bowel sounds, soft, non-tender/non-distended.   No rebound/guarding/rigidity.  EXT:  No edema or cyanosis.  Hands/feet warm to touch with good signs of peripheral perfusion.  No joint synovitis noted.  SKIN: Wound present on right buttock with open area of skin, serosanguineous drainage, some surrounding erythema  NEURO:  Symmetric muscle strength, sensation to touch grossly intact.  No new focal deficits appreciated.    Medications      busPIRone  15 mg Oral BID    citalopram  60 mg Oral Daily    divalproex sodium delayed-release  500 mg Oral Q12H ROXANE (08/20)    fluticasone  1 spray Nasal BID    lamoTRIgine  200 mg Oral BID    levETIRAcetam  1,000 mg Oral QAM    And    levETIRAcetam  1,500 mg Oral At Bedtime    levOCARNitine  660 mg Oral TID    miconazole   Topical BID    multivitamin, therapeutic  1 tablet Oral Daily    nafcillin  2 g Intravenous Q4H    pantoprazole  40 mg Oral Daily    polyethylene glycol  17 g Oral Daily    prazosin  2 mg Oral At Bedtime    propranolol  20 mg Oral BID    QUEtiapine ER  300 mg Oral At Bedtime    zonisamide  100 mg Oral QAM    And    zonisamide  200 mg Oral At Bedtime       Data   Recent Labs   Lab 11/27/23  0759 11/26/23  0648 11/26/23  0056 11/25/23  0558 11/24/23  1042   WBC  --  6.7 7.3  --  7.4   HGB  --  11.8* 12.1*  --  12.0*   MCV  --  102* 102*  --  100   PLT  --  229 224  --  239    140 140  --  139   POTASSIUM 4.0 3.9 3.8  --  3.9   CHLORIDE 100 95* 94*  --  94*   CO2 42* 42* 38*  --  37*   BUN 12.1 11.4 12.0  --  17.9   CR 0.68  0.68 0.61* 0.58*   < > 0.67   ANIONGAP 3* 3* 8  --  8   ARNOLD 8.6 8.5* 8.6  --  8.9   * 126* 143*  --  109*   ALBUMIN  --   --  3.4*  --  3.8   PROTTOTAL  --   --  6.2*  --  7.1   BILITOTAL  --   --  0.5  --  0.4   ALKPHOS  --   --  113  --  123   ALT  --   --  53  --  57   AST  --   --  47*  --  47*   LIPASE  --   --  20  --   --     < > = values in this interval not displayed.       No results found for this or any previous visit (from the past 24 hour(s)).

## 2023-11-27 NOTE — CONSULTS
[facility] Nurse Inpatient Assessment     Consulted for: Right buttock    Patient History (according to provider note(s):      Tre Vazquez is a 48 year old male who who has a very complicated past medical as noted in the list below.  He lives in a group home.  He has some developmental delay, morbid obesity, ANA and OHS using chronically BiPAP at night apparently.  History of seizure disorder and chronic hyperammonemia while on Depakote.  Schizoaffective disorder, borderline personality and left eye blindness. He presented with clouding of mentation and elevated CO2 and pneumonia.  (This patient normally has a CO2 between 70 and 80 and he is ammonia is in the 60s). His current presentation is suspected to be due to possible nonadherence to treatment, though it is difficult to prove.  Patient has been a started on NIPPV in the emergency department and he has had excellent response.  Per records from May of 2022, his ammonia level was elevated and considered the consequence of the use of Depakote for seizures.  There is no official diagnosis of cirrhosis in this patient, he is not taking lactulose and likely not needed though he had one dose in ED.    CT of the head negative for any acute intracranial event.  CT of the chest with PE protocol negative for embolization, pneumonia, pleural effusion or pneumothorax.    Assessment:      Areas visualized during today's visit: Focused: Buttocks    Pressure Injury Location: Left IT  Last photo: 11/27/23    Wound type: Pressure Injury     Pressure Injury Stage: Unstageable, present on admission    Wound history/plan of care: Resided in group home.  Reports spending more time sitting recently.  Wound base: 100 % slough,      Palpation of the wound bed: boggy      Drainage: moderate     Description of drainage: bloody     Measurements (length x width x depth, in cm): 1  x 0.8  x 0.5 cm      Tunneling: None     Undermining: up to 0.5 cm from 9  o'clock to 3 o'clock  Periwound skin: Erythema- blanchable      Color: pink      Temperature: normal   Odor: none  Pain: moderate and during dressing change, sharp  Pain interventions prior to dressing change: slow and gentle cares  and distraction  Treatment goal: Heal , Infection control/prevention, and Remove necrotic tissue  STATUS: initial assessment  Supplies ordered: gathered and at bedside    My PI Risk Assessment     Sensory Perception: 3 - Slightly Limited     Moisture: 3 - Occasionally moist      Activity: 3 - Walks occasionally      Mobility: 3 - Slightly limited      Nutrition: 3 - Adequate     Friction/Shear: 2 - Potential problem      TOTAL: 17      Pressure Injury Location: Bilateral buttocks  Last photo: 11/27/23    Wound type: Pressure Injury     Pressure Injury Stage: 2, present on admission   Wound history/plan of care: Residing at group home, presently covered with mepilex sacrum dressing.  Wound base: Left is 100 % dermis, Right is 100% dry drainage     Palpation of the wound bed: normal      Drainage: scant     Description of drainage: bloody     Measurements (length x width x depth, in cm): Left is 0.8  x 0.5 cm, Right is 1 x 0.5 cm      Tunneling: N/A     Undermining: N/A  Periwound skin: Erythema- blanchable      Color: purple and red      Temperature: normal   Odor: none  Pain: denies , none  Pain interventions prior to dressing change: N/A  Treatment goal: Heal  and Protection  STATUS: initial assessment  Supplies ordered: at bedside       Treatment Plan:     Left IT wound(s): Every other day  Cleanse area with Microklenz spray.  Apply small piece of Aquacel Ag to wound bed (SPS #[id number])  Cover with Mepilex 4x4    Buttock wound:  Every three days and PRN for soilage  Cleanse area with Microklenz spray.  Cover with Mepilex sacral dressing.    Pressure Injury Prevention (PIP) Plan:  If patient is declining pressure injury prevention interventions: Explore reason why and address patient's  "concerns, Educate on pressure injury risk and prevention intervention(s), and If patient is still declining, document \"informed refusal\"   Mattress: Follow bed algorithm, reassess daily and order specialty mattress, if indicated.  HOB: Maintain at or below 30 degrees, unless contraindicated  Repositioning in bed: Every 1-2 hours  and Left/right positioning; avoid supine  Heels: Pillows under calves  Chair positioning: Chair cushion (#[id number])    If patient has a buttock pressure injury, or high risk for PI use chair cushion or SPS.  Moisture Management: Avoid brief in bed  Under Devices: Inspect skin under all medical devices during skin inspection , Ensure tubes are stabilized without tension, and Ensure patient is not lying on medical devices or equipment when repositioned  Ask provider to discontinue device when no longer needed.      Orders: Written    RECOMMEND PRIMARY TEAM ORDER: None, at this time  Education provided: importance of repositioning, wound progress, and Off-loading pressure  Discussed plan of care with: Patient and Nurse  WOC nurse follow-up plan: weekly  Notify WOC if wound(s) deteriorate.  Nursing to notify the Provider(s) and re-consult the WOC Nurse if new skin concern.    DATA:     Current support surface: Bariatric Standard gel/foam mattress (IsoFlex, Atmos air, etc)  Containment of urine/stool: Continent of bladder and Continent of bowel  BMI: There is no height or weight on file to calculate BMI.   Active diet order: Orders Placed This Encounter      Regular Diet Adult     Output: I/O last 3 completed shifts:  In: 480 [P.O.:480]  Out: 450 [Urine:450]     Labs:   Recent Labs   Lab 11/26/23  0648 11/26/23  0056   ALBUMIN  --  3.4*   HGB 11.8* 12.1*   WBC 6.7 7.3     Pressure injury risk assessment:   Sensory Perception: 3-->slightly limited  Moisture: 3-->occasionally moist  Activity: 3-->walks occasionally  Mobility: 3-->slightly limited  Nutrition: 3-->adequate  Friction and Shear: " 2-->potential problem  Julio César Score: 17    Kathleen Nickerson   Pager no longer is use, please contact through [employer] group: [facility] Nurse   Dept. Office Number    Cosigned MARYJANE Telles [facility] Voc[employer] Group  Dept. Office Number: [phone]

## 2023-11-27 NOTE — PLAN OF CARE
/55   Pulse 81   Temp 99.2  F (37.3  C) (Axillary)   Resp 16   SpO2 96%     Pt A&O. Intermittent confusion. Lethargic.  Arouses to voice. A2 GB/W. On BiPAP at HS and for napping. 6L NC when off BiPAP. Vanco discontinued, nafcillin q4h started, positive blood cultures. Miconazole applied to rash in arm pits and folds. Open area on L buttock with serosanguineous drainage. Wound cleansed and covered with mepilex - WOC following. Barrier cream applied to sacrum. Neurology, ID and SW consulted. respiratory Bacterial culture needs to be collected. Echo done today. Discharge TBD.       Goal Outcome Evaluation:      Plan of Care Reviewed With: patient    Overall Patient Progress: no changeOverall Patient Progress: no change

## 2023-11-28 LAB
BACTERIA BLD CULT: ABNORMAL
BACTERIA BLD CULT: ABNORMAL
CREAT SERPL-MCNC: 0.65 MG/DL (ref 0.67–1.17)
EGFRCR SERPLBLD CKD-EPI 2021: >90 ML/MIN/1.73M2

## 2023-11-28 PROCEDURE — 36415 COLL VENOUS BLD VENIPUNCTURE: CPT | Performed by: STUDENT IN AN ORGANIZED HEALTH CARE EDUCATION/TRAINING PROGRAM

## 2023-11-28 PROCEDURE — 250N000013 HC RX MED GY IP 250 OP 250 PS 637: Performed by: HOSPITALIST

## 2023-11-28 PROCEDURE — 999N000157 HC STATISTIC RCP TIME EA 10 MIN

## 2023-11-28 PROCEDURE — 250N000013 HC RX MED GY IP 250 OP 250 PS 637: Performed by: INTERNAL MEDICINE

## 2023-11-28 PROCEDURE — 36569 INSJ PICC 5 YR+ W/O IMAGING: CPT

## 2023-11-28 PROCEDURE — 99232 SBSQ HOSP IP/OBS MODERATE 35: CPT | Performed by: INTERNAL MEDICINE

## 2023-11-28 PROCEDURE — 82565 ASSAY OF CREATININE: CPT | Performed by: STUDENT IN AN ORGANIZED HEALTH CARE EDUCATION/TRAINING PROGRAM

## 2023-11-28 PROCEDURE — 272N000579 HC TRAY POWER PICC SOLO 4FR SINGLE LUMEN

## 2023-11-28 PROCEDURE — 250N000009 HC RX 250: Performed by: INTERNAL MEDICINE

## 2023-11-28 PROCEDURE — 94660 CPAP INITIATION&MGMT: CPT

## 2023-11-28 PROCEDURE — 99232 SBSQ HOSP IP/OBS MODERATE 35: CPT | Performed by: STUDENT IN AN ORGANIZED HEALTH CARE EDUCATION/TRAINING PROGRAM

## 2023-11-28 PROCEDURE — 120N000001 HC R&B MED SURG/OB

## 2023-11-28 RX ORDER — LIDOCAINE 40 MG/G
CREAM TOPICAL
Status: DISCONTINUED | OUTPATIENT
Start: 2023-11-28 | End: 2023-11-28

## 2023-11-28 RX ORDER — LIDOCAINE 40 MG/G
CREAM TOPICAL
Status: ACTIVE | OUTPATIENT
Start: 2023-11-28 | End: 2023-12-01

## 2023-11-28 RX ADMIN — LAMOTRIGINE 200 MG: 200 TABLET ORAL at 10:10

## 2023-11-28 RX ADMIN — PROPRANOLOL HYDROCHLORIDE 20 MG: 20 TABLET ORAL at 10:09

## 2023-11-28 RX ADMIN — LAMOTRIGINE 200 MG: 200 TABLET ORAL at 21:31

## 2023-11-28 RX ADMIN — BUSPIRONE HYDROCHLORIDE 15 MG: 15 TABLET ORAL at 10:10

## 2023-11-28 RX ADMIN — ZONISAMIDE 200 MG: 100 CAPSULE ORAL at 21:31

## 2023-11-28 RX ADMIN — NAFCILLIN SODIUM 2 G: 2 INJECTION, POWDER, LYOPHILIZED, FOR SOLUTION INTRAMUSCULAR; INTRAVENOUS at 05:28

## 2023-11-28 RX ADMIN — PRAZOSIN HYDROCHLORIDE 2 MG: 1 CAPSULE ORAL at 21:31

## 2023-11-28 RX ADMIN — NAFCILLIN SODIUM 2 G: 2 INJECTION, POWDER, LYOPHILIZED, FOR SOLUTION INTRAMUSCULAR; INTRAVENOUS at 10:07

## 2023-11-28 RX ADMIN — BUSPIRONE HYDROCHLORIDE 15 MG: 15 TABLET ORAL at 21:32

## 2023-11-28 RX ADMIN — MICONAZOLE NITRATE: 20 POWDER TOPICAL at 10:12

## 2023-11-28 RX ADMIN — NAFCILLIN SODIUM 2 G: 2 INJECTION, POWDER, LYOPHILIZED, FOR SOLUTION INTRAMUSCULAR; INTRAVENOUS at 01:15

## 2023-11-28 RX ADMIN — QUETIAPINE FUMARATE 300 MG: 300 TABLET, EXTENDED RELEASE ORAL at 21:31

## 2023-11-28 RX ADMIN — PANTOPRAZOLE SODIUM 40 MG: 40 TABLET, DELAYED RELEASE ORAL at 10:10

## 2023-11-28 RX ADMIN — ZONISAMIDE 100 MG: 100 CAPSULE ORAL at 10:10

## 2023-11-28 RX ADMIN — MICONAZOLE NITRATE: 20 POWDER TOPICAL at 21:35

## 2023-11-28 RX ADMIN — LEVOCARNITINE 660 MG: 330 TABLET ORAL at 15:55

## 2023-11-28 RX ADMIN — NAFCILLIN SODIUM 2 G: 2 INJECTION, POWDER, LYOPHILIZED, FOR SOLUTION INTRAMUSCULAR; INTRAVENOUS at 15:51

## 2023-11-28 RX ADMIN — LEVOCARNITINE 660 MG: 330 TABLET ORAL at 10:10

## 2023-11-28 RX ADMIN — PROPRANOLOL HYDROCHLORIDE 20 MG: 20 TABLET ORAL at 21:32

## 2023-11-28 RX ADMIN — NAFCILLIN SODIUM 2 G: 2 INJECTION, POWDER, LYOPHILIZED, FOR SOLUTION INTRAMUSCULAR; INTRAVENOUS at 21:32

## 2023-11-28 RX ADMIN — DIVALPROEX SODIUM 500 MG: 500 TABLET, DELAYED RELEASE ORAL at 10:10

## 2023-11-28 RX ADMIN — THERA TABS 1 TABLET: TAB at 10:10

## 2023-11-28 RX ADMIN — CITALOPRAM HYDROBROMIDE 60 MG: 20 TABLET, FILM COATED ORAL at 10:09

## 2023-11-28 RX ADMIN — LEVETIRACETAM 1500 MG: 500 TABLET, FILM COATED ORAL at 21:31

## 2023-11-28 RX ADMIN — LEVETIRACETAM 1000 MG: 500 TABLET, FILM COATED ORAL at 10:09

## 2023-11-28 RX ADMIN — FLUTICASONE PROPIONATE 1 SPRAY: 50 SPRAY, METERED NASAL at 10:12

## 2023-11-28 RX ADMIN — FLUTICASONE PROPIONATE 1 SPRAY: 50 SPRAY, METERED NASAL at 21:35

## 2023-11-28 RX ADMIN — LEVOCARNITINE 660 MG: 330 TABLET ORAL at 21:31

## 2023-11-28 RX ADMIN — DIVALPROEX SODIUM 500 MG: 500 TABLET, DELAYED RELEASE ORAL at 21:32

## 2023-11-28 ASSESSMENT — ACTIVITIES OF DAILY LIVING (ADL)
ADLS_ACUITY_SCORE: 53
ADLS_ACUITY_SCORE: 51
ADLS_ACUITY_SCORE: 53
ADLS_ACUITY_SCORE: 51
ADLS_ACUITY_SCORE: 53
ADLS_ACUITY_SCORE: 51
ADLS_ACUITY_SCORE: 53
ADLS_ACUITY_SCORE: 53
ADLS_ACUITY_SCORE: 51

## 2023-11-28 NOTE — PLAN OF CARE
Goal Outcome Evaluation:      Plan of Care Reviewed With: patient    Overall Patient Progress: improvingOverall Patient Progress: improving       0324-3323: A&Ox3. VSS. Bipap applied at HS. Denies pain. Wound dressing reinforced. Ax2 lift. Abx given x2. Neuro, WOC, ID following. Plan of care ongoing.

## 2023-11-28 NOTE — PROGRESS NOTES
A BiPAP of  12/6 @ 45% was applied to the pt via the mask for overnight.  The bridge of the nose looks good and remains intact. Pt is tolerating it well. Will continue to monitor and assess the pt's current respiratory status and needs.        Fina Lofton, RT

## 2023-11-28 NOTE — PROCEDURES
[facility]    Single Lumen Midline Placement    Date/Time: 11/28/2023 3:03 PM    Performed by: Juliana Shannon RN  Authorized by: Eric Robertson MD  Indications: vascular access      UNIVERSAL PROTOCOL   Site Marked: Yes  Prior Images Obtained and Reviewed:  Yes  Required items: Required blood products, implants, devices and special equipment available    Patient identity confirmed:  Arm band, provided demographic data and hospital-assigned identification number  Patient was reevaluated immediately before administering moderate or deep sedation or anesthesia  Confirmation Checklist:  Patient's identity using two indicators, relevant allergies and procedure was appropriate and matched the consent or emergent situation  Time out: Immediately prior to the procedure a time out was called (bruna neville)    Universal Protocol: the Joint [facility] Universal Protocol was followed    Preparation: Patient was prepped and draped in usual sterile fashion       ANESTHESIA    Anesthesia:  Local infiltration  Local Anesthetic:  Lidocaine 1% without epinephrine  Anesthetic Total (mL):  3      SEDATION    Patient Sedated: No        Preparation: skin prepped with ChloraPrep  Skin prep agent: skin prep agent completely dried prior to procedure  Sterile barriers: maximum sterile barriers were used: cap, mask, sterile gown, sterile gloves, and large sterile sheet  Hand hygiene: hand hygiene performed prior to central venous catheter insertion  Type of line used: PICC and Power PICC  Catheter type: single lumen  Lumen type: valved  Catheter size: 4 Fr  Brand: Bizanga  Lot number: [id number]  Placement method: venipuncture, MST, ultrasound and tip confirmation system  Number of attempts: 1  Difficulty threading catheter: no  Successful placement: yes  Orientation: right    Location: basilic vein (6.1mm)  Tip Location: superior cavoatrial junction  Arm circumference: adults 10 cm  Extremity circumference: 42  Visible  catheter length: 3  Total catheter length: 47  Dressing and securement: adhesive securement device, alcohol impregnated caps, chlorhexidine disc applied, blood removed, blood cleaned with CHG, gloves changed prior to final dressing, line secured, site cleansed, securement device, subcutaneous anchor securement system, transparent dressing and secure lock  Post procedure assessment: blood return through all ports, placement verified by 3CG technology and free fluid flow  PROCEDURE   Patient Tolerance:  Patient tolerated the procedure well with no immediate complications   Picc ok to use as verified with 3cg technology

## 2023-11-28 NOTE — PROGRESS NOTES
Care Management Follow Up    Length of Stay (days): 4    Expected Discharge Date: 12/01/2023     Concerns to be Addressed: care coordination/care conferences, discharge planning     Patient plan of care discussed at interdisciplinary rounds: Yes    Anticipated Discharge Disposition: Group Home     Anticipated Discharge Services:  patient will need IV antibiotics on discharge  Anticipated Discharge DME:  TBD    Referrals Placed by CM/SW:  may need home infusion referral, hold until verified if  can do IV antibiotics      Additional Information:  CM following for discharge planning and return to . Patient came in from  with mentation changes. He was found to have MSSA bacteremia with URR 40%.  He is being followed by ID with recommendations for 4 weeks of antibiotics starting the day of first negative blood cultures. Patient will need PICC line placed prior to discharge.    Call placed to  [phone] and spoke to home representative. Discussed patient's need to return with IV antibiotics and inquired if they were able to accommodate that need. He gave me phone number for their Director, Quoc [phone]. Attempted to call with no answer. Message left to call CM back to discuss discharge planning. If  is unable to do IV antibiotics at home, patient will need Transitional Care Unit placement for the duration of therapy. Will cont to follow for discharge needs and await return call from  Director.          Kathy Blanton RN BSN CM  Inpatient Care Coordination  [facility]  [phone]

## 2023-11-28 NOTE — PLAN OF CARE
Goal Outcome Evaluation:      Plan of Care Reviewed With: patient    Overall Patient Progress: no changeOverall Patient Progress: no change     BP (!) 141/75 (BP Location: Left arm)   Pulse 82   Temp 98.4  F (36.9  C) (Oral)   Resp 20   SpO2 96%   General: Pt alert and decision making. Sustains general pain; buttock wound soreness tender.   Resp.: BiPap. 6L NC; denies SoB.  Cardiac: Denies chest pain.  GI / : Calls to use urinal. Assist of 2 lift, gait belt walker, did not get oob on shift.  Skin: intact, x. Wounds on buttocks. WOC following. Site clean dry intact, wound moist pink, yellow.  IV - Saline locked. Flushes w/o pain or difficulty. IV nafcillin Q4.    Plan - continue current plan of care.

## 2023-11-28 NOTE — PROCEDURES
[facility]    Single Lumen PICC Placement    Date/Time: 11/28/2023 3:29 PM    Performed by: Juliana Shannon RN  Authorized by: Eric Robertson MD  Indications: vascular access      UNIVERSAL PROTOCOL   Site Marked: Yes  Prior Images Obtained and Reviewed:  Yes  Required items: Required blood products, implants, devices and special equipment available    Patient identity confirmed:  Verbally with patient, arm band, provided demographic data and hospital-assigned identification number  NA - No sedation, light sedation, or local anesthesia  Confirmation Checklist:  Relevant allergies, procedure was appropriate and matched the consent or emergent situation, correct equipment/implants were available and patient's identity using two indicators  Time out: Immediately prior to the procedure a time out was called (bruna neville)    Universal Protocol: the Joint Commission Universal Protocol was followed    Preparation: Patient was prepped and draped in usual sterile fashion       ANESTHESIA    Anesthesia:  Local infiltration  Local Anesthetic:  Lidocaine 1% without epinephrine  Anesthetic Total (mL):  3      SEDATION    Patient Sedated: No        Preparation: skin prepped with ChloraPrep  Skin prep agent: skin prep agent completely dried prior to procedure  Sterile barriers: maximum sterile barriers were used: cap, mask, sterile gown, sterile gloves, and large sterile sheet  Hand hygiene: hand hygiene performed prior to central venous catheter insertion  Type of line used: PICC  Catheter type: single lumen  Lumen type: valved and power PICC  Catheter size: 4 Fr  Brand: Bard  Lot number: [id number]  Placement method: venipuncture, MST, ultrasound, flouroscopy and tip navigation system  Number of attempts: 1  Difficulty threading catheter: no  Successful placement: yes  Location: basilic vein (6.1mm)  Tip Location: SVC/RA Junction  Arm circumference: adults 10 cm  Extremity circumference: 42  Visible catheter  length: 3  Total catheter length: 47  Dressing and securement: antibiotic disc placed, blood cleaned with CHG, alcohol impregnated caps, blood removed, chlorhexidine disc applied, fixation device, dressing applied, gloves changed prior to final dressing, secure lock, subcutaneous anchor securement system, site cleansed, securement device, sterile dressing applied and transparent securement dressing  Post procedure assessment: blood return through all ports, free fluid flow and placement verified by 3CG technology  PROCEDURE   Patient Tolerance:  Patient tolerated the procedure well with no immediate complications   PICC ok to use as verified by 3cg technology , erwin Stacy RN   Disposal: sharps and needle count correct at the end of procedure, needles and guidewire disposed in sharps container

## 2023-11-28 NOTE — PLAN OF CARE
/64 (Cuff Size: Adult Regular)   Pulse 74   Temp 97.4  F (36.3  C) (Axillary)   Resp 14   SpO2 97%     Pt alert. Disoriented to time and situation. Denies pain. A2 gb/w. Up to BR. Continent of bowel and bladder. Up in chair for breakfast. Plan is to consult vascular today for line placement for long term ABX. Positive blood culture on 11/25. Echo done yesterday - EF 55-60%. Repeat blood gas venous in AM. Pt has been more awake today and refusing bipap this AM. Discharge TBD. WOC, ID, Resp following.     PICC placed this afternoon on L arm.           Goal Outcome Evaluation:      Plan of Care Reviewed With: patient    Overall Patient Progress: improvingOverall Patient Progress: improving

## 2023-11-28 NOTE — PROGRESS NOTES
[facility]    Hospitalist Progress Note  Provider : Eric Robertson MD  Date of Service (when I saw the patient): 11/28/2023    Assessment & Plan   Tre Vazquez is a 48 year old male who who has a very complicated past medical as noted in the list below.  He lives in a group home.  He has some developmental delay, morbid obesity, ANA and OHS using chronically BiPAP at night apparently.  History of seizure disorder and chronic hyperammonemia while on Depakote.  Schizoaffective disorder, borderline personality and left eye blindness. He presented with clouding of mentation and elevated CO2 and pneumonia.  (This patient normally has a CO2 between 70 and 80 and he is ammonia is in the 60s). His current presentation is suspected to be due to possible nonadherence to treatment, though it is difficult to prove.  Patient has been a started on NIPPV in the emergency department and he has had excellent response. Per records from May of 2022, his ammonia level was elevated and considered the consequence of the use of Depakote for seizures.  There is no official diagnosis of cirrhosis in this patient, he is not taking lactulose and likely not needed though he had one dose in ED.  CT of the head negative for any acute intracranial event.  CT of the chest with PE protocol negative for embolization, pneumonia, pleural effusion or pneumothorax.    Acute on chronic hypercarbic and hypoxemic respiratory failure.  Patient on chronic BiPAP at night time and napping.   Obesity/OHS.  Breathing comfortably morning 11/26.  Tolerated BiPAP overnight, on nasal cannula throughout the morning.  PCO2 remained elevated to 85 on venous gas, but mentation appears good.  Alert and oriented, able to answer questions.  For now, continue BiPAP overnight and with naps during the day.  Supplemental oxygen as needed.  Blood cultures from admission positive for Staph aureus, repeat cultures again growing gram-positive cocci in  clusters.  Started on IV vancomycin.  Overall respiratory status appears to be improved, doing well with BiPAP as needed.  - BiPAP at night and with naps  - RT following     Staph aureus bacteremia  Left buttock wound  Initial blood cultures positive for Staph aureus, sensitivities pending.  Repeat blood cultures again growing gram-positive cocci in clusters, no further speciation.  Started on IV vancomycin empirically, consulted infectious disease.  As far as source, has had respiratory issues prior to admission, but CT chest did not necessarily appear convincing for active pneumonia.  Also has a wound on left buttock that is open, having some serosanguineous drainage.  Somewhat firm to palpation underneath the opening on the skin, but no purulent discharge and does not necessarily feel like there is an abscess underneath the skin.  We will continue to monitor the wound as potential source as well.  Per ID, patient will need 4 weeks total IV antibiotic therapy.  Blood cultures have been negative for >72 hours, so we will have midline placed for patient and continue IV antibiotics.  - Continue IV nafcillin  - Consult ID, appreciate recommendations  - Midline being placed as blood cultures have been negative for >72 hours  - Will need to find out if IV antibiotics can be done at current living facility    Hyperammonemia, chronic.  Unclear cause.  Patient had one-time lactulose in the emergency department.    -No known liver disease  -Initial ammonia level was 129. He received a dose of lactulose in the ER. Ammonia level elevated at 82. Suspect related to seizure medication(Depakote)  -Depakote was initially held.  Discussed with neurology and decreased Depakote to 500 mg p.o. twice daily.     Acute toxic/metabolic encephalopathy-improving  Likely due to acute on chronic respiratory failure, hyeperammonemia.  Now engaged in conversation, able to follow discussion and asked questions.  -Will continue BiPAP  -Adjusted  Depakote level as above to twice daily dosing from 3 times daily     Seizure disorder. Stable on current medication.   -Discussed with neurology.  Neurology recommending to decrease Depakote to 500 mg twice daily, check free Depakote level, Keppra level, Lamictal level.  Orders placed.  Patient needs repeat drug levels in 1 to 2 weeks and follow-up with neurology as outpatient for further adjustment of his medications.  Appreciate neurology input.     Essential hypertension.  On propranolol and prazosin.     Depression/anxiety.  On Celexa and Seroquel.     GERD.   On pantoprazole.    DVT Prophylaxis: Pneumatic Compression Devices  Code Status: Full Code    Disposition: TBD given presence of staph bacteremia.    Interval History   No acute events overnight.  Tolerating BiPAP well.  Breathing comfortably on nasal cannula during our discussion today.  Discussed with patient that he would need a prolonged course of IV antibiotics, for total weeks.  Informed him of the need for longer term IV access, to which she was understanding.  No acute concerns or complaints.    -Data reviewed today: I reviewed all new labs and imaging results over the last 24 hours. I personally reviewed no images or EKG's today.    Physical Exam   Temp: 97.4  F (36.3  C) Temp src: Axillary BP: 119/64 Pulse: 74   Resp: 14 SpO2: 97 % O2 Device: Nasal cannula Oxygen Delivery: 7 LPM  There were no vitals filed for this visit.  Vital Signs with Ranges  Temp:  [97.4  F (36.3  C)-98.7  F (37.1  C)] 97.4  F (36.3  C)  Pulse:  [74-82] 74  Resp:  [14-23] 14  BP: (119-141)/(52-75) 119/64  FiO2 (%):  [45 %] 45 %  SpO2:  [95 %-98 %] 97 %  I/O last 3 completed shifts:  In: 240 [P.O.:240]  Out: 300 [Urine:300]    GEN:  Alert, following conversation and asking questions appropriately.  No acute distress  HEENT:  Normocephalic/atraumatic, no scleral icterus, no nasal discharge, mouth moist.  CV:  Regular rate and rhythm, no murmur or JVD.  S1 + S2 noted, no S3 or  S4.  LUNGS:  Clear to auscultation bilaterally without rales/rhonchi/wheezing/retractions.  Symmetric chest rise on inhalation noted.  Breathing comfortably on NC during exam  ABD:  Active bowel sounds, soft, non-tender/non-distended.  No rebound/guarding/rigidity.  EXT:  No edema or cyanosis.  Hands/feet warm to touch with good signs of peripheral perfusion.  No joint synovitis noted.  SKIN: Wound present on right buttock with open area of skin, serosanguineous drainage, some surrounding erythema  NEURO:  Symmetric muscle strength, sensation to touch grossly intact.  No new focal deficits appreciated.    Medications      busPIRone  15 mg Oral BID    citalopram  60 mg Oral Daily    divalproex sodium delayed-release  500 mg Oral Q12H [facility] (08/20)    fluticasone  1 spray Nasal BID    lamoTRIgine  200 mg Oral BID    levETIRAcetam  1,000 mg Oral QAM    And    levETIRAcetam  1,500 mg Oral At Bedtime    levOCARNitine  660 mg Oral TID    miconazole   Topical BID    multivitamin, therapeutic  1 tablet Oral Daily    nafcillin  2 g Intravenous Q4H    pantoprazole  40 mg Oral Daily    polyethylene glycol  17 g Oral Daily    prazosin  2 mg Oral At Bedtime    propranolol  20 mg Oral BID    QUEtiapine ER  300 mg Oral At Bedtime    sodium chloride (PF)  10 mL Intracatheter Q8H    zonisamide  100 mg Oral QAM    And    zonisamide  200 mg Oral At Bedtime       Data   Recent Labs   Lab 11/28/23  0747 11/27/23  0759 11/26/23  0648 11/26/23  0056 11/25/23  0558 11/24/23  1042   WBC  --   --  6.7 7.3  --  7.4   HGB  --   --  11.8* 12.1*  --  12.0*   MCV  --   --  102* 102*  --  100   PLT  --   --  229 224  --  239   NA  --  145 140 140  --  139   POTASSIUM  --  4.0 3.9 3.8  --  3.9   CHLORIDE  --  100 95* 94*  --  94*   CO2  --  42* 42* 38*  --  37*   BUN  --  12.1 11.4 12.0  --  17.9   CR 0.65* 0.68  0.68 0.61* 0.58*   < > 0.67   ANIONGAP  --  3* 3* 8  --  8   ARNOLD  --  8.6 8.5* 8.6  --  8.9   GLC  --  104* 126* 143*  --  109*   ALBUMIN   --   --   --  3.4*  --  3.8   PROTTOTAL  --   --   --  6.2*  --  7.1   BILITOTAL  --   --   --  0.5  --  0.4   ALKPHOS  --   --   --  113  --  123   ALT  --   --   --  53  --  57   AST  --   --   --  47*  --  47*   LIPASE  --   --   --  20  --   --     < > = values in this interval not displayed.       Recent Results (from the past 24 hour(s))   Echocardiogram Complete   Result Value    LVEF  55-60%    [facility]    [id number]  [id number]  [id number]  [id number]     [facility]  Echocardiography Laboratory  [address] [place] [address]     Name: KOJO BROWN  MRN: [id number]  : 1975  Study Date: 2023 02:10 PM  Age: 48 yrs  Gender: Male  Patient Location: [facility]  Reason For Study: Endocarditis  Ordering Physician: JANA MARR  Performed By: Marcelo Diaz RDCS     BSA: 2.2 m2  Height: 68 in  Weight: 230 lb  HR: 79  BP: 114/55 mmHg  ______________________________________________________________________________  Procedure  Complete Portable Echo Adult. Definity (NDC #50688-696) given intravenously.  ______________________________________________________________________________  Interpretation Summary     1. Technically difficult study.  2. Left ventricular systolic function is normal. The visual ejection fraction  is 55-60%.  3. No regional wall motion abnormalities noted.  4. The right ventricle is normal in structure, function and size.  5. Valves are not well visualized; no gross valvular pathology identified.  ______________________________________________________________________________  Left Ventricle  The left ventricle is normal in size. There is normal left ventricular wall  thickness. Left ventricular systolic function is normal. The visual ejection  fraction is 55-60%. Diastolic Doppler findings (E/E' ratio and/or other  parameters) suggest left ventricular filling pressures are normal. No regional  wall motion abnormalities noted.     Right Ventricle  The  right ventricle is normal in structure, function and size.     Atria  Normal left atrial size. Right atrial size is normal. There is no color  Doppler evidence of an atrial shunt.     Mitral Valve  The mitral valve is normal in structure and function. The mitral valve is not  well visualized. There is trace mitral regurgitation.     Tricuspid Valve  The tricuspid valve is not well visualized. The tricuspid valve is not well  visualized, but is grossly normal. There is trace tricuspid regurgitation. The  right ventricular systolic pressure is approximated at 30mmHg plus the right  atrial pressure.     Aortic Valve  The aortic valve is trileaflet with aortic valve sclerosis. The aortic valve  is not well visualized.     Pulmonic Valve  The pulmonic valve is not well seen, but is grossly normal.     Vessels  Normal size aorta. IVC diameter <2.1 cm collapsing >50% with sniff suggests a  normal RA pressure of 3 mmHg.     Pericardium  There is no pericardial effusion.     Rhythm  Sinus rhythm was noted.  ______________________________________________________________________________  MMode/2D Measurements & Calculations     IVSd: 1.0 cm  LVIDd: 4.8 cm  LVIDs: 3.2 cm  LVPWd: 0.86 cm  IVC diam: 1.3 cm  FS: 32.9 %  LV mass(C)d: 158.8 grams  LV mass(C)dI: 73.2 grams/m2  Ao root diam: 3.2 cm  LA dimension: 3.7 cm  asc Aorta Diam: 3.2 cm  LA/Ao: 1.2  Ao root diam index Ht(cm/m): 1.8  Ao root diam index BSA (cm/m2): 1.5  Asc Ao diam index BSA (cm/m2): 1.5  Asc Ao diam index Ht(cm/m): 1.9  LA Volume (BP): 43.2 ml     LA Volume Index (BP): 19.9 ml/m2  RWT: 0.36     Doppler Measurements & Calculations  MV E max taj: 85.9 cm/sec  MV A max taj: 74.6 cm/sec  MV E/A: 1.2  MV dec slope: 375.2 cm/sec2  MV dec time: 0.23 sec  PA V2 max: 200.5 cm/sec  PA max P.1 mmHg  TR max taj: 274.3 cm/sec  TR max P.1 mmHg  E/E' av.7  Lateral E/e': 8.5  Medial E/e': 9.0      ______________________________________________________________________________  Report approved by: Umer Crawford 11/27/2023 04:01 PM

## 2023-11-29 ENCOUNTER — HOME INFUSION (PRE-WILLOW HOME INFUSION) (OUTPATIENT)
Dept: PHARMACY | Facility: CLINIC | Age: 48
End: 2023-11-29
Payer: MEDICARE

## 2023-11-29 LAB
ANION GAP SERPL CALCULATED.3IONS-SCNC: <1 MMOL/L (ref 7–15)
BACTERIA BLD CULT: ABNORMAL
BACTERIA BLD CULT: ABNORMAL
BASE EXCESS BLDV CALC-SCNC: 12.1 MMOL/L (ref -7.7–1.9)
BUN SERPL-MCNC: 11.7 MG/DL (ref 6–20)
CALCIUM SERPL-MCNC: 8.1 MG/DL (ref 8.6–10)
CHLORIDE SERPL-SCNC: 101 MMOL/L (ref 98–107)
CREAT SERPL-MCNC: 0.62 MG/DL (ref 0.67–1.17)
DEPRECATED HCO3 PLAS-SCNC: 42 MMOL/L (ref 22–29)
EGFRCR SERPLBLD CKD-EPI 2021: >90 ML/MIN/1.73M2
ERYTHROCYTE [DISTWIDTH] IN BLOOD BY AUTOMATED COUNT: 14.1 % (ref 10–15)
GLUCOSE SERPL-MCNC: 100 MG/DL (ref 70–99)
HCO3 BLDV-SCNC: 41 MMOL/L (ref 21–28)
HCT VFR BLD AUTO: 35 % (ref 40–53)
HGB BLD-MCNC: 10.3 G/DL (ref 13.3–17.7)
MCH RBC QN AUTO: 30 PG (ref 26.5–33)
MCHC RBC AUTO-ENTMCNC: 29.4 G/DL (ref 31.5–36.5)
MCV RBC AUTO: 102 FL (ref 78–100)
O2/TOTAL GAS SETTING VFR VENT: 40 %
PCO2 BLDV: 83 MM HG (ref 40–50)
PH BLDV: 7.3 [PH] (ref 7.32–7.43)
PLATELET # BLD AUTO: 245 10E3/UL (ref 150–450)
PO2 BLDV: 53 MM HG (ref 25–47)
POTASSIUM SERPL-SCNC: 4 MMOL/L (ref 3.4–5.3)
RBC # BLD AUTO: 3.43 10E6/UL (ref 4.4–5.9)
SODIUM SERPL-SCNC: 143 MMOL/L (ref 135–145)
WBC # BLD AUTO: 6.3 10E3/UL (ref 4–11)

## 2023-11-29 PROCEDURE — 82803 BLOOD GASES ANY COMBINATION: CPT | Performed by: STUDENT IN AN ORGANIZED HEALTH CARE EDUCATION/TRAINING PROGRAM

## 2023-11-29 PROCEDURE — 80048 BASIC METABOLIC PNL TOTAL CA: CPT | Performed by: STUDENT IN AN ORGANIZED HEALTH CARE EDUCATION/TRAINING PROGRAM

## 2023-11-29 PROCEDURE — 94660 CPAP INITIATION&MGMT: CPT

## 2023-11-29 PROCEDURE — 999N000157 HC STATISTIC RCP TIME EA 10 MIN

## 2023-11-29 PROCEDURE — 250N000009 HC RX 250: Performed by: INTERNAL MEDICINE

## 2023-11-29 PROCEDURE — 250N000013 HC RX MED GY IP 250 OP 250 PS 637: Performed by: HOSPITALIST

## 2023-11-29 PROCEDURE — 99232 SBSQ HOSP IP/OBS MODERATE 35: CPT | Performed by: STUDENT IN AN ORGANIZED HEALTH CARE EDUCATION/TRAINING PROGRAM

## 2023-11-29 PROCEDURE — 85027 COMPLETE CBC AUTOMATED: CPT | Performed by: STUDENT IN AN ORGANIZED HEALTH CARE EDUCATION/TRAINING PROGRAM

## 2023-11-29 PROCEDURE — 120N000001 HC R&B MED SURG/OB

## 2023-11-29 PROCEDURE — 250N000013 HC RX MED GY IP 250 OP 250 PS 637: Performed by: INTERNAL MEDICINE

## 2023-11-29 RX ADMIN — LAMOTRIGINE 200 MG: 200 TABLET ORAL at 21:52

## 2023-11-29 RX ADMIN — FLUTICASONE PROPIONATE 1 SPRAY: 50 SPRAY, METERED NASAL at 21:57

## 2023-11-29 RX ADMIN — FLUTICASONE PROPIONATE 1 SPRAY: 50 SPRAY, METERED NASAL at 09:14

## 2023-11-29 RX ADMIN — NAFCILLIN SODIUM 2 G: 2 INJECTION, POWDER, LYOPHILIZED, FOR SOLUTION INTRAMUSCULAR; INTRAVENOUS at 09:14

## 2023-11-29 RX ADMIN — LAMOTRIGINE 200 MG: 200 TABLET ORAL at 08:28

## 2023-11-29 RX ADMIN — PANTOPRAZOLE SODIUM 40 MG: 40 TABLET, DELAYED RELEASE ORAL at 08:27

## 2023-11-29 RX ADMIN — LEVOCARNITINE 660 MG: 330 TABLET ORAL at 13:43

## 2023-11-29 RX ADMIN — NAFCILLIN SODIUM 2 G: 2 INJECTION, POWDER, LYOPHILIZED, FOR SOLUTION INTRAMUSCULAR; INTRAVENOUS at 21:52

## 2023-11-29 RX ADMIN — LEVETIRACETAM 1500 MG: 500 TABLET, FILM COATED ORAL at 21:52

## 2023-11-29 RX ADMIN — LEVOCARNITINE 660 MG: 330 TABLET ORAL at 08:28

## 2023-11-29 RX ADMIN — NAFCILLIN SODIUM 2 G: 2 INJECTION, POWDER, LYOPHILIZED, FOR SOLUTION INTRAMUSCULAR; INTRAVENOUS at 00:34

## 2023-11-29 RX ADMIN — BUSPIRONE HYDROCHLORIDE 15 MG: 15 TABLET ORAL at 21:52

## 2023-11-29 RX ADMIN — DIVALPROEX SODIUM 500 MG: 500 TABLET, DELAYED RELEASE ORAL at 21:52

## 2023-11-29 RX ADMIN — MICONAZOLE NITRATE: 20 POWDER TOPICAL at 09:14

## 2023-11-29 RX ADMIN — PRAZOSIN HYDROCHLORIDE 2 MG: 1 CAPSULE ORAL at 21:52

## 2023-11-29 RX ADMIN — PROPRANOLOL HYDROCHLORIDE 20 MG: 20 TABLET ORAL at 21:52

## 2023-11-29 RX ADMIN — LEVETIRACETAM 1000 MG: 500 TABLET, FILM COATED ORAL at 08:27

## 2023-11-29 RX ADMIN — NAFCILLIN SODIUM 2 G: 2 INJECTION, POWDER, LYOPHILIZED, FOR SOLUTION INTRAMUSCULAR; INTRAVENOUS at 04:22

## 2023-11-29 RX ADMIN — LEVOCARNITINE 660 MG: 330 TABLET ORAL at 21:52

## 2023-11-29 RX ADMIN — DIVALPROEX SODIUM 500 MG: 500 TABLET, DELAYED RELEASE ORAL at 08:27

## 2023-11-29 RX ADMIN — POLYETHYLENE GLYCOL 3350 17 G: 17 POWDER, FOR SOLUTION ORAL at 08:28

## 2023-11-29 RX ADMIN — THERA TABS 1 TABLET: TAB at 08:27

## 2023-11-29 RX ADMIN — CITALOPRAM HYDROBROMIDE 60 MG: 20 TABLET, FILM COATED ORAL at 08:31

## 2023-11-29 RX ADMIN — ZONISAMIDE 100 MG: 100 CAPSULE ORAL at 08:27

## 2023-11-29 RX ADMIN — BUSPIRONE HYDROCHLORIDE 15 MG: 15 TABLET ORAL at 08:28

## 2023-11-29 RX ADMIN — NAFCILLIN SODIUM 2 G: 2 INJECTION, POWDER, LYOPHILIZED, FOR SOLUTION INTRAMUSCULAR; INTRAVENOUS at 13:43

## 2023-11-29 RX ADMIN — ZONISAMIDE 200 MG: 100 CAPSULE ORAL at 21:52

## 2023-11-29 RX ADMIN — NAFCILLIN SODIUM 2 G: 2 INJECTION, POWDER, LYOPHILIZED, FOR SOLUTION INTRAMUSCULAR; INTRAVENOUS at 17:01

## 2023-11-29 RX ADMIN — MICONAZOLE NITRATE: 20 POWDER TOPICAL at 22:14

## 2023-11-29 RX ADMIN — PROPRANOLOL HYDROCHLORIDE 20 MG: 20 TABLET ORAL at 08:27

## 2023-11-29 RX ADMIN — QUETIAPINE FUMARATE 300 MG: 300 TABLET, EXTENDED RELEASE ORAL at 21:52

## 2023-11-29 ASSESSMENT — ACTIVITIES OF DAILY LIVING (ADL)
ADLS_ACUITY_SCORE: 51
ADLS_ACUITY_SCORE: 51
ADLS_ACUITY_SCORE: 55
ADLS_ACUITY_SCORE: 51
ADLS_ACUITY_SCORE: 51
ADLS_ACUITY_SCORE: 55
ADLS_ACUITY_SCORE: 53

## 2023-11-29 NOTE — PROGRESS NOTES
A BiPAP of  12/6 @ 40% was applied to the pt via the mask for NOC use.  The bridge of the nose looks good and remains intact. Pt is tolerating it well. Will continue to monitor and assess the pt's current respiratory status and needs.    Filippo Lamb RT on 11/29/2023 at 5:04 AM

## 2023-11-29 NOTE — PROGRESS NOTES
[facility]    Hospitalist Progress Note  Provider : Eric Robertson MD  Date of Service (when I saw the patient): 11/29/2023    Assessment & Plan   Tre Vazquez is a 48 year old male who who has a very complicated past medical as noted in the list below.  He lives in a group home.  He has some developmental delay, morbid obesity, ANA and OHS using chronically BiPAP at night apparently.  History of seizure disorder and chronic hyperammonemia while on Depakote.  Schizoaffective disorder, borderline personality and left eye blindness. He presented with clouding of mentation and elevated CO2 and pneumonia.  (This patient normally has a CO2 between 70 and 80 and he is ammonia is in the 60s). His current presentation is suspected to be due to possible nonadherence to treatment, though it is difficult to prove.  Patient has been a started on NIPPV in the emergency department and he has had excellent response. Per records from May of 2022, his ammonia level was elevated and considered the consequence of the use of Depakote for seizures. There is no official diagnosis of cirrhosis in this patient, he is not taking lactulose and likely not needed though he had one dose in ED.  CT of the head and chest without acute pathology.  Ultimately found to have Staph aureus bacteremia, source still unclear.  On nafcillin, discharge planning ongoing as patient will need for total weeks of IV antibiotics.    Acute on chronic hypercarbic and hypoxemic respiratory failure.  Patient on chronic BiPAP at night time and napping.   Obesity/OHS.  Breathing comfortably morning 11/26.  Tolerated BiPAP overnight, on nasal cannula throughout the morning.  PCO2 remained elevated to 85 on venous gas, but mentation appears good.  Alert and oriented, able to answer questions.  For now, continue BiPAP overnight and with naps during the day.  Supplemental oxygen as needed.  Blood cultures from admission positive for Staph aureus,  "repeat cultures again growing gram-positive cocci in clusters.  Started on IV vancomycin now on nafcillin.  Overall respiratory status appears to be improved, doing well with BiPAP as needed.  - BiPAP at night and with naps  - RT following     Staph aureus bacteremia  Left buttock wound  Initial blood cultures positive for Staph aureus.  Started on IV vancomycin empirically, consulted infectious disease.  As far as source, has had respiratory issues prior to admission, but CT chest did not necessarily appear convincing for active pneumonia.  Also has a wound on left buttock that is open, having some serosanguineous drainage.  Somewhat firm to palpation underneath the opening on the skin, but no purulent discharge and does not necessarily feel like there is an abscess underneath the skin.  We will continue to monitor the wound as potential source as well.  Per ID, patient will need 4 weeks total IV antibiotic therapy.  PICC now in place for ongoing antibiotic administration.  Discharge planning ongoing as group home not sure they will be able to accommodate patient's ongoing antibiotic need.  - Continue IV nafcillin  - Consult ID, appreciate recommendations  - PICC in place  - We will discuss with ID trying to simplify antibiotic regimen    Hyperammonemia, chronic.  Unclear cause.  Patient had one-time lactulose in the emergency department.    -No known liver disease  -Initial ammonia level was 129. He received a dose of lactulose in the ER. Ammonia level elevated at 82. Suspect related to seizure medication(Depakote)  -Depakote was initially held.  Discussed with neurology and decreased Depakote to 500 mg p.o. twice daily.     Acute toxic/metabolic encephalopathy - improving  Likely delirium, memory concerns  Patient and stepmother both endorse some concerns about ongoing issues with memory on 11/29.  Patient also states that he had a \"vision\" that was rather distressing to him he believes several days ago.  " Discussed with them that CT imaging of the head was normal, ammonia level was normal, and the ongoing confusion is likely related to delirium that does seem to be clearing as patient is much more awake and alert every day.  We will continue to monitor for signs of improve mental status, could consider MRI if continues to have memory issues despite ongoing other improvement.  - Continue monitoring mental status  - Consider brain MRI if ongoing memory issues  - BiPAP at bedtime  - Adjusted Depakote level as above to twice daily dosing from 3 times daily     Seizure disorder. Stable on current medication.   -Discussed with neurology.  Neurology recommending to decrease Depakote to 500 mg twice daily, check free Depakote level, Keppra level, Lamictal level.  Orders placed.  Patient needs repeat drug levels in 1 to 2 weeks and follow-up with neurology as outpatient for further adjustment of his medications.  Appreciate neurology input.     Essential hypertension.  On propranolol and prazosin.     Depression/anxiety.  On Celexa and Seroquel.     GERD.   On pantoprazole.    DVT Prophylaxis: Pneumatic Compression Devices  Code Status: Full Code    Disposition: TBD given presence of staph bacteremia, antibiotic planning.    Interval History   No acute events overnight.  Tolerating BiPAP well overnight.  Breathing comfortably on nasal cannula and sitting up in the chair during our discussion.  Most awake and alert patient is looked for me, fully engaged in conversation and asking questions.  Patient and stepmother did express some concern about his ongoing memory issues, explained that this is likely delirium caused by his hospitalization and acute illness.  Mental status is certainly improving day today, and discussed that if he has persistent memory issues even as his condition continues to improve we could consider MRI of the brain.  Patient and stepmother are agreeable with this plan.    -Data reviewed today: I reviewed  all new labs and imaging results over the last 24 hours. I personally reviewed no images or EKG's today.    Physical Exam   Temp: 98  F (36.7  C) Temp src: Axillary BP: 131/75 Pulse: 75   Resp: 20 SpO2: 98 % O2 Device: Nasal cannula    There were no vitals filed for this visit.  Vital Signs with Ranges  Temp:  [97.6  F (36.4  C)-98.5  F (36.9  C)] 98  F (36.7  C)  Pulse:  [74-91] 75  Resp:  [16-30] 20  BP: (131-148)/(67-83) 131/75  FiO2 (%):  [40 %-45 %] 40 %  SpO2:  [94 %-99 %] 98 %  I/O last 3 completed shifts:  In: 520 [P.O.:520]  Out: 250 [Urine:250]    GEN:  Alert, following conversation and asking questions appropriately.  No acute distress  HEENT:  Normocephalic/atraumatic, no scleral icterus, no nasal discharge, mouth moist.  CV:  Regular rate and rhythm, no murmur or JVD.  S1 + S2 noted, no S3 or S4.  LUNGS:  Clear to auscultation bilaterally without rales/rhonchi/wheezing/retractions.  Symmetric chest rise on inhalation noted.  Breathing comfortably on NC during exam  ABD:  Active bowel sounds, soft, non-tender/non-distended.  No rebound/guarding/rigidity.  EXT:  No edema or cyanosis.  Hands/feet warm to touch with good signs of peripheral perfusion.  No joint synovitis noted.  SKIN: Wound present on right buttock with open area of skin, serosanguineous drainage, some surrounding erythema  NEURO:  Symmetric muscle strength, sensation to touch grossly intact.  No new focal deficits appreciated.    Medications      busPIRone  15 mg Oral BID    citalopram  60 mg Oral Daily    divalproex sodium delayed-release  500 mg Oral Q12H [facility] (08/20)    fluticasone  1 spray Nasal BID    lamoTRIgine  200 mg Oral BID    levETIRAcetam  1,000 mg Oral QAM    And    levETIRAcetam  1,500 mg Oral At Bedtime    levOCARNitine  660 mg Oral TID    miconazole   Topical BID    multivitamin, therapeutic  1 tablet Oral Daily    nafcillin  2 g Intravenous Q4H    pantoprazole  40 mg Oral Daily    polyethylene glycol  17 g Oral Daily     prazosin  2 mg Oral At Bedtime    propranolol  20 mg Oral BID    QUEtiapine ER  300 mg Oral At Bedtime    zonisamide  100 mg Oral QAM    And    zonisamide  200 mg Oral At Bedtime       Data   Recent Labs   Lab 11/29/23  0636 11/28/23  0747 11/27/23  0759 11/26/23  0648 11/26/23  0056 11/25/23  0558 11/24/23  1042   WBC 6.3  --   --  6.7 7.3  --  7.4   HGB 10.3*  --   --  11.8* 12.1*  --  12.0*   *  --   --  102* 102*  --  100     --   --  229 224  --  239     --  145 140 140  --  139   POTASSIUM 4.0  --  4.0 3.9 3.8  --  3.9   CHLORIDE 101  --  100 95* 94*  --  94*   CO2 42*  --  42* 42* 38*  --  37*   BUN 11.7  --  12.1 11.4 12.0  --  17.9   CR 0.62* 0.65* 0.68  0.68 0.61* 0.58*   < > 0.67   ANIONGAP <1*  --  3* 3* 8  --  8   ARNOLD 8.1*  --  8.6 8.5* 8.6  --  8.9   *  --  104* 126* 143*  --  109*   ALBUMIN  --   --   --   --  3.4*  --  3.8   PROTTOTAL  --   --   --   --  6.2*  --  7.1   BILITOTAL  --   --   --   --  0.5  --  0.4   ALKPHOS  --   --   --   --  113  --  123   ALT  --   --   --   --  53  --  57   AST  --   --   --   --  47*  --  47*   LIPASE  --   --   --   --  20  --   --     < > = values in this interval not displayed.       No results found for this or any previous visit (from the past 24 hour(s)).

## 2023-11-29 NOTE — PLAN OF CARE
[id number]: A&Ox3. VSS. Tolerated bipap at night. Denies pain. Ax2. Uses urinal at bedside. On IV abx. R PICC line WDL. Neuro, WOC, ID following. Plan of care ongoing.

## 2023-11-29 NOTE — PLAN OF CARE
"Goal Outcome Evaluation:      Plan of Care Reviewed With: patient    Overall Patient Progress: no changeOverall Patient Progress: no change    Outcome Evaluation: .    A&Ox2  VSS on 6 LPM NC. BIPAP at night and naps  Denies pain  Activity: A1 belt/walk, up in chair most of shift  Consults: RT  Nafcillin given twice this shift, see MAR  Mepilexs on bottom changed multiple times this shift    Miralax scheduled once daily, hold on 11/30 for multiple loose stools on 11/29/23      Problem: Adult Inpatient Plan of Care  Goal: Plan of Care Review  Description: The Plan of Care Review/Shift note should be completed every shift.  The Outcome Evaluation is a brief statement about your assessment that the patient is improving, declining, or no change.  This information will be displayed automatically on your shift  note.  Outcome: Progressing  Flowsheets (Taken 11/29/2023 1346)  Outcome Evaluation: .  Plan of Care Reviewed With: patient  Overall Patient Progress: no change  Goal: Patient-Specific Goal (Individualized)  Description: You can add care plan individualizations to a care plan. Examples of Individualization might be:  \"Parent requests to be called daily at 9am for status\", \"I have a hard time hearing out of my right ear\", or \"Do not touch me to wake me up as it startles  me\".  Outcome: Progressing  Goal: Absence of Hospital-Acquired Illness or Injury  Outcome: Progressing  Intervention: Identify and Manage Fall Risk  Recent Flowsheet Documentation  Taken 11/29/2023 0913 by Karla Sanchez, RN  Safety Promotion/Fall Prevention: safety round/check completed  Taken 11/29/2023 0831 by Karla Sanchez, RN  Safety Promotion/Fall Prevention:   safety round/check completed   assistive device/personal items within reach   activity supervised   clutter free environment maintained   increased rounding and observation   increase visualization of patient   lighting adjusted   nonskid shoes/slippers when out of bed   patient " and family education   room door open   room organization consistent   supervised activity   treat underlying cause  Intervention: Prevent Skin Injury  Recent Flowsheet Documentation  Taken 11/29/2023 0817 by Karla Sanchez RN  Body Position: log-rolled  Intervention: Prevent and Manage VTE (Venous Thromboembolism) Risk  Recent Flowsheet Documentation  Taken 11/29/2023 0831 by Karla Sanchez, RN  VTE Prevention/Management: SCDs (sequential compression devices) off  Goal: Optimal Comfort and Wellbeing  Outcome: Progressing  Goal: Readiness for Transition of Care  Outcome: Progressing     Problem: Gas Exchange Impaired  Goal: Optimal Gas Exchange  Outcome: Progressing     Problem: Infection  Goal: Absence of Infection Signs and Symptoms  Outcome: Progressing

## 2023-11-29 NOTE — PROGRESS NOTES
Care Management Follow Up    Length of Stay (days): 5    Expected Discharge Date: 12/01/2023     Concerns to be Addressed: care coordination/care conferences, discharge planning     Patient plan of care discussed at interdisciplinary rounds: Yes    Anticipated Discharge Disposition: Group Home vs Transitional Care Unit pending IV needs     Anticipated Discharge Services: home IV antibiotic needs     Education Provided on the Discharge Plan:  yes  Patient/Family in Agreement with the Plan:  yes    Referrals Placed by CM/SW:  will need either HI referral or Transitional Care Unit referrals pending  answer to home IV antibiotic infusions      Additional Information:  CM following for discharge plan and need for home IV antibiotic infusions. Again placed call to  Manager Quoc [phone] with no answer and inability to leave message. Call placed to  [phone] with no answer.     Call placed to patient's stepparent Ashlie to discuss discharge plan. Updated her that patient will need IV antibiotics after discharge for another 4 weeks. Updated her that CM is unable to reach anyone at the  to inquire if they are able to accommodate IV needs or if patient will need to go to Transitional Care Unit while on the antibiotics. She verbalized understanding. She states she lives 100 miles away but is in town today and will be stopping by patient's  and then will be coming to the hospital. She is meeting today with  manager, Quoc, and will ask him about home IV needs and if they would be able to take him back and assist with it. She also states patient has been to Transitional Care Unit in the past and they know this process as well. She has questions about patient status for MD. Updated MD that she would be visiting today if he could follow up with her or via phone.     CM will follow up with Ashlie when she comes to visit patient in hospital.     ADDENDUM 1150:  Call received from Quoc at patient's . Explained  to him patient needs for home infusion antibiotics. Quoc feels the  would be able to change bag daily if patient returns on a CADD pump for nafcillin. They have nurses available for teaching. He would like a HC RN for weekly dressing changes and lab draws. Referral sent to [facility] For benefit check and cost estimate.     ADDENDUM 1520:  Call received from Quoc at the  stating that he has spoken to his management and they are unable to do IV antibiotics or take a PICC line. Patient will now need to go to Transitional Care Unit on discharge. Call placed to Kaitlin Dailey to update her that patient would need to go to Transitional Care Unit on discharge as the  cannot do the IV antibiotics. She is agreeable to Transitional Care Unit and does not have a preference on facility. She feels he would be better in a private room but he would not be able to afford it unless its covered by insurance. He tends to talk a lot and the roommates don't usually work out. She lives near [place] which is south of the [facility] and would prefer he gets placed south of [place] so she could visit. CM will send referrals south of [place] towards [place] when appropriate and patient's discharge plan is more clear. Updated home infusion to cancel referral.            Kathy Blanton RN BSN CM  Inpatient Care Coordination  [facility]  [phone]

## 2023-11-29 NOTE — PLAN OF CARE
Goal Outcome Evaluation:      Plan of Care Reviewed With: patient    Overall Patient Progress: no changeOverall Patient Progress: no change       Pt alert, disoriented to time, intermittent confusion. 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 Ax1-2 with GB + walker. 6L O2, Bipap when sleeping. R picc line in place for long term abx, have blood returned. Denies pain, SOB, dizziness, and n/v. On nafcillin. Wound on L buttock.

## 2023-11-29 NOTE — PROGRESS NOTES
Therapy: IV Nafcillin  Insurance: [place]  Monthly Ded: $3.80    Co-Insurance: 100/0    Pt does not have coverage for Nafcillin through their primary Medicare plan. However, their secondary [place] will pickup coverage. Pt should be covered at 100% but MA includes an additional $3.80 monthly deductible that may apply for coverage. ABN has been sent along w/ benefits email.    In reference to referral from KATHY Naranjo on pt admitted 11/24/23 to check for IV Nafcillin coverage.    Please contact Intake with any questions, [phone] or In Basket pool,  Home Infusion ([phone]).

## 2023-11-30 LAB
ANION GAP SERPL CALCULATED.3IONS-SCNC: 4 MMOL/L (ref 7–15)
BACTERIA BLD CULT: ABNORMAL
BACTERIA BLD CULT: ABNORMAL
BACTERIA BLD CULT: NO GROWTH
BASE EXCESS BLDV CALC-SCNC: 11.5 MMOL/L (ref -7.7–1.9)
BUN SERPL-MCNC: 12.4 MG/DL (ref 6–20)
CALCIUM SERPL-MCNC: 8.5 MG/DL (ref 8.6–10)
CHLORIDE SERPL-SCNC: 101 MMOL/L (ref 98–107)
CREAT SERPL-MCNC: 0.6 MG/DL (ref 0.67–1.17)
DEPRECATED HCO3 PLAS-SCNC: 39 MMOL/L (ref 22–29)
EGFRCR SERPLBLD CKD-EPI 2021: >90 ML/MIN/1.73M2
ERYTHROCYTE [DISTWIDTH] IN BLOOD BY AUTOMATED COUNT: 14.3 % (ref 10–15)
GLUCOSE SERPL-MCNC: 98 MG/DL (ref 70–99)
HCO3 BLDV-SCNC: 40 MMOL/L (ref 21–28)
HCT VFR BLD AUTO: 35 % (ref 40–53)
HGB BLD-MCNC: 10.5 G/DL (ref 13.3–17.7)
MCH RBC QN AUTO: 30.3 PG (ref 26.5–33)
MCHC RBC AUTO-ENTMCNC: 30 G/DL (ref 31.5–36.5)
MCV RBC AUTO: 101 FL (ref 78–100)
O2/TOTAL GAS SETTING VFR VENT: 40 %
PCO2 BLDV: 81 MM HG (ref 40–50)
PH BLDV: 7.31 [PH] (ref 7.32–7.43)
PLATELET # BLD AUTO: 270 10E3/UL (ref 150–450)
PO2 BLDV: 46 MM HG (ref 25–47)
POTASSIUM SERPL-SCNC: 3.9 MMOL/L (ref 3.4–5.3)
RBC # BLD AUTO: 3.47 10E6/UL (ref 4.4–5.9)
SODIUM SERPL-SCNC: 144 MMOL/L (ref 135–145)
WBC # BLD AUTO: 6.7 10E3/UL (ref 4–11)

## 2023-11-30 PROCEDURE — 250N000009 HC RX 250: Performed by: INTERNAL MEDICINE

## 2023-11-30 PROCEDURE — 99232 SBSQ HOSP IP/OBS MODERATE 35: CPT | Performed by: STUDENT IN AN ORGANIZED HEALTH CARE EDUCATION/TRAINING PROGRAM

## 2023-11-30 PROCEDURE — 120N000001 HC R&B MED SURG/OB

## 2023-11-30 PROCEDURE — 250N000011 HC RX IP 250 OP 636: Mod: JZ | Performed by: INTERNAL MEDICINE

## 2023-11-30 PROCEDURE — 85027 COMPLETE CBC AUTOMATED: CPT | Performed by: STUDENT IN AN ORGANIZED HEALTH CARE EDUCATION/TRAINING PROGRAM

## 2023-11-30 PROCEDURE — 250N000013 HC RX MED GY IP 250 OP 250 PS 637: Performed by: HOSPITALIST

## 2023-11-30 PROCEDURE — 99232 SBSQ HOSP IP/OBS MODERATE 35: CPT | Performed by: INTERNAL MEDICINE

## 2023-11-30 PROCEDURE — 94660 CPAP INITIATION&MGMT: CPT

## 2023-11-30 PROCEDURE — 82803 BLOOD GASES ANY COMBINATION: CPT | Performed by: STUDENT IN AN ORGANIZED HEALTH CARE EDUCATION/TRAINING PROGRAM

## 2023-11-30 PROCEDURE — 999N000157 HC STATISTIC RCP TIME EA 10 MIN

## 2023-11-30 PROCEDURE — 82310 ASSAY OF CALCIUM: CPT | Performed by: STUDENT IN AN ORGANIZED HEALTH CARE EDUCATION/TRAINING PROGRAM

## 2023-11-30 PROCEDURE — 250N000013 HC RX MED GY IP 250 OP 250 PS 637: Performed by: INTERNAL MEDICINE

## 2023-11-30 RX ORDER — LIDOCAINE 40 MG/G
CREAM TOPICAL
Status: DISCONTINUED | OUTPATIENT
Start: 2023-11-30 | End: 2023-12-18 | Stop reason: HOSPADM

## 2023-11-30 RX ORDER — NAFCILLIN SODIUM 2 G/1
2 INJECTION, POWDER, FOR SOLUTION INTRAVENOUS EVERY 4 HOURS
Status: ON HOLD | DISCHARGE
Start: 2023-11-26 | End: 2023-12-28

## 2023-11-30 RX ADMIN — NAFCILLIN SODIUM 2 G: 2 INJECTION, POWDER, LYOPHILIZED, FOR SOLUTION INTRAMUSCULAR; INTRAVENOUS at 02:36

## 2023-11-30 RX ADMIN — MICONAZOLE NITRATE: 20 POWDER TOPICAL at 22:09

## 2023-11-30 RX ADMIN — DIVALPROEX SODIUM 500 MG: 500 TABLET, DELAYED RELEASE ORAL at 08:14

## 2023-11-30 RX ADMIN — LEVOCARNITINE 660 MG: 330 TABLET ORAL at 08:14

## 2023-11-30 RX ADMIN — NAFCILLIN SODIUM 2 G: 2 INJECTION, POWDER, LYOPHILIZED, FOR SOLUTION INTRAMUSCULAR; INTRAVENOUS at 22:53

## 2023-11-30 RX ADMIN — MICONAZOLE NITRATE: 20 POWDER TOPICAL at 08:17

## 2023-11-30 RX ADMIN — LEVOCARNITINE 660 MG: 330 TABLET ORAL at 15:11

## 2023-11-30 RX ADMIN — FLUTICASONE PROPIONATE 1 SPRAY: 50 SPRAY, METERED NASAL at 08:17

## 2023-11-30 RX ADMIN — NAFCILLIN SODIUM 2 G: 2 INJECTION, POWDER, LYOPHILIZED, FOR SOLUTION INTRAMUSCULAR; INTRAVENOUS at 15:10

## 2023-11-30 RX ADMIN — PRAZOSIN HYDROCHLORIDE 2 MG: 1 CAPSULE ORAL at 22:01

## 2023-11-30 RX ADMIN — FLUTICASONE PROPIONATE 1 SPRAY: 50 SPRAY, METERED NASAL at 22:09

## 2023-11-30 RX ADMIN — NAFCILLIN SODIUM 2 G: 2 INJECTION, POWDER, LYOPHILIZED, FOR SOLUTION INTRAMUSCULAR; INTRAVENOUS at 19:42

## 2023-11-30 RX ADMIN — LEVETIRACETAM 1000 MG: 500 TABLET, FILM COATED ORAL at 08:14

## 2023-11-30 RX ADMIN — DIVALPROEX SODIUM 500 MG: 500 TABLET, DELAYED RELEASE ORAL at 22:02

## 2023-11-30 RX ADMIN — BUSPIRONE HYDROCHLORIDE 15 MG: 15 TABLET ORAL at 22:02

## 2023-11-30 RX ADMIN — PROPRANOLOL HYDROCHLORIDE 20 MG: 20 TABLET ORAL at 08:14

## 2023-11-30 RX ADMIN — PROPRANOLOL HYDROCHLORIDE 20 MG: 20 TABLET ORAL at 22:02

## 2023-11-30 RX ADMIN — NAFCILLIN SODIUM 2 G: 2 INJECTION, POWDER, LYOPHILIZED, FOR SOLUTION INTRAMUSCULAR; INTRAVENOUS at 10:16

## 2023-11-30 RX ADMIN — PANTOPRAZOLE SODIUM 40 MG: 40 TABLET, DELAYED RELEASE ORAL at 08:14

## 2023-11-30 RX ADMIN — LEVOCARNITINE 660 MG: 330 TABLET ORAL at 22:02

## 2023-11-30 RX ADMIN — LAMOTRIGINE 200 MG: 200 TABLET ORAL at 08:14

## 2023-11-30 RX ADMIN — ZONISAMIDE 100 MG: 100 CAPSULE ORAL at 08:14

## 2023-11-30 RX ADMIN — LAMOTRIGINE 200 MG: 200 TABLET ORAL at 22:02

## 2023-11-30 RX ADMIN — NAFCILLIN SODIUM 2 G: 2 INJECTION, POWDER, LYOPHILIZED, FOR SOLUTION INTRAMUSCULAR; INTRAVENOUS at 06:01

## 2023-11-30 RX ADMIN — BUSPIRONE HYDROCHLORIDE 15 MG: 15 TABLET ORAL at 08:14

## 2023-11-30 RX ADMIN — CITALOPRAM HYDROBROMIDE 60 MG: 20 TABLET, FILM COATED ORAL at 08:14

## 2023-11-30 RX ADMIN — QUETIAPINE FUMARATE 300 MG: 300 TABLET, EXTENDED RELEASE ORAL at 22:02

## 2023-11-30 RX ADMIN — THERA TABS 1 TABLET: TAB at 08:14

## 2023-11-30 RX ADMIN — LEVETIRACETAM 1500 MG: 500 TABLET, FILM COATED ORAL at 22:01

## 2023-11-30 RX ADMIN — ALTEPLASE 2 MG: 2.2 INJECTION, POWDER, LYOPHILIZED, FOR SOLUTION INTRAVENOUS at 18:47

## 2023-11-30 RX ADMIN — ZONISAMIDE 200 MG: 100 CAPSULE ORAL at 22:01

## 2023-11-30 ASSESSMENT — ACTIVITIES OF DAILY LIVING (ADL)
ADLS_ACUITY_SCORE: 55
ADLS_ACUITY_SCORE: 51
ADLS_ACUITY_SCORE: 55
ADLS_ACUITY_SCORE: 51
ADLS_ACUITY_SCORE: 55
ADLS_ACUITY_SCORE: 51
ADLS_ACUITY_SCORE: 55
ADLS_ACUITY_SCORE: 51
ADLS_ACUITY_SCORE: 55

## 2023-11-30 NOTE — PLAN OF CARE
Goal Outcome Evaluation:    VS stable. Diminished LS. No complaints of pain. Slept on and off throughout the night.

## 2023-11-30 NOTE — PLAN OF CARE
"Goal Outcome Evaluation:      Plan of Care Reviewed With: patient    Overall Patient Progress: no changeOverall Patient Progress: no change    Outcome Evaluation: .    A&Ox2  VSS on 5 LPM NC. BIPAP at night and naps   Denies pain  Activity: A1 belt/walk, up in chair this afternoon  Held miralax for loose stools yesterday  Consults: RT  Plan to discharge to TCU, pending placement, to continue IV antibiotics        Problem: Adult Inpatient Plan of Care  Goal: Plan of Care Review  Description: The Plan of Care Review/Shift note should be completed every shift.  The Outcome Evaluation is a brief statement about your assessment that the patient is improving, declining, or no change.  This information will be displayed automatically on your shift  note.  Outcome: Progressing  Flowsheets (Taken 11/30/2023 1514)  Outcome Evaluation: .  Plan of Care Reviewed With: patient  Overall Patient Progress: no change  Goal: Patient-Specific Goal (Individualized)  Description: You can add care plan individualizations to a care plan. Examples of Individualization might be:  \"Parent requests to be called daily at 9am for status\", \"I have a hard time hearing out of my right ear\", or \"Do not touch me to wake me up as it startles  me\".  Outcome: Progressing  Goal: Absence of Hospital-Acquired Illness or Injury  Outcome: Progressing  Intervention: Identify and Manage Fall Risk  Recent Flowsheet Documentation  Taken 11/30/2023 1513 by Karla Sanchez, RN  Safety Promotion/Fall Prevention: safety round/check completed  Taken 11/30/2023 1138 by Karla Sanchez, RN  Safety Promotion/Fall Prevention: safety round/check completed  Taken 11/30/2023 0814 by Karla Sanchez, RN  Safety Promotion/Fall Prevention:   activity supervised   assistive device/personal items within reach   clutter free environment maintained   increased rounding and observation   increase visualization of patient   lighting adjusted   mobility aid in reach   nonskid " shoes/slippers when out of bed   patient and family education   room door open   room organization consistent   safety round/check completed   supervised activity   treat underlying cause  Intervention: Prevent Skin Injury  Recent Flowsheet Documentation  Taken 11/30/2023 1033 by Karla Sanchez RN  Body Position: (refuses repo) other (see comments)  Taken 11/30/2023 0814 by Karla Sanchez RN  Body Position: weight shifting  Intervention: Prevent and Manage VTE (Venous Thromboembolism) Risk  Recent Flowsheet Documentation  Taken 11/30/2023 0814 by Karla Sanchez RN  VTE Prevention/Management: SCDs (sequential compression devices) off  Goal: Optimal Comfort and Wellbeing  Outcome: Progressing  Goal: Readiness for Transition of Care  Outcome: Progressing     Problem: Gas Exchange Impaired  Goal: Optimal Gas Exchange  Outcome: Progressing  Intervention: Optimize Oxygenation and Ventilation  Recent Flowsheet Documentation  Taken 11/30/2023 1033 by Karla Sanchez RN  Head of Bed (HOB) Positioning: HOB at 45 degrees  Taken 11/30/2023 0814 by Karla Sanchez RN  Head of Bed (HOB) Positioning: HOB at 60 degrees     Problem: Infection  Goal: Absence of Infection Signs and Symptoms  Outcome: Progressing

## 2023-11-30 NOTE — PROGRESS NOTES
[facility]    Hospitalist Progress Note  Provider : Eric Robertson MD  Date of Service (when I saw the patient): 11/30/2023    Assessment & Plan   Tre Vazquez is a 48 year old male who who has a very complicated past medical as noted in the list below.  He lives in a group home.  He has some developmental delay, morbid obesity, ANA and OHS using chronically BiPAP at night apparently.  History of seizure disorder and chronic hyperammonemia while on Depakote.  Schizoaffective disorder, borderline personality and left eye blindness. He presented with clouding of mentation and elevated CO2 and pneumonia.  (This patient normally has a CO2 between 70 and 80 and he is ammonia is in the 60s). His current presentation is suspected to be due to possible nonadherence to treatment, though it is difficult to prove.  Patient has been a started on NIPPV in the emergency department and he has had excellent response. Per records from May of 2022, his ammonia level was elevated and considered the consequence of the use of Depakote for seizures. There is no official diagnosis of cirrhosis in this patient, he is not taking lactulose and likely not needed though he had one dose in ED.  CT of the head and chest without acute pathology.  Ultimately found to have Staph aureus bacteremia, source still unclear.  On nafcillin, discharge planning ongoing as patient will need for total weeks of IV antibiotics and group home not able to accommodate.  Awaiting TCU placement.    Acute on chronic hypercarbic and hypoxemic respiratory failure.  Patient on chronic BiPAP at night time and napping.   Obesity/OHS.  Breathing comfortably morning 11/26.  Tolerated BiPAP overnight, on nasal cannula throughout the morning.  PCO2 remained elevated to 85 on venous gas, but mentation appears good.  Alert and oriented, able to answer questions.  For now, continue BiPAP overnight and with naps during the day.  Supplemental oxygen as  needed.  Blood cultures from admission positive for Staph aureus, repeat cultures again growing gram-positive cocci in clusters.  Started on IV vancomycin now on nafcillin.  Overall respiratory status appears to be improved, doing well with BiPAP as needed.  - BiPAP at night and with naps  - RT following     Staph aureus bacteremia  Left buttock wound  Initial blood cultures positive for Staph aureus.  Started on IV vancomycin empirically, consulted infectious disease.  As far as source, has had respiratory issues prior to admission, but CT chest did not necessarily appear convincing for active pneumonia.  Also has a wound on left buttock that is open, having some serosanguineous drainage.  Somewhat firm to palpation underneath the opening on the skin, but no purulent discharge and does not necessarily feel like there is an abscess underneath the skin.  We will continue to monitor the wound as potential source as well.  Per ID, patient will need 4 weeks total IV antibiotic therapy.  PICC now in place for ongoing antibiotic administration.  Discharge planning ongoing as group home not able to accommodate patient's ongoing antibiotic need, will need TCU.  - Continue IV nafcillin for 4 weeks of therapy (last positive culture 11/25)  - Consult ID, appreciate recommendations  - PICC in place  - Awaiting TCU placement for ongoing antibiotics    Hyperammonemia, chronic.  Unclear cause.  Patient had one-time lactulose in the emergency department.    -No known liver disease  -Initial ammonia level was 129. He received a dose of lactulose in the ER. Ammonia level elevated at 82. Suspect related to seizure medication(Depakote)  -Depakote was initially held.  Discussed with neurology and decreased Depakote to 500 mg p.o. twice daily.     Acute toxic/metabolic encephalopathy - improving  Likely delirium, memory concerns  Patient and stepmother both endorse some concerns about ongoing issues with memory on 11/29.  Patient also  "states that he had a \"vision\" that was rather distressing to him he believes several days ago.  Discussed with them that CT imaging of the head was normal, ammonia level was normal, and the ongoing confusion is likely related to delirium that does seem to be clearing as patient is much more awake and alert every day.  We will continue to monitor for signs of improve mental status, could consider MRI if continues to have memory issues despite ongoing other improvement.  - Continue monitoring mental status  - Consider brain MRI if ongoing memory issues  - BiPAP at bedtime  - Adjusted Depakote level as above to twice daily dosing from 3 times daily     Seizure disorder. Stable on current medication.   -Discussed with neurology.  Neurology recommending to decrease Depakote to 500 mg twice daily, check free Depakote level, Keppra level, Lamictal level.  Orders placed.  Patient needs repeat drug levels in 1 to 2 weeks and follow-up with neurology as outpatient for further adjustment of his medications.  Appreciate neurology input.     Essential hypertension.  On propranolol and prazosin.     Depression/anxiety.  On Celexa and Seroquel.     GERD.   On pantoprazole.    DVT Prophylaxis: Pneumatic Compression Devices  Code Status: Full Code    Disposition: TBD given presence of staph bacteremia, antibiotic planning.    Interval History   No acute events overnight.  Tolerating BiPAP well overnight.  Breathing was comfortable during our conversation today.  Asked patient if he felt that his memory issues were improving at all, states that he thinks maybe.  Able to remember some things better, but still a little bit cloudy.  Overall would still say his mental status had continued to improve considering he was almost somnolent several days ago.    -Data reviewed today: I reviewed all new labs and imaging results over the last 24 hours. I personally reviewed no images or EKG's today.    Physical Exam   Temp: 98.2  F (36.8  C) Temp " src: Oral BP: 130/69 Pulse: 78   Resp: 18 SpO2: 98 % O2 Device: Nasal cannula Oxygen Delivery: 5 LPM  There were no vitals filed for this visit.  Vital Signs with Ranges  Temp:  [97.4  F (36.3  C)-98.2  F (36.8  C)] 98.2  F (36.8  C)  Pulse:  [78-89] 78  Resp:  [18-23] 18  BP: (129-152)/(49-86) 130/69  FiO2 (%):  [40 %] 40 %  SpO2:  [91 %-98 %] 98 %  I/O last 3 completed shifts:  In: 360 [P.O.:360]  Out: 900 [Urine:900]    GEN:  Alert, following conversation and asking questions appropriately.  No acute distress  HEENT:  Normocephalic/atraumatic, no scleral icterus, no nasal discharge, mouth moist.  CV:  Regular rate and rhythm, no murmur or JVD.  S1 + S2 noted, no S3 or S4.  LUNGS:  Clear to auscultation bilaterally without rales/rhonchi/wheezing/retractions.  Symmetric chest rise on inhalation noted.  Breathing comfortably on NC during exam  ABD:  Active bowel sounds, soft, non-tender/non-distended.  No rebound/guarding/rigidity.  EXT:  No edema or cyanosis.  Hands/feet warm to touch with good signs of peripheral perfusion.  No joint synovitis noted.  SKIN: Wound present on right buttock with open area of skin, serosanguineous drainage, some surrounding erythema but no chuck abscess or purulent drainage  NEURO:  Symmetric muscle strength, sensation to touch grossly intact.  No new focal deficits appreciated.    Medications      busPIRone  15 mg Oral BID    citalopram  60 mg Oral Daily    divalproex sodium delayed-release  500 mg Oral Q12H [facility] (08/20)    fluticasone  1 spray Nasal BID    lamoTRIgine  200 mg Oral BID    levETIRAcetam  1,000 mg Oral QAM    And    levETIRAcetam  1,500 mg Oral At Bedtime    levOCARNitine  660 mg Oral TID    miconazole   Topical BID    multivitamin, therapeutic  1 tablet Oral Daily    nafcillin  2 g Intravenous Q4H    pantoprazole  40 mg Oral Daily    polyethylene glycol  17 g Oral Daily    prazosin  2 mg Oral At Bedtime    propranolol  20 mg Oral BID    QUEtiapine ER  300 mg Oral At  Bedtime    sodium chloride (PF)  10 mL Intracatheter Q8H    zonisamide  100 mg Oral QAM    And    zonisamide  200 mg Oral At Bedtime       Data   Recent Labs   Lab 11/30/23  0603 11/29/23  0636 11/28/23  0747 11/27/23  0759 11/26/23  0648 11/26/23  0056 11/25/23  0558 11/24/23  1042   WBC 6.7 6.3  --   --  6.7 7.3  --  7.4   HGB 10.5* 10.3*  --   --  11.8* 12.1*  --  12.0*   * 102*  --   --  102* 102*  --  100    245  --   --  229 224  --  239    143  --  145 140 140  --  139   POTASSIUM 3.9 4.0  --  4.0 3.9 3.8  --  3.9   CHLORIDE 101 101  --  100 95* 94*  --  94*   CO2 39* 42*  --  42* 42* 38*  --  37*   BUN 12.4 11.7  --  12.1 11.4 12.0  --  17.9   CR 0.60* 0.62* 0.65* 0.68  0.68 0.61* 0.58*   < > 0.67   ANIONGAP 4* <1*  --  3* 3* 8  --  8   ARNOLD 8.5* 8.1*  --  8.6 8.5* 8.6  --  8.9   GLC 98 100*  --  104* 126* 143*  --  109*   ALBUMIN  --   --   --   --   --  3.4*  --  3.8   PROTTOTAL  --   --   --   --   --  6.2*  --  7.1   BILITOTAL  --   --   --   --   --  0.5  --  0.4   ALKPHOS  --   --   --   --   --  113  --  123   ALT  --   --   --   --   --  53  --  57   AST  --   --   --   --   --  47*  --  47*   LIPASE  --   --   --   --   --  20  --   --     < > = values in this interval not displayed.       No results found for this or any previous visit (from the past 24 hour(s)).

## 2023-11-30 NOTE — PROGRESS NOTES
A BiPAP of  12/6 @ 40% was applied to the pt via the mask for NOC use. The bridge of the nose looks good and remains intact. Pt is tolerating it well. Will continue to monitor and assess the pt's current respiratory status and needs.    Filippo Lamb RT on 11/30/2023 at 4:39 AM

## 2023-11-30 NOTE — PROVIDER NOTIFICATION
11/30/23 0617   Critical Test Results/Notification   Critical Lab Result (Lab Name and Value) PCO2 of 81   What Time Did The Lab Notify You? 0615   Provider Notified yes   Date of Provider Notification 11/30/23   Time of Provider Notification 0618   Mechanism of Provider notification page   What Provider Did You Notify? on call       No new orders

## 2023-11-30 NOTE — PROGRESS NOTES
[facility]    Infectious Disease Progress Note    Date of Service (when I saw the patient): 11/30/2023     Assessment & Plan   Tre Vazquez is a 48 year old who was admitted on 11/24/2023.       Impression:  49 yo coming in from the  with mentation changes   PMH is very complicated He lives in a group home.  He has some developmental delay, morbid obesity, ANA and OHS using chronically BiPAP at night apparently.  History of seizure disorder and chronic hyperammonemia while on Depakote.  Schizoaffective disorder, borderline personality and left eye blindness.   Blood cultures with MSSA   Cephalosporin listed as an allergy allergy listed as a rash per chart reviewed patient has been placed on zosyn before no reaction listed   He is on vancomycin   He has a documented picture in the chart from 11/ 20 with a wound on the buttocks he was seen in the ER for this and prescribed doxycycline on 11/ 20, ? Source   Repeat blood cultures from 11/ 26 are negative so far      Recommendations:   Continue on nafcillin for MSSA bacteremia tolerating it without any issues so far.     4 weeks course     No clear source but repeat cultures starting 11/ 26 so far negative.   Ok to place midline     OPIV Antibiotics orders are in the chart.        Kecia Page MD    Interval History   Tolerating antibiotics ok   No new rashes or issues with antibiotics   Labs reviewed   No changes to past medical, social or family history   No fever       Physical Exam   Temp: 98.2  F (36.8  C) Temp src: Oral BP: 130/69 Pulse: 78   Resp: 18 SpO2: 98 % O2 Device: Nasal cannula Oxygen Delivery: 5 LPM  There were no vitals filed for this visit.  Vital Signs with Ranges  Temp:  [97.4  F (36.3  C)-98.2  F (36.8  C)] 98.2  F (36.8  C)  Pulse:  [78-89] 78  Resp:  [18-23] 18  BP: (129-152)/(49-86) 130/69  FiO2 (%):  [40 %] 40 %  SpO2:  [91 %-98 %] 98 %    Constitutional: Awake, alert, cooperative, no apparent distress  Lungs: Clear to  "auscultation bilaterally, no crackles or wheezing  Cardiovascular: Regular rate and rhythm, normal S1 and S2, and no murmur noted  Abdomen: Normal bowel sounds, soft, non-distended, non-tender  Skin: No rashes, no cyanosis, no edema  Other:    Medications      busPIRone  15 mg Oral BID    citalopram  60 mg Oral Daily    divalproex sodium delayed-release  500 mg Oral Q12H ROXANE (08/20)    fluticasone  1 spray Nasal BID    lamoTRIgine  200 mg Oral BID    levETIRAcetam  1,000 mg Oral QAM    And    levETIRAcetam  1,500 mg Oral At Bedtime    levOCARNitine  660 mg Oral TID    miconazole   Topical BID    multivitamin, therapeutic  1 tablet Oral Daily    nafcillin  2 g Intravenous Q4H    pantoprazole  40 mg Oral Daily    polyethylene glycol  17 g Oral Daily    prazosin  2 mg Oral At Bedtime    propranolol  20 mg Oral BID    QUEtiapine ER  300 mg Oral At Bedtime    zonisamide  100 mg Oral QAM    And    zonisamide  200 mg Oral At Bedtime       Data   All microbiology laboratory data reviewed.  Recent Labs   Lab Test 11/30/23  0603 11/29/23  0636 11/26/23  0648   WBC 6.7 6.3 6.7   HGB 10.5* 10.3* 11.8*   HCT 35.0* 35.0* 39.4*   * 102* 102*    245 229     Recent Labs   Lab Test 11/30/23  0603 11/29/23  0636 11/28/23  0747   CR 0.60* 0.62* 0.65*     No lab results found.  No lab results found.    Invalid input(s): \"UC\"    All cultures:  Recent Labs   Lab 11/26/23  1314 11/26/23  1309 11/25/23  0608 11/25/23  0558 11/24/23  1254 11/24/23  1042   CULTURE No growth after 3 days No growth after 3 days No Growth Positive on the 1st day of incubation*  Staphylococcus aureus* Positive on the 1st day of incubation*  Staphylococcus aureus* Positive on the 1st day of incubation*  Staphylococcus aureus*      Blood culture:  Results for orders placed or performed during the hospital encounter of 11/24/23   Blood Culture Hand, Left    Specimen: Hand, Left; Blood   Result Value Ref Range    Culture No growth after 3 days  "   Blood Culture Arm, Right    Specimen: Arm, Right; Blood   Result Value Ref Range    Culture No growth after 3 days    Blood Culture Arm, Right    Specimen: Arm, Right; Blood   Result Value Ref Range    Culture No Growth    Blood Culture Arm, Right    Specimen: Arm, Right; Blood   Result Value Ref Range    Culture Positive on the 1st day of incubation (A)     Culture Staphylococcus aureus (AA)    Blood Culture Hand, Left    Specimen: Hand, Left; Blood   Result Value Ref Range    Culture Positive on the 1st day of incubation (A)     Culture Staphylococcus aureus (AA)    Blood Culture Peripheral Blood    Specimen: Peripheral Blood   Result Value Ref Range    Culture Positive on the 1st day of incubation (A)     Culture Staphylococcus aureus (AA)        Susceptibility    Staphylococcus aureus - JOMAR     Oxacillin* <=0.25 Susceptible ug/mL      * Oxacillin susceptible isolates are susceptible to cephalosporins (example: cefazolin and cephalexin) and beta lactam combination agents. Oxacillin resistant isolates are resistant to these agents.     Gentamicin <=0.5 Susceptible ug/mL     Erythromycin <=0.25 Susceptible ug/mL     Clindamycin 0.25 Susceptible ug/mL     Vancomycin 1 Susceptible ug/mL     Daptomycin 0.5 Susceptible ug/mL     Tetracycline <=1 Susceptible ug/mL     Doxycycline <=0.5 Susceptible ug/mL     Trimethoprim/Sulfamethoxazole 8/152 Resistant ug/mL   Results for orders placed or performed during the hospital encounter of 05/12/23   Blood Culture Hand, Left    Specimen: Hand, Left; Blood   Result Value Ref Range    Culture No Growth    Blood Culture Line, venous    Specimen: Line, venous; Blood   Result Value Ref Range    Culture No Growth    Results for orders placed or performed during the hospital encounter of 05/04/23   Blood Culture Peripheral Blood    Specimen: Peripheral Blood   Result Value Ref Range    Culture No Growth    Blood Culture Peripheral Blood    Specimen: Peripheral Blood   Result Value  Ref Range    Culture No Growth       Urine culture:  Results for orders placed or performed in visit on 12/19/08   Urine culture   Result Value Ref Range    Specimen Description Midstream Urine     Culture Micro       10 to 50,000 colonies/mL Multiple species present, probable perineal    Micro Report Status FINAL 12/21/2008

## 2023-11-30 NOTE — PLAN OF CARE
Goal Outcome Evaluation:    Pt alert, disoriented to time. VSS. BiPAP at night and with naps. Denies pain. On IV nafcillin. I.D. following. PICC line in place. Up A1 gb/walker. Up to chair this shift. Mepilex in place on buttocks. Repositioned. WOC following. Plan to discharge to TCU on long term abx. SW following.      Plan of Care Reviewed With: patient    Overall Patient Progress: no change

## 2023-12-01 LAB
BACTERIA BLD CULT: NO GROWTH
BACTERIA BLD CULT: NO GROWTH

## 2023-12-01 PROCEDURE — 120N000001 HC R&B MED SURG/OB

## 2023-12-01 PROCEDURE — 999N000157 HC STATISTIC RCP TIME EA 10 MIN

## 2023-12-01 PROCEDURE — 250N000013 HC RX MED GY IP 250 OP 250 PS 637: Performed by: INTERNAL MEDICINE

## 2023-12-01 PROCEDURE — 99232 SBSQ HOSP IP/OBS MODERATE 35: CPT | Performed by: HOSPITALIST

## 2023-12-01 PROCEDURE — 250N000009 HC RX 250: Performed by: INTERNAL MEDICINE

## 2023-12-01 PROCEDURE — 94660 CPAP INITIATION&MGMT: CPT

## 2023-12-01 PROCEDURE — 250N000013 HC RX MED GY IP 250 OP 250 PS 637: Performed by: HOSPITALIST

## 2023-12-01 RX ADMIN — NAFCILLIN SODIUM 2 G: 2 INJECTION, POWDER, LYOPHILIZED, FOR SOLUTION INTRAMUSCULAR; INTRAVENOUS at 10:51

## 2023-12-01 RX ADMIN — NAFCILLIN SODIUM 2 G: 2 INJECTION, POWDER, LYOPHILIZED, FOR SOLUTION INTRAMUSCULAR; INTRAVENOUS at 06:01

## 2023-12-01 RX ADMIN — LAMOTRIGINE 200 MG: 200 TABLET ORAL at 09:31

## 2023-12-01 RX ADMIN — LAMOTRIGINE 200 MG: 200 TABLET ORAL at 22:12

## 2023-12-01 RX ADMIN — BUSPIRONE HYDROCHLORIDE 15 MG: 15 TABLET ORAL at 09:31

## 2023-12-01 RX ADMIN — DIVALPROEX SODIUM 500 MG: 500 TABLET, DELAYED RELEASE ORAL at 09:31

## 2023-12-01 RX ADMIN — LEVOCARNITINE 660 MG: 330 TABLET ORAL at 09:31

## 2023-12-01 RX ADMIN — PANTOPRAZOLE SODIUM 40 MG: 40 TABLET, DELAYED RELEASE ORAL at 09:31

## 2023-12-01 RX ADMIN — QUETIAPINE FUMARATE 300 MG: 300 TABLET, EXTENDED RELEASE ORAL at 22:12

## 2023-12-01 RX ADMIN — LEVOCARNITINE 660 MG: 330 TABLET ORAL at 17:47

## 2023-12-01 RX ADMIN — DIVALPROEX SODIUM 500 MG: 500 TABLET, DELAYED RELEASE ORAL at 22:11

## 2023-12-01 RX ADMIN — FLUTICASONE PROPIONATE 1 SPRAY: 50 SPRAY, METERED NASAL at 09:34

## 2023-12-01 RX ADMIN — PROPRANOLOL HYDROCHLORIDE 20 MG: 20 TABLET ORAL at 22:12

## 2023-12-01 RX ADMIN — PRAZOSIN HYDROCHLORIDE 2 MG: 1 CAPSULE ORAL at 22:15

## 2023-12-01 RX ADMIN — NAFCILLIN SODIUM 2 G: 2 INJECTION, POWDER, LYOPHILIZED, FOR SOLUTION INTRAMUSCULAR; INTRAVENOUS at 17:46

## 2023-12-01 RX ADMIN — ZONISAMIDE 200 MG: 100 CAPSULE ORAL at 22:11

## 2023-12-01 RX ADMIN — LEVETIRACETAM 1000 MG: 500 TABLET, FILM COATED ORAL at 09:30

## 2023-12-01 RX ADMIN — FLUTICASONE PROPIONATE 1 SPRAY: 50 SPRAY, METERED NASAL at 22:20

## 2023-12-01 RX ADMIN — MICONAZOLE NITRATE: 20 POWDER TOPICAL at 22:16

## 2023-12-01 RX ADMIN — LEVOCARNITINE 660 MG: 330 TABLET ORAL at 22:11

## 2023-12-01 RX ADMIN — LEVETIRACETAM 1500 MG: 500 TABLET, FILM COATED ORAL at 22:11

## 2023-12-01 RX ADMIN — CITALOPRAM HYDROBROMIDE 60 MG: 20 TABLET, FILM COATED ORAL at 09:31

## 2023-12-01 RX ADMIN — ZONISAMIDE 100 MG: 100 CAPSULE ORAL at 09:31

## 2023-12-01 RX ADMIN — MICONAZOLE NITRATE: 20 POWDER TOPICAL at 09:34

## 2023-12-01 RX ADMIN — NAFCILLIN SODIUM 2 G: 2 INJECTION, POWDER, LYOPHILIZED, FOR SOLUTION INTRAMUSCULAR; INTRAVENOUS at 20:33

## 2023-12-01 RX ADMIN — NAFCILLIN SODIUM 2 G: 2 INJECTION, POWDER, LYOPHILIZED, FOR SOLUTION INTRAMUSCULAR; INTRAVENOUS at 23:09

## 2023-12-01 RX ADMIN — PROPRANOLOL HYDROCHLORIDE 20 MG: 20 TABLET ORAL at 09:30

## 2023-12-01 RX ADMIN — BUSPIRONE HYDROCHLORIDE 15 MG: 15 TABLET ORAL at 22:11

## 2023-12-01 RX ADMIN — NAFCILLIN SODIUM 2 G: 2 INJECTION, POWDER, LYOPHILIZED, FOR SOLUTION INTRAMUSCULAR; INTRAVENOUS at 01:59

## 2023-12-01 RX ADMIN — THERA TABS 1 TABLET: TAB at 09:31

## 2023-12-01 ASSESSMENT — ACTIVITIES OF DAILY LIVING (ADL)
ADLS_ACUITY_SCORE: 57
ADLS_ACUITY_SCORE: 57
ADLS_ACUITY_SCORE: 53
ADLS_ACUITY_SCORE: 53
ADLS_ACUITY_SCORE: 57
ADLS_ACUITY_SCORE: 53
ADLS_ACUITY_SCORE: 51
ADLS_ACUITY_SCORE: 53
ADLS_ACUITY_SCORE: 57

## 2023-12-01 NOTE — PROGRESS NOTES
A BiPAP of  12/6 @ 40% was applied to the pt via the mask for NOC use. The bridge of the nose looks good and remains intact. Pt is tolerating it well. Will continue to monitor and assess the pt's current respiratory status and needs.    Filippo Lamb RT on 12/1/2023 at 4:23 AM

## 2023-12-01 NOTE — PROGRESS NOTES
[facility]    Hospitalist Progress Note  Name: Tre Vazquez    MRN: [id number]  Provider:  Praveen Tyson DO, MPH  Date of Service: 12/01/2023    Summary of Stay: Tre Vazquez is a 48 year old male who who has a very complicated past medical as noted in the list below.  He lives in a group home.  He has some developmental delay, morbid obesity, ANA and OHS using chronically BiPAP at night apparently.  History of seizure disorder and chronic hyperammonemia while on Depakote.  Schizoaffective disorder, borderline personality and left eye blindness. He presented with clouding of mentation and elevated CO2 and pneumonia.  (This patient normally has a CO2 between 70 and 80 and he is ammonia is in the 60s). His current presentation is suspected to be due to possible nonadherence to treatment, though it is difficult to prove.  Patient has been a started on NIPPV in the emergency department and he has had excellent response. Per records from May of 2022, his ammonia level was elevated and considered the consequence of the use of Depakote for seizures. There is no official diagnosis of cirrhosis in this patient, he is not taking lactulose and likely not needed though he had one dose in ED.  CT of the head and chest without acute pathology.  Ultimately found to have Staph aureus bacteremia, source still unclear.  On nafcillin, discharge planning ongoing as patient will need for total weeks of IV antibiotics and group home not able to accommodate.  Awaiting TCU placement.  Medically stable for discharge when placement identified.     Acute on chronic hypercarbic and hypoxemic respiratory failure.  Patient on chronic BiPAP at night time and napping.   Obesity/OHS.  Breathing comfortably morning 11/26.  Tolerated BiPAP overnight, on nasal cannula throughout the morning.  PCO2 remained elevated to 85 on venous gas, but mentation appears good.  Alert and oriented, able to answer questions.  For now, continue  BiPAP overnight and with naps during the day.  Supplemental oxygen as needed.  Blood cultures from admission positive for Staph aureus, repeat cultures again growing gram-positive cocci in clusters.  Started on IV vancomycin now on nafcillin.  Overall respiratory status appears to be improved, doing well with BiPAP as needed.  - BiPAP at night and with naps  - RT following     Staph aureus bacteremia  Left buttock wound  Initial blood cultures positive for Staph aureus.  Started on IV vancomycin empirically, consulted infectious disease.  As far as source, has had respiratory issues prior to admission, but CT chest did not necessarily appear convincing for active pneumonia.  Also has a wound on left buttock that is open, having some serosanguineous drainage.  Somewhat firm to palpation underneath the opening on the skin, but no purulent discharge and does not necessarily feel like there is an abscess underneath the skin.  We will continue to monitor the wound as potential source as well.  Per ID, patient will need 4 weeks total IV antibiotic therapy.  PICC now in place for ongoing antibiotic administration.  Discharge planning ongoing as group home not able to accommodate patient's ongoing antibiotic need, will need TCU.  - Continue IV nafcillin for 4 weeks of therapy (last positive culture 11/25)  - Consult ID, appreciate recommendations  - PICC in place  - Awaiting TCU placement for ongoing antibiotics     Hyperammonemia, chronic.  Unclear cause.  Patient had one-time lactulose in the emergency department.    - No known liver disease  - Initial ammonia level was 129. He received a dose of lactulose in the ER. Ammonia level elevated at 82. Suspect related to seizure medication (Depakote).  - Depakote was initially held.  Discussed with neurology and decreased Depakote to 500 mg p.o. twice daily.      Acute toxic/metabolic encephalopathy - improving  Likely delirium, memory concerns  Patient and stepmother both  "endorse some concerns about ongoing issues with memory on 11/29.  Patient also states that he had a \"vision\" that was rather distressing to him he believes several days ago.  Discussed with them that CT imaging of the head was normal, ammonia level was normal, and the ongoing confusion is likely related to delirium that does seem to be clearing as patient is much more awake and alert every day.  We will continue to monitor for signs of improve mental status, could consider MRI if continues to have memory issues despite ongoing other improvement.  - Continue monitoring mental status  - Consider brain MRI if ongoing memory issues  - BiPAP at bedtime  - Adjusted Depakote level as above to twice daily dosing from 3 times daily     Seizure disorder. Stable on current medication.   -Discussed with neurology.  Neurology recommending to decrease Depakote to 500 mg twice daily, check free Depakote level, Keppra level, Lamictal level.  Orders placed.  Patient needs repeat drug levels in 1 to 2 weeks and follow-up with neurology as outpatient for further adjustment of his medications.  Appreciate neurology input.     Essential hypertension.  On propranolol and prazosin.     Depression/anxiety.  On Celexa and Seroquel.     GERD.   On pantoprazole.    Diarrhea  Likely from antibiotics and recent bowel medications.  No abdominal pain.  - Consider C. difficile for enteric panel if persists or develops abdominal pain, fever, or other signs of infection    DVT Prophylaxis: Pneumatic Compression Devices  Code Status: Full Code  Diet: Regular Diet Adult    Reid Catheter: Not present  Disposition: Expected discharge in 1-2 days to TCU. Goals prior to discharge include placement.   Incidental Findings: None.  Family updated today: Yes, step mother updated by phone.    40 MINUTES SPENT BY ME on the date of service doing chart review, history, exam, documentation & further activities per the note.      Interval History   Assumed care " from previous hospitalist. The history was fully reviewed.  The patient reports doing well. No chest pain or shortness of breath. No nausea, vomiting, constipation. Having some diarrhea, no abdominal pain. Per RN does not appear to be classic C.diff diarrhea. No fevers. No other specific complaints identified.     -Data reviewed today: I personally reviewed all new labs and imaging results over the last 24 hours.     Physical Exam   Temp: 98.3  F (36.8  C) Temp src: Oral BP: 135/72 Pulse: 79   Resp: 20 SpO2: 97 % O2 Device: BiPAP/CPAP    There were no vitals filed for this visit.  Vital Signs with Ranges  Temp:  [98.3  F (36.8  C)-98.8  F (37.1  C)] 98.3  F (36.8  C)  Pulse:  [71-86] 79  Resp:  [16-21] 20  BP: (125-152)/(66-72) 135/72  FiO2 (%):  [40 %] 40 %  SpO2:  [96 %-97 %] 97 %  I/O last 3 completed shifts:  In: 500 [P.O.:480; I.V.:20]  Out: 1150 [Urine:1150]    GENERAL: No apparent distress. Awake, alert, and fully oriented.  HEENT: Normocephalic, atraumatic. Extraocular movements intact.  CARDIOVASCULAR: Regular rate and rhythm without murmurs or rubs. No S3.  PULMONARY: Clear bilaterally.  GASTROINTESTINAL: Soft, non-tender, non-distended. Bowel sounds normoactive.   EXTREMITIES: No cyanosis or clubbing. No edema.  NEUROLOGICAL: CN 2-12 grossly intact, no focal neurological deficits.  DERMATOLOGICAL: No rash, ulcer, bruising, nor jaundice.     Medications      busPIRone  15 mg Oral BID    citalopram  60 mg Oral Daily    divalproex sodium delayed-release  500 mg Oral Q12H Replaced by [facility] (08/20)    fluticasone  1 spray Nasal BID    lamoTRIgine  200 mg Oral BID    levETIRAcetam  1,000 mg Oral QAM    And    levETIRAcetam  1,500 mg Oral At Bedtime    levOCARNitine  660 mg Oral TID    miconazole   Topical BID    multivitamin, therapeutic  1 tablet Oral Daily    nafcillin  2 g Intravenous Q4H    pantoprazole  40 mg Oral Daily    polyethylene glycol  17 g Oral Daily    prazosin  2 mg Oral At Bedtime    propranolol  20 mg Oral  "BID    QUEtiapine ER  300 mg Oral At Bedtime    sodium chloride (PF)  10 mL Intracatheter Q8H    zonisamide  100 mg Oral QAM    And    zonisamide  200 mg Oral At Bedtime     Data     Laboratory:  Recent Labs   Lab 11/30/23  0603 11/29/23  0636 11/26/23  0648   WBC 6.7 6.3 6.7   HGB 10.5* 10.3* 11.8*   HCT 35.0* 35.0* 39.4*   * 102* 102*    245 229     Recent Labs   Lab 11/30/23  0603 11/29/23  0636 11/28/23  0747 11/27/23  0759    143  --  145   POTASSIUM 3.9 4.0  --  4.0   CHLORIDE 101 101  --  100   CO2 39* 42*  --  42*   ANIONGAP 4* <1*  --  3*   GLC 98 100*  --  104*   BUN 12.4 11.7  --  12.1   CR 0.60* 0.62* 0.65* 0.68  0.68   GFRESTIMATED >90 >90 >90 >90  >90   ARNOLD 8.5* 8.1*  --  8.6     No results for input(s): \"CULT\" in the last 168 hours.    Imaging:  No results found for this or any previous visit (from the past 24 hour(s)).      Praveen Tyson DO MPH  [facility] Hospitalist  [address] [place] [address]  12/01/2023   "

## 2023-12-01 NOTE — PLAN OF CARE
Care from [id number]      Admit Date: 11/24/2023  Admitting Diagnosis: Non compliance medication, Acute resp. Failure with hypoxia/hypercarbia  Pertinent History: developmental delay,  Obesity, ANA, OHS (Bipap), Seizure disorder, chronic hyperammonemia, Schizoaffective, borderline personality disorder.    Neuro: Disorientated to, Time, Situation, and occasionally place  Activity: are 1 Assist with Gait Belt and Walker. Pt not OOB during this shift  Telemetry Monitoring: No  Pain: denying pain.  GI: bowel sounds audible.  Denies nausea.  : Voiding spontaneously using the BSU with ax  O2: 5 L NC BiPap over night  LDA's: PICC  Fluids: is Saline locked.  Diet: Regular and Pt prefers soft foods  Living Situation: Group home  Discharge Disposition: Transitional Care Unit. Waiting for placement    Plan of Care:    IV Abx: Nafcillin q4  Wound care

## 2023-12-01 NOTE — PROGRESS NOTES
CLINICAL NUTRITION SERVICES    Reviewed nutrition risk factors due to LOS. Pt is tolerating diet, ordering very well from room service, and eating adequately per nursing documentation. Per pt, reports good/adequate oral intake and that he finishes the meal trays ordered. Pt was a little frustrated sometimes room service wasn't sending everything that he ordered. The past 3 days room service orders have ranged from 8945-7824 kcals and 111-145 g protein per day. Reviewed wt hx.  No unintentional wt loss PTA.      Follow Up / Monitoring:   Will continue to follow pt.    Darrius Roque RD, LD

## 2023-12-01 NOTE — PROGRESS NOTES
Care Management Follow Up    Length of Stay (days): 7    Expected Discharge Date: 12/01/2023     Concerns to be Addressed: care coordination/care conferences, discharge planning       Anticipated Discharge Disposition: TCU    Anticipated Discharge Services:  IV abx  Anticipated Discharge DME:      Education Provided on the Discharge Plan:    Patient/Family in Agreement with the Plan:      Referrals Placed by CM/SW:  Post acute facilities      Additional Information:  CM following for discharge planning to TCU as group home stated they are unable to do IV abx or take a PICC line. Per chart review edwin Dailey is agreeable to TCU and does not have facility preference but would prefer referrals sent [place] [facility] towards [place] so she can visit. CM sent initial TCU referrals for facilities [facility]. Will continue to follow for placement.     Addendum 1457: ROX updated  mgr Kumari PH: [phone] we are currently looking for TCU placement for IV abx. He expressed concern re: patient being on a CADI waiver and potentially having to be out of his group home for thirty days for IV abx. ROX placed call to [facility] who stated patient's current  is Carmen Raines PH: [phone]. CM placed call to CADI worker to clarify, LVM. ROX updated edwin Dailey by phone that we are still looking for TCU placement and will let her know once we find placement, she requested a call from the MD, hospitalist notified. GH requesting update when discharge plan finalized. Additional TCU referral sent to [facility] in [place].     Caroline Morgan, RN, BSN  Inpatient Care Coordination  [facility]  [phone]

## 2023-12-01 NOTE — PLAN OF CARE
Goal Outcome Evaluation:    Alert. Denies pain. Disoriented to time and situation. On 5L O2 NC, BIPAP at night. PICC WNL after alteplase. Left buttocks mepilex in place. Assist x1 w/ gb and walker. Plan: awaiting TCU placement.

## 2023-12-02 PROCEDURE — 94660 CPAP INITIATION&MGMT: CPT

## 2023-12-02 PROCEDURE — 250N000013 HC RX MED GY IP 250 OP 250 PS 637: Performed by: INTERNAL MEDICINE

## 2023-12-02 PROCEDURE — 99232 SBSQ HOSP IP/OBS MODERATE 35: CPT | Performed by: HOSPITALIST

## 2023-12-02 PROCEDURE — 250N000013 HC RX MED GY IP 250 OP 250 PS 637: Performed by: HOSPITALIST

## 2023-12-02 PROCEDURE — 120N000001 HC R&B MED SURG/OB

## 2023-12-02 PROCEDURE — 999N000157 HC STATISTIC RCP TIME EA 10 MIN

## 2023-12-02 PROCEDURE — 250N000009 HC RX 250: Performed by: INTERNAL MEDICINE

## 2023-12-02 RX ADMIN — LAMOTRIGINE 200 MG: 200 TABLET ORAL at 10:28

## 2023-12-02 RX ADMIN — LEVOCARNITINE 660 MG: 330 TABLET ORAL at 13:41

## 2023-12-02 RX ADMIN — PROPRANOLOL HYDROCHLORIDE 20 MG: 20 TABLET ORAL at 10:29

## 2023-12-02 RX ADMIN — QUETIAPINE FUMARATE 300 MG: 300 TABLET, EXTENDED RELEASE ORAL at 22:53

## 2023-12-02 RX ADMIN — THERA TABS 1 TABLET: TAB at 10:28

## 2023-12-02 RX ADMIN — NAFCILLIN SODIUM 2 G: 2 INJECTION, POWDER, LYOPHILIZED, FOR SOLUTION INTRAMUSCULAR; INTRAVENOUS at 02:43

## 2023-12-02 RX ADMIN — PANTOPRAZOLE SODIUM 40 MG: 40 TABLET, DELAYED RELEASE ORAL at 10:29

## 2023-12-02 RX ADMIN — FLUTICASONE PROPIONATE 1 SPRAY: 50 SPRAY, METERED NASAL at 10:30

## 2023-12-02 RX ADMIN — BUSPIRONE HYDROCHLORIDE 15 MG: 15 TABLET ORAL at 10:29

## 2023-12-02 RX ADMIN — FLUTICASONE PROPIONATE 1 SPRAY: 50 SPRAY, METERED NASAL at 20:32

## 2023-12-02 RX ADMIN — ZONISAMIDE 100 MG: 100 CAPSULE ORAL at 10:29

## 2023-12-02 RX ADMIN — NAFCILLIN SODIUM 2 G: 2 INJECTION, POWDER, LYOPHILIZED, FOR SOLUTION INTRAMUSCULAR; INTRAVENOUS at 06:34

## 2023-12-02 RX ADMIN — BUSPIRONE HYDROCHLORIDE 15 MG: 15 TABLET ORAL at 20:26

## 2023-12-02 RX ADMIN — DIVALPROEX SODIUM 500 MG: 500 TABLET, DELAYED RELEASE ORAL at 10:29

## 2023-12-02 RX ADMIN — POLYETHYLENE GLYCOL 3350 17 G: 17 POWDER, FOR SOLUTION ORAL at 10:28

## 2023-12-02 RX ADMIN — LAMOTRIGINE 200 MG: 200 TABLET ORAL at 20:27

## 2023-12-02 RX ADMIN — NAFCILLIN SODIUM 2 G: 2 INJECTION, POWDER, LYOPHILIZED, FOR SOLUTION INTRAMUSCULAR; INTRAVENOUS at 10:28

## 2023-12-02 RX ADMIN — LEVETIRACETAM 1000 MG: 500 TABLET, FILM COATED ORAL at 10:28

## 2023-12-02 RX ADMIN — LEVOCARNITINE 660 MG: 330 TABLET ORAL at 20:27

## 2023-12-02 RX ADMIN — MICONAZOLE NITRATE: 20 POWDER TOPICAL at 10:30

## 2023-12-02 RX ADMIN — PROPRANOLOL HYDROCHLORIDE 20 MG: 20 TABLET ORAL at 20:27

## 2023-12-02 RX ADMIN — LEVETIRACETAM 1500 MG: 500 TABLET, FILM COATED ORAL at 22:53

## 2023-12-02 RX ADMIN — ZONISAMIDE 200 MG: 100 CAPSULE ORAL at 22:53

## 2023-12-02 RX ADMIN — MICONAZOLE NITRATE: 20 POWDER TOPICAL at 20:33

## 2023-12-02 RX ADMIN — PRAZOSIN HYDROCHLORIDE 2 MG: 1 CAPSULE ORAL at 22:49

## 2023-12-02 RX ADMIN — CITALOPRAM HYDROBROMIDE 60 MG: 20 TABLET, FILM COATED ORAL at 10:29

## 2023-12-02 RX ADMIN — NAFCILLIN SODIUM 2 G: 2 INJECTION, POWDER, LYOPHILIZED, FOR SOLUTION INTRAMUSCULAR; INTRAVENOUS at 20:17

## 2023-12-02 RX ADMIN — NAFCILLIN SODIUM 2 G: 2 INJECTION, POWDER, LYOPHILIZED, FOR SOLUTION INTRAMUSCULAR; INTRAVENOUS at 13:40

## 2023-12-02 RX ADMIN — DIVALPROEX SODIUM 500 MG: 500 TABLET, DELAYED RELEASE ORAL at 20:27

## 2023-12-02 RX ADMIN — LEVOCARNITINE 660 MG: 330 TABLET ORAL at 10:28

## 2023-12-02 ASSESSMENT — ACTIVITIES OF DAILY LIVING (ADL)
ADLS_ACUITY_SCORE: 55
ADLS_ACUITY_SCORE: 57
ADLS_ACUITY_SCORE: 55

## 2023-12-02 NOTE — PLAN OF CARE
Nursing Summary:    [id number]    Pt is alert to self and place. Pt denies any pain or shortness of breath. BIPAP on through the night. Pt ambulates 2 assist with walker to the commode; refused GB. IV Nafcillin administered. Ongoing monitoring.    Plan: Awaiting TCU placement.    BP (!) 157/90 (BP Location: Left arm)   Pulse 88   Temp 98.5  F (36.9  C) (Oral)   Resp 20   SpO2 91%

## 2023-12-02 NOTE — PROGRESS NOTES
Respiratory Therapy Note    Patient seen on BiPAP for majority of shift of BiPAP 12/6 @ 40% via the mask while asleep/with naps. The bridge of the nose looks good and remains intact. Attempted switching patient to under the nose mask, patient tolerated for a short period of time. Will continue to monitor and assess the pt's current respiratory status and needs.    Valencia Reyes, RT  5:40 PM December 2, 2023

## 2023-12-02 NOTE — PROGRESS NOTES
[facility]    Hospitalist Progress Note  Name: Tre Vazquez    MRN: [id number]  Provider:  Praveen Tyson DO, MPH  Date of Service: 12/02/2023    Summary of Stay: Tre Vazquez is a 48 year old male who who has a very complicated past medical as noted in the list below.  He lives in a group home.  He has some developmental delay, morbid obesity, ANA and OHS using chronically BiPAP at night apparently.  History of seizure disorder and chronic hyperammonemia while on Depakote.  Schizoaffective disorder, borderline personality and left eye blindness. He presented with clouding of mentation and elevated CO2 and pneumonia.  (This patient normally has a CO2 between 70 and 80 and he is ammonia is in the 60s). His current presentation is suspected to be due to possible nonadherence to treatment, though it is difficult to prove.  Patient has been a started on NIPPV in the emergency department and he has had excellent response. Per records from May of 2022, his ammonia level was elevated and considered the consequence of the use of Depakote for seizures. There is no official diagnosis of cirrhosis in this patient, he is not taking lactulose and likely not needed though he had one dose in ED.  CT of the head and chest without acute pathology.  Ultimately found to have Staph aureus bacteremia, source still unclear.  On nafcillin, discharge planning ongoing as patient will need for total weeks of IV antibiotics and group home not able to accommodate.  Awaiting TCU placement.  Medically stable for discharge when placement identified.     Acute on chronic hypercarbic and hypoxemic respiratory failure.  Patient on chronic BiPAP at night time and napping.   Obesity/OHS.  Breathing comfortably morning 11/26.  Tolerated BiPAP overnight, on nasal cannula throughout the morning.  PCO2 remained elevated to 85 on venous gas, but mentation appears good.  Alert and oriented, able to answer questions.  For now, continue  BiPAP overnight and with naps during the day.  Supplemental oxygen as needed.  Blood cultures from admission positive for Staph aureus, repeat cultures again growing gram-positive cocci in clusters.  Started on IV vancomycin now on nafcillin.  Overall respiratory status appears to be improved, doing well with BiPAP as needed.  - BiPAP at night and with naps  - RT following     Staph aureus bacteremia  Left buttock wound  Initial blood cultures positive for Staph aureus.  Started on IV vancomycin empirically, consulted infectious disease.  As far as source, has had respiratory issues prior to admission, but CT chest did not necessarily appear convincing for active pneumonia.  Also has a wound on left buttock that is open, having some serosanguineous drainage.  Somewhat firm to palpation underneath the opening on the skin, but no purulent discharge and does not necessarily feel like there is an abscess underneath the skin.  We will continue to monitor the wound as potential source as well.  Per ID, patient will need 4 weeks total IV antibiotic therapy.  PICC now in place for ongoing antibiotic administration.  Discharge planning ongoing as group home not able to accommodate patient's ongoing antibiotic need, will need TCU.  - Continue IV nafcillin for 4 weeks of therapy (last positive culture 11/25)  - Consult ID, appreciate recommendations  - PICC in place  - Awaiting TCU placement for ongoing antibiotics     Hyperammonemia, chronic.  Unclear cause.  Patient had one-time lactulose in the emergency department.    - No known liver disease  - Initial ammonia level was 129. He received a dose of lactulose in the ER.  Ammonia level elevated at 82. Suspect related to seizure medication (Depakote).  - Depakote was initially held.  Discussed with neurology and decreased Depakote to 500 mg p.o. twice daily.      Acute toxic/metabolic encephalopathy - improving  Likely delirium, memory concerns  Patient and stepmother both  "endorse some concerns about ongoing issues with memory on 11/29.  Patient also states that he had a \"vision\" that was rather distressing to him he believes several days ago.  Discussed with them that CT imaging of the head was normal, ammonia level was normal, and the ongoing confusion is likely related to delirium that does seem to be clearing as patient is much more awake and alert every day.  We will continue to monitor for signs of improve mental status, could consider MRI if continues to have memory issues despite ongoing other improvement.  - Continue monitoring mental status  - Consider brain MRI if ongoing memory issues  - BiPAP at bedtime  - Adjusted Depakote level as above to twice daily dosing from 3 times daily     Seizure disorder. Stable on current medication.   -Discussed with neurology.  Neurology recommending to decrease Depakote to 500 mg twice daily, check free Depakote level, Keppra level, Lamictal level.  Orders placed.  Patient needs repeat drug levels in 1 to 2 weeks and follow-up with neurology as outpatient for further adjustment of his medications.  Appreciate neurology input.     Essential hypertension.  On propranolol and prazosin.     Depression/anxiety.  On Celexa and Seroquel.     GERD.   On pantoprazole.    Loose stools  Likely from antibiotics and recent bowel medications.  No abdominal pain.  - Consider C. difficile for enteric panel if persists or develops clear diarrhea, abdominal pain, fever, or other signs of infection    DVT Prophylaxis: Pneumatic Compression Devices  Code Status: Full Code  Diet: Regular Diet Adult    Reid Catheter: Not present  Disposition: Expected discharge in 1-2 days to TCU. Goals prior to discharge include placement.   Incidental Findings: None.  Family updated today: Not today, updated stepmother Ashlie yesterday.    30 MINUTES SPENT BY ME on the date of service doing chart review, history, exam, documentation & further activities per the note.    "   Interval History   The patient reports doing well. No chest pain or shortness of breath. No nausea, vomiting, constipation. 2 loose but formed stools overnight. No fevers. No other specific complaints identified.     -Data reviewed today: I personally reviewed all new labs and imaging results over the last 24 hours.     Physical Exam   Temp: 98.7  F (37.1  C) Temp src: Axillary BP: 114/64 Pulse: 76   Resp: 14 SpO2: 96 % O2 Device: BiPAP/CPAP Oxygen Delivery: 2 LPM  There were no vitals filed for this visit.  Vital Signs with Ranges  Temp:  [98.1  F (36.7  C)-98.7  F (37.1  C)] 98.7  F (37.1  C)  Pulse:  [76-88] 76  Resp:  [14-20] 14  BP: (114-157)/(64-90) 114/64  FiO2 (%):  [40 %] 40 %  SpO2:  [91 %-97 %] 96 %  I/O last 3 completed shifts:  In: 620 [P.O.:600; I.V.:20]  Out: 950 [Urine:950]    GENERAL: No apparent distress. Awake, alert, and fully oriented.  HEENT: Normocephalic, atraumatic. Extraocular movements intact.  CARDIOVASCULAR: Regular rate and rhythm without murmurs or rubs. No S3.  PULMONARY: Clear bilaterally.  GASTROINTESTINAL: Soft, non-tender, non-distended. Bowel sounds normoactive.   EXTREMITIES: No cyanosis or clubbing. No edema.  NEUROLOGICAL: CN 2-12 grossly intact, no focal neurological deficits.  DERMATOLOGICAL: No rash, ulcer, bruising, nor jaundice.     Medications      busPIRone  15 mg Oral BID    citalopram  60 mg Oral Daily    divalproex sodium delayed-release  500 mg Oral Q12H [facility] (08/20)    fluticasone  1 spray Nasal BID    lamoTRIgine  200 mg Oral BID    levETIRAcetam  1,000 mg Oral QAM    And    levETIRAcetam  1,500 mg Oral At Bedtime    levOCARNitine  660 mg Oral TID    miconazole   Topical BID    multivitamin, therapeutic  1 tablet Oral Daily    nafcillin  2 g Intravenous Q4H    pantoprazole  40 mg Oral Daily    polyethylene glycol  17 g Oral Daily    prazosin  2 mg Oral At Bedtime    propranolol  20 mg Oral BID    QUEtiapine ER  300 mg Oral At Bedtime    sodium chloride (PF)  10  "mL Intracatheter Q8H    zonisamide  100 mg Oral QAM    And    zonisamide  200 mg Oral At Bedtime     Data     Laboratory:  Recent Labs   Lab 11/30/23  0603 11/29/23  0636 11/26/23  0648   WBC 6.7 6.3 6.7   HGB 10.5* 10.3* 11.8*   HCT 35.0* 35.0* 39.4*   * 102* 102*    245 229     Recent Labs   Lab 11/30/23  0603 11/29/23  0636 11/28/23  0747 11/27/23  0759    143  --  145   POTASSIUM 3.9 4.0  --  4.0   CHLORIDE 101 101  --  100   CO2 39* 42*  --  42*   ANIONGAP 4* <1*  --  3*   GLC 98 100*  --  104*   BUN 12.4 11.7  --  12.1   CR 0.60* 0.62* 0.65* 0.68  0.68   GFRESTIMATED >90 >90 >90 >90  >90   ARNOLD 8.5* 8.1*  --  8.6     No results for input(s): \"CULT\" in the last 168 hours.    Imaging:  No results found for this or any previous visit (from the past 24 hour(s)).      Praveen Tyson DO MPH  [facility] Hospitalist  [address] [place] [place] [address]  12/02/2023   "

## 2023-12-02 NOTE — PLAN OF CARE
Goal Outcome Evaluation:    Alert. Denies pain. Disoriented to place, time, and situation. On 3L O2 NC, BIPAP at night. PICC WNL. Left buttocks mepilex in place. 2 loose, formed brown BMs this shift. Assist x1 w/ walker. Plan: awaiting TCU placement.

## 2023-12-02 NOTE — PLAN OF CARE
"[id number]  Goal Outcome Evaluation:      Plan of Care Reviewed With: patient    Overall Patient Progress: no changeOverall Patient Progress: no change    Outcome Evaluation: .    A&Ox2  VSS on 3 LPM NC. BIPAP at night and naps. Pt wore BIPAP until late morning   Denies pain  Activity: A1 belt/walker, up in chair for lunch  Consults: WOC, RT  Pending TCU placement to continue IV antibiotics       Problem: Adult Inpatient Plan of Care  Goal: Plan of Care Review  Description: The Plan of Care Review/Shift note should be completed every shift.  The Outcome Evaluation is a brief statement about your assessment that the patient is improving, declining, or no change.  This information will be displayed automatically on your shift  note.  Outcome: Progressing  Flowsheets (Taken 12/1/2023 1848)  Outcome Evaluation: .  Plan of Care Reviewed With: patient  Overall Patient Progress: no change  Goal: Patient-Specific Goal (Individualized)  Description: You can add care plan individualizations to a care plan. Examples of Individualization might be:  \"Parent requests to be called daily at 9am for status\", \"I have a hard time hearing out of my right ear\", or \"Do not touch me to wake me up as it startles  me\".  Outcome: Progressing  Goal: Absence of Hospital-Acquired Illness or Injury  Outcome: Progressing  Intervention: Identify and Manage Fall Risk  Recent Flowsheet Documentation  Taken 12/1/2023 1303 by Karla Sanchez, RN  Safety Promotion/Fall Prevention:   activity supervised   assistive device/personal items within reach   clutter free environment maintained   increased rounding and observation   increase visualization of patient   lighting adjusted   mobility aid in reach   nonskid shoes/slippers when out of bed   patient and family education   room organization consistent   safety round/check completed   supervised activity   treat underlying cause  Taken 12/1/2023 0766 by Karla Sanchez, RN  Safety Promotion/Fall " Prevention: safety round/check completed  Intervention: Prevent Skin Injury  Recent Flowsheet Documentation  Taken 12/1/2023 1303 by Karla Sanchez RN  Body Position:   position changed independently   weight shifting  Intervention: Prevent and Manage VTE (Venous Thromboembolism) Risk  Recent Flowsheet Documentation  Taken 12/1/2023 1303 by Karla Sanchez, RN  VTE Prevention/Management: SCDs (sequential compression devices) off  Goal: Optimal Comfort and Wellbeing  Outcome: Progressing  Goal: Readiness for Transition of Care  Outcome: Progressing     Problem: Gas Exchange Impaired  Goal: Optimal Gas Exchange  Outcome: Progressing     Problem: Infection  Goal: Absence of Infection Signs and Symptoms  Outcome: Progressing

## 2023-12-03 PROCEDURE — 250N000013 HC RX MED GY IP 250 OP 250 PS 637: Performed by: INTERNAL MEDICINE

## 2023-12-03 PROCEDURE — 99232 SBSQ HOSP IP/OBS MODERATE 35: CPT | Performed by: STUDENT IN AN ORGANIZED HEALTH CARE EDUCATION/TRAINING PROGRAM

## 2023-12-03 PROCEDURE — 250N000013 HC RX MED GY IP 250 OP 250 PS 637: Performed by: HOSPITALIST

## 2023-12-03 PROCEDURE — 94660 CPAP INITIATION&MGMT: CPT

## 2023-12-03 PROCEDURE — 250N000009 HC RX 250: Performed by: INTERNAL MEDICINE

## 2023-12-03 PROCEDURE — 999N000157 HC STATISTIC RCP TIME EA 10 MIN

## 2023-12-03 PROCEDURE — 120N000001 HC R&B MED SURG/OB

## 2023-12-03 RX ORDER — LOPERAMIDE HCL 2 MG
2 CAPSULE ORAL 4 TIMES DAILY PRN
Status: DISCONTINUED | OUTPATIENT
Start: 2023-12-03 | End: 2023-12-16

## 2023-12-03 RX ADMIN — NAFCILLIN SODIUM 2 G: 2 INJECTION, POWDER, LYOPHILIZED, FOR SOLUTION INTRAMUSCULAR; INTRAVENOUS at 23:58

## 2023-12-03 RX ADMIN — LAMOTRIGINE 200 MG: 200 TABLET ORAL at 11:16

## 2023-12-03 RX ADMIN — DIVALPROEX SODIUM 500 MG: 500 TABLET, DELAYED RELEASE ORAL at 11:15

## 2023-12-03 RX ADMIN — NAFCILLIN SODIUM 2 G: 2 INJECTION, POWDER, LYOPHILIZED, FOR SOLUTION INTRAMUSCULAR; INTRAVENOUS at 20:15

## 2023-12-03 RX ADMIN — NAFCILLIN SODIUM 2 G: 2 INJECTION, POWDER, LYOPHILIZED, FOR SOLUTION INTRAMUSCULAR; INTRAVENOUS at 11:11

## 2023-12-03 RX ADMIN — QUETIAPINE FUMARATE 300 MG: 300 TABLET, EXTENDED RELEASE ORAL at 21:22

## 2023-12-03 RX ADMIN — FLUTICASONE PROPIONATE 1 SPRAY: 50 SPRAY, METERED NASAL at 11:19

## 2023-12-03 RX ADMIN — FLUTICASONE PROPIONATE 1 SPRAY: 50 SPRAY, METERED NASAL at 20:58

## 2023-12-03 RX ADMIN — DIVALPROEX SODIUM 500 MG: 500 TABLET, DELAYED RELEASE ORAL at 20:06

## 2023-12-03 RX ADMIN — ZONISAMIDE 200 MG: 100 CAPSULE ORAL at 21:22

## 2023-12-03 RX ADMIN — MICONAZOLE NITRATE: 20 POWDER TOPICAL at 20:58

## 2023-12-03 RX ADMIN — LEVETIRACETAM 1000 MG: 500 TABLET, FILM COATED ORAL at 11:17

## 2023-12-03 RX ADMIN — MICONAZOLE NITRATE: 20 POWDER TOPICAL at 11:18

## 2023-12-03 RX ADMIN — PANTOPRAZOLE SODIUM 40 MG: 40 TABLET, DELAYED RELEASE ORAL at 11:16

## 2023-12-03 RX ADMIN — NAFCILLIN SODIUM 2 G: 2 INJECTION, POWDER, LYOPHILIZED, FOR SOLUTION INTRAMUSCULAR; INTRAVENOUS at 04:33

## 2023-12-03 RX ADMIN — PROPRANOLOL HYDROCHLORIDE 20 MG: 20 TABLET ORAL at 11:16

## 2023-12-03 RX ADMIN — PRAZOSIN HYDROCHLORIDE 2 MG: 1 CAPSULE ORAL at 21:21

## 2023-12-03 RX ADMIN — LEVETIRACETAM 1500 MG: 500 TABLET, FILM COATED ORAL at 21:21

## 2023-12-03 RX ADMIN — NAFCILLIN SODIUM 2 G: 2 INJECTION, POWDER, LYOPHILIZED, FOR SOLUTION INTRAMUSCULAR; INTRAVENOUS at 15:51

## 2023-12-03 RX ADMIN — BUSPIRONE HYDROCHLORIDE 15 MG: 15 TABLET ORAL at 20:05

## 2023-12-03 RX ADMIN — LAMOTRIGINE 200 MG: 200 TABLET ORAL at 20:07

## 2023-12-03 RX ADMIN — LEVOCARNITINE 660 MG: 330 TABLET ORAL at 11:16

## 2023-12-03 RX ADMIN — CITALOPRAM HYDROBROMIDE 60 MG: 20 TABLET, FILM COATED ORAL at 11:17

## 2023-12-03 RX ADMIN — NAFCILLIN SODIUM 2 G: 2 INJECTION, POWDER, LYOPHILIZED, FOR SOLUTION INTRAMUSCULAR; INTRAVENOUS at 00:03

## 2023-12-03 RX ADMIN — BUSPIRONE HYDROCHLORIDE 15 MG: 15 TABLET ORAL at 11:17

## 2023-12-03 RX ADMIN — LEVOCARNITINE 660 MG: 330 TABLET ORAL at 20:06

## 2023-12-03 RX ADMIN — LEVOCARNITINE 660 MG: 330 TABLET ORAL at 15:50

## 2023-12-03 RX ADMIN — THERA TABS 1 TABLET: TAB at 11:15

## 2023-12-03 RX ADMIN — PROPRANOLOL HYDROCHLORIDE 20 MG: 20 TABLET ORAL at 20:05

## 2023-12-03 RX ADMIN — ZONISAMIDE 100 MG: 100 CAPSULE ORAL at 11:16

## 2023-12-03 ASSESSMENT — ACTIVITIES OF DAILY LIVING (ADL)
ADLS_ACUITY_SCORE: 55
ADLS_ACUITY_SCORE: 55
ADLS_ACUITY_SCORE: 53
ADLS_ACUITY_SCORE: 55
ADLS_ACUITY_SCORE: 53
ADLS_ACUITY_SCORE: 55
ADLS_ACUITY_SCORE: 53
ADLS_ACUITY_SCORE: 55
ADLS_ACUITY_SCORE: 55
ADLS_ACUITY_SCORE: 53

## 2023-12-03 NOTE — PROGRESS NOTES
A BiPAP of  12/6 @ 40% was applied to the pt via the mask for NOC use. The bridge of the nose looks good and remains intact. Pt is tolerating it well. Will continue to monitor and assess the pt's current respiratory status and needs.

## 2023-12-03 NOTE — PLAN OF CARE
"Goal Outcome Evaluation:      Plan of Care Reviewed With: patient    Overall Patient Progress: improvingOverall Patient Progress: improving    Outcome Evaluation: .    A&Ox2  VSS on 3 LPM NC and BIPAP at night and naps   Denies pain  Encouraged to use commode after meals to prevent fecal incontinence   Activity: A1 walker, refuses belt  Pending TCU placement to continue IV antibiotics       Problem: Adult Inpatient Plan of Care  Goal: Plan of Care Review  Description: The Plan of Care Review/Shift note should be completed every shift.  The Outcome Evaluation is a brief statement about your assessment that the patient is improving, declining, or no change.  This information will be displayed automatically on your shift  note.  Outcome: Progressing  Flowsheets (Taken 12/3/2023 1554)  Outcome Evaluation: .  Plan of Care Reviewed With: patient  Overall Patient Progress: improving  Goal: Patient-Specific Goal (Individualized)  Description: You can add care plan individualizations to a care plan. Examples of Individualization might be:  \"Parent requests to be called daily at 9am for status\", \"I have a hard time hearing out of my right ear\", or \"Do not touch me to wake me up as it startles  me\".  Outcome: Progressing  Goal: Absence of Hospital-Acquired Illness or Injury  Outcome: Progressing  Intervention: Identify and Manage Fall Risk  Recent Flowsheet Documentation  Taken 12/3/2023 1220 by Karla Sanchez, RN  Safety Promotion/Fall Prevention: safety round/check completed  Taken 12/3/2023 1116 by Karla Sanchez, RN  Safety Promotion/Fall Prevention:   activity supervised   assistive device/personal items within reach   clutter free environment maintained   increased rounding and observation   increase visualization of patient   lighting adjusted   mobility aid in reach   nonskid shoes/slippers when out of bed   patient video monitoring   room organization consistent   safety round/check completed   supervised " activity   treat underlying cause  Taken 12/3/2023 1107 by Karla Sanchez RN  Safety Promotion/Fall Prevention: safety round/check completed  Taken 12/3/2023 0959 by Karla Sanchez RN  Safety Promotion/Fall Prevention: safety round/check completed  Taken 12/3/2023 0716 by Karla Sanchez RN  Safety Promotion/Fall Prevention: safety round/check completed  Intervention: Prevent Skin Injury  Recent Flowsheet Documentation  Taken 12/3/2023 0959 by Karla Sanchez RN  Body Position: supine, head elevated  Taken 12/3/2023 0716 by Karla Sanchez RN  Body Position: supine, head elevated  Goal: Optimal Comfort and Wellbeing  Outcome: Progressing  Goal: Readiness for Transition of Care  Outcome: Progressing     Problem: Gas Exchange Impaired  Goal: Optimal Gas Exchange  Outcome: Progressing  Intervention: Optimize Oxygenation and Ventilation  Recent Flowsheet Documentation  Taken 12/3/2023 0959 by Karla Sanchez RN  Head of Bed (HOB) Positioning: HOB at 30 degrees  Taken 12/3/2023 0716 by Karla Sanchez RN  Head of Bed (HOB) Positioning: HOB at 30 degrees     Problem: Infection  Goal: Absence of Infection Signs and Symptoms  Outcome: Progressing

## 2023-12-03 NOTE — PROGRESS NOTES
[facility]  Hospitalist Progress Note  Name: Tre Vazquez    MRN: [id number]  Provider:  Fina Modi DO   Date of Service: 12/03/2023    Summary of Stay: Mr. Tre Vazquez is a 48 year old male who who has a very complicated past medical as noted in the list below.  He lives in a group home.  He has some developmental delay, morbid obesity, ANA and OHS using chronically BiPAP at night apparently.  History of seizure disorder and chronic hyperammonemia while on Depakote.  Schizoaffective disorder, borderline personality and left eye blindness. He presented with clouding of mentation and elevated CO2 and pneumonia.  (This patient normally has a CO2 between 70 and 80 and he is ammonia is in the 60s). His current presentation is suspected to be due to possible nonadherence to treatment, though it is difficult to prove.  Patient has been a started on NIPPV in the emergency department and he has had excellent response. Per records from May of 2022, his ammonia level was elevated and considered the consequence of the use of Depakote for seizures. There is no official diagnosis of cirrhosis in this patient, he is not taking lactulose and likely not needed though he had one dose in ED.  CT of the head and chest without acute pathology.  Ultimately found to have Staph aureus bacteremia, source still unclear.  On nafcillin, discharge planning ongoing as patient will need for total weeks of IV antibiotics and group home not able to accommodate.  Awaiting TCU placement.  Medically stable for discharge when placement identified.      Acute on chronic hypercarbic and hypoxemic respiratory failure , stable   Patient on chronic BiPAP at night time and napping    Obesity/OHS.  Breathing comfortably morning 11/26.  Tolerated BiPAP overnight, on nasal cannula throughout the morning.  PCO2 remained elevated to 85 on venous gas, but mentation appears good.  Alert and oriented, able to answer questions.   For now, continue BiPAP overnight and with naps during the day.  Supplemental oxygen as needed.  Blood cultures from admission positive for Staph aureus, repeat cultures again growing gram-positive cocci in clusters.  Started on IV vancomycin now on nafcillin.  Overall respiratory status appears to be improved, doing well with BiPAP as needed.  - BiPAP at night and with naps  - RT following  - Continue to monitor respiratory status      Staph aureus bacteremia   Left buttock wound  Initial blood cultures positive for Staph aureus.  Started on IV vancomycin empirically, consulted infectious disease.  As far as source, has had respiratory issues prior to admission, but CT chest did not necessarily appear convincing for active pneumonia.  Also has a wound on left buttock that is open, having some serosanguineous drainage.  Somewhat firm to palpation underneath the opening on the skin, but no purulent discharge and does not necessarily feel like there is an abscess underneath the skin.  We will continue to monitor the wound as potential source as well.  Per ID, patient will need 4 weeks total IV antibiotic therapy.  PICC now in place for ongoing antibiotic administration.  Discharge planning ongoing as group home not able to accommodate patient's ongoing antibiotic need, will need TCU.  - IV nafcillin for 4 weeks of therapy (last positive culture 11/25)  - ID consulted during hospital stay,  no longer actively following   - PICC in place, no fever or chills   - Awaiting TCU placement for ongoing antibiotics     Hyperammonemia, chronic.  Unclear cause.  Patient had one-time lactulose in the emergency department.  Initial ammonia level was 129. He received a dose of lactulose in the ER.  Ammonia level elevated at 82. Suspect related to seizure medication (Depakote).  - No known liver disease  - Depakote was initially held.  Case discussed with neurology and decreased Depakote to 500 mg p.o. twice daily.   - Mental status  "seems stable , if worsening consider repeat VBG/ABG and ammonia level      Acute toxic/metabolic encephalopathy - improving  Likely delirium, memory concerns  Patient and stepmother both endorse some concerns about ongoing issues with memory on 11/29.  Patient also states that he had a \"vision\" that was rather distressing to him he believes several days ago.  Discussed with them that CT imaging of the head was normal, ammonia level was normal, and the ongoing confusion is likely related to delirium that does seem to be clearing as patient is much more awake and alert every day.  We will continue to monitor for signs of improve mental status, could consider MRI if continues to have memory issues despite ongoing other improvement.   - Continue monitoring mental status, seems stable at this time based on discussions with nursing staff   - Consider brain MRI if ongoing memory issues   - BiPAP at bedtime  - Adjusted Depakote level as above to twice daily dosing from 3 times daily     Seizure disorder. Stable on current medication.   -Discussed with neurology earlier in hospital stay.  Neurology recommending to decrease Depakote to 500 mg twice daily, check free Depakote level, Keppra level, Lamictal level.  Orders adjusted.  Patient needs repeat drug levels in 1 to 2 weeks and follow-up with neurology as outpatient for further adjustment of his medications.  Appreciate neurology input.  -repeat drug levels in 1 to 2 weeks (12/4-12/10)   - will need free Depakote level, Keppra level, Lamictal levels checked, could consider checking here if remains hospitalized   -follow-up with neurology as outpatient for further adjustment of his medications     Essential hypertension.  On propranolol and prazosin.     Depression/anxiety.  On Celexa and Seroquel.     GERD.   On pantoprazole.    Loose stools  Likely from antibiotics and recent bowel medications.  No abdominal pain.  - No indication for C. difficile or enteric panel at " "this time as is quite intermittent and low-volume , no abdominal pain, fever, or other signs of infection     DVT Prophylaxis: Pneumatic Compression Devices  Code Status: Full Code  Diet: Regular Diet Adult    Reid Catheter: Not present  Disposition: Expected discharge in 1-2 days to TCU. Goals prior to discharge include placement.   Incidental Findings: None.  Family updated today: Not today, updated stepmother Ashlie yesterday.    30 MINUTES SPENT BY ME on the date of service doing chart review, history, exam, documentation & further activities per the note.       Interval History   The patient reports doing well. No chest pain or shortness of breath. No nausea, vomiting, constipation. 1 loose but formed stools overnight. No fevers.  The patient reports that he \"continues to have visions of the past.\" Does not report any SI or HI, no other concerns or complaints.  No other specific complaints identified.     -Data reviewed today: I personally reviewed all new labs and imaging results over the last 24 hours.     Physical Exam   Temp: 98  F (36.7  C) Temp src: Oral BP: 124/56 Pulse: 71   Resp: 20 SpO2: 94 % O2 Device: BiPAP/CPAP Oxygen Delivery: 2 LPM  There were no vitals filed for this visit.  Vital Signs with Ranges  Temp:  [98  F (36.7  C)] 98  F (36.7  C)  Pulse:  [68-90] 71  Resp:  [16-20] 20  BP: (106-174)/(55-95) 124/56  FiO2 (%):  [40 %] 40 %  SpO2:  [91 %-99 %] 94 %  I/O last 3 completed shifts:  In: 480 [P.O.:480]  Out: 1475 [Urine:1475]    GENERAL: No apparent distress. Awake, sleeping initially but easily arousable, and fully oriented. Wearing BIPAP mask   HEENT: Normocephalic, atraumatic. Extraocular movements intact.  CARDIOVASCULAR: Distant heart sounds but regular rate and rhythm without murmurs or rubs. No S3.  PULMONARY: Clear bilaterally, no wheezes rales or rhonchi  GASTROINTESTINAL: Soft, non-tender, non-distended.    EXTREMITIES: No cyanosis or clubbing. No edema appreciated  NEUROLOGICAL: No " "focal neurological deficits, answers all questions appropriately   DERMATOLOGICAL: No rashes, ulcer, bruising, or jaundice.     Medications      busPIRone  15 mg Oral BID    citalopram  60 mg Oral Daily    divalproex sodium delayed-release  500 mg Oral Q12H [facility] (08/20)    fluticasone  1 spray Nasal BID    lamoTRIgine  200 mg Oral BID    levETIRAcetam  1,000 mg Oral QAM    And    levETIRAcetam  1,500 mg Oral At Bedtime    levOCARNitine  660 mg Oral TID    miconazole   Topical BID    multivitamin, therapeutic  1 tablet Oral Daily    nafcillin  2 g Intravenous Q4H    pantoprazole  40 mg Oral Daily    polyethylene glycol  17 g Oral Daily    prazosin  2 mg Oral At Bedtime    propranolol  20 mg Oral BID    QUEtiapine ER  300 mg Oral At Bedtime    sodium chloride (PF)  10 mL Intracatheter Q8H    zonisamide  100 mg Oral QAM    And    zonisamide  200 mg Oral At Bedtime     Data     Laboratory:  Recent Labs   Lab 11/30/23  0603 11/29/23  0636   WBC 6.7 6.3   HGB 10.5* 10.3*   HCT 35.0* 35.0*   * 102*    245     Recent Labs   Lab 11/30/23  0603 11/29/23  0636 11/28/23  0747 11/27/23  0759    143  --  145   POTASSIUM 3.9 4.0  --  4.0   CHLORIDE 101 101  --  100   CO2 39* 42*  --  42*   ANIONGAP 4* <1*  --  3*   GLC 98 100*  --  104*   BUN 12.4 11.7  --  12.1   CR 0.60* 0.62* 0.65* 0.68  0.68   GFRESTIMATED >90 >90 >90 >90  >90   ARNOLD 8.5* 8.1*  --  8.6     No results for input(s): \"CULT\" in the last 168 hours.    Imaging:  No results found for this or any previous visit (from the past 24 hour(s)).      "

## 2023-12-03 NOTE — PLAN OF CARE
Goal Outcome Evaluation:      Plan of Care Reviewed With: patient        VSS on bipap. Blood pressure elevated at times throughout shift, with highest value of 174/95. Disoriented to place and time. On prazosin and propranolol. Has single lumen PICC line in place, blood return noted., SL. On nafcillin q4 hr for staph. Aureus. Plan to discharge to TCU once placement is available.

## 2023-12-03 NOTE — PROGRESS NOTES
Respiratory Therapy Note    Patient seen on and off BiPAP therapy throughout shift. Pt on a BiPAP of 12/6 @ 40% via the mask with naps and while asleep. The bridge of the nose looks good and remains intact. Pt is tolerating it well. Will continue to monitor and assess the pt's current respiratory status and needs.    Valencia Reyes, RT  5:33 PM December 3, 2023

## 2023-12-04 LAB
AMMONIA PLAS-SCNC: 62 UMOL/L (ref 16–60)
BASE EXCESS BLDV CALC-SCNC: 9.5 MMOL/L (ref -7.7–1.9)
GLUCOSE BLDC GLUCOMTR-MCNC: 109 MG/DL (ref 70–99)
HCO3 BLDV-SCNC: 38 MMOL/L (ref 21–28)
LEVETIRACETAM SERPL-MCNC: 35.4 ΜG/ML (ref 10–40)
O2/TOTAL GAS SETTING VFR VENT: 20 %
PCO2 BLDV: 76 MM HG (ref 40–50)
PH BLDV: 7.31 [PH] (ref 7.32–7.43)
PO2 BLDV: 36 MM HG (ref 25–47)
VALPROATE SERPL-MCNC: 33.9 UG/ML

## 2023-12-04 PROCEDURE — 120N000001 HC R&B MED SURG/OB

## 2023-12-04 PROCEDURE — 999N000157 HC STATISTIC RCP TIME EA 10 MIN

## 2023-12-04 PROCEDURE — 250N000011 HC RX IP 250 OP 636: Mod: JZ | Performed by: INTERNAL MEDICINE

## 2023-12-04 PROCEDURE — 82803 BLOOD GASES ANY COMBINATION: CPT | Performed by: STUDENT IN AN ORGANIZED HEALTH CARE EDUCATION/TRAINING PROGRAM

## 2023-12-04 PROCEDURE — 80164 ASSAY DIPROPYLACETIC ACD TOT: CPT | Performed by: STUDENT IN AN ORGANIZED HEALTH CARE EDUCATION/TRAINING PROGRAM

## 2023-12-04 PROCEDURE — 99232 SBSQ HOSP IP/OBS MODERATE 35: CPT | Performed by: HOSPITALIST

## 2023-12-04 PROCEDURE — 250N000009 HC RX 250: Performed by: INTERNAL MEDICINE

## 2023-12-04 PROCEDURE — 82140 ASSAY OF AMMONIA: CPT | Performed by: STUDENT IN AN ORGANIZED HEALTH CARE EDUCATION/TRAINING PROGRAM

## 2023-12-04 PROCEDURE — 250N000013 HC RX MED GY IP 250 OP 250 PS 637: Performed by: INTERNAL MEDICINE

## 2023-12-04 PROCEDURE — 80175 DRUG SCREEN QUAN LAMOTRIGINE: CPT | Performed by: STUDENT IN AN ORGANIZED HEALTH CARE EDUCATION/TRAINING PROGRAM

## 2023-12-04 PROCEDURE — 94660 CPAP INITIATION&MGMT: CPT

## 2023-12-04 PROCEDURE — 80177 DRUG SCRN QUAN LEVETIRACETAM: CPT | Performed by: STUDENT IN AN ORGANIZED HEALTH CARE EDUCATION/TRAINING PROGRAM

## 2023-12-04 PROCEDURE — 250N000013 HC RX MED GY IP 250 OP 250 PS 637: Performed by: HOSPITALIST

## 2023-12-04 RX ADMIN — NAFCILLIN SODIUM 2 G: 2 INJECTION, POWDER, LYOPHILIZED, FOR SOLUTION INTRAMUSCULAR; INTRAVENOUS at 09:28

## 2023-12-04 RX ADMIN — MICONAZOLE NITRATE: 20 POWDER TOPICAL at 09:28

## 2023-12-04 RX ADMIN — NAFCILLIN SODIUM 2 G: 2 INJECTION, POWDER, LYOPHILIZED, FOR SOLUTION INTRAMUSCULAR; INTRAVENOUS at 04:31

## 2023-12-04 RX ADMIN — FLUTICASONE PROPIONATE 1 SPRAY: 50 SPRAY, METERED NASAL at 21:07

## 2023-12-04 RX ADMIN — BUSPIRONE HYDROCHLORIDE 15 MG: 15 TABLET ORAL at 20:50

## 2023-12-04 RX ADMIN — PROPRANOLOL HYDROCHLORIDE 20 MG: 20 TABLET ORAL at 09:29

## 2023-12-04 RX ADMIN — QUETIAPINE FUMARATE 300 MG: 300 TABLET, EXTENDED RELEASE ORAL at 21:05

## 2023-12-04 RX ADMIN — NAFCILLIN SODIUM 2 G: 2 INJECTION, POWDER, LYOPHILIZED, FOR SOLUTION INTRAMUSCULAR; INTRAVENOUS at 17:59

## 2023-12-04 RX ADMIN — THERA TABS 1 TABLET: TAB at 09:28

## 2023-12-04 RX ADMIN — LAMOTRIGINE 200 MG: 200 TABLET ORAL at 20:50

## 2023-12-04 RX ADMIN — PRAZOSIN HYDROCHLORIDE 2 MG: 1 CAPSULE ORAL at 21:05

## 2023-12-04 RX ADMIN — PROPRANOLOL HYDROCHLORIDE 20 MG: 20 TABLET ORAL at 20:50

## 2023-12-04 RX ADMIN — CITALOPRAM HYDROBROMIDE 60 MG: 20 TABLET, FILM COATED ORAL at 09:28

## 2023-12-04 RX ADMIN — ZONISAMIDE 100 MG: 100 CAPSULE ORAL at 09:28

## 2023-12-04 RX ADMIN — ZONISAMIDE 200 MG: 100 CAPSULE ORAL at 21:05

## 2023-12-04 RX ADMIN — FLUTICASONE PROPIONATE 1 SPRAY: 50 SPRAY, METERED NASAL at 09:28

## 2023-12-04 RX ADMIN — LEVETIRACETAM 1000 MG: 500 TABLET, FILM COATED ORAL at 09:28

## 2023-12-04 RX ADMIN — LEVETIRACETAM 1500 MG: 500 TABLET, FILM COATED ORAL at 21:05

## 2023-12-04 RX ADMIN — ALTEPLASE 2 MG: 2.2 INJECTION, POWDER, LYOPHILIZED, FOR SOLUTION INTRAVENOUS at 22:29

## 2023-12-04 RX ADMIN — LEVOCARNITINE 660 MG: 330 TABLET ORAL at 13:34

## 2023-12-04 RX ADMIN — MICONAZOLE NITRATE: 20 POWDER TOPICAL at 21:07

## 2023-12-04 RX ADMIN — NAFCILLIN SODIUM 2 G: 2 INJECTION, POWDER, LYOPHILIZED, FOR SOLUTION INTRAMUSCULAR; INTRAVENOUS at 20:50

## 2023-12-04 RX ADMIN — LAMOTRIGINE 200 MG: 200 TABLET ORAL at 09:28

## 2023-12-04 RX ADMIN — BUSPIRONE HYDROCHLORIDE 15 MG: 15 TABLET ORAL at 09:28

## 2023-12-04 RX ADMIN — DIVALPROEX SODIUM 500 MG: 500 TABLET, DELAYED RELEASE ORAL at 20:50

## 2023-12-04 RX ADMIN — LEVOCARNITINE 660 MG: 330 TABLET ORAL at 09:29

## 2023-12-04 RX ADMIN — NAFCILLIN SODIUM 2 G: 2 INJECTION, POWDER, LYOPHILIZED, FOR SOLUTION INTRAMUSCULAR; INTRAVENOUS at 13:34

## 2023-12-04 RX ADMIN — DIVALPROEX SODIUM 500 MG: 500 TABLET, DELAYED RELEASE ORAL at 09:28

## 2023-12-04 RX ADMIN — PANTOPRAZOLE SODIUM 40 MG: 40 TABLET, DELAYED RELEASE ORAL at 09:29

## 2023-12-04 RX ADMIN — LEVOCARNITINE 660 MG: 330 TABLET ORAL at 20:50

## 2023-12-04 ASSESSMENT — ACTIVITIES OF DAILY LIVING (ADL)
ADLS_ACUITY_SCORE: 53

## 2023-12-04 NOTE — PLAN OF CARE
Goal Outcome Evaluation:    Pt alert, disoriented to time/situation. VSS on 2L NC. BiPAP at night and with naps. Denies pain. On IV nafcillin Q4. PICC line in place for long term abx. Up A1 gb/walker. Up to chair for lunch. Mepilex in place on buttocks. WOC following. Awaiting TCU placement.      Plan of Care Reviewed With: patient    Overall Patient Progress: no change

## 2023-12-04 NOTE — PROGRESS NOTES
SPIRITUAL HEALTH SERVICES - Progress Note  [facility] Med surg.    Referral Source: Length of stay.    Present: Pt was alone in his room.    Assessment/Intervention: Pt. Declined.    Plan: I and other Chaplains remain available as needed.    Eder Evans, [facility]    Intern    [facility] routine referrals *[phone]  [facility] available 24/7 for emergent requests/referrals, either by paging the on-call  or by entering an ASAP/STAT consult in Epic (this will also page the on-call ).

## 2023-12-04 NOTE — PROGRESS NOTES
A BiPAP of  12/6 @ 40% was applied to the pt via the mask for NOC use. The bridge of the nose looks good and remains intact. Pt is tolerating it well. Will continue to monitor and assess the pt's current respiratory status and needs.      Allyson Mendez, RT
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
## 2023-12-04 NOTE — PROGRESS NOTES
[facility]    Hospitalist Progress Note      Assessment & Plan   Mr. Tre Vazquez is a 48 year old male w has a very complicated past medical including developmental delay, morbid obesity, ANA/OHS chronically on BiPAP at night, seizure disorder, chronic hyperammonemia, schizoaffective disorder, borderline personality, left eye blindness who presents with altered mental status.     He presented with clouding of mentation and elevated CO2 and pneumonia.  (This patient normally has a CO2 between 70 and 80 and he is ammonia is in the 60s). His current presentation is suspected to be due to possible nonadherence to treatment, though it is difficult to prove.  Patient has been a started on NIPPV in the emergency department and he has had excellent response. Per records from May of 2022, his ammonia level was elevated and considered the consequence of the use of Depakote for seizures. There is no official diagnosis of cirrhosis in this patient, he is not taking lactulose and likely not needed though he had one dose in ED.  CT of the head and chest without acute pathology.  Ultimately found to have Staph aureus bacteremia, source still unclear.  On nafcillin, discharge planning ongoing as patient will need for total weeks of IV antibiotics and group home not able to accommodate.  Awaiting TCU placement.       Acute on chronic hypercarbic and hypoxemic respiratory failure , stable   Patient on chronic BiPAP at night time and napping    Obesity/OHS.  Breathing comfortably.  Tolerating BiPAP overnight, on nasal cannula throughout the day when awake.  PCO2 improved with bipap therapy.  He still has intermittent confusion but is able to tell me his name is Joel.  He is asking appropriate questions including when he can leave the hospital.  He tells me his  is his mom.  For now, continue BiPAP overnight and with naps during the day.  Supplemental oxygen as needed.  Blood cultures from  admission positive for Staph aureus, for which she is on IV antibiotics..  Started on IV vancomycin now on nafcillin.  Overall respiratory status appears to be improved, doing well with BiPAP as needed.  - BiPAP at night and with naps  - RT following  - Continue to monitor respiratory status      #Staph aureus bacteremia.  Left buttock wound  Initial blood cultures positive for Staph aureus.  Started on IV vancomycin empirically, consulted infectious disease.  As far as source, has had respiratory issues prior to admission, but CT chest did not necessarily appear convincing for active pneumonia.  Also has a wound on left buttock that is open, having some serosanguineous drainage.  Somewhat firm to palpation underneath the opening on the skin, but no purulent discharge and does not necessarily feel like there is an abscess underneath the skin.  We will continue to monitor the wound as potential source as well.  Per ID, patient will need 4 weeks total IV antibiotic therapy.  PICC now in place for ongoing antibiotic administration.  Discharge planning ongoing as group home not able to accommodate patient's ongoing antibiotic need, will need TCU.  - IV nafcillin for 4 weeks of therapy (last positive culture 11/25)  - ID consulted during hospital stay,  no longer actively following   - PICC in place, no fever or chills   - Awaiting TCU placement for ongoing antibiotics     #Hyperammonemia, chronic: Unclear cause.  Patient had one-time lactulose in the emergency department.  Initial ammonia level was 129. He received a dose of lactulose in the ER.  Ammonia level elevated at 82. Suspect related to seizure medication (Depakote).  - No known liver disease  - Depakote was initially held.  Case discussed with neurology and decreased Depakote to 500 mg p.o. twice daily which we will continue.   - Mental status seems stable , if worsening consider repeat VBG/ABG and ammonia level      #Acute mixed toxic/metabolic and infectious  "encephalopathy - improving.  Likely delirium, memory concerns: Patient and stepmother both endorse some concerns about ongoing issues with memory on 11/29.  Patient also states that he had a \"vision\" that was rather distressing to him he believes several days ago.  Discussed with them that CT imaging of the head was normal, ammonia level not significantly elevated, and the ongoing confusion is likely related to delirium that is continuing to clear.  We will continue to monitor for signs of improve mental status, could consider MRI if continues to have memory issues despite ongoing other improvement. He has already been evaluated by neurology on 11/25 with adjustment of seizure meds.   - Continue monitoring mental status.  On 12/4, patient is awake.  He is able to tell me his name is Joel and seems to know he is in the hospital.  He asks appropriate questions including \"when can I leave to TCU.\"  He asked me to call his mom who is his contact.  -His step-mom notes that he is slow to talk.  She also notes that Joel can be manipulative at times and \"knows the system.\"    - BiPAP at bedtime  - Adjusted Depakote level as above to twice daily dosing from 3 times daily     #Seizure disorder. Stable on current medication.   -My colleague discussed with neurology earlier in hospital stay.  Neurology recommending to decrease Depakote to 500 mg twice daily, check free Depakote level, Keppra level, Lamictal level.  Orders adjusted.  Patient needs to follow-up with neurology in the outpatient setting.  -repeat drug levels ordered on 12/4.  -follow-up with neurology as outpatient for further adjustment of his medications     #Essential hypertension.  On propranolol and prazosin.     #Depression/anxiety.  On Celexa and Seroquel.     #GERD.   On pantoprazole.     #Loose stools  Likely from antibiotics and recent bowel medications.  No abdominal pain.  - No indication for C. difficile or enteric panel at this time as is quite " "intermittent and low-volume , no abdominal pain, fever, or other signs of infection      DVT Prophylaxis: Pneumatic Compression Devices  Code Status: Full Code  Dispo: Pending TCU placement.     I did call Ashlie who is listed as his contact on 12/4 to provide update.    Ubaldo Bell MD    Interval History   No events.  Patient on BiPAP until noon when he woke up.  He is now getting up to the chair to have some lunch.  He denies any pain.  He is slow to answer questions but able to tell me his name and asks \"when can I get out of hospital.\"  Discussed the need for IV antibiotics and continued use of BiPAP    -Data reviewed today: I reviewed all new labs and imaging results over the last 24 hours. I personally reviewed    Physical Exam   Temp: 97.6  F (36.4  C) Temp src: Axillary BP: 124/57 Pulse: 69   Resp: 20 SpO2: 94 % O2 Device: BiPAP/CPAP    There were no vitals filed for this visit.  Vital Signs with Ranges  Temp:  [97.6  F (36.4  C)-98.8  F (37.1  C)] 97.6  F (36.4  C)  Pulse:  [63-71] 69  Resp:  [14-20] 20  BP: (114-160)/(57-74) 124/57  FiO2 (%):  [40 %] 40 %  SpO2:  [94 %-97 %] 94 %  I/O last 3 completed shifts:  In: 960 [P.O.:960]  Out: 950 [Urine:950]    Constitutional: Chronically deconditioned.  Obese.  Awake.  HEENT: Normocephalic. MMM, No elevation of JVD noted.  Chronic left eye blindness  Respiratory: Nl WOB, Clear bilaterally, No wheezes or crackles  Cardiovascular: Regular, no murmur  GI: Obese, no tenderness.  Skin/Integumen: Chronic lymphedema noted.  Neuro: Patient wakes up to voice.  Moves extremities.  No tremor.  He knows his name is Joel and initially did not know he was in the hospital but then asked when can I get out of the hospital on provoked.    Medications      busPIRone  15 mg Oral BID    citalopram  60 mg Oral Daily    divalproex sodium delayed-release  500 mg Oral Q12H [facility] (08/20)    fluticasone  1 spray Nasal BID    lamoTRIgine  200 mg Oral BID    levETIRAcetam  1,000 mg Oral QAM    " And    levETIRAcetam  1,500 mg Oral At Bedtime    levOCARNitine  660 mg Oral TID    miconazole   Topical BID    multivitamin, therapeutic  1 tablet Oral Daily    nafcillin  2 g Intravenous Q4H    pantoprazole  40 mg Oral Daily    polyethylene glycol  17 g Oral Daily    prazosin  2 mg Oral At Bedtime    propranolol  20 mg Oral BID    QUEtiapine ER  300 mg Oral At Bedtime    sodium chloride (PF)  10 mL Intracatheter Q8H    zonisamide  100 mg Oral QAM    And    zonisamide  200 mg Oral At Bedtime       Data   Recent Labs   Lab 11/30/23  0603 11/29/23  0636 11/28/23  0747   WBC 6.7 6.3  --    HGB 10.5* 10.3*  --    * 102*  --     245  --     143  --    POTASSIUM 3.9 4.0  --    CHLORIDE 101 101  --    CO2 39* 42*  --    BUN 12.4 11.7  --    CR 0.60* 0.62* 0.65*   ANIONGAP 4* <1*  --    ARNOLD 8.5* 8.1*  --    GLC 98 100*  --        No results found for this or any previous visit (from the past 24 hour(s)).

## 2023-12-05 LAB
GLUCOSE BLDC GLUCOMTR-MCNC: 101 MG/DL (ref 70–99)
GLUCOSE BLDC GLUCOMTR-MCNC: 103 MG/DL (ref 70–99)
GLUCOSE BLDC GLUCOMTR-MCNC: 109 MG/DL (ref 70–99)
GLUCOSE BLDC GLUCOMTR-MCNC: 122 MG/DL (ref 70–99)
GLUCOSE BLDC GLUCOMTR-MCNC: 125 MG/DL (ref 70–99)
LAMOTRIGINE SERPL-MCNC: 10 UG/ML

## 2023-12-05 PROCEDURE — 120N000001 HC R&B MED SURG/OB

## 2023-12-05 PROCEDURE — 999N000157 HC STATISTIC RCP TIME EA 10 MIN

## 2023-12-05 PROCEDURE — 250N000009 HC RX 250: Performed by: INTERNAL MEDICINE

## 2023-12-05 PROCEDURE — G0463 HOSPITAL OUTPT CLINIC VISIT: HCPCS

## 2023-12-05 PROCEDURE — 250N000013 HC RX MED GY IP 250 OP 250 PS 637: Performed by: INTERNAL MEDICINE

## 2023-12-05 PROCEDURE — 94660 CPAP INITIATION&MGMT: CPT

## 2023-12-05 PROCEDURE — 250N000013 HC RX MED GY IP 250 OP 250 PS 637: Performed by: HOSPITALIST

## 2023-12-05 PROCEDURE — 99232 SBSQ HOSP IP/OBS MODERATE 35: CPT | Performed by: HOSPITALIST

## 2023-12-05 PROCEDURE — 97602 WOUND(S) CARE NON-SELECTIVE: CPT

## 2023-12-05 PROCEDURE — 999N000040 HC STATISTIC CONSULT NO CHARGE VASC ACCESS

## 2023-12-05 RX ADMIN — DIVALPROEX SODIUM 500 MG: 500 TABLET, DELAYED RELEASE ORAL at 19:59

## 2023-12-05 RX ADMIN — DIVALPROEX SODIUM 500 MG: 500 TABLET, DELAYED RELEASE ORAL at 09:17

## 2023-12-05 RX ADMIN — ZONISAMIDE 200 MG: 100 CAPSULE ORAL at 21:53

## 2023-12-05 RX ADMIN — LEVETIRACETAM 1500 MG: 500 TABLET, FILM COATED ORAL at 21:53

## 2023-12-05 RX ADMIN — CITALOPRAM HYDROBROMIDE 60 MG: 20 TABLET, FILM COATED ORAL at 09:15

## 2023-12-05 RX ADMIN — BUSPIRONE HYDROCHLORIDE 15 MG: 15 TABLET ORAL at 09:23

## 2023-12-05 RX ADMIN — FLUTICASONE PROPIONATE 1 SPRAY: 50 SPRAY, METERED NASAL at 20:09

## 2023-12-05 RX ADMIN — LEVETIRACETAM 1000 MG: 500 TABLET, FILM COATED ORAL at 09:24

## 2023-12-05 RX ADMIN — PANTOPRAZOLE SODIUM 40 MG: 40 TABLET, DELAYED RELEASE ORAL at 09:15

## 2023-12-05 RX ADMIN — PROPRANOLOL HYDROCHLORIDE 20 MG: 20 TABLET ORAL at 19:59

## 2023-12-05 RX ADMIN — LEVOCARNITINE 660 MG: 330 TABLET ORAL at 19:59

## 2023-12-05 RX ADMIN — FLUTICASONE PROPIONATE 1 SPRAY: 50 SPRAY, METERED NASAL at 09:42

## 2023-12-05 RX ADMIN — NAFCILLIN SODIUM 2 G: 2 INJECTION, POWDER, LYOPHILIZED, FOR SOLUTION INTRAMUSCULAR; INTRAVENOUS at 01:13

## 2023-12-05 RX ADMIN — LEVOCARNITINE 660 MG: 330 TABLET ORAL at 09:16

## 2023-12-05 RX ADMIN — PROPRANOLOL HYDROCHLORIDE 20 MG: 20 TABLET ORAL at 09:17

## 2023-12-05 RX ADMIN — THERA TABS 1 TABLET: TAB at 09:24

## 2023-12-05 RX ADMIN — BUSPIRONE HYDROCHLORIDE 15 MG: 15 TABLET ORAL at 19:59

## 2023-12-05 RX ADMIN — NAFCILLIN SODIUM 2 G: 2 INJECTION, POWDER, LYOPHILIZED, FOR SOLUTION INTRAMUSCULAR; INTRAVENOUS at 20:08

## 2023-12-05 RX ADMIN — NAFCILLIN SODIUM 2 G: 2 INJECTION, POWDER, LYOPHILIZED, FOR SOLUTION INTRAMUSCULAR; INTRAVENOUS at 04:54

## 2023-12-05 RX ADMIN — QUETIAPINE FUMARATE 300 MG: 300 TABLET, EXTENDED RELEASE ORAL at 21:53

## 2023-12-05 RX ADMIN — ZONISAMIDE 100 MG: 100 CAPSULE ORAL at 09:23

## 2023-12-05 RX ADMIN — LAMOTRIGINE 200 MG: 200 TABLET ORAL at 09:24

## 2023-12-05 RX ADMIN — LEVOCARNITINE 660 MG: 330 TABLET ORAL at 13:58

## 2023-12-05 RX ADMIN — NAFCILLIN SODIUM 2 G: 2 INJECTION, POWDER, LYOPHILIZED, FOR SOLUTION INTRAMUSCULAR; INTRAVENOUS at 12:54

## 2023-12-05 RX ADMIN — NAFCILLIN SODIUM 2 G: 2 INJECTION, POWDER, LYOPHILIZED, FOR SOLUTION INTRAMUSCULAR; INTRAVENOUS at 09:10

## 2023-12-05 RX ADMIN — MICONAZOLE NITRATE: 20 POWDER TOPICAL at 20:09

## 2023-12-05 RX ADMIN — LAMOTRIGINE 200 MG: 200 TABLET ORAL at 19:59

## 2023-12-05 RX ADMIN — NAFCILLIN SODIUM 2 G: 2 INJECTION, POWDER, LYOPHILIZED, FOR SOLUTION INTRAMUSCULAR; INTRAVENOUS at 16:29

## 2023-12-05 RX ADMIN — PRAZOSIN HYDROCHLORIDE 2 MG: 1 CAPSULE ORAL at 21:53

## 2023-12-05 RX ADMIN — POLYETHYLENE GLYCOL 3350 17 G: 17 POWDER, FOR SOLUTION ORAL at 09:15

## 2023-12-05 ASSESSMENT — ACTIVITIES OF DAILY LIVING (ADL)
ADLS_ACUITY_SCORE: 55
ADLS_ACUITY_SCORE: 53
ADLS_ACUITY_SCORE: 53
ADLS_ACUITY_SCORE: 55
ADLS_ACUITY_SCORE: 53
ADLS_ACUITY_SCORE: 53
ADLS_ACUITY_SCORE: 55
ADLS_ACUITY_SCORE: 53
ADLS_ACUITY_SCORE: 55
ADLS_ACUITY_SCORE: 53

## 2023-12-05 NOTE — PLAN OF CARE
Goal Outcome Evaluation:      Plan of Care Reviewed With: patient    Overall Patient Progress: improving    Outcome Evaluation: .    VSS on BIPAP. Disoriented to time and situation, forgetful at times. Denies pain. Denies SOB. On IV Naficillin q4h. PICC line patent with blood return noted. 1 medium BM this shift. . SBA GB walker. WOC following. Waiting for TCU placement.

## 2023-12-05 NOTE — PROGRESS NOTES
[facility]    Hospitalist Progress Note      Assessment & Plan   Mr. Tre Vazquez is a 48 year old male w has a very complicated past medical including developmental delay, morbid obesity, ANA/OHS chronically on BiPAP at night, seizure disorder, chronic hyperammonemia, schizoaffective disorder, borderline personality, left eye blindness who presents with altered mental status.      He presented with clouding of mentation and elevated CO2 and pneumonia.  (This patient normally has a CO2 between 70 and 80 and he is ammonia is in the 60s). His current presentation is suspected to be due to possible nonadherence to treatment, though it is difficult to prove.  Patient has been a started on NIPPV in the emergency department and he has had excellent response. Per records from May of 2022, his ammonia level was elevated and considered the consequence of the use of Depakote for seizures. There is no official diagnosis of cirrhosis in this patient, he is not taking lactulose and likely not needed though he had one dose in ED.  CT of the head and chest without acute pathology.  Ultimately found to have Staph aureus bacteremia, source still unclear.  On nafcillin, discharge planning ongoing as patient will need for total weeks of IV antibiotics and group home not able to accommodate.  Awaiting TCU placement.       Acute on chronic hypercarbic and hypoxemic respiratory failure , stable   Patient on chronic BiPAP at night time and napping    Obesity/OHS.  Breathing comfortably.  Tolerating BiPAP overnight, on nasal cannula throughout the day when awake.  PCO2 improved with bipap therapy.  He still has intermittent confusion but is able to tell me his name is Joel.  He is asking appropriate questions including when he can leave the hospital.  He tells me his  is his mom.  For now, continue BiPAP overnight and with naps during the day.  Supplemental oxygen as needed.  Blood cultures from  admission positive for Staph aureus, for which she is on IV antibiotics..  Started on IV vancomycin now on nafcillin.  Overall respiratory status appears to be improved, doing well with BiPAP as needed.  - BiPAP at night and with naps  - RT following  - Continue to monitor respiratory status      #Staph aureus bacteremia.  Left buttock wound  Initial blood cultures positive for Staph aureus.  Started on IV vancomycin empirically, consulted infectious disease.  As far as source, has had respiratory issues prior to admission, but CT chest did not necessarily appear convincing for active pneumonia.  Also has a wound on left buttock that is open, having some serosanguineous drainage.  Somewhat firm to palpation underneath the opening on the skin, but no purulent discharge and does not necessarily feel like there is an abscess underneath the skin.  We will continue to monitor the wound as potential source as well.  Per ID, patient will need 4 weeks total IV antibiotic therapy.  PICC now in place for ongoing antibiotic administration.  Discharge planning ongoing as group home not able to accommodate patient's ongoing antibiotic need, will need TCU.  - IV nafcillin for 4 weeks of therapy (last positive culture 11/25)  - ID consulted during hospital stay,  no longer actively following   - PICC in place, no fever or chills   - Awaiting TCU placement for ongoing antibiotics     #Hyperammonemia, chronic: Unclear cause.  Patient had one-time lactulose in the emergency department.  Initial ammonia level was 129. He received a dose of lactulose in the ER.  Ammonia level elevated at 82. Suspect related to seizure medication (Depakote).  - No known liver disease  - Depakote was initially held.  Case discussed with neurology and decreased Depakote to 500 mg p.o. twice daily which we will continue.   - Mental status seems stable , if worsening consider repeat VBG/ABG and ammonia level      #Acute mixed toxic/metabolic and infectious  "encephalopathy - improving.  Likely delirium, memory concerns: Patient and stepmother both endorse some concerns about ongoing issues with memory on 11/29.  Patient also states that he had a \"vision\" that was rather distressing to him he believes several days ago.  Discussed with them that CT imaging of the head was normal, ammonia level not significantly elevated, and the ongoing confusion is likely related to delirium that is continuing to clear.  We will continue to monitor for signs of improve mental status, could consider MRI if continues to have memory issues despite ongoing other improvement. He has already been evaluated by neurology on 11/25 with adjustment of seizure meds.   - Continue monitoring mental status.  On 12/4 and 12/5, patient is awake.  He is able to tell me his name is Joel and seems to know he is in the hospital.  He asks appropriate questions including \"when can I leave to TCU\"  and \"why can't I finish IV antibiotics here and then go back to group home.\"    -His step-mom notes that he is slow to talk.  She also notes that Joel can be manipulative at times and \"knows the system.\"    - BiPAP at bedtime  - Adjusted Depakote level as above to twice daily dosing from 3 times daily     #Seizure disorder. Stable on current medication.   -My colleague discussed with neurology earlier in hospital stay.  Neurology recommending to decrease Depakote to 500 mg twice daily, check free Depakote level, Keppra level, Lamictal level.  Orders adjusted.  Patient needs to follow-up with neurology in the outpatient setting.  -repeat drug levels ordered on 12/4.  Depakote level a bit low. I talked to Dr. Huff on 12/5.  Recommends continued slow wean of depakote in outpatient setting as can contribute to somnolence/slow thinking.  He has already been weaned a bit during this hospitalization as above.   -follow-up with neurology as outpatient for further adjustment of his medications     #Essential " hypertension.  On propranolol and prazosin.     #Depression/anxiety.  On Celexa and Seroquel.     #GERD.   On pantoprazole.     #Loose stools  Likely from antibiotics and recent bowel medications.  No abdominal pain.  - No indication for C. difficile or enteric panel at this time as is quite intermittent and low-volume , no abdominal pain, fever, or other signs of infection      DVT Prophylaxis: Pneumatic Compression Devices  Code Status: Full Code  Dispo: Pending TCU placement.     Ubaldo Bell MD    Interval History   No events.  Awake. Feels OK. No new complaints.  Asking if he can finish IV antibiotics here and then discharge to group home after. Discussed that we are still looking for TCU that can do IV abx.     -Data reviewed today: I reviewed all new labs and imaging results over the last 24 hours. I personally reviewed     Physical Exam   Temp: 98.5  F (36.9  C) Temp src: Axillary BP: 119/63 Pulse: 70   Resp: 15 SpO2: 95 % O2 Device: BiPAP/CPAP Oxygen Delivery: 2 LPM  There were no vitals filed for this visit.  Vital Signs with Ranges  Temp:  [97.4  F (36.3  C)-98.5  F (36.9  C)] 98.5  F (36.9  C)  Pulse:  [70-77] 70  Resp:  [15-20] 15  BP: (119-169)/(63-81) 119/63  FiO2 (%):  [40 %] 40 %  SpO2:  [92 %-100 %] 95 %  I/O last 3 completed shifts:  In: 550 [P.O.:540; I.V.:10]  Out: 2100 [Urine:2100]    Constitutional: Chronically deconditioned.  Obese.  Awake.  HEENT: Normocephalic. MMM, No elevation of JVD noted.  Chronic left eye blindness  Respiratory: Nl WOB, Clear bilaterally, No wheezes or crackles  Cardiovascular: Regular, no murmur  GI: Obese, no tenderness.  Skin/Integumen: Chronic lymphedema noted.  Neuro: Patient wakes up to voice.  Moves extremities.  No tremor.  Seems to ask appropriate questions.     Medications      busPIRone  15 mg Oral BID    citalopram  60 mg Oral Daily    divalproex sodium delayed-release  500 mg Oral Q12H [facility] (08/20)    fluticasone  1 spray Nasal BID    lamoTRIgine  200 mg  Oral BID    levETIRAcetam  1,000 mg Oral QAM    And    levETIRAcetam  1,500 mg Oral At Bedtime    levOCARNitine  660 mg Oral TID    miconazole   Topical BID    multivitamin, therapeutic  1 tablet Oral Daily    nafcillin  2 g Intravenous Q4H    pantoprazole  40 mg Oral Daily    polyethylene glycol  17 g Oral Daily    prazosin  2 mg Oral At Bedtime    propranolol  20 mg Oral BID    QUEtiapine ER  300 mg Oral At Bedtime    sodium chloride (PF)  10 mL Intracatheter Q8H    zonisamide  100 mg Oral QAM    And    zonisamide  200 mg Oral At Bedtime       Data   Recent Labs   Lab 12/05/23  0900 12/05/23  0218 12/04/23  2139 11/30/23  0603 11/29/23  0636   WBC  --   --   --  6.7 6.3   HGB  --   --   --  10.5* 10.3*   MCV  --   --   --  101* 102*   PLT  --   --   --  270 245   NA  --   --   --  144 143   POTASSIUM  --   --   --  3.9 4.0   CHLORIDE  --   --   --  101 101   CO2  --   --   --  39* 42*   BUN  --   --   --  12.4 11.7   CR  --   --   --  0.60* 0.62*   ANIONGAP  --   --   --  4* <1*   ARNOLD  --   --   --  8.5* 8.1*   * 122* 109* 98 100*       No results found for this or any previous visit (from the past 24 hour(s)).

## 2023-12-05 NOTE — PROGRESS NOTES
[facility] Nurse Inpatient Assessment     Consulted for: Right buttock    Patient History (according to provider note(s):      Tre Vazquez is a 48 year old male who who has a very complicated past medical as noted in the list below.  He lives in a group home.  He has some developmental delay, morbid obesity, ANA and OHS using chronically BiPAP at night apparently.  History of seizure disorder and chronic hyperammonemia while on Depakote.  Schizoaffective disorder, borderline personality and left eye blindness. He presented with clouding of mentation and elevated CO2 and pneumonia.  (This patient normally has a CO2 between 70 and 80 and he is ammonia is in the 60s). His current presentation is suspected to be due to possible nonadherence to treatment, though it is difficult to prove.  Patient has been a started on NIPPV in the emergency department and he has had excellent response.  Per records from May of 2022, his ammonia level was elevated and considered the consequence of the use of Depakote for seizures.  There is no official diagnosis of cirrhosis in this patient, he is not taking lactulose and likely not needed though he had one dose in ED.    CT of the head negative for any acute intracranial event.  CT of the chest with PE protocol negative for embolization, pneumonia, pleural effusion or pneumothorax.    Assessment:      Areas visualized during today's visit: Focused: Buttocks    Pressure Injury Location: Left IT  Last photo: 12/5/23    Wound type: Pressure Injury     Pressure Injury Stage: Unstageable, present on admission    Wound history/plan of care: Resides in group home.  Reports spending more time sitting recently.  Wound base: 100 % slough     Palpation of the wound bed: normal      Drainage: moderate     Description of drainage: purulent     Measurements (length x width x depth, in cm): 0.8  x 0.6  x 0.3 cm      Tunneling: None     Undermining: None  Periwound skin:  "Intact      Color: normal and consistent with surrounding tissue      Temperature: normal   Odor: none  Pain: moderate and during dressing change, sharp  Pain interventions prior to dressing change: slow and gentle cares  and distraction  Treatment goal: Heal , Infection control/prevention, and Remove necrotic tissue  STATUS: improving  Supplies ordered: gathered and at bedside    My PI Risk Assessment     Sensory Perception: 3 - Slightly Limited     Moisture: 3 - Occasionally moist      Activity: 3 - Walks occasionally      Mobility: 3 - Slightly limited      Nutrition: 3 - Adequate     Friction/Shear: 2 - Potential problem      TOTAL: 17      Pressure Injury Location: Bilateral buttocks  Last photo: 11/27/23    Wound type: Pressure Injury     Pressure Injury Stage: 2, present on admission   Wound history/plan of care: Residing at group home, presently covered with mepilex sacrum dressing.  Areas now healed, no open areas noted  STATUS: healed  Supplies ordered: at bedside       Treatment Plan:     Left IT wound(s): Every other day  Cleanse area with Microklenz spray.  Apply small piece of Aquacel Ag to wound bed (SPS #[id number])  Cover with Mepilex 4x4    Buttocks:  BID  Cleanse area with Britany Cleanse and Protect spray and soft dry wipe  Apply additional Britany spray to moisturize skin    Pressure Injury Prevention (PIP) Plan:  If patient is declining pressure injury prevention interventions: Explore reason why and address patient's concerns, Educate on pressure injury risk and prevention intervention(s), and If patient is still declining, document \"informed refusal\"   Mattress: Follow bed algorithm, reassess daily and order specialty mattress, if indicated.  HOB: Maintain at or below 30 degrees, unless contraindicated  Repositioning in bed: Every 1-2 hours  and Left/right positioning; avoid supine  Heels: Pillows under calves  Chair positioning: Chair cushion (#[id number])    If patient has a buttock pressure injury, or " high risk for PI use chair cushion or SPS.  Moisture Management: Avoid brief in bed  Under Devices: Inspect skin under all medical devices during skin inspection , Ensure tubes are stabilized without tension, and Ensure patient is not lying on medical devices or equipment when repositioned  Ask provider to discontinue device when no longer needed.      Orders: Updated    RECOMMEND PRIMARY TEAM ORDER: None, at this time  Education provided: importance of repositioning, wound progress, and Off-loading pressure  Discussed plan of care with: Patient and Nurse  WO nurse follow-up plan: weekly  Notify WOC if wound(s) deteriorate.  Nursing to notify the Provider(s) and re-consult the WO Nurse if new skin concern.    DATA:     Current support surface: Bariatric Standard gel/foam mattress (IsoFlex, Atmos air, etc)  Containment of urine/stool: Continent of bladder and Continent of bowel  BMI: There is no height or weight on file to calculate BMI.   Active diet order: Orders Placed This Encounter      Regular Diet Adult     Output: I/O last 3 completed shifts:  In: 550 [P.O.:540; I.V.:10]  Out: 2100 [Urine:2100]     Labs:   Recent Labs   Lab 11/30/23  0603   HGB 10.5*   WBC 6.7     Pressure injury risk assessment:   Sensory Perception: 4-->no impairment  Moisture: 3-->occasionally moist  Activity: 3-->walks occasionally  Mobility: 3-->slightly limited  Nutrition: 3-->adequate  Friction and Shear: 2-->potential problem  Julio César Score: 18    MARYJANE Telles [facility]  Dept. Office Number: [phone]

## 2023-12-05 NOTE — PLAN OF CARE
"Goal Outcome Evaluation:      Plan of Care Reviewed With: patient    Overall Patient Progress: improvingOverall Patient Progress: improving    Outcome Evaluation: .    A&Ox2  VSS on 1-2 LPM NC during day. BIPAP at night and naps   Denies pain  Activity: A1 walker, up in chair for meals   Pending TCU placement to continue IV antibiotics       Problem: Adult Inpatient Plan of Care  Goal: Plan of Care Review  Description: The Plan of Care Review/Shift note should be completed every shift.  The Outcome Evaluation is a brief statement about your assessment that the patient is improving, declining, or no change.  This information will be displayed automatically on your shift  note.  Outcome: Progressing  Flowsheets (Taken 12/5/2023 0011)  Outcome Evaluation: .  Plan of Care Reviewed With: patient  Overall Patient Progress: improving  Goal: Patient-Specific Goal (Individualized)  Description: You can add care plan individualizations to a care plan. Examples of Individualization might be:  \"Parent requests to be called daily at 9am for status\", \"I have a hard time hearing out of my right ear\", or \"Do not touch me to wake me up as it startles  me\".  Outcome: Progressing  Goal: Absence of Hospital-Acquired Illness or Injury  Outcome: Progressing  Intervention: Identify and Manage Fall Risk  Recent Flowsheet Documentation  Taken 12/4/2023 2142 by Karla Sanchez, RN  Safety Promotion/Fall Prevention: safety round/check completed  Taken 12/4/2023 1759 by Karla Sanchez, RN  Safety Promotion/Fall Prevention:   activity supervised   assistive device/personal items within reach   clutter free environment maintained   increased rounding and observation   increase visualization of patient   lighting adjusted   mobility aid in reach   nonskid shoes/slippers when out of bed   patient and family education   room door open   room organization consistent   safety round/check completed   supervised activity   treat underlying " cause  Taken 12/4/2023 1535 by Karla Sanchez, RN  Safety Promotion/Fall Prevention: safety round/check completed  Intervention: Prevent Skin Injury  Recent Flowsheet Documentation  Taken 12/4/2023 1759 by Karla Sanchez RN  Body Position: supine, head elevated  Taken 12/4/2023 1535 by Karla Sanchez, RN  Body Position: supine, head elevated  Intervention: Prevent Infection  Recent Flowsheet Documentation  Taken 12/4/2023 1759 by Karla Sanchez RN  Infection Prevention:   equipment surfaces disinfected   hand hygiene promoted  Goal: Optimal Comfort and Wellbeing  Outcome: Progressing  Goal: Readiness for Transition of Care  Outcome: Progressing     Problem: Gas Exchange Impaired  Goal: Optimal Gas Exchange  Outcome: Progressing  Intervention: Optimize Oxygenation and Ventilation  Recent Flowsheet Documentation  Taken 12/4/2023 1759 by Karla Sanchez, RN  Head of Bed (HOB) Positioning: HOB at 60 degrees  Taken 12/4/2023 1535 by Karla Sanchez, RN  Head of Bed (HOB) Positioning: HOB at 30 degrees     Problem: Infection  Goal: Absence of Infection Signs and Symptoms  Outcome: Progressing

## 2023-12-06 LAB — GLUCOSE BLDC GLUCOMTR-MCNC: 114 MG/DL (ref 70–99)

## 2023-12-06 PROCEDURE — 250N000013 HC RX MED GY IP 250 OP 250 PS 637: Performed by: HOSPITALIST

## 2023-12-06 PROCEDURE — 250N000013 HC RX MED GY IP 250 OP 250 PS 637: Performed by: INTERNAL MEDICINE

## 2023-12-06 PROCEDURE — 999N000157 HC STATISTIC RCP TIME EA 10 MIN

## 2023-12-06 PROCEDURE — 120N000001 HC R&B MED SURG/OB

## 2023-12-06 PROCEDURE — 250N000013 HC RX MED GY IP 250 OP 250 PS 637: Performed by: STUDENT IN AN ORGANIZED HEALTH CARE EDUCATION/TRAINING PROGRAM

## 2023-12-06 PROCEDURE — 250N000009 HC RX 250: Performed by: INTERNAL MEDICINE

## 2023-12-06 PROCEDURE — 94660 CPAP INITIATION&MGMT: CPT

## 2023-12-06 PROCEDURE — 99232 SBSQ HOSP IP/OBS MODERATE 35: CPT | Performed by: HOSPITALIST

## 2023-12-06 RX ADMIN — PANTOPRAZOLE SODIUM 40 MG: 40 TABLET, DELAYED RELEASE ORAL at 09:33

## 2023-12-06 RX ADMIN — BUSPIRONE HYDROCHLORIDE 15 MG: 15 TABLET ORAL at 09:34

## 2023-12-06 RX ADMIN — PROPRANOLOL HYDROCHLORIDE 20 MG: 20 TABLET ORAL at 21:09

## 2023-12-06 RX ADMIN — FLUTICASONE PROPIONATE 1 SPRAY: 50 SPRAY, METERED NASAL at 21:59

## 2023-12-06 RX ADMIN — PRAZOSIN HYDROCHLORIDE 2 MG: 1 CAPSULE ORAL at 21:10

## 2023-12-06 RX ADMIN — LEVOCARNITINE 660 MG: 330 TABLET ORAL at 09:32

## 2023-12-06 RX ADMIN — NAFCILLIN SODIUM 2 G: 2 INJECTION, POWDER, LYOPHILIZED, FOR SOLUTION INTRAMUSCULAR; INTRAVENOUS at 23:51

## 2023-12-06 RX ADMIN — LOPERAMIDE HYDROCHLORIDE 2 MG: 2 CAPSULE ORAL at 17:00

## 2023-12-06 RX ADMIN — NAFCILLIN SODIUM 2 G: 2 INJECTION, POWDER, LYOPHILIZED, FOR SOLUTION INTRAMUSCULAR; INTRAVENOUS at 09:26

## 2023-12-06 RX ADMIN — ZONISAMIDE 200 MG: 100 CAPSULE ORAL at 21:11

## 2023-12-06 RX ADMIN — LEVETIRACETAM 1000 MG: 500 TABLET, FILM COATED ORAL at 09:31

## 2023-12-06 RX ADMIN — MICONAZOLE NITRATE: 20 POWDER TOPICAL at 21:59

## 2023-12-06 RX ADMIN — NAFCILLIN SODIUM 2 G: 2 INJECTION, POWDER, LYOPHILIZED, FOR SOLUTION INTRAMUSCULAR; INTRAVENOUS at 00:27

## 2023-12-06 RX ADMIN — DIVALPROEX SODIUM 500 MG: 500 TABLET, DELAYED RELEASE ORAL at 09:33

## 2023-12-06 RX ADMIN — LAMOTRIGINE 200 MG: 200 TABLET ORAL at 21:09

## 2023-12-06 RX ADMIN — BUSPIRONE HYDROCHLORIDE 15 MG: 15 TABLET ORAL at 21:09

## 2023-12-06 RX ADMIN — NAFCILLIN SODIUM 2 G: 2 INJECTION, POWDER, LYOPHILIZED, FOR SOLUTION INTRAMUSCULAR; INTRAVENOUS at 16:59

## 2023-12-06 RX ADMIN — LEVOCARNITINE 660 MG: 330 TABLET ORAL at 21:10

## 2023-12-06 RX ADMIN — NAFCILLIN SODIUM 2 G: 2 INJECTION, POWDER, LYOPHILIZED, FOR SOLUTION INTRAMUSCULAR; INTRAVENOUS at 04:17

## 2023-12-06 RX ADMIN — NAFCILLIN SODIUM 2 G: 2 INJECTION, POWDER, LYOPHILIZED, FOR SOLUTION INTRAMUSCULAR; INTRAVENOUS at 12:55

## 2023-12-06 RX ADMIN — POLYETHYLENE GLYCOL 3350 17 G: 17 POWDER, FOR SOLUTION ORAL at 09:30

## 2023-12-06 RX ADMIN — PROPRANOLOL HYDROCHLORIDE 20 MG: 20 TABLET ORAL at 09:40

## 2023-12-06 RX ADMIN — QUETIAPINE FUMARATE 300 MG: 300 TABLET, EXTENDED RELEASE ORAL at 21:10

## 2023-12-06 RX ADMIN — DIVALPROEX SODIUM 500 MG: 500 TABLET, DELAYED RELEASE ORAL at 21:09

## 2023-12-06 RX ADMIN — LEVOCARNITINE 660 MG: 330 TABLET ORAL at 13:49

## 2023-12-06 RX ADMIN — CITALOPRAM HYDROBROMIDE 60 MG: 20 TABLET, FILM COATED ORAL at 09:32

## 2023-12-06 RX ADMIN — THERA TABS 1 TABLET: TAB at 09:34

## 2023-12-06 RX ADMIN — ZONISAMIDE 100 MG: 100 CAPSULE ORAL at 09:33

## 2023-12-06 RX ADMIN — LEVETIRACETAM 1500 MG: 500 TABLET, FILM COATED ORAL at 21:10

## 2023-12-06 RX ADMIN — LAMOTRIGINE 200 MG: 200 TABLET ORAL at 09:34

## 2023-12-06 RX ADMIN — NAFCILLIN SODIUM 2 G: 2 INJECTION, POWDER, LYOPHILIZED, FOR SOLUTION INTRAMUSCULAR; INTRAVENOUS at 21:08

## 2023-12-06 RX ADMIN — FLUTICASONE PROPIONATE 1 SPRAY: 50 SPRAY, METERED NASAL at 10:39

## 2023-12-06 ASSESSMENT — ACTIVITIES OF DAILY LIVING (ADL)
ADLS_ACUITY_SCORE: 53
ADLS_ACUITY_SCORE: 53
ADLS_ACUITY_SCORE: 51
ADLS_ACUITY_SCORE: 53
ADLS_ACUITY_SCORE: 53
ADLS_ACUITY_SCORE: 51
ADLS_ACUITY_SCORE: 53
ADLS_ACUITY_SCORE: 51

## 2023-12-06 NOTE — PLAN OF CARE
"VSS, disoriented to  time and situation, blind in L eye, denies pain, A1 walker gaitbelt. 2L NC during day, bipat at night. Brief in bed, occasionally utilizes urinal or toilet.  and 125, IV abx q4h. PICC dressing changes today, WOC cleaned wounds and placed new wound care orders. Plan to discharge to TCU pending placement.    Goal Outcome Evaluation:   Plan of Care Reviewed With: patient Overall Patient Progress: no change Outcome Evaluation: waiting for TCU placement  Problem: Adult Inpatient Plan of Care  Goal: Plan of Care Review  Description: The Plan of Care Review/Shift note should be completed every shift.  The Outcome Evaluation is a brief statement about your assessment that the patient is improving, declining, or no change.  This information will be displayed automatically on your shift  note.  Outcome: Adequate for Care Transition  Flowsheets (Taken 12/5/2023 2006)  Outcome Evaluation: waiting for TCU placement  Plan of Care Reviewed With: patient  Overall Patient Progress: no change  Goal: Patient-Specific Goal (Individualized)  Description: You can add care plan individualizations to a care plan. Examples of Individualization might be:  \"Parent requests to be called daily at 9am for status\", \"I have a hard time hearing out of my right ear\", or \"Do not touch me to wake me up as it startles  me\".  Outcome: Adequate for Care Transition  Goal: Absence of Hospital-Acquired Illness or Injury  Outcome: Adequate for Care Transition  Intervention: Identify and Manage Fall Risk  Recent Flowsheet Documentation  Taken 12/5/2023 0900 by Lalita Gill RN  Safety Promotion/Fall Prevention:   activity supervised   safety round/check completed  Goal: Optimal Comfort and Wellbeing  Outcome: Adequate for Care Transition  Goal: Readiness for Transition of Care  Outcome: Adequate for Care Transition     Problem: Gas Exchange Impaired  Goal: Optimal Gas Exchange  Outcome: Adequate for Care Transition     Problem: " Infection  Goal: Absence of Infection Signs and Symptoms  Outcome: Adequate for Care Transition

## 2023-12-06 NOTE — PLAN OF CARE
/85   Pulse 75   Temp 97.7  F  Resp 16   SpO2 96%     Patient is alert and oriented x2 to self and place. No complaints of pain, SBA with transfers, regular diet, uses a urinal during the night. Patient has a CPAP on and tolerating it well. Takes medications whole with water. CHG bath was done and linen was changed.

## 2023-12-06 NOTE — PROGRESS NOTES
[facility]    Hospitalist Progress Note  Name: Tre Vazquez    MRN: [id number]  Provider:  Praveen Tyson DO MPH  Date of Service: 12/06/2023    Summary of Stay: Mr. Tre Vazquez is a 48 year old male w has a very complicated past medical including developmental delay, morbid obesity, ANA/OHS chronically on BiPAP at night, seizure disorder, chronic hyperammonemia, schizoaffective disorder, borderline personality, left eye blindness who presents with altered mental status.      He presented with clouding of mentation and elevated CO2 and pneumonia.  (This patient normally has a CO2 between 70 and 80 and he is ammonia is in the 60s). His current presentation is suspected to be due to possible nonadherence to treatment, though it is difficult to prove.  Patient has been a started on NIPPV in the emergency department and he has had excellent response. Per records from May of 2022, his ammonia level was elevated and considered the consequence of the use of Depakote for seizures. There is no official diagnosis of cirrhosis in this patient, he is not taking lactulose and likely not needed though he had one dose in ED.  CT of the head and chest without acute pathology.  Ultimately found to have Staph aureus bacteremia, source still unclear.  On nafcillin, discharge planning ongoing as patient will need for total weeks of IV antibiotics and group home not able to accommodate.  Awaiting TCU placement.       Acute on chronic hypercarbic and hypoxemic respiratory failure , stable   Patient on chronic BiPAP at night time and napping    Obesity/OHS.  Breathing comfortably.  Tolerating BiPAP overnight, on nasal cannula throughout the day when awake.  PCO2 improved with bipap therapy.  He still has intermittent confusion but is able to tell me his name is Joel.  He is asking appropriate questions including when he can leave the hospital.  He tells me his  is his mom.  For now, continue BiPAP  overnight and with naps during the day.  Supplemental oxygen as needed.  Blood cultures from admission positive for Staph aureus, for which she is on IV antibiotics..  Started on IV vancomycin now on nafcillin.  Overall respiratory status appears to be improved, doing well with BiPAP as needed.  - BiPAP at night and with naps  - RT following  - Continue to monitor respiratory status      #Staph aureus bacteremia.  Left buttock wound  Initial blood cultures positive for Staph aureus.  Started on IV vancomycin empirically, consulted infectious disease.  As far as source, has had respiratory issues prior to admission, but CT chest did not necessarily appear convincing for active pneumonia.  Also has a wound on left buttock that is open, having some serosanguineous drainage.  Somewhat firm to palpation underneath the opening on the skin, but no purulent discharge and does not necessarily feel like there is an abscess underneath the skin.  We will continue to monitor the wound as potential source as well.  Per ID, patient will need 4 weeks total IV antibiotic therapy.  PICC now in place for ongoing antibiotic administration.  Discharge planning ongoing as group home not able to accommodate patient's ongoing antibiotic need, will need TCU.  - IV nafcillin for 4 weeks of therapy (last positive culture 11/25)  - ID consulted during hospital stay,  no longer actively following   - PICC in place, no fever or chills   - Awaiting TCU placement for ongoing antibiotics     #Hyperammonemia, chronic: Unclear cause.  Patient had one-time lactulose in the emergency department.  Initial ammonia level was 129. He received a dose of lactulose in the ER.  Ammonia level elevated at 82. Suspect related to seizure medication (Depakote).  - No known liver disease  - Depakote was initially held.  Case discussed with neurology and decreased Depakote to 500 mg p.o. twice daily which we will continue.   - Mental status seems stable , if  "worsening consider repeat VBG/ABG and ammonia level      #Acute mixed toxic/metabolic and infectious encephalopathy - improving.  Likely delirium, memory concerns: Patient and stepmother both endorse some concerns about ongoing issues with memory on 11/29.  Patient also states that he had a \"vision\" that was rather distressing to him he believes several days ago.  Discussed with them that CT imaging of the head was normal, ammonia level not significantly elevated, and the ongoing confusion is likely related to delirium that is continuing to clear.  We will continue to monitor for signs of improve mental status, could consider MRI if continues to have memory issues despite ongoing other improvement. He has already been evaluated by neurology on 11/25 with adjustment of seizure meds.   - Continue monitoring mental status.  On 12/4 and 12/5, patient is awake.  He is able to tell me his name is Joel and seems to know he is in the hospital.  He asks appropriate questions including \"when can I leave to TCU\"  and \"why can't I finish IV antibiotics here and then go back to group home.\"    -His step-mom notes that he is slow to talk.  She also notes that Joel can be manipulative at times and \"knows the system.\"    - BiPAP at bedtime  - Adjusted Depakote level as above to twice daily dosing from 3 times daily     #Seizure disorder. Stable on current medication.   -My colleague discussed with neurology earlier in hospital stay.  Neurology recommending to decrease Depakote to 500 mg twice daily, check free Depakote level, Keppra level, Lamictal level.  Orders adjusted.  Patient needs to follow-up with neurology in the outpatient setting.  -repeat drug levels ordered on 12/4.  Depakote level a bit low. I talked to Dr. Huff on 12/5.  Recommends continued slow wean of depakote in outpatient setting as can contribute to somnolence/slow thinking.  He has already been weaned a bit during this hospitalization as above. "   -follow-up with neurology as outpatient for further adjustment of his medications     #Essential hypertension.  On propranolol and prazosin.     #Depression/anxiety.  On Celexa and Seroquel.     #GERD.   On pantoprazole.     #Loose stools  Likely from antibiotics and recent bowel medications.  No abdominal pain.  - No indication for C. difficile or enteric panel at this time as is quite intermittent and low-volume , no abdominal pain, fever, or other signs of infection       DVT Prophylaxis: Pneumatic Compression Devices  Code Status: Full Code  Diet: Regular Diet Adult    Reid Catheter: Not present  Disposition: Expected discharge in 1-2 days to TCU. Goals prior to discharge include placement, medically stable for discharge.   Incidental Findings: As above.  Family updated today: No    30 MINUTES SPENT BY ME on the date of service doing chart review, history, exam, documentation & further activities per the note.      Interval History   Assumed care from previous hospitalist. The history was fully reviewed.  The patient reports doing well. No chest pain or shortness of breath. No nausea, vomiting, diarrhea, constipation. No fevers. No other specific complaints identified.     -Data reviewed today: I personally reviewed all new labs and imaging results over the last 24 hours.     Physical Exam   Temp: 98  F (36.7  C) Temp src: Axillary BP: 114/68 Pulse: 89   Resp: 18 SpO2: 96 % O2 Device: BiPAP/CPAP Oxygen Delivery: 2 LPM  There were no vitals filed for this visit.  Vital Signs with Ranges  Temp:  [97.7  F (36.5  C)-98  F (36.7  C)] 98  F (36.7  C)  Pulse:  [75-89] 89  Resp:  [16-18] 18  BP: (114-172)/(67-89) 114/68  FiO2 (%):  [40 %] 40 %  SpO2:  [95 %-96 %] 96 %  I/O last 3 completed shifts:  In: 3 [I.V.:3]  Out: 1475 [Urine:1475]    GENERAL: No apparent distress. Awake, alert, and fully oriented.  HEENT: Normocephalic, atraumatic. Extraocular movements intact.  CARDIOVASCULAR: Regular rate and rhythm without  "murmurs or rubs. No S3.  PULMONARY: Clear bilaterally.  GASTROINTESTINAL: Soft, non-tender, non-distended. Bowel sounds normoactive.   EXTREMITIES: No cyanosis or clubbing. No edema.  NEUROLOGICAL: CN 2-12 grossly intact, no focal neurological deficits.  DERMATOLOGICAL: No rash, ulcer, bruising, nor jaundice.     Medications      busPIRone  15 mg Oral BID    citalopram  60 mg Oral Daily    divalproex sodium delayed-release  500 mg Oral Q12H [facility] (08/20)    fluticasone  1 spray Nasal BID    lamoTRIgine  200 mg Oral BID    levETIRAcetam  1,000 mg Oral QAM    And    levETIRAcetam  1,500 mg Oral At Bedtime    levOCARNitine  660 mg Oral TID    miconazole   Topical BID    multivitamin, therapeutic  1 tablet Oral Daily    nafcillin  2 g Intravenous Q4H    pantoprazole  40 mg Oral Daily    polyethylene glycol  17 g Oral Daily    prazosin  2 mg Oral At Bedtime    propranolol  20 mg Oral BID    QUEtiapine ER  300 mg Oral At Bedtime    sodium chloride (PF)  10 mL Intracatheter Q8H    zonisamide  100 mg Oral QAM    And    zonisamide  200 mg Oral At Bedtime     Data     Laboratory:  Recent Labs   Lab 11/30/23  0603   WBC 6.7   HGB 10.5*   HCT 35.0*   *        Recent Labs   Lab 12/06/23  0206 12/05/23  2155 12/05/23  1639 12/04/23  2139 11/30/23  0603   NA  --   --   --   --  144   POTASSIUM  --   --   --   --  3.9   CHLORIDE  --   --   --   --  101   CO2  --   --   --   --  39*   ANIONGAP  --   --   --   --  4*   * 109* 125*   < > 98   BUN  --   --   --   --  12.4   CR  --   --   --   --  0.60*   GFRESTIMATED  --   --   --   --  >90   ARNOLD  --   --   --   --  8.5*    < > = values in this interval not displayed.     No results for input(s): \"CULT\" in the last 168 hours.    Imaging:  No results found for this or any previous visit (from the past 24 hour(s)).      Praveen Tyson DO MPH  [facility] Hospitalist  [address] [place] [address]  12/06/2023   "

## 2023-12-06 NOTE — PROGRESS NOTES
VSS bipap and 2L O2 NC. AO x4 forgetful sometimes confused. Denies pain, A1 commode occasionally incontinent of stool (several loose stools this shift). Waiting TCU placement

## 2023-12-06 NOTE — PLAN OF CARE
Goal Outcome Evaluation:         VSS on bipap. On 2L NC during daytime. A&O to self and place. Assist of 1. Single lumen R. PICC in place. On nafcillin q4 hr for positive blood cultures. Denies pain. Plan to discharge to TCU once placement is available to continue IV antibiotics.

## 2023-12-07 PROCEDURE — 250N000009 HC RX 250: Performed by: INTERNAL MEDICINE

## 2023-12-07 PROCEDURE — 250N000013 HC RX MED GY IP 250 OP 250 PS 637: Performed by: HOSPITALIST

## 2023-12-07 PROCEDURE — 120N000001 HC R&B MED SURG/OB

## 2023-12-07 PROCEDURE — 99232 SBSQ HOSP IP/OBS MODERATE 35: CPT | Performed by: HOSPITALIST

## 2023-12-07 PROCEDURE — 250N000013 HC RX MED GY IP 250 OP 250 PS 637: Performed by: INTERNAL MEDICINE

## 2023-12-07 PROCEDURE — 999N000157 HC STATISTIC RCP TIME EA 10 MIN

## 2023-12-07 PROCEDURE — 94660 CPAP INITIATION&MGMT: CPT

## 2023-12-07 RX ADMIN — LEVOCARNITINE 660 MG: 330 TABLET ORAL at 13:49

## 2023-12-07 RX ADMIN — NAFCILLIN SODIUM 2 G: 2 INJECTION, POWDER, LYOPHILIZED, FOR SOLUTION INTRAMUSCULAR; INTRAVENOUS at 11:55

## 2023-12-07 RX ADMIN — MICONAZOLE NITRATE: 20 POWDER TOPICAL at 09:08

## 2023-12-07 RX ADMIN — LEVETIRACETAM 1500 MG: 500 TABLET, FILM COATED ORAL at 21:32

## 2023-12-07 RX ADMIN — POLYETHYLENE GLYCOL 3350 17 G: 17 POWDER, FOR SOLUTION ORAL at 09:07

## 2023-12-07 RX ADMIN — NAFCILLIN SODIUM 2 G: 2 INJECTION, POWDER, LYOPHILIZED, FOR SOLUTION INTRAMUSCULAR; INTRAVENOUS at 16:33

## 2023-12-07 RX ADMIN — CITALOPRAM HYDROBROMIDE 60 MG: 20 TABLET, FILM COATED ORAL at 08:57

## 2023-12-07 RX ADMIN — QUETIAPINE FUMARATE 300 MG: 300 TABLET, EXTENDED RELEASE ORAL at 21:32

## 2023-12-07 RX ADMIN — BUSPIRONE HYDROCHLORIDE 15 MG: 15 TABLET ORAL at 21:33

## 2023-12-07 RX ADMIN — LEVETIRACETAM 1000 MG: 500 TABLET, FILM COATED ORAL at 08:58

## 2023-12-07 RX ADMIN — MICONAZOLE NITRATE: 20 POWDER TOPICAL at 21:45

## 2023-12-07 RX ADMIN — THERA TABS 1 TABLET: TAB at 08:58

## 2023-12-07 RX ADMIN — ZONISAMIDE 200 MG: 100 CAPSULE ORAL at 21:33

## 2023-12-07 RX ADMIN — FLUTICASONE PROPIONATE 1 SPRAY: 50 SPRAY, METERED NASAL at 09:08

## 2023-12-07 RX ADMIN — PRAZOSIN HYDROCHLORIDE 2 MG: 1 CAPSULE ORAL at 21:33

## 2023-12-07 RX ADMIN — PROPRANOLOL HYDROCHLORIDE 20 MG: 20 TABLET ORAL at 21:32

## 2023-12-07 RX ADMIN — LEVOCARNITINE 660 MG: 330 TABLET ORAL at 21:33

## 2023-12-07 RX ADMIN — NAFCILLIN SODIUM 2 G: 2 INJECTION, POWDER, LYOPHILIZED, FOR SOLUTION INTRAMUSCULAR; INTRAVENOUS at 03:50

## 2023-12-07 RX ADMIN — ZONISAMIDE 100 MG: 100 CAPSULE ORAL at 08:58

## 2023-12-07 RX ADMIN — LAMOTRIGINE 200 MG: 200 TABLET ORAL at 21:32

## 2023-12-07 RX ADMIN — LEVOCARNITINE 660 MG: 330 TABLET ORAL at 08:57

## 2023-12-07 RX ADMIN — PROPRANOLOL HYDROCHLORIDE 20 MG: 20 TABLET ORAL at 08:57

## 2023-12-07 RX ADMIN — LAMOTRIGINE 200 MG: 200 TABLET ORAL at 08:58

## 2023-12-07 RX ADMIN — DIVALPROEX SODIUM 500 MG: 500 TABLET, DELAYED RELEASE ORAL at 08:58

## 2023-12-07 RX ADMIN — BUSPIRONE HYDROCHLORIDE 15 MG: 15 TABLET ORAL at 08:58

## 2023-12-07 RX ADMIN — NAFCILLIN SODIUM 2 G: 2 INJECTION, POWDER, LYOPHILIZED, FOR SOLUTION INTRAMUSCULAR; INTRAVENOUS at 08:57

## 2023-12-07 RX ADMIN — NAFCILLIN SODIUM 2 G: 2 INJECTION, POWDER, LYOPHILIZED, FOR SOLUTION INTRAMUSCULAR; INTRAVENOUS at 21:33

## 2023-12-07 RX ADMIN — FLUTICASONE PROPIONATE 1 SPRAY: 50 SPRAY, METERED NASAL at 21:45

## 2023-12-07 RX ADMIN — PANTOPRAZOLE SODIUM 40 MG: 40 TABLET, DELAYED RELEASE ORAL at 08:58

## 2023-12-07 RX ADMIN — DIVALPROEX SODIUM 500 MG: 500 TABLET, DELAYED RELEASE ORAL at 21:33

## 2023-12-07 ASSESSMENT — ACTIVITIES OF DAILY LIVING (ADL)
ADLS_ACUITY_SCORE: 51
ADLS_ACUITY_SCORE: 55
ADLS_ACUITY_SCORE: 55
ADLS_ACUITY_SCORE: 51
ADLS_ACUITY_SCORE: 55
ADLS_ACUITY_SCORE: 51
ADLS_ACUITY_SCORE: 51
ADLS_ACUITY_SCORE: 55
ADLS_ACUITY_SCORE: 51
ADLS_ACUITY_SCORE: 55

## 2023-12-07 NOTE — PLAN OF CARE
Goal Outcome Evaluation:       Patient alert. Confused at times. VSS, on 3L NC. Slept till noon and refused to get out of bed this shift. LS dim. BS active. Incontinent of bowel and bladder. Large BM this shift. Mepilex on buttocks, dressing change due tomorrow. On IV abx. PICC SL. Awaiting for TCU, declined from all TCUs so far. Possible stays here to complete ABX. Denies pain and nausea. No SOB noted.

## 2023-12-07 NOTE — PROGRESS NOTES
[facility]    Hospitalist Progress Note  Name: Tre Vazquez    MRN: [id number]  Provider:  Praveen Tyson DO MPH  Date of Service: 12/07/2023    Summary of Stay: Mr. Tre Vazquez is a 48 year old male w has a very complicated past medical including developmental delay, morbid obesity, ANA/OHS chronically on BiPAP at night, seizure disorder, chronic hyperammonemia, schizoaffective disorder, borderline personality, left eye blindness who presents with altered mental status.      He presented with clouding of mentation and elevated CO2 and pneumonia.  (This patient normally has a CO2 between 70 and 80 and he is ammonia is in the 60s). His current presentation is suspected to be due to possible nonadherence to treatment, though it is difficult to prove.  Patient has been a started on NIPPV in the emergency department and he has had excellent response. Per records from May of 2022, his ammonia level was elevated and considered the consequence of the use of Depakote for seizures. There is no official diagnosis of cirrhosis in this patient, he is not taking lactulose and likely not needed though he had one dose in ED.  CT of the head and chest without acute pathology.  Ultimately found to have Staph aureus bacteremia, source still unclear.  On nafcillin, discharge planning ongoing as patient will need for total weeks of IV antibiotics and group home not able to accommodate.  Awaiting TCU placement.       Problem list:  Acute on chronic hypercarbic and hypoxemic respiratory failure , stable   Patient on chronic BiPAP at night time and napping    Obesity/OHS.  Breathing comfortably.  Tolerating BiPAP overnight, on nasal cannula throughout the day when awake.  PCO2 improved with bipap therapy.  He still has intermittent confusion but is able to tell me his name is Joel.  He is asking appropriate questions including when he can leave the hospital.  He tells me his  is his mom.  For now,  continue BiPAP overnight and with naps during the day.  Supplemental oxygen as needed.  Blood cultures from admission positive for Staph aureus, for which she is on IV antibiotics..  Started on IV vancomycin now on nafcillin.  Overall respiratory status appears to be improved, doing well with BiPAP as needed.  - BiPAP at night and with naps  - RT following  - Continue to monitor respiratory status      #Staph aureus bacteremia.  Left buttock wound  Initial blood cultures positive for Staph aureus.  Started on IV vancomycin empirically, consulted infectious disease.  As far as source, has had respiratory issues prior to admission, but CT chest did not necessarily appear convincing for active pneumonia.  Also has a wound on left buttock that is open, having some serosanguineous drainage.  Somewhat firm to palpation underneath the opening on the skin, but no purulent discharge and does not necessarily feel like there is an abscess underneath the skin.  We will continue to monitor the wound as potential source as well.  Per ID, patient will need 4 weeks total IV antibiotic therapy.  PICC now in place for ongoing antibiotic administration.  Discharge planning ongoing as group home not able to accommodate patient's ongoing antibiotic need, will need TCU.  - IV nafcillin for 4 weeks of therapy (last positive culture 11/25)  - Social work informed me that the frequency of the outpatient antibiotics is complicating placement, discussed with ID and unfortunately there are no other reasonable antibiotic options  - ID consulted during hospital stay,  no longer actively following   - PICC in place, no fever or chills   - Awaiting TCU placement for ongoing antibiotics     #Hyperammonemia, chronic: Unclear cause.  Patient had one-time lactulose in the emergency department.  Initial ammonia level was 129. He received a dose of lactulose in the ER.  Ammonia level elevated at 82. Suspect related to seizure medication (Depakote).  -  "No known liver disease  - Depakote was initially held.  Case discussed with neurology and decreased Depakote to 500 mg p.o. twice daily which we will continue.   - Mental status seems stable , if worsening consider repeat VBG/ABG and ammonia level      #Acute mixed toxic/metabolic and infectious encephalopathy - improving.  Likely delirium, memory concerns: Patient and stepmother both endorse some concerns about ongoing issues with memory on 11/29.  Patient also states that he had a \"vision\" that was rather distressing to him he believes several days ago.  Discussed with them that CT imaging of the head was normal, ammonia level not significantly elevated, and the ongoing confusion is likely related to delirium that is continuing to clear.  We will continue to monitor for signs of improve mental status, could consider MRI if continues to have memory issues despite ongoing other improvement. He has already been evaluated by neurology on 11/25 with adjustment of seizure meds.   - Continue monitoring mental status.  On 12/4 and 12/5, patient is awake.  He is able to tell me his name is oJel and seems to know he is in the hospital.  He asks appropriate questions including \"when can I leave to TCU\"  and \"why can't I finish IV antibiotics here and then go back to group home.\"    - His step-mom notes that he is slow to talk.  She also notes that Joel can be manipulative at times and \"knows the system.\"    - BiPAP at bedtime  - Adjusted Depakote level as above to twice daily dosing from 3 times daily     #Seizure disorder. Stable on current medication.   -My colleague discussed with neurology earlier in hospital stay.  Neurology recommending to decrease Depakote to 500 mg twice daily, check free Depakote level, Keppra level, Lamictal level.  Orders adjusted.  Patient needs to follow-up with neurology in the outpatient setting.  - Repeat drug levels ordered on 12/4.  Depakote level a bit low. I talked to Dr. Huff on " 12/5.  Recommends continued slow wean of depakote in outpatient setting as can contribute to somnolence/slow thinking.  He has already been weaned a bit during this hospitalization as above.   - Follow-up with neurology as outpatient for further adjustment of his medications     #Essential hypertension.  On propranolol and prazosin.     #Depression/anxiety.  On Celexa and Seroquel.     #GERD.   On pantoprazole.     #Loose stools  Likely from antibiotics and recent bowel medications.  No abdominal pain.  - No indication for C. difficile or enteric panel at this time as is quite intermittent and low-volume , no abdominal pain, fever, or other signs of infection     DVT Prophylaxis: Pneumatic Compression Devices  Code Status: Full Code  Diet: Regular Diet Adult    Reid Catheter: Not present  Disposition: Expected discharge in 1-2 days to TCU. Goals prior to discharge include placement, medically stable for discharge.   Incidental Findings: As above.  Family updated today: No    30 MINUTES SPENT BY ME on the date of service doing chart review, history, exam, documentation & further activities per the note.      Interval History   The patient reports doing well. No chest pain or shortness of breath. No nausea, vomiting, diarrhea, constipation. No fevers. No other specific complaints identified.     -Data reviewed today: I personally reviewed all new labs and imaging results over the last 24 hours.     Physical Exam   Temp: 97  F (36.1  C) Temp src: Axillary BP: (!) 147/92 Pulse: 89   Resp: 19 SpO2: 96 % O2 Device: Nasal cannula Oxygen Delivery: 3 LPM  There were no vitals filed for this visit.  Vital Signs with Ranges  Temp:  [97  F (36.1  C)-98  F (36.7  C)] 97  F (36.1  C)  Pulse:  [72-89] 89  Resp:  [18-19] 19  BP: (115-157)/(58-93) 147/92  SpO2:  [92 %-97 %] 96 %  I/O last 3 completed shifts:  In: 300 [P.O.:300]  Out: 1050 [Urine:1050]    GENERAL: No apparent distress. Awake, alert, and fully oriented.  HEENT:  "Normocephalic, atraumatic. Extraocular movements intact.  CARDIOVASCULAR: Regular rate and rhythm without murmurs or rubs. No S3.  PULMONARY: Clear bilaterally.  GASTROINTESTINAL: Soft, non-tender, non-distended. Bowel sounds normoactive.   EXTREMITIES: No cyanosis or clubbing. No edema.  NEUROLOGICAL: CN 2-12 grossly intact, no focal neurological deficits.  DERMATOLOGICAL: No rash, ulcer, bruising, nor jaundice.     Medications      busPIRone  15 mg Oral BID    citalopram  60 mg Oral Daily    divalproex sodium delayed-release  500 mg Oral Q12H [facility] (08/20)    fluticasone  1 spray Nasal BID    lamoTRIgine  200 mg Oral BID    levETIRAcetam  1,000 mg Oral QAM    And    levETIRAcetam  1,500 mg Oral At Bedtime    levOCARNitine  660 mg Oral TID    miconazole   Topical BID    multivitamin, therapeutic  1 tablet Oral Daily    nafcillin  2 g Intravenous Q4H    pantoprazole  40 mg Oral Daily    polyethylene glycol  17 g Oral Daily    prazosin  2 mg Oral At Bedtime    propranolol  20 mg Oral BID    QUEtiapine ER  300 mg Oral At Bedtime    sodium chloride (PF)  10 mL Intracatheter Q8H    zonisamide  100 mg Oral QAM    And    zonisamide  200 mg Oral At Bedtime     Data     Laboratory:  No results for input(s): \"WBC\", \"HGB\", \"HCT\", \"MCV\", \"PLT\" in the last 168 hours.    Recent Labs   Lab 12/06/23  0206 12/05/23  2155 12/05/23  1639   * 109* 125*     No results for input(s): \"CULT\" in the last 168 hours.    Imaging:  No results found for this or any previous visit (from the past 24 hour(s)).      Praveen Tyson DO MPH  [facility] Hospitalist  [address] [place] [address]  12/07/2023   "

## 2023-12-07 NOTE — PLAN OF CARE
Goal Outcome Evaluation:      Plan of Care Reviewed With: patient    Overall Patient Progress: no changeOverall Patient Progress: no change       Disoriented to time and situation, intermittent confusion. 3L O2. Right PICC, on IV nafciilin. Wound care on butt and thigh. Wears Bipap at night. Had been having loose stools during day, gave prn imodium. Awaiting for TCU placement.

## 2023-12-07 NOTE — PROGRESS NOTES
Care Management Follow Up    Length of Stay (days): 13    Expected Discharge Date: 12/08/2023     Concerns to be Addressed: care coordination/care conferences, discharge planning     Patient plan of care discussed at interdisciplinary rounds: Yes    Anticipated Discharge Disposition: Group Home         Referrals Placed by CM/SW:    Private pay costs discussed: Not applicable    Additional Information:  Patient has been declined by all TCU referrals sent. The [facility] at [facility] was considering but wanted to know if there was a way to not have every 4 hour iv antibiotic dosing. They then called back to state that they realized patient had previously been there and exhibited inappropriate behavior so they cannot except. One last TCU referral sent to [facility]. Updated MD. Likely unable to find TCU placement. MD updated.    [facility] was considering taking patient. When updating patient on TCU search he stated he would not go to [facility] due to a very traumatic childhood there. Called [facility] to let them know not to work on referral further. Updated MD that patient may have to finish his antibiotics here.     Zoie Aranda RN  Care Coordinator  [facility]

## 2023-12-07 NOTE — PLAN OF CARE
Goal Outcome Evaluation:      Plan of Care Reviewed With: patient    Overall Patient Progress: no changeOverall Patient Progress: no change    Alert, with cognitive delay. Denies any sob, pain and nausea. LS clear. Right PICC clean dry intact blood return noted, abx given twice on my shift otherwise its SL. BS normoactive. Wound care on butt and thigh. Using cpap @ night with 3L O2. Using urinal @ bedside. Not oob during my shift. Tolerating regular diet.    Major shift events: none     Plan: Awaiting TCU placement.

## 2023-12-08 PROCEDURE — 999N000157 HC STATISTIC RCP TIME EA 10 MIN

## 2023-12-08 PROCEDURE — 250N000013 HC RX MED GY IP 250 OP 250 PS 637: Performed by: HOSPITALIST

## 2023-12-08 PROCEDURE — 250N000013 HC RX MED GY IP 250 OP 250 PS 637: Performed by: INTERNAL MEDICINE

## 2023-12-08 PROCEDURE — 120N000001 HC R&B MED SURG/OB

## 2023-12-08 PROCEDURE — 94660 CPAP INITIATION&MGMT: CPT

## 2023-12-08 PROCEDURE — 250N000011 HC RX IP 250 OP 636: Performed by: INTERNAL MEDICINE

## 2023-12-08 PROCEDURE — 99232 SBSQ HOSP IP/OBS MODERATE 35: CPT | Performed by: HOSPITALIST

## 2023-12-08 PROCEDURE — 250N000009 HC RX 250: Performed by: INTERNAL MEDICINE

## 2023-12-08 RX ORDER — HYDRALAZINE HYDROCHLORIDE 20 MG/ML
10 INJECTION INTRAMUSCULAR; INTRAVENOUS EVERY 4 HOURS PRN
Status: DISCONTINUED | OUTPATIENT
Start: 2023-12-08 | End: 2023-12-18 | Stop reason: HOSPADM

## 2023-12-08 RX ADMIN — LEVETIRACETAM 1000 MG: 500 TABLET, FILM COATED ORAL at 08:57

## 2023-12-08 RX ADMIN — CITALOPRAM HYDROBROMIDE 60 MG: 20 TABLET, FILM COATED ORAL at 08:57

## 2023-12-08 RX ADMIN — ZONISAMIDE 200 MG: 100 CAPSULE ORAL at 21:51

## 2023-12-08 RX ADMIN — NAFCILLIN SODIUM 2 G: 2 INJECTION, POWDER, LYOPHILIZED, FOR SOLUTION INTRAMUSCULAR; INTRAVENOUS at 01:01

## 2023-12-08 RX ADMIN — BUSPIRONE HYDROCHLORIDE 15 MG: 15 TABLET ORAL at 20:45

## 2023-12-08 RX ADMIN — PRAZOSIN HYDROCHLORIDE 2 MG: 1 CAPSULE ORAL at 21:51

## 2023-12-08 RX ADMIN — PROPRANOLOL HYDROCHLORIDE 20 MG: 20 TABLET ORAL at 20:45

## 2023-12-08 RX ADMIN — NAFCILLIN SODIUM 2 G: 2 INJECTION, POWDER, LYOPHILIZED, FOR SOLUTION INTRAMUSCULAR; INTRAVENOUS at 08:52

## 2023-12-08 RX ADMIN — ZONISAMIDE 100 MG: 100 CAPSULE ORAL at 08:57

## 2023-12-08 RX ADMIN — LEVOCARNITINE 660 MG: 330 TABLET ORAL at 20:44

## 2023-12-08 RX ADMIN — NAFCILLIN SODIUM 2 G: 2 INJECTION, POWDER, LYOPHILIZED, FOR SOLUTION INTRAMUSCULAR; INTRAVENOUS at 15:49

## 2023-12-08 RX ADMIN — MICONAZOLE NITRATE: 20 POWDER TOPICAL at 20:52

## 2023-12-08 RX ADMIN — LEVETIRACETAM 1500 MG: 500 TABLET, FILM COATED ORAL at 21:50

## 2023-12-08 RX ADMIN — FLUTICASONE PROPIONATE 1 SPRAY: 50 SPRAY, METERED NASAL at 08:58

## 2023-12-08 RX ADMIN — NAFCILLIN SODIUM 2 G: 2 INJECTION, POWDER, LYOPHILIZED, FOR SOLUTION INTRAMUSCULAR; INTRAVENOUS at 12:20

## 2023-12-08 RX ADMIN — LAMOTRIGINE 200 MG: 200 TABLET ORAL at 20:45

## 2023-12-08 RX ADMIN — NAFCILLIN SODIUM 2 G: 2 INJECTION, POWDER, LYOPHILIZED, FOR SOLUTION INTRAMUSCULAR; INTRAVENOUS at 20:46

## 2023-12-08 RX ADMIN — DIVALPROEX SODIUM 500 MG: 500 TABLET, DELAYED RELEASE ORAL at 20:45

## 2023-12-08 RX ADMIN — DIVALPROEX SODIUM 500 MG: 500 TABLET, DELAYED RELEASE ORAL at 08:57

## 2023-12-08 RX ADMIN — QUETIAPINE FUMARATE 300 MG: 300 TABLET, EXTENDED RELEASE ORAL at 21:51

## 2023-12-08 RX ADMIN — LAMOTRIGINE 200 MG: 200 TABLET ORAL at 08:57

## 2023-12-08 RX ADMIN — LEVOCARNITINE 660 MG: 330 TABLET ORAL at 13:26

## 2023-12-08 RX ADMIN — PROPRANOLOL HYDROCHLORIDE 20 MG: 20 TABLET ORAL at 08:57

## 2023-12-08 RX ADMIN — LEVOCARNITINE 660 MG: 330 TABLET ORAL at 08:57

## 2023-12-08 RX ADMIN — HYDRALAZINE HYDROCHLORIDE 10 MG: 20 INJECTION INTRAMUSCULAR; INTRAVENOUS at 01:42

## 2023-12-08 RX ADMIN — NAFCILLIN SODIUM 2 G: 2 INJECTION, POWDER, LYOPHILIZED, FOR SOLUTION INTRAMUSCULAR; INTRAVENOUS at 05:15

## 2023-12-08 RX ADMIN — MICONAZOLE NITRATE: 20 POWDER TOPICAL at 08:58

## 2023-12-08 RX ADMIN — THERA TABS 1 TABLET: TAB at 08:56

## 2023-12-08 RX ADMIN — PANTOPRAZOLE SODIUM 40 MG: 40 TABLET, DELAYED RELEASE ORAL at 08:57

## 2023-12-08 RX ADMIN — BUSPIRONE HYDROCHLORIDE 15 MG: 15 TABLET ORAL at 08:57

## 2023-12-08 ASSESSMENT — ACTIVITIES OF DAILY LIVING (ADL)
ADLS_ACUITY_SCORE: 55
ADLS_ACUITY_SCORE: 55
ADLS_ACUITY_SCORE: 57
ADLS_ACUITY_SCORE: 55
ADLS_ACUITY_SCORE: 51
ADLS_ACUITY_SCORE: 51
ADLS_ACUITY_SCORE: 55
ADLS_ACUITY_SCORE: 57

## 2023-12-08 NOTE — PROGRESS NOTES
[facility]    Hospitalist Progress Note  Name: Tre Vazquez    MRN: [id number]  Provider:  Praveen Tyson DO MPH  Date of Service: 12/08/2023    Summary of Stay: Mr. Tre Vazquez is a 48 year old male w has a very complicated past medical including developmental delay, morbid obesity, ANA/OHS chronically on BiPAP at night, seizure disorder, chronic hyperammonemia, schizoaffective disorder, borderline personality, left eye blindness who presents with altered mental status.      He presented with clouding of mentation and elevated CO2 and pneumonia.  (This patient normally has a CO2 between 70 and 80 and he is ammonia is in the 60s). His current presentation is suspected to be due to possible nonadherence to treatment, though it is difficult to prove.  Patient has been a started on NIPPV in the emergency department and he has had excellent response. Per records from May of 2022, his ammonia level was elevated and considered the consequence of the use of Depakote for seizures. There is no official diagnosis of cirrhosis in this patient, he is not taking lactulose and likely not needed though he had one dose in ED.  CT of the head and chest without acute pathology.  Ultimately found to have Staph aureus bacteremia, source still unclear.  On nafcillin, discharge planning ongoing as patient will need for total weeks of IV antibiotics and group home not able to accommodate.  Awaiting TCU placement, discussed with SW and due to a variety of factors he will likely need to remain hospitalized until antibiotics are completed as no facility is able/willing to accept him.       Problem list:  Acute on chronic hypercarbic and hypoxemic respiratory failure , stable   Patient on chronic BiPAP at night time and napping    Obesity/OHS.  Breathing comfortably.  Tolerating BiPAP overnight, on nasal cannula throughout the day when awake.  PCO2 improved with bipap therapy.  He still has intermittent confusion but  is able to tell me his name is Joel.  He is asking appropriate questions including when he can leave the hospital.  He tells me his  is his mom.  For now, continue BiPAP overnight and with naps during the day.  Supplemental oxygen as needed.  Blood cultures from admission positive for Staph aureus, for which she is on IV antibiotics..  Started on IV vancomycin now on nafcillin.  Overall respiratory status appears to be improved, doing well with BiPAP as needed.  - BiPAP at night and with naps  - RT following  - Continue to monitor respiratory status      #Staph aureus bacteremia.  Left buttock wound  Initial blood cultures positive for Staph aureus.  Started on IV vancomycin empirically, consulted infectious disease.  As far as source, has had respiratory issues prior to admission, but CT chest did not necessarily appear convincing for active pneumonia.  Also has a wound on left buttock that is open, having some serosanguineous drainage.  Somewhat firm to palpation underneath the opening on the skin, but no purulent discharge and does not necessarily feel like there is an abscess underneath the skin.  We will continue to monitor the wound as potential source as well.  Per ID, patient will need 4 weeks total IV antibiotic therapy.  PICC now in place for ongoing antibiotic administration.  Discharge planning ongoing as group home not able to accommodate patient's ongoing antibiotic need, will need TCU.  - IV nafcillin for 4 weeks of therapy (last positive culture 11/25)  - Social work informed me that the frequency of the outpatient antibiotics is complicating placement, discussed with ID and unfortunately there are no other reasonable antibiotic options  - ID consulted during hospital stay,  no longer actively following   - PICC in place, no fever or chills   - Awaiting TCU placement for ongoing antibiotics     #Hyperammonemia, chronic: Unclear cause.  Patient had one-time lactulose in the emergency  "department.  Initial ammonia level was 129. He received a dose of lactulose in the ER.  Ammonia level elevated at 82. Suspect related to seizure medication (Depakote).  - No known liver disease  - Depakote was initially held.  Case discussed with neurology and decreased Depakote to 500 mg p.o. twice daily which we will continue.   - Mental status seems stable , if worsening consider repeat VBG/ABG and ammonia level      #Acute mixed toxic/metabolic and infectious encephalopathy - improving.  Likely delirium, memory concerns: Patient and stepmother both endorse some concerns about ongoing issues with memory on 11/29.  Patient also states that he had a \"vision\" that was rather distressing to him he believes several days ago.  Discussed with them that CT imaging of the head was normal, ammonia level not significantly elevated, and the ongoing confusion is likely related to delirium that is continuing to clear.  We will continue to monitor for signs of improve mental status, could consider MRI if continues to have memory issues despite ongoing other improvement. He has already been evaluated by neurology on 11/25 with adjustment of seizure meds.   - Continue monitoring mental status.  On 12/4 and 12/5, patient is awake.  He is able to tell me his name is Joel and seems to know he is in the hospital.  He asks appropriate questions including \"when can I leave to TCU\"  and \"why can't I finish IV antibiotics here and then go back to group home.\"    - His step-mom notes that he is slow to talk.  She also notes that Joel can be manipulative at times and \"knows the system.\"    - BiPAP at bedtime  - Adjusted Depakote level as above to twice daily dosing from 3 times daily     #Seizure disorder. Stable on current medication.   -My colleague discussed with neurology earlier in hospital stay.  Neurology recommending to decrease Depakote to 500 mg twice daily, check free Depakote level, Keppra level, Lamictal level.  Orders " adjusted.  Patient needs to follow-up with neurology in the outpatient setting.  - Repeat drug levels ordered on 12/4.  Depakote level a bit low. I talked to Dr. Huff on 12/5.  Recommends continued slow wean of depakote in outpatient setting as can contribute to somnolence/slow thinking.  He has already been weaned a bit during this hospitalization as above.   - Follow-up with neurology as outpatient for further adjustment of his medications     #Essential hypertension.  On propranolol and prazosin.     #Depression/anxiety.  On Celexa and Seroquel.     #GERD.   On pantoprazole.     #Loose stools  Likely from antibiotics and recent bowel medications.  No abdominal pain.  - No indication for C. difficile or enteric panel at this time as is quite intermittent and low-volume , no abdominal pain, fever, or other signs of infection     DVT Prophylaxis: Pneumatic Compression Devices  Code Status: Full Code  Diet: Regular Diet Adult    Reid Catheter: Not present  Disposition: Expected discharge in 1-2 days to TCU. Goals prior to discharge include placement, medically stable for discharge.   Incidental Findings: As above.  Family updated today: No    30 MINUTES SPENT BY ME on the date of service doing chart review, history, exam, documentation & further activities per the note.      Interval History   The patient reports doing well. Laying in bed, just woke up. No chest pain or shortness of breath. No nausea, vomiting, diarrhea, constipation. No fevers. No other specific complaints identified.     -Data reviewed today: I personally reviewed all new labs and imaging results over the last 24 hours.     Physical Exam   Temp: 98.8  F (37.1  C) Temp src: Oral BP: 106/53 Pulse: 74   Resp: 15 SpO2: 96 % O2 Device: BiPAP/CPAP Oxygen Delivery: 3 LPM  There were no vitals filed for this visit.  Vital Signs with Ranges  Temp:  [98.4  F (36.9  C)-98.8  F (37.1  C)] 98.8  F (37.1  C)  Pulse:  [74-77] 74  Resp:  [15-21] 15  BP:  "(106-240)/() 106/53  FiO2 (%):  [40 %] 40 %  SpO2:  [96 %-98 %] 96 %  I/O last 3 completed shifts:  In: 10 [I.V.:10]  Out: 525 [Urine:525]    GENERAL: No apparent distress. Awake, alert, and fully oriented.  HEENT: Normocephalic, atraumatic. Extraocular movements intact.  CARDIOVASCULAR: Regular rate and rhythm without murmurs or rubs. No S3.  PULMONARY: Clear bilaterally.  GASTROINTESTINAL: Soft, non-tender, non-distended. Bowel sounds normoactive.   EXTREMITIES: No cyanosis or clubbing. No edema.  NEUROLOGICAL: CN 2-12 grossly intact, no focal neurological deficits.  DERMATOLOGICAL: No rash, ulcer, bruising, nor jaundice.     Medications      busPIRone  15 mg Oral BID    citalopram  60 mg Oral Daily    divalproex sodium delayed-release  500 mg Oral Q12H [facility] (08/20)    fluticasone  1 spray Nasal BID    lamoTRIgine  200 mg Oral BID    levETIRAcetam  1,000 mg Oral QAM    And    levETIRAcetam  1,500 mg Oral At Bedtime    levOCARNitine  660 mg Oral TID    miconazole   Topical BID    multivitamin, therapeutic  1 tablet Oral Daily    nafcillin  2 g Intravenous Q4H    pantoprazole  40 mg Oral Daily    polyethylene glycol  17 g Oral Daily    prazosin  2 mg Oral At Bedtime    propranolol  20 mg Oral BID    QUEtiapine ER  300 mg Oral At Bedtime    sodium chloride (PF)  10 mL Intracatheter Q8H    zonisamide  100 mg Oral QAM    And    zonisamide  200 mg Oral At Bedtime     Data     Laboratory:  No results for input(s): \"WBC\", \"HGB\", \"HCT\", \"MCV\", \"PLT\" in the last 168 hours.    Recent Labs   Lab 12/06/23  0206 12/05/23  2155 12/05/23  1639   * 109* 125*     No results for input(s): \"CULT\" in the last 168 hours.    Imaging:  No results found for this or any previous visit (from the past 24 hour(s)).      Praveen Tyson DO MPH  [facility] Hospitalist  [address] [place] [address]  12/08/2023   "

## 2023-12-08 NOTE — PLAN OF CARE
Goal Outcome Evaluation:      Plan of Care Reviewed With: patient    Overall Patient Progress: no change    A&OX2. Disoriented to time and situation. Forgetful. Flat affect/verbally abusive/some racial slurrs.  Ls diminished, but clear. Up with assist of 1 to bedside commode and to recliner. Oxygen 97% on 3L NC. On BIPAP while in bed and with sleep. Incontinent of B&B. Redness to coccyx. Denied pain and discomfort. On IV abx Nafcillin Q4 hours. Right Single Lumen returns blood. Continue monitoring.

## 2023-12-08 NOTE — PROGRESS NOTES
Respiratory Therapy Note    Patient seen on BiPAP therapy this afternoon. Pt on a BiPAP of 12/6 @ 40% was applied to the pt via the mask while asleep.  The bridge of the nose looks good and remains intact. Pt is tolerating it well. Will continue to monitor and assess the pt's current respiratory status and needs.    Valencia Reyes, RT  6:57 PM December 7, 2023

## 2023-12-08 NOTE — PROGRESS NOTES
CLINICAL NUTRITION SERVICES - REASSESSMENT NOTE    Recommendations Ordered by Registered Dietitian (RD):   Diet per MD   Ordered Ensure Max at 2pm - wound healing   Continue MVI+M as ordered - wound healing    Malnutrition:   % Weight Loss:  Unable to determine - no weight during this admission - ordered   % Intake:  Decreased intake does not meet criteria for malnutrition   Subcutaneous Fat Loss:  None observed  Muscle Loss:  None observed  Fluid Retention:  mild    Malnutrition Diagnosis: Patient does not meet two of the above criteria necessary for diagnosing malnutrition     EVALUATION OF PROGRESS TOWARD GOALS   Diet: Regular Diet     Intake/Tolerance: Upon review of the flowsheets, pt is consuming % of meals ordered. One meal documented at 0% (pt refused). Ordering 1-2 meals per day. On average is ordering anywhere from 0372-8327 kcal and  g protein per day.     Spoke with patient, unable to provide name and ? Of note patient is disoriented to time and situation. Discussed oral nutritional supplement options to help with wound healing. Plan to order Ensure Max (chocolate). Continue MVI+M as ordered.     ASSESSED NUTRITION NEEDS:  Dosing Weight 124.7 kg   Estimated Energy Needs: 7249-4137 kcals (15-20 Kcal/Kg)  Justification: minimum maintenance and obese  Estimated Protein Needs: 100-125 grams protein (0.8-1 g pro/Kg)  Justification: maintenance, wound healing   Estimated Fluid Needs: per MD     NEW FINDINGS:   - Medically stable for discharge, awaiting placement   - WOC following - left IT (PI, unstageable); bilateral buttocks (PI, stage 2). Both present on admission.   - labs:  Labs:  Electrolytes  Potassium (mmol/L)   Date Value   2023 3.9   2023 4.0   2023 4.0   2022 4.4   2022 3.9   2021 3.9   2013 4.5   12/10/2013 4.3   2013 3.7     Phosphorus (mg/dL)   Date Value   2023 4.1   2023 2.5   2023 2.6   2023 2.6   2023  2.3 (L)    Blood Glucose  Glucose (mg/dL)   Date Value   06/20/2022 101 (H)   05/05/2022 81   12/08/2021 100 (H)   09/01/2021 88   08/31/2021 101 (H)   12/20/2013 94   12/10/2013 75   11/25/2013 100 (H)   11/24/2013 107 (H)   11/23/2013 106 (H)     GLUCOSE BY METER POCT (mg/dL)   Date Value   12/06/2023 114 (H)   12/05/2023 109 (H)   12/05/2023 125 (H)   12/05/2023 103 (H)   12/05/2023 101 (H)     Hemoglobin A1C (%)   Date Value   06/22/2008 5.3    Inflammatory Markers  WBC (10e9/L)   Date Value   12/20/2013 7.4   12/10/2013 5.6   11/25/2013 8.1     WBC Count (10e3/uL)   Date Value   11/30/2023 6.7   11/29/2023 6.3   11/26/2023 6.7     Albumin (g/dL)   Date Value   11/26/2023 3.4 (L)   11/24/2023 3.8   08/23/2023 4.3   06/20/2022 3.4   05/05/2022 3.5   08/31/2021 2.8 (L)   12/10/2013 4.4   11/23/2013 3.0 (L)   11/22/2013 3.0 (L)      Magnesium (mg/dL)   Date Value   05/22/2023 1.4 (L)   03/29/2023 1.6 (L)   03/28/2023 2.1   11/22/2013 1.8   11/21/2013 1.1 (L)   11/21/2013 1.1 (L)     Sodium (mmol/L)   Date Value   11/30/2023 144   11/29/2023 143   11/27/2023 145   12/20/2013 142   12/10/2013 143   11/25/2013 146 (H)    Renal  Urea Nitrogen (mg/dL)   Date Value   11/30/2023 12.4   11/29/2023 11.7   11/27/2023 12.1   06/20/2022 17   05/05/2022 14   12/08/2021 16   12/20/2013 19   12/10/2013 19   11/25/2013 11     Creatinine (mg/dL)   Date Value   11/30/2023 0.60 (L)   11/29/2023 0.62 (L)   11/28/2023 0.65 (L)   12/20/2013 1.03   12/10/2013 1.14   11/25/2013 0.75     Additional  Triglycerides (mg/dL)   Date Value   03/18/2023 146   03/15/2023 239 (H)   02/22/2013 119     Ketones Urine (mg/dL)   Date Value   11/26/2023 Negative   11/28/2011 Negative        - medications:   busPIRone  15 mg Oral BID    citalopram  60 mg Oral Daily    divalproex sodium delayed-release  500 mg Oral Q12H [facility] (08/20)    fluticasone  1 spray Nasal BID    lamoTRIgine  200 mg Oral BID    levETIRAcetam  1,000 mg Oral QAM    And    levETIRAcetam   1,500 mg Oral At Bedtime    levOCARNitine  660 mg Oral TID    miconazole   Topical BID    multivitamin, therapeutic  1 tablet Oral Daily    nafcillin  2 g Intravenous Q4H    pantoprazole  40 mg Oral Daily    polyethylene glycol  17 g Oral Daily    prazosin  2 mg Oral At Bedtime    propranolol  20 mg Oral BID    QUEtiapine ER  300 mg Oral At Bedtime    sodium chloride (PF)  10 mL Intracatheter Q8H    zonisamide  100 mg Oral QAM    And    zonisamide  200 mg Oral At Bedtime         hydrALAZINE, lidocaine 4%, lidocaine (buffered or not buffered), loperamide, naloxone **OR** naloxone **OR** naloxone **OR** naloxone, ondansetron, sodium chloride (PF), sodium chloride (PF), sodium chloride (PF)     - weight: ordered new weight   There were no vitals filed for this visit.    Previous Goals:   None to comment on   Evaluation: Unable to evaluate    Previous Nutrition Diagnosis:   None to comment on   Evaluation: unable to evaluate     CURRENT NUTRITION DIAGNOSIS  Predicted inadequate nutrient intake related to potential for PO intake to decline pending clinical course      INTERVENTIONS  Recommendations / Nutrition Prescription  Diet per MD   Ordered Ensure Max at 2pm - wound healing   Continue MVI+M as ordered - wound healing     Implementation  Medical Food Supplement and Multivitamin/Mineral: as above     Goals  Patient to consume >/=75% of meals ordered TID or the equivalent in oral nutritional supplements     MONITORING AND EVALUATION:  Progress towards goals will be monitored and evaluated per protocol and Practice Guidelines    Catherine Antonio RD, LD  Clinical Dietitian     3rd floor/ICU: [phone]  All other floors: [phone]  Weekend/holiday: [phone]  Office: [phone]

## 2023-12-08 NOTE — PLAN OF CARE
No c/o pain.  Oriented to self disoriented to time and situation.  Irritable and demanding with cares at times. Ambulates with assist 1 gait belt and walker.  Lungs diminished, clear.  94% 5L O2.  Bi-pap when sleeping. No tele.  Regular diet.  Incont bladder. Incont bowel.  Wound care per plan of care.Continue IV antibiotics for 2 weeks before returning to group home.

## 2023-12-08 NOTE — PLAN OF CARE
Goal Outcome Evaluation:      Plan of Care Reviewed With: patient      Shift: 11pm-7am    Vitals: Temp: 98.4  F (36.9  C) Temp src: Oral BP: (!) 172/79 Pulse: 77   Resp: 20 SpO2: 97 % O2 Device: BiPAP/CPAP Oxygen Delivery: 3 LPM     Orientation: alert with confusion   Pain: denies   Activity: assist x 1 w/ walker (refuses gb)   Resp: on 3L NC during day, Bipap at night   Diet: reg diet   How to take meds: whole w/ fluids   GI & : continent of urine, incontinent of bowel   Skin: mepilex on left buttock, bandage on right back of thigh   Lines: PICC on right arm   Other: no behaviors this shift, BP was 240/128 PRN hydralazine given

## 2023-12-08 NOTE — PLAN OF CARE
Goal Outcome Evaluation:           Overall Patient Progress: no changeOverall Patient Progress: no change    Outcome Evaluation: .stable     Provided care [id number]    Vitals VSS except BP slightly elevated   Neuro Alert. Disoriented to time and situation   Respiratory 98% on 5 LPM NC. BiPAP overnight   GI/ Incontinent. Large loose bm x2  Skin Scattered scabs. Wound at L buttock, foam covering   LDAs Single lumen PICC SL/ IV ABX for 2 weeks   Diet Regular   Activity A1 gb walker   Plan Continue with ABX treatment. Continue to monitor O2 needs.

## 2023-12-08 NOTE — PLAN OF CARE
"Goal Outcome Evaluation:    Plan of Care Reviewed With: patient    Overall Patient Progress: no change    Disoriented x4, but RN noticed pt was searching for words and seemed like he was intentionally telling RN wrong answers. When asked what his name was, he said, \"Everyone keeps calling me Joel so it must be Joel.\" Pt would then remember events such as losing his memory and would remember this RN's name, along with other staff members. Denies pain. A1 w/ walker, refuses gb. 3L NC, BiPAP at night, LS: dim. No tele. Continent of B&B at times, purposely had BM w/o alerting staff beforehand as payback for \"being mean.\" On Nafcillin q4h. PICC line patent. Pt will likely need to finish abx in hospital as no TCU will accept pt or pt is refusing certain locations.   "

## 2023-12-09 PROCEDURE — 94660 CPAP INITIATION&MGMT: CPT

## 2023-12-09 PROCEDURE — 250N000013 HC RX MED GY IP 250 OP 250 PS 637: Performed by: HOSPITALIST

## 2023-12-09 PROCEDURE — 250N000009 HC RX 250: Performed by: INTERNAL MEDICINE

## 2023-12-09 PROCEDURE — 99232 SBSQ HOSP IP/OBS MODERATE 35: CPT | Performed by: HOSPITALIST

## 2023-12-09 PROCEDURE — 250N000013 HC RX MED GY IP 250 OP 250 PS 637: Performed by: INTERNAL MEDICINE

## 2023-12-09 PROCEDURE — 999N000157 HC STATISTIC RCP TIME EA 10 MIN

## 2023-12-09 PROCEDURE — 120N000001 HC R&B MED SURG/OB

## 2023-12-09 RX ADMIN — PANTOPRAZOLE SODIUM 40 MG: 40 TABLET, DELAYED RELEASE ORAL at 09:39

## 2023-12-09 RX ADMIN — LEVOCARNITINE 660 MG: 330 TABLET ORAL at 09:39

## 2023-12-09 RX ADMIN — LAMOTRIGINE 200 MG: 200 TABLET ORAL at 09:38

## 2023-12-09 RX ADMIN — DIVALPROEX SODIUM 500 MG: 500 TABLET, DELAYED RELEASE ORAL at 09:39

## 2023-12-09 RX ADMIN — PROPRANOLOL HYDROCHLORIDE 20 MG: 20 TABLET ORAL at 21:06

## 2023-12-09 RX ADMIN — LEVETIRACETAM 1000 MG: 500 TABLET, FILM COATED ORAL at 09:39

## 2023-12-09 RX ADMIN — MICONAZOLE NITRATE: 20 POWDER TOPICAL at 09:40

## 2023-12-09 RX ADMIN — PROPRANOLOL HYDROCHLORIDE 20 MG: 20 TABLET ORAL at 09:40

## 2023-12-09 RX ADMIN — NAFCILLIN SODIUM 2 G: 2 INJECTION, POWDER, LYOPHILIZED, FOR SOLUTION INTRAMUSCULAR; INTRAVENOUS at 03:39

## 2023-12-09 RX ADMIN — LEVOCARNITINE 660 MG: 330 TABLET ORAL at 13:13

## 2023-12-09 RX ADMIN — NAFCILLIN SODIUM 2 G: 2 INJECTION, POWDER, LYOPHILIZED, FOR SOLUTION INTRAMUSCULAR; INTRAVENOUS at 09:36

## 2023-12-09 RX ADMIN — THERA TABS 1 TABLET: TAB at 09:39

## 2023-12-09 RX ADMIN — NAFCILLIN SODIUM 2 G: 2 INJECTION, POWDER, LYOPHILIZED, FOR SOLUTION INTRAMUSCULAR; INTRAVENOUS at 13:13

## 2023-12-09 RX ADMIN — ZONISAMIDE 200 MG: 100 CAPSULE ORAL at 21:04

## 2023-12-09 RX ADMIN — NAFCILLIN SODIUM 2 G: 2 INJECTION, POWDER, LYOPHILIZED, FOR SOLUTION INTRAMUSCULAR; INTRAVENOUS at 17:37

## 2023-12-09 RX ADMIN — ZONISAMIDE 100 MG: 100 CAPSULE ORAL at 09:40

## 2023-12-09 RX ADMIN — QUETIAPINE FUMARATE 300 MG: 300 TABLET, EXTENDED RELEASE ORAL at 21:05

## 2023-12-09 RX ADMIN — LEVOCARNITINE 660 MG: 330 TABLET ORAL at 21:05

## 2023-12-09 RX ADMIN — NAFCILLIN SODIUM 2 G: 2 INJECTION, POWDER, LYOPHILIZED, FOR SOLUTION INTRAMUSCULAR; INTRAVENOUS at 21:04

## 2023-12-09 RX ADMIN — CITALOPRAM HYDROBROMIDE 60 MG: 20 TABLET, FILM COATED ORAL at 09:38

## 2023-12-09 RX ADMIN — LAMOTRIGINE 200 MG: 200 TABLET ORAL at 21:05

## 2023-12-09 RX ADMIN — LEVETIRACETAM 1500 MG: 500 TABLET, FILM COATED ORAL at 21:04

## 2023-12-09 RX ADMIN — PRAZOSIN HYDROCHLORIDE 2 MG: 1 CAPSULE ORAL at 21:05

## 2023-12-09 RX ADMIN — NAFCILLIN SODIUM 2 G: 2 INJECTION, POWDER, LYOPHILIZED, FOR SOLUTION INTRAMUSCULAR; INTRAVENOUS at 01:00

## 2023-12-09 RX ADMIN — BUSPIRONE HYDROCHLORIDE 15 MG: 15 TABLET ORAL at 21:06

## 2023-12-09 RX ADMIN — BUSPIRONE HYDROCHLORIDE 15 MG: 15 TABLET ORAL at 09:39

## 2023-12-09 RX ADMIN — DIVALPROEX SODIUM 500 MG: 500 TABLET, DELAYED RELEASE ORAL at 21:05

## 2023-12-09 ASSESSMENT — ACTIVITIES OF DAILY LIVING (ADL)
ADLS_ACUITY_SCORE: 57
ADLS_ACUITY_SCORE: 53
ADLS_ACUITY_SCORE: 57
ADLS_ACUITY_SCORE: 57
ADLS_ACUITY_SCORE: 53
ADLS_ACUITY_SCORE: 57
ADLS_ACUITY_SCORE: 57
ADLS_ACUITY_SCORE: 53
ADLS_ACUITY_SCORE: 57
ADLS_ACUITY_SCORE: 53

## 2023-12-09 NOTE — PLAN OF CARE
[id number]    VSS on BiPAP 40% FiO2/3-5L via NC x HTN. Alert, disoriented x4, unclear if actually disoriented - refuses to answer orientation questions but will remember other details about care, life, etc. Incontinent at times, behavioral. R PICC WDL, Q4 IV abx. Reg diet. Up A2 GB W to commode, not OOB this shift. Wounds per LDA. Continue w/ POC.       BP (!) 153/86   Pulse 78   Temp 98.5  F (36.9  C) (Oral)   Resp 16   Wt 136.1 kg (300 lb)   SpO2 97%   BMI 46.99 kg/m

## 2023-12-09 NOTE — PROGRESS NOTES
A BiPAP of  12/6 @ 40% was applied to the pt via the mask for NOC use. The bridge of the nose looks good and remains intact. Pt is tolerating it well. Will continue to monitor and assess the pt's current respiratory status and needs.    Filippo Lamb RT on 12/9/2023 at 5:53 AM

## 2023-12-09 NOTE — PLAN OF CARE
15:05 Shift Summary: 7-3: No c/o pain.  Oriented to self disoriented to time and situation.  Irritable and demanding with cares at times. Ambulates with assist 1 gait belt and walker.  Lungs diminished, clear.  96% 4L O2.  Bi-pap when sleeping. No tele.  Regular diet.  Uses urinal for voiding.  Incont bowel at times. PICC line right upper arm.  Wound care per plan of care. Continue IV antibiotics for 2 weeks before returning to group home.

## 2023-12-09 NOTE — PLAN OF CARE
[id number]: Pt did not go to sleep until 3AM. Frequently using call light q10 minutes and calling for multiple request (back scratches, pillow, table, &fan adjustments; also requested on and off bipap mask.) Disoriented to situation and place. Up Ax1 to BR. Denies pain. On nafcillin q4h. R PICC single lumen saline locked.

## 2023-12-09 NOTE — PROGRESS NOTES
[facility]    Hospitalist Progress Note  Name: Tre Vazquez    MRN: [id number]  Provider:  Praveen Tyson DO MPH  Date of Service: 12/09/2023    Summary of Stay: Mr. Tre Vazquez is a 48 year old male w has a very complicated past medical including developmental delay, morbid obesity, ANA/OHS chronically on BiPAP at night, seizure disorder, chronic hyperammonemia, schizoaffective disorder, borderline personality, left eye blindness who presents with altered mental status.      He presented with clouding of mentation and elevated CO2 and pneumonia.  (This patient normally has a CO2 between 70 and 80 and he is ammonia is in the 60s). His current presentation is suspected to be due to possible nonadherence to treatment, though it is difficult to prove.  Patient has been a started on NIPPV in the emergency department and he has had excellent response. Per records from May of 2022, his ammonia level was elevated and considered the consequence of the use of Depakote for seizures. There is no official diagnosis of cirrhosis in this patient, he is not taking lactulose and likely not needed though he had one dose in ED.  CT of the head and chest without acute pathology.  Ultimately found to have Staph aureus bacteremia, source still unclear.  On nafcillin, discharge planning ongoing as patient will need for total weeks of IV antibiotics and group home not able to accommodate.  Awaiting TCU placement, discussed with SW and due to a variety of factors he will likely need to remain hospitalized until antibiotics are completed as no facility is able/willing to accept him.       Problem list:  Acute on chronic hypercarbic and hypoxemic respiratory failure , stable   Patient on chronic BiPAP at night time and napping    Obesity/OHS.  Breathing comfortably.  Tolerating BiPAP overnight, on nasal cannula throughout the day when awake.  PCO2 improved with bipap therapy.  He still has intermittent confusion but  is able to tell me his name is Joel.  He is asking appropriate questions including when he can leave the hospital.  He tells me his  is his mom.  For now, continue BiPAP overnight and with naps during the day.  Supplemental oxygen as needed.  Blood cultures from admission positive for Staph aureus, for which she is on IV antibiotics..  Started on IV vancomycin now on nafcillin.  Overall respiratory status appears to be improved, doing well with BiPAP as needed.  - BiPAP at night and with naps  - RT following  - Continue to monitor respiratory status      #Staph aureus bacteremia.  Left buttock wound  Initial blood cultures positive for Staph aureus.  Started on IV vancomycin empirically, consulted infectious disease.  As far as source, has had respiratory issues prior to admission, but CT chest did not necessarily appear convincing for active pneumonia.  Also has a wound on left buttock that is open, having some serosanguineous drainage.  Somewhat firm to palpation underneath the opening on the skin, but no purulent discharge and does not necessarily feel like there is an abscess underneath the skin.  We will continue to monitor the wound as potential source as well.  Per ID, patient will need 4 weeks total IV antibiotic therapy.  PICC now in place for ongoing antibiotic administration.  Discharge planning ongoing as group home not able to accommodate patient's ongoing antibiotic need, will need TCU.  - IV nafcillin for 4 weeks of therapy (last positive culture 11/25)  - Social work informed me that the frequency of the outpatient antibiotics is complicating placement, discussed with ID and unfortunately there are no other reasonable antibiotic options  - ID consulted during hospital stay,  no longer actively following   - PICC in place, no fever or chills   - Awaiting TCU placement for ongoing antibiotics     #Hyperammonemia, chronic: Unclear cause.  Patient had one-time lactulose in the emergency  "department.  Initial ammonia level was 129. He received a dose of lactulose in the ER.  Ammonia level elevated at 82. Suspect related to seizure medication (Depakote).  - No known liver disease  - Depakote was initially held.  Case discussed with neurology and decreased Depakote to 500 mg p.o. twice daily which we will continue.   - Mental status seems stable , if worsening consider repeat VBG/ABG and ammonia level      #Acute mixed toxic/metabolic and infectious encephalopathy - improving.  Likely delirium, memory concerns: Patient and stepmother both endorse some concerns about ongoing issues with memory on 11/29.  Patient also states that he had a \"vision\" that was rather distressing to him he believes several days ago.  Discussed with them that CT imaging of the head was normal, ammonia level not significantly elevated, and the ongoing confusion is likely related to delirium that is continuing to clear.  We will continue to monitor for signs of improve mental status, could consider MRI if continues to have memory issues despite ongoing other improvement. He has already been evaluated by neurology on 11/25 with adjustment of seizure meds.   - Continue monitoring mental status.  On 12/4 and 12/5, patient is awake.  He is able to tell me his name is Joel and seems to know he is in the hospital.  He asks appropriate questions including \"when can I leave to TCU\"  and \"why can't I finish IV antibiotics here and then go back to group home.\"    - His step-mom notes that he is slow to talk.  She also notes that Joel can be manipulative at times and \"knows the system.\"    - BiPAP at bedtime  - Adjusted Depakote level as above to twice daily dosing from 3 times daily     #Seizure disorder. Stable on current medication.   -My colleague discussed with neurology earlier in hospital stay.  Neurology recommending to decrease Depakote to 500 mg twice daily, check free Depakote level, Keppra level, Lamictal level.  Orders " adjusted.  Patient needs to follow-up with neurology in the outpatient setting.  - Repeat drug levels ordered on 12/4.  Depakote level a bit low. I talked to Dr. Huff on 12/5.  Recommends continued slow wean of depakote in outpatient setting as can contribute to somnolence/slow thinking.  He has already been weaned a bit during this hospitalization as above.   - Follow-up with neurology as outpatient for further adjustment of his medications     #Essential hypertension.  On propranolol and prazosin.     #Depression/anxiety.  On Celexa and Seroquel.     #GERD.   On pantoprazole.     #Loose stools  Likely from antibiotics and recent bowel medications.  No abdominal pain.  - No indication for C. difficile or enteric panel at this time as is quite intermittent and low-volume , no abdominal pain, fever, or other signs of infection     DVT Prophylaxis: Pneumatic Compression Devices  Code Status: Full Code  Diet: Regular Diet Adult  Snacks/Supplements Adult: Ensure Max Protein (bariatric); Between Meals    Reid Catheter: Not present  Disposition: Expected discharge in 1-2 days to TCU. Goals prior to discharge include placement, medically stable for discharge.   Incidental Findings: As above.  Family updated today: No    30 MINUTES SPENT BY ME on the date of service doing chart review, history, exam, documentation & further activities per the note.      Interval History   The patient reports doing well. Laying in bed, just woke up, BiPAP still in place. No chest pain or shortness of breath. No nausea, vomiting, diarrhea, constipation. No fevers. No other specific complaints identified.     -Data reviewed today: I personally reviewed all new labs and imaging results over the last 24 hours.     Physical Exam   Temp: 99  F (37.2  C) Temp src: Axillary BP: (!) 143/76 Pulse: 84   Resp: 25 SpO2: 98 % O2 Device: BiPAP/CPAP Oxygen Delivery: 5 LPM  Vitals:    12/08/23 1159   Weight: 136.1 kg (300 lb)     Vital Signs with  "Ranges  Temp:  [98.5  F (36.9  C)-99  F (37.2  C)] 99  F (37.2  C)  Pulse:  [76-84] 84  Resp:  [16-25] 25  BP: (143-161)/(76-86) 143/76  FiO2 (%):  [40 %] 40 %  SpO2:  [97 %-98 %] 98 %  I/O last 3 completed shifts:  In: -   Out: 1750 [Urine:1750]    GENERAL: No apparent distress. Awake, alert, and fully oriented.  HEENT: Normocephalic, atraumatic. Extraocular movements intact.  CARDIOVASCULAR: Regular rate and rhythm without murmurs or rubs. No S3.  PULMONARY: Clear bilaterally.  GASTROINTESTINAL: Soft, non-tender, non-distended. Bowel sounds normoactive.   EXTREMITIES: No cyanosis or clubbing. No edema.  NEUROLOGICAL: CN 2-12 grossly intact, no focal neurological deficits.  DERMATOLOGICAL: No rash, ulcer, bruising, nor jaundice.     Medications      busPIRone  15 mg Oral BID    citalopram  60 mg Oral Daily    divalproex sodium delayed-release  500 mg Oral Q12H [facility] (08/20)    fluticasone  1 spray Nasal BID    lamoTRIgine  200 mg Oral BID    levETIRAcetam  1,000 mg Oral QAM    And    levETIRAcetam  1,500 mg Oral At Bedtime    levOCARNitine  660 mg Oral TID    miconazole   Topical BID    multivitamin, therapeutic  1 tablet Oral Daily    nafcillin  2 g Intravenous Q4H    pantoprazole  40 mg Oral Daily    polyethylene glycol  17 g Oral Daily    prazosin  2 mg Oral At Bedtime    propranolol  20 mg Oral BID    QUEtiapine ER  300 mg Oral At Bedtime    sodium chloride (PF)  10 mL Intracatheter Q8H    zonisamide  100 mg Oral QAM    And    zonisamide  200 mg Oral At Bedtime     Data     Laboratory:  No results for input(s): \"WBC\", \"HGB\", \"HCT\", \"MCV\", \"PLT\" in the last 168 hours.    Recent Labs   Lab 12/06/23  0206 12/05/23  2155 12/05/23  1639   * 109* 125*     No results for input(s): \"CULT\" in the last 168 hours.    Imaging:  No results found for this or any previous visit (from the past 24 hour(s)).      Praveen Tyson DO MPH  [facility] Hospitalist  [address] [place] [place] [address]  12/09/2023   "

## 2023-12-10 PROCEDURE — 250N000013 HC RX MED GY IP 250 OP 250 PS 637: Performed by: INTERNAL MEDICINE

## 2023-12-10 PROCEDURE — 999N000157 HC STATISTIC RCP TIME EA 10 MIN

## 2023-12-10 PROCEDURE — 250N000009 HC RX 250: Performed by: INTERNAL MEDICINE

## 2023-12-10 PROCEDURE — 250N000013 HC RX MED GY IP 250 OP 250 PS 637: Performed by: HOSPITALIST

## 2023-12-10 PROCEDURE — 99232 SBSQ HOSP IP/OBS MODERATE 35: CPT | Performed by: HOSPITALIST

## 2023-12-10 PROCEDURE — 120N000001 HC R&B MED SURG/OB

## 2023-12-10 PROCEDURE — 94660 CPAP INITIATION&MGMT: CPT

## 2023-12-10 RX ADMIN — NAFCILLIN SODIUM 2 G: 2 INJECTION, POWDER, LYOPHILIZED, FOR SOLUTION INTRAMUSCULAR; INTRAVENOUS at 21:15

## 2023-12-10 RX ADMIN — DIVALPROEX SODIUM 500 MG: 500 TABLET, DELAYED RELEASE ORAL at 21:13

## 2023-12-10 RX ADMIN — NAFCILLIN SODIUM 2 G: 2 INJECTION, POWDER, LYOPHILIZED, FOR SOLUTION INTRAMUSCULAR; INTRAVENOUS at 08:50

## 2023-12-10 RX ADMIN — QUETIAPINE FUMARATE 300 MG: 300 TABLET, EXTENDED RELEASE ORAL at 21:14

## 2023-12-10 RX ADMIN — NAFCILLIN SODIUM 2 G: 2 INJECTION, POWDER, LYOPHILIZED, FOR SOLUTION INTRAMUSCULAR; INTRAVENOUS at 13:01

## 2023-12-10 RX ADMIN — LEVETIRACETAM 1500 MG: 500 TABLET, FILM COATED ORAL at 21:13

## 2023-12-10 RX ADMIN — PROPRANOLOL HYDROCHLORIDE 20 MG: 20 TABLET ORAL at 08:51

## 2023-12-10 RX ADMIN — MICONAZOLE NITRATE: 20 POWDER TOPICAL at 08:52

## 2023-12-10 RX ADMIN — LEVETIRACETAM 1000 MG: 500 TABLET, FILM COATED ORAL at 08:51

## 2023-12-10 RX ADMIN — NAFCILLIN SODIUM 2 G: 2 INJECTION, POWDER, LYOPHILIZED, FOR SOLUTION INTRAMUSCULAR; INTRAVENOUS at 02:02

## 2023-12-10 RX ADMIN — NAFCILLIN SODIUM 2 G: 2 INJECTION, POWDER, LYOPHILIZED, FOR SOLUTION INTRAMUSCULAR; INTRAVENOUS at 05:48

## 2023-12-10 RX ADMIN — LEVOCARNITINE 660 MG: 330 TABLET ORAL at 08:51

## 2023-12-10 RX ADMIN — NAFCILLIN SODIUM 2 G: 2 INJECTION, POWDER, LYOPHILIZED, FOR SOLUTION INTRAMUSCULAR; INTRAVENOUS at 16:36

## 2023-12-10 RX ADMIN — BUSPIRONE HYDROCHLORIDE 15 MG: 15 TABLET ORAL at 21:14

## 2023-12-10 RX ADMIN — FLUTICASONE PROPIONATE 1 SPRAY: 50 SPRAY, METERED NASAL at 08:52

## 2023-12-10 RX ADMIN — BUSPIRONE HYDROCHLORIDE 15 MG: 15 TABLET ORAL at 08:51

## 2023-12-10 RX ADMIN — LAMOTRIGINE 200 MG: 200 TABLET ORAL at 21:14

## 2023-12-10 RX ADMIN — PANTOPRAZOLE SODIUM 40 MG: 40 TABLET, DELAYED RELEASE ORAL at 08:51

## 2023-12-10 RX ADMIN — CITALOPRAM HYDROBROMIDE 60 MG: 20 TABLET, FILM COATED ORAL at 08:50

## 2023-12-10 RX ADMIN — FLUTICASONE PROPIONATE 1 SPRAY: 50 SPRAY, METERED NASAL at 21:14

## 2023-12-10 RX ADMIN — DIVALPROEX SODIUM 500 MG: 500 TABLET, DELAYED RELEASE ORAL at 08:50

## 2023-12-10 RX ADMIN — PRAZOSIN HYDROCHLORIDE 2 MG: 1 CAPSULE ORAL at 21:07

## 2023-12-10 RX ADMIN — ZONISAMIDE 200 MG: 100 CAPSULE ORAL at 21:13

## 2023-12-10 RX ADMIN — LEVOCARNITINE 660 MG: 330 TABLET ORAL at 21:14

## 2023-12-10 RX ADMIN — PROPRANOLOL HYDROCHLORIDE 20 MG: 20 TABLET ORAL at 21:13

## 2023-12-10 RX ADMIN — LEVOCARNITINE 660 MG: 330 TABLET ORAL at 13:01

## 2023-12-10 RX ADMIN — THERA TABS 1 TABLET: TAB at 08:51

## 2023-12-10 RX ADMIN — LAMOTRIGINE 200 MG: 200 TABLET ORAL at 08:50

## 2023-12-10 RX ADMIN — ZONISAMIDE 100 MG: 100 CAPSULE ORAL at 08:50

## 2023-12-10 ASSESSMENT — ACTIVITIES OF DAILY LIVING (ADL)
ADLS_ACUITY_SCORE: 57
ADLS_ACUITY_SCORE: 57
ADLS_ACUITY_SCORE: 51
ADLS_ACUITY_SCORE: 57
ADLS_ACUITY_SCORE: 57
ADLS_ACUITY_SCORE: 51
ADLS_ACUITY_SCORE: 57
ADLS_ACUITY_SCORE: 57
ADLS_ACUITY_SCORE: 55
ADLS_ACUITY_SCORE: 51

## 2023-12-10 NOTE — PLAN OF CARE
Goal Outcome Evaluation:      Plan of Care Reviewed With: patient      Pt alert to self and situation, denied pain, ate 100% of is meal, on IV autobiotic for next 2 weeks, has PICC line, on 3 l oxygen, dressing on coccyx was changed on this shift, had incontinent loose stool.    /45 (BP Location: Left arm)   Pulse 71   Temp 98.2  F (36.8  C) (Axillary)   Resp 19   Wt 136.1 kg (300 lb)   SpO2 96%   BMI 46.99 kg/m

## 2023-12-10 NOTE — PLAN OF CARE
Goal Outcome Evaluation:      Plan of Care Reviewed With: patient      Shift: 7pm-11pm    Vitals: Temp: 98.2  F (36.8  C) Temp src: Axillary BP: 118/45 Pulse: 71   Resp: 22 SpO2: 98 % O2 Device: BiPAP/CPAP Oxygen Delivery: 4 LPM     Orientation: alert w/ intermittent confusion   Pain: denies  Activity: assist of 1 w/ walker - refuses gb  Resp: on 3L NC during day, Bipap at night   Diet: reg diet   How to take meds: whole w/ fluids   GI & : pt continent of urine, no bm this shift   Skin: mepalex on buttocks   Lines: PICC on right arm- blood return noted

## 2023-12-10 NOTE — PROGRESS NOTES
[facility]    Hospitalist Progress Note  Name: Tre Vazquez    MRN: [id number]  Provider:  Praveen Tyson DO MPH  Date of Service: 12/10/2023    Summary of Stay: Mr. Tre Vazquez is a 48 year old male w has a very complicated past medical including developmental delay, morbid obesity, ANA/OHS chronically on BiPAP at night, seizure disorder, chronic hyperammonemia, schizoaffective disorder, borderline personality, left eye blindness who presents with altered mental status.      He presented with clouding of mentation and elevated CO2 and pneumonia.  (This patient normally has a CO2 between 70 and 80 and he is ammonia is in the 60s). His current presentation is suspected to be due to possible nonadherence to treatment, though it is difficult to prove.  Patient has been a started on NIPPV in the emergency department and he has had excellent response. Per records from May of 2022, his ammonia level was elevated and considered the consequence of the use of Depakote for seizures. There is no official diagnosis of cirrhosis in this patient, he is not taking lactulose and likely not needed though he had one dose in ED.  CT of the head and chest without acute pathology.  Ultimately found to have Staph aureus bacteremia, source still unclear.  On nafcillin, discharge planning ongoing as patient will need for total weeks of IV antibiotics and group home not able to accommodate.  Awaiting TCU placement, discussed with SW and due to a variety of factors he will likely need to remain hospitalized until antibiotics are completed as no facility is able/willing to accept him.       Problem list:  Acute on chronic hypercarbic and hypoxemic respiratory failure , stable   Patient on chronic BiPAP at night time and napping    Obesity/OHS.  Breathing comfortably.  Tolerating BiPAP overnight, on nasal cannula throughout the day when awake.  PCO2 improved with bipap therapy.  He still has intermittent confusion but  is able to tell me his name is Joel.  He is asking appropriate questions including when he can leave the hospital.  He tells me his  is his mom.  For now, continue BiPAP overnight and with naps during the day.  Supplemental oxygen as needed.  Blood cultures from admission positive for Staph aureus, for which she is on IV antibiotics..  Started on IV vancomycin now on nafcillin.  Overall respiratory status appears to be improved, doing well with BiPAP as needed.  - BiPAP at night and with naps  - RT following  - Continue to monitor respiratory status      #Staph aureus bacteremia.  Left buttock wound  Initial blood cultures positive for Staph aureus.  Started on IV vancomycin empirically, consulted infectious disease.  As far as source, has had respiratory issues prior to admission, but CT chest did not necessarily appear convincing for active pneumonia.  Also has a wound on left buttock that is open, having some serosanguineous drainage.  Somewhat firm to palpation underneath the opening on the skin, but no purulent discharge and does not necessarily feel like there is an abscess underneath the skin.  We will continue to monitor the wound as potential source as well.  Per ID, patient will need 4 weeks total IV antibiotic therapy.  PICC now in place for ongoing antibiotic administration.  Discharge planning ongoing as group home not able to accommodate patient's ongoing antibiotic need, will need TCU.  - IV nafcillin for 4 weeks of therapy (last positive culture 11/25)  - Social work informed me that the frequency of the outpatient antibiotics is complicating placement, discussed with ID and unfortunately there are no other reasonable antibiotic options  - ID consulted during hospital stay,  no longer actively following   - PICC in place, no fever or chills   - Awaiting TCU placement for ongoing antibiotics     #Hyperammonemia, chronic: Unclear cause.  Patient had one-time lactulose in the emergency  "department.  Initial ammonia level was 129. He received a dose of lactulose in the ER.  Ammonia level elevated at 82. Suspect related to seizure medication (Depakote).  - No known liver disease  - Depakote was initially held.  Case discussed with neurology and decreased Depakote to 500 mg p.o. twice daily which we will continue.   - Mental status seems stable , if worsening consider repeat VBG/ABG and ammonia level      #Acute mixed toxic/metabolic and infectious encephalopathy - improving.  Likely delirium, memory concerns: Patient and stepmother both endorse some concerns about ongoing issues with memory on 11/29.  Patient also states that he had a \"vision\" that was rather distressing to him he believes several days ago.  Discussed with them that CT imaging of the head was normal, ammonia level not significantly elevated, and the ongoing confusion is likely related to delirium that is continuing to clear.  We will continue to monitor for signs of improve mental status, could consider MRI if continues to have memory issues despite ongoing other improvement. He has already been evaluated by neurology on 11/25 with adjustment of seizure meds.   - Continue monitoring mental status.  On 12/4 and 12/5, patient is awake.  He is able to tell me his name is Joel and seems to know he is in the hospital.  He asks appropriate questions including \"when can I leave to TCU\"  and \"why can't I finish IV antibiotics here and then go back to group home.\"    - His step-mom notes that he is slow to talk.  She also notes that Joel can be manipulative at times and \"knows the system.\"    - BiPAP at bedtime  - Adjusted Depakote level as above to twice daily dosing from 3 times daily     #Seizure disorder. Stable on current medication.   -My colleague discussed with neurology earlier in hospital stay.  Neurology recommending to decrease Depakote to 500 mg twice daily, check free Depakote level, Keppra level, Lamictal level.  Orders " adjusted.  Patient needs to follow-up with neurology in the outpatient setting.  - Repeat drug levels ordered on 12/4.  Depakote level a bit low. I talked to Dr. Huff on 12/5.  Recommends continued slow wean of depakote in outpatient setting as can contribute to somnolence/slow thinking.  He has already been weaned a bit during this hospitalization as above.   - Follow-up with neurology as outpatient for further adjustment of his medications     #Essential hypertension.  On propranolol and prazosin.     #Depression/anxiety.  On Celexa and Seroquel.     #GERD.   On pantoprazole.     #Loose stools  Likely from antibiotics and recent bowel medications.  No abdominal pain.  - No indication for C. difficile or enteric panel at this time as is quite intermittent and low-volume , no abdominal pain, fever, or other signs of infection     DVT Prophylaxis: Pneumatic Compression Devices  Code Status: Full Code  Diet: Regular Diet Adult  Snacks/Supplements Adult: Ensure Max Protein (bariatric); Between Meals    Reid Catheter: Not present  Disposition: Expected discharge in 5+ days to TCU. Goals prior to discharge include placement, medically stable for discharge but will complete antibiotics in house as could not get him placed.  Incidental Findings: As above.  Family updated today: No.    30 MINUTES SPENT BY ME on the date of service doing chart review, history, exam, documentation & further activities per the note.      Interval History   The patient reports doing well. Laying in bed, off BiPAP and playing Nintendo. No chest pain or shortness of breath. No nausea, vomiting, diarrhea, constipation. No fevers. No other specific complaints identified.     -Data reviewed today: I personally reviewed all new labs and imaging results over the last 24 hours.     Physical Exam   Temp: 97.8  F (36.6  C) Temp src: Oral BP: 122/56 Pulse: 79   Resp: 18 SpO2: 93 % O2 Device: Nasal cannula Oxygen Delivery: 4 LPM  Vitals:    12/08/23  "1159   Weight: 136.1 kg (300 lb)     Vital Signs with Ranges  Temp:  [97.8  F (36.6  C)-99  F (37.2  C)] 97.8  F (36.6  C)  Pulse:  [71-82] 79  Resp:  [13-22] 18  BP: (118-125)/(45-81) 122/56  FiO2 (%):  [40 %] 40 %  SpO2:  [92 %-98 %] 93 %  I/O last 3 completed shifts:  In: 1010 [P.O.:870; I.V.:140]  Out: 2550 [Urine:2550]    GENERAL: No apparent distress. Awake, alert, and fully oriented.  HEENT: Normocephalic, atraumatic. Extraocular movements intact.  CARDIOVASCULAR: Regular rate and rhythm without murmurs or rubs. No S3.  PULMONARY: Clear bilaterally.  GASTROINTESTINAL: Soft, non-tender, non-distended. Bowel sounds normoactive.   EXTREMITIES: No cyanosis or clubbing. No edema.  NEUROLOGICAL: CN 2-12 grossly intact, no focal neurological deficits.  DERMATOLOGICAL: No rash, ulcer, bruising, nor jaundice.     Medications      busPIRone  15 mg Oral BID    citalopram  60 mg Oral Daily    divalproex sodium delayed-release  500 mg Oral Q12H [facility] (08/20)    fluticasone  1 spray Nasal BID    lamoTRIgine  200 mg Oral BID    levETIRAcetam  1,000 mg Oral QAM    And    levETIRAcetam  1,500 mg Oral At Bedtime    levOCARNitine  660 mg Oral TID    miconazole   Topical BID    multivitamin, therapeutic  1 tablet Oral Daily    nafcillin  2 g Intravenous Q4H    pantoprazole  40 mg Oral Daily    polyethylene glycol  17 g Oral Daily    prazosin  2 mg Oral At Bedtime    propranolol  20 mg Oral BID    QUEtiapine ER  300 mg Oral At Bedtime    sodium chloride (PF)  10 mL Intracatheter Q8H    zonisamide  100 mg Oral QAM    And    zonisamide  200 mg Oral At Bedtime     Data     Laboratory:  No results for input(s): \"WBC\", \"HGB\", \"HCT\", \"MCV\", \"PLT\" in the last 168 hours.    Recent Labs   Lab 12/06/23  0206 12/05/23  2155 12/05/23  1639   * 109* 125*     No results for input(s): \"CULT\" in the last 168 hours.    Imaging:  No results found for this or any previous visit (from the past 24 hour(s)).      Praveen Tyson DO MPH  [facility] " Hospitalist  [address] [facility] [place] [address]  12/10/2023

## 2023-12-10 NOTE — PLAN OF CARE
[id number]    VSS on 4L via NC/BiPAP when asleep. A/O x self. PICC WDL. IV abx Q4. Wound to buttocks, see WOC orders. Uses urinal. Irritable, labile. Continue w/ POC.       /54 (BP Location: Left arm)   Pulse 72   Temp 99  F (37.2  C) (Axillary)   Resp 13   Wt 136.1 kg (300 lb)   SpO2 95%   BMI 46.99 kg/m

## 2023-12-10 NOTE — PROGRESS NOTES
A BiPAP of  12/6 @ 40% was applied to the pt via the mask for NOC use. The bridge of the nose looks good and remains intact. Pt is tolerating it well. Will continue to monitor and assess the pt's current respiratory status and needs.    Filippo Lamb RT on 12/10/2023 at 5:24 AM

## 2023-12-10 NOTE — PROGRESS NOTES
Care Management Follow Up    Length of Stay (days): 16    Expected Discharge Date: 12/11/2023     Concerns to be Addressed: discharge planning     Patient plan of care discussed at interdisciplinary rounds: Yes    Anticipated Discharge Disposition: Group Home     Additional Information:  Pt requiring TCU, however, has been declined by 10 TCU's.      SW made new referrals today to [facility], [facility], and [facility].     Plan:  Will wait to see if any of these TCU's can accept.  KARIN will follow up on Monday.    Carmen HOLLAND, [facility]  Inpatient Care Coordination   [facility]   [phone]

## 2023-12-10 NOTE — PLAN OF CARE
14:37 Shift Summary: 7-3: No c/o pain.  Oriented to self disoriented to time and situation.  Irritable and demanding with cares at times. Ambulates with assist 1 gait belt and walker.  Lungs diminished, clear.  93% 4L O2.  Bi-pap when sleeping. No tele.  Regular diet.  Uses urinal for voiding.  Incont bowel at times. PICC line right upper arm.  Wound care per plan of care. Continue IV antibiotics for 2 weeks before returning to group home.

## 2023-12-11 PROCEDURE — 250N000013 HC RX MED GY IP 250 OP 250 PS 637: Performed by: INTERNAL MEDICINE

## 2023-12-11 PROCEDURE — 999N000157 HC STATISTIC RCP TIME EA 10 MIN

## 2023-12-11 PROCEDURE — 250N000013 HC RX MED GY IP 250 OP 250 PS 637: Performed by: HOSPITALIST

## 2023-12-11 PROCEDURE — 94660 CPAP INITIATION&MGMT: CPT

## 2023-12-11 PROCEDURE — 120N000001 HC R&B MED SURG/OB

## 2023-12-11 PROCEDURE — 250N000009 HC RX 250: Performed by: INTERNAL MEDICINE

## 2023-12-11 PROCEDURE — 99232 SBSQ HOSP IP/OBS MODERATE 35: CPT | Performed by: HOSPITALIST

## 2023-12-11 RX ORDER — LIDOCAINE 4 G/G
1 PATCH TOPICAL
Status: DISCONTINUED | OUTPATIENT
Start: 2023-12-12 | End: 2023-12-18 | Stop reason: HOSPADM

## 2023-12-11 RX ORDER — ACETAMINOPHEN 325 MG/1
975 TABLET ORAL ONCE
Status: DISCONTINUED | OUTPATIENT
Start: 2023-12-11 | End: 2023-12-11

## 2023-12-11 RX ORDER — ACETAMINOPHEN 325 MG/1
650 TABLET ORAL ONCE
Status: DISCONTINUED | OUTPATIENT
Start: 2023-12-12 | End: 2023-12-11

## 2023-12-11 RX ADMIN — ZONISAMIDE 100 MG: 100 CAPSULE ORAL at 08:47

## 2023-12-11 RX ADMIN — NAFCILLIN SODIUM 2 G: 2 INJECTION, POWDER, LYOPHILIZED, FOR SOLUTION INTRAMUSCULAR; INTRAVENOUS at 13:14

## 2023-12-11 RX ADMIN — LEVETIRACETAM 1500 MG: 500 TABLET, FILM COATED ORAL at 20:47

## 2023-12-11 RX ADMIN — CITALOPRAM HYDROBROMIDE 60 MG: 20 TABLET, FILM COATED ORAL at 08:46

## 2023-12-11 RX ADMIN — LEVOCARNITINE 660 MG: 330 TABLET ORAL at 08:46

## 2023-12-11 RX ADMIN — NAFCILLIN SODIUM 2 G: 2 INJECTION, POWDER, LYOPHILIZED, FOR SOLUTION INTRAMUSCULAR; INTRAVENOUS at 05:51

## 2023-12-11 RX ADMIN — LAMOTRIGINE 200 MG: 200 TABLET ORAL at 08:47

## 2023-12-11 RX ADMIN — PRAZOSIN HYDROCHLORIDE 2 MG: 1 CAPSULE ORAL at 20:51

## 2023-12-11 RX ADMIN — LAMOTRIGINE 200 MG: 200 TABLET ORAL at 20:50

## 2023-12-11 RX ADMIN — BUSPIRONE HYDROCHLORIDE 15 MG: 15 TABLET ORAL at 08:47

## 2023-12-11 RX ADMIN — ZONISAMIDE 200 MG: 100 CAPSULE ORAL at 20:47

## 2023-12-11 RX ADMIN — MICONAZOLE NITRATE: 20 POWDER TOPICAL at 20:49

## 2023-12-11 RX ADMIN — NAFCILLIN SODIUM 2 G: 2 INJECTION, POWDER, LYOPHILIZED, FOR SOLUTION INTRAMUSCULAR; INTRAVENOUS at 00:17

## 2023-12-11 RX ADMIN — PROPRANOLOL HYDROCHLORIDE 20 MG: 20 TABLET ORAL at 20:48

## 2023-12-11 RX ADMIN — DIVALPROEX SODIUM 500 MG: 500 TABLET, DELAYED RELEASE ORAL at 08:47

## 2023-12-11 RX ADMIN — THERA TABS 1 TABLET: TAB at 08:46

## 2023-12-11 RX ADMIN — LEVOCARNITINE 660 MG: 330 TABLET ORAL at 13:14

## 2023-12-11 RX ADMIN — NAFCILLIN SODIUM 2 G: 2 INJECTION, POWDER, LYOPHILIZED, FOR SOLUTION INTRAMUSCULAR; INTRAVENOUS at 20:43

## 2023-12-11 RX ADMIN — FLUTICASONE PROPIONATE 1 SPRAY: 50 SPRAY, METERED NASAL at 20:47

## 2023-12-11 RX ADMIN — LEVOCARNITINE 660 MG: 330 TABLET ORAL at 20:48

## 2023-12-11 RX ADMIN — PANTOPRAZOLE SODIUM 40 MG: 40 TABLET, DELAYED RELEASE ORAL at 08:47

## 2023-12-11 RX ADMIN — NAFCILLIN SODIUM 2 G: 2 INJECTION, POWDER, LYOPHILIZED, FOR SOLUTION INTRAMUSCULAR; INTRAVENOUS at 17:16

## 2023-12-11 RX ADMIN — QUETIAPINE FUMARATE 300 MG: 300 TABLET, EXTENDED RELEASE ORAL at 20:50

## 2023-12-11 RX ADMIN — NAFCILLIN SODIUM 2 G: 2 INJECTION, POWDER, LYOPHILIZED, FOR SOLUTION INTRAMUSCULAR; INTRAVENOUS at 08:42

## 2023-12-11 RX ADMIN — LEVETIRACETAM 1000 MG: 500 TABLET, FILM COATED ORAL at 08:46

## 2023-12-11 RX ADMIN — DIVALPROEX SODIUM 500 MG: 500 TABLET, DELAYED RELEASE ORAL at 20:50

## 2023-12-11 RX ADMIN — MICONAZOLE NITRATE: 20 POWDER TOPICAL at 08:52

## 2023-12-11 RX ADMIN — PROPRANOLOL HYDROCHLORIDE 20 MG: 20 TABLET ORAL at 08:47

## 2023-12-11 RX ADMIN — LIDOCAINE 1 PATCH: 4 PATCH TOPICAL at 23:37

## 2023-12-11 RX ADMIN — BUSPIRONE HYDROCHLORIDE 15 MG: 15 TABLET ORAL at 20:50

## 2023-12-11 ASSESSMENT — ACTIVITIES OF DAILY LIVING (ADL)
ADLS_ACUITY_SCORE: 55
ADLS_ACUITY_SCORE: 51
ADLS_ACUITY_SCORE: 55
ADLS_ACUITY_SCORE: 55
ADLS_ACUITY_SCORE: 51
ADLS_ACUITY_SCORE: 51
ADLS_ACUITY_SCORE: 55
ADLS_ACUITY_SCORE: 51
ADLS_ACUITY_SCORE: 51

## 2023-12-11 NOTE — PLAN OF CARE
Goal Outcome Evaluation:VSS, afeb., LS are dim, 96% on 4L 02.  Good appetite, ate 100% for dinner. Pt denies pain.  Up to BR w/ assist x1 walker and GB.  Pt cont to receive IV Nafcillin via R picc line, will need 2 more weeks of IV abx. Hope to discharge to TCU in 1-2 days if bed available. Or back to Group home when IV abx finished.

## 2023-12-11 NOTE — PLAN OF CARE
Care from [id number]    Inpatient Progress Note:  For complete assessment see flow sheet documentation.    /76 (BP Location: Left arm)   Pulse 81   Temp 97.7  F (36.5  C) (Oral)   Resp 22   Wt 136.1 kg (300 lb)   SpO2 93%   BMI 46.99 kg/m        Orientation: A&OX4, forgetful at times  Pain status: Denied  Activity: Ax1 with W/G  Peripheral edema: Mild edema on BLE  Resp: CPAP @ HS, LS diminished  Cardiac: WNL  GI: Incontinent at times  :  Incontinent at times, Urinal  LDA: PICC  Infusions: Q4h Nafcillin  Diet: Regular  Discharge Plan: TCU at discharge or back to group home after finishing the course of ABX    Will continue to monitor and provide cares.     Vanessa Núñez RN

## 2023-12-11 NOTE — PROGRESS NOTES
A BiPAP of  12/6 @ 40% was applied to the pt via the mask for NOC use. The bridge of the nose looks good and remains intact. Pt is tolerating it well. Will continue to monitor and assess the pt's current respiratory status and needs.    Filippo Lamb RT on 12/11/2023 at 3:30 AM.

## 2023-12-11 NOTE — PROGRESS NOTES
[facility]    Hospitalist Progress Note  Name: Tre Vazquez    MRN: [id number]  Provider:  Praveen Tyson DO MPH  Date of Service: 12/11/2023    Summary of Stay: Mr. Tre Vazquez is a 48 year old male w has a very complicated past medical including developmental delay, morbid obesity, ANA/OHS chronically on BiPAP at night, seizure disorder, chronic hyperammonemia, schizoaffective disorder, borderline personality, left eye blindness who presents with altered mental status.      He presented with clouding of mentation and elevated CO2 and pneumonia.  (This patient normally has a CO2 between 70 and 80 and he is ammonia is in the 60s). His current presentation is suspected to be due to possible nonadherence to treatment, though it is difficult to prove.  Patient has been a started on NIPPV in the emergency department and he has had excellent response. Per records from May of 2022, his ammonia level was elevated and considered the consequence of the use of Depakote for seizures. There is no official diagnosis of cirrhosis in this patient, he is not taking lactulose and likely not needed though he had one dose in ED.  CT of the head and chest without acute pathology.  Ultimately found to have Staph aureus bacteremia, source still unclear.  On nafcillin, discharge planning ongoing as patient will need for total weeks of IV antibiotics and group home not able to accommodate.  Awaiting TCU placement, discussed with SW and due to a variety of factors he will likely need to remain hospitalized until antibiotics are completed as no facility is able/willing to accept him.       Problem list:  Acute on chronic hypercarbic and hypoxemic respiratory failure , stable   Patient on chronic BiPAP at night time and napping    Obesity/OHS.  Breathing comfortably.  Tolerating BiPAP overnight, on nasal cannula throughout the day when awake.  PCO2 improved with bipap therapy.  He still has intermittent confusion but  is able to tell me his name is Joel.  He is asking appropriate questions including when he can leave the hospital.  He tells me his  is his mom.  For now, continue BiPAP overnight and with naps during the day.  Supplemental oxygen as needed.  Blood cultures from admission positive for Staph aureus, for which she is on IV antibiotics..  Started on IV vancomycin now on nafcillin.  Overall respiratory status appears to be improved, doing well with BiPAP as needed.  - BiPAP at night and with naps  - RT following  - Continue to monitor respiratory status      #Staph aureus bacteremia.  Left buttock wound  Initial blood cultures positive for Staph aureus.  Started on IV vancomycin empirically, consulted infectious disease.  As far as source, has had respiratory issues prior to admission, but CT chest did not necessarily appear convincing for active pneumonia.  Also has a wound on left buttock that is open, having some serosanguineous drainage.  Somewhat firm to palpation underneath the opening on the skin, but no purulent discharge and does not necessarily feel like there is an abscess underneath the skin.  We will continue to monitor the wound as potential source as well.  Per ID, patient will need 4 weeks total IV antibiotic therapy.  PICC now in place for ongoing antibiotic administration.  Discharge planning ongoing as group home not able to accommodate patient's ongoing antibiotic need, will need TCU.  - IV nafcillin for 4 weeks of therapy (last positive culture 11/25)  - Social work informed me that the frequency of the outpatient antibiotics is complicating placement, discussed with ID and unfortunately there are no other reasonable antibiotic options  - ID consulted during hospital stay,  no longer actively following   - PICC in place, no fever or chills   - Awaiting TCU placement for ongoing antibiotics     #Hyperammonemia, chronic: Unclear cause.  Patient had one-time lactulose in the emergency  "department.  Initial ammonia level was 129. He received a dose of lactulose in the ER.  Ammonia level elevated at 82. Suspect related to seizure medication (Depakote).  - No known liver disease  - Depakote was initially held.  Case discussed with neurology and decreased Depakote to 500 mg p.o. twice daily which we will continue.   - Mental status seems stable , if worsening consider repeat VBG/ABG and ammonia level      #Acute mixed toxic/metabolic and infectious encephalopathy - improving.  Likely delirium, memory concerns: Patient and stepmother both endorse some concerns about ongoing issues with memory on 11/29.  Patient also states that he had a \"vision\" that was rather distressing to him he believes several days ago.  Discussed with them that CT imaging of the head was normal, ammonia level not significantly elevated, and the ongoing confusion is likely related to delirium that is continuing to clear.  We will continue to monitor for signs of improve mental status, could consider MRI if continues to have memory issues despite ongoing other improvement. He has already been evaluated by neurology on 11/25 with adjustment of seizure meds.   - Continue monitoring mental status.  On 12/4 and 12/5, patient is awake.  He is able to tell me his name is Joel and seems to know he is in the hospital.  He asks appropriate questions including \"when can I leave to TCU\"  and \"why can't I finish IV antibiotics here and then go back to group home.\"    - His step-mom notes that he is slow to talk.  She also notes that Joel can be manipulative at times and \"knows the system.\"    - BiPAP at bedtime  - Adjusted Depakote level as above to twice daily dosing from 3 times daily     #Seizure disorder. Stable on current medication.   -My colleague discussed with neurology earlier in hospital stay.  Neurology recommending to decrease Depakote to 500 mg twice daily, check free Depakote level, Keppra level, Lamictal level.  Orders " adjusted.  Patient needs to follow-up with neurology in the outpatient setting.  - Repeat drug levels ordered on 12/4.  Depakote level a bit low. I talked to Dr. Huff on 12/5.  Recommends continued slow wean of depakote in outpatient setting as can contribute to somnolence/slow thinking.  He has already been weaned a bit during this hospitalization as above.   - Follow-up with neurology as outpatient for further adjustment of his medications     #Essential hypertension.  On propranolol and prazosin.     #Depression/anxiety.  On Celexa and Seroquel.     #GERD.   On pantoprazole.     #Loose stools  Likely from antibiotics and recent bowel medications.  No abdominal pain.  - No indication for C. difficile or enteric panel at this time as is quite intermittent and low-volume , no abdominal pain, fever, or other signs of infection     DVT Prophylaxis: Pneumatic Compression Devices  Code Status: Full Code  Diet: Regular Diet Adult  Snacks/Supplements Adult: Ensure Max Protein (bariatric); Between Meals    Reid Catheter: Not present  Disposition: Expected discharge in 5+ days to TCU. Goals prior to discharge include placement, medically stable for discharge but will complete antibiotics in house as could not get him placed.  Incidental Findings: As above.  Family updated today: No.    30 MINUTES SPENT BY ME on the date of service doing chart review, history, exam, documentation & further activities per the note.      Interval History   The patient reports doing well. Laying in bed, still on BiPAP. No chest pain or shortness of breath. No nausea, vomiting, diarrhea, constipation. No fevers. No other specific complaints identified.     -Data reviewed today: I personally reviewed all new labs and imaging results over the last 24 hours.     Physical Exam   Temp: 97.4  F (36.3  C) Temp src: Axillary BP: 134/74 Pulse: 80   Resp: 20 SpO2: 93 % O2 Device: BiPAP/CPAP Oxygen Delivery: 4 LPM  Vitals:    12/08/23 1159   Weight:  "136.1 kg (300 lb)     Vital Signs with Ranges  Temp:  [97.4  F (36.3  C)-98.3  F (36.8  C)] 97.4  F (36.3  C)  Pulse:  [80-85] 80  Resp:  [16-22] 20  BP: (131-154)/(74-83) 134/74  FiO2 (%):  [40 %] 40 %  SpO2:  [93 %-96 %] 93 %  I/O last 3 completed shifts:  In: 500 [P.O.:500]  Out: 1975 [Urine:1975]    GENERAL: No apparent distress. Awake, alert, and fully oriented.  HEENT: Normocephalic, atraumatic. Extraocular movements intact.  CARDIOVASCULAR: Regular rate and rhythm without murmurs or rubs. No S3.  PULMONARY: Clear bilaterally.  GASTROINTESTINAL: Soft, non-tender, non-distended. Bowel sounds normoactive.   EXTREMITIES: No cyanosis or clubbing. No edema.  NEUROLOGICAL: CN 2-12 grossly intact, no focal neurological deficits.  DERMATOLOGICAL: No rash, ulcer, bruising, nor jaundice.     Medications      busPIRone  15 mg Oral BID    citalopram  60 mg Oral Daily    divalproex sodium delayed-release  500 mg Oral Q12H [facility]ANE (08/20)    fluticasone  1 spray Nasal BID    lamoTRIgine  200 mg Oral BID    levETIRAcetam  1,000 mg Oral QAM    And    levETIRAcetam  1,500 mg Oral At Bedtime    levOCARNitine  660 mg Oral TID    miconazole   Topical BID    multivitamin, therapeutic  1 tablet Oral Daily    nafcillin  2 g Intravenous Q4H    pantoprazole  40 mg Oral Daily    polyethylene glycol  17 g Oral Daily    prazosin  2 mg Oral At Bedtime    propranolol  20 mg Oral BID    QUEtiapine ER  300 mg Oral At Bedtime    sodium chloride (PF)  10 mL Intracatheter Q8H    zonisamide  100 mg Oral QAM    And    zonisamide  200 mg Oral At Bedtime     Data     Laboratory:  No results for input(s): \"WBC\", \"HGB\", \"HCT\", \"MCV\", \"PLT\" in the last 168 hours.    Recent Labs   Lab 12/06/23  0206 12/05/23  2155 12/05/23  1639   * 109* 125*     No results for input(s): \"CULT\" in the last 168 hours.    Imaging:  No results found for this or any previous visit (from the past 24 hour(s)).      Praveen Tyson DO MPH  [facility] Hospitalist  [address] [place] [address]  12/11/2023

## 2023-12-11 NOTE — PLAN OF CARE
14:38 Shift Summary: 7-3: No c/o pain.  Oriented to self disoriented to time and situation.  Alert and oriented. Irritable and demanding with cares at times. Ambulates with assist 1 gait belt and walker.  Lungs diminished, clear.  93% 4L O2.  Bi-pap when sleeping. No tele.  Regular diet.  Uses urinal for voiding.  Incont bowel at times. PICC line right upper arm.  Wound care per plan of care. Continue IV antibiotics for 2 weeks before returning to group home either here in hospital or at a TCU if placement found.

## 2023-12-12 PROCEDURE — 999N000040 HC STATISTIC CONSULT NO CHARGE VASC ACCESS

## 2023-12-12 PROCEDURE — 99232 SBSQ HOSP IP/OBS MODERATE 35: CPT | Performed by: HOSPITALIST

## 2023-12-12 PROCEDURE — 120N000001 HC R&B MED SURG/OB

## 2023-12-12 PROCEDURE — 250N000013 HC RX MED GY IP 250 OP 250 PS 637: Performed by: HOSPITALIST

## 2023-12-12 PROCEDURE — G0463 HOSPITAL OUTPT CLINIC VISIT: HCPCS

## 2023-12-12 PROCEDURE — 250N000013 HC RX MED GY IP 250 OP 250 PS 637: Performed by: INTERNAL MEDICINE

## 2023-12-12 PROCEDURE — 250N000009 HC RX 250: Performed by: HOSPITALIST

## 2023-12-12 PROCEDURE — 250N000009 HC RX 250: Performed by: INTERNAL MEDICINE

## 2023-12-12 RX ORDER — NAFCILLIN SODIUM 2 G/8ML
2 INJECTION, POWDER, FOR SOLUTION INTRAMUSCULAR; INTRAVENOUS EVERY 4 HOURS
Status: DISCONTINUED | OUTPATIENT
Start: 2023-12-12 | End: 2023-12-18 | Stop reason: HOSPADM

## 2023-12-12 RX ADMIN — MICONAZOLE NITRATE: 20 POWDER TOPICAL at 20:51

## 2023-12-12 RX ADMIN — LEVOCARNITINE 660 MG: 330 TABLET ORAL at 20:47

## 2023-12-12 RX ADMIN — PROPRANOLOL HYDROCHLORIDE 20 MG: 20 TABLET ORAL at 09:33

## 2023-12-12 RX ADMIN — MICONAZOLE NITRATE: 20 POWDER TOPICAL at 11:11

## 2023-12-12 RX ADMIN — NAFCILLIN SODIUM 2 G: 2 INJECTION, POWDER, LYOPHILIZED, FOR SOLUTION INTRAMUSCULAR; INTRAVENOUS at 01:22

## 2023-12-12 RX ADMIN — BUSPIRONE HYDROCHLORIDE 15 MG: 15 TABLET ORAL at 20:44

## 2023-12-12 RX ADMIN — BUSPIRONE HYDROCHLORIDE 15 MG: 15 TABLET ORAL at 09:33

## 2023-12-12 RX ADMIN — NAFCILLIN 2 G: 2 POWDER, FOR SOLUTION INTRAMUSCULAR; INTRAVENOUS at 17:46

## 2023-12-12 RX ADMIN — LEVETIRACETAM 1500 MG: 500 TABLET, FILM COATED ORAL at 20:43

## 2023-12-12 RX ADMIN — NAFCILLIN SODIUM 2 G: 2 INJECTION, POWDER, LYOPHILIZED, FOR SOLUTION INTRAMUSCULAR; INTRAVENOUS at 04:55

## 2023-12-12 RX ADMIN — QUETIAPINE FUMARATE 300 MG: 300 TABLET, EXTENDED RELEASE ORAL at 20:45

## 2023-12-12 RX ADMIN — LEVOCARNITINE 660 MG: 330 TABLET ORAL at 14:09

## 2023-12-12 RX ADMIN — CITALOPRAM HYDROBROMIDE 60 MG: 20 TABLET, FILM COATED ORAL at 09:31

## 2023-12-12 RX ADMIN — PRAZOSIN HYDROCHLORIDE 2 MG: 1 CAPSULE ORAL at 20:48

## 2023-12-12 RX ADMIN — PROPRANOLOL HYDROCHLORIDE 20 MG: 20 TABLET ORAL at 20:50

## 2023-12-12 RX ADMIN — LEVOCARNITINE 660 MG: 330 TABLET ORAL at 09:32

## 2023-12-12 RX ADMIN — POLYETHYLENE GLYCOL 3350 17 G: 17 POWDER, FOR SOLUTION ORAL at 09:34

## 2023-12-12 RX ADMIN — THERA TABS 1 TABLET: TAB at 09:33

## 2023-12-12 RX ADMIN — LAMOTRIGINE 200 MG: 200 TABLET ORAL at 20:48

## 2023-12-12 RX ADMIN — NAFCILLIN 2 G: 2 POWDER, FOR SOLUTION INTRAMUSCULAR; INTRAVENOUS at 21:48

## 2023-12-12 RX ADMIN — LEVETIRACETAM 1000 MG: 500 TABLET, FILM COATED ORAL at 09:32

## 2023-12-12 RX ADMIN — FLUTICASONE PROPIONATE 1 SPRAY: 50 SPRAY, METERED NASAL at 20:50

## 2023-12-12 RX ADMIN — DIVALPROEX SODIUM 500 MG: 500 TABLET, DELAYED RELEASE ORAL at 20:46

## 2023-12-12 RX ADMIN — NAFCILLIN 2 G: 2 POWDER, FOR SOLUTION INTRAMUSCULAR; INTRAVENOUS at 09:35

## 2023-12-12 RX ADMIN — LAMOTRIGINE 200 MG: 200 TABLET ORAL at 09:32

## 2023-12-12 RX ADMIN — NAFCILLIN 2 G: 2 POWDER, FOR SOLUTION INTRAMUSCULAR; INTRAVENOUS at 14:10

## 2023-12-12 RX ADMIN — PANTOPRAZOLE SODIUM 40 MG: 40 TABLET, DELAYED RELEASE ORAL at 09:33

## 2023-12-12 RX ADMIN — ZONISAMIDE 200 MG: 100 CAPSULE ORAL at 20:46

## 2023-12-12 RX ADMIN — DIVALPROEX SODIUM 500 MG: 500 TABLET, DELAYED RELEASE ORAL at 09:32

## 2023-12-12 RX ADMIN — ZONISAMIDE 100 MG: 100 CAPSULE ORAL at 09:33

## 2023-12-12 RX ADMIN — FLUTICASONE PROPIONATE 1 SPRAY: 50 SPRAY, METERED NASAL at 10:02

## 2023-12-12 ASSESSMENT — ACTIVITIES OF DAILY LIVING (ADL)
ADLS_ACUITY_SCORE: 51
ADLS_ACUITY_SCORE: 55

## 2023-12-12 NOTE — PROGRESS NOTES
[facility]    Hospitalist Progress Note  Name: Tre Vazquez    MRN: [id number]  Provider:  Praveen Tyson DO MPH  Date of Service: 12/12/2023    Summary of Stay: Mr. Tre Vazquez is a 48 year old male w has a very complicated past medical including developmental delay, morbid obesity, ANA/OHS chronically on BiPAP at night, seizure disorder, chronic hyperammonemia, schizoaffective disorder, borderline personality, left eye blindness who presents with altered mental status.      He presented with clouding of mentation and elevated CO2 and pneumonia.  This patient normally has a CO2 between 70 and 80 and he is ammonia is in the 60s. His current presentation was suspected to be due to nonadherence to treatment, though it is difficult to prove.  Patient was started on NIPPV in the emergency department and he has had excellent response. Per records from May of 2022, his ammonia level was elevated and considered the consequence of the use of Depakote for seizures. There is no official diagnosis of cirrhosis in this patient, he is not taking lactulose and likely not needed though he had one dose in ED.  CT of the head and chest without acute pathology.      Ultimately found to have Staph aureus bacteremia, source still unclear.  ID was consulted and he was started on nafcillin.  He will need for total 4 weeks of IV antibiotics and group home not able to accommodate so TCU was investigated.  Discussed with SW and due to a variety of factors he will likely need to remain hospitalized until antibiotics are completed as no facility is able/willing to accept him.  The last dose of antibiotics will be 9am on 12/25.      Problem list:  Acute on chronic hypercarbic and hypoxemic respiratory failure , stable   Patient on chronic BiPAP at night time and napping    Obesity/OHS.  Breathing comfortably.  Tolerating BiPAP with sleep, on nasal cannula throughout the day when awake.  PCO2 improved with bipap therapy.   He still has intermittent confusion but much improved and he is largely appropriate.  He is asking appropriate questions including when he can leave the hospital.  He tells me his  is his step mom.  For now, continue BiPAP overnight and with naps during the day.  Supplemental oxygen as needed.  Blood cultures from admission positive for Staph aureus, for which he is on IV antibiotics.  Overall respiratory status appears to be improved, doing well with BiPAP as needed.  - BiPAP at night and with naps  - RT following  - Continue to monitor respiratory status      #Staph aureus bacteremia.  Left buttock wound.  Initial blood cultures positive for Staph aureus.  Started on IV vancomycin empirically, consulted infectious disease.  As far as source, has had respiratory issues prior to admission, but CT chest did not necessarily appear convincing for active pneumonia.  Also has a wound on left buttock that is open, having some serosanguineous drainage.  Somewhat firm to palpation underneath the opening on the skin, but no purulent discharge and does not necessarily feel like there is an abscess underneath the skin.  We will continue to monitor the wound as potential source as well.  Per ID, patient will need 4 weeks total IV antibiotic therapy.  PICC now in place for ongoing antibiotic administration.  Discharge planning ongoing as group home not able to accommodate patient's ongoing antibiotic need.  TCU was investigated but fortunately due to multiple dosing throughout the day the TCU also cannot accommodate his needs.  Social work is indicated he will need to remain hospitalized for the duration of his antibiotics given lack of successful placement.  - IV nafcillin for 4 weeks of therapy (last positive culture 11/25).  Discussed with pharmacy today and the last dose of antibiotics will be 9 AM on 12/25, and end date has been placed in the orders.  - Social work informed me that the frequency of the  "outpatient antibiotics is complicating placement, discussed with ID twice and unfortunately there are no other reasonable antibiotic options.  - ID consulted during hospital stay, no longer actively following.  - PICC in place, no fever or chills.     #Hyperammonemia, chronic  Unclear cause.  Patient had one-time lactulose in the emergency department.  Initial ammonia level was 129. He received a dose of lactulose in the ER.  Ammonia level elevated at 82. Suspect related to seizure medication (Depakote).  - No known liver disease.  - Depakote was initially held.  Case discussed with neurology and decreased Depakote to 500 mg p.o. twice daily which we will continue.   - Mental status seems stable , if worsening consider repeat VBG/ABG and ammonia level.     #Acute mixed toxic/metabolic and infectious encephalopathy - improving.  Likely delirium, memory concerns  Patient and stepmother both endorse some concerns about ongoing issues with memory on 11/29.  Patient also states that he had a \"vision\" that was rather distressing to him he believes several days ago.  Discussed with them that CT imaging of the head was normal, ammonia level not significantly elevated, and the ongoing confusion is likely related to delirium that is continuing to clear.  We will continue to monitor for signs of improve mental status, could consider MRI if continues to have memory issues despite ongoing other improvement.  He has already been evaluated by neurology on 11/25 with adjustment of seizure meds.   - Continue monitoring mental status.  Mental status appears now at baseline.  He asks appropriate questions including \"when can I leave to TCU\"  and \"why can't I finish IV antibiotics here and then go back to group home.\"    - His step-mom notes that he is slow to talk.  She also notes that Joel can be manipulative at times and \"knows the system.\"    - BiPAP at bedtime.  - Adjusted Depakote level as above to twice daily dosing from 3 times " daily.     #Seizure disorder  Stable on current medication including Depakote, Lamictal, Keppra, and zonisamide.   - My colleague discussed with neurology earlier in hospital stay.  Neurology recommending to decrease Depakote to 500 mg twice daily, check free Depakote level, Keppra level, Lamictal level.  Orders adjusted.  Patient needs to follow-up with neurology in the outpatient setting.  - Repeat drug levels ordered on 12/4.  Depakote level a bit low.  My colleague talked to Dr. Huff on 12/5.  Recommends continued slow wean of depakote in outpatient setting as can contribute to somnolence/slow thinking.  He has already been weaned a bit during this hospitalization as above.   - Follow-up with neurology as outpatient for further adjustment of his medications.     #Essential hypertension.  On propranolol and prazosin.     #Depression/anxiety.  On Celexa, BuSpar and Seroquel.     #GERD.  On pantoprazole.     #Loose stools  Likely from antibiotics and recent bowel medications.  No abdominal pain.  - No indication for C. difficile or enteric panel at this time as is quite intermittent and low-volume, no abdominal pain, fever, or other signs of infection.    DVT Prophylaxis: Pneumatic Compression Devices  Code Status: Full Code  Diet: Regular Diet Adult  Snacks/Supplements Adult: Ensure Max Protein (bariatric); Between Meals    Reid Catheter: Not present  Disposition: Expected discharge on 12/25 to group home. Goals prior to discharge include complete antibiotics on 12/25 and discharged back to group home.  Incidental Findings: As above.  Family updated today: No.    30 MINUTES SPENT BY ME on the date of service doing chart review, history, exam, documentation & further activities per the note.      Interval History   The patient reports doing well. Laying in bed, still on BiPAP. No chest pain or shortness of breath. No nausea, vomiting, diarrhea, constipation. No fevers. No other specific complaints identified.  "    -Data reviewed today: I personally reviewed all new labs and imaging results over the last 24 hours.     Physical Exam   Temp: 97.9  F (36.6  C) Temp src: Axillary BP: (!) 152/77 Pulse: 97   Resp: 18 SpO2: 98 % O2 Device: Nasal cannula Oxygen Delivery: 4 LPM  Vitals:    12/08/23 1159   Weight: 136.1 kg (300 lb)     Vital Signs with Ranges  Temp:  [97.2  F (36.2  C)-97.9  F (36.6  C)] 97.9  F (36.6  C)  Pulse:  [78-97] 97  Resp:  [16-21] 18  BP: (125-166)/(73-90) 152/77  FiO2 (%):  [40 %] 40 %  SpO2:  [96 %-98 %] 98 %  I/O last 3 completed shifts:  In: -   Out: 600 [Urine:600]    GENERAL: No apparent distress. Awake, alert, and fully oriented.  HEENT: Normocephalic, atraumatic. Extraocular movements intact.  CARDIOVASCULAR: Regular rate and rhythm without murmurs or rubs. No S3.  PULMONARY: Clear bilaterally.  GASTROINTESTINAL: Soft, non-tender, non-distended. Bowel sounds normoactive.   EXTREMITIES: No cyanosis or clubbing. No edema.  NEUROLOGICAL: CN 2-12 grossly intact, no focal neurological deficits.  DERMATOLOGICAL: No rash, ulcer, bruising, nor jaundice.     Medications      busPIRone  15 mg Oral BID    citalopram  60 mg Oral Daily    divalproex sodium delayed-release  500 mg Oral Q12H [facility] (08/20)    fluticasone  1 spray Nasal BID    lamoTRIgine  200 mg Oral BID    levETIRAcetam  1,000 mg Oral QAM    And    levETIRAcetam  1,500 mg Oral At Bedtime    levOCARNitine  660 mg Oral TID    lidocaine  1 patch Transdermal Q24h    miconazole   Topical BID    multivitamin, therapeutic  1 tablet Oral Daily    nafcillin  2 g Intravenous Q4H    pantoprazole  40 mg Oral Daily    polyethylene glycol  17 g Oral Daily    prazosin  2 mg Oral At Bedtime    propranolol  20 mg Oral BID    QUEtiapine ER  300 mg Oral At Bedtime    sodium chloride (PF)  10 mL Intracatheter Q8H    zonisamide  100 mg Oral QAM    And    zonisamide  200 mg Oral At Bedtime     Data     Laboratory:  No results for input(s): \"WBC\", \"HGB\", \"HCT\", \"MCV\", " "\"PLT\" in the last 168 hours.    Recent Labs   Lab 12/06/23  0206 12/05/23  2155 12/05/23  1639   * 109* 125*     No results for input(s): \"CULT\" in the last 168 hours.    Imaging:  No results found for this or any previous visit (from the past 24 hour(s)).      Praveen Tyson DO MPH  [facility] Hospitalist  [address] [place] [address]  12/12/2023   "

## 2023-12-12 NOTE — PLAN OF CARE
Goal Outcome Evaluation:VSS, afeb., LS are dim., 97% on 4L NC, Pt has good appetite, but refused dinner this shift, as only standard trays were available. Pt had Lg BM this shift, ambulated to BR, walker and GB.  R picc flushes well, pt receiving IV Nafcillin Q4H for Bacteremia. Plan to discharge to TCU for abx tx x2 weeks, if bed available. No Tele.

## 2023-12-12 NOTE — PROGRESS NOTES
Cared for [id number]    Pt is alert and oriented to self and situation. Vital signs stable. Pt denies numbness or tingling. Pt reports R sided pectoral pain, lidocaine patch applied. Pt is up 1 assist w/ walker. Lung sounds diminished, on BIPAP overnight.    /75 (BP Location: Left arm)   Pulse 85   Temp 97.2  F (36.2  C) (Oral)   Resp 17   Wt 136.1 kg (300 lb)   SpO2 97%   BMI 46.99 kg/m

## 2023-12-12 NOTE — PROGRESS NOTES
[facility] Nurse Inpatient Assessment     Consulted for: Right buttock    Patient History (according to provider note(s):      Tre Vazquez is a 48 year old male who who has a very complicated past medical as noted in the list below.  He lives in a group home.  He has some developmental delay, morbid obesity, AAN and OHS using chronically BiPAP at night apparently.  History of seizure disorder and chronic hyperammonemia while on Depakote.  Schizoaffective disorder, borderline personality and left eye blindness. He presented with clouding of mentation and elevated CO2 and pneumonia.  (This patient normally has a CO2 between 70 and 80 and he is ammonia is in the 60s). His current presentation is suspected to be due to possible nonadherence to treatment, though it is difficult to prove.  Patient has been a started on NIPPV in the emergency department and he has had excellent response.  Per records from May of 2022, his ammonia level was elevated and considered the consequence of the use of Depakote for seizures.  There is no official diagnosis of cirrhosis in this patient, he is not taking lactulose and likely not needed though he had one dose in ED.    CT of the head negative for any acute intracranial event.  CT of the chest with PE protocol negative for embolization, pneumonia, pleural effusion or pneumothorax.    Assessment:      Areas visualized during today's visit: Focused: Buttocks    Pressure Injury Location: Left IT  Last photo: 12/5/23    Wound type: Pressure Injury     Pressure Injury Stage: Unstageable, present on admission    Wound history/plan of care: Resides in group home.  Reports spending more time sitting recently.  Wound base: 100 % slough     Palpation of the wound bed: normal      Drainage: small     Description of drainage: serosanguinous     Measurements (length x width x depth, in cm): 0.6  x 0.3  x 0.2 cm      Tunneling: None     Undermining: None  Periwound  "skin: Intact      Color: normal and consistent with surrounding tissue      Temperature: normal   Odor: none  Pain: moderate and during dressing change, sharp  Pain interventions prior to dressing change: slow and gentle cares  and distraction  Treatment goal: Heal , Infection control/prevention, and Remove necrotic tissue  STATUS: improving  Supplies ordered: gathered and at bedside    My PI Risk Assessment     Sensory Perception: 3 - Slightly Limited     Moisture: 3 - Occasionally moist      Activity: 3 - Walks occasionally      Mobility: 3 - Slightly limited      Nutrition: 3 - Adequate     Friction/Shear: 2 - Potential problem      TOTAL: 17       Treatment Plan:     Left IT wound(s): Every other day  Cleanse area with Microklenz spray.  Apply small piece of Aquacel Ag to wound bed (SPS #[id number])  Cover with Mepilex 4x4    Buttocks:  BID  Cleanse area with Britany Cleanse and Protect spray and soft dry wipe  Apply additional Britany spray to moisturize skin    Pressure Injury Prevention (PIP) Plan:  If patient is declining pressure injury prevention interventions: Explore reason why and address patient's concerns, Educate on pressure injury risk and prevention intervention(s), and If patient is still declining, document \"informed refusal\"   Mattress: Follow bed algorithm, reassess daily and order specialty mattress, if indicated.  HOB: Maintain at or below 30 degrees, unless contraindicated  Repositioning in bed: Every 1-2 hours  and Left/right positioning; avoid supine  Heels: Pillows under calves  Chair positioning: Chair cushion (#[id number])    If patient has a buttock pressure injury, or high risk for PI use chair cushion or SPS.  Moisture Management: Avoid brief in bed  Under Devices: Inspect skin under all medical devices during skin inspection , Ensure tubes are stabilized without tension, and Ensure patient is not lying on medical devices or equipment when repositioned  Ask provider to discontinue device when no " "longer needed.    Orders: Reviewed    RECOMMEND PRIMARY TEAM ORDER: None, at this time  Education provided: importance of repositioning, wound progress, and Off-loading pressure  Discussed plan of care with: Patient  WOC nurse follow-up plan: weekly  Notify WOC if wound(s) deteriorate.  Nursing to notify the Provider(s) and re-consult the WOC Nurse if new skin concern.    DATA:     Current support surface: Bariatric Standard gel/foam mattress (IsoFlex, Atmos air, etc)  Containment of urine/stool: Continent of bladder and Continent of bowel  BMI: Body mass index is 46.99 kg/m .   Active diet order: Orders Placed This Encounter      Regular Diet Adult     Output: I/O last 3 completed shifts:  In: -   Out: 975 [Urine:975]     Labs:   No lab results found in last 7 days.    Invalid input(s): \"GLUCOMBO\"    Pressure injury risk assessment:   Sensory Perception: 3-->slightly limited  Moisture: 3-->occasionally moist  Activity: 3-->walks occasionally  Mobility: 3-->slightly limited  Nutrition: 3-->adequate  Friction and Shear: 2-->potential problem  Julio César Score: 17    MARYJANE Telles [facility] [employer]  Dept. Office Number: [phone]    "

## 2023-12-13 LAB
ALBUMIN SERPL BCG-MCNC: 4 G/DL (ref 3.5–5.2)
ALP SERPL-CCNC: 63 U/L (ref 40–150)
ALT SERPL W P-5'-P-CCNC: 25 U/L (ref 0–70)
ANION GAP SERPL CALCULATED.3IONS-SCNC: 3 MMOL/L (ref 7–15)
AST SERPL W P-5'-P-CCNC: 23 U/L (ref 0–45)
BILIRUB DIRECT SERPL-MCNC: <0.2 MG/DL (ref 0–0.3)
BILIRUB SERPL-MCNC: 0.2 MG/DL
BUN SERPL-MCNC: 14.7 MG/DL (ref 6–20)
CALCIUM SERPL-MCNC: 8.7 MG/DL (ref 8.6–10)
CHLORIDE SERPL-SCNC: 101 MMOL/L (ref 98–107)
CREAT SERPL-MCNC: 0.57 MG/DL (ref 0.67–1.17)
DEPRECATED HCO3 PLAS-SCNC: 38 MMOL/L (ref 22–29)
EGFRCR SERPLBLD CKD-EPI 2021: >90 ML/MIN/1.73M2
ERYTHROCYTE [DISTWIDTH] IN BLOOD BY AUTOMATED COUNT: 14.2 % (ref 10–15)
GLUCOSE SERPL-MCNC: 120 MG/DL (ref 70–99)
HCT VFR BLD AUTO: 36.5 % (ref 40–53)
HGB BLD-MCNC: 11.3 G/DL (ref 13.3–17.7)
MCH RBC QN AUTO: 30.2 PG (ref 26.5–33)
MCHC RBC AUTO-ENTMCNC: 31 G/DL (ref 31.5–36.5)
MCV RBC AUTO: 98 FL (ref 78–100)
PLATELET # BLD AUTO: 229 10E3/UL (ref 150–450)
POTASSIUM SERPL-SCNC: 4.1 MMOL/L (ref 3.4–5.3)
PROT SERPL-MCNC: 6.5 G/DL (ref 6.4–8.3)
RBC # BLD AUTO: 3.74 10E6/UL (ref 4.4–5.9)
SODIUM SERPL-SCNC: 142 MMOL/L (ref 135–145)
WBC # BLD AUTO: 6 10E3/UL (ref 4–11)

## 2023-12-13 PROCEDURE — 82947 ASSAY GLUCOSE BLOOD QUANT: CPT | Performed by: HOSPITALIST

## 2023-12-13 PROCEDURE — 94660 CPAP INITIATION&MGMT: CPT

## 2023-12-13 PROCEDURE — 82310 ASSAY OF CALCIUM: CPT | Performed by: HOSPITALIST

## 2023-12-13 PROCEDURE — 250N000013 HC RX MED GY IP 250 OP 250 PS 637: Performed by: HOSPITALIST

## 2023-12-13 PROCEDURE — 99232 SBSQ HOSP IP/OBS MODERATE 35: CPT | Performed by: INTERNAL MEDICINE

## 2023-12-13 PROCEDURE — 120N000001 HC R&B MED SURG/OB

## 2023-12-13 PROCEDURE — 250N000013 HC RX MED GY IP 250 OP 250 PS 637: Performed by: INTERNAL MEDICINE

## 2023-12-13 PROCEDURE — 999N000157 HC STATISTIC RCP TIME EA 10 MIN

## 2023-12-13 PROCEDURE — 82248 BILIRUBIN DIRECT: CPT | Performed by: HOSPITALIST

## 2023-12-13 PROCEDURE — 250N000009 HC RX 250: Performed by: HOSPITALIST

## 2023-12-13 PROCEDURE — 82374 ASSAY BLOOD CARBON DIOXIDE: CPT | Performed by: HOSPITALIST

## 2023-12-13 PROCEDURE — 85027 COMPLETE CBC AUTOMATED: CPT | Performed by: HOSPITALIST

## 2023-12-13 RX ADMIN — LAMOTRIGINE 200 MG: 200 TABLET ORAL at 21:34

## 2023-12-13 RX ADMIN — PANTOPRAZOLE SODIUM 40 MG: 40 TABLET, DELAYED RELEASE ORAL at 08:36

## 2023-12-13 RX ADMIN — LEVOCARNITINE 660 MG: 330 TABLET ORAL at 13:43

## 2023-12-13 RX ADMIN — PROPRANOLOL HYDROCHLORIDE 20 MG: 20 TABLET ORAL at 21:35

## 2023-12-13 RX ADMIN — THERA TABS 1 TABLET: TAB at 08:36

## 2023-12-13 RX ADMIN — NAFCILLIN 2 G: 2 POWDER, FOR SOLUTION INTRAMUSCULAR; INTRAVENOUS at 13:43

## 2023-12-13 RX ADMIN — QUETIAPINE FUMARATE 300 MG: 300 TABLET, EXTENDED RELEASE ORAL at 21:35

## 2023-12-13 RX ADMIN — LEVOCARNITINE 660 MG: 330 TABLET ORAL at 08:36

## 2023-12-13 RX ADMIN — NAFCILLIN 2 G: 2 POWDER, FOR SOLUTION INTRAMUSCULAR; INTRAVENOUS at 08:37

## 2023-12-13 RX ADMIN — LEVETIRACETAM 1000 MG: 500 TABLET, FILM COATED ORAL at 08:37

## 2023-12-13 RX ADMIN — LEVETIRACETAM 1500 MG: 500 TABLET, FILM COATED ORAL at 21:35

## 2023-12-13 RX ADMIN — PROPRANOLOL HYDROCHLORIDE 20 MG: 20 TABLET ORAL at 08:37

## 2023-12-13 RX ADMIN — MICONAZOLE NITRATE: 20 POWDER TOPICAL at 21:33

## 2023-12-13 RX ADMIN — NAFCILLIN 2 G: 2 POWDER, FOR SOLUTION INTRAMUSCULAR; INTRAVENOUS at 16:49

## 2023-12-13 RX ADMIN — DIVALPROEX SODIUM 500 MG: 500 TABLET, DELAYED RELEASE ORAL at 08:36

## 2023-12-13 RX ADMIN — DIVALPROEX SODIUM 500 MG: 500 TABLET, DELAYED RELEASE ORAL at 21:35

## 2023-12-13 RX ADMIN — CITALOPRAM HYDROBROMIDE 60 MG: 20 TABLET, FILM COATED ORAL at 08:36

## 2023-12-13 RX ADMIN — NAFCILLIN 2 G: 2 POWDER, FOR SOLUTION INTRAMUSCULAR; INTRAVENOUS at 00:08

## 2023-12-13 RX ADMIN — BUSPIRONE HYDROCHLORIDE 15 MG: 15 TABLET ORAL at 08:37

## 2023-12-13 RX ADMIN — LAMOTRIGINE 200 MG: 200 TABLET ORAL at 08:36

## 2023-12-13 RX ADMIN — ZONISAMIDE 200 MG: 100 CAPSULE ORAL at 21:35

## 2023-12-13 RX ADMIN — NAFCILLIN 2 G: 2 POWDER, FOR SOLUTION INTRAMUSCULAR; INTRAVENOUS at 21:33

## 2023-12-13 RX ADMIN — POLYETHYLENE GLYCOL 3350 17 G: 17 POWDER, FOR SOLUTION ORAL at 08:34

## 2023-12-13 RX ADMIN — FLUTICASONE PROPIONATE 1 SPRAY: 50 SPRAY, METERED NASAL at 21:33

## 2023-12-13 RX ADMIN — PRAZOSIN HYDROCHLORIDE 2 MG: 1 CAPSULE ORAL at 21:36

## 2023-12-13 RX ADMIN — ZONISAMIDE 100 MG: 100 CAPSULE ORAL at 08:36

## 2023-12-13 RX ADMIN — NAFCILLIN 2 G: 2 POWDER, FOR SOLUTION INTRAMUSCULAR; INTRAVENOUS at 06:06

## 2023-12-13 RX ADMIN — BUSPIRONE HYDROCHLORIDE 15 MG: 15 TABLET ORAL at 21:36

## 2023-12-13 RX ADMIN — LEVOCARNITINE 660 MG: 330 TABLET ORAL at 21:34

## 2023-12-13 ASSESSMENT — ACTIVITIES OF DAILY LIVING (ADL)
ADLS_ACUITY_SCORE: 53
ADLS_ACUITY_SCORE: 51
ADLS_ACUITY_SCORE: 53
ADLS_ACUITY_SCORE: 51
ADLS_ACUITY_SCORE: 53

## 2023-12-13 NOTE — PLAN OF CARE
Goal Outcome Evaluation:    Assumed cares [id number]:  Pt alert but confused.  VSS, denies pain. Asked repeated inappropriate questions and made inappropriate comments toward a female employee. Getting Q4 IV Nafcillin. Looking for TCU for LT abx.  Last dose on 12/25.  PICC c/d/i

## 2023-12-13 NOTE — PROGRESS NOTES
Care Management Follow Up    Length of Stay (days): 19    Expected Discharge Date: 12/26/2023     Concerns to be Addressed: care coordination/care conferences, discharge planning     Patient plan of care discussed at interdisciplinary rounds: Yes    Anticipated Discharge Disposition: Group Home     Anticipated Discharge Services: none   Education Provided on the Discharge Plan: yes   Patient/Family in Agreement with the Plan:  yes        Additional Information:  CM following for discharge planning. Patient is in need of 4 weeks of Nafcillin q4hrs per ID recommendations. He is unable to return to the  with the IV antibiotics and they are not licensed to accommodate that. Transitional Care Unit referrals have been sent with multiple declines due to the frequency of the antibiotic. The antibiotic will be completed on 12/25. Plan at this time will be for patient to remain in hospital for the length of antibiotic need.     Call placed to the  and spoke to Quoc,  director/RN, to update him on current plan of care and discharge plan. He is agreeable to the current plan.  is able to accept patient back in the am of 12/26. Quoc inquired about wounds and wound care. Per WOC note 12/12, the care of Left IT is cleansing spray, aquacel ad mepilex 4x4. Quoc is ok with wound care and would like a few supplies and treatment plan added to instructions on discharge. He would like an updated phone call with final plan closer to discharge date. Patient will need  w/c transport arranged on discharge.    Call also placed to edwin Dailey to update her on plan of care and discharge plan. Updated her that we are unable to find a Transitional Care Unit to accept the q4hrs antibiotics. Plan will be for patient to remain in hospital for the remainder of the antibiotics. Updated that  can take him back on 12/26 and transport will be arranged in the am. She is thankful for the update and will speak to Quoc at the  to make sure  they are ready for patient's return. She states 3 family members will be visiting patient on Sunday for a small marlys celebration with patient.    CM will cont to follow for discharge plan of care.                 Kathy Blanton RN BSN CM  Inpatient Care Coordination  [facility]  [phone]

## 2023-12-13 NOTE — PLAN OF CARE
Cared for [id number]    Pt A/O x 4, VSS; Pt denies pain, headache, dizziness, N/V & SOB. Pt up Asst: 1 w/walker. Lung sounds diminished, 3-4L O2 Nasal Cannula and BiPap when sleeping. Wounds/dressings intact per plan of care. ID, PT, OT & Social Work following. Plan to discharge 12/26 after IV antibiotics completed. Will continue with plan of care.
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
## 2023-12-13 NOTE — PROGRESS NOTES
A BiPAP of  12/6 @ 40% was applied to the pt via the mask for NOC use.  The bridge of the nose looks good and remains intact. Pt is tolerating it well. Will continue to monitor and assess the pt's current respiratory status and needs.    Filippo Lamb RT on 12/13/2023 at 4:19 AM

## 2023-12-13 NOTE — PROGRESS NOTES
[facility]  Hospitalist Progress Note  Casper Gomez MD 12/13/23    Reason for Stay (Diagnosis): Staph aureus bacteremia, encephalopathy         Assessment and Plan:      Summary of Stay: Tre Vazquez is a 48 year old male with past medical history including developmental delay, morbid obesity, ANA/OHS on BiPAP at bedtime, seizure disorder, chronic hyperammonemia, anemia, schizoaffective disorder, borderline personality disorder, MDD, anxiety, HTN, and left eye blindness who was admitted on 11/24/2023 with altered mental status consistent with acute encephalopathy likely primary driven by hypercapnia based on VBG.  Additionally ammonia level was elevated, however this is more of a chronic issue likely related to his Depakote with no history of cirrhosis.  CT of the head and chest did not show any acute pathology.  Blood cultures ended up coming back positive for Staph aureus so he was started on IV nafcillin after consultation from ID.  He will need 4 weeks of IV antibiotics which his group home cannot accommodate and we have not been able to find a TCU for him.  Likely to remain hospitalized until finishing antibiotics at end of day 12/25 and discharged 12/26.    Problem List/Assessment and Plan:   Acute on chronic hypercarbic and hypoxemic respiratory failure , stable   Patient on chronic BiPAP at night time and napping    ANA  Obesity hypoventilation syndrome  Breathing comfortably.  Tolerating BiPAP with sleep, on nasal cannula throughout the day when awake.  PCO2 improved with bipap therapy.  He still has intermittent confusion but much improved and he is largely appropriate.  He is asking appropriate questions including when he can leave the hospital.  He tells me his  is his step mom.  For now, continue BiPAP overnight and with naps during the day.  Supplemental oxygen as needed.  Blood cultures from admission positive for Staph aureus, for which he is on IV antibiotics.   Overall respiratory status appears to be improved, doing well with BiPAP as needed.  -BiPAP at night and with naps  -RT following  -Continue to monitor respiratory status      Staph aureus bacteremia  Left buttock wound, present on admission  Initial blood cultures positive for Staph aureus.  Started on IV vancomycin empirically, consulted infectious disease.  As far as source, has had respiratory issues prior to admission, but CT chest did not necessarily appear convincing for active pneumonia.  Also has a wound on left buttock that is open, having some serosanguineous drainage.  Somewhat firm to palpation underneath the opening on the skin, but no purulent discharge and does not necessarily feel like there is an abscess underneath the skin.  We will continue to monitor the wound as potential source as well.  Per ID, patient will need 4 weeks total IV antibiotic therapy.  PICC now in place for ongoing antibiotic administration.  Discharge planning ongoing as group home not able to accommodate patient's ongoing antibiotic need.  TCU was investigated but fortunately due to multiple dosing throughout the day the TCU also cannot accommodate his needs.  Social work is indicated he will need to remain hospitalized for the duration of his antibiotics given lack of successful placement.  -IV nafcillin for 4 weeks of therapy (last positive culture 11/25).  Discussed with pharmacy today and the last dose of antibiotics will be on 12/25, and end date has been placed in the orders.  -Social work informed that the frequency of the outpatient antibiotics is complicating placement, discussed with ID twice and unfortunately there are no other reasonable antibiotic options.  -ID consulted during hospital stay, no longer actively following.  -PICC in place     Chronic hyperammonemia  Suspect this is secondary to Depakote.  No history of liver disease.  Patient had one-time lactulose in the emergency department.  Initial ammonia  "level was 129. He received a dose of lactulose in the ER.  Chronically elevated in the 80s.  -No known liver disease.  -Depakote was initially held.  Case discussed with neurology and decreased Depakote to 500 mg p.o. twice daily with plan for tapering off     Acute metabolic and infectious encephalopathy   Developmental delay  Initially presenting with acute encephalopathy likely combination of acute infectious encephalopathy from Staph aureus bacteremia and acute on chronic hypercapnia.  Patient and stepmother both endorse some concerns about ongoing issues with memory on 11/29.  Patient also states that he had a \"vision\" that was rather distressing to him he believes several days ago.  Discussed with them that CT imaging of the head was normal, ammonia level not significantly elevated, and the ongoing confusion is likely related to delirium that is continuing to clear.  We will continue to monitor for signs of improve mental status, could consider MRI if continues to have memory issues despite ongoing other improvement.  He has already been evaluated by neurology on 11/25 with adjustment of seizure meds.   -Mental status appears now at baseline.  He asks appropriate questions including \"when can I leave to TCU\"  and \"why can't I finish IV antibiotics here and then go back to group home.\"    -His step-mom notes that he is slow to talk.  She also notes that Joel can be manipulative at times and \"knows the system.\"    -BiPAP at bedtime.  -Decreasing Depakote as below     Seizure disorder  Stable on current medication including Depakote, Lamictal, Keppra, and zonisamide.   -Colleague previously discussed case with neurology earlier in hospital stay.  Neurology recommending to decrease Depakote to 500 mg twice daily, check free Depakote level, Keppra level, Lamictal level.  Orders adjusted.  Patient needs to follow-up with neurology in the outpatient setting.  -Repeat drug levels ordered on 12/4.  Depakote level a bit " low.  My colleague talked to Dr. Huff on 12/5.  Recommends continued slow wean of depakote in outpatient setting as can contribute to somnolence/slow thinking.  He has already been weaned a bit during this hospitalization as above.   -Follow-up with neurology as outpatient for further adjustment of his medications.     Essential hypertension  -Resumed PTA propranolol and prazosin.     MDD  Anxiety  Borderline personality disorder  Schizoaffective disorder  -Resumed PTA Celexa, BuSpar and Seroquel.     GERD  -Resume PTA pantoprazole.     Loose stools  Likely from antibiotics and recent bowel medications.  No abdominal pain.  -No indication for C. difficile or enteric panel at this time as is quite intermittent and low-volume, no abdominal pain, fever, or other signs of infection  -Imodium as needed    Morbid obesity: BMI 46.    Chronic anemia: Hemoglobin at baseline of 10-12.    DVT Prophylaxis: Pneumatic Compression Devices  Code Status: Full Code  Lines: PICC line  FEN: Regular diet  Discharge Dispo:  lives in group home.  Social work consulted  Estimated Disch Date / # of Days until Disch: He will need 4 weeks of IV antibiotics which his group home cannot accommodate and we have not been able to find a TCU for him.  Likely to remain hospitalized until finishing antibiotics at end of day 12/25 and discharged 12/26.    Clinically Significant Risk Factors              # Hypoalbuminemia: Lowest albumin = 3.4 g/dL at 11/26/2023 12:56 AM, will monitor as appropriate     # Hypertension: Noted on problem list                               Interval History (Subjective):      No acute events overnight.  Denies any pain at this time.  Continues to ask how long he needs to be in the hospital and explained to him until we finish his IV antibiotic course on 12/25.                  Physical Exam:      Last Vital Signs:  /62 (BP Location: Left arm)   Pulse 86   Temp 98.1  F (36.7  C) (Axillary)   Resp 20   Wt 136.1  kg (300 lb)   SpO2 98%   BMI 46.99 kg/m        Intake/Output Summary (Last 24 hours) at 12/13/2023 1647  Last data filed at 12/13/2023 1230  Gross per 24 hour   Intake 360 ml   Output 1400 ml   Net -1040 ml       Constitutional: Awake, NAD  Eyes: sclera white.  Left eyelid droop  HEENT: MMM  Respiratory: no respiratory distress, lungs cta bilaterally, no crackles or wheeze  Cardiovascular: RRR.  No murmur appreciated  GI: Obese, nontender, nondistended, bowel sounds present  Skin: no rash  Musculoskeletal/extremities: Trace lower extremity edema  Neurologic: Alert, answers some symptom-based questions appropriately  Psychiatric: calm          Medications:      All current medications were reviewed with changes reflected in problem list.         Data:      All new lab and imaging data were personally reviewed.   Labs:  Recent Labs   Lab 12/13/23  0600      POTASSIUM 4.1   CHLORIDE 101   CO2 38*   ANIONGAP 3*   *   BUN 14.7   CR 0.57*   GFRESTIMATED >90   ARNOLD 8.7     Recent Labs   Lab 12/13/23  0600   WBC 6.0   HGB 11.3*   HCT 36.5*   MCV 98         Imaging:   None today      Casper Gomez MD

## 2023-12-14 ENCOUNTER — APPOINTMENT (OUTPATIENT)
Dept: ULTRASOUND IMAGING | Facility: CLINIC | Age: 48
DRG: 871 | End: 2023-12-14
Attending: INTERNAL MEDICINE
Payer: MEDICARE

## 2023-12-14 PROCEDURE — 94660 CPAP INITIATION&MGMT: CPT

## 2023-12-14 PROCEDURE — 250N000009 HC RX 250: Performed by: HOSPITALIST

## 2023-12-14 PROCEDURE — 250N000013 HC RX MED GY IP 250 OP 250 PS 637: Performed by: STUDENT IN AN ORGANIZED HEALTH CARE EDUCATION/TRAINING PROGRAM

## 2023-12-14 PROCEDURE — 120N000001 HC R&B MED SURG/OB

## 2023-12-14 PROCEDURE — 250N000013 HC RX MED GY IP 250 OP 250 PS 637: Performed by: HOSPITALIST

## 2023-12-14 PROCEDURE — 999N000157 HC STATISTIC RCP TIME EA 10 MIN

## 2023-12-14 PROCEDURE — 250N000013 HC RX MED GY IP 250 OP 250 PS 637: Performed by: INTERNAL MEDICINE

## 2023-12-14 PROCEDURE — 93971 EXTREMITY STUDY: CPT | Mod: RT

## 2023-12-14 PROCEDURE — 99232 SBSQ HOSP IP/OBS MODERATE 35: CPT | Performed by: INTERNAL MEDICINE

## 2023-12-14 RX ADMIN — QUETIAPINE FUMARATE 300 MG: 300 TABLET, EXTENDED RELEASE ORAL at 21:49

## 2023-12-14 RX ADMIN — BUSPIRONE HYDROCHLORIDE 15 MG: 15 TABLET ORAL at 20:01

## 2023-12-14 RX ADMIN — PROPRANOLOL HYDROCHLORIDE 20 MG: 20 TABLET ORAL at 20:01

## 2023-12-14 RX ADMIN — DIVALPROEX SODIUM 500 MG: 500 TABLET, DELAYED RELEASE ORAL at 09:48

## 2023-12-14 RX ADMIN — BUSPIRONE HYDROCHLORIDE 15 MG: 15 TABLET ORAL at 09:48

## 2023-12-14 RX ADMIN — ZONISAMIDE 200 MG: 100 CAPSULE ORAL at 21:50

## 2023-12-14 RX ADMIN — LEVOCARNITINE 660 MG: 330 TABLET ORAL at 09:48

## 2023-12-14 RX ADMIN — NAFCILLIN 2 G: 2 POWDER, FOR SOLUTION INTRAMUSCULAR; INTRAVENOUS at 18:02

## 2023-12-14 RX ADMIN — LOPERAMIDE HYDROCHLORIDE 2 MG: 2 CAPSULE ORAL at 14:57

## 2023-12-14 RX ADMIN — CITALOPRAM HYDROBROMIDE 60 MG: 20 TABLET, FILM COATED ORAL at 09:47

## 2023-12-14 RX ADMIN — NAFCILLIN 2 G: 2 POWDER, FOR SOLUTION INTRAMUSCULAR; INTRAVENOUS at 01:05

## 2023-12-14 RX ADMIN — LAMOTRIGINE 200 MG: 200 TABLET ORAL at 20:01

## 2023-12-14 RX ADMIN — NAFCILLIN 2 G: 2 POWDER, FOR SOLUTION INTRAMUSCULAR; INTRAVENOUS at 10:47

## 2023-12-14 RX ADMIN — LAMOTRIGINE 200 MG: 200 TABLET ORAL at 09:48

## 2023-12-14 RX ADMIN — NAFCILLIN 2 G: 2 POWDER, FOR SOLUTION INTRAMUSCULAR; INTRAVENOUS at 14:14

## 2023-12-14 RX ADMIN — PRAZOSIN HYDROCHLORIDE 2 MG: 1 CAPSULE ORAL at 21:50

## 2023-12-14 RX ADMIN — LEVOCARNITINE 660 MG: 330 TABLET ORAL at 14:14

## 2023-12-14 RX ADMIN — NAFCILLIN 2 G: 2 POWDER, FOR SOLUTION INTRAMUSCULAR; INTRAVENOUS at 21:52

## 2023-12-14 RX ADMIN — THERA TABS 1 TABLET: TAB at 09:47

## 2023-12-14 RX ADMIN — ZONISAMIDE 100 MG: 100 CAPSULE ORAL at 09:48

## 2023-12-14 RX ADMIN — NAFCILLIN 2 G: 2 POWDER, FOR SOLUTION INTRAMUSCULAR; INTRAVENOUS at 05:30

## 2023-12-14 RX ADMIN — LEVETIRACETAM 1000 MG: 500 TABLET, FILM COATED ORAL at 09:47

## 2023-12-14 RX ADMIN — PROPRANOLOL HYDROCHLORIDE 20 MG: 20 TABLET ORAL at 09:46

## 2023-12-14 RX ADMIN — LEVOCARNITINE 660 MG: 330 TABLET ORAL at 20:01

## 2023-12-14 RX ADMIN — LEVETIRACETAM 1500 MG: 500 TABLET, FILM COATED ORAL at 21:49

## 2023-12-14 RX ADMIN — DIVALPROEX SODIUM 500 MG: 500 TABLET, DELAYED RELEASE ORAL at 20:01

## 2023-12-14 RX ADMIN — PANTOPRAZOLE SODIUM 40 MG: 40 TABLET, DELAYED RELEASE ORAL at 09:47

## 2023-12-14 ASSESSMENT — ACTIVITIES OF DAILY LIVING (ADL)
ADLS_ACUITY_SCORE: 47
ADLS_ACUITY_SCORE: 53
ADLS_ACUITY_SCORE: 49
ADLS_ACUITY_SCORE: 47
ADLS_ACUITY_SCORE: 53
ADLS_ACUITY_SCORE: 49
ADLS_ACUITY_SCORE: 47
ADLS_ACUITY_SCORE: 53
ADLS_ACUITY_SCORE: 49
ADLS_ACUITY_SCORE: 47
ADLS_ACUITY_SCORE: 49
ADLS_ACUITY_SCORE: 49

## 2023-12-14 NOTE — PLAN OF CARE
Goal Outcome Evaluation:      Plan of Care Reviewed With: patient         Temp: 97.7  F (36.5  C) Temp src: Axillary BP: (!) 146/80 Pulse: 97   Resp: 18 SpO2: 98 %     VSS on Bipap. Disoriented to time. Assist of 1, walker. On Nafcillin q4 hr. R. PICC in place, blood return noted. Plan to continue antibiotics, with last dose being on 12/25.

## 2023-12-14 NOTE — PLAN OF CARE
Goal Outcome Evaluation:      Plan of Care Reviewed With: patient    Overall Patient Progress: improvingOverall Patient Progress: improving    Outcome Evaluation: . Stable    Provided care [id number]    Vitals VSS  Neuro Intermittent confusion. Alert   Respiratory 98% RA  Cardiac/Tele WDL  GI/ Urinal at bedside   Skin Wound at L buttock   LDAs R single lumen PICC SL  Diet Regular   Activity SBA gb walker   Plan Continue with IV ABX. Plan to discharge back to group home once ABX course is completed.

## 2023-12-14 NOTE — PROGRESS NOTES
A BiPAP of  12/6 @ 40% was applied to the pt via the mask for NOC use. The bridge of the nose looks good and remains intact. Pt is tolerating it well. Will continue to monitor and assess the pt's current respiratory status and needs.    Filippo Lamb RT on 12/14/2023 at 4:23 AM

## 2023-12-14 NOTE — PROGRESS NOTES
[facility]  Hospitalist Progress Note  Casper Gomez MD 12/14/23    Reason for Stay (Diagnosis): Staph aureus bacteremia, encephalopathy         Assessment and Plan:      Summary of Stay:   Tre Vazquez is a 48 year old male with past medical history including developmental delay, morbid obesity, ANA/OHS on BiPAP at bedtime, seizure disorder, chronic hyperammonemia, anemia, schizoaffective disorder, borderline personality disorder, MDD, anxiety, HTN, and left eye blindness who was admitted on 11/24/2023 with altered mental status consistent with acute encephalopathy likely primary driven by hypercapnia based on VBG.  Additionally ammonia level was elevated, however this is more of a chronic issue likely related to his Depakote with no history of cirrhosis.  CT of the head and chest did not show any acute pathology.  Blood cultures ended up coming back positive for Staph aureus so he was started on IV nafcillin after consultation from ID.  He will need 4 weeks of IV antibiotics which his group home cannot accommodate and we have not been able to find a TCU for him.  Likely to remain hospitalized until finishing antibiotics at end of day 12/25 and discharged 12/26.  Some right arm swelling from PICC line, so ultrasound was obtained and was negative for DVT.    Problem List/Assessment and Plan:   Acute on chronic hypercarbic and hypoxemic respiratory failure , stable   Patient on chronic BiPAP at night time and napping    ANA  Obesity hypoventilation syndrome  Breathing comfortably.  Tolerating BiPAP with sleep, on nasal cannula throughout the day when awake.  PCO2 improved with bipap therapy.  He still has intermittent confusion but much improved and he is largely appropriate.  He is asking appropriate questions including when he can leave the hospital.  He tells me his  is his step mom.  For now, continue BiPAP overnight and with naps during the day.  Supplemental oxygen as needed.   Blood cultures from admission positive for Staph aureus, for which he is on IV antibiotics.  Overall respiratory status appears to be improved, doing well with BiPAP as needed.  -BiPAP at night and with naps  -RT following  -Continue to monitor respiratory status      Staph aureus bacteremia  Left buttock wound, present on admission  Initial blood cultures positive for Staph aureus.  Started on IV vancomycin empirically, consulted infectious disease.  As far as source, has had respiratory issues prior to admission, but CT chest did not necessarily appear convincing for active pneumonia.  Also has a wound on left buttock that is open, having some serosanguineous drainage.  Somewhat firm to palpation underneath the opening on the skin, but no purulent discharge and does not necessarily feel like there is an abscess underneath the skin.  We will continue to monitor the wound as potential source as well.  Per ID, patient will need 4 weeks total IV antibiotic therapy.  PICC now in place for ongoing antibiotic administration.  Discharge planning ongoing as group home not able to accommodate patient's ongoing antibiotic need.  TCU was investigated but fortunately due to multiple dosing throughout the day the TCU also cannot accommodate his needs.  Social work is indicated he will need to remain hospitalized for the duration of his antibiotics given lack of successful placement.  -IV nafcillin for 4 weeks of therapy (last positive culture 11/25).  Discussed with pharmacy today and the last dose of antibiotics will be on 12/25, and end date has been placed in the orders.  -Social work informed that the frequency of the outpatient antibiotics is complicating placement, discussed with ID twice and unfortunately there are no other reasonable antibiotic options.  -ID consulted during hospital stay, no longer actively following.  -PICC in place    Right arm edema:  Some right arm swelling noted 12/14 from PICC line, so ultrasound  "was obtained and was negative for DVT.     Chronic hyperammonemia  Suspect this is secondary to Depakote.  No history of liver disease.  Patient had one-time lactulose in the emergency department.  Initial ammonia level was 129. He received a dose of lactulose in the ER.  Chronically elevated in the 80s.  -No known liver disease.  -Depakote was initially held.  Case discussed with neurology and decreased Depakote to 500 mg p.o. twice daily with plan for tapering off     Acute metabolic and infectious encephalopathy   Developmental delay  Initially presenting with acute encephalopathy likely combination of acute infectious encephalopathy from Staph aureus bacteremia and acute on chronic hypercapnia.  Patient and stepmother both endorse some concerns about ongoing issues with memory on 11/29.  Patient also states that he had a \"vision\" that was rather distressing to him he believes several days ago.  Discussed with them that CT imaging of the head was normal, ammonia level not significantly elevated, and the ongoing confusion is likely related to delirium that is continuing to clear.  We will continue to monitor for signs of improve mental status, could consider MRI if continues to have memory issues despite ongoing other improvement.  He has already been evaluated by neurology on 11/25 with adjustment of seizure meds.   -Mental status appears now at baseline.  He asks appropriate questions including \"when can I leave to TCU\"  and \"why can't I finish IV antibiotics here and then go back to group home.\"    -His step-mom notes that he is slow to talk.  She also notes that Joel can be manipulative at times and \"knows the system.\"    -BiPAP at bedtime.  -Decreasing Depakote as below     Seizure disorder  Stable on current medication including Depakote, Lamictal, Keppra, and zonisamide.   -Colleague previously discussed case with neurology earlier in hospital stay.  Neurology recommending to decrease Depakote to 500 mg " twice daily, check free Depakote level, Keppra level, Lamictal level.  Orders adjusted.  Patient needs to follow-up with neurology in the outpatient setting.  -Repeat drug levels ordered on 12/4.  Depakote level a bit low.  My colleague talked to Dr. Huff on 12/5.  Recommends continued slow wean of depakote in outpatient setting as can contribute to somnolence/slow thinking.  He has already been weaned a bit during this hospitalization as above.   -Follow-up with neurology as outpatient for further adjustment of his medications.     Essential hypertension  -Resumed PTA propranolol and prazosin  -Blood pressure remains mildly elevated     MDD  Anxiety  Borderline personality disorder  Schizoaffective disorder  -Resumed PTA Celexa, BuSpar and Seroquel.     GERD  -Resume PTA pantoprazole.     Loose stools  Likely from antibiotics and recent bowel medications.  No abdominal pain.  -No indication for C. difficile or enteric panel at this time as is quite intermittent and low-volume, no abdominal pain, fever, or other signs of infection  -Imodium as needed    Morbid obesity: BMI 46.    Chronic anemia: Hemoglobin at baseline of 10-12.    DVT Prophylaxis: Pneumatic Compression Devices  Code Status: Full Code  Lines: PICC line right arm  FEN: Regular diet  Discharge Dispo:  lives in group home.  Social work consulted  Estimated Disch Date / # of Days until Disch: He will need 4 weeks of IV antibiotics which his group home cannot accommodate and we have not been able to find a TCU for him.  Likely to remain hospitalized until finishing antibiotics at end of day 12/25 and discharged 12/26.    Clinically Significant Risk Factors              # Hypoalbuminemia: Lowest albumin = 3.4 g/dL at 11/26/2023 12:56 AM, will monitor as appropriate     # Hypertension: Noted on problem list                               Interval History (Subjective):      Noted to have some new swelling/edema involving the right arm.  Slight tenderness  proximal to the PICC line.  No erythema.  Afebrile.  Ultrasound obtained that does not show any edema.  Otherwise he denies any new symptoms.                  Physical Exam:      Last Vital Signs:  /79 (BP Location: Left arm)   Pulse 97   Temp 97.9  F (36.6  C) (Oral)   Resp 16   Wt 136.1 kg (300 lb)   SpO2 (!) 89%   BMI 46.99 kg/m      Intake/Output Summary (Last 24 hours) at 12/14/2023 1408  Last data filed at 12/14/2023 0230  Gross per 24 hour   Intake --   Output 1395 ml   Net -1395 ml     Constitutional: Awake, NAD  Eyes: sclera white.  Left eyelid droop  HEENT: MMM  Respiratory: no respiratory distress, lungs cta bilaterally, no crackles or wheeze  Cardiovascular: RRR.  No murmur appreciated  GI: Obese, nontender, nondistended, bowel sounds present  Skin: no rash  Musculoskeletal/extremities: Trace bilateral lower extremity edema.  Trace right upper arm edema.  Neurologic: Alert, answers some symptom-based questions appropriately  Psychiatric: calm          Medications:      All current medications were reviewed with changes reflected in problem list.         Data:      All new lab and imaging data were personally reviewed.   Labs:  No new labs today    Imaging:   Recent Results (from the past 24 hour(s))   US Upper Extremity Venous Duplex Right    Narrative    US UPPER EXTREMITY VENOUS DUPLEX RIGHT  12/14/2023 10:45 AM     HISTORY: Right arm picc with swelling, eval for DVT    COMPARISON: None.    TECHNIQUE: Examination of the upper extremity veins was performed with  graded compression and 2-D ultrasound and color doppler spectral  waveform analysis.     FINDINGS:  There is no evidence of thrombosis of the axillary,  brachial, basilic or cephalic veins.      Impression    IMPRESSION: No evidence for DVT in the right upper extremity.    ISIAH SOTO MD         SYSTEM ID:  [id number]           Casper Gomez MD

## 2023-12-15 PROCEDURE — 250N000013 HC RX MED GY IP 250 OP 250 PS 637: Performed by: HOSPITALIST

## 2023-12-15 PROCEDURE — 120N000001 HC R&B MED SURG/OB

## 2023-12-15 PROCEDURE — 250N000013 HC RX MED GY IP 250 OP 250 PS 637: Performed by: STUDENT IN AN ORGANIZED HEALTH CARE EDUCATION/TRAINING PROGRAM

## 2023-12-15 PROCEDURE — 94660 CPAP INITIATION&MGMT: CPT

## 2023-12-15 PROCEDURE — 999N000157 HC STATISTIC RCP TIME EA 10 MIN

## 2023-12-15 PROCEDURE — 250N000013 HC RX MED GY IP 250 OP 250 PS 637: Performed by: INTERNAL MEDICINE

## 2023-12-15 PROCEDURE — 250N000009 HC RX 250: Performed by: HOSPITALIST

## 2023-12-15 PROCEDURE — 99232 SBSQ HOSP IP/OBS MODERATE 35: CPT | Performed by: INTERNAL MEDICINE

## 2023-12-15 PROCEDURE — 250N000011 HC RX IP 250 OP 636: Performed by: INTERNAL MEDICINE

## 2023-12-15 RX ORDER — ACETAMINOPHEN 325 MG/1
650 TABLET ORAL EVERY 4 HOURS PRN
Status: DISCONTINUED | OUTPATIENT
Start: 2023-12-15 | End: 2023-12-18 | Stop reason: HOSPADM

## 2023-12-15 RX ORDER — KETOROLAC TROMETHAMINE 30 MG/ML
30 INJECTION, SOLUTION INTRAMUSCULAR; INTRAVENOUS ONCE
Status: COMPLETED | OUTPATIENT
Start: 2023-12-15 | End: 2023-12-15

## 2023-12-15 RX ADMIN — PROPRANOLOL HYDROCHLORIDE 20 MG: 20 TABLET ORAL at 22:00

## 2023-12-15 RX ADMIN — NAFCILLIN 2 G: 2 POWDER, FOR SOLUTION INTRAMUSCULAR; INTRAVENOUS at 01:38

## 2023-12-15 RX ADMIN — LOPERAMIDE HYDROCHLORIDE 2 MG: 2 CAPSULE ORAL at 14:45

## 2023-12-15 RX ADMIN — LAMOTRIGINE 200 MG: 200 TABLET ORAL at 11:00

## 2023-12-15 RX ADMIN — LEVETIRACETAM 1000 MG: 500 TABLET, FILM COATED ORAL at 11:00

## 2023-12-15 RX ADMIN — LEVOCARNITINE 660 MG: 330 TABLET ORAL at 11:00

## 2023-12-15 RX ADMIN — LEVOCARNITINE 660 MG: 330 TABLET ORAL at 14:42

## 2023-12-15 RX ADMIN — LAMOTRIGINE 200 MG: 200 TABLET ORAL at 22:01

## 2023-12-15 RX ADMIN — BUSPIRONE HYDROCHLORIDE 15 MG: 15 TABLET ORAL at 10:59

## 2023-12-15 RX ADMIN — ZONISAMIDE 100 MG: 100 CAPSULE ORAL at 11:16

## 2023-12-15 RX ADMIN — NAFCILLIN 2 G: 2 POWDER, FOR SOLUTION INTRAMUSCULAR; INTRAVENOUS at 18:43

## 2023-12-15 RX ADMIN — THERA TABS 1 TABLET: TAB at 11:00

## 2023-12-15 RX ADMIN — NAFCILLIN 2 G: 2 POWDER, FOR SOLUTION INTRAMUSCULAR; INTRAVENOUS at 11:07

## 2023-12-15 RX ADMIN — QUETIAPINE FUMARATE 300 MG: 300 TABLET, EXTENDED RELEASE ORAL at 22:01

## 2023-12-15 RX ADMIN — LEVETIRACETAM 1500 MG: 500 TABLET, FILM COATED ORAL at 22:01

## 2023-12-15 RX ADMIN — NAFCILLIN 2 G: 2 POWDER, FOR SOLUTION INTRAMUSCULAR; INTRAVENOUS at 14:46

## 2023-12-15 RX ADMIN — PANTOPRAZOLE SODIUM 40 MG: 40 TABLET, DELAYED RELEASE ORAL at 11:01

## 2023-12-15 RX ADMIN — LEVOCARNITINE 660 MG: 330 TABLET ORAL at 22:01

## 2023-12-15 RX ADMIN — PROPRANOLOL HYDROCHLORIDE 20 MG: 20 TABLET ORAL at 11:00

## 2023-12-15 RX ADMIN — LOPERAMIDE HYDROCHLORIDE 2 MG: 2 CAPSULE ORAL at 22:01

## 2023-12-15 RX ADMIN — ZONISAMIDE 200 MG: 100 CAPSULE ORAL at 22:00

## 2023-12-15 RX ADMIN — PRAZOSIN HYDROCHLORIDE 2 MG: 1 CAPSULE ORAL at 22:01

## 2023-12-15 RX ADMIN — LIDOCAINE 1 PATCH: 4 PATCH TOPICAL at 22:00

## 2023-12-15 RX ADMIN — DIVALPROEX SODIUM 500 MG: 500 TABLET, DELAYED RELEASE ORAL at 22:00

## 2023-12-15 RX ADMIN — NAFCILLIN 2 G: 2 POWDER, FOR SOLUTION INTRAMUSCULAR; INTRAVENOUS at 22:02

## 2023-12-15 RX ADMIN — KETOROLAC TROMETHAMINE 30 MG: 30 INJECTION, SOLUTION INTRAMUSCULAR; INTRAVENOUS at 02:12

## 2023-12-15 RX ADMIN — CITALOPRAM HYDROBROMIDE 60 MG: 20 TABLET, FILM COATED ORAL at 10:59

## 2023-12-15 RX ADMIN — NAFCILLIN 2 G: 2 POWDER, FOR SOLUTION INTRAMUSCULAR; INTRAVENOUS at 06:34

## 2023-12-15 RX ADMIN — DIVALPROEX SODIUM 500 MG: 500 TABLET, DELAYED RELEASE ORAL at 11:00

## 2023-12-15 RX ADMIN — MICONAZOLE NITRATE: 20 POWDER TOPICAL at 22:02

## 2023-12-15 RX ADMIN — BUSPIRONE HYDROCHLORIDE 15 MG: 15 TABLET ORAL at 22:01

## 2023-12-15 ASSESSMENT — ACTIVITIES OF DAILY LIVING (ADL)
ADLS_ACUITY_SCORE: 47
ADLS_ACUITY_SCORE: 49
ADLS_ACUITY_SCORE: 47
ADLS_ACUITY_SCORE: 49
ADLS_ACUITY_SCORE: 47
ADLS_ACUITY_SCORE: 49
ADLS_ACUITY_SCORE: 49

## 2023-12-15 NOTE — PLAN OF CARE
Goal Outcome Evaluation:      Plan of Care Reviewed With: patient    Overall Patient Progress: improvingOverall Patient Progress: improving     Temp: 98.1  F (36.7  C) Temp src: Axillary BP: 125/72 Pulse: 82   Resp: 16 SpO2: 99 % O2 Device: BiPAP/CPAP Oxygen Delivery: 3 LPM     A/O3. Up SBA w walker. R arm pain, ultrasound done. BM today. PRN imodium given. CHG wipes and linen change done. Bipap when sleeping. Q4 abx. Bed alarm on.

## 2023-12-15 NOTE — PLAN OF CARE
Goal Outcome Evaluation:      Plan of Care Reviewed With: patient    Overall Patient Progress: no changeOverall Patient Progress: no change    Outcome Evaluation: Patient to consume >/=75% of meals ordered TID or the equivalent in oral nutritional supplements

## 2023-12-15 NOTE — PLAN OF CARE
"Care from 1900 - 2300:    Goal Outcome Evaluation:      Plan of Care Reviewed With: patient    Overall Patient Progress: no change     Temp: 98.2  F (36.8  C) Temp src: Axillary BP: 137/66 Pulse: 78   Resp: 19 SpO2: 96 % O2 Device: BiPAP/CPAP     Pt A/O x2, disoriented to time and situation. BiPAP on @ hs. LS dim, on 3L O2 NC when not on BiPAP. PICC line patent and SL. On abx q4h. Up SBA to bathroom. Will continue POC.    Problem: Adult Inpatient Plan of Care  Goal: Plan of Care Review  Description: The Plan of Care Review/Shift note should be completed every shift.  The Outcome Evaluation is a brief statement about your assessment that the patient is improving, declining, or no change.  This information will be displayed automatically on your shift  note.  Outcome: Progressing  Flowsheets (Taken 12/15/2023 0011)  Plan of Care Reviewed With: patient  Overall Patient Progress: no change  Goal: Patient-Specific Goal (Individualized)  Description: You can add care plan individualizations to a care plan. Examples of Individualization might be:  \"Parent requests to be called daily at 9am for status\", \"I have a hard time hearing out of my right ear\", or \"Do not touch me to wake me up as it startles  me\".  Outcome: Progressing  Goal: Absence of Hospital-Acquired Illness or Injury  Outcome: Progressing  Intervention: Identify and Manage Fall Risk  Recent Flowsheet Documentation  Taken 12/14/2023 2005 by Ava Esquivel RN  Safety Promotion/Fall Prevention:   activity supervised   clutter free environment maintained   increase visualization of patient   lighting adjusted   nonskid shoes/slippers when out of bed   room door open   room near nurse's station   room organization consistent   safety round/check completed   supervised activity  Intervention: Prevent Skin Injury  Recent Flowsheet Documentation  Taken 12/14/2023 2005 by Ava Esquivel, RN  Body Position:   position changed independently   supine, head " elevated  Intervention: Prevent and Manage VTE (Venous Thromboembolism) Risk  Recent Flowsheet Documentation  Taken 12/14/2023 2005 by Ava Esquivel RN  VTE Prevention/Management: SCDs (sequential compression devices) off  Intervention: Prevent Infection  Recent Flowsheet Documentation  Taken 12/14/2023 2005 by Ava Esquivel RN  Infection Prevention:   rest/sleep promoted   hand hygiene promoted  Goal: Optimal Comfort and Wellbeing  Outcome: Progressing  Goal: Readiness for Transition of Care  Outcome: Progressing     Problem: Gas Exchange Impaired  Goal: Optimal Gas Exchange  Outcome: Progressing  Intervention: Optimize Oxygenation and Ventilation  Recent Flowsheet Documentation  Taken 12/14/2023 2005 by Ava Esquivel RN  Head of Bed (HOB) Positioning: HOB at 30 degrees     Problem: Infection  Goal: Absence of Infection Signs and Symptoms  Outcome: Progressing

## 2023-12-15 NOTE — PROGRESS NOTES
CLINICAL NUTRITION SERVICES - REASSESSMENT NOTE    Recommendations Ordered by Registered Dietitian (RD): Modified supplement order - Shira Ensure Enlive BID with dinner, and HS snack    Malnutrition: Patient does not meet two of the above criteria necessary for diagnosing malnutrition     EVALUATION OF PROGRESS TOWARD GOALS   Diet:  Regular and Ensure Max between meals 2 pm shira     Intake/Tolerance:  Pt reported no issue with is po intake and said he's been finishing most of the meal trays that he orders. Joel was receiving both Ensure Max and Enlive, at various times, so we discussed the changes he like to his oral nutrition supplements plan.  - Flow sheets show pt had a good appetite 12/9 and has been consuming % of 0-3 meals per day.   - Bright Pattern (meal ordering system) shows pt ordering a 6-day average of 3413 kcal and 128 g PRO (>100% estimated needs).    ASSESSED NUTRITION NEEDS:  Dosing Weight 124.7 kg   Estimated Energy Needs: 6153-1418 kcals (15-20 Kcal/Kg)  Justification: minimum maintenance and obese  Estimated Protein Needs: 100-125 grams protein (0.8-1 g pro/Kg)  Justification: maintenance, wound healing     NEW FINDINGS:   WOCN 12/12, Pressure Injury Location: Left IT, Wound type: Pressure Injury     Pressure Injury Stage: Unstageable, present on admission, STATUS: improving    Weight:   12/08/23 1159 136.1 kg (300 lb) Standing scale     GI: stools noted    Meds:    busPIRone  15 mg Oral BID    citalopram  60 mg Oral Daily    divalproex sodium delayed-release  500 mg Oral Q12H [facility] (08/20)    fluticasone  1 spray Nasal BID    lamoTRIgine  200 mg Oral BID    levETIRAcetam  1,000 mg Oral QAM    And    levETIRAcetam  1,500 mg Oral At Bedtime    levOCARNitine  660 mg Oral TID    lidocaine  1 patch Transdermal Q24h    miconazole   Topical BID    multivitamin, therapeutic  1 tablet Oral Daily    nafcillin  2 g Intravenous Q4H    pantoprazole  40 mg Oral Daily    polyethylene glycol  17 g Oral Daily     prazosin  2 mg Oral At Bedtime    propranolol  20 mg Oral BID    QUEtiapine ER  300 mg Oral At Bedtime    sodium chloride (PF)  10 mL Intracatheter Q8H    zonisamide  100 mg Oral QAM    And    zonisamide  200 mg Oral At Bedtime        Labs:   Electrolytes  Sodium (mmol/L)   Date Value   12/13/2023 142   12/20/2013 142     Potassium (mmol/L)   Date Value   12/13/2023 4.1   06/20/2022 4.4   12/20/2013 4.5     Magnesium (mg/dL)   Date Value   05/22/2023 1.4 (L)   11/22/2013 1.8     Phosphorus (mg/dL)   Date Value   03/24/2023 4.1    Blood Glucose  Glucose (mg/dL)   Date Value   12/13/2023 120 (H)   06/20/2022 101 (H)   12/20/2013 94     GLUCOSE BY METER POCT (mg/dL)   Date Value   12/06/2023 114 (H)     Hemoglobin A1C (%)   Date Value   06/22/2008 5.3    Renal  Urea Nitrogen (mg/dL)   Date Value   12/13/2023 14.7   06/20/2022 17   12/20/2013 19     Creatinine (mg/dL)   Date Value   12/13/2023 0.57 (L)   12/20/2013 1.03         Previous Goals:   Patient to consume >/=75% of meals ordered TID or the equivalent in oral nutritional supplements   Evaluation: Met    Previous Nutrition Diagnosis:   Predicted inadequate nutrient intake related to potential for PO intake to decline pending clinical course    Evaluation: No change    MALNUTRITION  % Weight Loss:  None noted  % Intake:  No decreased intake noted  Subcutaneous Fat Loss:  None observed  Muscle Loss:  None observed  Fluid Retention:  Mild 2+    Malnutrition Diagnosis: Patient does not meet two of the above criteria necessary for diagnosing malnutrition    CURRENT NUTRITION DIAGNOSIS  Predicted inadequate nutrient intake related to potential for PO intake to decline pending clinical course    INTERVENTIONS  Recommendations / Nutrition Prescription  Modified supplement order - Dorina Ensure Enlive BID with dinner, and HS snack    Implementation  Medical food supplement therapy    Goals  Patient to consume >/=75% of meals ordered TID or the equivalent in oral nutritional  supplements     MONITORING AND EVALUATION:  Progress towards goals will be monitored and evaluated per protocol and Practice Guidelines    Darrius Roque RD, LD

## 2023-12-15 NOTE — PROGRESS NOTES
A BiPAP of  12/6 @ 40% was applied to the pt via the mask for NOC use. The bridge of the nose looks good and remains intact. Pt is tolerating it well. Will continue to monitor and assess the pt's current respiratory status and needs.    Filippo Lamb RT on 12/15/2023 at 4:30 AM

## 2023-12-15 NOTE — PLAN OF CARE
"Goal Outcome Evaluation:      Plan of Care Reviewed With: patient    Overall Patient Progress: improvingOverall Patient Progress: improving       Vitals: /66 (BP Location: Right arm)   Pulse 78   Temp 98.2  F (36.8  C) (Axillary)   Resp 22   Wt 136.1 kg (300 lb)   SpO2 98%   BMI 46.99 kg/m       Pt reported one account of 8/10 pain in scapula and upper back pain. Paged cross cover. Admin 1 mL toradol - interventions effective. PICC Single lumen. No reports of pain at site. Disorientated to time and situation. Asked staff if they were related to pt because he feels that staff is their sister. Refers to female presenting staff as, \"honey\" or \"dear.\" BiPAP at night. 3 L nasal cannula when off. Edematous in extremities. Pt seems to have the necessary strength to hold their own genitals when it comes to urinating in handheld urinal. Becomes irritated with staff when they set boundaries and encourage pt's independence.   "

## 2023-12-15 NOTE — PLAN OF CARE
Goal Outcome Evaluation:    Shift : 0700 - 1900    Vitals: stable  Temp: 98  F (36.7  C) Temp src: Axillary BP: 110/61 Pulse: 84   Resp: 20 SpO2: 97 % O2 Device: None (Room air) Oxygen Delivery: 3 LPM     Orientation:  x4, pleasant   Pain: denies pain   Activity:  ambulated with walker to use bathroom and to go to chair, sat in chair for some time this shift   Resp:LS very diminished, no SOB reported, on BIPAP for naps, otherwise on RA, stable   Diet:ate lunch and snacks, good appetite   How to take meds: whole with fluids - in the morning he wanted all of them at once with 3 apple juices in one cup   GI: 2x loose BM this shift, pt requested Imodium, given.   : voids spontaneously and into urinal   Skin: wound on right buttock, wondcare done this shift   Lines: PICC line, patent and blood return noted. CHG bath done and all linens changed   Plan:  cont with plan of care

## 2023-12-15 NOTE — PROGRESS NOTES
[facility]  Hospitalist Progress Note  Casper Gomez MD 12/15/23    Reason for Stay (Diagnosis): Staph aureus bacteremia, encephalopathy         Assessment and Plan:      Summary of Stay:   Tre Vazquez is a 48 year old male with past medical history including developmental delay, morbid obesity, ANA/OHS on BiPAP at bedtime, seizure disorder, chronic hyperammonemia, anemia, schizoaffective disorder, borderline personality disorder, MDD, anxiety, HTN, and left eye blindness who was admitted on 11/24/2023 with altered mental status consistent with acute encephalopathy likely primary driven by hypercapnia based on VBG.  Additionally ammonia level was elevated, however this is more of a chronic issue likely related to his Depakote with no history of cirrhosis.  CT of the head and chest did not show any acute pathology.  Blood cultures ended up coming back positive for Staph aureus so he was started on IV nafcillin after consultation from ID.  He will need 4 weeks of IV antibiotics which his group home cannot accommodate and we have not been able to find a TCU for him.  Likely to remain hospitalized until finishing antibiotics at end of day 12/25 and discharged 12/26.  Some right arm swelling from PICC line, so ultrasound was obtained and was negative for DVT.    Problem List/Assessment and Plan:   Acute on chronic hypercarbic and hypoxemic respiratory failure , stable   Patient on chronic BiPAP at night time and napping    ANA  Obesity hypoventilation syndrome  Breathing comfortably.  Tolerating BiPAP with sleep, on nasal cannula throughout the day when awake.  PCO2 improved with bipap therapy.  He still has intermittent confusion but much improved and he is largely appropriate.  He is asking appropriate questions including when he can leave the hospital.  He tells me his  is his step mom.  For now, continue BiPAP overnight and with naps during the day.  Supplemental oxygen as needed.   Blood cultures from admission positive for Staph aureus, for which he is on IV antibiotics.  Overall respiratory status appears to be improved, doing well with BiPAP as needed.  -BiPAP at night and with naps  -RT following  -Continue to monitor respiratory status      Staph aureus bacteremia  Left buttock wound, present on admission  Initial blood cultures positive for Staph aureus.  Started on IV vancomycin empirically, consulted infectious disease.  As far as source, has had respiratory issues prior to admission, but CT chest did not necessarily appear convincing for active pneumonia.  Also has a wound on left buttock that is open, having some serosanguineous drainage.  Somewhat firm to palpation underneath the opening on the skin, but no purulent discharge and does not necessarily feel like there is an abscess underneath the skin.  We will continue to monitor the wound as potential source as well.  Per ID, patient will need 4 weeks total IV antibiotic therapy.  PICC now in place for ongoing antibiotic administration.  Discharge planning ongoing as group home not able to accommodate patient's ongoing antibiotic need.  TCU was investigated but fortunately due to multiple dosing throughout the day the TCU also cannot accommodate his needs.  Social work is indicated he will need to remain hospitalized for the duration of his antibiotics given lack of successful placement.  -IV nafcillin for 4 weeks of therapy (last positive culture 11/25).  Discussed with pharmacy today and the last dose of antibiotics will be on 12/25, and end date has been placed in the orders.  -Social work informed that the frequency of the outpatient antibiotics is complicating placement, discussed with ID twice and unfortunately there are no other reasonable antibiotic options.  -ID consulted during hospital stay, no longer actively following.  -PICC in place    Right arm edema:  Some right arm swelling noted 12/14 from PICC line, so ultrasound  "was obtained and was negative for DVT.     Chronic hyperammonemia  Suspect this is secondary to Depakote.  No history of liver disease.  Patient had one-time lactulose in the emergency department.  Initial ammonia level was 129. He received a dose of lactulose in the ER.  Chronically elevated in the 80s.  -No known liver disease.  -Depakote was initially held.  Case discussed with neurology and decreased Depakote to 500 mg p.o. twice daily with plan for tapering off     Acute metabolic and infectious encephalopathy   Developmental delay  Initially presenting with acute encephalopathy likely combination of acute infectious encephalopathy from Staph aureus bacteremia and acute on chronic hypercapnia.  Patient and stepmother both endorse some concerns about ongoing issues with memory on 11/29.  Patient also states that he had a \"vision\" that was rather distressing to him he believes several days ago.  Discussed with them that CT imaging of the head was normal, ammonia level not significantly elevated, and the ongoing confusion is likely related to delirium that is continuing to clear.  We will continue to monitor for signs of improve mental status, could consider MRI if continues to have memory issues despite ongoing other improvement.  He has already been evaluated by neurology on 11/25 with adjustment of seizure meds.   -Mental status appears now at baseline.  He asks appropriate questions about when can he leave the hospital and understands that the antibiotics course needs to be finished  -His step-mom notes that he is slow to talk at baseline.  She also notes that Joel can be manipulative at times and \"knows the system.\"    -BiPAP at bedtime.  -Decreasing Depakote as below     Seizure disorder  Stable on current medication including Depakote, Lamictal, Keppra, and zonisamide.   -Colleague previously discussed case with neurology earlier in hospital stay.  Neurology recommending to decrease Depakote to 500 mg twice " daily, check free Depakote level, Keppra level, Lamictal level.  Orders adjusted.  Patient needs to follow-up with neurology in the outpatient setting.  -Repeat drug levels ordered on 12/4.  Depakote level a bit low.  My colleague talked to Dr. Huff on 12/5.  Recommends continued slow wean of depakote in outpatient setting as can contribute to somnolence/slow thinking.  He has already been weaned a bit during this hospitalization as above.   -Follow-up with neurology as outpatient for further adjustment of his medications.     Essential hypertension  -Resumed PTA propranolol and prazosin  -Blood pressure remains mildly elevated     MDD  Anxiety  Borderline personality disorder  Schizoaffective disorder  -Resumed PTA Celexa, BuSpar and Seroquel.     GERD  -Resume PTA pantoprazole.     Loose stools  Likely from antibiotics and recent bowel medications.  No abdominal pain.  -No indication for C. difficile or enteric panel at this time as is quite intermittent and low-volume, no abdominal pain, fever, or other signs of infection  -Imodium as needed    Morbid obesity: BMI 46.    Chronic anemia: Hemoglobin at baseline of 10-12.    DVT Prophylaxis: Pneumatic Compression Devices  Code Status: Full Code  Lines: PICC line right arm  FEN: Regular diet  Discharge Dispo:  lives in group home.  Social work consulted  Estimated Disch Date / # of Days until Disch: He will need 4 weeks of IV antibiotics which his group home cannot accommodate and we have not been able to find a TCU for him.  Likely to remain hospitalized until finishing antibiotics at end of day 12/25 and discharged 12/26.    Clinically Significant Risk Factors              # Hypoalbuminemia: Lowest albumin = 3.4 g/dL at 11/26/2023 12:56 AM, will monitor as appropriate     # Hypertension: Noted on problem list                               Interval History (Subjective):      No acute events overnight.  Tolerating regular diet.  He reports tolerating antibiotics  at this time.  Minimal discomfort to the right arm swelling.                  Physical Exam:      Last Vital Signs:  /61 (BP Location: Right arm)   Pulse 84   Temp 98  F (36.7  C) (Axillary)   Resp 20   Wt 136.1 kg (300 lb)   SpO2 97%   BMI 46.99 kg/m      Intake/Output Summary (Last 24 hours) at 12/15/2023 1606  Last data filed at 12/15/2023 1400  Gross per 24 hour   Intake 250 ml   Output 325 ml   Net -75 ml     Constitutional: Awake, NAD  Eyes: sclera white.  Left eyelid droop  HEENT: MMM  Respiratory:  lungs cta bilaterally, no crackles or wheeze  Cardiovascular: RRR.  No murmur appreciated  GI: Obese, nontender, nondistended, bowel sounds present  Skin: no rash  Musculoskeletal/extremities: Trace bilateral lower extremity edema.  Trace right upper arm edema.  Neurologic: Alert, answers some symptom-based questions appropriately  Psychiatric: calm          Medications:      All current medications were reviewed with changes reflected in problem list.         Data:      All new lab and imaging data were personally reviewed.   Labs:  No new labs today    Imaging:   None today        Casper Gomez MD

## 2023-12-16 PROCEDURE — 94660 CPAP INITIATION&MGMT: CPT

## 2023-12-16 PROCEDURE — 250N000013 HC RX MED GY IP 250 OP 250 PS 637: Performed by: INTERNAL MEDICINE

## 2023-12-16 PROCEDURE — 120N000001 HC R&B MED SURG/OB

## 2023-12-16 PROCEDURE — 250N000013 HC RX MED GY IP 250 OP 250 PS 637: Performed by: HOSPITALIST

## 2023-12-16 PROCEDURE — 250N000009 HC RX 250: Performed by: HOSPITALIST

## 2023-12-16 PROCEDURE — 999N000157 HC STATISTIC RCP TIME EA 10 MIN

## 2023-12-16 PROCEDURE — 250N000013 HC RX MED GY IP 250 OP 250 PS 637: Performed by: STUDENT IN AN ORGANIZED HEALTH CARE EDUCATION/TRAINING PROGRAM

## 2023-12-16 PROCEDURE — 99232 SBSQ HOSP IP/OBS MODERATE 35: CPT | Performed by: INTERNAL MEDICINE

## 2023-12-16 RX ORDER — LOPERAMIDE HCL 2 MG
2 CAPSULE ORAL 3 TIMES DAILY
Status: DISCONTINUED | OUTPATIENT
Start: 2023-12-16 | End: 2023-12-18 | Stop reason: HOSPADM

## 2023-12-16 RX ADMIN — LOPERAMIDE HYDROCHLORIDE 2 MG: 2 CAPSULE ORAL at 21:16

## 2023-12-16 RX ADMIN — NAFCILLIN 2 G: 2 POWDER, FOR SOLUTION INTRAMUSCULAR; INTRAVENOUS at 02:00

## 2023-12-16 RX ADMIN — LEVETIRACETAM 1000 MG: 500 TABLET, FILM COATED ORAL at 10:01

## 2023-12-16 RX ADMIN — LEVOCARNITINE 660 MG: 330 TABLET ORAL at 10:00

## 2023-12-16 RX ADMIN — CITALOPRAM HYDROBROMIDE 60 MG: 20 TABLET, FILM COATED ORAL at 10:00

## 2023-12-16 RX ADMIN — NAFCILLIN 2 G: 2 POWDER, FOR SOLUTION INTRAMUSCULAR; INTRAVENOUS at 14:01

## 2023-12-16 RX ADMIN — BUSPIRONE HYDROCHLORIDE 15 MG: 15 TABLET ORAL at 21:16

## 2023-12-16 RX ADMIN — PANTOPRAZOLE SODIUM 40 MG: 40 TABLET, DELAYED RELEASE ORAL at 10:01

## 2023-12-16 RX ADMIN — ACETAMINOPHEN 650 MG: 325 TABLET, FILM COATED ORAL at 11:46

## 2023-12-16 RX ADMIN — ZONISAMIDE 100 MG: 100 CAPSULE ORAL at 10:00

## 2023-12-16 RX ADMIN — NAFCILLIN 2 G: 2 POWDER, FOR SOLUTION INTRAMUSCULAR; INTRAVENOUS at 21:18

## 2023-12-16 RX ADMIN — LEVOCARNITINE 660 MG: 330 TABLET ORAL at 21:16

## 2023-12-16 RX ADMIN — BUSPIRONE HYDROCHLORIDE 15 MG: 15 TABLET ORAL at 10:00

## 2023-12-16 RX ADMIN — LEVOCARNITINE 660 MG: 330 TABLET ORAL at 13:30

## 2023-12-16 RX ADMIN — NAFCILLIN 2 G: 2 POWDER, FOR SOLUTION INTRAMUSCULAR; INTRAVENOUS at 06:09

## 2023-12-16 RX ADMIN — NAFCILLIN 2 G: 2 POWDER, FOR SOLUTION INTRAMUSCULAR; INTRAVENOUS at 10:21

## 2023-12-16 RX ADMIN — MICONAZOLE NITRATE: 20 POWDER TOPICAL at 11:46

## 2023-12-16 RX ADMIN — LAMOTRIGINE 200 MG: 200 TABLET ORAL at 21:15

## 2023-12-16 RX ADMIN — DIVALPROEX SODIUM 500 MG: 500 TABLET, DELAYED RELEASE ORAL at 21:16

## 2023-12-16 RX ADMIN — LOPERAMIDE HYDROCHLORIDE 2 MG: 2 CAPSULE ORAL at 13:30

## 2023-12-16 RX ADMIN — LEVETIRACETAM 1500 MG: 500 TABLET, FILM COATED ORAL at 21:15

## 2023-12-16 RX ADMIN — LAMOTRIGINE 200 MG: 200 TABLET ORAL at 10:00

## 2023-12-16 RX ADMIN — DIVALPROEX SODIUM 500 MG: 500 TABLET, DELAYED RELEASE ORAL at 10:00

## 2023-12-16 RX ADMIN — NAFCILLIN 2 G: 2 POWDER, FOR SOLUTION INTRAMUSCULAR; INTRAVENOUS at 17:51

## 2023-12-16 RX ADMIN — LIDOCAINE 1 PATCH: 4 PATCH TOPICAL at 21:13

## 2023-12-16 RX ADMIN — ZONISAMIDE 200 MG: 100 CAPSULE ORAL at 21:15

## 2023-12-16 RX ADMIN — THERA TABS 1 TABLET: TAB at 10:00

## 2023-12-16 RX ADMIN — QUETIAPINE FUMARATE 300 MG: 300 TABLET, EXTENDED RELEASE ORAL at 21:15

## 2023-12-16 RX ADMIN — MICONAZOLE NITRATE: 20 POWDER TOPICAL at 21:21

## 2023-12-16 RX ADMIN — PROPRANOLOL HYDROCHLORIDE 20 MG: 20 TABLET ORAL at 21:15

## 2023-12-16 RX ADMIN — PROPRANOLOL HYDROCHLORIDE 20 MG: 20 TABLET ORAL at 10:01

## 2023-12-16 RX ADMIN — PRAZOSIN HYDROCHLORIDE 2 MG: 1 CAPSULE ORAL at 21:15

## 2023-12-16 RX ADMIN — ACETAMINOPHEN 650 MG: 325 TABLET, FILM COATED ORAL at 21:15

## 2023-12-16 ASSESSMENT — ACTIVITIES OF DAILY LIVING (ADL)
ADLS_ACUITY_SCORE: 47
ADLS_ACUITY_SCORE: 47
ADLS_ACUITY_SCORE: 46
ADLS_ACUITY_SCORE: 47
ADLS_ACUITY_SCORE: 46
ADLS_ACUITY_SCORE: 46
ADLS_ACUITY_SCORE: 47
ADLS_ACUITY_SCORE: 46
ADLS_ACUITY_SCORE: 47
ADLS_ACUITY_SCORE: 46

## 2023-12-16 NOTE — PLAN OF CARE
Goal Outcome Evaluation:    Shift : 0700  - 1900    Vitals: stable   Temp: 98.3  F (36.8  C) Temp src: Axillary BP: 106/52 Pulse: 86   Resp: 18 SpO2: 94 % O2 Device: BiPAP/CPAP Oxygen Delivery: 3 LPM     Orientation: x4,   Pain: generalized pain today, tylenol prn given with good pain relief   Activity: ambulates with walker to use bathroom and to go to chair, steady gait. Pt refuses gait belt   Resp: LS diminished, desaturation on RA when sleeping - recovered quickly with 3l NC. Tolerated BIPAP this afternoon for nap. Stable on RA when in chair   Diet: did not eat breakfast, ate  lunch and snacks between meals   How to take meds: whole with apple juice or fluids   GI: loose BM this shift, requested imodium, given. New order from PRN to scheduled 3x per day  : voids spontaneously, using toilette or urinal, continent   Skin: pea size wound on left buttock, dressing change done per orders.   Lines: PICC line patent, blood return noted, SL when not used for antibiotics   Other: pt requested  for phone and game device, but can't find one that fits or works   Plan:   discharge planned after 12/25 due when rounds of antibiotics done.     1900: pt felt week after using bathroom, stated feeling like a seizure coming on. Pt was alert at all time, no seizure activity noted. Pt went to bed with BIPAP on and tucked in as requested, leaving dinner tray in room for now.

## 2023-12-16 NOTE — PROGRESS NOTES
[facility]  Hospitalist Progress Note  Casper Gomez MD 12/16/23    Reason for Stay (Diagnosis): Staph aureus bacteremia, encephalopathy         Assessment and Plan:      Summary of Stay:   Tre Vazquez is a 48 year old male with past medical history including developmental delay, morbid obesity, ANA/OHS on BiPAP at bedtime, seizure disorder, chronic hyperammonemia, anemia, schizoaffective disorder, borderline personality disorder, MDD, anxiety, HTN, and left eye blindness who was admitted on 11/24/2023 with altered mental status consistent with acute encephalopathy likely primary driven by hypercapnia based on VBG.  Additionally ammonia level was elevated, however this is more of a chronic issue likely related to his Depakote with no history of cirrhosis.  CT of the head and chest did not show any acute pathology.  Blood cultures ended up coming back positive for Staph aureus so he was started on IV nafcillin after consultation from ID.  He will need 4 weeks of IV antibiotics which his group home cannot accommodate and we have not been able to find a TCU for him.  Likely to remain hospitalized until finishing antibiotics at end of day 12/25 and discharged 12/26.  Some right arm swelling from PICC line, so ultrasound was obtained and was negative for DVT.    Problem List/Assessment and Plan:   Acute on chronic hypercarbic and hypoxemic respiratory failure , stable   Patient on chronic BiPAP at night time and napping    ANA  Obesity hypoventilation syndrome  Breathing comfortably.  Tolerating BiPAP with sleep, on nasal cannula throughout the day when awake.  PCO2 improved with bipap therapy.  He still has intermittent confusion but much improved and he is largely appropriate.  He is asking appropriate questions including when he can leave the hospital.  He tells me his  is his step mom.  For now, continue BiPAP overnight and with naps during the day.  Supplemental oxygen as needed.   Blood cultures from admission positive for Staph aureus, for which he is on IV antibiotics.  Overall respiratory status appears to be improved, doing well with BiPAP as needed.  -BiPAP at night and with naps  -RT following  -Continue to monitor respiratory status      Staph aureus bacteremia  Left buttock wound, present on admission  Initial blood cultures positive for Staph aureus.  Started on IV vancomycin empirically, consulted infectious disease.  As far as source, has had respiratory issues prior to admission, but CT chest did not necessarily appear convincing for active pneumonia.  Also has a wound on left buttock that is open, having some serosanguineous drainage.  Somewhat firm to palpation underneath the opening on the skin, but no purulent discharge and does not necessarily feel like there is an abscess underneath the skin.  We will continue to monitor the wound as potential source as well.  Per ID, patient will need 4 weeks total IV antibiotic therapy.  PICC now in place for ongoing antibiotic administration.  Discharge planning ongoing as group home not able to accommodate patient's ongoing antibiotic need.  TCU was investigated but fortunately due to multiple dosing throughout the day the TCU also cannot accommodate his needs.  Social work is indicated he will need to remain hospitalized for the duration of his antibiotics given lack of successful placement.  -IV nafcillin for 4 weeks of therapy (last positive culture 11/25).  Discussed with pharmacy today and the last dose of antibiotics will be on 12/25, and end date has been placed in the orders.  -Social work informed that the frequency of the outpatient antibiotics is complicating placement, discussed with ID twice and unfortunately there are no other reasonable antibiotic options.  -ID consulted during hospital stay, no longer actively following.  -PICC in place    Right arm edema:  Some right arm swelling noted 12/14 from PICC line, so ultrasound  "was obtained and was negative for DVT.     Chronic hyperammonemia  Suspect this is secondary to Depakote.  No history of liver disease.  Patient had one-time lactulose in the emergency department.  Initial ammonia level was 129. He received a dose of lactulose in the ER.  Chronically elevated in the 80s.  -No known liver disease.  -Depakote was initially held.  Case discussed with neurology and decreased Depakote to 500 mg p.o. twice daily with plan for tapering off     Acute metabolic and infectious encephalopathy   Developmental delay  Initially presenting with acute encephalopathy likely combination of acute infectious encephalopathy from Staph aureus bacteremia and acute on chronic hypercapnia.  Patient and stepmother both endorse some concerns about ongoing issues with memory on 11/29.  Patient also states that he had a \"vision\" that was rather distressing to him he believes several days ago.  Discussed with them that CT imaging of the head was normal, ammonia level not significantly elevated, and the ongoing confusion is likely related to delirium that is continuing to clear.  We will continue to monitor for signs of improve mental status, could consider MRI if continues to have memory issues despite ongoing other improvement.  He has already been evaluated by neurology on 11/25 with adjustment of seizure meds.   -Mental status appears now at baseline.  He asks appropriate questions about when can he leave the hospital and understands that the antibiotics course needs to be finished  -His step-mom notes that he is slow to talk at baseline.  She also notes that Joel can be manipulative at times and \"knows the system.\"    -BiPAP at bedtime.  -Decreasing Depakote as below     Seizure disorder  Stable on current medication including Depakote, Lamictal, Keppra, and zonisamide.   -Colleague previously discussed case with neurology earlier in hospital stay.  Neurology recommending to decrease Depakote to 500 mg twice " daily, check free Depakote level, Keppra level, Lamictal level.  Orders adjusted.  Patient needs to follow-up with neurology in the outpatient setting.  -Repeat drug levels ordered on 12/4.  Depakote level a bit low.  My colleague talked to Dr. Huff on 12/5.  Recommends continued slow wean of depakote in outpatient setting as can contribute to somnolence/slow thinking.  He has already been weaned a bit during this hospitalization as above.   -Follow-up with neurology as outpatient for further adjustment of his medications.  -He reported having 1 seizure overnight 12/15 although it was not witnessed.  If further episodes especially if witnessed we will need to rediscuss with neurology     Essential hypertension  -Resumed PTA propranolol and prazosin  -Blood pressure remains mildly elevated     MDD  Anxiety  Borderline personality disorder  Schizoaffective disorder  -Resumed PTA Celexa, BuSpar and Seroquel.     GERD  -Resume PTA pantoprazole.     Loose stools  Likely from antibiotics and recent bowel medications.  No abdominal pain.  -No indication for C. difficile or enteric panel at this time as is quite intermittent and low-volume, no abdominal pain, fever, or other signs of infection  -Change Imodium to scheduled 3 times daily    Morbid obesity: BMI 46.    Chronic anemia: Hemoglobin at baseline of 10-12.    DVT Prophylaxis: Pneumatic Compression Devices  Code Status: Full Code  Lines: PICC line right arm  FEN: Regular diet  Discharge Dispo:  lives in group home.  Social work consulted  Estimated Disch Date / # of Days until Disch: He will need 4 weeks of IV antibiotics which his group home cannot accommodate and we have not been able to find a TCU for him.  Likely to remain hospitalized until finishing antibiotics at end of day 12/25 and discharged 12/26.    Clinically Significant Risk Factors              # Hypoalbuminemia: Lowest albumin = 3.4 g/dL at 11/26/2023 12:56 AM, will monitor as appropriate     #  Hypertension: Noted on problem list                               Interval History (Subjective):      Reports some mild right upper arm discomfort.  Still having a lot of diarrhea despite Imodium.  We will schedule Imodium now to make sure he is receiving.  Afebrile overnight.  He has no other symptoms to discuss.                  Physical Exam:      Last Vital Signs:  /52 (BP Location: Left arm)   Pulse 86   Temp 98.3  F (36.8  C) (Axillary)   Resp 18   Wt 136.1 kg (300 lb)   SpO2 94%   BMI 46.99 kg/m      Intake/Output Summary (Last 24 hours) at 12/16/2023 1357  Last data filed at 12/16/2023 0048  Gross per 24 hour   Intake 240 ml   Output 500 ml   Net -260 ml         Constitutional: Awake, NAD  Eyes: sclera white.  Left eyelid droop  HEENT: MMM  Respiratory:  lungs cta bilaterally, no crackles or wheeze  Cardiovascular: RRR.  No murmur appreciated  GI: Obese, nontender, nondistended, bowel sounds present  Skin: no rash  Musculoskeletal/extremities: Trace bilateral lower extremity edema.  Trace right upper arm edema minimally tender,   Neurologic: Alert, answers some symptom-based questions appropriately  Psychiatric: calm          Medications:      All current medications were reviewed with changes reflected in problem list.         Data:      All new lab and imaging data were personally reviewed.   Labs:  No new labs today    Imaging:   None today        Casper Gomez MD

## 2023-12-16 NOTE — PROGRESS NOTES
A BiPAP of  12/6 @ 40% was applied to the pt via the mask for an increase in WOB and/or SOB.  The bridge of the nose looks good and remains intact. Pt is tolerating it well. Will continue to monitor and assess the pt's current respiratory status and needs.     Praveen Christensen, RT on 12/16/2023 at 12:25 AM

## 2023-12-16 NOTE — PLAN OF CARE
Goal Outcome Evaluation:                    A/Ox4. R PICC patient and blood return noted, on IV nafcillin. Complaint of pain around PICC line, lidocaine provided, pain subsided. Bipap at night. Uses urinal. Has been having diarrhea, prn imodium given. Denies sob and n/v. Wound care done on right buttock.

## 2023-12-16 NOTE — PROVIDER NOTIFICATION
Notified MD, as per pt said he had a seizure. I did my assessment, everything was within normal limit. Seizure precaution ordered.

## 2023-12-17 LAB
AMMONIA PLAS-SCNC: 71 UMOL/L (ref 16–60)
AMMONIA PLAS-SCNC: 71 UMOL/L (ref 16–60)

## 2023-12-17 PROCEDURE — 120N000001 HC R&B MED SURG/OB

## 2023-12-17 PROCEDURE — 250N000013 HC RX MED GY IP 250 OP 250 PS 637: Performed by: PSYCHIATRY & NEUROLOGY

## 2023-12-17 PROCEDURE — 250N000013 HC RX MED GY IP 250 OP 250 PS 637: Performed by: INTERNAL MEDICINE

## 2023-12-17 PROCEDURE — 999N000157 HC STATISTIC RCP TIME EA 10 MIN

## 2023-12-17 PROCEDURE — 94660 CPAP INITIATION&MGMT: CPT

## 2023-12-17 PROCEDURE — 250N000013 HC RX MED GY IP 250 OP 250 PS 637: Performed by: HOSPITALIST

## 2023-12-17 PROCEDURE — 99232 SBSQ HOSP IP/OBS MODERATE 35: CPT | Performed by: INTERNAL MEDICINE

## 2023-12-17 PROCEDURE — 82140 ASSAY OF AMMONIA: CPT | Performed by: INTERNAL MEDICINE

## 2023-12-17 PROCEDURE — 250N000009 HC RX 250: Performed by: HOSPITALIST

## 2023-12-17 RX ORDER — LACOSAMIDE 50 MG/1
50 TABLET ORAL DAILY
Status: DISCONTINUED | OUTPATIENT
Start: 2023-12-17 | End: 2023-12-18 | Stop reason: HOSPADM

## 2023-12-17 RX ADMIN — BUSPIRONE HYDROCHLORIDE 15 MG: 15 TABLET ORAL at 21:10

## 2023-12-17 RX ADMIN — NAFCILLIN 2 G: 2 POWDER, FOR SOLUTION INTRAMUSCULAR; INTRAVENOUS at 14:39

## 2023-12-17 RX ADMIN — LACOSAMIDE 50 MG: 50 TABLET, FILM COATED ORAL at 15:04

## 2023-12-17 RX ADMIN — LEVOCARNITINE 660 MG: 330 TABLET ORAL at 14:31

## 2023-12-17 RX ADMIN — PRAZOSIN HYDROCHLORIDE 2 MG: 1 CAPSULE ORAL at 21:08

## 2023-12-17 RX ADMIN — BUSPIRONE HYDROCHLORIDE 15 MG: 15 TABLET ORAL at 10:17

## 2023-12-17 RX ADMIN — NAFCILLIN 2 G: 2 POWDER, FOR SOLUTION INTRAMUSCULAR; INTRAVENOUS at 10:30

## 2023-12-17 RX ADMIN — LOPERAMIDE HYDROCHLORIDE 2 MG: 2 CAPSULE ORAL at 21:10

## 2023-12-17 RX ADMIN — THERA TABS 1 TABLET: TAB at 10:18

## 2023-12-17 RX ADMIN — NAFCILLIN 2 G: 2 POWDER, FOR SOLUTION INTRAMUSCULAR; INTRAVENOUS at 01:56

## 2023-12-17 RX ADMIN — LEVETIRACETAM 1500 MG: 500 TABLET, FILM COATED ORAL at 21:09

## 2023-12-17 RX ADMIN — QUETIAPINE FUMARATE 300 MG: 300 TABLET, EXTENDED RELEASE ORAL at 21:10

## 2023-12-17 RX ADMIN — LOPERAMIDE HYDROCHLORIDE 2 MG: 2 CAPSULE ORAL at 17:47

## 2023-12-17 RX ADMIN — PANTOPRAZOLE SODIUM 40 MG: 40 TABLET, DELAYED RELEASE ORAL at 10:17

## 2023-12-17 RX ADMIN — DIVALPROEX SODIUM 500 MG: 500 TABLET, DELAYED RELEASE ORAL at 21:10

## 2023-12-17 RX ADMIN — ZONISAMIDE 200 MG: 100 CAPSULE ORAL at 21:08

## 2023-12-17 RX ADMIN — LEVETIRACETAM 1000 MG: 500 TABLET, FILM COATED ORAL at 10:18

## 2023-12-17 RX ADMIN — PROPRANOLOL HYDROCHLORIDE 20 MG: 20 TABLET ORAL at 21:09

## 2023-12-17 RX ADMIN — MICONAZOLE NITRATE: 20 POWDER TOPICAL at 10:19

## 2023-12-17 RX ADMIN — LOPERAMIDE HYDROCHLORIDE 2 MG: 2 CAPSULE ORAL at 10:18

## 2023-12-17 RX ADMIN — PROPRANOLOL HYDROCHLORIDE 20 MG: 20 TABLET ORAL at 10:19

## 2023-12-17 RX ADMIN — CITALOPRAM HYDROBROMIDE 60 MG: 20 TABLET, FILM COATED ORAL at 10:18

## 2023-12-17 RX ADMIN — LAMOTRIGINE 200 MG: 200 TABLET ORAL at 21:10

## 2023-12-17 RX ADMIN — LEVOCARNITINE 660 MG: 330 TABLET ORAL at 10:18

## 2023-12-17 RX ADMIN — ZONISAMIDE 100 MG: 100 CAPSULE ORAL at 10:17

## 2023-12-17 RX ADMIN — DIVALPROEX SODIUM 500 MG: 500 TABLET, DELAYED RELEASE ORAL at 10:17

## 2023-12-17 RX ADMIN — NAFCILLIN 2 G: 2 POWDER, FOR SOLUTION INTRAMUSCULAR; INTRAVENOUS at 21:14

## 2023-12-17 RX ADMIN — ACETAMINOPHEN 650 MG: 325 TABLET, FILM COATED ORAL at 01:56

## 2023-12-17 RX ADMIN — NAFCILLIN 2 G: 2 POWDER, FOR SOLUTION INTRAMUSCULAR; INTRAVENOUS at 06:08

## 2023-12-17 RX ADMIN — LEVOCARNITINE 660 MG: 330 TABLET ORAL at 21:09

## 2023-12-17 RX ADMIN — NAFCILLIN 2 G: 2 POWDER, FOR SOLUTION INTRAMUSCULAR; INTRAVENOUS at 18:00

## 2023-12-17 RX ADMIN — LAMOTRIGINE 200 MG: 200 TABLET ORAL at 10:18

## 2023-12-17 ASSESSMENT — ACTIVITIES OF DAILY LIVING (ADL)
ADLS_ACUITY_SCORE: 50
ADLS_ACUITY_SCORE: 50
ADLS_ACUITY_SCORE: 46
ADLS_ACUITY_SCORE: 54
ADLS_ACUITY_SCORE: 50
ADLS_ACUITY_SCORE: 46
ADLS_ACUITY_SCORE: 46
ADLS_ACUITY_SCORE: 50
ADLS_ACUITY_SCORE: 50
ADLS_ACUITY_SCORE: 46

## 2023-12-17 NOTE — PROVIDER NOTIFICATION
MD notified and aware that per pt has been having more seizure tonight. Pt said it last for a few seconds, with tremors and feel weak and worn out after.

## 2023-12-17 NOTE — PLAN OF CARE
Goal Outcome Evaluation:                    A/Ox4. Denies sob, dizziness or n/v. Wound care done on buttock. Bipap at night. Complaint of right arm pain, lidocaine patch, tyenol and warm pack given. Right PICC, blood return noted, is on IV nafcillin, plan to discharge back to group home 12/25.

## 2023-12-17 NOTE — PROGRESS NOTES
A BiPAP of  12/6 @ 40% was applied to the pt via the mask for an increase in WOB and/or SOB.  The bridge of the nose looks good and remains intact. Pt is tolerating it well. Will continue to monitor and assess the pt's current respiratory status and needs.

## 2023-12-17 NOTE — PROGRESS NOTES
[facility]  Hospitalist Progress Note  Casper Gomez MD 12/17/23    Reason for Stay (Diagnosis): Staph aureus bacteremia, encephalopathy         Assessment and Plan:      Summary of Stay:   Tre Vazquez is a 48 year old male with past medical history including cerebral palsy, left eye blindness, seizure disorder, morbid obesity, ANA/OHS on BiPAP at bedtime, chronic hyperammonemia, anemia, schizoaffective disorder, borderline personality disorder, MDD, anxiety, HTN, and left eye blindness who was admitted on 11/24/2023 with altered mental status consistent with acute encephalopathy likely primary driven by hypercapnia based on VBG.  Additionally ammonia level was elevated, however this is more of a chronic issue likely related to his Depakote with no history of cirrhosis.  CT of the head and chest did not show any acute pathology.  Blood cultures ended up coming back positive for Staph aureus so he was started on IV nafcillin after consultation from ID.  He will need 4 weeks of IV antibiotics which his group home cannot accommodate and we have not been able to find a TCU for him.  Likely to remain hospitalized until finishing antibiotics at end of day 12/25 and discharged 12/26.  Some right arm swelling from PICC line, so ultrasound was obtained and was negative for DVT.    Neurology reconsulted 12/17 as he is reporting increasing frequency of very short-lived seizures described as jerking of extremities for a few seconds and then feeling fatigued.  1 episode of staring to space lasting 30 seconds witnessed by his stepmom which she says was similar to seizure episodes in the past.  Neurology reconsulted.  Starting Vimpat and following up on antiepileptic medication levels.  Consideration for transfer for continuous EEG since he is hospitalized through 12/25.    Problem List/Assessment and Plan:   Acute on chronic hypercarbic and hypoxemic respiratory failure , stable   Patient on chronic BiPAP at  night time and napping    ANA  Obesity hypoventilation syndrome  Breathing comfortably.  Tolerating BiPAP with sleep, on nasal cannula throughout the day when awake.  PCO2 improved with bipap therapy.  He still has intermittent confusion but much improved and he is largely appropriate.  He is asking appropriate questions including when he can leave the hospital.  He tells me his  is his step mom.  For now, continue BiPAP overnight and with naps during the day.  Supplemental oxygen as needed.  Blood cultures from admission positive for Staph aureus, for which he is on IV antibiotics.  Overall respiratory status appears to be improved, doing well with BiPAP as needed.  -BiPAP at night and with naps  -RT following  -Continue to monitor respiratory status      Staph aureus bacteremia  Left buttock wound, present on admission  Initial blood cultures positive for Staph aureus.  Started on IV vancomycin empirically, consulted infectious disease.  As far as source, has had respiratory issues prior to admission, but CT chest did not necessarily appear convincing for active pneumonia.  Also has a wound on left buttock that is open, having some serosanguineous drainage.  Somewhat firm to palpation underneath the opening on the skin, but no purulent discharge and does not necessarily feel like there is an abscess underneath the skin.  We will continue to monitor the wound as potential source as well.  Per ID, patient will need 4 weeks total IV antibiotic therapy.  PICC now in place for ongoing antibiotic administration.  Discharge planning ongoing as group home not able to accommodate patient's ongoing antibiotic need.  TCU was investigated but fortunately due to multiple dosing throughout the day the TCU also cannot accommodate his needs.  Social work is indicated he will need to remain hospitalized for the duration of his antibiotics given lack of successful placement.  -IV nafcillin for 4 weeks of therapy (last  "positive culture 11/25).  Discussed with pharmacy today and the last dose of antibiotics will be on 12/25, and end date has been placed in the orders.  -Social work informed that the frequency of the outpatient antibiotics is complicating placement, discussed with ID twice and unfortunately there are no other reasonable antibiotic options.  -ID consulted during hospital stay, no longer actively following.  -PICC in place    Right arm edema:  Some right arm swelling noted 12/14 from PICC line, so ultrasound was obtained and was negative for DVT.     Chronic hyperammonemia  Suspect this is secondary to Depakote.  No history of liver disease.  Patient had one-time lactulose in the emergency department.  Initial ammonia level was 129. He received a dose of lactulose in the ER.  Chronically elevated in the 80s.  -No known liver disease.  -Depakote was initially held.  Case discussed with neurology and decreased Depakote to 500 mg p.o. twice daily with plan for tapering off     Acute metabolic and infectious encephalopathy   Cerebral palsy  Left eye blindness:  Initially presenting with acute encephalopathy likely combination of acute infectious encephalopathy from Staph aureus bacteremia and acute on chronic hypercapnia.  Patient and stepmother both endorse some concerns about ongoing issues with memory on 11/29.  Patient also states that he had a \"vision\" that was rather distressing to him he believes several days ago.  Discussed with them that CT imaging of the head was normal, ammonia level not significantly elevated, and the ongoing confusion is likely related to delirium that is continuing to clear.  We will continue to monitor for signs of improve mental status, could consider MRI if continues to have memory issues despite ongoing other improvement.  He has already been evaluated by neurology on 11/25 with adjustment of seizure meds.   -Mental status appears now at baseline.  He asks appropriate questions about " "when can he leave the hospital and understands that the antibiotics course needs to be finished  -His step-mom notes that he is slow to talk at baseline.  She also notes that Joel can be manipulative at times and \"knows the system.\"    -BiPAP at bedtime.  -Decreasing Depakote as below     Seizure disorder  Stable on current medication including Depakote, Lamictal, Keppra, and zonisamide.   -Colleague previously discussed case with neurology earlier in hospital stay.  Neurology recommending to decrease Depakote to 500 mg twice daily, check free Depakote level, Keppra level, Lamictal level.  Orders adjusted.  Patient needs to follow-up with neurology in the outpatient setting.  -Repeat drug levels ordered on 12/4.  Depakote level a bit low.  My colleague talked to Dr. Huff on 12/5.  Recommends continued slow wean of depakote in outpatient setting as can contribute to somnolence/slow thinking.  He has already been weaned a bit during this hospitalization as above.   -He is reporting frequent episodes of seizures where he has a few muscle jerks for a few seconds and then feels tired afterwards.  His stepmom witnessed  an episode where he stared off into space for 20 to 30 seconds and then was fine afterwards and not confused on 12/17 which is similar to previous seizure episodes in the past.  -Reconsulted general neurology 12/17.  Recommended considering transfer to [facility] or [facility] for continuous EEG monitoring to try to determine if these events are seizures or not seizure activity since he needs to be hospitalized through 12/25.  This would be more helpful for a long term management of his seizure disorder.  -Obtaining an ammonia level now and then morning trough levels of Depakote and Lamictal  -Vimpat 50 mg daily added 12/17 and depending on levels may decrease Depakote  -Only give benzodiazepines if a clear generalized tonic-clonic seizure lasting >3 to 5 minutes     Essential " hypertension  -Resumed PTA propranolol and prazosin  -Blood pressure remains mildly elevated     MDD  Anxiety  Borderline personality disorder  Schizoaffective disorder  -Resumed PTA Celexa, BuSpar and Seroquel.     GERD  -Resume PTA pantoprazole.     Loose stools  Likely from antibiotics and recent bowel medications.  No abdominal pain.  -No indication for C. difficile or enteric panel at this time as is quite intermittent and low-volume, no abdominal pain, fever, or other signs of infection  -Imodium scheduled 3 times daily    Morbid obesity: BMI 46.    Left IT unstageable pressure injury, present on admission: WOC RN consult here.    Chronic anemia: Hemoglobin at baseline of 10-12.    DVT Prophylaxis: Pneumatic Compression Devices  Code Status: Full Code  Lines: PICC line right arm  FEN: Regular diet  Discharge Dispo:  lives in group home.  Social work consulted  Estimated Disch Date / # of Days until Disch: He will need 4 weeks of IV antibiotics which his group home cannot accommodate and we have not been able to find a TCU for him.  Likely to remain hospitalized until finishing antibiotics at end of day 12/25 and discharged 12/26.    Clinically Significant Risk Factors              # Hypoalbuminemia: Lowest albumin = 3.4 g/dL at 11/26/2023 12:56 AM, will monitor as appropriate     # Hypertension: Noted on problem list                               Interval History (Subjective):      He is reporting multiple frequent seizures.  He describes these as jerks or tremors lasting a few seconds.  He feels fatigued afterwards.  His stepmom was visiting and witnessed an episode where he stared off into space for about 20 to 30 seconds and then did not have any confusion afterwards.  She said this was similar to previous seizures in the past.  He reports ongoing pain in the right arm where the PICC line is.                  Physical Exam:      Last Vital Signs:  /66 (BP Location: Left arm)   Pulse 80   Temp 98.1   F (36.7  C) (Axillary)   Resp 16   Wt 136.1 kg (300 lb)   SpO2 90%   BMI 46.99 kg/m      Intake/Output Summary (Last 24 hours) at 12/17/2023 1522  Last data filed at 12/17/2023 0200  Gross per 24 hour   Intake --   Output 800 ml   Net -800 ml     Constitutional: Awake, NAD  Eyes: sclera white.  Left eyelid droop  HEENT: MMM  Respiratory:  lungs cta bilaterally, no crackles or wheeze  Cardiovascular: RRR.  No murmur appreciated  GI: Obese, nontender, nondistended, bowel sounds present  Skin: no rash  Musculoskeletal/extremities: Trace bilateral lower extremity edema.  Trace right upper arm edema minimally tender  Neurologic: Alert, answers some symptom-based questions appropriately, moving his legs continuously during my exam  Psychiatric: calm          Medications:      All current medications were reviewed with changes reflected in problem list.         Data:      All new lab and imaging data were personally reviewed.   Labs:  No new labs today    Imaging:   None today        Casper Gomez MD

## 2023-12-17 NOTE — CONSULTS
"[facility]  Neurology Consultation         Fina Martinez MD    Tre Vazquez MRN# [id number]   YOB: 1975 Age: 48 year old      Date of Admission:  11/24/2023  Date of Consult: 12/17/2023             History of Present Illness:     Summary of Stay - See Dr. Gomez's note from 12/17/2023 -   Tre Vazquez is a 48 year old male with past medical history including developmental delay, morbid obesity, ANA/OHS on BiPAP at bedtime, seizure disorder, chronic hyperammonemia, anemia, schizoaffective disorder, borderline personality disorder, MDD, anxiety, HTN, and left eye blindness who was admitted on 11/24/2023 with altered mental status consistent with acute encephalopathy likely primary driven by hypercapnia based on VBG.  Additionally ammonia level was elevated, however this is more of a chronic issue likely related to his Depakote with no history of cirrhosis.  CT of the head and chest did not show any acute pathology.  Blood cultures ended up coming back positive for Staph aureus so he was started on IV nafcillin after consultation from ID.  He will need 4 weeks of IV antibiotics which his group home cannot accommodate and we have not been able to find a TCU for him.  Likely to remain hospitalized until finishing antibiotics at end of day 12/25 and discharged 12/26.      Neurology was consulted for concern increased seizures.  Now since 12/15/2023 concern for more frequent events of possible seizure.  Events per nursing and Dr. Gomez and his step mom have consisted of jerking or \"glassy stare\".  This will tend to last seconds and no specific worsening of confusion after.   First reports on night of 12/15 but now at least several events (2+ per shift) since then.  Per step mom baseline of these events at group home can vary but can at times be at least daily to multiple times a day since at least summer 2023.  Per step mom since at least summer 2023 when she would visit monthly " could have similar events to 1 every couple hours.      Please refer to prior neurology consult from 11/25/2023.  Patient with concern for extensive seizure history.  Follows with [facility] neurology ( Dr. Griggs and SEBASTIEN Merchant since 4/2022) last seen 1/2023).  On review of notes appears only frequency of GTC has been addressed which is more rare (last in 2021).  On review of notes baseline frequency and target goal of other more simple/partial seizures is not clearly addressed.    Prior to admit Seizure meds   Depakote 500mg TID with levocarnitine 330 TID   Keppra 1000/86709  Lamictal 200/200  Zonisamide 200 at bedtime    During admit (on 11/25/2023) Depakote changed to 500mg BID due to concern for ongoing elevated ammonia.  With plan to likely wean Depakote as outpatient but would likely need epilepsy monitoring unit          Past Medical History:     Patient Active Problem List   Diagnosis    Non-refractory epilepsy (H)    Cerebral palsy (H)    Encephalomalacia    Benign hypertension    Advanced directives, counseling/discussion    Septic shock (H)    Pneumococcal septicemia(038.2) (H)    History of sepsis    Depression    Morbid obesity (H)    Breakthrough seizure (H)    Nonintractable generalized idiopathic epilepsy without status epilepticus (H)    Acute respiratory failure with hypercapnia (H)    Generalized muscle weakness    Cerebral palsy, unspecified type (H)    Oxygen dependent    Acute and chronic respiratory failure with hypercapnia (H)    Obesity hypoventilation syndrome (H)    Toxic metabolic encephalopathy    Hyperammonemic encephalopathy    Acute on chronic respiratory failure with hypoxia (H)    Hyperammonemia (H24)    Transaminitis    Contusion of right lower extremity, initial encounter    Hepatic encephalopathy (H)    Shortness of breath    Non compliance w medication regimen    Acute respiratory failure with hypoxia and hypercarbia (H)      Past Medical History:   Diagnosis Date     Blindness of left eye     Depression 03/10/2014    Hypertension     Pneumococcal septicemia(038.2) (H) 11/26/2013    Hospitalized    Pneumonia, organism unspecified(486) 11/26/2013    Hospitalized    Uncomplicated asthma     Unspecified epilepsy without mention of intractable epilepsy              Past Surgical History:     Past Surgical History:   Procedure Laterality Date    COLONOSCOPY N/A 12/1/2022    Procedure: COLONOSCOPY;  Surgeon: Michael Caldwell MD;  Location:  OR    ESOPHAGOSCOPY, GASTROSCOPY, DUODENOSCOPY (EGD), COMBINED N/A 12/1/2022    Procedure: ESOPHAGOGASTRODUODENOSCOPY with biopsy;  Surgeon: Michael Caldwell MD;  Location:  OR    ORTHOPEDIC SURGERY      pt stated past surgery on both ankles            Home Medications:     Prior to Admission medications    Medication Sig Last Dose Taking? Auth Provider Long Term End Date   ammonium lactate (AMLACTIN) 12 % external cream Apply topically 2 times daily 11/24/2023 at 0800 Yes Unknown, Entered By History     bacitracin 500 UNIT/GM external ointment Apply topically 2 times daily 11/24/2023 at 0800 Yes Eloy Wood MD     busPIRone (BUSPAR) 15 MG tablet Take 15 mg by mouth 2 times daily 11/24/2023 at 0800 Yes Reported, Patient Yes    citalopram (CELEXA) 20 MG tablet Take 20 mg by mouth daily 11/24/2023 at 0800 Yes Reported, Patient No    citalopram (CELEXA) 40 MG tablet Take 40 mg by mouth daily 11/24/2023 at 0800 Yes Unknown, Entered By History No    clotrimazole (LOTRIMIN) 1 % external solution Apply topically 2 times daily 11/24/2023 at 0800 Yes Reported, Patient     divalproex sodium delayed-release (DEPAKOTE) 500 MG DR tablet Take 500 mg by mouth 3 times daily 11/24/2023 at 0800 Yes Unknown, Entered By History No    fluticasone (FLONASE) 50 MCG/ACT nasal spray Spray 1 spray in nostril 2 times daily 11/24/2023 at 0800 Yes Unknown, Entered By History     lamoTRIgine (LAMICTAL) 200 MG tablet Take 1 tablet (200 mg) by  mouth 2 times daily 11/24/2023 at 0800 Yes Lisa Merchant APRN CNP Yes    levETIRAcetam (KEPPRA) 500 MG tablet Take 2 tablets (1,000 mg) by mouth every morning AND 3 tablets (1,500 mg) At Bedtime. 11/24/2023 at 0800 Yes Lisa Merchant APRN CNP Yes    levOCARNitine (CARNITOR) 330 MG tablet Take 2 tablets (660 mg) by mouth 3 times daily 11/24/2023 at 0800 Yes Lisa Merchant APRN CNP Yes    methylcellulose POWD powder Apply 2 mg topically 2 times daily Mix 1 rounded tablespoon (2 mg) in 8 oz glass of water and take by mouth twice daily 11/24/2023 at 0800 Yes Unknown, Entered By History     multivitamin, therapeutic (THERA-VIT) TABS tablet Take 1 tablet by mouth daily 11/24/2023 at 0800 Yes Unknown, Entered By History     Nafcillin Sodium 2 g SOLR Inject 2 g into the vein every 4 hours for 28 days weekly CBC, creatinine and ALT faxed to [phone]  Yes Kecia Page MD  12/24/23   ondansetron (ZOFRAN ODT) 4 MG ODT tab Take 1 tablet (4 mg) by mouth every 6 hours as needed for nausea or vomiting More than a month Yes Lis Lagunas MD     pantoprazole (PROTONIX) 40 MG EC tablet Take 40 mg by mouth daily 11/24/2023 at 0800 Yes Unknown, Entered By History     polyethylene glycol (MIRALAX) 17 GM/Dose powder Take 1 capful. by mouth daily 11/24/2023 at 0900 Yes Reported, Patient     prazosin (MINIPRESS) 2 MG capsule Take 2 mg by mouth At Bedtime 11/23/2023 at 2000 Yes Reported, Patient Yes    propranolol (INDERAL) 20 MG tablet Take 20 mg by mouth 2 times daily 11/24/2023 at 0800 Yes Reported, Patient No    QUEtiapine ER (SEROQUEL XR) 300 MG 24 hr tablet Take 300 mg by mouth at bedtime 11/23/2023 at 2000 Yes Unknown, Entered By History No    zonisamide (ZONEGRAN) 100 MG capsule Take 1 capsule (100 mg) by mouth every morning AND 2 capsules (200 mg) At Bedtime. 11/24/2023 at 0800 Yes Lisa Merchant APRN CNP Yes             Current Medications:          busPIRone  15 mg Oral BID    citalopram  60 mg Oral Daily    divalproex  sodium delayed-release  500 mg Oral Q12H [facility] (08/20)    fluticasone  1 spray Nasal BID    lamoTRIgine  200 mg Oral BID    levETIRAcetam  1,000 mg Oral QAM    And    levETIRAcetam  1,500 mg Oral At Bedtime    levOCARNitine  660 mg Oral TID    lidocaine  1 patch Transdermal Q24h    loperamide  2 mg Oral TID    miconazole   Topical BID    multivitamin, therapeutic  1 tablet Oral Daily    nafcillin  2 g Intravenous Q4H    pantoprazole  40 mg Oral Daily    polyethylene glycol  17 g Oral Daily    prazosin  2 mg Oral At Bedtime    propranolol  20 mg Oral BID    QUEtiapine ER  300 mg Oral At Bedtime    sodium chloride (PF)  10 mL Intracatheter Q8H    zonisamide  100 mg Oral QAM    And    zonisamide  200 mg Oral At Bedtime     acetaminophen, hydrALAZINE, lidocaine 4%, lidocaine (buffered or not buffered), naloxone **OR** naloxone **OR** naloxone **OR** naloxone, ondansetron, sodium chloride (PF), sodium chloride (PF), sodium chloride (PF)         Allergies:     Allergies   Allergen Reactions    Hydrocodone Bitartrate Er Unknown    Cephalexin Rash     HUT Reaction: Rash; HUT Noted: [id number]      Vicodin [Hydrocodone-Acetaminophen] GI Disturbance            Social History:     Lives in group home          Family History:   No family history on file.               Physical Exam:    , Blood pressure 109/66, pulse 80, temperature 98.1  F (36.7  C), temperature source Axillary, resp. rate 16, weight 136.1 kg (300 lb), SpO2 90%.  300 lbs 0 oz     Done on video   Neurological Exam:  General: Mental status -Alert to location and name - did not ask date   Able to answer questions about seizures.           Data:   All new lab and imaging data was reviewed.  Recent Labs   Lab 12/13/23  0600   WBC 6.0   HGB 11.3*   MCV 98         POTASSIUM 4.1   CHLORIDE 101   CO2 38*   BUN 14.7   CR 0.57*   ANIONGAP 3*   ARNOLD 8.7   *   ALBUMIN 4.0   PROTTOTAL 6.5   BILITOTAL 0.2   ALKPHOS 63   ALT 25   AST 23     Labs   Prior  labs  Baseline ammonia - 60-80 on review  Last seizure meds - 4/12/2023 - see chart - free VPA 13  10/25/2023 VPA - 72.8      11/25/2023 - VPA total - 38 - free <7, keppra 35.4, Lamictal 14.6     12/4/23 - VPA total 33.9, Keppra 35.4, Ammonia 62  (no lamictal level done          Assessment and Plan:     Tre Vazquez is a 48 year old male with past medical history including developmental delay, morbid obesity, ANA/OHS on BiPAP at bedtime, seizure disorder, chronic hyperammonemia, anemia, schizoaffective disorder, borderline personality disorder, MDD, anxiety, HTN, and left eye blindness who was admitted on 11/24/2023 with altered mental status consistent with acute encephalopathy likely primary driven by hypercapnia based on VBG.  Additionally ammonia level was elevated, however this is more of a chronic issue likely related to his Depakote with no history of cirrhosis.  Now with continued admission for IV antibiotics for positive blood cultures.  During stay Depakote decreased from 500 TID to BID due to concern for elevated ammonia.  Now concern for increased seizures.  Events consisting of jerking, or staring lasting seconds.  This reportedly is near baseline at group home per step mom but clear documentation of baseline events is lacking in chart and is change from prior weeks in hospital.  Labs in 12/4/2023 with lower Depakote level than baseline with med change.   Recommend the following     Consider admission to [facility] for long term EEG/epilepsy monitoring unit to fully capture these events to determine if seizure/non seizure.  Seems this is likely needed given ongoing history of similar events outpatient.  Given will need continued admission until 12/25 this could be helpful for long term care of this patient.   Obtain ammonia level now, obtain am (trough) levels of depakote and lamictal given new dosing  (depakote levels can affect Lamictal levels)  - (orders placed)   For now will add Vimpat  50mg - pending levels may consider further increase in Vimpat and decrease of depakote - again this may be most appropriate to do in setting of long term EEG monitoring unit.     I would avoid sedation for starring events as goal of no events given reported baseline of near daily events is unlikely and only give benzos for seizure event concerning for GTC with decreased consciousness/jerking lasting > 3-5 min    90 min spent on day of care             Visit/Communication Style   Virtual (Video) communication was used to evaluate Tre.  Tre consented to the use of video communication.  Video START time: 12:50pm, 12/17/2023  Video STOP time: 1:10pm, 12/17/2023   Patient's location: [place] [address] [facility]  Provider's location during the visit: [facility]/Home

## 2023-12-17 NOTE — PLAN OF CARE
Goal Outcome Evaluation:    Shift : 0700 - 1900    Vitals: stable     Orientation:  x4   Pain: right arm pain on and off, pt thinks that PICC line must be hitting a nerve   Activity:  feeling weak today, was in chair for a short time only. Stanbdy by Assist  and GB and walker to ambulate   Resp: desaturation when sleeping without BIPAP, recovers immediatly  with 3l NC  Diet: little appetite today, ate very little   How to take meds: whole with apple juice (3 juice in a cup with lid and straw)   GI: loose stools, imodium per orders given. Incontinence due to feeling to week to ambulate in time   : voids spontaneously, uses urinal or toilette   Skin: wound care done per orders   Lines: PICC line patent, blood return noted   Other: pt had several minor seizures throughout the day, MD notified, Neuro consult ordered and done. New meds prescribed. Lab draw for threshold on seizures meds for tomorrow morning.   Plan:  cont with plan of care - will stay inpatient until antibiotic administration done.

## 2023-12-18 ENCOUNTER — HOSPITAL ENCOUNTER (INPATIENT)
Facility: CLINIC | Age: 48
LOS: 10 days | Discharge: HOME HEALTH | DRG: 101 | End: 2023-12-28
Attending: INTERNAL MEDICINE | Admitting: INTERNAL MEDICINE
Payer: MEDICARE

## 2023-12-18 ENCOUNTER — APPOINTMENT (OUTPATIENT)
Dept: GENERAL RADIOLOGY | Facility: CLINIC | Age: 48
DRG: 101 | End: 2023-12-18
Attending: INTERNAL MEDICINE
Payer: MEDICARE

## 2023-12-18 VITALS
DIASTOLIC BLOOD PRESSURE: 79 MMHG | WEIGHT: 300 LBS | BODY MASS INDEX: 46.99 KG/M2 | SYSTOLIC BLOOD PRESSURE: 140 MMHG | RESPIRATION RATE: 20 BRPM | TEMPERATURE: 98.2 F | HEART RATE: 73 BPM | OXYGEN SATURATION: 93 %

## 2023-12-18 DIAGNOSIS — L89.159 PRESSURE INJURY OF SKIN OF SACRAL REGION, UNSPECIFIED INJURY STAGE: ICD-10-CM

## 2023-12-18 DIAGNOSIS — R78.81 BACTEREMIA DUE TO STAPHYLOCOCCUS: Primary | ICD-10-CM

## 2023-12-18 DIAGNOSIS — I10 BENIGN HYPERTENSION: ICD-10-CM

## 2023-12-18 DIAGNOSIS — B95.8 BACTEREMIA DUE TO STAPHYLOCOCCUS: Primary | ICD-10-CM

## 2023-12-18 DIAGNOSIS — G40.909 SEIZURE DISORDER (H): ICD-10-CM

## 2023-12-18 LAB
ANION GAP SERPL CALCULATED.3IONS-SCNC: 5 MMOL/L (ref 7–15)
BUN SERPL-MCNC: 10.9 MG/DL (ref 6–20)
CALCIUM SERPL-MCNC: 8.6 MG/DL (ref 8.6–10)
CHLORIDE SERPL-SCNC: 104 MMOL/L (ref 98–107)
CREAT SERPL-MCNC: 0.6 MG/DL (ref 0.67–1.17)
DEPRECATED HCO3 PLAS-SCNC: 35 MMOL/L (ref 22–29)
EGFRCR SERPLBLD CKD-EPI 2021: >90 ML/MIN/1.73M2
ERYTHROCYTE [DISTWIDTH] IN BLOOD BY AUTOMATED COUNT: 14.4 % (ref 10–15)
GLUCOSE SERPL-MCNC: 104 MG/DL (ref 70–99)
HCT VFR BLD AUTO: 35.6 % (ref 40–53)
HGB BLD-MCNC: 11 G/DL (ref 13.3–17.7)
MCH RBC QN AUTO: 30.2 PG (ref 26.5–33)
MCHC RBC AUTO-ENTMCNC: 30.9 G/DL (ref 31.5–36.5)
MCV RBC AUTO: 98 FL (ref 78–100)
PLATELET # BLD AUTO: 182 10E3/UL (ref 150–450)
POTASSIUM SERPL-SCNC: 4 MMOL/L (ref 3.4–5.3)
RBC # BLD AUTO: 3.64 10E6/UL (ref 4.4–5.9)
SODIUM SERPL-SCNC: 144 MMOL/L (ref 135–145)
VALPROATE SERPL-MCNC: 3.2 UG/ML
WBC # BLD AUTO: 5.6 10E3/UL (ref 4–11)

## 2023-12-18 PROCEDURE — 999N000040 HC STATISTIC CONSULT NO CHARGE VASC ACCESS

## 2023-12-18 PROCEDURE — 250N000013 HC RX MED GY IP 250 OP 250 PS 637: Performed by: INTERNAL MEDICINE

## 2023-12-18 PROCEDURE — 999N000157 HC STATISTIC RCP TIME EA 10 MIN

## 2023-12-18 PROCEDURE — 80164 ASSAY DIPROPYLACETIC ACD TOT: CPT | Performed by: PSYCHIATRY & NEUROLOGY

## 2023-12-18 PROCEDURE — 250N000013 HC RX MED GY IP 250 OP 250 PS 637: Performed by: HOSPITALIST

## 2023-12-18 PROCEDURE — 99418 PROLNG IP/OBS E/M EA 15 MIN: CPT | Performed by: INTERNAL MEDICINE

## 2023-12-18 PROCEDURE — 99233 SBSQ HOSP IP/OBS HIGH 50: CPT | Performed by: INTERNAL MEDICINE

## 2023-12-18 PROCEDURE — 99207 PR APP CREDIT; MD BILLING SHARED VISIT: CPT | Performed by: INTERNAL MEDICINE

## 2023-12-18 PROCEDURE — 120N000001 HC R&B MED SURG/OB

## 2023-12-18 PROCEDURE — 250N000009 HC RX 250: Performed by: HOSPITALIST

## 2023-12-18 PROCEDURE — 250N000013 HC RX MED GY IP 250 OP 250 PS 637: Performed by: PSYCHIATRY & NEUROLOGY

## 2023-12-18 PROCEDURE — 85027 COMPLETE CBC AUTOMATED: CPT | Performed by: INTERNAL MEDICINE

## 2023-12-18 PROCEDURE — 71045 X-RAY EXAM CHEST 1 VIEW: CPT

## 2023-12-18 PROCEDURE — 94660 CPAP INITIATION&MGMT: CPT

## 2023-12-18 PROCEDURE — 80175 DRUG SCREEN QUAN LAMOTRIGINE: CPT | Performed by: PSYCHIATRY & NEUROLOGY

## 2023-12-18 PROCEDURE — 80048 BASIC METABOLIC PNL TOTAL CA: CPT | Performed by: INTERNAL MEDICINE

## 2023-12-18 RX ORDER — ZONISAMIDE 100 MG/1
200 CAPSULE ORAL AT BEDTIME
Status: CANCELLED | OUTPATIENT
Start: 2023-12-18

## 2023-12-18 RX ORDER — CALCIUM CARBONATE 500 MG/1
1000 TABLET, CHEWABLE ORAL 4 TIMES DAILY PRN
Status: DISCONTINUED | OUTPATIENT
Start: 2023-12-18 | End: 2023-12-28 | Stop reason: HOSPADM

## 2023-12-18 RX ORDER — FLUTICASONE PROPIONATE 50 MCG
1 SPRAY, SUSPENSION (ML) NASAL 2 TIMES DAILY
Status: CANCELLED | OUTPATIENT
Start: 2023-12-18

## 2023-12-18 RX ORDER — CITALOPRAM HYDROBROMIDE 20 MG/1
60 TABLET ORAL DAILY
Status: CANCELLED | OUTPATIENT
Start: 2023-12-19

## 2023-12-18 RX ORDER — NALOXONE HYDROCHLORIDE 0.4 MG/ML
0.4 INJECTION, SOLUTION INTRAMUSCULAR; INTRAVENOUS; SUBCUTANEOUS
Status: DISCONTINUED | OUTPATIENT
Start: 2023-12-18 | End: 2023-12-28 | Stop reason: HOSPADM

## 2023-12-18 RX ORDER — NALOXONE HYDROCHLORIDE 0.4 MG/ML
0.2 INJECTION, SOLUTION INTRAMUSCULAR; INTRAVENOUS; SUBCUTANEOUS
Status: CANCELLED | OUTPATIENT
Start: 2023-12-18

## 2023-12-18 RX ORDER — BUSPIRONE HYDROCHLORIDE 5 MG/1
15 TABLET ORAL 2 TIMES DAILY
Status: DISCONTINUED | OUTPATIENT
Start: 2023-12-19 | End: 2023-12-28 | Stop reason: HOSPADM

## 2023-12-18 RX ORDER — LAMOTRIGINE 200 MG/1
200 TABLET ORAL 2 TIMES DAILY
Status: CANCELLED | OUTPATIENT
Start: 2023-12-18

## 2023-12-18 RX ORDER — LIDOCAINE 40 MG/G
CREAM TOPICAL
Status: CANCELLED | OUTPATIENT
Start: 2023-12-18

## 2023-12-18 RX ORDER — ZONISAMIDE 100 MG/1
100 CAPSULE ORAL EVERY MORNING
Status: DISCONTINUED | OUTPATIENT
Start: 2023-12-19 | End: 2023-12-28 | Stop reason: HOSPADM

## 2023-12-18 RX ORDER — MULTIVITAMIN,THERAPEUTIC
1 TABLET ORAL DAILY
Status: CANCELLED | OUTPATIENT
Start: 2023-12-18

## 2023-12-18 RX ORDER — ACETAMINOPHEN 650 MG/1
650 SUPPOSITORY RECTAL EVERY 4 HOURS PRN
Status: DISCONTINUED | OUTPATIENT
Start: 2023-12-18 | End: 2023-12-28 | Stop reason: HOSPADM

## 2023-12-18 RX ORDER — ONDANSETRON 4 MG/1
4 TABLET, ORALLY DISINTEGRATING ORAL EVERY 6 HOURS PRN
Status: CANCELLED | OUTPATIENT
Start: 2023-12-18

## 2023-12-18 RX ORDER — ACETAMINOPHEN 325 MG/1
650 TABLET ORAL EVERY 4 HOURS PRN
Status: CANCELLED | OUTPATIENT
Start: 2023-12-18

## 2023-12-18 RX ORDER — LIDOCAINE 40 MG/G
CREAM TOPICAL
Status: DISCONTINUED | OUTPATIENT
Start: 2023-12-18 | End: 2023-12-28 | Stop reason: HOSPADM

## 2023-12-18 RX ORDER — PANTOPRAZOLE SODIUM 40 MG/1
40 TABLET, DELAYED RELEASE ORAL DAILY
Status: CANCELLED | OUTPATIENT
Start: 2023-12-18

## 2023-12-18 RX ORDER — ZONISAMIDE 100 MG/1
100 CAPSULE ORAL EVERY MORNING
Status: CANCELLED | OUTPATIENT
Start: 2023-12-19

## 2023-12-18 RX ORDER — AMOXICILLIN 250 MG
2 CAPSULE ORAL 2 TIMES DAILY PRN
Status: DISCONTINUED | OUTPATIENT
Start: 2023-12-18 | End: 2023-12-28 | Stop reason: HOSPADM

## 2023-12-18 RX ORDER — BUSPIRONE HYDROCHLORIDE 15 MG/1
15 TABLET ORAL 2 TIMES DAILY
Status: CANCELLED | OUTPATIENT
Start: 2023-12-18

## 2023-12-18 RX ORDER — DIVALPROEX SODIUM 250 MG/1
500 TABLET, DELAYED RELEASE ORAL EVERY 12 HOURS SCHEDULED
Status: DISCONTINUED | OUTPATIENT
Start: 2023-12-19 | End: 2023-12-28 | Stop reason: HOSPADM

## 2023-12-18 RX ORDER — LACOSAMIDE 50 MG/1
50 TABLET ORAL DAILY
Status: CANCELLED | OUTPATIENT
Start: 2023-12-19

## 2023-12-18 RX ORDER — CITALOPRAM HYDROBROMIDE 10 MG/1
20 TABLET ORAL DAILY
Status: DISCONTINUED | OUTPATIENT
Start: 2023-12-19 | End: 2023-12-18

## 2023-12-18 RX ORDER — NALOXONE HYDROCHLORIDE 0.4 MG/ML
0.2 INJECTION, SOLUTION INTRAMUSCULAR; INTRAVENOUS; SUBCUTANEOUS
Status: DISCONTINUED | OUTPATIENT
Start: 2023-12-18 | End: 2023-12-28 | Stop reason: HOSPADM

## 2023-12-18 RX ORDER — NAFCILLIN SODIUM 2 G/8ML
2 INJECTION, POWDER, FOR SOLUTION INTRAMUSCULAR; INTRAVENOUS EVERY 4 HOURS
Status: CANCELLED | OUTPATIENT
Start: 2023-12-18 | End: 2023-12-25

## 2023-12-18 RX ORDER — ZONISAMIDE 100 MG/1
200 CAPSULE ORAL AT BEDTIME
Status: DISCONTINUED | OUTPATIENT
Start: 2023-12-18 | End: 2023-12-28 | Stop reason: HOSPADM

## 2023-12-18 RX ORDER — QUETIAPINE 300 MG/1
300 TABLET, FILM COATED, EXTENDED RELEASE ORAL AT BEDTIME
Status: CANCELLED | OUTPATIENT
Start: 2023-12-18

## 2023-12-18 RX ORDER — LEVETIRACETAM 500 MG/1
1500 TABLET ORAL AT BEDTIME
Status: CANCELLED | OUTPATIENT
Start: 2023-12-18

## 2023-12-18 RX ORDER — POLYETHYLENE GLYCOL 3350 17 G/17G
17 POWDER, FOR SOLUTION ORAL DAILY
Status: CANCELLED | OUTPATIENT
Start: 2023-12-18

## 2023-12-18 RX ORDER — LEVETIRACETAM 500 MG/1
1000 TABLET ORAL EVERY MORNING
Status: DISCONTINUED | OUTPATIENT
Start: 2023-12-19 | End: 2023-12-28 | Stop reason: HOSPADM

## 2023-12-18 RX ORDER — AMOXICILLIN 250 MG
1 CAPSULE ORAL 2 TIMES DAILY PRN
Status: DISCONTINUED | OUTPATIENT
Start: 2023-12-18 | End: 2023-12-28 | Stop reason: HOSPADM

## 2023-12-18 RX ORDER — PRAZOSIN HYDROCHLORIDE 1 MG/1
2 CAPSULE ORAL AT BEDTIME
Status: CANCELLED | OUTPATIENT
Start: 2023-12-18

## 2023-12-18 RX ORDER — NALOXONE HYDROCHLORIDE 0.4 MG/ML
0.4 INJECTION, SOLUTION INTRAMUSCULAR; INTRAVENOUS; SUBCUTANEOUS
Status: CANCELLED | OUTPATIENT
Start: 2023-12-18

## 2023-12-18 RX ORDER — MULTIVITAMIN,THERAPEUTIC
1 TABLET ORAL DAILY
Status: DISCONTINUED | OUTPATIENT
Start: 2023-12-19 | End: 2023-12-28 | Stop reason: HOSPADM

## 2023-12-18 RX ORDER — POLYETHYLENE GLYCOL 3350 17 G/17G
17 POWDER, FOR SOLUTION ORAL DAILY
Status: DISCONTINUED | OUTPATIENT
Start: 2023-12-19 | End: 2023-12-28 | Stop reason: HOSPADM

## 2023-12-18 RX ORDER — PANTOPRAZOLE SODIUM 40 MG/1
40 TABLET, DELAYED RELEASE ORAL DAILY
Status: DISCONTINUED | OUTPATIENT
Start: 2023-12-19 | End: 2023-12-28 | Stop reason: HOSPADM

## 2023-12-18 RX ORDER — LEVOCARNITINE 330 MG/1
660 TABLET ORAL 3 TIMES DAILY
Status: CANCELLED | OUTPATIENT
Start: 2023-12-18

## 2023-12-18 RX ORDER — CITALOPRAM HYDROBROMIDE 20 MG/1
60 TABLET ORAL DAILY
Status: DISCONTINUED | OUTPATIENT
Start: 2023-12-19 | End: 2023-12-28 | Stop reason: HOSPADM

## 2023-12-18 RX ORDER — CITALOPRAM HYDROBROMIDE 10 MG/1
40 TABLET ORAL DAILY
Status: DISCONTINUED | OUTPATIENT
Start: 2023-12-19 | End: 2023-12-18

## 2023-12-18 RX ORDER — DIVALPROEX SODIUM 500 MG/1
500 TABLET, DELAYED RELEASE ORAL EVERY 12 HOURS SCHEDULED
Status: CANCELLED | OUTPATIENT
Start: 2023-12-18

## 2023-12-18 RX ORDER — OXYCODONE HYDROCHLORIDE 5 MG/1
5 TABLET ORAL EVERY 4 HOURS PRN
Status: DISCONTINUED | OUTPATIENT
Start: 2023-12-18 | End: 2023-12-28 | Stop reason: HOSPADM

## 2023-12-18 RX ORDER — LEVOCARNITINE 330 MG/1
660 TABLET ORAL 3 TIMES DAILY
Status: DISCONTINUED | OUTPATIENT
Start: 2023-12-19 | End: 2023-12-28 | Stop reason: HOSPADM

## 2023-12-18 RX ORDER — LACOSAMIDE 50 MG/1
50 TABLET ORAL DAILY
Status: DISCONTINUED | OUTPATIENT
Start: 2023-12-19 | End: 2023-12-28 | Stop reason: HOSPADM

## 2023-12-18 RX ORDER — ACETAMINOPHEN 325 MG/1
650 TABLET ORAL EVERY 4 HOURS PRN
Status: DISCONTINUED | OUTPATIENT
Start: 2023-12-18 | End: 2023-12-28 | Stop reason: HOSPADM

## 2023-12-18 RX ORDER — LAMOTRIGINE 100 MG/1
200 TABLET ORAL 2 TIMES DAILY
Status: DISCONTINUED | OUTPATIENT
Start: 2023-12-19 | End: 2023-12-19

## 2023-12-18 RX ORDER — PROPRANOLOL HYDROCHLORIDE 20 MG/1
20 TABLET ORAL 2 TIMES DAILY
Status: CANCELLED | OUTPATIENT
Start: 2023-12-18

## 2023-12-18 RX ORDER — LOPERAMIDE HCL 2 MG
2 CAPSULE ORAL 3 TIMES DAILY
Status: CANCELLED | OUTPATIENT
Start: 2023-12-18

## 2023-12-18 RX ORDER — HYDRALAZINE HYDROCHLORIDE 20 MG/ML
10 INJECTION INTRAMUSCULAR; INTRAVENOUS EVERY 4 HOURS PRN
Status: CANCELLED | OUTPATIENT
Start: 2023-12-18

## 2023-12-18 RX ORDER — PRAZOSIN HYDROCHLORIDE 2 MG/1
2 CAPSULE ORAL AT BEDTIME
Status: DISCONTINUED | OUTPATIENT
Start: 2023-12-18 | End: 2023-12-28 | Stop reason: HOSPADM

## 2023-12-18 RX ORDER — LEVETIRACETAM 750 MG/1
1500 TABLET ORAL AT BEDTIME
Status: DISCONTINUED | OUTPATIENT
Start: 2023-12-19 | End: 2023-12-28 | Stop reason: HOSPADM

## 2023-12-18 RX ORDER — ENOXAPARIN SODIUM 100 MG/ML
40 INJECTION SUBCUTANEOUS EVERY 12 HOURS
Status: DISCONTINUED | OUTPATIENT
Start: 2023-12-19 | End: 2023-12-28 | Stop reason: HOSPADM

## 2023-12-18 RX ORDER — SALIVA STIMULANT COMB. NO.3
2 SPRAY, NON-AEROSOL (ML) MUCOUS MEMBRANE
Status: DISCONTINUED | OUTPATIENT
Start: 2023-12-18 | End: 2023-12-28 | Stop reason: HOSPADM

## 2023-12-18 RX ORDER — PROPRANOLOL HYDROCHLORIDE 10 MG/1
20 TABLET ORAL 2 TIMES DAILY
Status: DISCONTINUED | OUTPATIENT
Start: 2023-12-19 | End: 2023-12-24

## 2023-12-18 RX ORDER — BISACODYL 10 MG
10 SUPPOSITORY, RECTAL RECTAL DAILY PRN
Status: DISCONTINUED | OUTPATIENT
Start: 2023-12-18 | End: 2023-12-28 | Stop reason: HOSPADM

## 2023-12-18 RX ORDER — LEVETIRACETAM 500 MG/1
1000 TABLET ORAL EVERY MORNING
Status: CANCELLED | OUTPATIENT
Start: 2023-12-19

## 2023-12-18 RX ORDER — NAFCILLIN SODIUM 2 G/8ML
2 INJECTION, POWDER, FOR SOLUTION INTRAMUSCULAR; INTRAVENOUS EVERY 4 HOURS
Status: DISCONTINUED | OUTPATIENT
Start: 2023-12-18 | End: 2023-12-25

## 2023-12-18 RX ORDER — LIDOCAINE 4 G/G
1 PATCH TOPICAL
Status: CANCELLED | OUTPATIENT
Start: 2023-12-18

## 2023-12-18 RX ADMIN — LEVETIRACETAM 1000 MG: 500 TABLET, FILM COATED ORAL at 08:44

## 2023-12-18 RX ADMIN — LEVOCARNITINE 660 MG: 330 TABLET ORAL at 20:55

## 2023-12-18 RX ADMIN — BUSPIRONE HYDROCHLORIDE 15 MG: 15 TABLET ORAL at 20:55

## 2023-12-18 RX ADMIN — LEVOCARNITINE 660 MG: 330 TABLET ORAL at 08:43

## 2023-12-18 RX ADMIN — NAFCILLIN 2 G: 2 POWDER, FOR SOLUTION INTRAMUSCULAR; INTRAVENOUS at 01:58

## 2023-12-18 RX ADMIN — LAMOTRIGINE 200 MG: 200 TABLET ORAL at 08:44

## 2023-12-18 RX ADMIN — BUSPIRONE HYDROCHLORIDE 15 MG: 15 TABLET ORAL at 08:43

## 2023-12-18 RX ADMIN — LOPERAMIDE HYDROCHLORIDE 2 MG: 2 CAPSULE ORAL at 08:43

## 2023-12-18 RX ADMIN — PROPRANOLOL HYDROCHLORIDE 20 MG: 20 TABLET ORAL at 20:55

## 2023-12-18 RX ADMIN — THERA TABS 1 TABLET: TAB at 08:43

## 2023-12-18 RX ADMIN — LEVOCARNITINE 660 MG: 330 TABLET ORAL at 14:49

## 2023-12-18 RX ADMIN — MICONAZOLE NITRATE: 20 POWDER TOPICAL at 08:46

## 2023-12-18 RX ADMIN — DIVALPROEX SODIUM 500 MG: 500 TABLET, DELAYED RELEASE ORAL at 20:55

## 2023-12-18 RX ADMIN — NAFCILLIN 2 G: 2 POWDER, FOR SOLUTION INTRAMUSCULAR; INTRAVENOUS at 14:51

## 2023-12-18 RX ADMIN — NAFCILLIN 2 G: 2 POWDER, FOR SOLUTION INTRAMUSCULAR; INTRAVENOUS at 05:46

## 2023-12-18 RX ADMIN — FLUTICASONE PROPIONATE 1 SPRAY: 50 SPRAY, METERED NASAL at 08:46

## 2023-12-18 RX ADMIN — LEVETIRACETAM 1500 MG: 500 TABLET, FILM COATED ORAL at 21:02

## 2023-12-18 RX ADMIN — NAFCILLIN 2 G: 2 POWDER, FOR SOLUTION INTRAMUSCULAR; INTRAVENOUS at 10:56

## 2023-12-18 RX ADMIN — OXYCODONE HYDROCHLORIDE 2.5 MG: 5 TABLET ORAL at 23:59

## 2023-12-18 RX ADMIN — PANTOPRAZOLE SODIUM 40 MG: 40 TABLET, DELAYED RELEASE ORAL at 08:43

## 2023-12-18 RX ADMIN — ZONISAMIDE 100 MG: 100 CAPSULE ORAL at 08:43

## 2023-12-18 RX ADMIN — NAFCILLIN 2 G: 2 POWDER, FOR SOLUTION INTRAMUSCULAR; INTRAVENOUS at 17:55

## 2023-12-18 RX ADMIN — LAMOTRIGINE 200 MG: 200 TABLET ORAL at 20:55

## 2023-12-18 RX ADMIN — DIVALPROEX SODIUM 500 MG: 500 TABLET, DELAYED RELEASE ORAL at 08:43

## 2023-12-18 RX ADMIN — LACOSAMIDE 50 MG: 50 TABLET, FILM COATED ORAL at 08:43

## 2023-12-18 RX ADMIN — CITALOPRAM HYDROBROMIDE 60 MG: 20 TABLET, FILM COATED ORAL at 08:43

## 2023-12-18 RX ADMIN — PROPRANOLOL HYDROCHLORIDE 20 MG: 20 TABLET ORAL at 08:44

## 2023-12-18 ASSESSMENT — ACTIVITIES OF DAILY LIVING (ADL)
ADLS_ACUITY_SCORE: 50
ADLS_ACUITY_SCORE: 50
ADLS_ACUITY_SCORE: 40
ADLS_ACUITY_SCORE: 50
ADLS_ACUITY_SCORE: 40
ADLS_ACUITY_SCORE: 50
ADLS_ACUITY_SCORE: 39
ADLS_ACUITY_SCORE: 50

## 2023-12-18 NOTE — PLAN OF CARE
"Goal Outcome Evaluation:  Appetite very good.  No c/o pain .  When asksing question , like his name he states\" I can't remember my name or anything for that matter\"  Requests  I don't ask him any questions cause he just does'nt know or remember.  Up to chair for meals and ambulates with SBA to bathroom.  No seizure activity noted                      "

## 2023-12-18 NOTE — PLAN OF CARE
Cared for 1900-0730    Activity: SBA  Neuro: A&Ox2. Disoriented to time and person.   Cardiac: denies chest pain  Resp: on BiPAP overnight. 3L NC when not on BiPAP  GI/: bedside urinal  Diet: regular  Skin: L buttock wound dressing changed  LDAs: R PICC SL, WDL. Changed caps.  Pain: c/o pain at PICC site. US negative for DVT.   PRNs: none  Labs: Drawn this AM.    Major Shift Events: Pt called frequently throughout the night for continuous adjustment with BiPAP mask and blankets. RT was paged to help adjust BiPAP mask, pt not satisfied. Pt was agitated with support staff trying to help and stated he only wanted his nurse.     Plan: Continue with plan of care, discharge 12/25 after course of antibiotics   For vital signs and complete assessments, please see documentation under flowsheets.    Yanna Canales, RN      Goal Outcome Evaluation: unchanged

## 2023-12-18 NOTE — DISCHARGE SUMMARY
[facility]  Discharge Summary  Name: Tre Vazquez    MRN: [id number]  YOB: 1975    Age: 48 year old  Date of Discharge:  12/18/2023  Date of Admission: 11/24/2023  Primary Care Provider: Siobhan Mayo  Discharge Physician:  Casper Gomez MD  Discharging Service:  Hospitalist      Discharge Diagnoses:  Staph aureus bacteremia  Left IT unstageable pressure injury, present on admission  Acute on chronic hypercarbic and hypoxemic respiratory failure  ANA on chronic BiPAP (minimally adherent at home)  Obesity hypoventilation syndrome  Morbid obesity  Seizure disorder with breakthrough seizures  Chronic hyperammonemia  Acute metabolic and infectious encephalopathy  Cerebral palsy  Developmental delay  Left eye blindness  Right arm edema  HTN  MDD  Anxiety  Borderline personality disorder  Schizoaffective started  Loose stools  Chronic anemia     Hospital Course:  Summary of Stay:   Tre Vazquez is a 48 year old male with past medical history including developmental delay, cerebral palsy, morbid obesity, ANA/OHS on BiPAP at bedtime, seizure disorder, chronic hyperammonemia, anemia, schizoaffective disorder, borderline personality disorder, MDD, anxiety, HTN, and left eye blindness who was admitted on 11/24/2023 with altered mental status consistent with acute encephalopathy likely primary driven by hypercapnia based on VBG.  Additionally ammonia level was elevated, however this is more of a chronic issue likely related to his Depakote with no history of cirrhosis.  CT of the head and chest did not show any acute pathology.  Blood cultures ended up coming back positive for Staph aureus so he was started on IV nafcillin after consultation from ID.  He will need 4 weeks of IV antibiotics which his group home cannot accommodate and we have not been able to find a TCU for him.  Likely to remain hospitalized until finishing antibiotics at end of day 12/25 and discharged 12/26.  Some right arm  swelling from PICC line, so ultrasound was obtained and was negative for DVT.     Neurology reconsulted 12/17 as he is reporting increasing frequency of very short-lived seizures described as jerking of extremities for a few seconds and then feeling fatigued.  1 episode of staring to space lasting 30 seconds witnessed by his stepmom which she says was similar to seizure episodes in the past.  Neurology reconsulted.  Starting Vimpat and following up on antiepileptic medication levels.  Neurology recommended transfer to [facility] for continuous EEG since he is hospitalized through 12/25 for continuous monitoring to see if these truly are seizure episodes which would help with long-term management of his medications especially while weaning off Depakote and starting Vimpat.  Patient was open to this idea.  Discussed transfer with Dr. Alan from the hospitalist team at [facility] who is accepted the patient.     Problem List/Assessment and Plan:   Acute on chronic hypercarbic and hypoxemic respiratory failure , stable   Patient on chronic BiPAP at night time and napping    ANA  Obesity hypoventilation syndrome  Breathing comfortably.  Tolerating BiPAP with sleep, on nasal cannula throughout the day when awake.  PCO2 improved with bipap therapy.  He still has intermittent confusion but much improved and he is largely appropriate.  He is asking appropriate questions including when he can leave the hospital.  He tells me his  is his step mom.  For now, continue BiPAP overnight and with naps during the day.  Supplemental oxygen as needed.  Blood cultures from admission positive for Staph aureus, for which he is on IV antibiotics.  Overall respiratory status appears to be improved, doing well with BiPAP as needed.  -BiPAP at night and with naps  -RT following  -Continue to monitor respiratory status      Staph aureus bacteremia  Left IT unstageable pressure injury, present on admission:    Initial blood cultures positive for Staph aureus.  Started on IV vancomycin empirically, consulted infectious disease.  As far as source, has had respiratory issues prior to admission, but CT chest did not necessarily appear convincing for active pneumonia.  Also has a wound on left buttock that is open, having some serosanguineous drainage.  Somewhat firm to palpation underneath the opening on the skin, but no purulent discharge and does not necessarily feel like there is an abscess underneath the skin.  We will continue to monitor the wound as potential source as well.  Per ID, patient will need 4 weeks total IV antibiotic therapy.  PICC now in place for ongoing antibiotic administration.  Discharge planning ongoing as group home not able to accommodate patient's ongoing antibiotic need.  TCU was investigated but fortunately due to multiple dosing throughout the day the TCU also cannot accommodate his needs.  Social work is indicated he will need to remain hospitalized for the duration of his antibiotics given lack of successful placement.  -IV nafcillin for 4 weeks of therapy (last positive culture 11/25).  Discussed with pharmacy today and the last doses of antibiotics will be on 12/25, and end date has been placed in the orders.    -Social work informed that the frequency of the outpatient antibiotics is complicating placement, discussed with ID twice and unfortunately there are no other reasonable antibiotic options.  -ID consulted during hospital stay, no longer actively following.  -R arm PICC in place, can be removed after last doses on 11/25  -WOC RN consulted here for wound management     Right arm edema:  Some right arm swelling noted 12/14 from PICC line, so ultrasound was obtained and was negative for DVT.     Chronic hyperammonemia  Suspect this is secondary to Depakote.  No history of liver disease.  Patient had one-time lactulose in the emergency department.  Initial ammonia level was 129. He  "received a dose of lactulose in the ER.  Chronically elevated in the 80s.  -No known liver disease.  -Depakote was initially held.  Case discussed with neurology and decreased Depakote to 500 mg p.o. twice daily with plan for tapering off     Acute metabolic and infectious encephalopathy   Cerebral palsy  Developmental delay  Left eye blindness:  Initially presenting with acute encephalopathy likely combination of acute infectious encephalopathy from Staph aureus bacteremia and acute on chronic hypercapnia.  Patient and stepmother both endorse some concerns about ongoing issues with memory on 11/29.  Patient also states that he had a \"vision\" that was rather distressing to him he believes several days ago.  Discussed with them that CT imaging of the head was normal, ammonia level not significantly elevated, and the ongoing confusion is likely related to delirium that is continuing to clear.  We will continue to monitor for signs of improve mental status, could consider MRI if continues to have memory issues despite ongoing other improvement.  He has already been evaluated by neurology on 11/25 with adjustment of seizure meds.   -Mental status appears now at baseline.  He asks appropriate questions about when can he leave the hospital and understands that the antibiotics course needs to be finished  -His step-mom notes that he is slow to talk at baseline.  She also notes that Joel can be manipulative at times and \"knows the system.\"    -BiPAP at bedtime.  -Decreasing Depakote as below     Seizure disorder  Stable on current medication including Depakote, Lamictal, Keppra, and zonisamide.   -Colleague previously discussed case with neurology earlier in hospital stay.  Neurology recommending to decrease Depakote to 500 mg twice daily, check free Depakote level, Keppra level, Lamictal level.  Orders adjusted.  Patient needs to follow-up with neurology in the outpatient setting.  -Repeat drug levels ordered on 12/4.  " Depakote level a bit low.  My colleague talked to Dr. Huff on 12/5.  Recommends continued slow wean of depakote in outpatient setting as can contribute to somnolence/slow thinking.  He has already been weaned a bit during this hospitalization as above.   -He is reporting frequent episodes of seizures where he has a few muscle jerks for a few seconds and then feels tired afterwards.  His stepmom witnessed  an episode where he stared off into space for 20 to 30 seconds and then was fine afterwards and not confused on 12/17 which is similar to previous seizure episodes in the past.  -Reconsulted general neurology 12/17.  Recommended considering transfer to [facility] for continuous EEG monitoring to try to determine if these events are seizures or not seizure activity since he needs to be hospitalized until 12/26.  This would be helpful for a long term management of his seizure disorder especially if weaning Depakote off here and starting. Patient was open to this plan. Transferring to [facility] for continuous EEG and medication adjustment   -Ammonia level remains elevated at about 70, but does not seem encephalopathic  -Morning valproic acid level was actually really low at 3 well below subtherapeutic level  -Lamictal level pending  -Vimpat 50 mg daily added 12/17 and depending on levels may decrease or stop Depakote  -Only give benzodiazepines if a clear generalized tonic-clonic seizure lasting >3 to 5 minutes     Essential hypertension  -Resumed PTA propranolol and prazosin  -Blood pressure remains mildly elevated     MDD  Anxiety  Borderline personality disorder  Schizoaffective disorder  -Resumed PTA Celexa, BuSpar and Seroquel.     GERD  -Resume PTA pantoprazole.     Loose stools  Likely from antibiotics and recent bowel medications.  No abdominal pain.  -No indication for C. difficile or enteric panel at this time as is quite intermittent and low-volume, no abdominal pain, fever, or other  signs of infection  -Imodium scheduled 3 times daily     Morbid obesity: BMI 46.     Chronic anemia: Hemoglobin at baseline of 10-12.     DVT Prophylaxis: Pneumatic Compression Devices  Code Status: Full Code  Lines: PICC line right arm  FEN: Regular diet  Discharge Dispo:  lives in group home.  Social work consulted  Estimated Disch Date / # of Days until Disch: He will need 4 weeks of IV antibiotics which his group home cannot accommodate and we have not been able to find a TCU for him.  He will remain hospitalized until finishing antibiotics at end of day 12/25 and discharged 12/26.  As above seeing if transfer to [facility] is possible for continuous EEG to confirm if truly having seizures while adjusting antilipid medication.       Discharge Disposition:  Transferred to [facility]     Allergies:  Allergies   Allergen Reactions    Hydrocodone Bitartrate Er Unknown    Cephalexin Rash     HUT Reaction: Rash; HUT Noted: [id number]      Vicodin [Hydrocodone-Acetaminophen] GI Disturbance        Discharge Medications:   Current Discharge Medication List        START taking these medications    Details   Nafcillin Sodium 2 g SOLR Inject 2 g into the vein every 4 hours for 28 days weekly CBC, creatinine and ALT faxed to [phone]    Associated Diagnoses: Bacteremia           CONTINUE these medications which have NOT CHANGED    Details   ammonium lactate (AMLACTIN) 12 % external cream Apply topically 2 times daily      bacitracin 500 UNIT/GM external ointment Apply topically 2 times daily  Qty: 14 g, Refills: 0      busPIRone (BUSPAR) 15 MG tablet Take 15 mg by mouth 2 times daily      !! citalopram (CELEXA) 20 MG tablet Take 20 mg by mouth daily      !! citalopram (CELEXA) 40 MG tablet Take 40 mg by mouth daily      clotrimazole (LOTRIMIN) 1 % external solution Apply topically 2 times daily      divalproex sodium delayed-release (DEPAKOTE) 500 MG DR tablet Take 500 mg by mouth 3 times daily      fluticasone  (FLONASE) 50 MCG/ACT nasal spray Spray 1 spray in nostril 2 times daily      lamoTRIgine (LAMICTAL) 200 MG tablet Take 1 tablet (200 mg) by mouth 2 times daily  Qty: 60 tablet, Refills: 11    Associated Diagnoses: Non-refractory epilepsy (H); Breakthrough seizure (H)      levETIRAcetam (KEPPRA) 500 MG tablet Take 2 tablets (1,000 mg) by mouth every morning AND 3 tablets (1,500 mg) At Bedtime.  Qty: 150 tablet, Refills: 11    Associated Diagnoses: Non-refractory epilepsy (H)      levOCARNitine (CARNITOR) 330 MG tablet Take 2 tablets (660 mg) by mouth 3 times daily  Qty: 180 tablet, Refills: 11    Associated Diagnoses: Nonintractable generalized idiopathic epilepsy without status epilepticus (H)      methylcellulose POWD powder Apply 2 mg topically 2 times daily Mix 1 rounded tablespoon (2 mg) in 8 oz glass of water and take by mouth twice daily      multivitamin, therapeutic (THERA-VIT) TABS tablet Take 1 tablet by mouth daily      ondansetron (ZOFRAN ODT) 4 MG ODT tab Take 1 tablet (4 mg) by mouth every 6 hours as needed for nausea or vomiting  Qty: 10 tablet, Refills: 0      pantoprazole (PROTONIX) 40 MG EC tablet Take 40 mg by mouth daily      polyethylene glycol (MIRALAX) 17 GM/Dose powder Take 1 capful. by mouth daily      prazosin (MINIPRESS) 2 MG capsule Take 2 mg by mouth At Bedtime      propranolol (INDERAL) 20 MG tablet Take 20 mg by mouth 2 times daily      QUEtiapine ER (SEROQUEL XR) 300 MG 24 hr tablet Take 300 mg by mouth at bedtime      zonisamide (ZONEGRAN) 100 MG capsule Take 1 capsule (100 mg) by mouth every morning AND 2 capsules (200 mg) At Bedtime.  Qty: 90 capsule, Refills: 11    Associated Diagnoses: Non-refractory epilepsy (H); Breakthrough seizure (H)       !! - Potential duplicate medications found. Please discuss with provider.        STOP taking these medications       doxycycline hyclate (VIBRAMYCIN) 100 MG capsule Comments:   Reason for Stopping:                Condition on  Discharge:  Discharge condition: Stable   Discharge vitals: Blood pressure (!) 140/79, pulse 73, temperature 98.2  F (36.8  C), temperature source Axillary, resp. rate 20, weight 136.1 kg (300 lb), SpO2 93%.   Code status on discharge: Full Code     History of Illness:  See detailed admission note for full details.    Physical Exam:  Blood pressure (!) 140/79, pulse 73, temperature 98.2  F (36.8  C), temperature source Axillary, resp. rate 20, weight 136.1 kg (300 lb), SpO2 93%.  Wt Readings from Last 1 Encounters:   12/08/23 136.1 kg (300 lb)     Constitutional: Awake, NAD  Eyes: sclera white.  Left eyelid droop  HEENT: MMM  Respiratory:  lungs cta bilaterally, no crackles or wheeze  Cardiovascular: RRR.  No murmur appreciated  GI: Obese, nontender, nondistended, bowel sounds present  Skin: no rash  Musculoskeletal/extremities: Trace bilateral lower extremity edema.  Trace right upper arm edema minimally tender  Neurologic: Alert, answers some symptom-based questions appropriately.  No myoclonic jerks noted or staring into space episodes during my encounter  Psychiatric: calm     Procedures other than Imaging:  PICC line     Imaging:  Results for orders placed or performed during the hospital encounter of 11/24/23   CT Chest Pulmonary Embolism w Contrast    Narrative    CT CHEST PULMONARY EMBOLISM WITH CONTRAST 11/24/2023 3:43 PM    CLINICAL HISTORY: Shortness of breath. Hypoxia. Elevated D-dimer.  TECHNIQUE: CT angiogram chest during arterial phase injection IV  contrast. 2D and 3D MIP reconstructions were performed by the CT  technologist. Dose reduction techniques were used.   CONTRAST: 88mL [id number]  COMPARISON: CT of the chest, abdomen, and pelvis performed 5/12/2023.    FINDINGS:  ANGIOGRAM CHEST: Pulmonary arteries are normal caliber and negative  for pulmonary emboli. Thoracic aorta is negative for dissection.    LUNGS AND PLEURA: There are a few part solid nodular densities in the  right lung, with the  largest in the right middle lobe measuring 1.2 cm  (series 7 image 107), new since the previous exam. No pleural  effusions.    MEDIASTINUM/AXILLAE: No enlarged lymph nodes in the chest. No  pericardial effusion.    CORONARY ARTERY CALCIFICATION: Mild.    UPPER ABDOMEN: Cholelithiasis. Nonobstructing 0.8 cm stone in the  upper pole of the left kidney is partially included on this exam.  There are also multiple small calcifications noted within a cyst in  the upper pole of the left kidney.    MUSCULOSKELETAL: Degenerative changes in the thoracic spine.      Impression    IMPRESSION:  1.  No evidence for pulmonary embolism.  2.  A few part-solid nodular densities in the right lung are new since  the previous exam. These may be infectious or inflammatory in  etiology, but are considered indeterminate, and a follow-up CT in 3  months is recommended to ensure resolution.  3.  Cholelithiasis.  4.  Left nephrolithiasis is partially included on this exam.    TITI FELDER MD         SYSTEM ID:  [id number]   US Upper Extremity Venous Duplex Right    Narrative    US UPPER EXTREMITY VENOUS DUPLEX RIGHT  2023 10:45 AM     HISTORY: Right arm picc with swelling, eval for DVT    COMPARISON: None.    TECHNIQUE: Examination of the upper extremity veins was performed with  graded compression and 2-D ultrasound and color doppler spectral  waveform analysis.     FINDINGS:  There is no evidence of thrombosis of the axillary,  brachial, basilic or cephalic veins.      Impression    IMPRESSION: No evidence for DVT in the right upper extremity.    ISIAH SOTO MD         SYSTEM ID:  [id number]   Echocardiogram Complete     Value    LVEF  55-60%    Narrative    [id number]  [id number]  [id number]  [id number]^TEJINDER^[facility]  Echocardiography Laboratory  [address] [place] [address]     Name: KOJO BROWN  MRN: [id number]  : 1975  Study Date: 2023 02:10 PM  Age: 48 yrs  Gender:  Male  Patient Location: [facility]  Reason For Study: Endocarditis  Ordering Physician: JANA MARR  Performed By: Marcelo Diaz RDCS     BSA: 2.2 m2  Height: 68 in  Weight: 230 lb  HR: 79  BP: 114/55 mmHg  ______________________________________________________________________________  Procedure  Complete Portable Echo Adult. Definity (NDC #87507-159) given intravenously.  ______________________________________________________________________________  Interpretation Summary     1. Technically difficult study.  2. Left ventricular systolic function is normal. The visual ejection fraction  is 55-60%.  3. No regional wall motion abnormalities noted.  4. The right ventricle is normal in structure, function and size.  5. Valves are not well visualized; no gross valvular pathology identified.  ______________________________________________________________________________  Left Ventricle  The left ventricle is normal in size. There is normal left ventricular wall  thickness. Left ventricular systolic function is normal. The visual ejection  fraction is 55-60%. Diastolic Doppler findings (E/E' ratio and/or other  parameters) suggest left ventricular filling pressures are normal. No regional  wall motion abnormalities noted.     Right Ventricle  The right ventricle is normal in structure, function and size.     Atria  Normal left atrial size. Right atrial size is normal. There is no color  Doppler evidence of an atrial shunt.     Mitral Valve  The mitral valve is normal in structure and function. The mitral valve is not  well visualized. There is trace mitral regurgitation.     Tricuspid Valve  The tricuspid valve is not well visualized. The tricuspid valve is not well  visualized, but is grossly normal. There is trace tricuspid regurgitation. The  right ventricular systolic pressure is approximated at 30mmHg plus the right  atrial pressure.     Aortic Valve  The aortic valve is trileaflet with aortic valve sclerosis. The aortic  valve  is not well visualized.     Pulmonic Valve  The pulmonic valve is not well seen, but is grossly normal.     Vessels  Normal size aorta. IVC diameter <2.1 cm collapsing >50% with sniff suggests a  normal RA pressure of 3 mmHg.     Pericardium  There is no pericardial effusion.     Rhythm  Sinus rhythm was noted.  ______________________________________________________________________________  MMode/2D Measurements & Calculations     IVSd: 1.0 cm  LVIDd: 4.8 cm  LVIDs: 3.2 cm  LVPWd: 0.86 cm  IVC diam: 1.3 cm  FS: 32.9 %  LV mass(C)d: 158.8 grams  LV mass(C)dI: 73.2 grams/m2  Ao root diam: 3.2 cm  LA dimension: 3.7 cm  asc Aorta Diam: 3.2 cm  LA/Ao: 1.2  Ao root diam index Ht(cm/m): 1.8  Ao root diam index BSA (cm/m2): 1.5  Asc Ao diam index BSA (cm/m2): 1.5  Asc Ao diam index Ht(cm/m): 1.9  LA Volume (BP): 43.2 ml     LA Volume Index (BP): 19.9 ml/m2  RWT: 0.36     Doppler Measurements & Calculations  MV E max taj: 85.9 cm/sec  MV A max taj: 74.6 cm/sec  MV E/A: 1.2  MV dec slope: 375.2 cm/sec2  MV dec time: 0.23 sec  PA V2 max: 200.5 cm/sec  PA max P.1 mmHg  TR max taj: 274.3 cm/sec  TR max P.1 mmHg  E/E' av.7  Lateral E/e': 8.5  Medial E/e': 9.0     ______________________________________________________________________________  Report approved by: Umer Crawford 2023 04:01 PM              Consultations:  Consultation during this admission received from infectious disease and neurology.       Recent Lab Results:  Recent Labs   Lab 23  0535 23  0600   WBC 5.6 6.0   HGB 11.0* 11.3*   HCT 35.6* 36.5*   MCV 98 98    229     Recent Labs   Lab 23  0535 23  0600    142   POTASSIUM 4.0 4.1   CHLORIDE 104 101   CO2 35* 38*   ANIONGAP 5* 3*   * 120*   BUN 10.9 14.7   CR 0.60* 0.57*   GFRESTIMATED >90 >90   ARNOLD 8.6 8.7   PROTTOTAL  --  6.5   ALBUMIN  --  4.0   BILITOTAL  --  0.2   ALKPHOS  --  63   AST  --  23   ALT  --  25       Ammonia level  71  Depakote level 3    Multiple blood cultures positive for MSSA       Pending Results:    Unresulted Labs Ordered in the Past 30 Days of this Admission       Date and Time Order Name Status Description    12/18/2023 12:02 AM Lamotrigine Level In process            These results will be followed up by patient's primary care provider.    Discharge Instructions and Follow-Up:   Discharge Procedure Orders   Medication Therapy Management Referral   Referral Priority: Routine Referral Type: Med Therapy Management   Requested Specialty: Pharmacist   Number of Visits Requested: 1     Wound care and dressings   Order Comments: Instructions to care for your wound at home:   Left IT wound(s): Every other day  1. Cleanse area with Microklenz spray.  2. Apply small piece of Aquacel Ag to wound bed (SPS #[id number])  3. Cover with Mepilex 4x4     Buttocks:  BID  1. Cleanse area with Britany Cleanse and Protect spray and soft dry wipe  2. Apply additional Britany spray to moisturize skin         ICasper MD, personally saw the patient today and spent greater than 30 minutes discharging this patient.    Casper Gomez MD

## 2023-12-18 NOTE — PROGRESS NOTES
[facility]  Hospitalist Progress Note  Casper Gomez MD 12/18/23    Reason for Stay (Diagnosis): Staph aureus bacteremia, encephalopathy         Assessment and Plan:      Summary of Stay:   Tre Vazquez is a 48 year old male with past medical history including developmental delay, morbid obesity, ANA/OHS on BiPAP at bedtime, seizure disorder, chronic hyperammonemia, anemia, schizoaffective disorder, borderline personality disorder, MDD, anxiety, HTN, and left eye blindness who was admitted on 11/24/2023 with altered mental status consistent with acute encephalopathy likely primary driven by hypercapnia based on VBG.  Additionally ammonia level was elevated, however this is more of a chronic issue likely related to his Depakote with no history of cirrhosis.  CT of the head and chest did not show any acute pathology.  Blood cultures ended up coming back positive for Staph aureus so he was started on IV nafcillin after consultation from ID.  He will need 4 weeks of IV antibiotics which his group home cannot accommodate and we have not been able to find a TCU for him.  Likely to remain hospitalized until finishing antibiotics at end of day 12/25 and discharged 12/26.  Some right arm swelling from PICC line, so ultrasound was obtained and was negative for DVT.    Neurology reconsulted 12/17 as he is reporting increasing frequency of very short-lived seizures described as jerking of extremities for a few seconds and then feeling fatigued.  1 episode of staring to space lasting 30 seconds witnessed by his stepmom which she says was similar to seizure episodes in the past.  Neurology reconsulted.  Starting Vimpat and following up on antiepileptic medication levels.  Neurology recommended transfer to [facility] for continuous EEG since he is hospitalized through 12/25 for continuous monitoring to see if these truly are seizure episodes which would help with long-term management of his medications  especially while weaning off Depakote and starting Vimpat.  Patient was open to this idea.  I spoke to the transfer center, but have not heard back, presumably no bed availability at this time.  Per my discussion with neurology if this does not happenby 12/20, then likely not worth doing as we approach his discharge from the hospital on 12/26.    Problem List/Assessment and Plan:   Acute on chronic hypercarbic and hypoxemic respiratory failure , stable   Patient on chronic BiPAP at night time and napping    ANA  Obesity hypoventilation syndrome  Breathing comfortably.  Tolerating BiPAP with sleep, on nasal cannula throughout the day when awake.  PCO2 improved with bipap therapy.  He still has intermittent confusion but much improved and he is largely appropriate.  He is asking appropriate questions including when he can leave the hospital.  He tells me his  is his step mom.  For now, continue BiPAP overnight and with naps during the day.  Supplemental oxygen as needed.  Blood cultures from admission positive for Staph aureus, for which he is on IV antibiotics.  Overall respiratory status appears to be improved, doing well with BiPAP as needed.  -BiPAP at night and with naps  -RT following  -Continue to monitor respiratory status      Staph aureus bacteremia  Left IT unstageable pressure injury, present on admission:   Initial blood cultures positive for Staph aureus.  Started on IV vancomycin empirically, consulted infectious disease.  As far as source, has had respiratory issues prior to admission, but CT chest did not necessarily appear convincing for active pneumonia.  Also has a wound on left buttock that is open, having some serosanguineous drainage.  Somewhat firm to palpation underneath the opening on the skin, but no purulent discharge and does not necessarily feel like there is an abscess underneath the skin.  We will continue to monitor the wound as potential source as well.  Per ID, patient  will need 4 weeks total IV antibiotic therapy.  PICC now in place for ongoing antibiotic administration.  Discharge planning ongoing as group home not able to accommodate patient's ongoing antibiotic need.  TCU was investigated but fortunately due to multiple dosing throughout the day the TCU also cannot accommodate his needs.  Social work is indicated he will need to remain hospitalized for the duration of his antibiotics given lack of successful placement.  -IV nafcillin for 4 weeks of therapy (last positive culture 11/25).  Discussed with pharmacy today and the last doses of antibiotics will be on 12/25, and end date has been placed in the orders.    -Social work informed that the frequency of the outpatient antibiotics is complicating placement, discussed with ID twice and unfortunately there are no other reasonable antibiotic options.  -ID consulted during hospital stay, no longer actively following.  -R arm PICC in place, can be removed after last doses on 11/25  -WOC RN consulted here for wound management    Right arm edema:  Some right arm swelling noted 12/14 from PICC line, so ultrasound was obtained and was negative for DVT.     Chronic hyperammonemia  Suspect this is secondary to Depakote.  No history of liver disease.  Patient had one-time lactulose in the emergency department.  Initial ammonia level was 129. He received a dose of lactulose in the ER.  Chronically elevated in the 80s.  -No known liver disease.  -Depakote was initially held.  Case discussed with neurology and decreased Depakote to 500 mg p.o. twice daily with plan for tapering off     Acute metabolic and infectious encephalopathy   Cerebral palsy  Left eye blindness:  Initially presenting with acute encephalopathy likely combination of acute infectious encephalopathy from Staph aureus bacteremia and acute on chronic hypercapnia.  Patient and stepmother both endorse some concerns about ongoing issues with memory on 11/29.  Patient also  "states that he had a \"vision\" that was rather distressing to him he believes several days ago.  Discussed with them that CT imaging of the head was normal, ammonia level not significantly elevated, and the ongoing confusion is likely related to delirium that is continuing to clear.  We will continue to monitor for signs of improve mental status, could consider MRI if continues to have memory issues despite ongoing other improvement.  He has already been evaluated by neurology on 11/25 with adjustment of seizure meds.   -Mental status appears now at baseline.  He asks appropriate questions about when can he leave the hospital and understands that the antibiotics course needs to be finished  -His step-mom notes that he is slow to talk at baseline.  She also notes that Joel can be manipulative at times and \"knows the system.\"    -BiPAP at bedtime.  -Decreasing Depakote as below     Seizure disorder  Stable on current medication including Depakote, Lamictal, Keppra, and zonisamide.   -Colleague previously discussed case with neurology earlier in hospital stay.  Neurology recommending to decrease Depakote to 500 mg twice daily, check free Depakote level, Keppra level, Lamictal level.  Orders adjusted.  Patient needs to follow-up with neurology in the outpatient setting.  -Repeat drug levels ordered on 12/4.  Depakote level a bit low.  My colleague talked to Dr. Huff on 12/5.  Recommends continued slow wean of depakote in outpatient setting as can contribute to somnolence/slow thinking.  He has already been weaned a bit during this hospitalization as above.   -He is reporting frequent episodes of seizures where he has a few muscle jerks for a few seconds and then feels tired afterwards.  His stepmom witnessed  an episode where he stared off into space for 20 to 30 seconds and then was fine afterwards and not confused on 12/17 which is similar to previous seizure episodes in the past.  -Reconsulted general neurology " 12/17.  Recommended considering transfer to [facility] for continuous EEG monitoring to try to determine if these events are seizures or not seizure activity since he needs to be hospitalized until 12/26.  This would be helpful for a long term management of his seizure disorder especially if weaning Depakote off here and starting Vimpat.  Patient was open to this plan.  I spoke to the transfer center, but have not heard back regarding any bed availability.  Per my discussion with neurology if this does not happenby 12/20, then likely not worth doing as we approach his discharge from the hospital on 12/26  -Ammonia level remains elevated at about 70, but does not seem encephalopathic  -Morning valproic acid level was actually really low at 3 well below subtherapeutic level  -Lamictal level pending  -Vimpat 50 mg daily added 12/17 and depending on levels may decrease Depakote  -Only give benzodiazepines if a clear generalized tonic-clonic seizure lasting >3 to 5 minutes     Essential hypertension  -Resumed PTA propranolol and prazosin  -Blood pressure remains mildly elevated     MDD  Anxiety  Borderline personality disorder  Schizoaffective disorder  -Resumed PTA Celexa, BuSpar and Seroquel.     GERD  -Resume PTA pantoprazole.     Loose stools  Likely from antibiotics and recent bowel medications.  No abdominal pain.  -No indication for C. difficile or enteric panel at this time as is quite intermittent and low-volume, no abdominal pain, fever, or other signs of infection  -Imodium scheduled 3 times daily    Morbid obesity: BMI 46.    Chronic anemia: Hemoglobin at baseline of 10-12.    DVT Prophylaxis: Pneumatic Compression Devices  Code Status: Full Code  Lines: PICC line right arm  FEN: Regular diet  Discharge Dispo:  lives in group home.  Social work consulted  Estimated Disch Date / # of Days until Disch: He will need 4 weeks of IV antibiotics which his group home cannot accommodate and we have not been  able to find a TCU for him.  He will remain hospitalized until finishing antibiotics at end of day 12/25 and discharged 12/26.  As above seeing if transfer to [facility] is possible for continuous EEG to confirm if truly having seizures while adjusting antilipid medication.      Clinically Significant Risk Factors              # Hypoalbuminemia: Lowest albumin = 3.4 g/dL at 11/26/2023 12:56 AM, will monitor as appropriate     # Hypertension: Noted on problem list                               Interval History (Subjective):      Reports ongoing fairly frequent seizure-like episodes with brief myoclonic jerks/tremors and staring into space episodes lasting less than 30 seconds.  I have not witnessed 1 of these episodes yet.  Discussed neurology recommendations with him of transfer to [facility] for continuous EEG while adjusting antiepileptic medications and he was open to this.  Still having some pain in the right upper arm.                  Physical Exam:      Last Vital Signs:  BP (P) 122/67 (BP Location: Left arm)   Pulse (P) 79   Temp (P) 98.3  F (36.8  C) (Axillary)   Resp 20   Wt 136.1 kg (300 lb)   SpO2 (P) 95%   BMI 46.99 kg/m    Intake/Output Summary (Last 24 hours) at 12/18/2023 1547  Last data filed at 12/18/2023 0646  Gross per 24 hour   Intake 1680 ml   Output 1250 ml   Net 430 ml         Constitutional: Awake, NAD  Eyes: sclera white.  Left eyelid droop  HEENT: MMM  Respiratory:  lungs cta bilaterally, no crackles or wheeze  Cardiovascular: RRR.  No murmur appreciated  GI: Obese, nontender, nondistended, bowel sounds present  Skin: no rash  Musculoskeletal/extremities: Trace bilateral lower extremity edema.  Trace right upper arm edema minimally tender  Neurologic: Alert, answers some symptom-based questions appropriately.  No myoclonic jerks noted or staring into space episodes during my encounter  Psychiatric: calm          Medications:      All current medications were reviewed with  changes reflected in problem list.         Data:      All new lab and imaging data were personally reviewed.   Labs:  CBC RESULTS:   Recent Labs   Lab Test 12/18/23  0535   WBC 5.6   RBC 3.64*   HGB 11.0*   HCT 35.6*   MCV 98   MCH 30.2   MCHC 30.9*   RDW 14.4        Last Comprehensive Metabolic Panel:  Lab Results   Component Value Date     12/18/2023    POTASSIUM 4.0 12/18/2023    CHLORIDE 104 12/18/2023    CO2 35 (H) 12/18/2023    ANIONGAP 5 (L) 12/18/2023     (H) 12/18/2023    BUN 10.9 12/18/2023    CR 0.60 (L) 12/18/2023    GFRESTIMATED >90 12/18/2023    ARNOLD 8.6 12/18/2023       Ammonia level 71  Valproic acid level 3    Imaging:   None today        Casper Gomez MD

## 2023-12-19 ENCOUNTER — HOSPITAL ENCOUNTER (INPATIENT)
Dept: NEUROLOGY | Facility: CLINIC | Age: 48
Discharge: HOME | DRG: 101 | End: 2023-12-19
Attending: INTERNAL MEDICINE | Admitting: INTERNAL MEDICINE
Payer: MEDICARE

## 2023-12-19 LAB
ALBUMIN SERPL BCG-MCNC: 3.7 G/DL (ref 3.5–5.2)
ALP SERPL-CCNC: 65 U/L (ref 40–150)
ALT SERPL W P-5'-P-CCNC: 23 U/L (ref 0–70)
ANION GAP SERPL CALCULATED.3IONS-SCNC: 8 MMOL/L (ref 7–15)
AST SERPL W P-5'-P-CCNC: 18 U/L (ref 0–45)
BASE EXCESS BLDV CALC-SCNC: 7.6 MMOL/L (ref -7.7–1.9)
BILIRUB SERPL-MCNC: 0.2 MG/DL
BUN SERPL-MCNC: 10.9 MG/DL (ref 6–20)
CALCIUM SERPL-MCNC: 8.5 MG/DL (ref 8.6–10)
CHLORIDE SERPL-SCNC: 101 MMOL/L (ref 98–107)
CREAT SERPL-MCNC: 0.58 MG/DL (ref 0.67–1.17)
CREAT SERPL-MCNC: 0.61 MG/DL (ref 0.67–1.17)
DEPRECATED HCO3 PLAS-SCNC: 33 MMOL/L (ref 22–29)
EGFRCR SERPLBLD CKD-EPI 2021: >90 ML/MIN/1.73M2
EGFRCR SERPLBLD CKD-EPI 2021: >90 ML/MIN/1.73M2
ERYTHROCYTE [DISTWIDTH] IN BLOOD BY AUTOMATED COUNT: 14.2 % (ref 10–15)
GLUCOSE SERPL-MCNC: 112 MG/DL (ref 70–99)
HCO3 BLDV-SCNC: 37 MMOL/L (ref 21–28)
HCT VFR BLD AUTO: 34 % (ref 40–53)
HGB BLD-MCNC: 10.7 G/DL (ref 13.3–17.7)
LAMOTRIGINE SERPL-MCNC: 6.1 UG/ML
MCH RBC QN AUTO: 30.2 PG (ref 26.5–33)
MCHC RBC AUTO-ENTMCNC: 31.5 G/DL (ref 31.5–36.5)
MCV RBC AUTO: 96 FL (ref 78–100)
O2/TOTAL GAS SETTING VFR VENT: 40 %
PCO2 BLDV: 77 MM HG (ref 40–50)
PH BLDV: 7.29 [PH] (ref 7.32–7.43)
PLATELET # BLD AUTO: 174 10E3/UL (ref 150–450)
PO2 BLDV: 47 MM HG (ref 25–47)
POTASSIUM SERPL-SCNC: 3.7 MMOL/L (ref 3.4–5.3)
PROT SERPL-MCNC: 6.1 G/DL (ref 6.4–8.3)
RBC # BLD AUTO: 3.54 10E6/UL (ref 4.4–5.9)
SODIUM SERPL-SCNC: 142 MMOL/L (ref 135–145)
WBC # BLD AUTO: 7 10E3/UL (ref 4–11)

## 2023-12-19 PROCEDURE — 82565 ASSAY OF CREATININE: CPT | Performed by: INTERNAL MEDICINE

## 2023-12-19 PROCEDURE — 94660 CPAP INITIATION&MGMT: CPT

## 2023-12-19 PROCEDURE — 5A09357 ASSISTANCE WITH RESPIRATORY VENTILATION, LESS THAN 24 CONSECUTIVE HOURS, CONTINUOUS POSITIVE AIRWAY PRESSURE: ICD-10-PCS | Performed by: HOSPITALIST

## 2023-12-19 PROCEDURE — 97602 WOUND(S) CARE NON-SELECTIVE: CPT

## 2023-12-19 PROCEDURE — 94640 AIRWAY INHALATION TREATMENT: CPT

## 2023-12-19 PROCEDURE — 999N000157 HC STATISTIC RCP TIME EA 10 MIN

## 2023-12-19 PROCEDURE — 99233 SBSQ HOSP IP/OBS HIGH 50: CPT | Performed by: HOSPITALIST

## 2023-12-19 PROCEDURE — G0463 HOSPITAL OUTPT CLINIC VISIT: HCPCS | Mod: 25

## 2023-12-19 PROCEDURE — 94640 AIRWAY INHALATION TREATMENT: CPT | Mod: 76

## 2023-12-19 PROCEDURE — 95714 VEEG EA 12-26 HR UNMNTR: CPT

## 2023-12-19 PROCEDURE — 250N000013 HC RX MED GY IP 250 OP 250 PS 637: Performed by: PSYCHIATRY & NEUROLOGY

## 2023-12-19 PROCEDURE — 82803 BLOOD GASES ANY COMBINATION: CPT | Performed by: INTERNAL MEDICINE

## 2023-12-19 PROCEDURE — 85027 COMPLETE CBC AUTOMATED: CPT | Performed by: INTERNAL MEDICINE

## 2023-12-19 PROCEDURE — 250N000009 HC RX 250: Performed by: INTERNAL MEDICINE

## 2023-12-19 PROCEDURE — 250N000013 HC RX MED GY IP 250 OP 250 PS 637: Performed by: INTERNAL MEDICINE

## 2023-12-19 PROCEDURE — 120N000001 HC R&B MED SURG/OB

## 2023-12-19 PROCEDURE — 250N000011 HC RX IP 250 OP 636: Mod: JZ | Performed by: INTERNAL MEDICINE

## 2023-12-19 PROCEDURE — 250N000013 HC RX MED GY IP 250 OP 250 PS 637

## 2023-12-19 RX ORDER — LAMOTRIGINE 100 MG/1
200 TABLET ORAL 2 TIMES DAILY
Status: COMPLETED | OUTPATIENT
Start: 2023-12-19 | End: 2023-12-26

## 2023-12-19 RX ORDER — LOPERAMIDE HCL 2 MG
2 CAPSULE ORAL 4 TIMES DAILY PRN
Status: DISCONTINUED | OUTPATIENT
Start: 2023-12-19 | End: 2023-12-28 | Stop reason: HOSPADM

## 2023-12-19 RX ORDER — IPRATROPIUM BROMIDE AND ALBUTEROL SULFATE 2.5; .5 MG/3ML; MG/3ML
3 SOLUTION RESPIRATORY (INHALATION)
Status: DISCONTINUED | OUTPATIENT
Start: 2023-12-19 | End: 2023-12-24

## 2023-12-19 RX ORDER — QUETIAPINE FUMARATE 300 MG/1
300 TABLET, FILM COATED ORAL AT BEDTIME
Status: DISCONTINUED | OUTPATIENT
Start: 2023-12-19 | End: 2023-12-28 | Stop reason: HOSPADM

## 2023-12-19 RX ADMIN — BUSPIRONE HYDROCHLORIDE 15 MG: 5 TABLET ORAL at 08:29

## 2023-12-19 RX ADMIN — LOPERAMIDE HYDROCHLORIDE 2 MG: 2 CAPSULE ORAL at 20:36

## 2023-12-19 RX ADMIN — IPRATROPIUM BROMIDE AND ALBUTEROL SULFATE 3 ML: .5; 3 SOLUTION RESPIRATORY (INHALATION) at 18:02

## 2023-12-19 RX ADMIN — IPRATROPIUM BROMIDE AND ALBUTEROL SULFATE 3 ML: .5; 3 SOLUTION RESPIRATORY (INHALATION) at 11:20

## 2023-12-19 RX ADMIN — ENOXAPARIN SODIUM 40 MG: 40 INJECTION SUBCUTANEOUS at 22:23

## 2023-12-19 RX ADMIN — METHYLCELLULOSE 500 MG: 500 TABLET ORAL at 22:15

## 2023-12-19 RX ADMIN — DIVALPROEX SODIUM 500 MG: 250 TABLET, DELAYED RELEASE ORAL at 20:36

## 2023-12-19 RX ADMIN — NAFCILLIN 2 G: 2 POWDER, FOR SOLUTION INTRAMUSCULAR; INTRAVENOUS at 22:27

## 2023-12-19 RX ADMIN — LACOSAMIDE 50 MG: 50 TABLET, FILM COATED ORAL at 08:29

## 2023-12-19 RX ADMIN — NAFCILLIN 2 G: 2 POWDER, FOR SOLUTION INTRAMUSCULAR; INTRAVENOUS at 05:57

## 2023-12-19 RX ADMIN — LEVOCARNITINE 660 MG: 330 TABLET ORAL at 22:16

## 2023-12-19 RX ADMIN — POLYETHYLENE GLYCOL 3350 17 G: 17 POWDER, FOR SOLUTION ORAL at 08:29

## 2023-12-19 RX ADMIN — PRAZOSIN HYDROCHLORIDE 2 MG: 2 CAPSULE ORAL at 22:15

## 2023-12-19 RX ADMIN — LOPERAMIDE HYDROCHLORIDE 2 MG: 2 CAPSULE ORAL at 12:52

## 2023-12-19 RX ADMIN — MULTIVITAMIN TABLET 1 TABLET: TABLET at 08:29

## 2023-12-19 RX ADMIN — LEVETIRACETAM 1000 MG: 500 TABLET, FILM COATED ORAL at 08:30

## 2023-12-19 RX ADMIN — PANTOPRAZOLE SODIUM 40 MG: 40 TABLET, DELAYED RELEASE ORAL at 08:29

## 2023-12-19 RX ADMIN — LEVOCARNITINE 660 MG: 330 TABLET ORAL at 08:30

## 2023-12-19 RX ADMIN — DIVALPROEX SODIUM 500 MG: 250 TABLET, DELAYED RELEASE ORAL at 08:29

## 2023-12-19 RX ADMIN — ENOXAPARIN SODIUM 40 MG: 40 INJECTION SUBCUTANEOUS at 08:31

## 2023-12-19 RX ADMIN — CITALOPRAM HYDROBROMIDE 60 MG: 40 TABLET ORAL at 12:09

## 2023-12-19 RX ADMIN — ZONISAMIDE 200 MG: 100 CAPSULE ORAL at 00:00

## 2023-12-19 RX ADMIN — NAFCILLIN 2 G: 2 POWDER, FOR SOLUTION INTRAMUSCULAR; INTRAVENOUS at 08:31

## 2023-12-19 RX ADMIN — LAMOTRIGINE 200 MG: 100 TABLET ORAL at 22:16

## 2023-12-19 RX ADMIN — NAFCILLIN 2 G: 2 POWDER, FOR SOLUTION INTRAMUSCULAR; INTRAVENOUS at 01:13

## 2023-12-19 RX ADMIN — QUETIAPINE FUMARATE 300 MG: 300 TABLET ORAL at 22:16

## 2023-12-19 RX ADMIN — LEVOCARNITINE 660 MG: 330 TABLET ORAL at 17:21

## 2023-12-19 RX ADMIN — PROPRANOLOL HYDROCHLORIDE 20 MG: 20 TABLET ORAL at 08:30

## 2023-12-19 RX ADMIN — BUSPIRONE HYDROCHLORIDE 15 MG: 5 TABLET ORAL at 22:16

## 2023-12-19 RX ADMIN — METHYLCELLULOSE 500 MG: 500 TABLET ORAL at 08:29

## 2023-12-19 RX ADMIN — NAFCILLIN 2 G: 2 POWDER, FOR SOLUTION INTRAMUSCULAR; INTRAVENOUS at 17:22

## 2023-12-19 RX ADMIN — ZONISAMIDE 100 MG: 100 CAPSULE ORAL at 08:31

## 2023-12-19 RX ADMIN — PROPRANOLOL HYDROCHLORIDE 20 MG: 20 TABLET ORAL at 22:16

## 2023-12-19 RX ADMIN — NAFCILLIN 2 G: 2 POWDER, FOR SOLUTION INTRAMUSCULAR; INTRAVENOUS at 12:15

## 2023-12-19 RX ADMIN — LEVETIRACETAM 1500 MG: 750 TABLET, FILM COATED ORAL at 22:15

## 2023-12-19 RX ADMIN — LAMOTRIGINE 200 MG: 100 TABLET ORAL at 08:30

## 2023-12-19 RX ADMIN — PRAZOSIN HYDROCHLORIDE 2 MG: 2 CAPSULE ORAL at 00:00

## 2023-12-19 RX ADMIN — IPRATROPIUM BROMIDE AND ALBUTEROL SULFATE 3 ML: .5; 3 SOLUTION RESPIRATORY (INHALATION) at 08:13

## 2023-12-19 RX ADMIN — ZONISAMIDE 200 MG: 100 CAPSULE ORAL at 22:15

## 2023-12-19 ASSESSMENT — ACTIVITIES OF DAILY LIVING (ADL)
ADLS_ACUITY_SCORE: 45
ADLS_ACUITY_SCORE: 46
ADLS_ACUITY_SCORE: 46
ADLS_ACUITY_SCORE: 41
ADLS_ACUITY_SCORE: 41
ADLS_ACUITY_SCORE: 46
ADLS_ACUITY_SCORE: 45
DEPENDENT_IADLS:: CLEANING;COOKING;LAUNDRY;SHOPPING;MONEY MANAGEMENT;MEDICATION MANAGEMENT;MEAL PREPARATION;TRANSPORTATION
ADLS_ACUITY_SCORE: 46

## 2023-12-19 ASSESSMENT — ABNORMAL INVOLUNTARY MOVEMENT SCALE (AIMS)
NECK_SHOULDER_HIPS: NONE, NORMAL
UPPER_BODY_EXTREMITIES: NONE, NORMAL
LOWER_BODY_EXTREMITIES: NONE, NORMAL

## 2023-12-19 NOTE — PHARMACY-ADMISSION MEDICATION HISTORY
Admission medication history completed at [facility]. Please see Pharmacist Admission Medication History note from 11/24/2023.    Please note patient's Depakote dosage was adjusted at [facility] and VImpat was initiated. Did not add these to PTA medication list.     Inez Parsons, MichaelD, BCPS

## 2023-12-19 NOTE — PROGRESS NOTES
D: Checked on PICC dressing and site intact with xray confirming placement SVC   A:Site marked changed 12/16 so due 12/25  P:Nurse states has good blood return  Michele Pollack RN [facility]

## 2023-12-19 NOTE — PLAN OF CARE
Goal Outcome Evaluation:         RN discussed transfer paper work with pt, and pt understand and signed.  All paper work and belongings sent  to [place] with Transport team.

## 2023-12-19 NOTE — PROGRESS NOTES
Marcela Mackenzie RN on 12/19/2023 at 2:41 PM    Reason for Admission: breakthrough seizure    Cognitive/Mentation: A/Ox 1, disoriented to place, situation, and time. Confused, garbled speech.    Neuros/CMS: Intact ex generalized weakness.   VS: stable.   GI: Last BM 12/19. Loose, soft, brown stool. Continent, will call when needs to go. Gave PRN imodium per patient request.  : Continent. Up with walker to the bathroom.    Pulmonary: LS diminished in all lung fields.  Pain: no reported pain.     Drains/Lines: single lumen PICC right basilic. Infusing IV antibiotics  Skin: blanchable redness on buttock. Mepilex in place. Wound on left buttock, cleaned and dressed by Woc.   Activity: Assist x 1 with walker. Wheelchair per patient request.   Diet: regular with thin liquids. Takes pills all at once with apple juice.     Discharge: group facility on 12/25 after IV antibiotics are completed    Aggression Stoplight Tool: green    End of shift summary: patient placed on 24hr EEG monitoring. Tolerated well. No seizure activity noted.

## 2023-12-19 NOTE — CONSULTS
Care Management Initial Consult    General Information  Assessment completed with: VM-chart review,    Type of CM/SW Visit: Initial Assessment    Primary Care Provider verified and updated as needed:     Readmission within the last 30 days: no previous admission in last 30 days      Reason for Consult: discharge planning  Advance Care Planning:            Communication Assessment  Patient's communication style:               Cognitive  Cognitive/Neuro/Behavioral: .WDL except  Level of Consciousness: alert, confused  Arousal Level: opens eyes spontaneously  Orientation: disoriented to, place, time, situation  Mood/Behavior: calm, cooperative  Best Language: 0 - No aphasia  Speech: garbled, spontaneous    Living Environment:   People in home: facility resident     Current living Arrangements: group home      Able to return to prior arrangements:         Family/Social Support:  Care provided by: other (see comments) ( staff)  Provides care for: no one, unable/limited ability to care for self  Marital Status: Single             Description of Support System:           Current Resources:   Patient receiving home care services:       Community Resources: Group Home  Equipment currently used at home:    Supplies currently used at home: Oxygen Tubing/Supplies    Employment/Financial:  Employment Status: disabled        Financial Concerns:             Does the patient's insurance plan have a 3 day qualifying hospital stay waiver?  No    Lifestyle & Psychosocial Needs:  Social Determinants of Health     Food Insecurity: Not on file   Depression: Not on file   Housing Stability: Not on file   Tobacco Use: Medium Risk (5/5/2023)    Patient History     Smoking Tobacco Use: Former     Smokeless Tobacco Use: Never     Passive Exposure: Not on file   Financial Resource Strain: Not on file   Alcohol Use: Not on file   Transportation Needs: Not on file   Physical Activity: Not on file   Interpersonal Safety: Not on file   Stress:  Not on file   Social Connections: Not on file       Functional Status:  Prior to admission patient needed assistance:   Dependent ADLs:: Ambulation-walker, Bathing, Dressing, Grooming, Transfers, Wheelchair-with assist, Toileting  Dependent IADLs:: Cleaning, Cooking, Laundry, Shopping, Money Management, Medication Management, Meal Preparation, Transportation       Mental Health Status:          Chemical Dependency Status:                Values/Beliefs:  Spiritual, Cultural Beliefs, Denominational Practices, Values that affect care:                 Additional Information:  CM consult for discharge planning and increased risk for readmission.  Per chart review, patient was hospitalized at [facility] since 11/24 for MSSA bacteremia and was receiving IV antibiotics.  He had a PICC line placed and needs IVAB through 12/25.  The plan was for him to stay in the hospital until completed.  He resides in a group home and they were unable to provide home infusion and TCU placement was not found.  Patient was transferred to [facility] on 12/18 due to concern for seizures.      Contacted [facility] ([phone]).  Left message for director Quoc to return call.     Darya Suresh RN, BSN, PHN  Inpatient Care Coordination  [facility]  Phone: [phone]

## 2023-12-19 NOTE — CONSULTS
"[facility] Nurse Inpatient Assessment     Consulted for: Wound L buttock     Summary: patient with POA wound to left buttock noted per charting as a prior abscess, started iodosorb gel 12/19     Patient History (according to provider note(s):      \"48 year old male admitted on 12/18/2023. He insisted admission from [facility] where he has been hospitalized since late November for MSSA bacteremia.  Was unable to return to his group home.\"     Assessment:      Areas visualized during today's visit: Focused: and Sacrum/coccyx    Wound location: buttock left     Last photo: 12/19  Wound due to:  noted per charting as a prior abscess   Wound history/plan of care: found with 4x4\" mepilex in use   Wound base:  slough     Palpation of the wound bed: boggy and fluctuance      Drainage: scant     Description of drainage: serosanguinous     Measurements (length x width x depth, in cm): 0.5  x 0.5  x  0.3 cm      Tunneling: N/A     Undermining: N/A  Periwound skin: Intact      Color: pale and purple      Temperature: normal   Odor: none  Pain: no grimacing or signs of discomfort, none  Pain interventions prior to dressing change: patient tolerated well  Treatment goal: Drainage control and Infection control/prevention  STATUS: initial assessment  Supplies ordered: gathered, at bedside, discussed with RN, and discussed with patient        Treatment Plan:       Wound care  Start:  12/20/23 0600,   DAILY,   Routine        Comments: Location: left buttock  Care: provided daily by primary RN  1. Cleanse daily with normal saline or commercial wound cleanser and 4x4\" gauze, pat dry  2. Apply iodoform (#[id number]) at least nickel thickness to wound base  3. Cover with 4x4\" mepilex dressing. Ok to lift for routine skin assessments and reapply.             Skin care precautions  EFFECTIVE NOW        Comments: Pressure Injury Prevention (PIP) Plan:  If patient is declining pressure injury " "prevention interventions: Explore reason why and address patient's concerns, Educate on pressure injury risk and prevention intervention(s), If patient is still declining, document \"informed refusal\" , and Ensure Care team is aware ( provider, charge nurse, etc)  Mattress: Follow bed algorithm, reassess daily and order specialty mattress, if indicated.  HOB: Maintain at or below 30 degrees, unless contraindicated  Repositioning in bed: Every 1-2 hours , Left/right positioning; avoid supine, and Raise foot of bed prior to raising head of bed, to reduce patient sliding down (shear)  Heels: Keep elevated off mattress and Pillows under calves  Chair positioning: Chair cushion (#[id number]) , Assist patient to reposition hourly, and Do NOT use a donut for sitting (this increases pressure to smaller area and creates a higher potential for injury)    If patient has a buttock pressure injury, or high risk for PI use chair cushion or SPS.  Moisture Management: Perineal cleansing /protection: Follow Incontinence Protocol, Avoid brief in bed, Clean and dry skin folds with bathing , and Moisturize dry skin  Under Devices: Inspect skin under all medical devices during skin inspection , Ensure tubes are stabilized without tension, and Ensure patient is not lying on medical devices or equipment when repositioned  Ask provider to discontinue device when no longer needed.        12/18/23 5586  Wound care and Ostomy Supplies  ONE TIME     Complete     Comments: Chair cushion #[id number]-please send 1        Orders: Written    RECOMMEND PRIMARY TEAM ORDER: None, at this time  Education provided: plan of care, Moisture management, and Hygiene  Discussed plan of care with: Patient and Nurse  WOC nurse follow-up plan: weekly  Notify WOC if wound(s) deteriorate.  Nursing to notify the Provider(s) and re-consult the WOC Nurse if new skin concern.    DATA:     Current support surface: Standard  Low air loss (LIZZY pump, Isolibrium, Pulsate, skin " guard, etc)  Containment of urine/stool: Incontinence Protocol and Incontinent pad in bed  BMI: There is no height or weight on file to calculate BMI.   Active diet order: Orders Placed This Encounter      Regular Diet Adult     Output: I/O last 3 completed shifts:  In: -   Out: 450 [Urine:450]     Labs:   Recent Labs   Lab 12/19/23  0555   ALBUMIN 3.7   HGB 10.7*   WBC 7.0     Pressure injury risk assessment:   Sensory Perception: 3-->slightly limited  Moisture: 3-->occasionally moist  Activity: 3-->walks occasionally  Mobility: 3-->slightly limited  Nutrition: 2-->probably inadequate  Friction and Shear: 1-->problem  Julio César Score: 15    Annika CWOCN   1st choice: Securely message with [employer] ([facility] [employer])   (2nd option: [facility] Office Phone [phone], messages checked periodically Mon-Fri 8a-4p)

## 2023-12-19 NOTE — PROGRESS NOTES
[facility]    Hospitalist Progress Note      Assessment & Plan   Tre Vazquez is a 48 year old male admitted on 12/18/2023. He insisted admission from [facility] where he has been hospitalized since late November for MSSA bacteremia.  Was unable to return to his group home.  [apparently there was discussion of 24 Continuous EEG ordered that is not performed at [facility]; and performed at [facility]. ]       Epilepsy history, shaking spells: Some concern for ongoing partial seizures with frequent shaking spells noted at outside hospital during his prolonged hospitalization.  Multiple antiepileptics prior to admission.  Followed by neurology at [facility] prior to transfer with recommendation for continuous EEG at [facility].   -Vimpat 50 mg every morning will continue, started by neurology 12/17/2023.  -Continuing prior to admission Zonegran 100 mg every morning, 200 mg at bedtime, Keppra 1 g every morning, 1.5 g every evening, Lamictal 200 mg twice daily  -Continuing Depakote at recently reduced dose of 500 mg twice daily; patient noted to have hyperammonemia on admission at outside hospital and there is a plan per neurology to reduce Depakote dosing and potentially taper off  -Continuing levocarnitine 3 times daily  -apparently there was discussion of 24 Continuous EEG ordered that is not performed at [facility]; and performed at [facility].   -General neurology consult: pending.      MSSA bacteremia: Uncertain source, potentially from a left gluteal abscess/injury, but also has a history of boils (Axillary, L arm lesions this past Sept).  No clear endocarditis on TTE.  Last positive blood culture 11/25/2023 and remains hospitalized while completing a 4-week course of nafcillin through 12/24/2023.  Was unable to discharge to his group home while on IV anti-infectives, no TCU placement available.  -Continue 2 g nafcillin every 4 hours  -Anticipate discharge back to group home 12/25/2023 upon  completion of anti-infective therapy  -1 view CXR to confirm PICC placement (present from OSH)  -Admitting hospitalist did not consult with infectious disease team as they were previously involved at [facility] and have since signed off while awaiting completion of antibiotic therapy; with duration as above. .     Chronic hypoxic hypercarbic respiratory failure: Obesity hypoventilation, ANA, nonadherence with BiPAP noted.  Significant hypercarbia on admission at outside facility.   -BiPap/CPAP at night.     Class III obesity: BMI greater than 46.  Increased risk of all cause mortality  -VBG in a.m.  -Nocturnal BiPAP ordered per most recent documented pressures and respiratory therapy note  -Oximetry, oxygen as needed  -duonebs QID scheduled; previously recommended by pulmonary provider to use at least daily (July telephone visit through [facility]).     Hypertension:  -Continue prior to admission propranolol, Minipress     Schizoaffective disorder:  Major depressive disorder:  Borderline personality disorder:  -Continuing prior to admission Celexa, BuSpar  -Continue Seroquel 300 mg at bedtime  -Nursing notes no acute behavioral issues.  Monitor.    DVT Prophylaxis: Enoxaparin (Lovenox) SQ  Code Status: Full Code     Expected Discharge Date: 12/26/2023    Discharge Delays: IV Medication - consider oral or Home Infusion  Destination: nursing home         Shreya Pressley MD  Text Page (7am - 6pm, M-F)    Total time 50 minutes for today 12/19/2023 :   time consisted of the following, assuming Patient care with review of records including labs, imaging results, medications, interdisciplinary notes; examination of Patient;  and completing documentation and orders.  Care Management included counseling/discussion with Patient regarding current condition including sx disorder and Coordination of Care time with Nursing  and Specialists, Neurology regarding care plan, management and surveillance.     Interval History    Patient without complaints, however he states he does not want male staff due to prior experience [did not ask for elaboration] .  This is noted in epic.    SH: No tobacco. Lives in     ROS: Complete ROS negative except as above.     -Data reviewed today: I reviewed all new labs and imaging results over the last 24 hours.   Physical Exam   Temp: 98.7  F (37.1  C) Temp src: Oral BP: 127/82 Pulse: 84   Resp: 16 SpO2: 94 % O2 Device: Nasal cannula Oxygen Delivery: 2 LPM    General/Constitutional:   NAD, awake, calm, preoccupied with cell phone   Chest/Respiratory: Respirations nonlabored room air  Cardiovascular: egular, no murmur appreciated.  Gastrointestinal/Abdomen:  soft, nontender, no rebound, guarding or other peritoneal signs.  Neuro.  Gross motor tested, nonfocal, no tremor or sz activity.  Psych oriented, affect calm      Medications      busPIRone  15 mg Oral BID    citalopram  60 mg Oral Daily    divalproex sodium delayed-release  500 mg Oral Q12H [facility] (08/20)    enoxaparin ANTICOAGULANT  40 mg Subcutaneous Q12H    ipratropium - albuterol 0.5 mg/2.5 mg/3 mL  3 mL Nebulization 4x daily    lacosamide  50 mg Oral Daily    lamoTRIgine  200 mg Oral BID    levETIRAcetam  1,000 mg Oral QAM    And    levETIRAcetam  1,500 mg Oral At Bedtime    levOCARNitine  660 mg Oral TID    methylcellulose  500 mg Oral or Feeding Tube BID    multivitamin, therapeutic  1 tablet Oral Daily    nafcillin  2 g Intravenous Q4H    pantoprazole  40 mg Oral Daily    polyethylene glycol  17 g Oral Daily    prazosin  2 mg Oral At Bedtime    propranolol  20 mg Oral BID    QUEtiapine  300 mg Oral At Bedtime    sodium chloride (PF)  10 mL Intracatheter Q8H    sodium chloride (PF)  10-40 mL Intracatheter Q7 Days    zonisamide  100 mg Oral QAM    And    zonisamide  200 mg Oral At Bedtime       Data   Recent Labs   Lab 12/19/23  0555 12/19/23  0112 12/18/23  0535 12/13/23  0600   WBC 7.0  --  5.6 6.0   HGB 10.7*  --  11.0* 11.3*   MCV 96  --   98 98     --  182 229     --  144 142   POTASSIUM 3.7  --  4.0 4.1   CHLORIDE 101  --  104 101   CO2 33*  --  35* 38*   BUN 10.9  --  10.9 14.7   CR 0.61* 0.58* 0.60* 0.57*   ANIONGAP 8  --  5* 3*   ARNOLD 8.5*  --  8.6 8.7   *  --  104* 120*   ALBUMIN 3.7  --   --  4.0   PROTTOTAL 6.1*  --   --  6.5   BILITOTAL 0.2  --   --  0.2   ALKPHOS 65  --   --  63   ALT 23  --   --  25   AST 18  --   --  23       Recent Results (from the past 24 hour(s))   XR Chest Port 1 View    Narrative    EXAM: XR CHEST PORT 1 VIEW  LOCATION: [facility]  DATE: 12/18/2023    INDICATION: confirm PICC placement (Transfer from OSH)  COMPARISON: None.      Impression    IMPRESSION: A right PICC is in place with the tip in the mid SVC. The glenohumeral joint and AC joint are well aligned on these AP views. The included portions of the ribs and lungs are unremarkable.

## 2023-12-19 NOTE — CONSULTS
REPORT OF CONSULTATION  Patient Name: Tre Vazquez   MRN: [id number]   : 1975   Admission Date/Time: 2023  9:42 PM   Primary Care Physician: Siobhan Mayo   Consulting Physician: Fanny Carbajal MD    REASON FOR CONSULTATION  I am asked to see this patient in consultation at the request of Paty Alan MD for evaluation of seizures.     CONSULTATION IMPRESSION/RECOMMENDATIONS:   Principal Problem:    Bacteremia due to Staphylococcus     This is a 48 year old man with past medical history including developmental delay, morbid obesity, ANA/OHS on BiPAP at bedtime, seizure disorder, chronic hyperammonemia, anemia, schizoaffective disorder, borderline personality disorder, MDD, anxiety, HTN, and left eye blindness who presents with concern for increased complex partial seizures.  -started on continuous EEG monitoring with hopes to capture patient's events and to characterized them as seizure or - ddx will be myoclonus from depakote vs non-epileptic event of different nature  -will change LTG to 200 mg TID given drop in level  -last zonisamide level in April was 11 (just above therapeutic) hence will consider increase if continue decreasing depakote    Will continue to follow.   Please page with questions: pager [phone]  I thank Dr. Paty Alan for the opportunity to participate in the patient's care.     ------------------------------------------------------------------------------------------------------    HPI: This is a 48 year old male who was transferred from [facility] for EEG monitoring due to frequent spells jerking and staring.  Per  primary epileptology notes patient had GTCs in the past and no other types of seizures.     Per Dr Young note from May 2023:  He has a history of frequent ER visits/hospitalizations for reported breakthrough seizures as well as respiratory failure. His past two reported EEG's have documented no electrophysiological evidence of epilepsy,  though an EEG performed in 2007 reportedly showed activity consistent with underlying epilepsy. He has been followed in the past at [facility], but he fired at least two neurologists there, including Dr. Cox, and has been recently followed in the Neuro clinic at the [facility]. Dr. Cox at [facility] reportedly felt that he had genuine as well as non-epileptic seizures. He has been given diagnoses of oppositional/defiant disorder, cerebral palsy, static cognitive dysfunction since childhood, and severe apnea, likely primary central apnea, but has not been compliant with previously prescribed treatment for apnea. He has been admitted for encephalopathy in the setting of notable hypercapnia. His most recent ammonia level was elevated at 62, but a month ago was as high as 86 micromoles/L. He presented with a pCO2 notably elevated at 86 mm Hg, and his most recent pCO2 remains elevated at 82 mmg Hg. He is very somnolent, though easily aroused, but tends to fall asleep during his evaluation.   Epilepsy. He has a history of generalized epilepsy with likely pseudoseizures, but I suspect he has non-epileptic spells that he considers to be seizures. Under the circumstances it is challenging to treat the underlying cause of his spells without the risk of over medication/side effects from the seizure medications.  Severe apnea with hypercapnia. He likely has central sleep apnea with chronic hypoventilation syndrome exacerbated by body habitus. He requires ventilatory support (BiPAP) but has not tolerated his prescribed treatment to now. He is tolerating NC O2, but it remains unclear if the O2 is exacerbating his hypoventilation/CO2 retention.    Per Dr Martinez note from 12/17:----------------------------------------------------   Summary of Stay - See Dr. Gomez's note from 12/17/2023 -   Tre Vazquez is a 48 year old male with past medical history including developmental delay, morbid obesity, ANA/OHS on BiPAP at bedtime, seizure  "disorder, chronic hyperammonemia, anemia, schizoaffective disorder, borderline personality disorder, MDD, anxiety, HTN, and left eye blindness who was admitted on 11/24/2023 with altered mental status consistent with acute encephalopathy likely primary driven by hypercapnia based on VBG.  Additionally ammonia level was elevated, however this is more of a chronic issue likely related to his Depakote with no history of cirrhosis.  CT of the head and chest did not show any acute pathology.  Blood cultures ended up coming back positive for Staph aureus so he was started on IV nafcillin after consultation from ID.  He will need 4 weeks of IV antibiotics which his group home cannot accommodate and we have not been able to find a TCU for him.  Likely to remain hospitalized until finishing antibiotics at end of day 12/25 and discharged 12/26.       Neurology was consulted for concern increased seizures.  Now since 12/15/2023 concern for more frequent events of possible seizure.  Events per nursing and Dr. Gomez and his step mom have consisted of jerking or \"glassy stare\".  This will tend to last seconds and no specific worsening of confusion after.   First reports on night of 12/15 but now at least several events (2+ per shift) since then.  Per step mom baseline of these events at group home can vary but can at times be at least daily to multiple times a day since at least summer 2023.  Per step mom since at least summer 2023 when she would visit monthly could have similar events to 1 every couple hours.       Please refer to prior neurology consult from 11/25/2023.  Patient with concern for extensive seizure history.  Follows with [facility] neurology ( Dr. Griggs and SEBASTIEN Merchant since 4/2022) last seen 1/2023).  On review of notes appears only frequency of GTC has been addressed which is more rare (last in 2021).  On review of notes baseline frequency and target goal of other more simple/partial seizures is not clearly " addressed.    Prior to admit Seizure meds   Depakote 500mg TID with levocarnitine 330 TID   Keppra 1000/1500  Lamictal 200/200  Zonisamide 200 at bedtime     During admit (on 11/25/2023) Depakote changed to 500mg BID due to concern for ongoing elevated ammonia.  With plan to likely wean Depakote as outpatient but would likely need epilepsy monitoring unit     Prior labs  Baseline ammonia - 60-80 on review  Last seizure meds - 4/12/2023 - see chart - free VPA 13  10/25/2023 VPA - 72.8      11/25/2023 - VPA total - 38 - free <7, keppra 35.4, Lamictal 14.6      12/4/23 - VPA total 33.9, Keppra 35.4, Ammonia 62  (no lamictal level done      Assessment and Plan:      Tre Vazquez is a 48 year old male with past medical history including developmental delay, morbid obesity, ANA/OHS on BiPAP at bedtime, seizure disorder, chronic hyperammonemia, anemia, schizoaffective disorder, borderline personality disorder, MDD, anxiety, HTN, and left eye blindness who was admitted on 11/24/2023 with altered mental status consistent with acute encephalopathy likely primary driven by hypercapnia based on VBG.  Additionally ammonia level was elevated, however this is more of a chronic issue likely related to his Depakote with no history of cirrhosis.  Now with continued admission for IV antibiotics for positive blood cultures.  During stay Depakote decreased from 500 TID to BID due to concern for elevated ammonia.  Now concern for increased seizures.  Events consisting of jerking, or staring lasting seconds.  This reportedly is near baseline at group home per step mom but clear documentation of baseline events is lacking in chart and is change from prior weeks in hospital.  Labs in 12/4/2023 with lower Depakote level than baseline with med change.   Recommend the following      Consider admission to  [facility] for long term EEG/epilepsy monitoring unit to fully capture these events to determine if seizure/non seizure.  Seems  this is likely needed given ongoing history of similar events outpatient.  Given will need continued admission until 12/25 this could be helpful for long term care of this patient.   Obtain ammonia level now, obtain am (trough) levels of depakote and lamictal given new dosing  (depakote levels can affect Lamictal levels)  - (orders placed)   For now will add Vimpat 50mg - pending levels may consider further increase in Vimpat and decrease of depakote - again this may be most appropriate to do in setting of long term EEG monitoring unit.     I would avoid sedation for starring events as goal of no events given reported baseline of near daily events is unlikely and only give benzos for seizure event concerning for GTC with decreased consciousness/jerking lasting > 3-5 min    PAST MEDICAL HISTORY    Past Medical History:   Diagnosis Date    Blindness of left eye     Depression 03/10/2014    Hypertension     Pneumococcal septicemia(038.2) (H) 11/26/2013    Hospitalized    Pneumonia, organism unspecified(486) 11/26/2013    Hospitalized    Uncomplicated asthma     Unspecified epilepsy without mention of intractable epilepsy         PAST SURGICAL HISTORY    Past Surgical History:   Procedure Laterality Date    COLONOSCOPY N/A 12/1/2022    Procedure: COLONOSCOPY;  Surgeon: Michael Caldwell MD;  Location:  OR    ESOPHAGOSCOPY, GASTROSCOPY, DUODENOSCOPY (EGD), COMBINED N/A 12/1/2022    Procedure: ESOPHAGOGASTRODUODENOSCOPY with biopsy;  Surgeon: Michael Caldwell MD;  Location:  OR    ORTHOPEDIC SURGERY      pt stated past surgery on both ankles        ALLERGIES/SENSITIVITIES    Allergies   Allergen Reactions    Hydrocodone Bitartrate Er Unknown    Cephalexin Rash     HUT Reaction: Rash; HUT Noted: [id number]      Vicodin [Hydrocodone-Acetaminophen] GI Disturbance         CURRENT MEDS    No current outpatient medications on file.        SOCIAL HISTORY  Social History     Socioeconomic History    Marital  status: Single     Spouse name: Not on file    Number of children: Not on file    Years of education: Not on file    Highest education level: Not on file   Occupational History    Not on file   Tobacco Use    Smoking status: Former     Types: Cigarettes     Start date:      Quit date:      Years since quittin.9    Smokeless tobacco: Never   Substance and Sexual Activity    Alcohol use: No    Drug use: No    Sexual activity: Never   Other Topics Concern    Parent/sibling w/ CABG, MI or angioplasty before 65F 55M? Not Asked   Social History Narrative    Not on file     Social Determinants of Health     Financial Resource Strain: Not on file   Food Insecurity: Not on file   Transportation Needs: Not on file   Physical Activity: Not on file   Stress: Not on file   Social Connections: Not on file   Interpersonal Safety: Not on file   Housing Stability: Not on file       FAMILY HISTORY  No family history on file.     REVIEW OF SYSTEMS: Systems (general, cardiovascular, respiratory, ENT/eyes, GI, , musculo-skeletal, neuro, psychiatric, allergology-immunologic and endocrine) were reviewed with pertinent positives noted in HPI and otherwise all others were negative.     EXAM: /74 (BP Location: Right arm)   Pulse 82   Temp 97.9  F (36.6  C) (Oral)   Resp 14   SpO2 96%    General Appearance: no acute distress, obese man  Patient refused exam twice due to needing to use bathroom urgently both times      IMAGING: I have personally reviewed the patient's imaging:     LABS:   Component      Latest Ref Rng 2023  5:35 AM   Lamotrigine      3.0 - 15.0 ug/mL 6.1    Valproic acid        ug/mL 3.2 (L)       Legend:  (L) Low    Component      Latest Ref Rng 2023  3:17 PM   Valproic Acid Free      7 - 23 ug/mL 12    Valproic Acid Total      50 - 125 ug/mL 78    Valproic Acid % Free      5 - 18 % 15    Keppra (Levetiracetam) Level      10.0 - 40.0  g/mL 33.0    Lamotrigine      3.0 - 15.0 ug/mL 14.6     Zonisamide Level Quant      10 - 40 ug/mL 11          Total consult time 75 minutes with the time spent on chart review, imaging and lab results review, and more than 50 % of the time spent on coordination of cares and face-to-face time with the patient including counseling regarding pathophysiology of the above conditions, results of the tests and further directions of care.     Fanny Carbajal MD  [facility]

## 2023-12-19 NOTE — DISCHARGE INSTRUCTIONS
"WOUND CARES   Location: left buttock  Care: provided daily by primary RN  1. Cleanse daily with normal saline or commercial wound cleanser and 4x4\" gauze, pat dry  2. Apply iodoform gel, or like product, at least nickel thickness to wound base  3. Cover with 4x4\" mepilex dressing, or like product. Ok to lift for routine skin assessments and reapply.  "

## 2023-12-19 NOTE — PLAN OF CARE
Neuro: Baseline left blindness. Basline slurred speech. Oriented, but occasonally when questioned, pt states I don't remember anything before yesterday.  Cardio: BP WDL  Resp: LS dim Satting mid 90s off o2 but on 2L NC for comfort  GI: BS active, passing gas  : Continent. Urinal  Skin: Blanchable coccyx, mepilex in place. Wound on left buttocks cleaned and dressed  Pain: Denies  Activity: A1 GBW Pt requests a walker.

## 2023-12-19 NOTE — H&P
[facility]    History and Physical - Hospitalist Service       Date of Admission:  12/18/2023    Assessment & Plan      Tre Vazquez is a 48 year old male admitted on 12/18/2023. He insisted admission from [facility] where he has been hospitalized since late November for MSSA bacteremia.  Was unable to return to his group home    Epilepsy history, shaking spells: Some concern for ongoing partial seizures with frequent shaking spells noted at outside hospital during his prolonged hospitalization.  Multiple antiepileptics prior to admission.  Followed by neurology at [facility] prior to transfer with recommendation for continuous EEG at [facility].   -Vimpat 50 mg every morning will continue, started by neurology 12/17/2023.  -Continuing prior to admission Zonegran 100 mg every morning, 200 mg at bedtime, Keppra 1 g every morning, 1.5 g every evening, Lamictal 200 mg twice daily  -Continuing Depakote at recently reduced dose of 500 mg twice daily; patient noted to have hyperammonemia on admission at outside hospital and there is a plan per neurology to reduce Depakote dosing and potentially taper off  -Continuing levocarnitine 3 times daily  -Continuous EEG ordered  -General neurology consulted    MSSA bacteremia: Uncertain source, potentially from a left gluteal abscess/injury, but also has a history of boils (Axillary, L arm lesions this past Sept).  No clear endocarditis on TTE.  Last positive blood culture 11/25/2023 and remains hospitalized while completing a 4-week course of nafcillin through 12/24/2023.  Was unable to discharge to his group home while on IV anti-infectives, no TCU placement available.  -Continue 2 g nafcillin every 4 hours  -Anticipate discharge back to group home 12/25/2023 upon completion of anti-infective therapy  -1 view CXR to confirm PICC placement (present from OSH)  -I have not consulted infectious disease team as they were previously  involved at [facility] and have since signed off while awaiting completion of antibiotic therapy.    Chronic hypoxic hypercarbic respiratory failure: Obesity hypoventilation, ANA, nonadherence with BiPAP noted.  Significant hypercarbia on admission at outside facility.   Class III obesity: BMI greater than 46.  Increased risk of all cause mortality  -VBG in a.m.  -Nocturnal BiPAP ordered per most recent documented pressures and respiratory therapy note  -Oximetry, oxygen as needed  -duonebs QID scheduled; previously recommended by pulmonary provider to use at least daily (July telephone visit through [facility]).    Hypertension:  -Continue prior to admission propranolol, Minipress    Schizoaffective disorder:  Major depressive disorder:  Borderline personality disorder:  -Continuing prior to admission Celexa, BuSpar  -Continue Seroquel 300 mg at bedtime          Diet:  Regular adult diet as tolerated  DVT Prophylaxis: Enoxaparin (Lovenox) SQ  Reid Catheter: Not present  Lines: PRESENT      PICC 11/28/23 Single Lumen Basilic-Site Assessment: WDL      Cardiac Monitoring: None  Code Status:  Full code    Clinically Significant Risk Factors Present on Admission                  # Hypertension: Noted on problem list                 Disposition Plan    anticipated discharge back to group home 12/25 versus 12/26/2023 following completion of nafcillin anti-infectives and any further recommendations from neurology regarding antiepileptic drug dose changes       Ba Mosley MD  Hospitalist Service  [facility]  Securely message with [employer] (more info)  Text page via [facility] Paging/Directory     ______________________________________________________________________    Chief Complaint   Forgetfulness; noted to have staring spells and some myoclonic jerks at outside facility without postictal state.    History is obtained from the patient, chart review    History of Present Illness   Tre RAO  George is a 48 year old male who presents as a direct admission from [facility] where he has been hospitalized since late November for MSSA bacteremia.  Patient has a history of boils, abscess on his left AC and axillary abscess treated with doxycycline this past September.  He was brought from his group home to [facility] 11/24/2023 after he was noted to be somnolent increased from his baseline.  He has chronic hypercarbic and hypoxic respiratory failure on nocturnal BiPAP as well as what appears to be 2 L nasal cannula oxygen during the day by review of last pulmonary note.  He also has a history of developmental delay with encephalomalacia, seizures on multiple antiepileptic therapies including Depakote, and hyperammonemia associated with his Depakote.  Initially on his hospitalization there was concern for increased confusion associated with both CO2 retention with elevated pCO2 as well as possible contribution from hyperammonemia.  During workup, patient was subsequently found to have persistent positive blood cultures with MSSA, last culture positivity 11/25/2023.    He was initiated on nafcillin for treatment of bacteremia.  No clear evidence of endocarditis on TTE.  Given frequency of nafcillin dosing, he was unable to discharge back to his group home or to a TCU, and there was a plan to have patient remain hospitalized through completion of anti-infectives 12/24/2023 before returning to his group home either 12/25 or 12/26.    With his hyperammonemia, neurology was following at [facility] and recommended a decrease in his Depakote from 500 mg 3 times daily to twice daily dosing as well as initiation of Vimpat.  Patient was noted to have some myoclonic jerking episodes with 20 to 30 seconds of staring into space noted by stepmother at outside hospital.  No postictal state.  There is a question if these might represent seizure activity, and decision was made to transfer to  "[facility] for continuous EEG to facilitate antiepileptic therapy changes.  Apparently, these episodes have been increasing over the past 3 days.    Majority of history is obtained from chart review.  Patient not able to give a narrative history, and tells me that he is forgetful.  He cannot tell me where he lives, speaks about the nurse at the bedside next to him and tells me that he is not sure who she is, wonders if she could be a girlfriend, a wife, or a sister.  Patient also tells me that he can only remember being in [facility] for 1 day rather than nearly 1 month.  Question if some of this history might be fictitious, as he later tells me that he will be fine as long as he is able to flirt (after questioning if his nurse might be his wife).  Outside documentation of patient being described by his stepmother as \"manipulative at times.\"  He is alert and conversant, and despite reporting that he is unable to provide any history, is able to tell me that his indwelling catheter is a PICC line not a midline.        Past Medical History    Past Medical History:   Diagnosis Date    Blindness of left eye     Depression 03/10/2014    Hypertension     Pneumococcal septicemia(038.2) (H) 11/26/2013    Hospitalized    Pneumonia, organism unspecified(486) 11/26/2013    Hospitalized    Uncomplicated asthma     Unspecified epilepsy without mention of intractable epilepsy    Obstructive sleep apnea  Chronic hypercarbic hypoxic respiratory failure    Past Surgical History   Past Surgical History:   Procedure Laterality Date    COLONOSCOPY N/A 12/1/2022    Procedure: COLONOSCOPY;  Surgeon: Michael Caldwell MD;  Location:  OR    ESOPHAGOSCOPY, GASTROSCOPY, DUODENOSCOPY (EGD), COMBINED N/A 12/1/2022    Procedure: ESOPHAGOGASTRODUODENOSCOPY with biopsy;  Surgeon: Michael Caldwell MD;  Location:  OR    ORTHOPEDIC SURGERY      pt stated past surgery on both ankles       Prior to " Admission Medications   Prior to Admission Medications   Prescriptions Last Dose Informant Patient Reported? Taking?   Nafcillin Sodium 2 g SOLR   No No   Sig: Inject 2 g into the vein every 4 hours for 28 days weekly CBC, creatinine and ALT faxed to [phone]   QUEtiapine ER (SEROQUEL XR) 300 MG 24 hr tablet   Yes No   Sig: Take 300 mg by mouth at bedtime   ammonium lactate (AMLACTIN) 12 % external cream   Yes No   Sig: Apply topically 2 times daily   bacitracin 500 UNIT/GM external ointment   No No   Sig: Apply topically 2 times daily   busPIRone (BUSPAR) 15 MG tablet   Yes No   Sig: Take 15 mg by mouth 2 times daily   citalopram (CELEXA) 20 MG tablet   Yes No   Sig: Take 20 mg by mouth daily   citalopram (CELEXA) 40 MG tablet  [facility] Yes No   Sig: Take 40 mg by mouth daily   clotrimazole (LOTRIMIN) 1 % external solution   Yes No   Sig: Apply topically 2 times daily   divalproex sodium delayed-release (DEPAKOTE) 500 MG DR tablet   Yes No   Sig: Take 500 mg by mouth 3 times daily   fluticasone (FLONASE) 50 MCG/ACT nasal spray   Yes No   Sig: Spray 1 spray in nostril 2 times daily   lamoTRIgine (LAMICTAL) 200 MG tablet   No No   Sig: Take 1 tablet (200 mg) by mouth 2 times daily   levETIRAcetam (KEPPRA) 500 MG tablet   No No   Sig: Take 2 tablets (1,000 mg) by mouth every morning AND 3 tablets (1,500 mg) At Bedtime.   levOCARNitine (CARNITOR) 330 MG tablet   No No   Sig: Take 2 tablets (660 mg) by mouth 3 times daily   methylcellulose POWD powder   Yes No   Sig: Apply 2 mg topically 2 times daily Mix 1 rounded tablespoon (2 mg) in 8 oz glass of water and take by mouth twice daily   multivitamin, therapeutic (THERA-VIT) TABS tablet   Yes No   Sig: Take 1 tablet by mouth daily   ondansetron (ZOFRAN ODT) 4 MG ODT tab   No No   Sig: Take 1 tablet (4 mg) by mouth every 6 hours as needed for nausea or vomiting   pantoprazole (PROTONIX) 40 MG EC tablet   Yes No   Sig: Take 40 mg by mouth daily   polyethylene glycol  (MIRALAX) 17 GM/Dose powder   Yes No   Sig: Take 1 capful. by mouth daily   prazosin (MINIPRESS) 2 MG capsule   Yes No   Sig: Take 2 mg by mouth At Bedtime   propranolol (INDERAL) 20 MG tablet   Yes No   Sig: Take 20 mg by mouth 2 times daily   zonisamide (ZONEGRAN) 100 MG capsule   No No   Sig: Take 1 capsule (100 mg) by mouth every morning AND 2 capsules (200 mg) At Bedtime.      Facility-Administered Medications: None           Physical Exam   Vital Signs: Temp: 99.6  F (37.6  C) Temp src: Axillary BP: (!) 155/91 Pulse: 86   Resp: 20 SpO2: 94 % O2 Device: None (Room air)      General Appearance: Generally comfortable appearing obese male resting in bed.  Does not appear toxic.  Respiratory: Decreased air movement in lung fields bilaterally.  Fair effort.  Has some fine crackles in his right base  Cardiovascular: Regular rate and rhythm without appreciable murmur  GI: Abdomen obese, soft, nontender palpation.  No palpable mass.  Lymph/Hematologic: Patient with chronic lymphedema bilateral lower extremities  Skin: Left buttock wound measuring approximately 1 cm lower suspicion for pressure injury, appears to have been an abscess previously.  No active drainage or surrounding erythema, but some more chronic discoloration surrounding.  Has some excoriations/scabbed over abrasions behind right thigh.  Neurologic: Alert and conversant.  Does have some facial asymmetry right corner of mouth and keeps left eye closed in the setting of chronic blindness.  Reports memory impairment and can only recall being hospitalized for the past 1 day rather than the nearly full month he has been at [facility].  Psychiatric: Makes some odd and slightly sexualized statements regarding flirting and wondering if his nurse might be his wife or girlfriend    Medical Decision Making       75 MINUTES SPENT BY ME on the date of service doing chart review, history, exam, documentation & further activities per the note.      Data     I have  personally reviewed the following data over the past 24 hrs:    5.6  \   11.0 (L)   / 182     144 104 10.9 /  104 (H)   4.0 35 (H) 0.60 (L) \       Imaging results reviewed over the past 24 hrs:   No results found for this or any previous visit (from the past 24 hour(s)).

## 2023-12-19 NOTE — PLAN OF CARE
Reason for Admission: Breakthrough seizure    Cognitive/Mentation: A/Ox 1 -disoriented to place,situation, time  Neuros/CMS: Intact ex generalized weakness  VS: stable on bipap 40% FiO2 overnight.   GI: Last BM 12/18/23. Continent/incontinent.  : some hesitancy w/ urination, uses urinal or up to BR. Continent.  Pulmonary: LS diminished.  Pain: upper arm pain rated 10/10, PRN oxy given x1 w/ relief.     Drains/Lines: R PICC SL  Skin: wound to buttock - mepilex in place, scattered bruising/scabbing to extremities, scratches to b/l thighs  Activity: Assist x 1 with GBW.  Diet: regular with thin liquids. Takes pills whole w/ juice.     Therapies recs: pending  Discharge: pending completion of IV abx on 12/25    Aggression Stoplight Tool: green    End of shift summary: plan for EEG today

## 2023-12-20 ENCOUNTER — HOSPITAL ENCOUNTER (INPATIENT)
Dept: NEUROLOGY | Facility: CLINIC | Age: 48
Discharge: HOME | DRG: 101 | End: 2023-12-20
Attending: INTERNAL MEDICINE | Admitting: INTERNAL MEDICINE
Payer: MEDICARE

## 2023-12-20 PROCEDURE — 250N000013 HC RX MED GY IP 250 OP 250 PS 637: Performed by: PSYCHIATRY & NEUROLOGY

## 2023-12-20 PROCEDURE — 999N000157 HC STATISTIC RCP TIME EA 10 MIN

## 2023-12-20 PROCEDURE — 94640 AIRWAY INHALATION TREATMENT: CPT

## 2023-12-20 PROCEDURE — 250N000013 HC RX MED GY IP 250 OP 250 PS 637

## 2023-12-20 PROCEDURE — 250N000011 HC RX IP 250 OP 636: Mod: JZ | Performed by: INTERNAL MEDICINE

## 2023-12-20 PROCEDURE — 250N000013 HC RX MED GY IP 250 OP 250 PS 637: Performed by: INTERNAL MEDICINE

## 2023-12-20 PROCEDURE — 99232 SBSQ HOSP IP/OBS MODERATE 35: CPT | Performed by: HOSPITALIST

## 2023-12-20 PROCEDURE — 94640 AIRWAY INHALATION TREATMENT: CPT | Mod: 76

## 2023-12-20 PROCEDURE — 250N000009 HC RX 250: Performed by: INTERNAL MEDICINE

## 2023-12-20 PROCEDURE — 95714 VEEG EA 12-26 HR UNMNTR: CPT

## 2023-12-20 PROCEDURE — 94660 CPAP INITIATION&MGMT: CPT

## 2023-12-20 PROCEDURE — 120N000001 HC R&B MED SURG/OB

## 2023-12-20 RX ORDER — LAMOTRIGINE 100 MG/1
200 TABLET ORAL DAILY
Qty: 14 TABLET | Refills: 0 | Status: DISCONTINUED | OUTPATIENT
Start: 2023-12-27 | End: 2023-12-24

## 2023-12-20 RX ORDER — LAMOTRIGINE 100 MG/1
100 TABLET ORAL DAILY
Qty: 7 TABLET | Refills: 0 | Status: COMPLETED | OUTPATIENT
Start: 2023-12-20 | End: 2023-12-26

## 2023-12-20 RX ADMIN — NAFCILLIN 2 G: 2 POWDER, FOR SOLUTION INTRAMUSCULAR; INTRAVENOUS at 08:34

## 2023-12-20 RX ADMIN — NAFCILLIN 2 G: 2 POWDER, FOR SOLUTION INTRAMUSCULAR; INTRAVENOUS at 05:22

## 2023-12-20 RX ADMIN — LAMOTRIGINE 200 MG: 100 TABLET ORAL at 20:13

## 2023-12-20 RX ADMIN — ZONISAMIDE 100 MG: 100 CAPSULE ORAL at 09:04

## 2023-12-20 RX ADMIN — QUETIAPINE FUMARATE 300 MG: 300 TABLET ORAL at 21:38

## 2023-12-20 RX ADMIN — LEVETIRACETAM 1500 MG: 750 TABLET, FILM COATED ORAL at 21:37

## 2023-12-20 RX ADMIN — NAFCILLIN 2 G: 2 POWDER, FOR SOLUTION INTRAMUSCULAR; INTRAVENOUS at 12:24

## 2023-12-20 RX ADMIN — PROPRANOLOL HYDROCHLORIDE 20 MG: 20 TABLET ORAL at 20:14

## 2023-12-20 RX ADMIN — LAMOTRIGINE 100 MG: 100 TABLET ORAL at 17:04

## 2023-12-20 RX ADMIN — LEVOCARNITINE 660 MG: 330 TABLET ORAL at 09:04

## 2023-12-20 RX ADMIN — IPRATROPIUM BROMIDE AND ALBUTEROL SULFATE 3 ML: .5; 3 SOLUTION RESPIRATORY (INHALATION) at 12:06

## 2023-12-20 RX ADMIN — NAFCILLIN 2 G: 2 POWDER, FOR SOLUTION INTRAMUSCULAR; INTRAVENOUS at 16:19

## 2023-12-20 RX ADMIN — ZONISAMIDE 200 MG: 100 CAPSULE ORAL at 21:37

## 2023-12-20 RX ADMIN — LEVOCARNITINE 660 MG: 330 TABLET ORAL at 21:37

## 2023-12-20 RX ADMIN — DIVALPROEX SODIUM 500 MG: 250 TABLET, DELAYED RELEASE ORAL at 20:14

## 2023-12-20 RX ADMIN — LAMOTRIGINE 200 MG: 100 TABLET ORAL at 09:04

## 2023-12-20 RX ADMIN — NAFCILLIN 2 G: 2 POWDER, FOR SOLUTION INTRAMUSCULAR; INTRAVENOUS at 20:22

## 2023-12-20 RX ADMIN — METHYLCELLULOSE 500 MG: 500 TABLET ORAL at 09:04

## 2023-12-20 RX ADMIN — LEVETIRACETAM 1000 MG: 500 TABLET, FILM COATED ORAL at 09:05

## 2023-12-20 RX ADMIN — PANTOPRAZOLE SODIUM 40 MG: 40 TABLET, DELAYED RELEASE ORAL at 09:05

## 2023-12-20 RX ADMIN — METHYLCELLULOSE 500 MG: 500 TABLET ORAL at 20:14

## 2023-12-20 RX ADMIN — LACOSAMIDE 50 MG: 50 TABLET, FILM COATED ORAL at 09:05

## 2023-12-20 RX ADMIN — BUSPIRONE HYDROCHLORIDE 15 MG: 5 TABLET ORAL at 09:04

## 2023-12-20 RX ADMIN — PROPRANOLOL HYDROCHLORIDE 20 MG: 20 TABLET ORAL at 09:05

## 2023-12-20 RX ADMIN — PRAZOSIN HYDROCHLORIDE 2 MG: 2 CAPSULE ORAL at 21:37

## 2023-12-20 RX ADMIN — NAFCILLIN 2 G: 2 POWDER, FOR SOLUTION INTRAMUSCULAR; INTRAVENOUS at 01:00

## 2023-12-20 RX ADMIN — MULTIVITAMIN TABLET 1 TABLET: TABLET at 09:04

## 2023-12-20 RX ADMIN — IPRATROPIUM BROMIDE AND ALBUTEROL SULFATE 3 ML: .5; 3 SOLUTION RESPIRATORY (INHALATION) at 16:12

## 2023-12-20 RX ADMIN — LEVOCARNITINE 660 MG: 330 TABLET ORAL at 16:19

## 2023-12-20 RX ADMIN — CITALOPRAM HYDROBROMIDE 60 MG: 40 TABLET ORAL at 09:04

## 2023-12-20 RX ADMIN — ENOXAPARIN SODIUM 40 MG: 40 INJECTION SUBCUTANEOUS at 09:04

## 2023-12-20 RX ADMIN — ENOXAPARIN SODIUM 40 MG: 40 INJECTION SUBCUTANEOUS at 20:13

## 2023-12-20 RX ADMIN — DIVALPROEX SODIUM 500 MG: 250 TABLET, DELAYED RELEASE ORAL at 09:04

## 2023-12-20 RX ADMIN — BUSPIRONE HYDROCHLORIDE 15 MG: 5 TABLET ORAL at 20:14

## 2023-12-20 ASSESSMENT — ACTIVITIES OF DAILY LIVING (ADL)
ADLS_ACUITY_SCORE: 51
ADLS_ACUITY_SCORE: 46
ADLS_ACUITY_SCORE: 51
ADLS_ACUITY_SCORE: 51
ADLS_ACUITY_SCORE: 52
ADLS_ACUITY_SCORE: 51
ADLS_ACUITY_SCORE: 47
ADLS_ACUITY_SCORE: 47
ADLS_ACUITY_SCORE: 46
ADLS_ACUITY_SCORE: 46
ADLS_ACUITY_SCORE: 52
ADLS_ACUITY_SCORE: 47

## 2023-12-20 NOTE — PROGRESS NOTES
Care Management Follow Up    Length of Stay (days): 2    Expected Discharge Date: 12/26/2023     Concerns to be Addressed: discharge planning     Patient plan of care discussed at interdisciplinary rounds: Yes    Anticipated Discharge Disposition: Group Home  Anticipated Discharge Services:  HHC is possible   Anticipated Discharge DME:  none     Referrals Placed by CM/SW:  none at this time   Private pay costs discussed: Not applicable    Additional Information:  SW received voicemail from Qu[facility] , at [phone] asking to talk about the waiver and discharge plans for the 26th. Called him back and his voicemail box was full.    SW found CADI  in the chart and called Carmen at [phone]. SW left voicemail to let her know patient is likely to discharge on 12/26 and he needs the waiver to be re-opened prior to going back to the group home.     Veronica Garcia, MSW, LGSW   Social Work   [facility]

## 2023-12-20 NOTE — PROGRESS NOTES
Neurology Progress Note.    Assessment and Plan:  This is a 48 year old man with past medical history including developmental delay, morbid obesity, ANA/OHS on BiPAP at bedtime, seizure disorder, chronic hyperammonemia, anemia, schizoaffective disorder, borderline personality disorder, MDD, anxiety, HTN, and left eye blindness who presents with concern for increased complex partial seizures.  -patient is on continuous EEG monitoring with hopes to capture patient's events and to characterized them as seizure or - ddx will be myoclonus from depakote vs non-epileptic event of different nature  NO SPELLS CAPTURED SO FAR. No electrographic seizures were seen overnight.   - lamotrigine - increase from  200 mg BID to 200-100-200 for 1 week then 200 TID to avoid rapid titration   -last zonisamide level in April was 11  -continue depakote 500 mg BID - given no spells it's possible that patient had myoclonus from depakote and high ammonia    Thank you for allowing me to participate in cares of this patient. Please contact me with any questions of concerns (pager [phone]).     Subjective: No acute events overnight.  Today patient reported that unhappy that yesterday I came when he needed to urinate. I apologized, but patient wasn't in the mood of sharing his concerns. Discussed his EEG thus far.     Objective:  BP 99/64 (BP Location: Left arm)   Pulse 82   Temp 98.6  F (37  C) (Oral)   Resp 18   SpO2 97%   General: no acute distress  Neuro:  Exam deferred due to counseling    I have personally reviewed all the labs and imaging studies. Pertinent findings:   EEG - intermittent overall infrequent generalized epileptiform discharges. No seizures. No spells captured (recording was reviewed from start to 10:40 am)      Total time of visit: 25 minutes with the time spent on chart review, imaging and lab results review, and more than 50 % of the time spent on coordination of cares and face-to-face time with the patient  including counseling regarding pathophysiology of the above conditions, results of the tests and further directions of care.     Fanny Carbajal MD  [facility]

## 2023-12-20 NOTE — PLAN OF CARE
"Reason for Admission: Breakthrough seizure     Cognitive/Mentation: Alert and oriented x1, only knows hist first name. Disoriented to place, situation and time.   Neuros/CMS: Intact ex garbled speech, generalized weakness, L eye blindness  VS: Stable on bipap overnight   GI: Continent, incontinent at times, large loose stool , prn imodium given per pt request  : Continent, some urinary hesitancy  Pulmonary: LS diminished  Pain: Denied    Drains/Lines: R PICC SL  Skin: blanchable sacrum/coccyx, wound to buttock- 2 mepilex applied, scattered bruising and scabbing to thighs  Activity: Assist x1 GBW  Diet: Regular with thin liquids. Takes pills whole with liquids.     Therapies recs: Group facility   Discharge: pending completion of IV abx on Dec. 25th    Aggression Spotlight Tool: Green    End of shift summary: No seizure activity noted. Writer had a conversation with pt about him crossing boundaries with staff by being flirtatious and using inappropriate names to address us such as \"honey\". Pt talked about \"miserable things that have happened\" to him and how he does these things as a \"safety blanket\" for himself. Then he seemed to be more receptive and understanding to what writer was saying. However, continued to make inappropriate comments and requests throughout shift.                       "

## 2023-12-20 NOTE — PROGRESS NOTES
[facility]    Hospitalist Progress Note      Assessment & Plan   Tre Vazquez is a 48 year old male admitted on 12/18/2023. He insisted admission from [facility] where he has been hospitalized since late November for MSSA bacteremia.  Was unable to return to his group home.  [apparently there was discussion of 24 Continuous EEG ordered that is not performed at [facility]; and performed at [facility]. ]       Epilepsy history, shaking spells: Some concern for ongoing partial seizures with frequent shaking spells noted at outside hospital during his prolonged hospitalization.  Multiple antiepileptics prior to admission.  Followed by neurology at [facility] prior to transfer with recommendation for continuous EEG at [facility].   -Vimpat 50 mg every morning will continue, started by neurology 12/17/2023.  -Continuing prior to admission Zonegran 100 mg every morning, 200 mg at bedtime, Keppra 1 g every morning, 1.5 g every evening, Lamictal 200 mg twice daily  -Continuing Depakote at recently reduced dose of 500 mg twice daily; patient noted to have hyperammonemia on admission at outside hospital and there is a plan per neurology to reduce Depakote dosing and potentially taper off  -Continuing levocarnitine 3 times daily  -24 Continuous EEG on that is not performed at [facility]; and performed at [facility].   -General neurology consult: see note; need clarification lamotrigine dose per neurology as note says 200 mg 3 times daily, currently on 200 mg twice daily.     MSSA bacteremia: Uncertain source, potentially from a left gluteal abscess/injury, but also has a history of boils (Axillary, L arm lesions this past Sept).  No clear endocarditis on TTE.  Last positive blood culture 11/25/2023 and remains hospitalized while completing a 4-week course of nafcillin through 12/24/2023.  Was unable to discharge to his group home while on IV anti-infectives, no TCU placement available.  -Continue 2 g nafcillin  every 4 hours  -Anticipate discharge back to group [facility] 12/25/2023 upon completion of anti-infective therapy  -1 view CXR to confirm PICC placement (present from OSH)  -Admitting hospitalist did not consult with infectious disease team as they were previously involved at [facility] and have since signed off while awaiting completion of antibiotic therapy; with duration as above. .     Chronic hypoxic hypercarbic respiratory failure: Obesity hypoventilation, ANA, nonadherence with BiPAP noted.  Significant hypercarbia on admission at outside facility.   -BiPap/CPAP at night.     Class III obesity: BMI greater than 46.  Increased risk of all cause mortality  -VBG in a.m.  -Nocturnal BiPAP ordered per most recent documented pressures and respiratory therapy note  -Oximetry, oxygen as needed  -duonebs QID scheduled; previously recommended by pulmonary provider to use at least daily (July telephone visit through [facility]).     Hypertension:  -Continue prior to admission propranolol, Minipress     Schizoaffective disorder:  Major depressive disorder:  Borderline personality disorder:  -Continuing prior to admission Celexa, BuSpar  -Continue Seroquel 300 mg at bedtime  -Nursing notes no behavioral issues.  Monitor.    DVT Prophylaxis: Enoxaparin (Lovenox) SQ  Code Status: Full Code     Expected Discharge Date: 12/26/2023    Discharge Delays: IV Medication - consider oral or Home Infusion  Destination: [facility]         Shreya Pressley MD  Text Page (7am - 6pm, M-F)    I spent 35 minutes total time in management of care today 12/20/2023 reviewing labs, medications, interdisciplinary notes; and completing documentation of encounter and orders with Care Management including counseling/discussion withthe Patient regarding szs and EEG and Coordinating Care and plan with Nursing and Specialists, Neurology regarding sz management and surveillance     Interval History       SH: No tobacco. Lives in     ROS: Complete ROS  negative except as above.     -Data reviewed today: I reviewed all new labs and imaging results over the last 24 hours.   Physical Exam   Temp: 98.6  F (37  C) Temp src: Oral BP: 99/64 Pulse: 82   Resp: 18 SpO2: 97 % O2 Device: Nasal cannula Oxygen Delivery: 2 LPM    General/Constitutional:   NAD, awake, calm,  HEENT; continuous EEG on  Chest/Respiratory: Respirations nonlabored room air  Cardiovascular: egular, no murmur appreciated.  Gastrointestinal/Abdomen:  soft, nontender, no rebound, guarding or other peritoneal signs.  Neuro.  Gross motor tested, nonfocal, no tremor or sz activity.  Psych oriented, affect calm    Medications      busPIRone  15 mg Oral BID    citalopram  60 mg Oral Daily    divalproex sodium delayed-release  500 mg Oral Q12H [facility] (08/20)    enoxaparin ANTICOAGULANT  40 mg Subcutaneous Q12H    ipratropium - albuterol 0.5 mg/2.5 mg/3 mL  3 mL Nebulization 4x daily    lacosamide  50 mg Oral Daily    lamoTRIgine  200 mg Oral BID    levETIRAcetam  1,000 mg Oral QAM    And    levETIRAcetam  1,500 mg Oral At Bedtime    levOCARNitine  660 mg Oral TID    methylcellulose  500 mg Oral or Feeding Tube BID    multivitamin, therapeutic  1 tablet Oral Daily    nafcillin  2 g Intravenous Q4H    pantoprazole  40 mg Oral Daily    polyethylene glycol  17 g Oral Daily    prazosin  2 mg Oral At Bedtime    propranolol  20 mg Oral BID    QUEtiapine  300 mg Oral At Bedtime    sodium chloride (PF)  10 mL Intracatheter Q8H    sodium chloride (PF)  10-40 mL Intracatheter Q7 Days    zonisamide  100 mg Oral QAM    And    zonisamide  200 mg Oral At Bedtime       Data   Recent Labs   Lab 12/19/23  0555 12/19/23  0112 12/18/23  0535   WBC 7.0  --  5.6   HGB 10.7*  --  11.0*   MCV 96  --  98     --  182     --  144   POTASSIUM 3.7  --  4.0   CHLORIDE 101  --  104   CO2 33*  --  35*   BUN 10.9  --  10.9   CR 0.61* 0.58* 0.60*   ANIONGAP 8  --  5*   ARNOLD 8.5*  --  8.6   *  --  104*   ALBUMIN 3.7  --   --     PROTTOTAL 6.1*  --   --    BILITOTAL 0.2  --   --    ALKPHOS 65  --   --    ALT 23  --   --    AST 18  --   --

## 2023-12-20 NOTE — PLAN OF CARE
Pt here with breakthrough seizure, IV abx for bacteremia treatment until 12/25. A&Ox1. Neuros intact ex garbled speech and generalized weakness. Reg diet, thin liquids. Takes pills whole. Up with A1 GBW. Continent, uses urinal. Denies pain. Pt scoring green on the Aggression Stop Light Tool. Plan continuous EEG monitoring. Discharge back to group home when IV abx completed 12/25.

## 2023-12-21 ENCOUNTER — HOSPITAL ENCOUNTER (INPATIENT)
Dept: NEUROLOGY | Facility: CLINIC | Age: 48
Discharge: HOME | DRG: 101 | End: 2023-12-21
Attending: INTERNAL MEDICINE | Admitting: INTERNAL MEDICINE
Payer: MEDICARE

## 2023-12-21 LAB
CREAT SERPL-MCNC: 0.58 MG/DL (ref 0.67–1.17)
EGFRCR SERPLBLD CKD-EPI 2021: >90 ML/MIN/1.73M2
PLATELET # BLD AUTO: 173 10E3/UL (ref 150–450)

## 2023-12-21 PROCEDURE — 999N000157 HC STATISTIC RCP TIME EA 10 MIN

## 2023-12-21 PROCEDURE — 94640 AIRWAY INHALATION TREATMENT: CPT | Mod: 76

## 2023-12-21 PROCEDURE — 94660 CPAP INITIATION&MGMT: CPT

## 2023-12-21 PROCEDURE — 250N000011 HC RX IP 250 OP 636: Mod: JZ | Performed by: INTERNAL MEDICINE

## 2023-12-21 PROCEDURE — 250N000009 HC RX 250: Performed by: INTERNAL MEDICINE

## 2023-12-21 PROCEDURE — 250N000013 HC RX MED GY IP 250 OP 250 PS 637: Performed by: PSYCHIATRY & NEUROLOGY

## 2023-12-21 PROCEDURE — 250N000013 HC RX MED GY IP 250 OP 250 PS 637

## 2023-12-21 PROCEDURE — 82565 ASSAY OF CREATININE: CPT | Performed by: INTERNAL MEDICINE

## 2023-12-21 PROCEDURE — 99232 SBSQ HOSP IP/OBS MODERATE 35: CPT | Performed by: HOSPITALIST

## 2023-12-21 PROCEDURE — 120N000001 HC R&B MED SURG/OB

## 2023-12-21 PROCEDURE — 85049 AUTOMATED PLATELET COUNT: CPT | Performed by: INTERNAL MEDICINE

## 2023-12-21 PROCEDURE — 250N000013 HC RX MED GY IP 250 OP 250 PS 637: Performed by: INTERNAL MEDICINE

## 2023-12-21 PROCEDURE — 80175 DRUG SCREEN QUAN LAMOTRIGINE: CPT | Performed by: PSYCHIATRY & NEUROLOGY

## 2023-12-21 PROCEDURE — 94640 AIRWAY INHALATION TREATMENT: CPT

## 2023-12-21 PROCEDURE — 999N000052 EEG VIDEO 2-12 HRS UNMONITORED

## 2023-12-21 RX ADMIN — IPRATROPIUM BROMIDE AND ALBUTEROL SULFATE 3 ML: .5; 3 SOLUTION RESPIRATORY (INHALATION) at 16:30

## 2023-12-21 RX ADMIN — DIVALPROEX SODIUM 500 MG: 250 TABLET, DELAYED RELEASE ORAL at 22:41

## 2023-12-21 RX ADMIN — NAFCILLIN 2 G: 2 POWDER, FOR SOLUTION INTRAMUSCULAR; INTRAVENOUS at 10:03

## 2023-12-21 RX ADMIN — PROPRANOLOL HYDROCHLORIDE 20 MG: 20 TABLET ORAL at 10:08

## 2023-12-21 RX ADMIN — BUSPIRONE HYDROCHLORIDE 15 MG: 5 TABLET ORAL at 22:41

## 2023-12-21 RX ADMIN — IPRATROPIUM BROMIDE AND ALBUTEROL SULFATE 3 ML: .5; 3 SOLUTION RESPIRATORY (INHALATION) at 19:14

## 2023-12-21 RX ADMIN — PRAZOSIN HYDROCHLORIDE 2 MG: 2 CAPSULE ORAL at 22:42

## 2023-12-21 RX ADMIN — LEVOCARNITINE 660 MG: 330 TABLET ORAL at 22:42

## 2023-12-21 RX ADMIN — LEVETIRACETAM 1500 MG: 750 TABLET, FILM COATED ORAL at 22:43

## 2023-12-21 RX ADMIN — CITALOPRAM HYDROBROMIDE 60 MG: 40 TABLET ORAL at 10:06

## 2023-12-21 RX ADMIN — QUETIAPINE FUMARATE 300 MG: 300 TABLET ORAL at 22:42

## 2023-12-21 RX ADMIN — DIVALPROEX SODIUM 500 MG: 250 TABLET, DELAYED RELEASE ORAL at 10:08

## 2023-12-21 RX ADMIN — BUSPIRONE HYDROCHLORIDE 15 MG: 5 TABLET ORAL at 10:07

## 2023-12-21 RX ADMIN — NAFCILLIN 2 G: 2 POWDER, FOR SOLUTION INTRAMUSCULAR; INTRAVENOUS at 05:45

## 2023-12-21 RX ADMIN — LAMOTRIGINE 200 MG: 100 TABLET ORAL at 10:08

## 2023-12-21 RX ADMIN — LEVOCARNITINE 660 MG: 330 TABLET ORAL at 10:08

## 2023-12-21 RX ADMIN — NAFCILLIN 2 G: 2 POWDER, FOR SOLUTION INTRAMUSCULAR; INTRAVENOUS at 22:56

## 2023-12-21 RX ADMIN — NAFCILLIN 2 G: 2 POWDER, FOR SOLUTION INTRAMUSCULAR; INTRAVENOUS at 01:06

## 2023-12-21 RX ADMIN — LEVETIRACETAM 1000 MG: 500 TABLET, FILM COATED ORAL at 10:08

## 2023-12-21 RX ADMIN — LEVOCARNITINE 660 MG: 330 TABLET ORAL at 16:48

## 2023-12-21 RX ADMIN — IPRATROPIUM BROMIDE AND ALBUTEROL SULFATE 3 ML: .5; 3 SOLUTION RESPIRATORY (INHALATION) at 07:41

## 2023-12-21 RX ADMIN — PANTOPRAZOLE SODIUM 40 MG: 40 TABLET, DELAYED RELEASE ORAL at 10:09

## 2023-12-21 RX ADMIN — LACOSAMIDE 50 MG: 50 TABLET, FILM COATED ORAL at 10:08

## 2023-12-21 RX ADMIN — ENOXAPARIN SODIUM 40 MG: 40 INJECTION SUBCUTANEOUS at 10:06

## 2023-12-21 RX ADMIN — NAFCILLIN 2 G: 2 POWDER, FOR SOLUTION INTRAMUSCULAR; INTRAVENOUS at 14:25

## 2023-12-21 RX ADMIN — ZONISAMIDE 200 MG: 100 CAPSULE ORAL at 22:43

## 2023-12-21 RX ADMIN — MULTIVITAMIN TABLET 1 TABLET: TABLET at 10:07

## 2023-12-21 RX ADMIN — PROPRANOLOL HYDROCHLORIDE 20 MG: 20 TABLET ORAL at 22:51

## 2023-12-21 RX ADMIN — NAFCILLIN 2 G: 2 POWDER, FOR SOLUTION INTRAMUSCULAR; INTRAVENOUS at 18:28

## 2023-12-21 RX ADMIN — ZONISAMIDE 100 MG: 100 CAPSULE ORAL at 10:08

## 2023-12-21 RX ADMIN — IPRATROPIUM BROMIDE AND ALBUTEROL SULFATE 3 ML: .5; 3 SOLUTION RESPIRATORY (INHALATION) at 10:39

## 2023-12-21 RX ADMIN — LAMOTRIGINE 200 MG: 100 TABLET ORAL at 22:40

## 2023-12-21 RX ADMIN — LAMOTRIGINE 100 MG: 100 TABLET ORAL at 14:24

## 2023-12-21 RX ADMIN — METHYLCELLULOSE 500 MG: 500 TABLET ORAL at 22:42

## 2023-12-21 ASSESSMENT — ACTIVITIES OF DAILY LIVING (ADL)
ADLS_ACUITY_SCORE: 47

## 2023-12-21 NOTE — PROGRESS NOTES
Care Management Follow Up    Length of Stay (days): 3    Expected Discharge Date: 12/26/2023     Concerns to be Addressed: discharge planning     Patient plan of care discussed at interdisciplinary rounds:     Anticipated Discharge Disposition: Group Home     Anticipated Discharge Services:    Anticipated Discharge DME:      Patient/family educated on Medicare website which has current facility and service quality ratings:    Education Provided on the Discharge Plan:    SW received call from [facility] , ( [phone]) Carmen stating she has been unable to reach pt by phone.  SW brought the phone into pt so he could speak with Carmen and pt refused several times stating that he doesn't know anything and he does not talk to people he doesn't know.  Carmen offered to speak to pt over the phone or come to hospital but he refused to speak with her stating that he only knows one county worker by the name of Anyi.  SW offered reassurance that conversation is necessary to continue his services out of the hospital.    Patient/Family in Agreement with the Plan:  no    Referrals Placed by CM/SW:    Private pay costs discussed:     Additional Information:  Carmen will be in contact with pt's  to speak with pt and hopefully convince him to speak with her regarding needed assessment to reopen waiver.    ELVIRA Pavon

## 2023-12-21 NOTE — PLAN OF CARE
"Goal Outcome Evaluation:  Pt refused neuro assessment instead when writer went in the room and told him I was going to do a neuro assessment he uncovered himself and spread his legs wide open. Pt is very rude and has behavioral issues and very hard to deal with. He had a large BM in bed without calling for assistance, second time he had BM in the bathroom and refused to use the gait belt and started yelling and talking inappropriately to the writer and NA \"wipe my ass now\". D/t safety when writer lowered the bed he got very aggressive and yelled \"put my bed back up or walk out and get someone who will do it\".     "

## 2023-12-21 NOTE — PLAN OF CARE
"Goal Outcome Evaluation:      Plan of Care Reviewed With: patient    Overall Patient Progress: no change    Pt is alert and oriented to self, uncooperative with assessments, refused neuro checks, says he is tired and when asked about his full name and , he said\" I can only tell you Joel, I have lots of memory issues\".Pt unpleasant and verbally abusive to staff, continues to yell at and order staff around and when re-directed gets upset, insists staff bend over him and hold onto the urinal while he takes forever to void whereas urinal can be placed in position and he he can void without anyone holding onto it, he gets upset when he doesn't get his way and things done exactly as he orders.He is AX1GB/W, adrianna reg diet, gets room service, takes pills whole with water, rt arm PICC s/l, VSS RA, refused oxygen when not on BIPAP, used BIPAP from later this morning. Plan is to discharge back to [facility] when medically ready, still on I.V antibiotic Q 4 hrs, EEG continuous video monitoring in place.           "

## 2023-12-21 NOTE — PLAN OF CARE
"Goal Outcome Evaluation:      Plan of Care Reviewed With: patient        Patient alert to self only, stating \"I have memory issues and do not do assessments\". Uncooperative with neurological exam. Left eyelid appears weak/history of blindness. Moving all extremities spontaneously/moderate weakness & somnolent. Sleeping with bipap on this am until air leak & beeping then removed by patient/RT alerted/nebs scheduled. EEG monitoring ongoing/leads needed reconnecting due to bed mobility. Seizure precautions in place/no new seizure activity. Refused care from a male nurse.  Declining breakfast/ate two cookies left at bedside from yesterday/took meds whole with three apple juices per request. Refusing skin check and help turning & reposition this morning. Incontinent of bladder & bowels previous shifts/declining use of urinal at rounds. Denies pain. Pt scoring yellow/red on the [employer], shouting at staff & demanding. Will discharge back to group home once medically stable.     5897 update: EEG monitoring continues. Uncooperative with neurological assessment and positioning or bathroom breaks. Patient boosted himself in bed before morning med pass, refusing turns & repositions all shift, voided first time at 1400 via urinal in bed/ordered first meal at 1400.       "

## 2023-12-21 NOTE — PROGRESS NOTES
[facility]    Hospitalist Progress Note      Assessment & Plan   Tre Vazquez is a 48 year old male admitted on 12/18/2023. He insisted admission from [facility] where he has been hospitalized since late November for MSSA bacteremia.  Was unable to return to his group home.  [apparently there was discussion of 24 Continuous EEG ordered that is not performed at [facility]; and performed at [facility]. ]       Epilepsy history, shaking spells: Some concern for ongoing partial seizures with frequent shaking spells noted at outside hospital during his prolonged hospitalization.  Multiple antiepileptics prior to admission.  Followed by neurology at [facility] prior to transfer with recommendation for continuous EEG at [facility].   -Vimpat 50 mg every morning will continue, started by neurology 12/17/2023.  -Continuing prior to admission Zonegran 100 mg every morning, 200 mg at bedtime, Keppra 1 g every morning, 1.5 g every evening, Lamictal 200 mg twice daily  -Continuing Depakote at recently reduced dose of 500 mg twice daily; patient noted to have hyperammonemia on admission at outside hospital and there is a plan per neurology to reduce Depakote dosing and potentially taper off  -Continuing levocarnitine 3 times daily  -24 Continuous EEG on that is not performed at [facility]; and performed at [facility].   -General neurology consult: see note; it appears that neurology is uptitrating from 200 twice daily ultimately to 200 3 times daily however on a up titrating basis initially 100 mg added for 1 week then ultimately to 200 3 times daily.  Per nursing and with my encounter patient is fairly dismissive however no overt behavioral issues, no seizure activity.  Compliant to continuous EEG monitoring.       MSSA bacteremia: Uncertain source, potentially from a left gluteal abscess/injury, but also has a history of boils (Axillary, L arm lesions this past Sept).  No clear endocarditis on TTE.  Last  positive blood culture 11/25/2023 and remains hospitalized while completing a 4-week course of nafcillin through 12/24/2023.  Was unable to discharge to his group home while on IV anti-infectives, no TCU placement available.  -Continue 2 g nafcillin every 4 hours  -Anticipate discharge back to group home 12/25/2023 upon completion of anti-infective therapy  -1 view CXR to confirm PICC placement (present from OSH)  -Admitting hospitalist did not consult with infectious disease team as they were previously involved at [facility] and have since signed off while awaiting completion of antibiotic therapy; with duration as above. .     Chronic hypoxic hypercarbic respiratory failure: Obesity hypoventilation, ANA, nonadherence with BiPAP noted.  Significant hypercarbia on admission at outside facility.   -BiPap/CPAP at night.     Class III obesity: BMI greater than 46.  Increased risk of all cause mortality  -VBG in a.m.  -Nocturnal BiPAP ordered per most recent documented pressures and respiratory therapy note  -Oximetry, oxygen as needed  -duonebs QID scheduled; previously recommended by pulmonary provider to use at least daily (July telephone visit through [facility]).     Hypertension:  -Continue prior to admission propranolol, Minipress     Schizoaffective disorder:  Major depressive disorder:  Borderline personality disorder:  -Continuing prior to admission Celexa, BuSpar  -Continue Seroquel 300 mg at bedtime  -Patient somewhat dismissive however no overt behavioral issues noted by nursing.  Monitor.    DVT Prophylaxis: Enoxaparin (Lovenox) SQ  Code Status: Full Code     Expected Discharge Date: 12/26/2023    Discharge Delays: IV Medication - consider oral or Home Infusion  Destination: group home  Discharge Comments: IV antibiotics until 12/25       Shreya Pressley MD  Text Page (7am - 6pm, M-F)    I spent 35 minutes total time in management of care today 12/21/2023 reviewing labs, medications,  interdisciplinary notes; and completing documentation of encounter and orders with Care Management including counseling/discussion withthe Patient regarding sz and BiPAP/CPAP use and Coordinating Care and plan with Nursing re: behaviors and Specialists, Neurology regarding sz management and surveillance     Interval History       SH: No tobacco. Lives in     ROS: Complete ROS negative except as above.     -Data reviewed today: I reviewed all new labs and imaging results over the last 24 hours.   Physical Exam   Temp: 97.4  F (36.3  C) Temp src: Oral BP: 100/69 Pulse: 95   Resp: 18 SpO2: 91 % O2 Device: None (Room air) Oxygen Delivery: 2 LPM    General/Constitutional:  NAD, awake, CPAP mask off,   HEENT; continuous EEG on  Chest/Respiratory: Respirations nonlabored room air  Cardiovascular: egular, no murmur appreciated.  Gastrointestinal/Abdomen:  soft, nontender, no rebound, guarding or other peritoneal signs.  Neuro.  Gross motor tested, nonfocal, no  tremor or sz activity.  Psych oriented, affect dismissive    Medications      busPIRone  15 mg Oral BID    citalopram  60 mg Oral Daily    divalproex sodium delayed-release  500 mg Oral Q12H [facility] (08/20)    enoxaparin ANTICOAGULANT  40 mg Subcutaneous Q12H    ipratropium - albuterol 0.5 mg/2.5 mg/3 mL  3 mL Nebulization 4x daily    lacosamide  50 mg Oral Daily    lamoTRIgine  100 mg Oral Daily    Followed by    [START ON 12/27/2023] lamoTRIgine  200 mg Oral Daily    lamoTRIgine  200 mg Oral BID    levETIRAcetam  1,000 mg Oral QAM    And    levETIRAcetam  1,500 mg Oral At Bedtime    levOCARNitine  660 mg Oral TID    methylcellulose  500 mg Oral or Feeding Tube BID    multivitamin, therapeutic  1 tablet Oral Daily    nafcillin  2 g Intravenous Q4H    pantoprazole  40 mg Oral Daily    polyethylene glycol  17 g Oral Daily    prazosin  2 mg Oral At Bedtime    propranolol  20 mg Oral BID    QUEtiapine  300 mg Oral At Bedtime    sodium chloride (PF)  10 mL Intracatheter  Q8H    sodium chloride (PF)  10-40 mL Intracatheter Q7 Days    zonisamide  100 mg Oral QAM    And    zonisamide  200 mg Oral At Bedtime       Data   Recent Labs   Lab 12/21/23  0602 12/19/23  0555 12/19/23  0112 12/18/23  0535   WBC  --  7.0  --  5.6   HGB  --  10.7*  --  11.0*   MCV  --  96  --  98    174  --  182   NA  --  142  --  144   POTASSIUM  --  3.7  --  4.0   CHLORIDE  --  101  --  104   CO2  --  33*  --  35*   BUN  --  10.9  --  10.9   CR 0.58* 0.61* 0.58* 0.60*   ANIONGAP  --  8  --  5*   ARNOLD  --  8.5*  --  8.6   GLC  --  112*  --  104*   ALBUMIN  --  3.7  --   --    PROTTOTAL  --  6.1*  --   --    BILITOTAL  --  0.2  --   --    ALKPHOS  --  65  --   --    ALT  --  23  --   --    AST  --  18  --   --

## 2023-12-21 NOTE — PROGRESS NOTES
Neurology Progress Note.    Assessment and Plan:  This is a 48 year old  man with past medical history including developmental delay, morbid obesity, ANA/OHS on BiPAP at bedtime, seizure disorder, chronic hyperammonemia, anemia, schizoaffective disorder, borderline personality disorder, MDD, anxiety, HTN, and left eye blindness who presents with concern for increased complex partial seizures.  -patient was placed on continuous EEG monitoring with hopes to capture patient's events and to characterized them as seizure or - ddx will be myoclonus from depakote vs non-epileptic event of different nature  NO SPELLS CAPTURED during entire recording. No electrographic seizures were seen overnight.   - lamotrigine - increase from  200 mg BID to 200-100-200 for 1 week then 200 TID to avoid rapid titration   -last zonisamide level in April was 11, continue 200 mg daily  -continue depakote 500 mg BID - given no spells it's possible that patient had myoclonus from depakote and high ammonia  -continue levocarnitine. Given patient's psychiatric issues will not recommend taper off depakote at this time.     Will sign off, please call with questions,. Patient ok to discharge from neurostand point, he will need to follow up with his regular out-patient neurologist.      Thank you for allowing me to participate in cares of this patient. Please contact me with any questions of concerns (pager [phone]).     Subjective: No acute events overnight. EEG reviewed and no seizures seen.  Today patient reported that he is upset that his phone keeps ringing and wants me to turn it off. Per RN notes patient is highly demanding and being rude to staff, refuses lots of checks and other parts of regular cares.     Objective:  /69 (BP Location: Left arm)   Pulse 95   Temp 97.4  F (36.3  C) (Oral)   Resp 18   SpO2 91%   General: no acute distress  Neuro:  Alert, oriented. Speech clear and language normal.  Pupils equal and reactive to  light. Visual fields full to confrontation. Extra-ocular movements without restrictions.  Face symmetric. Facial sensation normal.  Hearing intact bilaterally to voice.  Palate elevates symmetrically. Tongue midline. Shoulder shrug is equal bilaterally.  Motor:  Normal strength in all four extremities. Tone and bulk normal.   Sensory: normal to light touch, pinprick and vibration bilaterally.  DTR: symmetric, 2+ in all four extremities.  Toes downgoing bilaterally.   Coordination: normal finger-to-nose and heel-to-shin bilaterally.  Gait: normal. Able to tandem, heel and toe walking. Romberg steady.     I have personally reviewed all the labs and imaging studies. Pertinent findings:   EEG - see separate report      Total time of visit: 25 minutes with the time spent on chart review, imaging and lab results review, and more than 50 % of the time spent on coordination of cares and face-to-face time with the patient including counseling regarding pathophysiology of the above conditions, results of the tests and further directions of care.     Fanny Carbajal MD  [facility]

## 2023-12-22 ENCOUNTER — APPOINTMENT (OUTPATIENT)
Dept: PHYSICAL THERAPY | Facility: CLINIC | Age: 48
DRG: 101 | End: 2023-12-22
Attending: HOSPITALIST
Payer: MEDICARE

## 2023-12-22 LAB — LAMOTRIGINE SERPL-MCNC: 7.5 UG/ML

## 2023-12-22 PROCEDURE — 250N000013 HC RX MED GY IP 250 OP 250 PS 637

## 2023-12-22 PROCEDURE — 97162 PT EVAL MOD COMPLEX 30 MIN: CPT | Mod: GP

## 2023-12-22 PROCEDURE — 999N000157 HC STATISTIC RCP TIME EA 10 MIN

## 2023-12-22 PROCEDURE — 99232 SBSQ HOSP IP/OBS MODERATE 35: CPT | Performed by: HOSPITALIST

## 2023-12-22 PROCEDURE — 250N000011 HC RX IP 250 OP 636: Mod: JZ | Performed by: INTERNAL MEDICINE

## 2023-12-22 PROCEDURE — 250N000013 HC RX MED GY IP 250 OP 250 PS 637: Performed by: PSYCHIATRY & NEUROLOGY

## 2023-12-22 PROCEDURE — 97530 THERAPEUTIC ACTIVITIES: CPT | Mod: GP

## 2023-12-22 PROCEDURE — 94660 CPAP INITIATION&MGMT: CPT

## 2023-12-22 PROCEDURE — 120N000001 HC R&B MED SURG/OB

## 2023-12-22 PROCEDURE — 250N000009 HC RX 250: Performed by: INTERNAL MEDICINE

## 2023-12-22 PROCEDURE — 94640 AIRWAY INHALATION TREATMENT: CPT | Mod: 76

## 2023-12-22 PROCEDURE — 250N000013 HC RX MED GY IP 250 OP 250 PS 637: Performed by: INTERNAL MEDICINE

## 2023-12-22 PROCEDURE — 94640 AIRWAY INHALATION TREATMENT: CPT

## 2023-12-22 RX ADMIN — METHYLCELLULOSE 500 MG: 500 TABLET ORAL at 21:20

## 2023-12-22 RX ADMIN — NAFCILLIN 2 G: 2 POWDER, FOR SOLUTION INTRAMUSCULAR; INTRAVENOUS at 06:57

## 2023-12-22 RX ADMIN — IPRATROPIUM BROMIDE AND ALBUTEROL SULFATE 3 ML: .5; 3 SOLUTION RESPIRATORY (INHALATION) at 11:34

## 2023-12-22 RX ADMIN — LAMOTRIGINE 200 MG: 100 TABLET ORAL at 21:19

## 2023-12-22 RX ADMIN — BUSPIRONE HYDROCHLORIDE 15 MG: 5 TABLET ORAL at 21:20

## 2023-12-22 RX ADMIN — QUETIAPINE FUMARATE 300 MG: 300 TABLET ORAL at 21:19

## 2023-12-22 RX ADMIN — LEVOCARNITINE 660 MG: 330 TABLET ORAL at 21:19

## 2023-12-22 RX ADMIN — ENOXAPARIN SODIUM 40 MG: 40 INJECTION SUBCUTANEOUS at 21:18

## 2023-12-22 RX ADMIN — DIVALPROEX SODIUM 500 MG: 250 TABLET, DELAYED RELEASE ORAL at 21:20

## 2023-12-22 RX ADMIN — NAFCILLIN 2 G: 2 POWDER, FOR SOLUTION INTRAMUSCULAR; INTRAVENOUS at 03:09

## 2023-12-22 RX ADMIN — LAMOTRIGINE 100 MG: 100 TABLET ORAL at 14:17

## 2023-12-22 RX ADMIN — CITALOPRAM HYDROBROMIDE 60 MG: 40 TABLET ORAL at 10:14

## 2023-12-22 RX ADMIN — DIVALPROEX SODIUM 500 MG: 250 TABLET, DELAYED RELEASE ORAL at 10:15

## 2023-12-22 RX ADMIN — ZONISAMIDE 200 MG: 100 CAPSULE ORAL at 21:20

## 2023-12-22 RX ADMIN — LEVETIRACETAM 1500 MG: 750 TABLET, FILM COATED ORAL at 21:19

## 2023-12-22 RX ADMIN — PROPRANOLOL HYDROCHLORIDE 20 MG: 20 TABLET ORAL at 10:14

## 2023-12-22 RX ADMIN — LEVOCARNITINE 660 MG: 330 TABLET ORAL at 10:14

## 2023-12-22 RX ADMIN — NAFCILLIN 2 G: 2 POWDER, FOR SOLUTION INTRAMUSCULAR; INTRAVENOUS at 21:21

## 2023-12-22 RX ADMIN — IPRATROPIUM BROMIDE AND ALBUTEROL SULFATE 3 ML: .5; 3 SOLUTION RESPIRATORY (INHALATION) at 15:40

## 2023-12-22 RX ADMIN — LEVETIRACETAM 1000 MG: 500 TABLET, FILM COATED ORAL at 10:14

## 2023-12-22 RX ADMIN — LACOSAMIDE 50 MG: 50 TABLET, FILM COATED ORAL at 10:14

## 2023-12-22 RX ADMIN — BUSPIRONE HYDROCHLORIDE 15 MG: 5 TABLET ORAL at 10:15

## 2023-12-22 RX ADMIN — IPRATROPIUM BROMIDE AND ALBUTEROL SULFATE 3 ML: .5; 3 SOLUTION RESPIRATORY (INHALATION) at 07:33

## 2023-12-22 RX ADMIN — NAFCILLIN 2 G: 2 POWDER, FOR SOLUTION INTRAMUSCULAR; INTRAVENOUS at 10:24

## 2023-12-22 RX ADMIN — PROPRANOLOL HYDROCHLORIDE 20 MG: 20 TABLET ORAL at 21:19

## 2023-12-22 RX ADMIN — PANTOPRAZOLE SODIUM 40 MG: 40 TABLET, DELAYED RELEASE ORAL at 10:14

## 2023-12-22 RX ADMIN — PRAZOSIN HYDROCHLORIDE 2 MG: 2 CAPSULE ORAL at 21:20

## 2023-12-22 RX ADMIN — ZONISAMIDE 100 MG: 100 CAPSULE ORAL at 10:15

## 2023-12-22 RX ADMIN — NAFCILLIN 2 G: 2 POWDER, FOR SOLUTION INTRAMUSCULAR; INTRAVENOUS at 18:13

## 2023-12-22 RX ADMIN — LEVOCARNITINE 660 MG: 330 TABLET ORAL at 17:06

## 2023-12-22 RX ADMIN — METHYLCELLULOSE 500 MG: 500 TABLET ORAL at 10:14

## 2023-12-22 RX ADMIN — LAMOTRIGINE 200 MG: 100 TABLET ORAL at 10:14

## 2023-12-22 RX ADMIN — NAFCILLIN 2 G: 2 POWDER, FOR SOLUTION INTRAMUSCULAR; INTRAVENOUS at 14:17

## 2023-12-22 RX ADMIN — MULTIVITAMIN TABLET 1 TABLET: TABLET at 10:14

## 2023-12-22 ASSESSMENT — ACTIVITIES OF DAILY LIVING (ADL)
ADLS_ACUITY_SCORE: 47

## 2023-12-22 NOTE — PROGRESS NOTES
[facility]    Hospitalist Progress Note      Assessment & Plan   Tre Vazquez is a 48 year old male admitted on 12/18/2023. He insisted admission from [facility] where he has been hospitalized since late November for MSSA bacteremia.  Was unable to return to his group home.  [apparently there was discussion of 24 Continuous EEG ordered that is not performed at [facility]; and performed at [facility]. ]       Epilepsy history, shaking spells: Some concern for ongoing partial seizures with frequent shaking spells noted at outside hospital during his prolonged hospitalization.  Multiple antiepileptics prior to admission.  Followed by neurology at [facility] prior to transfer with recommendation for continuous EEG at [facility].   -Vimpat 50 mg every morning will continue, started by neurology 12/17/2023.  -Continuing prior to admission Zonegran 100 mg every morning, 200 mg at bedtime, Keppra 1 g every morning, 1.5 g every evening, Lamictal 200 mg twice daily  -Continuing Depakote at recently reduced dose of 500 mg twice daily; patient noted to have hyperammonemia on admission at outside hospital and there is a plan per neurology to reduce Depakote dosing and potentially taper off  -Continuing levocarnitine 3 times daily  -24 Continuous EEG on that is not performed at [facility]; and performed at [facility].   -General neurology consult: see note; it appears that neurology is uptitrating from 200 twice daily ultimately to 200 3 times daily however on a up titrating basis initially 100 mg added for 1 week then ultimately to 200 3 times daily.  Per nursing and with my encounter patient is fairly dismissive however no overt behavioral issues, no seizure activity.  Compliant to continuous EEG monitoring.       MSSA bacteremia: Uncertain source, potentially from a left gluteal abscess/injury, but also has a history of boils (Axillary, L arm lesions this past Sept).  No clear endocarditis on TTE.  Last  positive blood culture 11/25/2023 and remains hospitalized while completing a 4-week course of nafcillin through 12/24/2023.  Was unable to discharge to his group home while on IV anti-infectives, no TCU placement available.  -Continue 2 g nafcillin every 4 hours  -Anticipate discharge back to group home 12/25/2023 upon completion of anti-infective therapy  -1 view CXR to confirm PICC placement (present from OSH)  -Admitting hospitalist did not consult with infectious disease team as they were previously involved at [facility] and have since signed off while awaiting completion of antibiotic therapy; with duration as above. .     Chronic hypoxic hypercarbic respiratory failure: Obesity hypoventilation, ANA, nonadherence with BiPAP noted.  Significant hypercarbia on admission at outside facility.   -BiPap/CPAP at night.     Class III obesity: BMI greater than 46.  Increased risk of all cause mortality  -VBG in a.m.  -Nocturnal BiPAP ordered per most recent documented pressures and respiratory therapy note  -Oximetry, oxygen as needed  -duonebs QID scheduled; previously recommended by pulmonary provider to use at least daily (July telephone visit through [facility]).     Hypertension:  -Continue prior to admission propranolol, Minipress     Schizoaffective disorder:  Major depressive disorder:  Borderline personality disorder:  -Continuing prior to admission Celexa, BuSpar  -Continue Seroquel 300 mg at bedtime  -Patient states patient noncompliant to bowel movement medications.  Patient is somewhat willful and on morning encounters was still sleeping on BiPAP.  Due to history of chronic hypercapnia will not disrupt use of BiPAP and sleeping.  Encourage patient regarding his cares, vitals, medications.  I do see that there is vitals later in the AM and he took his medicine.  Encourage.    DVT Prophylaxis: Enoxaparin (Lovenox) SQ  Code Status: Full Code     Expected Discharge Date: 12/26/2023  KARIN working with  on  waiver status; see SW note  Discharge Delays: IV Medication   Destination: group home  Discharge Comments: IV antibiotics until 12/25       Shreya Pressley MD  Text Page (7am - 6pm, M-F)    I spent 35 minutes total time in management of care today 12/22/2023 reviewing labs, medications, interdisciplinary notes; and completing documentation of encounter and orders with Care Management including Coordinating Care and plan with Nursing re Patient refusing cares and Specialists, Neurology regarding sz disorder management and surveillance     Interval History       SH: No tobacco. Lives in     ROS: Complete ROS negative except as above.     -Data reviewed today: I reviewed all new labs and imaging results over the last 24 hours.   Physical Exam   Temp: 98.8  F (37.1  C) Temp src: Oral BP: (!) 153/87 Pulse: 103   Resp: 20 SpO2: 92 % O2 Device: None (Room air)      General/Constitutional:  NAD, on AM encounter sleeping with Bipap on   HEENT; continuous EEG on  Chest/Respiratory: Respirations nonlabored room air  Cardiovascular: egular, no murmur appreciated.  Gastrointestinal/Abdomen:  soft, nontender, no rebound, guarding or other peritoneal signs.  Neuro.  Gross motor tested, nonfocal, no  tremor or sz activity.  Psych oriented, affect sleeping     Medications      busPIRone  15 mg Oral BID    citalopram  60 mg Oral Daily    divalproex sodium delayed-release  [facility]H [facility]    enoxaparin ANTICOAGULANT  40 mg Subcutaneous Q12H    ipratropium - albuterol 0.5 mg/2.5 mg/3 mL  3 mL Nebulization 4x daily    lacosamide  50 mg Oral Daily    lamoTRIgine  100 mg Oral Daily    Followed by    [START ON 12/27/2023] lamoTRIgine  200 mg Oral Daily    lamoTRIgine  200 mg Oral BID    levETIRAcetam  1,000 mg Oral QAM    And    levETIRAcetam  1,500 mg Oral At Bedtime    levOCARNitine  660 mg Oral TID    methylcellulose  500 mg Oral or Feeding Tube BID    multivitamin, therapeutic  1 tablet Oral Daily    nafcillin  2 g  Intravenous Q4H    pantoprazole  40 mg Oral Daily    polyethylene glycol  17 g Oral Daily    prazosin  2 mg Oral At Bedtime    propranolol  20 mg Oral BID    QUEtiapine  300 mg Oral At Bedtime    sodium chloride (PF)  10 mL Intracatheter Q8H    sodium chloride (PF)  10-40 mL Intracatheter Q7 Days    zonisamide  100 mg Oral QAM    And    zonisamide  200 mg Oral At Bedtime       Data   Recent Labs   Lab 12/21/23  0602 12/19/23  0555 12/19/23  0112 12/18/23  0535   WBC  --  7.0  --  5.6   HGB  --  10.7*  --  11.0*   MCV  --  96  --  98    174  --  182   NA  --  142  --  144   POTASSIUM  --  3.7  --  4.0   CHLORIDE  --  101  --  104   CO2  --  33*  --  35*   BUN  --  10.9  --  10.9   CR 0.58* 0.61* 0.58* 0.60*   ANIONGAP  --  8  --  5*   ARNOLD  --  8.5*  --  8.6   GLC  --  112*  --  104*   ALBUMIN  --  3.7  --   --    PROTTOTAL  --  6.1*  --   --    BILITOTAL  --  0.2  --   --    ALKPHOS  --  65  --   --    ALT  --  23  --   --    AST  --  18  --   --

## 2023-12-22 NOTE — PROGRESS NOTES
Care Management Follow Up    Length of Stay (days): 4    Expected Discharge Date: 12/26/2023     Concerns to be Addressed: discharge planning     Patient plan of care discussed at interdisciplinary rounds: Yes    Anticipated Discharge Disposition: Group Home    Referrals Placed by CM/SW:  none   Private pay costs discussed: Not applicable    Additional Information:  KARIN left voicemail with  [facility] asking to speak about the waiver closing on 12/24 and that the patient is refusing the assessment to re-open it.     Veronica Garcia, SKYLER, LGSW   Social Work   [facility]

## 2023-12-22 NOTE — PROGRESS NOTES
"   12/22/23 1636   Appointment Info   Signing Clinician's Name / Credentials (PT) Jeannette العلي, PT, DPT   Rehab Comments (PT) no men; pt can be inappropriate with staff   Living Environment   People in Home facility resident   Current Living Arrangements group home   Transportation Anticipated agency   Self-Care   Usual Activity Tolerance moderate   Current Activity Tolerance fair   Equipment Currently Used at Home walker, rolling   Fall history within last six months yes   Number of times patient has fallen within last six months 10  (many per pt)   Activity/Exercise/Self-Care Comment Pt reports he uses a walker and w/c for long distances. 4WW per chart. Pt can't remember most subjective questions. Per chart, lives in group home and has assist.   General Information   Onset of Illness/Injury or Date of Surgery 12/18/23   Referring Physician Shreya Pressley MD   Patient/Family Therapy Goals Statement (PT) not able to state   Pertinent History of Current Problem (include personal factors and/or comorbidities that impact the POC) Epilepsy history, shaking spells, \"Schizoaffective disorder:  Major depressive disorder:  Borderline personality disorder\"   Existing Precautions/Restrictions fall   Weight-Bearing Status - LLE full weight-bearing   Weight-Bearing Status - RLE full weight-bearing   Cognition   Affect/Mental Status (Cognition) flat/blunted affect   Follows Commands (Cognition) increased processing time needed;verbal cues/prompting required   Pain Assessment   Patient Currently in Pain No   Integumentary/Edema   Integumentary/Edema Comments various small scrapes   Posture    Posture Protracted shoulders   Range of Motion (ROM)   Range of Motion ROM is WFL   Strength (Manual Muscle Testing)   Strength (Manual Muscle Testing) strength is WFL   Strength Comments generally against gravity   Bed Mobility   Comment, (Bed Mobility) SBA supine to seated, used railing   Transfers   Comment, (Transfers) SBA with " FWW, no overt unsteadiness   Gait/Stairs (Locomotion)   [place] Level (Gait) contact guard   Assistive Device (Gait) walker, front-wheeled   Comment, (Gait/Stairs) steady with device, impulsive   Balance   Balance Comments needs FWW in standing   Clinical Impression   Criteria for Skilled Therapeutic Intervention Yes, treatment indicated   PT Diagnosis (PT) impaired functional mobility   Influenced by the following impairments decreased activity tolerance   Functional limitations due to impairments transfers, ambulation   Clinical Presentation (PT Evaluation Complexity) evolving   Clinical Presentation Rationale clinical judgement   Clinical Decision Making (Complexity) moderate complexity   Planned Therapy Interventions (PT) balance training;bed mobility training;gait training;home exercise program;neuromuscular re-education;patient/family education;transfer training;strengthening   Risk & Benefits of therapy have been explained evaluation/treatment results reviewed;care plan/treatment goals reviewed;risks/benefits reviewed;current/potential barriers reviewed;participants voiced agreement with care plan;participants included;patient   PT Total Evaluation Time   PT Eval, Moderate Complexity Minutes (20324) 12   Physical Therapy Goals   PT Frequency 3x/week   PT Predicted Duration/Target Date for Goal Attainment 12/29/23   PT Goals Bed Mobility;Transfers;Gait   PT: Bed Mobility Independent   PT: Transfers Modified independent;Sit to/from stand;Bed to/from chair;Assistive device   PT: Gait Modified independent;Assistive device;100 feet   Interventions   Interventions Quick Adds Therapeutic Activity   Therapeutic Activity   Therapeutic Activities: dynamic activities to improve functional performance Minutes (33737) 25   Symptoms Noted During/After Treatment Fatigue   Treatment Detail/Skilled Intervention SBA supine to seated EOB, verbal cuing for safety. Pt can be particular about cares, but was appropriate during  this session. Transfers w/ FWW (used lindsay size) with close SBA. Toilet transfer with railing for controlled descent. Needed excessive time on the toilet. Recliner chair set up for him; SBA ambulation in room to chair, set up comfortably. Discussed PT-- pt just states 'i don't know' to conversation   PT Discharge Planning   PT Plan trial 4WW ambulation progression   PT Discharge Recommendation (DC Rec) home with assist   PT Rationale for DC Rec Patient is able to demonstrate mobility with SBA and FWW, likely near baseline for mobility but unclear as pt unable to provide subjective report. Anticipated he will be able to return to his group home when medically ready.   PT Brief overview of current status SBA x 25ft   Total Session Time   Timed Code Treatment Minutes 25   Total Session Time (sum of timed and untimed services) 37

## 2023-12-22 NOTE — PLAN OF CARE
Pt here with breakthrough seizure. Alert to self. Neuros unable to assess due to patient refusing assessments. Pt refusing most cares and vitals. Regular diet, thin liquids. Takes pills whole. Up with Ax1 GBW, refusing to wear GB. Denies pain. Pt wearing Bipap at night. Continuous EEG completed this shift. Pt scoring yellow on the Aggression Stop Light Tool for being verbally abusive to staff. Discharge pending.

## 2023-12-22 NOTE — PROGRESS NOTES
Patient has discharged from [facility] home care services. Will need a new referral for home care prior to discharge, if appropriate. Thanks!     DANDRE So, LPN  [facility] Liaison   Cell: [phone]

## 2023-12-22 NOTE — PROGRESS NOTES
Here for breakthrough seizure. AO to self only, but able to recall some past experiences. Continent uses urinal.  Hx of left eye blindness, borderline personality disorder, MDD, anxiety, and HTN. Unable to assess due to patient not accepting assessments nor vital signs.Not OOB this shift.   Ate 3 cookies and three containers of ice cream and an 8 oz of Sprite.  Patient wearing BiPap at night. Pt called frequently throughout the night for continuous adjustment with BiPAP mask and blankets. RT was paged to help adjust BiPAP mask x2, pt not satisfied.  Scored yellow on Aggression Stoplight, may be sexually inappropriate, and sometimes verbally abusive to staff.  Discharge pending for 12/25.

## 2023-12-23 PROCEDURE — 99232 SBSQ HOSP IP/OBS MODERATE 35: CPT | Performed by: HOSPITALIST

## 2023-12-23 PROCEDURE — 250N000011 HC RX IP 250 OP 636: Mod: JZ | Performed by: INTERNAL MEDICINE

## 2023-12-23 PROCEDURE — 999N000157 HC STATISTIC RCP TIME EA 10 MIN

## 2023-12-23 PROCEDURE — 94660 CPAP INITIATION&MGMT: CPT

## 2023-12-23 PROCEDURE — 250N000013 HC RX MED GY IP 250 OP 250 PS 637: Performed by: PSYCHIATRY & NEUROLOGY

## 2023-12-23 PROCEDURE — 94640 AIRWAY INHALATION TREATMENT: CPT

## 2023-12-23 PROCEDURE — 120N000001 HC R&B MED SURG/OB

## 2023-12-23 PROCEDURE — 250N000013 HC RX MED GY IP 250 OP 250 PS 637

## 2023-12-23 PROCEDURE — 250N000009 HC RX 250: Performed by: INTERNAL MEDICINE

## 2023-12-23 PROCEDURE — 94640 AIRWAY INHALATION TREATMENT: CPT | Mod: 76

## 2023-12-23 PROCEDURE — 250N000013 HC RX MED GY IP 250 OP 250 PS 637: Performed by: INTERNAL MEDICINE

## 2023-12-23 RX ADMIN — MULTIVITAMIN TABLET 1 TABLET: TABLET at 08:17

## 2023-12-23 RX ADMIN — NAFCILLIN 2 G: 2 POWDER, FOR SOLUTION INTRAMUSCULAR; INTRAVENOUS at 02:35

## 2023-12-23 RX ADMIN — LACOSAMIDE 50 MG: 50 TABLET, FILM COATED ORAL at 08:17

## 2023-12-23 RX ADMIN — LEVOCARNITINE 660 MG: 330 TABLET ORAL at 15:45

## 2023-12-23 RX ADMIN — QUETIAPINE FUMARATE 300 MG: 300 TABLET ORAL at 21:12

## 2023-12-23 RX ADMIN — ZONISAMIDE 100 MG: 100 CAPSULE ORAL at 08:16

## 2023-12-23 RX ADMIN — IPRATROPIUM BROMIDE AND ALBUTEROL SULFATE 3 ML: .5; 3 SOLUTION RESPIRATORY (INHALATION) at 11:26

## 2023-12-23 RX ADMIN — LAMOTRIGINE 100 MG: 100 TABLET ORAL at 14:12

## 2023-12-23 RX ADMIN — LAMOTRIGINE 200 MG: 100 TABLET ORAL at 21:04

## 2023-12-23 RX ADMIN — ACETAMINOPHEN 650 MG: 325 TABLET, FILM COATED ORAL at 14:57

## 2023-12-23 RX ADMIN — DIVALPROEX SODIUM 500 MG: 250 TABLET, DELAYED RELEASE ORAL at 21:04

## 2023-12-23 RX ADMIN — IPRATROPIUM BROMIDE AND ALBUTEROL SULFATE 3 ML: .5; 3 SOLUTION RESPIRATORY (INHALATION) at 14:17

## 2023-12-23 RX ADMIN — BUSPIRONE HYDROCHLORIDE 15 MG: 5 TABLET ORAL at 21:04

## 2023-12-23 RX ADMIN — NAFCILLIN 2 G: 2 POWDER, FOR SOLUTION INTRAMUSCULAR; INTRAVENOUS at 05:09

## 2023-12-23 RX ADMIN — PROPRANOLOL HYDROCHLORIDE 20 MG: 20 TABLET ORAL at 08:17

## 2023-12-23 RX ADMIN — LEVETIRACETAM 1500 MG: 750 TABLET, FILM COATED ORAL at 21:11

## 2023-12-23 RX ADMIN — METHYLCELLULOSE 500 MG: 500 TABLET ORAL at 21:04

## 2023-12-23 RX ADMIN — NAFCILLIN 2 G: 2 POWDER, FOR SOLUTION INTRAMUSCULAR; INTRAVENOUS at 18:33

## 2023-12-23 RX ADMIN — LEVOCARNITINE 660 MG: 330 TABLET ORAL at 21:12

## 2023-12-23 RX ADMIN — METHYLCELLULOSE 500 MG: 500 TABLET ORAL at 08:16

## 2023-12-23 RX ADMIN — DIVALPROEX SODIUM 500 MG: 250 TABLET, DELAYED RELEASE ORAL at 08:17

## 2023-12-23 RX ADMIN — ZONISAMIDE 200 MG: 100 CAPSULE ORAL at 21:11

## 2023-12-23 RX ADMIN — NAFCILLIN 2 G: 2 POWDER, FOR SOLUTION INTRAMUSCULAR; INTRAVENOUS at 14:35

## 2023-12-23 RX ADMIN — CITALOPRAM HYDROBROMIDE 60 MG: 40 TABLET ORAL at 08:17

## 2023-12-23 RX ADMIN — LEVETIRACETAM 1000 MG: 500 TABLET, FILM COATED ORAL at 08:16

## 2023-12-23 RX ADMIN — LEVOCARNITINE 660 MG: 330 TABLET ORAL at 08:16

## 2023-12-23 RX ADMIN — IPRATROPIUM BROMIDE AND ALBUTEROL SULFATE 3 ML: .5; 3 SOLUTION RESPIRATORY (INHALATION) at 07:19

## 2023-12-23 RX ADMIN — NAFCILLIN 2 G: 2 POWDER, FOR SOLUTION INTRAMUSCULAR; INTRAVENOUS at 09:51

## 2023-12-23 RX ADMIN — PROPRANOLOL HYDROCHLORIDE 20 MG: 20 TABLET ORAL at 21:04

## 2023-12-23 RX ADMIN — PANTOPRAZOLE SODIUM 40 MG: 40 TABLET, DELAYED RELEASE ORAL at 08:17

## 2023-12-23 RX ADMIN — LAMOTRIGINE 200 MG: 100 TABLET ORAL at 08:16

## 2023-12-23 RX ADMIN — BUSPIRONE HYDROCHLORIDE 15 MG: 5 TABLET ORAL at 08:17

## 2023-12-23 RX ADMIN — PRAZOSIN HYDROCHLORIDE 2 MG: 2 CAPSULE ORAL at 21:26

## 2023-12-23 ASSESSMENT — ACTIVITIES OF DAILY LIVING (ADL)
ADLS_ACUITY_SCORE: 47

## 2023-12-23 NOTE — PROGRESS NOTES
[facility]    Hospitalist Progress Note      Assessment & Plan   Tre Vazquez is a 48 year old male admitted on 12/18/2023. He insisted admission from [facility] where he has been hospitalized since late November for MSSA bacteremia.  Was unable to return to his group home.  [apparently there was discussion of 24 Continuous EEG ordered that is not performed at [facility]; and performed at [facility]. ]       Epilepsy history, shaking spells: Some concern for ongoing partial seizures with frequent shaking spells noted at outside hospital during his prolonged hospitalization.  Multiple antiepileptics prior to admission.  Followed by neurology at [facility] prior to transfer with recommendation for continuous EEG at [facility].   -Vimpat 50 mg every morning will continue, started by neurology 12/17/2023.  -Continuing prior to admission Zonegran 100 mg every morning, 200 mg at bedtime, Keppra 1 g every morning, 1.5 g every evening, Lamictal 200 mg twice daily  -Continuing Depakote at recently reduced dose of 500 mg twice daily; patient noted to have hyperammonemia on admission at outside hospital and there is a plan per neurology to reduce Depakote dosing and potentially taper off  -Continuing levocarnitine 3 times daily  -24 Continuous EEG on that is not performed at [facility]; and performed at [facility].   -General neurology consult: see note; it appears that neurology is uptitrating from 200 twice daily ultimately to 200 3 times daily however on a up titrating basis initially 100 mg added for 1 week then ultimately to 200 3 times daily.  Per nursing and with my encounter patient is fairly dismissive no seizure activity.    -See neurology notes 12/21/2023, no spells captured on continuous EEG monitoring.  No seizure activity.  Seizure regimen, see note.  Follow-up PTA outpatient neurology        MSSA bacteremia: Uncertain source, potentially from a left gluteal abscess/injury, but also has a  history of boils (Axillary, L arm lesions this past Sept).  No clear endocarditis on TTE.  Last positive blood culture 11/25/2023 and remains hospitalized while completing a 4-week course of nafcillin through 12/24/2023.  Was unable to discharge to his group home while on IV anti-infectives, no TCU placement available.  -Continue 2 g nafcillin every 4 hours  -Anticipate discharge back to group home 12/26/2023 upon completion of anti-infective therapy  -1 view CXR to confirm PICC placement (present from OSH)  -Admitting hospitalist did not consult with infectious disease team as they were previously involved at [facility] and have since signed off while awaiting completion of antibiotic therapy; with duration as above. .     Chronic hypoxic hypercarbic respiratory failure: Obesity hypoventilation, ANA, nonadherence with BiPAP noted.  Significant hypercarbia on admission at outside facility.   -BiPap/CPAP at night.     Class III obesity: BMI greater than 46.  Increased risk of all cause mortality  -VBG in a.m.  -Nocturnal BiPAP ordered per most recent documented pressures and respiratory therapy note  -Oximetry, oxygen as needed  -duonebs QID scheduled; previously recommended by pulmonary provider to use at least daily (July telephone visit through [facility]).     Hypertension:  -Continue prior to admission propranolol, Minipress     Schizoaffective disorder:  Major depressive disorder:  Borderline personality disorder:  -Continuing prior to admission Celexa BuSpar  -Continue Seroquel 300 mg at bedtime  -Patient has continued episodes of noncompliance during this hospital stay, vitals, Lovenox.  Is ambulatory.  It appears this is his baseline behavior, encourage.  However 12/23/2023 discussed with patient needs respectful behavior to Staff.  On discharge return to group home.      DVT Prophylaxis: Enoxaparin (Lovenox) SQ  Code Status: Full Code     Expected Discharge Date: 12/26/2023  KARIN working with  on waiver  status; see SW note  Discharge Delays: IV Medication   Destination: group home  Discharge Comments: IV antibiotics until 12/25       Shreya Pressley MD  Text Page (7am - 6pm, M-F)    I spent 35 minutes total time in management of care today 12/23/2023 reviewing labs, medications, interdisciplinary notes; and completing documentation of encounter and orders with Care Management including counseling/discussion withthe Patient regarding behaviors especially to Staff and Coordinating Care and plan with Nursing and Specialists, Neurology regarding sz management and surveillance     Interval History   No seizure activity.  Observed raising his tone and disrespectful verbally to staff.  Discussed with patient need for respectful behavior overall and to staff.    SH: No tobacco. Lives in     ROS: Complete ROS negative except as above.     -Data reviewed today: I reviewed all new labs and imaging results over the last 24 hours.   Physical Exam   Temp: 98.4  F (36.9  C) Temp src: Oral BP: 113/64 Pulse: 76   Resp: 18 SpO2: 93 % O2 Device: None (Room air)      General/Constitutional: NAD, raised voice in nonrespectful manner to staff who were trying to help him.    Chest/Respiratory: Respirations nonlabored room air  Cardiovascular: regular, no murmur appreciated.  Gastrointestinal/Abdomen:  soft, nontender,  Neuro.  Gross motor tested, nonfocal, no  tremor or sz activity.  Psych oriented, affect abrupt    Medications      busPIRone  15 mg Oral BID    citalopram  60 mg Oral Daily    divalproex sodium delayed-release  500 mg Oral Q12H [facility] (08/20)    enoxaparin ANTICOAGULANT  40 mg Subcutaneous Q12H    ipratropium - albuterol 0.5 mg/2.5 mg/3 mL  3 mL Nebulization 4x daily    lacosamide  50 mg Oral Daily    lamoTRIgine  100 mg Oral Daily    Followed by    [START ON 12/27/2023] lamoTRIgine  200 mg Oral Daily    lamoTRIgine  200 mg Oral BID    levETIRAcetam  1,000 mg Oral QAM    And    levETIRAcetam  1,500 mg Oral At Bedtime     levOCARNitine  660 mg Oral TID    methylcellulose  500 mg Oral or Feeding Tube BID    multivitamin, therapeutic  1 tablet Oral Daily    nafcillin  2 g Intravenous Q4H    pantoprazole  40 mg Oral Daily    polyethylene glycol  17 g Oral Daily    prazosin  2 mg Oral At Bedtime    propranolol  20 mg Oral BID    QUEtiapine  300 mg Oral At Bedtime    sodium chloride (PF)  10 mL Intracatheter Q8H    sodium chloride (PF)  10-40 mL Intracatheter Q7 Days    zonisamide  100 mg Oral QAM    And    zonisamide  200 mg Oral At Bedtime       Data   Recent Labs   Lab 12/21/23  0602 12/19/23  0555 12/19/23  0112 12/18/23  0535   WBC  --  7.0  --  5.6   HGB  --  10.7*  --  11.0*   MCV  --  96  --  98    174  --  182   NA  --  142  --  144   POTASSIUM  --  3.7  --  4.0   CHLORIDE  --  101  --  104   CO2  --  33*  --  35*   BUN  --  10.9  --  10.9   CR 0.58* 0.61* 0.58* 0.60*   ANIONGAP  --  8  --  5*   ARNOLD  --  8.5*  --  8.6   GLC  --  112*  --  104*   ALBUMIN  --  3.7  --   --    PROTTOTAL  --  6.1*  --   --    BILITOTAL  --  0.2  --   --    ALKPHOS  --  65  --   --    ALT  --  23  --   --    AST  --  18  --   --

## 2023-12-23 NOTE — PROGRESS NOTES
Sunita Murphy RN on 12/23/2023 at 6:00 AM    Reason for Admission: breakthrough seizures  Cognitive/Mentation: A/Ox self and location. refused most assessments  Neuros/CMS: Intact ex confusion and gen weakness. Once a shift.  VS: VSS. BiPAP overnight. Refused last set of vitals.  GI: passing flatus, last BM 12/22. Continent.  : Uses bedside urinal. Continent.   Pulmonary: LS diminished.  Pain: denies.   Drains/Lines: PICC SL.   Skin: JOSE MIGUEL- refusing  Activity: Assist x 1 with walker/ SBA.   Diet: regular with thin liquids. Takes pills whole.   Therapies recs: home with assist   Discharge: Anticipate discharge back to group home 12/25/2023 upon completion of anti-infective therapy  Aggression Stoplight Tool: yellow, can be sexually inappropriate  End of shift summary: Females only in room.

## 2023-12-23 NOTE — PLAN OF CARE
Reason for Admission: bacteremia and seizures    Cognitive/Mentation: A/Ox self   Neuros/CMS: appears intact, refusing assessment  VS: stable.   Tele: n/a.  GI: last BM 12/23/23. Continent.  : Voiding adequate amounts. Continent.  Pulmonary: LS diminished throughout.  Pain: R shoulder pain, tylenol given. Declined ice packs     Drains/Lines: PICC for intermittent Abx use  Skin: L gluteal dressing changed  Activity: Assist x 1 with GB/W.  Diet: REgular with thin liquids. Takes pills whole with water.     Therapies recs: back to group home  Discharge: after completing abx, plan for 12/26/23    Aggression Stoplight Tool: yellow d/t inappropriate comments

## 2023-12-23 NOTE — PLAN OF CARE
Pt here with breakthrough seizures. A&O to self only, d/t time, place and situation. Neuros: refused most assessments, blind in L eye at baseline. VSS. Regular diet, thin liquids. Takes pills whole. Up with Ax1 GBW; refused to get out of bed today. Denies pain. Continent of B&B, uses bedside urinal. Wears CPAP at night. Pt scoring green on the Aggression Stop Light Tool. Discharge pending to group home. Pt refused turn and repos. Pt refused vitals and assessments intermittently.

## 2023-12-23 NOTE — PROVIDER NOTIFICATION
Paged Dr. Pressley:   Pt reporting pain in R shoulder with use of PICC and sore with movement. Site looks good. Measurements of PICC and arm have remained unchanged. Still ok to use? Do you want any imagining?     Update: Ok to use PICC. Treat pain with tylenol

## 2023-12-24 LAB
CREAT SERPL-MCNC: 0.62 MG/DL (ref 0.67–1.17)
EGFRCR SERPLBLD CKD-EPI 2021: >90 ML/MIN/1.73M2
ERYTHROCYTE [DISTWIDTH] IN BLOOD BY AUTOMATED COUNT: 14.8 % (ref 10–15)
HCT VFR BLD AUTO: 35.9 % (ref 40–53)
HGB BLD-MCNC: 11.2 G/DL (ref 13.3–17.7)
MCH RBC QN AUTO: 30.1 PG (ref 26.5–33)
MCHC RBC AUTO-ENTMCNC: 31.2 G/DL (ref 31.5–36.5)
MCV RBC AUTO: 97 FL (ref 78–100)
PLATELET # BLD AUTO: 171 10E3/UL (ref 150–450)
PLATELET # BLD AUTO: 178 10E3/UL (ref 150–450)
RBC # BLD AUTO: 3.72 10E6/UL (ref 4.4–5.9)
WBC # BLD AUTO: 7.9 10E3/UL (ref 4–11)

## 2023-12-24 PROCEDURE — 999N000157 HC STATISTIC RCP TIME EA 10 MIN

## 2023-12-24 PROCEDURE — 250N000013 HC RX MED GY IP 250 OP 250 PS 637

## 2023-12-24 PROCEDURE — 94640 AIRWAY INHALATION TREATMENT: CPT

## 2023-12-24 PROCEDURE — 250N000013 HC RX MED GY IP 250 OP 250 PS 637: Performed by: INTERNAL MEDICINE

## 2023-12-24 PROCEDURE — 99232 SBSQ HOSP IP/OBS MODERATE 35: CPT | Performed by: HOSPITALIST

## 2023-12-24 PROCEDURE — 94640 AIRWAY INHALATION TREATMENT: CPT | Mod: 76

## 2023-12-24 PROCEDURE — 120N000001 HC R&B MED SURG/OB

## 2023-12-24 PROCEDURE — 82565 ASSAY OF CREATININE: CPT | Performed by: INTERNAL MEDICINE

## 2023-12-24 PROCEDURE — 250N000013 HC RX MED GY IP 250 OP 250 PS 637: Performed by: PSYCHIATRY & NEUROLOGY

## 2023-12-24 PROCEDURE — 250N000013 HC RX MED GY IP 250 OP 250 PS 637: Performed by: HOSPITALIST

## 2023-12-24 PROCEDURE — 85049 AUTOMATED PLATELET COUNT: CPT | Performed by: INTERNAL MEDICINE

## 2023-12-24 PROCEDURE — 250N000009 HC RX 250: Performed by: INTERNAL MEDICINE

## 2023-12-24 PROCEDURE — 85027 COMPLETE CBC AUTOMATED: CPT | Performed by: HOSPITALIST

## 2023-12-24 PROCEDURE — 94660 CPAP INITIATION&MGMT: CPT

## 2023-12-24 RX ORDER — LAMOTRIGINE 100 MG/1
200 TABLET ORAL 3 TIMES DAILY
Status: DISCONTINUED | OUTPATIENT
Start: 2023-12-27 | End: 2023-12-28

## 2023-12-24 RX ORDER — PROPRANOLOL HYDROCHLORIDE 40 MG/1
40 TABLET ORAL 2 TIMES DAILY
Status: DISCONTINUED | OUTPATIENT
Start: 2023-12-24 | End: 2023-12-28 | Stop reason: HOSPADM

## 2023-12-24 RX ORDER — IPRATROPIUM BROMIDE AND ALBUTEROL SULFATE 2.5; .5 MG/3ML; MG/3ML
3 SOLUTION RESPIRATORY (INHALATION) EVERY 4 HOURS PRN
Status: DISCONTINUED | OUTPATIENT
Start: 2023-12-24 | End: 2023-12-28 | Stop reason: HOSPADM

## 2023-12-24 RX ADMIN — IPRATROPIUM BROMIDE AND ALBUTEROL SULFATE 3 ML: .5; 3 SOLUTION RESPIRATORY (INHALATION) at 07:47

## 2023-12-24 RX ADMIN — ZONISAMIDE 100 MG: 100 CAPSULE ORAL at 09:47

## 2023-12-24 RX ADMIN — LEVOCARNITINE 660 MG: 330 TABLET ORAL at 21:16

## 2023-12-24 RX ADMIN — QUETIAPINE FUMARATE 300 MG: 300 TABLET ORAL at 21:15

## 2023-12-24 RX ADMIN — DIVALPROEX SODIUM 500 MG: 250 TABLET, DELAYED RELEASE ORAL at 09:47

## 2023-12-24 RX ADMIN — NAFCILLIN 2 G: 2 POWDER, FOR SOLUTION INTRAMUSCULAR; INTRAVENOUS at 05:11

## 2023-12-24 RX ADMIN — NAFCILLIN 2 G: 2 POWDER, FOR SOLUTION INTRAMUSCULAR; INTRAVENOUS at 21:18

## 2023-12-24 RX ADMIN — LEVETIRACETAM 1500 MG: 750 TABLET, FILM COATED ORAL at 21:15

## 2023-12-24 RX ADMIN — LACOSAMIDE 50 MG: 50 TABLET, FILM COATED ORAL at 09:51

## 2023-12-24 RX ADMIN — LAMOTRIGINE 200 MG: 100 TABLET ORAL at 09:48

## 2023-12-24 RX ADMIN — PROPRANOLOL HYDROCHLORIDE 40 MG: 40 TABLET ORAL at 21:15

## 2023-12-24 RX ADMIN — LEVOCARNITINE 660 MG: 330 TABLET ORAL at 17:29

## 2023-12-24 RX ADMIN — LAMOTRIGINE 200 MG: 100 TABLET ORAL at 21:14

## 2023-12-24 RX ADMIN — LAMOTRIGINE 100 MG: 100 TABLET ORAL at 13:36

## 2023-12-24 RX ADMIN — PRAZOSIN HYDROCHLORIDE 2 MG: 2 CAPSULE ORAL at 21:15

## 2023-12-24 RX ADMIN — NAFCILLIN 2 G: 2 POWDER, FOR SOLUTION INTRAMUSCULAR; INTRAVENOUS at 00:09

## 2023-12-24 RX ADMIN — METHYLCELLULOSE 500 MG: 500 TABLET ORAL at 21:15

## 2023-12-24 RX ADMIN — METHYLCELLULOSE 500 MG: 500 TABLET ORAL at 09:52

## 2023-12-24 RX ADMIN — DIVALPROEX SODIUM 500 MG: 250 TABLET, DELAYED RELEASE ORAL at 21:16

## 2023-12-24 RX ADMIN — MULTIVITAMIN TABLET 1 TABLET: TABLET at 09:51

## 2023-12-24 RX ADMIN — IPRATROPIUM BROMIDE AND ALBUTEROL SULFATE 3 ML: .5; 3 SOLUTION RESPIRATORY (INHALATION) at 11:39

## 2023-12-24 RX ADMIN — NAFCILLIN 2 G: 2 POWDER, FOR SOLUTION INTRAMUSCULAR; INTRAVENOUS at 13:36

## 2023-12-24 RX ADMIN — NAFCILLIN 2 G: 2 POWDER, FOR SOLUTION INTRAMUSCULAR; INTRAVENOUS at 09:39

## 2023-12-24 RX ADMIN — PROPRANOLOL HYDROCHLORIDE 40 MG: 40 TABLET ORAL at 09:52

## 2023-12-24 RX ADMIN — ZONISAMIDE 200 MG: 100 CAPSULE ORAL at 21:16

## 2023-12-24 RX ADMIN — CITALOPRAM HYDROBROMIDE 60 MG: 40 TABLET ORAL at 09:50

## 2023-12-24 RX ADMIN — NAFCILLIN 2 G: 2 POWDER, FOR SOLUTION INTRAMUSCULAR; INTRAVENOUS at 17:30

## 2023-12-24 RX ADMIN — BUSPIRONE HYDROCHLORIDE 15 MG: 5 TABLET ORAL at 09:47

## 2023-12-24 RX ADMIN — LEVOCARNITINE 660 MG: 330 TABLET ORAL at 09:46

## 2023-12-24 RX ADMIN — PANTOPRAZOLE SODIUM 40 MG: 40 TABLET, DELAYED RELEASE ORAL at 09:50

## 2023-12-24 RX ADMIN — POLYETHYLENE GLYCOL 3350 17 G: 17 POWDER, FOR SOLUTION ORAL at 09:46

## 2023-12-24 RX ADMIN — BUSPIRONE HYDROCHLORIDE 15 MG: 5 TABLET ORAL at 21:14

## 2023-12-24 RX ADMIN — LEVETIRACETAM 1000 MG: 500 TABLET, FILM COATED ORAL at 09:51

## 2023-12-24 ASSESSMENT — ACTIVITIES OF DAILY LIVING (ADL)
ADLS_ACUITY_SCORE: 41
ADLS_ACUITY_SCORE: 47
ADLS_ACUITY_SCORE: 47
ADLS_ACUITY_SCORE: 45
ADLS_ACUITY_SCORE: 45
ADLS_ACUITY_SCORE: 41
ADLS_ACUITY_SCORE: 47
ADLS_ACUITY_SCORE: 45
ADLS_ACUITY_SCORE: 47
ADLS_ACUITY_SCORE: 47
ADLS_ACUITY_SCORE: 45
ADLS_ACUITY_SCORE: 47

## 2023-12-24 NOTE — PROGRESS NOTES
Pt is alert to self and knows that he is in [place], disoriented to time and situation. VSS on RA. Bi-pap @ bedtime and when napping. R arm PICC line SL. Seizure precaution maintained. Up with 1/wlk to BR. Had loose stool x3 per pt. Denies pain. Remind pt to call before getting out of bed/chair. No other concern @ the moment. Plan of care on going.

## 2023-12-24 NOTE — PROGRESS NOTES
Orientation: AO to self    Vitals: VSS ex. Elevated BP    Tele: NA    IV Access/drains: PICC SL    Diet: Regular    Mobility: Ax1 GB/Walker    GI/: Incontinent at times    Wound/Skin: Wound on L. Buttocks CDI    Discharge Plan: TBD      See Flow sheets for assessment

## 2023-12-24 NOTE — PROVIDER NOTIFICATION
MD Notification    Notified Person: MD    Notified Person Name:Wendie Cash    Notification Date/Time:12/24/23 @ 1221    Notification Interaction: Vocera Message    Purpose of Notification: Called Neurology clinic and talked to on-call Dr. He does not cover [facility], but he was able to clarify the medication; Pt is now on TID Lamictal 200mg in the morning, 100mg in the afternoon and 200mg at night. I also verified this order from pharmacist, and it is correct.     Orders Received:    Comments:

## 2023-12-24 NOTE — PROGRESS NOTES
Care Management Follow Up    Length of Stay (days): 6    Expected Discharge Date: 12/26/2023     Concerns to be Addressed: discharge planning     Patient plan of care discussed at interdisciplinary rounds: Yes    Anticipated Discharge Disposition: Group Home     Anticipated Discharge Services:    Anticipated Discharge DME:      Patient/family educated on Medicare website which has current facility and service quality ratings:    Education Provided on the Discharge Plan:    Patient/Family in Agreement with the Plan:      Referrals Placed by CM/SW:    Private pay costs discussed: Not applicable    Additional Information:  Writer spoke with . Care management will plan to work on this case on 12/26 as supervisor states will need [facility] involvement and they are closed due to weekend today, and marlys holiday tomorrow.     CASPER Keller  Social Work  [facility]

## 2023-12-24 NOTE — PROGRESS NOTES
[facility]    Hospitalist Progress Note      Assessment & Plan   Tre Vazquez is a 48 year old male admitted on 12/18/2023. He insisted admission from [facility] where he has been hospitalized since late November for MSSA bacteremia.  Was unable to return to his group home.  [apparently there was discussion of 24 Continuous EEG ordered that is not performed at [facility]; and performed at [facility]. ]       Epilepsy history, shaking spells: Some concern for ongoing partial seizures with frequent shaking spells noted at outside hospital during his prolonged hospitalization.  Multiple antiepileptics prior to admission.  Followed by neurology at [facility] prior to transfer with recommendation for continuous EEG at [facility].   -Vimpat 50 mg every morning will continue, started by neurology 12/17/2023.  -Continuing prior to admission Zonegran 100 mg every morning, 200 mg at bedtime, Keppra 1 g every morning, 1.5 g every evening, Lamictal 200 mg twice daily  -Continuing Depakote at recently reduced dose of 500 mg twice daily; patient noted to have hyperammonemia on admission at outside hospital and there is a plan per neurology to reduce Depakote dosing and potentially taper off  -Continuing levocarnitine 3 times daily  -24 Continuous EEG on that is not performed at [facility]; and performed at [facility].   -General neurology consult: see note; it appears that neurology is uptitrating from 200 twice daily ultimately to 200 3 times daily however on a up titrating basis initially 100 mg added for 1 week then ultimately to 200 3 times daily.   -12/24/2023 PharmD needs clarification on Lamictal dosing per Dr. Carbajal.  Currently it looks like as ordered by Dr. Carbajal patient may be dosed 700 mg total, discussed with nursing who will talk to Dr. Carbajal, neurology or her colleague to clarify dose of titration for 1 week and then enter maintenance dosing.  Advised nursing not to dose until clarification  per Neurology. ADDENDUM; discussed with PharmD who ordered clarified dosing with start date of new doing 12/27/23 [in the interim uptritrated dose]   -See neurology notes 12/21/2023, no spells captured on continuous EEG monitoring.  No seizure activity.  Seizure regimen, see note.  Follow-up PTA outpatient neurology        MSSA bacteremia: Uncertain source, potentially from a left gluteal abscess/injury, but also has a history of boils (Axillary, L arm lesions this past Sept).  No clear endocarditis on TTE.  Last positive blood culture 11/25/2023 and remains hospitalized while completing a 4-week course of nafcillin through 12/24/2023.  Was unable to discharge to his group home while on IV anti-infectives, no TCU placement available.  -Continue 2 g nafcillin every 4 hours  -Anticipate discharge back to group home 12/26/2023 upon completion of anti-infective therapy; however SW cites issues with waiver,  see SW note.  -1 view CXR to confirm PICC placement (present from OSH)  -Admitting hospitalist did not consult with infectious disease team as they were previously involved at [facility] and have since signed off while awaiting completion of antibiotic therapy; with duration as above. .     Chronic hypoxic hypercarbic respiratory failure: Obesity hypoventilation, ANA, nonadherence with BiPAP noted.  Significant hypercarbia on admission at outside facility.   -BiPap/CPAP at night.     Class III obesity: BMI greater than 46.  Increased risk of all cause mortality  -VBG in a.m.  -Nocturnal BiPAP ordered per most recent documented pressures and respiratory therapy note  -Oximetry, oxygen as needed  -elvie QID scheduled; previously recommended by pulmonary provider to use at least daily (July telephone visit through [facility]).     Hypertension:  -Continue prior to admission propranolol, Minipress     Schizoaffective disorder:  Major depressive disorder:  Borderline personality disorder:  -Continuing prior to  admission Rennya, BuSpar  -Continue Seroquel 300 mg at bedtime  -Patient has continued episodes of noncompliance during this hospital stay, vitals, Lovenox.  Is ambulatory.  It appears this is his baseline behavior, encourage compliance.  However 12/23/2023 discussed with patient needs respectful behavior to Staff.  On discharge = return to group home.      DVT Prophylaxis: Enoxaparin (Lovenox) SQ, ambulation every shift .  Code Status: Full Code     Expected Discharge Date: 12/26/2023 back to [facility], however SW working with  on waiver status; see SW note  Discharge Delays: IV Medication   Destination: group home  Discharge Comments: IV antibiotics dosed every 4 hours through 12/25       Shreya Pressley MD  Text Page (7am - 6pm, M-F)    I spent 35 minutes total time in management of care today 12/24/2023 reviewing labs, medications, interdisciplinary notes; and completing documentation of encounter and orders with Care Management including  Coordinating Care and plan with Nursing re behaviors and Nursing and PharmD re: communication with Neurology, Dr Carbajal or coverage  re: lamictal dosing, see above.    Interval History   No seizure activity, rather calm however willful.      SH: No tobacco. Lives in     ROS: Complete ROS negative except as above.     -Data reviewed today: I reviewed all new labs and imaging results over the last 24 hours.   Physical Exam   Temp: 98.3  F (36.8  C) Temp src: Oral BP: (!) 143/85 Pulse: 89   Resp: 18 SpO2: 92 % O2 Device: None (Room air)      General/Constitutional: NAD, calm, willful  Chest/Respiratory: Respirations nonlabored room air  Cardiovascular: regular, no murmur appreciated.  Gastrointestinal/Abdomen:  soft, nontender,  Neuro.  Gross motor tested, nonfocal, no tremor or sz activity.  Psych oriented, affect calm    Medications      busPIRone  15 mg Oral BID    citalopram  60 mg Oral Daily    divalproex sodium delayed-release  500 mg Oral Q12H [facility] (08/20)     enoxaparin ANTICOAGULANT  40 mg Subcutaneous Q12H    ipratropium - albuterol 0.5 mg/2.5 mg/3 mL  3 mL Nebulization 4x daily    lacosamide  50 mg Oral Daily    lamoTRIgine  100 mg Oral Daily    Followed by    [START ON 12/27/2023] lamoTRIgine  200 mg Oral Daily    lamoTRIgine  200 mg Oral BID    levETIRAcetam  1,000 mg Oral QAM    And    levETIRAcetam  1,500 mg Oral At Bedtime    levOCARNitine  660 mg Oral TID    methylcellulose  500 mg Oral or Feeding Tube BID    multivitamin, therapeutic  1 tablet Oral Daily    nafcillin  2 g Intravenous Q4H    pantoprazole  40 mg Oral Daily    polyethylene glycol  17 g Oral Daily    prazosin  2 mg Oral At Bedtime    propranolol  40 mg Oral BID    QUEtiapine  300 mg Oral At Bedtime    sodium chloride (PF)  10 mL Intracatheter Q8H    sodium chloride (PF)  10-40 mL Intracatheter Q7 Days    zonisamide  100 mg Oral QAM    And    zonisamide  200 mg Oral At Bedtime       Data   Recent Labs   Lab 12/24/23  0628 12/24/23  0010 12/21/23  0602 12/19/23  0555 12/19/23  0112 12/18/23  0535   WBC  --  7.9  --  7.0  --  5.6   HGB  --  11.2*  --  10.7*  --  11.0*   MCV  --  97  --  96  --  98    178 173 174  --  182   NA  --   --   --  142  --  144   POTASSIUM  --   --   --  3.7  --  4.0   CHLORIDE  --   --   --  101  --  104   CO2  --   --   --  33*  --  35*   BUN  --   --   --  10.9  --  10.9   CR 0.62*  --  0.58* 0.61*   < > 0.60*   ANIONGAP  --   --   --  8  --  5*   ARNOLD  --   --   --  8.5*  --  8.6   GLC  --   --   --  112*  --  104*   ALBUMIN  --   --   --  3.7  --   --    PROTTOTAL  --   --   --  6.1*  --   --    BILITOTAL  --   --   --  0.2  --   --    ALKPHOS  --   --   --  65  --   --    ALT  --   --   --  23  --   --    AST  --   --   --  18  --   --     < > = values in this interval not displayed.

## 2023-12-24 NOTE — PROGRESS NOTES
[facility] Note    Date:12/24/2023  Admission Diagnosis: MSSA bacteremia   Pulmonary History: ANA (noted to be noncompliant with BiPAP)   Home Nebulizer/ MDI Use: N/A  Home Oxygen Use: N/A   Acuity Level (from RT Assessment flow sheet): Level 5    Aerosol Therapy Initiated: Nebulizer treatments changed to Q4prn       Pulmonary Hygiene Initiated: Flutter valve       Volume Expansion Therapy Initiated: Incentive Spirometry and BiPAP (is wearing at night here in the hospital)       Current Oxygen Requirement: None, room air  Current SpO2: 92%    Re-evaluation date: 12/28/2023    See 'RT Assessments' flow sheet for patient assessment scoring and Acuity Level Details.    Ramon Hi, RRT on 12/24/2023 at 2:00 PM

## 2023-12-24 NOTE — PROVIDER NOTIFICATION
MD Notification    Notified Person: MD    Notified Person Name:Wendie Cash    Notification Date/Time:12/24/23@ 1026    Notification Interaction: Vocera message    Purpose of Notification:Pt refusing Lovenox. Said that it makes him nauseous. He agreed to take it if he can have Zofran. Can place an order for Zofran.     Orders Received:     Comments: Pt been refusing. Ambulate as much as possible per MD.

## 2023-12-24 NOTE — PLAN OF CARE
Orientations: Alert and oriented to self only.    Vitals/Pain: VSS ex HTN. On RA. Denies pain.   Lines/Drains: PIC SL   Skin/Wounds: Wound on L buttocks UTV  GI/: Adequate urine output. Pt had bright red blood in stool. MD notified.   Ambulation/Assist: A of 1 w gbw  Plan: Continue plan of care

## 2023-12-25 PROCEDURE — 99232 SBSQ HOSP IP/OBS MODERATE 35: CPT | Performed by: INTERNAL MEDICINE

## 2023-12-25 PROCEDURE — 250N000013 HC RX MED GY IP 250 OP 250 PS 637: Performed by: PSYCHIATRY & NEUROLOGY

## 2023-12-25 PROCEDURE — 250N000013 HC RX MED GY IP 250 OP 250 PS 637: Performed by: INTERNAL MEDICINE

## 2023-12-25 PROCEDURE — 250N000013 HC RX MED GY IP 250 OP 250 PS 637: Performed by: HOSPITALIST

## 2023-12-25 PROCEDURE — 999N000157 HC STATISTIC RCP TIME EA 10 MIN

## 2023-12-25 PROCEDURE — 94660 CPAP INITIATION&MGMT: CPT

## 2023-12-25 PROCEDURE — 250N000011 HC RX IP 250 OP 636: Mod: JZ | Performed by: INTERNAL MEDICINE

## 2023-12-25 PROCEDURE — 250N000013 HC RX MED GY IP 250 OP 250 PS 637

## 2023-12-25 PROCEDURE — 250N000009 HC RX 250: Performed by: INTERNAL MEDICINE

## 2023-12-25 PROCEDURE — 120N000001 HC R&B MED SURG/OB

## 2023-12-25 RX ADMIN — LEVOCARNITINE 660 MG: 330 TABLET ORAL at 21:19

## 2023-12-25 RX ADMIN — LEVOCARNITINE 660 MG: 330 TABLET ORAL at 16:58

## 2023-12-25 RX ADMIN — LEVETIRACETAM 1500 MG: 750 TABLET, FILM COATED ORAL at 21:19

## 2023-12-25 RX ADMIN — DIVALPROEX SODIUM 500 MG: 250 TABLET, DELAYED RELEASE ORAL at 11:32

## 2023-12-25 RX ADMIN — ZONISAMIDE 100 MG: 100 CAPSULE ORAL at 11:32

## 2023-12-25 RX ADMIN — PRAZOSIN HYDROCHLORIDE 2 MG: 2 CAPSULE ORAL at 21:24

## 2023-12-25 RX ADMIN — NAFCILLIN 2 G: 2 POWDER, FOR SOLUTION INTRAMUSCULAR; INTRAVENOUS at 00:17

## 2023-12-25 RX ADMIN — NAFCILLIN 2 G: 2 POWDER, FOR SOLUTION INTRAMUSCULAR; INTRAVENOUS at 13:14

## 2023-12-25 RX ADMIN — PANTOPRAZOLE SODIUM 40 MG: 40 TABLET, DELAYED RELEASE ORAL at 11:32

## 2023-12-25 RX ADMIN — LEVOCARNITINE 660 MG: 330 TABLET ORAL at 11:33

## 2023-12-25 RX ADMIN — METHYLCELLULOSE 500 MG: 500 TABLET ORAL at 11:33

## 2023-12-25 RX ADMIN — NAFCILLIN 2 G: 2 POWDER, FOR SOLUTION INTRAMUSCULAR; INTRAVENOUS at 11:34

## 2023-12-25 RX ADMIN — LEVETIRACETAM 1000 MG: 500 TABLET, FILM COATED ORAL at 11:32

## 2023-12-25 RX ADMIN — QUETIAPINE FUMARATE 300 MG: 300 TABLET ORAL at 21:24

## 2023-12-25 RX ADMIN — BUSPIRONE HYDROCHLORIDE 15 MG: 5 TABLET ORAL at 11:34

## 2023-12-25 RX ADMIN — LAMOTRIGINE 100 MG: 100 TABLET ORAL at 13:14

## 2023-12-25 RX ADMIN — BUSPIRONE HYDROCHLORIDE 15 MG: 5 TABLET ORAL at 21:19

## 2023-12-25 RX ADMIN — ZONISAMIDE 200 MG: 100 CAPSULE ORAL at 21:19

## 2023-12-25 RX ADMIN — MULTIVITAMIN TABLET 1 TABLET: TABLET at 11:32

## 2023-12-25 RX ADMIN — LACOSAMIDE 50 MG: 50 TABLET, FILM COATED ORAL at 11:32

## 2023-12-25 RX ADMIN — ACETAMINOPHEN 650 MG: 325 TABLET, FILM COATED ORAL at 00:17

## 2023-12-25 RX ADMIN — PROPRANOLOL HYDROCHLORIDE 40 MG: 40 TABLET ORAL at 21:19

## 2023-12-25 RX ADMIN — LAMOTRIGINE 200 MG: 100 TABLET ORAL at 11:32

## 2023-12-25 RX ADMIN — LAMOTRIGINE 200 MG: 100 TABLET ORAL at 21:24

## 2023-12-25 RX ADMIN — CITALOPRAM HYDROBROMIDE 60 MG: 40 TABLET ORAL at 11:32

## 2023-12-25 RX ADMIN — PROPRANOLOL HYDROCHLORIDE 40 MG: 40 TABLET ORAL at 11:33

## 2023-12-25 RX ADMIN — METHYLCELLULOSE 500 MG: 500 TABLET ORAL at 21:23

## 2023-12-25 RX ADMIN — NAFCILLIN 2 G: 2 POWDER, FOR SOLUTION INTRAMUSCULAR; INTRAVENOUS at 16:58

## 2023-12-25 RX ADMIN — NAFCILLIN 2 G: 2 POWDER, FOR SOLUTION INTRAMUSCULAR; INTRAVENOUS at 05:33

## 2023-12-25 RX ADMIN — DIVALPROEX SODIUM 500 MG: 250 TABLET, DELAYED RELEASE ORAL at 21:24

## 2023-12-25 ASSESSMENT — ACTIVITIES OF DAILY LIVING (ADL)
ADLS_ACUITY_SCORE: 41
ADLS_ACUITY_SCORE: 45
ADLS_ACUITY_SCORE: 41
ADLS_ACUITY_SCORE: 45
ADLS_ACUITY_SCORE: 41
ADLS_ACUITY_SCORE: 45
ADLS_ACUITY_SCORE: 41

## 2023-12-25 NOTE — PROGRESS NOTES
[facility]    Medicine Progress Note - Hospitalist Service    Date of Admission:  12/18/2023    Patient was not seen by me as I went to see him 3 different times and he was in toilet  I came back later also and asked the incharge nurse to see if he would be willing to see me  He said everything is fine and he does not know how long it will take    So this information is based on chart review and talking to the nursing staff  He spoke from toilet when I went to the room and said he has no concerns today    Assessment & Plan      Tre Vazquez is a 48 year old male admitted on 12/18/2023. He insisted admission from [facility] where he has been hospitalized since late November for MSSA bacteremia.  Was unable to return to his group home.  [apparently there was discussion of 24 Continuous EEG ordered that is not performed at [facility]; and performed at [facility]. ]        Epilepsy history, shaking spells: Some concern for ongoing partial seizures with frequent shaking spells noted at outside hospital during his prolonged hospitalization.  Multiple antiepileptics prior to admission.  Followed by neurology at [facility] prior to transfer with recommendation for continuous EEG at [facility].   -Vimpat 50 mg every morning will continue, started by neurology 12/17/2023.  -Continuing prior to admission Zonegran 100 mg every morning, 200 mg at bedtime, Keppra 1 g every morning, 1.5 g every evening, Lamictal 200 mg twice daily  -Continuing Depakote at recently reduced dose of 500 mg twice daily; patient noted to have hyperammonemia on admission at outside hospital and there is a plan per neurology to reduce Depakote dosing and potentially taper off  -Continuing levocarnitine 3 times daily  -24 Continuous EEG on that is not performed at [facility]; and performed at [facility].   -General neurology consult: see note; it appears that neurology is uptitrating from 200 twice daily ultimately to 200 3 times daily  however on a up titrating basis initially 100 mg added for 1 week then ultimately to 200 3 times daily.   -12/24/2023 PharmD needs clarification on Lamictal dosing per Dr. Carbajal.  Currently it looks like as ordered by Dr. Carbajal patient may be dosed 700 mg total, discussed with nursing who will talk to Dr. Carbajal, neurology or her colleague to clarify dose of titration for 1 week and then enter maintenance dosing.  Advised nursing not to dose until clarification per Neurology. ADDENDUM; discussed with PharmD who ordered clarified dosing with start date of new doing 12/27/23 [in the interim uptritrated dose]   -See neurology notes 12/21/2023, no spells captured on continuous EEG monitoring.  No seizure activity.  Seizure regimen, see note.  Follow-up PTA outpatient neurology        MSSA bacteremia:   Uncertain source, potentially from a left gluteal abscess/injury, but also has a history of boils (Axillary, L arm lesions this past Sept).  No clear endocarditis on TTE.  Last positive blood culture 11/25/2023 and remains hospitalized while completing a 4-week course of nafcillin through 12/24/2023.    Was unable to discharge to his group home while on IV anti-infectives, no TCU placement available.  -Continue 2 g nafcillin every 4 hours ( discontinued this evening )  -Anticipate discharge back to group home 12/26/2023 upon completion of anti-infective therapy; however SW cites issues with waiver,  see SW note.  -1 view CXR to confirm PICC placement (present from OSH)  -Admitting hospitalist did not consult with infectious disease team as they were previously involved at [facility] and have since signed off while awaiting completion of antibiotic therapy; with duration as above. .     Chronic hypoxic hypercarbic respiratory failure: Obesity hypoventilation, ANA, nonadherence with BiPAP noted.  Significant hypercarbia on admission at outside facility.   -BiPap/CPAP at night.      Class III obesity: BMI greater  than 46.  Increased risk of all cause mortality  -VBG in a.m.  -Nocturnal BiPAP ordered per most recent documented pressures and respiratory therapy note  -Oximetry, oxygen as needed  -duonebs QID scheduled; previously recommended by pulmonary provider to use at least daily (July telephone visit through [facility]).     Hypertension:  -Continue prior to admission propranolol, Minipress     Schizoaffective disorder:  Major depressive disorder:  Borderline personality disorder:  -Continuing prior to admission Celexa, BuSpar  -Continue Seroquel 300 mg at bedtime  -Patient has continued episodes of noncompliance during this hospital stay, vitals, Lovenox.  Is ambulatory.  It appears this is his baseline behavior, encourage compliance.    However other provider on 12/23/2023 discussed with patient needs respectful behavior to Staff.    On discharge = return to group home.   See note of  from 12/24            Diet: Regular Diet Adult  Snacks/Supplements Adult: Ensure Enlive; With Meals  Snacks/Supplements Adult: Ensure Enlive; With Meals    DVT Prophylaxis: Low Risk/Ambulatory with no VTE prophylaxis indicated  Reid Catheter: Not present  Lines: PRESENT      PICC 11/28/23 Single Lumen Right Basilic-Site Assessment: WDL      Cardiac Monitoring: None  Code Status: Full Code      Clinically Significant Risk Factors                  # Hypertension: Noted on problem list                   Disposition Plan      Expected Discharge Date: 12/26/2023    Discharge Delays: IV Medication - consider oral or Home Infusion  Destination: group home  Discharge Comments: IV antibiotics until 12/25, return to group home            Cydney Ruiz MD  Hospitalist Service  [facility]  Securely message with [email or web address] (more info)  Text page via [employer] Paging/Directory   ______________________________________________________________________    Interval History   Per nursing staff mostly behavioral issues  Patient  himself denied any problem    Physical Exam   Vital Signs: Temp: 98  F (36.7  C) Temp src: Oral BP: (!) 162/83 Pulse: 77   Resp: 18 SpO2: 93 % O2 Device: None (Room air)    Weight: 0 lbs 0 oz    Could not examine him as he was in rest room    Medical Decision Making             Data

## 2023-12-25 NOTE — PROGRESS NOTES
Pt A&O X 1. VSS. Regular diet, thin liquids. Takes pills whole. Up with assist of 1 GB. PICC line in R arm. Chronic L eye blindness. On intermittent IV ABX. Denies pain. On BIPAP at night. Pt scoring green on the Aggression Stop Light Tool. Discharge pending.

## 2023-12-25 NOTE — PROGRESS NOTES
Shift Summary [id number]    Admitting Diagnosis: Bacteremia due to Staphylococcus    Patient alert to self. VSS, BIPAP at night. Denied pain. PIV SL. Up x1 walker refusing GB. No change from the previous shift. Will continue monitor per plan of care.

## 2023-12-25 NOTE — PROGRESS NOTES
Alert to self only. A of 1 gb. Wound care on L buttock complete, CDI. Neuros intact. Pain managed with tylenol. PICC in R arm. Seizure precautions maintained.

## 2023-12-26 PROCEDURE — 250N000013 HC RX MED GY IP 250 OP 250 PS 637: Performed by: PSYCHIATRY & NEUROLOGY

## 2023-12-26 PROCEDURE — 250N000013 HC RX MED GY IP 250 OP 250 PS 637

## 2023-12-26 PROCEDURE — 120N000001 HC R&B MED SURG/OB

## 2023-12-26 PROCEDURE — 250N000013 HC RX MED GY IP 250 OP 250 PS 637: Performed by: INTERNAL MEDICINE

## 2023-12-26 PROCEDURE — 250N000011 HC RX IP 250 OP 636: Mod: JZ | Performed by: INTERNAL MEDICINE

## 2023-12-26 PROCEDURE — 999N000157 HC STATISTIC RCP TIME EA 10 MIN

## 2023-12-26 PROCEDURE — 94660 CPAP INITIATION&MGMT: CPT

## 2023-12-26 PROCEDURE — 250N000013 HC RX MED GY IP 250 OP 250 PS 637: Performed by: HOSPITALIST

## 2023-12-26 PROCEDURE — 99207 PR NO BILLABLE SERVICE THIS VISIT: CPT | Performed by: INTERNAL MEDICINE

## 2023-12-26 PROCEDURE — 99232 SBSQ HOSP IP/OBS MODERATE 35: CPT | Performed by: INTERNAL MEDICINE

## 2023-12-26 RX ADMIN — PANTOPRAZOLE SODIUM 40 MG: 40 TABLET, DELAYED RELEASE ORAL at 09:09

## 2023-12-26 RX ADMIN — ENOXAPARIN SODIUM 40 MG: 40 INJECTION SUBCUTANEOUS at 21:19

## 2023-12-26 RX ADMIN — PROPRANOLOL HYDROCHLORIDE 40 MG: 40 TABLET ORAL at 21:16

## 2023-12-26 RX ADMIN — METHYLCELLULOSE 500 MG: 500 TABLET ORAL at 09:10

## 2023-12-26 RX ADMIN — LAMOTRIGINE 200 MG: 100 TABLET ORAL at 21:14

## 2023-12-26 RX ADMIN — LACOSAMIDE 50 MG: 50 TABLET, FILM COATED ORAL at 09:10

## 2023-12-26 RX ADMIN — BUSPIRONE HYDROCHLORIDE 15 MG: 5 TABLET ORAL at 09:09

## 2023-12-26 RX ADMIN — DIVALPROEX SODIUM 500 MG: 250 TABLET, DELAYED RELEASE ORAL at 21:14

## 2023-12-26 RX ADMIN — LEVETIRACETAM 1500 MG: 750 TABLET, FILM COATED ORAL at 21:15

## 2023-12-26 RX ADMIN — ACETAMINOPHEN 650 MG: 325 TABLET, FILM COATED ORAL at 04:28

## 2023-12-26 RX ADMIN — QUETIAPINE FUMARATE 300 MG: 300 TABLET ORAL at 21:16

## 2023-12-26 RX ADMIN — PRAZOSIN HYDROCHLORIDE 2 MG: 2 CAPSULE ORAL at 21:15

## 2023-12-26 RX ADMIN — ZONISAMIDE 100 MG: 100 CAPSULE ORAL at 09:10

## 2023-12-26 RX ADMIN — METHYLCELLULOSE 500 MG: 500 TABLET ORAL at 21:15

## 2023-12-26 RX ADMIN — DIVALPROEX SODIUM 500 MG: 250 TABLET, DELAYED RELEASE ORAL at 09:09

## 2023-12-26 RX ADMIN — BUSPIRONE HYDROCHLORIDE 15 MG: 5 TABLET ORAL at 21:16

## 2023-12-26 RX ADMIN — ZONISAMIDE 200 MG: 100 CAPSULE ORAL at 21:14

## 2023-12-26 RX ADMIN — LEVETIRACETAM 1000 MG: 500 TABLET, FILM COATED ORAL at 09:09

## 2023-12-26 RX ADMIN — LEVOCARNITINE 660 MG: 330 TABLET ORAL at 21:15

## 2023-12-26 RX ADMIN — LAMOTRIGINE 200 MG: 100 TABLET ORAL at 09:10

## 2023-12-26 RX ADMIN — LEVOCARNITINE 660 MG: 330 TABLET ORAL at 16:38

## 2023-12-26 RX ADMIN — LAMOTRIGINE 100 MG: 100 TABLET ORAL at 14:00

## 2023-12-26 RX ADMIN — LEVOCARNITINE 660 MG: 330 TABLET ORAL at 09:08

## 2023-12-26 RX ADMIN — PROPRANOLOL HYDROCHLORIDE 40 MG: 40 TABLET ORAL at 09:09

## 2023-12-26 RX ADMIN — MULTIVITAMIN TABLET 1 TABLET: TABLET at 09:09

## 2023-12-26 RX ADMIN — CITALOPRAM HYDROBROMIDE 60 MG: 40 TABLET ORAL at 09:09

## 2023-12-26 RX ADMIN — POLYETHYLENE GLYCOL 3350 17 G: 17 POWDER, FOR SOLUTION ORAL at 09:08

## 2023-12-26 ASSESSMENT — ACTIVITIES OF DAILY LIVING (ADL)
ADLS_ACUITY_SCORE: 45
ADLS_ACUITY_SCORE: 43
ADLS_ACUITY_SCORE: 45
ADLS_ACUITY_SCORE: 43
ADLS_ACUITY_SCORE: 45

## 2023-12-26 ASSESSMENT — ABNORMAL INVOLUNTARY MOVEMENT SCALE (AIMS)
FACIAL_EXPRESSION_MUSCLES: NONE, NORMAL
NECK_SHOULDER_HIPS: NONE, NORMAL
LOWER_BODY_EXTREMITIES: NONE, NORMAL
CURRENT_PROBLEMS_TEETH_DENTURES: NO
TONGUE: NONE, NORMAL
LIPS_PARIETAL: NONE, NORMAL
UPPER_BODY_EXTREMITIES: NONE, NORMAL

## 2023-12-26 NOTE — PROGRESS NOTES
Pt Al&O to self only.Up with1 gb. Denies Pain.Neuros intact. Pain managed with tylenol. PICC in place R arm. Seizure precautions maintained.Family called regard discharge plan.Bi-pap at bedtime.See notes for more.Continue monitoring.

## 2023-12-26 NOTE — PROGRESS NOTES
Alert to self only. A of 1 gb. Neuros intact. Pain managed with tylenol. PICC in R arm. Seizure precautions maintained.

## 2023-12-26 NOTE — PROGRESS NOTES
Pt alert to self. Behaviors at green. R arm PICC line SL. Assist of 1/wlk to BR. Soft BM x1 in this shift. No blood noted. Denies pain. Up in the chair playing games. Chair alarm on. Bi-pap @ bedtime. Plan of care on going.

## 2023-12-26 NOTE — PROGRESS NOTES
[facility]    Medicine Progress Note - Hospitalist Service    Date of Admission:  12/18/2023    Interval History   Patient seen early this am and uncertain if waiver needed to get back to the Group Home.   Informed later in the day that he could go back to his Group Home but needs to get back by noon tomorrow and if left today would require multiple medications       Assessment & Plan   Tre Vazquez is a 48 year old male with past medical history including developmental delay, cerebral palsy, morbid obesity, ANA/OHS on BiPAP at bedtime, seizure disorder, chronic hyperammonemia, anemia, schizoaffective disorder, borderline personality disorder, MDD, anxiety, HTN, and left eye blindness who was admitted to [facility] on 11/24/2023 with altered mental status consistent with acute encephalopathy likely primary driven by hypercapnia based on VBG.     He was also found to have an elevated ammonia level. (More chronic)   CT of the head, chest did not show any acute pathology.  Blood cultures returned positive for Staph aureus so he was treated with IV nafcillin and consultation with ID.  Plan was for 4 weeks of IV antibiotics.     Neurology had been consulted as patient reported increasing frequency of very short-lived seizures described as jerking of extremities for a few seconds and then feeling fatigued.  1 episode seen by edwin which reported similar to seizure episodes in the past.  Neurology reconsulted.  Started Vimpat and remained on antiepileptic medications.  Neurology recommended transfer to [facility] for continuous EEG given he already required staying in the hospital for IV antibiotic therapy prior to discharge.  Patient was transferred to [facility] on 12/18       Epilepsy history, shaking spells:   At [facility] Some concern for ongoing partial seizures with frequent shaking spells noted at outside hospital during his prolonged hospitalization.    Multiple  antiepileptics prior to admission.  Followed by neurology at [facility] prior to transfer with recommendation for continuous EEG at [facility].   12/17 Dr. Martinez detailed patient's seizure history.  12/19 started on a continuous EEG  12/20 neurology follow-up.  EEG showing no events.  12/20 increase lamotrigine from 200 twice daily to 200-100 - 200 for 1 week then 200 mg po TID which will start 12/27   Continue depakote 500 mg po BID   12/21 12/21 Given psychiatric issues did not recommend titrating off Depakote.  Continue levocarnitine for high ammonia levels.  Recommended outpatient follow up with neurologist   SUMMARY OF seizure meds currently   Vimpat 50 mg po daily (started 12/17)   Lamictal 200 mg p.o. twice daily for 15 doses till 12/19 and then increase to 200 mg 3 times a day on 12/27  Keppra 1000 mg a.m. and 1500 mg p.m.  Carnitor 660 mg 3 times daily  Depakote extended release 500 mg every 12 hours.  Zonegran 100 mg a.m. and 200 mg p.o. pm   See neurology notes 12/21/2023, no spells captured on continuous EEG monitoring.  No seizure activity.  Seizure regimen, see note.  Follow-up PTA outpatient neurology     MSSA bacteremia:   Staph aureus bacteremia 11/24 x 2 blood cultures  11/25 1 of 2 Staph aureus blood cultures  11/26 blood cultures x 2 negative   Uncertain source, potentially from a left gluteal abscess/injury, but also has a history of boils (Axillary, L arm lesions this past Sept). ID consult. Documented picture in the chart on 11/20 with wound on buttocks. No clear endocarditis on TTE. Last positive blood culture 11/25/2023 and remained hospitalized while completing a 4-week course of nafcillin through 12/24/2023.  Was unable to discharge to his group home while on IV anti-infectives, no TCU placement available. Seen by ID at [facility]. Continue 2 g nafcillin every 4 hours  As per notes from Dr. Page 11/28 will need minimal 4 weeks antibiotics starting day of first negative  cultures which was 11/26 - 12/24      Chronic hypoxic hypercarbic respiratory failure: Obesity hypoventilation, ANA, nonadherence with BiPAP noted. Significant hypercarbia on admission at [facility] outside facility.   Chronic BIPAP at night as PTA   Continue on discharge BIPAP      Class III obesity: BMI greater than 46. Increased risk of all cause mortality     Hypertension:  -Continue prior to admission propranolol, Minipress     Schizoaffective disorder:  Major depressive disorder:  Borderline personality disorder:  Continuing prior to admission Celexa, BuSpar. Continue Seroquel 300 mg at bedtime Patient has had continued episodes of noncompliance during this hospital stay, vitals, Lovenox.  Is ambulatory.  It appears this is his baseline behavior, encourage compliance.  However 12/23/2023 discussed with patient needs respectful behavior to Staff.  On discharge return to group home.       DVT Prophylaxis: Enoxaparin (Lovenox) SQ, ambulation every shift .  Code Status: Full Code     Expected Discharge Date: 12/26/2023 back to [facility], however SW working with  on waiver status; see SW note  Discharge Delays: IV Medication   Destination: group home  Discharge Comments: IV antibiotics dosed every 4 hours through 12/25        Lines: PRESENT      PICC 11/28/23 Single Lumen Right Basilic-Site Assessment: WDL      Cardiac Monitoring: None  Code Status: Full Code      Clinically Significant Risk Factors                  # Hypertension: Noted on problem list                   Disposition Plan      Expected Discharge Date: 12/27/2023    Discharge Delays: IV Medication - consider oral or Home Infusion  Destination: group home  Discharge Comments: IV antibiotics until 12/25, return to group home after waiver is approved SW consulted.            ROMEL LAINEZ MD  Hospitalist Service  [facility]  Securely message with [employer] (more info)  Text page via [employer] Paging/Directory    ______________________________________________________________________    Physical Exam   Vital Signs: Temp: 98.1  F (36.7  C) Temp src: Oral BP: 132/88 Pulse: 95   Resp: 18 SpO2: 95 % O2 Device: None (Room air)    Weight: 0 lbs 0 oz    General Appearance: Patient on his BiPAP mask  Respiratory: Clear to auscultation bilaterally without wheezes or rhonchi  Cardiovascular: Regular rate and rhythm without gallop rub normal S1-S2  GI: Positive bowel sounds soft no rebound guarding  Skin: No overt edema      Medical Decision Making       45 MINUTES SPENT BY ME on the date of service doing chart review, history, exam, documentation & further activities per the note.    Review of chart with extensive detail including changes in medication prior hospital course.  Current plans.  Lead discharge medications filled.    Data         Imaging results reviewed over the past 24 hrs:   No results found for this or any previous visit (from the past 24 hour(s)).

## 2023-12-26 NOTE — PROGRESS NOTES
Care Management Follow Up    Length of Stay (days): 8    Expected Discharge Date: 12/27/2023     Concerns to be Addressed: discharge planning     Patient plan of care discussed at interdisciplinary rounds: Yes    Anticipated Discharge Disposition: Group Home     Anticipated Discharge Services:    Anticipated Discharge DME:      Patient/family educated on Medicare website which has current facility and service quality ratings:    Education Provided on the Discharge Plan:    Patient/Family in Agreement with the Plan:      Referrals Placed by CM/SW:    Private pay costs discussed: transportation costs    Additional Information:  Writer was updated by CCU RN that Dr. Tariq was inquiring about the patient and when they would be ready to go.  Bedside RN spoke with writer that the patient wanted to speak with them regarding discharging.    Per chart review, patient needed to do a reassessment with the [facility] in order to reactivate the CADI waiver they were under.  No further note was made since 12/24 about this.    Writer spoke with the patient about this. Patient stated that this was completed last week.  Patient called their stepmother, Ashlie, who stated that the [facility]  had completed the paperwork for the [facility] waiver to be reactivated for tomorrow, but was told that the patient can discharge today.  Magdalena stated for the writer to call Quoc over at the group [place], [phone].  Ashlie additionally stated that due to the CADI waiver reactivating tomorrow the patient would most likely need to be discharged with 24 hours worth of meds.  Ashlie stated the writer would have to set up a ride for the patient.  Writer educated Ashlie and patient about potential out-of-pocket costs.  Both patient and ashlie agreed.    Writer called Quoc at the group [place].  And he confirmed that the patient would need to go with 24 hours of meds.    Writer paged Dr. Tariq, who stated that due to the extensive stay, they would not be  able to complete all of the medications and orders.  Writer called Quoc back to let him know, and restated they could accept the patient before noon tomorrow.  Writer updated Dr. Tariq.  Quoc stated the prescriptions can be sent to [facility] pharmacy.    Writer will update the patient.        ELVIRA Garg  [facility]

## 2023-12-26 NOTE — PROGRESS NOTES
Patient has a complex history.  He had been hospitalized at [facility] from 11/24 through 12/18 and transferred to [facility] for continuous EEG monitoring.  This was completed and neurology had signed off.    Prior hospital notes suggest back to group home on 12/26 upon completion of antibiotics.  Reportedly in social work notes patient has a waiver that closed on 12/24 and patient refusing the assessment to reopen it.    Uncertain status on his discharge.  Would appear discharge related to him getting back to his group home.    Asked care management team early this a.m. to update Dr. Tariq on status of this situation.     Reportedly family has called for an update on discharge but Dr. Tariq has no additional information awaiting input from the care management team.   Please update Dr. Tariq when information available on discharge.   Dr. Shantell Tariq

## 2023-12-27 LAB
ANION GAP SERPL CALCULATED.3IONS-SCNC: 6 MMOL/L (ref 7–15)
BUN SERPL-MCNC: 15.6 MG/DL (ref 6–20)
C DIFF TOX B STL QL: NEGATIVE
CALCIUM SERPL-MCNC: 9.1 MG/DL (ref 8.6–10)
CHLORIDE SERPL-SCNC: 101 MMOL/L (ref 98–107)
CREAT SERPL-MCNC: 0.61 MG/DL (ref 0.67–1.17)
CREAT SERPL-MCNC: 0.61 MG/DL (ref 0.67–1.17)
DEPRECATED HCO3 PLAS-SCNC: 34 MMOL/L (ref 22–29)
EGFRCR SERPLBLD CKD-EPI 2021: >90 ML/MIN/1.73M2
EGFRCR SERPLBLD CKD-EPI 2021: >90 ML/MIN/1.73M2
ERYTHROCYTE [DISTWIDTH] IN BLOOD BY AUTOMATED COUNT: 14.9 % (ref 10–15)
GLUCOSE SERPL-MCNC: 92 MG/DL (ref 70–99)
HCT VFR BLD AUTO: 35.4 % (ref 40–53)
HGB BLD-MCNC: 11.1 G/DL (ref 13.3–17.7)
HGB BLD-MCNC: 11.6 G/DL (ref 13.3–17.7)
MCH RBC QN AUTO: 30.2 PG (ref 26.5–33)
MCHC RBC AUTO-ENTMCNC: 31.4 G/DL (ref 31.5–36.5)
MCV RBC AUTO: 97 FL (ref 78–100)
PLATELET # BLD AUTO: 194 10E3/UL (ref 150–450)
PLATELET # BLD AUTO: 194 10E3/UL (ref 150–450)
POTASSIUM SERPL-SCNC: 4.2 MMOL/L (ref 3.4–5.3)
RBC # BLD AUTO: 3.67 10E6/UL (ref 4.4–5.9)
SODIUM SERPL-SCNC: 141 MMOL/L (ref 135–145)
WBC # BLD AUTO: 10.4 10E3/UL (ref 4–11)

## 2023-12-27 PROCEDURE — 99207 PR NO BILLABLE SERVICE THIS VISIT: CPT | Performed by: INTERNAL MEDICINE

## 2023-12-27 PROCEDURE — 82565 ASSAY OF CREATININE: CPT | Performed by: INTERNAL MEDICINE

## 2023-12-27 PROCEDURE — 80048 BASIC METABOLIC PNL TOTAL CA: CPT | Performed by: INTERNAL MEDICINE

## 2023-12-27 PROCEDURE — 120N000001 HC R&B MED SURG/OB

## 2023-12-27 PROCEDURE — 99233 SBSQ HOSP IP/OBS HIGH 50: CPT | Performed by: INTERNAL MEDICINE

## 2023-12-27 PROCEDURE — 250N000013 HC RX MED GY IP 250 OP 250 PS 637: Performed by: INTERNAL MEDICINE

## 2023-12-27 PROCEDURE — 85049 AUTOMATED PLATELET COUNT: CPT | Performed by: INTERNAL MEDICINE

## 2023-12-27 PROCEDURE — 85018 HEMOGLOBIN: CPT | Performed by: PHYSICIAN ASSISTANT

## 2023-12-27 PROCEDURE — 85027 COMPLETE CBC AUTOMATED: CPT | Performed by: INTERNAL MEDICINE

## 2023-12-27 PROCEDURE — 99232 SBSQ HOSP IP/OBS MODERATE 35: CPT | Performed by: INTERNAL MEDICINE

## 2023-12-27 PROCEDURE — 87493 C DIFF AMPLIFIED PROBE: CPT | Performed by: PHYSICIAN ASSISTANT

## 2023-12-27 PROCEDURE — 250N000013 HC RX MED GY IP 250 OP 250 PS 637

## 2023-12-27 PROCEDURE — 250N000013 HC RX MED GY IP 250 OP 250 PS 637: Performed by: HOSPITALIST

## 2023-12-27 PROCEDURE — 250N000013 HC RX MED GY IP 250 OP 250 PS 637: Performed by: PSYCHIATRY & NEUROLOGY

## 2023-12-27 RX ADMIN — LAMOTRIGINE 200 MG: 100 TABLET ORAL at 09:44

## 2023-12-27 RX ADMIN — MULTIVITAMIN TABLET 1 TABLET: TABLET at 09:45

## 2023-12-27 RX ADMIN — LEVETIRACETAM 1500 MG: 750 TABLET, FILM COATED ORAL at 21:29

## 2023-12-27 RX ADMIN — BUSPIRONE HYDROCHLORIDE 15 MG: 5 TABLET ORAL at 09:44

## 2023-12-27 RX ADMIN — DIVALPROEX SODIUM 500 MG: 250 TABLET, DELAYED RELEASE ORAL at 09:44

## 2023-12-27 RX ADMIN — PROPRANOLOL HYDROCHLORIDE 40 MG: 40 TABLET ORAL at 09:44

## 2023-12-27 RX ADMIN — PANTOPRAZOLE SODIUM 40 MG: 40 TABLET, DELAYED RELEASE ORAL at 09:44

## 2023-12-27 RX ADMIN — LEVOCARNITINE 660 MG: 330 TABLET ORAL at 09:45

## 2023-12-27 RX ADMIN — LAMOTRIGINE 200 MG: 100 TABLET ORAL at 21:31

## 2023-12-27 RX ADMIN — PROPRANOLOL HYDROCHLORIDE 40 MG: 40 TABLET ORAL at 21:31

## 2023-12-27 RX ADMIN — PRAZOSIN HYDROCHLORIDE 2 MG: 2 CAPSULE ORAL at 21:31

## 2023-12-27 RX ADMIN — METHYLCELLULOSE 500 MG: 500 TABLET ORAL at 21:30

## 2023-12-27 RX ADMIN — QUETIAPINE FUMARATE 300 MG: 300 TABLET ORAL at 21:31

## 2023-12-27 RX ADMIN — LEVOCARNITINE 660 MG: 330 TABLET ORAL at 21:31

## 2023-12-27 RX ADMIN — ACETAMINOPHEN 650 MG: 325 TABLET, FILM COATED ORAL at 02:37

## 2023-12-27 RX ADMIN — BUSPIRONE HYDROCHLORIDE 15 MG: 5 TABLET ORAL at 21:30

## 2023-12-27 RX ADMIN — LAMOTRIGINE 200 MG: 100 TABLET ORAL at 16:33

## 2023-12-27 RX ADMIN — ZONISAMIDE 100 MG: 100 CAPSULE ORAL at 09:44

## 2023-12-27 RX ADMIN — LACOSAMIDE 50 MG: 50 TABLET, FILM COATED ORAL at 09:45

## 2023-12-27 RX ADMIN — LEVOCARNITINE 660 MG: 330 TABLET ORAL at 16:33

## 2023-12-27 RX ADMIN — DIVALPROEX SODIUM 500 MG: 250 TABLET, DELAYED RELEASE ORAL at 21:30

## 2023-12-27 RX ADMIN — METHYLCELLULOSE 500 MG: 500 TABLET ORAL at 09:44

## 2023-12-27 RX ADMIN — ZONISAMIDE 200 MG: 100 CAPSULE ORAL at 21:30

## 2023-12-27 RX ADMIN — CITALOPRAM HYDROBROMIDE 60 MG: 40 TABLET ORAL at 09:45

## 2023-12-27 RX ADMIN — LEVETIRACETAM 1000 MG: 500 TABLET, FILM COATED ORAL at 09:45

## 2023-12-27 ASSESSMENT — ACTIVITIES OF DAILY LIVING (ADL)
ADLS_ACUITY_SCORE: 43

## 2023-12-27 NOTE — PROGRESS NOTES
Patient with loose watery stools but blood in stools.   He reports that he has blood in his stools all the time.   Examination with the nurse present revealed dried blood in a rim around gluteal region.   No obvious large bleeding external hemorrhoids.   Rectal exam with blood no over mass palpable.   Request input from GI   Colonoscopy in 2022 2 mm polyp in cecum that was sessile. Removed. Few small mouthed diverticula found in signmoid colon   GI input.   His chronic respiratory failure does place him at higher risk of procedures but evaluate need for further with GI    Chandni

## 2023-12-27 NOTE — CONSULTS
"[facility]  Gastroenterology Consultation         Tre Vazquez  [address]   [place] [address]  48 year old male    Admission Date/Time: 12/18/2023  Primary Care Provider: Siobhan Mayo  Referring / Attending Physician:  Dr. Jocelyne Tariq    REASON FOR CONSULT: [facility] was asked by Dr. Jocelyne Tariq to see this patient in consultation for evaluation of blood in stool.    NOTE: Patient will only allow female providers and staff in his room.     HPI:  Tre Vazquez is a 48 year old male admitted on 12/18/2023. He insisted admission from [facility] where he has been hospitalized since late November for MSSA bacteremia.  Was unable to return to his group home.      Blood in stool  Watery stools  - GI consulted this AM for patient having loose watery stools with  blood in stools. He reports that he has blood in his stools all the time. He reports that loose stools are not abnormal for him. Patient had physical exam by hospitalist with nurse present revealing dried blood in a rim around gluteal region.   No obvious large bleeding external hemorrhoids. Requested input from GI. Patient had colonoscopy in 2022 with 2 mm polyp in cecum that was sessile. Removed. Few small mouthed diverticula found in signmoid colon. His chronic respiratory failure does place him at higher risk of procedures.     - I discussed potential colonoscopy with this patient today and he refuses to have one done. He states, \" this usually happens and I had a normal colonoscopy last year despite having these symptoms.\"  - I then recommended that he have stool studies to at least rule out C. Diff and other pathogens due to the loose stools and bleeding. He is agreeable to this. (Ordered)  - Recommend daily hemoglobin to trend and monitor for worsening bleeding.   - Since no plan for endoscopy at this time, GI will follow along peripherally  - Please call if any questions    Epilepsy history, shaking spells: " Some concern for ongoing partial seizures with frequent shaking spells noted at outside hospital during his prolonged hospitalization.  Multiple antiepileptics prior to admission.  Followed by neurology at [facility] prior to transfer with recommendation for continuous EEG at [facility].   -Vimpat 50 mg every morning will continue, started by neurology 12/17/2023.  -Continuing prior to admission Zonegran 100 mg every morning, 200 mg at bedtime, Keppra 1 g every morning, 1.5 g every evening, Lamictal 200 mg twice daily  -Continuing Depakote at recently reduced dose of 500 mg twice daily; patient noted to have hyperammonemia on admission at outside hospital and there is a plan per neurology to reduce Depakote dosing and potentially taper off  -Continuing levocarnitine 3 times daily  -24 Continuous EEG on that is not performed at [facility]; and performed at [facility].   -General neurology consult: see note; it appears that neurology is uptitrating from 200 twice daily ultimately to 200 3 times daily however on a up titrating basis initially 100 mg added for 1 week then ultimately to 200 3 times daily.   -12/24/2023 PharmD needs clarification on Lamictal dosing per Dr. Carbajal.  Currently it looks like as ordered by Dr. Carbajal patient may be dosed 700 mg total, discussed with nursing who will talk to Dr. Carbajal, neurology or her colleague to clarify dose of titration for 1 week and then enter maintenance dosing.  Advised nursing not to dose until clarification per Neurology. ADDENDUM; discussed with PharmD who ordered clarified dosing with start date of new doing 12/27/23 [in the interim uptritrated dose]   -See neurology notes 12/21/2023, no spells captured on continuous EEG monitoring.  No seizure activity.  Seizure regimen, see note.  Follow-up PTA outpatient neurology     MSSA bacteremia: Uncertain source, potentially from a left gluteal abscess/injury, but also has a history of boils (Axillary, L arm lesions this past  Sept).  No clear endocarditis on TTE.  Last positive blood culture 11/25/2023 and remains hospitalized while completing a 4-week course of nafcillin through 12/24/2023.  Was unable to discharge to his group home while on IV anti-infectives, no TCU placement available.  -Continue 2 g nafcillin every 4 hours  -Anticipate discharge back to group home 12/26/2023 upon completion of anti-infective therapy; however SW cites issues with waiver,  see  note.  -1 view CXR to confirm PICC placement (present from OSH)  -Admitting hospitalist did not consult with infectious disease team as they were previously involved at [facility] and have since signed off while awaiting completion of antibiotic therapy; with duration as above. .     Chronic hypoxic hypercarbic respiratory failure: Obesity hypoventilation, ANA, nonadherence with BiPAP noted.  Significant hypercarbia on admission at outside facility.   -BiPap/CPAP at night.      Class III obesity: BMI greater than 46.  Increased risk of all cause mortality  -VBG in a.m.  -Nocturnal BiPAP ordered per most recent documented pressures and respiratory therapy note  -Oximetry, oxygen as needed  -duonebs QID scheduled; previously recommended by pulmonary provider to use at least daily (July telephone visit through [facility]).     Hypertension:  -Continue prior to admission propranolol, Minipress     Schizoaffective disorder:  Major depressive disorder:  Borderline personality disorder:  -Continuing prior to admission Celexa, BuSpar  -Continue Seroquel 300 mg at bedtime  -Patient has continued episodes of noncompliance during this hospital stay, vitals, Lovenox.  Is ambulatory.  It appears this is his baseline behavior, encourage compliance.  However 12/23/2023 discussed with patient needs respectful behavior to Staff.  On discharge = return to group home.         ROS: A comprehensive ten point review of systems was negative aside from those in mentioned in the HPI.      PAST MED  HX:  I have reviewed this patient's medical history and updated it with pertinent information if needed.   Past Medical History:   Diagnosis Date    Blindness of left eye     Depression 03/10/2014    Hypertension     Pneumococcal septicemia(038.2) (H) 11/26/2013    Hospitalized    Pneumonia, organism unspecified(486) 11/26/2013    Hospitalized    Uncomplicated asthma     Unspecified epilepsy without mention of intractable epilepsy        MEDICATIONS:   Prior to Admission Medications   Prescriptions Last Dose Informant Patient Reported? Taking?   Nafcillin Sodium 2 g SOLR 12/18/2023  No Yes   Sig: Inject 2 g into the vein every 4 hours for 28 days weekly CBC, creatinine and ALT faxed to [phone]   QUEtiapine ER (SEROQUEL XR) 300 MG 24 hr tablet 12/17/2023 at HS at [facility]  Yes Yes   Sig: Take 300 mg by mouth at bedtime   ammonium lactate (AMLACTIN) 12 % external cream 11/24/2023  Yes Yes   Sig: Apply topically 2 times daily   bacitracin 500 UNIT/GM external ointment 11/24/2023  No Yes   Sig: Apply topically 2 times daily   busPIRone (BUSPAR) 15 MG tablet 12/18/2023 at x2 at [facility]  Yes Yes   Sig: Take 15 mg by mouth 2 times daily   citalopram (CELEXA) 20 MG tablet 12/18/2023 at am  Yes Yes   Sig: Take 20 mg by mouth daily   citalopram (CELEXA) 40 MG tablet 12/18/2023 at am Pharmacy Yes Yes   Sig: Take 40 mg by mouth daily   clotrimazole (LOTRIMIN) 1 % external solution 11/24/2023  Yes Yes   Sig: Apply topically 2 times daily   divalproex sodium delayed-release (DEPAKOTE) 500 MG DR tablet 12/18/2023 at x2 at [facility]  Yes Yes   Sig: Take 500 mg by mouth 3 times daily   fluticasone (FLONASE) 50 MCG/ACT nasal spray 12/18/2023 at am at [facility]  Yes Yes   Sig: Spray 1 spray in nostril 2 times daily   lamoTRIgine (LAMICTAL) 200 MG tablet 12/18/2023 at x2 at [facility]  No Yes   Sig: Take 1 tablet (200 mg) by mouth 2 times daily   levETIRAcetam (KEPPRA) 500 MG tablet 12/18/2023 at x2 at [facility]  No Yes   Sig: Take 2 tablets (1,000 mg) by mouth  every morning AND 3 tablets (1,500 mg) At Bedtime.   levOCARNitine (CARNITOR) 330 MG tablet 2023 at x3  No Yes   Sig: Take 2 tablets (660 mg) by mouth 3 times daily   methylcellulose POWD powder Past Week  Yes Yes   Sig: Apply 2 mg topically 2 times daily Mix 1 rounded tablespoon (2 mg) in 8 oz glass of water and take by mouth twice daily   multivitamin, therapeutic (THERA-VIT) TABS tablet 2023  Yes Yes   Sig: Take 1 tablet by mouth daily   ondansetron (ZOFRAN ODT) 4 MG ODT tab  at prn  No Yes   Sig: Take 1 tablet (4 mg) by mouth every 6 hours as needed for nausea or vomiting   pantoprazole (PROTONIX) 40 MG EC tablet 2023 at am at [facility]  Yes Yes   Sig: Take 40 mg by mouth daily   polyethylene glycol (MIRALAX) 17 GM/Dose powder 2023 at at [facility]  Yes Yes   Sig: Take 1 capful. by mouth daily   prazosin (MINIPRESS) 2 MG capsule 2023 at hs [facility]  Yes Yes   Sig: Take 2 mg by mouth At Bedtime   propranolol (INDERAL) 20 MG tablet 2023 at x2  Yes Yes   Sig: Take 20 mg by mouth 2 times daily   zonisamide (ZONEGRAN) 100 MG capsule 2023 at am at [facility]  No Yes   Sig: Take 1 capsule (100 mg) by mouth every morning AND 2 capsules (200 mg) At Bedtime.      Facility-Administered Medications: None       ALLERGIES:   Allergies   Allergen Reactions    Hydrocodone Bitartrate Er Unknown    Cephalexin Rash     HUT Reaction: Rash; HUT Noted: [id number]      Vicodin [Hydrocodone-Acetaminophen] GI Disturbance       SOCIAL HISTORY:  Social History     Tobacco Use    Smoking status: Former     Types: Cigarettes     Start date:      Quit date: 2010     Years since quittin.9    Smokeless tobacco: Never   Substance Use Topics    Alcohol use: No    Drug use: No       FAMILY HISTORY:  No family history on file.    PHYSICAL EXAM:   Vital Signs with Ranges  Temp: 98.7  F (37.1  C) Temp src: Oral BP: 119/78 Pulse: 88   Resp: 16 SpO2: (!) 89 % O2 Device: None (Room air)    No intake/output data  recorded.    VITAL SIGNS: VSS stable  GENERAL APPEARANCE: No acute distress  HEENT: Normocephalic, atraumatic, PERRLA, and EOM intact. No scleral icterus. Neck is supple. Trachea is midline.   CARDIOVASCULAR: RRR, normal S1S2, no murmurs, gallops, thrills, or rubs. Pedal pulses are 2+ and symmetric.   RESPIRATORY: Clear to auscultation. No rhonchi, rales, or wheezes. Normal respiratory effort.   ABDOMEN: Soft, non-tender. No guarding or rebound tenderness. No hepatosplenomegaly. No distention. Normal bowel sounds.   EXTREMITIES: No edema, cyanosis, or clubbing.  MUSCULOSKELETAL: Moves all 4 extremities normally. Normal muscle tone.   SKIN: Warm and dry. No rashes, cyanosis, jaundice, or petechiae.  NEUROLOGIC: Oriented to person, place, and time. No focal deficits.  PSYCHIATRIC: Normal speech, mood, and affect. Good eye contact.     RECENT LAB RESULTS:  Recent Labs   Lab Test 12/27/23  0647 12/24/23  0628 12/24/23  0010 12/21/23  0602 12/19/23  0555 12/18/23  0535 05/12/23  0114 05/06/23  1235   WBC  --   --  7.9  --  7.0 5.6   < >  --    HGB  --   --  11.2*  --  10.7* 11.0*   < >  --    MCV  --   --  97  --  96 98   < >  --     171 178   < > 174 182   < >  --    INR  --   --   --   --   --   --   --  0.99    < > = values in this interval not displayed.     Recent Labs   Lab Test 12/27/23  0647 12/19/23  0555 12/18/23  0535   POTASSIUM 4.2 3.7 4.0   CHLORIDE 101 101 104   CO2 34* 33* 35*   BUN 15.6 10.9 10.9   ANIONGAP 6* 8 5*     Recent Labs   Lab Test 12/19/23  0555 12/13/23  0600 11/26/23  1545 11/26/23  0056 05/29/23  0942 05/12/23  1629 05/12/23  0513 05/06/23  0637 05/04/23  2130 03/16/23  0511 06/20/22  1119   ALBUMIN 3.7 4.0  --  3.4*   < >  --   --    < >  --    < > 3.4   BILITOTAL 0.2 0.2  --  0.5   < >  --   --    < >  --    < > 0.4   ALT 23 25  --  53   < >  --   --    < >  --    < > 24   AST 18 23  --  47*   < >  --   --    < >  --    < > 18   PROTEIN  --   --  20*  --   --  20*  --   --   Negative  --   --    LIPASE  --   --   --  20  --   --  18  --   --   --  69*    < > = values in this interval not displayed.       ADDITIONAL COMMENTS:   I reviewed the patient's recent chart notes, lab results, imaging results and other pertinent diagnostic test results.   I discussed the patient's consultation assessment and plan with Dr. Hayes and the patient's attending RN.    ENEDELIA Mason PA-C [facility]  Office: [phone]  Cell : [phone] (Dr. Hayes)  Cell: [phone] (Barbara Avila PA-C)

## 2023-12-27 NOTE — PLAN OF CARE
Goal Outcome Evaluation:         Pt is alert. Uncooperative at times, refused to answer orientation questions & some assessments as well. Pt is having small amounts of blood in stool, educated on not straining when having a BM. Prn tylenol given for pain. A1 GB/W, up to BR. R PICC in place, blood return noted, labs drawn. Discharge pending to group home today.

## 2023-12-27 NOTE — PROGRESS NOTES
Watery bloody stool    Will need to send for C  dificile   Unlikely to be done by noon with result.   If stillnoon discharge time  not likely to leave today.   Also requested ID input ? Are we done with Abx , cultures.   Chandni

## 2023-12-27 NOTE — CARE PLAN
.Patient vital signs are at baseline: Yes  Patient able to ambulate as they were prior to admission or with assist devices provided by therapies during their stay:  Yes  Patient MUST void prior to discharge:  Yes  Patient able to tolerate oral intake:  Yes  Pain has adequate pain control using Oral analgesics:  Yes  Does patient have an identified :  yes  Has goal D/C date and time been discussed with patient: Pt is alert and oriented to self , forgetful about place and situations, Pt is seizure precaution  and regular diet , up with SBA, Pt continues to stained doing bowel movement and later complained of bloody stool , education was given to avoid staining during bowel movement. Discharge  pending

## 2023-12-27 NOTE — CONSULTS
[facility]    Infectious Disease Progress Note    Date of Service (when I saw the patient): 12/27/2023     Assessment & Plan   Tre Vazquez is a 48 year old who was admitted on 11/24/2023.       Impression:  47 yo coming in from the  with mentation changes   PMH is very complicated He lives in a group home.  He has some developmental delay, morbid obesity, ANA and OHS using chronically BiPAP at night apparently.  History of seizure disorder and chronic hyperammonemia while on Depakote.  Schizoaffective disorder, borderline personality and left eye blindness.   Blood cultures with MSSA   Cephalosporin listed as an allergy allergy listed as a rash per chart reviewed patient has been placed on zosyn before no reaction listed   He is on vancomycin   He has a documented picture in the chart from 11/ 20 with a wound on the buttocks he was seen in the ER for this and prescribed doxycycline on 11/ 20, ? Source   Repeat blood cultures from 11/ 26 are negative so far   Since he was seen by me he was transferred to [facility]   He has already finished the course of antibiotics for the MSSA bacteremia    Is now having diarrhea. With blood in stool is being checked for c diff      Recommendations:   Done with nafcillin for recent MSSA bacteremia   Now blood in stool with diarrhea, ? GI bleed, C diff is being ruled out, GI consult pending will follow     Kecia Page MD    Interval History   Tolerating antibiotics ok   No new rashes or issues with antibiotics   Labs reviewed   No changes to past medical, social or family history   No fever       Physical Exam   Temp: 98.7  F (37.1  C) Temp src: Oral BP: 119/78 Pulse: 88   Resp: 16 SpO2: (!) 89 % O2 Device: None (Room air)    Vitals:     Vital Signs with Ranges  Temp:  [97.6  F (36.4  C)-98.7  F (37.1  C)] 98.7  F (37.1  C)  Pulse:  [85-95] 88  Resp:  [16-18] 16  BP: (110-132)/(73-88) 119/78  SpO2:  [89 %-95 %] 89 %    Constitutional: Awake, alert,  "cooperative, no apparent distress  Lungs: Clear to auscultation bilaterally, no crackles or wheezing  Cardiovascular: Regular rate and rhythm, normal S1 and S2, and no murmur noted  Abdomen: Normal bowel sounds, soft, non-distended, non-tender  Skin: No rashes, no cyanosis, no edema  Other:    Medications      busPIRone  15 mg Oral BID    citalopram  60 mg Oral Daily    divalproex sodium delayed-release  500 mg Oral Q12H [facility] (08/20)    enoxaparin ANTICOAGULANT  40 mg Subcutaneous Q12H    lacosamide  50 mg Oral Daily    lamoTRIgine  200 mg Oral TID    levETIRAcetam  1,000 mg Oral QAM    And    levETIRAcetam  1,500 mg Oral At Bedtime    levOCARNitine  660 mg Oral TID    methylcellulose  500 mg Oral or Feeding Tube BID    multivitamin, therapeutic  1 tablet Oral Daily    pantoprazole  40 mg Oral Daily    polyethylene glycol  17 g Oral Daily    prazosin  2 mg Oral At Bedtime    propranolol  40 mg Oral BID    QUEtiapine  300 mg Oral At Bedtime    sodium chloride (PF)  10 mL Intracatheter Q8H    sodium chloride (PF)  10-40 mL Intracatheter Q7 Days    zonisamide  100 mg Oral QAM    And    zonisamide  200 mg Oral At Bedtime       Data   All microbiology laboratory data reviewed.  Recent Labs   Lab Test 12/27/23  0647 12/24/23  0628 12/24/23  0010 12/21/23  0602 12/19/23  0555 12/18/23  0535   WBC  --   --  7.9  --  7.0 5.6   HGB  --   --  11.2*  --  10.7* 11.0*   HCT  --   --  35.9*  --  34.0* 35.6*   MCV  --   --  97  --  96 98    171 178   < > 174 182    < > = values in this interval not displayed.     Recent Labs   Lab Test 12/27/23  0647 12/24/23  0628 12/21/23  0602   CR 0.61*  0.61* 0.62* 0.58*     No lab results found.  No lab results found.    Invalid input(s): \"UC\"    All cultures:  No results for input(s): \"CULTURE\" in the last 168 hours.     Blood culture:  Results for orders placed or performed during the hospital encounter of 11/24/23   Blood Culture Hand, Left    Specimen: Hand, Left; Blood   Result " Value Ref Range    Culture No Growth    Blood Culture Arm, Right    Specimen: Arm, Right; Blood   Result Value Ref Range    Culture No Growth    Blood Culture Arm, Right    Specimen: Arm, Right; Blood   Result Value Ref Range    Culture No Growth    Blood Culture Arm, Right    Specimen: Arm, Right; Blood   Result Value Ref Range    Culture Positive on the 1st day of incubation (A)     Culture Staphylococcus aureus (AA)    Blood Culture Hand, Left    Specimen: Hand, Left; Blood   Result Value Ref Range    Culture Positive on the 1st day of incubation (A)     Culture Staphylococcus aureus (AA)    Blood Culture Peripheral Blood    Specimen: Peripheral Blood   Result Value Ref Range    Culture Positive on the 1st day of incubation (A)     Culture Staphylococcus aureus (AA)        Susceptibility    Staphylococcus aureus - JOMAR     Oxacillin* <=0.25 Susceptible ug/mL      * Oxacillin susceptible isolates are susceptible to cephalosporins (example: cefazolin and cephalexin) and beta lactam combination agents. Oxacillin resistant isolates are resistant to these agents.     Gentamicin <=0.5 Susceptible ug/mL     Erythromycin <=0.25 Susceptible ug/mL     Clindamycin 0.25 Susceptible ug/mL     Vancomycin 1 Susceptible ug/mL     Daptomycin 0.5 Susceptible ug/mL     Tetracycline <=1 Susceptible ug/mL     Doxycycline <=0.5 Susceptible ug/mL     Trimethoprim/Sulfamethoxazole 8/152 Resistant ug/mL   Results for orders placed or performed during the hospital encounter of 05/12/23   Blood Culture Hand, Left    Specimen: Hand, Left; Blood   Result Value Ref Range    Culture No Growth    Blood Culture Line, venous    Specimen: Line, venous; Blood   Result Value Ref Range    Culture No Growth    Results for orders placed or performed during the hospital encounter of 05/04/23   Blood Culture Peripheral Blood    Specimen: Peripheral Blood   Result Value Ref Range    Culture No Growth    Blood Culture Peripheral Blood    Specimen:  Peripheral Blood   Result Value Ref Range    Culture No Growth    Results for orders placed or performed in visit on 11/20/13   Blood culture   Result Value Ref Range    Specimen Description Blood     Special Requests L ARM YUNG     Culture Micro       Cultured on the 1st day of incubation: Streptococcus pneumoniae in 4 of 4   bottles. Refer to culture [id number] for susceptibilities  Critical Value called to and read back by Shantell Mg RN at 0843 on 11/21/13      Micro Report Status FINAL 11/28/2013    Results for orders placed or performed in visit on 11/20/13   Blood culture   Result Value Ref Range    Specimen Description Blood     Special Requests LEFTARM     Culture Micro       Cultured on the 1st day of incubation: Streptococcus pneumoniae in 4 of 4   bottles.  Critical Value called to and read back by Shantell Mg RN at 0843 on 11/21/13    Sensitivities sent to [facility] per Dr. Camacho    Micro Report Status FINAL 11/28/2013        Susceptibility    Cultured on the 1st day of incubation: streptococcus pneumoniae in 4 of 4  bottles. (e test) -  (no method available)     Ceftriaxone (meningitis) 0.047 Susceptible  ug/mL     Ceftriaxone (non-meningitis) 0.047 Susceptible  ug/mL     Clindamycin 0.38 Intermediate  ug/mL     Levofloxacin 1.0 Susceptible  ug/mL     Meropenem 0.032 Susceptible  ug/mL     Penicillin (meningitis) 0.032 Susceptible  ug/mL     Penicillin (non-meningitis) 0.032 Susceptible  ug/mL     Penicillin(oral) 0.032 Susceptible  ug/mL     Vancomycin 0.5 Susceptible  ug/mL   Results for orders placed or performed in visit on 11/20/13   Blood culture    Specimen: Blood   Result Value Ref Range    Specimen Description Unknown     Culture Micro Duplicate request  CANCELLED AND CREDITED     Micro Report Status FINAL 11/20/2013    Blood culture    Specimen: Blood   Result Value Ref Range    Specimen Description Unknown     Culture Micro Duplicate request  CANCELLED AND CREDITED     Micro Report Status FINAL  11/20/2013       Urine culture:  Results for orders placed or performed in visit on 12/19/08   Urine culture   Result Value Ref Range    Specimen Description Midstream Urine     Culture Micro       10 to 50,000 colonies/mL Multiple species present, probable perineal    Micro Report Status FINAL 12/21/2008

## 2023-12-27 NOTE — PROGRESS NOTES
Care Management Follow Up    Length of Stay (days): 9    Expected Discharge Date: 12/28/2023     Concerns to be Addressed: discharge planning     Patient plan of care discussed at interdisciplinary rounds: Yes    Anticipated Discharge Disposition: Group Home     Anticipated Discharge Services:    Anticipated Discharge DME:      Patient/family educated on Medicare website which has current facility and service quality ratings:    Education Provided on the Discharge Plan:    Patient/Family in Agreement with the Plan:      Referrals Placed by CM/SW:    Private pay costs discussed: Not applicable    Additional Information:  Writer noticed that we are ruling patient out for c-diff. Awaiting GI to see.  manager Quoc ([phone]) requested pt return to Group Home before noon on day of discharge. Writer set up stretcher ride for [phone] tomorrow back to . Writer called Quoc twice but unable to reach and his mailbox is still full and cannot accept messages. Writer called isaurapartyler Dailey ([phone]) to update POC and she's in agreement with plan. MD notified of plan and will see if pt is medically ready to discharge tomorrow. CM will continue to follow for safe discharge planning.      Dennis Balderrama, RN  [facility]  Inpatient Care Management - Ortho Spine  CM RN Mobile: [phone] daily 7:30-4:00

## 2023-12-28 VITALS
DIASTOLIC BLOOD PRESSURE: 59 MMHG | RESPIRATION RATE: 23 BRPM | TEMPERATURE: 98.5 F | SYSTOLIC BLOOD PRESSURE: 94 MMHG | OXYGEN SATURATION: 92 % | HEART RATE: 74 BPM

## 2023-12-28 LAB — LEVETIRACETAM SERPL-MCNC: 32.7 ΜG/ML (ref 10–40)

## 2023-12-28 PROCEDURE — 80177 DRUG SCRN QUAN LEVETIRACETAM: CPT | Performed by: INTERNAL MEDICINE

## 2023-12-28 PROCEDURE — G0463 HOSPITAL OUTPT CLINIC VISIT: HCPCS

## 2023-12-28 PROCEDURE — 99207 PR NO BILLABLE SERVICE THIS VISIT: CPT | Performed by: INTERNAL MEDICINE

## 2023-12-28 PROCEDURE — 99239 HOSP IP/OBS DSCHRG MGMT >30: CPT | Performed by: INTERNAL MEDICINE

## 2023-12-28 PROCEDURE — 36415 COLL VENOUS BLD VENIPUNCTURE: CPT | Performed by: INTERNAL MEDICINE

## 2023-12-28 PROCEDURE — 80175 DRUG SCREEN QUAN LAMOTRIGINE: CPT | Performed by: INTERNAL MEDICINE

## 2023-12-28 PROCEDURE — 250N000013 HC RX MED GY IP 250 OP 250 PS 637: Performed by: INTERNAL MEDICINE

## 2023-12-28 PROCEDURE — 250N000013 HC RX MED GY IP 250 OP 250 PS 637: Performed by: HOSPITALIST

## 2023-12-28 PROCEDURE — 250N000013 HC RX MED GY IP 250 OP 250 PS 637

## 2023-12-28 PROCEDURE — 999N000040 HC STATISTIC CONSULT NO CHARGE VASC ACCESS

## 2023-12-28 RX ORDER — LACOSAMIDE 50 MG/1
50 TABLET ORAL DAILY
Qty: 7 TABLET | Refills: 0 | Status: SHIPPED | OUTPATIENT
Start: 2023-12-28 | End: 2024-03-07

## 2023-12-28 RX ORDER — LAMOTRIGINE 200 MG/1
200 TABLET ORAL 3 TIMES DAILY
Qty: 21 TABLET | Refills: 0 | Status: SHIPPED | OUTPATIENT
Start: 2023-12-28 | End: 2023-12-28

## 2023-12-28 RX ORDER — LAMOTRIGINE 200 MG/1
200 TABLET ORAL 2 TIMES DAILY
Qty: 14 TABLET | Refills: 0 | Status: SHIPPED | OUTPATIENT
Start: 2023-12-28 | End: 2024-03-07

## 2023-12-28 RX ORDER — PROPRANOLOL HYDROCHLORIDE 40 MG/1
40 TABLET ORAL 2 TIMES DAILY
Qty: 14 TABLET | Refills: 0 | Status: SHIPPED | OUTPATIENT
Start: 2023-12-28 | End: 2023-12-28

## 2023-12-28 RX ORDER — PROPRANOLOL HYDROCHLORIDE 20 MG/1
20 TABLET ORAL 3 TIMES DAILY
Start: 2023-12-28 | End: 2023-12-28

## 2023-12-28 RX ORDER — LAMOTRIGINE 100 MG/1
200 TABLET ORAL 2 TIMES DAILY
Status: DISCONTINUED | OUTPATIENT
Start: 2023-12-28 | End: 2023-12-28 | Stop reason: HOSPADM

## 2023-12-28 RX ORDER — PROPRANOLOL HYDROCHLORIDE 20 MG/1
20 TABLET ORAL 2 TIMES DAILY
Start: 2023-12-28

## 2023-12-28 RX ORDER — LAMOTRIGINE 100 MG/1
100 TABLET ORAL DAILY
Status: DISCONTINUED | OUTPATIENT
Start: 2023-12-28 | End: 2023-12-28 | Stop reason: HOSPADM

## 2023-12-28 RX ORDER — DIVALPROEX SODIUM 500 MG/1
500 TABLET, DELAYED RELEASE ORAL EVERY 12 HOURS
Qty: 14 TABLET | Refills: 0 | Status: SHIPPED | OUTPATIENT
Start: 2023-12-28 | End: 2024-03-01

## 2023-12-28 RX ORDER — LAMOTRIGINE 100 MG/1
100 TABLET ORAL DAILY
Qty: 7 TABLET | Refills: 0 | Status: SHIPPED | OUTPATIENT
Start: 2023-12-28 | End: 2024-03-07

## 2023-12-28 RX ADMIN — ZONISAMIDE 100 MG: 100 CAPSULE ORAL at 09:31

## 2023-12-28 RX ADMIN — PROPRANOLOL HYDROCHLORIDE 40 MG: 40 TABLET ORAL at 09:31

## 2023-12-28 RX ADMIN — DIVALPROEX SODIUM 500 MG: 250 TABLET, DELAYED RELEASE ORAL at 09:31

## 2023-12-28 RX ADMIN — BUSPIRONE HYDROCHLORIDE 15 MG: 5 TABLET ORAL at 09:33

## 2023-12-28 RX ADMIN — PANTOPRAZOLE SODIUM 40 MG: 40 TABLET, DELAYED RELEASE ORAL at 09:32

## 2023-12-28 RX ADMIN — METHYLCELLULOSE 500 MG: 500 TABLET ORAL at 09:31

## 2023-12-28 RX ADMIN — CITALOPRAM HYDROBROMIDE 60 MG: 40 TABLET ORAL at 09:32

## 2023-12-28 RX ADMIN — MULTIVITAMIN TABLET 1 TABLET: TABLET at 09:32

## 2023-12-28 RX ADMIN — LACOSAMIDE 50 MG: 50 TABLET, FILM COATED ORAL at 09:32

## 2023-12-28 RX ADMIN — LEVOCARNITINE 660 MG: 330 TABLET ORAL at 09:31

## 2023-12-28 RX ADMIN — LEVETIRACETAM 1000 MG: 500 TABLET, FILM COATED ORAL at 09:32

## 2023-12-28 ASSESSMENT — ACTIVITIES OF DAILY LIVING (ADL)
ADLS_ACUITY_SCORE: 43

## 2023-12-28 NOTE — PROGRESS NOTES
Care Management Discharge Note    Discharge Date: 12/28/2023       Discharge Disposition: Group Home    Discharge Services:      Discharge DME:      Discharge Transportation: agency    Private pay costs discussed: Not applicable    Does the patient's insurance plan have a 3 day qualifying hospital stay waiver?  No    PAS Confirmation Code:    Patient/family educated on Medicare website which has current facility and service quality ratings:      Education Provided on the Discharge Plan:    Persons Notified of Discharge Plans: Ashlie, isaura lundbergAriadna, RN; charge RNQuoc at   Patient/Family in Agreement with the Plan:      Handoff Referral Completed: No    Additional Information:  Discharge plans in progress.  Writer arranged for home RN as requested by Dr. Graham this morning.  Wound care orders are written in the discharge and AVS.  [facility] accepted.  Writer called step toño Dailey and let her know. Ashlie asked why the wound has not healed and wondered if it is due to lack of movement.Writer explained the sore is on his bottom so more difficult area to heal and sitting will make it worse rather than help it heal.  Writer suggested once home RN starts, she can approach [facility] on getting home PT order from the PCP if the RN feels it is appropriate.  Also note that at the hospital RNs have had him up with assit of one and GB & walker.    Writer asked bedside MARYJANE CHRISTENSEN to send dressing supplies with patient.    Transport arrived to  patient with a crew of 2 males.  Writer asked MARYJANE Christensen to try to get patient to agree to this, which she was able to do.    Anyi Pollack RN

## 2023-12-28 NOTE — PROGRESS NOTES
Care Transitions Note:  Writer acknowledges Dr. Tariq' note re medications this morning.  Writer called  and requested patient be able to return to them by 3:00 and they did agree to that.  Writer changed stretcher ride to [phone].  Will anticipate lab work and medications be sorted out by then.

## 2023-12-28 NOTE — PROGRESS NOTES
ID is not able to perform/ result patient's enteric bacteria & virus panel PCR d/t pt's hospital stay being >3 days, on call hospitalist notified.

## 2023-12-28 NOTE — PLAN OF CARE
Physical Therapy Discharge Summary    Reason for therapy discharge:    Discharged to home.    Progress towards therapy goal(s). See goals on Care Plan in [facility] electronic health record for goal details.  Goals partially met.  Barriers to achieving goals:   discharge from facility.    Therapy recommendation(s):    No further therapy is recommended.    *Of note, writer did not treat pt

## 2023-12-28 NOTE — PROGRESS NOTES
"[facility] Nurse Inpatient Assessment     Consulted for: Wound L buttock     Summary: patient with POA wound to left buttock noted per charting as a prior abscess, started iodosorb gel 12/19, improving 12/28    Patient History (according to provider note(s):      \"48 year old male admitted on 12/18/2023. He insisted admission from [facility] where he has been hospitalized since late November for MSSA bacteremia.  Was unable to return to his group home.\"     Assessment:      Areas visualized during today's visit: Focused: and Sacrum/coccyx    Wound location: buttock left     Last photo: 12/28  Wound due to:  noted per charting as a prior abscess   Wound history/plan of care: found with 4x4\" mepilex in use   Wound base:  fibrin and slough     Palpation of the wound bed: boggy and fluctuance      Drainage: scant     Description of drainage: serosanguinous     Measurements (length x width x depth, in cm): 0.5  x 0.5  x  0.1 cm      Tunneling: N/A     Undermining: N/A  Periwound skin: Intact      Color: pale and purple      Temperature: normal   Odor: none  Pain: no grimacing or signs of discomfort, none  Pain interventions prior to dressing change: patient tolerated well  Treatment goal: Drainage control and Infection control/prevention  STATUS: improving  Supplies ordered: gathered and at bedside       Treatment Plan:       Wound care  Start:  12/20/23 0600,   DAILY,   Routine        Comments: Location: left buttock  Care: provided daily by primary RN  1. Cleanse daily with normal saline or commercial wound cleanser and 4x4\" gauze, pat dry  2. Apply iodoform (#[id number]) at least nickel thickness to wound base  3. Cover with 4x4\" mepilex dressing. Ok to lift for routine skin assessments and reapply.             Skin care precautions  EFFECTIVE NOW        Comments: Pressure Injury Prevention (PIP) Plan:  If patient is declining pressure injury prevention interventions: Explore " "reason why and address patient's concerns, Educate on pressure injury risk and prevention intervention(s), If patient is still declining, document \"informed refusal\" , and Ensure Care team is aware ( provider, charge nurse, etc)  Mattress: Follow bed algorithm, reassess daily and order specialty mattress, if indicated.  HOB: Maintain at or below 30 degrees, unless contraindicated  Repositioning in bed: Every 1-2 hours , Left/right positioning; avoid supine, and Raise foot of bed prior to raising head of bed, to reduce patient sliding down (shear)  Heels: Keep elevated off mattress and Pillows under calves  Chair positioning: Chair cushion (#[id number]) , Assist patient to reposition hourly, and Do NOT use a donut for sitting (this increases pressure to smaller area and creates a higher potential for injury)    If patient has a buttock pressure injury, or high risk for PI use chair cushion or SPS.  Moisture Management: Perineal cleansing /protection: Follow Incontinence Protocol, Avoid brief in bed, Clean and dry skin folds with bathing , and Moisturize dry skin  Under Devices: Inspect skin under all medical devices during skin inspection , Ensure tubes are stabilized without tension, and Ensure patient is not lying on medical devices or equipment when repositioned  Ask provider to discontinue device when no longer needed.        12/18/23 1449  Wound care and Ostomy Supplies  ONE TIME     Complete     Comments: Chair cushion #[id number]-please send 1          Wound care  Start:  12/28/23 1106,   EVERY SHIFT,   Routine        Comments: Location: perianal  Care: provided qshift and PRN by Staff RN and / or NA  1. Cleanse perianal for patient qshift with joselito dimethicone wipes (gray package)  2. Alternatively assist patient washing with a washcloth and soap and water, up and down and side to side  3. Dry patient using paper towels or dry wash cloth        Orders: Reviewed and Updated    RECOMMEND PRIMARY TEAM ORDER: None, at " this time  Education provided: plan of care, Moisture management, and Hygiene  Discussed plan of care with: Patient and Nurse  [facility] nurse follow-up plan: weekly  Notify [facility] if wound(s) deteriorate.  Nursing to notify the Provider(s) and re-consult the [facility] Nurse if new skin concern.    DATA:     Current support surface: Standard  Low air loss (LIZZY pump, Isolibrium, Pulsate, skin guard, etc)  Containment of urine/stool: Incontinence Protocol and Incontinent pad in bed  BMI: There is no height or weight on file to calculate BMI.   Active diet order: Orders Placed This Encounter      Regular Diet Adult      Diet     Output: I/O last 3 completed shifts:  In: 240 [P.O.:240]  Out: 1150 [Urine:1150]     Labs:   Recent Labs   Lab 12/27/23  1307 12/27/23  0647   HGB 11.6* 11.1*   WBC  --  10.4     Pressure injury risk assessment:   Sensory Perception: 4-->no impairment  Moisture: 3-->occasionally moist  Activity: 3-->walks occasionally  Mobility: 3-->slightly limited  Nutrition: 2-->probably inadequate  Friction and Shear: 2-->potential problem  Julio César Score: 17    Annika CWOCN   1st choice: Securely message with [employer] ([facility] [employer] [facility])   (2nd option: [facility] Office Phone [phone], messages checked periodically Mon-Fri 8a-4p)

## 2023-12-28 NOTE — PROGRESS NOTES
Pt alert, agitated at times, doesn't follow commands sometimes. Up x1 with walker. VSS.RA. R are PICC patent with blood return. Had BM x1, loose and soft, no blood. stool sample just sent to the lab, result pending.

## 2023-12-28 NOTE — PROGRESS NOTES
Dr. Tariq attempting to get discharge completed.   There is a drug drug interaction for depakote and Vimpat and dosing uptitrated.     Dr Tariq is unfamiliar with risk/benefit of this interaction or the titration of the meds as these are seizure medications.   Requested that neurology evaluate if this is still appropriate to prescribe and if the dosing is appropriate and safe for the interaction. Vimpat/depakote interaction is flagged.     Dr. Carbajal had ordered the medication initially with titration of dosing and not currently on service.   Dr. Tariq paged Dr. Lewis to see if this is still appropriate.   Will not be able to discharge until this is clarified.   Would not necessarily require an official consult but need to have meds reviewed for discharge.   He needs to leave between 10-noon per discussion with SW and nursing or his discharge will be postponed until tomorrow     Chandni

## 2023-12-28 NOTE — PLAN OF CARE
Goal Outcome Evaluation:         Pt is alert. Bipap on overnight. Continues to be uncooperative at times & demanding. No BM this shift. C diff results negative. R PICC in place. Denies pain.

## 2023-12-28 NOTE — PROGRESS NOTES
Pt alert, refused to answer orientation questions, up x1 with walker. PROS.RA. Pt will discharge to group home in the afternoon.

## 2023-12-28 NOTE — PROGRESS NOTES
[facility]    Medicine Progress Note - Hospitalist Service    Date of Admission:  12/18/2023    Interval History   Patient seen on rounding today.  Complained of loose stools and noted.  Staffing also reported blood in the stools.  Noted on the sheets.  Did exam with blood on glove.  GI consult the patient refused any colonoscopy or procedure.  Plans to go back to the group home on 12/28 as he has waiver approval  Seen by ID today as well for follow-up to his care plan    Assessment & Plan   Tre Vazquez is a 48 year old male with past medical history including developmental delay, cerebral palsy, morbid obesity, ANA/OHS on BiPAP at bedtime, seizure disorder, chronic hyperammonemia, anemia, schizoaffective disorder, borderline personality disorder, MDD, anxiety, HTN, and left eye blindness who was admitted to [facility] on 11/24/2023 with altered mental status consistent with acute encephalopathy likely primary driven by hypercapnia based on VBG.     He was also found to have an elevated ammonia level. (More chronic)   CT of the head, chest did not show any acute pathology.  Blood cultures returned positive for Staph aureus so he was treated with IV nafcillin and consultation with ID.  Plan was for 4 weeks of IV antibiotics.     Neurology had been consulted as patient reported increasing frequency of very short-lived seizures described as jerking of extremities for a few seconds and then feeling fatigued.  1 episode seen by edwin which reported similar to seizure episodes in the past.  Neurology reconsulted.  Started Vimpat and remained on antiepileptic medications.  Neurology recommended transfer to [facility] for continuous EEG given he already required staying in the hospital for IV antibiotic therapy prior to discharge.  Patient was transferred to [facility] on 12/18       Epilepsy history, shaking spells:   At [facility] Some concern for ongoing partial seizures  with frequent shaking spells noted at outside hospital during his prolonged hospitalization.    Multiple antiepileptics prior to admission.  Followed by neurology at [facility] prior to transfer with recommendation for continuous EEG at [facility].   12/17 Dr. Martinez detailed patient's seizure history.  12/19 started on a continuous EEG  12/20 neurology follow-up.  EEG showing no events.  12/20 increase lamotrigine from 200 twice daily to 200-100 - 200 for 1 week then 200 mg po TID which will start 12/27   Continue depakote 500 mg po BID   12/21 12/21 Given psychiatric issues did not recommend titrating off Depakote.  Continue levocarnitine for high ammonia levels.  Recommended outpatient follow up with neurologist   SUMMARY OF seizure meds currently   Vimpat 50 mg po daily (started 12/17)   Lamictal 200 mg p.o. twice daily for 15 doses till 12/19 and then increase to 200 mg 3 times a day on 12/27  Keppra 1000 mg a.m. and 1500 mg p.m.  Carnitor 660 mg 3 times daily  Depakote extended release 500 mg every 12 hours.  Zonegran 100 mg a.m. and 200 mg p.o. pm   See neurology notes 12/21/2023, no spells captured on continuous EEG monitoring.  No seizure activity.  Seizure regimen, see note.  Follow-up PTA outpatient neurology  Reviewed records for workup to date regarding his medications.   12/28 will go back to his group home.  Follow-up with outpatient neurology     MSSA bacteremia:   Staph aureus bacteremia 11/24 x 2 blood cultures  11/25 1 of 2 Staph aureus blood cultures  11/26 blood cultures x 2 negative   Uncertain source, potentially from a left gluteal abscess/injury, but also has a history of boils (Axillary, L arm lesions this past Sept). ID consult. Documented picture in the chart on 11/20 with wound on buttocks. No clear endocarditis on TTE. Last positive blood culture 11/25/2023 and remained hospitalized while completing a 4-week course of nafcillin through 12/24/2023.  Was unable to discharge to his  group home while on IV anti-infectives, no TCU placement available. Seen by ID at [facility]. Continue 2 g nafcillin every 4 hours  As per notes from Dr. Page 11/28 will need minimal 4 weeks antibiotics starting day of first negative cultures which was 11/26 - 12/24 12/27 Confirmed with ID that no further workup needed at this time.  He is done with his nafcillin.     Chronic hypoxic hypercarbic respiratory failure: Obesity hypoventilation, ANA, nonadherence with BiPAP noted. Significant hypercarbia on admission at [facility] outside facility.   Chronic BIPAP at night as PTA   Continue on discharge BIPAP      Class III obesity: BMI greater than 46. Increased risk of all cause mortality     Hypertension:  -Continue prior to admission propranolol, Minipress     Schizoaffective disorder:  Major depressive disorder:  Borderline personality disorder:  Continuing prior to admission Celexa, BuSpar. Continue Seroquel 300 mg at bedtime Patient has had continued episodes of noncompliance during this hospital stay, vitals, Lovenox.  Is ambulatory.  It appears this is his baseline behavior, encourage compliance.  However 12/23/2023 discussed with patient needs respectful behavior to Staff.  On discharge return to group home.      Diarrhea/blood:   C. difficile ordered and pending as well as enteric bacteria.  On rounding noted to have blood on the sheets.  On exam did not have any overt external hemorrhoids noted.  Rectal exam with blood  Colonoscopy 2022 with polyps.   He declined any colonoscopy  12/27 GI consult      DVT Prophylaxis: Enoxaparin (Lovenox) SQ, ambulation every shift .  Code Status: Full Code     Expected Discharge Date: 12/26/2023 back to [facility], however KARIN working with  on waiver status; see SW note  Discharge Delays: IV Medication   Destination: group home  Discharge Comments: IV antibiotics dosed every 4 hours through 12/25        Lines: PRESENT      PICC 11/28/23 Single Lumen Right Basilic-Site  Assessment: WDL      Cardiac Monitoring: None  Code Status: Full Code      Clinically Significant Risk Factors                  # Hypertension: Noted on problem list                   Disposition Plan      Expected Discharge Date: 12/28/2023,  9:00 AM  Discharge Delays: IV Medication - consider oral or Home Infusion  Destination: group home  Discharge Comments: Return to Group Home, must arrive before 1200.            ROMEL LAINEZ MD  Hospitalist Service  [facility]  Securely message with Result[employer] (more info)  Text page via amBX Paging/Directory   ______________________________________________________________________    Physical Exam   Vital Signs: Temp: 98  F (36.7  C) Temp src: Oral BP: 105/70 Pulse: 79   Resp: 16 SpO2: 91 % O2 Device: BiPAP/CPAP    Weight: 0 lbs 0 oz    General Appearance: Patient on his BiPAP mask  Respiratory: Clear to auscultation bilaterally without wheezes or rhonchi  Cardiovascular: Regular rate and rhythm without gallop rub normal S1-S2  GI: Positive bowel sounds soft no rebound guarding  Skin: No overt edema      Medical Decision Making       55 MINUTES SPENT BY ME on the date of service doing chart review, history, exam, documentation & further activities per the note.    Evaluation of bloody stools.  GI consult.  Discussion with nursing staff and case management.  Called and spoke with group Morris about medications on discharge and coordination of his care    Data     I have personally reviewed the following data over the past 24 hrs:    10.4  \   11.6 (L)   / 194; 194     141 101 15.6 /  92   4.2 34 (H) 0.61 (L); 0.61 (L) \       Imaging results reviewed over the past 24 hrs:   No results found for this or any previous visit (from the past 24 hour(s)).

## 2023-12-29 ENCOUNTER — HOSPITAL ENCOUNTER (EMERGENCY)
Facility: CLINIC | Age: 48
Discharge: NURSING, REHAB OR LONG-TERM CARE | End: 2023-12-29
Attending: EMERGENCY MEDICINE | Admitting: EMERGENCY MEDICINE
Payer: MEDICARE

## 2023-12-29 VITALS — OXYGEN SATURATION: 97 % | RESPIRATION RATE: 16 BRPM

## 2023-12-29 DIAGNOSIS — Z13.9 ENCOUNTER FOR MEDICAL SCREENING EXAMINATION: ICD-10-CM

## 2023-12-29 LAB — LAMOTRIGINE SERPL-MCNC: 10 UG/ML

## 2023-12-29 PROCEDURE — 99281 EMR DPT VST MAYX REQ PHY/QHP: CPT

## 2023-12-29 NOTE — DISCHARGE SUMMARY
[facility]  Hospitalist Discharge Summary      Date of Admission:  12/18/2023  Date of Discharge:  12/28/2023  2:25 PM  Discharging Provider: ROMEL LAINEZ MD  Discharge Service: Hospitalist Service    Discharge Diagnoses   History of epilepsy with shaking spells  MSSA bacteremia  Chronic hypoxic hypercarbic respiratory failure on home BiPAP  Obesity hypoventilation syndrome with ANA  Class III obesity  Hypertension  Schizoaffective disorder  Major depression disorder  Borderline personality disorder  Diarrhea with bloody stools refused colonoscopy  C. difficile negative  Transfer to [facility] for continuous EEG with negative EEG  Open pressure area on sacral region>> wound care with follow up       Clinically Significant Risk Factors          Follow-ups Needed After Discharge     Unresulted Labs Ordered in the Past 30 Days of this Admission       Date and Time Order Name Status Description    12/28/2023  8:20 AM Lamotrigine Level In process         These results will be followed up by Group home and neurologist     Discharge Disposition   Discharged to home  Condition at discharge: Stable    Hospital Course   Tre Vazquez is a 48 year old male with past medical history including developmental delay, cerebral palsy, morbid obesity, ANA/OHS on BiPAP at bedtime, seizure disorder, chronic hyperammonemia, anemia, schizoaffective disorder, borderline personality disorder, MDD, anxiety, HTN, and left eye blindness who was admitted to [facility] on 11/24/2023 with altered mental status consistent with acute encephalopathy likely primary driven by hypercapnia based on VBG.      He was also found to have an elevated ammonia level. (More chronic)   CT of the head, chest did not show any acute pathology.  Blood cultures returned positive for Staph aureus so he was treated with IV nafcillin and consultation with ID.  Plan was for 4 weeks of IV antibiotics.      Neurology had been consulted as  patient reported increasing frequency of very short-lived seizures described as jerking of extremities for a few seconds and then feeling fatigued.  1 episode seen by edwin which reported similar to seizure episodes in the past.  Neurology reconsulted.  Started Vimpat and remained on antiepileptic medications. Neurology recommended transfer to [facility] for continuous EEG given he already required staying in the hospital for IV antibiotic therapy prior to discharge.  Patient was transferred to [facility] on 12/18       Epilepsy history, shaking spells:   At [facility] Some concern for ongoing partial seizures with frequent shaking spells noted at outside hospital during his prolonged hospitalization.  Multiple antiepileptics prior to admission.  Followed by neurology at [facility] prior to transfer with recommendation for continuous EEG at [facility]. 12/17 Dr. Martinez detailed patient's seizure history.  12/19 started on a continuous EEG: no events on continuous EEG.  Adjustment in medications were made by neurology.  Initial plans to increase lamotrigine as outlined in note: 12/20 increase lamotrigine from 200 twice daily to 200-100 -200 for 1 week then 200 mg po TID which will start 12/27: Continue Depakote at 500 mg p.o. twice daily. (Did not taper off as potential use for psychiatric issues as well) continue levocarnitine for elevated ammonia.  Started on Vimpat 50 mg p.o. daily on 12/17.  Keppra continued as prior to admission with the 1000 mg a.m. and 1500 mg p.m.  Zonegran 100 mg a.m. and 200 mg p.m.  See neurology notes 12/21/2023, no spells captured on continuous EEG monitoring.  No seizure activity.  Seizure regimen, see note.  Follow-up PTA outpatient neurology.  In preparation for medications to discharge there was a potential concern of increasing lamotrigine more than 50 mg a week while being on the Depakote.  Dosing of lamotrigine remained at 200 mg twice a day 8 AM and 8 PM with 100  mg at 1400.  12/28 lamotrigine level pending at the time of discharge  Patient will need to follow-up closely with his outpatient neurologist/seizure physician: Within a couple weeks after discharge    MSSA bacteremia:   Staph aureus bacteremia 11/24 x 2 blood cultures  11/25 1 of 2 Staph aureus blood cultures  11/26 blood cultures x 2 negative   Patient diagnosed with Staph aureus bacteremia while at [facility].  Uncertain source, potentially from a left gluteal abscess/injury, but also has a history of boils (Axillary, L arm lesions this past Sept). ID consult. Documented picture in the chart on 11/20 with wound on buttocks. No clear endocarditis on TTE. Last positive blood culture 11/25/2023 and remained hospitalized while completing a 4-week course of nafcillin through 12/24/2023.  Was unable to discharge to his group home while on IV anti-infectives, no TCU placement available. Seen by ID at [facility]. Continue 2 g nafcillin every 4 hours  As per notes from Dr. Page 11/28 will need minimal 4 weeks antibiotics starting day of first negative cultures which was 11/26 - 12/24 12/27 Confirmed with ID that no further workup needed at this time.  He is done with his nafcillin. PICC removed.      Chronic hypoxic hypercarbic respiratory failure: Obesity hypoventilation, ANA, nonadherence with BiPAP noted. Significant hypercarbia on admission at [facility] outside facility.   Chronic BIPAP at night as PTA   Continue on discharge BIPAP      Class III obesity: BMI greater than 46. Increased risk of all cause mortality     Hypertension:  Continue prior to admission propranolol, Minipress     Schizoaffective disorder:  Major depressive disorder:  Borderline personality disorder:  Continuing prior to admission Celexa, BuSpar. Continue Seroquel 300 mg at bedtime Patient has had continued episodes of noncompliance during this hospital stay, vitals, Lovenox.  Is ambulatory.  It appears this is his baseline behavior,  encourage compliance.  At times somewhat disrespectful to staff and encouraged patient to be respectful of staff.      Diarrhea/blood:   12/27 on rounding patient reported diarrhea and having some blood in his stool.  On exam he did not have overt external hemorrhoids.  12/27 GI consult: Rectal exam noted to have blood.  He did have a colonoscopy in 2022.  Patient declined any colonoscopy this admission.  C. difficile ordered and negative.  Symptoms appeared to improve by the time of discharge on 12/28 both blood and diarrhea    Wound care  WOC evaluation for wounds/pressure areas on sacrum and left buttock.  Pictures in epic.  Home care nurse ordered to follow-up on skin areas and ensure pressure relieving plan in place  As noted on admission to [facility] there was an open skin area on his left buttocks which could have been the source of staph bacteremia  WOC orders   1. Cleanse perianal for patient qshift with joselito dimethicone wipes (gray package)  2. Alternatively assist patient washing with a washcloth and soap and water, up and down and side to side  3. Dry patient using paper towels or dry wash cloth          Consultations This Hospital Stay   NEUROLOGY IP CONSULT  WOUND OSTOMY CONTINENCE NURSE  IP CONSULT  VASCULAR ACCESS ADULT IP CONSULT  RESPIRATORY CARE IP CONSULT  CARE MANAGEMENT / SOCIAL WORK IP CONSULT  PHYSICAL THERAPY ADULT IP CONSULT  VASCULAR ACCESS ADULT IP CONSULT  INFECTIOUS DISEASES IP CONSULT  GASTROENTEROLOGY IP CONSULT  PHARMACY IP CONSULT  NEUROLOGY IP CONSULT  NEUROLOGY IP CONSULT  VASCULAR ACCESS ADULT IP CONSULT    Code Status   Prior    Time Spent on this Encounter   I, ROMEL LAINEZ MD, personally saw the patient today and spent greater than 30 minutes discharging this patient.       ROMEL LAINEZ MD  [facility]  [address] [facility] [address]  Phone: [phone]  Fax:  "[phone]  ______________________________________________________________________    Physical Exam   Vital Signs: Temp: 98.5  F (36.9  C) Temp src: Oral BP: 94/59 Pulse: 74   Resp: 23 SpO2: 92 % O2 Device: BiPAP/CPAP    Weight: 0 lbs 0 oz  General Appearance: Patient was awake and alert  Morbidly obese  Respiratory: Lungs clear to auscultation bilaterally with no wheezes or rhonchi  Cardiovascular: Regular rate and rhythm without gallop rub normal S1-S2  GI: Positive bowel sounds soft rebound guarding tenderness  Skin: No overt pitting edema         Primary Care Physician   Siobhan Mayo    Discharge Orders      Medication Therapy Management Referral      Adult Neurology [facility] Referral      Home Care Referral      Reason for your hospital stay    Staph bacteremia and seizure     Follow-up and recommended labs and tests     Follow up with primary care provider, Siobhan Mayo, within 7 days for hospital follow- up.  The following labs/tests are recommended: basic metabolic and cbc/platelet .     Activity    Your activity upon discharge: activity as tolerated     Brief Discharge Instructions    BIPAP as PTA.     Brief Discharge Instructions    Resume home trelogy as PTA.   PTA AVAP rate 16  Pressure support 20/5 : EPAP 8-15 and : Max pressure of 25     Brief Discharge Instructions    Wound care  Start:  12/20/23 0600,   DAILY,   Routine       Comments: Location: left buttock  Care: provided daily by primary RN  1. Cleanse daily with normal saline or commercial wound cleanser and 4x4\" gauze, pat dry  2. Apply iodoform (#110651) at least nickel thickness to wound base  3. Cover with 4x4\" mepilex dressing. Ok to lift for routine skin assessments and reapply.  Nursing cares at Group home to follow and monitor for improvement : Update providers regular basis of progress either better or worsening     Brief Discharge Instructions    Patient needs to follow up primary neurologist for seizures in next 1-2 weeks "     Brief Discharge Instructions    Wound care for sacral area and monitoring for healing     Diet    Follow this diet upon discharge: Orders Placed This Encounter      Snacks/Supplements Adult: Ensure Enlive; With Meals      Snacks/Supplements Adult: Ensure Enlive; With Meals      Regular Diet Adult       Significant Results and Procedures   Most Recent 3 CBC's:  Recent Labs   Lab Test 12/27/23  1307 12/27/23  0647 12/24/23  0628 12/24/23  0010 12/21/23  0602 12/19/23  0555   WBC  --  10.4  --  7.9  --  7.0   HGB 11.6* 11.1*  --  11.2*  --  10.7*   MCV  --  97  --  97  --  96   PLT  --  194  194 171 178   < > 174    < > = values in this interval not displayed.     Most Recent 3 BMP's:  Recent Labs   Lab Test 12/27/23  0647 12/24/23  0628 12/21/23  0602 12/19/23  0555 12/19/23  0112 12/18/23  0535     --   --  142  --  144   POTASSIUM 4.2  --   --  3.7  --  4.0   CHLORIDE 101  --   --  101  --  104   CO2 34*  --   --  33*  --  35*   BUN 15.6  --   --  10.9  --  10.9   CR 0.61*  0.61* 0.62* 0.58* 0.61*   < > 0.60*   ANIONGAP 6*  --   --  8  --  5*   ARNOLD 9.1  --   --  8.5*  --  8.6   GLC 92  --   --  112*  --  104*    < > = values in this interval not displayed.     Most Recent 2 LFT's:  Recent Labs   Lab Test 12/19/23  0555 12/13/23  0600   AST 18 23   ALT 23 25   ALKPHOS 65 63   BILITOTAL 0.2 0.2     Most Recent 3 INR's:  Recent Labs   Lab Test 05/06/23  1235   INR 0.99     Most Recent INR's and Anticoagulation Dosing History:  Anticoagulation Dose History          Latest Ref Rng & Units 3/8/2011 11/21/2013 5/6/2023   Recent Dosing and Labs   INR 0.85 - 1.15 1.05  1.23  0.99      Most Recent 3 Creatinines:  Recent Labs   Lab Test 12/27/23  0647 12/24/23  0628 12/21/23  0602   CR 0.61*  0.61* 0.62* 0.58*     Most Recent 3 Hemoglobins:  Recent Labs   Lab Test 12/27/23  1307 12/27/23  0647 12/24/23  0010   HGB 11.6* 11.1* 11.2*     Most Recent 3 Troponin's:No lab results found.  Most Recent 3 BNP's:  Recent  Labs   Lab Test 05/12/23  0114 05/04/23  1517 03/25/23  0450   NTBNPI 309 69 161     Most Recent D-dimer:  Recent Labs   Lab Test 11/24/23  1042   DD 0.94*     Most Recent Cholesterol Panel:  Recent Labs   Lab Test 03/18/23  0543   TRIG 146     7-Day Micro Results       Collected Updated Procedure Result Status      12/27/2023 1859 12/27/2023 2318 C. difficile Toxin B PCR with reflex to C. difficile Antigen and Toxins A/B EIA [[id number]]    Stool from Per Rectum    Final result Component Value   C Difficile Toxin B by PCR Negative   A negative result does not exclude actual disease due to C. difficile and may be due to improper collection, handling and storage of the specimen or the number of organisms in the specimen is below the detection limit of the assay.                  Most Recent TSH and T4:No lab results found.  Most Recent Hemoglobin A1c:No lab results found.  Most Recent 6 glucoses:  Recent Labs   Lab Test 12/27/23  0647 12/19/23  0555 12/18/23  0535 12/13/23  0600 12/06/23  0206 12/05/23  2155   GLC 92 112* 104* 120* 114* 109*     Most Recent Urinalysis:  Recent Labs   Lab Test 11/26/23  1545   COLOR Dark Yellow*   APPEARANCE Clear   URINEGLC Negative   URINEBILI Negative   URINEKETONE Negative   SG 1.029   UBLD Negative   URINEPH 6.0   PROTEIN 20*   NITRITE Negative   LEUKEST Negative   RBCU 1   WBCU 1     Most Recent ABG:  Recent Labs   Lab Test 05/12/23  1306 05/12/23  0114 03/16/23  0830   PH 7.25*   < > 7.39   PO2  --   --  90   PCO2  --   --  56*   HCO3  --   --  34*   FRANCISCO  --   --  7.7*    < > = values in this interval not displayed.     Most Recent ESR & CRP:  Recent Labs   Lab Test 03/20/23 2022   CRPI 29.99*     Most Recent Anemia Panel:  Recent Labs   Lab Test 12/27/23  1307 12/27/23  0647   WBC  --  10.4   HGB 11.6* 11.1*   HCT  --  35.4*   MCV  --  97   PLT  --  194  194     Most Recent CPK:  Recent Labs   Lab Test 03/18/23  0543 03/15/23  1303   T 544* 85   ,   Results for orders  placed or performed during the hospital encounter of 12/18/23   XR Chest Port 1 View    Narrative    EXAM: XR CHEST PORT 1 VIEW  LOCATION: [facility]  DATE: 12/18/2023    INDICATION: confirm PICC placement (Transfer from OSH)  COMPARISON: None.      Impression    IMPRESSION: A right PICC is in place with the tip in the mid SVC. The glenohumeral joint and AC joint are well aligned on these AP views. The included portions of the ribs and lungs are unremarkable.       Discharge Medications   Discharge Medication List as of 12/28/2023  1:36 PM        START taking these medications    Details   lacosamide (VIMPAT) 50 MG TABS tablet Take 1 tablet (50 mg) by mouth daily, Disp-7 tablet, R-0, E-Prescribe           CONTINUE these medications which have CHANGED    Details   divalproex sodium delayed-release (DEPAKOTE) 500 MG DR tablet Take 1 tablet (500 mg) by mouth every 12 hours, Disp-14 tablet, R-0, E-Prescribe      !! lamoTRIgine (LAMICTAL) 100 MG tablet Take 1 tablet (100 mg) by mouth daily At 1400, Disp-7 tablet, R-0, E-Prescribe      !! lamoTRIgine (LAMICTAL) 200 MG tablet Take 1 tablet (200 mg) by mouth 2 times daily Take at 8 AM and 8 pm, Disp-14 tablet, R-0, E-Prescribe      propranolol (INDERAL) 20 MG tablet Take 1 tablet (20 mg) by mouth 2 times daily, No Print Out       !! - Potential duplicate medications found. Please discuss with provider.        CONTINUE these medications which have NOT CHANGED    Details   ammonium lactate (AMLACTIN) 12 % external cream Apply topically 2 times dailyHistorical      busPIRone (BUSPAR) 15 MG tablet Take 15 mg by mouth 2 times daily, Historical      !! citalopram (CELEXA) 20 MG tablet Take 20 mg by mouth daily, Historical      !! citalopram (CELEXA) 40 MG tablet Take 40 mg by mouth daily, Historical      fluticasone (FLONASE) 50 MCG/ACT nasal spray Spray 1 spray in nostril 2 times daily, Historical      levETIRAcetam (KEPPRA) 500 MG tablet Take 2 tablets  (1,000 mg) by mouth every morning AND 3 tablets (1,500 mg) At Bedtime., Disp-150 tablet, R-11, E-Prescribe      levOCARNitine (CARNITOR) 330 MG tablet Take 2 tablets (660 mg) by mouth 3 times daily, Disp-180 tablet, R-11, E-Prescribe      methylcellulose POWD powder Apply 2 mg topically 2 times daily Mix 1 rounded tablespoon (2 mg) in 8 oz glass of water and take by mouth twice daily, Historical      multivitamin, therapeutic (THERA-VIT) TABS tablet Take 1 tablet by mouth daily, Historical      pantoprazole (PROTONIX) 40 MG EC tablet Take 40 mg by mouth daily, Historical      polyethylene glycol (MIRALAX) 17 GM/Dose powder Take 1 capful. by mouth daily, Historical      prazosin (MINIPRESS) 2 MG capsule Take 2 mg by mouth At Bedtime, Historical      QUEtiapine ER (SEROQUEL XR) 300 MG 24 hr tablet Take 300 mg by mouth at bedtime, Historical      zonisamide (ZONEGRAN) 100 MG capsule Take 1 capsule (100 mg) by mouth every morning AND 2 capsules (200 mg) At Bedtime., Disp-90 capsule, R-11, E-Prescribe       !! - Potential duplicate medications found. Please discuss with provider.        STOP taking these medications       bacitracin 500 UNIT/GM external ointment Comments:   Reason for Stopping:         clotrimazole (LOTRIMIN) 1 % external solution Comments:   Reason for Stopping:         Nafcillin Sodium 2 g SOLR Comments:   Reason for Stopping:         ondansetron (ZOFRAN ODT) 4 MG ODT tab Comments:   Reason for Stopping:             Allergies   Allergies   Allergen Reactions    Hydrocodone Bitartrate Er Unknown    Cephalexin Rash     HUT Reaction: Rash; HUT Noted: [id number]      Vicodin [Hydrocodone-Acetaminophen] GI Disturbance

## 2023-12-30 NOTE — ED TRIAGE NOTES
Patient brought in via EMS due to patient's O2 tank was not working. Called patient's group home. Kary, , confirmed the O2 tank is now working. Patient has no other complaints.

## 2023-12-30 NOTE — ED PROVIDER NOTES
History     Chief Complaint:  Medication Refill       HPI   Tre Vazquez is a 48 year old male with a history of blindness of the left eye, hypertension, epilepsy presents emergency department with a complaint of his oxygen not working at his group home.  Patient was sent to the emergency department for oxygen supplementation until their machine is working.  Patient is chronically on oxygen.  Patient does not have any other complaints at this time.  Patient denies any fevers, cough, shortness of breath, chest pain.      Independent Historian:   None - Patient Only    Review of External Notes:          Medications:    ammonium lactate (AMLACTIN) 12 % external cream  busPIRone (BUSPAR) 15 MG tablet  citalopram (CELEXA) 20 MG tablet  citalopram (CELEXA) 40 MG tablet  divalproex sodium delayed-release (DEPAKOTE) 500 MG DR tablet  fluticasone (FLONASE) 50 MCG/ACT nasal spray  lacosamide (VIMPAT) 50 MG TABS tablet  lamoTRIgine (LAMICTAL) 100 MG tablet  lamoTRIgine (LAMICTAL) 200 MG tablet  levETIRAcetam (KEPPRA) 500 MG tablet  levOCARNitine (CARNITOR) 330 MG tablet  methylcellulose POWD powder  multivitamin, therapeutic (THERA-VIT) TABS tablet  pantoprazole (PROTONIX) 40 MG EC tablet  polyethylene glycol (MIRALAX) 17 GM/Dose powder  prazosin (MINIPRESS) 2 MG capsule  propranolol (INDERAL) 20 MG tablet  QUEtiapine ER (SEROQUEL XR) 300 MG 24 hr tablet  zonisamide (ZONEGRAN) 100 MG capsule        Past Medical History:    Past Medical History:   Diagnosis Date    Blindness of left eye     Depression 03/10/2014    Hypertension     Pneumococcal septicemia(038.2) (H) 11/26/2013    Pneumonia, organism unspecified(486) 11/26/2013    Uncomplicated asthma     Unspecified epilepsy without mention of intractable epilepsy        Past Surgical History:    Past Surgical History:   Procedure Laterality Date    COLONOSCOPY N/A 12/1/2022    Procedure: COLONOSCOPY;  Surgeon: Michael Caldwell MD;  Location:  OR     ESOPHAGOSCOPY, GASTROSCOPY, DUODENOSCOPY (EGD), COMBINED N/A 2022    Procedure: ESOPHAGOGASTRODUODENOSCOPY with biopsy;  Surgeon: Michael Caldwell MD;  Location: [facility] OR    ORTHOPEDIC SURGERY      pt stated past surgery on both ankles        Physical Exam   Patient Vitals for the past 24 hrs:   Resp SpO2   23 1926 16 97 %        Physical Exam  General: Well-nourished, resting comfortably when I enter the room  Eyes: Pupils equal, conjunctivae pink no scleral icterus or conjunctival injection  ENT:  Moist mucus membranes  Respiratory:  Lungs clear to auscultation bilaterally, no crackles/rubs/wheezes.  Good air movement  CV: Normal rate and rhythm, no murmurs  GI:  Abdomen soft and non-distended.  No tenderness, guarding or rebound  Skin: Warm, dry.  No rashes or petechiae  Musculoskeletal: No peripheral edema or calf tenderness  Neuro: Alert and oriented to person/place/time  Psychiatric: Normal affect      Emergency Department Course     Imaging:  No orders to display          Laboratory:  Labs Ordered and Resulted from Time of ED Arrival to Time of ED Departure - No data to display     Procedures       Emergency Department Course & Assessments:             Interventions:  Medications - No data to display     Assessments:  On reevaluation, patient is not in any acute distress.  He does not have any complaints at this time.    Independent Interpretation (X-rays, CTs, rhythm strip):  None    Consultations/Discussion of Management or Tests:  None        Social Determinants of Health affecting care:   None    Disposition:  The patient was discharged to home.     Impression & Plan    CMS Diagnoses: None    Medical Decision Makin-year-old male who lives at a group home and is on chronic oxygen supplementation presents emergency department after his oxygen concentrator stopped working.  Patient does not have any complaints at this time.  Group home was contacted, his oxygen machine is now  working.  He can come back home.  Patient feels comfortable going home.  Patient is discharged home.      Diagnosis:    ICD-10-CM    1. Encounter for medical screening examination  Z13.9            Discharge Medications:  Discharge Medication List as of 12/29/2023  7:55 PM             Kristan Israel MD  12/29/2023   Kristan Israel MD Richardson, Elizabeth, MD  12/30/23 0323

## 2023-12-30 NOTE — DISCHARGE INSTRUCTIONS
Please return to the emergency department if you have any shortness of breath, chest pain, uncontrollable nausea or vomiting.    Please follow-up with your primary care physician as needed.

## 2023-12-31 ENCOUNTER — MEDICAL CORRESPONDENCE (OUTPATIENT)
Dept: HEALTH INFORMATION MANAGEMENT | Facility: CLINIC | Age: 48
End: 2023-12-31

## 2024-01-03 ENCOUNTER — TELEPHONE (OUTPATIENT)
Dept: PHARMACY | Facility: OTHER | Age: 49
End: 2024-01-03
Payer: MEDICARE

## 2024-01-03 NOTE — TELEPHONE ENCOUNTER
MTM referral from: Transitions of Care (recent hospital discharge or ED visit)    MTM referral outreach attempt #2 on January 3, 2024 at 11:58 AM      Outcome: Patient not reachable after several attempts, will route to MTM Pharmacist/Provider as an FYI.  [facility] scheduling number is .  Thank you for the referral.    Use cathy for the carrier/Plan on the flowsheet      [employer] Message Sent    Daphnie Woo CPhT  [facility]

## 2024-01-07 NOTE — PROCEDURES
VIDEO EEG DATE: 12/21/23  VIDEO EEG LOG: [facility]  LTV  VIDEO EEG DAY#: 3  VIDEO EEG SOURCE FILE DURATION: 2:17:16  The video EEG was started on 12/21/23 at 13:12:31  The video EEG ended on 12/21/23 at 18:39:15     PATIENT INFORMATION: 49 yo man with past medical history including developmental delay, morbid obesity, ANA/OHS on BiPAP at bedtime, seizure disorder, chronic hyperammonemia, anemia, schizoaffective disorder, borderline personality disorder, MDD, anxiety, HTN, and left eye blindness who presents with concern for increased complex partial seizures. .      MEDICATIONS: see chart  These medications and doses were derived from the medical record at the time of this procedure.     TECHNICAL SUMMARY: EEG was recorded from 10-20 scalp electrodes placed according to the 10-20 international system. Additional electrodes were used for referencing, EKG, and to record from other cerebral regions as appropriate. Video was continuously recorded and was reviewed for clinical correlation. Electrodes were attached and both video and EEG were monitored and annotated by qualified EEG technologists. Video-EEG was reviewed and report generated by qualified physician.     BACKGROUND ACTIVITY: Background activity during the record consist of generalized rhythm which has a frequency of 8-9 Hz.  The EEG is reactive to eyes opening.  Throughout the record there are episodes of muscle and mouth movement artifact as the patient moves or talks or eats during daily activities.  No sleep elements were seen.        ACTIVATION PROCEDURE: None.        INTERICTAL EPILEPTIFORM DISCHARGES: Occasional Spikes seen over right centro-parietal, left frontal regions and at times showing bilateral synchrony were seen predominantly during drowsiness.       EVENTS:no clinical events occurred/was captured/was marked     EKG was recorded on this shows a regular rhythm with a heart rate of 84/min      Video was reviewed intermittently by EEG  technologist and physician for clinical seizures.      IMPRESSION OF VIDEO EEG DAY #3   This is abnormal awake, drowsy and asleep prolonged video EEG recording due to the findings of intermittent right centro-parietal, left frontal and generalized spikes.   No clinical events were captured, no electrographic seizures were seen.   This abnormal prolonged video EEG monitoring is supportive of diagnosis of Multifocal Epilepsy.

## 2024-01-07 NOTE — PROCEDURES
VIDEO EEG DATE: 12/19/23  VIDEO EEG LOG: [facility]  LTV  VIDEO EEG DAY#: 1  VIDEO EEG SOURCE FILE DURATION: 22:36:31  The video EEG was started on 12/19/23 at 14:35:32  The video EEG ended on 12/20/23 at 13:11:41     PATIENT INFORMATION: 49 yo man with past medical history including developmental delay, morbid obesity, ANA/OHS on BiPAP at bedtime, seizure disorder, chronic hyperammonemia, anemia, schizoaffective disorder, borderline personality disorder, MDD, anxiety, HTN, and left eye blindness who presents with concern for increased complex partial seizures. .      MEDICATIONS: see chart  These medications and doses were derived from the medical record at the time of this procedure.     TECHNICAL SUMMARY: EEG was recorded from 10-20 scalp electrodes placed according to the 10-20 international system. Additional electrodes were used for referencing, EKG, and to record from other cerebral regions as appropriate. Video was continuously recorded and was reviewed for clinical correlation. Electrodes were attached and both video and EEG were monitored and annotated by qualified EEG technologists. Video-EEG was reviewed and report generated by qualified physician.     BACKGROUND ACTIVITY: Background activity during the record consist of generalized rhythm which has a frequency of 9 Hz.  The EEG is reactive to eyes opening.  Throughout the record there are episodes of muscle and mouth movement artifact as the patient moves or talks or eats during daily activities.  During sleep there are slower generalized frequencies, occasionally symmetric vertex and spindles as well as K-complexes.  Significant  electrode artifact compromised parts of the recording.        ACTIVATION PROCEDURE: None.        INTERICTAL EPILEPTIFORM DISCHARGES: Intermittent Spikes seen over right temporal,  right centro-parietal, left frontal regions and at times showing bilateral synchrony were seen predominantly in the second part of the recording.       EVENTS:no clinical events occurred/was captured/was marked    EKG was recorded on this shows a regular rhythm with a heart rate of 78/min.     Video was reviewed intermittently by EEG technologist and physician for clinical seizures.      IMPRESSION OF VIDEO EEG DAY # 1   This is abnormal awake, drowsy and asleep prolonged video EEG recording due to the findings of intermittent right temporal,  right centro-parietal, left frontal and generalized spikes. . No clinical events were captured, no electrographic seizures were seen.   This abnormal prolonged video EEG monitoring is supportive of diagnosis of Multifocal Epilepsy.

## 2024-01-07 NOTE — PROCEDURES
VIDEO EEG DATE: 12/20/23  VIDEO EEG LOG: [facility]  LTV  VIDEO EEG DAY#: 2  VIDEO EEG SOURCE FILE DURATION: 24:00:02  The video EEG was started on 12/20/23 at 13:12:09  The video EEG ended on 12/21/23 at 13:12:04     PATIENT INFORMATION: 47 yo man with past medical history including developmental delay, morbid obesity, ANA/OHS on BiPAP at bedtime, seizure disorder, chronic hyperammonemia, anemia, schizoaffective disorder, borderline personality disorder, MDD, anxiety, HTN, and left eye blindness who presents with concern for increased complex partial seizures. .      MEDICATIONS: see chart  These medications and doses were derived from the medical record at the time of this procedure.     TECHNICAL SUMMARY: EEG was recorded from 10-20 scalp electrodes placed according to the 10-20 international system. Additional electrodes were used for referencing, EKG, and to record from other cerebral regions as appropriate. Video was continuously recorded and was reviewed for clinical correlation. Electrodes were attached and both video and EEG were monitored and annotated by qualified EEG technologists. Video-EEG was reviewed and report generated by qualified physician.     BACKGROUND ACTIVITY: Background activity during the record consist of generalized rhythm which has a frequency of 8-9 Hz.  The EEG is reactive to eyes opening.  Throughout the record there are episodes of muscle and mouth movement artifact as the patient moves or talks or eats during daily activities.  During sleep there are slower generalized frequencies, occasionally symmetric vertex and spindles as well as K-complexes.  Periods of significant electrode artifact compromised parts of the recording.        ACTIVATION PROCEDURE: None.        INTERICTAL EPILEPTIFORM DISCHARGES: Intermittent Spikes seen over right temporal,  right centro-parietal, left frontal regions and at times showing bilateral synchrony were seen predominantly in the second part of the  recording.      EVENTS:no clinical events occurred/was captured/was marked    EKG was recorded on this shows a regular rhythm with a heart rate of 78-80/min and was not showing rhythm for majority of the recording due to lead disconnection.     Video was reviewed intermittently by EEG technologist and physician for clinical seizures.      IMPRESSION OF VIDEO EEG DAY # 2   This is abnormal awake, drowsy and asleep prolonged video EEG recording due to the findings of intermittent right temporal,  right centro-parietal, left frontal and generalized spikes.   No clinical events were captured, no electrographic seizures were seen.   This abnormal prolonged video EEG monitoring is supportive of diagnosis of Multifocal Epilepsy.

## 2024-01-09 DIAGNOSIS — G40.309 NONINTRACTABLE GENERALIZED IDIOPATHIC EPILEPSY WITHOUT STATUS EPILEPTICUS (H): ICD-10-CM

## 2024-01-09 DIAGNOSIS — G40.909 NON-REFRACTORY EPILEPSY (H): ICD-10-CM

## 2024-01-09 NOTE — TELEPHONE ENCOUNTER
Refill request for: Levocarnitin & Keppra      LOV: 1/3/23- virtual   NOV: 2/2/24    30 day supply with 11 refills Medication T'd for review and signature

## 2024-01-11 RX ORDER — LEVETIRACETAM 500 MG/1
TABLET ORAL
Qty: 150 TABLET | Refills: 11 | Status: SHIPPED | OUTPATIENT
Start: 2024-01-11 | End: 2024-03-07

## 2024-01-11 RX ORDER — LEVOCARNITINE 330 MG/1
660 TABLET ORAL 3 TIMES DAILY
Qty: 180 TABLET | Refills: 11 | Status: SHIPPED | OUTPATIENT
Start: 2024-01-11

## 2024-01-31 NOTE — PROGRESS NOTES
__________________________________  ESTABLISHED PATIENT NEUROLOGY NOTE    DATE OF VISIT: 1/3/2023  MRN: [id number]  PATIENT NAME: Tre Vazquez  YOB: 1975    Chief Complaint   Patient presents with    Seizures     Pt stated he felt like he had some small seizures or feels of episodes coming on.        SUBJECTIVE:                                                      HISTORY OF PRESENT ILLNESS:  Tre is here for follow up regarding seizures    Tre Vazquez is a 47 year old male with PMH of cerebral palsy with mild spastic paraparesis, intellectual disability, congenital left eye blindness, previous right Bell's palsy w/ mild residual facial droop, mood disturbance, and epilepsy disorder on 4 AEDs.  He follows Dr. Griggs in the clinic last seen, 4/19/2022.  Per chart review, Notes indicate he had a breakthrough seizure in August/September 2021 and was hospitalized at the [facility]. Prior to the most recent seizure, apparently he had been seizure-free for about 10 years.  He has history of generalized tonic-clonic seizures.  No history of other seizure types. Head CT in 8.2021 showed lateral and third ventricle enlargement.  EEG from 2012 showed bilateral theta slowing, no epileptiform activity.    01/03/23:Joel arrived for his seizure follow up virtually today. He is accompanied by a care attendant, Jennifer. Patient denies any seizure activity since last visit, in fact he and Jennifer deny any seizure activity since 2021. No loss of consciousness, zoning out or starring spells. Denies adverse effects from medications. No increased fatigue, rashes, dizziness, changes in vision or speech. Mood has been stable.     Tre is interested in coming off of his Depakote.  He denies any adverse effects but feels that this medication is not beneficial.  We discussed the possibility of discontinuing Depakote and decided against that at this time.  He will follow-up in the clinic for  "additional testing and education before discontinuing medication.  Staff thought it was better to stay on medication since his seizures are so well controlled.  -----------------  Chart review from 12/21/23: admitted with decreased mental status and decreased oxygen in concern ofr setting of decreased compliance with BiPAP/O2 at group home with chronic hypercarbic. During eval in ER labs with elevated ammonia beyond baseline to 129 and did receive lactulose. Also with elevated pCo2 to 92. D dimer elevated but PE CT neg. Now admitted and improving with BiPAP. Neurology consulted CT of the head and chest did not show any acute pathology.  Blood cultures ended up coming back positive for Staph aureus so he was started on IV nafcillin after consultation from ID.     Now since 12/15/2023 concern for more frequent events of possible seizure. Events per nursing and Dr. Gomez and his step mom have consisted of jerking or \"glassy stare\". This will tend to last seconds and no specific worsening of confusion after. First reports on night of 12/15 but now at least several events (2+ per shift) since then. Per step mom baseline of these events at group home can vary but can at times be at least daily to multiple times a day since at least summer 2023.     Medication changes from hospitalization:   Initial plans to increase lamotrigine as outlined in note: 1.12/20 increase lamotrigine from 200 twice daily to 200-100 -200 for 1 week then 200 mg po TID which will start 12/27: Continue 2. Depakote at 500 mg p.o. twice daily. (Did not taper off as potential use for psychiatric issues as well) continue levocarnitine for elevated ammonia.  Started on 3. Vimpat 50 mg p.o. daily on 12/17.  4. Keppra continued as prior to admission with the 1000 mg a.m. and 1500 mg p.m.  5. Zonegran 100 mg a.m. and 200 mg p.m.  See neurology notes 12/21/2023, no spells captured on continuous EEG monitoring.  No seizure activity.  Seizure regimen, see " note.  Follow-up PTA outpatient neurology.  In preparation for medications to discharge there was a potential concern of increasing lamotrigine more than 50 mg a week while being on the Depakote.  Dosing of lamotrigine remained at 1. Revised. 200 mg twice a day 8 AM and 8 PM with 100 mg at 1400.  12/28 lamotrigine level pending at the time of   -----------------------  02/02/24: Tre presents to the clinic for follow-up after hospitalization in November.  He is accompanied by staff from the group home, [facility].  Nathan states that he does not normally work with Joel and is unsure of his medications.  Staff denies any seizure activity since discharging from the hospital.  Tre tells me that he has had a few staring off spells since his hospitalization.  Upon reviewing med list today in clinic, the medication doses do not match what was updated from his hospitalization.  Nathan reports that he will follow-up with the  to ensure this is the updated list of his medications.    Tre tells me that he is more tired than normal because he did not sleep well last night.  He is also complaining of left ear pain.  No other concerns today     Current Anti-Seizure Medications: Unsure of current medication list accuracy  Prior dosing:  Keppra 1000mg in the morning and 1500mg in the evening  Depakote DR: 500mg 3 times daily + levocarnitine: 330mg TID  Lamotrigine: 200mg BID  Zonisamide: 200mg nightly    Med list indicates from today to 2/2/24:  zonisamide is 100 mg a.m. 200 mg p.m.**  Keppra 1000 mg a.m. 1500 mg p.m.**  Lamotrigine 200 mg a.m. 200 mg p.m.- not updated to add 100 mg midday  Depakote 500 mg 3 times daily- Not updated to BID dosing  Vimpat 50 mg daily - didn't see on medication list    Perceived AED Side Effects: No       Current Medications:   ammonium lactate (AMLACTIN) 12 % external cream, Apply topically 2 times daily  busPIRone (BUSPAR) 15 MG tablet, Take 15 mg by mouth 2 times  daily  citalopram (CELEXA) 20 MG tablet, Take 20 mg by mouth daily  citalopram (CELEXA) 40 MG tablet, Take 40 mg by mouth daily  divalproex sodium delayed-release (DEPAKOTE) 500 MG DR tablet, Take 1 tablet (500 mg) by mouth every 12 hours  fluticasone (FLONASE) 50 MCG/ACT nasal spray, Spray 1 spray in nostril 2 times daily  lacosamide (VIMPAT) 50 MG TABS tablet, Take 1 tablet (50 mg) by mouth daily  lamoTRIgine (LAMICTAL) 100 MG tablet, Take 1 tablet (100 mg) by mouth daily At 1400  lamoTRIgine (LAMICTAL) 200 MG tablet, Take 1 tablet (200 mg) by mouth 2 times daily Take at 8 AM and 8 pm  levETIRAcetam (KEPPRA) 500 MG tablet, Take 2 tablets (1,000 mg) by mouth every morning AND 3 tablets (1,500 mg) at bedtime.  levOCARNitine (CARNITOR) 330 MG tablet, Take 2 tablets (660 mg) by mouth 3 times daily  methylcellulose POWD powder, Apply 2 mg topically 2 times daily Mix 1 rounded tablespoon (2 mg) in 8 oz glass of water and take by mouth twice daily  multivitamin, therapeutic (THERA-VIT) TABS tablet, Take 1 tablet by mouth daily  pantoprazole (PROTONIX) 40 MG EC tablet, Take 40 mg by mouth daily  polyethylene glycol (MIRALAX) 17 GM/Dose powder, Take 1 capful. by mouth daily  prazosin (MINIPRESS) 2 MG capsule, Take 2 mg by mouth At Bedtime  propranolol (INDERAL) 20 MG tablet, Take 1 tablet (20 mg) by mouth 2 times daily  QUEtiapine ER (SEROQUEL XR) 300 MG 24 hr tablet, Take 300 mg by mouth at bedtime  zonisamide (ZONEGRAN) 100 MG capsule, Take 1 capsule (100 mg) by mouth every morning AND 2 capsules (200 mg) At Bedtime.    No current facility-administered medications on file prior to visit.    Past Medical History:   Patient  has a past medical history of Blindness of left eye, Depression (03/10/2014), Hypertension, Pneumococcal septicemia(038.2) (H) (11/26/2013), Pneumonia, organism unspecified(486) (11/26/2013), Uncomplicated asthma, and Unspecified epilepsy without mention of intractable epilepsy.  Surgical History:  He   has a past surgical history that includes orthopedic surgery; Esophagoscopy, gastroscopy, duodenoscopy (EGD), combined (N/A, 12/1/2022); and Colonoscopy (N/A, 12/1/2022).  Family and Social History:  Reviewed, and he  reports that he quit smoking about 14 years ago. His smoking use included cigarettes. He started smoking about 24 years ago. He has never used smokeless tobacco. He reports that he does not drink alcohol and does not use drugs.  Reviewed, and family history is not on file.    RECENT DIAGNOSTIC STUDIES:   Labs:09/27/22  LEVETIRACETAM (KEPPRA) 6.0 - 46.0 ug/mL 53.0 High       LAMOTRIGINE 3.0 - 15.0 ug/mL 17.8 High       Imaging:   EXAM: CT HEAD W/O CONTRAST  DATE/TIME: 10/25/2022 11:38 PM  INDICATION: fall, abrasion to forehead  IMPRESSION:  1.  No acute intracranial process.  2.  Ventriculomegaly is disproportionate to cerebral atrophy, primarily due to white matter volume loss. Correlate for symptoms of normal pressure/nonobstructive hydrocephalus.  CT HEAD W/O CONTRAST 8/30/2021 4:29 PM  History: Seizure, nontraumatic (Age >= 41y)   Impression:  No acute intracranial pathology. Chronic small vessel ischemic  disease. Mild to moderate lateral and third ventriculomegaly.  EEG 07/08/22  Impression: This is an abnormal EEG due to paroxysmal slowing of the background and theta range that suggest nonspecific generalized cerebral dysfunction.  No sharp activity arrhythmic discharges were seen to suggest seizures  Classification: Dysrhythmia grade II generalized     REVIEW OF SYSTEMS:                                                      10-point review of systems is negative except as mentioned above in HPI.    EXAM:                                                      Physical Exam:   There were no vitals taken for this visit.  MENTAL STATUS: Alert, attentive.   GENERAL: Healthy, alert and no distress  EYES: left eye sealed closed, blind.  No discharge   RESP: No audible wheeze, cough, or visible cyanosis.   "No visible retractions or increased work of breathing.    MS: No gross musculoskeletal defects noted and normal range of motion of the upper extremities.   SKIN: Visible skin clear. No significant rash, abnormal pigmentation or lesions to face or neck.  NEURO: Cranial nerves grossly intact. Speech is fluent. Normal comprehension. Normal concentration. hearing not formally tested but intact to conversation     ASSESSMENT and PLAN:                                                      Assessment:    ICD-10-CM    1. Seizure disorder (H)  G40.909 Adult Neurology  Referral     Valproic Acid Free & Total     Keppra (Levetiracetam) Level     Lamotrigine Level     Zonisamide Level Quantitative     Ammonia          Tre Vazquez is a 48 year old male with PMH of cerebral palsy with mild spastic paraparesis, intellectual disability, congenital left eye blindness, previous right Bell's palsy w/ mild residual facial droop, mood disturbance, and epilepsy disorder on 4 AEDs.  He follows Dr. Griggs in the clinic.  Per chart review, patient was hospitalized 11/2023 with decreased mental status and decreased oxygen in concern in setting of decreased compliance with BiPAP/O2, elevated ammonia beyond baseline to 129, and a staph infection.  While in the hospital he had spells that consisted of jerking or \"glassy stare\".  Neurology was consulted and changes were made to his AED regimen.  Upon presenting to the clinic today it was unclear if all of the medication changes followed through to his group home.  Staff will have manager from group home verify medication dosing early next week.  I will order labs to monitor the drug parameters. We discussed interventions, will plan to see the patient back in 5 months. Joel and staff understands and agrees with this plan.     Plan:  --- Current medication list brought to clinic today does not reflect the changes made in the hospital recently.  Please have group home send updated " medication list or call clinic to confirm the doses of his antiseizure medications.  --- Labs: Drug levels  --- Take your medications as prescribed, and do not stop taking abruptly.  --- Try to take your anti-seizure medications at the same time every day  --- Avoid common triggers: sleep deprivation, excessive alcohol, stress, flashing lights or patterns, dehydration, recreational drug use, illness and missed medications.  --- Plan on follow up in the Neurology Clinic in 5 months.  --- Please feel free to reach out if you have any further questions or concerns.  --- Seek immediate medical attention if an emergency arises or if your health becomes progressively worse.     It was a pleasure to see you today!     Total Time: Total time spent for face to face visit, reviewing labs/imaging studies, counseling and coordination of care was: 45 Minutes spent on the date of the encounter doing chart review, review of test results, patient visit, documentation and staff at group home      This note was dictated using voice recognition software.  Any grammatical or context distortions are unintentional and inherent to the software.    Lisa Merchant, DNP, APRN, CNP  [facility]

## 2024-02-01 ENCOUNTER — MEDICAL CORRESPONDENCE (OUTPATIENT)
Dept: HEALTH INFORMATION MANAGEMENT | Facility: CLINIC | Age: 49
End: 2024-02-01
Payer: MEDICARE

## 2024-02-02 ENCOUNTER — OFFICE VISIT (OUTPATIENT)
Dept: NEUROLOGY | Facility: CLINIC | Age: 49
End: 2024-02-02
Payer: MEDICARE

## 2024-02-02 DIAGNOSIS — G40.909 SEIZURE DISORDER (H): ICD-10-CM

## 2024-02-02 PROCEDURE — 99215 OFFICE O/P EST HI 40 MIN: CPT

## 2024-02-02 NOTE — LETTER
2/2/2024         RE: Tre Vazquez  [address] [place] [address]        Dear Colleague,    Thank you for referring your patient, Tre Vazquez, to the [facility] [place]. Please see a copy of my visit note below.        __________________________________  ESTABLISHED PATIENT NEUROLOGY NOTE    DATE OF VISIT: 1/3/2023  MRN: [id number]  PATIENT NAME: Tre Vazquez  YOB: 1975    Chief Complaint   Patient presents with     Seizures     Pt stated he felt like he had some small seizures or feels of episodes coming on.        SUBJECTIVE:                                                      HISTORY OF PRESENT ILLNESS:  Tre is here for follow up regarding seizures    Tre Vazquez is a 47 year old male with PMH of cerebral palsy with mild spastic paraparesis, intellectual disability, congenital left eye blindness, previous right Bell's palsy w/ mild residual facial droop, mood disturbance, and epilepsy disorder on 4 AEDs.  He follows Dr. Griggs in the clinic last seen, 4/19/2022.  Per chart review, Notes indicate he had a breakthrough seizure in August/September 2021 and was hospitalized at the [facility]. Prior to the most recent seizure, apparently he had been seizure-free for about 10 years.  He has history of generalized tonic-clonic seizures.  No history of other seizure types. Head CT in 8.2021 showed lateral and third ventricle enlargement.  EEG from 2012 showed bilateral theta slowing, no epileptiform activity.    01/03/23:Joel arrived for his seizure follow up virtually today. He is accompanied by a care attendant, Jennifer. Patient denies any seizure activity since last visit, in fact he and Jennifer deny any seizure activity since 2021. No loss of consciousness, zoning out or starring spells. Denies adverse effects from medications. No increased fatigue, rashes, dizziness, changes in vision or speech. Mood has been stable.     Tre is  "interested in coming off of his Depakote.  He denies any adverse effects but feels that this medication is not beneficial.  We discussed the possibility of discontinuing Depakote and decided against that at this time.  He will follow-up in the clinic for additional testing and education before discontinuing medication.  Staff thought it was better to stay on medication since his seizures are so well controlled.  -----------------  Chart review from 12/21/23: admitted with decreased mental status and decreased oxygen in concern ofr setting of decreased compliance with BiPAP/O2 at group home with chronic hypercarbic. During eval in ER labs with elevated ammonia beyond baseline to 129 and did receive lactulose. Also with elevated pCo2 to 92. D dimer elevated but PE CT neg. Now admitted and improving with BiPAP. Neurology consulted CT of the head and chest did not show any acute pathology.  Blood cultures ended up coming back positive for Staph aureus so he was started on IV nafcillin after consultation from ID.     Now since 12/15/2023 concern for more frequent events of possible seizure. Events per nursing and Dr. Gomez and his step mom have consisted of jerking or \"glassy stare\". This will tend to last seconds and no specific worsening of confusion after. First reports on night of 12/15 but now at least several events (2+ per shift) since then. Per step mom baseline of these events at group home can vary but can at times be at least daily to multiple times a day since at least summer 2023.     Medication changes from hospitalization:   Initial plans to increase lamotrigine as outlined in note: 1.12/20 increase lamotrigine from 200 twice daily to 200-100 -200 for 1 week then 200 mg po TID which will start 12/27: Continue 2. Depakote at 500 mg p.o. twice daily. (Did not taper off as potential use for psychiatric issues as well) continue levocarnitine for elevated ammonia.  Started on 3. Vimpat 50 mg p.o. daily on " 12/17.  4. Keppra continued as prior to admission with the 1000 mg a.m. and 1500 mg p.m.  5. Zonegran 100 mg a.m. and 200 mg p.m.  See neurology notes 12/21/2023, no spells captured on continuous EEG monitoring.  No seizure activity.  Seizure regimen, see note.  Follow-up PTA outpatient neurology.  In preparation for medications to discharge there was a potential concern of increasing lamotrigine more than 50 mg a week while being on the Depakote.  Dosing of lamotrigine remained at 1. Revised. 200 mg twice a day 8 AM and 8 PM with 100 mg at 1400.  12/28 lamotrigine level pending at the time of   -----------------------  02/02/24: Tre presents to the clinic for follow-up after hospitalization in November.  He is accompanied by staff from the group home, Nathan.  Nathan states that he does not normally work with [facility] and is unsure of his medications.  Staff denies any seizure activity since discharging from the hospital.  Tre tells me that he has had a few staring off spells since his hospitalization.  Upon reviewing med list today in clinic, the medication doses do not match what was updated from his hospitalization.  Nathan reports that he will follow-up with the  to ensure this is the updated list of his medications.    Tre tells me that he is more tired than normal because he did not sleep well last night.  He is also complaining of left ear pain.  No other concerns today     Current Anti-Seizure Medications: Unsure of current medication list accuracy  Prior dosing:  Keppra 1000mg in the morning and 1500mg in the evening  Depakote DR: 500mg 3 times daily + levocarnitine: 330mg TID  Lamotrigine: 200mg BID  Zonisamide: 200mg nightly    Med list indicates from today to 2/2/24:  zonisamide is 100 mg a.m. 200 mg p.m.**  Keppra 1000 mg a.m. 1500 mg p.m.**  Lamotrigine 200 mg a.m. 200 mg p.m.- not updated to add 100 mg midday  Depakote 500 mg 3 times daily- Not updated to BID dosing  Vimpat 50 mg  daily - didn't see on medication list    Perceived AED Side Effects: No       Current Medications:   ammonium lactate (AMLACTIN) 12 % external cream, Apply topically 2 times daily  busPIRone (BUSPAR) 15 MG tablet, Take 15 mg by mouth 2 times daily  citalopram (CELEXA) 20 MG tablet, Take 20 mg by mouth daily  citalopram (CELEXA) 40 MG tablet, Take 40 mg by mouth daily  divalproex sodium delayed-release (DEPAKOTE) 500 MG DR tablet, Take 1 tablet (500 mg) by mouth every 12 hours  fluticasone (FLONASE) 50 MCG/ACT nasal spray, Spray 1 spray in nostril 2 times daily  lacosamide (VIMPAT) 50 MG TABS tablet, Take 1 tablet (50 mg) by mouth daily  lamoTRIgine (LAMICTAL) 100 MG tablet, Take 1 tablet (100 mg) by mouth daily At 1400  lamoTRIgine (LAMICTAL) 200 MG tablet, Take 1 tablet (200 mg) by mouth 2 times daily Take at 8 AM and 8 pm  levETIRAcetam (KEPPRA) 500 MG tablet, Take 2 tablets (1,000 mg) by mouth every morning AND 3 tablets (1,500 mg) at bedtime.  levOCARNitine (CARNITOR) 330 MG tablet, Take 2 tablets (660 mg) by mouth 3 times daily  methylcellulose POWD powder, Apply 2 mg topically 2 times daily Mix 1 rounded tablespoon (2 mg) in 8 oz glass of water and take by mouth twice daily  multivitamin, therapeutic (THERA-VIT) TABS tablet, Take 1 tablet by mouth daily  pantoprazole (PROTONIX) 40 MG EC tablet, Take 40 mg by mouth daily  polyethylene glycol (MIRALAX) 17 GM/Dose powder, Take 1 capful. by mouth daily  prazosin (MINIPRESS) 2 MG capsule, Take 2 mg by mouth At Bedtime  propranolol (INDERAL) 20 MG tablet, Take 1 tablet (20 mg) by mouth 2 times daily  QUEtiapine ER (SEROQUEL XR) 300 MG 24 hr tablet, Take 300 mg by mouth at bedtime  zonisamide (ZONEGRAN) 100 MG capsule, Take 1 capsule (100 mg) by mouth every morning AND 2 capsules (200 mg) At Bedtime.    No current facility-administered medications on file prior to visit.    Past Medical History:   Patient  has a past medical history of Blindness of left eye,  Depression (03/10/2014), Hypertension, Pneumococcal septicemia(038.2) (H) (11/26/2013), Pneumonia, organism unspecified(486) (11/26/2013), Uncomplicated asthma, and Unspecified epilepsy without mention of intractable epilepsy.  Surgical History:  He  has a past surgical history that includes orthopedic surgery; Esophagoscopy, gastroscopy, duodenoscopy (EGD), combined (N/A, 12/1/2022); and Colonoscopy (N/A, 12/1/2022).  Family and Social History:  Reviewed, and he  reports that he quit smoking about 14 years ago. His smoking use included cigarettes. He started smoking about 24 years ago. He has never used smokeless tobacco. He reports that he does not drink alcohol and does not use drugs.  Reviewed, and family history is not on file.    RECENT DIAGNOSTIC STUDIES:   Labs:09/27/22  LEVETIRACETAM (KEPPRA) 6.0 - 46.0 ug/mL 53.0 High       LAMOTRIGINE 3.0 - 15.0 ug/mL 17.8 High       Imaging:   EXAM: CT HEAD W/O CONTRAST  DATE/TIME: 10/25/2022 11:38 PM  INDICATION: fall, abrasion to forehead  IMPRESSION:  1.  No acute intracranial process.  2.  Ventriculomegaly is disproportionate to cerebral atrophy, primarily due to white matter volume loss. Correlate for symptoms of normal pressure/nonobstructive hydrocephalus.  CT HEAD W/O CONTRAST 8/30/2021 4:29 PM  History: Seizure, nontraumatic (Age >= 41y)   Impression:  No acute intracranial pathology. Chronic small vessel ischemic  disease. Mild to moderate lateral and third ventriculomegaly.  EEG 07/08/22  Impression: This is an abnormal EEG due to paroxysmal slowing of the background and theta range that suggest nonspecific generalized cerebral dysfunction.  No sharp activity arrhythmic discharges were seen to suggest seizures  Classification: Dysrhythmia grade II generalized     REVIEW OF SYSTEMS:                                                      10-point review of systems is negative except as mentioned above in HPI.    EXAM:                                                 "      Physical Exam:   There were no vitals taken for this visit.  MENTAL STATUS: Alert, attentive.   GENERAL: Healthy, alert and no distress  EYES: left eye sealed closed, blind.  No discharge   RESP: No audible wheeze, cough, or visible cyanosis.  No visible retractions or increased work of breathing.    MS: No gross musculoskeletal defects noted and normal range of motion of the upper extremities.   SKIN: Visible skin clear. No significant rash, abnormal pigmentation or lesions to face or neck.  NEURO: Cranial nerves grossly intact. Speech is fluent. Normal comprehension. Normal concentration. hearing not formally tested but intact to conversation     ASSESSMENT and PLAN:                                                      Assessment:    ICD-10-CM    1. Seizure disorder (H)  G40.909 Adult Neurology  Referral     Valproic Acid Free & Total     Keppra (Levetiracetam) Level     Lamotrigine Level     Zonisamide Level Quantitative     Ammonia          Tre Vazquez is a 48 year old male with PMH of cerebral palsy with mild spastic paraparesis, intellectual disability, congenital left eye blindness, previous right Bell's palsy w/ mild residual facial droop, mood disturbance, and epilepsy disorder on 4 AEDs.  He follows Dr. Griggs in the clinic.  Per chart review, patient was hospitalized 11/2023 with decreased mental status and decreased oxygen in concern in setting of decreased compliance with BiPAP/O2, elevated ammonia beyond baseline to 129, and a staph infection.  While in the hospital he had spells that consisted of jerking or \"glassy stare\".  Neurology was consulted and changes were made to his AED regimen.  Upon presenting to the clinic today it was unclear if all of the medication changes followed through to his group home.  Staff will have manager from group home verify medication dosing early next week.  I will order labs to monitor the drug parameters. We discussed interventions, will plan to see " the patient back in 5 months. Joel and staff understands and agrees with this plan.     Plan:  --- Current medication list brought to clinic today does not reflect the changes made in the hospital recently.  Please have group home send updated medication list or call clinic to confirm the doses of his antiseizure medications.  --- Labs: Drug levels  --- Take your medications as prescribed, and do not stop taking abruptly.  --- Try to take your anti-seizure medications at the same time every day  --- Avoid common triggers: sleep deprivation, excessive alcohol, stress, flashing lights or patterns, dehydration, recreational drug use, illness and missed medications.  --- Plan on follow up in the Neurology Clinic in 5 months.  --- Please feel free to reach out if you have any further questions or concerns.  --- Seek immediate medical attention if an emergency arises or if your health becomes progressively worse.     It was a pleasure to see you today!     Total Time: Total time spent for face to face visit, reviewing labs/imaging studies, counseling and coordination of care was: 45 Minutes spent on the date of the encounter doing chart review, review of test results, patient visit, documentation and staff at group [facility]      This note was dictated using voice recognition software.  Any grammatical or context distortions are unintentional and inherent to the software.    Lisa Merchant, REEMA, APRN, CNP  [facility]           Again, thank you for allowing me to participate in the care of your patient.        Sincerely,        RON Oliveira CNP

## 2024-02-02 NOTE — PATIENT INSTRUCTIONS
Plan:  --- Current medication list brought to clinic today does not reflect the changes made in the hospital recently.  Please have group home send updated medication list or call clinic to confirm the doses of his antiseizure medications. (Medications list reflects date of 05/2022)  --- Labs: Drug levels  --- Take your medications as prescribed, and do not stop taking abruptly.  --- Try to take your anti-seizure medications at the same time every day  --- Avoid common triggers: sleep deprivation, excessive alcohol, stress, flashing lights or patterns, dehydration, recreational drug use, illness and missed medications.  --- Plan on follow up in the Neurology Clinic in 5 months.  --- Please feel free to reach out if you have any further questions or concerns.  --- Seek immediate medical attention if an emergency arises or if your health becomes progressively worse.     It was a pleasure to see you today!

## 2024-02-02 NOTE — NURSING NOTE
"Tre Vazquez is a 48 year old male who presents for:  Chief Complaint   Patient presents with    Seizures     Pt stated he felt like he had some small seizures or feels of episodes coming on.           Initial Vitals:  There were no vitals taken for this visit. Estimated body mass index is 46.99 kg/m  as calculated from the following:    Height as of 9/11/23: 1.702 m (5' 7\").    Weight as of 12/8/23: 136.1 kg (300 lb).. There is no height or weight on file to calculate BSA. BP completed using cuff size: NA (Not Taken)    Alphonso Cooper  "

## 2024-02-06 ENCOUNTER — APPOINTMENT (OUTPATIENT)
Dept: CT IMAGING | Facility: CLINIC | Age: 49
End: 2024-02-06
Attending: PHYSICIAN ASSISTANT
Payer: MEDICARE

## 2024-02-06 ENCOUNTER — HOSPITAL ENCOUNTER (EMERGENCY)
Facility: CLINIC | Age: 49
Discharge: NURSING, REHAB OR LONG-TERM CARE | End: 2024-02-06
Attending: PHYSICIAN ASSISTANT | Admitting: PHYSICIAN ASSISTANT
Payer: MEDICARE

## 2024-02-06 ENCOUNTER — APPOINTMENT (OUTPATIENT)
Dept: GENERAL RADIOLOGY | Facility: CLINIC | Age: 49
End: 2024-02-06
Attending: PHYSICIAN ASSISTANT
Payer: MEDICARE

## 2024-02-06 VITALS
RESPIRATION RATE: 14 BRPM | TEMPERATURE: 97.3 F | SYSTOLIC BLOOD PRESSURE: 125 MMHG | OXYGEN SATURATION: 97 % | HEART RATE: 62 BPM | DIASTOLIC BLOOD PRESSURE: 73 MMHG

## 2024-02-06 DIAGNOSIS — R55 SYNCOPE: ICD-10-CM
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
## 2024-02-06 DIAGNOSIS — S40.811A ABRASION OF RIGHT UPPER EXTREMITY, INITIAL ENCOUNTER: ICD-10-CM

## 2024-02-06 LAB
ANION GAP SERPL CALCULATED.3IONS-SCNC: 6 MMOL/L (ref 7–15)
ATRIAL RATE - MUSE: 64 BPM
BASOPHILS # BLD AUTO: 0 10E3/UL (ref 0–0.2)
BASOPHILS NFR BLD AUTO: 1 %
BUN SERPL-MCNC: 19.7 MG/DL (ref 6–20)
CALCIUM SERPL-MCNC: 9.1 MG/DL (ref 8.6–10)
CHLORIDE SERPL-SCNC: 99 MMOL/L (ref 98–107)
CREAT SERPL-MCNC: 0.68 MG/DL (ref 0.67–1.17)
D DIMER PPP FEU-MCNC: 0.49 UG/ML FEU (ref 0–0.5)
DEPRECATED HCO3 PLAS-SCNC: 38 MMOL/L (ref 22–29)
DIASTOLIC BLOOD PRESSURE - MUSE: NORMAL MMHG
EGFRCR SERPLBLD CKD-EPI 2021: >90 ML/MIN/1.73M2
EOSINOPHIL # BLD AUTO: 0.1 10E3/UL (ref 0–0.7)
EOSINOPHIL NFR BLD AUTO: 1 %
ERYTHROCYTE [DISTWIDTH] IN BLOOD BY AUTOMATED COUNT: 13.6 % (ref 10–15)
FLUAV RNA SPEC QL NAA+PROBE: NEGATIVE
FLUBV RNA RESP QL NAA+PROBE: NEGATIVE
GLUCOSE SERPL-MCNC: 98 MG/DL (ref 70–99)
HCT VFR BLD AUTO: 39.4 % (ref 40–53)
HGB BLD-MCNC: 11.8 G/DL (ref 13.3–17.7)
IMM GRANULOCYTES # BLD: 0.1 10E3/UL
IMM GRANULOCYTES NFR BLD: 2 %
INTERPRETATION ECG - MUSE: NORMAL
LYMPHOCYTES # BLD AUTO: 2.3 10E3/UL (ref 0.8–5.3)
LYMPHOCYTES NFR BLD AUTO: 44 %
MCH RBC QN AUTO: 30.6 PG (ref 26.5–33)
MCHC RBC AUTO-ENTMCNC: 29.9 G/DL (ref 31.5–36.5)
MCV RBC AUTO: 102 FL (ref 78–100)
MONOCYTES # BLD AUTO: 0.5 10E3/UL (ref 0–1.3)
MONOCYTES NFR BLD AUTO: 10 %
NEUTROPHILS # BLD AUTO: 2.1 10E3/UL (ref 1.6–8.3)
NEUTROPHILS NFR BLD AUTO: 42 %
NRBC # BLD AUTO: 0 10E3/UL
NRBC BLD AUTO-RTO: 0 /100
P AXIS - MUSE: 61 DEGREES
PLATELET # BLD AUTO: 200 10E3/UL (ref 150–450)
POTASSIUM SERPL-SCNC: 4.1 MMOL/L (ref 3.4–5.3)
PR INTERVAL - MUSE: 194 MS
QRS DURATION - MUSE: 88 MS
QT - MUSE: 304 MS
QTC - MUSE: 313 MS
R AXIS - MUSE: 12 DEGREES
RBC # BLD AUTO: 3.85 10E6/UL (ref 4.4–5.9)
RSV RNA SPEC NAA+PROBE: NEGATIVE
SARS-COV-2 RNA RESP QL NAA+PROBE: NEGATIVE
SODIUM SERPL-SCNC: 143 MMOL/L (ref 135–145)
SYSTOLIC BLOOD PRESSURE - MUSE: NORMAL MMHG
T AXIS - MUSE: 45 DEGREES
TROPONIN T SERPL HS-MCNC: 20 NG/L
TROPONIN T SERPL HS-MCNC: 20 NG/L
TSH SERPL DL<=0.005 MIU/L-ACNC: 2.22 UIU/ML (ref 0.3–4.2)
VENTRICULAR RATE- MUSE: 64 BPM
WBC # BLD AUTO: 5.1 10E3/UL (ref 4–11)

## 2024-02-06 PROCEDURE — 72125 CT NECK SPINE W/O DYE: CPT | Mod: MA

## 2024-02-06 PROCEDURE — 84484 ASSAY OF TROPONIN QUANT: CPT | Performed by: PHYSICIAN ASSISTANT

## 2024-02-06 PROCEDURE — 84443 ASSAY THYROID STIM HORMONE: CPT | Performed by: PHYSICIAN ASSISTANT

## 2024-02-06 PROCEDURE — 80048 BASIC METABOLIC PNL TOTAL CA: CPT | Performed by: PHYSICIAN ASSISTANT

## 2024-02-06 PROCEDURE — 85379 FIBRIN DEGRADATION QUANT: CPT | Performed by: PHYSICIAN ASSISTANT

## 2024-02-06 PROCEDURE — 70450 CT HEAD/BRAIN W/O DYE: CPT | Mod: MA

## 2024-02-06 PROCEDURE — 85025 COMPLETE CBC W/AUTO DIFF WBC: CPT | Performed by: PHYSICIAN ASSISTANT

## 2024-02-06 PROCEDURE — 99285 EMERGENCY DEPT VISIT HI MDM: CPT | Mod: 25

## 2024-02-06 PROCEDURE — 71046 X-RAY EXAM CHEST 2 VIEWS: CPT

## 2024-02-06 PROCEDURE — 93005 ELECTROCARDIOGRAM TRACING: CPT

## 2024-02-06 PROCEDURE — 87637 SARSCOV2&INF A&B&RSV AMP PRB: CPT | Performed by: PHYSICIAN ASSISTANT

## 2024-02-06 PROCEDURE — 36415 COLL VENOUS BLD VENIPUNCTURE: CPT | Performed by: PHYSICIAN ASSISTANT

## 2024-02-06 ASSESSMENT — ACTIVITIES OF DAILY LIVING (ADL)
ADLS_ACUITY_SCORE: 40

## 2024-02-06 NOTE — ED PROVIDER NOTES
History     Chief Complaint:  Fall       HPI   Tre Vazquez is a 48 year old male with a history of epilepsy to the ED via EMS for evaluation after a fall.  Patient states that he was attempting to transfer from a recliner into his wheelchair when he had a fall to the ground.  He endorses striking his head and believes he had LOC of unclear duration.  He has difficulty describing circumstances that preceded the fall as well as c events that occurred after the fall.  He does endorse currently feeling generally weak and feeling slightly lightheaded.  No headache, changes to vision from baseline, neck pain, numbness tingling, new or different back pain, chest pain, dyspnea, abdominal pain, nausea, vomiting, or pain to the upper or lower extremities. He denies fevers or chills. No pharyngitis, rhinorrhea, or congestion. He does note mild cough. He notes abrasion to proximal right arm as a product of fall. He does not believe that he had a seizure. No tongue laceration, no bowel or bladder incontinence.    Independent Historian:   Case discussed with group home staff member who notes patient found on the ground and patient requested staff call EMS    Review of External Notes:   Neuro visit on 2/2/24:  HISTORY OF PRESENT ILLNESS:  Tre is here for follow up regarding seizures     Tre Vzaquez is a 47 year old male with PMH of cerebral palsy with mild spastic paraparesis, intellectual disability, congenital left eye blindness, previous right Bell's palsy w/ mild residual facial droop, mood disturbance, and epilepsy disorder on 4 AEDs.  He follows Dr. Griggs in the clinic last seen, 4/19/2022.  Per chart review, Notes indicate he had a breakthrough seizure in August/September 2021 and was hospitalized at the [facility]. Prior to the most recent seizure, apparently he had been seizure-free for about 10 years.  He has history of generalized tonic-clonic seizures.  No history of other  seizure types. Head CT in 8.2021 showed lateral and third ventricle enlargement.  EEG from 2012 showed bilateral theta slowing, no epileptiform activity.     01/03/23:Joel arrived for his seizure follow up virtually today. He is accompanied by a care attendant, Jennifer. Patient denies any seizure activity since last visit, in fact he and Jennifer deny any seizure activity since 2021. No loss of consciousness, zoning out or starring spells. Denies adverse effects from medications. No increased fatigue, rashes, dizziness, changes in vision or speech. Mood has been stable.     Medications:    ammonium lactate (AMLACTIN) 12 % external cream  busPIRone (BUSPAR) 15 MG tablet  citalopram (CELEXA) 20 MG tablet  citalopram (CELEXA) 40 MG tablet  divalproex sodium delayed-release (DEPAKOTE) 500 MG DR tablet  fluticasone (FLONASE) 50 MCG/ACT nasal spray  lacosamide (VIMPAT) 50 MG TABS tablet  lamoTRIgine (LAMICTAL) 100 MG tablet  lamoTRIgine (LAMICTAL) 200 MG tablet  levETIRAcetam (KEPPRA) 500 MG tablet  levOCARNitine (CARNITOR) 330 MG tablet  methylcellulose POWD powder  multivitamin, therapeutic (THERA-VIT) TABS tablet  pantoprazole (PROTONIX) 40 MG EC tablet  polyethylene glycol (MIRALAX) 17 GM/Dose powder  prazosin (MINIPRESS) 2 MG capsule  propranolol (INDERAL) 20 MG tablet  QUEtiapine ER (SEROQUEL XR) 300 MG 24 hr tablet  zonisamide (ZONEGRAN) 100 MG capsule        Past Medical History:    Past Medical History:   Diagnosis Date    Blindness of left eye     Depression 03/10/2014    Hypertension     Pneumococcal septicemia(038.2) (H) 11/26/2013    Pneumonia, organism unspecified(486) 11/26/2013    Uncomplicated asthma     Unspecified epilepsy without mention of intractable epilepsy        Past Surgical History:    Past Surgical History:   Procedure Laterality Date    COLONOSCOPY N/A 12/1/2022    Procedure: COLONOSCOPY;  Surgeon: Michael Caldwell MD;  Location: [facility] OR    ESOPHAGOSCOPY, GASTROSCOPY, DUODENOSCOPY (EGD),  COMBINED N/A 12/1/2022    Procedure: ESOPHAGOGASTRODUODENOSCOPY with biopsy;  Surgeon: Michael Caldwell MD;  Location: [facility] OR    ORTHOPEDIC SURGERY      pt stated past surgery on both ankles        Physical Exam   Patient Vitals for the past 24 hrs:   BP Temp Temp src Pulse Resp SpO2   02/06/24 1314 109/66 -- -- 61 -- 93 %   02/06/24 1306 -- 97.3  F (36.3  C) Oral -- -- --   02/06/24 1300 105/64 -- -- 61 -- 95 %   02/06/24 1200 123/75 -- -- 65 -- --   02/06/24 1056 116/61 -- -- 72 14 93 %        Physical Exam  Constitutional: Pleasant. Cooperative.   Eyes: Pupils equally round and reactive  HENT: No scalp hematoma. No scalp tenderness. No bony step-off or crepitus. No facial bone tenderness or instability. No periorbital ecchymosis or Abraham signs. Oropharynx is normal with moist mucus membranes. No evidence for intraoral injury.  Cardiovascular: Regular rate and rhythm and without murmurs.  Respiratory: Normal respiratory effort, lungs are clear bilaterally.  GI: Abdomen is soft, non-tender, non-distended. No guarding, rebound, or rigidity.  Musculoskeletal: No midline cervical, thoracic, or lumbar tenderness. Normal painless ROM of the neck. No clavicular tenderness. No upper extremity tenderness. No lower extremity tenderness. Normal ROM of extremities. No tenderness with compression of the rib cage or pelvis.  Skin: No rashes. Abrasion to lateral aspect of proximal humerus   Neurologic: No focal deficits. Alert and oriented to person and place. GCS 15  Psychiatric: Normal affect.  Nursing notes and vital signs reviewed.    Emergency Department Course   ECG  ECG results from 02/06/24   EKG 12-lead, tracing only     Value    Systolic Blood Pressure     Diastolic Blood Pressure     Ventricular Rate 64    Atrial Rate 64    NM Interval 194    QRS Duration 88        QTc 313    P Axis 61    R AXIS 12    T Axis 45    Interpretation ECG      Sinus rhythm with Premature atrial complexes  Nonspecific T wave  abnormality  Abnormal ECG  When compared with ECG of 24-NOV-2023 10:32,  Premature atrial complexes are now Present  Nonspecific T wave abnormality now evident in Inferior leads  Nonspecific T wave abnormality now evident in Anterolateral leads  QT has shortened  Unconfirmed report - interpretation of this ECG is computer generated - see medical record for final interpretation  Confirmed by - EMERGENCY ROOM, PHYSICIAN ([id number]),  TRISTEN DEAN ([id number]) on 2/6/2024 3:10:10 PM           Imaging:  Chest XR,  PA & LAT   Final Result   IMPRESSION: Mildly elevated right hemidiaphragm. No infiltrate,   pleural effusion or pneumothorax. Stable cardiac mediastinal   silhouette      PIETER GUILLORY MD            SYSTEM ID:  [id number]      CT Cervical Spine w/o Contrast   Final Result   IMPRESSION: There is normal alignment of the cervical vertebrae;   however, there is reversal of normal cervical lordosis centered at the   C5-C6 level. Vertebral body heights of the cervical spine are normal.   Craniocervical alignment is normal. There are no fractures of the   cervical spine. Loss of disc space height and degenerative endplate   spurring at C4-C5, C5-C6, C6-C7 and C7-T1. Facet arthropathy   throughout the cervical spine. Large anterior osteophytes from C3 down   to C7. Overall, no change from the recent comparison study.         Radiation dose for this scan was reduced using automated exposure   control, adjustment of the mA and/or kV according to patient size, or   iterative reconstruction technique      TAMIKA MULLINS MD            SYSTEM ID:  [id number]      CT Head w/o Contrast   Final Result   IMPRESSION: Ventriculomegaly of the lateral and third ventricles   raising the question of normal pressure hydrocephalus again noted.   Diffuse cerebral volume loss and cerebral white matter changes   consistent with chronic small vessel ischemic disease. No evidence for   acute intracranial pathology.         Radiation dose for  this scan was reduced using automated exposure   control, adjustment of the mA and/or kV according to patient size, or   iterative reconstruction technique      TAMIKA MULLINS MD            SYSTEM ID:  [id number]             Laboratory:  Labs Ordered and Resulted from Time of ED Arrival to Time of ED Departure   BASIC METABOLIC PANEL - Abnormal       Result Value    Sodium 143      Potassium 4.1      Chloride 99      Carbon Dioxide (CO2) 38 (*)     Anion Gap 6 (*)     Urea Nitrogen 19.7      Creatinine 0.68      GFR Estimate >90      Calcium 9.1      Glucose 98     CBC WITH PLATELETS AND DIFFERENTIAL - Abnormal    WBC Count 5.1      RBC Count 3.85 (*)     Hemoglobin 11.8 (*)     Hematocrit 39.4 (*)      (*)     MCH 30.6      MCHC 29.9 (*)     RDW 13.6      Platelet Count 200      % Neutrophils 42      % Lymphocytes 44      % Monocytes 10      % Eosinophils 1      % Basophils 1      % Immature Granulocytes 2      NRBCs per 100 WBC 0      Absolute Neutrophils 2.1      Absolute Lymphocytes 2.3      Absolute Monocytes 0.5      Absolute Eosinophils 0.1      Absolute Basophils 0.0      Absolute Immature Granulocytes 0.1      Absolute NRBCs 0.0     TROPONIN T, HIGH SENSITIVITY - Normal    Troponin T, High Sensitivity 20     D DIMER QUANTITATIVE - Normal    D-Dimer Quantitative 0.49     TSH WITH FREE T4 REFLEX - Normal    TSH 2.22     INFLUENZA A/B, RSV, & SARS-COV2 PCR - Normal    Influenza A PCR Negative      Influenza B PCR Negative      RSV PCR Negative      SARS CoV2 PCR Negative     TROPONIN T, HIGH SENSITIVITY - Normal    Troponin T, High Sensitivity 20     ROUTINE UA WITH MICROSCOPIC REFLEX TO CULTURE        Emergency Department Course & Assessments:    Interventions:  Medications - No data to display     Assessments:  On recheck, patient does endorse reaching over to attempt to grab a remote when he toppled over.    Independent Interpretation (X-rays, CTs, rhythm strip):  I personally evaluated the CXR, no  obvious PTX, PNA.    Consultations/Discussion of Management or Tests:  None        Social Determinants of Health affecting care:   None    Disposition:  The patient was discharged.     Impression & Plan      Medical Decision Making:  Tre Vazquez is a 48 year old male who presents to ED for evaluation after a fall.  Initially, patient states he does not remember what happened with respect to the fall, only noting that he feels generally weak and lightheaded on evaluation.  Shortly thereafter, I did follow-up with him and he describes that he was actually attempting to reach over out of his recliner while trying to attempt to hold onto his wheelchair to reach a remote on the ground when he toppled over.  He initially described loss of consciousness, however on recheck is unclear if this is the case as he is recalling the events after the fall.  See HPI as above for additional details.  Vitals and physical exam as above.  Differential for potential syncope, generalized weakness, lightheadedness was broad and included anemia, arrhythmia, atypical ACS, electrolyte abnormality, thyroid abnormality, infectious etiology such as UTI, pneumonia, PE, SAH, seizure, vasovagal episode, dehydration, amongst others.  Workup obtained as above.  Hemoglobin at baseline.  No electrolyte abnormalities.  Delta troponin negative and patient denies any chest pain or shortness of breath to suggest for atypical ACS.  ECG reassuring.  D-dimer negative, making PE less likely.  TSH WNL.  COVID, influenza swabs returned negative.  Chest x-ray without evidence for pneumonia or other intrathoracic abnormality.  Patient firmly states he did not have a seizure.  No tongue laceration or incontinence to suggest as such.  CT of head and neck return reassuring.  Patient does not appear overtly dehydrated.  Patient refuses UA, thus cannot definitively rule out UTI, however he denies any urinary symptoms.  Although initially unclear, it sounds as  though this was likely a mechanical fall.  No indication for any other imaging at this time based upon full exam.  Patient did have abrasion that was cleaned to right shoulder.  Ultimately, patient comfortable with discharge to home, noting he feels safe managing at home. Discussed reasons to return. All questions answered. Patient discharged to home in stable condition.    Diagnosis:    ICD-10-CM    1. Fall, initial encounter  W19.XXXA       2. Syncope  R55     possible      3. Abrasion of right upper extremity, initial encounter  S40.811A            Discharge Medications:  New Prescriptions    No medications on file      This record was created at least in part using electronic voice recognition software, so please excuse any typographical errors.       Niko Xie PA-C  02/06/24 1543

## 2024-02-06 NOTE — ED NOTES
Writer informed pt of MD order to obtain straight cath urine sample. Pt refused. Simona WESLEY informed.

## 2024-02-06 NOTE — ED TRIAGE NOTES
Patient BIBA from group home. Patient fell out of recliner while attempting to transfer to wheelchair. Patient fell onto right side and struck head on wall. Patient reports that they blacked out and are unsure how long they were down for. Patient called for help and staff assisted them off of the floor. Patient has a small abrasion to his right shoulder but denies pain. Patient is Aox4 and VSS.     Triage Assessment (Adult)       Row Name 02/06/24 1055          Triage Assessment    Airway WDL WDL        Respiratory WDL    Respiratory WDL X  patient on 4 L NC remains at baseline        Cardiac WDL    Cardiac WDL WDL     Cardiac Rhythm NSR        Peripheral/Neurovascular WDL    Peripheral Neurovascular WDL WDL        Cognitive/Neuro/Behavioral WDL    Cognitive/Neuro/Behavioral WDL WDL

## 2024-02-20 ENCOUNTER — APPOINTMENT (OUTPATIENT)
Dept: CT IMAGING | Facility: CLINIC | Age: 49
DRG: 100 | End: 2024-02-20
Attending: EMERGENCY MEDICINE
Payer: MEDICARE

## 2024-02-20 ENCOUNTER — HOSPITAL ENCOUNTER (OUTPATIENT)
Facility: CLINIC | Age: 49
Setting detail: OBSERVATION
Discharge: NURSING, REHAB OR LONG-TERM CARE | DRG: 100 | End: 2024-02-21
Attending: EMERGENCY MEDICINE | Admitting: HOSPITALIST
Payer: MEDICARE

## 2024-02-20 ENCOUNTER — APPOINTMENT (OUTPATIENT)
Dept: GENERAL RADIOLOGY | Facility: CLINIC | Age: 49
DRG: 100 | End: 2024-02-20
Attending: EMERGENCY MEDICINE
Payer: MEDICARE

## 2024-02-20 DIAGNOSIS — G40.309 NONINTRACTABLE GENERALIZED IDIOPATHIC EPILEPSY WITHOUT STATUS EPILEPTICUS (H): Primary | ICD-10-CM

## 2024-02-20 DIAGNOSIS — R94.31 PROLONGED Q-T INTERVAL ON ECG: ICD-10-CM
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
## 2024-02-20 DIAGNOSIS — R41.82 ALTERED MENTAL STATUS, UNSPECIFIED ALTERED MENTAL STATUS TYPE: ICD-10-CM

## 2024-02-20 DIAGNOSIS — R56.9 SEIZURE (H): ICD-10-CM

## 2024-02-20 LAB
ALBUMIN SERPL BCG-MCNC: 4 G/DL (ref 3.5–5.2)
ALP SERPL-CCNC: 76 U/L (ref 40–150)
ALT SERPL W P-5'-P-CCNC: 42 U/L (ref 0–70)
AMMONIA PLAS-SCNC: 60 UMOL/L (ref 16–60)
ANION GAP SERPL CALCULATED.3IONS-SCNC: 5 MMOL/L (ref 7–15)
AST SERPL W P-5'-P-CCNC: 31 U/L (ref 0–45)
ATRIAL RATE - MUSE: 86 BPM
BASE EXCESS BLDV CALC-SCNC: 11.9 MMOL/L (ref -3–3)
BASOPHILS # BLD AUTO: 0 10E3/UL (ref 0–0.2)
BASOPHILS NFR BLD AUTO: 1 %
BILIRUB SERPL-MCNC: 0.2 MG/DL
BUN SERPL-MCNC: 15.9 MG/DL (ref 6–20)
CALCIUM SERPL-MCNC: 8.8 MG/DL (ref 8.6–10)
CHLORIDE SERPL-SCNC: 98 MMOL/L (ref 98–107)
CREAT SERPL-MCNC: 0.6 MG/DL (ref 0.67–1.17)
D DIMER PPP FEU-MCNC: 0.29 UG/ML FEU (ref 0–0.5)
DEPRECATED HCO3 PLAS-SCNC: 40 MMOL/L (ref 22–29)
DIASTOLIC BLOOD PRESSURE - MUSE: NORMAL MMHG
EGFRCR SERPLBLD CKD-EPI 2021: >90 ML/MIN/1.73M2
EOSINOPHIL # BLD AUTO: 0.1 10E3/UL (ref 0–0.7)
EOSINOPHIL NFR BLD AUTO: 2 %
ERYTHROCYTE [DISTWIDTH] IN BLOOD BY AUTOMATED COUNT: 12.8 % (ref 10–15)
GLUCOSE SERPL-MCNC: 89 MG/DL (ref 70–99)
HCO3 BLDV-SCNC: 41 MMOL/L (ref 21–28)
HCT VFR BLD AUTO: 39.8 % (ref 40–53)
HGB BLD-MCNC: 12.4 G/DL (ref 13.3–17.7)
IMM GRANULOCYTES # BLD: 0.1 10E3/UL
IMM GRANULOCYTES NFR BLD: 1 %
INTERPRETATION ECG - MUSE: NORMAL
LACTATE SERPL-SCNC: 0.9 MMOL/L (ref 0.7–2)
LEVETIRACETAM SERPL-MCNC: 30.7 ΜG/ML (ref 10–40)
LYMPHOCYTES # BLD AUTO: 2.4 10E3/UL (ref 0.8–5.3)
LYMPHOCYTES NFR BLD AUTO: 42 %
MAGNESIUM SERPL-MCNC: 1.6 MG/DL (ref 1.7–2.3)
MCH RBC QN AUTO: 31 PG (ref 26.5–33)
MCHC RBC AUTO-ENTMCNC: 31.2 G/DL (ref 31.5–36.5)
MCV RBC AUTO: 100 FL (ref 78–100)
MONOCYTES # BLD AUTO: 0.6 10E3/UL (ref 0–1.3)
MONOCYTES NFR BLD AUTO: 10 %
NEUTROPHILS # BLD AUTO: 2.6 10E3/UL (ref 1.6–8.3)
NEUTROPHILS NFR BLD AUTO: 44 %
NRBC # BLD AUTO: 0 10E3/UL
NRBC BLD AUTO-RTO: 0 /100
O2/TOTAL GAS SETTING VFR VENT: 32 %
OXYHGB MFR BLDV: 66 % (ref 70–75)
P AXIS - MUSE: 38 DEGREES
PCO2 BLDV: 78 MM HG (ref 40–50)
PH BLDV: 7.33 [PH] (ref 7.32–7.43)
PLATELET # BLD AUTO: 175 10E3/UL (ref 150–450)
PO2 BLDV: 36 MM HG (ref 25–47)
POTASSIUM SERPL-SCNC: 4.3 MMOL/L (ref 3.4–5.3)
PR INTERVAL - MUSE: 188 MS
PROT SERPL-MCNC: 6.4 G/DL (ref 6.4–8.3)
QRS DURATION - MUSE: 88 MS
QT - MUSE: 418 MS
QTC - MUSE: 500 MS
R AXIS - MUSE: 8 DEGREES
RBC # BLD AUTO: 4 10E6/UL (ref 4.4–5.9)
SAO2 % BLDV: 67.5 % (ref 70–75)
SODIUM SERPL-SCNC: 143 MMOL/L (ref 135–145)
SYSTOLIC BLOOD PRESSURE - MUSE: NORMAL MMHG
T AXIS - MUSE: 29 DEGREES
TROPONIN T SERPL HS-MCNC: 15 NG/L
VALPROATE SERPL-MCNC: 39.6 UG/ML
VENTRICULAR RATE- MUSE: 86 BPM
WBC # BLD AUTO: 5.8 10E3/UL (ref 4–11)

## 2024-02-20 PROCEDURE — 250N000011 HC RX IP 250 OP 636: Performed by: EMERGENCY MEDICINE

## 2024-02-20 PROCEDURE — 71046 X-RAY EXAM CHEST 2 VIEWS: CPT

## 2024-02-20 PROCEDURE — 80235 DRUG ASSAY LACOSAMIDE: CPT | Performed by: EMERGENCY MEDICINE

## 2024-02-20 PROCEDURE — 82805 BLOOD GASES W/O2 SATURATION: CPT | Performed by: EMERGENCY MEDICINE

## 2024-02-20 PROCEDURE — 82140 ASSAY OF AMMONIA: CPT | Performed by: EMERGENCY MEDICINE

## 2024-02-20 PROCEDURE — 85041 AUTOMATED RBC COUNT: CPT | Performed by: EMERGENCY MEDICINE

## 2024-02-20 PROCEDURE — 80175 DRUG SCREEN QUAN LAMOTRIGINE: CPT | Performed by: EMERGENCY MEDICINE

## 2024-02-20 PROCEDURE — 36415 COLL VENOUS BLD VENIPUNCTURE: CPT | Performed by: EMERGENCY MEDICINE

## 2024-02-20 PROCEDURE — 99222 1ST HOSP IP/OBS MODERATE 55: CPT | Mod: AI | Performed by: HOSPITALIST

## 2024-02-20 PROCEDURE — 80164 ASSAY DIPROPYLACETIC ACD TOT: CPT | Performed by: EMERGENCY MEDICINE

## 2024-02-20 PROCEDURE — G0378 HOSPITAL OBSERVATION PER HR: HCPCS

## 2024-02-20 PROCEDURE — 84484 ASSAY OF TROPONIN QUANT: CPT | Performed by: EMERGENCY MEDICINE

## 2024-02-20 PROCEDURE — 85379 FIBRIN DEGRADATION QUANT: CPT | Performed by: EMERGENCY MEDICINE

## 2024-02-20 PROCEDURE — 70450 CT HEAD/BRAIN W/O DYE: CPT | Mod: MA

## 2024-02-20 PROCEDURE — 83735 ASSAY OF MAGNESIUM: CPT | Performed by: EMERGENCY MEDICINE

## 2024-02-20 PROCEDURE — 80053 COMPREHEN METABOLIC PANEL: CPT | Performed by: EMERGENCY MEDICINE

## 2024-02-20 PROCEDURE — 250N000013 HC RX MED GY IP 250 OP 250 PS 637: Performed by: HOSPITALIST

## 2024-02-20 PROCEDURE — 99285 EMERGENCY DEPT VISIT HI MDM: CPT | Mod: 25

## 2024-02-20 PROCEDURE — 93005 ELECTROCARDIOGRAM TRACING: CPT

## 2024-02-20 PROCEDURE — 72125 CT NECK SPINE W/O DYE: CPT | Mod: MA

## 2024-02-20 PROCEDURE — 83605 ASSAY OF LACTIC ACID: CPT | Performed by: EMERGENCY MEDICINE

## 2024-02-20 PROCEDURE — 96365 THER/PROPH/DIAG IV INF INIT: CPT

## 2024-02-20 PROCEDURE — 80177 DRUG SCRN QUAN LEVETIRACETAM: CPT | Performed by: EMERGENCY MEDICINE

## 2024-02-20 RX ORDER — ONDANSETRON 4 MG/1
4 TABLET, ORALLY DISINTEGRATING ORAL EVERY 6 HOURS PRN
Status: DISCONTINUED | OUTPATIENT
Start: 2024-02-20 | End: 2024-02-21 | Stop reason: HOSPADM

## 2024-02-20 RX ORDER — ZONISAMIDE 100 MG/1
200 CAPSULE ORAL AT BEDTIME
Status: DISCONTINUED | OUTPATIENT
Start: 2024-02-20 | End: 2024-02-21 | Stop reason: HOSPADM

## 2024-02-20 RX ORDER — BUSPIRONE HYDROCHLORIDE 15 MG/1
15 TABLET ORAL 2 TIMES DAILY
Status: DISCONTINUED | OUTPATIENT
Start: 2024-02-20 | End: 2024-02-21 | Stop reason: HOSPADM

## 2024-02-20 RX ORDER — QUETIAPINE 300 MG/1
300 TABLET, FILM COATED, EXTENDED RELEASE ORAL AT BEDTIME
Status: DISCONTINUED | OUTPATIENT
Start: 2024-02-20 | End: 2024-02-21 | Stop reason: HOSPADM

## 2024-02-20 RX ORDER — LEVOCARNITINE 330 MG/1
660 TABLET ORAL 3 TIMES DAILY
Status: DISCONTINUED | OUTPATIENT
Start: 2024-02-20 | End: 2024-02-21 | Stop reason: HOSPADM

## 2024-02-20 RX ORDER — LACOSAMIDE 50 MG/1
50 TABLET ORAL DAILY
Status: DISCONTINUED | OUTPATIENT
Start: 2024-02-20 | End: 2024-02-21 | Stop reason: HOSPADM

## 2024-02-20 RX ORDER — ZONISAMIDE 100 MG/1
100 CAPSULE ORAL EVERY MORNING
Status: DISCONTINUED | OUTPATIENT
Start: 2024-02-21 | End: 2024-02-21 | Stop reason: HOSPADM

## 2024-02-20 RX ORDER — MAGNESIUM SULFATE HEPTAHYDRATE 40 MG/ML
2 INJECTION, SOLUTION INTRAVENOUS ONCE
Status: COMPLETED | OUTPATIENT
Start: 2024-02-20 | End: 2024-02-20

## 2024-02-20 RX ORDER — MULTIVITAMIN,THERAPEUTIC
1 TABLET ORAL DAILY
Status: DISCONTINUED | OUTPATIENT
Start: 2024-02-20 | End: 2024-02-21 | Stop reason: HOSPADM

## 2024-02-20 RX ORDER — LEVETIRACETAM 500 MG/1
1500 TABLET ORAL AT BEDTIME
Status: DISCONTINUED | OUTPATIENT
Start: 2024-02-20 | End: 2024-02-21 | Stop reason: HOSPADM

## 2024-02-20 RX ORDER — DIVALPROEX SODIUM 500 MG/1
500 TABLET, DELAYED RELEASE ORAL EVERY 12 HOURS
Status: DISCONTINUED | OUTPATIENT
Start: 2024-02-20 | End: 2024-02-21 | Stop reason: HOSPADM

## 2024-02-20 RX ORDER — CLOTRIMAZOLE 1 %
CREAM (GRAM) TOPICAL 2 TIMES DAILY
COMMUNITY

## 2024-02-20 RX ORDER — ONDANSETRON 2 MG/ML
4 INJECTION INTRAMUSCULAR; INTRAVENOUS EVERY 6 HOURS PRN
Status: DISCONTINUED | OUTPATIENT
Start: 2024-02-20 | End: 2024-02-21 | Stop reason: HOSPADM

## 2024-02-20 RX ORDER — LAMOTRIGINE 100 MG/1
100 TABLET ORAL DAILY
Status: DISCONTINUED | OUTPATIENT
Start: 2024-02-21 | End: 2024-02-21 | Stop reason: HOSPADM

## 2024-02-20 RX ORDER — POLYETHYLENE GLYCOL 3350 17 G/17G
17 POWDER, FOR SOLUTION ORAL DAILY
Status: DISCONTINUED | OUTPATIENT
Start: 2024-02-20 | End: 2024-02-21 | Stop reason: HOSPADM

## 2024-02-20 RX ORDER — AMOXICILLIN 250 MG
1 CAPSULE ORAL 2 TIMES DAILY PRN
Status: DISCONTINUED | OUTPATIENT
Start: 2024-02-20 | End: 2024-02-21 | Stop reason: HOSPADM

## 2024-02-20 RX ORDER — BACITRACIN ZINC 500 [USP'U]/G
OINTMENT TOPICAL 2 TIMES DAILY
COMMUNITY

## 2024-02-20 RX ORDER — CITALOPRAM HYDROBROMIDE 20 MG/1
40 TABLET ORAL DAILY
Status: DISCONTINUED | OUTPATIENT
Start: 2024-02-20 | End: 2024-02-21 | Stop reason: HOSPADM

## 2024-02-20 RX ORDER — PROPRANOLOL HYDROCHLORIDE 20 MG/1
20 TABLET ORAL 2 TIMES DAILY
Status: DISCONTINUED | OUTPATIENT
Start: 2024-02-20 | End: 2024-02-21 | Stop reason: HOSPADM

## 2024-02-20 RX ORDER — LAMOTRIGINE 200 MG/1
200 TABLET ORAL 2 TIMES DAILY
Status: DISCONTINUED | OUTPATIENT
Start: 2024-02-20 | End: 2024-02-21 | Stop reason: HOSPADM

## 2024-02-20 RX ORDER — AMOXICILLIN 250 MG
2 CAPSULE ORAL 2 TIMES DAILY PRN
Status: DISCONTINUED | OUTPATIENT
Start: 2024-02-20 | End: 2024-02-21 | Stop reason: HOSPADM

## 2024-02-20 RX ORDER — ACETAMINOPHEN 325 MG/1
650 TABLET ORAL EVERY 4 HOURS PRN
COMMUNITY
End: 2024-06-21

## 2024-02-20 RX ORDER — LEVETIRACETAM 500 MG/1
1000 TABLET ORAL EVERY MORNING
Status: DISCONTINUED | OUTPATIENT
Start: 2024-02-21 | End: 2024-02-21 | Stop reason: HOSPADM

## 2024-02-20 RX ORDER — PRAZOSIN HYDROCHLORIDE 1 MG/1
2 CAPSULE ORAL AT BEDTIME
Status: DISCONTINUED | OUTPATIENT
Start: 2024-02-20 | End: 2024-02-21 | Stop reason: HOSPADM

## 2024-02-20 RX ORDER — PANTOPRAZOLE SODIUM 40 MG/1
40 TABLET, DELAYED RELEASE ORAL DAILY
Status: DISCONTINUED | OUTPATIENT
Start: 2024-02-20 | End: 2024-02-21 | Stop reason: HOSPADM

## 2024-02-20 RX ORDER — CITALOPRAM HYDROBROMIDE 20 MG/1
20 TABLET ORAL DAILY
Status: DISCONTINUED | OUTPATIENT
Start: 2024-02-20 | End: 2024-02-21 | Stop reason: HOSPADM

## 2024-02-20 RX ADMIN — DIVALPROEX SODIUM 500 MG: 500 TABLET, DELAYED RELEASE ORAL at 21:03

## 2024-02-20 RX ADMIN — LACOSAMIDE 50 MG: 50 TABLET, FILM COATED ORAL at 21:02

## 2024-02-20 RX ADMIN — PRAZOSIN HYDROCHLORIDE 2 MG: 1 CAPSULE ORAL at 22:14

## 2024-02-20 RX ADMIN — MAGNESIUM SULFATE HEPTAHYDRATE 2 G: 2 INJECTION, SOLUTION INTRAVENOUS at 10:45

## 2024-02-20 RX ADMIN — BUSPIRONE HYDROCHLORIDE 15 MG: 15 TABLET ORAL at 21:03

## 2024-02-20 RX ADMIN — PANTOPRAZOLE SODIUM 40 MG: 40 TABLET, DELAYED RELEASE ORAL at 21:02

## 2024-02-20 RX ADMIN — THERA TABS 1 TABLET: TAB at 21:03

## 2024-02-20 RX ADMIN — ZONISAMIDE 200 MG: 100 CAPSULE ORAL at 21:05

## 2024-02-20 RX ADMIN — PROPRANOLOL HYDROCHLORIDE 20 MG: 20 TABLET ORAL at 21:03

## 2024-02-20 RX ADMIN — CITALOPRAM HYDROBROMIDE 20 MG: 20 TABLET ORAL at 21:09

## 2024-02-20 RX ADMIN — CITALOPRAM HYDROBROMIDE 40 MG: 20 TABLET ORAL at 21:09

## 2024-02-20 RX ADMIN — QUETIAPINE FUMARATE 300 MG: 300 TABLET, EXTENDED RELEASE ORAL at 22:14

## 2024-02-20 RX ADMIN — LEVOCARNITINE 660 MG: 330 TABLET ORAL at 22:15

## 2024-02-20 RX ADMIN — LEVETIRACETAM 1500 MG: 500 TABLET, FILM COATED ORAL at 21:12

## 2024-02-20 RX ADMIN — LAMOTRIGINE 200 MG: 200 TABLET ORAL at 21:04

## 2024-02-20 RX ADMIN — POLYETHYLENE GLYCOL 3350 17 G: 17 POWDER, FOR SOLUTION ORAL at 21:02

## 2024-02-20 RX ADMIN — METHYLCELLULOSE 500 MG: 500 TABLET ORAL at 22:15

## 2024-02-20 ASSESSMENT — ACTIVITIES OF DAILY LIVING (ADL)
ADLS_ACUITY_SCORE: 41
ADLS_ACUITY_SCORE: 41
ADLS_ACUITY_SCORE: 36
ADLS_ACUITY_SCORE: 36
ADLS_ACUITY_SCORE: 40
ADLS_ACUITY_SCORE: 41
ADLS_ACUITY_SCORE: 35
DEPENDENT_IADLS:: CLEANING;COOKING;LAUNDRY;SHOPPING;MONEY MANAGEMENT;MEDICATION MANAGEMENT;MEAL PREPARATION;TRANSPORTATION
ADLS_ACUITY_SCORE: 40

## 2024-02-20 NOTE — ED TRIAGE NOTES
Pt arrives via EMS from a foster care group home. EMS found the patient on the floor, reports a fall from the toilet onto his back but does not remember what happened. Pt reports increased seizure activity, but it was not witnessed, and disorientation. VSS, Hx; epilepsy per the patient.      Triage Assessment (Adult)       Row Name 02/20/24 0753          Triage Assessment    Airway WDL WDL        Respiratory WDL    Respiratory WDL WDL        Skin Circulation/Temperature WDL    Skin Circulation/Temperature WDL WDL        Cardiac WDL    Cardiac WDL WDL        Peripheral/Neurovascular WDL    Peripheral Neurovascular WDL WDL        Cognitive/Neuro/Behavioral WDL    Cognitive/Neuro/Behavioral WDL X     Level of Consciousness lethargic     Arousal Level arouses to voice     Orientation disoriented to;time;situation;place     Speech clear     Mood/Behavior calm        Pupils (CN II)    Pupil PERRLA other (see comments)  L eye blindness - birth defect per pt     Pupil Size Left other (see comments)     Pupil Size Right 2 mm        Michele Coma Scale    Best Eye Response 3-->(E3) to speech     Best Motor Response 6-->(M6) obeys commands     Best Verbal Response 4-->(V4) confused     Bernard Coma Scale Score 13     Assessment Qualifiers patient not sedated/intubated

## 2024-02-20 NOTE — ED PROVIDER NOTES
History     Chief Complaint:  Fall    The history is provided by the patient.      Tre Vazquez is a 48 year old male with a history of epilepsy and cerebral palsy presenting with a presumed fall. Patient resides in a group home and was found by staff on the ground. It is unknown how long the patient was down for. Patient remembers a staff member helping him get to the bathroom, standing up from the toilet independently, and flushed. He recalled waking up and being on the floor. He doesn't remember anything else. He endorses feeling tired and fatigued. Joel reports his current symptoms feel similar to past seizures. He notes he has 3-5 seizures per day, which has been more frequent over the past few days. When he gets a seizure, he reports twitching, jerking, and loss of control. Denies loss of bowel or bladder control. His group home provides his medications. Patient takes Depakote (500 mg TID), Lamictal (200 mg, BID), Keppra (1 g in the morning, 1.5 g at bedtime), Levocarnitine (660 mg 3x daily), and Zonegran (100 mg in the morning, and 200 mg at bedtime) according to MAR from group home.  Of note, I do see a few doses which had been missed over the past few days. Patient uses at home oxygen. Joel is supposed to wear BiPAP at night or while napping and has been intermittently compliant with this. Denies headache, nausea, vomiting, numbness, tingling, or pain.     Independent Historian:   None - Patient Only    Review of External Notes:   I reviewed the neurology visit note from 2/2/24.      Medications:    Albuterol   Buspirone  Citalopram   Lacosamide   Lamotrigine   Levetiracetam   Pantoprazole   Miralax   Propranolol   Quetiapine   Zonisamide     Past Medical History:   Asthma    Blindness of left eye  Bronchitis   Cognitive impairment   Depression  Dysthymia   GERD  Hypertension  LDD  Mood disorder   Mild intellectual disability   ANA  Otitis media   Pneumonia  Paraplegia   Pulmonary infiltrate  "  Prediabetes   PTSD  Respiratory failure  Respiratory acidosis   Schizoaffective disorder  Spastic diplegia   Tremor   Unspecified epilepsy     Past Surgical History:    Colonoscopy  EGD  Ankle surgery, bilateral   Orbit surgery      Physical Exam   Patient Vitals for the past 24 hrs:   BP Temp Temp src Pulse Resp SpO2 Height Weight   02/20/24 1323 135/81 97.4  F (36.3  C) Oral 81 20 98 % -- --   02/20/24 1159 -- -- -- -- -- -- 1.727 m (5' 8\") 136.1 kg (300 lb)   02/20/24 1150 115/78 -- -- -- -- -- -- --   02/20/24 1140 124/80 -- -- 85 -- -- -- --   02/20/24 1130 -- -- -- -- -- 95 % -- --   02/20/24 1122 110/71 -- -- 80 -- 95 % -- --   02/20/24 1100 -- -- -- -- -- 95 % -- --   02/20/24 1030 109/72 -- -- 83 -- 98 % -- --   02/20/24 1015 (!) 149/80 -- -- -- -- 96 % -- --   02/20/24 0920 -- -- -- 92 -- 92 % -- --   02/20/24 0919 -- -- -- -- -- 92 % -- --   02/20/24 0918 -- -- -- -- -- 91 % -- --   02/20/24 0917 -- -- -- -- -- 91 % -- --   02/20/24 0916 -- -- -- -- -- 90 % -- --   02/20/24 0915 119/71 -- -- -- -- 92 % -- --   02/20/24 0911 -- 98.3  F (36.8  C) Oral -- -- 94 % -- --   02/20/24 0900 -- -- -- -- -- (!) 89 % -- --   02/20/24 0845 124/76 -- -- 86 -- 94 % -- --   02/20/24 0830 130/81 -- -- 87 -- 94 % -- --   02/20/24 0815 135/75 -- -- 88 -- 92 % -- --   02/20/24 0800 125/80 -- -- 88 -- 95 % -- --   02/20/24 0753 123/75 -- -- 89 16 94 % -- --   02/20/24 0752 -- -- -- -- -- 94 % -- --   02/20/24 0751 123/75 -- -- 89 -- -- -- --      Physical Exam  General:              Well-nourished              Speaking in full sentences              Somewhat somnolent              Answers questions appropriately  Eyes:              Conjunctiva without injection or scleral icterus  ENT:              Moist mucous membranes              Nares patent              Pinnae normal  Neck:              Full ROM              No stiffness appreciated  Resp:              Lungs CTAB              No crackles, wheezing or audible rubs      "         Good air movement  CV:                    Normal rate, regular rhythm              S1 and S2 present              No murmur, gallop or rub  GI:              BS present              Abdomen soft without distention              Non-tender to light and deep palpation              No guarding or rebound tenderness  Skin:              Warm, dry, well perfused              No rashes or open wounds on exposed skin  MSK:              Moves all extremities              No focal deformities or swelling  Neuro:              Alert              Answers questions appropriately    No seizure activity noted  Psych:              Flat affect    Emergency Department Course   ECG  ECG results from 02/20/24   EKG 12 lead     Value    Systolic Blood Pressure     Diastolic Blood Pressure     Ventricular Rate 86    Atrial Rate 86    RI Interval 188    QRS Duration 88        QTc 500    P Axis 38    R AXIS 8    T Axis 29    Interpretation ECG      Sinus rhythm  Prolonged QT  Abnormal ECG  Premature atrial complexes are no longer Present  Nonspecific T wave abnormality no longer evident in Anterolateral leads  QT has lengthened  EKG interpreted by me at 0839     Imaging:  Chest XR,  PA & LAT   Final Result   IMPRESSION: Cardiopericardial silhouette is within normal limits. No   focal airspace consolidation. No pleural effusion. No discernible   pneumothorax. No acute displaced fracture.      MÓNICA GAUTAM MD            SYSTEM ID:  [id number]      CT Cervical Spine w/o Contrast   Final Result   IMPRESSION:   1. No acute fracture or posttraumatic subluxation.   2. Congenitally narrow bony spinal canal and advanced degenerative   changes resulting in multilevel high-grade spinal canal and neural   foraminal stenosis.      JENNIE MEAD MD            SYSTEM ID:  [id number]      Head CT w/o contrast   Final Result   IMPRESSION:    1. No acute intracranial pathology.    2. Chronic small vessel ischemic disease and moderate  diffuse cerebral   volume loss. Unchanged moderate lateral and third ventriculomegaly.      JENNIE MEAD MD            SYSTEM ID:  [id number]         Laboratory:  Labs Ordered and Resulted from Time of ED Arrival to Time of ED Departure   COMPREHENSIVE METABOLIC PANEL - Abnormal       Result Value    Sodium 143      Potassium 4.3      Carbon Dioxide (CO2) 40 (*)     Anion Gap 5 (*)     Urea Nitrogen 15.9      Creatinine 0.60 (*)     GFR Estimate >90      Calcium 8.8      Chloride 98      Glucose 89      Alkaline Phosphatase 76      AST 31      ALT 42      Protein Total 6.4      Albumin 4.0      Bilirubin Total 0.2     VALPROIC ACID - Abnormal    Valproic acid 39.6 (*)    MAGNESIUM - Abnormal    Magnesium 1.6 (*)    BLOOD GAS VENOUS - Abnormal    pH Venous 7.33      pCO2 Venous 78 (*)     pO2 Venous 36      Bicarbonate Venous 41 (*)     Base Excess/Deficit Venous 11.9 (*)     FIO2 32      Oxyhemoglobin Venous 66 (*)     O2 Sat, Venous 67.5 (*)    CBC WITH PLATELETS AND DIFFERENTIAL - Abnormal    WBC Count 5.8      RBC Count 4.00 (*)     Hemoglobin 12.4 (*)     Hematocrit 39.8 (*)           MCH 31.0      MCHC 31.2 (*)     RDW 12.8      Platelet Count 175      % Neutrophils 44      % Lymphocytes 42      % Monocytes 10      % Eosinophils 2      % Basophils 1      % Immature Granulocytes 1      NRBCs per 100 WBC 0      Absolute Neutrophils 2.6      Absolute Lymphocytes 2.4      Absolute Monocytes 0.6      Absolute Eosinophils 0.1      Absolute Basophils 0.0      Absolute Immature Granulocytes 0.1      Absolute NRBCs 0.0     TROPONIN T, HIGH SENSITIVITY - Normal    Troponin T, High Sensitivity 15     AMMONIA - Normal    Ammonia 60     LACTIC ACID WHOLE BLOOD - Normal    Lactic Acid 0.9     LAMOTRIGINE LEVEL   KEPPRA (LEVETIRACETAM) LEVEL   ROUTINE UA WITH MICROSCOPIC REFLEX TO CULTURE   LACOSAMIDE LEVEL      Procedures   None    Emergency Department Course & Assessments:    Interventions:  Medications    busPIRone (BUSPAR) tablet 15 mg (has no administration in time range)   citalopram (celeXA) tablet 20 mg (has no administration in time range)   citalopram (celeXA) tablet 40 mg (has no administration in time range)   divalproex sodium delayed-release (DEPAKOTE) DR tablet 500 mg (has no administration in time range)   lacosamide (VIMPAT) tablet 50 mg (has no administration in time range)   lamoTRIgine (LaMICtal) tablet 100 mg (has no administration in time range)   lamoTRIgine (LaMICtal) tablet 200 mg (has no administration in time range)   levETIRAcetam (KEPPRA) tablet 1,000 mg (has no administration in time range)     And   levETIRAcetam (KEPPRA) tablet 1,500 mg (has no administration in time range)   levOCARNitine (CARNITOR) tablet 660 mg (has no administration in time range)   methylcellulose powder 2 mg (has no administration in time range)   multivitamin, therapeutic (THERA-VIT) tablet 1 tablet (has no administration in time range)   pantoprazole (PROTONIX) EC tablet 40 mg (has no administration in time range)   polyethylene glycol (MIRALAX) powder 17 g (has no administration in time range)   prazosin (MINIPRESS) capsule 2 mg (has no administration in time range)   propranolol (INDERAL) tablet 20 mg (has no administration in time range)   QUEtiapine ER (SEROquel XR) 24 hr tablet 300 mg (has no administration in time range)   zonisamide (ZONEGRAN) capsule 100 mg (has no administration in time range)     And   zonisamide (ZONEGRAN) capsule 200 mg (has no administration in time range)   senna-docusate (SENOKOT-S/PERICOLACE) 8.6-50 MG per tablet 1 tablet (has no administration in time range)     Or   senna-docusate (SENOKOT-S/PERICOLACE) 8.6-50 MG per tablet 2 tablet (has no administration in time range)   ondansetron (ZOFRAN ODT) ODT tab 4 mg (has no administration in time range)     Or   ondansetron (ZOFRAN) injection 4 mg (has no administration in time range)   magnesium sulfate 2 g in 50 mL sterile water  intermittent infusion (0 g Intravenous Stopped 2/20/24 1207)      Independent Interpretation (X-rays, CTs, rhythm strip):  CXR reviewed and no acute infiltrate is noted    Assessments/Consultations/Discussion of Management or Tests:  ED Course as of 02/20/24 1437   Tue Feb 20, 2024   0758 Riddhi, a student working with me, obtained the initial history.    0856 I obtained the history and examined the patient as noted above.      0856 I obtained the history and examined the patient as noted above.      1014 Riddhi rechecked and updated the patient.   1127 Patient re-evaluated.  Somnolent, though awakens to verbal and tactile stimuli   1142 Spoke with Dr. Ervin who has accepted for observation.     Social Determinants of Health affecting care:   None    Disposition:  The patient was admitted to the hospital under the care of Dr. Ervin.     Impression & Plan    Medical Decision Making:  Tre Vazquez is a 48-year-old male with a complex medical history significant for epilepsy, spastic diplegia, respiratory failure, presenting to the ED via EMS for evaluation of a fall.  History obtained from patient and supplemented by review of previous EMR.  With regards to patient's fall, differential diagnosis includes recurrent seizure, syncope (arrhythmia, ACS, PE), pneumonia, metabolic derangement, hypoxia, among others.  Workup included advanced imaging, EKG, and laboratory studies.  Given patient's fall, subsequent amnesia, and profound fatigue, which he describes as consistent with previous seizures, recurrent seizure high on the differential.  Patient is maintained on a 4 different antiepileptic medications, though on review of MAR from [facility], it appears as though some of these dosages may have been missed.  Antiepileptic levels have been sent, many of which are still pending, though Depakote level has returned slightly low at 39.6.  He was observed closely in the ED, and has not demonstrated any recurrent seizure activity.   CT head and cervical spine were obtained, which are negative for intracranial hemorrhage nor fracture.  Other etiologies for patient's fatigue and confusion were considered though felt to be less likely.  From a respiratory standpoint, he does appear to be at baseline oxygen requirement per his report.  He does have chronic hypercarbia, though appears reasonably compensated at this time, and work of breathing is otherwise unremarkable.  I considered pulmonary embolism, though also feel this to be unlikely in the absence of worsened hypoxia, tachycardia, as well as given the above history.  His EKG demonstrates sinus rhythm with mild prolongation of the QT interval to 500 ms.  He has noted to be mildly hypomagnesemic for which IV supplementation was administered.  Results and clinical impression discussed with patient.  Will plan for hospital observation, consideration of neurology consultation to ensure appropriate antiepileptic management, and close monitoring of symptoms.  Patient in agreement with outlined plan of care.  Questions have been answered prior to admission.    Diagnosis:    ICD-10-CM    1. Fall, initial encounter  W19.XXXA       2. Altered mental status, unspecified altered mental status type  R41.82       3. Prolonged Q-T interval on ECG  R94.31       4. Seizure (H)  R56.9            Scribe Disclosure:  Kristan NAILS, am serving as a scribe at 7:55 AM on 2/20/2024 to document services personally performed by Kvng Obrien MD based on my observations and the provider's statements to me.  2/20/2024   Kvng Obrien MD Roach, Brian Donald, MD  02/20/24 0391

## 2024-02-20 NOTE — H&P
[facility]    History and Physical - Hospitalist Service       Date of Admission:  2/20/2024    Assessment & Plan      Tre Vazquez is a 48 year old male with history of developmental delay (lives in group home), seizures, obesity, ANA/OHS on BiPAP at night and while napping, chronic hypercapnia, chronic hyperammoniemia, anemia, schizoaffective disorder, borderline personality, MDD, anxiety, HTN, L eye blindness admitted on 2/20/2024 with fall/possible seizure.    Fall  Possible seizure  History of seizures    - group home staff state patient didn't lock the wheelchair and fell on the floor    - patient thinks he had a seizure    - apparently patient has done this in the past (states he had a seizure)    - in December he was actually transferred from [facility] to [facility] for 24 hour EEG monitoring (which was negative)    - would continue his home seizure medication regimen without changes (lacosamide, lamotrigine, levetiracetam, zonisamide, Depakote)    - likely can discharge back to his group home tomorrow    Developmental delay    - lives in group home    - uses wheelchair    - sometimes uses a walker    ANA/OHS  Chronic hypercapnia    - hypercapnia on blood gas in ED consistent with his chronic hypercapnia    - ordered BiPAP for tonight    Chronic hyperammoniemia    - no need to check    - on levocarnitine    Schizoaffective disorder  Borderline Personality disorder  MDD  Anxiety    - cont home meds (citalopram and buspirone)    HTN    - cont propanolol, prazosin    GERD    - cont protonix    Full Code    Called his group home and spoke with the director  Called and updated his step mother       Observation Goals: -diagnostic tests and consults completed and resulted, -vital signs normal or at patient baseline, Nurse to notify provider when observation goals have been met and patient is ready for discharge.  Diet: Regular Diet Adult    DVT Prophylaxis: start if remains in the  "hospital  Reid Catheter: Not present  Lines: None     Cardiac Monitoring: None  Code Status: Full Code      Clinically Significant Risk Factors Present on Admission            # Hypomagnesemia: Lowest Mg = 1.6 mg/dL in last 2 days, will replace as needed       # Hypertension: Noted on problem list      # Severe Obesity: Estimated body mass index is 45.61 kg/m  as calculated from the following:    Height as of this encounter: 1.727 m (5' 8\").    Weight as of this encounter: 136.1 kg (300 lb).              Disposition Plan      Expected Discharge Date: 02/21/2024                  Ezekiel Ervin MD  Hospitalist Service  [facility]  Securely message with [employer] (more info)  Text page via [employer] Paging/Directory     ______________________________________________________________________    Chief Complaint   Fall    History is obtained from the patient and his group home    History of Present Illness   Tre Vazquez is a 48 year old male with history of developmental delay (lives in group home), seizures, obesity, ANA/OHS on BiPAP at night and while napping, chronic hypercapnia, chronic hyperammoniemia, anemia, schizoaffective disorder, borderline personality, MDD, anxiety, HTN, L eye blindness admitted on 2/20/2024 with fall/possible seizure.  Initial history was obtained from the patient.  He states that he woke up in his normal state of health.  He went to the bathroom.  He states he \"peed \".  He then states that the last thing he remembers.  The next he remembers is waking up in the emergency room.  He denies any chest pain or shortness of breath.  He denies any abdominal pain, nausea, vomiting, diarrhea.  He has been eating and drinking normally.  No cough, runny nose, sore throat.  No fevers or chills.  No urinary symptoms.  He states he only feels a little \"shaky \"as he often does after seizure.  He states he has been taking his medications.  He states he has been trying to wear his BiPAP " mask, but explains to me that sometimes it blows hot air and at those times he cannot wear it.  I then called his group home.  I spoke with the director.  He states that the staff was with him when he was in the bathroom.  He was in his normal state of health.  He states that the patient had not locked his wheelchair so when he went to sit back down the wheelchair rolled away and he fell on the ground.  He did not injure himself.  He told the staff that he had a seizure.  The director of the group home states that the patient often states that he has seizure activity.  The director of the group home states that the patient is compliant with his medications.  There are no missed doses.      Past Medical History    Past Medical History:   Diagnosis Date    Blindness of left eye     Depression 03/10/2014    Hypertension     Pneumococcal septicemia(038.2) (H) 11/26/2013    Hospitalized    Pneumonia, organism unspecified(486) 11/26/2013    Hospitalized    Uncomplicated asthma     Unspecified epilepsy without mention of intractable epilepsy        Past Surgical History   Past Surgical History:   Procedure Laterality Date    COLONOSCOPY N/A 12/1/2022    Procedure: COLONOSCOPY;  Surgeon: Michael Caldwell MD;  Location:  OR    ESOPHAGOSCOPY, GASTROSCOPY, DUODENOSCOPY (EGD), COMBINED N/A 12/1/2022    Procedure: ESOPHAGOGASTRODUODENOSCOPY with biopsy;  Surgeon: Michael Caldwell MD;  Location:  OR    ORTHOPEDIC SURGERY      pt stated past surgery on both ankles       Prior to Admission Medications   Prior to Admission Medications   Prescriptions Last Dose Informant Patient Reported? Taking?   QUEtiapine ER (SEROQUEL XR) 300 MG 24 hr tablet   Yes No   Sig: Take 300 mg by mouth at bedtime   ammonium lactate (AMLACTIN) 12 % external cream   Yes No   Sig: Apply topically 2 times daily   busPIRone (BUSPAR) 15 MG tablet   Yes No   Sig: Take 15 mg by mouth 2 times daily   citalopram (CELEXA) 20 MG tablet    Yes No   Sig: Take 20 mg by mouth daily   citalopram (CELEXA) 40 MG tablet  Pharmacy Yes No   Sig: Take 40 mg by mouth daily   divalproex sodium delayed-release (DEPAKOTE) 500 MG DR tablet   No No   Sig: Take 1 tablet (500 mg) by mouth every 12 hours   fluticasone (FLONASE) 50 MCG/ACT nasal spray   Yes No   Sig: Spray 1 spray in nostril 2 times daily   lacosamide (VIMPAT) 50 MG TABS tablet   No No   Sig: Take 1 tablet (50 mg) by mouth daily   lamoTRIgine (LAMICTAL) 100 MG tablet   No No   Sig: Take 1 tablet (100 mg) by mouth daily At 1400   lamoTRIgine (LAMICTAL) 200 MG tablet   No No   Sig: Take 1 tablet (200 mg) by mouth 2 times daily Take at 8 AM and 8 pm   levETIRAcetam (KEPPRA) 500 MG tablet   No No   Sig: Take 2 tablets (1,000 mg) by mouth every morning AND 3 tablets (1,500 mg) at bedtime.   levOCARNitine (CARNITOR) 330 MG tablet   No No   Sig: Take 2 tablets (660 mg) by mouth 3 times daily   methylcellulose POWD powder   Yes No   Sig: Apply 2 mg topically 2 times daily Mix 1 rounded tablespoon (2 mg) in 8 oz glass of water and take by mouth twice daily   multivitamin, therapeutic (THERA-VIT) TABS tablet   Yes No   Sig: Take 1 tablet by mouth daily   pantoprazole (PROTONIX) 40 MG EC tablet   Yes No   Sig: Take 40 mg by mouth daily   polyethylene glycol (MIRALAX) 17 GM/Dose powder   Yes No   Sig: Take 1 capful. by mouth daily   prazosin (MINIPRESS) 2 MG capsule   Yes No   Sig: Take 2 mg by mouth At Bedtime   propranolol (INDERAL) 20 MG tablet   No No   Sig: Take 1 tablet (20 mg) by mouth 2 times daily   zonisamide (ZONEGRAN) 100 MG capsule   No No   Sig: Take 1 capsule (100 mg) by mouth every morning AND 2 capsules (200 mg) At Bedtime.      Facility-Administered Medications: None        Review of Systems    The 10 point Review of Systems is negative other than noted in the HPI or here.     Social History   I have reviewed this patient's social history and updated it with pertinent information if needed.  Social  History     Tobacco Use    Smoking status: Former     Types: Cigarettes     Start date:      Quit date:      Years since quittin.1    Smokeless tobacco: Never   Substance Use Topics    Alcohol use: No    Drug use: No         Family History       He cannot tell me his family history    Allergies   Allergies   Allergen Reactions    Hydrocodone Bitartrate Er Unknown    Cephalexin Rash     HUT Reaction: Rash; HUT Noted: 2019      Vicodin [Hydrocodone-Acetaminophen] GI Disturbance        Physical Exam   Vital Signs: Temp: 97.4  F (36.3  C) Temp src: Oral BP: 135/81 Pulse: 81   Resp: 20 SpO2: 98 % O2 Device: Nasal cannula Oxygen Delivery: 3 LPM  Weight: 300 lbs 0 oz    Constitutional: awake, alert, cooperative, no apparent distress, and appears stated age  Eyes: Lids and lashes normal, pupils equal, round and reactive to light, extra ocular muscles intact, sclera clear, conjunctiva normal  ENT: Normocephalic, without obvious abnormality, atraumatic, sinuses nontender on palpation, external ears without lesions, oral pharynx with moist mucous membranes, tonsils without erythema or exudates, gums normal and good dentition.  Respiratory: No increased work of breathing, good air exchange, clear to auscultation bilaterally, no crackles or wheezing  Cardiovascular: Normal apical impulse, regular rate and rhythm, normal S1 and S2, no S3 or S4, and no murmur noted  GI: No scars, normal bowel sounds, soft, non-distended, non-tender, no masses palpated, no hepatosplenomegally  Skin: no bruising or bleeding    Medical Decision Making       60 MINUTES SPENT BY ME on the date of service doing chart review, history, exam, documentation & further activities per the note.      Data     I have personally reviewed the following data over the past 24 hrs:    5.8  \   12.4 (L)   / 175     143 98 15.9 /  89   4.3 40 (H) 0.60 (L) \     ALT: 42 AST: 31 AP: 76 TBILI: 0.2   ALB: 4.0 TOT PROTEIN: 6.4 LIPASE: N/A     Trop: 15 BNP:  N/A     Procal: N/A CRP: N/A Lactic Acid: 0.9         Imaging results reviewed over the past 24 hrs:   Recent Results (from the past 24 hour(s))   Head CT w/o contrast    Narrative    EXAM: CT HEAD W/O CONTRAST  2/20/2024 9:42 AM     HISTORY:  fall, head injury, confusion       COMPARISON:  Head CT 2/6/2024    TECHNIQUE: Using multidetector thin collimation helical acquisition  technique, axial, coronal and sagittal CT images from the skull base  to the vertex were obtained without intravenous contrast.   (topogram) image(s) also obtained and reviewed. Dose reduction  techniques were performed.    FINDINGS:  No intracranial hemorrhage, mass effect, or midline shift. No acute  loss of gray-white matter differentiation in the cerebral hemispheres.  Unchanged moderate ventriculomegaly. Clear basal cisterns. Moderate  diffuse cerebral volume loss. Patchy periventricular hypoattenuation,  consistent with chronic small vessel ischemic disease.    The bony calvaria and the bones of the skull base are normal.  Hyperostosis frontalis interna. Minimal scattered paranasal sinus  mucosal thickening. The mastoid air cells are clear. Phthisis bulbi on  the left..       Impression    IMPRESSION:   1. No acute intracranial pathology.   2. Chronic small vessel ischemic disease and moderate diffuse cerebral  volume loss. Unchanged moderate lateral and third ventriculomegaly.    JENNIE MEAD MD         SYSTEM ID:  [id number]   CT Cervical Spine w/o Contrast    Narrative    EXAM: CT CERVICAL SPINE W/O CONTRAST  2/20/2024 9:43 AM     HISTORY: fall, confused       COMPARISON: CT cervical spine 2/6/2004    TECHNIQUE: Using multidetector thin collimation helical acquisition  technique, axial, coronal and sagittal CT images through the cervical  spine were obtained without intravenous contrast. Dose reduction  techniques were used.    FINDINGS:  No acute fracture or traumatic subluxation. No prevertebral edema.     Segmentation  anomaly at T1-2.    Anterior bridging osteophytes C3-T1.    Reversal of normal cervical lordosis.    Congenitally narrow bony spinal canal and superimposed degenerative  changes resulting in severe spinal canal stenosis at C4-5. Additional  multilevel spinal canal and neural foraminal stenosis.    Normal paraspinous soft tissues..      Impression    IMPRESSION:  1. No acute fracture or posttraumatic subluxation.  2. Congenitally narrow bony spinal canal and advanced degenerative  changes resulting in multilevel high-grade spinal canal and neural  foraminal stenosis.    JENNIE MEAD MD         SYSTEM ID:  [id number]   Chest XR,  PA & LAT    Narrative    CHEST TWO VIEWS 2/20/2024 9:46 AM     HISTORY: fall    COMPARISON: 2/6/2024.       Impression    IMPRESSION: Cardiopericardial silhouette is within normal limits. No  focal airspace consolidation. No pleural effusion. No discernible  pneumothorax. No acute displaced fracture.    MÓNICA GAUTAM MD         SYSTEM ID:  [id number]

## 2024-02-20 NOTE — CONSULTS
Care Management Initial Consult    General Information  Assessment completed with: Patient, Care Team Member, Patient,  staff Ethan  Type of CM/SW Visit: Initial Assessment    Primary Care Provider verified and updated as needed: No   Readmission within the last 30 days:        Reason for Consult: discharge planning  Advance Care Planning:            Communication Assessment  Patient's communication style: spoken language (English or Bilingual)             Cognitive  Cognitive/Neuro/Behavioral: .WDL except  Level of Consciousness: alert  Arousal Level: opens eyes spontaneously  Orientation: oriented x 4  Mood/Behavior: calm  Best Language: 0 - No aphasia  Speech: clear, garbled    Living Environment:   People in home: facility resident     Current living Arrangements: group home      Able to return to prior arrangements: yes       Family/Social Support:  Care provided by:    Provides care for: no one, unable/limited ability to care for self                Description of Support System:           Current Resources:   Patient receiving home care services: No     Community Resources: Group Home  Equipment currently used at home:    Supplies currently used at home:      Employment/Financial:  Employment Status:          Financial Concerns: none           Does the patient's insurance plan have a 3 day qualifying hospital stay waiver?  No    Lifestyle & Psychosocial Needs:  Social Determinants of Health     Food Insecurity: Not on file   Depression: Not at risk (2/2/2024)    PHQ-2     PHQ-2 Score: 0   Housing Stability: Not on file   Tobacco Use: Medium Risk (5/5/2023)    Patient History     Smoking Tobacco Use: Former     Smokeless Tobacco Use: Never     Passive Exposure: Not on file   Financial Resource Strain: Not on file   Alcohol Use: Not on file   Transportation Needs: Not on file   Physical Activity: Not on file   Interpersonal Safety: Not on file   Stress: Not on file   Social Connections: Not on file        Functional Status:  Prior to admission patient needed assistance:   Dependent ADLs:: Ambulation-walker, Bathing, Dressing, Grooming, Transfers, Wheelchair-with assist, Toileting  Dependent IADLs:: Cleaning, Cooking, Laundry, Shopping, Money Management, Medication Management, Meal Preparation, Transportation       Mental Health Status:          Chemical Dependency Status:  Chemical Dependency Status: No Current Concerns               Additional Information:  Patient has a high URR of 45%. SW met with patient at bedside in the ED .     Patient lives at [facility] P: [phone]. Patient gave sw permission to contact facility. Patient's GH states he is normally able to stand unassisted for a few minutes. He is not able to walk long distances. He can use a wheelchair on his own. He tells staff what clothing he wants to wear but dresses self. Facility manages medications and uses [facility].     They can accept patient back tomorrow.  was told the manager will be back tomorrow morning and should call back then.  staff did not have a fax number for  to send orders to.      Reviewed out of pocket cost for [employer]  transport, $89.98 base and $5.79 per mile to the destination. Spoke with patient, they expressed understanding and are agreeable to this.      SKYLER Stroud, LGSW  Emergency Room   Please contact the SW on the floor in which the patient is staying for any questions or concerns

## 2024-02-20 NOTE — ED NOTES
"[facility]  ED Nurse Handoff Report    ED Chief complaint: Fall (Possible pseudoseizure)  . ED Diagnosis:   Final diagnoses:   Fall, initial encounter   Altered mental status, unspecified altered mental status type   Prolonged Q-T interval on ECG   Seizure (H)       Allergies:   Allergies   Allergen Reactions    Hydrocodone Bitartrate Er Unknown    Cephalexin Rash     HUT Reaction: Rash; HUT Noted: 86002081      Vicodin [Hydrocodone-Acetaminophen] GI Disturbance       Code Status: Full Code    Activity level - Baseline/Home:  independent and uses a wheelchair manually .  Activity Level - Current:   assist of 1, lift, and Wheelchair transfer independently, could forgo lift .   Lift room needed: No.   Bariatric: No   Needed: No   Isolation: No.   Infection: Not Applicable.     Respiratory status: Nasal cannula    Vital Signs (within 30 minutes):   Vitals:    02/20/24 1130 02/20/24 1140 02/20/24 1150 02/20/24 1159   BP:  124/80 115/78    Pulse:  85     Resp:       Temp:       TempSrc:       SpO2: 95%      Weight:    136.1 kg (300 lb)   Height:    1.727 m (5' 8\")       Cardiac Rhythm:  ,      Pain level:    Patient confused: No.   Patient Falls Risk: assistive device/personal items within reach and activity supervised.   Elimination Status: Has voided     Patient Report - Initial Complaint: Tre Vazquez is a 48 year old male with a history of epilepsy and cerebral palsy presenting with a presumed fall. Patient resides in a group home and was found by staff on the ground. It is unknown how long the patient was down for. Patient remembers a staff member helping him get to the bathroom, standing up from the toilet independently, and flushed. He recalled waking up and being on the floor. He doesn't remember anything else. He endorses feeling tired and fatigued. Joel reports his current symptoms feel similar to past seizures. He notes he has 3-5 seizures per day. When he gets a seizure, he " reports twitching, jerking, and loss of control. Denies loss of bowel or bladder control. His group home provides his medications. Patient takes Depakote (500 mg TID), Lamictal (200 mg, BID), Keppra (1 g in the morning, 1.5 g at bedtime), Levocarnitine (660 mg 3x daily), and Zonegran (100 mg in the morning, and 200 mg at bedtime. Patient uses at home oxygen. Joel is supposed to wear a mask at night, but notes it is broken. Denies headache, nausea, vomiting, numbness, tingling, or pain.   Focused Assessment:   MusculoskeletalMusculoskeletal WDL: .WDL except; mobilityGeneral Mobility: mildly impaired      1209  HEENT  ML    HEENTHead/Face WDL: .WDL except; face symptomsFace Symptoms: drooping; other (see comments) (basline facial droop, congenital disability/defect)  Eye WDLEye WDL: .WDL except (L eye congenital defect)                1207  Respiratory  ML    RespiratoryAirway WDL: WDL  Respiratory WDLRespiratory WDL: .WDL except (chronic O2)      1205  Neuro Cognitive  ML    Neuro CognitiveCognitive/Neuro/Behavioral WDL: .WDL exceptLevel of Consciousness: alertArousal Level: opens eyes spontaneouslyOrientation: oriented x 4Speech: clearMood/Behavior: calm  Extraocular MovementLeft Extraocular Movement: not testedRight Extraocular Movement: conjugate  [place] Coma ScaleBest Eye Response: 4-->(E4) spontaneousBest Motor Response: 6-->(M6) obeys commandsBest Verbal Response: 5-->(V5) orientedGlasgow Coma Scale Score: 15  Pupils (CN II)Pupil PERRLA: no; other (see comments)Pupil Size Left: other (see comments) (congenital eye deformity)Pupil Size Right: 2 mmPupil Shape Right: roundPupil Reaction Right: briskPupil Accommodation Right: normal response  Motor StrengthLeft Upper Motor Strength: 3 - active movement against gravityRight Upper Motor Strength: 3 - active movement against gravityLeft Lower Motor Strength: 2 - active movement of body part when gravity is eliminatedRight Lower Motor Strength: 2 - active movement of  body part when gravity is eliminated           Abnormal Results:   Labs Ordered and Resulted from Time of ED Arrival to Time of ED Departure   COMPREHENSIVE METABOLIC PANEL - Abnormal       Result Value    Sodium 143      Potassium 4.3      Carbon Dioxide (CO2) 40 (*)     Anion Gap 5 (*)     Urea Nitrogen 15.9      Creatinine 0.60 (*)     GFR Estimate >90      Calcium 8.8      Chloride 98      Glucose 89      Alkaline Phosphatase 76      AST 31      ALT 42      Protein Total 6.4      Albumin 4.0      Bilirubin Total 0.2     VALPROIC ACID - Abnormal    Valproic acid 39.6 (*)    MAGNESIUM - Abnormal    Magnesium 1.6 (*)    BLOOD GAS VENOUS - Abnormal    pH Venous 7.33      pCO2 Venous 78 (*)     pO2 Venous 36      Bicarbonate Venous 41 (*)     Base Excess/Deficit Venous 11.9 (*)     FIO2 32      Oxyhemoglobin Venous 66 (*)     O2 Sat, Venous 67.5 (*)    CBC WITH PLATELETS AND DIFFERENTIAL - Abnormal    WBC Count 5.8      RBC Count 4.00 (*)     Hemoglobin 12.4 (*)     Hematocrit 39.8 (*)           MCH 31.0      MCHC 31.2 (*)     RDW 12.8      Platelet Count 175      % Neutrophils 44      % Lymphocytes 42      % Monocytes 10      % Eosinophils 2      % Basophils 1      % Immature Granulocytes 1      NRBCs per 100 WBC 0      Absolute Neutrophils 2.6      Absolute Lymphocytes 2.4      Absolute Monocytes 0.6      Absolute Eosinophils 0.1      Absolute Basophils 0.0      Absolute Immature Granulocytes 0.1      Absolute NRBCs 0.0     TROPONIN T, HIGH SENSITIVITY - Normal    Troponin T, High Sensitivity 15     AMMONIA - Normal    Ammonia 60     LACTIC ACID WHOLE BLOOD - Normal    Lactic Acid 0.9     LAMOTRIGINE LEVEL   KEPPRA (LEVETIRACETAM) LEVEL   ROUTINE UA WITH MICROSCOPIC REFLEX TO CULTURE   LACOSAMIDE LEVEL        Chest XR,  PA & LAT   Final Result   IMPRESSION: Cardiopericardial silhouette is within normal limits. No   focal airspace consolidation. No pleural effusion. No discernible   pneumothorax. No acute  displaced fracture.      MÓNICA GAUTAM MD            SYSTEM ID:  [id number]      CT Cervical Spine w/o Contrast   Final Result   IMPRESSION:   1. No acute fracture or posttraumatic subluxation.   2. Congenitally narrow bony spinal canal and advanced degenerative   changes resulting in multilevel high-grade spinal canal and neural   foraminal stenosis.      JENNIE MEAD MD            SYSTEM ID:  [id number]      Head CT w/o contrast   Final Result   IMPRESSION:    1. No acute intracranial pathology.    2. Chronic small vessel ischemic disease and moderate diffuse cerebral   volume loss. Unchanged moderate lateral and third ventriculomegaly.      JENNIE MEAD MD            SYSTEM ID:  [id number]          Treatments provided: See MAR  Family Comments: Pt will contact via cell phone, attempted to call group home ([facility], [phone])  OBS brochure/video discussed/provided to patient:  Yes  ED Medications:   Medications   magnesium sulfate 2 g in 50 mL sterile water intermittent infusion (0 g Intravenous Stopped 2/20/24 1207)       Drips infusing:  No  For the majority of the shift this patient was Green.   Interventions performed were NA.    Sepsis treatment initiated: No    Cares/treatment/interventions/medications to be completed following ED care: UA    ED Nurse Name: Montse Horta RN  12:13 PM     RECEIVING UNIT ED HANDOFF REVIEW    Above ED Nurse Handoff Report was reviewed: Yes  Reviewed by: Arnold Harrison RN on February 20, 2024 at 5:46 PM    RECEIVING UNIT ED HANDOFF REVIEW    Above ED Nurse Handoff Report was reviewed: Yes  Reviewed by: Raeann Robles RN on February 20, 2024 at 6:05 PM

## 2024-02-20 NOTE — PLAN OF CARE
Goal Outcome Evaluation:      Plan of Care Reviewed With: patient    Overall Patient Progress: no changeOverall Patient Progress: no change    PRIMARY DIAGNOSIS: SEIZURE   OUTPATIENT/OBSERVATION GOALS TO BE MET BEFORE DISCHARGE:  ADLs back to baseline: No    Activity and level of assistance: Up with assist of 1, transfers to wheelchair independently    Pain status: Pain free.    Return to near baseline physical activity: Yes     Discharge Planner Nurse   Safe discharge environment identified: Yes  Barriers to discharge: Yes       Entered by: Jacquelyn Cruz RN 02/20/2024 4:53 PM     Please review provider order for any additional goals.   Nurse to notify provider when observation goals have been met and patient is ready for discharge.

## 2024-02-21 ENCOUNTER — TELEPHONE (OUTPATIENT)
Dept: NEUROLOGY | Facility: CLINIC | Age: 49
End: 2024-02-21

## 2024-02-21 ENCOUNTER — HOSPITAL ENCOUNTER (EMERGENCY)
Facility: CLINIC | Age: 49
Discharge: HOME | DRG: 100 | End: 2024-02-22
Attending: EMERGENCY MEDICINE | Admitting: EMERGENCY MEDICINE
Payer: MEDICARE

## 2024-02-21 VITALS
HEIGHT: 68 IN | SYSTOLIC BLOOD PRESSURE: 124 MMHG | WEIGHT: 300 LBS | HEART RATE: 87 BPM | TEMPERATURE: 98.5 F | RESPIRATION RATE: 18 BRPM | DIASTOLIC BLOOD PRESSURE: 78 MMHG | OXYGEN SATURATION: 96 % | BODY MASS INDEX: 45.47 KG/M2

## 2024-02-21 VITALS
DIASTOLIC BLOOD PRESSURE: 68 MMHG | RESPIRATION RATE: 15 BRPM | SYSTOLIC BLOOD PRESSURE: 122 MMHG | HEART RATE: 78 BPM | TEMPERATURE: 98.7 F | OXYGEN SATURATION: 96 %

## 2024-02-21 DIAGNOSIS — R56.9 SEIZURE-LIKE ACTIVITY (H): ICD-10-CM

## 2024-02-21 LAB
ANION GAP SERPL CALCULATED.3IONS-SCNC: 1 MMOL/L (ref 7–15)
BASOPHILS # BLD AUTO: 0 10E3/UL (ref 0–0.2)
BASOPHILS NFR BLD AUTO: 0 %
BUN SERPL-MCNC: 12.7 MG/DL (ref 6–20)
CALCIUM SERPL-MCNC: 8.8 MG/DL (ref 8.6–10)
CHLORIDE SERPL-SCNC: 98 MMOL/L (ref 98–107)
CREAT SERPL-MCNC: 0.62 MG/DL (ref 0.67–1.17)
DEPRECATED HCO3 PLAS-SCNC: 44 MMOL/L (ref 22–29)
EGFRCR SERPLBLD CKD-EPI 2021: >90 ML/MIN/1.73M2
EOSINOPHIL # BLD AUTO: 0.1 10E3/UL (ref 0–0.7)
EOSINOPHIL NFR BLD AUTO: 1 %
ERYTHROCYTE [DISTWIDTH] IN BLOOD BY AUTOMATED COUNT: 12.6 % (ref 10–15)
FLUAV RNA SPEC QL NAA+PROBE: NEGATIVE
FLUBV RNA RESP QL NAA+PROBE: NEGATIVE
GLUCOSE SERPL-MCNC: 101 MG/DL (ref 70–99)
HCT VFR BLD AUTO: 42 % (ref 40–53)
HGB BLD-MCNC: 13 G/DL (ref 13.3–17.7)
IMM GRANULOCYTES # BLD: 0.1 10E3/UL
IMM GRANULOCYTES NFR BLD: 1 %
LACTATE SERPL-SCNC: 0.8 MMOL/L (ref 0.7–2)
LAMOTRIGINE SERPL-MCNC: 5.7 UG/ML
LYMPHOCYTES # BLD AUTO: 1.8 10E3/UL (ref 0.8–5.3)
LYMPHOCYTES NFR BLD AUTO: 29 %
MCH RBC QN AUTO: 30.8 PG (ref 26.5–33)
MCHC RBC AUTO-ENTMCNC: 31 G/DL (ref 31.5–36.5)
MCV RBC AUTO: 100 FL (ref 78–100)
MONOCYTES # BLD AUTO: 0.5 10E3/UL (ref 0–1.3)
MONOCYTES NFR BLD AUTO: 8 %
NEUTROPHILS # BLD AUTO: 3.8 10E3/UL (ref 1.6–8.3)
NEUTROPHILS NFR BLD AUTO: 61 %
NRBC # BLD AUTO: 0 10E3/UL
NRBC BLD AUTO-RTO: 0 /100
PLATELET # BLD AUTO: 176 10E3/UL (ref 150–450)
POTASSIUM SERPL-SCNC: 5.1 MMOL/L (ref 3.4–5.3)
RBC # BLD AUTO: 4.22 10E6/UL (ref 4.4–5.9)
RSV RNA SPEC NAA+PROBE: NEGATIVE
SARS-COV-2 RNA RESP QL NAA+PROBE: NEGATIVE
SODIUM SERPL-SCNC: 143 MMOL/L (ref 135–145)
WBC # BLD AUTO: 6.3 10E3/UL (ref 4–11)

## 2024-02-21 PROCEDURE — 250N000013 HC RX MED GY IP 250 OP 250 PS 637: Performed by: HOSPITALIST

## 2024-02-21 PROCEDURE — 80048 BASIC METABOLIC PNL TOTAL CA: CPT | Performed by: HOSPITALIST

## 2024-02-21 PROCEDURE — 99239 HOSP IP/OBS DSCHRG MGMT >30: CPT | Performed by: HOSPITALIST

## 2024-02-21 PROCEDURE — 83605 ASSAY OF LACTIC ACID: CPT | Performed by: EMERGENCY MEDICINE

## 2024-02-21 PROCEDURE — 87637 SARSCOV2&INF A&B&RSV AMP PRB: CPT | Mod: GZ | Performed by: EMERGENCY MEDICINE

## 2024-02-21 PROCEDURE — 93005 ELECTROCARDIOGRAM TRACING: CPT

## 2024-02-21 PROCEDURE — 99284 EMERGENCY DEPT VISIT MOD MDM: CPT

## 2024-02-21 PROCEDURE — 36415 COLL VENOUS BLD VENIPUNCTURE: CPT | Performed by: HOSPITALIST

## 2024-02-21 PROCEDURE — 85025 COMPLETE CBC W/AUTO DIFF WBC: CPT | Performed by: HOSPITALIST

## 2024-02-21 PROCEDURE — 36415 COLL VENOUS BLD VENIPUNCTURE: CPT | Performed by: EMERGENCY MEDICINE

## 2024-02-21 PROCEDURE — G0378 HOSPITAL OBSERVATION PER HR: HCPCS

## 2024-02-21 RX ADMIN — LEVETIRACETAM 1000 MG: 500 TABLET, FILM COATED ORAL at 08:45

## 2024-02-21 RX ADMIN — THERA TABS 1 TABLET: TAB at 08:44

## 2024-02-21 RX ADMIN — POLYETHYLENE GLYCOL 3350 17 G: 17 POWDER, FOR SOLUTION ORAL at 08:43

## 2024-02-21 RX ADMIN — DIVALPROEX SODIUM 500 MG: 500 TABLET, DELAYED RELEASE ORAL at 08:45

## 2024-02-21 RX ADMIN — CITALOPRAM HYDROBROMIDE 40 MG: 20 TABLET ORAL at 08:44

## 2024-02-21 RX ADMIN — METHYLCELLULOSE 500 MG: 500 TABLET ORAL at 09:02

## 2024-02-21 RX ADMIN — CITALOPRAM HYDROBROMIDE 20 MG: 20 TABLET ORAL at 08:44

## 2024-02-21 RX ADMIN — PANTOPRAZOLE SODIUM 40 MG: 40 TABLET, DELAYED RELEASE ORAL at 08:45

## 2024-02-21 RX ADMIN — LACOSAMIDE 50 MG: 50 TABLET, FILM COATED ORAL at 08:45

## 2024-02-21 RX ADMIN — PROPRANOLOL HYDROCHLORIDE 20 MG: 20 TABLET ORAL at 08:44

## 2024-02-21 RX ADMIN — ZONISAMIDE 100 MG: 100 CAPSULE ORAL at 08:45

## 2024-02-21 RX ADMIN — BUSPIRONE HYDROCHLORIDE 15 MG: 15 TABLET ORAL at 08:44

## 2024-02-21 RX ADMIN — LAMOTRIGINE 200 MG: 200 TABLET ORAL at 08:46

## 2024-02-21 RX ADMIN — LEVOCARNITINE 660 MG: 330 TABLET ORAL at 08:45

## 2024-02-21 ASSESSMENT — ACTIVITIES OF DAILY LIVING (ADL)
ADLS_ACUITY_SCORE: 41
ADLS_ACUITY_SCORE: 36
ADLS_ACUITY_SCORE: 37
ADLS_ACUITY_SCORE: 41
ADLS_ACUITY_SCORE: 41
ADLS_ACUITY_SCORE: 37
ADLS_ACUITY_SCORE: 41
ADLS_ACUITY_SCORE: 37
ADLS_ACUITY_SCORE: 41
ADLS_ACUITY_SCORE: 41
ADLS_ACUITY_SCORE: 37

## 2024-02-21 ASSESSMENT — COLUMBIA-SUICIDE SEVERITY RATING SCALE - C-SSRS
5. HAVE YOU STARTED TO WORK OUT OR WORKED OUT THE DETAILS OF HOW TO KILL YOURSELF? DO YOU INTEND TO CARRY OUT THIS PLAN?: NO
6. HAVE YOU EVER DONE ANYTHING, STARTED TO DO ANYTHING, OR PREPARED TO DO ANYTHING TO END YOUR LIFE?: NO
4. HAVE YOU HAD THESE THOUGHTS AND HAD SOME INTENTION OF ACTING ON THEM?: NO
1. IN THE PAST MONTH, HAVE YOU WISHED YOU WERE DEAD OR WISHED YOU COULD GO TO SLEEP AND NOT WAKE UP?: NO
2. HAVE YOU ACTUALLY HAD ANY THOUGHTS OF KILLING YOURSELF IN THE PAST MONTH?: YES
3. HAVE YOU BEEN THINKING ABOUT HOW YOU MIGHT KILL YOURSELF?: NO

## 2024-02-21 NOTE — PROGRESS NOTES
Care Management Discharge Note    Discharge Date: 02/21/2024     Discharge Disposition: long-term    Patient/Family in Agreement with the Plan: yes    Handoff Referral Completed: No    Additional Information:  Reviewed out of pocket cost for [facility] stretcher transport, $1117.00 for base rate and $26.06 per mile to the destination. Pt/family expressed understanding and are agreeable to this.      Patient requires stretcher transportation due to needing supervision    Stretcher transportation has been arranged for [id number]. Patient and bedside nurse notified of transportation time.    Ambulance PCS form completed and placed in patient chart. Ambulance PCS form should be given to transportation team.    Call placed to  to update, [phone], no answer and VM box full. Will update this afternoon.     SKYLER Ambrosio, [facility]   Inpatient Care Coordination  [facility]   [phone]

## 2024-02-21 NOTE — TELEPHONE ENCOUNTER
[facility] Call Center    Phone Message    May a detailed message be left on voicemail: no     Reason for Call: Other: Provider call from   Dr. Manisha CHAVEZ [facility] Emergency Dept to [facility].      Please reach out to further advise    Back line and provider on call numbers have been attempted.     Action Taken: Other: Neurology    Travel Screening: Not Applicable

## 2024-02-21 NOTE — PLAN OF CARE
"CARE FROM 1830 - 2300  PRIMARY DIAGNOSIS: Fall  OUTPATIENT/OBSERVATION GOALS TO BE MET BEFORE DISCHARGE:  ADLs back to baseline: No    Activity and level of assistance: Assist x 2 with W/G or W/C    Pain status: Pain free.    Return to near baseline physical activity: No     Discharge Planner Nurse   Safe discharge environment identified: Yes  Barriers to discharge: Yes       Entered by: Raeann Robles RN 02/20/2024 11:24 PM  Patient is A & O x 4, noted with intermittent confusions/forgetful, has a Hx cognitive impairement. Patient is 2 assist with W/G, denies pain/SOB/NV. BIPAP machine offered, pt refused, education offered but pt still refused and said he wanted to be left alone to sleep. SW consulted. Will continue to monitor.  BP (!) 155/84 (BP Location: Right arm)   Pulse 87   Temp 98.4  F (36.9  C) (Oral)   Resp 18   Ht 1.727 m (5' 8\")   Wt 136.1 kg (300 lb)   SpO2 97%   BMI 45.61 kg/m     Please review provider order for any additional goals.   Nurse to notify provider when observation goals have been met and patient is ready for discharge.      "

## 2024-02-21 NOTE — PHARMACY-ADMISSION MEDICATION HISTORY
Pharmacist Admission Medication History    Admission medication history is complete. The information provided in this note is only as accurate as the sources available at the time of the update.    Information Source(s): Facility ([facility]) medication list/MAR and [facility]SureScripts via N/A    Pertinent Information: Neurology visit from 2/2/2024 indicates that some changes to the patient's anti-seizure medications from previous hospitalizations were not yet reflected in  list (but are current in EPIC):  Additional midday lamotrigine 100mg in addition to 200mg BID (100mg tabs last dispensed 12/28/23 per surescripts)  New lacosamide 50mg daily (last dispensed 12/28/23 per surescripts)  Depakote 500mg BID (from TID) (updated dosing dispensed 2/15/24 per surescripts)    Changes made to PTA medication list:  Added:  APAP prn  Bacitracin  Clotrimazole  Deleted: None  Changed: None    Allergies reviewed with patient and updates made in EHR: no    Medication History Completed By: Sandy Charles RPH 2/20/2024 8:37 PM    Prior to Admission medications    Medication Sig Last Dose Taking? Auth Provider Long Term End Date   acetaminophen (TYLENOL) 325 MG tablet Take 650 mg by mouth every 4 hours as needed for mild pain Unknown at PRN Yes Unknown, Entered By History     ammonium lactate (AMLACTIN) 12 % external cream Apply topically 2 times daily 2/19/2024 Yes Unknown, Entered By History     bacitracin 500 UNIT/GM external ointment Apply topically 2 times daily Apply to left arm wound 2/19/2024 Yes Unknown, Entered By History No    busPIRone (BUSPAR) 15 MG tablet Take 15 mg by mouth 2 times daily 2/19/2024 Yes Reported, Patient Yes    citalopram (CELEXA) 20 MG tablet Take 20 mg by mouth daily 2/19/2024 Yes Reported, Patient No    citalopram (CELEXA) 40 MG tablet Take 40 mg by mouth daily 2/19/2024 Yes Unknown, Entered By History No    clotrimazole (LOTRIMIN) 1 % external cream Apply topically 2 times daily Apply  to left groin 2/19/2024 Yes Unknown, Entered By History No    divalproex sodium delayed-release (DEPAKOTE) 500 MG DR tablet Take 1 tablet (500 mg) by mouth every 12 hours 2/19/2024 Yes Jocelyne Tariq MD Yes    fluticasone (FLONASE) 50 MCG/ACT nasal spray Spray 1 spray in nostril 2 times daily 2/19/2024 Yes Unknown, Entered By History     lacosamide (VIMPAT) 50 MG TABS tablet Take 1 tablet (50 mg) by mouth daily Unknown Yes Jocelyne Tariq MD Yes    lamoTRIgine (LAMICTAL) 100 MG tablet Take 1 tablet (100 mg) by mouth daily At 1400 Unknown Yes Jocelyne Tariq MD Yes    lamoTRIgine (LAMICTAL) 200 MG tablet Take 1 tablet (200 mg) by mouth 2 times daily Take at 8 AM and 8 pm 2/19/2024 Yes Jocelyne Tariq MD Yes    levETIRAcetam (KEPPRA) 500 MG tablet Take 2 tablets (1,000 mg) by mouth every morning AND 3 tablets (1,500 mg) at bedtime. 2/19/2024 Yes Lisa Merchant APRN CNP Yes    levOCARNitine (CARNITOR) 330 MG tablet Take 2 tablets (660 mg) by mouth 3 times daily 2/19/2024 Yes Lisa Merchant APRN CNP Yes    methylcellulose POWD powder Apply 2 mg topically 2 times daily Mix 1 rounded tablespoon (2 mg) in 8 oz glass of water and take by mouth twice daily 2/19/2024 Yes Unknown, Entered By History     multivitamin, therapeutic (THERA-VIT) TABS tablet Take 1 tablet by mouth daily 2/19/2024 Yes Unknown, Entered By History     pantoprazole (PROTONIX) 40 MG EC tablet Take 40 mg by mouth daily 2/19/2024 Yes Unknown, Entered By History     polyethylene glycol (MIRALAX) 17 GM/Dose powder Take 1 capful. by mouth daily 2/19/2024 Yes Reported, Patient     prazosin (MINIPRESS) 2 MG capsule Take 2 mg by mouth At Bedtime 2/19/2024 at 2000 Yes Reported, Patient Yes    propranolol (INDERAL) 20 MG tablet Take 1 tablet (20 mg) by mouth 2 times daily 2/19/2024 Yes Jocelyne Tariq MD Yes    QUEtiapine ER (SEROQUEL XR) 300 MG 24 hr tablet Take 300 mg by mouth at bedtime 2/19/2024 at 2000 Yes Unknown, Entered By History No     zonisamide (ZONEGRAN) 100 MG capsule Take 1 capsule (100 mg) by mouth every morning AND 2 capsules (200 mg) At Bedtime. 2/19/2024 Yes Lisa Merchant APRN CNP Yes

## 2024-02-21 NOTE — DISCHARGE SUMMARY
"[facility]  Hospitalist Discharge Summary      Date of Admission:  2/20/2024  Date of Discharge:  2/21/2024  Discharging Provider: Ezekiel Ervin MD  Discharge Service: Hospitalist Service    Discharge Diagnoses   Mechanical fall  Possible seizure    Clinically Significant Risk Factors     # Severe Obesity: Estimated body mass index is 45.61 kg/m  as calculated from the following:    Height as of this encounter: 1.727 m (5' 8\").    Weight as of this encounter: 136.1 kg (300 lb).       Follow-ups Needed After Discharge   Follow-up Appointments     Follow-up and recommended labs and tests       Follow up with primary care provider, Siobhan Mayo, within 7 days for   hospital follow- up.  No follow up labs or test are needed.  Follow-up with your Neurologist as previously scheduled          Unresulted Labs Ordered in the Past 30 Days of this Admission       Date and Time Order Name Status Description    2/20/2024  8:23 AM Lacosamide Level In process     2/20/2024  8:22 AM Lamotrigine Level In process         These results will be followed up by Hospitalist Group    Discharge Disposition   Discharged to home  Condition at discharge: Stable    Hospital Course   Tre Vazquez is a 48 year old male with history of developmental delay (lives in group home), seizures, obesity, ANA/OHS on BiPAP at night and while napping, chronic hypercapnia, chronic hyperammoniemia, anemia, schizoaffective disorder, borderline personality, MDD, anxiety, HTN, L eye blindness admitted on 2/20/2024 with fall/possible seizure.  Initial history was obtained from the patient.  He states that he woke up in his normal state of health.  He went to the bathroom.  He states he \"peed \".  He then states that the last thing he remembers.  The next he remembers is waking up in the emergency room.  He denies any chest pain or shortness of breath.  He denies any abdominal pain, nausea, vomiting, diarrhea.  He has been eating and " "drinking normally.  No cough, runny nose, sore throat.  No fevers or chills.  No urinary symptoms.  He states he only feels a little \"shaky \"as he often does after seizure.  He states he has been taking his medications.  He states he has been trying to wear his BiPAP mask, but explains to me that sometimes it blows hot air and at those times he cannot wear it.  I then called his group home.  I spoke with the director.  He states that the staff was with him when he was in the bathroom.  He was in his normal state of health.  He states that the patient had not locked his wheelchair so when he went to sit back down the wheelchair rolled away and he fell on the ground.  He did not injure himself.  He told the staff that he had a seizure.  The director of the group home states that the patient often states that he has seizure activity.  The director of the group home states that the patient is compliant with his medications.  There are no missed doses.     Fall  Possible seizure  History of seizures    - group home staff state patient didn't lock the wheelchair and fell on the floor    - patient thinks he had a seizure    - apparently patient has done this in the past (states he had a seizure)    - in December he was actually transferred from [facility] to [facility] for 24 hour EEG monitoring (which was negative)    - would continue his home seizure medication regimen without changes (lacosamide, lamotrigine, levetiracetam, zonisamide, Depakote)    - likely can discharge back to his group home tomorrow     Developmental delay    - lives in group home    - uses wheelchair    - sometimes uses a walker     ANA/OHS  Chronic hypercapnia    - hypercapnia on blood gas in ED consistent with his chronic hypercapnia    - ordered BiPAP for tonight     Chronic hyperammoniemia    - no need to check    - on levocarnitine     Schizoaffective disorder  Borderline Personality disorder  MDD  Anxiety    - cont home meds (citalopram and " buspirone)     HTN    - cont propanolol, prazosin     GERD    - cont protonix     Patient had an uneventful night in the hospital. He has no complaints this morning. We will get him back to his group home. No medication changes.    Consultations This Hospital Stay   CARE MANAGEMENT / SOCIAL WORK IP CONSULT    Code Status   Full Code    Time Spent on this Encounter   I, Ezekiel Ervin MD, personally saw the patient today and spent greater than 30 minutes discharging this patient.       Ezekiel Ervin MD  [facility] OBSERVATION DEPT  [address] [place] [address]  Phone: [phone]  ______________________________________________________________________    Physical Exam   Vital Signs: Temp: 97.9  F (36.6  C) Temp src: Axillary BP: (!) 144/84 Pulse: 85   Resp: 18 SpO2: 96 % O2 Device: Nasal cannula Oxygen Delivery: 3 LPM  Weight: 300 lbs 0 oz  Constitutional: awake, alert, cooperative, no apparent distress, and appears stated age  Eyes: Lids and lashes normal, pupils equal, round and reactive to light, extra ocular muscles intact, sclera clear, conjunctiva normal  ENT: Normocephalic, without obvious abnormality, atraumatic, sinuses nontender on palpation, external ears without lesions, oral pharynx with moist mucous membranes, tonsils without erythema or exudates, gums normal and good dentition.       Primary Care Physician   Siobhan Mayo    Discharge Orders      Reason for your hospital stay    Fall (missed wheelchair)  Question of seizure     Follow-up and recommended labs and tests     Follow up with primary care provider, Siobhan Mayo, within 7 days for hospital follow- up.  No follow up labs or test are needed.  Follow-up with your Neurologist as previously scheduled     Activity    Your activity upon discharge: activity as tolerated     BiPAP - Continue Home BiPAP    Continue Home BiPAP per home equipment settings.     Diet    Follow this diet upon discharge: Orders Placed This  Encounter      Regular Diet Adult       Significant Results and Procedures   Most Recent 3 CBC's:  Recent Labs   Lab Test 02/21/24  0548 02/20/24  0903 02/06/24  1222   WBC 6.3 5.8 5.1   HGB 13.0* 12.4* 11.8*    100 102*    175 200     Most Recent 3 BMP's:  Recent Labs   Lab Test 02/21/24  0548 02/20/24  0903 02/06/24  1222    143 143   POTASSIUM 5.1 4.3 4.1   CHLORIDE 98 98 99   CO2 44* 40* 38*   BUN 12.7 15.9 19.7   CR 0.62* 0.60* 0.68   ANIONGAP 1* 5* 6*   ARNOLD 8.8 8.8 9.1   * 89 98     Most Recent 2 LFT's:  Recent Labs   Lab Test 02/20/24  0903 12/19/23  0555   AST 31 18   ALT 42 23   ALKPHOS 76 65   BILITOTAL 0.2 0.2   ,   Results for orders placed or performed during the hospital encounter of 02/20/24   Head CT w/o contrast    Narrative    EXAM: CT HEAD W/O CONTRAST  2/20/2024 9:42 AM     HISTORY:  fall, head injury, confusion       COMPARISON:  Head CT 2/6/2024    TECHNIQUE: Using multidetector thin collimation helical acquisition  technique, axial, coronal and sagittal CT images from the skull base  to the vertex were obtained without intravenous contrast.   (topogram) image(s) also obtained and reviewed. Dose reduction  techniques were performed.    FINDINGS:  No intracranial hemorrhage, mass effect, or midline shift. No acute  loss of gray-white matter differentiation in the cerebral hemispheres.  Unchanged moderate ventriculomegaly. Clear basal cisterns. Moderate  diffuse cerebral volume loss. Patchy periventricular hypoattenuation,  consistent with chronic small vessel ischemic disease.    The bony calvaria and the bones of the skull base are normal.  Hyperostosis frontalis interna. Minimal scattered paranasal sinus  mucosal thickening. The mastoid air cells are clear. Phthisis bulbi on  the left..       Impression    IMPRESSION:   1. No acute intracranial pathology.   2. Chronic small vessel ischemic disease and moderate diffuse cerebral  volume loss. Unchanged moderate  lateral and third ventriculomegaly.    JENNIE MEAD MD         SYSTEM ID:  [id number]   CT Cervical Spine w/o Contrast    Narrative    EXAM: CT CERVICAL SPINE W/O CONTRAST  2/20/2024 9:43 AM     HISTORY: fall, confused       COMPARISON: CT cervical spine 2/6/2004    TECHNIQUE: Using multidetector thin collimation helical acquisition  technique, axial, coronal and sagittal CT images through the cervical  spine were obtained without intravenous contrast. Dose reduction  techniques were used.    FINDINGS:  No acute fracture or traumatic subluxation. No prevertebral edema.     Segmentation anomaly at T1-2.    Anterior bridging osteophytes C3-T1.    Reversal of normal cervical lordosis.    Congenitally narrow bony spinal canal and superimposed degenerative  changes resulting in severe spinal canal stenosis at C4-5. Additional  multilevel spinal canal and neural foraminal stenosis.    Normal paraspinous soft tissues..      Impression    IMPRESSION:  1. No acute fracture or posttraumatic subluxation.  2. Congenitally narrow bony spinal canal and advanced degenerative  changes resulting in multilevel high-grade spinal canal and neural  foraminal stenosis.    JENNIE MEAD MD         SYSTEM ID:  [id number]   Chest XR,  PA & LAT    Narrative    CHEST TWO VIEWS 2/20/2024 9:46 AM     HISTORY: fall    COMPARISON: 2/6/2024.       Impression    IMPRESSION: Cardiopericardial silhouette is within normal limits. No  focal airspace consolidation. No pleural effusion. No discernible  pneumothorax. No acute displaced fracture.    MÓNICA GAUTAM MD         SYSTEM ID:  [id number]       Discharge Medications   Current Discharge Medication List        CONTINUE these medications which have NOT CHANGED    Details   acetaminophen (TYLENOL) 325 MG tablet Take 650 mg by mouth every 4 hours as needed for mild pain      ammonium lactate (AMLACTIN) 12 % external cream Apply topically 2 times daily      bacitracin 500 UNIT/GM external  ointment Apply topically 2 times daily Apply to left arm wound      busPIRone (BUSPAR) 15 MG tablet Take 15 mg by mouth 2 times daily      !! citalopram (CELEXA) 20 MG tablet Take 20 mg by mouth daily      !! citalopram (CELEXA) 40 MG tablet Take 40 mg by mouth daily      clotrimazole (LOTRIMIN) 1 % external cream Apply topically 2 times daily Apply to left groin      divalproex sodium delayed-release (DEPAKOTE) 500 MG DR tablet Take 1 tablet (500 mg) by mouth every 12 hours  Qty: 14 tablet, Refills: 0    Associated Diagnoses: Seizure disorder (H)      fluticasone (FLONASE) 50 MCG/ACT nasal spray Spray 1 spray in nostril 2 times daily      lacosamide (VIMPAT) 50 MG TABS tablet Take 1 tablet (50 mg) by mouth daily  Qty: 7 tablet, Refills: 0    Associated Diagnoses: Seizure disorder (H)      !! lamoTRIgine (LAMICTAL) 100 MG tablet Take 1 tablet (100 mg) by mouth daily At 1400  Qty: 7 tablet, Refills: 0    Associated Diagnoses: Seizure disorder (H)      !! lamoTRIgine (LAMICTAL) 200 MG tablet Take 1 tablet (200 mg) by mouth 2 times daily Take at 8 AM and 8 pm  Qty: 14 tablet, Refills: 0    Associated Diagnoses: Seizure disorder (H)      levETIRAcetam (KEPPRA) 500 MG tablet Take 2 tablets (1,000 mg) by mouth every morning AND 3 tablets (1,500 mg) at bedtime.  Qty: 150 tablet, Refills: 11    Associated Diagnoses: Non-refractory epilepsy (H)      levOCARNitine (CARNITOR) 330 MG tablet Take 2 tablets (660 mg) by mouth 3 times daily  Qty: 180 tablet, Refills: 11    Associated Diagnoses: Nonintractable generalized idiopathic epilepsy without status epilepticus (H)      methylcellulose POWD powder Apply 2 mg topically 2 times daily Mix 1 rounded tablespoon (2 mg) in 8 oz glass of water and take by mouth twice daily      multivitamin, therapeutic (THERA-VIT) TABS tablet Take 1 tablet by mouth daily      pantoprazole (PROTONIX) 40 MG EC tablet Take 40 mg by mouth daily      polyethylene glycol (MIRALAX) 17 GM/Dose powder Take 1  capful. by mouth daily      prazosin (MINIPRESS) 2 MG capsule Take 2 mg by mouth At Bedtime      propranolol (INDERAL) 20 MG tablet Take 1 tablet (20 mg) by mouth 2 times daily    Associated Diagnoses: Benign hypertension      QUEtiapine ER (SEROQUEL XR) 300 MG 24 hr tablet Take 300 mg by mouth at bedtime      zonisamide (ZONEGRAN) 100 MG capsule Take 1 capsule (100 mg) by mouth every morning AND 2 capsules (200 mg) At Bedtime.  Qty: 90 capsule, Refills: 11    Associated Diagnoses: Non-refractory epilepsy (H); Breakthrough seizure (H)       !! - Potential duplicate medications found. Please discuss with provider.        Allergies   Allergies   Allergen Reactions    Hydrocodone Bitartrate Er Unknown    Cephalexin Rash     HUT Reaction: Rash; HUT Noted: [id number]      Vicodin [Hydrocodone-Acetaminophen] GI Disturbance

## 2024-02-21 NOTE — PROGRESS NOTES
Patient with fall to ground during discharge. Patient was assisted by nursing staff as he lunged himself from sitting at the edge of the bed to the floor. Patient was assisted to sitting position on floor. No injury to patient was sustained. Patient had shoes on with walker within reach. Dr. Ervin notified.

## 2024-02-21 NOTE — PLAN OF CARE
Goal Outcome Evaluation:      Plan of Care Reviewed With: patient    Overall Patient Progress: improvingOverall Patient Progress: improving    A&Ox4. VSS on 3L O2 via NC (baseline) except soft BP at times. Up with assist of 2, lift (wheelchair baseline). Fall this AM, no injury sustained. Voiding via urinal, no UA needed per Dr. Ervin. Regular diet, tolerating. Trace edema to BLE. CMS intact. Seizure precautions maintained. Discharging back to group home. Personal belongings with patient. Transportation provided by [employer].

## 2024-02-21 NOTE — PLAN OF CARE
Goal Outcome Evaluation:      Plan of Care Reviewed With: patient    Overall Patient Progress: improvingOverall Patient Progress: improving    PRIMARY DIAGNOSIS: SEIZURE/FALL  OUTPATIENT/OBSERVATION GOALS TO BE MET BEFORE DISCHARGE:  ADLs back to baseline: Yes    Activity and level of assistance: x2, lift; wheelchair bound baseline    Pain status: Pain free.    Return to near baseline physical activity: Yes     Discharge Planner Nurse   Safe discharge environment identified: Yes  Barriers to discharge: No       Entered by: Jacquelyn Cruz RN 02/21/2024 2:08 PM     Please review provider order for any additional goals.   Nurse to notify provider when observation goals have been met and patient is ready for discharge.

## 2024-02-21 NOTE — PROGRESS NOTES
Care Management Discharge Note    Discharge Date: 02/21/2024       Discharge Disposition: Group Home    Discharge Services: None    Discharge DME: None    Discharge Transportation: agency -  [employer] w/c  Reviewed out of pocket cost for [facility] transport, $89.98 base and $5.79 per mile to the destination. Spoke with the pt, they expressed understanding and are agreeable to this.     Private pay costs discussed: Not applicable    Does the patient's insurance plan have a 3 day qualifying hospital stay waiver?  No    PAS Confirmation Code: N/A  Patient/family educated on Medicare website which has current facility and service quality ratings:  N/A    Education Provided on the Discharge Plan:  yes  Persons Notified of Discharge Plans: Pt and pt's GH manager  Patient/Family in Agreement with the Plan: yes    Handoff Referral Completed: No    Additional Information:  Pt will discharge back to his  today via [employer] w/c between [id number].  Steven spoke with the GH manager, Quoc P: [phone] F: [phone], to update him on the pt's return today.  Quoc identified no concerns.  Steven updated the pt on his discharge time today.  Steven faxed the pt's discharge orders to his .    Sw will continue to be available as needed until discharge.    SKYLER Diaz, [facility]  Inpatient Care Coordination  [facility]  [phone]

## 2024-02-21 NOTE — PLAN OF CARE
Goal Outcome Evaluation:      Plan of Care Reviewed With: patient    Overall Patient Progress: improvingOverall Patient Progress: improving    PRIMARY DIAGNOSIS: SEIZURE/FALL  OUTPATIENT/OBSERVATION GOALS TO BE MET BEFORE DISCHARGE:  ADLs back to baseline: Yes    Activity and level of assistance: x2 with walker, wheelchair baseline    Pain status: Pain free.    Return to near baseline physical activity: Yes     Discharge Planner Nurse   Safe discharge environment identified: Yes  Barriers to discharge: No       Entered by: Jacquelyn Cruz RN 02/21/2024 9:49 AM     Please review provider order for any additional goals.   Nurse to notify provider when observation goals have been met and patient is ready for discharge.

## 2024-02-21 NOTE — ED NOTES
Spoke with Chucky at [facility] ([phone]), pt was report to have 'shaking' events today x3. No fall reported, they called EMS due to these episodes and them being reported as seizures.

## 2024-02-21 NOTE — PLAN OF CARE
Goal Outcome Evaluation:      Plan of Care Reviewed With: patient    Overall Patient Progress: no changeOverall Patient Progress: no change    A&Ox4. VSS on 3L O2 via NC. Denies pain. Awaiting urine sample for UA. 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 IV SL. Seizure precautions maintained. Regular diet, tolerating.

## 2024-02-21 NOTE — PLAN OF CARE
PRIMARY DIAGNOSIS: seizures/fall   OUTPATIENT/OBSERVATION GOALS TO BE MET BEFORE DISCHARGE:  1. Pain Status: Pain free.    2. Return to near baseline physical activity: Yes    3. Cleared for discharge by consultants (if involved): Yes    Discharge Planner Nurse   Safe discharge environment identified: Yes  Barriers to discharge: Yes       Vitals are Temp: 97.9  F (36.6  C) Temp src: Axillary BP: (!) 144/84 Pulse: 85   Resp: 18 SpO2: 96 %.  Patient is Alert and Oriented x4 but forgetful. They are 2 assist with Gait Belt and Walker, baseline is wheelchair at group home.  Pt is a Regular diet.  They are denying pain.  Patient is Saline locked. BiPAP-refuses. Seizure precautions in place. SW following-back to group home today.  Pt stated he had seizure, provider on call was notified of the situation. Pt didn't have any post ictal symptoms.   Please review provider order for any additional goals.   Nurse to notify provider when observation goals have been met and patient is ready for discharge.

## 2024-02-21 NOTE — ED TRIAGE NOTES
Pt presents vis EMS, was just discharged from hospital today and per EMS once settled in his recliner, pt had 3 witnessed seizures. Once EMS was on scene, no seizures noted. Pt did have some shaking/jerking movements. Pt states that he has seizures all day, everyday.      Triage Assessment (Adult)       Row Name 02/21/24 1609          Triage Assessment    Airway WDL WDL        Respiratory WDL    Respiratory WDL WDL        Skin Circulation/Temperature WDL    Skin Circulation/Temperature WDL WDL        Cardiac WDL    Cardiac WDL WDL        Peripheral/Neurovascular WDL    Peripheral Neurovascular WDL WDL        Cognitive/Neuro/Behavioral WDL    Cognitive/Neuro/Behavioral WDL WDL

## 2024-02-21 NOTE — PLAN OF CARE
ROOM # 203    Living Situation (if not independent, order SW consult): Group Home  Fac[facility] name:  : [facility]    Activity level at baseline: Independent with manual W/C  Activity level on admit: Assist x 1 with W/G    Who will be transporting you at discharge: TBD    Patient registered to observation; given Patient Bill of Rights; given the opportunity to ask questions about observation status and their plan of care.  Patient has been oriented to the observation room, bathroom and call light is in place.    Discussed discharge goals and expectations with patient/family. Yes

## 2024-02-21 NOTE — ED PROVIDER NOTES
"  History     Chief Complaint:  Seizures     The history is provided by the patient and the EMS personnel.      Tre \"Joel\" MAIRA Vazquez is a 48 year old male with history of epilepsy, cerebral palsy, and developmental delays who presents to the ED via EMS from his group home for evaluation of presumed seizures. EMS reports per staff the patient had 3 witnessed seizures when he returned to his group home after his admission to [facility] yesterday for a fall and possible seizure. Patient states he has been feeling \"twitchy\" and unsteady over the last couple of days. EMS saw some jerking movements but patient did not seem post-ictal. Joel denies any pain or falls since yesterday. He is not ambulatory at baseline and uses a walker or wheelchair.  He currently denies any symptoms aside from feeling \"tired\".  He denies chest pain or shortness of breath.  Denies fever or chills.  He denies headache or neck pain.      Independent Historian:   EMS - They report as above.    Review of External Notes:   I reviewed outpatient clinic notes.  [facility] neurology note reviewed from 2/2/24.  Plan was for following up on medication management.  Clinic follow-up was recommended in 5 months.  I reviewed patient's ED note from yesterday and hospital discharge note from earlier today. Labs from this morning below.  Drug labs reviewed from yesterday.    CBC w/ platelets differential  Component      Latest Ref Rng 2/21/2024  5:48 AM   WBC      4.0 - 11.0 10e3/uL 6.3    RBC Count      4.40 - 5.90 10e6/uL 4.22 (L)    Hemoglobin      13.3 - 17.7 g/dL 13.0 (L)    Hematocrit      40.0 - 53.0 % 42.0    MCV      78 - 100 fL 100    MCH      26.5 - 33.0 pg 30.8    MCHC      31.5 - 36.5 g/dL 31.0 (L)    RDW      10.0 - 15.0 % 12.6    Platelet Count      150 - 450 10e3/uL 176    % Neutrophils      % 61    % Lymphocytes      % 29    % Monocytes      % 8    % Eosinophils      % 1    % Basophils      % 0    % Immature Granulocytes      % 1    NRBCs " per 100 WBC      <1 /100 0    Absolute Neutrophils      1.6 - 8.3 10e3/uL 3.8    Absolute Lymphocytes      0.8 - 5.3 10e3/uL 1.8    Absolute Monocytes      0.0 - 1.3 10e3/uL 0.5    Absolute Eosinophils      0.0 - 0.7 10e3/uL 0.1    Absolute Basophils      0.0 - 0.2 10e3/uL 0.0    Absolute Immature Granulocytes      <=0.4 10e3/uL 0.1    Absolute NRBCs      10e3/uL 0.0         BMP  Component      Latest Ref Rng 2/21/2024  5:48 AM   Sodium      135 - 145 mmol/L 143    Anion Gap      7 - 15 mmol/L 1 (L)    Creatinine      0.67 - 1.17 mg/dL 0.62 (L)    Calcium      8.6 - 10.0 mg/dL 8.8    GFR Estimate      >60 mL/min/1.73m2 >90    Potassium      3.4 - 5.3 mmol/L 5.1    Chloride      98 - 107 mmol/L 98    Carbon Dioxide (CO2)      22 - 29 mmol/L 44 (H)    Urea Nitrogen      6.0 - 20.0 mg/dL 12.7    Glucose      70 - 99 mg/dL 101 (H)         Medications:    Buspirone  Citalopram  Divalproex sodium  Fluticasone nasal  Lacosamide  Lamotrigine  Levetiracetam  Levocarnitine  Pantoprazole  Prazosin  Propranolol  Quetiapine  Zonisamide    Past Medical History:   Asthma    Blindness of left eye  Bronchitis   Cognitive impairment   Depression  Dysthymia   GERD  Hypertension  LDD  Mood disorder   Mild intellectual disability   ANA  Otitis media   Pneumonia  Paraplegia   Pulmonary infiltrate   Prediabetes   PTSD  Respiratory failure  Respiratory acidosis   Schizoaffective disorder  Spastic diplegia   Tremor   Unspecified epilepsy      Past Surgical History:    Colonoscopy  EGD  Ankle surgery, bilateral   Orbit surgery    Physical Exam   Patient Vitals for the past 24 hrs:   BP Temp Temp src Pulse Resp SpO2   02/21/24 1630 102/52 -- -- 89 (!) 33 94 %   02/21/24 1615 114/55 -- -- 87 18 91 %   02/21/24 1608 (!) 122/102 98.7  F (37.1  C) Oral 90 18 92 %     Physical Exam  General: Adult male, sitting upright  Eyes: Right pupil round and reactive to light.  Conjunctival within normal limits.  ENT: Moist mucous membranes, oropharynx  clear.   CV: Normal S1S2, no murmur, rub or gallop. Regular rate and rhythm  Resp: Clear to auscultation bilaterally, no wheezes, rales or rhonchi. Normal respiratory effort.  GI: Abdomen is soft, nontender and nondistended. No palpable masses. No rebound or guarding.  MSK: No edema. Nontender. Normal active range of motion.  Skin: Warm and dry. No rashes or lesions or ecchymoses on visible skin.  Neuro: Alert and oriented. Responds appropriately to all questions and commands. No focal findings appreciated. Normal muscle tone.  Psych: Normal mood and affect. Pleasant.    Emergency Department Course   ECG  ECG taken at 1607, ECG read at 1615  Normal sinus rhythm  Normal ECG   Rate 88 bpm. MA interval 180 ms. QRS duration 86 ms. QT/QTc 388/469 ms. P-R-T axes 43 30 63.     Laboratory:  Labs Ordered and Resulted from Time of ED Arrival to Time of ED Departure   INFLUENZA A/B, RSV, & SARS-COV2 PCR - Normal       Result Value    Influenza A PCR Negative      Influenza B PCR Negative      RSV PCR Negative      SARS CoV2 PCR Negative     LACTIC ACID WHOLE BLOOD - Normal    Lactic Acid 0.8          Emergency Department Course & Assessments:  Assessments/Consultations/Discussion of Management or Tests:  1619 I obtained history and examined the patient as noted above.    I reassessed the patient. He denies any new concerns.   I consulted with Dr. Ireland of neurology. Based on recent presentations, history of frequent difficulty to control seizures, unclear seizure like activity that brought him here, unconcerning appearance here, he feels the patient can safely be discharged with close neuro follow-up but recommends ID screening with viral screens and UA.   I reassessed the patient.  He notes he does not want to give a urine sample.  He feels comfortable at this time and would like to go home.  He denies any new symptoms or concerns.  I reassessed the patient.  I updated him that his group home is not answering the phone.   Will attempt to transport him as soon as possible back to his home.    Interventions:  None     Independent Interpretation (X-rays, CTs, rhythm strip):  None    Social Determinants of Health affecting care:   None    Disposition:  The patient was discharged to home currently is awaiting transport, signed out to my colleague Dr. Ortega while still in the ED.     Impression & Plan    CMS Diagnoses: None    Medical Decision Making:  Tre Vazquez is a medically complicated patient with a history of chronic seizure disorder requiring 4 antiepileptic drug therapy who presents emergency department from group home with concerns for possible seizure.  By group home report it is not a clear if this was true seizure activity or nonepileptiform seizure-like movements.  Per EMS, he was not postictal and was occasionally having twitching.  On arrival here, there is no twitching activity.  He is alert.  Aside from feeling fatigued he has no other concerns.  Did not see indication for imaging.  Labs were reviewed from earlier this morning and were not concerning, no repeat labs were sent aside from lactic acid level and viral screening.  Patient refused urinalysis.  He was hemodynamically normal in the ED with no fever.  He had no neurologic deficit.  ECG had been obtained prior to my assessment did not show acute pathology.  On lengthy chart review it appears as though the patient has had longstanding issues with seizures and/or seizure-like movements.  EEG was reportedly not demonstrating epileptiform activity at 1 point in his assessment recently.  He had recent medication changes at his last hospitalization which on the most recent neurology evaluation had not been actually changed and this seems to have been addressed at that visit.  His lactic acid was normal here, might expect some elevation with reported of 4 seizures.  He was here for multiple hours with no activity that would suggest seizure.  I discussed his care with  neurology, communication as above.  Ultimately discharged home seemed reasonable.  Unfortunately, we had trouble reaching the group home and the patient is still here despite multiple requests that he would like to go.  He is fed and comfortable while waiting communication with the group home to discharge, ongoing care in the ED while awaiting transport.    Diagnosis:    ICD-10-CM    1. Seizure-like activity (H)  R56.9         Scribe Disclosure:  I, Sarah Cantu, am serving as a scribe at 4:14 PM on 2/21/2024 to document services personally performed by Madalyn Dyer MD based on my observations and the provider's statements to me.  2/21/2024   Madalyn Dyer MD Jonkman, Tracy Dianne, MD  02/23/24 1829

## 2024-02-22 ENCOUNTER — APPOINTMENT (OUTPATIENT)
Dept: GENERAL RADIOLOGY | Facility: CLINIC | Age: 49
DRG: 100 | End: 2024-02-22
Attending: EMERGENCY MEDICINE
Payer: MEDICARE

## 2024-02-22 ENCOUNTER — APPOINTMENT (OUTPATIENT)
Dept: CT IMAGING | Facility: CLINIC | Age: 49
DRG: 100 | End: 2024-02-22
Attending: EMERGENCY MEDICINE
Payer: MEDICARE

## 2024-02-22 ENCOUNTER — HOSPITAL ENCOUNTER (INPATIENT)
Facility: CLINIC | Age: 49
LOS: 6 days | Discharge: HOME HEALTH | DRG: 100 | End: 2024-02-28
Attending: EMERGENCY MEDICINE | Admitting: STUDENT IN AN ORGANIZED HEALTH CARE EDUCATION/TRAINING PROGRAM
Payer: MEDICARE

## 2024-02-22 DIAGNOSIS — J96.02 ACUTE RESPIRATORY FAILURE WITH HYPOXIA AND HYPERCARBIA (H): Primary | ICD-10-CM

## 2024-02-22 DIAGNOSIS — J96.21 ACUTE ON CHRONIC RESPIRATORY FAILURE WITH HYPOXIA (H): ICD-10-CM

## 2024-02-22 DIAGNOSIS — Z99.81 OXYGEN DEPENDENT: ICD-10-CM

## 2024-02-22 DIAGNOSIS — I46.9 CARDIAC ARREST (H): ICD-10-CM

## 2024-02-22 DIAGNOSIS — J96.01 ACUTE RESPIRATORY FAILURE WITH HYPOXIA AND HYPERCARBIA (H): Primary | ICD-10-CM

## 2024-02-22 DIAGNOSIS — R56.9 SEIZURE (H): ICD-10-CM

## 2024-02-22 DIAGNOSIS — J96.22 ACUTE AND CHRONIC RESPIRATORY FAILURE WITH HYPERCAPNIA (H): ICD-10-CM

## 2024-02-22 LAB
ALBUMIN SERPL BCG-MCNC: 4.5 G/DL (ref 3.5–5.2)
ALBUMIN UR-MCNC: 100 MG/DL
ALP SERPL-CCNC: 89 U/L (ref 40–150)
ALT SERPL W P-5'-P-CCNC: 48 U/L (ref 0–70)
AMPHETAMINES UR QL SCN: NORMAL
ANION GAP BLD CALCULATED.3IONS-SCNC: 19 MMOL/L (ref 3–14)
ANION GAP SERPL CALCULATED.3IONS-SCNC: 20 MMOL/L (ref 7–15)
ANION GAP SERPL CALCULATED.3IONS-SCNC: 5 MMOL/L (ref 7–15)
APPEARANCE UR: ABNORMAL
APTT PPP: 28 SECONDS (ref 22–38)
AST SERPL W P-5'-P-CCNC: 40 U/L (ref 0–45)
ATRIAL RATE - MUSE: 88 BPM
ATRIAL RATE - MUSE: 97 BPM
BARBITURATES UR QL SCN: NORMAL
BASE EXCESS BLDV CALC-SCNC: 12 MMOL/L (ref -3–3)
BASE EXCESS BLDV CALC-SCNC: 13.8 MMOL/L (ref -3–3)
BASOPHILS # BLD AUTO: 0 10E3/UL (ref 0–0.2)
BASOPHILS # BLD AUTO: ABNORMAL 10*3/UL
BASOPHILS # BLD MANUAL: 0 10E3/UL (ref 0–0.2)
BASOPHILS NFR BLD AUTO: 0 %
BASOPHILS NFR BLD AUTO: ABNORMAL %
BASOPHILS NFR BLD MANUAL: 0 %
BENZODIAZ UR QL SCN: NORMAL
BILIRUB SERPL-MCNC: 0.3 MG/DL
BILIRUB UR QL STRIP: NEGATIVE
BUN SERPL-MCNC: 13.5 MG/DL (ref 6–20)
BUN SERPL-MCNC: 14 MG/DL (ref 7–30)
BUN SERPL-MCNC: 15.4 MG/DL (ref 6–20)
BZE UR QL SCN: NORMAL
CA-I BLD-MCNC: 4.8 MG/DL (ref 4.4–5.2)
CALCIUM SERPL-MCNC: 8.5 MG/DL (ref 8.6–10)
CALCIUM SERPL-MCNC: 9.1 MG/DL (ref 8.6–10)
CANNABINOIDS UR QL SCN: NORMAL
CHLORIDE BLD-SCNC: 94 MMOL/L (ref 94–109)
CHLORIDE SERPL-SCNC: 100 MMOL/L (ref 98–107)
CHLORIDE SERPL-SCNC: 94 MMOL/L (ref 98–107)
CK SERPL-CCNC: 169 U/L (ref 39–308)
CO2 BLD-SCNC: 34 MMOL/L (ref 20–32)
COLOR UR AUTO: YELLOW
CREAT BLD-MCNC: 0.9 MG/DL (ref 0.7–1.3)
CREAT SERPL-MCNC: 0.59 MG/DL (ref 0.67–1.17)
CREAT SERPL-MCNC: 0.7 MG/DL (ref 0.67–1.17)
DEPRECATED HCO3 PLAS-SCNC: 29 MMOL/L (ref 22–29)
DEPRECATED HCO3 PLAS-SCNC: 36 MMOL/L (ref 22–29)
DIASTOLIC BLOOD PRESSURE - MUSE: NORMAL MMHG
DIASTOLIC BLOOD PRESSURE - MUSE: NORMAL MMHG
EGFRCR SERPLBLD CKD-EPI 2021: >90 ML/MIN/1.73M2
EGFRCR SERPLBLD CKD-EPI 2021: >90 ML/MIN/1.73M2
EOSINOPHIL # BLD AUTO: 0.1 10E3/UL (ref 0–0.7)
EOSINOPHIL # BLD AUTO: ABNORMAL 10*3/UL
EOSINOPHIL # BLD MANUAL: 0.1 10E3/UL (ref 0–0.7)
EOSINOPHIL NFR BLD AUTO: 1 %
EOSINOPHIL NFR BLD AUTO: ABNORMAL %
EOSINOPHIL NFR BLD MANUAL: 1 %
ERYTHROCYTE [DISTWIDTH] IN BLOOD BY AUTOMATED COUNT: 12.4 % (ref 10–15)
ERYTHROCYTE [DISTWIDTH] IN BLOOD BY AUTOMATED COUNT: 12.8 % (ref 10–15)
ETHANOL SERPL-MCNC: <0.01 G/DL
FENTANYL UR QL: NORMAL
FLUAV RNA SPEC QL NAA+PROBE: NEGATIVE
FLUBV RNA RESP QL NAA+PROBE: NEGATIVE
GLUCOSE BLD-MCNC: 198 MG/DL (ref 70–99)
GLUCOSE BLDC GLUCOMTR-MCNC: 109 MG/DL (ref 70–99)
GLUCOSE BLDC GLUCOMTR-MCNC: 84 MG/DL (ref 70–99)
GLUCOSE BLDC GLUCOMTR-MCNC: 92 MG/DL (ref 70–99)
GLUCOSE BLDC GLUCOMTR-MCNC: 97 MG/DL (ref 70–99)
GLUCOSE SERPL-MCNC: 202 MG/DL (ref 70–99)
GLUCOSE SERPL-MCNC: 95 MG/DL (ref 70–99)
GLUCOSE UR STRIP-MCNC: NEGATIVE MG/DL
HCO3 BLDV-SCNC: 36 MMOL/L (ref 21–28)
HCO3 BLDV-SCNC: 39 MMOL/L (ref 21–28)
HCO3 BLDV-SCNC: 40 MMOL/L (ref 21–28)
HCO3 BLDV-SCNC: <1 MMOL/L (ref 21–28)
HCT VFR BLD AUTO: 35.2 % (ref 40–53)
HCT VFR BLD AUTO: 45.6 % (ref 40–53)
HCT VFR BLD CALC: 45 % (ref 40–53)
HGB BLD-MCNC: 11.3 G/DL (ref 13.3–17.7)
HGB BLD-MCNC: 13.5 G/DL (ref 13.3–17.7)
HGB BLD-MCNC: 15.3 G/DL (ref 13.3–17.7)
HGB UR QL STRIP: NEGATIVE
HOLD SPECIMEN: NORMAL
IMM GRANULOCYTES # BLD: 0.1 10E3/UL
IMM GRANULOCYTES # BLD: ABNORMAL 10*3/UL
IMM GRANULOCYTES NFR BLD: 1 %
IMM GRANULOCYTES NFR BLD: ABNORMAL %
INR PPP: 0.96 (ref 0.85–1.15)
INTERPRETATION ECG - MUSE: NORMAL
INTERPRETATION ECG - MUSE: NORMAL
KETONES UR STRIP-MCNC: NEGATIVE MG/DL
LACTATE BLD-SCNC: 11.5 MMOL/L
LACTATE BLD-SCNC: 4.1 MMOL/L
LACTATE SERPL-SCNC: 12.4 MMOL/L (ref 0.7–2)
LACTATE SERPL-SCNC: 2 MMOL/L (ref 0.7–2)
LACTATE SERPL-SCNC: 2.3 MMOL/L (ref 0.7–2)
LEUKOCYTE ESTERASE UR QL STRIP: NEGATIVE
LIPASE SERPL-CCNC: 45 U/L (ref 13–60)
LYMPHOCYTES # BLD AUTO: 1.9 10E3/UL (ref 0.8–5.3)
LYMPHOCYTES # BLD AUTO: ABNORMAL 10*3/UL
LYMPHOCYTES # BLD MANUAL: 6.3 10E3/UL (ref 0.8–5.3)
LYMPHOCYTES NFR BLD AUTO: 26 %
LYMPHOCYTES NFR BLD AUTO: ABNORMAL %
LYMPHOCYTES NFR BLD MANUAL: 47 %
MAGNESIUM SERPL-MCNC: 1.7 MG/DL (ref 1.7–2.3)
MCH RBC QN AUTO: 30.9 PG (ref 26.5–33)
MCH RBC QN AUTO: 31.1 PG (ref 26.5–33)
MCHC RBC AUTO-ENTMCNC: 29.6 G/DL (ref 31.5–36.5)
MCHC RBC AUTO-ENTMCNC: 32.1 G/DL (ref 31.5–36.5)
MCV RBC AUTO: 104 FL (ref 78–100)
MCV RBC AUTO: 97 FL (ref 78–100)
METAMYELOCYTES # BLD MANUAL: 0.1 10E3/UL
METAMYELOCYTES NFR BLD MANUAL: 1 %
MONOCYTES # BLD AUTO: 0.7 10E3/UL (ref 0–1.3)
MONOCYTES # BLD AUTO: ABNORMAL 10*3/UL
MONOCYTES # BLD MANUAL: 0.8 10E3/UL (ref 0–1.3)
MONOCYTES NFR BLD AUTO: 9 %
MONOCYTES NFR BLD AUTO: ABNORMAL %
MONOCYTES NFR BLD MANUAL: 6 %
MUCOUS THREADS #/AREA URNS LPF: PRESENT /LPF
NEUTROPHILS # BLD AUTO: 4.8 10E3/UL (ref 1.6–8.3)
NEUTROPHILS # BLD AUTO: ABNORMAL 10*3/UL
NEUTROPHILS # BLD MANUAL: 6.1 10E3/UL (ref 1.6–8.3)
NEUTROPHILS NFR BLD AUTO: 63 %
NEUTROPHILS NFR BLD AUTO: ABNORMAL %
NEUTROPHILS NFR BLD MANUAL: 45 %
NITRATE UR QL: NEGATIVE
NRBC # BLD AUTO: 0 10E3/UL
NRBC # BLD AUTO: 0 10E3/UL
NRBC BLD AUTO-RTO: 0 /100
NRBC BLD AUTO-RTO: 0 /100
NT-PROBNP SERPL-MCNC: 119 PG/ML (ref 0–450)
O2/TOTAL GAS SETTING VFR VENT: 50 %
O2/TOTAL GAS SETTING VFR VENT: 70 %
OPIATES UR QL SCN: NORMAL
OXYHGB MFR BLDV: 80 % (ref 70–75)
OXYHGB MFR BLDV: 96 % (ref 70–75)
P AXIS - MUSE: 43 DEGREES
P AXIS - MUSE: 47 DEGREES
PATH REV: ABNORMAL
PCO2 BLDV: 54 MM HG (ref 40–50)
PCO2 BLDV: 58 MM HG (ref 40–50)
PCO2 BLDV: 68 MM HG (ref 40–50)
PCO2 BLDV: >130 MM HG (ref 40–50)
PCP QUAL URINE (ROCHE): NORMAL
PH BLDV: 6.98 [PH] (ref 7.32–7.43)
PH BLDV: 7.33 [PH] (ref 7.32–7.43)
PH BLDV: 7.43 [PH] (ref 7.32–7.43)
PH BLDV: 7.47 [PH] (ref 7.32–7.43)
PH UR STRIP: 8 [PH] (ref 5–7)
PLAT MORPH BLD: ABNORMAL
PLATELET # BLD AUTO: 151 10E3/UL (ref 150–450)
PLATELET # BLD AUTO: 239 10E3/UL (ref 150–450)
PO2 BLDV: 27 MM HG (ref 25–47)
PO2 BLDV: 39 MM HG (ref 25–47)
PO2 BLDV: 72 MM HG (ref 25–47)
PO2 BLDV: 88 MM HG (ref 25–47)
POTASSIUM BLD-SCNC: 4 MMOL/L (ref 3.4–5.3)
POTASSIUM SERPL-SCNC: 3.3 MMOL/L (ref 3.4–5.3)
POTASSIUM SERPL-SCNC: 4.2 MMOL/L (ref 3.4–5.3)
PR INTERVAL - MUSE: 180 MS
PR INTERVAL - MUSE: 182 MS
PROCALCITONIN SERPL IA-MCNC: 0.07 NG/ML
PROT SERPL-MCNC: 6.9 G/DL (ref 6.4–8.3)
QRS DURATION - MUSE: 84 MS
QRS DURATION - MUSE: 86 MS
QT - MUSE: 388 MS
QT - MUSE: 388 MS
QTC - MUSE: 469 MS
QTC - MUSE: 492 MS
R AXIS - MUSE: 22 DEGREES
R AXIS - MUSE: 30 DEGREES
RBC # BLD AUTO: 3.63 10E6/UL (ref 4.4–5.9)
RBC # BLD AUTO: 4.37 10E6/UL (ref 4.4–5.9)
RBC MORPH BLD: ABNORMAL
RBC URINE: 6 /HPF
RSV RNA SPEC NAA+PROBE: NEGATIVE
SAO2 % BLDV: 43 % (ref 70–75)
SAO2 % BLDV: 81.2 % (ref 70–75)
SAO2 % BLDV: 97.1 % (ref 70–75)
SAO2 % BLDV: ABNORMAL %
SARS-COV-2 RNA RESP QL NAA+PROBE: NEGATIVE
SMUDGE CELLS BLD QL SMEAR: PRESENT
SODIUM BLD-SCNC: 142 MMOL/L (ref 135–145)
SODIUM SERPL-SCNC: 141 MMOL/L (ref 135–145)
SODIUM SERPL-SCNC: 143 MMOL/L (ref 135–145)
SP GR UR STRIP: 1.02 (ref 1–1.03)
SQUAMOUS EPITHELIAL: <1 /HPF
SYSTOLIC BLOOD PRESSURE - MUSE: NORMAL MMHG
SYSTOLIC BLOOD PRESSURE - MUSE: NORMAL MMHG
T AXIS - MUSE: 63 DEGREES
T AXIS - MUSE: 80 DEGREES
T4 FREE SERPL-MCNC: 0.67 NG/DL (ref 0.9–1.7)
TROPONIN T SERPL HS-MCNC: 21 NG/L
TSH SERPL DL<=0.005 MIU/L-ACNC: 4.91 UIU/ML (ref 0.3–4.2)
UROBILINOGEN UR STRIP-MCNC: NORMAL MG/DL
VALPROATE SERPL-MCNC: 24.7 UG/ML
VENTRICULAR RATE- MUSE: 88 BPM
VENTRICULAR RATE- MUSE: 97 BPM
WBC # BLD AUTO: 13.5 10E3/UL (ref 4–11)
WBC # BLD AUTO: 7.6 10E3/UL (ref 4–11)
WBC URINE: 5 /HPF

## 2024-02-22 PROCEDURE — 85027 COMPLETE CBC AUTOMATED: CPT | Performed by: INTERNAL MEDICINE

## 2024-02-22 PROCEDURE — 84484 ASSAY OF TROPONIN QUANT: CPT | Performed by: EMERGENCY MEDICINE

## 2024-02-22 PROCEDURE — 250N000013 HC RX MED GY IP 250 OP 250 PS 637: Performed by: SURGERY

## 2024-02-22 PROCEDURE — 81001 URINALYSIS AUTO W/SCOPE: CPT | Performed by: EMERGENCY MEDICINE

## 2024-02-22 PROCEDURE — 82803 BLOOD GASES ANY COMBINATION: CPT

## 2024-02-22 PROCEDURE — 96361 HYDRATE IV INFUSION ADD-ON: CPT

## 2024-02-22 PROCEDURE — 96374 THER/PROPH/DIAG INJ IV PUSH: CPT

## 2024-02-22 PROCEDURE — 85025 COMPLETE CBC W/AUTO DIFF WBC: CPT | Performed by: EMERGENCY MEDICINE

## 2024-02-22 PROCEDURE — 93005 ELECTROCARDIOGRAM TRACING: CPT

## 2024-02-22 PROCEDURE — 87637 SARSCOV2&INF A&B&RSV AMP PRB: CPT | Performed by: EMERGENCY MEDICINE

## 2024-02-22 PROCEDURE — 85730 THROMBOPLASTIN TIME PARTIAL: CPT | Performed by: EMERGENCY MEDICINE

## 2024-02-22 PROCEDURE — 250N000013 HC RX MED GY IP 250 OP 250 PS 637: Performed by: STUDENT IN AN ORGANIZED HEALTH CARE EDUCATION/TRAINING PROGRAM

## 2024-02-22 PROCEDURE — 83735 ASSAY OF MAGNESIUM: CPT | Performed by: EMERGENCY MEDICINE

## 2024-02-22 PROCEDURE — 99291 CRITICAL CARE FIRST HOUR: CPT | Mod: 25

## 2024-02-22 PROCEDURE — 85610 PROTHROMBIN TIME: CPT | Performed by: EMERGENCY MEDICINE

## 2024-02-22 PROCEDURE — 36415 COLL VENOUS BLD VENIPUNCTURE: CPT | Performed by: STUDENT IN AN ORGANIZED HEALTH CARE EDUCATION/TRAINING PROGRAM

## 2024-02-22 PROCEDURE — 999N000065 XR CHEST PORT 1 VIEW

## 2024-02-22 PROCEDURE — 83880 ASSAY OF NATRIURETIC PEPTIDE: CPT | Performed by: EMERGENCY MEDICINE

## 2024-02-22 PROCEDURE — 83605 ASSAY OF LACTIC ACID: CPT | Performed by: EMERGENCY MEDICINE

## 2024-02-22 PROCEDURE — 99223 1ST HOSP IP/OBS HIGH 75: CPT | Mod: AI | Performed by: STUDENT IN AN ORGANIZED HEALTH CARE EDUCATION/TRAINING PROGRAM

## 2024-02-22 PROCEDURE — 85007 BL SMEAR W/DIFF WBC COUNT: CPT | Performed by: EMERGENCY MEDICINE

## 2024-02-22 PROCEDURE — 80164 ASSAY DIPROPYLACETIC ACD TOT: CPT | Performed by: EMERGENCY MEDICINE

## 2024-02-22 PROCEDURE — 96375 TX/PRO/DX INJ NEW DRUG ADDON: CPT

## 2024-02-22 PROCEDURE — 82962 GLUCOSE BLOOD TEST: CPT

## 2024-02-22 PROCEDURE — 83690 ASSAY OF LIPASE: CPT | Performed by: EMERGENCY MEDICINE

## 2024-02-22 PROCEDURE — 80175 DRUG SCREEN QUAN LAMOTRIGINE: CPT | Performed by: EMERGENCY MEDICINE

## 2024-02-22 PROCEDURE — 82805 BLOOD GASES W/O2 SATURATION: CPT | Performed by: SURGERY

## 2024-02-22 PROCEDURE — 83605 ASSAY OF LACTIC ACID: CPT

## 2024-02-22 PROCEDURE — 70450 CT HEAD/BRAIN W/O DYE: CPT | Mod: MG

## 2024-02-22 PROCEDURE — 999N000254 HC STATISTIC VENTILATOR TRANSFER

## 2024-02-22 PROCEDURE — 80047 BASIC METABLC PNL IONIZED CA: CPT

## 2024-02-22 PROCEDURE — 99291 CRITICAL CARE FIRST HOUR: CPT | Performed by: SURGERY

## 2024-02-22 PROCEDURE — 5A1945Z RESPIRATORY VENTILATION, 24-96 CONSECUTIVE HOURS: ICD-10-PCS | Performed by: SURGERY

## 2024-02-22 PROCEDURE — 250N000011 HC RX IP 250 OP 636: Performed by: STUDENT IN AN ORGANIZED HEALTH CARE EDUCATION/TRAINING PROGRAM

## 2024-02-22 PROCEDURE — 36415 COLL VENOUS BLD VENIPUNCTURE: CPT | Performed by: INTERNAL MEDICINE

## 2024-02-22 PROCEDURE — 84145 PROCALCITONIN (PCT): CPT | Performed by: EMERGENCY MEDICINE

## 2024-02-22 PROCEDURE — 94640 AIRWAY INHALATION TREATMENT: CPT

## 2024-02-22 PROCEDURE — 82805 BLOOD GASES W/O2 SATURATION: CPT | Performed by: STUDENT IN AN ORGANIZED HEALTH CARE EDUCATION/TRAINING PROGRAM

## 2024-02-22 PROCEDURE — 250N000011 HC RX IP 250 OP 636: Performed by: EMERGENCY MEDICINE

## 2024-02-22 PROCEDURE — 80307 DRUG TEST PRSMV CHEM ANLYZR: CPT | Performed by: EMERGENCY MEDICINE

## 2024-02-22 PROCEDURE — 250N000009 HC RX 250: Performed by: SURGERY

## 2024-02-22 PROCEDURE — 96365 THER/PROPH/DIAG IV INF INIT: CPT

## 2024-02-22 PROCEDURE — 31500 INSERT EMERGENCY AIRWAY: CPT

## 2024-02-22 PROCEDURE — 84439 ASSAY OF FREE THYROXINE: CPT | Performed by: EMERGENCY MEDICINE

## 2024-02-22 PROCEDURE — 82550 ASSAY OF CK (CPK): CPT | Performed by: INTERNAL MEDICINE

## 2024-02-22 PROCEDURE — 80053 COMPREHEN METABOLIC PANEL: CPT | Performed by: EMERGENCY MEDICINE

## 2024-02-22 PROCEDURE — 96376 TX/PRO/DX INJ SAME DRUG ADON: CPT

## 2024-02-22 PROCEDURE — 999N000065 XR ABDOMEN PORT 1 VIEW

## 2024-02-22 PROCEDURE — 94002 VENT MGMT INPAT INIT DAY: CPT

## 2024-02-22 PROCEDURE — 999N000157 HC STATISTIC RCP TIME EA 10 MIN

## 2024-02-22 PROCEDURE — 82077 ASSAY SPEC XCP UR&BREATH IA: CPT | Performed by: EMERGENCY MEDICINE

## 2024-02-22 PROCEDURE — 84443 ASSAY THYROID STIM HORMONE: CPT | Performed by: EMERGENCY MEDICINE

## 2024-02-22 PROCEDURE — 36415 COLL VENOUS BLD VENIPUNCTURE: CPT | Performed by: EMERGENCY MEDICINE

## 2024-02-22 PROCEDURE — 258N000003 HC RX IP 258 OP 636: Performed by: EMERGENCY MEDICINE

## 2024-02-22 PROCEDURE — 200N000001 HC R&B ICU

## 2024-02-22 PROCEDURE — 36415 COLL VENOUS BLD VENIPUNCTURE: CPT | Performed by: SURGERY

## 2024-02-22 PROCEDURE — 80235 DRUG ASSAY LACOSAMIDE: CPT | Performed by: EMERGENCY MEDICINE

## 2024-02-22 PROCEDURE — 999N000009 HC STATISTIC AIRWAY CARE

## 2024-02-22 PROCEDURE — 36415 COLL VENOUS BLD VENIPUNCTURE: CPT

## 2024-02-22 RX ORDER — BUSPIRONE HYDROCHLORIDE 15 MG/1
15 TABLET ORAL 2 TIMES DAILY
Status: DISCONTINUED | OUTPATIENT
Start: 2024-02-22 | End: 2024-02-23

## 2024-02-22 RX ORDER — LAMOTRIGINE 100 MG/1
100 TABLET ORAL DAILY
Status: DISCONTINUED | OUTPATIENT
Start: 2024-02-22 | End: 2024-02-23

## 2024-02-22 RX ORDER — LEVETIRACETAM 10 MG/ML
1000 INJECTION INTRAVASCULAR ONCE
Status: COMPLETED | OUTPATIENT
Start: 2024-02-22 | End: 2024-02-22

## 2024-02-22 RX ORDER — LEVETIRACETAM 500 MG/1
1500 TABLET ORAL AT BEDTIME
Status: DISCONTINUED | OUTPATIENT
Start: 2024-02-22 | End: 2024-02-23

## 2024-02-22 RX ORDER — MIDAZOLAM HCL IN 0.9 % NACL/PF 1 MG/ML
1-8 PLASTIC BAG, INJECTION (ML) INTRAVENOUS CONTINUOUS
Status: DISCONTINUED | OUTPATIENT
Start: 2024-02-22 | End: 2024-02-24

## 2024-02-22 RX ORDER — PROPOFOL 10 MG/ML
5-75 INJECTION, EMULSION INTRAVENOUS CONTINUOUS
Status: DISCONTINUED | OUTPATIENT
Start: 2024-02-22 | End: 2024-02-24

## 2024-02-22 RX ORDER — PROPRANOLOL HYDROCHLORIDE 20 MG/1
20 TABLET ORAL 2 TIMES DAILY
Status: DISCONTINUED | OUTPATIENT
Start: 2024-02-22 | End: 2024-02-23

## 2024-02-22 RX ORDER — LACOSAMIDE 50 MG/1
50 TABLET ORAL DAILY
Status: DISCONTINUED | OUTPATIENT
Start: 2024-02-22 | End: 2024-02-23

## 2024-02-22 RX ORDER — ALBUTEROL SULFATE 0.83 MG/ML
2.5 SOLUTION RESPIRATORY (INHALATION) ONCE
Status: COMPLETED | OUTPATIENT
Start: 2024-02-22 | End: 2024-02-22

## 2024-02-22 RX ORDER — DIVALPROEX SODIUM 500 MG/1
500 TABLET, DELAYED RELEASE ORAL EVERY 12 HOURS
Status: DISCONTINUED | OUTPATIENT
Start: 2024-02-22 | End: 2024-02-22

## 2024-02-22 RX ORDER — NICOTINE POLACRILEX 4 MG
15-30 LOZENGE BUCCAL
Status: DISCONTINUED | OUTPATIENT
Start: 2024-02-22 | End: 2024-02-28 | Stop reason: HOSPADM

## 2024-02-22 RX ORDER — LIDOCAINE HYDROCHLORIDE 20 MG/ML
JELLY TOPICAL
Status: DISCONTINUED | OUTPATIENT
Start: 2024-02-22 | End: 2024-02-28 | Stop reason: HOSPADM

## 2024-02-22 RX ORDER — ALBUTEROL SULFATE 0.83 MG/ML
2.5 SOLUTION RESPIRATORY (INHALATION)
Status: DISCONTINUED | OUTPATIENT
Start: 2024-02-22 | End: 2024-02-28 | Stop reason: HOSPADM

## 2024-02-22 RX ORDER — LAMOTRIGINE 200 MG/1
200 TABLET ORAL 2 TIMES DAILY
Status: DISCONTINUED | OUTPATIENT
Start: 2024-02-22 | End: 2024-02-23

## 2024-02-22 RX ORDER — ZONISAMIDE 100 MG/1
200 CAPSULE ORAL AT BEDTIME
Status: DISCONTINUED | OUTPATIENT
Start: 2024-02-22 | End: 2024-02-23

## 2024-02-22 RX ORDER — ENOXAPARIN SODIUM 100 MG/ML
40 INJECTION SUBCUTANEOUS EVERY 12 HOURS
Status: DISCONTINUED | OUTPATIENT
Start: 2024-02-22 | End: 2024-02-28 | Stop reason: HOSPADM

## 2024-02-22 RX ORDER — CHLORHEXIDINE GLUCONATE ORAL RINSE 1.2 MG/ML
15 SOLUTION DENTAL EVERY 12 HOURS
Status: DISCONTINUED | OUTPATIENT
Start: 2024-02-22 | End: 2024-02-26

## 2024-02-22 RX ORDER — ENOXAPARIN SODIUM 100 MG/ML
40 INJECTION SUBCUTANEOUS EVERY 24 HOURS
Status: DISCONTINUED | OUTPATIENT
Start: 2024-02-22 | End: 2024-02-22

## 2024-02-22 RX ORDER — LEVETIRACETAM 500 MG/1
1000 TABLET ORAL EVERY MORNING
Status: DISCONTINUED | OUTPATIENT
Start: 2024-02-22 | End: 2024-02-23

## 2024-02-22 RX ORDER — FENTANYL CITRATE 50 UG/ML
100 INJECTION, SOLUTION INTRAMUSCULAR; INTRAVENOUS ONCE
Status: COMPLETED | OUTPATIENT
Start: 2024-02-22 | End: 2024-02-22

## 2024-02-22 RX ORDER — DEXTROSE MONOHYDRATE 25 G/50ML
25-50 INJECTION, SOLUTION INTRAVENOUS
Status: DISCONTINUED | OUTPATIENT
Start: 2024-02-22 | End: 2024-02-28 | Stop reason: HOSPADM

## 2024-02-22 RX ORDER — LEVOCARNITINE 330 MG/1
660 TABLET ORAL 3 TIMES DAILY
Status: DISCONTINUED | OUTPATIENT
Start: 2024-02-22 | End: 2024-02-23

## 2024-02-22 RX ORDER — ZONISAMIDE 100 MG/1
100 CAPSULE ORAL EVERY MORNING
Status: DISCONTINUED | OUTPATIENT
Start: 2024-02-22 | End: 2024-02-23

## 2024-02-22 RX ADMIN — Medication 50 MCG: at 13:45

## 2024-02-22 RX ADMIN — BUSPIRONE HYDROCHLORIDE 15 MG: 15 TABLET ORAL at 20:32

## 2024-02-22 RX ADMIN — LAMOTRIGINE 100 MG: 100 TABLET ORAL at 16:15

## 2024-02-22 RX ADMIN — SODIUM CHLORIDE 1000 ML: 9 INJECTION, SOLUTION INTRAVENOUS at 07:53

## 2024-02-22 RX ADMIN — PROPOFOL 10 MCG/KG/MIN: 10 INJECTION, EMULSION INTRAVENOUS at 23:22

## 2024-02-22 RX ADMIN — PROPOFOL 10 MCG/KG/MIN: 10 INJECTION, EMULSION INTRAVENOUS at 14:26

## 2024-02-22 RX ADMIN — Medication 100 MCG/HR: at 08:17

## 2024-02-22 RX ADMIN — LEVETIRACETAM 1000 MG: 10 INJECTION INTRAVENOUS at 07:45

## 2024-02-22 RX ADMIN — PROPRANOLOL HYDROCHLORIDE 20 MG: 20 TABLET ORAL at 20:31

## 2024-02-22 RX ADMIN — FENTANYL CITRATE 100 MCG: 50 INJECTION, SOLUTION INTRAMUSCULAR; INTRAVENOUS at 08:01

## 2024-02-22 RX ADMIN — ZONISAMIDE 100 MG: 100 CAPSULE ORAL at 16:18

## 2024-02-22 RX ADMIN — ENOXAPARIN SODIUM 40 MG: 40 INJECTION SUBCUTANEOUS at 16:10

## 2024-02-22 RX ADMIN — VALPROIC ACID 500 MG: 250 SOLUTION ORAL at 23:59

## 2024-02-22 RX ADMIN — Medication 50 MCG: at 08:48

## 2024-02-22 RX ADMIN — PROPOFOL 40 MCG/KG/MIN: 10 INJECTION, EMULSION INTRAVENOUS at 06:40

## 2024-02-22 RX ADMIN — LAMOTRIGINE 200 MG: 200 TABLET ORAL at 20:31

## 2024-02-22 RX ADMIN — LEVETIRACETAM 1500 MG: 500 TABLET, FILM COATED ORAL at 21:53

## 2024-02-22 RX ADMIN — LEVOCARNITINE 660 MG: 330 TABLET ORAL at 20:32

## 2024-02-22 RX ADMIN — Medication 25 MCG: at 11:49

## 2024-02-22 RX ADMIN — CHLORHEXIDINE GLUCONATE 15 ML: 1.2 SOLUTION ORAL at 20:32

## 2024-02-22 RX ADMIN — MIDAZOLAM HYDROCHLORIDE 5 MG/HR: 1 INJECTION, SOLUTION INTRAVENOUS at 23:25

## 2024-02-22 RX ADMIN — Medication 50 MCG: at 13:08

## 2024-02-22 RX ADMIN — MIDAZOLAM 1 MG: 1 INJECTION INTRAMUSCULAR; INTRAVENOUS at 07:41

## 2024-02-22 RX ADMIN — MIDAZOLAM HYDROCHLORIDE 1 MG/HR: 1 INJECTION, SOLUTION INTRAVENOUS at 07:45

## 2024-02-22 RX ADMIN — ALBUTEROL SULFATE 2.5 MG: 2.5 SOLUTION RESPIRATORY (INHALATION) at 15:08

## 2024-02-22 RX ADMIN — BUSPIRONE HYDROCHLORIDE 15 MG: 15 TABLET ORAL at 16:17

## 2024-02-22 RX ADMIN — ZONISAMIDE 200 MG: 100 CAPSULE ORAL at 21:53

## 2024-02-22 RX ADMIN — SODIUM CHLORIDE 1000 ML: 9 INJECTION, SOLUTION INTRAVENOUS at 07:07

## 2024-02-22 RX ADMIN — LEVOCARNITINE 660 MG: 330 TABLET ORAL at 16:12

## 2024-02-22 ASSESSMENT — ACTIVITIES OF DAILY LIVING (ADL)
ADLS_ACUITY_SCORE: 55
ADLS_ACUITY_SCORE: 41
ADLS_ACUITY_SCORE: 41
ADLS_ACUITY_SCORE: 55
DEPENDENT_IADLS:: CLEANING;COOKING;LAUNDRY;SHOPPING;MONEY MANAGEMENT;MEDICATION MANAGEMENT;MEAL PREPARATION;TRANSPORTATION
ADLS_ACUITY_SCORE: 41
ADLS_ACUITY_SCORE: 55
ADLS_ACUITY_SCORE: 57
ADLS_ACUITY_SCORE: 55
ADLS_ACUITY_SCORE: 41
ADLS_ACUITY_SCORE: 55
ADLS_ACUITY_SCORE: 55
ADLS_ACUITY_SCORE: 41
ADLS_ACUITY_SCORE: 57
ADLS_ACUITY_SCORE: 55
ADLS_ACUITY_SCORE: 41

## 2024-02-22 NOTE — H&P
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 He received 5 minutes of CPR in the field with ROSC and was subseqently intubated and sedated on arrival to the emergency department.    Cardiac Arrest s/p ROSC  Noted to have witness cardiac arrest by EMS after seizure activity. I suspect this was primary respiratory failure that led to cardiovascular collapse. ROSC achieved after 5 minutes of CPR. On continuous cardiac monitoring now without evidence of continued ectopy. Will obtain TTE and cardiology consult.    Breakthrough Seizure Activity  Epilepsy  [facility] jail. Was recently admitted to observation from 2/20/2024-2/21/2024 for possible seizure activity. He does have a history of epilepsy, but also history of reporting seizure activity when there has been none. Had previously been transferred for 24 hours EEG at [facility] without evidence of seizure activity. Remains on lacosamide, lamotrigine, keppra, zonisamide, and depakote. Received versed in field and has been sedated sicne intubation in ER. Briefly became agitated with lightened sedation upon arrival to ICU and was moving all extremities. Will need to wean sedation to off prior to full neuro examination. Neurology consulted, appreciate recommendations.     Acute Hypoxic & Acute on Chronic Hypercapnic Respiratory Failure  Acute Respiratory Acidosis  ANA/OHS  Chronic Hypercapnia  Currently intubated after cardiac arrest. Normally  "wears BiPAP at night and with naps for chronic hypercapnia. Presented qith pH 6.98, pCO2 >130 after cardiac arrest. Now improved with intubation. Will attempt to wean sedation and extubate tomorrow if possible.     Schizoaffective Disorder  Borderline Personality disorder  MDD  Anxiety  Continue home citalopram and buspirone once weaned from sedation.     Developmental Delay: Lives in group home. Uses wheelchair and walker interchangeably.  Hypertension: Continue PTA propranolol + prazosin.  GERD: Prontonix  Chronic Hyperammoniemia: Continue PTA levocarnitine.  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 Indication: ICU  Code Status: Full Code      Clinically Significant Risk Factors Present on Admission             # Anion Gap Metabolic Acidosis: Highest Anion Gap = 20 mmol/L in last 2 days, will monitor and treat as appropriate      # Hypertension: Noted on problem list   # Acute Respiratory Failure: Documented O2 saturation < 91%.  Continue supplemental oxygen as needed     # Severe Obesity: Estimated body mass index is 45.83 kg/m  as calculated from the following:    Height as of 2/20/24: 1.727 m (5' 8\").    Weight as of this encounter: 136.7 kg (301 lb 6.4 oz).       # Financial/Environmental Concerns:           Disposition Plan      Expected Discharge Date: 02/24/2024                  Emeterio Pittman MD  Hospitalist Service  [facility]  Securely message with [employer] (more info)  Text page via [employer] (by [email or web address]) Paging/Directory     ______________________________________________________________________    Chief Complaint   Seizure and Cardiac Arrest    History is obtained from the emergency department physician    History of Present Illness "   Tre Vazquez is a 48 year old male with past medical history significant for developmental delay (lives in group home), seizures, obesity, ANA/OHS on BiPAP at night and while napping, chronic hypercapnia, chronic hyperammoniemia, anemia, schizoaffective disorder, borderline personality, MDD, anxiety, HTN, 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    Patient was recently admitted to [facility] observation unit 2/20/2024 - 2/21/2024 for possible seizure activity.  He has a history of epilepsy and multiple recent admissions in the past with reported seizure activity, but without objective evidence on many of those occasions to verify seizure activity.  He is on 5 AEDs.  He was discharged back to his group home on 2/21/2024, but unfortunately had with stiffness seizure activity in the morning of 2/22/2024.  During the seizure activity EMS was called and while they were evaluating patient in the field he progressed into cardiac arrest.  He received 5 minutes of CPR with ROSC.  Versed was provided in the field and patient was transferred to the emergency department.    In the emergency department he was initially noted to have normal heart rate was slight hypotension to 90/50.  He was intubated for airway control and subsequently became somewhat agitated, therefore was placed on propofol+ fentanyl + midazolam gtt. he was admitted to the ICU for further cares.      Past Medical History    Past Medical History:   Diagnosis Date    Blindness of left eye     Depression 03/10/2014    Hypertension     Pneumococcal septicemia(038.2) (H) 11/26/2013    Hospitalized    Pneumonia, organism unspecified(486) 11/26/2013    Hospitalized    Uncomplicated asthma     Unspecified epilepsy without mention of intractable epilepsy        Past Surgical History   Past Surgical History:   Procedure Laterality Date    COLONOSCOPY N/A 12/1/2022     Procedure: COLONOSCOPY;  Surgeon: Michael Caldwell MD;  Location:  OR    ESOPHAGOSCOPY, GASTROSCOPY, DUODENOSCOPY (EGD), COMBINED N/A 12/1/2022    Procedure: ESOPHAGOGASTRODUODENOSCOPY with biopsy;  Surgeon: Michael Caldwell MD;  Location:  OR    ORTHOPEDIC SURGERY      pt stated past surgery on both ankles       Prior to Admission Medications   Prior to Admission Medications   Prescriptions Last Dose Informant Patient Reported? Taking?   CITALOPRAM HYDROBROMIDE PO 2/21/2024 at 0844 Pharmacy Yes Yes   Sig: Take 60 mg by mouth daily   QUEtiapine ER (SEROQUEL XR) 300 MG 24 hr tablet 2/20/2024 at 2214  Yes Yes   Sig: Take 300 mg by mouth at bedtime   acetaminophen (TYLENOL) 325 MG tablet   Yes Yes   Sig: Take 650 mg by mouth every 4 hours as needed for mild pain   ammonium lactate (AMLACTIN) 12 % external cream 2/19/2024  Yes Yes   Sig: Apply topically 2 times daily   bacitracin 500 UNIT/GM external ointment 2/19/2024  Yes Yes   Sig: Apply topically 2 times daily Apply to left arm wound   busPIRone (BUSPAR) 15 MG tablet 2/21/2024 at 0844  Yes Yes   Sig: Take 15 mg by mouth 2 times daily   clotrimazole (LOTRIMIN) 1 % external cream 2/19/2024  Yes Yes   Sig: Apply topically 2 times daily Apply to left groin   divalproex sodium delayed-release (DEPAKOTE) 500 MG DR tablet 2/21/2024 at 0845  No Yes   Sig: Take 1 tablet (500 mg) by mouth every 12 hours   fluticasone (FLONASE) 50 MCG/ACT nasal spray   Yes Yes   Sig: Spray 1 spray in nostril 2 times daily   lacosamide (VIMPAT) 50 MG TABS tablet 2/21/2024 at 0845  No Yes   Sig: Take 1 tablet (50 mg) by mouth daily   lamoTRIgine (LAMICTAL) 100 MG tablet Unknown  No Yes   Sig: Take 1 tablet (100 mg) by mouth daily At 1400   lamoTRIgine (LAMICTAL) 200 MG tablet 2/21/2024 at 0846  No Yes   Sig: Take 1 tablet (200 mg) by mouth 2 times daily Take at 8 AM and 8 pm   levETIRAcetam (KEPPRA) 500 MG tablet 2/21/2024 at 0845  No Yes   Sig: Take 2 tablets (1,000  mg) by mouth every morning AND 3 tablets (1,500 mg) at bedtime.   levOCARNitine (CARNITOR) 330 MG tablet 2/21/2024 at 0845  No Yes   Sig: Take 2 tablets (660 mg) by mouth 3 times daily   methylcellulose POWD powder 2/21/2024 at 0902  Yes Yes   Sig: Apply 2 mg topically 2 times daily Mix 1 rounded tablespoon (2 mg) in 8 oz glass of water and take by mouth twice daily   multivitamin, therapeutic (THERA-VIT) TABS tablet 2/21/2024 at 0844  Yes Yes   Sig: Take 1 tablet by mouth daily   pantoprazole (PROTONIX) 40 MG EC tablet 2/21/2024 at 0845  Yes Yes   Sig: Take 40 mg by mouth daily   polyethylene glycol (MIRALAX) 17 GM/Dose powder 2/21/2024 at 0843  Yes Yes   Sig: Take 1 capful. by mouth daily   prazosin (MINIPRESS) 2 MG capsule 2/20/2024 at 2214  Yes Yes   Sig: Take 2 mg by mouth At Bedtime   propranolol (INDERAL) 20 MG tablet 2/21/2024 at 0844  No Yes   Sig: Take 1 tablet (20 mg) by mouth 2 times daily   zonisamide (ZONEGRAN) 100 MG capsule 2/21/2024 at 0845  No Yes   Sig: Take 1 capsule (100 mg) by mouth every morning AND 2 capsules (200 mg) At Bedtime.      Facility-Administered Medications: None        Physical Exam   General: Critically-ill male resting comfortably in hospital bed. Sedated. Moves slightly with light touch.  HEENT: Normocephalic, atraumatic. PERRL. Conjunctiva clear, sclera anicteric. Mucus membranes moist. Left eye enucleation.  Cardiac: Regular rate and rhythm without murmur, gallop or rub. No peripheral edema. Peripheral pulses intact.  Respiratory: Clear to auscultation without wheezing, rales or rhonchi. ETT in place. Synchronous with ventilator.  Abdominal: Normoactive bowel sounds. Soft, nontender, nondistended.  : Reid in place draining yello urine.  Musculoskeletal: Moving all extremities with lightened sedation.  Skin: Warm and well perfused. No rashes or abrasions on exposed skin.  Neurologic: Sedated.   Psychiatric: Unable to assess.      Medical Decision Making       75 MINUTES  SPENT BY ME on the date of service doing chart review, history, exam, documentation & further activities per the note.      Data     I have personally reviewed the following data over the past 24 hrs:    13.5 (H)  \   15.3   / 239     142 94 14 /  97   4.0 29 0.9 \     ALT: 48 AST: 40 AP: 89 TBILI: 0.3   ALB: 4.5 TOT PROTEIN: 6.9 LIPASE: 45     Trop: 21 BNP: 119     TSH: 4.91 (H) T4: 0.67 (L) A1C: N/A     Procal: 0.07 CRP: N/A Lactic Acid: 2.0       INR:  0.96 PTT:  28   D-dimer:  N/A Fibrinogen:  N/A       Imaging results reviewed over the past 24 hrs:   Recent Results (from the past 24 hour(s))   XR Chest Port 1 View    Narrative    CHEST ONE VIEW February 22, 2024 7:09 AM     HISTORY: Post intubation.    COMPARISON: February 20, 2024.      Impression    IMPRESSION: Endotracheal tube tip 4.1 cm from the lisa. No gross  infiltrates.    JONATHAN ROD MD         SYSTEM ID:  [id number]   XR Abdomen Port 1 View    Narrative    ABDOMEN ONE VIEW PORTABLE February 22, 2024 7:10 AM     HISTORY: OG tube.    COMPARISON: March 17, 2023.       Impression    IMPRESSION: Enteric tube tip projects within the stomach bubble in the  left upper quadrant.    JONATHAN ROD MD         SYSTEM ID:  [id number]   CT Head w/o Contrast    Narrative    CT HEAD WITHOUT CONTRAST February 22, 2024 8:37 AM     HISTORY: Seizure, cardiac arrest.       COMPARISON: Head CT 2/20/2024.    TECHNIQUE: Using multidetector thin collimation helical acquisition  technique, axial, coronal and sagittal CT images from the skull base  to the vertex were obtained without intravenous contrast.   (topogram) image(s) also obtained and reviewed. Dose reduction  techniques were performed.    FINDINGS: No intracranial hemorrhage, mass effect, or midline shift.  No acute loss of gray-white matter differentiation in the cerebral  hemispheres. Ventricles are proportionate to the cerebral sulci. Clear  basal cisterns. Moderate diffuse cerebral volume loss.  Patchy  periventricular hypoattenuation, consistent with chronic small vessel  ischemic disease. Unchanged moderate ventriculomegaly.    Hyperostosis frontalis interna. The visualized portions of the  paranasal sinuses and mastoid air cells are clear. Grossly normal  orbits.       Impression    IMPRESSION: Stable findings compared to 2/20/2024.   1. No acute intracranial pathology.   2. Chronic small vessel ischemic disease and diffuse cerebral volume  loss. Unchanged moderate ventriculomegaly.    JENNIE MEAD MD         SYSTEM ID:  [id number]

## 2024-02-22 NOTE — ED NOTES
Spjesus with Gerry regarding Joel returning to group home tonight. Agreeable to plan and transportation arrangements made.

## 2024-02-22 NOTE — CONSULTS
[facility]  Intensive Care Note          Assessment and Plan:     Summary Statement  48 year old male with known seizure disorder (as well as HTN, developmental delay, chronic hypercapnic resp failure, ANA, schizoaffective disorder) who had a witnessed seizure at his group home followed by cardiac arrest. CPR x5 minutes with ROSC.  Given versed by EMS, IO placed.  Intubated on arrival to the ED.  Head CT (noncontast) without abnormal findings.  Essentially stable since intubation. No pressor required, no longer hypoxic.  Immediately post seizure was acidotic and elevated lactic acid- cleared on recheck.      Neurology: Sedated on versed (hypotensive on propofol).  Nonfocal exam. Head CT negative. Neurology consulted. Home medications ordered   Cardiovascular / Hemodynamics: Vitally stable.  Cardiac arrest after seizure with 5 minutes of CPR, moving all extremities when sedation lightened. Monitor   Pulmonary: Chronic hypercapnia.  Vented without issue or high ventilator requirements. Presume will extubate to bipap when able. Higher Peak pressures on the vent- reduced I time with some improvement. No tube kinking evident.  Pplat of 25.  No wheezing, but will trial albuterol now   GI and Nutrition: OG tube in place. If unable to extubate in next 24 hours will plan to start enteral feeds   Renal, Fluid and Electrolytes: Lactic acidosis resolved post seizure/arrest.     Infectious Disease: No acute concerns   Hematology and Oncology: No acute concerns   Endocrinology: Monitor glucose   Intensive Care: Central line: No central line present  Arterial line: No arterial line present  Restraint:Needed   Others: none   24 hour goals: Neuro consult, wean sedation, work to extubate   Billin minutes            Problem list:     Principal Problem:    Seizure (H)    Assessment: known seizure disorder, multiple medications for seizure. Currently on versed infusion    Plan: Neuro consult  Active  Problems:    Cardiac arrest (H)    Assessment: vitals stable, nonfocal neuro exam. Suspect due to acute hypoxia due to seizure    Plan: no plans to cool, supportive care at this time            Key events - last 24 hours:     Admitted to ICU- intubated after seizure and cardiac arrest           Hospital Course:        Stable since admission           Medications:     Current Facility-Administered Medications Ordered in Epic   Medication Dose Route Frequency Last Rate Last Admin    busPIRone (BUSPAR) tablet 15 mg  15 mg Oral BID        glucose gel 15-30 g  15-30 g Oral Q15 Min PRN        Or    dextrose 50 % injection 25-50 mL  25-50 mL Intravenous Q15 Min PRN        Or    glucagon injection 1 mg  1 mg Subcutaneous Q15 Min PRN        divalproex sodium delayed-release (DEPAKOTE) DR tablet 500 mg  500 mg Oral Q12H        enoxaparin ANTICOAGULANT (LOVENOX) injection 40 mg  40 mg Subcutaneous Q24H        fentaNYL (SUBLIMAZE) 50 mcg/mL bolus from pump  25-50 mcg Intravenous Q1H PRN   50 mcg at 02/22/24 1345    fentaNYL (SUBLIMAZE) infusion   mcg/hr Intravenous Continuous   ED/Periop/Clinic Infusing on Admission/transfer at 02/22/24 1349    lacosamide (VIMPAT) tablet 50 mg  50 mg Oral Daily        lamoTRIgine (LaMICtal) tablet 100 mg  100 mg Oral Daily        lamoTRIgine (LaMICtal) tablet 200 mg  200 mg Oral BID        levETIRAcetam (KEPPRA) tablet 1,000 mg  1,000 mg Oral QAM        And    levETIRAcetam (KEPPRA) tablet 1,500 mg  1,500 mg Oral At Bedtime        levOCARNitine (CARNITOR) tablet 660 mg  660 mg Oral TID        lidocaine (XYLOCAINE) 2 % external gel   Urethral Once PRN        midazolam (VERSED) 1 mg/mL bolus from syringe/bag pump ADULT  1-2 mg Intravenous Q1H PRN   1 mg at 02/22/24 1347    midazolam (VERSED) 100mg/100mL NS infusion - ADULT  1-8 mg/hr Intravenous Continuous   ED/Periop/Clinic Infusing on Admission/transfer at 02/22/24 1302    propofol (DIPRIVAN) infusion  5-75 mcg/kg/min Intravenous  Continuous   Stopped at 24 0807    propranolol (INDERAL) tablet 20 mg  20 mg Oral BID        zonisamide (ZONEGRAN) capsule 100 mg  100 mg Oral QAM        And    zonisamide (ZONEGRAN) capsule 200 mg  200 mg Oral At Bedtime         No current [facility]-ordered outpatient medications on file.            Review of Systems:   Review of systems not obtained due to patient factors - intubation            Physical Exam:   Blood pressure 118/72, pulse 80, temperature 98.1  F (36.7  C), temperature source Temporal, resp. rate 22, weight 136.7 kg (301 lb 6.4 oz), SpO2 97%.    Vital Sign Ranges  Temperature Temp  Av.4  F (36.9  C)  Min: 98.1  F (36.7  C)  Max: 98.7  F (37.1  C)   Blood pressure Systolic (24hrs), Av , Min:77 , Max:124        Diastolic (24hrs), Av, Min:48, Max:102      Pulse Pulse  Av.2  Min: 77  Max: 98   Respirations Resp  Av  Min: 10  Max: 33   Pulse oximetry SpO2  Av %  Min: 86 %  Max: 99 %         Intake/Output Summary (Last 24 hours) at 2024 1118  Last data filed at 2024 0947  Gross per 24 hour   Intake 1000 ml   Output --   Net 1000 ml         General Appearance:   intubated   HEENT:   Head is atraumatic  Trachea is midline  Endotrachael tube is present  Orogastric tube is present   Cardiovascular:   RRR, extremities warm and perfused   Chest and Lungs:   Coarse BS, no wheezing. Minimal vent requirements   Abdomen and Genitourinary::   Non-distended  Soft   Extremities and Skin:   Skin is intact   Neuro:   Sedated. Moving his extremities.  Left eye is absent. Right pupil is reactive to light.  Reacts to pressure (restrained)     Dressings:   No dressings present   Drains and Tubes:   No drains or tubes present   Intravascular Access and Device:   PIV   Additional exam findings:   None             Data:   All lab results reviewed

## 2024-02-22 NOTE — CARE PLAN
Notified provider about indwelling collazo catheter discussed removal or continued need.    Did provider choose to remove indwelling collazo catheter? NO    Provider's collazo indication for keeping indwelling collazo catheter: Indication for continued use: Deep Sedation/Paralysis    Is there an order for indwelling collazo catheter? YES    *If there is a plan to keep collazo catheter in place at discharge daily notification with provider is not necessary, but please add a notation in the treatment team sticky note that the patient will be discharging with the catheter.

## 2024-02-22 NOTE — CONSULTS
Neurology Consultation    Tre Vazquez MRN# [id number]   YOB: 1975 Age: 48 year old    Code Status:Full Code   Date of Admission: 2/22/2024  Date of Consult:02/22/2024                                                                                       Assessment and Plan:                                         #.  Recurrent seizures/seizure-like events over the last 3 days, the most recent associated with cardiac arrest x 5 minutes with spontaneous return of circulation.  This patient has a longstanding history of seizure disorder preceding 2007.  His history indicates that he has both nonepileptic and epileptic events  -- Currently sedated on propofol/fentanyl  When he was agitated earlier per nurse he was apparently moving all 4 extremities.  Continue to give the medications as taken prior to admission through the OG tube-discussed with nurse.  If this is a problem we can convert the divalproex, lacosamide and levetiracetam to IV although I understand IV access is an issue as well.  His levels do reflect that valproate is lower however the dose was reduced at his last hospitalization in December.    Continue to wean sedatives.  Monitor neurostatus  Consider EEG depending on recovery  Will follow            ----------------------------------------------------------------------------------  ----------------------------------------------------------------------------------  Reason for consult: as above       Chief Complaint:   Patient intubated and sedated           History of Present Illness:   This patient is a 48 year old male who presents with recurrent seizures.  This patient has a known history of seizure disorder for many years-past records indicate that he has combination of epileptic and nonepileptic events.  He has been through extensive evaluations at [facility].    2/20/2024, fall/possible seizure, seen in the emergency room  2/21/2020 4-3 witnessed seizures  in the group home  2024: Return for witnessed seizure and cardiac arrest for 5 minutes with return of spontaneous circulation.  Admitted to ICU/intubated/sedated    Currently in ICU-on propofol and midazolam drip    MEDICAL DOCUMENTATION REVIEWED:  [employer]  Neurology Mio/Dr. Indu WELDON 24-transferred from [facility] to [facility] for monitoring due to recurrent seizure-like events-no event captured   -seen by Dr. Conner for encephalopathy/hyper ammonia anemia/hypoventilation syndrome    Home dosing of anticonvulsants:  Lamotrigine 200 mg twice daily and 100 mg in midday (total of 500 mg/day)  Levetiracetam 1000 mg in the morning and 1500 at bedtime (2500 mg/day  Divalproex 500 mg twice daily  Lacosamide 50 mg daily       Past Medical History:     Past Medical History:   Diagnosis Date    Blindness of left eye     Depression 03/10/2014    Hypertension     Pneumococcal septicemia(038.2) (H) 2013    Hospitalized    Pneumonia, organism unspecified(486) 2013    Hospitalized    Uncomplicated asthma     Unspecified epilepsy without mention of intractable epilepsy          Past Surgical History:     Past Surgical History:   Procedure Laterality Date    COLONOSCOPY N/A 2022    Procedure: COLONOSCOPY;  Surgeon: Michael Caldwell MD;  Location:  OR    ESOPHAGOSCOPY, GASTROSCOPY, DUODENOSCOPY (EGD), COMBINED N/A 2022    Procedure: ESOPHAGOGASTRODUODENOSCOPY with biopsy;  Surgeon: Michael Caldwell MD;  Location:  OR    ORTHOPEDIC SURGERY      pt stated past surgery on both ankles          Social History:     Social History     Socioeconomic History    Marital status: Single   Tobacco Use    Smoking status: Former     Types: Cigarettes     Start date:      Quit date:      Years since quittin.1    Smokeless tobacco: Never   Substance and Sexual Activity    Alcohol use: No    Drug use: No    Sexual activity: Never     Patient denies smoking, no  significant alcohol intake, denies illicit drugs use       Family History:   No family history on file.  Reviewed and not felt to be contributory.        Home Medications:     Prior to Admission Medications   Prescriptions Last Dose Informant Patient Reported? Taking?   CITALOPRAM HYDROBROMIDE PO 2/21/2024 at 0844 Pharmacy Yes Yes   Sig: Take 60 mg by mouth daily   QUEtiapine ER (SEROQUEL XR) 300 MG 24 hr tablet 2/20/2024 at 2214  Yes Yes   Sig: Take 300 mg by mouth at bedtime   acetaminophen (TYLENOL) 325 MG tablet   Yes Yes   Sig: Take 650 mg by mouth every 4 hours as needed for mild pain   ammonium lactate (AMLACTIN) 12 % external cream 2/19/2024  Yes Yes   Sig: Apply topically 2 times daily   bacitracin 500 UNIT/GM external ointment 2/19/2024  Yes Yes   Sig: Apply topically 2 times daily Apply to left arm wound   busPIRone (BUSPAR) 15 MG tablet 2/21/2024 at 0844  Yes Yes   Sig: Take 15 mg by mouth 2 times daily   clotrimazole (LOTRIMIN) 1 % external cream 2/19/2024  Yes Yes   Sig: Apply topically 2 times daily Apply to left groin   divalproex sodium delayed-release (DEPAKOTE) 500 MG DR tablet 2/21/2024 at 0845  No Yes   Sig: Take 1 tablet (500 mg) by mouth every 12 hours   fluticasone (FLONASE) 50 MCG/ACT nasal spray   Yes Yes   Sig: Spray 1 spray in nostril 2 times daily   lacosamide (VIMPAT) 50 MG TABS tablet 2/21/2024 at 0845  No Yes   Sig: Take 1 tablet (50 mg) by mouth daily   lamoTRIgine (LAMICTAL) 100 MG tablet Unknown  No Yes   Sig: Take 1 tablet (100 mg) by mouth daily At 1400   lamoTRIgine (LAMICTAL) 200 MG tablet 2/21/2024 at 0846  No Yes   Sig: Take 1 tablet (200 mg) by mouth 2 times daily Take at 8 AM and 8 pm   levETIRAcetam (KEPPRA) 500 MG tablet 2/21/2024 at 0845  No Yes   Sig: Take 2 tablets (1,000 mg) by mouth every morning AND 3 tablets (1,500 mg) at bedtime.   levOCARNitine (CARNITOR) 330 MG tablet 2/21/2024 at 0845  No Yes   Sig: Take 2 tablets (660 mg) by mouth 3 times daily    methylcellulose POWD powder 2/21/2024 at 0902  Yes Yes   Sig: Apply 2 mg topically 2 times daily Mix 1 rounded tablespoon (2 mg) in 8 oz glass of water and take by mouth twice daily   multivitamin, therapeutic (THERA-VIT) TABS tablet 2/21/2024 at 0844  Yes Yes   Sig: Take 1 tablet by mouth daily   pantoprazole (PROTONIX) 40 MG EC tablet 2/21/2024 at 0845  Yes Yes   Sig: Take 40 mg by mouth daily   polyethylene glycol (MIRALAX) 17 GM/Dose powder 2/21/2024 at 0843  Yes Yes   Sig: Take 1 capful. by mouth daily   prazosin (MINIPRESS) 2 MG capsule 2/20/2024 at 2214  Yes Yes   Sig: Take 2 mg by mouth At Bedtime   propranolol (INDERAL) 20 MG tablet 2/21/2024 at 0844  No Yes   Sig: Take 1 tablet (20 mg) by mouth 2 times daily   zonisamide (ZONEGRAN) 100 MG capsule 2/21/2024 at 0845  No Yes   Sig: Take 1 capsule (100 mg) by mouth every morning AND 2 capsules (200 mg) At Bedtime.      Facility-Administered Medications: None          Allergy:     Allergies   Allergen Reactions    Acetaminophen Unknown    Hydrocodone Bitartrate Er Unknown    Tomato GI Disturbance    Cephalexin Rash     HUT Reaction: Rash; HUT Noted: [id number]      Vicodin [Hydrocodone-Acetaminophen] GI Disturbance          Inpatient Medications:   Scheduled Meds:   busPIRone  15 mg Oral BID    divalproex sodium delayed-release  500 mg Oral Q12H    enoxaparin ANTICOAGULANT  40 mg Subcutaneous Q24H    lacosamide  50 mg Oral Daily    lamoTRIgine  100 mg Oral Daily    lamoTRIgine  200 mg Oral BID    levETIRAcetam  1,000 mg Oral QAM    And    levETIRAcetam  1,500 mg Oral At Bedtime    levOCARNitine  660 mg Oral TID    propranolol  20 mg Oral BID    zonisamide  100 mg Oral QAM    And    zonisamide  200 mg Oral At Bedtime     PRN Meds: glucose **OR** dextrose **OR** glucagon, fentaNYL, lidocaine, midazolam          Physical Exam:   Physical Exam   Vitals:  Height:Data Unavailable  Weight:301 lbs 6.4 oz   Temp: 98.1  F (36.7  C) Temp src: Temporal BP: 118/72 Pulse: 80    Resp: 22 SpO2: 97 % O2 Device: Mechanical Ventilator    General Appearance: Heavily sedated, overweight body habitus  Neuro Exam:  Mental Status Exam: Nonresponsive/nonverbal-minimal response to noxious stimuli  Decreased reflexes-plantar signs equivocal  Neck: No nuchal rigidity           Data:   ROUTINE IP LABS   CBC RESULTS:     Recent Labs   Lab 02/22/24  0639 02/22/24  0632 02/21/24  0548 02/20/24  0903   WBC  --  13.5* 6.3 5.8   RBC  --  4.37* 4.22* 4.00*   HGB 15.3 13.5 13.0* 12.4*   HCT 45 45.6 42.0 39.8*   PLT  --  239 176 175     Basic Metabolic Panel:   Recent Labs   Lab Test 02/22/24  1159 02/22/24  0639 02/22/24  0632 02/21/24  0548 02/20/24  0903   NA  --  142 143 143 143   POTASSIUM  --  4.0 4.2 5.1 4.3   CHLORIDE  --  94 94* 98 98   CO2  --   --  29 44* 40*   BUN  --  14 13.5 12.7 15.9   CR  --  0.9 0.70 0.62* 0.60*   * 198* 202* 101* 89   ARNOLD  --   --  9.1 8.8 8.8     Liver panel:  Recent Labs   Lab Test 02/22/24  0632 02/20/24  0903 12/19/23  0555 12/13/23  0600 11/26/23  0056   PROTTOTAL 6.9 6.4 6.1* 6.5 6.2*   ALBUMIN 4.5 4.0 3.7 4.0 3.4*   BILITOTAL 0.3 0.2 0.2 0.2 0.5   ALKPHOS 89 76 65 63 113   AST 40 31 18 23 47*   ALT 48 42 23 25 53     Lipid Profile:  Recent Labs   Lab Test 03/18/23  0543 03/15/23  1303   TRIG 146 239*     Thyroid Panel:  Recent Labs   Lab Test 02/22/24  0632 02/06/24  1222   TSH 4.91* 2.22   T4 0.67*  --        Latest Reference Range & Units 12/04/23 06:21 12/18/23 05:35 12/21/23 06:02 12/28/23 08:47 02/20/24 09:03   Lamotrigine 3.0 - 15.0 ug/mL 10.0 6.1 7.5 10.0 5.7      Latest Reference Range & Units 12/04/23 06:21 12/28/23 08:47 02/20/24 09:03   Keppra (Levetiracetam) Level 10.0 - 40.0  g/mL 35.4 32.7 30.7      Latest Reference Range & Units 11/24/23 13:31 12/04/23 06:21 12/18/23 05:35 02/20/24 09:03 02/22/24 06:32   Valproic acid ug/mL 62.8 33.9 (L) 3.2 (L) 39.6 (L) 24.7 (L)   (L): Data is abnormally low     IMAGING:   Independent interpretation of the  following studies by myself as part of today's encounter.-Ventriculomegaly which is similar to previous studies  CT head:   CT HEAD WITHOUT CONTRAST February 22, 2024 8:37 AM      HISTORY: Seizure, cardiac arrest.        COMPARISON: Head CT 2/20/2024.     TECHNIQUE: Using multidetector thin collimation helical acquisition  technique, axial, coronal and sagittal CT images from the skull base  to the vertex were obtained without intravenous contrast.   (topogram) image(s) also obtained and reviewed. Dose reduction  techniques were performed.     FINDINGS: No intracranial hemorrhage, mass effect, or midline shift.  No acute loss of gray-white matter differentiation in the cerebral  hemispheres. Ventricles are proportionate to the cerebral sulci. Clear  basal cisterns. Moderate diffuse cerebral volume loss. Patchy  periventricular hypoattenuation, consistent with chronic small vessel  ischemic disease. Unchanged moderate ventriculomegaly.     Hyperostosis frontalis interna. The visualized portions of the  paranasal sinuses and mastoid air cells are clear. Grossly normal  orbits.                                                                       IMPRESSION: Stable findings compared to 2/20/2024.   1. No acute intracranial pathology.   2. Chronic small vessel ischemic disease and diffuse cerebral volume  loss. Unchanged moderate ventriculomegaly    Time:  75minutes evaluation and management.-Evaluation took considerable review of old records    Shannon Brannon M.D.  [facility] Neurology, [facility].  Office [phone]

## 2024-02-22 NOTE — PROGRESS NOTES
[facility] ICU VENTILATOR RESPIRATORY NOTE  Date of Admission: 2024  Date of Intubation (most recent): 2024  Reason for Mechanical Ventilation: seizure  Number of Days on Mechanical Ventilation: 1  Met Criteria for Pressure Support Trial: no  Reason for No Pressure Support Trial: FiO2 70%  ABG Results: VB.43/58/72/39  ETT appearance on chest x-ray: 4.1 cm above the lisa.     CMV 18/460/+8/70%. BS diminished, suctioned red streaked from ETT. Albuterol x1 given inline per MD order.

## 2024-02-22 NOTE — ED PROVIDER NOTES
History     Chief Complaint:  Seizures    HPI   Tre Vazquez is a 48 year old male with history of epilepsy, hypertension, and chronic respiratory failure, who presents via EMS after a witnessed seizure and cardiac arrest. EMS report that he was seen here recently for seizures and was discharged to his group home yesterday. This morning he had a seizure witnessed by staff and a  who came on scene, and the patient then had witnessed cardiac arrest. CPR was completed for 5 minutes. He received 10 mg of Versed en route, and an IO was placed in his left leg. He was bagged en route, unresponsive. Saturations have been in the 90's.     Independent Historian:   EMS - They report as above.    Review of External Notes:   Reviewed ER visit from yesterday patient presented with seizures reportedly had 3 witnessed seizures.  Patient was evaluated in the ER and then discharged back to group home.  Prior to yesterday's ER visit he was discharged after being admitted for fall, possible seizure.    Medications:    Ammonium lactate  Buspirone  Citalopram  Divalproex sodium  Lacosamide  Lamotrigine  Levetiracetam  Levocarnitine  Pantoprazole  Polyethylene glycol  Prazosin  Propranolol  Quetiapine   Zonisamide   Ventolin   Duoneb     Past Medical History:    Depression  Hypertension  Asthma  Epilepsy   ANA  Pulmonary hypertension  Schizoaffective disorder  Paraplegia  Lumbar disc disease  Chronic respiratory failure  Cerebral ventriculomegaly  Psychosis  Obesity     Past Surgical History:    Colonoscopy  EGD     Physical Exam   Patient Vitals for the past 24 hrs:   BP Temp Temp src Pulse Resp SpO2 Weight   02/22/24 0915 106/70 -- -- 78 22 96 % --   02/22/24 0900 106/69 -- -- 79 22 96 % --   02/22/24 0845 115/77 -- -- 80 22 97 % --   02/22/24 0841 -- -- -- -- -- 97 % --   02/22/24 0815 109/86 -- -- 84 -- 97 % --   02/22/24 0800 98/76 -- -- 82 -- 96 % --   02/22/24 0745 95/64 -- -- 82 21 97 % --   02/22/24 0700 102/70  -- -- 92 21 98 % --   02/22/24 0655 90/50 -- -- 95 21 95 % --   02/22/24 0650 91/48 -- -- 98 21 97 % --   02/22/24 0645 96/69 98.1  F (36.7  C) Temporal 95 22 98 % --   02/22/24 0644 -- -- -- 92 19 99 % --   02/22/24 0639 -- -- -- -- -- -- 143.3 kg (316 lb)        Physical Exam  Vitals reviewed.   Constitutional:       General: He is in acute distress.      Appearance: He is ill-appearing.   HENT:      Head: Normocephalic and atraumatic.   Eyes:      Comments: Left eyes not present.  Right eye is nonreactive.   Cardiovascular:      Rate and Rhythm: Regular rhythm. Tachycardia present.   Pulmonary:      Effort: Pulmonary effort is normal. No respiratory distress.      Breath sounds: Normal breath sounds. No wheezing.      Comments: Patient arrived with Igel in place bilateral breath sounds heard.  Noted to have moderate amount of secretions.  Abdominal:      Palpations: Abdomen is soft.      Tenderness: There is no abdominal tenderness. There is no guarding.   Musculoskeletal:      Cervical back: Normal range of motion.   Skin:     General: Skin is warm and dry.   Neurological:      GCS: GCS eye subscore is 4. GCS verbal subscore is 5. GCS motor subscore is 6.      Comments: No spontaneous movements.   Psychiatric:      Comments: Unable to assess       Emergency Department Course   ECG  ECG taken at 0644, ECG read at 0649  Sinus rhythm with marked sinus arrhythmia  Nonspecific ST & T wave abnormality  Abnormal ECG   Rate 97 bpm. HI interval 182 ms. QRS duration 84 ms. QT/QTc 388/492 ms. P-R-T axes 47 22 80.     Imaging:  CT Head w/o Contrast   Final Result   IMPRESSION: Stable findings compared to 2/20/2024.    1. No acute intracranial pathology.    2. Chronic small vessel ischemic disease and diffuse cerebral volume   loss. Unchanged moderate ventriculomegaly.      JENNIE MEAD MD            SYSTEM ID:  [id number]      XR Abdomen Port 1 View   Preliminary Result   IMPRESSION: Enteric tube tip projects within the  stomach bubble in the   left upper quadrant.      XR Chest Port 1 View   Preliminary Result   IMPRESSION: Endotracheal tube tip 4.1 cm from the lisa. No gross   infiltrates.        Laboratory:  Labs Ordered and Resulted from Time of ED Arrival to Time of ED Departure   COMPREHENSIVE METABOLIC PANEL - Abnormal       Result Value    Sodium 143      Potassium 4.2      Carbon Dioxide (CO2) 29      Anion Gap 20 (*)     Urea Nitrogen 13.5      Creatinine 0.70      GFR Estimate >90      Calcium 9.1      Chloride 94 (*)     Glucose 202 (*)     Alkaline Phosphatase 89      AST 40      ALT 48      Protein Total 6.9      Albumin 4.5      Bilirubin Total 0.3     LACTIC ACID WHOLE BLOOD - Abnormal    Lactic Acid 12.4 (*)    TSH WITH FREE T4 REFLEX - Abnormal    TSH 4.91 (*)    ROUTINE UA WITH MICROSCOPIC REFLEX TO CULTURE - Abnormal    Color Urine Yellow      Appearance Urine Slightly Cloudy (*)     Glucose Urine Negative      Bilirubin Urine Negative      Ketones Urine Negative      Specific Gravity Urine 1.017      Blood Urine Negative      pH Urine 8.0 (*)     Protein Albumin Urine 100 (*)     Urobilinogen Urine Normal      Nitrite Urine Negative      Leukocyte Esterase Urine Negative      Mucus Urine Present (*)     RBC Urine 6 (*)     WBC Urine 5      Squamous Epithelials Urine <1     VALPROIC ACID - Abnormal    Valproic acid 24.7 (*)    ISTAT BASIC CHEM ICA HEMATOCRIT POCT - Abnormal    Chloride POCT 94      Potassium POCT 4.0      Sodium POCT 142      UREA NITROGEN POCT 14      Calcium, Ionized Whole Blood POCT 4.8      Glucose Whole Blood POCT 198 (*)     Anion Gap POCT 19.0 (*)     Hemoglobin POCT 15.3      Hematocrit POCT 45      Creatinine POCT 0.9      TOTAL CO2 POCT 34 (*)    CBC WITH PLATELETS AND DIFFERENTIAL - Abnormal    WBC Count 13.5 (*)     RBC Count 4.37 (*)     Hemoglobin 13.5      Hematocrit 45.6       (*)     MCH 30.9      MCHC 29.6 (*)     RDW 12.4      Platelet Count 239      % Neutrophils         % Lymphocytes        % Monocytes        % Eosinophils        % Basophils        % Immature Granulocytes        NRBCs per 100 WBC 0      Absolute Neutrophils        Absolute Lymphocytes        Absolute Monocytes        Absolute Eosinophils        Absolute Basophils        Absolute Immature Granulocytes        Absolute NRBCs 0.0     ISTAT GASES LACTATE VENOUS POCT - Abnormal    Lactic Acid POCT 11.5 (*)     Bicarbonate Venous POCT <1 (*)     O2 Sat, Venous POCT        pCO2 Venous POCT >130 (*)     pH Venous POCT 6.98 (*)     pO2 Venous POCT 88 (*)    T4 FREE - Abnormal    Free T4 0.67 (*)    DIFFERENTIAL - Abnormal    % Neutrophils 45      % Lymphocytes 47      % Monocytes 6      % Eosinophils 1      % Basophils 0      % Metamyelocytes 1      Absolute Neutrophils 6.1      Absolute Lymphocytes 6.3 (*)     Absolute Monocytes 0.8      Absolute Eosinophils 0.1      Absolute Basophils 0.0      Absolute Metamyelocytes 0.1 (*)     RBC Morphology Confirmed RBC Indices      Platelet Assessment        Value: Automated Count Confirmed. Platelet morphology is normal.    Smudge Cells Present (*)     Pathologist Review Comments (Blood) Atypical lymphocytes, favor reactive. Dr JENNIFER Gonzalez     ISTAT GASES LACTATE VENOUS POCT - Abnormal    Lactic Acid POCT 4.1 (*)     Bicarbonate Venous POCT 36 (*)     O2 Sat, Venous POCT 43 (*)     pCO2 Venous POCT 68 (*)     pH Venous POCT 7.33      pO2 Venous POCT 27     INR - Normal    INR 0.96     PARTIAL THROMBOPLASTIN TIME - Normal    aPTT 28     LIPASE - Normal    Lipase 45     PROCALCITONIN - Normal    Procalcitonin 0.07     TROPONIN T, HIGH SENSITIVITY - Normal    Troponin T, High Sensitivity 21     MAGNESIUM - Normal    Magnesium 1.7     NT PROBNP INPATIENT - Normal    N terminal Pro BNP Inpatient 119     ETHYL ALCOHOL LEVEL - Normal    Alcohol ethyl <0.01     INFLUENZA A/B, RSV, & SARS-COV2 PCR - Normal    Influenza A PCR Negative      Influenza B PCR Negative      RSV PCR Negative       SARS CoV2 PCR Negative     URINE DRUG SCREEN PANEL - Normal    Amphetamines Urine Screen Negative      Barbituates Urine Screen Negative      Benzodiazepine Urine Screen Negative      Cannabinoids Urine Screen Negative      Cocaine Urine Screen Negative      Fentanyl Qual Urine Screen Negative      Opiates Urine Screen Negative      PCP Urine Screen Negative     LAMOTRIGINE LEVEL   LACOSAMIDE LEVEL   LACTIC ACID WHOLE BLOOD        Procedures      Rapid Sequence Intubation      Procedure: Rapid Sequence Intubation    Consent: Unable/Emergent     Risks Discussed:  Pt unresponsive    Universal Protocol: Universal protocol was followed and time out conducted just prior to starting procedure, confirming patient identity, site/side, procedure, patient position, and availability of correct equipment and implants.     Indication: Respiratory failure    Preparation: Preoxygenation with Nasal Cannula and BVM. Premedication with None.    Sedation: None    Paralytic:  None    Procedure Detail:   The patient was intubated with a 8 endotracheal tube using Video Laryngoscopy.  Following intubation, the patient's breath sounds were Equal.  ET Tube placement was confirmed with Auscultation, Chest X-ray, and ETCO2.    Monitoring: Monitoring consisted of heart rate, cardiac monitor, continuous pulse oximetry, blood pressure checks, level of consciousness, IV access, constant attendance by RN, and MD attendance until patient stable or transfer of care.      Patient Status: The patient tolerated the procedure well: Yes. There were no complications.     Emergency Department Course & Assessments:       Interventions:  Medications   lidocaine (XYLOCAINE) 2 % external gel (has no administration in time range)   propofol (DIPRIVAN) infusion (0 mcg/kg/min × 143.3 kg Intravenous Stopped 2/22/24 0807)   midazolam (VERSED) 100mg/100mL NS infusion - ADULT (5 mg/hr Intravenous Rate/Dose Change 2/22/24 0847)   fentaNYL (SUBLIMAZE) infusion (125  mcg/hr Intravenous Rate/Dose Change 24 0853)   fentaNYL (SUBLIMAZE) 50 mcg/mL bolus from pump (50 mcg Intravenous $Given 24 0848)   midazolam (VERSED) 1 mg/mL bolus from syringe/bag pump ADULT (has no administration in time range)   levETIRAcetam (KEPPRA) intermittent infusion 1,000 mg (0 mg Intravenous Stopped 24 0822)   sodium chloride 0.9% BOLUS 1,000 mL (0 mLs Intravenous Stopped 24 0811)   sodium chloride 0.9% BOLUS 1,000 mL (0 mLs Intravenous Stopped 24 0947)   midazolam (VERSED) injection 1 mg (1 mg Intravenous $Given 24 0741)   fentaNYL (PF) (SUBLIMAZE) injection 100 mcg (100 mcg Intravenous $Given 24 0801)      Assessments, Independent Interpretation, & Consultations/Discussion of Management or Tests:  ED Course as of 24 0953   Thu 2024   0629 I entered the patient's room and obtained history.   0636 I performed a intubation procedure, see procedure note above.    0648 Lactic Acid POCT(!!): 11.5   0648 pCO2 Venous POCT(!!): >130   0702 WBC(!): 13.5   0712 Reviewed CXR on portable XR ETT in place, and OG in place   0810 Patient biting at the tube.  A push of Versed was given.  Patient continued to bite at ET tube..  Versed drip was added.  On reassessment he was still agitated given a fentanyl push.  Fentanyl drip added.   0828 Repeat blood gas reviewed pH is 7.328, pCO2 of 67, lactic acid of 4.   0844 I rechecked the patient.   0932 I consulted with Dr. Pittman, hospitalist, regarding the patient's history and presentation here in the emergency department who accepted the patient for admission.       Social Determinants of Health affecting care:   None    Disposition:  The patient was admitted to the hospital under the care of Dr. Pittman.     Impression & Plan      Medical Decision Makin-year-old male history of epilepsy, hypertension presenting today with witnessed seizure and cardiac arrest.  He has known seizure disorder recently seen in the hospital  yesterday for seizures and discharged home.  He was also admitted and discharged yesterday.  Patient had a witnessed seizure at his group home and then had a witnessed cardiac arrest.  CPR was done for 5 minutes.  On arrival patient had pulses was being bagged with eye gel in place.  He was intubated.  He was unresponsive no spontaneous movements.  Patient was given propofol for sedation.  After an hour of being in the ER he began to bite at the ET tube.  He was then given Versed push and started on Versed drip.  He continued to bite at the tube and move his bilateral upper extremities, he was then started on a fentanyl drip.  He was also given Keppra load given the witnessed seizure.  CT of the head as noted above.  Patient admitted to hospital service to the ICU.    Critical Care time was 45 minutes for this patient excluding procedures.    Diagnosis:    ICD-10-CM    1. Cardiac arrest (H)  I46.9       2. Seizure (H)  R56.9            Scribe Disclosure:  Riya NAILS Hired, am serving as a scribe at 6:40 AM on 2/22/2024 to document services personally performed by Marlene Hagan DO based on my observations and the provider's statements to me.   2/22/2024   Marlene Hagan DO Doan, Tiffani, DO  02/22/24 0953

## 2024-02-22 NOTE — ED NOTES
Brought in by ems for witnessed seizure and cardiac arrest. CPR was started for about 5 minutes before obtaining ROSC. Arrived to ED with bag mask ventilation and red team activation. History of epilepsy. Ventilating without difficulty.

## 2024-02-22 NOTE — DISCHARGE INSTRUCTIONS
It is not clear if the activity that was appreciated is true seizure versus seizure-like activity.  You should discuss your current medications and episodes as soon as possible with neurology.  Please call your clinic and set up an appointment as soon as possible.  Return with altered mental status, fever, intractable seizure-like activity.

## 2024-02-22 NOTE — PHARMACY-ADMISSION MEDICATION HISTORY
Pharmacist Admission Medication History    Admission medication history is complete. The information provided in this note is only as accurate as the sources available at the time of the update.    Information Source(s): Hospital records and Facility (U/NH/) medication list/MAR via N/A    Pertinent Information: Patient was discharged yesterday afternoon and promptly returned to the ED, was discharged home late last evening, and returned this morning.  He received no home med while discharged, PTA med history per prior med history and hospital MAR.     Changes made to PTA medication list:  Added: None  Deleted: None  Changed: None    Allergies reviewed with patient and updates made in EHR: no    Medication History Completed By: Luis Armando Valencia [facility] 2/22/2024 10:10 AM    Prior to Admission medications    Medication Sig Last Dose Taking? Auth Provider Long Term End Date   acetaminophen (TYLENOL) 325 MG tablet Take 650 mg by mouth every 4 hours as needed for mild pain  Yes Unknown, Entered By History     ammonium lactate (AMLACTIN) 12 % external cream Apply topically 2 times daily 2/19/2024 Yes Unknown, Entered By History     bacitracin 500 UNIT/GM external ointment Apply topically 2 times daily Apply to left arm wound 2/19/2024 Yes Unknown, Entered By History No    busPIRone (BUSPAR) 15 MG tablet Take 15 mg by mouth 2 times daily 2/21/2024 at 0844 Yes Reported, Patient Yes    CITALOPRAM HYDROBROMIDE PO Take 60 mg by mouth daily 2/21/2024 at 0844 Yes Unknown, Entered By History No    clotrimazole (LOTRIMIN) 1 % external cream Apply topically 2 times daily Apply to left groin 2/19/2024 Yes Unknown, Entered By History No    divalproex sodium delayed-release (DEPAKOTE) 500 MG DR tablet Take 1 tablet (500 mg) by mouth every 12 hours 2/21/2024 at 0845 Yes Jocelyne Tariq MD Yes    lacosamide (VIMPAT) 50 MG TABS tablet Take 1 tablet (50 mg) by mouth daily 2/21/2024 at 0845 Yes Jocelyne Tariq MD Yes    lamoTRIgine  (LAMICTAL) 100 MG tablet Take 1 tablet (100 mg) by mouth daily At 1400 Unknown Yes Jocelyne Tariq MD Yes    lamoTRIgine (LAMICTAL) 200 MG tablet Take 1 tablet (200 mg) by mouth 2 times daily Take at 8 AM and 8 pm 2/21/2024 at 0846 Yes Jocelyne Tariq MD Yes    levETIRAcetam (KEPPRA) 500 MG tablet Take 2 tablets (1,000 mg) by mouth every morning AND 3 tablets (1,500 mg) at bedtime. 2/21/2024 at 0845 Yes Lisa Merchant APRN CNP Yes    levOCARNitine (CARNITOR) 330 MG tablet Take 2 tablets (660 mg) by mouth 3 times daily 2/21/2024 at 0845 Yes Lisa Merchant APRN CNP Yes    methylcellulose POWD powder Apply 2 mg topically 2 times daily Mix 1 rounded tablespoon (2 mg) in 8 oz glass of water and take by mouth twice daily 2/21/2024 at 0902 Yes Unknown, Entered By History     multivitamin, therapeutic (THERA-VIT) TABS tablet Take 1 tablet by mouth daily 2/21/2024 at 0844 Yes Unknown, Entered By History     pantoprazole (PROTONIX) 40 MG EC tablet Take 40 mg by mouth daily 2/21/2024 at 0845 Yes Unknown, Entered By History     polyethylene glycol (MIRALAX) 17 GM/Dose powder Take 1 capful. by mouth daily 2/21/2024 at 0843 Yes Reported, Patient     prazosin (MINIPRESS) 2 MG capsule Take 2 mg by mouth At Bedtime 2/20/2024 at 2214 Yes Reported, Patient Yes    propranolol (INDERAL) 20 MG tablet Take 1 tablet (20 mg) by mouth 2 times daily 2/21/2024 at 0844 Yes Jocelyne Tariq MD Yes    QUEtiapine ER (SEROQUEL XR) 300 MG 24 hr tablet Take 300 mg by mouth at bedtime 2/20/2024 at 2214 Yes Unknown, Entered By History No    zonisamide (ZONEGRAN) 100 MG capsule Take 1 capsule (100 mg) by mouth every morning AND 2 capsules (200 mg) At Bedtime. 2/21/2024 at 0845 Yes Zasada, Lisa, APRN CNP Yes    fluticasone (FLONASE) 50 MCG/ACT nasal spray Spray 1 spray in nostril 2 times daily 2/19/2024  Unknown, Entered By History

## 2024-02-22 NOTE — CONSULTS
Care Management Initial Consult    General Information  Assessment completed with: Care Team Member, -chart review, Group home , chart review  Type of CM/SW Visit: Initial Assessment    Primary Care Provider verified and updated as needed:     Readmission within the last 30 days: previous discharge plan unsuccessful   Return Category: Progression of disease  Reason for Consult: discharge planning  Advance Care Planning:            Communication Assessment  Patient's communication style: spoken language (English or Bilingual)             Cognitive  Cognitive/Neuro/Behavioral:                        Living Environment:   People in home: facility resident     Current living Arrangements: group home      Able to return to prior arrangements: yes       Family/Social Support:  Care provided by: other (see comments)  Provides care for: no one, unable/limited ability to care for self     Facility resident(s)/Staff          Description of Support System: Supportive, Involved         Current Resources:   Patient receiving home care services: No     Community Resources: Group Home  Equipment currently used at home:    Supplies currently used at home: Oxygen Tubing/Supplies    Employment/Financial:  Employment Status: disabled        Financial Concerns:     Referral to Financial Worker: No       Does the patient's insurance plan have a 3 day qualifying hospital stay waiver?  No    Lifestyle & Psychosocial Needs:  Social Determinants of Health     Food Insecurity: Not on file   Depression: Not at risk (2/2/2024)    PHQ-2     PHQ-2 Score: 0   Housing Stability: Not on file   Tobacco Use: Medium Risk (5/5/2023)    Patient History     Smoking Tobacco Use: Former     Smokeless Tobacco Use: Never     Passive Exposure: Not on file   Financial Resource Strain: Not on file   Alcohol Use: Not on file   Transportation Needs: Not on file   Physical Activity: Not on file   Interpersonal Safety: Not on file   Stress: Not on file   Social  Connections: Not on file       Functional Status:  Prior to admission patient needed assistance:   Dependent ADLs:: Ambulation-walker, Bathing, Dressing, Grooming, Transfers, Wheelchair-with assist, Toileting  Dependent IADLs:: Cleaning, Cooking, Laundry, Shopping, Money Management, Medication Management, Meal Preparation, Transportation       Mental Health Status:          Chemical Dependency Status:  Chemical Dependency Status: No Current Concerns             Values/Beliefs:  Spiritual, Cultural Beliefs, Jainism Practices, Values that affect care:                 Additional Information:  Patient has a URR of 55%.     This SW assessed patient on 2/20/24. Patient lives at [facility] P: [phone]. Patient gave  permission to contact facility. Patient's  states he is normally able to stand unassisted for a few minutes. He is not able to walk long distances. He can use a wheelchair on his own. He tells staff what clothing he wants to wear but dresses self. Facility manages medications and uses [facility]. Patient is intubated so SW is unable to speak with patient.  let  know plan and will keep  updated.      manager, Quoc P: [phone] F: [phone]     Patient took a stretcher ride home after last OBS stay. Patient discharged 2/21/24 back to the . He returned later in the day to the ED and discharged this morning around 0007. Patient returned again this afternoon and was admitted around 1416.     SKYLER Stroud, SW  Emergency Room   Please contact the SW on the floor in which the patient is staying for any questions or concerns

## 2024-02-22 NOTE — ED NOTES
"Several attempts made to contact [facility] to make arrangements for patient to return; no answer and mailbox \"full\" and not able to leave a message. Will continue to attempt to call   "

## 2024-02-23 ENCOUNTER — APPOINTMENT (OUTPATIENT)
Dept: GENERAL RADIOLOGY | Facility: CLINIC | Age: 49
DRG: 100 | End: 2024-02-23
Attending: SURGERY
Payer: MEDICARE

## 2024-02-23 ENCOUNTER — APPOINTMENT (OUTPATIENT)
Dept: CARDIOLOGY | Facility: CLINIC | Age: 49
DRG: 100 | End: 2024-02-23
Attending: STUDENT IN AN ORGANIZED HEALTH CARE EDUCATION/TRAINING PROGRAM
Payer: MEDICARE

## 2024-02-23 LAB
ANION GAP SERPL CALCULATED.3IONS-SCNC: 9 MMOL/L (ref 7–15)
BASE EXCESS BLDV CALC-SCNC: 10.2 MMOL/L (ref -3–3)
BASE EXCESS BLDV CALC-SCNC: 8.3 MMOL/L (ref -3–3)
BUN SERPL-MCNC: 14.4 MG/DL (ref 6–20)
CALCIUM SERPL-MCNC: 8.7 MG/DL (ref 8.6–10)
CHLORIDE SERPL-SCNC: 100 MMOL/L (ref 98–107)
CREAT SERPL-MCNC: 0.64 MG/DL (ref 0.67–1.17)
DEPRECATED HCO3 PLAS-SCNC: 33 MMOL/L (ref 22–29)
EGFRCR SERPLBLD CKD-EPI 2021: >90 ML/MIN/1.73M2
ERYTHROCYTE [DISTWIDTH] IN BLOOD BY AUTOMATED COUNT: 13 % (ref 10–15)
GLUCOSE BLDC GLUCOMTR-MCNC: 75 MG/DL (ref 70–99)
GLUCOSE BLDC GLUCOMTR-MCNC: 77 MG/DL (ref 70–99)
GLUCOSE BLDC GLUCOMTR-MCNC: 81 MG/DL (ref 70–99)
GLUCOSE BLDC GLUCOMTR-MCNC: 92 MG/DL (ref 70–99)
GLUCOSE BLDC GLUCOMTR-MCNC: 92 MG/DL (ref 70–99)
GLUCOSE SERPL-MCNC: 89 MG/DL (ref 70–99)
HCO3 BLDV-SCNC: 35 MMOL/L (ref 21–28)
HCO3 BLDV-SCNC: 37 MMOL/L (ref 21–28)
HCT VFR BLD AUTO: 37 % (ref 40–53)
HGB BLD-MCNC: 12.1 G/DL (ref 13.3–17.7)
LVEF ECHO: NORMAL
MAGNESIUM SERPL-MCNC: 1.3 MG/DL (ref 1.7–2.3)
MAGNESIUM SERPL-MCNC: 1.8 MG/DL (ref 1.7–2.3)
MCH RBC QN AUTO: 31.3 PG (ref 26.5–33)
MCHC RBC AUTO-ENTMCNC: 32.7 G/DL (ref 31.5–36.5)
MCV RBC AUTO: 96 FL (ref 78–100)
O2/TOTAL GAS SETTING VFR VENT: 35 %
O2/TOTAL GAS SETTING VFR VENT: 35 %
OXYHGB MFR BLDV: 65 % (ref 70–75)
OXYHGB MFR BLDV: 93 % (ref 70–75)
PCO2 BLDV: 57 MM HG (ref 40–50)
PCO2 BLDV: 62 MM HG (ref 40–50)
PH BLDV: 7.39 [PH] (ref 7.32–7.43)
PH BLDV: 7.4 [PH] (ref 7.32–7.43)
PHOSPHATE SERPL-MCNC: 1.5 MG/DL (ref 2.5–4.5)
PLATELET # BLD AUTO: 167 10E3/UL (ref 150–450)
PO2 BLDV: 35 MM HG (ref 25–47)
PO2 BLDV: 68 MM HG (ref 25–47)
POTASSIUM SERPL-SCNC: 3.6 MMOL/L (ref 3.4–5.3)
POTASSIUM SERPL-SCNC: 3.6 MMOL/L (ref 3.4–5.3)
RBC # BLD AUTO: 3.87 10E6/UL (ref 4.4–5.9)
SAO2 % BLDV: 66.3 % (ref 70–75)
SAO2 % BLDV: 95 % (ref 70–75)
SODIUM SERPL-SCNC: 142 MMOL/L (ref 135–145)
WBC # BLD AUTO: 10 10E3/UL (ref 4–11)

## 2024-02-23 PROCEDURE — 83735 ASSAY OF MAGNESIUM: CPT | Performed by: STUDENT IN AN ORGANIZED HEALTH CARE EDUCATION/TRAINING PROGRAM

## 2024-02-23 PROCEDURE — 999N000157 HC STATISTIC RCP TIME EA 10 MIN

## 2024-02-23 PROCEDURE — 250N000011 HC RX IP 250 OP 636: Performed by: EMERGENCY MEDICINE

## 2024-02-23 PROCEDURE — C8929 TTE W OR WO FOL WCON,DOPPLER: HCPCS

## 2024-02-23 PROCEDURE — C9113 INJ PANTOPRAZOLE SODIUM, VIA: HCPCS | Performed by: SURGERY

## 2024-02-23 PROCEDURE — 82805 BLOOD GASES W/O2 SATURATION: CPT | Performed by: STUDENT IN AN ORGANIZED HEALTH CARE EDUCATION/TRAINING PROGRAM

## 2024-02-23 PROCEDURE — 93306 TTE W/DOPPLER COMPLETE: CPT | Mod: 26 | Performed by: INTERNAL MEDICINE

## 2024-02-23 PROCEDURE — 99291 CRITICAL CARE FIRST HOUR: CPT | Performed by: INTERNAL MEDICINE

## 2024-02-23 PROCEDURE — 80048 BASIC METABOLIC PNL TOTAL CA: CPT | Performed by: STUDENT IN AN ORGANIZED HEALTH CARE EDUCATION/TRAINING PROGRAM

## 2024-02-23 PROCEDURE — 255N000002 HC RX 255 OP 636: Performed by: STUDENT IN AN ORGANIZED HEALTH CARE EDUCATION/TRAINING PROGRAM

## 2024-02-23 PROCEDURE — 99291 CRITICAL CARE FIRST HOUR: CPT | Performed by: STUDENT IN AN ORGANIZED HEALTH CARE EDUCATION/TRAINING PROGRAM

## 2024-02-23 PROCEDURE — 250N000011 HC RX IP 250 OP 636: Performed by: INTERNAL MEDICINE

## 2024-02-23 PROCEDURE — 250N000013 HC RX MED GY IP 250 OP 250 PS 637: Performed by: SURGERY

## 2024-02-23 PROCEDURE — 250N000013 HC RX MED GY IP 250 OP 250 PS 637: Performed by: STUDENT IN AN ORGANIZED HEALTH CARE EDUCATION/TRAINING PROGRAM

## 2024-02-23 PROCEDURE — 200N000001 HC R&B ICU

## 2024-02-23 PROCEDURE — 999N000208 ECHOCARDIOGRAM COMPLETE

## 2024-02-23 PROCEDURE — 85027 COMPLETE CBC AUTOMATED: CPT | Performed by: STUDENT IN AN ORGANIZED HEALTH CARE EDUCATION/TRAINING PROGRAM

## 2024-02-23 PROCEDURE — 999N000065 XR CHEST PORT 1 VIEW

## 2024-02-23 PROCEDURE — 94003 VENT MGMT INPAT SUBQ DAY: CPT

## 2024-02-23 PROCEDURE — 36415 COLL VENOUS BLD VENIPUNCTURE: CPT | Performed by: STUDENT IN AN ORGANIZED HEALTH CARE EDUCATION/TRAINING PROGRAM

## 2024-02-23 PROCEDURE — 84132 ASSAY OF SERUM POTASSIUM: CPT | Performed by: STUDENT IN AN ORGANIZED HEALTH CARE EDUCATION/TRAINING PROGRAM

## 2024-02-23 PROCEDURE — 999N000009 HC STATISTIC AIRWAY CARE

## 2024-02-23 PROCEDURE — 250N000013 HC RX MED GY IP 250 OP 250 PS 637

## 2024-02-23 PROCEDURE — 84100 ASSAY OF PHOSPHORUS: CPT | Performed by: STUDENT IN AN ORGANIZED HEALTH CARE EDUCATION/TRAINING PROGRAM

## 2024-02-23 PROCEDURE — 250N000011 HC RX IP 250 OP 636: Performed by: SURGERY

## 2024-02-23 PROCEDURE — 258N000003 HC RX IP 258 OP 636: Performed by: STUDENT IN AN ORGANIZED HEALTH CARE EDUCATION/TRAINING PROGRAM

## 2024-02-23 PROCEDURE — 999N000253 HC STATISTIC WEANING TRIALS

## 2024-02-23 PROCEDURE — 36416 COLLJ CAPILLARY BLOOD SPEC: CPT | Performed by: STUDENT IN AN ORGANIZED HEALTH CARE EDUCATION/TRAINING PROGRAM

## 2024-02-23 PROCEDURE — 250N000011 HC RX IP 250 OP 636: Performed by: STUDENT IN AN ORGANIZED HEALTH CARE EDUCATION/TRAINING PROGRAM

## 2024-02-23 PROCEDURE — 99291 CRITICAL CARE FIRST HOUR: CPT | Performed by: SURGERY

## 2024-02-23 RX ORDER — LAMOTRIGINE 25 MG/1
100 TABLET ORAL DAILY
Status: DISCONTINUED | OUTPATIENT
Start: 2024-02-24 | End: 2024-02-28 | Stop reason: HOSPADM

## 2024-02-23 RX ORDER — NALOXONE HYDROCHLORIDE 0.4 MG/ML
0.4 INJECTION, SOLUTION INTRAMUSCULAR; INTRAVENOUS; SUBCUTANEOUS
Status: DISCONTINUED | OUTPATIENT
Start: 2024-02-23 | End: 2024-02-28 | Stop reason: HOSPADM

## 2024-02-23 RX ORDER — ZONISAMIDE 50 MG/1
200 CAPSULE ORAL AT BEDTIME
Status: DISCONTINUED | OUTPATIENT
Start: 2024-02-23 | End: 2024-02-24

## 2024-02-23 RX ORDER — PROPRANOLOL HYDROCHLORIDE 20 MG/1
20 TABLET ORAL 2 TIMES DAILY
Status: DISCONTINUED | OUTPATIENT
Start: 2024-02-24 | End: 2024-02-28 | Stop reason: HOSPADM

## 2024-02-23 RX ORDER — LEVETIRACETAM 500 MG/1
1500 TABLET ORAL AT BEDTIME
Status: DISCONTINUED | OUTPATIENT
Start: 2024-02-23 | End: 2024-02-28 | Stop reason: HOSPADM

## 2024-02-23 RX ORDER — DEXTROSE MONOHYDRATE 100 MG/ML
INJECTION, SOLUTION INTRAVENOUS CONTINUOUS PRN
Status: DISCONTINUED | OUTPATIENT
Start: 2024-02-23 | End: 2024-02-24

## 2024-02-23 RX ORDER — MAGNESIUM SULFATE HEPTAHYDRATE 40 MG/ML
4 INJECTION, SOLUTION INTRAVENOUS ONCE
Status: COMPLETED | OUTPATIENT
Start: 2024-02-23 | End: 2024-02-23

## 2024-02-23 RX ORDER — FUROSEMIDE 10 MG/ML
20 INJECTION INTRAMUSCULAR; INTRAVENOUS ONCE
Status: COMPLETED | OUTPATIENT
Start: 2024-02-23 | End: 2024-02-23

## 2024-02-23 RX ORDER — POTASSIUM CHLORIDE 1.5 G/1.58G
40 POWDER, FOR SOLUTION ORAL ONCE
Status: COMPLETED | OUTPATIENT
Start: 2024-02-23 | End: 2024-02-23

## 2024-02-23 RX ORDER — LACOSAMIDE 50 MG/1
50 TABLET ORAL DAILY
Status: DISCONTINUED | OUTPATIENT
Start: 2024-02-24 | End: 2024-02-28 | Stop reason: HOSPADM

## 2024-02-23 RX ORDER — LEVOCARNITINE 330 MG/1
660 TABLET ORAL 3 TIMES DAILY
Status: DISCONTINUED | OUTPATIENT
Start: 2024-02-24 | End: 2024-02-28 | Stop reason: HOSPADM

## 2024-02-23 RX ORDER — NALOXONE HYDROCHLORIDE 0.4 MG/ML
0.2 INJECTION, SOLUTION INTRAMUSCULAR; INTRAVENOUS; SUBCUTANEOUS
Status: DISCONTINUED | OUTPATIENT
Start: 2024-02-23 | End: 2024-02-28 | Stop reason: HOSPADM

## 2024-02-23 RX ORDER — BUSPIRONE HYDROCHLORIDE 15 MG/1
15 TABLET ORAL 2 TIMES DAILY
Status: DISCONTINUED | OUTPATIENT
Start: 2024-02-24 | End: 2024-02-28 | Stop reason: HOSPADM

## 2024-02-23 RX ORDER — SODIUM CHLORIDE, SODIUM LACTATE, POTASSIUM CHLORIDE, CALCIUM CHLORIDE 600; 310; 30; 20 MG/100ML; MG/100ML; MG/100ML; MG/100ML
INJECTION, SOLUTION INTRAVENOUS CONTINUOUS
Status: DISCONTINUED | OUTPATIENT
Start: 2024-02-23 | End: 2024-02-24

## 2024-02-23 RX ORDER — GUAIFENESIN 600 MG/1
15 TABLET, EXTENDED RELEASE ORAL DAILY
Status: DISCONTINUED | OUTPATIENT
Start: 2024-02-23 | End: 2024-02-26

## 2024-02-23 RX ORDER — LAMOTRIGINE 200 MG/1
200 TABLET ORAL 2 TIMES DAILY
Status: DISCONTINUED | OUTPATIENT
Start: 2024-02-24 | End: 2024-02-28 | Stop reason: HOSPADM

## 2024-02-23 RX ORDER — ZONISAMIDE 50 MG/1
100 CAPSULE ORAL EVERY MORNING
Status: DISCONTINUED | OUTPATIENT
Start: 2024-02-24 | End: 2024-02-24

## 2024-02-23 RX ORDER — LEVETIRACETAM 500 MG/1
1000 TABLET ORAL EVERY MORNING
Status: DISCONTINUED | OUTPATIENT
Start: 2024-02-24 | End: 2024-02-28 | Stop reason: HOSPADM

## 2024-02-23 RX ADMIN — POTASSIUM & SODIUM PHOSPHATES POWDER PACK 280-160-250 MG 2 PACKET: 280-160-250 PACK at 19:50

## 2024-02-23 RX ADMIN — CHLORHEXIDINE GLUCONATE 15 ML: 1.2 SOLUTION ORAL at 07:56

## 2024-02-23 RX ADMIN — ENOXAPARIN SODIUM 40 MG: 40 INJECTION SUBCUTANEOUS at 04:05

## 2024-02-23 RX ADMIN — SODIUM CHLORIDE, POTASSIUM CHLORIDE, SODIUM LACTATE AND CALCIUM CHLORIDE: 600; 310; 30; 20 INJECTION, SOLUTION INTRAVENOUS at 21:09

## 2024-02-23 RX ADMIN — FUROSEMIDE 20 MG: 10 INJECTION, SOLUTION INTRAMUSCULAR; INTRAVENOUS at 01:21

## 2024-02-23 RX ADMIN — VALPROIC ACID 500 MG: 250 SOLUTION ORAL at 12:03

## 2024-02-23 RX ADMIN — PROPOFOL 20 MCG/KG/MIN: 10 INJECTION, EMULSION INTRAVENOUS at 21:07

## 2024-02-23 RX ADMIN — MAGNESIUM SULFATE 4 G: 4 INJECTION INTRAVENOUS at 16:48

## 2024-02-23 RX ADMIN — PROPOFOL 20 MCG/KG/MIN: 10 INJECTION, EMULSION INTRAVENOUS at 16:49

## 2024-02-23 RX ADMIN — PANTOPRAZOLE SODIUM 40 MG: 40 INJECTION, POWDER, FOR SOLUTION INTRAVENOUS at 07:56

## 2024-02-23 RX ADMIN — PROPRANOLOL HYDROCHLORIDE 20 MG: 20 TABLET ORAL at 07:57

## 2024-02-23 RX ADMIN — LACOSAMIDE 50 MG: 50 TABLET, FILM COATED ORAL at 07:57

## 2024-02-23 RX ADMIN — PROPOFOL 10 MCG/KG/MIN: 10 INJECTION, EMULSION INTRAVENOUS at 07:55

## 2024-02-23 RX ADMIN — LAMOTRIGINE 200 MG: 200 TABLET ORAL at 07:57

## 2024-02-23 RX ADMIN — LEVETIRACETAM 1000 MG: 500 TABLET, FILM COATED ORAL at 07:57

## 2024-02-23 RX ADMIN — PROPRANOLOL HYDROCHLORIDE 20 MG: 20 TABLET ORAL at 19:51

## 2024-02-23 RX ADMIN — LAMOTRIGINE 100 MG: 100 TABLET ORAL at 14:33

## 2024-02-23 RX ADMIN — ZONISAMIDE 100 MG: 100 CAPSULE ORAL at 07:57

## 2024-02-23 RX ADMIN — BUSPIRONE HYDROCHLORIDE 15 MG: 15 TABLET ORAL at 19:51

## 2024-02-23 RX ADMIN — LEVOCARNITINE 660 MG: 330 TABLET ORAL at 14:33

## 2024-02-23 RX ADMIN — Medication 15 ML: at 16:34

## 2024-02-23 RX ADMIN — HUMAN ALBUMIN MICROSPHERES AND PERFLUTREN 3 ML: 10; .22 INJECTION, SOLUTION INTRAVENOUS at 08:53

## 2024-02-23 RX ADMIN — POTASSIUM CHLORIDE 40 MEQ: 1.5 POWDER, FOR SOLUTION ORAL at 00:55

## 2024-02-23 RX ADMIN — ENOXAPARIN SODIUM 40 MG: 40 INJECTION SUBCUTANEOUS at 16:34

## 2024-02-23 RX ADMIN — BUSPIRONE HYDROCHLORIDE 15 MG: 15 TABLET ORAL at 07:57

## 2024-02-23 RX ADMIN — SODIUM CHLORIDE, POTASSIUM CHLORIDE, SODIUM LACTATE AND CALCIUM CHLORIDE: 600; 310; 30; 20 INJECTION, SOLUTION INTRAVENOUS at 14:33

## 2024-02-23 RX ADMIN — ZONISAMIDE 200 MG: 50 CAPSULE ORAL at 22:33

## 2024-02-23 RX ADMIN — SODIUM CHLORIDE, POTASSIUM CHLORIDE, SODIUM LACTATE AND CALCIUM CHLORIDE: 600; 310; 30; 20 INJECTION, SOLUTION INTRAVENOUS at 07:56

## 2024-02-23 RX ADMIN — LEVETIRACETAM 1500 MG: 500 TABLET, FILM COATED ORAL at 21:49

## 2024-02-23 RX ADMIN — VALPROIC ACID 500 MG: 250 SOLUTION ORAL at 21:49

## 2024-02-23 RX ADMIN — LAMOTRIGINE 200 MG: 200 TABLET ORAL at 19:51

## 2024-02-23 RX ADMIN — CHLORHEXIDINE GLUCONATE 15 ML: 1.2 SOLUTION ORAL at 19:50

## 2024-02-23 RX ADMIN — POTASSIUM & SODIUM PHOSPHATES POWDER PACK 280-160-250 MG 2 PACKET: 280-160-250 PACK at 16:47

## 2024-02-23 RX ADMIN — LEVOCARNITINE 660 MG: 330 TABLET ORAL at 19:51

## 2024-02-23 RX ADMIN — Medication 50 MCG/HR: at 01:22

## 2024-02-23 RX ADMIN — LEVOCARNITINE 660 MG: 330 TABLET ORAL at 07:57

## 2024-02-23 ASSESSMENT — ACTIVITIES OF DAILY LIVING (ADL)
ADLS_ACUITY_SCORE: 55

## 2024-02-23 NOTE — CONSULTS
"CLINICAL NUTRITION SERVICES  -  ASSESSMENT NOTE    Recommendations Ordered by Registered Dietitian (RD):   Recommend TF as follows, plan for trophic feeds for now:   Type of Feeding Tube: OG  Enteral Frequency:  Continuous  Enteral Regimen: Vital High Protein at 10 mL/hr   Meets < 100% of DRI's. - added MVI+M   Propofol  @ 21.5 mL/hr = 568 kcal   Free Water Flush: standard 60 mL q4 hours    Daily electrolyte check, Mg and Phos add on  Daily weights  Start and hold at 10 mL/hr, RD to reassess for ability to advance daily     Phos returned at 1.5 and Mag at 1.3. Please replace prior to starting EN.    Malnutrition:   % Weight Loss:  Weight loss does not meet criteria for malnutrition   % Intake:  unable to assess - pt intubated   Subcutaneous Fat Loss:  None observed  Muscle Loss:  None observed  Fluid Retention:  trace    Malnutrition Diagnosis: Patient does not meet two of the above criteria necessary for diagnosing malnutrition      REASON FOR ASSESSMENT  Tre Vazquez is a 48 year old male seen by Registered Dietitian for Provider Order - Registered Dietitian to Assess and Order TF per Medical Nutrition Therapy Protocol    Past medical history: developmental delay (lives in group home), seizures, obesity, ANA/OHS on BiPAP at night and while napping, chronic hypercapnia, chronic hyperammoniemia, anemia, schizoaffective disorder, borderline personality, MDD, anxiety, HTN, 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  Weight Status:  Obesity Grade III BMI >40  Weight History: weight gain noted since September 2023. Possible overall weight loss of 5.3% since May 2023  Wt Readings from Last 10 Encounters:   02/23/24 137.1 kg (302 lb 4 oz)   02/20/24 136.1 kg (300 lb)   12/08/23 136.1 kg (300 lb)   09/11/23 124.7 kg (275 lb)   05/21/23 144.8 kg (319 lb 3.6 oz)   05/04/23 142.9 kg (315 lb)   03/27/23 141.8 kg (312 lb 11.2 oz)   03/20/23 139.6 kg (307 lb 12.2 oz)   01/03/23 122.5 kg (270 lb)   11/30/22 123.4 kg (272 lb)     LABS  Labs reviewed    Labs:  Electrolytes  Potassium (mmol/L)   Date Value   02/23/2024 3.6   02/23/2024 3.6   02/22/2024 3.3 (L)   06/20/2022 4.4   05/05/2022 3.9   12/08/2021 3.9   12/20/2013 4.5   12/10/2013 4.3   11/25/2013 3.7     Potassium POCT (mmol/L)   Date Value   02/22/2024 4.0     Phosphorus (mg/dL)   Date Value   03/24/2023 4.1   03/23/2023 2.5   03/22/2023 2.6   03/21/2023 2.6   03/20/2023 2.3 (L)    Blood Glucose  Glucose (mg/dL)   Date Value   02/23/2024 89   02/22/2024 95   06/20/2022 101 (H)   05/05/2022 81   12/08/2021 100 (H)   09/01/2021 88   08/31/2021 101 (H)   12/20/2013 94   12/10/2013 75   11/25/2013 100 (H)   11/24/2013 107 (H)   11/23/2013 106 (H)     GLUCOSE BY METER POCT (mg/dL)   Date Value   02/23/2024 75   02/23/2024 92   02/23/2024 92   02/22/2024 92   02/22/2024 97     Hemoglobin A1C (%)   Date Value   06/22/2008 5.3    Inflammatory Markers  WBC (10e9/L)   Date Value   12/20/2013 7.4   12/10/2013 5.6   11/25/2013 8.1     WBC Count (10e3/uL)   Date Value   02/23/2024 10.0   02/22/2024 7.6   02/22/2024 13.5 (H)     Albumin (g/dL)   Date Value   02/22/2024 4.5   02/20/2024 4.0   12/19/2023 3.7   06/20/2022 3.4   05/05/2022 3.5   08/31/2021 2.8 (L)   12/10/2013 4.4   11/23/2013 3.0 (L)   11/22/2013 3.0 (L)      Magnesium (mg/dL)   Date Value   02/22/2024 1.7   02/20/2024 1.6 (L)   05/22/2023 1.4 (L)   11/22/2013 1.8   11/21/2013 1.1 (L)    11/21/2013 1.1 (L)     Sodium (mmol/L)   Date Value   02/23/2024 142   02/22/2024 141   02/22/2024 143   12/20/2013 142   12/10/2013 143   11/25/2013 146 (H)     Sodium POCT (mmol/L)   Date Value   02/22/2024 142    Renal  Urea Nitrogen (mg/dL)   Date Value   02/23/2024 14.4   02/22/2024 15.4   02/22/2024 13.5   06/20/2022 17   05/05/2022 14   12/08/2021 16   12/20/2013 19   12/10/2013 19   11/25/2013 11     UREA NITROGEN POCT (mg/dL)   Date Value   02/22/2024 14     Creatinine (mg/dL)   Date Value   02/23/2024 0.64 (L)   02/22/2024 0.59 (L)   02/22/2024 0.70   12/20/2013 1.03   12/10/2013 1.14   11/25/2013 0.75     Creatinine POCT (mg/dL)   Date Value   02/22/2024 0.9     Additional  Triglycerides (mg/dL)   Date Value   03/18/2023 146   03/15/2023 239 (H)   02/22/2013 119     Ketones Urine (mg/dL)   Date Value   02/22/2024 Negative   11/28/2011 Negative        MEDICATIONS  Medications reviewed     busPIRone  15 mg Oral BID    chlorhexidine  15 mL Mouth/Throat Q12H    enoxaparin ANTICOAGULANT  40 mg Subcutaneous Q12H    lacosamide  50 mg Oral Daily    lamoTRIgine  100 mg Oral Daily    lamoTRIgine  200 mg Oral BID    levETIRAcetam  1,000 mg Oral QAM    And    levETIRAcetam  1,500 mg Oral At Bedtime    levOCARNitine  660 mg Oral TID    pantoprazole  40 mg Per Feeding Tube QAM AC    Or    pantoprazole  40 mg Intravenous QAM AC    propranolol  20 mg Oral BID    valproic acid  500 mg Oral Q12H    zonisamide  100 mg Oral QAM    And    zonisamide  200 mg Oral At Bedtime       fentaNYL 50 mcg/hr (02/23/24 1145)    lactated ringers 150 mL/hr at 02/23/24 0756    midazolam Stopped (02/23/24 0730)    propofol 25 mcg/kg/min (02/23/24 1230)      albuterol, glucose **OR** dextrose **OR** glucagon, fentaNYL, lidocaine, midazolam     ASSESSED NUTRITION NEEDS PER APPROVED PRACTICE GUIDELINES:    Dosing Weight 137.1 kg actual; 68.4 kg IBW   Estimated Energy Needs: 1043-2388 kcals (11-14 Kcal/Kg)  Justification: obese and  vented  Estimated Protein Needs: 171 grams protein (2.5 g pro/Kg)  Justification: hypercatabolism with critical illness and obesity guidelines   Estimated Fluid Needs: per MD     NUTRITION DIAGNOSIS:  Inadequate oral intake related to increased respiratory needs with intubation as evidenced by pt NPO requiring nutritional support to meet nutritional needs     NUTRITION INTERVENTIONS  Recommendations / Nutrition Prescription  Recommend TF as follows, plan for trophic feeds for now:   Type of Feeding Tube: OG  Enteral Frequency:  Continuous  Enteral Regimen: Vital High Protein at 10 mL/hr   Meets < 100% of DRI's. - added MVI+M   Propofol  @ 21.5 mL/hr = 568 kcal   Free Water Flush: standard 60 mL q4 hours    Daily electrolyte check, Mg and Phos add on  Daily weights  Start and hold at 10 mL/hr, RD to reassess for ability to advance daily     Implementation  Nutrition education: Not appropriate at this time due to patient condition  EN Composition, EN Schedule, Feeding Tube Flush, and Multivitamin/Mineral: as above    Nutrition Goals  EN + Propofol to meet 100% of nutritional needs at eventual goal     MONITORING AND EVALUATION:  Progress towards goals will be monitored and evaluated per protocol and Practice Guidelines    Catherine Antonio RD, LD  Clinical Dietitian     3rd floor/ICU: [phone]  All other floors: [phone]  Weekend/holiday: [phone]  Office: [phone]

## 2024-02-23 NOTE — PLAN OF CARE
Goal Outcome Evaluation:      Plan of Care Reviewed With: patient    Overall Patient Progress: improvingOverall Patient Progress: improving     ICU End of Shift Summary.  For vital signs and complete assessments, please see documentation flowsheets.     Neuro: Drowsy, will squeeze hands/wiggle feet to command, mouthing around et tube  Cardiac: tele SR/SA, 1+ edema BUE/BLE  Resp: vent supported, fio2 weaned to 35%. Lungs dim. Tan/red streaked et secretions, cloudy oral secretions  GI: obese, incontinent of stool, OG clamped  : collazo in place- low uop in rhonda, improved with lasix  Skin: scabs/scrape R shoulder and L groin, bruising  Lines: piv x3  Drips: prop, fent, versed    Major Shift Events:   K+ protocol added for k 3.3  Failed wean this AM- apnea/desat  Plan (Upcoming Events): wean again this AM        Right wrist and Left wrist restraints continued 2/23/2024    Clinical Justification: Pulling lines, pulling tubes, and pulling equipment  Less Restrictive Alternative: Repositioning, Re-evaluate equipment, Disguise equipment, Pain management, Alarm, De-escalation, Reorientation  Attending Physician Notified: MD ordered restraint,     New orders placed No  Length of Order: 1 Day      Vanessa Caal RN

## 2024-02-23 NOTE — CONSULTS
"[facility]    Cardiology Consultation     Date of Admission:  2/22/2024    Assessment & Plan   Tre Vazquez is a 48 year old male who was admitted on 2/22/2024.    Impression:  1.  It is unclear at this stage of this patient had a cardiac arrest or respiratory arrest in the setting of intractable seizures.  That the patient did not receive a shock and that he developed a perfusing rhythm spontaneously is against ventricular fibrillation as the cause of his arrhythmia and also unlikely that ventricular tachycardia was the cause.  2.  No evidence of myocardial infarction and EKG shows nonspecific ST changes.  3.  Morbid obesity.  4.  History of intractable seizures and was discharged only 1 day before this event.    Plan:  1.  We will await the results of the echocardiogram.  2.  When patient's clinical situation improves we will assess the patient for ischemia.  If the patient has significantly reduced left ventricular systolic function or regional wall motion abnormalities cardiac catheterization will be indicated down the line.  3.  Neurological assessment as per neurology and intensivist.  Will follow.      Critical Care: Total critical care time spent: 55    Wesley Wallace MD, [facility], FRCPI    Primary Care Physician   Siobhan Mayo    Reason for Consult   Reason for consult: I was asked by intensivist service to evaluate this patient for cardiac arrest.    History of Present Illness   Tre Vazquez is a 48 year old male who presents with a history of intractable seizures.  According to the history the patient was  having intractable seizures and then developed what was called \"cardiac arrest\".  The patient received 5 minutes of CPR.  He did not appear to have been administered any shock.  The information on the \"cardiac arrest\" is scant.  It is unclear whether this patient has had a respiratory arrest secondary to his seizure activity but it is unlikely that he had ventricular " fibrillation since he spontaneously went back into normal rhythm or a perfusing rhythm without having a shock.  His twelve-lead electrocardiogram nonspecific ST and T wave changes with no evidence of QT prolongation and no solid evidence for ischemia.  His troponins were negative.  Patient was intubated and is sedated and on a ventilator.  He is unable to give a history as he is on a propofol drip and on a ventilator.  Initial labs showed mild hypokalemia 3.3 which was replaced to 3.6.    Past Medical History   Past Medical History:   Diagnosis Date    Blindness of left eye     Depression 03/10/2014    Hypertension     Pneumococcal septicemia(038.2) (H) 11/26/2013    Hospitalized    Pneumonia, organism unspecified(486) 11/26/2013    Hospitalized    Uncomplicated asthma     Unspecified epilepsy without mention of intractable epilepsy          Past Surgical History   Past Surgical History:   Procedure Laterality Date    COLONOSCOPY N/A 12/1/2022    Procedure: COLONOSCOPY;  Surgeon: Michael Caldwell MD;  Location:  OR    ESOPHAGOSCOPY, GASTROSCOPY, DUODENOSCOPY (EGD), COMBINED N/A 12/1/2022    Procedure: ESOPHAGOGASTRODUODENOSCOPY with biopsy;  Surgeon: Michael Caldwell MD;  Location:  OR    ORTHOPEDIC SURGERY      pt stated past surgery on both ankles         Prior to Admission Medications   Prior to Admission Medications   Prescriptions Last Dose Informant Patient Reported? Taking?   CITALOPRAM HYDROBROMIDE PO 2/21/2024 at 0844 Pharmacy Yes Yes   Sig: Take 60 mg by mouth daily   QUEtiapine ER (SEROQUEL XR) 300 MG 24 hr tablet 2/20/2024 at 2214  Yes Yes   Sig: Take 300 mg by mouth at bedtime   acetaminophen (TYLENOL) 325 MG tablet   Yes Yes   Sig: Take 650 mg by mouth every 4 hours as needed for mild pain   ammonium lactate (AMLACTIN) 12 % external cream 2/19/2024  Yes Yes   Sig: Apply topically 2 times daily   bacitracin 500 UNIT/GM external ointment 2/19/2024  Yes Yes   Sig: Apply topically  2 times daily Apply to left arm wound   busPIRone (BUSPAR) 15 MG tablet 2/21/2024 at 0844  Yes Yes   Sig: Take 15 mg by mouth 2 times daily   clotrimazole (LOTRIMIN) 1 % external cream 2/19/2024  Yes Yes   Sig: Apply topically 2 times daily Apply to left groin   divalproex sodium delayed-release (DEPAKOTE) 500 MG DR tablet 2/21/2024 at 0845  No Yes   Sig: Take 1 tablet (500 mg) by mouth every 12 hours   fluticasone (FLONASE) 50 MCG/ACT nasal spray   Yes Yes   Sig: Spray 1 spray in nostril 2 times daily   lacosamide (VIMPAT) 50 MG TABS tablet 2/21/2024 at 0845  No Yes   Sig: Take 1 tablet (50 mg) by mouth daily   lamoTRIgine (LAMICTAL) 100 MG tablet Unknown  No Yes   Sig: Take 1 tablet (100 mg) by mouth daily At 1400   lamoTRIgine (LAMICTAL) 200 MG tablet 2/21/2024 at 0846  No Yes   Sig: Take 1 tablet (200 mg) by mouth 2 times daily Take at 8 AM and 8 pm   levETIRAcetam (KEPPRA) 500 MG tablet 2/21/2024 at 0845  No Yes   Sig: Take 2 tablets (1,000 mg) by mouth every morning AND 3 tablets (1,500 mg) at bedtime.   levOCARNitine (CARNITOR) 330 MG tablet 2/21/2024 at 0845  No Yes   Sig: Take 2 tablets (660 mg) by mouth 3 times daily   methylcellulose POWD powder 2/21/2024 at 0902  Yes Yes   Sig: Apply 2 mg topically 2 times daily Mix 1 rounded tablespoon (2 mg) in 8 oz glass of water and take by mouth twice daily   multivitamin, therapeutic (THERA-VIT) TABS tablet 2/21/2024 at 0844  Yes Yes   Sig: Take 1 tablet by mouth daily   pantoprazole (PROTONIX) 40 MG EC tablet 2/21/2024 at 0845  Yes Yes   Sig: Take 40 mg by mouth daily   polyethylene glycol (MIRALAX) 17 GM/Dose powder 2/21/2024 at 0843  Yes Yes   Sig: Take 1 capful. by mouth daily   prazosin (MINIPRESS) 2 MG capsule 2/20/2024 at 2214  Yes Yes   Sig: Take 2 mg by mouth At Bedtime   propranolol (INDERAL) 20 MG tablet 2/21/2024 at 0844  No Yes   Sig: Take 1 tablet (20 mg) by mouth 2 times daily   zonisamide (ZONEGRAN) 100 MG capsule 2/21/2024 at 0845  No Yes   Sig:  Take 1 capsule (100 mg) by mouth every morning AND 2 capsules (200 mg) At Bedtime.      Facility-Administered Medications: None     Current Facility-Administered Medications   Medication Dose Route Frequency    busPIRone  15 mg Oral BID    chlorhexidine  15 mL Mouth/Throat Q12H    enoxaparin ANTICOAGULANT  40 mg Subcutaneous Q12H    lacosamide  50 mg Oral Daily    lamoTRIgine  100 mg Oral Daily    lamoTRIgine  200 mg Oral BID    levETIRAcetam  1,000 mg Oral QAM    And    levETIRAcetam  1,500 mg Oral At Bedtime    levOCARNitine  660 mg Oral TID    pantoprazole  40 mg Per Feeding Tube QAM AC    Or    pantoprazole  40 mg Intravenous QAM AC    propranolol  20 mg Oral BID    valproic acid  500 mg Oral Q12H    zonisamide  100 mg Oral QAM    And    zonisamide  200 mg Oral At Bedtime     Current Facility-Administered Medications   Medication Last Rate    fentaNYL 50 mcg/hr (02/23/24 0700)    midazolam 1 mg/hr (02/23/24 0700)    propofol 5 mcg/kg/min (02/23/24 0700)     Allergies   Allergies   Allergen Reactions    Acetaminophen Unknown    Hydrocodone Bitartrate Er Unknown    Tomato GI Disturbance    Cephalexin Rash     HUT Reaction: Rash; HUT Noted: [id number]      Vicodin [Hydrocodone-Acetaminophen] GI Disturbance       Social History    reports that he quit smoking about 14 years ago. His smoking use included cigarettes. He started smoking about 24 years ago. He has never used smokeless tobacco. He reports that he does not drink alcohol and does not use drugs.      Family History     Unable to obtain due to: Patient being on a ventilator and sedated with propofol       Review of Systems   A comprehensive review of system was performed and is negative other than that noted in the HPI or here.     Physical Exam   Vital Signs with Ranges  Temp:  [98.1  F (36.7  C)-99  F (37.2  C)] 99  F (37.2  C)  Pulse:  [77-85] 84  Resp:  [14-29] 18  BP: ()/(53-90) 109/53  FiO2 (%):  [35 %-70 %] 35 %  SpO2:  [80 %-100 %] 95 %  Wt  Readings from Last 4 Encounters:   02/23/24 137.1 kg (302 lb 4 oz)   02/20/24 136.1 kg (300 lb)   12/08/23 136.1 kg (300 lb)   09/11/23 124.7 kg (275 lb)     I/O last 3 completed shifts:  In: 1983.06 [I.V.:313.06; NG/GT:670; IV Piggyback:1000]  Out: 1615 [Urine:1615]      Vitals: /53   Pulse 84   Temp 99  F (37.2  C) (Axillary)   Resp 18   Wt 137.1 kg (302 lb 4 oz)   SpO2 95%   BMI 45.96 kg/m      Physical Exam:   General -sedated and on a ventilator with no purposeful movements or response to commands.  Morbidly obese  Eyes - No scleral icterus  HEENT - Neck supple, moist mucous membranes.  Orally intubated with an ET tube and nasogastric tube present.  Cardiovascular -heart sounds 1 and 2 are normal.  Heart sounds are distant.  No murmurs heard.  Jugular venous pulse was difficult to assess as the patient is lying flat and is large.  Carotids were normal with no bruits.  Extremities - There is no peripheral edema  Respiratory -orally intubated on a ventilator.  Chest is clear to percussion auscultation.  Skin - No pallor or cyanosis  Gastrointestinal - Non tender and non distended without rebound or guarding  Psych - Appropriate affect   Neurological - No gross motor neurological focal deficits        Recent Labs   Lab 02/23/24  0525 02/23/24  0404 02/23/24  0003 02/22/24  2243 02/22/24  1159 02/22/24  0639 02/22/24  0632 02/21/24  0548 02/20/24  0903   WBC  --   --   --  7.6  --   --  13.5* 6.3 5.8   HGB  --   --   --  11.3*  --  15.3 13.5 13.0* 12.4*   MCV  --   --   --  97  --   --  104* 100 100   PLT  --   --   --  151  --   --  239 176 175   INR  --   --   --   --   --   --  0.96  --   --    NA  --   --   --  141  --  142 143 143 143   POTASSIUM 3.6  --   --  3.3*  --  4.0 4.2 5.1 4.3   CHLORIDE  --   --   --  100  --  94 94* 98 98   CO2  --   --   --  36*  --   --  29 44* 40*   BUN  --   --   --  15.4  --  14 13.5 12.7 15.9   CR  --   --   --  0.59*  --  0.9 0.70 0.62* 0.60*   GFRESTIMATED  --    "--   --  >90  --   --  >90 >90 >90   ANIONGAP  --   --   --  5*  --   --  20* 1* 5*   ARNOLD  --   --   --  8.5*  --   --  9.1 8.8 8.8   GLC  --  92 92 95   < > 198* 202* 101* 89   ALBUMIN  --   --   --   --   --   --  4.5  --  4.0   PROTTOTAL  --   --   --   --   --   --  6.9  --  6.4   BILITOTAL  --   --   --   --   --   --  0.3  --  0.2   ALKPHOS  --   --   --   --   --   --  89  --  76   ALT  --   --   --   --   --   --  48  --  42   AST  --   --   --   --   --   --  40  --  31   LIPASE  --   --   --   --   --   --  45  --   --     < > = values in this interval not displayed.     Recent Labs   Lab Test 03/18/23  0543 03/15/23  1303   TRIG 146 239*     Recent Labs   Lab 02/22/24 2243 02/22/24  0639 02/22/24  0632 02/21/24  0548   WBC 7.6  --  13.5* 6.3   HGB 11.3* 15.3 13.5 13.0*   HCT 35.2* 45 45.6 42.0   MCV 97  --  104* 100     --  239 176     Recent Labs   Lab 02/22/24  1446 02/22/24  1202 02/22/24  0819   PHV 7.43 7.47* 7.33   PO2V 72* 39 27   PCO2V 58* 54* 68*   HCO3V 39* 40* 36*     Recent Labs   Lab 02/22/24  0632   NTBNPI 119     Recent Labs   Lab 02/20/24  1511   DD 0.29     No results for input(s): \"SED\", \"CRP\" in the last 168 hours.  Recent Labs   Lab 02/22/24 2243 02/22/24  0632 02/21/24  0548    239 176     Recent Labs   Lab 02/22/24  0632   TSH 4.91*     Recent Labs   Lab 02/22/24  0703   COLOR Yellow   APPEARANCE Slightly Cloudy*   URINEGLC Negative   URINEBILI Negative   URINEKETONE Negative   SG 1.017   UBLD Negative   URINEPH 8.0*   PROTEIN 100*   NITRITE Negative   LEUKEST Negative   RBCU 6*   WBCU 5       Imaging:  Recent Results (from the past 48 hour(s))   XR Chest Port 1 View    Narrative    CHEST ONE VIEW February 22, 2024 7:09 AM     HISTORY: Post intubation.    COMPARISON: February 20, 2024.      Impression    IMPRESSION: Endotracheal tube tip 4.1 cm from the lisa. No gross  infiltrates.    JONATHAN ROD MD         SYSTEM ID:  [id number]   XR Abdomen Port 1 View    " Narrative    ABDOMEN ONE VIEW PORTABLE February 22, 2024 7:10 AM     HISTORY: OG tube.    COMPARISON: March 17, 2023.       Impression    IMPRESSION: Enteric tube tip projects within the stomach bubble in the  left upper quadrant.    JONATHAN ROD MD         SYSTEM ID:  [id number]   CT Head w/o Contrast    Narrative    CT HEAD WITHOUT CONTRAST February 22, 2024 8:37 AM     HISTORY: Seizure, cardiac arrest.       COMPARISON: Head CT 2/20/2024.    TECHNIQUE: Using multidetector thin collimation helical acquisition  technique, axial, coronal and sagittal CT images from the skull base  to the vertex were obtained without intravenous contrast.   (topogram) image(s) also obtained and reviewed. Dose reduction  techniques were performed.    FINDINGS: No intracranial hemorrhage, mass effect, or midline shift.  No acute loss of gray-white matter differentiation in the cerebral  hemispheres. Ventricles are proportionate to the cerebral sulci. Clear  basal cisterns. Moderate diffuse cerebral volume loss. Patchy  periventricular hypoattenuation, consistent with chronic small vessel  ischemic disease. Unchanged moderate ventriculomegaly.    Hyperostosis frontalis interna. The visualized portions of the  paranasal sinuses and mastoid air cells are clear. Grossly normal  orbits.       Impression    IMPRESSION: Stable findings compared to 2/20/2024.   1. No acute intracranial pathology.   2. Chronic small vessel ischemic disease and diffuse cerebral volume  loss. Unchanged moderate ventriculomegaly.    JENNIE MEAD MD         SYSTEM ID:  [id number]   XR Chest Port 1 View    Narrative    EXAM: XR CHEST PORT 1 VIEW  LOCATION: [facility]  DATE: 2/23/2024    INDICATION: Endotracheal tube positioning  COMPARISON: 02/22/2024       Impression    IMPRESSION: Endotracheal tube tip 2.8 cm above the lisa. Enteric drainage tube extends to the lower chest out of the field-of-view. Hypoventilatory changes. Mild  "hazy opacity left hemithorax likely overlapping soft tissue. No large consolidation. No   pleural effusion or pneumothorax. Heart size exaggerated by technique.       Echo:  No results found for this or any previous visit (from the past 4320 hour(s)).    Clinically Significant Risk Factors Present on Admission        # Hypokalemia: Lowest K = 3.3 mmol/L in last 2 days, will replace as needed      # Anion Gap Metabolic Acidosis: Highest Anion Gap = 20 mmol/L in last 2 days, will monitor and treat as appropriate      # Hypertension: Noted on problem list   # Acute Respiratory Failure: Documented O2 saturation < 91%.  Continue supplemental oxygen as needed     # Severe Obesity: Estimated body mass index is 45.96 kg/m  as calculated from the following:    Height as of 2/20/24: 1.727 m (5' 8\").    Weight as of this encounter: 137.1 kg (302 lb 4 oz).       # Financial/Environmental Concerns:          Cardiac arrest    Hypokalemia                                                "

## 2024-02-23 NOTE — PROGRESS NOTES
ICU staff  DOS 2/23/2024    No major overnight events reported. Apneic with desaturations on SBT this morning. Apneic when I tried at the bedside. Opened eyes to exam but otherwise didn't interact.    Vitals:   Temp:  [98.1  F (36.7  C)-99  F (37.2  C)] 99  F (37.2  C)  Pulse:  [78-90] 85  Resp:  [8-22] 22  BP: (103-138)/(53-90) 113/63  FiO2 (%):  [30 %-70 %] 30 %  SpO2:  [80 %-100 %] 96 %     Vent Mode: CMV/AC  (Continuous Mandatory Ventilation/ Assist Control)  FiO2 (%): 30 %  Resp Rate (Set): 18 breaths/min  Tidal Volume (Set, mL): 460 mL  PEEP (cm H2O): 8 cmH2O  Resp: 22    I/O last 3 completed shifts:  In: 1983.06 [I.V.:313.06; NG/GT:670; IV Piggyback:1000]  Out: 1615 [Urine:1615]    Fentanyl -> off  Midazolam -> off  Propofol 5-10    Exam:  Gen: Intubated, sedated  HEENT: NC/AT, anicteric  Pulm: Distant breath sounds  Cor: RRR  Abdomen/GI: Soft, nondistended  : Reid in place, urine clear  Extremities: Warm, nonedematous  Skin: Well-perfused  Neuro: Eyes opened to exam, moving extremities purposefully  Psych: Unable to assess    Data:   Labs reviewed and without major changes  - VBG this morning 7.39/62/35  Nothing new on cultures  Imaging reviewed  - CXR from this morning with low lung volumes, no confluent infiltrates    Assessment/plan:  47 y/o man with seizure disorder admitted 2/21 after cardiac arrest following a period of seizure activity. Likely respiratory etiology leading to arrest, with ROSC after 5 minutes and no shock. Is waking up but is agitated when sedation fully lightened.  CNS: Intubated, sedated.  # Seizure disorder  # Toxic/metabolic encephalopathy  # Developmental delay  # Major depressive disorder  # Schizoaffective disorder  - Lighten sedation as able, prefer propofol to midazolam; add back fentanyl if needed  - Continue home meds -- lacosamide, lamotrigine, zonisamide, valproic acid, levetiracetam  - Neurology following  Pulm: Tolerating mechanical ventilation.  # Acute hypoxemic  "respiratory failure  # Obesity hypoventilation syndrome  # Obstructive sleep apnea  - Apneic on PS trials this morning  - Lighten sedation and continue SBTs as tolerated with goal of extubation  - Could accept higher PS settings on spontaneous trial (8/8 for example) because of body habitus  - CPAP as needed once extubated  CV: Not requiring pressors.  # Hypertension  - Continue propranolol  GI: Abdomen benign  # Morbid obesity affecting cares, BMI 46  # Hyperammonemia, chronic  - Feeding tube if unable to extubate in next 24 hours  - Continue home l-carnitine  : UOP adequate, Cr stable 0.64  Heme: Hb 12.1 (11.3)  Msk: Extremities warm, well-perfused.   Endo: Glucose 89-97  ID: Afebrile, WBCs 10.   ICU: LMWH, PPI.    This patient is critically ill to my assessment and requires ICU monitoring and cares. A total of 40 minutes critical care time spent on 2/23/2024, exclusive of procedures.     Rashad Santos MD, PhD  Surgical critical care  2/23/2024, 11:28 AM    Clinically Significant Risk Factors        # Hypokalemia: Lowest K = 3.3 mmol/L in last 2 days, will replace as needed      # Anion Gap Metabolic Acidosis: Highest Anion Gap = 20 mmol/L in last 2 days, will monitor and treat as appropriate      # Hypertension: Noted on problem list        # Severe Obesity: Estimated body mass index is 45.96 kg/m  as calculated from the following:    Height as of 2/20/24: 1.727 m (5' 8\").    Weight as of this encounter: 137.1 kg (302 lb 4 oz)., PRESENT ON ADMISSION     # Financial/Environmental Concerns:            "

## 2024-02-23 NOTE — PROGRESS NOTES
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 He received 5 minutes of CPR in the field with ROSC and was subseqently intubated and sedated on arrival to the emergency department.     Cardiac Arrest s/p ROSC  Noted to have witness cardiac arrest by EMS after seizure activity. I suspect this was primary respiratory failure that led to cardiovascular collapse. ROSC achieved after 5 minutes of CPR. On continuous cardiac monitoring now without evidence of continued ectopy. TTE without wall motion abnormalities and with retained EF (60-65%). Cardiology consulted, appreciate recommendations     Breakthrough Seizure Activity  Epilepsy  Witnessed seizure activity at correction. Was recently admitted to observation from 2/20/2024-2/21/2024 for possible seizure activity. He does have a history of epilepsy, but also history of reporting seizure activity when there has been none. Had previously been transferred for 24 hours EEG at [facility] without evidence of seizure activity. Remains on lacosamide, lamotrigine, keppra, zonisamide, and depakote. Received versed in field and has been sedated sicne intubation in ER. Briefly became agitated with lightened sedation upon arrival to ICU and was moving all extremities. Will need to wean sedation to off prior to full neuro examination. Neurology consulted, appreciate recommendations.     Acute Hypoxic & Acute on Chronic Hypercapnic Respiratory Failure  Acute Respiratory  Acidosis  ANA/OHS  Chronic Hypercapnia  Pulmonary Hypertension  Currently intubated after cardiac arrest. Normally wears BiPAP at night and with naps for chronic hypercapnia. Presented qith pH 6.98, pCO2 >130 after cardiac arrest. Now improved with intubation, but lower than baseline ~mid-70s. Patient apneic with pressure support trials. Respiratory rate decreased to 14 in attempt to increase CO2 back to baseline levels and can attempt pressure support trial again at that point. If unable to extubate this afternoon will need to place keofeed.     Schizoaffective Disorder  Borderline Personality disorder  MDD  Anxiety  Continue home citalopram and buspirone once weaned from sedation.     Developmental Delay: Lives in group home. Uses wheelchair and walker interchangeably.  Hypertension: Continue PTA propranolol + prazosin.  GERD: Prontonix  Chronic Hyperammoniemia: Continue PTA levocarnitine.  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 Indication: ICU  Code Status: Full Code      The patient's care was discussed with the Bedside Nurse and critical care Consultant(s).  I personally spent 65 minutes of critical care time on chart review, documentation, coordination, and bedside management of patient.    Emeterio Pittman MD, S  Hospitalist Service  [facility]  ______________________________________________________________________    Interval History   Nursing notes reviewed; no acute events overnight. Patient not tolerating PST this morning and became apneic. Agitated when sedation lightened.    A full 10+ point review of systems was performed and found to be negative with the exception of those items noted here.    Physical Exam  "  Temp: 98  F (36.7  C) Temp src: Temporal BP: (!) 161/93 Pulse: 92   Resp: 15 SpO2: 96 % O2 Device: Mechanical Ventilator     Weight: 137.1 kg (302 lb 4 oz)  Estimated body mass index is 45.96 kg/m  as calculated from the following:    Height as of 2/20/24: 1.727 m (5' 8\").    Weight as of this encounter: 137.1 kg (302 lb 4 oz).    General: Critically-ill male resting comfortably in hospital bed. Sedated. Moves slightly with light touch.  HEENT: Normocephalic, atraumatic. PERRL. Conjunctiva clear, sclera anicteric. Mucus membranes moist. Left eye enucleation.  Cardiac: Regular rate and rhythm without murmur, gallop or rub. No peripheral edema. Peripheral pulses intact.  Respiratory: Clear to auscultation without wheezing, rales or rhonchi. ETT in place. Synchronous with ventilator.  Abdominal: Normoactive bowel sounds. Soft, nontender, nondistended.  : Reid in place draining yello urine.  Musculoskeletal: Moving all extremities with lightened sedation.  Skin: Warm and well perfused. No rashes or abrasions on exposed skin.  Neurologic: Sedated.   Psychiatric: Unable to assess.    Data   All laboratory results and other diagnostic data from the past 24 hours is available in Epic and has been personally reviewed.    Recent Labs   Lab 02/23/24  1200 02/23/24  0755 02/23/24  0525 02/23/24  0404 02/23/24  0003 02/22/24  2243 02/22/24  1159 02/22/24  0639 02/22/24  0632 02/21/24  0548 02/20/24  0903   WBC  --  10.0  --   --   --  7.6  --   --  13.5*   < > 5.8   HGB  --  12.1*  --   --   --  11.3*  --  15.3 13.5   < > 12.4*   MCV  --  96  --   --   --  97  --   --  104*   < > 100   PLT  --  167  --   --   --  151  --   --  239   < > 175   INR  --   --   --   --   --   --   --   --  0.96  --   --    NA  --   --  142  --   --  141  --  142 143   < > 143   POTASSIUM  --   --  3.6  3.6  --   --  3.3*  --  4.0 4.2   < > 4.3   CHLORIDE  --   --  100  --   --  100  --  94 94*   < > 98   CO2  --   --  33*  --   --  36*  --   " --  29   < > 40*   BUN  --   --  14.4  --   --  15.4  --  14 13.5   < > 15.9   CR  --   --  0.64*  --   --  0.59*  --  0.9 0.70   < > 0.60*   ANIONGAP  --   --  9  --   --  5*  --   --  20*   < > 5*   ARNOLD  --   --  8.7  --   --  8.5*  --   --  9.1   < > 8.8   GLC 75  --  89 92   < > 95   < > 198* 202*   < > 89   ALBUMIN  --   --   --   --   --   --   --   --  4.5  --  4.0   PROTTOTAL  --   --   --   --   --   --   --   --  6.9  --  6.4   BILITOTAL  --   --   --   --   --   --   --   --  0.3  --  0.2   ALKPHOS  --   --   --   --   --   --   --   --  89  --  76   ALT  --   --   --   --   --   --   --   --  48  --  42   AST  --   --   --   --   --   --   --   --  40  --  31   LIPASE  --   --   --   --   --   --   --   --  45  --   --     < > = values in this interval not displayed.       Imaging results reviewed over the past 24 hrs:   Recent Results (from the past 24 hour(s))   XR Chest Port 1 View    Narrative    EXAM: XR CHEST PORT 1 VIEW  LOCATION: [facility]  DATE: 2024    INDICATION: Endotracheal tube positioning  COMPARISON: 2024       Impression    IMPRESSION: Endotracheal tube tip 2.8 cm above the lias. Enteric drainage tube extends to the lower chest out of the field-of-view. Hypoventilatory changes. Mild hazy opacity left hemithorax likely overlapping soft tissue. No large consolidation. No   pleural effusion or pneumothorax. Heart size exaggerated by technique.   Echocardiogram Complete   Result Value    LVEF  60-65%    Narrative    [id number]  [id number]  [id number]  [id number]^VILLARREAL^ASIM     [facility]  Echocardiography Laboratory  [address] [place] [address]     Name: KOJO BROWN  MRN: [id number]  : 1975  Study Date: 2024 07:44 AM  Age: 48 yrs  Gender: Male  Patient Location: [facility]  Reason For Study: Cardiac Arrest  Ordering Physician: ASIM VILLARREAL  Performed By: Tyra Espino     BSA: 2.4 m2  Height: 68 in  Weight: 301 lb  BP: 127/71  mmHg  ______________________________________________________________________________  Procedure  Complete Portable Echo Adult. Optison (NDC #[id number]) given intravenously.  ______________________________________________________________________________  Interpretation Summary     Mild (35-45mmHg) pulmonary hypertension is present.  The visual ejection fraction is 60-65%.  The study was technically difficult. Contrast was used without apparent  complications. Compared to prior study, there is no significant change.  ______________________________________________________________________________  Left Ventricle  The left ventricle is normal in size. The visual ejection fraction is 60-65%.  Left ventricular diastolic function is not assessable.     Right Ventricle  The right ventricle is normal in structure, function and size. Right  ventricular function cannot be assessed due to poor image quality.     Atria  Normal left atrial size. Right atrial size is normal.     Mitral Valve  The mitral valve leaflets appear normal. There is no evidence of stenosis,  fluttering, or prolapse.     Tricuspid Valve  The tricuspid valve is not well visualized. Mild (35-45mmHg) pulmonary  hypertension is present.     Aortic Valve  There is trivial trileaflet aortic sclerosis. No aortic stenosis is present.     Vessels  The aortic root is normal size. Normal size ascending aorta. The inferior vena  cava is normal.     ______________________________________________________________________________  MMode/2D Measurements & Calculations  asc Aorta Diam: 2.9 cm  Asc Ao diam index BSA (cm/m2): 1.2  Asc Ao diam index Ht(cm/m): 1.7     Doppler Measurements & Calculations  MV E max taj: 49.6 cm/sec  MV A max taj: 30.2 cm/sec  MV E/A: 1.6  MV dec slope: 255.9 cm/sec2  MV dec time: 0.19 sec  TR max taj: 293.9 cm/sec  TR max P.6 mmHg     Medial E/e': 5.3     ______________________________________________________________________________  Report  approved by: Pedro Cottrell 02/23/2024 09:51 AM           I personally reviewed: no images or EKG's today.

## 2024-02-23 NOTE — PROGRESS NOTES
Respiratory Therapy Note    [facility] ICU VENTILATOR RESPIRATORY NOTE  Date of Admission: 2/22/24  Date of Intubation (most recent): 2/22/24  Reason for Mechanical Ventilation: Respiratory Failure  Number of Days on Mechanical Ventilation: 2  Met Criteria for Pressure Support Trial: Yes  Length of Pressure Support Trial: About 10 minutes  Reason for Stopping Pressure Support Trial: Patient's respiratory rate 30s-40s, patient having frequent low VTs and high RSBi  VBG Results: 7.40/57/68/35    Plan:  Continue to monitor and assess patient's respiratory status.    Vent Mode: CMV/AC  (Continuous Mandatory Ventilation/ Assist Control)  FiO2 (%): 35 %  Resp Rate (Set): 14 breaths/min  Tidal Volume (Set, mL): 460 mL  PEEP (cm H2O): 7 cmH2O  Pressure Support (cm H2O): (S) 12 cmH2O  Resp: 14    RT to follow.     RT Jeff  5:18 PM February 23, 2024

## 2024-02-23 NOTE — PROGRESS NOTES
[facility] ICU VENTILATOR RESPIRATORY NOTE  Date of Admission: 2/22/24  Date of Intubation (most recent): 2/22/24  Reason for Mechanical Ventilation: Respiratory failure  Number of Days on Mechanical Ventilation: 2  Met Criteria for Pressure Support Trial: Yes  Reason for Stopping Pressure Support Trial: Apnea and oxygen sats down in 60's during attempted wean. Did recover after return to previous settings.  Vent Mode: CMV/AC  (Continuous Mandatory Ventilation/ Assist Control)  FiO2 (%): 35 %  Resp Rate (Set): 18 breaths/min  Tidal Volume (Set, mL): 460 mL  PEEP (cm H2O): 8 cmH2O  Resp: 18     Significant Events Today: Failed wean trial  ABG Results: Venous Blood Gas  Recent Labs   Lab 02/22/24  1446 02/22/24  1202 02/22/24  0819 02/22/24  0636 02/20/24  0959   PHV 7.43 7.47* 7.33 6.98* 7.33   PCO2V 58* 54* 68* >130* 78*   PO2V 72* 39 27 88* 36   HCO3V 39* 40* 36* <1* 41*   YOUSIF 12.0* 13.8*  --   --  11.9*   O2PER 70 50  --   --  32      ETT appearance on chest x-ray:   Narrative & Impression   CHEST ONE VIEW February 22, 2024 7:09 AM      HISTORY: Post intubation.     COMPARISON: February 20, 2024.                                                                      IMPRESSION: Endotracheal tube tip 4.1 cm from the lisa. No gross  infiltrates.          Plan:  Will continue to monitor    RT Abe on 2/23/2024 at 5:09 AM

## 2024-02-23 NOTE — PROGRESS NOTES
Neurology Daily Note      Admission Date:2/22/2024   Date of service: 02/23/2024   Hospital Day: 2                                                   Assessment and Plan:   #.  Recurrent seizures/seizure-like events over the 3 days PTA, the most recent associated with cardiac arrest x 5 minutes with spontaneous return of circulation.  This patient has a longstanding history of seizure disorder preceding 2007.  His history indicates that he has both nonepileptic and epileptic events  -- Currently sedated on propofol/fentanyl    Continue to give the medications as taken prior to admission through the OG tube-  If this is a problem we can convert the divalproex, lacosamide and levetiracetam to IV although I understand IV access is an issue as well.  His levels do reflect that valproate is lower however the dose was reduced at his last hospitalization in December.    Discussed with pharmacist-there is a shortage of zonisamide in the hospital.  They are investigating making a suspension that can still be given through the oral tube.  If that is not possible it is reasonable to hold the zonisamide for the weekend until Monday since the patient is covered by multiple anticonvulsants and has been partially covered thus far with propofol.  Even if the propofol is discontinued, waiting until Monday would seem reasonable if no zonisamide is available.     Continue to wean sedatives.  Monitor neurostatus  Consider EEG depending on recovery      Once stablized, follow up with regular neurology office Dr. Griggs/Shonda Pacheco.      General Neurology service will follow peripherally,   Please contact with neuro questions when needed.       Interval History:   Still on sedatives due to agitation but later sedation and able to follow simple commands    Neurologic History:    known history of seizure disorder for many years-past records indicate that he has combination of epileptic and nonepileptic events.  He has been  through extensive evaluations at [facility].     2/20/2024, fall/possible seizure, seen in the emergency room  2/21/2020 4-3 witnessed seizures in the group home  2/22/2024: Return for witnessed seizure and cardiac arrest for 5 minutes with return of spontaneous circulation.  Admitted to ICU/intubated/sedated  Home dosing of anticonvulsants:  Lamotrigine 200 mg twice daily and 100 mg in midday (total of 500 mg/day)  Levetiracetam 1000 mg in the morning and 1500 at bedtime (2500 mg/day  Divalproex 500 mg twice daily  Lacosamide 50 mg daily  Zonisamide 100mg/200mg        Medications:   Scheduled Meds:   busPIRone  15 mg Oral BID    chlorhexidine  15 mL Mouth/Throat Q12H    enoxaparin ANTICOAGULANT  40 mg Subcutaneous Q12H    lacosamide  50 mg Oral Daily    lamoTRIgine  100 mg Oral Daily    lamoTRIgine  200 mg Oral BID    levETIRAcetam  1,000 mg Oral QAM    And    levETIRAcetam  1,500 mg Oral At Bedtime    levOCARNitine  660 mg Oral TID    magnesium sulfate  4 g Intravenous Once    multivitamins w/minerals  15 mL Per Feeding Tube Daily    pantoprazole  40 mg Per Feeding Tube QAM AC    Or    pantoprazole  40 mg Intravenous QAM AC    potassium & sodium phosphates  2 packet Oral or Feeding Tube Q4H    propranolol  20 mg Oral BID    valproic acid  500 mg Oral Q12H    zonisamide  100 mg Oral QAM    And    zonisamide  200 mg Oral At Bedtime     PRN Meds: albuterol, dextrose, glucose **OR** dextrose **OR** glucagon, fentaNYL, lidocaine, midazolam        Physical Exam:   Vitals: Temp: 97  F (36.1  C) Temp src: Temporal BP: 115/76 Pulse: 79   Resp: 14 SpO2: 98 % O2 Device: Mechanical Ventilator    Vital Signs with Ranges: Temp:  [97  F (36.1  C)-99  F (37.2  C)] 97  F (36.1  C)  Pulse:  [78-92] 79  Resp:  [8-22] 14  BP: (104-161)/(48-93) 115/76  FiO2 (%):  [30 %-60 %] 35 %  SpO2:  [80 %-100 %] 98 %    General Appearance:  No acute distress  Neuro: Opens eyes slightly to voice.  Follow simple commands with gripping  on both sides and wiggling toes.          -No left eye      Extremities: No clubbing, no cyanosis, no edema       Data:   ROUTINE IP LABS (Last 3results)  CBC RESULTS:     Recent Labs   Lab Test 02/23/24  0755 02/22/24  2243 02/22/24  0639 02/22/24  0632   WBC 10.0 7.6  --  13.5*   RBC 3.87* 3.63*  --  4.37*   HGB 12.1* 11.3* 15.3 13.5   HCT 37.0* 35.2* 45 45.6    151  --  239     Basic Metabolic Panel:  Recent Labs   Lab Test 02/23/24  1607 02/23/24  1200 02/23/24  0525 02/23/24  0003 02/22/24  2243 02/22/24  1159 02/22/24  0639 02/22/24  0632   NA  --   --  142  --  141  --  142 143   POTASSIUM  --   --  3.6  3.6  --  3.3*  --  4.0 4.2   CHLORIDE  --   --  100  --  100  --  94 94*   CO2  --   --  33*  --  36*  --   --  29   BUN  --   --  14.4  --  15.4  --  14 13.5   CR  --   --  0.64*  --  0.59*  --  0.9 0.70   GLC 81 75 89   < > 95   < > 198* 202*   ARNOLD  --   --  8.7  --  8.5*  --   --  9.1    < > = values in this interval not displayed.     Liver panel:  Recent Labs   Lab Test 02/22/24  0632 02/20/24  0903 12/19/23  0555 12/13/23  0600 11/26/23  0056   PROTTOTAL 6.9 6.4 6.1* 6.5 6.2*   ALBUMIN 4.5 4.0 3.7 4.0 3.4*   BILITOTAL 0.3 0.2 0.2 0.2 0.5   ALKPHOS 89 76 65 63 113   AST 40 31 18 23 47*   ALT 48 42 23 25 53     Thyroid Panel:  Recent Labs   Lab Test 02/22/24  0632 02/06/24  1222   TSH 4.91* 2.22   T4 0.67*  --         Ammonia:   Recent Labs   Lab Test 02/20/24  0903 12/17/23  1455 12/17/23  1422 12/04/23  0621 11/26/23  0648   JAYLA 60 71* 71* 62* 56         Shannon Brannon M.D.  Neurologist  [facility] Neurology  Office [phone]

## 2024-02-23 NOTE — TELEPHONE ENCOUNTER
Patient is now inpatient, after initial ED encounters. Patient's care will be managed in that setting and neurology consulted as needed.    Charles Sommer RN, BSN  [facility]rology

## 2024-02-23 NOTE — PROVIDER NOTIFICATION
2220: Notified tele hub RN + MD of low uop (25ml/2 hours). Discussed. Continue to monitor and rediscuss 0100.   Also requested lab orders.    2320: notified tele hub RN + MD that k+ is 3.3, no protocol in place. Inquired if could get protocol or replacements ordered.    0100- notified tele hub RN of continued low uop, 40ml since last update. Tele RN to notify tele MD.

## 2024-02-23 NOTE — PLAN OF CARE
Patient new admit from the ED around 1445.  Afebrile, sedated, and Rass at goal -2   Cardiac: Tele SR- BP and HR WDL  Respiratory: LS diminished. Vented: Fio2 70 18 RR, 460 TV, 8 Peep.   :  Reid in place for deep sedation. Urine output adequate this shift.  GI: BS present, one large stool on arrival to ICU.  OG in place low continuous suction, no output.  Skin: dry, flaky skin, see flowsheet.  Lines: PIV x 2. I/O in left shin.  Drips: versed, fentanyl, propofol.     Plan to lower sedation and do PST starting Friday morning 2/23/24.

## 2024-02-24 ENCOUNTER — APPOINTMENT (OUTPATIENT)
Dept: PHYSICAL THERAPY | Facility: CLINIC | Age: 49
DRG: 100 | End: 2024-02-24
Attending: STUDENT IN AN ORGANIZED HEALTH CARE EDUCATION/TRAINING PROGRAM
Payer: MEDICARE

## 2024-02-24 LAB
ANION GAP SERPL CALCULATED.3IONS-SCNC: 8 MMOL/L (ref 7–15)
BUN SERPL-MCNC: 13.1 MG/DL (ref 6–20)
CALCIUM SERPL-MCNC: 8.4 MG/DL (ref 8.6–10)
CHLORIDE SERPL-SCNC: 99 MMOL/L (ref 98–107)
CREAT SERPL-MCNC: 0.62 MG/DL (ref 0.67–1.17)
DEPRECATED HCO3 PLAS-SCNC: 32 MMOL/L (ref 22–29)
EGFRCR SERPLBLD CKD-EPI 2021: >90 ML/MIN/1.73M2
GLUCOSE BLDC GLUCOMTR-MCNC: 110 MG/DL (ref 70–99)
GLUCOSE BLDC GLUCOMTR-MCNC: 148 MG/DL (ref 70–99)
GLUCOSE BLDC GLUCOMTR-MCNC: 62 MG/DL (ref 70–99)
GLUCOSE BLDC GLUCOMTR-MCNC: 66 MG/DL (ref 70–99)
GLUCOSE BLDC GLUCOMTR-MCNC: 66 MG/DL (ref 70–99)
GLUCOSE BLDC GLUCOMTR-MCNC: 67 MG/DL (ref 70–99)
GLUCOSE BLDC GLUCOMTR-MCNC: 76 MG/DL (ref 70–99)
GLUCOSE BLDC GLUCOMTR-MCNC: 79 MG/DL (ref 70–99)
GLUCOSE BLDC GLUCOMTR-MCNC: 81 MG/DL (ref 70–99)
GLUCOSE BLDC GLUCOMTR-MCNC: 81 MG/DL (ref 70–99)
GLUCOSE BLDC GLUCOMTR-MCNC: 87 MG/DL (ref 70–99)
GLUCOSE BLDC GLUCOMTR-MCNC: 89 MG/DL (ref 70–99)
GLUCOSE SERPL-MCNC: 74 MG/DL (ref 70–99)
LAMOTRIGINE SERPL-MCNC: 7.4 UG/ML
MAGNESIUM SERPL-MCNC: 1.7 MG/DL (ref 1.7–2.3)
PHOSPHATE SERPL-MCNC: 3.7 MG/DL (ref 2.5–4.5)
PHOSPHATE SERPL-MCNC: 4 MG/DL (ref 2.5–4.5)
POTASSIUM SERPL-SCNC: 3.9 MMOL/L (ref 3.4–5.3)
SODIUM SERPL-SCNC: 139 MMOL/L (ref 135–145)

## 2024-02-24 PROCEDURE — 99233 SBSQ HOSP IP/OBS HIGH 50: CPT | Performed by: STUDENT IN AN ORGANIZED HEALTH CARE EDUCATION/TRAINING PROGRAM

## 2024-02-24 PROCEDURE — 250N000013 HC RX MED GY IP 250 OP 250 PS 637: Performed by: STUDENT IN AN ORGANIZED HEALTH CARE EDUCATION/TRAINING PROGRAM

## 2024-02-24 PROCEDURE — 250N000011 HC RX IP 250 OP 636: Performed by: SURGERY

## 2024-02-24 PROCEDURE — C9113 INJ PANTOPRAZOLE SODIUM, VIA: HCPCS | Performed by: SURGERY

## 2024-02-24 PROCEDURE — 250N000011 HC RX IP 250 OP 636: Performed by: STUDENT IN AN ORGANIZED HEALTH CARE EDUCATION/TRAINING PROGRAM

## 2024-02-24 PROCEDURE — 99291 CRITICAL CARE FIRST HOUR: CPT | Performed by: SURGERY

## 2024-02-24 PROCEDURE — 97161 PT EVAL LOW COMPLEX 20 MIN: CPT | Mod: GP | Performed by: PHYSICAL THERAPIST

## 2024-02-24 PROCEDURE — 84100 ASSAY OF PHOSPHORUS: CPT | Performed by: STUDENT IN AN ORGANIZED HEALTH CARE EDUCATION/TRAINING PROGRAM

## 2024-02-24 PROCEDURE — 999N000157 HC STATISTIC RCP TIME EA 10 MIN

## 2024-02-24 PROCEDURE — 36415 COLL VENOUS BLD VENIPUNCTURE: CPT | Performed by: STUDENT IN AN ORGANIZED HEALTH CARE EDUCATION/TRAINING PROGRAM

## 2024-02-24 PROCEDURE — 258N000003 HC RX IP 258 OP 636: Performed by: STUDENT IN AN ORGANIZED HEALTH CARE EDUCATION/TRAINING PROGRAM

## 2024-02-24 PROCEDURE — 94003 VENT MGMT INPAT SUBQ DAY: CPT

## 2024-02-24 PROCEDURE — 83735 ASSAY OF MAGNESIUM: CPT | Performed by: STUDENT IN AN ORGANIZED HEALTH CARE EDUCATION/TRAINING PROGRAM

## 2024-02-24 PROCEDURE — 97530 THERAPEUTIC ACTIVITIES: CPT | Mod: GP | Performed by: PHYSICAL THERAPIST

## 2024-02-24 PROCEDURE — 80048 BASIC METABOLIC PNL TOTAL CA: CPT | Performed by: STUDENT IN AN ORGANIZED HEALTH CARE EDUCATION/TRAINING PROGRAM

## 2024-02-24 PROCEDURE — 250N000013 HC RX MED GY IP 250 OP 250 PS 637: Performed by: INTERNAL MEDICINE

## 2024-02-24 PROCEDURE — 250N000013 HC RX MED GY IP 250 OP 250 PS 637: Performed by: SURGERY

## 2024-02-24 PROCEDURE — 200N000001 HC R&B ICU

## 2024-02-24 PROCEDURE — 999N000253 HC STATISTIC WEANING TRIALS

## 2024-02-24 PROCEDURE — 250N000011 HC RX IP 250 OP 636: Performed by: EMERGENCY MEDICINE

## 2024-02-24 PROCEDURE — 258N000001 HC RX 258: Performed by: STUDENT IN AN ORGANIZED HEALTH CARE EDUCATION/TRAINING PROGRAM

## 2024-02-24 RX ORDER — OXYCODONE HYDROCHLORIDE 5 MG/1
5 TABLET ORAL EVERY 6 HOURS PRN
Status: DISCONTINUED | OUTPATIENT
Start: 2024-02-24 | End: 2024-02-28 | Stop reason: HOSPADM

## 2024-02-24 RX ORDER — FUROSEMIDE 10 MG/ML
40 INJECTION INTRAMUSCULAR; INTRAVENOUS ONCE
Status: COMPLETED | OUTPATIENT
Start: 2024-02-24 | End: 2024-02-24

## 2024-02-24 RX ORDER — DIVALPROEX SODIUM 500 MG/1
500 TABLET, DELAYED RELEASE ORAL EVERY 12 HOURS SCHEDULED
Status: DISCONTINUED | OUTPATIENT
Start: 2024-02-24 | End: 2024-02-28 | Stop reason: HOSPADM

## 2024-02-24 RX ORDER — ZONISAMIDE 100 MG/1
200 CAPSULE ORAL AT BEDTIME
Status: DISCONTINUED | OUTPATIENT
Start: 2024-02-24 | End: 2024-02-28 | Stop reason: HOSPADM

## 2024-02-24 RX ORDER — ZONISAMIDE 100 MG/1
100 CAPSULE ORAL EVERY MORNING
Status: DISCONTINUED | OUTPATIENT
Start: 2024-02-25 | End: 2024-02-28 | Stop reason: HOSPADM

## 2024-02-24 RX ADMIN — BUSPIRONE HYDROCHLORIDE 15 MG: 15 TABLET ORAL at 08:04

## 2024-02-24 RX ADMIN — LACOSAMIDE 50 MG: 50 TABLET, FILM COATED ORAL at 08:04

## 2024-02-24 RX ADMIN — PROPRANOLOL HYDROCHLORIDE 20 MG: 20 TABLET ORAL at 08:04

## 2024-02-24 RX ADMIN — PANTOPRAZOLE SODIUM 40 MG: 40 INJECTION, POWDER, FOR SOLUTION INTRAVENOUS at 08:04

## 2024-02-24 RX ADMIN — DIVALPROEX SODIUM 500 MG: 500 TABLET, DELAYED RELEASE ORAL at 22:26

## 2024-02-24 RX ADMIN — ENOXAPARIN SODIUM 40 MG: 40 INJECTION SUBCUTANEOUS at 16:11

## 2024-02-24 RX ADMIN — ZONISAMIDE 100 MG: 50 CAPSULE ORAL at 08:05

## 2024-02-24 RX ADMIN — PROPOFOL 20 MCG/KG/MIN: 10 INJECTION, EMULSION INTRAVENOUS at 03:09

## 2024-02-24 RX ADMIN — LAMOTRIGINE 200 MG: 200 TABLET ORAL at 08:04

## 2024-02-24 RX ADMIN — LEVETIRACETAM 1500 MG: 500 TABLET, FILM COATED ORAL at 22:26

## 2024-02-24 RX ADMIN — FUROSEMIDE 40 MG: 10 INJECTION, SOLUTION INTRAMUSCULAR; INTRAVENOUS at 10:52

## 2024-02-24 RX ADMIN — VALPROIC ACID 500 MG: 250 SOLUTION ORAL at 10:00

## 2024-02-24 RX ADMIN — DEXTROSE MONOHYDRATE 50 ML: 25 INJECTION, SOLUTION INTRAVENOUS at 20:12

## 2024-02-24 RX ADMIN — LEVOCARNITINE 660 MG: 330 TABLET ORAL at 16:11

## 2024-02-24 RX ADMIN — LEVOCARNITINE 660 MG: 330 TABLET ORAL at 22:31

## 2024-02-24 RX ADMIN — ENOXAPARIN SODIUM 40 MG: 40 INJECTION SUBCUTANEOUS at 04:09

## 2024-02-24 RX ADMIN — ZONISAMIDE 200 MG: 100 CAPSULE ORAL at 21:58

## 2024-02-24 RX ADMIN — DEXTROSE MONOHYDRATE 50 ML: 25 INJECTION, SOLUTION INTRAVENOUS at 01:08

## 2024-02-24 RX ADMIN — LEVETIRACETAM 1000 MG: 500 TABLET, FILM COATED ORAL at 08:04

## 2024-02-24 RX ADMIN — SODIUM CHLORIDE, POTASSIUM CHLORIDE, SODIUM LACTATE AND CALCIUM CHLORIDE: 600; 310; 30; 20 INJECTION, SOLUTION INTRAVENOUS at 03:09

## 2024-02-24 RX ADMIN — Medication 15 ML: at 08:03

## 2024-02-24 RX ADMIN — CHLORHEXIDINE GLUCONATE 15 ML: 1.2 SOLUTION ORAL at 08:03

## 2024-02-24 RX ADMIN — LAMOTRIGINE 200 MG: 200 TABLET ORAL at 20:23

## 2024-02-24 RX ADMIN — LEVOCARNITINE 660 MG: 330 TABLET ORAL at 08:04

## 2024-02-24 RX ADMIN — POTASSIUM & SODIUM PHOSPHATES POWDER PACK 280-160-250 MG 2 PACKET: 280-160-250 PACK at 00:04

## 2024-02-24 RX ADMIN — PROPRANOLOL HYDROCHLORIDE 20 MG: 20 TABLET ORAL at 20:23

## 2024-02-24 RX ADMIN — OXYCODONE HYDROCHLORIDE 5 MG: 5 TABLET ORAL at 21:58

## 2024-02-24 RX ADMIN — BUSPIRONE HYDROCHLORIDE 15 MG: 15 TABLET ORAL at 20:22

## 2024-02-24 RX ADMIN — LAMOTRIGINE 100 MG: 100 TABLET ORAL at 16:11

## 2024-02-24 RX ADMIN — DEXTROSE MONOHYDRATE 50 ML: 25 INJECTION, SOLUTION INTRAVENOUS at 16:11

## 2024-02-24 ASSESSMENT — ACTIVITIES OF DAILY LIVING (ADL)
ADLS_ACUITY_SCORE: 49
ADLS_ACUITY_SCORE: 51
ADLS_ACUITY_SCORE: 51
ADLS_ACUITY_SCORE: 49
ADLS_ACUITY_SCORE: 51
ADLS_ACUITY_SCORE: 49
ADLS_ACUITY_SCORE: 51
ADLS_ACUITY_SCORE: 51
ADLS_ACUITY_SCORE: 49
ADLS_ACUITY_SCORE: 51
ADLS_ACUITY_SCORE: 49
ADLS_ACUITY_SCORE: 49
ADLS_ACUITY_SCORE: 51
ADLS_ACUITY_SCORE: 55
ADLS_ACUITY_SCORE: 51
ADLS_ACUITY_SCORE: 55

## 2024-02-24 NOTE — PROGRESS NOTES
ICU staff  DOS 2/24/2024    No major overnight events noted. This morning Mr Vazquez is awake and following commands well. RSBI 70s-80s on 7/8/35 at my evaluation, not able to take a vital capacity but following commands with good strength.    Vitals:   Temp:  [97  F (36.1  C)-99.2  F (37.3  C)] 98.2  F (36.8  C)  Pulse:  [72-92] 82  Resp:  [13-28] 18  BP: ()/() 121/54  FiO2 (%):  [35 %] 35 %  SpO2:  [93 %-100 %] 94 %     Vent Mode: CPAP/PS  (Continuous positive airway pressure with Pressure Support)  FiO2 (%): 35 %  Resp Rate (Set): 14 breaths/min  Tidal Volume (Set, mL): 460 mL  PEEP (cm H2O): 7 cmH2O  Pressure Support (cm H2O): (S) 10 cmH2O  Resp: 18    I/O last 3 completed shifts:  In: 5098.96 [P.O.:360; I.V.:3658.96; NG/GT:1060]  Out: 980 [Urine:980]    Fentanyl off  Propofol off    Exam:  Gen: Alert, no apparent distress  HEENT: NC/AT, anicteric, left eye enucleated  Pulm: Clear  Cor: RRR  Abdomen/GI: Soft, nondistended  : Reid with adri urine  Extremities: Warm, mild edema  Skin: Well-perfused  Neuro: Eyes to voice, DOWNING to command, good strength  Psych: Unable to assess    Data:   Labs reviewed  No new culture information  Echocardiogram 2/23  - Mild pulmonary hypertension, LVEF 60-65%    Assessment/plan:  47 y/o man with developmental delay, seizure disorder admitted 2/21 after cardiac arrest following a seizure with respiratory failure. Awake and following commands this morning.  CNS: Alert, follows.  # Seizure disorder  # Developmental delay  # Major depressive disorder  # Schizoaffective disorder  # Toxic/metabolic encephalopathy  # Blindness left eye (enucleation)  - Come off sedation  - Continue current anti-seizure regimen; per neurology could hold zonisamide if oral dose is not available  - Continue buspirone  Pulm: RSBI 70s-80s but he is strong and otherwise meets criteria for trial of extubation this morning.  # Acute on chronic hypoxemic and hypercarbic respiratory failure  #  "Obstructive sleep apnea  # Obesity hypoventilation syndrome  # Mild pulmonary hypertension c/w chronic hypercapnia from ANA/OHS  - Extubation this morning  - BiPAP or CPAP per home settings when sleeping  CV: No new issues.  # Essential hypertension  - Continue propranolol  GI: Abdomen benign  # Nutrition  # Morbid obesity affecting cares, BMI 46  - Tube feedings started yesterday, will likely lose access with extubation  - Bedside swallow evaluation  : UOP has been adequate, though I/O positive and weight up a little from admission.   # Chronic hyperammonemia  - Continue levocarnitine  - Consider diuresis today  Heme: Hb 12.1  Msk: Extremities warm, well-perfused.   Endo: Glucose 77-89  ID: No infectious issues at present.  ICU: LMWH, PPI.    This patient is critically ill to my assessment and requires ICU monitoring and cares. A total of 40 minutes critical care time spent on 2/24/2024, exclusive of procedures.     Rashad Santos MD, PhD  Surgical critical care  2/24/2024, 9:31 AM    Clinically Significant Risk Factors        # Hypokalemia: Lowest K = 3.3 mmol/L in last 2 days, will replace as needed     # Hypomagnesemia: Lowest Mg = 1.3 mg/dL in last 2 days, will replace as needed       # Hypertension: Noted on problem list        # Severe Obesity: Estimated body mass index is 46.9 kg/m  as calculated from the following:    Height as of 2/20/24: 1.727 m (5' 8\").    Weight as of this encounter: 139.9 kg (308 lb 6.8 oz)., PRESENT ON ADMISSION     # Financial/Environmental Concerns:                  "

## 2024-02-24 NOTE — PLAN OF CARE
Problem: Enteral Nutrition  Goal: Absence of Aspiration Signs and Symptoms  Outcome: Adequate for Care Transition  Goal: Safe, Effective Therapy Delivery  Outcome: Adequate for Care Transition  Goal: Feeding Tolerance  Outcome: Adequate for Care Transition   Goal Outcome Evaluation: patient extubated; enteral access lost. Will discontinue enteral orders.

## 2024-02-24 NOTE — PROGRESS NOTES
02/24/24 1600   Appointment Info   Signing Clinician's Name / Credentials (PT) Montse Ingram DPT   Living Environment   People in Home facility resident   Current Living Arrangements group home   Home Accessibility no concerns   Living Environment Comments Pt reports no stairs to navigate   Self-Care   Equipment Currently Used at Home wheelchair, manual;shower chair;walker, rolling   Fall history within last six months yes   Number of times patient has fallen within last six months   (reports many falls at home)   Activity/Exercise/Self-Care Comment Pt reports he transfers inde at home, but does not walk much reports that he mostly uses his W/C. Pt rpeorts he recieves help with dressing and bathing, but does his own toileting   General Information   Onset of Illness/Injury or Date of Surgery 02/22/24   Referring Physician Dr. Pittman   Patient/Family Therapy Goals Statement (PT) Pt agreeable to mobility   Pertinent History of Current Problem (include personal factors and/or comorbidities that impact the POC) Tre Vazquez is a 48 year old male with past medical history significant for developmental delay (lives in group home), seizures, obesity, ANA/OHS on BiPAP at night and while napping, chronic hypercapnia, chronic hyperammoniemia, anemia, schizoaffective disorder, borderline personality, MDD, anxiety, HTN, 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 He received 5 minutes of CPR in the field with ROSC and was subseqently intubated and sedated on arrival to the emergency department. Pt now extubated and now on 4 liters of O2 via NC   Existing Precautions/Restrictions fall;oxygen therapy device and L/min   Cognition   Affect/Mental Status (Cognition) flat/blunted affect   Orientation Status (Cognition) oriented x 3   Follows Commands (Cognition) delayed response/completion;increased processing time needed   Safety Deficit (Cognition) impulsivity;insight  into deficits/self-awareness;judgment;problem-solving;safety precautions awareness   Range of Motion (ROM)   Range of Motion ROM is WFL   Strength (Manual Muscle Testing)   Strength (Manual Muscle Testing) Deficits observed during functional mobility   Bed Mobility   Comment, (Bed Mobility) mod A x 2   Transfers   Comment, (Transfers) min A x 2   Gait/Stairs (Locomotion)   Comment, (Gait/Stairs) able to take 2-3 steps to chair   Clinical Impression   Criteria for Skilled Therapeutic Intervention Yes, treatment indicated   PT Diagnosis (PT) decreased functional mobility   Influenced by the following impairments insight, SOB, decreased strength   Functional limitations due to impairments decreased bed mob, transfers   Clinical Presentation (PT Evaluation Complexity) stable   Clinical Presentation Rationale improving   Clinical Decision Making (Complexity) low complexity   Planned Therapy Interventions (PT) balance training;bed mobility training;gait training;home exercise program;neuromuscular re-education;patient/family education;transfer training;strengthening   Risk & Benefits of therapy have been explained evaluation/treatment results reviewed;care plan/treatment goals reviewed;risks/benefits reviewed;current/potential barriers reviewed;participants voiced agreement with care plan;participants included;patient   PT Total Evaluation Time   PT Eval, Low Complexity Minutes (78396) 5   Physical Therapy Goals   PT Frequency 5x/week   PT Predicted Duration/Target Date for Goal Attainment 02/27/24   PT Goals Bed Mobility;Transfers;Aerobic Activity   PT: Bed Mobility Independent;Supine to/from sit   PT: Transfers Modified independent;Sit to/from stand;Bed to/from chair;Assistive device   PT: Perform aerobic activity with stable cardiovascular response 15 minutes  (transfers)   Interventions   Interventions Quick Adds Therapeutic Activity   Therapeutic Activity   Therapeutic Activities: dynamic activities to improve  functional performance Minutes (72127) 15   Symptoms Noted During/After Treatment Fatigue   Treatment Detail/Skilled Intervention Pt cued for safe mobility with therapist, increased time for room set up/equipment set up as reports transfers only at baseline, and concern having been in bed for 2 days. Pt was cued for supine to sit through modified log roll. Pt needing mod A x 2. Very difficult for pt to push up to sit, considering his chest pain- educated likely from CPR. Pt impulsivly attempting EOB and standing, cued for waiting until device and gait belt, refused SS, despite concern about feet not on the ground. Pt was cued for sit to stand with FWW, min A x 2. Pt was cued for safe stepping to chair. Able to perform wtih CGA x 2, line management, pt  sats low 90s/89 with activity and anxious about being SOB. RN assisting with bipap, did not need urgently from saturation perspective. With RN following.   PT Discharge Planning   PT Plan Progress bed mob, transfers   PT Discharge Recommendation (DC Rec) home with assist;home with home care physical therapy   PT Rationale for DC Rec Pt may need A x 1 for bed mob and transfers at DC, if group home able to accomadate this rec home with home PT, if not may need to have short rehab stay for improving bed mob, transfers.   PT Brief overview of current status A x 2 transfer   Total Session Time   Timed Code Treatment Minutes 15   Total Session Time (sum of timed and untimed services) 20

## 2024-02-24 NOTE — PHARMACY-CONSULT NOTE
Pharmacy Tube Feeding Consult    Medication reviewed for administration by feeding tube and for potential food/drug interactions.    Recommendation: Recommend changing the following medications to a liquid dosage form: zonisamide capsules     Pharmacy will continue to follow as new medications are ordered.

## 2024-02-24 NOTE — PLAN OF CARE
Right wrist and Left wrist restraints discontinued at 09:40 AM on 2/24/2024.    Restraint discontinue criteria met, patient is calm, cooperative and safe. Restraints removed.     Patient's Response: No evidence of learning  Family Notification: Other  Attending Physician Notified: MD ordered restraint,      Jael Almanza RN

## 2024-02-24 NOTE — PROGRESS NOTES
Respiratory Therapy Note    Patient extubated per MD order at 0940 after successful CPAP/PS trial for 90 minutes. Patient extubated to BiPAP therapy. Pt placed on a BiPAP of 14/7 @ 35% via the mask for an increase in WOB and/or SOB. The bridge of the nose looks good and remains intact. Pt is tolerating it well. Patient encouraged to cough and deep breath. Will continue to monitor and assess the pt's current respiratory status and needs.     RT Jeff  9:40 AM February 24, 2024

## 2024-02-24 NOTE — PLAN OF CARE
ICU End of Shift Summary.  For vital signs and complete assessments, please see documentation flowsheets.      Pertinent assessments:   Neuro: RASS -1. Remains sedated after failed cpap trials.   Cardiac: SR. BP stable.   Resp: LS dim. Vent dependent.   GI: BS +. OG in place. TF @ 10 to start once mag / phos above goal  : collazo in place low but adequate out put  Skin: WDL  Lines: PIV x 3.   Drips: Propofol, fent, LR    Major Shift Events:   Echo completed  LR started at 150 ml/hr  Nutrition consult   TF to start @ goal rate of 10 ml/hr once phos and mag replaced to goal.   Attempted to wean in hopes of liberating from vent. Failed multiple attempts.     Plan (Upcoming Events): Attempt to ween from vent and sedation as able. Continue supportive cares.   Discharge/Transfer Needs:  TBD.      Bedside Shift Report Completed :yes   Bedside Safety Check Completed: yes     Right wrist and Left wrist restraints continued 2/23/2024    Clinical Justification: Pulling lines, pulling tubes, and pulling equipment  Less Restrictive Alternative: Repositioning, Re-evaluate equipment, Disguise equipment, Pain management, Alarm, De-escalation, Reorientation  Attending Physician Notified: MD ordered restraint,     New orders placed Yes  Length of Order: 1 Day      Jael Almanza RN

## 2024-02-24 NOTE — PROGRESS NOTES
49 yo male with intractable seizures, question of VF arrest. Echo normal, trops normal, ECG initially with Prolonged QTc.  Recommend avoiding QT prolonging meds ie) home meds- citalopram and seroquel  Please call with further questions.

## 2024-02-24 NOTE — PROGRESS NOTES
[facility] ICU VENTILATOR RESPIRATORY NOTE  Date of Admission: 2/22/2024  Date of Intubation (most recent): 2/22/2024  Reason for Mechanical Ventilation: Respiratory Failure  Number of Days on Mechanical Ventilation: 3  Met Criteria for Pressure Support Trial: yes  Length of Pressure Support Trial: Started at 0558 on 7/8      ETT appearance on chest x-ray: In good position, 2.8cm above lisa    PPlat: 23  PEEPi: 2.3-2.5  BS: Diminished  Secretions: Scant/small, thick    /86   Pulse 82   Temp 98  F (36.7  C) (Axillary)   Resp 14   Wt 137.1 kg (302 lb 4 oz)   SpO2 95%   BMI 45.96 kg/m      Vent Mode: (S) CPAP/PS  (Continuous positive airway pressure with Pressure Support)  FiO2 (%): 35 %  Resp Rate (Set): 14 breaths/min  Tidal Volume (Set, mL): 460 mL  PEEP (cm H2O): 7 cmH2O  Pressure Support (cm H2O): 8 cmH2O  Resp: 14    Plan:  Continue SBT as tolerated    Sharla Mackenzie RT

## 2024-02-24 NOTE — PLAN OF CARE
Goal Outcome Evaluation:      Plan of Care Reviewed With: patient    Overall Patient Progress: no changeOverall Patient Progress: no change     ICU End of Shift Summary.  For vital signs and complete assessments, please see documentation flowsheets.      Neuro: More alert tonight, continues to squeeze hands to command/wiggle feet. Pulls at restraints. Points at the vent.   Cardiac: tele sr, bp stable. 1+-2+ edema.  Resp: Vent supported 35%, thick tan/creamy ett secretions.   GI: incontinent of small amounts of stool  : collazo with adri/dark yellow output  Lines: piv x2  Drips: fent, prop, LR    Major Shift Events:   Weaned about 35 min this AM before RR increased to upper 40s/low 50, desat to low 60%s, and became restless.   Able to start tube feeding 0400.  Plan (Upcoming Events): continue weaning       Right wrist and Left wrist restraints continued 2/24/2024    Clinical Justification: Pulling lines, pulling tubes, and pulling equipment  Less Restrictive Alternative: Repositioning, Re-evaluate equipment, Disguise equipment, Pain management, Alarm, De-escalation, Reorientation  Attending Physician Notified: MD ordered restraint,     New orders placed Yes  Length of Order: 1 Day      Vanessa Caal, RN

## 2024-02-24 NOTE — PROGRESS NOTES
[facility]    Medicine Progress Note - Hospitalist Service  Date of Admission: 2/22/2024     Assessment & Plan         Tre Vazquez is a 48 year old male with past medical history significant for developmental delay (lives in group home), seizures, obesity, ANA/OHS on BiPAP at night and while napping, chronic hypercapnia, chronic hyperammoniemia, anemia, schizoaffective disorder, borderline personality, MDD, anxiety, HTN, 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 He received 5 minutes of CPR in the field with ROSC and was subseqently intubated and sedated on arrival to the emergency department.     Cardiac Arrest s/p ROSC  Prolonged QTc  Noted to have witness cardiac arrest by EMS after seizure activity. I suspect this was primary respiratory failure that led to cardiovascular collapse. ROSC achieved after 5 minutes of CPR. On continuous cardiac monitoring now without evidence of continued ectopy. TTE without wall motion abnormalities and with retained EF (60-65%). Avoiding PTA seroquel and citalopram which are QT prolonging. Cardiology consulted, appreciate recommendations     Breakthrough Seizure Activity  Epilepsy  [facility]. Was recently admitted to observation from 2/20/2024-2/21/2024 for possible seizure activity. He does have a history of epilepsy, but also history of reporting seizure activity when there has been none. Had previously been transferred for 24 hours EEG at [facility] without evidence of seizure activity. Remains on lacosamide, lamotrigine, keppra, zonisamide, and depakote. Received versed in field and has been sedated sicne intubation in ER. Briefly became agitated with lightened sedation upon arrival to ICU and was moving all extremities. Will need to wean sedation to off prior to full neuro examination. Neurology consulted, appreciate recommendations.     Acute Hypoxic & Acute  on Chronic Hypercapnic Respiratory Failure, improving  Acute Respiratory Acidosis  ANA/OHS  Chronic Hypercapnia  Pulmonary Hypertension  Currently intubated after cardiac arrest. Normally wears BiPAP at night and with naps for chronic hypercapnia. Presented qith pH 6.98, pCO2 >130 after cardiac arrest. Now improved with intubation, but lower than baseline ~mid-70s. Patient apneic with pressure support trials on 2/23/2024. Able to bipap extubate 2/24/2024.     Schizoaffective Disorder  Borderline Personality disorder  MDD  Anxiety  Continue home buspirone once weaned from sedation. Holding PTA citalopram and seroquel.     Developmental Delay: Lives in group home. Uses wheelchair and walker interchangeably.  Hypertension: Continue PTA propranolol + prazosin.  GERD: Prontonix  Chronic Hyperammoniemia: Continue PTA levocarnitine.  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 Indication: ICU  Code Status: Full Code      The patient's care was discussed with the Bedside Nurse and critical care Consultant(s).  I personally spent 65 minutes of critical care time on chart review, documentation, coordination, and bedside management of patient.    Emeterio Pittman MD, S  Hospitalist Service  [facility]  ______________________________________________________________________    Interval History   Nursing notes reviewed; no acute events overnight. Patient able to pressure support and extubate this morning.    A full 10+ point review of systems was performed and found to be negative with the exception of those items noted here.    Physical Exam   Temp: 98.2  F (36.8  C) Temp src: Temporal BP: 113/57 Pulse: 86   Resp: 25 SpO2: 93 % O2 Device:  "BiPAP/CPAP     Weight: 139.9 kg (308 lb 6.8 oz)  Estimated body mass index is 46.9 kg/m  as calculated from the following:    Height as of 2/20/24: 1.727 m (5' 8\").    Weight as of this encounter: 139.9 kg (308 lb 6.8 oz).    General: Critically-ill male resting comfortably in hospital bed. Awake and calm. Follows commands.  HEENT: Normocephalic, atraumatic. PERRL. Conjunctiva clear, sclera anicteric. Mucus membranes moist. Left eye enucleation.  Cardiac: Regular rate and rhythm without murmur, gallop or rub. No peripheral edema. Peripheral pulses intact.  Respiratory: Clear to auscultation without wheezing, rales or rhonchi. ETT in place. Synchronous with ventilator.  Abdominal: Normoactive bowel sounds. Soft, nontender, nondistended.  : Reid in place draining yellow urine.  Musculoskeletal: Moving all extremities with lightened sedation.  Skin: Warm and well perfused. No rashes or abrasions on exposed skin.  Neurologic: Sedated.   Psychiatric: Unable to assess.    Data   All laboratory results and other diagnostic data from the past 24 hours is available in Epic and has been personally reviewed.    Recent Labs   Lab 02/24/24  1151 02/24/24  0815 02/24/24  0537 02/23/24  1200 02/23/24  0755 02/23/24  0525 02/23/24  0003 02/22/24  2243 02/22/24  1159 02/22/24  0639 02/22/24  0632 02/21/24  0548 02/20/24  0903   WBC  --   --   --   --  10.0  --   --  7.6  --   --  13.5*   < > 5.8   HGB  --   --   --   --  12.1*  --   --  11.3*  --  15.3 13.5   < > 12.4*   MCV  --   --   --   --  96  --   --  97  --   --  104*   < > 100   PLT  --   --   --   --  167  --   --  151  --   --  239   < > 175   INR  --   --   --   --   --   --   --   --   --   --  0.96  --   --    NA  --   --  139  --   --  142  --  141  --  142 143   < > 143   POTASSIUM  --   --  3.9  --   --  3.6  3.6  --  3.3*  --  4.0 4.2   < > 4.3   CHLORIDE  --   --  99  --   --  100  --  100  --  94 94*   < > 98   CO2  --   --  32*  --   --  33*  --  36*  --   -- "  29   < > 40*   BUN  --   --  13.1  --   --  14.4  --  15.4  --  14 13.5   < > 15.9   CR  --   --  0.62*  --   --  0.64*  --  0.59*  --  0.9 0.70   < > 0.60*   ANIONGAP  --   --  8  --   --  9  --  5*  --   --  20*   < > 5*   ARNOLD  --   --  8.4*  --   --  8.7  --  8.5*  --   --  9.1   < > 8.8   GLC 76 79 74   < >  --  89   < > 95   < > 198* 202*   < > 89   ALBUMIN  --   --   --   --   --   --   --   --   --   --  4.5  --  4.0   PROTTOTAL  --   --   --   --   --   --   --   --   --   --  6.9  --  6.4   BILITOTAL  --   --   --   --   --   --   --   --   --   --  0.3  --  0.2   ALKPHOS  --   --   --   --   --   --   --   --   --   --  89  --  76   ALT  --   --   --   --   --   --   --   --   --   --  48  --  42   AST  --   --   --   --   --   --   --   --   --   --  40  --  31   LIPASE  --   --   --   --   --   --   --   --   --   --  45  --   --     < > = values in this interval not displayed.       Imaging results reviewed over the past 24 hrs:   No results found for this or any previous visit (from the past 24 hour(s)).    I personally reviewed: no images or EKG's today.

## 2024-02-25 ENCOUNTER — APPOINTMENT (OUTPATIENT)
Dept: SPEECH THERAPY | Facility: CLINIC | Age: 49
DRG: 100 | End: 2024-02-25
Attending: STUDENT IN AN ORGANIZED HEALTH CARE EDUCATION/TRAINING PROGRAM
Payer: MEDICARE

## 2024-02-25 LAB
ANION GAP SERPL CALCULATED.3IONS-SCNC: 7 MMOL/L (ref 7–15)
BUN SERPL-MCNC: 7.7 MG/DL (ref 6–20)
CALCIUM SERPL-MCNC: 8.7 MG/DL (ref 8.6–10)
CHLORIDE SERPL-SCNC: 97 MMOL/L (ref 98–107)
CREAT SERPL-MCNC: 0.6 MG/DL (ref 0.67–1.17)
CREAT SERPL-MCNC: 0.7 MG/DL (ref 0.67–1.17)
DEPRECATED HCO3 PLAS-SCNC: 36 MMOL/L (ref 22–29)
EGFRCR SERPLBLD CKD-EPI 2021: >90 ML/MIN/1.73M2
EGFRCR SERPLBLD CKD-EPI 2021: >90 ML/MIN/1.73M2
GLUCOSE BLDC GLUCOMTR-MCNC: 109 MG/DL (ref 70–99)
GLUCOSE BLDC GLUCOMTR-MCNC: 69 MG/DL (ref 70–99)
GLUCOSE BLDC GLUCOMTR-MCNC: 75 MG/DL (ref 70–99)
GLUCOSE BLDC GLUCOMTR-MCNC: 83 MG/DL (ref 70–99)
GLUCOSE BLDC GLUCOMTR-MCNC: 86 MG/DL (ref 70–99)
GLUCOSE BLDC GLUCOMTR-MCNC: 87 MG/DL (ref 70–99)
GLUCOSE BLDC GLUCOMTR-MCNC: 93 MG/DL (ref 70–99)
GLUCOSE BLDC GLUCOMTR-MCNC: 95 MG/DL (ref 70–99)
GLUCOSE SERPL-MCNC: 100 MG/DL (ref 70–99)
PHOSPHATE SERPL-MCNC: 3.7 MG/DL (ref 2.5–4.5)
PLATELET # BLD AUTO: 162 10E3/UL (ref 150–450)
POTASSIUM SERPL-SCNC: 3.8 MMOL/L (ref 3.4–5.3)
SODIUM SERPL-SCNC: 140 MMOL/L (ref 135–145)

## 2024-02-25 PROCEDURE — 84100 ASSAY OF PHOSPHORUS: CPT | Performed by: STUDENT IN AN ORGANIZED HEALTH CARE EDUCATION/TRAINING PROGRAM

## 2024-02-25 PROCEDURE — 120N000013 HC R&B IMCU

## 2024-02-25 PROCEDURE — 94660 CPAP INITIATION&MGMT: CPT

## 2024-02-25 PROCEDURE — 250N000011 HC RX IP 250 OP 636: Performed by: SURGERY

## 2024-02-25 PROCEDURE — 250N000013 HC RX MED GY IP 250 OP 250 PS 637: Performed by: SURGERY

## 2024-02-25 PROCEDURE — 85049 AUTOMATED PLATELET COUNT: CPT | Performed by: STUDENT IN AN ORGANIZED HEALTH CARE EDUCATION/TRAINING PROGRAM

## 2024-02-25 PROCEDURE — 36415 COLL VENOUS BLD VENIPUNCTURE: CPT | Performed by: STUDENT IN AN ORGANIZED HEALTH CARE EDUCATION/TRAINING PROGRAM

## 2024-02-25 PROCEDURE — 250N000013 HC RX MED GY IP 250 OP 250 PS 637: Performed by: STUDENT IN AN ORGANIZED HEALTH CARE EDUCATION/TRAINING PROGRAM

## 2024-02-25 PROCEDURE — C9113 INJ PANTOPRAZOLE SODIUM, VIA: HCPCS | Performed by: SURGERY

## 2024-02-25 PROCEDURE — 92526 ORAL FUNCTION THERAPY: CPT | Mod: GN

## 2024-02-25 PROCEDURE — 99233 SBSQ HOSP IP/OBS HIGH 50: CPT | Performed by: INTERNAL MEDICINE

## 2024-02-25 PROCEDURE — 250N000013 HC RX MED GY IP 250 OP 250 PS 637: Performed by: INTERNAL MEDICINE

## 2024-02-25 PROCEDURE — 82565 ASSAY OF CREATININE: CPT | Performed by: STUDENT IN AN ORGANIZED HEALTH CARE EDUCATION/TRAINING PROGRAM

## 2024-02-25 PROCEDURE — 250N000011 HC RX IP 250 OP 636: Performed by: STUDENT IN AN ORGANIZED HEALTH CARE EDUCATION/TRAINING PROGRAM

## 2024-02-25 PROCEDURE — 999N000157 HC STATISTIC RCP TIME EA 10 MIN

## 2024-02-25 PROCEDURE — 92610 EVALUATE SWALLOWING FUNCTION: CPT | Mod: GN

## 2024-02-25 PROCEDURE — 5A09457 ASSISTANCE WITH RESPIRATORY VENTILATION, 24-96 CONSECUTIVE HOURS, CONTINUOUS POSITIVE AIRWAY PRESSURE: ICD-10-PCS | Performed by: INTERNAL MEDICINE

## 2024-02-25 PROCEDURE — 36415 COLL VENOUS BLD VENIPUNCTURE: CPT | Performed by: INTERNAL MEDICINE

## 2024-02-25 PROCEDURE — 82565 ASSAY OF CREATININE: CPT | Performed by: INTERNAL MEDICINE

## 2024-02-25 RX ORDER — PANTOPRAZOLE SODIUM 40 MG/1
40 TABLET, DELAYED RELEASE ORAL
Status: DISCONTINUED | OUTPATIENT
Start: 2024-02-26 | End: 2024-02-28 | Stop reason: HOSPADM

## 2024-02-25 RX ORDER — QUETIAPINE FUMARATE 25 MG/1
25 TABLET, FILM COATED ORAL AT BEDTIME
Status: DISCONTINUED | OUTPATIENT
Start: 2024-02-25 | End: 2024-02-25

## 2024-02-25 RX ORDER — CITALOPRAM HYDROBROMIDE 10 MG/1
10 TABLET ORAL DAILY
Status: DISCONTINUED | OUTPATIENT
Start: 2024-02-25 | End: 2024-02-25

## 2024-02-25 RX ADMIN — DIVALPROEX SODIUM 500 MG: 500 TABLET, DELAYED RELEASE ORAL at 20:19

## 2024-02-25 RX ADMIN — ZONISAMIDE 100 MG: 100 CAPSULE ORAL at 07:49

## 2024-02-25 RX ADMIN — OXYCODONE HYDROCHLORIDE 5 MG: 5 TABLET ORAL at 14:16

## 2024-02-25 RX ADMIN — LEVETIRACETAM 1500 MG: 500 TABLET, FILM COATED ORAL at 21:53

## 2024-02-25 RX ADMIN — ZONISAMIDE 200 MG: 100 CAPSULE ORAL at 21:53

## 2024-02-25 RX ADMIN — LEVOCARNITINE 660 MG: 330 TABLET ORAL at 14:16

## 2024-02-25 RX ADMIN — LAMOTRIGINE 200 MG: 200 TABLET ORAL at 20:23

## 2024-02-25 RX ADMIN — CITALOPRAM HYDROBROMIDE 10 MG: 10 TABLET ORAL at 13:24

## 2024-02-25 RX ADMIN — ENOXAPARIN SODIUM 40 MG: 40 INJECTION SUBCUTANEOUS at 14:16

## 2024-02-25 RX ADMIN — LEVOCARNITINE 660 MG: 330 TABLET ORAL at 21:53

## 2024-02-25 RX ADMIN — PROPRANOLOL HYDROCHLORIDE 20 MG: 20 TABLET ORAL at 20:20

## 2024-02-25 RX ADMIN — CHLORHEXIDINE GLUCONATE 15 ML: 1.2 SOLUTION ORAL at 20:20

## 2024-02-25 RX ADMIN — ENOXAPARIN SODIUM 40 MG: 40 INJECTION SUBCUTANEOUS at 04:24

## 2024-02-25 RX ADMIN — LEVETIRACETAM 1000 MG: 500 TABLET, FILM COATED ORAL at 07:47

## 2024-02-25 RX ADMIN — PANTOPRAZOLE SODIUM 40 MG: 40 INJECTION, POWDER, FOR SOLUTION INTRAVENOUS at 07:47

## 2024-02-25 RX ADMIN — LAMOTRIGINE 200 MG: 200 TABLET ORAL at 07:47

## 2024-02-25 RX ADMIN — BUSPIRONE HYDROCHLORIDE 15 MG: 15 TABLET ORAL at 07:53

## 2024-02-25 RX ADMIN — LACOSAMIDE 50 MG: 50 TABLET, FILM COATED ORAL at 07:48

## 2024-02-25 RX ADMIN — LEVOCARNITINE 660 MG: 330 TABLET ORAL at 07:48

## 2024-02-25 RX ADMIN — PROPRANOLOL HYDROCHLORIDE 20 MG: 20 TABLET ORAL at 07:49

## 2024-02-25 RX ADMIN — Medication 15 ML: at 07:51

## 2024-02-25 RX ADMIN — DIVALPROEX SODIUM 500 MG: 500 TABLET, DELAYED RELEASE ORAL at 07:48

## 2024-02-25 RX ADMIN — LAMOTRIGINE 100 MG: 100 TABLET ORAL at 13:24

## 2024-02-25 RX ADMIN — BUSPIRONE HYDROCHLORIDE 15 MG: 15 TABLET ORAL at 20:19

## 2024-02-25 RX ADMIN — OXYCODONE HYDROCHLORIDE 5 MG: 5 TABLET ORAL at 05:50

## 2024-02-25 ASSESSMENT — ACTIVITIES OF DAILY LIVING (ADL)
ADLS_ACUITY_SCORE: 53
ADLS_ACUITY_SCORE: 53
ADLS_ACUITY_SCORE: 49
ADLS_ACUITY_SCORE: 49
ADLS_ACUITY_SCORE: 53
ADLS_ACUITY_SCORE: 53
ADLS_ACUITY_SCORE: 49
ADLS_ACUITY_SCORE: 49
ADLS_ACUITY_SCORE: 53
ADLS_ACUITY_SCORE: 49
ADLS_ACUITY_SCORE: 53
ADLS_ACUITY_SCORE: 49
ADLS_ACUITY_SCORE: 53
ADLS_ACUITY_SCORE: 49
ADLS_ACUITY_SCORE: 49
ADLS_ACUITY_SCORE: 53

## 2024-02-25 NOTE — PROGRESS NOTES
Respiratory Therapy Note    Pt seen on and off BiPAP therapy of 14/7 @ 40% via the mask for an increase in WOB and/or SOB. The bridge of the nose looks good and remains intact. Pt is tolerating it well. When off BiPAP patient on 6-7 LPM oxymizer cannula. Will continue to monitor and assess the pt's current respiratory status and needs.    Valencia Reyes, RT  4:33 PM February 25, 2024

## 2024-02-25 NOTE — PLAN OF CARE
ICU End of Shift Summary.  For vital signs and complete assessments, please see documentation flowsheets.      Pertinent assessments:   Neuro: Alert. Disorientated to time and situation.   Cardiac: SR. BP stable.   Resp: LS dim, maintaining sats on bipap / nasal canula.   GI: BS +. NPO pending swallow eval.   : collazo in place. WDL  Skin: WDL  Lines: PIV  Drips: none.     Major Shift Events:   Extubated @ 0940  Lasix 40 mg given IVP  IV fluids dc'd  PT consult  Plan (Upcoming Events): continue supportive cares, monitor respiratory status, encourage activity.   Discharge/Transfer Needs: TBD     Bedside Shift Report Completed : yes  Bedside Safety Check Completed: yes

## 2024-02-25 NOTE — PROGRESS NOTES
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 He received 5 minutes of CPR in the field with ROSC and was subseqently intubated and sedated on arrival to the emergency department.  Had a prolonged stay but has improved and is now extubated/improving.     Cardiac Arrest s/p ROSC, suspect seizure/respiratory trigger  Prolonged QTc concern though later assessment with improved QT:  Noted to have witness cardiac arrest by EMS after seizure activity. I suspect this was primary respiratory failure that led to cardiovascular collapse. ROSC achieved after 5 minutes of CPR. On continuous cardiac monitoring now without evidence of continued ectopy. TTE without wall motion abnormalities and with retained EF (60-65%).  Unclear if QT played much a role.  He was notably on high dose psychiatric medications that can prolong QT.  -  Patient with significant mental health issues.   QT now better.   See discussion below concerning meds.     Breakthrough Seizure Activity  Epilepsy:  Witnessed seizure activity at halfway. Was recently admitted to observation from 2/20/2024-2/21/2024 for possible seizure activity. He does have a history of epilepsy, but also history of reporting seizure activity when there has been none. Had previously been transferred for 24 hours EEG at [facility] without evidence of seizure activity. Remains on lacosamide, lamotrigine, keppra, zonisamide, and depakote.  -  Neurology consulted, appreciate  recommendations.     Acute Hypoxic & Acute on Chronic Hypercapnic Respiratory Failure, improving  Acute Respiratory Acidosis  ANA/OHS  Chronic Hypercapnia  Pulmonary Hypertension:  Was intubated after cardiac arrest. Normally wears BiPAP at night and with naps for chronic hypercapnia. Presented qith pH 6.98, pCO2 >130 after cardiac arrest. Now improved with intubation, but lower than baseline ~mid-70s. Patient apneic with pressure support trials on 2/23/2024. Able to bipap extubate 2/24/2024.  Using some intermittent BiPAP on 2/25 and with sleep.  Patient improved but still needing ongoing extra O2 + BiPAP.  This afternoon felt more SOB again, had to go back on BiPAP.  Will leave in ICU overnight tonight.  If resp status continues to improve, can likely move to floor tomorrow.     Schizoaffective Disorder  Borderline Personality disorder  MDD  Anxiety  Continue home buspirone. Had been holding PTA citalopram and seroquel.  Patients behaviors escalating slightly, hasn't slept as much.  Recent QT ok.  Will give a small dose of celexa 10 mg and recheck QT EKG in AM.  May be reasonable to continue selective serotonin reuptake inhibitor without the concominant use of high dose seroquel (was PTA on 300 mg + 60 of celexa).  -  I will ask psychiatry to see tomorrow for further recommendations given limits with QT prolonging meds.  Rechecking EKG in AM after small amount of celexa today.     Dysphagia:  -  Modified diet, speech following    Developmental Delay: Lives in group home. Uses wheelchair and walker interchangeably.    Hypertension: Continue PTA propranolol + prazosin.    GERD: Prontonix resumed, QT was improved    Chronic Hyperammoniemia: Continue PTA levocarnitine.    Left Eye Blindness 2/2 Enucleation     Disposition/SW issues:  POA - patient has stepparent helping but not officially POA by report.  SW consult to assist.  Unclear if group home will be able to immediately meet his care needs, may need  "TCU/rehab.    Medical Decision Making       Over 50 MINUTES SPENT BY ME on the date of service doing chart review, history, exam, documentation & further activities per the note.      Labs/Imaging Reviewed:  See Information above and Data section below    PPE Used:  Mask       Diet: Combination Diet Minced and Moist Diet (level 5); Thin Liquids (level 0) (Supervision and assistance with all oral intake; encourage slow rate and small bites/sips)    DVT Prophylaxis: Enoxaparin (Lovenox) SQ  Reid Catheter: Not present  Lines: None     Cardiac Monitoring: ACTIVE order. Indication: ICU  Code Status: Full Code      Clinically Significant Risk Factors                  # Hypertension: Noted on problem list        # Severe Obesity: Estimated body mass index is 46.9 kg/m  as calculated from the following:    Height as of 2/20/24: 1.727 m (5' 8\").    Weight as of this encounter: 139.9 kg (308 lb 6.8 oz)., PRESENT ON ADMISSION     # Financial/Environmental Concerns:           Disposition Plan      Expected Discharge Date: 02/28/2024                  Jaskaran Veliz DO  Hospitalist Service  [facility]  Securely message with [employer] (more info)  Text page via [facility] Paging/Directory   ______________________________________________________________________    Interval History   Assumed care for day, history reviewed.  Mr. Vazquez had no major new complaints.  His main concern was if he could have more regular food (on modified diet with dysphagia concern).  Denied pain.  Staff reported didn't sleep well overnight, somewhat anxious/agitated at times.  This was better in AM/daytime hours.    Physical Exam   Vital Signs: Temp: 97.2  F (36.2  C) Temp src: Temporal BP: (!) 163/95 Pulse: 81   Resp: 16 SpO2: 97 % O2 Device: BiPAP/CPAP Oxygen Delivery: (S) 7 LPM  Weight: 308 lbs 6.78 oz    GEN:  Alert, oriented to self/place, confused with details, appears comfortable, no overt distress.  HEENT:  " Normocephalic/atraumatic, no scleral icterus, no nasal discharge, mouth moist.  CV:  Regular rate and rhythm, no murmur or JVD.  S1 + S2 noted, no S3 or S4.  LUNGS:  Somewhat course but moving air well, no wheezing/retractions.  Symmetric chest rise on inhalation noted.  ABD:  Active bowel sounds, soft, non-tender, mildly distended.  No guarding/rigidity.  EXT:  +1 edema.  No cyanosis.  No acute joint synovitis noted.      Medications      busPIRone  15 mg Oral or Feeding Tube BID    chlorhexidine  15 mL Mouth/Throat Q12H    citalopram  10 mg Oral Daily    divalproex sodium delayed-release  500 mg Oral Q12H [facility] (08/20)    enoxaparin ANTICOAGULANT  40 mg Subcutaneous Q12H    lacosamide  50 mg Oral or Feeding Tube Daily    lamoTRIgine  100 mg Oral or Feeding Tube Daily    lamoTRIgine  200 mg Oral or Feeding Tube BID    levETIRAcetam  1,000 mg Oral or Feeding Tube QAM    And    levETIRAcetam  1,500 mg Oral or Feeding Tube At Bedtime    levOCARNitine  660 mg Oral or Feeding Tube TID    multivitamins w/minerals  15 mL Per Feeding Tube Daily    [START ON 2/26/2024] pantoprazole  40 mg Oral QAM AC    propranolol  20 mg Oral or Feeding Tube BID    QUEtiapine  25 mg Oral At Bedtime    zonisamide  100 mg Oral or Feeding Tube QAM    And    zonisamide  200 mg Oral or Feeding Tube At Bedtime       Data     Labs and Imaging results below reviewed today.  Recent Labs   Lab 02/25/24  0535 02/23/24  0755 02/22/24  2243 02/22/24  0639 02/22/24  0632   WBC  --  10.0 7.6  --  13.5*   HGB  --  12.1* 11.3* 15.3 13.5   HCT  --  37.0* 35.2* 45 45.6   MCV  --  96 97  --  104*    167 151  --  239     7-Day Micro Results       Collected Updated Procedure Result Status      02/22/2024 0652 02/22/2024 0751 Symptomatic Influenza A/B, RSV, & SARS-CoV2 PCR (COVID-19) Nasopharyngeal [[id number]]    Swab from Nasopharyngeal    Final result Component Value   Influenza A PCR Negative   Influenza B PCR Negative   RSV PCR Negative   SARS CoV2  PCR Negative   NEGATIVE: SARS-CoV-2 (COVID-19) RNA not detected, presumed negative.            02/21/2024 1730 02/21/2024 1823 Symptomatic Influenza A/B, RSV, & SARS-CoV2 PCR (COVID-19) Nasopharyngeal [[id number]]    Swab from Nasopharyngeal    Final result Component Value   Influenza A PCR Negative   Influenza B PCR Negative   RSV PCR Negative   SARS CoV2 PCR Negative   NEGATIVE: SARS-CoV-2 (COVID-19) RNA not detected, presumed negative.                  Recent Labs   Lab 02/25/24  1232 02/25/24  0802 02/25/24  0638 02/25/24  0611 02/25/24  0535 02/24/24  0815 02/24/24  0537 02/24/24  0351 02/24/24  0003 02/23/24  2142 02/23/24  1200 02/23/24  0525 02/23/24  0003 02/22/24  2243 02/22/24  0639 02/22/24  0632 02/21/24  0548 02/20/24  0903   NA  --   --   --   --   --   --  139  --   --   --   --  142  --  141   < > 143   < > 143   POTASSIUM  --   --   --   --   --   --  3.9  --   --   --   --  3.6  3.6  --  3.3*   < > 4.2   < > 4.3   CHLORIDE  --   --   --   --   --   --  99  --   --   --   --  100  --  100   < > 94*   < > 98   CO2  --   --   --   --   --   --  32*  --   --   --   --  33*  --  36*  --  29   < > 40*   ANIONGAP  --   --   --   --   --   --  8  --   --   --   --  9  --  5*  --  20*   < > 5*   GLC 95 87 86   < >  --    < > 74  --    < >  --    < > 89   < > 95   < > 202*   < > 89   BUN  --   --   --   --   --   --  13.1  --   --   --   --  14.4  --  15.4   < > 13.5   < > 15.9   CR  --   --   --   --  0.70  --  0.62*  --   --   --   --  0.64*  --  0.59*   < > 0.70   < > 0.60*   GFRESTIMATED  --   --   --   --  >90  --  >90  --   --   --   --  >90  --  >90  --  >90   < > >90   ARNODL  --   --   --   --   --   --  8.4*  --   --   --   --  8.7  --  8.5*  --  9.1   < > 8.8   MAG  --   --   --   --   --   --  1.7  --   --  1.8  --  1.3*  --   --   --  1.7  --  1.6*   PHOS  --   --   --   --  3.7  --  4.0 3.7  --   --   --  1.5*   < >  --   --   --   --   --    PROTTOTAL  --   --   --   --   --   --   --   --    --   --   --   --   --   --   --  6.9  --  6.4   ALBUMIN  --   --   --   --   --   --   --   --   --   --   --   --   --   --   --  4.5  --  4.0   BILITOTAL  --   --   --   --   --   --   --   --   --   --   --   --   --   --   --  0.3  --  0.2   ALKPHOS  --   --   --   --   --   --   --   --   --   --   --   --   --   --   --  89  --  76   AST  --   --   --   --   --   --   --   --   --   --   --   --   --   --   --  40  --  31   ALT  --   --   --   --   --   --   --   --   --   --   --   --   --   --   --  48  --  42    < > = values in this interval not displayed.       No results found for this or any previous visit (from the past 24 hour(s)).

## 2024-02-25 NOTE — PROGRESS NOTES
"Clinical Swallow Evaluation  [facility]     02/25/24 0900   Appointment Info   Signing Clinician's Name / Credentials (SLP) Nevin Johnson MS, CCC-SLP   General Information   Onset of Illness/Injury or Date of Surgery 02/22/24   Referring Physician Emeterio Pittman MD   Patient/Family Therapy Goal Statement (SLP) none stated   Pertinent History of Current Problem Per provider note: \"Tre Vazquez is a 48 year old male with past medical history significant for developmental delay (lives in group home), seizures, obesity, ANA/OHS on BiPAP at night and while napping, chronic hypercapnia, chronic hyperammoniemia, anemia, schizoaffective disorder, borderline personality, MDD, anxiety, HTN, 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 He received 5 minutes of CPR in the field with ROSC and was subseqently intubated and sedated on arrival to the emergency department.\"   General Observations Patient is alert and agreeable to evaluation. Confused with underlying developmental delay, though pleasant   Type of Evaluation   Type of Evaluation Swallow Evaluation   Oral Motor   Oral Musculature generally intact   Structural Abnormalities none present   Dentition (Oral Motor)   Dentition (Oral Motor) edentulous   Facial Symmetry (Oral Motor)   Facial Symmetry (Oral Motor)   (WFL, though appears to have slight weakness on R)   Lip Function (Oral Motor)   Lip Range of Motion (Oral Motor) unable/difficult to assess   Lip Strength (Oral Motor) unable/difficult to assess   Tongue Function (Oral Motor)   Tongue Strength (Oral Motor) WFL   Tongue Coordination/Speed (Oral Motor) unable/difficult to assess   Tongue ROM (Oral Motor) unable/difficult to assess   Jaw Function (Oral Motor)   Jaw Function (Oral Motor) WNL   Cough/Swallow/Gag Reflex (Oral Motor)   Volitional Throat Clear/Cough (Oral Motor) unable/difficult to assess   Volitional Swallow (Oral Motor) " WNL   Vocal Quality/Secretion Management (Oral Motor)   Vocal Quality (Oral Motor) WFL   General Swallowing Observations   Past History of Dysphagia None per EMR   Respiratory Support nasal cannula   Current Diet/Method of Nutritional Intake (General Swallowing Observations, NIS) NPO   Swallowing Evaluation Clinical swallow evaluation   Clinical Swallow Evaluation   Feeding Assistance frequent cues/help required   Clinical Swallow Evaluation Textures Trialed thin liquids;pureed   Clinical Swallow Eval: Thin Liquid Texture Trial   Mode of Presentation, Thin Liquids cup;straw;fed by clinician   Volume of Liquid or Food Presented 4 oz   Oral Phase of Swallow WFL   Pharyngeal Phase of Swallow intact   Diagnostic Statement No overt s/sx of aspiration. Single cough following larger drink of thin liquids through straw. He was responsive to cues for smaller drinks   Clinical Swallow Evaluation: Puree Solid Texture Trial   Mode of Presentation, Puree spoon;fed by clinician   Volume of Puree Presented 4 oz   Oral Phase, Puree WFL   Pharyngeal Phase, Puree intact   Diagnostic Statement No overt s/sx of aspiration. No residue. Timely bolus formation and transport   Esophageal Phase of Swallow   Patient reports or presents with symptoms of esophageal dysphagia No   Swallowing Recommendations   Diet Consistency Recommendations minced & moist (level 5);thin liquids (level 0)   Supervision Level for Intake 1:1 supervision needed   Mode of Delivery Recommendations bolus size, small;slow rate of intake   Swallowing Maneuver Recommendations alternate food and liquid intake   Monitoring/Assistance Required (Eating/Swallowing) stop eating activities when fatigue is present;monitor for cough or change in vocal quality with intake   Recommended Feeding/Eating Techniques (Swallow Eval) maintain upright sitting position for eating;maintain upright posture during/after eating for 30 minutes;minimize distractions during oral intake;provide  "assist with feeding   Medication Administration Recommendations, Swallowing (SLP) as tolerated   General Therapy Interventions   Planned Therapy Interventions Dysphagia Treatment   Dysphagia treatment Modified diet education;Instruction of safe swallow strategies   Clinical Impression   Criteria for Skilled Therapeutic Interventions Met (SLP Eval) Yes, treatment indicated   SLP Diagnosis mild oral dysphagia (d/t edentulous status; likely close to baseline)   Risks & Benefits of therapy have been explained evaluation/treatment results reviewed;care plan/treatment goals reviewed;patient   Clinical Impression Comments Clinical swallow evaluation completed per MD order. Patient on 6 lpm via cannula. Confused at baseline. Patient with no overt s/sx of aspiration with all trials, though only trialed thin liquids and puree solids d/t edentulous status. Patient does state he eats pizza at baseline, though avoids \"hard foods\" (gives steak as example). Patient appears somewhat impulsive and with reduced awareness to deficits, environment, etc. Straws OK. Patient will require support/assistance with all oral intake.   SLP Total Evaluation Time   Eval: oral/pharyngeal swallow function, clinical swallow Minutes (56768) 15   SLP Goals   Therapy Frequency (SLP Eval) 4 times/week   SLP Predicted Duration/Target Date for Goal Attainment 03/10/24   SLP Goals Swallow   SLP: Safely tolerate diet without signs/symptoms of aspiration Soft & bite sized diet;Thin liquids   Interventions   Interventions Quick Adds Swallowing Dysfunction   Swallowing Dysfunction &/or Oral Function for Feeding   Treatment of Swallowing Dysfunction &/or Oral Function for Feeding Minutes (96668) 8   Symptoms Noted During/After Treatment None   Treatment Detail/Skilled Intervention Education for role/purpose of SLP, diet recommendations, safe swallow strategies, etc. with patient verbalizing understanding and agreement, though will need reinforcement.   SLP " Discharge Planning   SLP Plan diet tolerance, ADAT, education/strategy reinforcement   SLP Discharge Recommendation adult foster care/group home   SLP Rationale for DC Rec below baseline for swallow   SLP Brief overview of current status  Minced and moist solids, thin liquids; likely close to baseline d/t edentulous status; will require 1:1 with oral intake d/t impulsiveness and reduced motor ability/weakness   Total Session Time   Total Session Time (sum of timed and untimed services) 23

## 2024-02-25 NOTE — PROGRESS NOTES
A BiPAP of  14/7 @ 40% was applied to the pt via the under the nose mask for an increase in WOB and SOB. The bridge of the nose looks good and remains intact. Pt is tolerating it well. RT will continue to monitor and assess the pt's current respiratory status and needs.    Darya Wolfe, RT on 2/25/2024 at 2:25 AM

## 2024-02-25 NOTE — PLAN OF CARE
Goal Outcome Evaluation:               ICU End of Shift Summary.  For vital signs and complete assessments, please see documentation flowsheets.     Pertinent assessments:   Neuro: Alert and oriented x3, some confusion. Afebrile. Complaints of chest soreness/pain. PRN oxycodone x2.   CV: NSR, no ectopy noted. BP's stable.   Resp: Bipap at 40% most of night. Tolerates periods off at 5L NC. LS diminished. Fair, non productive cough.   GI/: Reid in place with adequate UO. BS audible throughout, no BM this shift.   Lines: PIV x2.   Major Shift Events:  Complaints of chest pain/soreness. No complaints of tightness or pressure, no signs of distress or ecg changes. MD notified and prn oxycodone given.   Plan (Upcoming Events): Swallow study.   Discharge/Transfer Needs: TBD    Bedside Shift Report Completed : Y  Bedside Safety Check Completed: Y

## 2024-02-25 NOTE — PROGRESS NOTES
Tele-ICU  PO oxycodone added for chest pain (recent CPR per report).    Jessica pink  February 24, 2024

## 2024-02-26 ENCOUNTER — APPOINTMENT (OUTPATIENT)
Dept: OCCUPATIONAL THERAPY | Facility: CLINIC | Age: 49
DRG: 100 | End: 2024-02-26
Attending: INTERNAL MEDICINE
Payer: MEDICARE

## 2024-02-26 ENCOUNTER — APPOINTMENT (OUTPATIENT)
Dept: PHYSICAL THERAPY | Facility: CLINIC | Age: 49
DRG: 100 | End: 2024-02-26
Payer: MEDICARE

## 2024-02-26 ENCOUNTER — APPOINTMENT (OUTPATIENT)
Dept: SPEECH THERAPY | Facility: CLINIC | Age: 49
DRG: 100 | End: 2024-02-26
Payer: MEDICARE

## 2024-02-26 LAB
ANION GAP SERPL CALCULATED.3IONS-SCNC: 5 MMOL/L (ref 7–15)
BUN SERPL-MCNC: 9 MG/DL (ref 6–20)
CALCIUM SERPL-MCNC: 8.9 MG/DL (ref 8.6–10)
CHLORIDE SERPL-SCNC: 99 MMOL/L (ref 98–107)
CREAT SERPL-MCNC: 0.63 MG/DL (ref 0.67–1.17)
DEPRECATED HCO3 PLAS-SCNC: 37 MMOL/L (ref 22–29)
EGFRCR SERPLBLD CKD-EPI 2021: >90 ML/MIN/1.73M2
ERYTHROCYTE [DISTWIDTH] IN BLOOD BY AUTOMATED COUNT: 12.8 % (ref 10–15)
GLUCOSE BLDC GLUCOMTR-MCNC: 103 MG/DL (ref 70–99)
GLUCOSE BLDC GLUCOMTR-MCNC: 104 MG/DL (ref 70–99)
GLUCOSE BLDC GLUCOMTR-MCNC: 122 MG/DL (ref 70–99)
GLUCOSE SERPL-MCNC: 108 MG/DL (ref 70–99)
HCT VFR BLD AUTO: 36.5 % (ref 40–53)
HGB BLD-MCNC: 11.4 G/DL (ref 13.3–17.7)
LACOSAMIDE SERPL-MCNC: 1.2 UG/ML
MAGNESIUM SERPL-MCNC: 1.7 MG/DL (ref 1.7–2.3)
MCH RBC QN AUTO: 31.3 PG (ref 26.5–33)
MCHC RBC AUTO-ENTMCNC: 31.2 G/DL (ref 31.5–36.5)
MCV RBC AUTO: 100 FL (ref 78–100)
PHOSPHATE SERPL-MCNC: 2.6 MG/DL (ref 2.5–4.5)
PLATELET # BLD AUTO: 166 10E3/UL (ref 150–450)
POTASSIUM SERPL-SCNC: 3.9 MMOL/L (ref 3.4–5.3)
RBC # BLD AUTO: 3.64 10E6/UL (ref 4.4–5.9)
SODIUM SERPL-SCNC: 141 MMOL/L (ref 135–145)
WBC # BLD AUTO: 6.6 10E3/UL (ref 4–11)

## 2024-02-26 PROCEDURE — 97165 OT EVAL LOW COMPLEX 30 MIN: CPT | Mod: GO

## 2024-02-26 PROCEDURE — 36415 COLL VENOUS BLD VENIPUNCTURE: CPT | Performed by: INTERNAL MEDICINE

## 2024-02-26 PROCEDURE — 999N000157 HC STATISTIC RCP TIME EA 10 MIN

## 2024-02-26 PROCEDURE — 97530 THERAPEUTIC ACTIVITIES: CPT | Mod: GP | Performed by: PHYSICAL THERAPIST

## 2024-02-26 PROCEDURE — 99233 SBSQ HOSP IP/OBS HIGH 50: CPT | Performed by: INTERNAL MEDICINE

## 2024-02-26 PROCEDURE — 83735 ASSAY OF MAGNESIUM: CPT | Performed by: INTERNAL MEDICINE

## 2024-02-26 PROCEDURE — 250N000013 HC RX MED GY IP 250 OP 250 PS 637: Performed by: INTERNAL MEDICINE

## 2024-02-26 PROCEDURE — 80048 BASIC METABOLIC PNL TOTAL CA: CPT | Performed by: INTERNAL MEDICINE

## 2024-02-26 PROCEDURE — 92526 ORAL FUNCTION THERAPY: CPT | Mod: GN | Performed by: SPEECH-LANGUAGE PATHOLOGIST

## 2024-02-26 PROCEDURE — 99222 1ST HOSP IP/OBS MODERATE 55: CPT

## 2024-02-26 PROCEDURE — 85027 COMPLETE CBC AUTOMATED: CPT | Performed by: INTERNAL MEDICINE

## 2024-02-26 PROCEDURE — 84100 ASSAY OF PHOSPHORUS: CPT | Performed by: INTERNAL MEDICINE

## 2024-02-26 PROCEDURE — 250N000011 HC RX IP 250 OP 636: Performed by: STUDENT IN AN ORGANIZED HEALTH CARE EDUCATION/TRAINING PROGRAM

## 2024-02-26 PROCEDURE — 120N000001 HC R&B MED SURG/OB

## 2024-02-26 PROCEDURE — 94660 CPAP INITIATION&MGMT: CPT

## 2024-02-26 PROCEDURE — 97530 THERAPEUTIC ACTIVITIES: CPT | Mod: GO

## 2024-02-26 PROCEDURE — 250N000013 HC RX MED GY IP 250 OP 250 PS 637: Performed by: STUDENT IN AN ORGANIZED HEALTH CARE EDUCATION/TRAINING PROGRAM

## 2024-02-26 RX ORDER — MULTIVITAMIN,THERAPEUTIC
1 TABLET ORAL DAILY
Status: DISCONTINUED | OUTPATIENT
Start: 2024-02-26 | End: 2024-02-28 | Stop reason: HOSPADM

## 2024-02-26 RX ADMIN — OXYCODONE HYDROCHLORIDE 5 MG: 5 TABLET ORAL at 04:14

## 2024-02-26 RX ADMIN — LACOSAMIDE 50 MG: 50 TABLET, FILM COATED ORAL at 11:09

## 2024-02-26 RX ADMIN — OXYCODONE HYDROCHLORIDE 5 MG: 5 TABLET ORAL at 12:03

## 2024-02-26 RX ADMIN — LAMOTRIGINE 100 MG: 100 TABLET ORAL at 15:07

## 2024-02-26 RX ADMIN — ENOXAPARIN SODIUM 40 MG: 40 INJECTION SUBCUTANEOUS at 04:13

## 2024-02-26 RX ADMIN — LEVOCARNITINE 660 MG: 330 TABLET ORAL at 21:10

## 2024-02-26 RX ADMIN — LEVOCARNITINE 660 MG: 330 TABLET ORAL at 15:07

## 2024-02-26 RX ADMIN — DIVALPROEX SODIUM 500 MG: 500 TABLET, DELAYED RELEASE ORAL at 11:10

## 2024-02-26 RX ADMIN — PROPRANOLOL HYDROCHLORIDE 20 MG: 20 TABLET ORAL at 11:10

## 2024-02-26 RX ADMIN — PANTOPRAZOLE SODIUM 40 MG: 40 TABLET, DELAYED RELEASE ORAL at 11:09

## 2024-02-26 RX ADMIN — ZONISAMIDE 200 MG: 100 CAPSULE ORAL at 21:10

## 2024-02-26 RX ADMIN — LAMOTRIGINE 200 MG: 200 TABLET ORAL at 11:09

## 2024-02-26 RX ADMIN — OXYCODONE HYDROCHLORIDE 5 MG: 5 TABLET ORAL at 18:20

## 2024-02-26 RX ADMIN — LAMOTRIGINE 200 MG: 200 TABLET ORAL at 20:31

## 2024-02-26 RX ADMIN — DIVALPROEX SODIUM 500 MG: 500 TABLET, DELAYED RELEASE ORAL at 20:31

## 2024-02-26 RX ADMIN — ENOXAPARIN SODIUM 40 MG: 40 INJECTION SUBCUTANEOUS at 15:07

## 2024-02-26 RX ADMIN — PROPRANOLOL HYDROCHLORIDE 20 MG: 20 TABLET ORAL at 20:31

## 2024-02-26 RX ADMIN — LEVOCARNITINE 660 MG: 330 TABLET ORAL at 11:09

## 2024-02-26 RX ADMIN — LEVETIRACETAM 1000 MG: 500 TABLET, FILM COATED ORAL at 11:10

## 2024-02-26 RX ADMIN — ZONISAMIDE 100 MG: 100 CAPSULE ORAL at 11:09

## 2024-02-26 RX ADMIN — BUSPIRONE HYDROCHLORIDE 15 MG: 15 TABLET ORAL at 11:10

## 2024-02-26 RX ADMIN — BUSPIRONE HYDROCHLORIDE 15 MG: 15 TABLET ORAL at 20:31

## 2024-02-26 RX ADMIN — LEVETIRACETAM 1500 MG: 500 TABLET, FILM COATED ORAL at 21:10

## 2024-02-26 RX ADMIN — THERA TABS 1 TABLET: TAB at 12:03

## 2024-02-26 ASSESSMENT — ACTIVITIES OF DAILY LIVING (ADL)
ADLS_ACUITY_SCORE: 52
ADLS_ACUITY_SCORE: 52
ADLS_ACUITY_SCORE: 53
ADLS_ACUITY_SCORE: 52
ADLS_ACUITY_SCORE: 53
ADLS_ACUITY_SCORE: 53
ADLS_ACUITY_SCORE: 52
ADLS_ACUITY_SCORE: 52
ADLS_ACUITY_SCORE: 53
ADLS_ACUITY_SCORE: 52
ADLS_ACUITY_SCORE: 53
ADLS_ACUITY_SCORE: 53
ADLS_ACUITY_SCORE: 52
ADLS_ACUITY_SCORE: 52
ADLS_ACUITY_SCORE: 53

## 2024-02-26 NOTE — PROGRESS NOTES
[facility]    Patient is currently receiving home care services from [facility]. The patient is currently receiving SN services.  and home health team have been notified of patient admission. [facility] Liaison will continue to follow patient during stay. If appropriate provide orders to resume home care at time of discharge. EOE is tomorrow, 2/28/24.     DANDRE So, LPN  Home Care Liaison   Cell: [phone]

## 2024-02-26 NOTE — PLAN OF CARE
Goal Outcome Evaluation:      Plan of Care Reviewed With: patient    Overall Patient Progress: improvingOverall Patient Progress: improving     ICU End of Shift Summary.  For vital signs and complete assessments, please see documentation flowsheets.      Pertinent assessments: Alert, awake most of the night ringing the call light to request female assistance in his room- a large majority of the time this is pt wanting attention, not actually requiring assistance- but then refusing alfonso assistance from males. When questioned about male vs female caregivers no reason given other than gender preference. Inappropriately telling staff he loves them and requesting it be said back to him. Frequently calling female staff pet names. Education provided on boundaries, call light usage, the importance of rest- pt continues to ignore.  Tele SR, BP stable, bipap 40% FIO2 while resting/ oxymizer cannula 5L while awake. External cath in place. No BM.    Plan (Upcoming Events): wean O2 support as able. Therapies to see today.  Discharge/Transfer Needs: downgrade today and transfer to medical floor?

## 2024-02-26 NOTE — PROGRESS NOTES
A BiPAP of  14/7 @ 40% was applied to the pt via the mask for NOC use. The bridge of the nose looks good and remains intact. Pt is tolerating it well. Will continue to monitor and assess the pt's current respiratory status and needs.    Filippo Lamb RT on 2/26/2024 at 4:28 AM

## 2024-02-26 NOTE — PROGRESS NOTES
"   02/26/24 1000   Appointment Info   Signing Clinician's Name / Credentials (OT) Martha Ware, OTR/L   Rehab Comments (OT) L eye blindness; can be inappropriate to women   Living Environment   People in Home facility resident   Current Living Arrangements group home   Home Accessibility no concerns   Transportation Anticipated agency;health plan transportation   Living Environment Comments walk in shower- provides different answers on shower chair vs sitting in w/c; grab bars in shower; SH toilet with grab bars next to   Self-Care   Fall history within last six months yes   Number of times patient has fallen within last six months   (\"I can't even count\" expresses high amoutn of falls)   Activity/Exercise/Self-Care Comment Pt reports assist with dressing, showering, meds, meals and laundry from staff. Pt reports indep with toileting.   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 He received 5 minutes of CPR in the field with ROSC and was subseqently intubated and sedated on arrival to the emergency department.  Had a prolonged stay but has improved and is now extubated/improving.   Existing Precautions/Restrictions fall  (L vision impairment)   General Observations and Info pt has some inappropriate behaviors towards female providers per notes and pt's nurse.   Cognitive Status " Examination   Cognitive Status Comments cog impairment noted in chart   Visual Perception   Impact of Vision Impairment on Function (Vision) L eye blindness   Pain Assessment   Patient Currently in Pain Yes, see Vital Sign flowsheet   Transfers   Transfers sit-stand transfer;toilet transfer   Sit-Stand Transfer   Sit-Stand [place] (Transfers) contact guard   Toilet Transfer   Toilet Transfer Comments not assessed as pt declines   Activities of Daily Living   BADL Assessment/Intervention toileting;lower body dressing   Lower Body Dressing Assessment/Training   Comment, (Lower Body Dressing) pt declines; feels he will fall out of chair and reports staff can assist with doff/don socks, underwear and pants and assist on shower days and sometimes on non-shower days   Toileting   Comment, (Toileting) not assessed as pt declines; pt on external catheter and chair is visibly soiled. Pt declines completing another STS to change soiled odalys as breakfast arrives-nursing notified   Clinical Impression   Criteria for Skilled Therapeutic Interventions Met (OT) Yes, treatment indicated   OT Diagnosis weakness   OT Problem List-Impairments impacting ADL problems related to;activity tolerance impaired;balance;strength;pain   Assessment of Occupational Performance 1-3 Performance Deficits   Identified Performance Deficits toileting and functional transfers   Planned Therapy Interventions (OT) ADL retraining;transfer training   Clinical Decision Making Complexity (OT) problem focused assessment/low complexity   Risk & Benefits of therapy have been explained evaluation/treatment results reviewed;care plan/treatment goals reviewed;risks/benefits reviewed;current/potential barriers reviewed;participants voiced agreement with care plan;participants included;patient   OT Total Evaluation Time   OT Eval, Low Complexity Minutes (54648) 24   OT Goals   Therapy Frequency (OT) Daily   OT Predicted Duration/Target Date for Goal Attainment  03/01/24   OT Goals Toilet Transfer/Toileting;OT Goal 1   OT: Toilet Transfer/Toileting Supervision/stand-by assist;toilet transfer;cleaning and garment management;using adaptive equipment   OT: Goal 1 Pt will be SBA with grooming/hygiene.   Interventions   Interventions Quick Adds Therapeutic Activity   Therapeutic Activities   Therapeutic Activity Minutes (29669) 8   Treatment Detail/Skilled Intervention Pt requires education and encouragement to allow therapist to use gaitbelt during transfer. Pt is SBA with STS from chair with 2ww. Pt unable to stand for more than 1 min before needing seated rest break; pt on 9L of O2 and O2 sats do drop to high 80s but pt able to recover with cues for PLB. Noted pt has soiled odalys; encouraged to stand again so OT could change odalys pt declines as food tray arrives and pt is very fixated on his food tray this morning. Pt asks nursing to feed him. OT encourages pt to feed himself as he was feeding himself at home; pt is noted to feed self with set up A and utilizing R hand to manage utensil.   OT Discharge Planning   OT Plan BSC or toilet transfer; grooming/hygiene; endurance   OT Discharge Recommendation (DC Rec) home with assist;home with home care occupational therapy   OT Rationale for DC Rec Anticipate with medical management and IP therapies, pt will be able to d/c to group home with Ax1 with alll transfers and ADLs/IADLs. If facility is not able to assist with all transfers and ADLs, pt may benefit from TCU.   OT Brief overview of current status SBA STS; set up A with self feeding   Total Session Time   Timed Code Treatment Minutes 8   Total Session Time (sum of timed and untimed services) 32

## 2024-02-26 NOTE — PLAN OF CARE
ICU End of Shift Summary.  For vital signs and complete assessments, please see documentation flowsheets.      Pertinent assessments:   Neuro: Alert. Forgetful. Anxious at times.   Cardiac: SR. BP stable.   Resp: LS dim. Continues on oxymizer canula / bipap as needed.   GI: WDL  : external catheter. Voiding in adequate amounts.   Skin:  WDL  Lines: PIV x 2.   Drips: none.     Major Shift Events:   Reid removed.   Psych consult for meds to be restarted.     Plan (Upcoming Events): TBD  Discharge/Transfer Needs: tbd     Bedside Shift Report Completed : yes  Bedside Safety Check Completed: yes

## 2024-02-26 NOTE — PROGRESS NOTES
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 He received 5 minutes of CPR in the field with ROSC and was subseqently intubated and sedated on arrival to the emergency department.  Had a prolonged stay but has improved and is now extubated/improving.     Cardiac Arrest s/p ROSC, suspect seizure/respiratory trigger  Prolonged QTc concern though later assessment with improved QT:  Noted to have witness cardiac arrest by EMS after seizure activity. I suspect this was primary respiratory failure that led to cardiovascular collapse. ROSC achieved after 5 minutes of CPR. On continuous cardiac monitoring now without evidence of continued ectopy. TTE without wall motion abnormalities and with retained EF (60-65%).  Unclear if QT played much a role.  He was notably on high dose psychiatric medications that can prolong QT.  -  Patient with significant mental health issues.   QT now better.   See discussion below concerning meds.     Breakthrough Seizure Activity  Epilepsy:  Witnessed seizure activity at custodial. Was recently admitted to observation from 2/20/2024-2/21/2024 for possible seizure activity. He does have a history of epilepsy, but also history of reporting seizure activity when there has been none. Had previously been transferred for 24 hours EEG at [facility] without evidence of seizure activity. Remains on lacosamide, lamotrigine, keppra, zonisamide, and depakote.  -  Neurology consulted, appreciate  recommendations.     Acute Hypoxic & Acute on Chronic Hypercapnic Respiratory Failure, improving  Acute Respiratory Acidosis  ANA/OHS  Chronic Hypercapnia  Pulmonary Hypertension:  Was intubated after cardiac arrest. Normally wears BiPAP at night and with naps for chronic hypercapnia. Presented qith pH 6.98, pCO2 >130 after cardiac arrest. Now improved with intubation, but lower than baseline ~mid-70s. Patient apneic with pressure support trials on 2/23/2024. Able to bipap extubate 2/24/2024.  Using some intermittent BiPAP on 2/25 and with sleep.  Patient improved but still needing ongoing extra O2 + BiPAP.   -- Currently patient is on 9 L of oxygen.  May need to wean down oxygen as able.  Continue BiPAP at night.  May transition out of ICU if he remains stable.      Schizoaffective Disorder  Borderline Personality disorder  MDD  Anxiety  Continue home buspirone. Had been holding PTA citalopram and seroquel.  Patients behaviors escalating slightly, hasn't slept as much.  Recent QT ok.  Will give a small dose of celexa 10 mg and recheck QT EKG in AM.  May be reasonable to continue selective serotonin reuptake inhibitor without the concominant use of high dose seroquel (was PTA on 300 mg + 60 of celexa).  -Psychiatry team consulted to assist with further recommendations.  Input pending.  QTc is 492 this morning       Dysphagia:  -  Modified diet, speech following    Developmental Delay: Lives in group home. Uses wheelchair and walker interchangeably.    Hypertension: Continue PTA propranolol + prazosin.    GERD: Prontonix resumed, QT was improved    Chronic Hyperammoniemia: Continue PTA levocarnitine.    Left Eye Blindness 2/2 Enucleation     Disposition/SW issues:  POA - patient has stepparent helping but not officially POA by report.  SW consult to assist.  Unclear if group home will be able to immediately meet his care needs, may need TCU/rehab.    Medical Decision Making       Over 50 MINUTES SPENT BY ME on the  "date of service doing chart review, history, exam, documentation & further activities per the note.      Labs/Imaging Reviewed:  See Information above and Data section below    PPE Used:  Mask       Diet: Combination Diet Soft and Bite Sized Diet (level 6); Thin Liquids (level 0) (Supervision and assistance with all oral intake; encourage slow rate and small bites/sips)  Room Service    DVT Prophylaxis: Enoxaparin (Lovenox) SQ  Reid Catheter: Not present  Lines: None     Cardiac Monitoring: ACTIVE order. Indication: ICU  Code Status: Full Code      Clinically Significant Risk Factors                  # Hypertension: Noted on problem list        # Severe Obesity: Estimated body mass index is 45.74 kg/m  as calculated from the following:    Height as of 2/20/24: 1.727 m (5' 8\").    Weight as of this encounter: 136.4 kg (300 lb 12.8 oz).        # Financial/Environmental Concerns:           Disposition Plan May transfer out of ICU later today.  PT OT and  following for discharge planning.  May return back to group home versus TCU visit next 2 to 3 days         Shabbir Vickers MD  Hospitalist Service  [facility]  Securely message with [employer] (more info)  Text page via [employer] Paging/Directory   ______________________________________________________________________    Interval History   Patient is seen and examined by me today and medical record reviewed.Overnight events noted and care discussed with nursing staff.  Patient care assumed by me this morning.  Patient is known to me from previous encounters.  Denies any.  Was on BiPAP at night and on nasal supplemental oxygen this morning.  Discussed with rehab team as well as bedside nurse.  Behavioral issues noted in nursing documentation.    Physical Exam   Vital Signs: Temp: 98.2  F (36.8  C) Temp src: Oral BP: (!) 134/93 Pulse: 80   Resp: 22 SpO2: 99 % O2 Device: Oxymizer cannula Oxygen Delivery: 3 LPM  Weight: 300 lbs 12.8 " oz    GEN:  Alert, oriented to self/place, confused with details, appears comfortable, no overt distress.  HEENT:  Normocephalic/atraumatic, no scleral icterus, no nasal discharge, mouth moist.  CV:  Regular rate and rhythm, no murmur or JVD.  S1 + S2 noted, no S3 or S4.  LUNGS:  Somewhat course but moving air well, no wheezing/retractions.  Symmetric chest rise on inhalation noted.  ABD:  Active bowel sounds, soft, non-tender, mildly distended.  No guarding/rigidity.  EXT:  +1 edema.  No cyanosis.  No acute joint synovitis noted.      Medications      busPIRone  15 mg Oral or Feeding Tube BID    divalproex sodium delayed-release  500 mg Oral Q12H [facility] (08/20)    enoxaparin ANTICOAGULANT  40 mg Subcutaneous Q12H    lacosamide  50 mg Oral or Feeding Tube Daily    lamoTRIgine  100 mg Oral or Feeding Tube Daily    lamoTRIgine  200 mg Oral or Feeding Tube BID    levETIRAcetam  1,000 mg Oral or Feeding Tube QAM    And    levETIRAcetam  1,500 mg Oral or Feeding Tube At Bedtime    levOCARNitine  660 mg Oral or Feeding Tube TID    multivitamin, therapeutic  1 tablet Oral Daily    pantoprazole  40 mg Oral QAM AC    propranolol  20 mg Oral or Feeding Tube BID    zonisamide  100 mg Oral or Feeding Tube QAM    And    zonisamide  200 mg Oral or Feeding Tube At Bedtime       Data     Labs and Imaging results below reviewed today.  Recent Labs   Lab 02/26/24  0556 02/25/24  0535 02/23/24  0755 02/22/24  2243   WBC 6.6  --  10.0 7.6   HGB 11.4*  --  12.1* 11.3*   HCT 36.5*  --  37.0* 35.2*     --  96 97    162 167 151     7-Day Micro Results       Collected Updated Procedure Result Status      02/22/2024 0652 02/22/2024 0751 Symptomatic Influenza A/B, RSV, & SARS-CoV2 PCR (COVID-19) Nasopharyngeal [[id number]]    Swab from Nasopharyngeal    Final result Component Value   Influenza A PCR Negative   Influenza B PCR Negative   RSV PCR Negative   SARS CoV2 PCR Negative   NEGATIVE: SARS-CoV-2 (COVID-19) RNA not detected,  presumed negative.            02/21/2024 1730 02/21/2024 1823 Symptomatic Influenza A/B, RSV, & SARS-CoV2 PCR (COVID-19) Nasopharyngeal [[id number]]    Swab from Nasopharyngeal    Final result Component Value   Influenza A PCR Negative   Influenza B PCR Negative   RSV PCR Negative   SARS CoV2 PCR Negative   NEGATIVE: SARS-CoV-2 (COVID-19) RNA not detected, presumed negative.                  Recent Labs   Lab 02/26/24  1243 02/26/24  0833 02/26/24  0556 02/25/24  1942 02/25/24  1831 02/25/24  0611 02/25/24  0535 02/24/24  0815 02/24/24  0537 02/24/24  0003 02/23/24  2142 02/22/24  0639 02/22/24  0632 02/21/24  0548 02/20/24  0903   NA  --   --  141  --  140  --   --   --  139  --   --    < > 143   < > 143   POTASSIUM  --   --  3.9  --  3.8  --   --   --  3.9  --   --    < > 4.2   < > 4.3   CHLORIDE  --   --  99  --  97*  --   --   --  99  --   --    < > 94*   < > 98   CO2  --   --  37*  --  36*  --   --   --  32*  --   --    < > 29   < > 40*   ANIONGAP  --   --  5*  --  7  --   --   --  8  --   --    < > 20*   < > 5*   * 122* 108*   < > 100*   < >  --    < > 74   < >  --    < > 202*   < > 89   BUN  --   --  9.0  --  7.7  --   --   --  13.1  --   --    < > 13.5   < > 15.9   CR  --   --  0.63*  --  0.60*  --  0.70  --  0.62*  --   --    < > 0.70   < > 0.60*   GFRESTIMATED  --   --  >90  --  >90  --  >90  --  >90  --   --    < > >90   < > >90   ARNOLD  --   --  8.9  --  8.7  --   --   --  8.4*  --   --    < > 9.1   < > 8.8   MAG  --   --  1.7  --   --   --   --   --  1.7  --  1.8   < > 1.7  --  1.6*   PHOS  --   --  2.6  --   --   --  3.7  --  4.0   < >  --    < >  --   --   --    PROTTOTAL  --   --   --   --   --   --   --   --   --   --   --   --  6.9  --  6.4   ALBUMIN  --   --   --   --   --   --   --   --   --   --   --   --  4.5  --  4.0   BILITOTAL  --   --   --   --   --   --   --   --   --   --   --   --  0.3  --  0.2   ALKPHOS  --   --   --   --   --   --   --   --   --   --   --   --  89  --  76   AST   --   --   --   --   --   --   --   --   --   --   --   --  40  --  31   ALT  --   --   --   --   --   --   --   --   --   --   --   --  48  --  42    < > = values in this interval not displayed.       No results found for this or any previous visit (from the past 24 hour(s)).

## 2024-02-26 NOTE — PROGRESS NOTES
Care Management Follow Up    Length of Stay (days): 4    Expected Discharge Date: 02/28/2024     Concerns to be Addressed:     discharge planning   Patient plan of care discussed at interdisciplinary rounds: Yes    Anticipated Discharge Disposition:  home care     Anticipated Discharge Services:  Home Care PT and RN  Anticipated Discharge DME:      Additional Information:  PT recs home with assist and home care. Patient already open to [facility] for RN. Will see if they can add PT.     KARIN called  at [phone].  manager is not in. Was told to call back tomorrow. Provided update that patient is an assist x1 and PT recs home care.     SKYLER Stroud, LGSW  Emergency Room   Please contact the  on the floor in which the patient is staying for any questions or concerns

## 2024-02-26 NOTE — CONSULTS
"      Initial Psychiatric Consult   Consult date: February 26, 2024         Reason for Consult, requesting source:    s/p cardiac arrest patient, now extubated with more anxiety/depression. PTA before arrest had been on high dose seroquel + celexa but reduced with QT concern     Requesting source: Toy Pittman    Labs and imaging reviewed. Paged Dr. Vickers with medication recommendations. Spoke with nursing staff.         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 He received 5 minutes of CPR in the field with ROSC and was subseqently intubated and sedated on arrival to the emergency department.  Had a prolonged stay but has improved and is now extubated/improving.     Patient has overall improved during his stay. Qtc remains elevated thus Celexa and Seroquel have been held. Psychiatry asked to consult regarding medication management. Joel was sitting in a recliner when I approached. He tells me that staff need to listen to him more and help him more. He tells me he likes the staff at the hospital but likes his behavioral specialists, Filippo and Siobhan, who he sees at this group home more. He feels \"good\" and denies SI. No evidence of psychosis. When I reviewed his psychiatric medications that had been stopped due to QTC prolongation concerns, he tells me he only wants medication changes by Dr. Siobhan Mayo who is his PCP. I discussed his past mental health dx, he tells me he has been depressed and anxious all his life. He denies feeling depressed or anxious currently. He tells me has never heard of " "schizoaffective disorder and is adamant that is not an accurate diagnosis for him. He tells me he slept well although there appear to be varying reports if he has been sleeping. He wants to return to his group home and tells me \"they treat me like heaven there.\" As I was leaving the room, he stated \"I love you, baby.\"         Past Psychiatric History:   Record review indicates past Hx of schizoaffective disorder, borderline personality disorder, MDD, and anxiety         Substance Use and History:   Does not appear to be contributing to current presentation. Record review indicates he was a former smoker         Past Medical History:   PAST MEDICAL HISTORY:   Past Medical History:   Diagnosis Date    Blindness of left eye     Depression 03/10/2014    Hypertension     Pneumococcal septicemia(038.2) (H) 2013    Hospitalized    Pneumonia, organism unspecified(486) 2013    Hospitalized    Uncomplicated asthma     Unspecified epilepsy without mention of intractable epilepsy        PAST SURGICAL HISTORY:   Past Surgical History:   Procedure Laterality Date    COLONOSCOPY N/A 2022    Procedure: COLONOSCOPY;  Surgeon: Michael Caldwell MD;  Location:  OR    ESOPHAGOSCOPY, GASTROSCOPY, DUODENOSCOPY (EGD), COMBINED N/A 2022    Procedure: ESOPHAGOGASTRODUODENOSCOPY with biopsy;  Surgeon: Michael Caldwlel MD;  Location:  OR    ORTHOPEDIC SURGERY      pt stated past surgery on both ankles             Family History:   FAMILY HISTORY: No family history on file.        Social History:   SOCIAL HISTORY:   Social History     Tobacco Use    Smoking status: Former     Types: Cigarettes     Start date:      Quit date: 2010     Years since quittin.1    Smokeless tobacco: Never   Substance Use Topics    Alcohol use: No       Patient resides at group home.          Physical ROS:   The 10 point Review of Systems is negative other than noted in the HPI or here.           Medications: "      busPIRone  15 mg Oral or Feeding Tube BID    divalproex sodium delayed-release  500 mg Oral Q12H [facility] (08/20)    enoxaparin ANTICOAGULANT  40 mg Subcutaneous Q12H    lacosamide  50 mg Oral or Feeding Tube Daily    lamoTRIgine  100 mg Oral or Feeding Tube Daily    lamoTRIgine  200 mg Oral or Feeding Tube BID    levETIRAcetam  1,000 mg Oral or Feeding Tube QAM    And    levETIRAcetam  1,500 mg Oral or Feeding Tube At Bedtime    levOCARNitine  660 mg Oral or Feeding Tube TID    multivitamin, therapeutic  1 tablet Oral Daily    pantoprazole  40 mg Oral QAM AC    propranolol  20 mg Oral or Feeding Tube BID    zonisamide  100 mg Oral or Feeding Tube QAM    And    zonisamide  200 mg Oral or Feeding Tube At Bedtime              Allergies:     Allergies   Allergen Reactions    Acetaminophen Unknown    Hydrocodone Bitartrate Er Unknown    Tomato GI Disturbance    Cephalexin Rash     HUT Reaction: Rash; HUT Noted: [id number]      Vicodin [Hydrocodone-Acetaminophen] GI Disturbance          Labs:     Recent Results (from the past 48 hour(s))   Glucose by meter    Collection Time: 02/24/24  5:21 PM   Result Value Ref Range    GLUCOSE BY METER POCT 87 70 - 99 mg/dL   Glucose by meter    Collection Time: 02/24/24  7:59 PM   Result Value Ref Range    GLUCOSE BY METER POCT 62 (L) 70 - 99 mg/dL   Glucose by meter    Collection Time: 02/24/24  8:34 PM   Result Value Ref Range    GLUCOSE BY METER POCT 110 (H) 70 - 99 mg/dL   Glucose by meter    Collection Time: 02/25/24 12:01 AM   Result Value Ref Range    GLUCOSE BY METER POCT 93 70 - 99 mg/dL   Glucose by meter    Collection Time: 02/25/24  4:25 AM   Result Value Ref Range    GLUCOSE BY METER POCT 75 70 - 99 mg/dL   Phosphorus    Collection Time: 02/25/24  5:35 AM   Result Value Ref Range    Phosphorus 3.7 2.5 - 4.5 mg/dL   Platelet count    Collection Time: 02/25/24  5:35 AM   Result Value Ref Range    Platelet Count 162 150 - 450 10e3/uL   Creatinine    Collection Time: 02/25/24   5:35 AM   Result Value Ref Range    Creatinine 0.70 0.67 - 1.17 mg/dL    GFR Estimate >90 >60 mL/min/1.73m2   Glucose by meter    Collection Time: 02/25/24  6:11 AM   Result Value Ref Range    GLUCOSE BY METER POCT 69 (L) 70 - 99 mg/dL   Glucose by meter    Collection Time: 02/25/24  6:38 AM   Result Value Ref Range    GLUCOSE BY METER POCT 86 70 - 99 mg/dL   Glucose by meter    Collection Time: 02/25/24  8:02 AM   Result Value Ref Range    GLUCOSE BY METER POCT 87 70 - 99 mg/dL   Glucose by meter    Collection Time: 02/25/24 12:32 PM   Result Value Ref Range    GLUCOSE BY METER POCT 95 70 - 99 mg/dL   Basic metabolic panel    Collection Time: 02/25/24  6:31 PM   Result Value Ref Range    Sodium 140 135 - 145 mmol/L    Potassium 3.8 3.4 - 5.3 mmol/L    Chloride 97 (L) 98 - 107 mmol/L    Carbon Dioxide (CO2) 36 (H) 22 - 29 mmol/L    Anion Gap 7 7 - 15 mmol/L    Urea Nitrogen 7.7 6.0 - 20.0 mg/dL    Creatinine 0.60 (L) 0.67 - 1.17 mg/dL    GFR Estimate >90 >60 mL/min/1.73m2    Calcium 8.7 8.6 - 10.0 mg/dL    Glucose 100 (H) 70 - 99 mg/dL   Glucose by meter    Collection Time: 02/25/24  7:42 PM   Result Value Ref Range    GLUCOSE BY METER POCT 83 70 - 99 mg/dL   Glucose by meter    Collection Time: 02/25/24 11:18 PM   Result Value Ref Range    GLUCOSE BY METER POCT 109 (H) 70 - 99 mg/dL   Glucose by meter    Collection Time: 02/26/24  4:04 AM   Result Value Ref Range    GLUCOSE BY METER POCT 104 (H) 70 - 99 mg/dL   Basic metabolic panel    Collection Time: 02/26/24  5:56 AM   Result Value Ref Range    Sodium 141 135 - 145 mmol/L    Potassium 3.9 3.4 - 5.3 mmol/L    Chloride 99 98 - 107 mmol/L    Carbon Dioxide (CO2) 37 (H) 22 - 29 mmol/L    Anion Gap 5 (L) 7 - 15 mmol/L    Urea Nitrogen 9.0 6.0 - 20.0 mg/dL    Creatinine 0.63 (L) 0.67 - 1.17 mg/dL    GFR Estimate >90 >60 mL/min/1.73m2    Calcium 8.9 8.6 - 10.0 mg/dL    Glucose 108 (H) 70 - 99 mg/dL   CBC with platelets    Collection Time: 02/26/24  5:56 AM   Result  Value Ref Range    WBC Count 6.6 4.0 - 11.0 10e3/uL    RBC Count 3.64 (L) 4.40 - 5.90 10e6/uL    Hemoglobin 11.4 (L) 13.3 - 17.7 g/dL    Hematocrit 36.5 (L) 40.0 - 53.0 %     78 - 100 fL    MCH 31.3 26.5 - 33.0 pg    MCHC 31.2 (L) 31.5 - 36.5 g/dL    RDW 12.8 10.0 - 15.0 %    Platelet Count 166 150 - 450 10e3/uL   Magnesium    Collection Time: 02/26/24  5:56 AM   Result Value Ref Range    Magnesium 1.7 1.7 - 2.3 mg/dL   Phosphorus    Collection Time: 02/26/24  5:56 AM   Result Value Ref Range    Phosphorus 2.6 2.5 - 4.5 mg/dL   Glucose by meter    Collection Time: 02/26/24  8:33 AM   Result Value Ref Range    GLUCOSE BY METER POCT 122 (H) 70 - 99 mg/dL   Glucose by meter    Collection Time: 02/26/24 12:43 PM   Result Value Ref Range    GLUCOSE BY METER POCT 103 (H) 70 - 99 mg/dL          Physical and Psychiatric Examination:     BP (!) 134/93   Pulse 80   Temp 98.2  F (36.8  C) (Oral)   Resp 22   Wt 136.4 kg (300 lb 12.8 oz)   SpO2 99%   BMI 45.74 kg/m    Weight is 300 lbs 12.8 oz  Body mass index is 45.74 kg/m .    Physical Exam:  I have reviewed the physical exam as documented by by the medical team and agree with findings and assessment and have no additional findings to add at this time.    Mental Status Exam:    Appearance: awake, alert, adequately groomed, dressed in hospital scrubs, and appeared as age stated  Attitude:  cooperative  Eye Contact:  fair  Mood:  good  Affect:  mood congruent and intensity is flat  Speech:  clear, coherent and normal prosody  Psychomotor Behavior:  no evidence of tardive dyskinesia, dystonia, or tics and sitting in recliner   Thought Process:  linear  Associations:  no loose associations  Thought Content:  no evidence of suicidal ideation or homicidal ideation and no evidence of psychotic thought  Insight:  limited  Judgement:  limited  Oriented to:  time, person, and place  Attention Span and Concentration:  limited  Recent and Remote Memory:  limited  Gait and  "Station: sitting in chair, did not observe moving                DSM-5 Diagnosis:   298.9 (F29)  Unspecified Schizophrenia Spectrum  311 (F32.9) Unspecified Depressive Disorder   300.02 (F41.1) Generalized Anxiety Disorder  301.9 (F60.9) Unspecified Personality Disorder          Assessment:   Tre \"Joel\" MAIRA Vazquez is a 48 year old male with past medical history significant for developmental delay (lives in group home), seizures, obesity, ANA/OHS on BiPAP at night and while napping, chronic hypercapnia, chronic hyperammoniemia, anemia, schizoaffective disorder, borderline personality, MDD, anxiety, HTN, 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 He received 5 minutes of CPR in the field with ROSC and was subseqently intubated and sedated on arrival to the emergency department.  Had a prolonged stay but has improved and is now extubated/improving.     Patient has overall improved during his stay. Qtc remains elevated thus Celexa and Seroquel have been held. Psychiatry asked to consult regarding medication management. Today Joel appeared calm and was sitting in recliner when I went to meet with him. He denies mental health symptoms, denies SI and denies AH/VH. He does endorse a hx of depression and anxiety yet does not agree with dx of schizoaffective disorder. Discussed medications including seroquel and celexa that have been held due to ongoing Qtc prolongation and medication alternatives. He declines medication changes stating that he only wants his PCP provider, Dr. Mayo to change his mediations. Should mood appear to decompensate, could consider antipsychotic with minimal Qtc prolongation risk such as Abilify. It is unclear from record review when his mental health dx were made a cuurrent accuracy. Should anxiety and or depression appear more problematic, could consider selective serotonin reuptake inhibitor such as sertraline that is often more neutral " in regards to Qtc prolongation. Trazodone can be utilized to assist with sleep and in some research can actually lower Qtc by a slight amount. Recommendation for patient to follow-up with PCP shortly after discharge. Patient requesting to return to group home once stabilized.           Summary of Recommendations:   EKG from 2/26 resulted with Qtc 492, would recommend continuing to hold Celexa and Seroquel until this improves   Patient has previously taken Celexa for depression and anxiety which does carry the risk of QTc prolongation yet this is often dose dependent. Could consider switching to selective serotonin reuptake inhibitor with lower QTc prolongation potential such as sertraline, recommendation to initiate at 50mg and then titrate as indicated monitoring for tolerability.  Patient has previously taken Seroquel for schizoaffective disorder and mood stabilization. Could consider switching to antipsychotic such as Abilify which can at time actually lower Qtc, recommendation to initiate at 5mg  If sleep problematic, recommendation to initiate Trazodone 50mg at bedtime,  research has shown that this can also lower Qtc at times.  Patient declined medication changes at time of assessment and would prefer to follow with PCP. Recommendation to follow-up with PCP soon after discharge  Please reconsult psych as needed      RON York CNP    Consult/Liaison Psychiatry   [facility]    Contact information available via [facility] Paging/Directory  If I am not available, then FRANKY ANDERSON line ([phone]) should know who is covering our consult service.

## 2024-02-26 NOTE — CONSULTS
Care Management Follow Up    Length of Stay (days): 4    Expected Discharge Date: 02/28/2024       Additional Information:  SW consulted for assistance with POA. SW cannot assist with POA for finances. Family would have to do this on their own.     If looking for HCD-, this is not completed by the CM/SW team. Bedside RN should print HCD documents from chart (found by clicking on Code Status > Staff and Patient Resources > ACP Documents & Patient Resources) and provide to patient.     Common resources are:   - Health Care Directive -Standard Version  - Honoring Choices Handout  - Honoring Choices Your Rights      SKYLER Stroud, LGSW  Emergency Room   Please contact the SW on the floor in which the patient is staying for any questions or concerns

## 2024-02-27 LAB
ANION GAP SERPL CALCULATED.3IONS-SCNC: 3 MMOL/L (ref 7–15)
BUN SERPL-MCNC: 13.3 MG/DL (ref 6–20)
CALCIUM SERPL-MCNC: 9.1 MG/DL (ref 8.6–10)
CHLORIDE SERPL-SCNC: 99 MMOL/L (ref 98–107)
CREAT SERPL-MCNC: 0.63 MG/DL (ref 0.67–1.17)
DEPRECATED HCO3 PLAS-SCNC: 39 MMOL/L (ref 22–29)
EGFRCR SERPLBLD CKD-EPI 2021: >90 ML/MIN/1.73M2
ERYTHROCYTE [DISTWIDTH] IN BLOOD BY AUTOMATED COUNT: 12.8 % (ref 10–15)
GLUCOSE SERPL-MCNC: 101 MG/DL (ref 70–99)
HCT VFR BLD AUTO: 36.3 % (ref 40–53)
HGB BLD-MCNC: 11.3 G/DL (ref 13.3–17.7)
MAGNESIUM SERPL-MCNC: 1.8 MG/DL (ref 1.7–2.3)
MCH RBC QN AUTO: 31.3 PG (ref 26.5–33)
MCHC RBC AUTO-ENTMCNC: 31.1 G/DL (ref 31.5–36.5)
MCV RBC AUTO: 101 FL (ref 78–100)
PHOSPHATE SERPL-MCNC: 3.5 MG/DL (ref 2.5–4.5)
PLATELET # BLD AUTO: 224 10E3/UL (ref 150–450)
POTASSIUM SERPL-SCNC: 4.6 MMOL/L (ref 3.4–5.3)
POTASSIUM SERPL-SCNC: 5.1 MMOL/L (ref 3.4–5.3)
RBC # BLD AUTO: 3.61 10E6/UL (ref 4.4–5.9)
SODIUM SERPL-SCNC: 141 MMOL/L (ref 135–145)
WBC # BLD AUTO: 6 10E3/UL (ref 4–11)

## 2024-02-27 PROCEDURE — 36415 COLL VENOUS BLD VENIPUNCTURE: CPT | Performed by: INTERNAL MEDICINE

## 2024-02-27 PROCEDURE — 250N000013 HC RX MED GY IP 250 OP 250 PS 637: Performed by: INTERNAL MEDICINE

## 2024-02-27 PROCEDURE — 84132 ASSAY OF SERUM POTASSIUM: CPT | Performed by: STUDENT IN AN ORGANIZED HEALTH CARE EDUCATION/TRAINING PROGRAM

## 2024-02-27 PROCEDURE — 250N000011 HC RX IP 250 OP 636: Performed by: STUDENT IN AN ORGANIZED HEALTH CARE EDUCATION/TRAINING PROGRAM

## 2024-02-27 PROCEDURE — 36416 COLLJ CAPILLARY BLOOD SPEC: CPT | Performed by: STUDENT IN AN ORGANIZED HEALTH CARE EDUCATION/TRAINING PROGRAM

## 2024-02-27 PROCEDURE — 83735 ASSAY OF MAGNESIUM: CPT | Performed by: INTERNAL MEDICINE

## 2024-02-27 PROCEDURE — 250N000013 HC RX MED GY IP 250 OP 250 PS 637: Performed by: STUDENT IN AN ORGANIZED HEALTH CARE EDUCATION/TRAINING PROGRAM

## 2024-02-27 PROCEDURE — 84100 ASSAY OF PHOSPHORUS: CPT | Performed by: INTERNAL MEDICINE

## 2024-02-27 PROCEDURE — 99233 SBSQ HOSP IP/OBS HIGH 50: CPT | Performed by: STUDENT IN AN ORGANIZED HEALTH CARE EDUCATION/TRAINING PROGRAM

## 2024-02-27 PROCEDURE — 80048 BASIC METABOLIC PNL TOTAL CA: CPT | Performed by: INTERNAL MEDICINE

## 2024-02-27 PROCEDURE — 999N000157 HC STATISTIC RCP TIME EA 10 MIN

## 2024-02-27 PROCEDURE — 120N000001 HC R&B MED SURG/OB

## 2024-02-27 PROCEDURE — 85027 COMPLETE CBC AUTOMATED: CPT | Performed by: INTERNAL MEDICINE

## 2024-02-27 PROCEDURE — 94660 CPAP INITIATION&MGMT: CPT

## 2024-02-27 RX ORDER — IBUPROFEN 200 MG
200 TABLET ORAL ONCE
Status: COMPLETED | OUTPATIENT
Start: 2024-02-27 | End: 2024-02-27

## 2024-02-27 RX ORDER — IBUPROFEN 100 MG/1
100 TABLET, CHEWABLE ORAL ONCE
Status: DISCONTINUED | OUTPATIENT
Start: 2024-02-27 | End: 2024-02-27

## 2024-02-27 RX ADMIN — BUSPIRONE HYDROCHLORIDE 15 MG: 15 TABLET ORAL at 21:10

## 2024-02-27 RX ADMIN — LEVOCARNITINE 660 MG: 330 TABLET ORAL at 15:09

## 2024-02-27 RX ADMIN — LEVOCARNITINE 660 MG: 330 TABLET ORAL at 09:04

## 2024-02-27 RX ADMIN — LACOSAMIDE 50 MG: 50 TABLET, FILM COATED ORAL at 09:04

## 2024-02-27 RX ADMIN — ENOXAPARIN SODIUM 40 MG: 40 INJECTION SUBCUTANEOUS at 03:12

## 2024-02-27 RX ADMIN — LEVOCARNITINE 660 MG: 330 TABLET ORAL at 21:12

## 2024-02-27 RX ADMIN — DIVALPROEX SODIUM 500 MG: 500 TABLET, DELAYED RELEASE ORAL at 21:12

## 2024-02-27 RX ADMIN — ZONISAMIDE 100 MG: 100 CAPSULE ORAL at 09:04

## 2024-02-27 RX ADMIN — PROPRANOLOL HYDROCHLORIDE 20 MG: 20 TABLET ORAL at 21:12

## 2024-02-27 RX ADMIN — DIVALPROEX SODIUM 500 MG: 500 TABLET, DELAYED RELEASE ORAL at 09:04

## 2024-02-27 RX ADMIN — LAMOTRIGINE 200 MG: 200 TABLET ORAL at 21:12

## 2024-02-27 RX ADMIN — ZONISAMIDE 200 MG: 100 CAPSULE ORAL at 21:12

## 2024-02-27 RX ADMIN — ENOXAPARIN SODIUM 40 MG: 40 INJECTION SUBCUTANEOUS at 15:09

## 2024-02-27 RX ADMIN — PROPRANOLOL HYDROCHLORIDE 20 MG: 20 TABLET ORAL at 09:05

## 2024-02-27 RX ADMIN — LAMOTRIGINE 200 MG: 200 TABLET ORAL at 09:04

## 2024-02-27 RX ADMIN — BUSPIRONE HYDROCHLORIDE 15 MG: 15 TABLET ORAL at 09:04

## 2024-02-27 RX ADMIN — LAMOTRIGINE 100 MG: 100 TABLET ORAL at 15:09

## 2024-02-27 RX ADMIN — THERA TABS 1 TABLET: TAB at 09:05

## 2024-02-27 RX ADMIN — PANTOPRAZOLE SODIUM 40 MG: 40 TABLET, DELAYED RELEASE ORAL at 09:05

## 2024-02-27 RX ADMIN — LEVETIRACETAM 1000 MG: 500 TABLET, FILM COATED ORAL at 09:04

## 2024-02-27 RX ADMIN — LEVETIRACETAM 1500 MG: 500 TABLET, FILM COATED ORAL at 21:11

## 2024-02-27 ASSESSMENT — ACTIVITIES OF DAILY LIVING (ADL)
ADLS_ACUITY_SCORE: 55
ADLS_ACUITY_SCORE: 56
ADLS_ACUITY_SCORE: 56
ADLS_ACUITY_SCORE: 51
ADLS_ACUITY_SCORE: 55
ADLS_ACUITY_SCORE: 56
ADLS_ACUITY_SCORE: 55
ADLS_ACUITY_SCORE: 55
ADLS_ACUITY_SCORE: 56
ADLS_ACUITY_SCORE: 54
ADLS_ACUITY_SCORE: 56
ADLS_ACUITY_SCORE: 56
ADLS_ACUITY_SCORE: 51
ADLS_ACUITY_SCORE: 55
ADLS_ACUITY_SCORE: 58
ADLS_ACUITY_SCORE: 55
ADLS_ACUITY_SCORE: 56
ADLS_ACUITY_SCORE: 54

## 2024-02-27 NOTE — PROGRESS NOTES
CLINICAL NUTRITION SERVICES - REASSESSMENT NOTE    Recommendations Ordered by Registered Dietitian (RD):   Shira Ensure Max @ 2 pm   Malnutrition: Patient does not meet two of the above criteria necessary for diagnosing malnutrition     EVALUATION OF PROGRESS TOWARD GOALS   Diet:  Soft and bite sized, thin liquids    Nutrition Support:  discontinued 2/24    Intake/Tolerance:  When asked how his po intake was going since stopping nutrition support, Joel reported he had a large BM. He didn't recall yesterday's intakes. He stated Gel+ and Magic Cups both can give him diarrhea. He did say that shira Ensure Max doesn't give him any GI issues.    - Flow sheets show x6 intakes of % yesterday and x1 intake of 75% on 2/25 since EN discontinued on 2/24    ASSESSED NUTRITION NEEDS:  Dosing Weight 137.1 kg actual; 68.4 kg IBW   Estimated Energy Needs: 7162-6047 kcals (11-14 Kcal/Kg)  Justification: obese and vented  Estimated Protein Needs: 171 grams protein (2.5 g pro/Kg)  Justification: hypercatabolism with critical illness and obesity guidelines   Estimated Fluid Needs: per MD     NEW FINDINGS:   Weight:   02/27/24 0524 144.1 kg (317 lb 10.9 oz) Bed scale   02/26/24 0415 136.4 kg (300 lb 12.8 oz) Bed scale   02/24/24 0600 139.9 kg (308 lb 6.8 oz) Bed scale   02/23/24 0400 137.1 kg (302 lb 4 oz) Bed scale   02/22/24 1413 136.7 kg (301 lb 6.4 oz) --   02/22/24 0639 143.3 kg (316 lb) --     GI: LBM x2 on 2/24    Meds:    busPIRone  15 mg Oral or Feeding Tube BID    divalproex sodium delayed-release  500 mg Oral Q12H [facility] (08/20)    enoxaparin ANTICOAGULANT  40 mg Subcutaneous Q12H    lacosamide  50 mg Oral or Feeding Tube Daily    lamoTRIgine  100 mg Oral or Feeding Tube Daily    lamoTRIgine  200 mg Oral or Feeding Tube BID    levETIRAcetam  1,000 mg Oral or Feeding Tube QAM    And    levETIRAcetam  1,500 mg Oral or Feeding Tube At Bedtime    levOCARNitine  660 mg Oral or Feeding Tube TID    multivitamin, therapeutic  1  tablet Oral Daily    pantoprazole  40 mg Oral QAM AC    propranolol  20 mg Oral or Feeding Tube BID    zonisamide  100 mg Oral or Feeding Tube QAM    And    zonisamide  200 mg Oral or Feeding Tube At Bedtime        Labs:   Electrolytes  Sodium (mmol/L)   Date Value   02/27/2024 141   12/20/2013 142     Potassium (mmol/L)   Date Value   02/27/2024 5.1   06/20/2022 4.4   12/20/2013 4.5     Potassium POCT (mmol/L)   Date Value   02/22/2024 4.0     Magnesium (mg/dL)   Date Value   02/27/2024 1.8   11/22/2013 1.8     Phosphorus (mg/dL)   Date Value   02/27/2024 3.5    Blood Glucose  Glucose (mg/dL)   Date Value   02/27/2024 101 (H)   06/20/2022 101 (H)   12/20/2013 94     GLUCOSE BY METER POCT (mg/dL)   Date Value   02/26/2024 103 (H)     Hemoglobin A1C (%)   Date Value   06/22/2008 5.3    Renal  Urea Nitrogen (mg/dL)   Date Value   02/27/2024 13.3   06/20/2022 17   12/20/2013 19     UREA NITROGEN POCT (mg/dL)   Date Value   02/22/2024 14     Creatinine (mg/dL)   Date Value   02/27/2024 0.63 (L)   12/20/2013 1.03         Previous Goals:   EN + Propofol to meet 100% of nutritional needs at eventual goal   Evaluation: Met    Previous Nutrition Diagnosis:   Inadequate oral intake related to increased respiratory needs with intubation as evidenced by pt NPO requiring nutritional support to meet nutritional needs   Evaluation: Completed    CURRENT NUTRITION DIAGNOSIS  Inadequate oral intake related to variable intakes and risk for po to decline 2/2 mental status as evidenced by pt not remembering yesterday's po intake and need for oral nutrition supplements to help pt meet nutrition needs.    INTERVENTIONS  Recommendations / Nutrition Prescription  shira Ensure Max @ 2 pm    Implementation  Medical food supplement therapy    Goals  PO - >75% meal trays TID or the equivalent with supplements.    MONITORING AND EVALUATION:  Progress towards goals will be monitored and evaluated per protocol and Practice Guideline    Darrius  Jude SOTELO, FELA

## 2024-02-27 NOTE — CARE PLAN
/67 (BP Location: Right arm)   Pulse 72   Temp 97.5  F (36.4  C) (Axillary)   Resp 21   Wt 136.4 kg (300 lb 12.8 oz)   SpO2 91%   BMI 45.74 kg/m      Neuro: A/O X4, forgetful at times  Cardiac: RR  Lungs: 2 L NC  GI: No BM  : External Catheter  Pain: 0/10  IV: LPIV  Meds: Lovenox  Labs/tests: K-mg-ph protocol  Diet: Soft and bite sized, Thin Liquids  Activity: Lift  Plan: Discharge back to group home 2/27/2024 or 02/28/2024 once stable

## 2024-02-27 NOTE — PROGRESS NOTES
Care Management Follow Up    Length of Stay (days): 5    Expected Discharge Date: 02/28/2024     Concerns to be Addressed:       Patient plan of care discussed at interdisciplinary rounds: Yes    Anticipated Discharge Disposition:       Anticipated Discharge Services:    Anticipated Discharge DME:      Patient/family educated on Medicare website which has current facility and service quality ratings:    Education Provided on the Discharge Plan:    Patient/Family in Agreement with the Plan:      Referrals Placed by CM/SW:    Private pay costs discussed: Not applicable    Additional Information:  Sw following for discharge planning.     Sw called  and left information to request call back.  Sw communicated that pt is currently AX1.    Addendum:  Manager came by and assessed ptBayron Quoc to discuss with his team and call sw.     Social work will continue to follow and assist with discharge planning as needed.    SKYLER Lewis, [facility]  Inpatient Care Coordination  [facility]  [phone]      SKYLER Lewis

## 2024-02-27 NOTE — PLAN OF CARE
Goal Outcome Evaluation:      Plan of Care Reviewed With: patient        Labs/Protocols: K/Mag/Phos  Vitals: BP (!) 143/86 (BP Location: Right arm)   Pulse 82   Temp 98.2  F (36.8  C) (Oral)   Resp 24   Wt 144.1 kg (317 lb 10.9 oz)   SpO2 99%   BMI 48.30 kg/m    Cardiac: WNL  Telemetry: SR 78 per telemetry tech   Respiratory: 2 L NC; BIPAP HS and intermittently throughout day per pt request   Neuro: A/Ox4 - intermittent confusion, slow to respond   GI/: external cath in place  Skin: slight BLE edema, scattered bruises   LDAs: PIV SL  Diet: soft bite sized, thin liquids   Activity: Assist x1  Pain: denies  Plan: pain control, encourage activity, plan to discharge back to

## 2024-02-27 NOTE — PLAN OF CARE
Previous Goals:   EN + Propofol to meet 100% of nutritional needs at eventual goal   Evaluation: Met    Goal Outcome Evaluation:    Plan of Care Reviewed With: patient    Outcome Evaluation: PO - >75% meal trays TID or the equivalent with supplements.    Daily shira Ensure Max @ 2 pm

## 2024-02-27 NOTE — PLAN OF CARE
Goal Outcome Evaluation:      Plan of Care Reviewed With: patient    Overall Patient Progress: improvingOverall Patient Progress: improving         Pt is a/o, forgetful. Inappropriate with calling staff love names, saying I love you. Boundaries set and pt has been more appropriate. VS- continues on 3L nc. Lungs diminished. Up to chair. Speech therapy changed diet to small bites. Eating ice cream and likes apple juice. Voiding per urinal. Up with A1 and walker to bathroom. Plan to discharge back to group home- possibly tomorrow.

## 2024-02-27 NOTE — PROGRESS NOTES
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 He received 5 minutes of CPR in the field with ROSC and was subseqently intubated and sedated on arrival to the emergency department.  Had a prolonged stay but has improved and is now extubated/improving.Oxygen currently being weaned, now on home O2 with nasal cannula.  Hopeful for discharge 2/28.     Cardiac Arrest s/p ROSC, suspect seizure/respiratory trigger  Prolonged QTc concern though later assessment with improved QT:  Noted to have witness cardiac arrest by EMS after seizure activity. I suspect this was primary respiratory failure that led to cardiovascular collapse. ROSC achieved after 5 minutes of CPR. On continuous cardiac monitoring now without evidence of continued ectopy. TTE without wall motion abnormalities and with retained EF (60-65%).  Unclear if QT played much a role.  He was notably on high dose psychiatric medications that can prolong QT.  -  Patient with significant mental health issues.   QT now better.   See discussion below concerning meds.     Breakthrough Seizure Activity  Epilepsy:  Witnessed seizure activity at shelter. Was recently admitted to observation from 2/20/2024-2/21/2024 for possible seizure activity. He does have a history of epilepsy, but also history of reporting seizure activity when there has been none. Had previously been transferred for 24 hours EEG at [facility] without evidence of seizure activity. Remains on lacosamide,  lamotrigine, keppra, zonisamide, and depakote.  -  Neurology consulted, now planning to follow-up outpatient     Acute Hypoxic & Acute on Chronic Hypercapnic Respiratory Failure, improving  Acute Respiratory Acidosis  ANA/OHS  Chronic Hypercapnia  Pulmonary Hypertension:  Was intubated after cardiac arrest. Normally wears BiPAP at night and with naps for chronic hypercapnia. Presented qith pH 6.98, pCO2 >130 after cardiac arrest. Now improved with intubation, but lower than baseline ~mid-70s. Patient apneic with pressure support trials on 2/23/2024. Able to bipap extubate 2/24/2024.  Using some intermittent BiPAP on 2/25 and with sleep.   -- Now on 2 L by nasal cannula, which is home oxygen requirement  -- Likely ready for discharge 2/28 pending group home approval      Schizoaffective Disorder  Borderline Personality disorder  MDD  Anxiety  Continue home buspirone. Had been holding PTA citalopram and seroquel.  Patients behaviors escalating slightly, hasn't slept as much.  Recent QT ok.  Will give a small dose of celexa 10 mg and recheck QT EKG in AM.  May be reasonable to continue selective serotonin reuptake inhibitor without the concominant use of high dose seroquel (was PTA on 300 mg + 60 of celexa).  -Psychiatry team consulted to assist with further recommendations  -Patient declined changes to his current psychiatric medications while inpatient, would prefer to follow-up with PCP     Dysphagia:  -  Modified diet, speech following    Developmental Delay: Lives in group home. Uses wheelchair and walker interchangeably.    Hypertension: Continue PTA propranolol + prazosin.    GERD: Prontonix resumed, QT was improved    Chronic Hyperammoniemia: Continue PTA levocarnitine.    Left Eye Blindness 2/2 Enucleation     Disposition/SW issues:  POA - patient has stepparent helping but not officially POA by report.  SW consult to assist.  Unclear if group home will be able to immediately meet his care needs. Staff from  "group home came to evaluate patient in person 2/27, will contact  if/when they feel they are able to take the patient home and accommodate his current needs    Medical Decision Making       45 MINUTES SPENT BY ME on the date of service doing chart review, history, exam, documentation & further activities per the note.         Diet: Combination Diet Soft and Bite Sized Diet (level 6); Thin Liquids (level 0) (Supervision and assistance with all oral intake; encourage slow rate and small bites/sips)  Room Service  Snacks/Supplements Adult: Ensure Max Protein (bariatric); Between Meals    DVT Prophylaxis: Enoxaparin (Lovenox) SQ  Reid Catheter: Not present  Lines: None     Cardiac Monitoring: ACTIVE order. Indication: ICU  Code Status: Full Code      Clinically Significant Risk Factors                  # Hypertension: Noted on problem list        # Severe Obesity: Estimated body mass index is 48.3 kg/m  as calculated from the following:    Height as of 2/20/24: 1.727 m (5' 8\").    Weight as of this encounter: 144.1 kg (317 lb 10.9 oz).        # Financial/Environmental Concerns:           Disposition Plan Discharge pending group home approval for discharge home.         Eric Robertson MD  Hospitalist Service  [facility]  Securely message with [employer] (more info)  Text page via [employer] Paging/Directory   ______________________________________________________________________    Interval History   No acute events overnight.  Oxygen has been weaned to 2 L by nasal cannula, which is patient's chronic oxygen requirement.  States that he has no issues or concerns today, feels improved.  Group home staff coming by hospital today to assess patient, will let us know if they are able to take patient home in his current condition.    Physical Exam   Vital Signs: Temp: 98.2  F (36.8  C) Temp src: Oral BP: (!) 143/86 Pulse: 82   Resp: 24 SpO2: 99 % O2 Device: BiPAP/CPAP Oxygen Delivery: 5 LPM  Weight: " 317 lbs 10.93 oz    GEN:  Alert, oriented to self/place, some confusion during exam, appears comfortable, no overt distress.  HEENT:  Normocephalic/atraumatic, no scleral icterus, no nasal discharge, mouth moist.  CV:  Regular rate and rhythm, no murmur or JVD.  S1 + S2 noted, no S3 or S4.  LUNGS:  Somewhat course but moving air well, no wheezing/retractions.  Symmetric chest rise on inhalation noted.  ABD:  Active bowel sounds, soft, non-tender, mildly distended.  No guarding/rigidity.  EXT:  +1 edema.  No cyanosis.  No acute joint synovitis noted.      Medications      busPIRone  15 mg Oral or Feeding Tube BID    divalproex sodium delayed-release  500 mg Oral Q12H [facility] (08/20)    enoxaparin ANTICOAGULANT  40 mg Subcutaneous Q12H    lacosamide  50 mg Oral or Feeding Tube Daily    lamoTRIgine  100 mg Oral or Feeding Tube Daily    lamoTRIgine  200 mg Oral or Feeding Tube BID    levETIRAcetam  1,000 mg Oral or Feeding Tube QAM    And    levETIRAcetam  1,500 mg Oral or Feeding Tube At Bedtime    levOCARNitine  660 mg Oral or Feeding Tube TID    multivitamin, therapeutic  1 tablet Oral Daily    pantoprazole  40 mg Oral QAM AC    propranolol  20 mg Oral or Feeding Tube BID    zonisamide  100 mg Oral or Feeding Tube QAM    And    zonisamide  200 mg Oral or Feeding Tube At Bedtime       Data     Labs and Imaging results below reviewed today.  Recent Labs   Lab 02/27/24  0816 02/26/24  0556 02/25/24  0535 02/23/24  0755   WBC 6.0 6.6  --  10.0   HGB 11.3* 11.4*  --  12.1*   HCT 36.3* 36.5*  --  37.0*   * 100  --  96    166 162 167     7-Day Micro Results       Collected Updated Procedure Result Status      02/22/2024 0652 02/22/2024 0751 Symptomatic Influenza A/B, RSV, & SARS-CoV2 PCR (COVID-19) Nasopharyngeal [[id number]]    Swab from Nasopharyngeal    Final result Component Value   Influenza A PCR Negative   Influenza B PCR Negative   RSV PCR Negative   SARS CoV2 PCR Negative   NEGATIVE: SARS-CoV-2  (COVID-19) RNA not detected, presumed negative.            02/21/2024 1730 02/21/2024 1823 Symptomatic Influenza A/B, RSV, & SARS-CoV2 PCR (COVID-19) Nasopharyngeal [[id number]]    Swab from Nasopharyngeal    Final result Component Value   Influenza A PCR Negative   Influenza B PCR Negative   RSV PCR Negative   SARS CoV2 PCR Negative   NEGATIVE: SARS-CoV-2 (COVID-19) RNA not detected, presumed negative.                  Recent Labs   Lab 02/27/24  1149 02/27/24  0816 02/26/24  1243 02/26/24  0833 02/26/24  0556 02/25/24  1942 02/25/24  1831 02/25/24  0611 02/25/24  0535 02/24/24  0815 02/24/24  0537 02/22/24  0639 02/22/24  0632   NA  --  141  --   --  141  --  140  --   --   --  139   < > 143   POTASSIUM 4.6 5.1  --   --  3.9  --  3.8  --   --   --  3.9   < > 4.2   CHLORIDE  --  99  --   --  99  --  97*  --   --   --  99   < > 94*   CO2  --  39*  --   --  37*  --  36*  --   --   --  32*   < > 29   ANIONGAP  --  3*  --   --  5*  --  7  --   --   --  8   < > 20*   GLC  --  101* 103* 122* 108*   < > 100*   < >  --    < > 74   < > 202*   BUN  --  13.3  --   --  9.0  --  7.7  --   --   --  13.1   < > 13.5   CR  --  0.63*  --   --  0.63*  --  0.60*  --  0.70  --  0.62*   < > 0.70   GFRESTIMATED  --  >90  --   --  >90  --  >90  --  >90  --  >90   < > >90   ARNOLD  --  9.1  --   --  8.9  --  8.7  --   --   --  8.4*   < > 9.1   MAG  --  1.8  --   --  1.7  --   --   --   --   --  1.7   < > 1.7   PHOS  --  3.5  --   --  2.6  --   --   --  3.7  --  4.0   < >  --    PROTTOTAL  --   --   --   --   --   --   --   --   --   --   --   --  6.9   ALBUMIN  --   --   --   --   --   --   --   --   --   --   --   --  4.5   BILITOTAL  --   --   --   --   --   --   --   --   --   --   --   --  0.3   ALKPHOS  --   --   --   --   --   --   --   --   --   --   --   --  89   AST  --   --   --   --   --   --   --   --   --   --   --   --  40   ALT  --   --   --   --   --   --   --   --   --   --   --   --  48    < > = values in this interval not  displayed.       No results found for this or any previous visit (from the past 24 hour(s)).

## 2024-02-27 NOTE — PROGRESS NOTES
A BiPAP of  14/7 @ 40% was applied to the pt via the mask for NOC use.  The bridge of the nose looks good and remains intact. Pt is tolerating it well. Will continue to monitor and assess the pt's current respiratory status and needs.     Praveen Christensen, RT on 2/27/2024 at 12:45 AM

## 2024-02-28 ENCOUNTER — APPOINTMENT (OUTPATIENT)
Dept: PHYSICAL THERAPY | Facility: CLINIC | Age: 49
DRG: 100 | End: 2024-02-28
Payer: MEDICARE

## 2024-02-28 VITALS
OXYGEN SATURATION: 97 % | HEART RATE: 77 BPM | RESPIRATION RATE: 20 BRPM | DIASTOLIC BLOOD PRESSURE: 85 MMHG | SYSTOLIC BLOOD PRESSURE: 154 MMHG | BODY MASS INDEX: 47.93 KG/M2 | WEIGHT: 315 LBS | TEMPERATURE: 98.1 F

## 2024-02-28 LAB
ERYTHROCYTE [DISTWIDTH] IN BLOOD BY AUTOMATED COUNT: 12.7 % (ref 10–15)
HCT VFR BLD AUTO: 36.8 % (ref 40–53)
HGB BLD-MCNC: 11.2 G/DL (ref 13.3–17.7)
HOLD SPECIMEN: NORMAL
LACOSAMIDE SERPL-MCNC: <0.5 UG/ML
MCH RBC QN AUTO: 30.6 PG (ref 26.5–33)
MCHC RBC AUTO-ENTMCNC: 30.4 G/DL (ref 31.5–36.5)
MCV RBC AUTO: 101 FL (ref 78–100)
PLATELET # BLD AUTO: 233 10E3/UL (ref 150–450)
RBC # BLD AUTO: 3.66 10E6/UL (ref 4.4–5.9)
WBC # BLD AUTO: 6.7 10E3/UL (ref 4–11)

## 2024-02-28 PROCEDURE — 94660 CPAP INITIATION&MGMT: CPT

## 2024-02-28 PROCEDURE — 97542 WHEELCHAIR MNGMENT TRAINING: CPT | Mod: GP | Performed by: PHYSICAL THERAPIST

## 2024-02-28 PROCEDURE — 97530 THERAPEUTIC ACTIVITIES: CPT | Mod: GP | Performed by: PHYSICAL THERAPIST

## 2024-02-28 PROCEDURE — 250N000013 HC RX MED GY IP 250 OP 250 PS 637: Performed by: STUDENT IN AN ORGANIZED HEALTH CARE EDUCATION/TRAINING PROGRAM

## 2024-02-28 PROCEDURE — 250N000013 HC RX MED GY IP 250 OP 250 PS 637: Performed by: INTERNAL MEDICINE

## 2024-02-28 PROCEDURE — 99232 SBSQ HOSP IP/OBS MODERATE 35: CPT | Performed by: STUDENT IN AN ORGANIZED HEALTH CARE EDUCATION/TRAINING PROGRAM

## 2024-02-28 PROCEDURE — 85027 COMPLETE CBC AUTOMATED: CPT | Performed by: STUDENT IN AN ORGANIZED HEALTH CARE EDUCATION/TRAINING PROGRAM

## 2024-02-28 PROCEDURE — 999N000157 HC STATISTIC RCP TIME EA 10 MIN

## 2024-02-28 PROCEDURE — 36415 COLL VENOUS BLD VENIPUNCTURE: CPT | Performed by: STUDENT IN AN ORGANIZED HEALTH CARE EDUCATION/TRAINING PROGRAM

## 2024-02-28 PROCEDURE — 250N000011 HC RX IP 250 OP 636: Performed by: STUDENT IN AN ORGANIZED HEALTH CARE EDUCATION/TRAINING PROGRAM

## 2024-02-28 RX ADMIN — ENOXAPARIN SODIUM 40 MG: 40 INJECTION SUBCUTANEOUS at 03:23

## 2024-02-28 RX ADMIN — DIVALPROEX SODIUM 500 MG: 500 TABLET, DELAYED RELEASE ORAL at 09:34

## 2024-02-28 RX ADMIN — PROPRANOLOL HYDROCHLORIDE 20 MG: 20 TABLET ORAL at 09:34

## 2024-02-28 RX ADMIN — LACOSAMIDE 50 MG: 50 TABLET, FILM COATED ORAL at 09:34

## 2024-02-28 RX ADMIN — LAMOTRIGINE 200 MG: 200 TABLET ORAL at 09:34

## 2024-02-28 RX ADMIN — BUSPIRONE HYDROCHLORIDE 15 MG: 15 TABLET ORAL at 09:33

## 2024-02-28 RX ADMIN — LEVOCARNITINE 660 MG: 330 TABLET ORAL at 09:33

## 2024-02-28 RX ADMIN — PANTOPRAZOLE SODIUM 40 MG: 40 TABLET, DELAYED RELEASE ORAL at 09:34

## 2024-02-28 RX ADMIN — THERA TABS 1 TABLET: TAB at 09:34

## 2024-02-28 RX ADMIN — LEVETIRACETAM 1000 MG: 500 TABLET, FILM COATED ORAL at 09:33

## 2024-02-28 RX ADMIN — ZONISAMIDE 100 MG: 100 CAPSULE ORAL at 09:34

## 2024-02-28 ASSESSMENT — ACTIVITIES OF DAILY LIVING (ADL)
ADLS_ACUITY_SCORE: 55
ADLS_ACUITY_SCORE: 56
ADLS_ACUITY_SCORE: 55
ADLS_ACUITY_SCORE: 56
ADLS_ACUITY_SCORE: 55
ADLS_ACUITY_SCORE: 55
ADLS_ACUITY_SCORE: 56
ADLS_ACUITY_SCORE: 56
ADLS_ACUITY_SCORE: 55
ADLS_ACUITY_SCORE: 56
ADLS_ACUITY_SCORE: 56

## 2024-02-28 NOTE — PROGRESS NOTES
A BiPAP of  14/7 @ 60% was applied to the pt via the mask for NOC use.  The bridge of the nose looks good and remains intact. Pt is tolerating it well. Will continue to monitor and assess the pt's current respiratory status and needs.     Praveen Christensen, RT on 2/27/2024 at 10:46 PM

## 2024-02-28 NOTE — PROVIDER NOTIFICATION
"Nurse: \"[facility] can we get [facility] ordered?\"  "

## 2024-02-28 NOTE — PROGRESS NOTES
Care Management Discharge Note    Discharge Date: 03/01/2024       Discharge Disposition: Group Home    Discharge Services: Transportation Services    Discharge DME: Oxygen    Discharge Transportation: agency, health plan transportation    Private pay costs discussed: Not applicable    Does the patient's insurance plan have a 3 day qualifying hospital stay waiver?  No    PAS Confirmation Code:    Patient/family educated on Medicare website which has current facility and service quality ratings: yes    Education Provided on the Discharge Plan: Yes  Persons Notified of Discharge Plans: , RN, charge   Patient/Family in Agreement with the Plan: yes    Handoff Referral Completed: No    Additional Information:  Steven following for discharge planning.     Steven spoke with [facility]  Manager, that pt can return to his . Steven paged MD who affirmed that pt is ready to discharge home today. Sw set up transportation 210-250pm. Pt will be transported by stretcher due to need for supervision. The  requested that new medications be sent to [facility]. Steven spoke with [facility] (and sent new referral) to confirm that pt is discharging today and will need PT added as a discipline.      manager updated on timing, RN, MD, [facility] also updated on timing of discharge.     Addendum: PCS form given to [facility].     Social work will continue to follow and assist with discharge planning as needed.    SKYLER Lewis, [facility]  Inpatient Care Coordination  [facility]  [phone]      SKYLER Lewis

## 2024-02-28 NOTE — DISCHARGE SUMMARY
"[facility]  Hospitalist Discharge Summary      Date of Admission:  2/22/2024  Date of Discharge:  2/28/2024  Discharging Provider: Eric Robertson MD  Discharge Service: Hospitalist Service    Discharge Diagnoses   Cardiac arrest s/p ROSC  Acute on chronic hypoxic and hypercapnic respiratory failure  Seizure disorder  Prolonged Qtc  ANA/OHS  Pulmonary hypertension  Schizoaffective disorder  Borderline personality disorder  MDD/Anxiety  Dysphagia  HTN  GERD  Hyperammonemia, chronic  Left eye blindness    Clinically Significant Risk Factors     # Severe Obesity: Estimated body mass index is 47.93 kg/m  as calculated from the following:    Height as of 2/20/24: 1.727 m (5' 8\").    Weight as of this encounter: 143 kg (315 lb 4.1 oz).       Follow-ups Needed After Discharge   Follow-up Appointments     Follow-up and recommended labs and tests       Please follow-up with your primary care provider as needed for any issues   or concerns were having regarding her health.    Also please follow-up with your psychiatrist and neurologist.  We had   offered to make some changes to your psychiatric medications while you   were admitted, but you said you would prefer to speak to your own   psychiatrist.  Our neurologist would also recommend that you have some   changes made to your seizure medications, as you are taking quite a few of   them, but would prefer your neurologist who knows you and your condition   better make those changes as an outpatient.            Unresulted Labs Ordered in the Past 30 Days of this Admission       No orders found from 1/23/2024 to 2/23/2024.          Discharge Disposition   Discharged to group home.   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 He received 5 minutes of CPR in the field with ROSC and was subseqently intubated and sedated on arrival to the emergency department.  Had a prolonged stay in ICU attempting to wean from ventilator but was eventually extubated to BiPAP, has significantly improved and and is now back to baseline chronic oxygen requirement. [facility] came to hospital to assess patient and determined he was near his baseline function status and able to return home.      Cardiac Arrest s/p ROSC, suspect seizure/respiratory trigger  Prolonged QTc concern though later assessment with improved QT:  Noted to have witness cardiac arrest by EMS after seizure activity. I suspect this was primary respiratory failure that led to cardiovascular collapse. ROSC achieved after 5 minutes of CPR. On continuous cardiac monitoring now without evidence of continued ectopy. TTE without wall motion abnormalities and with retained EF (60-65%).  Unclear if QT played much a role.  He was notably on high dose psychiatric medications that can prolong QT.  -  Patient with significant mental health issues.   QT now better.   See discussion below concerning meds.     Breakthrough Seizure Activity  Epilepsy:  Witnessed seizure activity at [facility]. Was recently admitted to observation from 2/20/2024-2/21/2024 for possible seizure activity. He does have a history of epilepsy, but also history of reporting seizure activity when there has been none. Had previously been transferred for 24 hours EEG at [facility] without evidence of seizure activity. Remains on lacosamide, lamotrigine, keppra, zonisamide, and depakote.  -  Neurology consulted, now planning to follow-up outpatient as patient has long history of current meds and changes should be made by provider with more  knowledge of patient's seizure history      Acute Hypoxic & Acute on Chronic Hypercapnic Respiratory Failure, improving  Acute Respiratory Acidosis  ANA/OHS  Chronic Hypercapnia  Pulmonary Hypertension:  Was intubated after cardiac arrest. Normally wears BiPAP at night and with naps for chronic hypercapnia. Presented qith pH 6.98, pCO2 >130 after cardiac arrest. Now improved with intubation, but lower than baseline ~mid-70s. Patient apneic with pressure support trials on 2/23/2024. Able to bipap extubate 2/24/2024.  Using some intermittent BiPAP on 2/25 and with sleep, back to home oxygen requirement.   -- Now on 2 L by nasal cannula, which is home oxygen requirement  -- Likely ready for discharge 2/28 pending group home approval     Schizoaffective Disorder  Borderline Personality disorder  MDD  Anxiety  Continuing home buspirone. Holding PTA citalopram and seroquel given prolonged QT interval in setting of cardiac arrest on presentation. Mood has been stable during admission. Psych recommending continuing to hold seroquel and citalopram given QT prolongation, patient deferred additional changes to medications to outpatient psychiatrist.   -Patient declined changes to his current psychiatric medications while inpatient, would prefer to follow-up with PCP     Dysphagia:  -  Modified diet, speech following     Developmental Delay: Lives in group home. Uses wheelchair and walker interchangeably.     Hypertension: Continue PTA propranolol + prazosin.     GERD: Prontonix resumed, QT was improved     Chronic Hyperammoniemia: Continue PTA levocarnitine.     Left Eye Blindness 2/2 Enucleation    Consultations This Hospital Stay   PHYSICAL THERAPY ADULT IP CONSULT  INTENSIVIST IP CONSULT  NEUROLOGY IP CONSULT  NEUROLOGY IP CONSULT  CARE MANAGEMENT / SOCIAL WORK IP CONSULT  CARDIOLOGY IP CONSULT  NUTRITION SERVICES ADULT IP CONSULT  PHARMACY IP CONSULT  SPEECH LANGUAGE PATH ADULT IP CONSULT  OCCUPATIONAL THERAPY ADULT IP  CONSULT  SOCIAL WORK IP CONSULT  PSYCHIATRY IP CONSULT    Code Status   Full Code    Time Spent on this Encounter   I, Eric Robertson MD, personally saw the patient today and spent greater than 30 minutes discharging this patient.       Eric Robertson MD  [place] [address] MEDICAL SURGICAL  [address]  [facility] [address]  Phone: [phone]  Fax: [phone]  ______________________________________________________________________    Physical Exam   Vital Signs: Temp: 98.1  F (36.7  C) Temp src: Oral BP: (!) 154/85 Pulse: 77   Resp: 20 SpO2: 97 % O2 Device: Oxymizer cannula Oxygen Delivery: 6 LPM  Weight: 315 lbs 4.12 oz    GEN:  Alert, oriented to self/place, some confusion during exam, appears comfortable, no overt distress.  HEENT:  Normocephalic/atraumatic, no scleral icterus, no nasal discharge, mouth moist.  CV:  Regular rate and rhythm, no murmur or JVD.  S1 + S2 noted, no S3 or S4.  LUNGS:  Somewhat course but moving air well, no wheezing/retractions.  Symmetric chest rise on inhalation noted.  ABD:  Active bowel sounds, soft, non-tender, mildly distended.  No guarding/rigidity.  EXT:  +1 edema.  No cyanosis.  No acute joint synovitis noted.       Primary Care Physician   Siobhan Mayo    Discharge Orders      Medication Therapy Management Referral      Home Care Referral      Reason for your hospital stay    Cardiac arrest likely due to acute respiratory failure.     Follow-up and recommended labs and tests     Please follow-up with your primary care provider as needed for any issues or concerns were having regarding her health.    Also please follow-up with your psychiatrist and neurologist.  We had offered to make some changes to your psychiatric medications while you were admitted, but you said you would prefer to speak to your own psychiatrist.  Our neurologist would also recommend that you have some changes made to your seizure medications, as you are taking quite a few of them, but  would prefer your neurologist who knows you and your condition better make those changes as an outpatient.     Activity    Your activity upon discharge: activity as tolerated     Oxygen Adult/Peds    Oxygen Documentation  I certify that this patient, Tre Vazquez has been under my care (or a nurse practitioner or physician's assistant working with me). This is the face-to-face encounter for oxygen medical necessity.      At the time of this encounter, I have reviewed the qualifying testing and have determined that supplemental oxygen is reasonable and necessary and is expected to improve the patient's condition in a home setting.       Patient has continued oxygen desaturation due to Chronic Respiratory Failure with Hypoxia J96.11.    If portability is ordered, is the patient mobile within the home? yes     Diet    Follow this diet upon discharge: Orders Placed This Encounter      Room Service      Snacks/Supplements Adult: Ensure Max Protein (bariatric); Between Meals      Combination Diet Soft and Bite Sized Diet (level 6); Thin Liquids (level 0) (Supervision and assistance with all oral intake; encourage slow rate and small bites/sips)       Significant Results and Procedures   Most Recent 3 CBC's:  Recent Labs   Lab Test 02/28/24  0608 02/27/24  0816 02/26/24  0556   WBC 6.7 6.0 6.6   HGB 11.2* 11.3* 11.4*   * 101* 100    224 166     Most Recent 3 BMP's:  Recent Labs   Lab Test 02/27/24  1149 02/27/24  0816 02/26/24  1243 02/26/24  0833 02/26/24  0556 02/25/24  1942 02/25/24  1831   NA  --  141  --   --  141  --  140   POTASSIUM 4.6 5.1  --   --  3.9  --  3.8   CHLORIDE  --  99  --   --  99  --  97*   CO2  --  39*  --   --  37*  --  36*   BUN  --  13.3  --   --  9.0  --  7.7   CR  --  0.63*  --   --  0.63*  --  0.60*   ANIONGAP  --  3*  --   --  5*  --  7   ARNOLD  --  9.1  --   --  8.9  --  8.7   GLC  --  101* 103* 122* 108*   < > 100*    < > = values in this interval not displayed.       Discharge  Medications   Current Discharge Medication List        CONTINUE these medications which have NOT CHANGED    Details   acetaminophen (TYLENOL) 325 MG tablet Take 650 mg by mouth every 4 hours as needed for mild pain      ammonium lactate (AMLACTIN) 12 % external cream Apply topically 2 times daily      bacitracin 500 UNIT/GM external ointment Apply topically 2 times daily Apply to left arm wound      busPIRone (BUSPAR) 15 MG tablet Take 15 mg by mouth 2 times daily      clotrimazole (LOTRIMIN) 1 % external cream Apply topically 2 times daily Apply to left groin      divalproex sodium delayed-release (DEPAKOTE) 500 MG DR tablet Take 1 tablet (500 mg) by mouth every 12 hours  Qty: 14 tablet, Refills: 0    Associated Diagnoses: Seizure disorder (H)      fluticasone (FLONASE) 50 MCG/ACT nasal spray Spray 1 spray in nostril 2 times daily      lacosamide (VIMPAT) 50 MG TABS tablet Take 1 tablet (50 mg) by mouth daily  Qty: 7 tablet, Refills: 0    Associated Diagnoses: Seizure disorder (H)      !! lamoTRIgine (LAMICTAL) 100 MG tablet Take 1 tablet (100 mg) by mouth daily At 1400  Qty: 7 tablet, Refills: 0    Associated Diagnoses: Seizure disorder (H)      !! lamoTRIgine (LAMICTAL) 200 MG tablet Take 1 tablet (200 mg) by mouth 2 times daily Take at 8 AM and 8 pm  Qty: 14 tablet, Refills: 0    Associated Diagnoses: Seizure disorder (H)      levETIRAcetam (KEPPRA) 500 MG tablet Take 2 tablets (1,000 mg) by mouth every morning AND 3 tablets (1,500 mg) at bedtime.  Qty: 150 tablet, Refills: 11    Associated Diagnoses: Non-refractory epilepsy (H)      levOCARNitine (CARNITOR) 330 MG tablet Take 2 tablets (660 mg) by mouth 3 times daily  Qty: 180 tablet, Refills: 11    Associated Diagnoses: Nonintractable generalized idiopathic epilepsy without status epilepticus (H)      methylcellulose POWD powder Apply 2 mg topically 2 times daily Mix 1 rounded tablespoon (2 mg) in 8 oz glass of water and take by mouth twice daily       multivitamin, therapeutic (THERA-VIT) TABS tablet Take 1 tablet by mouth daily      pantoprazole (PROTONIX) 40 MG EC tablet Take 40 mg by mouth daily      polyethylene glycol (MIRALAX) 17 GM/Dose powder Take 1 capful. by mouth daily      prazosin (MINIPRESS) 2 MG capsule Take 2 mg by mouth At Bedtime      propranolol (INDERAL) 20 MG tablet Take 1 tablet (20 mg) by mouth 2 times daily    Associated Diagnoses: Benign hypertension      zonisamide (ZONEGRAN) 100 MG capsule Take 1 capsule (100 mg) by mouth every morning AND 2 capsules (200 mg) At Bedtime.  Qty: 90 capsule, Refills: 11    Associated Diagnoses: Non-refractory epilepsy (H); Breakthrough seizure (H)       !! - Potential duplicate medications found. Please discuss with provider.        STOP taking these medications       CITALOPRAM HYDROBROMIDE PO Comments:   Reason for Stopping:         QUEtiapine ER (SEROQUEL XR) 300 MG 24 hr tablet Comments:   Reason for Stopping:             Allergies   Allergies   Allergen Reactions    Acetaminophen Unknown    Hydrocodone Bitartrate Er Unknown    Tomato GI Disturbance    Cephalexin Rash     HUT Reaction: Rash; HUT Noted: [id number]      Vicodin [Hydrocodone-Acetaminophen] GI Disturbance

## 2024-02-28 NOTE — CARE PLAN
BP (!) 143/86 (BP Location: Right arm)   Pulse 84   Temp 98.2  F (36.8  C) (Oral)   Resp 23   Wt 144.1 kg (317 lb 10.9 oz)   SpO2 97%   BMI 48.30 kg/m      Neuro: A/O X4  Cardiac: SR  Lungs: Bipap at night, 4 L Mustache Oxymizer  GI: WDL  : External Catheter  Pain: 0/10  IV: LPIV Saline lock, RPIV Saline lock  Meds: Buspar, Keppra, Depakote, Lamictal, Carnitor, Inderal, Zonegran, Lovenox  Labs/tests: K-mg-Ph  Diet: Sof Bite, Thin Liquids  Activity: Lift X2  Plan: Discharge 02/28/2024 Group Home

## 2024-02-28 NOTE — PLAN OF CARE
Physical Therapy Discharge Summary    Reason for therapy discharge:    Discharged to home with home therapy.    Progress towards therapy goal(s). See goals on Care Plan in [facility] electronic health record for goal details.  Goals not met.  Barriers to achieving goals:   discharge from facility.    Therapy recommendation(s):    Continued therapy is recommended.  Rationale/Recommendations:  HHPT to increase strength and progress independence and safety with all mobility.    Goal Outcome Evaluation:

## 2024-02-29 NOTE — PROGRESS NOTES
Speech Language Therapy Discharge Summary    Reason for therapy discharge:    Discharged to home with home therapy.    Progress towards therapy goal(s). See goals on Care Plan in [facility] electronic health record for goal details.  Goals partially met.  Barriers to achieving goals:   discharge from facility.    Therapy recommendation(s):    Continued therapy is recommended.  Rationale/Recommendations:  recommend  SLP for oropharyngeal dysphagia.    At the time of hospital discharge SLP recommended: soft and bite size diet (IDDSI 6) with thin liquids (0) with 1:1 assistance to ensure single bites (feeding assistance may be needed by spoon) and single sips, straw ok. Eat only when alert and up to chair for meals.

## 2024-02-29 NOTE — PLAN OF CARE
Occupational Therapy Discharge Summary    Reason for therapy discharge:    Discharged to home with home therapy.    Progress towards therapy goal(s). See goals on Care Plan in [facility] electronic health record for goal details.  Goals not met.  Barriers to achieving goals:   discharge from facility.    Therapy recommendation(s):    Continued therapy is recommended.  Rationale/Recommendations:  HHOT for progression of ADL independence.

## 2024-03-01 DIAGNOSIS — G40.909 NON-REFRACTORY EPILEPSY (H): ICD-10-CM

## 2024-03-01 DIAGNOSIS — G40.909 SEIZURE DISORDER (H): ICD-10-CM

## 2024-03-01 DIAGNOSIS — G40.919 BREAKTHROUGH SEIZURE (H): ICD-10-CM

## 2024-03-01 RX ORDER — ZONISAMIDE 100 MG/1
CAPSULE ORAL
Qty: 90 CAPSULE | Refills: 3 | Status: SHIPPED | OUTPATIENT
Start: 2024-03-01 | End: 2024-03-19

## 2024-03-01 RX ORDER — DIVALPROEX SODIUM 500 MG/1
TABLET, DELAYED RELEASE ORAL
Qty: 60 TABLET | Refills: 3 | Status: SHIPPED | OUTPATIENT
Start: 2024-03-01 | End: 2024-03-07

## 2024-03-01 NOTE — TELEPHONE ENCOUNTER
Refill request for: divalproex sodium delayed-release (DEPAKOTE) 500 MG DR tablet    Directions: Take 1 tablet (500 mg) by mouth every 12 hours     Refill request for: zonisamide (ZONEGRAN) 100 MG capsule   Directions: Take 1 capsule (100 mg) by mouth every morning AND 2 capsules (200 mg) At Bedtime     LOV: 2/2/24  NOV: Not scheduled     30 day supply with 3 refills Medication T'd for review and signature    Per OV 2/2/24 with Lisa  2. Depakote at 500 mg p.o. twice daily. (Did not taper off as potential use for psychiatric issues as well) continue levocarnitine for elevated ammonia.   5. Zonegran 100 mg a.m. and 200 mg p.m.

## 2024-03-06 ENCOUNTER — MEDICAL CORRESPONDENCE (OUTPATIENT)
Dept: HEALTH INFORMATION MANAGEMENT | Facility: CLINIC | Age: 49
End: 2024-03-06
Payer: MEDICARE

## 2024-03-07 ENCOUNTER — APPOINTMENT (OUTPATIENT)
Dept: CT IMAGING | Facility: CLINIC | Age: 49
DRG: 189 | End: 2024-03-07
Attending: EMERGENCY MEDICINE
Payer: MEDICARE

## 2024-03-07 ENCOUNTER — HOSPITAL ENCOUNTER (INPATIENT)
Facility: CLINIC | Age: 49
LOS: 4 days | Discharge: HOME HEALTH | DRG: 189 | End: 2024-03-11
Attending: EMERGENCY MEDICINE | Admitting: INTERNAL MEDICINE
Payer: MEDICARE

## 2024-03-07 DIAGNOSIS — J41.8 MIXED SIMPLE AND MUCOPURULENT CHRONIC BRONCHITIS (H): ICD-10-CM

## 2024-03-07 DIAGNOSIS — J96.22 ACUTE AND CHRONIC RESPIRATORY FAILURE WITH HYPERCAPNIA (H): ICD-10-CM

## 2024-03-07 DIAGNOSIS — J43.9 PULMONARY EMPHYSEMA, UNSPECIFIED EMPHYSEMA TYPE (H): ICD-10-CM

## 2024-03-07 DIAGNOSIS — J96.22 ACUTE ON CHRONIC RESPIRATORY FAILURE WITH HYPOXIA AND HYPERCAPNIA (H): ICD-10-CM

## 2024-03-07 DIAGNOSIS — L89.323 PRESSURE INJURY OF LEFT BUTTOCK, STAGE 3 (H): Primary | ICD-10-CM

## 2024-03-07 DIAGNOSIS — L89.312 PRESSURE INJURY OF RIGHT BUTTOCK, STAGE 2 (H): ICD-10-CM

## 2024-03-07 DIAGNOSIS — J96.21 ACUTE ON CHRONIC RESPIRATORY FAILURE WITH HYPOXIA AND HYPERCAPNIA (H): ICD-10-CM

## 2024-03-07 DIAGNOSIS — L30.4 INTERTRIGO: ICD-10-CM

## 2024-03-07 LAB
ANION GAP SERPL CALCULATED.3IONS-SCNC: 6 MMOL/L (ref 7–15)
ATRIAL RATE - MUSE: 96 BPM
BASE EXCESS BLDV CALC-SCNC: 11.1 MMOL/L (ref -3–3)
BASE EXCESS BLDV CALC-SCNC: 12.5 MMOL/L (ref -3–3)
BASE EXCESS BLDV CALC-SCNC: 12.9 MMOL/L (ref -3–3)
BASE EXCESS BLDV CALC-SCNC: 12.9 MMOL/L (ref -3–3)
BASOPHILS # BLD AUTO: 0.1 10E3/UL (ref 0–0.2)
BASOPHILS NFR BLD AUTO: 1 %
BUN SERPL-MCNC: 13.5 MG/DL (ref 6–20)
CALCIUM SERPL-MCNC: 9.5 MG/DL (ref 8.6–10)
CHLORIDE SERPL-SCNC: 96 MMOL/L (ref 98–107)
CREAT SERPL-MCNC: 0.67 MG/DL (ref 0.67–1.17)
D DIMER PPP FEU-MCNC: 1.38 UG/ML FEU (ref 0–0.5)
DEPRECATED HCO3 PLAS-SCNC: 42 MMOL/L (ref 22–29)
DIASTOLIC BLOOD PRESSURE - MUSE: NORMAL MMHG
EGFRCR SERPLBLD CKD-EPI 2021: >90 ML/MIN/1.73M2
EOSINOPHIL # BLD AUTO: 0.2 10E3/UL (ref 0–0.7)
EOSINOPHIL NFR BLD AUTO: 2 %
ERYTHROCYTE [DISTWIDTH] IN BLOOD BY AUTOMATED COUNT: 12.6 % (ref 10–15)
GLUCOSE BLDC GLUCOMTR-MCNC: 81 MG/DL (ref 70–99)
GLUCOSE SERPL-MCNC: 104 MG/DL (ref 70–99)
HCO3 BLDV-SCNC: 39 MMOL/L (ref 21–28)
HCO3 BLDV-SCNC: 42 MMOL/L (ref 21–28)
HCO3 BLDV-SCNC: 42 MMOL/L (ref 21–28)
HCO3 BLDV-SCNC: 43 MMOL/L (ref 21–28)
HCT VFR BLD AUTO: 42.1 % (ref 40–53)
HGB BLD-MCNC: 12.8 G/DL (ref 13.3–17.7)
HOLD SPECIMEN: NORMAL
IMM GRANULOCYTES # BLD: 0.2 10E3/UL
IMM GRANULOCYTES NFR BLD: 2 %
INTERPRETATION ECG - MUSE: NORMAL
LYMPHOCYTES # BLD AUTO: 2.4 10E3/UL (ref 0.8–5.3)
LYMPHOCYTES NFR BLD AUTO: 30 %
MCH RBC QN AUTO: 30.5 PG (ref 26.5–33)
MCHC RBC AUTO-ENTMCNC: 30.4 G/DL (ref 31.5–36.5)
MCV RBC AUTO: 101 FL (ref 78–100)
MONOCYTES # BLD AUTO: 0.8 10E3/UL (ref 0–1.3)
MONOCYTES NFR BLD AUTO: 10 %
NEUTROPHILS # BLD AUTO: 4.5 10E3/UL (ref 1.6–8.3)
NEUTROPHILS NFR BLD AUTO: 55 %
NRBC # BLD AUTO: 0 10E3/UL
NRBC BLD AUTO-RTO: 0 /100
O2/TOTAL GAS SETTING VFR VENT: 25 %
O2/TOTAL GAS SETTING VFR VENT: 25 %
O2/TOTAL GAS SETTING VFR VENT: 3 %
O2/TOTAL GAS SETTING VFR VENT: 35 %
OXYHGB MFR BLDV: 50 % (ref 70–75)
OXYHGB MFR BLDV: 50 % (ref 70–75)
OXYHGB MFR BLDV: 57 % (ref 70–75)
OXYHGB MFR BLDV: 93 % (ref 70–75)
P AXIS - MUSE: 25 DEGREES
PCO2 BLDV: 68 MM HG (ref 40–50)
PCO2 BLDV: 82 MM HG (ref 40–50)
PCO2 BLDV: 86 MM HG (ref 40–50)
PCO2 BLDV: 89 MM HG (ref 40–50)
PH BLDV: 7.3 [PH] (ref 7.32–7.43)
PH BLDV: 7.3 [PH] (ref 7.32–7.43)
PH BLDV: 7.32 [PH] (ref 7.32–7.43)
PH BLDV: 7.37 [PH] (ref 7.32–7.43)
PLATELET # BLD AUTO: 309 10E3/UL (ref 150–450)
PO2 BLDV: 30 MM HG (ref 25–47)
PO2 BLDV: 31 MM HG (ref 25–47)
PO2 BLDV: 34 MM HG (ref 25–47)
PO2 BLDV: 73 MM HG (ref 25–47)
POTASSIUM SERPL-SCNC: 4.1 MMOL/L (ref 3.4–5.3)
PR INTERVAL - MUSE: 176 MS
QRS DURATION - MUSE: 84 MS
QT - MUSE: 388 MS
QTC - MUSE: 490 MS
R AXIS - MUSE: 0 DEGREES
RBC # BLD AUTO: 4.19 10E6/UL (ref 4.4–5.9)
SAO2 % BLDV: 51.2 % (ref 70–75)
SAO2 % BLDV: 51.3 % (ref 70–75)
SAO2 % BLDV: 57.9 % (ref 70–75)
SAO2 % BLDV: 95 % (ref 70–75)
SODIUM SERPL-SCNC: 144 MMOL/L (ref 135–145)
SYSTOLIC BLOOD PRESSURE - MUSE: NORMAL MMHG
T AXIS - MUSE: 24 DEGREES
TROPONIN T SERPL HS-MCNC: 20 NG/L
TROPONIN T SERPL HS-MCNC: 21 NG/L
VENTRICULAR RATE- MUSE: 96 BPM
WBC # BLD AUTO: 8.1 10E3/UL (ref 4–11)

## 2024-03-07 PROCEDURE — 250N000013 HC RX MED GY IP 250 OP 250 PS 637: Performed by: INTERNAL MEDICINE

## 2024-03-07 PROCEDURE — 36415 COLL VENOUS BLD VENIPUNCTURE: CPT | Performed by: EMERGENCY MEDICINE

## 2024-03-07 PROCEDURE — 999N000157 HC STATISTIC RCP TIME EA 10 MIN

## 2024-03-07 PROCEDURE — 5A09457 ASSISTANCE WITH RESPIRATORY VENTILATION, 24-96 CONSECUTIVE HOURS, CONTINUOUS POSITIVE AIRWAY PRESSURE: ICD-10-PCS | Performed by: EMERGENCY MEDICINE

## 2024-03-07 PROCEDURE — 82805 BLOOD GASES W/O2 SATURATION: CPT | Performed by: EMERGENCY MEDICINE

## 2024-03-07 PROCEDURE — 99223 1ST HOSP IP/OBS HIGH 75: CPT | Mod: AI | Performed by: INTERNAL MEDICINE

## 2024-03-07 PROCEDURE — 250N000009 HC RX 250: Performed by: EMERGENCY MEDICINE

## 2024-03-07 PROCEDURE — 94640 AIRWAY INHALATION TREATMENT: CPT

## 2024-03-07 PROCEDURE — 93005 ELECTROCARDIOGRAM TRACING: CPT

## 2024-03-07 PROCEDURE — 85025 COMPLETE CBC W/AUTO DIFF WBC: CPT | Performed by: EMERGENCY MEDICINE

## 2024-03-07 PROCEDURE — 250N000011 HC RX IP 250 OP 636: Performed by: EMERGENCY MEDICINE

## 2024-03-07 PROCEDURE — 250N000011 HC RX IP 250 OP 636: Performed by: INTERNAL MEDICINE

## 2024-03-07 PROCEDURE — 94660 CPAP INITIATION&MGMT: CPT

## 2024-03-07 PROCEDURE — 82805 BLOOD GASES W/O2 SATURATION: CPT | Performed by: INTERNAL MEDICINE

## 2024-03-07 PROCEDURE — 84484 ASSAY OF TROPONIN QUANT: CPT | Performed by: EMERGENCY MEDICINE

## 2024-03-07 PROCEDURE — 99285 EMERGENCY DEPT VISIT HI MDM: CPT | Mod: 25

## 2024-03-07 PROCEDURE — G1010 CDSM STANSON: HCPCS

## 2024-03-07 PROCEDURE — 85379 FIBRIN DEGRADATION QUANT: CPT | Performed by: EMERGENCY MEDICINE

## 2024-03-07 PROCEDURE — 80048 BASIC METABOLIC PNL TOTAL CA: CPT | Performed by: EMERGENCY MEDICINE

## 2024-03-07 PROCEDURE — 36415 COLL VENOUS BLD VENIPUNCTURE: CPT | Performed by: INTERNAL MEDICINE

## 2024-03-07 PROCEDURE — 200N000001 HC R&B ICU

## 2024-03-07 PROCEDURE — 250N000009 HC RX 250: Performed by: INTERNAL MEDICINE

## 2024-03-07 RX ORDER — POLYETHYLENE GLYCOL 3350 17 G/17G
17 POWDER, FOR SOLUTION ORAL DAILY
Status: DISCONTINUED | OUTPATIENT
Start: 2024-03-08 | End: 2024-03-11 | Stop reason: HOSPADM

## 2024-03-07 RX ORDER — DIVALPROEX SODIUM 500 MG/1
500 TABLET, DELAYED RELEASE ORAL 3 TIMES DAILY
COMMUNITY
End: 2024-03-19

## 2024-03-07 RX ORDER — BUSPIRONE HYDROCHLORIDE 15 MG/1
15 TABLET ORAL 2 TIMES DAILY
Status: DISCONTINUED | OUTPATIENT
Start: 2024-03-07 | End: 2024-03-11 | Stop reason: HOSPADM

## 2024-03-07 RX ORDER — ALBUTEROL SULFATE 0.83 MG/ML
2.5 SOLUTION RESPIRATORY (INHALATION)
Status: DISCONTINUED | OUTPATIENT
Start: 2024-03-07 | End: 2024-03-11 | Stop reason: HOSPADM

## 2024-03-07 RX ORDER — DEXTROSE MONOHYDRATE 25 G/50ML
25-50 INJECTION, SOLUTION INTRAVENOUS
Status: DISCONTINUED | OUTPATIENT
Start: 2024-03-07 | End: 2024-03-11 | Stop reason: HOSPADM

## 2024-03-07 RX ORDER — ALBUTEROL SULFATE 0.83 MG/ML
2.5 SOLUTION RESPIRATORY (INHALATION)
Status: DISCONTINUED | OUTPATIENT
Start: 2024-03-07 | End: 2024-03-08

## 2024-03-07 RX ORDER — DIVALPROEX SODIUM 500 MG/1
500 TABLET, DELAYED RELEASE ORAL 3 TIMES DAILY
Status: DISCONTINUED | OUTPATIENT
Start: 2024-03-07 | End: 2024-03-11 | Stop reason: HOSPADM

## 2024-03-07 RX ORDER — ZONISAMIDE 100 MG/1
200 CAPSULE ORAL AT BEDTIME
Status: DISCONTINUED | OUTPATIENT
Start: 2024-03-07 | End: 2024-03-11 | Stop reason: HOSPADM

## 2024-03-07 RX ORDER — PROPRANOLOL HYDROCHLORIDE 20 MG/1
20 TABLET ORAL 2 TIMES DAILY
Status: DISCONTINUED | OUTPATIENT
Start: 2024-03-07 | End: 2024-03-11 | Stop reason: HOSPADM

## 2024-03-07 RX ORDER — QUETIAPINE 300 MG/1
300 TABLET, FILM COATED, EXTENDED RELEASE ORAL AT BEDTIME
Status: DISCONTINUED | OUTPATIENT
Start: 2024-03-07 | End: 2024-03-11 | Stop reason: HOSPADM

## 2024-03-07 RX ORDER — LEVOCARNITINE 330 MG/1
660 TABLET ORAL 3 TIMES DAILY
Status: DISCONTINUED | OUTPATIENT
Start: 2024-03-07 | End: 2024-03-11 | Stop reason: HOSPADM

## 2024-03-07 RX ORDER — LEVETIRACETAM 500 MG/1
1000 TABLET ORAL EVERY MORNING
COMMUNITY
End: 2024-03-19

## 2024-03-07 RX ORDER — PANTOPRAZOLE SODIUM 40 MG/1
40 TABLET, DELAYED RELEASE ORAL DAILY
Status: DISCONTINUED | OUTPATIENT
Start: 2024-03-08 | End: 2024-03-11 | Stop reason: HOSPADM

## 2024-03-07 RX ORDER — LEVETIRACETAM 500 MG/1
1000 TABLET ORAL EVERY MORNING
Status: DISCONTINUED | OUTPATIENT
Start: 2024-03-08 | End: 2024-03-11 | Stop reason: HOSPADM

## 2024-03-07 RX ORDER — QUETIAPINE 300 MG/1
300 TABLET, FILM COATED, EXTENDED RELEASE ORAL AT BEDTIME
Status: ON HOLD | COMMUNITY
End: 2024-04-18

## 2024-03-07 RX ORDER — NICOTINE POLACRILEX 4 MG
15-30 LOZENGE BUCCAL
Status: DISCONTINUED | OUTPATIENT
Start: 2024-03-07 | End: 2024-03-11 | Stop reason: HOSPADM

## 2024-03-07 RX ORDER — LAMOTRIGINE 100 MG/1
200 TABLET, EXTENDED RELEASE ORAL 2 TIMES DAILY
Status: DISCONTINUED | OUTPATIENT
Start: 2024-03-07 | End: 2024-03-11 | Stop reason: HOSPADM

## 2024-03-07 RX ORDER — FLUTICASONE PROPIONATE 50 MCG
1 SPRAY, SUSPENSION (ML) NASAL 2 TIMES DAILY
Status: DISCONTINUED | OUTPATIENT
Start: 2024-03-07 | End: 2024-03-11 | Stop reason: HOSPADM

## 2024-03-07 RX ORDER — LEVETIRACETAM 500 MG/1
1500 TABLET, FILM COATED, EXTENDED RELEASE ORAL AT BEDTIME
COMMUNITY
End: 2024-03-19

## 2024-03-07 RX ORDER — METHYLPREDNISOLONE SODIUM SUCCINATE 125 MG/2ML
60 INJECTION, POWDER, LYOPHILIZED, FOR SOLUTION INTRAMUSCULAR; INTRAVENOUS EVERY 8 HOURS
Status: DISCONTINUED | OUTPATIENT
Start: 2024-03-07 | End: 2024-03-08

## 2024-03-07 RX ORDER — LEVETIRACETAM 500 MG/1
1500 TABLET, FILM COATED, EXTENDED RELEASE ORAL AT BEDTIME
Status: DISCONTINUED | OUTPATIENT
Start: 2024-03-07 | End: 2024-03-11 | Stop reason: HOSPADM

## 2024-03-07 RX ORDER — CITALOPRAM HYDROBROMIDE 20 MG/1
60 TABLET ORAL DAILY
COMMUNITY

## 2024-03-07 RX ORDER — IOPAMIDOL 755 MG/ML
500 INJECTION, SOLUTION INTRAVASCULAR ONCE
Status: COMPLETED | OUTPATIENT
Start: 2024-03-07 | End: 2024-03-07

## 2024-03-07 RX ORDER — LAMOTRIGINE 200 MG/1
200 TABLET, EXTENDED RELEASE ORAL 2 TIMES DAILY
COMMUNITY
End: 2024-03-19

## 2024-03-07 RX ORDER — PRAZOSIN HYDROCHLORIDE 1 MG/1
2 CAPSULE ORAL AT BEDTIME
Status: DISCONTINUED | OUTPATIENT
Start: 2024-03-07 | End: 2024-03-11 | Stop reason: HOSPADM

## 2024-03-07 RX ADMIN — PROPRANOLOL HYDROCHLORIDE 20 MG: 20 TABLET ORAL at 21:40

## 2024-03-07 RX ADMIN — DIVALPROEX SODIUM 500 MG: 500 TABLET, DELAYED RELEASE ORAL at 21:40

## 2024-03-07 RX ADMIN — QUETIAPINE FUMARATE 300 MG: 300 TABLET, EXTENDED RELEASE ORAL at 21:41

## 2024-03-07 RX ADMIN — METHYLPREDNISOLONE SODIUM SUCCINATE 62.5 MG: 125 INJECTION, POWDER, FOR SOLUTION INTRAMUSCULAR; INTRAVENOUS at 21:41

## 2024-03-07 RX ADMIN — LEVETIRACETAM 1500 MG: 500 TABLET, FILM COATED, EXTENDED RELEASE ORAL at 21:40

## 2024-03-07 RX ADMIN — PRAZOSIN HYDROCHLORIDE 2 MG: 1 CAPSULE ORAL at 21:40

## 2024-03-07 RX ADMIN — LAMOTRIGINE 200 MG: 100 TABLET, FILM COATED, EXTENDED RELEASE ORAL at 21:40

## 2024-03-07 RX ADMIN — ZONISAMIDE 200 MG: 100 CAPSULE ORAL at 21:40

## 2024-03-07 RX ADMIN — LEVOCARNITINE 660 MG: 330 TABLET ORAL at 21:41

## 2024-03-07 RX ADMIN — IOPAMIDOL 88 ML: 755 INJECTION, SOLUTION INTRAVENOUS at 14:04

## 2024-03-07 RX ADMIN — SODIUM CHLORIDE 100 ML: 9 INJECTION, SOLUTION INTRAVENOUS at 14:04

## 2024-03-07 RX ADMIN — BUSPIRONE HYDROCHLORIDE 15 MG: 15 TABLET ORAL at 21:40

## 2024-03-07 RX ADMIN — ALBUTEROL SULFATE 2.5 MG: 2.5 SOLUTION RESPIRATORY (INHALATION) at 22:11

## 2024-03-07 ASSESSMENT — ACTIVITIES OF DAILY LIVING (ADL)
ADLS_ACUITY_SCORE: 55
ADLS_ACUITY_SCORE: 41
ADLS_ACUITY_SCORE: 41
ADLS_ACUITY_SCORE: 40
ADLS_ACUITY_SCORE: 41
ADLS_ACUITY_SCORE: 41
ADLS_ACUITY_SCORE: 40
ADLS_ACUITY_SCORE: 41

## 2024-03-07 ASSESSMENT — COLUMBIA-SUICIDE SEVERITY RATING SCALE - C-SSRS
1. IN THE PAST MONTH, HAVE YOU WISHED YOU WERE DEAD OR WISHED YOU COULD GO TO SLEEP AND NOT WAKE UP?: NO
2. HAVE YOU ACTUALLY HAD ANY THOUGHTS OF KILLING YOURSELF IN THE PAST MONTH?: NO
6. HAVE YOU EVER DONE ANYTHING, STARTED TO DO ANYTHING, OR PREPARED TO DO ANYTHING TO END YOUR LIFE?: YES

## 2024-03-07 NOTE — ED TRIAGE NOTES
Pt was seen here two weeks for choking on an M&M. Right after discharge, pt started to have lower left sided chest pain. Rates as sharp. Pt notes anepisode of two 'violent sneezes' which actually seemed to relieve the pain for a period of time. But now continues with pain.     Pt has a history of COPD and is chronically on oxygen at 2-3 liters per NC at home. Room air sats 89% at arrival.

## 2024-03-07 NOTE — ED PROVIDER NOTES
"  History     Chief Complaint:  Chest Pain       HPI   Tre Vazquez is a 48 year old male who presents to the ED due to concerns for chest pain.  The patient notes that he was seen here couple weeks ago after choking on an M&M.  He notes that he \"\" and was \"brought back\".  He states that he has been having pain over the left side of his chest since then.  He feels like initially it was some degree of a dull ache but more recently it has been a sharper pain especially when taking deep breaths.    He denies any recent fever.  He does not feel short of breath.      Independent Historian:    None - Patient Only    Review of External Notes:  DC Summary 24: Cardiac arrest status post ROSC.  Acute on chronic hypoxic and hypercapnic respiratory failure.  Seizure disorder and seizure in the prehospital setting, others.    Allergies:  Acetaminophen  Hydrocodone Bitartrate Er  Tomato  Cephalexin  Vicodin [Hydrocodone-Acetaminophen]     Physical Exam   Patient Vitals for the past 24 hrs:   BP Temp Temp src Pulse Resp SpO2 Height Weight   24 1440 -- -- -- -- 25 93 % -- --   24 1230 117/70 -- -- 94 25 -- -- --   24 1215 121/75 -- -- 90 -- -- -- --   24 1138 123/82 -- -- -- -- -- -- --   24 1131 -- 98.7  F (37.1  C) Oral 97 20 92 % 1.727 m (5' 8\") 122.5 kg (270 lb)        Physical Exam  Constitutional: Vital signs reviewed as above.   Eyes: PEERL, EOMI B/L  Neck: No JVD noted. FROM   Cardiovascular: normal rate, Regular rhythm and normal heart sounds.  No murmur heard. Equal B/L peripheral pulses.  Pulmonary/Chest: Effort normal and breath sounds normal. No respiratory distress. Patient has no wheezes. Patient has no rales.   Gastrointestinal: Soft. There is no tenderness.   Musculoskeletal/Extremities: No deformities noted.  Neurological: Alert  Skin: Skin is warm and dry. There is no diaphoresis noted.   Psychiatric: The patient appears calm.     Emergency Department Course   ECG  ECG " taken at 1124, ECG read at 1136  Normal sinus rhythm  Nonspecific T wave abnormality  Abnormal ECG    When compared with prior ECG dated 2/22/2024, there does not appear to be any significant change  Rate 96 bpm. KY interval 176 ms. QRS duration 84 ms. QT/QTc 388/490 ms. P-R-T axes 25 0 24        Laboratory: Imaging:   Labs Ordered and Resulted from Time of ED Arrival to Time of ED Departure   D DIMER QUANTITATIVE - Abnormal       Result Value    D-Dimer Quantitative 1.38 (*)    BASIC METABOLIC PANEL - Abnormal    Sodium 144      Potassium 4.1      Chloride 96 (*)     Carbon Dioxide (CO2) 42 (*)     Anion Gap 6 (*)     Urea Nitrogen 13.5      Creatinine 0.67      GFR Estimate >90      Calcium 9.5      Glucose 104 (*)    CBC WITH PLATELETS AND DIFFERENTIAL - Abnormal    WBC Count 8.1      RBC Count 4.19 (*)     Hemoglobin 12.8 (*)     Hematocrit 42.1       (*)     MCH 30.5      MCHC 30.4 (*)     RDW 12.6      Platelet Count 309      % Neutrophils 55      % Lymphocytes 30      % Monocytes 10      % Eosinophils 2      % Basophils 1      % Immature Granulocytes 2      NRBCs per 100 WBC 0      Absolute Neutrophils 4.5      Absolute Lymphocytes 2.4      Absolute Monocytes 0.8      Absolute Eosinophils 0.2      Absolute Basophils 0.1      Absolute Immature Granulocytes 0.2      Absolute NRBCs 0.0     BLOOD GAS VENOUS - Abnormal    pH Venous 7.30 (*)     pCO2 Venous 89 (*)     pO2 Venous 31      Bicarbonate Venous 43 (*)     Base Excess/Deficit Venous 12.9 (*)     FIO2 3      Oxyhemoglobin Venous 50 (*)     O2 Sat, Venous 51.2 (*)    TROPONIN T, HIGH SENSITIVITY - Normal    Troponin T, High Sensitivity 20     TROPONIN T, HIGH SENSITIVITY - Normal    Troponin T, High Sensitivity 21       CT Chest Pulmonary Embolism w Contrast   Preliminary Result   IMPRESSION:   No acute process demonstrated.              Procedures       Emergency Department Course & Assessments:    Interventions:  Medications   CT SCAN FLUSH (100  mLs Intravenous $Given 3/7/24 1404)   iopamidol (ISOVUE-370) solution 500 mL (88 mLs Intravenous $Given 3/7/24 1404)        Assessments, Independent Interpretation, Consult/Discussion of ManagementTests:  ED Course as of 03/07/24 1542   Thu Mar 07, 2024   1140 Initial evaluation.   1441 D/W Dr. Alvarez. I will recheck a VBG in 1 hour. If the patient's PCO2 improves, he can be a regular admit. If it does not improve/worsens, then he will need to be admitted to the ICU by the ICU team.       Social Determinants of Health affecting care:  None    Disposition:  See ED Course and MDM    Impression & Plan    CMS Diagnoses: None    Code Status: Prior    Medical Decision Making:  This 48-year-old male patient presents to the ED due to chest pain.  Please see the HPI and exam for specifics.  A broad differential was considered.  EKG did not appear to be different than his previous and troponin was stable.  Initial blood gas showed acute on chronic respiratory acidosis likely due to the patient's COPD and probable obesity hypoventilation.  The patient also seemed generally weak as it was very difficult for him to even sit up in bed.    After discussing the case with the hospitalist, we will plan on repeating the patient's blood gas after he has been on BiPAP for an hour and if it improves and he can come off BiPAP, medical admission is reasonable.  If his blood gas does not change or worsens, then consideration for intensive care unit admission is reasonable.    The patient was signed over to my partner, Dr. Casarez.      Critical Care:  None.    Diagnosis:    ICD-10-CM    1. Acute on chronic respiratory failure with hypoxia and hypercapnia (H)  J96.21     J96.22            Discharge Medications:  New Prescriptions    No medications on file          3/7/2024   Gerry Powell, Gerry Angel DO  03/07/24 [id number]

## 2024-03-07 NOTE — H&P
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 He received 5 minutes of CPR in the field with ROSC and was subseqently intubated hospitalized.  Patient was discharged home on 2/28/2024.  Since discharge has been complaining of ongoing chest pain localized producible chest pain.  Was sent to the emergency room from facility today for increased somnolence, chest pain and weakness.  In the emergency room patient was found to be hypercapnic and was placed on BiPAP.  Is being admitted to ICU.    Acute on chronic respiratory failure with hypercapnia  Acute COPD exacerbation  Suspect obesity hypoventilation  Respiratory acidosis  Obstructive sleep apnea  pH was 7.3 pCO2 was 89 on admission-  -CT chest with no PE or acute infiltrate  -patient had prolonged stay in ICU attempting to wean from ventilator but was eventually extubated to BiPAP, and subsequently discharged to group home on CPAP at night  -Continue BiPAP therapy increased respiratory rate  -Scheduled and as needed nebs  -IV steroids  -Follow [facility]  - admit to ICU    Chest pain  -Likely reproducible after recent CPR  -Troponin is detected  -EKG with flat T in 1 and aVL  -Will trend troponin  -As needed pain meds    History of seizure activity  History of epilepsy  -Seizure precaution  -Resumed prior to admission med    Schizoaffective disorder  Borderline personality  disorder  MDD/anxiety  -Continue prior to admission citalopram and buspirone    Developmental delay  -Lives in group home, at baseline uses wheelchair and  -Supportive care    Hypertension  -Continue prior to admission propranolol and prazosin    GERD  -Continue Protonix      Chronic hyperammonemia  -Continue prior to admission liver current    Left eye blindness   -Supportive care    Report called to the intensivist    DVT Prophylaxis: Pneumatic Compression Devices  Code Status: Full Code    Disposition: Admitted    Primary Care Physician   Siobhan Mayo    Chief Complaint   Chest pain, increased somnolence    Unable to obtain a history from the patient due to confusion    History of Present Illness   Tre Vazquez is a 48 year old male with past medical history significant for developmental delay (lives in group home), seizures, obesity, ANA/OHS on BiPAP at night and while napping, chronic hypercapnia, chronic hyperammoniemia, anemia, schizoaffective disorder, borderline personality, MDD, anxiety, HTN, 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 He received 5 minutes of CPR in the field with ROSC and was subseqently intubated hospitalized.  Patient was discharged home on 2/28/2024.  Since discharge has been complaining of ongoing chest pain localized producible chest pain.  Was sent to the emergency room from facility today for increased somnolence, chest pain and weakness.  In the emergency room patient was found to be hypercapnic and was placed on BiPAP.  Is being admitted to ICU.    Patient responds when tried to wake him up.  However does not want to talk much is sleepy and somnolent, says he has chest pain.  More than 10 point review of system was attempted and carried out but limited due to patient's underlying somnolence and mental condition    In the emergency room patient received treatment with BiPAP with no significant change in pCO2 levels.   Patient is being admitted to ICU for further treatment    Past Medical History    Past Medical History:   Diagnosis Date    Blindness of left eye     Depression 03/10/2014    Hypertension     Pneumococcal septicemia(038.2) (H) 11/26/2013    Hospitalized    Pneumonia, organism unspecified(486) 11/26/2013    Hospitalized    Uncomplicated asthma     Unspecified epilepsy without mention of intractable epilepsy          Past Surgical History   Past Surgical History:   Procedure Laterality Date    COLONOSCOPY N/A 12/1/2022    Procedure: COLONOSCOPY;  Surgeon: Michael Caldwell MD;  Location:  OR    ESOPHAGOSCOPY, GASTROSCOPY, DUODENOSCOPY (EGD), COMBINED N/A 12/1/2022    Procedure: ESOPHAGOGASTRODUODENOSCOPY with biopsy;  Surgeon: Michael Caldwell MD;  Location:  OR    ORTHOPEDIC SURGERY      pt stated past surgery on both ankles       Prior to Admission Medications   Prior to Admission Medications   Prescriptions Last Dose Informant Patient Reported? Taking?   acetaminophen (TYLENOL) 325 MG tablet   Yes No   Sig: Take 650 mg by mouth every 4 hours as needed for mild pain   ammonium lactate (AMLACTIN) 12 % external cream   Yes No   Sig: Apply topically 2 times daily   bacitracin 500 UNIT/GM external ointment   Yes No   Sig: Apply topically 2 times daily Apply to left arm wound   busPIRone (BUSPAR) 15 MG tablet   Yes No   Sig: Take 15 mg by mouth 2 times daily   clotrimazole (LOTRIMIN) 1 % external cream   Yes No   Sig: Apply topically 2 times daily Apply to left groin   divalproex sodium delayed-release (DEPAKOTE) 500 MG DR tablet   No No   Sig: TAKE 1 TABLET BY MOUTH EVERY 12 HOURS . *1 TOTAL FILL*   fluticasone (FLONASE) 50 MCG/ACT nasal spray   Yes No   Sig: Spray 1 spray in nostril 2 times daily   lacosamide (VIMPAT) 50 MG TABS tablet   No No   Sig: Take 1 tablet (50 mg) by mouth daily   lamoTRIgine (LAMICTAL) 100 MG tablet   No No   Sig: Take 1 tablet (100 mg) by mouth daily At 1400    lamoTRIgine (LAMICTAL) 200 MG tablet   No No   Sig: Take 1 tablet (200 mg) by mouth 2 times daily Take at 8 AM and 8 pm   levETIRAcetam (KEPPRA) 500 MG tablet   No No   Sig: Take 2 tablets (1,000 mg) by mouth every morning AND 3 tablets (1,500 mg) at bedtime.   levOCARNitine (CARNITOR) 330 MG tablet   No No   Sig: Take 2 tablets (660 mg) by mouth 3 times daily   methylcellulose POWD powder   Yes No   Sig: Apply 2 mg topically 2 times daily Mix 1 rounded tablespoon (2 mg) in 8 oz glass of water and take by mouth twice daily   multivitamin, therapeutic (THERA-VIT) TABS tablet   Yes No   Sig: Take 1 tablet by mouth daily   pantoprazole (PROTONIX) 40 MG EC tablet   Yes No   Sig: Take 40 mg by mouth daily   polyethylene glycol (MIRALAX) 17 GM/Dose powder   Yes No   Sig: Take 1 capful. by mouth daily   prazosin (MINIPRESS) 2 MG capsule   Yes No   Sig: Take 2 mg by mouth At Bedtime   propranolol (INDERAL) 20 MG tablet   No No   Sig: Take 1 tablet (20 mg) by mouth 2 times daily   zonisamide (ZONEGRAN) 100 MG capsule   No No   Sig: TAKE 1 CAPSULE BY MOUTH EVERY MORNING;TAKE 2 CAPSULES (200MG) BY MOUTH AT BEDTIME.      Facility-Administered Medications: None     Allergies   Allergies   Allergen Reactions    Acetaminophen Unknown    Hydrocodone Bitartrate Er Unknown    Tomato GI Disturbance    Cephalexin Rash     HUT Reaction: Rash; HUT Noted: [id number]      Vicodin [Hydrocodone-Acetaminophen] GI Disturbance       Social History   Social History     Tobacco Use    Smoking status: Former     Types: Cigarettes     Start date:      Quit date: 2010     Years since quittin.1    Smokeless tobacco: Never   Substance Use Topics    Alcohol use: No     Social History     Social History Narrative    Not on file     Gives in group home.    Family History   I have reviewed this patient's family history and updated it with pertinent information if needed.   No family history on file.      Review of Systems   A Comprehensive  "greater than 10 system review of systems was carried out limited given patient's underlying mental status.    .Physical Exam   Temp: 98.7  F (37.1  C) Temp src: Oral BP: 117/70 Pulse: 94   Resp: 25 SpO2: 93 % O2 Device: BiPAP/CPAP Oxygen Delivery: 2 LPM  Vital Signs with Ranges  Temp:  [98.7  F (37.1  C)] 98.7  F (37.1  C)  Pulse:  [90-97] 94  Resp:  [20-25] 25  BP: (117-123)/(70-82) 117/70  FiO2 (%):  [2 %-25 %] 25 %  SpO2:  [92 %-93 %] 93 %  270 lbs 0 oz    GEN: Sleepy somnolent breathing through BiPAP mask., no overt distress  HEENT:  Normocephalic/atraumatic, no scleral icterus, no nasal discharge, mouth dry  CV:  Regular rate and rhythm, no murmur or JVD.  S1 + S2 noted, no S3 or S4.  LUNGS: Occasional wheezing.  Symmetric chest rise on inhalation noted.  ABD:  Active bowel sounds, soft, non-tender/non-distended.  No rebound/guarding/rigidity.  EXT:  No edema.  No cyanosis.  No joint synovitis noted.  SKIN:  Dry to touch, no exanthems noted in the visualized areas.  NEURO:  Symmetric muscle strength,.  No new focal deficits appreciated.    Data   Data reviewed today:  I personally reviewed the EKG tracing showing sinus rhythm with flat T waves in 1 and aVL nonspecific ST-T change .    Recent Labs   Lab 03/07/24  1915 03/07/24  1552 03/07/24  1312   PHV 7.32 7.30* 7.30*   PO2V 30 34 31   PCO2V 82* 86* 89*   HCO3V 42* 42* 43*     Recent Labs   Lab 03/07/24  1140   WBC 8.1   HGB 12.8*   HCT 42.1   *        Recent Labs   Lab 03/07/24  1140      POTASSIUM 4.1   CHLORIDE 96*   CO2 42*   ANIONGAP 6*   *   BUN 13.5   CR 0.67   GFRESTIMATED >90   ARNOLD 9.5     7-Day Micro Results       No results found for the last 168 hours.          No results for input(s): \"NTBNPI\", \"NTBNP\" in the last 168 hours.  No results for input(s): \"SED\", \"CRP\" in the last 168 hours.  GFR Estimate   Date Value Ref Range Status   03/07/2024 >90 >60 mL/min/1.73m2 Final   02/27/2024 >90 >60 mL/min/1.73m2 Final " "  02/26/2024 >90 >60 mL/min/1.73m2 Final   12/20/2013 80 >60 mL/min/1.7m2 Final   12/10/2013 72 >60 mL/min/1.7m2 Final   11/25/2013 >90 >60 mL/min/1.7m2 Final     GFR, ESTIMATED POCT   Date Value Ref Range Status   05/12/2023 >60 >60 mL/min/1.73m2 Final     GFR Estimate If Black   Date Value Ref Range Status   12/20/2013 >90 >60 mL/min/1.7m2 Final   12/10/2013 87 >60 mL/min/1.7m2 Final   11/25/2013 >90 >60 mL/min/1.7m2 Final     Recent Labs   Lab 03/07/24  1140   *     Recent Labs   Lab 03/07/24  1140   HGB 12.8*     No results for input(s): \"AST\", \"ALT\", \"GGT\", \"ALKPHOS\", \"BILITOTAL\", \"BILICONJ\", \"BILIDIRECT\", \"JAYLA\" in the last 168 hours.    Invalid input(s): \"BILIRUBININDIRECT\"  No results for input(s): \"INR\" in the last 168 hours.  No results for input(s): \"LACT\" in the last 168 hours.  No results for input(s): \"LIPASE\" in the last 168 hours.  No results for input(s): \"CHOL\", \"HDL\", \"LDL\", \"TRIG\", \"CHOLHDLRATIO\" in the last 168 hours.  Recent Labs   Lab 03/07/24  1140   BUN 13.5   CR 0.67     No results for input(s): \"TSH\" in the last 168 hours.  No results for input(s): \"TROPONIN\", \"TROPI\", \"TROPR\", \"TROPONINIS\" in the last 168 hours.    Invalid input(s): \"TROPT\", \"TROP\", \"TROPONINIES\", \"TNIH\"  No results for input(s): \"COLOR\", \"APPEARANCE\", \"URINEGLC\", \"URINEBILI\", \"URINEKETONE\", \"SG\", \"UBLD\", \"URINEPH\", \"PROTEIN\", \"UROBILINOGEN\", \"NITRITE\", \"LEUKEST\", \"RBCU\", \"WBCU\" in the last 168 hours.    Recent Results (from the past 24 hour(s))   CT Chest Pulmonary Embolism w Contrast    Narrative    CT CHEST PULMONARY EMBOLISM WITH CONTRAST 3/7/2024 2:20 PM    CLINICAL HISTORY: Chest pain. Elevated D-dimer.    TECHNIQUE: CT angiogram chest during arterial phase injection IV  contrast. 2D and 3D MIP reconstructions were performed by the CT  technologist. Dose reduction techniques were used.     CONTRAST: 88mL [id number]-370    COMPARISON: November 24, 2023    FINDINGS: Some areas of motion artifact limiting " detail.    ANGIOGRAM CHEST: Pulmonary arteries are normal caliber and negative  for pulmonary emboli. Thoracic aorta is negative for dissection. No CT  evidence of right heart strain.    LUNGS AND PLEURA: Minimal dependent probable atelectasis. Previously  seen right-sided nodule not seen today. No effusions.    MEDIASTINUM/AXILLAE: No adenopathy or aneurysm.    CORONARY ARTERY CALCIFICATIONS: None.    UPPER ABDOMEN: Cholelithiasis without cholecystitis again evident.    MUSCULOSKELETAL: No frankly destructive bony lesions.      Impression    IMPRESSION:  No acute process demonstrated.    JONATHAN ROD MD         SYSTEM ID:  [id number]

## 2024-03-07 NOTE — PROGRESS NOTES
Pt placed on BIPAP due to high CO2.    Pt appears unlabored and answering questions appropriately on 2 L NC with sat's of 92%.    Pt placed on BIPAP 10/5 on 25% FIO2.    Pt's BS are diminished with sat's of 92%.    No skin issues are noted.    Will continue to follow and assess and make changes as needed per pt and per [facility].    Karne Avalos, RT on 3/7/2024 at 2:47 PM

## 2024-03-08 ENCOUNTER — APPOINTMENT (OUTPATIENT)
Dept: CARDIOLOGY | Facility: CLINIC | Age: 49
DRG: 189 | End: 2024-03-08
Attending: INTERNAL MEDICINE
Payer: MEDICARE

## 2024-03-08 LAB
BASE EXCESS BLDV CALC-SCNC: 4.8 MMOL/L (ref -3–3)
GLUCOSE BLDC GLUCOMTR-MCNC: 132 MG/DL (ref 70–99)
GLUCOSE BLDC GLUCOMTR-MCNC: 147 MG/DL (ref 70–99)
GLUCOSE BLDC GLUCOMTR-MCNC: 171 MG/DL (ref 70–99)
HCO3 BLDV-SCNC: 36 MMOL/L (ref 21–28)
LVEF ECHO: NORMAL
O2/TOTAL GAS SETTING VFR VENT: 4 %
OXYHGB MFR BLDV: 69 % (ref 70–75)
PCO2 BLDV: 92 MM HG (ref 40–50)
PH BLDV: 7.2 [PH] (ref 7.32–7.43)
PO2 BLDV: 32 MM HG (ref 25–47)
SAO2 % BLDV: 70.1 % (ref 70–75)

## 2024-03-08 PROCEDURE — 999N000157 HC STATISTIC RCP TIME EA 10 MIN

## 2024-03-08 PROCEDURE — 250N000013 HC RX MED GY IP 250 OP 250 PS 637: Performed by: INTERNAL MEDICINE

## 2024-03-08 PROCEDURE — 82805 BLOOD GASES W/O2 SATURATION: CPT | Performed by: INTERNAL MEDICINE

## 2024-03-08 PROCEDURE — 255N000002 HC RX 255 OP 636: Performed by: INTERNAL MEDICINE

## 2024-03-08 PROCEDURE — 99233 SBSQ HOSP IP/OBS HIGH 50: CPT | Performed by: INTERNAL MEDICINE

## 2024-03-08 PROCEDURE — 94640 AIRWAY INHALATION TREATMENT: CPT | Mod: 76

## 2024-03-08 PROCEDURE — 250N000011 HC RX IP 250 OP 636: Performed by: INTERNAL MEDICINE

## 2024-03-08 PROCEDURE — G0463 HOSPITAL OUTPT CLINIC VISIT: HCPCS | Mod: 25

## 2024-03-08 PROCEDURE — 94660 CPAP INITIATION&MGMT: CPT

## 2024-03-08 PROCEDURE — 120N000001 HC R&B MED SURG/OB

## 2024-03-08 PROCEDURE — 36415 COLL VENOUS BLD VENIPUNCTURE: CPT | Performed by: INTERNAL MEDICINE

## 2024-03-08 PROCEDURE — 250N000009 HC RX 250: Performed by: INTERNAL MEDICINE

## 2024-03-08 PROCEDURE — 94640 AIRWAY INHALATION TREATMENT: CPT

## 2024-03-08 PROCEDURE — 999N000208 ECHOCARDIOGRAM COMPLETE

## 2024-03-08 PROCEDURE — 93306 TTE W/DOPPLER COMPLETE: CPT | Mod: 26 | Performed by: INTERNAL MEDICINE

## 2024-03-08 RX ORDER — METHYLPREDNISOLONE SODIUM SUCCINATE 40 MG/ML
40 INJECTION, POWDER, LYOPHILIZED, FOR SOLUTION INTRAMUSCULAR; INTRAVENOUS EVERY 12 HOURS SCHEDULED
Status: DISCONTINUED | OUTPATIENT
Start: 2024-03-08 | End: 2024-03-09

## 2024-03-08 RX ORDER — ENOXAPARIN SODIUM 100 MG/ML
40 INJECTION SUBCUTANEOUS EVERY 12 HOURS SCHEDULED
Status: DISCONTINUED | OUTPATIENT
Start: 2024-03-08 | End: 2024-03-11 | Stop reason: HOSPADM

## 2024-03-08 RX ORDER — METHYLPREDNISOLONE SODIUM SUCCINATE 40 MG/ML
40 INJECTION, POWDER, LYOPHILIZED, FOR SOLUTION INTRAMUSCULAR; INTRAVENOUS EVERY 8 HOURS
Status: DISCONTINUED | OUTPATIENT
Start: 2024-03-08 | End: 2024-03-08

## 2024-03-08 RX ORDER — LIDOCAINE 4 G/G
1 PATCH TOPICAL
Status: DISCONTINUED | OUTPATIENT
Start: 2024-03-08 | End: 2024-03-11 | Stop reason: HOSPADM

## 2024-03-08 RX ADMIN — PROPRANOLOL HYDROCHLORIDE 20 MG: 20 TABLET ORAL at 08:57

## 2024-03-08 RX ADMIN — LAMOTRIGINE 200 MG: 100 TABLET, FILM COATED, EXTENDED RELEASE ORAL at 08:58

## 2024-03-08 RX ADMIN — HUMAN ALBUMIN MICROSPHERES AND PERFLUTREN 3 ML: 10; .22 INJECTION, SOLUTION INTRAVENOUS at 12:23

## 2024-03-08 RX ADMIN — BUSPIRONE HYDROCHLORIDE 15 MG: 15 TABLET ORAL at 20:47

## 2024-03-08 RX ADMIN — ALBUTEROL SULFATE 2.5 MG: 2.5 SOLUTION RESPIRATORY (INHALATION) at 09:11

## 2024-03-08 RX ADMIN — LEVETIRACETAM 1000 MG: 500 TABLET, FILM COATED ORAL at 08:57

## 2024-03-08 RX ADMIN — DIVALPROEX SODIUM 500 MG: 500 TABLET, DELAYED RELEASE ORAL at 16:29

## 2024-03-08 RX ADMIN — LEVETIRACETAM 1500 MG: 500 TABLET, FILM COATED, EXTENDED RELEASE ORAL at 21:54

## 2024-03-08 RX ADMIN — ENOXAPARIN SODIUM 40 MG: 40 INJECTION SUBCUTANEOUS at 19:23

## 2024-03-08 RX ADMIN — DIVALPROEX SODIUM 500 MG: 500 TABLET, DELAYED RELEASE ORAL at 08:58

## 2024-03-08 RX ADMIN — LEVOCARNITINE 660 MG: 330 TABLET ORAL at 21:54

## 2024-03-08 RX ADMIN — LEVOCARNITINE 660 MG: 330 TABLET ORAL at 16:29

## 2024-03-08 RX ADMIN — FLUTICASONE PROPIONATE 1 SPRAY: 50 SPRAY, METERED NASAL at 21:54

## 2024-03-08 RX ADMIN — METHYLPREDNISOLONE SODIUM SUCCINATE 62.5 MG: 125 INJECTION, POWDER, FOR SOLUTION INTRAMUSCULAR; INTRAVENOUS at 04:38

## 2024-03-08 RX ADMIN — DIVALPROEX SODIUM 500 MG: 500 TABLET, DELAYED RELEASE ORAL at 21:55

## 2024-03-08 RX ADMIN — FLUTICASONE PROPIONATE 1 SPRAY: 50 SPRAY, METERED NASAL at 08:58

## 2024-03-08 RX ADMIN — ENOXAPARIN SODIUM 40 MG: 40 INJECTION SUBCUTANEOUS at 12:33

## 2024-03-08 RX ADMIN — BUSPIRONE HYDROCHLORIDE 15 MG: 15 TABLET ORAL at 08:58

## 2024-03-08 RX ADMIN — PROPRANOLOL HYDROCHLORIDE 20 MG: 20 TABLET ORAL at 20:46

## 2024-03-08 RX ADMIN — PANTOPRAZOLE SODIUM 40 MG: 40 TABLET, DELAYED RELEASE ORAL at 08:57

## 2024-03-08 RX ADMIN — METHYLCELLULOSE 500 MG: 500 TABLET ORAL at 08:57

## 2024-03-08 RX ADMIN — LAMOTRIGINE 200 MG: 100 TABLET, FILM COATED, EXTENDED RELEASE ORAL at 19:23

## 2024-03-08 RX ADMIN — METHYLCELLULOSE 500 MG: 500 TABLET ORAL at 20:46

## 2024-03-08 RX ADMIN — ZONISAMIDE 200 MG: 100 CAPSULE ORAL at 21:55

## 2024-03-08 RX ADMIN — ALBUTEROL SULFATE 2.5 MG: 2.5 SOLUTION RESPIRATORY (INHALATION) at 21:22

## 2024-03-08 RX ADMIN — ALBUTEROL SULFATE 2.5 MG: 2.5 SOLUTION RESPIRATORY (INHALATION) at 14:58

## 2024-03-08 RX ADMIN — PRAZOSIN HYDROCHLORIDE 2 MG: 1 CAPSULE ORAL at 21:54

## 2024-03-08 RX ADMIN — ALBUTEROL SULFATE 2.5 MG: 2.5 SOLUTION RESPIRATORY (INHALATION) at 01:39

## 2024-03-08 RX ADMIN — DICLOFENAC SODIUM 2 G: 10 GEL TOPICAL at 12:33

## 2024-03-08 RX ADMIN — LEVOCARNITINE 660 MG: 330 TABLET ORAL at 08:57

## 2024-03-08 RX ADMIN — METHYLPREDNISOLONE SODIUM SUCCINATE 40 MG: 40 INJECTION, POWDER, FOR SOLUTION INTRAMUSCULAR; INTRAVENOUS at 16:29

## 2024-03-08 RX ADMIN — QUETIAPINE FUMARATE 300 MG: 300 TABLET, EXTENDED RELEASE ORAL at 21:54

## 2024-03-08 ASSESSMENT — ACTIVITIES OF DAILY LIVING (ADL)
ADLS_ACUITY_SCORE: 55
ADLS_ACUITY_SCORE: 55
ADLS_ACUITY_SCORE: 45
ADLS_ACUITY_SCORE: 55
ADLS_ACUITY_SCORE: 53
ADLS_ACUITY_SCORE: 45
ADLS_ACUITY_SCORE: 47
ADLS_ACUITY_SCORE: 55
ADLS_ACUITY_SCORE: 45
ADLS_ACUITY_SCORE: 55
ADLS_ACUITY_SCORE: 55
ADLS_ACUITY_SCORE: 45
ADLS_ACUITY_SCORE: 45
ADLS_ACUITY_SCORE: 55
ADLS_ACUITY_SCORE: 55
ADLS_ACUITY_SCORE: 45
ADLS_ACUITY_SCORE: 45
ADLS_ACUITY_SCORE: 55
DEPENDENT_IADLS:: CLEANING;COOKING;LAUNDRY;SHOPPING;MEAL PREPARATION;MEDICATION MANAGEMENT;MONEY MANAGEMENT;TRANSPORTATION
ADLS_ACUITY_SCORE: 45
ADLS_ACUITY_SCORE: 55
ADLS_ACUITY_SCORE: 55

## 2024-03-08 NOTE — PROGRESS NOTES
Pt transferred from ICU at 1315. A&Ox4. Denies pain. A1 GB/W. Lung sounds dim. On 4L NC. Baseline 3-4L.  Bipap when napping and at HS. On tele. NSR. EF 60-65%. PCO2 92. Regular diet. Discharge TBD.

## 2024-03-08 NOTE — CONSULTS
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 He received 5 minutes of CPR in the field with ROSC and was subseqently intubated hospitalized.  Patient was discharged home on 2/28/2024.  Since discharge has been complaining of ongoing chest pain localized producible chest pain.  Was sent to the emergency room from facility today for increased somnolence, chest pain and weakness.  In the emergency room patient was found to be hypercapnic and was placed on BiPAP.  Is being admitted to ICU.     Assessment:      Areas visualized during today's visit: Focused:    Pressure Injury Location: Bilateral buttock  Last photo: 3/8/24    Wound type: Pressure Injury     Pressure Injury Stage: 3 on left, Stage 2 on right, present on admission   Wound history/plan of care:   Patient with pressure injury on left starting 4 months ago.  Wound base: Left buttock is 0.4x0.2x0.1cm, 100% pink nongranular tissue. Right is 0.2x0.3x0.1cm, 100% dermis.     Palpation of the wound bed: normal      Drainage: scant     Description of drainage: serous     Measurements (length x width x depth, in cm) see above      Tunneling N/A     Undermining N/A  Periwound skin: Scar tissue on left      Color: pink      Temperature: normal   Odor: none  Pain: denies   Pain intervention prior to dressing change: N/A  Treatment goal: Heal   STATUS: initial assessment  Supplies ordered: at bedside    My PI Risk Assessment     Sensory Perception: 3 - Slightly  "Limited     Moisture: 3 - Occasionally moist      Activity: 2 - Chairfast     Mobility: 3 - Slightly limited      Nutrition: 3 - Adequate     Friction/Shear: 2 - Potential problem      TOTAL: 16       Treatment Plan:     Bilateral buttock wound(s): Every 3 days  Cleanse with wound cleanser and dry  Apply Mepilex 4x4 to each butttock  No briefs in bed    Pressure Injury Prevention (PIP) Plan:  If patient is declining pressure injury prevention interventions: Explore reason why and address patient's concerns, Educate on pressure injury risk and prevention intervention(s), and If patient is still declining, document \"informed refusal\"   Mattress: Follow bed algorithm, reassess daily and order specialty mattress, if indicated.  HOB: Maintain at or below 30 degrees, unless contraindicated  Repositioning in bed: Every 1-2 hours  and Left/right positioning; avoid supine  Heels: Pillows under calves  Protective Dressing: Sacral Mepilex for prevention (#[id number]),  especially for the agitated patient   Chair positioning: Chair cushion (#[id number])    If patient has a buttock pressure injury, or high risk for PI use chair cushion or SPS.  Moisture Management: Avoid brief in bed  Under Devices: Inspect skin under all medical devices during skin inspection , Ensure tubes are stabilized without tension, and Ensure patient is not lying on medical devices or equipment when repositioned  Ask provider to discontinue device when no longer needed.       Orders: Written    RECOMMEND PRIMARY TEAM ORDER: None, at this time  Education provided: importance of repositioning, plan of care, and Off-loading pressure  Discussed plan of care with: Patient and Nurse  WOC nurse follow-up plan: weekly  Notify WOC if wound(s) deteriorate.  Nursing to notify the Provider(s) and re-consult the WOC Nurse if new skin concern.    DATA:     Current support surface: Standard  Low air loss (LIZZY pump, Isolibrium, Pulsate, skin guard, etc)  Containment of " urine/stool: Incontinence Protocol  BMI: Body mass index is 43.98 kg/m .   Active diet order: Orders Placed This Encounter      Combination Diet Regular Diet     Output: I/O last 3 completed shifts:  In: 920 [P.O.:920]  Out: 375 [Urine:375]     Labs:   Recent Labs   Lab 03/07/24  1140   HGB 12.8*   WBC 8.1     Pressure injury risk assessment:   Sensory Perception: 3-->slightly limited  Moisture: 3-->occasionally moist  Activity: 2-->chairfast  Mobility: 2-->very limited  Nutrition: 3-->adequate  Friction and Shear: 1-->problem  Julio César Score: 14    MARYJANE Telles  [facility] [facility]  Dept. Office Number: [phone]

## 2024-03-08 NOTE — PHARMACY-ADMISSION MEDICATION HISTORY
Pharmacist Admission Medication History    Admission medication history is complete. The information provided in this note is only as accurate as the sources available at the time of the update.    Information Source(s): Facility ([facility]/) medication list/MAR via N/A    Pertinent Information: none    Changes made to PTA medication list:  Added: seroquel XR 300mg, citalopram  Deleted: vimpat  Changed: Keppra and Keppra XR, lamotrigine XR, depakote DR    Allergies reviewed with patient and updates made in EHR: unable to assess    Medication History Completed By: Praveen Marte RPH 3/7/2024 7:26 PM    PTA Med List   Medication Sig Last Dose    acetaminophen (TYLENOL) 325 MG tablet Take 650 mg by mouth every 4 hours as needed for mild pain prn    ammonium lactate (AMLACTIN) 12 % external cream Apply topically 2 times daily 3/7/2024 at am    bacitracin 500 UNIT/GM external ointment Apply topically 2 times daily Apply to left arm wound 3/7/2024 at am    busPIRone (BUSPAR) 15 MG tablet Take 15 mg by mouth 2 times daily 3/7/2024 at am    citalopram (CELEXA) 20 MG tablet Take 60 mg by mouth daily Past Week    clotrimazole (LOTRIMIN) 1 % external cream Apply topically 2 times daily Apply to left groin 3/7/2024 at am    divalproex sodium delayed-release (DEPAKOTE) 500 MG DR tablet Take 500 mg by mouth 3 times daily 3/7/2024 at am    fluticasone (FLONASE) 50 MCG/ACT nasal spray Spray 1 spray in nostril 2 times daily 3/7/2024 at am    LamoTRIgine (LAMICTAL) 200 MG TB24 Take 200 mg by mouth 2 times daily 8am and 8pm 3/7/2024 at am    levETIRAcetam (KEPPRA XR) 500 MG 24 hr tablet Take 1,500 mg by mouth at bedtime 3/6/2024    levETIRAcetam (KEPPRA) 500 MG tablet Take 1,000 mg by mouth every morning 3/7/2024 at am    levOCARNitine (CARNITOR) 330 MG tablet Take 2 tablets (660 mg) by mouth 3 times daily 3/7/2024 at am    methylcellulose POWD powder Apply 2 mg topically 2 times daily Mix 1 rounded tablespoon (2 mg) in 8 oz glass of water  and take by mouth twice daily 3/7/2024 at am    multivitamin, therapeutic (THERA-VIT) TABS tablet Take 1 tablet by mouth daily 3/7/2024 at am    pantoprazole (PROTONIX) 40 MG EC tablet Take 40 mg by mouth daily 3/7/2024 at am    polyethylene glycol (MIRALAX) 17 GM/Dose powder Take 1 capful. by mouth daily 3/7/2024 at am    prazosin (MINIPRESS) 2 MG capsule Take 2 mg by mouth At Bedtime 3/6/2024 at 2000    propranolol (INDERAL) 20 MG tablet Take 1 tablet (20 mg) by mouth 2 times daily 3/7/2024 at am    QUEtiapine ER (SEROQUEL XR) 300 MG 24 hr tablet Take 300 mg by mouth at bedtime     zonisamide (ZONEGRAN) 100 MG capsule TAKE 1 CAPSULE BY MOUTH EVERY MORNING;TAKE 2 CAPSULES (200MG) BY MOUTH AT BEDTIME. 3/7/2024 at am

## 2024-03-08 NOTE — PROGRESS NOTES
A BiPAP of  10/5 @ 5% was applied to the pt via the mask for overnight use.  The bridge of the nose looks good and remains intact. Pt is tolerating it well. Will continue to monitor and assess the pt's current respiratory status and needs.        Fina Lofton, RT

## 2024-03-08 NOTE — PLAN OF CARE
"Goal Outcome Evaluation:    Plan of Care Reviewed With: patient    Overall Patient Progress: improving    Outcome Evaluation: Patient able to remove BiPAP for approximately an hour & maintained O2 saturations on 4LNC, which is baseline per patient. All other VS stable. Tolerating PO intake. Upon admission assessment, bilateral buttock wounds noted. See LDAs for details.     Problem: Adult Inpatient Plan of Care  Goal: Plan of Care Review  Description: The Plan of Care Review/Shift note should be completed every shift.  The Outcome Evaluation is a brief statement about your assessment that the patient is improving, declining, or no change.  This information will be displayed automatically on your shift  note.  Outcome: Progressing  Flowsheets (Taken 3/7/2024 2316)  Outcome Evaluation: Patient able to remove BiPAP for approximately an hour & maintained O2 saturations on 4LNC, which is baseline per patient.  Plan of Care Reviewed With: patient  Overall Patient Progress: improving  Goal: Patient-Specific Goal (Individualized)  Description: You can add care plan individualizations to a care plan. Examples of Individualization might be:  \"Parent requests to be called daily at 9am for status\", \"I have a hard time hearing out of my right ear\", or \"Do not touch me to wake me up as it startles  me\".  Outcome: Progressing  Flowsheets (Taken 3/7/2024 2316)  Individualized Care Needs: Warm blankets, frequent apple juice & ice cream  Goal: Absence of Hospital-Acquired Illness or Injury  Outcome: Progressing  Intervention: Identify and Manage Fall Risk  Flowsheets  Taken 3/7/2024 2316  Safety Promotion/Fall Prevention:   activity supervised   increased rounding and observation   clutter free environment maintained   increase visualization of patient   lighting adjusted   safety round/check completed   patient and family education  Taken 3/7/2024 2030  Safety Promotion/Fall Prevention:   activity supervised   increased rounding and " observation   clutter free environment maintained   increase visualization of patient   lighting adjusted   safety round/check completed   patient and family education  Intervention: Prevent Skin Injury  Flowsheets  Taken 3/7/2024 2316  Body Position: position changed independently  Taken 3/7/2024 2030  Body Position: position changed independently  Skin Protection:   adhesive use limited   incontinence pads utilized   silicone foam dressing in place  Device Skin Pressure Protection:   absorbent pad utilized/changed   pressure points protected  Intervention: Prevent and Manage VTE (Venous Thromboembolism) Risk  Flowsheets (Taken 3/7/2024 2316)  VTE Prevention/Management: SCDs (sequential compression devices) off  Intervention: Prevent Infection  Flowsheets  Taken 3/7/2024 2316  Infection Prevention:   single patient room provided   rest/sleep promoted  Taken 3/7/2024 2030  Infection Prevention:   single patient room provided   rest/sleep promoted  Goal: Optimal Comfort and Wellbeing  Outcome: Progressing  Intervention: Monitor Pain and Promote Comfort  Flowsheets  Taken 3/7/2024 2316  Pain Management Interventions:   rest   declines  Taken 3/7/2024 2130  Pain Management Interventions:   rest   declines  Taken 3/7/2024 2030  Pain Management Interventions:   distraction   diversional activity provided   emotional support   quiet environment facilitated   rest  Intervention: Provide Person-Centered Care  Flowsheets  Taken 3/7/2024 2316  Trust Relationship/Rapport:   care explained   choices provided   emotional support provided   empathic listening provided   questions answered   questions encouraged   reassurance provided   thoughts/feelings acknowledged  Taken 3/7/2024 2030  Trust Relationship/Rapport:   care explained   choices provided   emotional support provided   empathic listening provided   questions answered   questions encouraged   reassurance provided   thoughts/feelings acknowledged  Goal: Readiness for  Transition of Care  Outcome: Progressing  Flowsheets (Taken 3/7/2024 2316)  Concerns to be Addressed:   discharge planning   adjustment to diagnosis/illness   compliance issue  Intervention: Mutually Develop Transition Plan  Flowsheets  Taken 3/7/2024 2316  Readmission Within the Last 30 Days: previous discharge plan unsuccessful  Concerns to be Addressed:   discharge planning   adjustment to diagnosis/illness   compliance issue  Current Outpatient/Agency/Support Group: group home  Equipment Currently Used at Home:   wheelchair, manual   walker, standard  Taken 3/7/2024 2025  Equipment Currently Used at Home:   wheelchair, manual   walker, standard     Problem: Gas Exchange Impaired  Goal: Optimal Gas Exchange  Outcome: Progressing  Intervention: Optimize Oxygenation and Ventilation  Flowsheets  Taken 3/7/2024 2316  Airway/Ventilation Management: airway patency maintained  Head of Bed (HOB) Positioning: HOB at 30 degrees  Taken 3/7/2024 2030  Head of Bed (HOB) Positioning: HOB at 30 degrees     Problem: Comorbidity Management  Goal: Maintenance of COPD Symptom Control  Outcome: Progressing  Intervention: Maintain COPD Symptom Control  Flowsheets  Taken 3/7/2024 2316  Supportive Measures: self-care encouraged  Medication Review/Management:   medications reviewed   high-risk medications identified  Taken 3/7/2024 2030  Medication Review/Management:   medications reviewed   high-risk medications identified     Problem: Fall Injury Risk  Goal: Absence of Fall and Fall-Related Injury  Outcome: Progressing  Intervention: Identify and Manage Contributors  Flowsheets  Taken 3/7/2024 2316  Medication Review/Management:   medications reviewed   high-risk medications identified  Taken 3/7/2024 2030  Medication Review/Management:   medications reviewed   high-risk medications identified  Intervention: Promote Injury-Free Environment  Flowsheets  Taken 3/7/2024 2316  Safety Promotion/Fall Prevention:   activity supervised    increased rounding and observation   clutter free environment maintained   increase visualization of patient   lighting adjusted   safety round/check completed   patient and family education  Taken 3/7/2024 2030  Safety Promotion/Fall Prevention:   activity supervised   increased rounding and observation   clutter free environment maintained   increase visualization of patient   lighting adjusted   safety round/check completed   patient and family education

## 2024-03-08 NOTE — CONSULTS
Care Management Initial Consult    General Information  Assessment completed with: Patient, Caregiver,    Type of CM/SW Visit: Initial Assessment    Primary Care Provider verified and updated as needed: Yes   Readmission within the last 30 days: previous discharge plan unsuccessful      Reason for Consult: discharge planning        Communication Assessment  Patient's communication style: spoken language (English or Bilingual)    Hearing Difficulty or Deaf: no   Wear Glasses or Blind: yes (L eye per patient)    Cognitive  Cognitive/Neuro/Behavioral: .WDL except, speech  Level of Consciousness: alert  Arousal Level: opens eyes spontaneously  Orientation: oriented x 4  Mood/Behavior: calm, cooperative  Best Language: 0 - No aphasia  Speech: garbled, logical, spontaneous    Living Environment:   People in home: facility resident     Current living Arrangements: group home      Able to return to prior arrangements: yes       Family/Social Support:  Care provided by:  ([facility] staff)  Provides care for: no one, unable/limited ability to care for self     Facility resident(s)/Staff          Description of Support System: Supportive, Involved       Current Resources:   Patient receiving home care services: Yes  Skilled Home Care Services: Skilled Nursing, Physical Therapy, Occupational Therapy  Community Resources: Group Home  Equipment currently used at home: wheelchair, manual, walker, standard  Supplies currently used at home: Oxygen Tubing/Supplies (Bi-pap)     Does the patient's insurance plan have a 3 day qualifying hospital stay waiver?  No    Lifestyle & Psychosocial Needs:  Social Determinants of Health     Food Insecurity: Not on file   Depression: Not at risk (2/2/2024)    PHQ-2     PHQ-2 Score: 0   Housing Stability: Not on file   Tobacco Use: Medium Risk (5/5/2023)    Patient History     Smoking Tobacco Use: Former     Smokeless Tobacco Use: Never     Passive Exposure: Not on file   Financial Resource Strain:  Not on file   Alcohol Use: Not on file   Transportation Needs: Not on file   Physical Activity: Not on file   Interpersonal Safety: Not on file   Stress: Not on file   Social Connections: Not on file       Functional Status:  Prior to admission patient needed assistance:   Dependent ADLs:: Ambulation-walker, Wheelchair-independent, Transfers, Bathing, Grooming  Dependent IADLs:: Cleaning, Cooking, Laundry, Shopping, Meal Preparation, Medication Management, Money Management, Transportation      Additional Information:  RN CC/SW is following/consulted for discharge planning. Pt admitted with resp failure, COPD exacerbation.  Per chart review, pt resides in a Group Home. Spoke to pt to verify pt s residence at [facility]. Verbal permission was received to speak to the facility to verify services and to discuss the discharge plan of care. A call was placed to [facility] and services were verified.     Patient receives services as follows: Med management, transfers, meals, bathing    [facility] contact (name/number): Quoc [phone]  Fax #: [phone]  Barriers to pt returning: None  Baseline mobility: Up with assist of 1, pt will ambulate short distances with a walker at times, otherwise uses a wheelchair  Time of preferred return: M-F, before 1500  New meds/prescriptions/Pharmacy: [facility]  Transportation: [facility] Wheelchair    Other: Pt uses O2 at 2lpm at baseline along with bi-pap at HS. Pt is open to Home Care with [facility] for RN PT OT visits.    Pt had reported that Group Home staff does not always apply his bi-pap at HS. Quoc, the nurse in the Group Home, states that pt does not like the bi-pap and often refuses to wear it. Quoc has tried to work with pt during the daytime in order to get pt to be more accustomed to wearing the bi-pap.   Quoc states that all of the staff have been trained in applying the bi-pap.    RN CC/SW will continue to follow for discharge planning and return to facility.      Cherelle Carpenter RN   Inpatient Care Coordination  [facility]   Phone: [phone]

## 2024-03-08 NOTE — PROGRESS NOTES
[facility]    Hospitalist Progress Note  Name: Tre Vazquez    MRN: [id number]  Provider:  Juvenal Tyson DO  Date of Service: 03/08/2024    Summary of Stay: Tre Vazquez is a 48 year old male with a history of chronic hypoxic and hypercapnic respiratory failure with BiPAP nightly and with naps and on 2 L NC continuously, ANA with BiPAP, suspected OHS, epilepsy, schizoaffective disorder, borderline personality disorder, MDD/anxiety, developmental delay, chronic hyperammonia anemia, left eye blindness, GERD, hypertension, morbid obesity, recent cardiac arrest with ROSC after a seizure admitted on 3/7/2024 with generalized weakness, chest pain, somnolence.  In the emergency department, the patient was found to be temperature 98.7  F, blood pressure 117/70, heart rate 94, respiratory rate 25, SpO2 93% on 2 L with continuous BiPAP.  Initial lab work showed chloride 96, bicarb 42, glucose 104, high-sensitivity troponin 28, WBC 8.1, hemoglobin 12.8, venous pH 7.30 with a venous pCO2 of 89.  CT chest was unremarkable.  EKG showed sinus rhythm.  The patient was placed on continuous BiPAP and admitted to the ICU for acute on chronic hypercapnic respiratory failure.  The morning of 3/8, the patient was titrated off of BiPAP.  He was also started on IV Solu-Medrol with concern for an acute COPD exacerbation.    TODAY'S PLAN:  Pt doing well off bipap this morning.  Pt reports using 4LNC at baseline continuously.  Discharge summary from 2/28 lists 2LNC continuously his baseline.  With his chronic hypercapnic resp failure, would agree with lower O2 of 2LNC with goal of spo2 of 90-94%.  Pt states his group home does not put his bipap on him at night.  He states he is blind so he is unable to do it himself.  Will discuss with SW/ROX to ensure the group home is able to manage his bipap appropriately.  Transfer to medical floor.  Given his complaint of recurrent chest pain, will check echo to ensure no  pericardial effusion or other changes since previous echo during last visit.  Monitor overnight with plan for discharge back to group home tomorrow.    Problem List:   Acute on Chronic Hypercapnic Respiratory Failure  Chronic Hypoxic Respiratory Failure  Acute COPD Exacerbation  ANA with Chronic BIPAP - Noncompliant  Suspected Obesity Hypoventilation Syndrome  - Pt reports using 4LNC continuously at baseline, though recent discharge summary from 2/28 lists baseline at 2LNC continuously.  Would aim for goal spo2 of 90-94% and assume baseline is 2LNC  - Bipap at bedtime and with naps  - Decrease solumedrol to 40 mg q12h - anticipate transition to oral prednisone tomorrow  - Duonebs prn  - Check VBG  - Appreciate CM/SW assistance with coordination with group home to assure they can manage his bipap    Chest Pain  Recent Cardiac Arrest with ROSC  - Occurred after seizure, likely in the setting of respiratory failure  - Monitor QT prolonging medications  - Check echo given his recurrent chest pain, though suspect this is from compressions during CPR  - CT chest showed no coronary calcifications and unremarkable pulmonary findings    Epilepsy  Schizoaffective Disorder  Borderline Personality Disorder  MDD  Anxiety  Developmental Delay  Chronic Hyperammonemia  - Continue depakote, lamotrigine, buspar, keppra, levocarnitine, seroquel, zonisamide    Chronic Medical Problems:  L Eye Blindness  GERD  Hypertension  Morbid Obesity - BMI 43.98    I spent 53 minutes in reviewing this patient's labs, imaging, medications, medical history, reviewing charts with nursing notes, previous discharge summary, outpatient provider notes, respiratory and ED provider notes.  In addition time was spent interviewing the patient, communicating with family, and medical decision making.     DVT Prophylaxis: Enoxaparin (Lovenox) SQ  Code Status: Full Code  Diet: Combination Diet Regular Diet    Reid Catheter: Not present  Disposition: Expected  "discharge in 1-2 days to group home. Goals prior to discharge include respiratory status stable, group home able to accept pt back.   Family updated today: No     Interval History   Pt seen and examined.  Pt reports still having \"heart pain\".  States his group home would not place his bipap on him.    -Data reviewed today: I personally reviewed all new labs and imaging results over the last 24 hours.     Physical Exam   Temp: 98  F (36.7  C) Temp src: Axillary BP: 114/69 Pulse: 66   Resp: 11 SpO2: 91 % O2 Device: None (Room air) Oxygen Delivery: 4 LPM  Vitals:    03/07/24 1131 03/07/24 2030 03/08/24 0430   Weight: 122.5 kg (270 lb) 130.2 kg (287 lb 0.6 oz) 131.2 kg (289 lb 3.9 oz)     Vital Signs with Ranges  Temp:  [97.3  F (36.3  C)-99.1  F (37.3  C)] 98  F (36.7  C)  Pulse:  [66-97] 66  Resp:  [10-29] 11  BP: (101-129)/(52-82) 114/69  FiO2 (%):  [2 %-35 %] 35 %  SpO2:  [89 %-96 %] 91 %  I/O last 3 completed shifts:  In: 920 [P.O.:920]  Out: 375 [Urine:375]    GENERAL: No apparent distress. Awake, alert, and fully oriented.  HEENT: Normocephalic, atraumatic. Extraocular movements intact.  CARDIOVASCULAR: Regular rate and rhythm without murmurs or rubs. No S3.  PULMONARY: Clear bilaterally.  GASTROINTESTINAL: Soft, non-tender, non-distended. Bowel sounds normoactive.   EXTREMITIES: No cyanosis or clubbing. No edema.  NEUROLOGICAL: CN 2-12 grossly intact, no focal neurological deficits.  DERMATOLOGICAL: No rash, ulcer, bruising, nor jaundice.    Medications      albuterol  2.5 mg Nebulization Q6H    busPIRone  15 mg Oral BID    divalproex sodium delayed-release  500 mg Oral TID    fluticasone  1 spray Nasal BID    lamoTRIgine  200 mg Oral BID    levETIRAcetam  1,500 mg Oral At Bedtime    levETIRAcetam  1,000 mg Oral QAM    levOCARNitine  660 mg Oral TID    methylcellulose  500 mg Oral BID    methylPREDNISolone  40 mg Intravenous Q8H    pantoprazole  40 mg Oral Daily    polyethylene glycol  17 g Oral Daily    " "prazosin  2 mg Oral At Bedtime    propranolol  20 mg Oral BID    QUEtiapine ER  300 mg Oral At Bedtime    zonisamide  200 mg Oral At Bedtime     Data     Laboratory:  Recent Labs   Lab 03/07/24  1140   WBC 8.1   HGB 12.8*   HCT 42.1   *        Recent Labs   Lab 03/08/24  0844 03/08/24  0423 03/08/24  0017 03/07/24  2031 03/07/24  1140   NA  --   --   --   --  144   POTASSIUM  --   --   --   --  4.1   CHLORIDE  --   --   --   --  96*   CO2  --   --   --   --  42*   ANIONGAP  --   --   --   --  6*   * 171* 132*   < > 104*   BUN  --   --   --   --  13.5   CR  --   --   --   --  0.67   GFRESTIMATED  --   --   --   --  >90   ARNOLD  --   --   --   --  9.5    < > = values in this interval not displayed.     No results for input(s): \"CULT\" in the last 168 hours.    Imaging:  Recent Results (from the past 24 hour(s))   CT Chest Pulmonary Embolism w Contrast    Narrative    CT CHEST PULMONARY EMBOLISM WITH CONTRAST 3/7/2024 2:20 PM    CLINICAL HISTORY: Chest pain. Elevated D-dimer.    TECHNIQUE: CT angiogram chest during arterial phase injection IV  contrast. 2D and 3D MIP reconstructions were performed by the CT  technologist. Dose reduction techniques were used.     CONTRAST: 88mL Isovue-370    COMPARISON: November 24, 2023    FINDINGS: Some areas of motion artifact limiting detail.    ANGIOGRAM CHEST: Pulmonary arteries are normal caliber and negative  for pulmonary emboli. Thoracic aorta is negative for dissection. No CT  evidence of right heart strain.    LUNGS AND PLEURA: Minimal dependent probable atelectasis. Previously  seen right-sided nodule not seen today. No effusions.    MEDIASTINUM/AXILLAE: No adenopathy or aneurysm.    CORONARY ARTERY CALCIFICATIONS: None.    UPPER ABDOMEN: Cholelithiasis without cholecystitis again evident.    MUSCULOSKELETAL: No frankly destructive bony lesions.      Impression    IMPRESSION:  No acute process demonstrated.    JONATHAN ROD MD         SYSTEM ID:  [id number] "         Juvenal Tyson, DO  [facility] Hospitalist  [address] [place] [address]  Securely message with [employer] (more info)  Text page via [facility] Paging/Directory   03/08/2024

## 2024-03-08 NOTE — PLAN OF CARE
Goal Outcome Evaluation:      Plan of Care Reviewed With: patient    Overall Patient Progress: improvingOverall Patient Progress: improving     ICU End of Shift Summary.  For vital signs and complete assessments, please see documentation flowsheets.      Pertinent assessments: A&O, able to make needs known. Poor boundaries. Tele SR, VSS. Bipap 35%fio2 while sleeping, 4L NC while awake. Lungs dim/clear. Using urinal with staff assistance. Scattered bruising. PTA wounds to bilateral buttocks. Has denied any chest pain.    Lines: PIV x1    Plan (Upcoming Events): continue solumedrol, bipap

## 2024-03-08 NOTE — PROGRESS NOTES
[facility]    Patient is currently receiving home care services from [facility]. The patient is currently receiving SN PT OT services.  and home health team have been notified of patient admission. [facility] Liaison will continue to follow patient during stay. If appropriate provide orders to resume home care at time of discharge.    DANDRE So, LPN  Home Care Liaison   Cell: [phone]

## 2024-03-09 LAB
ANION GAP SERPL CALCULATED.3IONS-SCNC: 9 MMOL/L (ref 7–15)
BUN SERPL-MCNC: 19.8 MG/DL (ref 6–20)
CALCIUM SERPL-MCNC: 9 MG/DL (ref 8.6–10)
CHLORIDE SERPL-SCNC: 99 MMOL/L (ref 98–107)
CREAT SERPL-MCNC: 0.64 MG/DL (ref 0.67–1.17)
DEPRECATED HCO3 PLAS-SCNC: 35 MMOL/L (ref 22–29)
EGFRCR SERPLBLD CKD-EPI 2021: >90 ML/MIN/1.73M2
ERYTHROCYTE [DISTWIDTH] IN BLOOD BY AUTOMATED COUNT: 12.5 % (ref 10–15)
GLUCOSE SERPL-MCNC: 145 MG/DL (ref 70–99)
HCT VFR BLD AUTO: 37.1 % (ref 40–53)
HGB BLD-MCNC: 11.5 G/DL (ref 13.3–17.7)
MCH RBC QN AUTO: 30.6 PG (ref 26.5–33)
MCHC RBC AUTO-ENTMCNC: 31 G/DL (ref 31.5–36.5)
MCV RBC AUTO: 99 FL (ref 78–100)
PLATELET # BLD AUTO: 302 10E3/UL (ref 150–450)
POTASSIUM SERPL-SCNC: 4.3 MMOL/L (ref 3.4–5.3)
RBC # BLD AUTO: 3.76 10E6/UL (ref 4.4–5.9)
SODIUM SERPL-SCNC: 143 MMOL/L (ref 135–145)
WBC # BLD AUTO: 11.4 10E3/UL (ref 4–11)

## 2024-03-09 PROCEDURE — 999N000157 HC STATISTIC RCP TIME EA 10 MIN

## 2024-03-09 PROCEDURE — 94660 CPAP INITIATION&MGMT: CPT

## 2024-03-09 PROCEDURE — 99232 SBSQ HOSP IP/OBS MODERATE 35: CPT | Performed by: INTERNAL MEDICINE

## 2024-03-09 PROCEDURE — 80048 BASIC METABOLIC PNL TOTAL CA: CPT | Performed by: INTERNAL MEDICINE

## 2024-03-09 PROCEDURE — 250N000009 HC RX 250: Performed by: INTERNAL MEDICINE

## 2024-03-09 PROCEDURE — 250N000011 HC RX IP 250 OP 636: Performed by: INTERNAL MEDICINE

## 2024-03-09 PROCEDURE — 36415 COLL VENOUS BLD VENIPUNCTURE: CPT | Performed by: INTERNAL MEDICINE

## 2024-03-09 PROCEDURE — 250N000013 HC RX MED GY IP 250 OP 250 PS 637: Performed by: INTERNAL MEDICINE

## 2024-03-09 PROCEDURE — 120N000001 HC R&B MED SURG/OB

## 2024-03-09 PROCEDURE — 94640 AIRWAY INHALATION TREATMENT: CPT | Mod: 76

## 2024-03-09 PROCEDURE — 85014 HEMATOCRIT: CPT | Performed by: INTERNAL MEDICINE

## 2024-03-09 PROCEDURE — 94640 AIRWAY INHALATION TREATMENT: CPT

## 2024-03-09 RX ORDER — PREDNISONE 50 MG/1
50 TABLET ORAL DAILY
Status: DISCONTINUED | OUTPATIENT
Start: 2024-03-10 | End: 2024-03-11 | Stop reason: HOSPADM

## 2024-03-09 RX ORDER — IPRATROPIUM BROMIDE AND ALBUTEROL SULFATE 2.5; .5 MG/3ML; MG/3ML
3 SOLUTION RESPIRATORY (INHALATION) EVERY 4 HOURS PRN
Status: DISCONTINUED | OUTPATIENT
Start: 2024-03-09 | End: 2024-03-11 | Stop reason: HOSPADM

## 2024-03-09 RX ORDER — IPRATROPIUM BROMIDE AND ALBUTEROL SULFATE 2.5; .5 MG/3ML; MG/3ML
3 SOLUTION RESPIRATORY (INHALATION)
Status: DISCONTINUED | OUTPATIENT
Start: 2024-03-09 | End: 2024-03-10

## 2024-03-09 RX ADMIN — IPRATROPIUM BROMIDE AND ALBUTEROL SULFATE 3 ML: .5; 3 SOLUTION RESPIRATORY (INHALATION) at 21:00

## 2024-03-09 RX ADMIN — METHYLCELLULOSE 500 MG: 500 TABLET ORAL at 21:43

## 2024-03-09 RX ADMIN — LEVOCARNITINE 660 MG: 330 TABLET ORAL at 21:43

## 2024-03-09 RX ADMIN — LAMOTRIGINE 200 MG: 100 TABLET, FILM COATED, EXTENDED RELEASE ORAL at 13:39

## 2024-03-09 RX ADMIN — BUSPIRONE HYDROCHLORIDE 15 MG: 15 TABLET ORAL at 13:38

## 2024-03-09 RX ADMIN — PRAZOSIN HYDROCHLORIDE 2 MG: 1 CAPSULE ORAL at 21:43

## 2024-03-09 RX ADMIN — DIVALPROEX SODIUM 500 MG: 500 TABLET, DELAYED RELEASE ORAL at 13:39

## 2024-03-09 RX ADMIN — METHYLCELLULOSE 500 MG: 500 TABLET ORAL at 13:38

## 2024-03-09 RX ADMIN — FLUTICASONE PROPIONATE 1 SPRAY: 50 SPRAY, METERED NASAL at 21:49

## 2024-03-09 RX ADMIN — DIVALPROEX SODIUM 500 MG: 500 TABLET, DELAYED RELEASE ORAL at 21:43

## 2024-03-09 RX ADMIN — ENOXAPARIN SODIUM 40 MG: 40 INJECTION SUBCUTANEOUS at 14:35

## 2024-03-09 RX ADMIN — ZONISAMIDE 200 MG: 100 CAPSULE ORAL at 21:42

## 2024-03-09 RX ADMIN — PROPRANOLOL HYDROCHLORIDE 20 MG: 20 TABLET ORAL at 21:43

## 2024-03-09 RX ADMIN — LEVETIRACETAM 1500 MG: 500 TABLET, FILM COATED, EXTENDED RELEASE ORAL at 21:43

## 2024-03-09 RX ADMIN — IPRATROPIUM BROMIDE AND ALBUTEROL SULFATE 3 ML: .5; 3 SOLUTION RESPIRATORY (INHALATION) at 16:11

## 2024-03-09 RX ADMIN — BUSPIRONE HYDROCHLORIDE 15 MG: 15 TABLET ORAL at 21:42

## 2024-03-09 RX ADMIN — LEVETIRACETAM 1000 MG: 500 TABLET, FILM COATED ORAL at 13:39

## 2024-03-09 RX ADMIN — QUETIAPINE FUMARATE 300 MG: 300 TABLET, EXTENDED RELEASE ORAL at 21:42

## 2024-03-09 RX ADMIN — METHYLPREDNISOLONE SODIUM SUCCINATE 40 MG: 40 INJECTION, POWDER, FOR SOLUTION INTRAMUSCULAR; INTRAVENOUS at 06:46

## 2024-03-09 RX ADMIN — LAMOTRIGINE 200 MG: 100 TABLET, FILM COATED, EXTENDED RELEASE ORAL at 21:42

## 2024-03-09 RX ADMIN — PROPRANOLOL HYDROCHLORIDE 20 MG: 20 TABLET ORAL at 13:39

## 2024-03-09 RX ADMIN — LEVOCARNITINE 660 MG: 330 TABLET ORAL at 16:34

## 2024-03-09 RX ADMIN — DIVALPROEX SODIUM 500 MG: 500 TABLET, DELAYED RELEASE ORAL at 16:34

## 2024-03-09 RX ADMIN — LEVOCARNITINE 660 MG: 330 TABLET ORAL at 13:40

## 2024-03-09 RX ADMIN — PANTOPRAZOLE SODIUM 40 MG: 40 TABLET, DELAYED RELEASE ORAL at 13:38

## 2024-03-09 RX ADMIN — ENOXAPARIN SODIUM 40 MG: 40 INJECTION SUBCUTANEOUS at 21:42

## 2024-03-09 ASSESSMENT — ACTIVITIES OF DAILY LIVING (ADL)
ADLS_ACUITY_SCORE: 47

## 2024-03-09 NOTE — PROGRESS NOTES
Pt placed on BIPAP for nap this afternoon due to his OHS.    Pt is currently on BIPAP 10/5 on 35% FIO2.    Pt's BS are diminished with sat's in the mid 90's.    No skin issues were noted.    Will continue to follow and assess and make changes as needed.    Karen Avalos, RT on 3/8/2024 at 6:54 PM

## 2024-03-09 NOTE — PROGRESS NOTES
A BiPAP of  10/5 @ 35% was applied to the pt via the mask for an increase in WOB and/or SOB.  The bridge of the nose looks good and remains intact. Pt is tolerating it well. Will continue to monitor and assess the pt's current respiratory status and needs.     Praveen Christensen, RT on 3/8/2024 at 9:30 PM

## 2024-03-09 NOTE — PLAN OF CARE
"  A&O x4  Denies pain, SoB, N/V  Pt refused cares throughout morning   Pt refused morning medications - Took morning meds at 1400  Pt wanted to lay in bed on bipap.   Tele: SR - Discontinued  Bipap @ 35%, switched to NC @ 3L  Reg Diet      /50 (BP Location: Right arm, Patient Position: Semi-Chávez's)   Pulse 82   Temp 98  F (36.7  C) (Axillary)   Resp 16   Ht 1.727 m (5' 8\")   Wt 131.8 kg (290 lb 9.1 oz)   SpO2 94%   BMI 44.18 kg/m      "

## 2024-03-09 NOTE — PROGRESS NOTES
"    [facility]     Hospitalist Progress Note     Assessment & Plan     ASSESSMENT    48M with hx of morbid obesity BMI 44, seizure disorder, and multiple psych diagnoses presented with somnolence and generalized weakness and found to have acute on chronic hypoxic hypercapnic respiratory failure 2/2 decompensated COPD.    Back to his baseline respiratory status but group home not able to take him back over the weekend.    PLAN    Acute on Chronic Hypoxic Hypercapnic Respiratory Failure  -On 2-4L O2 outpatient and BiPAP at bedtime  -Needing continuous BiPAP this adm, suspect 2/2 COPD exacerbation and decompensated ANA/OHS  -2L O2 during the day, BiPAP at bedtime     COPD Exacerbation  -Methylprednisolone --> prednisone 50mg every day  -Scheduled and PRN nebs    Decompensated ANA/OHS  -Apparently wasn't wearing his BiPAP at group home bc he is blind and staff not setting it up for him  -Discussed this with SW, will reach out to group home about this    Other Issues  -Morbid Obesity BMI 44: Complicates all aspects of care  -Seizure Disorder: Home medications  -Schizoaffective Disorder, Borderline Personality Disorder, BETSEY, MDD: Home medications  -Recent Cardiac Arrest: Occurred after a seizure  -Essential HTN: Home medications    DVT Prophy  -LMWH    Disposition  -Medically ready for discharge but unable to go back to his group home over the weekend      Wesley Branham MD    Subjective     Seen at bedside. On his home O2 settings and denies SOB. Discussed discharge with case management and he is unable to go back to his group home over the weekend.        Objective   Blood pressure 128/63, pulse 76, temperature 98.4  F (36.9  C), temperature source Axillary, resp. rate 16, height 1.727 m (5' 8\"), weight 131.8 kg (290 lb 9.1 oz), SpO2 96%.    PHYSICAL EXAM  General: In no acute distress  CV: RRR.  Lungs: Diminished throughout. Nl WOB.  Abd: Non-tender.  Ext: No edema.    LABS AND IMAGING  Reviewed and " pertinent results discussed in assessment and plan.

## 2024-03-09 NOTE — PLAN OF CARE
Goal Outcome Evaluation:      Plan of Care Reviewed With: patient    Overall Patient Progress: no change     IMC. On BIPAP with 32% FiO2.  A&OX4. Flat affect with some behaviors.  LS clear. VSS stable. Afebrile. Up with assist of 1 with gait belt to bedside commode. Denied pain and discomfort. Tele NSR. Reg diet. On solumedrol for respiratory falure. Continue monitoring.

## 2024-03-09 NOTE — PLAN OF CARE
Goal Outcome Evaluation:      Plan of Care Reviewed With: patient    Overall Patient Progress: no changeOverall Patient Progress: no change       A/Ox4. Poor boundaries, and is agitated at times. Denies pain, sob, dizziness or n/v. Tele: SR. 3L NC during day, bipap at night. Is on Iv solumedrol. Possible discharge back to group home.

## 2024-03-10 PROCEDURE — 94640 AIRWAY INHALATION TREATMENT: CPT | Mod: 76

## 2024-03-10 PROCEDURE — 250N000009 HC RX 250: Performed by: INTERNAL MEDICINE

## 2024-03-10 PROCEDURE — 250N000011 HC RX IP 250 OP 636: Performed by: INTERNAL MEDICINE

## 2024-03-10 PROCEDURE — 120N000001 HC R&B MED SURG/OB

## 2024-03-10 PROCEDURE — 94640 AIRWAY INHALATION TREATMENT: CPT

## 2024-03-10 PROCEDURE — 94660 CPAP INITIATION&MGMT: CPT

## 2024-03-10 PROCEDURE — 99232 SBSQ HOSP IP/OBS MODERATE 35: CPT | Performed by: INTERNAL MEDICINE

## 2024-03-10 PROCEDURE — 999N000157 HC STATISTIC RCP TIME EA 10 MIN

## 2024-03-10 PROCEDURE — 250N000013 HC RX MED GY IP 250 OP 250 PS 637: Performed by: INTERNAL MEDICINE

## 2024-03-10 PROCEDURE — 250N000012 HC RX MED GY IP 250 OP 636 PS 637: Performed by: INTERNAL MEDICINE

## 2024-03-10 RX ORDER — IPRATROPIUM BROMIDE AND ALBUTEROL SULFATE 2.5; .5 MG/3ML; MG/3ML
3 SOLUTION RESPIRATORY (INHALATION)
Status: DISCONTINUED | OUTPATIENT
Start: 2024-03-10 | End: 2024-03-11 | Stop reason: HOSPADM

## 2024-03-10 RX ADMIN — ENOXAPARIN SODIUM 40 MG: 40 INJECTION SUBCUTANEOUS at 10:32

## 2024-03-10 RX ADMIN — PROPRANOLOL HYDROCHLORIDE 20 MG: 20 TABLET ORAL at 21:16

## 2024-03-10 RX ADMIN — LEVOCARNITINE 660 MG: 330 TABLET ORAL at 18:14

## 2024-03-10 RX ADMIN — IPRATROPIUM BROMIDE AND ALBUTEROL SULFATE 3 ML: .5; 3 SOLUTION RESPIRATORY (INHALATION) at 21:48

## 2024-03-10 RX ADMIN — BUSPIRONE HYDROCHLORIDE 15 MG: 15 TABLET ORAL at 10:28

## 2024-03-10 RX ADMIN — LAMOTRIGINE 200 MG: 100 TABLET, FILM COATED, EXTENDED RELEASE ORAL at 10:28

## 2024-03-10 RX ADMIN — LEVETIRACETAM 1500 MG: 500 TABLET, FILM COATED, EXTENDED RELEASE ORAL at 21:16

## 2024-03-10 RX ADMIN — DIVALPROEX SODIUM 500 MG: 500 TABLET, DELAYED RELEASE ORAL at 21:17

## 2024-03-10 RX ADMIN — ZONISAMIDE 200 MG: 100 CAPSULE ORAL at 21:16

## 2024-03-10 RX ADMIN — LEVOCARNITINE 660 MG: 330 TABLET ORAL at 21:17

## 2024-03-10 RX ADMIN — FLUTICASONE PROPIONATE 1 SPRAY: 50 SPRAY, METERED NASAL at 21:19

## 2024-03-10 RX ADMIN — DIVALPROEX SODIUM 500 MG: 500 TABLET, DELAYED RELEASE ORAL at 18:14

## 2024-03-10 RX ADMIN — PROPRANOLOL HYDROCHLORIDE 20 MG: 20 TABLET ORAL at 10:28

## 2024-03-10 RX ADMIN — METHYLCELLULOSE 500 MG: 500 TABLET ORAL at 21:17

## 2024-03-10 RX ADMIN — PANTOPRAZOLE SODIUM 40 MG: 40 TABLET, DELAYED RELEASE ORAL at 10:28

## 2024-03-10 RX ADMIN — IPRATROPIUM BROMIDE AND ALBUTEROL SULFATE 3 ML: .5; 3 SOLUTION RESPIRATORY (INHALATION) at 08:32

## 2024-03-10 RX ADMIN — DIVALPROEX SODIUM 500 MG: 500 TABLET, DELAYED RELEASE ORAL at 10:28

## 2024-03-10 RX ADMIN — BUSPIRONE HYDROCHLORIDE 15 MG: 15 TABLET ORAL at 21:17

## 2024-03-10 RX ADMIN — METHYLCELLULOSE 500 MG: 500 TABLET ORAL at 10:28

## 2024-03-10 RX ADMIN — PREDNISONE 50 MG: 50 TABLET ORAL at 10:28

## 2024-03-10 RX ADMIN — LAMOTRIGINE 200 MG: 100 TABLET, FILM COATED, EXTENDED RELEASE ORAL at 21:17

## 2024-03-10 RX ADMIN — LEVOCARNITINE 660 MG: 330 TABLET ORAL at 10:28

## 2024-03-10 RX ADMIN — QUETIAPINE FUMARATE 300 MG: 300 TABLET, EXTENDED RELEASE ORAL at 21:17

## 2024-03-10 RX ADMIN — LEVETIRACETAM 1000 MG: 500 TABLET, FILM COATED ORAL at 10:27

## 2024-03-10 RX ADMIN — IPRATROPIUM BROMIDE AND ALBUTEROL SULFATE 3 ML: .5; 3 SOLUTION RESPIRATORY (INHALATION) at 15:10

## 2024-03-10 RX ADMIN — PRAZOSIN HYDROCHLORIDE 2 MG: 1 CAPSULE ORAL at 21:17

## 2024-03-10 RX ADMIN — ENOXAPARIN SODIUM 40 MG: 40 INJECTION SUBCUTANEOUS at 21:19

## 2024-03-10 ASSESSMENT — ACTIVITIES OF DAILY LIVING (ADL)
ADLS_ACUITY_SCORE: 47
ADLS_ACUITY_SCORE: 53
ADLS_ACUITY_SCORE: 47
ADLS_ACUITY_SCORE: 55
ADLS_ACUITY_SCORE: 47
ADLS_ACUITY_SCORE: 53
ADLS_ACUITY_SCORE: 53
ADLS_ACUITY_SCORE: 47
ADLS_ACUITY_SCORE: 53
ADLS_ACUITY_SCORE: 53
ADLS_ACUITY_SCORE: 47

## 2024-03-10 NOTE — PROGRESS NOTES
"    [facility]     Hospitalist Progress Note     Assessment & Plan     ASSESSMENT    48M with hx of morbid obesity BMI 44, seizure disorder, and multiple psych diagnoses presented with somnolence and generalized weakness and found to have acute on chronic hypoxic hypercapnic respiratory failure 2/2 decompensated COPD.    Back to his baseline respiratory status but group home not able to take him back over the weekend.    PLAN    Acute on Chronic Hypoxic Hypercapnic Respiratory Failure  -On 2-4L O2 outpatient and BiPAP at bedtime  -Needing continuous BiPAP this adm, suspect 2/2 COPD exacerbation and decompensated ANA/OHS  -2L O2 during the day, BiPAP at bedtime     COPD Exacerbation  -Methylprednisolone --> prednisone 50mg every day  -Scheduled and PRN nebs    Decompensated ANA/OHS  -Apparently wasn't wearing his BiPAP at group home bc he is blind and staff not setting it up for him  -Discussed this with SW, will reach out to group home about this    Other Issues  -Morbid Obesity BMI 44: Complicates all aspects of care  -Seizure Disorder: Home medications  -Schizoaffective Disorder, Borderline Personality Disorder, BETSEY, MDD: Home medications  -Recent Cardiac Arrest: Occurred after a seizure  -Essential HTN: Home medications    DVT Prophy  -LMWH    Disposition  -Medically ready for discharge but unable to go back to his group home over the weekend      Wesley Branham MD    Subjective     No events overnight. Seen at bedside. Doing well. Planning on discharge back to alf tomorrow.        Objective   Blood pressure 107/62, pulse 69, temperature 98.5  F (36.9  C), temperature source Axillary, resp. rate 20, height 1.727 m (5' 8\"), weight 131.8 kg (290 lb 9.1 oz), SpO2 97%.    PHYSICAL EXAM  General: In no acute distress  CV: RRR.  Lungs: Diminished throughout. Nl WOB.  Abd: Non-tender.  Ext: No edema.    LABS AND IMAGING  Reviewed and pertinent results discussed in assessment and plan.   "

## 2024-03-10 NOTE — PROGRESS NOTES
A BiPAP of  10/5 @ 35% was applied to the pt via the mask for an increase in WOB and/or SOB.  The bridge of the nose looks good and remains intact. Pt is tolerating it well. Will continue to monitor and assess the pt's current respiratory status and needs.     Praveen Christensen, RT on 3/10/2024 at 4:45 AM

## 2024-03-10 NOTE — PLAN OF CARE
Goal Outcome Evaluation:      Plan of Care Reviewed With: patient    Overall Patient Progress: no changeOverall Patient Progress: no change       A/Ox4. Poor boundaries, refuses care and agitated at times. 3LPM when awake, bipap at night. Denies pain, sob, dizziness or n/v. Refuses repo and skin assessment. Possible discharge on Monday back to group home.

## 2024-03-11 VITALS
RESPIRATION RATE: 16 BRPM | TEMPERATURE: 97 F | SYSTOLIC BLOOD PRESSURE: 138 MMHG | BODY MASS INDEX: 44.04 KG/M2 | HEIGHT: 68 IN | HEART RATE: 74 BPM | DIASTOLIC BLOOD PRESSURE: 78 MMHG | WEIGHT: 290.57 LBS | OXYGEN SATURATION: 97 %

## 2024-03-11 PROCEDURE — G0463 HOSPITAL OUTPT CLINIC VISIT: HCPCS

## 2024-03-11 PROCEDURE — 94640 AIRWAY INHALATION TREATMENT: CPT | Mod: 76

## 2024-03-11 PROCEDURE — 250N000012 HC RX MED GY IP 250 OP 636 PS 637: Performed by: INTERNAL MEDICINE

## 2024-03-11 PROCEDURE — 94660 CPAP INITIATION&MGMT: CPT

## 2024-03-11 PROCEDURE — 94640 AIRWAY INHALATION TREATMENT: CPT

## 2024-03-11 PROCEDURE — 250N000013 HC RX MED GY IP 250 OP 250 PS 637: Performed by: INTERNAL MEDICINE

## 2024-03-11 PROCEDURE — 250N000011 HC RX IP 250 OP 636: Performed by: INTERNAL MEDICINE

## 2024-03-11 PROCEDURE — 999N000157 HC STATISTIC RCP TIME EA 10 MIN

## 2024-03-11 PROCEDURE — 99239 HOSP IP/OBS DSCHRG MGMT >30: CPT | Performed by: INTERNAL MEDICINE

## 2024-03-11 PROCEDURE — 250N000009 HC RX 250: Performed by: INTERNAL MEDICINE

## 2024-03-11 RX ORDER — ALBUTEROL SULFATE 90 UG/1
2 AEROSOL, METERED RESPIRATORY (INHALATION) EVERY 4 HOURS PRN
Qty: 18 G | Refills: 6 | Status: SHIPPED | OUTPATIENT
Start: 2024-03-11

## 2024-03-11 RX ORDER — FLUTICASONE FUROATE, UMECLIDINIUM BROMIDE AND VILANTEROL TRIFENATATE 200; 62.5; 25 UG/1; UG/1; UG/1
1 POWDER RESPIRATORY (INHALATION) DAILY
Qty: 1 EACH | Refills: 6 | Status: SHIPPED | OUTPATIENT
Start: 2024-03-11

## 2024-03-11 RX ADMIN — LEVETIRACETAM 1000 MG: 500 TABLET, FILM COATED ORAL at 09:15

## 2024-03-11 RX ADMIN — LEVOCARNITINE 660 MG: 330 TABLET ORAL at 09:15

## 2024-03-11 RX ADMIN — IPRATROPIUM BROMIDE AND ALBUTEROL SULFATE 3 ML: .5; 3 SOLUTION RESPIRATORY (INHALATION) at 11:27

## 2024-03-11 RX ADMIN — PREDNISONE 50 MG: 50 TABLET ORAL at 09:15

## 2024-03-11 RX ADMIN — BUSPIRONE HYDROCHLORIDE 15 MG: 15 TABLET ORAL at 09:15

## 2024-03-11 RX ADMIN — METHYLCELLULOSE 500 MG: 500 TABLET ORAL at 09:15

## 2024-03-11 RX ADMIN — PROPRANOLOL HYDROCHLORIDE 20 MG: 20 TABLET ORAL at 09:15

## 2024-03-11 RX ADMIN — ENOXAPARIN SODIUM 40 MG: 40 INJECTION SUBCUTANEOUS at 09:15

## 2024-03-11 RX ADMIN — IPRATROPIUM BROMIDE AND ALBUTEROL SULFATE 3 ML: .5; 3 SOLUTION RESPIRATORY (INHALATION) at 07:11

## 2024-03-11 RX ADMIN — LAMOTRIGINE 200 MG: 100 TABLET, FILM COATED, EXTENDED RELEASE ORAL at 09:15

## 2024-03-11 RX ADMIN — DIVALPROEX SODIUM 500 MG: 500 TABLET, DELAYED RELEASE ORAL at 09:15

## 2024-03-11 RX ADMIN — PANTOPRAZOLE SODIUM 40 MG: 40 TABLET, DELAYED RELEASE ORAL at 09:15

## 2024-03-11 ASSESSMENT — ACTIVITIES OF DAILY LIVING (ADL)
ADLS_ACUITY_SCORE: 51

## 2024-03-11 NOTE — PLAN OF CARE
Goal Outcome Evaluation:      Plan of Care Reviewed With: patient    Overall Patient Progress: no changeOverall Patient Progress: no change       A/Ox4. Denies pain, sob, dizziness or n/v. Had bed bath today. 3L NC awake, bipap at night. Uses urinal at bedside. Discharge back to group home today.

## 2024-03-11 NOTE — PROGRESS NOTES
A BiPAP of  10/5 @ 35% was applied to the pt via the mask for NOC use.  The bridge of the nose looks good and remains intact. Pt is tolerating it well. Will continue to monitor and assess the pt's current respiratory status and needs.     Praveen Christensen, RT on 3/11/2024 at 4:11 AM

## 2024-03-11 NOTE — PROGRESS NOTES
Oxygen Documentation  I certify that this patient, Tre Vazquez has been under my care (or a nurse practitioner or physician's assistant working with me). This is the face-to-face encounter for oxygen medical necessity.      At the time of this encounter, I have reviewed the qualifying testing and have determined that supplemental oxygen is reasonable and necessary and is expected to improve the patient's condition in a home setting.       Patient has continued oxygen desaturation due to COPD J44.9.    If portability is ordered, is the patient mobile within the home? yes

## 2024-03-11 NOTE — CONSULTS
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 He received 5 minutes of CPR in the field with ROSC and was subseqently intubated hospitalized.  Patient was discharged home on 2/28/2024.  Since discharge has been complaining of ongoing chest pain localized producible chest pain.  Was sent to the emergency room from facility today for increased somnolence, chest pain and weakness.  In the emergency room patient was found to be hypercapnic and was placed on BiPAP.  Is being admitted to ICU.     Assessment:      Areas visualized during today's visit: Focused:    Pressure Injury Location: Bilateral buttock  Last photo: 3/11/24    Wound type: Pressure Injury     Pressure Injury Stage: 3 on left, Stage 2 on right, present on admission   Wound history/plan of care:   Patient with pressure injury on left starting 4 months ago.  Wound base: Left buttock is 0.4x0.2cm, 100% dry drainage. Right is 0.2x0.3cm, 100% dry drainage.  Left buttock with 3 linear excoriations from scratching ranging from 0.1x1.5cm to 0.1x3.5cm     Palpation of the wound bed: normal      Drainage: none     Description of drainage: none     Measurements (length x width x depth, in cm) see above      Tunneling N/A     Undermining N/A  Periwound skin: Scar tissue on left      Color: pink      Temperature: normal   Odor: none  Pain: denies   Pain intervention prior to dressing change: N/A  Treatment goal: Heal   STATUS: improving  Supplies ordered:  "at bedside    My PI Risk Assessment     Sensory Perception: 3 - Slightly Limited     Moisture: 3 - Occasionally moist      Activity: 2 - Chairfast     Mobility: 3 - Slightly limited      Nutrition: 3 - Adequate     Friction/Shear: 2 - Potential problem      TOTAL: 16     Wound location: Coccyx  Last photo: none  Wound due to: Intertrigo  Wound history/plan of care: Intertrigo  Wound base: 100 % dermis     Palpation of the wound bed: normal      Drainage: scant     Description of drainage: bloody     Measurements (length x width x depth, in cm): 0.4  x 0.1 cm      Tunneling: N/A     Undermining: N/A  Periwound skin: Intact      Color: normal and consistent with surrounding tissue      Temperature: normal   Odor: none  Pain: mild  Pain interventions prior to dressing change: N/A  Treatment goal: Heal   STATUS: initial assessment  Supplies ordered: at bedside      Treatment Plan:     Bilateral buttock/Coccyx wound(s): BID  Cleanse Britany Cleanse and Protect spray and soft dry wipe  Apply a thin layer of Critic Aid paste  On repeat cleanings only remove surface stool, then reapply Critic Aid over any remaining paste    Pressure Injury Prevention (PIP) Plan:  If patient is declining pressure injury prevention interventions: Explore reason why and address patient's concerns, Educate on pressure injury risk and prevention intervention(s), and If patient is still declining, document \"informed refusal\"   Mattress: Follow bed algorithm, reassess daily and order specialty mattress, if indicated.  HOB: Maintain at or below 30 degrees, unless contraindicated  Repositioning in bed: Every 1-2 hours  and Left/right positioning; avoid supine  Heels: Pillows under calves  Protective Dressing: Sacral Mepilex for prevention (#[id number]),  especially for the agitated patient   Chair positioning: Chair cushion (#[id number])    If patient has a buttock pressure injury, or high risk for PI use chair cushion or SPS.  Moisture Management: Avoid brief " in bed  Under Devices: Inspect skin under all medical devices during skin inspection , Ensure tubes are stabilized without tension, and Ensure patient is not lying on medical devices or equipment when repositioned  Ask provider to discontinue device when no longer needed.       Orders: Updated    RECOMMEND PRIMARY TEAM ORDER: None, at this time  Education provided: importance of repositioning, plan of care, and Off-loading pressure  Discussed plan of care with: Patient and Nurse  WO nurse follow-up plan: weekly  Notify WOC if wound(s) deteriorate.  Nursing to notify the Provider(s) and re-consult the WO Nurse if new skin concern.    DATA:     Current support surface: Standard  Low air loss (LIZZY pump, Isolibrium, Pulsate, skin guard, etc)  Containment of urine/stool: Incontinence Protocol  BMI: Body mass index is 44.18 kg/m .   Active diet order: Orders Placed This Encounter      Combination Diet Regular Diet      Diet     Output: I/O last 3 completed shifts:  In: 1440 [P.O.:1440]  Out: 350 [Urine:350]     Labs:   Recent Labs   Lab 03/09/24  0552   HGB 11.5*   WBC 11.4*     Pressure injury risk assessment:   Sensory Perception: 3-->slightly limited  Moisture: 3-->occasionally moist  Activity: 2-->chairfast  Mobility: 3-->slightly limited  Nutrition: 3-->adequate  Friction and Shear: 1-->problem  Julio César Score: 15    MARYJANE Telles [facility] [facility]  Dept. Office Number: [phone]

## 2024-03-11 NOTE — PLAN OF CARE
Goal Outcome Evaluation:      Plan of Care Reviewed With: patient    VSS afebrile. Denies pain.  BIPAP at night and 2-3Lo2 during the day. On Scheduled Nebs and Prednisone. Plan for discharge back to group home today. Pt discharged at 1410 per wc with transport with all belongings and paperwork given to transport.

## 2024-03-11 NOTE — PROGRESS NOTES
Care Management Discharge Note    Discharge Date: 03/11/2024     Discharge Disposition: Group Home, Home Care  Discharge Services:    Discharge DME:    Discharge Transportation: agency      Education Provided on the Discharge Plan:  yes  Persons Notified of Discharge Plans: [facility]      Additional Information:  Patient is discharging back to his  today. ROX spoke with [facility],  nurse and informed him of patient's return today and transport time. He stated they can accept the patient back today.     WC with oxygen set up for 3/11 pickup between 1209-2216, bedside and charge RN informed.     [facility] was informed patient is discharging today. They provide RN/PT/OT services for the patient.     Discharge orders were faxed to [phone] and CM encouraged [facility] to let us know if they have questions.     ROX reinforced with [facility] that, per MD, patient should be wearing his bipap for sleep at night and may need assistance with applying it. [facility] verbalized understanding and stated that staff assist the patient with applying oxygen and bipap and they are trained in how to do this.     Caroline Morgan, RN, BSN  Inpatient Care Coordination  [facility]  [phone]

## 2024-03-11 NOTE — PLAN OF CARE
Goal Outcome Evaluation:       Alert and oriented x4.  Calm and cooperative this shift.  VSS.   Sleeping with bipap on and off.  On 3L O2 via NC when awake.  Large BM this evening, walked to the bathroom.   Uses urinal at bedside.   Wound on buttocks; dressing changed.  Blind in Left eye.  Regular diet.   Denies pain CP or SOB. DE LA FUENTE.   Plan to discharge to group home [facility].

## 2024-03-11 NOTE — DISCHARGE SUMMARY
"[facility]  Discharge Summary    Admit date:  3/7/2024    Discharge date and time: 3/11/2024    Discharge Physician: Wesley Branham MD    Primary care provider: Siobhan Mayo    Primary Discharge Diagnosis      Acute on Chronic Hypoxic Hypercapnic Respiratory Failure     Secondary Diagnoses     COPD Exacerbation  Decompensated ANA/OHS  Morbid Obesity BMI 44  Seizure Disorder  Schizoaffective Disorder, Borderline Personality Disorder, BETSEY, MDD  Recent Cardiac Arrest  Essential HTN  Bilateral Buttock Pressure Injury     Pressure Injury Stage: 3 on left, Stage 2 on right, present on admission    Summary of Hospital Stay     48M with hx of morbid obesity BMI 44, seizure disorder, multiple psych diagnoses, and chronic hypoxic hypercapnic respiratory failure 2/2 COPD and ANA/OHS on 2L O2 during day and BiPAP at bedtime presented with somnolence and generalized weakness and found to have acute on chronic hypoxic hypercapnic respiratory failure 2/2 decompensated COPD. Improved with BiPAP and treatment for COPD. Apparently wasn't wearing his BiPAP at group home bc he is blind and staff not setting it up for him. Discussed this with SW, will reach out to group home about this. Also, not on any maintenance inhalers for COPD so these will be started at time of discharge. Patient needs to follow-up with his PCP in 1-2 weeks      Patient Discharge Condition & Exam     Discharge condition: Improved    /78 (BP Location: Right arm)   Pulse 78   Temp 97  F (36.1  C) (Oral)   Resp 20   Ht 1.727 m (5' 8\")   Wt 131.8 kg (290 lb 9.1 oz)   SpO2 95%   BMI 44.18 kg/m       General: In NAD.  Cardiac: RRR.  Lungs: CTAB.  Abd: Non-tender.  Ext: No edema.  Skin:  Bilateral Buttock Pressure Injury     Pressure Injury Stage: 3 on left, Stage 2 on right, present on admission    Discharge Instructions     Patient/family instructions: Written discharge instruction given to patient/family    Discharge Medications:     Review " of your medicines        START taking        Dose / Directions   albuterol 108 (90 Base) MCG/ACT inhaler  Commonly known as: PROAIR HFA/PROVENTIL HFA/VENTOLIN HFA  Used for: Mixed simple and mucopurulent chronic bronchitis (H)      Dose: 2 puff  Inhale 2 puffs into the lungs every 4 hours as needed for shortness of breath, wheezing or cough  Quantity: 18 g  Refills: 6     Trelegy Ellipta 200-62.5-25 MCG/ACT oral inhaler  Used for: Mixed simple and mucopurulent chronic bronchitis (H)  Generic drug: Fluticasone-Umeclidin-Vilanterol      Dose: 1 puff  Inhale 1 puff into the lungs daily  Quantity: 1 each  Refills: 6            CONTINUE these medicines which have NOT CHANGED        Dose / Directions   acetaminophen 325 MG tablet  Commonly known as: TYLENOL      Dose: 650 mg  Take 650 mg by mouth every 4 hours as needed for mild pain  Refills: 0     ammonium lactate 12 % external cream  Commonly known as: AMLACTIN      Apply topically 2 times daily  Refills: 0     bacitracin 500 UNIT/GM external ointment      Apply topically 2 times daily Apply to left arm wound  Refills: 0     busPIRone 15 MG tablet  Commonly known as: BUSPAR      Dose: 15 mg  Take 15 mg by mouth 2 times daily  Refills: 0     citalopram 20 MG tablet  Commonly known as: celeXA      Dose: 60 mg  Take 60 mg by mouth daily  Refills: 0     clotrimazole 1 % external cream  Commonly known as: LOTRIMIN      Apply topically 2 times daily Apply to left groin  Refills: 0     divalproex sodium delayed-release 500 MG DR tablet  Commonly known as: DEPAKOTE      Dose: 500 mg  Take 500 mg by mouth 3 times daily  Refills: 0     fluticasone 50 MCG/ACT nasal spray  Commonly known as: FLONASE      Dose: 1 spray  Spray 1 spray in nostril 2 times daily  Refills: 0     LamoTRIgine 200 MG Tb24  Commonly known as: LaMICtal      Dose: 200 mg  Take 200 mg by mouth 2 times daily 8am and 8pm  Refills: 0     * levETIRAcetam 500 MG 24 hr tablet  Commonly known as: KEPPRA XR      Dose:  1,500 mg  Take 1,500 mg by mouth at bedtime  Refills: 0     * levETIRAcetam 500 MG tablet  Commonly known as: KEPPRA      Dose: 1,000 mg  Take 1,000 mg by mouth every morning  Refills: 0     levOCARNitine 330 MG tablet  Commonly known as: CARNITOR  Used for: Nonintractable generalized idiopathic epilepsy without status epilepticus (H)      Dose: 660 mg  Take 2 tablets (660 mg) by mouth 3 times daily  Quantity: 180 tablet  Refills: 11     methylcellulose Powd powder      Dose: 2 mg  Apply 2 mg topically 2 times daily Mix 1 rounded tablespoon (2 mg) in 8 oz glass of water and take by mouth twice daily  Refills: 0     multivitamin, therapeutic Tabs tablet      Dose: 1 tablet  Take 1 tablet by mouth daily  Refills: 0     pantoprazole 40 MG EC tablet  Commonly known as: PROTONIX      Dose: 40 mg  Take 40 mg by mouth daily  Refills: 0     polyethylene glycol 17 GM/Dose powder  Commonly known as: MIRALAX      Dose: 1 capful.  Take 1 capful. by mouth daily  Refills: 0     prazosin 2 MG capsule  Commonly known as: MINIPRESS      Dose: 2 mg  Take 2 mg by mouth At Bedtime  Refills: 0     propranolol 20 MG tablet  Commonly known as: INDERAL  Used for: Benign hypertension      Dose: 20 mg  Take 1 tablet (20 mg) by mouth 2 times daily  Refills: 0     QUEtiapine  MG 24 hr tablet  Commonly known as: SEROquel XR      Dose: 300 mg  Take 300 mg by mouth at bedtime  Refills: 0     zonisamide 100 MG capsule  Commonly known as: ZONEGRAN  Used for: Non-refractory epilepsy (H), Breakthrough seizure (H)      TAKE 1 CAPSULE BY MOUTH EVERY MORNING;TAKE 2 CAPSULES (200MG) BY MOUTH AT BEDTIME.  Quantity: 90 capsule  Refills: 3           * This list has 2 medication(s) that are the same as other medications prescribed for you. Read the directions carefully, and ask your doctor or other care provider to review them with you.                   Where to get your medicines        These medications were sent to [email or web address] -  [place], [place] - [address]  [address] [facility] [address]      Phone: [phone]   albuterol 108 (90 Base) MCG/ACT inhaler  Trelegy Ellipta 200-62.5-25 MCG/ACT oral inhaler        Discharge diet: regular diet    Discharge activity: Activity as tolerated    Discharge follow-up:    Follow up with primary care provider within one week or earlier if symptoms return or gets worse.    Follow up with consultant as instructed.    Pending Results     Unresulted Labs Ordered in the Past 30 Days of this Admission       No orders found from 2/6/2024 to 3/8/2024.             Patient Allergies     Allergies   Allergen Reactions    Acetaminophen Unknown    Hydrocodone Bitartrate Er Unknown    Cephalexin Rash     HUT Reaction: Rash; HUT Noted: [id number]      Vicodin [Hydrocodone-Acetaminophen] GI Disturbance     Disposition   Disposition: long term care facility     I saw and evaluated the patient on day of discharge and  discharge instructions reviewed  and  all the patient's questions and concerns addressed. Over 30 minutes spent on discharge and coordination of discharge process for this patient.

## 2024-03-12 ENCOUNTER — TELEPHONE (OUTPATIENT)
Dept: NEUROLOGY | Facility: CLINIC | Age: 49
End: 2024-03-12
Payer: MEDICARE

## 2024-03-12 DIAGNOSIS — G40.309 NONINTRACTABLE GENERALIZED IDIOPATHIC EPILEPSY WITHOUT STATUS EPILEPTICUS (H): Primary | ICD-10-CM

## 2024-03-12 NOTE — TELEPHONE ENCOUNTER
[facility] Call Center    Phone Message    May a detailed message be left on voicemail: yes     Reason for Call: Other: Johanna- [facility] care nurse is requesting a call back to further discuss another hospital admission for pt. Johanna has some questions about pt's medications (LamoTRIgine (LAMICTAL) and other medications.     Recent TE from 3/7 from the same caller.    Please follow up to further advise.    Writer offered to schedule follow up appt with Shonda or Brenda caller declined.     Please advise    Action Taken: Other: Neurology    Travel Screening: Not Applicable

## 2024-03-12 NOTE — TELEPHONE ENCOUNTER
Johanna also wanted to add that the pt has non of the 200 Mg in the home, hoping to have it ordered also.

## 2024-03-13 NOTE — TELEPHONE ENCOUNTER
Called and left message for Johanna (a nurse  with [facility]), asking for a return phone call to discuss seizure management and seizure medications (per chart review, many medications are recorded as historic).    Also per last patient office visit plan notes 2/2/24:  Plan:  --- Current medication list brought to clinic today does not reflect the changes made in the hospital recently.  Please have group home send updated medication list or call clinic to confirm the doses of his antiseizure medications.  --- Labs: Drug levels  --- Take your medications as prescribed, and do not stop taking abruptly.  --- Try to take your anti-seizure medications at the same time every day  --- Avoid common triggers: sleep deprivation, excessive alcohol, stress, flashing lights or patterns, dehydration, recreational drug use, illness and missed medications.      Upon call back need to gather information related to medications in order to appropriately refill and advise on any changes.    Charles Sommer RN, BSN  [facility] Neurology

## 2024-03-14 RX ORDER — LEVETIRACETAM 1000 MG/1
1000 TABLET ORAL EVERY MORNING
Qty: 30 TABLET | Refills: 5 | Status: SHIPPED | OUTPATIENT
Start: 2024-03-14 | End: 2024-07-03

## 2024-03-14 RX ORDER — DIVALPROEX SODIUM 500 MG/1
500 TABLET, DELAYED RELEASE ORAL 3 TIMES DAILY
Qty: 90 TABLET | Refills: 5 | Status: SHIPPED | OUTPATIENT
Start: 2024-03-14 | End: 2024-03-19

## 2024-03-14 RX ORDER — LACOSAMIDE 50 MG/1
50 TABLET ORAL EVERY MORNING
Qty: 30 TABLET | Refills: 5 | Status: CANCELLED | OUTPATIENT
Start: 2024-03-14

## 2024-03-14 RX ORDER — LAMOTRIGINE 200 MG/1
200 TABLET, EXTENDED RELEASE ORAL 2 TIMES DAILY
Qty: 60 TABLET | Refills: 5 | Status: SHIPPED | OUTPATIENT
Start: 2024-03-14 | End: 2024-07-03

## 2024-03-14 RX ORDER — LEVETIRACETAM 500 MG/1
TABLET ORAL
Qty: 150 TABLET | Refills: 5 | Status: CANCELLED | OUTPATIENT
Start: 2024-03-14

## 2024-03-14 RX ORDER — LEVETIRACETAM 500 MG/1
1500 TABLET, FILM COATED, EXTENDED RELEASE ORAL AT BEDTIME
Qty: 90 TABLET | Refills: 5 | Status: SHIPPED | OUTPATIENT
Start: 2024-03-14 | End: 2024-07-03

## 2024-03-14 RX ORDER — LAMOTRIGINE 100 MG/1
TABLET ORAL
Qty: 150 TABLET | Refills: 5 | Status: CANCELLED | OUTPATIENT
Start: 2024-03-14

## 2024-03-14 RX ORDER — ZONISAMIDE 100 MG/1
CAPSULE ORAL
Qty: 90 CAPSULE | Refills: 5 | Status: SHIPPED | OUTPATIENT
Start: 2024-03-14 | End: 2024-07-03

## 2024-03-14 NOTE — TELEPHONE ENCOUNTER
Lisa I spoke to Johanna with [facility] care. With patients numerous hospitalizations the patients medication list has been altered so many times that the group home is struggling to produce an accurate med list. Johanna and I went through med list from last hospital discharge and during hospitalization pt was taking antiepileptics as follows:     keppra 1000mg IR in AM and 1500mg ER at bedtime.     Lamotrigine 200mg ER BID    Depakote DR 500mg TID    Levocarnitine 660mg TID    Zonisamide 100mg AM and 200mg HS    Patient has been out of lamotrigine for the month of march has not taken since 3/11 discharge. They are requesting urgent rx sent to pharmacy. They also need prescriptions for keppra, depakote, and lamotrigine as these were discontinued after last hospitalization and put in as historical (pharmacy does not have orders).     Please sign pended prescriptions below if agreeable to above medication plan. Again pt urgently needs lamotrigine.     Has follow up next week 3/14. Also homecare nurse can get labs if you want them before appt, please let me know/order and we can fax.     Thanks  Tito Hathaway, RN, BSN  [facility] Neurology

## 2024-03-14 NOTE — TELEPHONE ENCOUNTER
[facility] Call Center    Phone Message    May a detailed message be left on voicemail: yes     Reason for Call: Other: Johanna from [facility] returning call to clinic. Requesting call back at [phone].     Action Taken: Message routed to:  Other: [facility] Neurology    Travel Screening: Not Applicable

## 2024-03-15 NOTE — TELEPHONE ENCOUNTER
"Called and spoke with pharmacy and clarified medications sent in yesterday by provider, Lisa Merchant.    Medications sent per previous conversation (rectifying of patient's previous historic entries and changes during last hospital discharge).    \"Johanna and I went through med list from last hospital discharge and during hospitalization pt was taking antiepileptics as follows:   keppra 1000mg IR in AM and 1500mg ER at bedtime.   Lamotrigine 200mg ER BID  Depakote DR 500mg TID  Levocarnitine 660mg TID  Zonisamide 100mg AM and 200mg HS\"      Clarification given and pharmacist noted this in the [facility] system.    Charles Sommer, RN, BSN  [facility] Neurology    "

## 2024-03-15 NOTE — TELEPHONE ENCOUNTER
Thank you so much for putting in the time to dig through his chart! I was struggling with the list of AEDs. I had planned to talk to the group home next week at his appointment. All of the orders have been sent through. I also ordered drug levels to be completed prior to his visit. I will consult with Dr. Griggs about his case and get back to you if she has additional orders she would like placed. Thank you again!     RON Oliveira CNP

## 2024-03-15 NOTE — TELEPHONE ENCOUNTER
Samara from [facility], requesting clarification on levETIRAcetam (KEPPRA) and LamoTRIgine (LAMICTAL).    Please call Samara at [phone] to discuss further.

## 2024-03-18 NOTE — PROGRESS NOTES
__________________________________  ESTABLISHED PATIENT NEUROLOGY NOTE    DATE OF VISIT: 1/3/2023  MRN: [id number]  PATIENT NAME: Tre Vazquez  YOB: 1975    Chief Complaint   Patient presents with    Seizures     Patient reports disoriented today. Patient reports 2-3 seizures this month.     SUBJECTIVE:                                                      HISTORY OF PRESENT ILLNESS:  Tre is here for follow up regarding seizures    Tre Vazquez is a 47 year old male with PMH of cerebral palsy with mild spastic paraparesis, intellectual disability, congenital left eye blindness, previous right Bell's palsy w/ mild residual facial droop, mood disturbance, and epilepsy disorder on 4 AEDs.  He follows Dr. Griggs in the clinic last seen, 4/19/2022.  Per chart review, Notes indicate he had a breakthrough seizure in August/September 2021 and was hospitalized at the [facility]. Prior to the most recent seizure, apparently he had been seizure-free for about 10 years.  He has history of generalized tonic-clonic seizures.  No history of other seizure types. Head CT in 8.2021 showed lateral and third ventricle enlargement.  EEG from 2012 showed bilateral theta slowing, no epileptiform activity.    01/03/23:Joel arrived for his seizure follow up virtually today. He is accompanied by a care attendant, Jennifer. Patient denies any seizure activity since last visit, in fact he and Jennifer deny any seizure activity since 2021. No loss of consciousness, zoning out or starring spells. Denies adverse effects from medications. No increased fatigue, rashes, dizziness, changes in vision or speech. Mood has been stable.     Tre is interested in coming off of his Depakote.  He denies any adverse effects but feels that this medication is not beneficial.  We discussed the possibility of discontinuing Depakote and decided against that at this time.  He will follow-up in the clinic for additional  "testing and education before discontinuing medication.  Staff thought it was better to stay on medication since his seizures are so well controlled.  -----------------  Chart review from 12/21/23: admitted with decreased mental status and decreased oxygen in concern ofr setting of decreased compliance with BiPAP/O2 at group home with chronic hypercarbic. During eval in ER labs with elevated ammonia beyond baseline to 129 and did receive lactulose. Also with elevated pCo2 to 92. D dimer elevated but PE CT neg. Now admitted and improving with BiPAP. Neurology consulted CT of the head and chest did not show any acute pathology.  Blood cultures ended up coming back positive for Staph aureus so he was started on IV nafcillin after consultation from ID.     Now since 12/15/2023 concern for more frequent events of possible seizure. Events per nursing and Dr. Gomez and his step mom have consisted of jerking or \"glassy stare\". This will tend to last seconds and no specific worsening of confusion after. First reports on night of 12/15 but now at least several events (2+ per shift) since then. Per step mom baseline of these events at group home can vary but can at times be at least daily to multiple times a day since at least summer 2023.     Medication changes from hospitalization:   Initial plans to increase lamotrigine as outlined in note: 1.12/20 increase lamotrigine from 200 twice daily to 200-100 -200 for 1 week then 200 mg po TID which will start 12/27: Continue 2. Depakote at 500 mg p.o. twice daily. (Did not taper off as potential use for psychiatric issues as well) continue levocarnitine for elevated ammonia.  Started on 3. Vimpat 50 mg p.o. daily on 12/17.  4. Keppra continued as prior to admission with the 1000 mg a.m. and 1500 mg p.m.  5. Zonegran 100 mg a.m. and 200 mg p.m.  See neurology notes 12/21/2023, no spells captured on continuous EEG monitoring.  No seizure activity.  Seizure regimen, see note.  " Follow-up PTA outpatient neurology.  In preparation for medications to discharge there was a potential concern of increasing lamotrigine more than 50 mg a week while being on the Depakote.  Dosing of lamotrigine remained at 1. Revised. 200 mg twice a day 8 AM and 8 PM with 100 mg at 1400.  12/28 lamotrigine level pending   -----------------------  02/02/24: Tre presents to the clinic for follow-up after hospitalization in November.  He is accompanied by staff from the group [facility], Nathan.  Nathan states that he does not normally work with Joel and is unsure of his medications.  Staff denies any seizure activity since discharging from the hospital.  Tre tells me that he has had a few staring off spells since his hospitalization.  Upon reviewing med list today in clinic, the medication doses do not match what was updated from his hospitalization.  Nathan reports that he will follow-up with the  to ensure this is the updated list of his medications.    Tre tells me that he is more tired than normal because he did not sleep well last night.  He is also complaining of left ear pain.  No other concerns today  -----  02/22/24 - 02/28/24: admitted on 2/22/2024 with seizure activity resulting in cardiac arrest. He received 5 minutes of CPR in the field with ROSC and was subseqently intubated and sedated on arrival to the emergency department.  Had a prolonged stay in ICU attempting to wean from ventilator but was eventually extubated to BiPAP, has significantly improved and and is now back to baseline chronic oxygen requirement. [facility] came to hospital to assess patient and determined he was near his baseline function status and able to return home.  Remains on lacosamide, lamotrigine, keppra, zonisamide, and depakote.   ------    03/19/24: Joel presents to the clinic today post hospitalization.  He is accompanied by Quoc who works closely with him at his facility.  Joel and Quoc report that he  has remained seizure-free since his hospitalization.  Jen was able to go through his current medication list,. medication by medication and verify his current meds.    Current AEDs:  Depakote 500 mg twice daily  Lamotrigine 200 mg twice daily  Keppra 1000 mg in the a.m. and 1500 mg in the p.m.  Zonisamide 100 mg in the a.m. and 200 mg at night    Joel reports that he has felt some disorientation/foggy mentation overnight, this morning and into the afternoon today.  He states that he is having bouts of dizziness/lightheadedness and confusion. Dr. Griggs was present in the clinic and visited with patient. Patient told providers that he thought he was going to have a seizure.   MD offered snacks and to reassess the patient after few minutes.  Patient and staff states that Joel was feeling much better after having a light snack and resting a few minutes.  Patient was discharged to leave in stable condition.       Current Anti-Seizure Medications: current list  Depakote 500 mg twice daily  Lamotrigine 200 mg twice daily  Keppra 1000 mg in the a.m. and 1500 mg in the p.m.  Zonisamide 100 mg in the a.m. and 200 mg at night    Perceived AED Side Effects: No       Current Medications:   acetaminophen (TYLENOL) 325 MG tablet, Take 650 mg by mouth every 4 hours as needed for mild pain  albuterol (PROAIR HFA/PROVENTIL HFA/VENTOLIN HFA) 108 (90 Base) MCG/ACT inhaler, Inhale 2 puffs into the lungs every 4 hours as needed for shortness of breath, wheezing or cough  ammonium lactate (AMLACTIN) 12 % external cream, Apply topically 2 times daily  bacitracin 500 UNIT/GM external ointment, Apply topically 2 times daily Apply to left arm wound  busPIRone (BUSPAR) 15 MG tablet, Take 15 mg by mouth 2 times daily  citalopram (CELEXA) 20 MG tablet, Take 60 mg by mouth daily  clotrimazole (LOTRIMIN) 1 % external cream, Apply topically 2 times daily Apply to left groin  fluticasone (FLONASE) 50 MCG/ACT nasal spray, Spray 1 spray in  nostril 2 times daily  Fluticasone-Umeclidin-Vilanterol (TRELEGY ELLIPTA) 200-62.5-25 MCG/ACT oral inhaler, Inhale 1 puff into the lungs daily  LamoTRIgine (LAMICTAL) 200 MG TB24, Take 200 mg by mouth 2 times daily  levETIRAcetam (KEPPRA XR) 500 MG 24 hr tablet, Take 3 tablets (1,500 mg) by mouth at bedtime  levETIRAcetam (KEPPRA) 1000 MG tablet, Take 1 tablet (1,000 mg) by mouth every morning  levOCARNitine (CARNITOR) 330 MG tablet, Take 2 tablets (660 mg) by mouth 3 times daily  methylcellulose POWD powder, Apply 2 mg topically 2 times daily Mix 1 rounded tablespoon (2 mg) in 8 oz glass of water and take by mouth twice daily  multivitamin, therapeutic (THERA-VIT) TABS tablet, Take 1 tablet by mouth daily  pantoprazole (PROTONIX) 40 MG EC tablet, Take 40 mg by mouth daily  polyethylene glycol (MIRALAX) 17 GM/Dose powder, Take 1 capful. by mouth daily  prazosin (MINIPRESS) 2 MG capsule, Take 2 mg by mouth At Bedtime  propranolol (INDERAL) 20 MG tablet, Take 1 tablet (20 mg) by mouth 2 times daily  QUEtiapine ER (SEROQUEL XR) 300 MG 24 hr tablet, Take 300 mg by mouth at bedtime  zonisamide (ZONEGRAN) 100 MG capsule, Take 1 capsule (100 mg) by mouth every morning AND 2 capsules (200 mg) every evening.    No current facility-administered medications on file prior to visit.    Past Medical History:   Patient  has a past medical history of Blindness of left eye, Depression (03/10/2014), Hypertension, Pneumococcal septicemia(038.2) (H) (11/26/2013), Pneumonia, organism unspecified(486) (11/26/2013), Uncomplicated asthma, and Unspecified epilepsy without mention of intractable epilepsy.  Surgical History:  He  has a past surgical history that includes orthopedic surgery; Esophagoscopy, gastroscopy, duodenoscopy (EGD), combined (N/A, 12/1/2022); and Colonoscopy (N/A, 12/1/2022).  Family and Social History:  Reviewed, and he  reports that he quit smoking about 14 years ago. His smoking use included cigarettes. He started  "smoking about 24 years ago. He has never used smokeless tobacco. He reports that he does not drink alcohol and does not use drugs.  Reviewed, and family history is not on file.    RECENT DIAGNOSTIC STUDIES:   Labs:09/27/22  LEVETIRACETAM (KEPPRA) 6.0 - 46.0 ug/mL 53.0 High       LAMOTRIGINE 3.0 - 15.0 ug/mL 17.8 High       Imaging:   EXAM: CT HEAD W/O CONTRAST  DATE/TIME: 10/25/2022 11:38 PM  INDICATION: fall, abrasion to forehead  IMPRESSION:  1.  No acute intracranial process.  2.  Ventriculomegaly is disproportionate to cerebral atrophy, primarily due to white matter volume loss. Correlate for symptoms of normal pressure/nonobstructive hydrocephalus.  CT HEAD W/O CONTRAST 8/30/2021 4:29 PM  History: Seizure, nontraumatic (Age >= 41y)   Impression:  No acute intracranial pathology. Chronic small vessel ischemic  disease. Mild to moderate lateral and third ventriculomegaly.  EEG 07/08/22  Impression: This is an abnormal EEG due to paroxysmal slowing of the background and theta range that suggest nonspecific generalized cerebral dysfunction.  No sharp activity arrhythmic discharges were seen to suggest seizures  Classification: Dysrhythmia grade II generalized     REVIEW OF SYSTEMS:                                                      10-point review of systems is negative except as mentioned above in HPI.    EXAM:                                                      Physical Exam:   /56   Pulse 78   Ht 1.727 m (5' 8\")   Wt 131.5 kg (290 lb)   BMI 44.09 kg/m    MENTAL STATUS: Alert, attentive.   GENERAL: Healthy, alert and no distress  EYES: left eye sealed closed, blind.  No discharge   RESP: No audible wheeze, cough, or visible cyanosis.  No visible retractions or increased work of breathing.    MS: No gross musculoskeletal defects noted and normal range of motion of the upper extremities.   SKIN: Visible skin clear. No significant rash, abnormal pigmentation or lesions to face or neck.  NEURO: Cranial " nerves grossly intact. Speech is fluent. Normal comprehension. Normal concentration. hearing not formally tested but intact to conversation     ASSESSMENT and PLAN:                                                      Assessment:    ICD-10-CM    1. Nonintractable generalized idiopathic epilepsy without status epilepticus (H)  G40.309 divalproex sodium delayed-release (DEPAKOTE) 500 MG DR diane Vazquez is a 48 year old male with PMH of cerebral palsy with mild spastic paraparesis, intellectual disability, congenital left eye blindness, previous right Bell's palsy w/ mild residual facial droop, mood disturbance, and epilepsy disorder on 4 AEDs.  He follows Dr. Griggs in the clinic.  Patient has had multiple breakthrough seizures over the last few months which required hospitalization.  There was some confusion on which medications he was taking.  Staff states that he had run out of medications or not taking the appropriate dose due confusion on what medications he should be taking.  Today we verified which medications he has been on since his last hospitalization. Since he has remained seizure-free we will keep him on this regimen.  We reviewed the plan with his primary neurologist, Dr. Griggs in clinic today.  Provider was able to meet with patient and staff and discuss plan.  I will order labs to monitor the drug parameters. We discussed interventions, will plan to see the patient back in July or follow up.. Joel and staff understands and agrees with this plan.     Plan:  --- Depakote 500 mg twice daily  --- Lamotrigine 200 mg twice daily  --- Keppra 1000 mg in the a.m. and 1500 mg in the p.m.  --- Zonisamide 100 mg in the a.m. and 200 mg at night  Labs: Drug levels  --- Take your medications as prescribed, and do not stop taking abruptly.  --- Try to take your anti-seizure medications at the same time every day  --- Avoid common triggers: sleep deprivation, excessive alcohol, stress, flashing  lights or patterns, dehydration, recreational drug use, illness and missed medications.  --- Plan on follow up in the Neurology Clinic on 07/03/24  --- Please feel free to reach out if you have any further questions or concerns.  --- Seek immediate medical attention if an emergency arises or if your health becomes progressively worse.     It was a pleasure to see you today!     Total Time: Total time spent for face to face visit, reviewing labs/imaging studies, counseling and coordination of care was: 45 Minutes spent on the date of the encounter doing chart review, review of test results, patient visit, documentation and staff at group home  provider collaboration     This note was dictated using voice recognition software.  Any grammatical or context distortions are unintentional and inherent to the software.    Lisa Merchant, DNP, APRN, CNP  [facility]

## 2024-03-19 ENCOUNTER — OFFICE VISIT (OUTPATIENT)
Dept: NEUROLOGY | Facility: CLINIC | Age: 49
End: 2024-03-19
Payer: MEDICARE

## 2024-03-19 VITALS
HEART RATE: 78 BPM | DIASTOLIC BLOOD PRESSURE: 56 MMHG | BODY MASS INDEX: 43.95 KG/M2 | SYSTOLIC BLOOD PRESSURE: 104 MMHG | HEIGHT: 68 IN | WEIGHT: 290 LBS

## 2024-03-19 DIAGNOSIS — G40.309 NONINTRACTABLE GENERALIZED IDIOPATHIC EPILEPSY WITHOUT STATUS EPILEPTICUS (H): ICD-10-CM

## 2024-03-19 PROCEDURE — 99215 OFFICE O/P EST HI 40 MIN: CPT

## 2024-03-19 RX ORDER — DIVALPROEX SODIUM 500 MG/1
500 TABLET, DELAYED RELEASE ORAL 2 TIMES DAILY
Qty: 60 TABLET | Refills: 11 | Status: SHIPPED | OUTPATIENT
Start: 2024-03-19 | End: 2024-04-11

## 2024-03-19 NOTE — LETTER
3/19/2024         RE: Tre Vazquez  [address] [place] [address]        Dear Colleague,    Thank you for referring your patient, Tre Vazuqez, to the [facility] [place]. Please see a copy of my visit note below.        __________________________________  ESTABLISHED PATIENT NEUROLOGY NOTE    DATE OF VISIT: 1/3/2023  MRN: [id number]  PATIENT NAME: Tre Vazquez  YOB: 1975    Chief Complaint   Patient presents with     Seizures     Patient reports disoriented today. Patient reports 2-3 seizures this month.     SUBJECTIVE:                                                      HISTORY OF PRESENT ILLNESS:  Tre is here for follow up regarding seizures    Tre Vazquez is a 47 year old male with PMH of cerebral palsy with mild spastic paraparesis, intellectual disability, congenital left eye blindness, previous right Bell's palsy w/ mild residual facial droop, mood disturbance, and epilepsy disorder on 4 AEDs.  He follows Dr. Griggs in the clinic last seen, 4/19/2022.  Per chart review, Notes indicate he had a breakthrough seizure in August/September 2021 and was hospitalized at the [facility]. Prior to the most recent seizure, apparently he had been seizure-free for about 10 years.  He has history of generalized tonic-clonic seizures.  No history of other seizure types. Head CT in 8.2021 showed lateral and third ventricle enlargement.  EEG from 2012 showed bilateral theta slowing, no epileptiform activity.    01/03/23:Joel arrived for his seizure follow up virtually today. He is accompanied by a care attendant, Jennifer. Patient denies any seizure activity since last visit, in fact he and Jennifer deny any seizure activity since 2021. No loss of consciousness, zoning out or starring spells. Denies adverse effects from medications. No increased fatigue, rashes, dizziness, changes in vision or speech. Mood has been stable.     Tre is interested  "in coming off of his Depakote.  He denies any adverse effects but feels that this medication is not beneficial.  We discussed the possibility of discontinuing Depakote and decided against that at this time.  He will follow-up in the clinic for additional testing and education before discontinuing medication.  Staff thought it was better to stay on medication since his seizures are so well controlled.  -----------------  Chart review from 12/21/23: admitted with decreased mental status and decreased oxygen in concern ofr setting of decreased compliance with BiPAP/O2 at group home with chronic hypercarbic. During eval in ER labs with elevated ammonia beyond baseline to 129 and did receive lactulose. Also with elevated pCo2 to 92. D dimer elevated but PE CT neg. Now admitted and improving with BiPAP. Neurology consulted CT of the head and chest did not show any acute pathology.  Blood cultures ended up coming back positive for Staph aureus so he was started on IV nafcillin after consultation from ID.     Now since 12/15/2023 concern for more frequent events of possible seizure. Events per nursing and Dr. Gomez and his step mom have consisted of jerking or \"glassy stare\". This will tend to last seconds and no specific worsening of confusion after. First reports on night of 12/15 but now at least several events (2+ per shift) since then. Per step mom baseline of these events at group home can vary but can at times be at least daily to multiple times a day since at least summer 2023.     Medication changes from hospitalization:   Initial plans to increase lamotrigine as outlined in note: 1.12/20 increase lamotrigine from 200 twice daily to 200-100 -200 for 1 week then 200 mg po TID which will start 12/27: Continue 2. Depakote at 500 mg p.o. twice daily. (Did not taper off as potential use for psychiatric issues as well) continue levocarnitine for elevated ammonia.  Started on 3. Vimpat 50 mg p.o. daily on 12/17.  4. " Keppra continued as prior to admission with the 1000 mg a.m. and 1500 mg p.m.  5. Zonegran 100 mg a.m. and 200 mg p.m.  See neurology notes 12/21/2023, no spells captured on continuous EEG monitoring.  No seizure activity.  Seizure regimen, see note.  Follow-up PTA outpatient neurology.  In preparation for medications to discharge there was a potential concern of increasing lamotrigine more than 50 mg a week while being on the Depakote.  Dosing of lamotrigine remained at 1. Revised. 200 mg twice a day 8 AM and 8 PM with 100 mg at 1400.  12/28 lamotrigine level pending   -----------------------  02/02/24: Tre presents to the clinic for follow-up after hospitalization in November.  He is accompanied by staff from the group home, [facility].  Nathan states that he does not normally work with [facility] and is unsure of his medications.  Staff denies any seizure activity since discharging from the hospital.  Tre tells me that he has had a few staring off spells since his hospitalization.  Upon reviewing med list today in clinic, the medication doses do not match what was updated from his hospitalization.  Nathan reports that he will follow-up with the  to ensure this is the updated list of his medications.    Tre tells me that he is more tired than normal because he did not sleep well last night.  He is also complaining of left ear pain.  No other concerns today  -----  02/22/24 - 02/28/24: admitted on 2/22/2024 with seizure activity resulting in cardiac arrest. He received 5 minutes of CPR in the field with ROSC and was subseqently intubated and sedated on arrival to the emergency department.  Had a prolonged stay in ICU attempting to wean from ventilator but was eventually extubated to BiPAP, has significantly improved and and is now back to baseline chronic oxygen requirement. [facility] came to hospital to assess patient and determined he was near his baseline function status and able to return home.   Remains on lacosamide, lamotrigine, keppra, zonisamide, and depakote.   ------    03/19/24: Joel presents to the clinic today post hospitalization.  He is accompanied by Quoc who works closely with him at his facility.  Joel and Quoc report that he has remained seizure-free since his hospitalization.  Quoc and I was able to go through his current medication list,. medication by medication and verify his current meds.    Current AEDs:  Depakote 500 mg twice daily  Lamotrigine 200 mg twice daily  Keppra 1000 mg in the a.m. and 1500 mg in the p.m.  Zonisamide 100 mg in the a.m. and 200 mg at night    Joel reports that he has felt some disorientation/foggy mentation overnight, this morning and into the afternoon today.  He states that he is having bouts of dizziness/lightheadedness and confusion. Dr. Griggs was present in the clinic and visited with patient. Patient told providers that he thought he was going to have a seizure.   MD offered snacks and to reassess the patient after few minutes.  Patient and staff states that Joel was feeling much better after having a light snack and resting a few minutes.  Patient was discharged to leave in stable condition.       Current Anti-Seizure Medications: current list  Depakote 500 mg twice daily  Lamotrigine 200 mg twice daily  Keppra 1000 mg in the a.m. and 1500 mg in the p.m.  Zonisamide 100 mg in the a.m. and 200 mg at night    Perceived AED Side Effects: No       Current Medications:   acetaminophen (TYLENOL) 325 MG tablet, Take 650 mg by mouth every 4 hours as needed for mild pain  albuterol (PROAIR HFA/PROVENTIL HFA/VENTOLIN HFA) 108 (90 Base) MCG/ACT inhaler, Inhale 2 puffs into the lungs every 4 hours as needed for shortness of breath, wheezing or cough  ammonium lactate (AMLACTIN) 12 % external cream, Apply topically 2 times daily  bacitracin 500 UNIT/GM external ointment, Apply topically 2 times daily Apply to left arm wound  busPIRone (BUSPAR) 15 MG tablet, Take  15 mg by mouth 2 times daily  citalopram (CELEXA) 20 MG tablet, Take 60 mg by mouth daily  clotrimazole (LOTRIMIN) 1 % external cream, Apply topically 2 times daily Apply to left groin  fluticasone (FLONASE) 50 MCG/ACT nasal spray, Spray 1 spray in nostril 2 times daily  Fluticasone-Umeclidin-Vilanterol (TRELEGY ELLIPTA) 200-62.5-25 MCG/ACT oral inhaler, Inhale 1 puff into the lungs daily  LamoTRIgine (LAMICTAL) 200 MG TB24, Take 200 mg by mouth 2 times daily  levETIRAcetam (KEPPRA XR) 500 MG 24 hr tablet, Take 3 tablets (1,500 mg) by mouth at bedtime  levETIRAcetam (KEPPRA) 1000 MG tablet, Take 1 tablet (1,000 mg) by mouth every morning  levOCARNitine (CARNITOR) 330 MG tablet, Take 2 tablets (660 mg) by mouth 3 times daily  methylcellulose POWD powder, Apply 2 mg topically 2 times daily Mix 1 rounded tablespoon (2 mg) in 8 oz glass of water and take by mouth twice daily  multivitamin, therapeutic (THERA-VIT) TABS tablet, Take 1 tablet by mouth daily  pantoprazole (PROTONIX) 40 MG EC tablet, Take 40 mg by mouth daily  polyethylene glycol (MIRALAX) 17 GM/Dose powder, Take 1 capful. by mouth daily  prazosin (MINIPRESS) 2 MG capsule, Take 2 mg by mouth At Bedtime  propranolol (INDERAL) 20 MG tablet, Take 1 tablet (20 mg) by mouth 2 times daily  QUEtiapine ER (SEROQUEL XR) 300 MG 24 hr tablet, Take 300 mg by mouth at bedtime  zonisamide (ZONEGRAN) 100 MG capsule, Take 1 capsule (100 mg) by mouth every morning AND 2 capsules (200 mg) every evening.    No current facility-administered medications on file prior to visit.    Past Medical History:   Patient  has a past medical history of Blindness of left eye, Depression (03/10/2014), Hypertension, Pneumococcal septicemia(038.2) (H) (11/26/2013), Pneumonia, organism unspecified(486) (11/26/2013), Uncomplicated asthma, and Unspecified epilepsy without mention of intractable epilepsy.  Surgical History:  He  has a past surgical history that includes orthopedic surgery;  "Esophagoscopy, gastroscopy, duodenoscopy (EGD), combined (N/A, 12/1/2022); and Colonoscopy (N/A, 12/1/2022).  Family and Social History:  Reviewed, and he  reports that he quit smoking about 14 years ago. His smoking use included cigarettes. He started smoking about 24 years ago. He has never used smokeless tobacco. He reports that he does not drink alcohol and does not use drugs.  Reviewed, and family history is not on file.    RECENT DIAGNOSTIC STUDIES:   Labs:09/27/22  LEVETIRACETAM (KEPPRA) 6.0 - 46.0 ug/mL 53.0 High       LAMOTRIGINE 3.0 - 15.0 ug/mL 17.8 High       Imaging:   EXAM: CT HEAD W/O CONTRAST  DATE/TIME: 10/25/2022 11:38 PM  INDICATION: fall, abrasion to forehead  IMPRESSION:  1.  No acute intracranial process.  2.  Ventriculomegaly is disproportionate to cerebral atrophy, primarily due to white matter volume loss. Correlate for symptoms of normal pressure/nonobstructive hydrocephalus.  CT HEAD W/O CONTRAST 8/30/2021 4:29 PM  History: Seizure, nontraumatic (Age >= 41y)   Impression:  No acute intracranial pathology. Chronic small vessel ischemic  disease. Mild to moderate lateral and third ventriculomegaly.  EEG 07/08/22  Impression: This is an abnormal EEG due to paroxysmal slowing of the background and theta range that suggest nonspecific generalized cerebral dysfunction.  No sharp activity arrhythmic discharges were seen to suggest seizures  Classification: Dysrhythmia grade II generalized     REVIEW OF SYSTEMS:                                                      10-point review of systems is negative except as mentioned above in HPI.    EXAM:                                                      Physical Exam:   /56   Pulse 78   Ht 1.727 m (5' 8\")   Wt 131.5 kg (290 lb)   BMI 44.09 kg/m    MENTAL STATUS: Alert, attentive.   GENERAL: Healthy, alert and no distress  EYES: left eye sealed closed, blind.  No discharge   RESP: No audible wheeze, cough, or visible cyanosis.  No visible " retractions or increased work of breathing.    MS: No gross musculoskeletal defects noted and normal range of motion of the upper extremities.   SKIN: Visible skin clear. No significant rash, abnormal pigmentation or lesions to face or neck.  NEURO: Cranial nerves grossly intact. Speech is fluent. Normal comprehension. Normal concentration. hearing not formally tested but intact to conversation     ASSESSMENT and PLAN:                                                      Assessment:    ICD-10-CM    1. Nonintractable generalized idiopathic epilepsy without status epilepticus (H)  G40.309 divalproex sodium delayed-release (DEPAKOTE) 500 MG DR diane Vazquez is a 48 year old male with PMH of cerebral palsy with mild spastic paraparesis, intellectual disability, congenital left eye blindness, previous right Bell's palsy w/ mild residual facial droop, mood disturbance, and epilepsy disorder on 4 AEDs.  He follows Dr. Griggs in the clinic.  Patient has had multiple breakthrough seizures over the last few months which required hospitalization.  There was some confusion on which medications he was taking.  Staff states that he had run out of medications or not taking the appropriate dose due confusion on what medications he should be taking.  Today we verified which medications he has been on since his last hospitalization. Since he has remained seizure-free we will keep him on this regimen.  We reviewed the plan with his primary neurologist, Dr. Griggs in clinic today.  Provider was able to meet with patient and staff and discuss plan.  I will order labs to monitor the drug parameters. We discussed interventions, will plan to see the patient back in July or follow up.. Joel and staff understands and agrees with this plan.     Plan:  --- Depakote 500 mg twice daily  --- Lamotrigine 200 mg twice daily  --- Keppra 1000 mg in the a.m. and 1500 mg in the p.m.  --- Zonisamide 100 mg in the a.m. and 200 mg at  night  Labs: Drug levels  --- Take your medications as prescribed, and do not stop taking abruptly.  --- Try to take your anti-seizure medications at the same time every day  --- Avoid common triggers: sleep deprivation, excessive alcohol, stress, flashing lights or patterns, dehydration, recreational drug use, illness and missed medications.  --- Plan on follow up in the Neurology Clinic on 07/03/24  --- Please feel free to reach out if you have any further questions or concerns.  --- Seek immediate medical attention if an emergency arises or if your health becomes progressively worse.     It was a pleasure to see you today!     Total Time: Total time spent for face to face visit, reviewing labs/imaging studies, counseling and coordination of care was: 45 Minutes spent on the date of the encounter doing chart review, review of test results, patient visit, documentation and staff at group home  provider collaboration     This note was dictated using voice recognition software.  Any grammatical or context distortions are unintentional and inherent to the software.    Lisa Merchant, REEMA, APRN, CNP  [facility]           Again, thank you for allowing me to participate in the care of your patient.        Sincerely,        RON Oliveira CNP

## 2024-03-19 NOTE — PATIENT INSTRUCTIONS
Plan:  --- Depakote 500 mg twice daily  --- Lamotrigine 200 mg twice daily  --- Keppra 1000 mg in the a.m. and 1500 mg in the p.m.  --- Zonisamide 100 mg in the a.m. and 200 mg at night  --- Take your medications as prescribed, and do not stop taking abruptly.  --- Try to take your anti-seizure medications at the same time every day  --- Avoid common triggers: sleep deprivation, excessive alcohol, stress, flashing lights or patterns, dehydration, recreational drug use, illness and missed medications.  --- Plan on follow up in the Neurology Clinic on 07/03/24  --- Please feel free to reach out if you have any further questions or concerns.  --- Seek immediate medical attention if an emergency arises or if your health becomes progressively worse.     It was a pleasure to see you today!

## 2024-03-19 NOTE — NURSING NOTE
Chief Complaint   Patient presents with    Seizures     Patient reports disoriented today. Patient reports 2-3 seizures this month.     aMgaly Bustillo on 3/19/2024 at 1:39 PM

## 2024-03-26 ENCOUNTER — HOSPITAL ENCOUNTER (EMERGENCY)
Facility: CLINIC | Age: 49
Discharge: HOME | End: 2024-03-26
Attending: EMERGENCY MEDICINE | Admitting: EMERGENCY MEDICINE
Payer: MEDICARE

## 2024-03-26 VITALS
BODY MASS INDEX: 43.95 KG/M2 | RESPIRATION RATE: 23 BRPM | DIASTOLIC BLOOD PRESSURE: 94 MMHG | HEART RATE: 89 BPM | OXYGEN SATURATION: 96 % | SYSTOLIC BLOOD PRESSURE: 146 MMHG | TEMPERATURE: 98.5 F | HEIGHT: 68 IN | WEIGHT: 290 LBS

## 2024-03-26 DIAGNOSIS — R56.9 SEIZURE (H): ICD-10-CM

## 2024-03-26 LAB
ALBUMIN UR-MCNC: NEGATIVE MG/DL
ANION GAP SERPL CALCULATED.3IONS-SCNC: 8 MMOL/L (ref 7–15)
APPEARANCE UR: ABNORMAL
ATRIAL RATE - MUSE: 98 BPM
BASOPHILS # BLD AUTO: 0 10E3/UL (ref 0–0.2)
BASOPHILS NFR BLD AUTO: 1 %
BILIRUB UR QL STRIP: NEGATIVE
BUN SERPL-MCNC: 16.2 MG/DL (ref 6–20)
CALCIUM SERPL-MCNC: 9.4 MG/DL (ref 8.6–10)
CHLORIDE SERPL-SCNC: 96 MMOL/L (ref 98–107)
COLOR UR AUTO: YELLOW
CREAT SERPL-MCNC: 0.63 MG/DL (ref 0.67–1.17)
DEPRECATED HCO3 PLAS-SCNC: 39 MMOL/L (ref 22–29)
DIASTOLIC BLOOD PRESSURE - MUSE: NORMAL MMHG
EGFRCR SERPLBLD CKD-EPI 2021: >90 ML/MIN/1.73M2
EOSINOPHIL # BLD AUTO: 0.1 10E3/UL (ref 0–0.7)
EOSINOPHIL NFR BLD AUTO: 2 %
ERYTHROCYTE [DISTWIDTH] IN BLOOD BY AUTOMATED COUNT: 12.5 % (ref 10–15)
GLUCOSE SERPL-MCNC: 105 MG/DL (ref 70–99)
GLUCOSE UR STRIP-MCNC: NEGATIVE MG/DL
HCT VFR BLD AUTO: 39.6 % (ref 40–53)
HGB BLD-MCNC: 12.4 G/DL (ref 13.3–17.7)
HGB UR QL STRIP: NEGATIVE
HOLD SPECIMEN: NORMAL
HOLD SPECIMEN: NORMAL
IMM GRANULOCYTES # BLD: 0.1 10E3/UL
IMM GRANULOCYTES NFR BLD: 1 %
INTERPRETATION ECG - MUSE: NORMAL
KETONES UR STRIP-MCNC: ABNORMAL MG/DL
LEUKOCYTE ESTERASE UR QL STRIP: NEGATIVE
LEVETIRACETAM SERPL-MCNC: 50.1 ΜG/ML (ref 10–40)
LYMPHOCYTES # BLD AUTO: 2.4 10E3/UL (ref 0.8–5.3)
LYMPHOCYTES NFR BLD AUTO: 32 %
MCH RBC QN AUTO: 30.7 PG (ref 26.5–33)
MCHC RBC AUTO-ENTMCNC: 31.3 G/DL (ref 31.5–36.5)
MCV RBC AUTO: 98 FL (ref 78–100)
MONOCYTES # BLD AUTO: 0.6 10E3/UL (ref 0–1.3)
MONOCYTES NFR BLD AUTO: 8 %
MUCOUS THREADS #/AREA URNS LPF: PRESENT /LPF
NEUTROPHILS # BLD AUTO: 4.3 10E3/UL (ref 1.6–8.3)
NEUTROPHILS NFR BLD AUTO: 56 %
NITRATE UR QL: NEGATIVE
NRBC # BLD AUTO: 0 10E3/UL
NRBC BLD AUTO-RTO: 0 /100
P AXIS - MUSE: 50 DEGREES
PH UR STRIP: 7 [PH] (ref 5–7)
PLATELET # BLD AUTO: 234 10E3/UL (ref 150–450)
POTASSIUM SERPL-SCNC: 4.2 MMOL/L (ref 3.4–5.3)
PR INTERVAL - MUSE: 192 MS
QRS DURATION - MUSE: 80 MS
QT - MUSE: 358 MS
QTC - MUSE: 457 MS
R AXIS - MUSE: 21 DEGREES
RBC # BLD AUTO: 4.04 10E6/UL (ref 4.4–5.9)
RBC URINE: 3 /HPF
SODIUM SERPL-SCNC: 143 MMOL/L (ref 135–145)
SP GR UR STRIP: 1.02 (ref 1–1.03)
SYSTOLIC BLOOD PRESSURE - MUSE: NORMAL MMHG
T AXIS - MUSE: 50 DEGREES
UROBILINOGEN UR STRIP-MCNC: 3 MG/DL
VENTRICULAR RATE- MUSE: 98 BPM
WBC # BLD AUTO: 7.4 10E3/UL (ref 4–11)
WBC URINE: 5 /HPF
YEAST #/AREA URNS HPF: ABNORMAL /HPF

## 2024-03-26 PROCEDURE — 36415 COLL VENOUS BLD VENIPUNCTURE: CPT | Performed by: EMERGENCY MEDICINE

## 2024-03-26 PROCEDURE — 85025 COMPLETE CBC W/AUTO DIFF WBC: CPT | Performed by: EMERGENCY MEDICINE

## 2024-03-26 PROCEDURE — 80048 BASIC METABOLIC PNL TOTAL CA: CPT | Performed by: EMERGENCY MEDICINE

## 2024-03-26 PROCEDURE — 80175 DRUG SCREEN QUAN LAMOTRIGINE: CPT | Performed by: EMERGENCY MEDICINE

## 2024-03-26 PROCEDURE — 99284 EMERGENCY DEPT VISIT MOD MDM: CPT

## 2024-03-26 PROCEDURE — 81001 URINALYSIS AUTO W/SCOPE: CPT | Performed by: EMERGENCY MEDICINE

## 2024-03-26 PROCEDURE — 80177 DRUG SCRN QUAN LEVETIRACETAM: CPT | Performed by: EMERGENCY MEDICINE

## 2024-03-26 PROCEDURE — 93005 ELECTROCARDIOGRAM TRACING: CPT

## 2024-03-26 ASSESSMENT — ACTIVITIES OF DAILY LIVING (ADL)
ADLS_ACUITY_SCORE: 40

## 2024-03-26 ASSESSMENT — COLUMBIA-SUICIDE SEVERITY RATING SCALE - C-SSRS
6. HAVE YOU EVER DONE ANYTHING, STARTED TO DO ANYTHING, OR PREPARED TO DO ANYTHING TO END YOUR LIFE?: NO
2. HAVE YOU ACTUALLY HAD ANY THOUGHTS OF KILLING YOURSELF IN THE PAST MONTH?: NO
1. IN THE PAST MONTH, HAVE YOU WISHED YOU WERE DEAD OR WISHED YOU COULD GO TO SLEEP AND NOT WAKE UP?: NO

## 2024-03-26 NOTE — ED PROVIDER NOTES
History     Chief Complaint:  Seizures       HPI   Tre Vazquez is a 48 year old male with a history of seizure who presents in a group home with 3 seizures tonight.  Patient states that this is his normal and he gets grand mal seizures.  Seizures were not witnessed by group home staff. He did not appear post ictal upon EMS arrival He says even when he is taking medication, he could have seizures.  He called ambulance and came in after the seizures.  He states that he did not injure himself.  There is no recent medication change that he is aware of.  He is not sure who his neurologist is but did not see them in a week or so ago.  He denies any other medication issues.  He is eating and drinking normally.  Denies any infectious symptoms.  Denies any headache.  There is no injury from his seizure tonight.  He does use oxygen chronically at baseline 5 L.      Independent Historian:   None - Patient Only    Review of External Notes:   3/19/24 Neuro note- current meds include depalote, lamictal, keppra, zonisamide      Medications:    acetaminophen (TYLENOL) 325 MG tablet  albuterol (PROAIR HFA/PROVENTIL HFA/VENTOLIN HFA) 108 (90 Base) MCG/ACT inhaler  ammonium lactate (AMLACTIN) 12 % external cream  bacitracin 500 UNIT/GM external ointment  busPIRone (BUSPAR) 15 MG tablet  citalopram (CELEXA) 20 MG tablet  clotrimazole (LOTRIMIN) 1 % external cream  divalproex sodium delayed-release (DEPAKOTE) 500 MG DR tablet  fluticasone (FLONASE) 50 MCG/ACT nasal spray  Fluticasone-Umeclidin-Vilanterol (TRELEGY ELLIPTA) 200-62.5-25 MCG/ACT oral inhaler  LamoTRIgine (LAMICTAL) 200 MG TB24  levETIRAcetam (KEPPRA XR) 500 MG 24 hr tablet  levETIRAcetam (KEPPRA) 1000 MG tablet  levOCARNitine (CARNITOR) 330 MG tablet  methylcellulose POWD powder  multivitamin, therapeutic (THERA-VIT) TABS tablet  pantoprazole (PROTONIX) 40 MG EC tablet  polyethylene glycol (MIRALAX) 17 GM/Dose powder  prazosin (MINIPRESS) 2 MG capsule  propranolol  "(INDERAL) 20 MG tablet  QUEtiapine ER (SEROQUEL XR) 300 MG 24 hr tablet  zonisamide (ZONEGRAN) 100 MG capsule        Past Medical History:    Past Medical History:   Diagnosis Date    Blindness of left eye     Depression 03/10/2014    Hypertension     Pneumococcal septicemia(038.2) (H) 11/26/2013    Pneumonia, organism unspecified(486) 11/26/2013    Uncomplicated asthma     Unspecified epilepsy without mention of intractable epilepsy        Past Surgical History:    Past Surgical History:   Procedure Laterality Date    COLONOSCOPY N/A 12/1/2022    Procedure: COLONOSCOPY;  Surgeon: Michael Caldwell MD;  Location:  OR    ESOPHAGOSCOPY, GASTROSCOPY, DUODENOSCOPY (EGD), COMBINED N/A 12/1/2022    Procedure: ESOPHAGOGASTRODUODENOSCOPY with biopsy;  Surgeon: Michael Caldwell MD;  Location:  OR    ORTHOPEDIC SURGERY      pt stated past surgery on both ankles        Physical Exam   Patient Vitals for the past 24 hrs:   BP Temp Temp src Pulse Resp SpO2 Height Weight   03/26/24 0145 (!) 149/98 -- -- 96 21 91 % -- --   03/26/24 0130 (!) 172/115 -- -- 98 29 95 % -- --   03/26/24 0116 -- -- -- 96 21 96 % -- --   03/26/24 0115 (!) 146/94 -- -- 95 15 96 % -- --   03/26/24 0113 (!) 150/120 -- -- 98 22 96 % -- --   03/26/24 0110 -- 98.5  F (36.9  C) Oral 99 -- (!) 81 % 1.727 m (5' 8\") 131.5 kg (290 lb)        Physical Exam  Constitutional:       Appearance: He is well-developed.   HENT:      Right Ear: External ear normal.      Left Ear: External ear normal.      Mouth/Throat:      Mouth: Mucous membranes are moist.      Pharynx: Oropharynx is clear. No oropharyngeal exudate or posterior oropharyngeal erythema.   Eyes:      General: No scleral icterus.     Conjunctiva/sclera: Conjunctivae normal.      Pupils: Pupils are equal, round, and reactive to light.   Cardiovascular:      Rate and Rhythm: Normal rate and regular rhythm.      Heart sounds: Normal heart sounds. No murmur heard.     No friction rub. No " gallop.   Pulmonary:      Effort: Pulmonary effort is normal. No respiratory distress.      Breath sounds: Normal breath sounds. No wheezing or rales.   Abdominal:      General: Bowel sounds are normal. There is no distension.      Palpations: Abdomen is soft. There is no mass.      Tenderness: There is no abdominal tenderness.   Skin:     General: Skin is warm and dry.      Capillary Refill: Capillary refill takes less than 2 seconds.      Findings: No rash.   Neurological:      General: No focal deficit present.      Mental Status: He is alert.      Cranial Nerves: No cranial nerve deficit.           Emergency Department Course   ECG  ECG taken at 0115, ECG read at 0120  NSR without acute St changes  Rate 98 bpm. NH interval 192 ms. QRS duration 80 ms. QT/QTc 358/457 ms. P-R-T axes  50 21 50.       Laboratory:  Labs Ordered and Resulted from Time of ED Arrival to Time of ED Departure   BASIC METABOLIC PANEL - Abnormal       Result Value    Sodium 143      Potassium 4.2      Chloride 96 (*)     Carbon Dioxide (CO2) 39 (*)     Anion Gap 8      Urea Nitrogen 16.2      Creatinine 0.63 (*)     GFR Estimate >90      Calcium 9.4      Glucose 105 (*)    CBC WITH PLATELETS AND DIFFERENTIAL - Abnormal    WBC Count 7.4      RBC Count 4.04 (*)     Hemoglobin 12.4 (*)     Hematocrit 39.6 (*)     MCV 98      MCH 30.7      MCHC 31.3 (*)     RDW 12.5      Platelet Count 234      % Neutrophils 56      % Lymphocytes 32      % Monocytes 8      % Eosinophils 2      % Basophils 1      % Immature Granulocytes 1      NRBCs per 100 WBC 0      Absolute Neutrophils 4.3      Absolute Lymphocytes 2.4      Absolute Monocytes 0.6      Absolute Eosinophils 0.1      Absolute Basophils 0.0      Absolute Immature Granulocytes 0.1      Absolute NRBCs 0.0     KEPPRA (LEVETIRACETAM) LEVEL - Abnormal    Keppra (Levetiracetam) Level 50.1 (*)    ROUTINE UA WITH MICROSCOPIC REFLEX TO CULTURE - Abnormal    Color Urine Yellow      Appearance Urine Slightly  Cloudy (*)     Glucose Urine Negative      Bilirubin Urine Negative      Ketones Urine Trace (*)     Specific Gravity Urine 1.022      Blood Urine Negative      pH Urine 7.0      Protein Albumin Urine Negative      Urobilinogen Urine 3.0 (*)     Nitrite Urine Negative      Leukocyte Esterase Urine Negative      Budding Yeast Urine Many (*)     Mucus Urine Present (*)     RBC Urine 3 (*)     WBC Urine 5     LAMOTRIGINE LEVEL        Emergency Department Course & Assessments:    Interventions:  Medications - No data to display     Assessments:  0155 Exam    Independent Interpretation (X-rays, CTs, rhythm strip):  None    Consultations/Discussion of Management or Tests:  None        Social Determinants of Health affecting care:   None    Disposition:  The patient was discharged.     Impression & Plan        MIPS (If applicable):  N/A    Medical Decision Making:  Patient presents today with 3 episodes of unwitnessed seizure event his group home.  Patient reports that he is normal at this point.  He does not appear postictal on arrival.  He is on 4 different medications for seizures and reports compliance with them.  He was monitored here without any further events.  We did get labs and there are no signs of infection.  Keppra level is elevated at 50.  He was previously in the normal range.  I sent off a note to his neurologist so that they can follow-up on him and adjust his medication as needed.  Patient otherwise was sleeping her entire time and had no further activities.  I asked him to continue with his medication and follow-up with a neurologist this week.  Patient discharged back to his group home.      Diagnosis:    ICD-10-CM    1. Seizure (H)  R56.9                  3/26/2024   Nikki Lal MD Cheng, Wenlan, MD  03/26/24 0536

## 2024-03-26 NOTE — ED NOTES
Writer applied a 2x2 on pt right ear due to pt stated the O2 tubing was hurting his ear. Pt said Dr Lal allowed him to have a mountain dew. Dr Lal states only water at this time. Pt was upset he did not get a mountain dew. Pt wants staff to help with his personal phone to connect to [employer] and games. Staff told Pt we dont touch Pts personal phones. Pt upset staff is not giving into to his demands. RN and MD notified.

## 2024-03-26 NOTE — ED TRIAGE NOTES
BIBA from group home, usually bedridden. Pt told staff he thought he had 3 seizures tonight and told them he wanted to come to the ED. Seizures present as tonic clonic generally. AVSS on RA. Pt reports using 3-5L O2 NC at baseline.

## 2024-03-26 NOTE — ED NOTES
Called staff from [facility] ( [phone]). Spoke to Germán, caretaker. Report and discharge instructions given to him.

## 2024-03-28 LAB — LAMOTRIGINE SERPL-MCNC: 12.8 UG/ML

## 2024-03-31 ENCOUNTER — APPOINTMENT (OUTPATIENT)
Dept: GENERAL RADIOLOGY | Facility: CLINIC | Age: 49
End: 2024-03-31
Attending: EMERGENCY MEDICINE
Payer: MEDICARE

## 2024-03-31 ENCOUNTER — HOSPITAL ENCOUNTER (EMERGENCY)
Facility: CLINIC | Age: 49
Discharge: HOME | End: 2024-03-31
Attending: EMERGENCY MEDICINE | Admitting: EMERGENCY MEDICINE
Payer: MEDICARE

## 2024-03-31 VITALS
OXYGEN SATURATION: 97 % | SYSTOLIC BLOOD PRESSURE: 127 MMHG | TEMPERATURE: 98.2 F | RESPIRATION RATE: 13 BRPM | DIASTOLIC BLOOD PRESSURE: 75 MMHG | HEART RATE: 86 BPM
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
## 2024-03-31 LAB
ANION GAP SERPL CALCULATED.3IONS-SCNC: 4 MMOL/L (ref 7–15)
BASOPHILS # BLD AUTO: 0.1 10E3/UL (ref 0–0.2)
BASOPHILS NFR BLD AUTO: 1 %
BUN SERPL-MCNC: 7.8 MG/DL (ref 6–20)
CALCIUM SERPL-MCNC: 9.4 MG/DL (ref 8.6–10)
CHLORIDE SERPL-SCNC: 98 MMOL/L (ref 98–107)
CREAT SERPL-MCNC: 0.76 MG/DL (ref 0.67–1.17)
DEPRECATED HCO3 PLAS-SCNC: 40 MMOL/L (ref 22–29)
EGFRCR SERPLBLD CKD-EPI 2021: >90 ML/MIN/1.73M2
EOSINOPHIL # BLD AUTO: 0.1 10E3/UL (ref 0–0.7)
EOSINOPHIL NFR BLD AUTO: 1 %
ERYTHROCYTE [DISTWIDTH] IN BLOOD BY AUTOMATED COUNT: 12.6 % (ref 10–15)
GLUCOSE SERPL-MCNC: 101 MG/DL (ref 70–99)
HCT VFR BLD AUTO: 41.2 % (ref 40–53)
HGB BLD-MCNC: 12.8 G/DL (ref 13.3–17.7)
IMM GRANULOCYTES # BLD: 0.2 10E3/UL
IMM GRANULOCYTES NFR BLD: 3 %
LYMPHOCYTES # BLD AUTO: 2.3 10E3/UL (ref 0.8–5.3)
LYMPHOCYTES NFR BLD AUTO: 32 %
MCH RBC QN AUTO: 30.4 PG (ref 26.5–33)
MCHC RBC AUTO-ENTMCNC: 31.1 G/DL (ref 31.5–36.5)
MCV RBC AUTO: 98 FL (ref 78–100)
MONOCYTES # BLD AUTO: 0.6 10E3/UL (ref 0–1.3)
MONOCYTES NFR BLD AUTO: 8 %
NEUTROPHILS # BLD AUTO: 3.8 10E3/UL (ref 1.6–8.3)
NEUTROPHILS NFR BLD AUTO: 55 %
NRBC # BLD AUTO: 0 10E3/UL
NRBC BLD AUTO-RTO: 0 /100
PLATELET # BLD AUTO: 207 10E3/UL (ref 150–450)
POTASSIUM SERPL-SCNC: 3.8 MMOL/L (ref 3.4–5.3)
RBC # BLD AUTO: 4.21 10E6/UL (ref 4.4–5.9)
SODIUM SERPL-SCNC: 142 MMOL/L (ref 135–145)
WBC # BLD AUTO: 7 10E3/UL (ref 4–11)

## 2024-03-31 PROCEDURE — 36415 COLL VENOUS BLD VENIPUNCTURE: CPT | Performed by: EMERGENCY MEDICINE

## 2024-03-31 PROCEDURE — 99284 EMERGENCY DEPT VISIT MOD MDM: CPT

## 2024-03-31 PROCEDURE — 72100 X-RAY EXAM L-S SPINE 2/3 VWS: CPT

## 2024-03-31 PROCEDURE — 73522 X-RAY EXAM HIPS BI 3-4 VIEWS: CPT

## 2024-03-31 PROCEDURE — 80048 BASIC METABOLIC PNL TOTAL CA: CPT | Performed by: EMERGENCY MEDICINE

## 2024-03-31 PROCEDURE — 85025 COMPLETE CBC W/AUTO DIFF WBC: CPT | Performed by: EMERGENCY MEDICINE

## 2024-03-31 ASSESSMENT — ACTIVITIES OF DAILY LIVING (ADL)
ADLS_ACUITY_SCORE: 40

## 2024-03-31 ASSESSMENT — COLUMBIA-SUICIDE SEVERITY RATING SCALE - C-SSRS
1. IN THE PAST MONTH, HAVE YOU WISHED YOU WERE DEAD OR WISHED YOU COULD GO TO SLEEP AND NOT WAKE UP?: NO
2. HAVE YOU ACTUALLY HAD ANY THOUGHTS OF KILLING YOURSELF IN THE PAST MONTH?: NO
6. HAVE YOU EVER DONE ANYTHING, STARTED TO DO ANYTHING, OR PREPARED TO DO ANYTHING TO END YOUR LIFE?: NO

## 2024-03-31 NOTE — ED PROVIDER NOTES
History     Chief Complaint:  Generalized Weakness     HPI   Tre Vazquez is a 48 year old male with history of cerebral palsy, hypertension, cardiac arrest, and septic shock who presents via EMS from his group home for evaluation of weakness. The patient reports that he was trying to get to his wheelchair but it was farther away form him than he expected and so he fell, injuring his lower back and hips bilaterally. States that he wears supplemental oxygen at baseline but did not have any on during the day. Notes that he does ambulate with a walker but more often gets around in a wheelchair. Denies neck pain. EMS add that the patient frequently calls for their services.     Independent Historian:   EMS - They report as noted above.    Review of External Notes:     Medications:    Albuterol  Depakote  Trelegy  Lamotrigine  Levetriacetam  Levocarnitine  Propranolol  Zonisamide  Buspirone  Citalopram  Fluticasone  Pantoprazole  Miralax  Prazosin  Quetiapine     Past Medical History:    Blind in left eye  Depression  Hypertension  Pneumococcal septicemia  Pneumonia  Asthma  Epilepsy   Cerebral palsy  Encephalomalacia  Septic shock  Obesity  Acute respiratory failure  Obesity hypoventilation syndrome  Hyperammonemic encephalopathy   Hepatic encephalopathy  Prolonged QT  Cardiac arrest    Past Surgical History:    Ankle surgery, bilateral      Physical Exam   Patient Vitals for the past 24 hrs:   BP Temp Temp src Pulse Resp SpO2   03/31/24 0315 127/75 -- -- 86 -- 97 %   03/31/24 0300 131/83 -- -- 86 13 96 %   03/31/24 0245 131/84 -- -- 90 11 92 %   03/31/24 0240 130/82 98.2  F (36.8  C) Oral 89 20 92 %        Physical Exam  Vitals reviewed.   Constitutional:       Appearance: He is obese.   HENT:      Head: Normocephalic and atraumatic.      Right Ear: Tympanic membrane normal.      Left Ear: Tympanic membrane normal.   Cardiovascular:      Rate and Rhythm: Normal rate.   Pulmonary:      Effort: Pulmonary effort is  normal.   Abdominal:      General: Abdomen is flat. Bowel sounds are normal.   Musculoskeletal:         General: Normal range of motion.      Comments: Patient presents with bilateral hip pain and low back pain after fall from wheelchair   Skin:     General: Skin is warm.      Capillary Refill: Capillary refill takes less than 2 seconds.   Neurological:      General: No focal deficit present.      Mental Status: He is alert and oriented to person, place, and time. Mental status is at baseline.   Psychiatric:         Mood and Affect: Mood normal.         Emergency Department Course   Imaging:  XR Pelvis w 1 View Each Hip   Final Result   IMPRESSION: Normal joint spaces and alignment. No fracture.      Lumbar spine XR, 2-3 views   Final Result   IMPRESSION: No fracture. Normal vertebral heights and alignment. Normal disc spaces and facets for age. Normal extraspinal structures.         Laboratory:  Labs Ordered and Resulted from Time of ED Arrival to Time of ED Departure   BASIC METABOLIC PANEL - Abnormal       Result Value    Sodium 142      Potassium 3.8      Chloride 98      Carbon Dioxide (CO2) 40 (*)     Anion Gap 4 (*)     Urea Nitrogen 7.8      Creatinine 0.76      GFR Estimate >90      Calcium 9.4      Glucose 101 (*)    CBC WITH PLATELETS AND DIFFERENTIAL - Abnormal    WBC Count 7.0      RBC Count 4.21 (*)     Hemoglobin 12.8 (*)     Hematocrit 41.2      MCV 98      MCH 30.4      MCHC 31.1 (*)     RDW 12.6      Platelet Count 207      % Neutrophils 55      % Lymphocytes 32      % Monocytes 8      % Eosinophils 1      % Basophils 1      % Immature Granulocytes 3      NRBCs per 100 WBC 0      Absolute Neutrophils 3.8      Absolute Lymphocytes 2.3      Absolute Monocytes 0.6      Absolute Eosinophils 0.1      Absolute Basophils 0.1      Absolute Immature Granulocytes 0.2      Absolute NRBCs 0.0        Emergency Department Course & Assessments:    Interventions:  Medications - No data to display      Assessments:  0239 I obtained history and examined the patient as noted above.     Independent Interpretation (X-rays, CTs, rhythm strip):  None    Consultations/Discussion of Management or Tests:  None        Social Determinants of Health affecting care:   None    Disposition:  The patient was discharged.     Impression & Plan    Medical Decision Making:  Patient presents with self-described fall.  Complaining of feeling weak.  Patient is well-appearing according to the staff at group home patient called EMS frequently.  Suspect dissatisfaction with group home placement.  And possible malingering.  I due to history of fall x-rays performed and negative.  Due to complaints of weakness hemoglobin potassium checked and normal.  Offered reassurance and discharged back to group home    Diagnosis:    ICD-10-CM    1. Fall at home, initial encounter  W19.XXXA     Y92.009            Scribe Disclosure:  I, Johanna Suh, am serving as a scribe at 2:49 AM on 3/31/2024 to document services personally performed by Kvng Sanchez MD based on my observations and the provider's statements to me.     3/31/2024   Kvng Sanchez MD Goodman, Brian Samuel, MD  04/01/24 2032

## 2024-03-31 NOTE — ED TRIAGE NOTES
Pt BIBA from a group home, as per EMS, Pt went to use the bathroom, lowered himself in the bathroom floor, c/o weakness.     Triage Assessment (Adult)       Row Name 03/31/24 0244          Triage Assessment    Airway WDL WDL        Respiratory WDL    Respiratory WDL WDL        Skin Circulation/Temperature WDL    Skin Circulation/Temperature WDL WDL        Cardiac WDL    Cardiac WDL WDL        Peripheral/Neurovascular WDL    Peripheral Neurovascular WDL WDL        Cognitive/Neuro/Behavioral WDL    Cognitive/Neuro/Behavioral WDL WDL

## 2024-03-31 NOTE — DISCHARGE INSTRUCTIONS
We have done x-rays and lab work and these are normal.  Please continue to avoid falls when able.  Follow-up with your regular doctor as needed.   normal S1, S2 heard

## 2024-04-01 NOTE — PROGRESS NOTES
Patient was seen in the hospital on 3/26/2024 after having 3 seizures at his group home.  I received a message from Dr. Lal stating that his Keppra level was at 50, previously normal. Please let staff know that would like to have this patient's Keppra levels redrawn.  Please ensure that it is a trough level.    Thank you,   RON Oliveira CNP

## 2024-04-05 ENCOUNTER — APPOINTMENT (OUTPATIENT)
Dept: CT IMAGING | Facility: CLINIC | Age: 49
End: 2024-04-05
Attending: EMERGENCY MEDICINE
Payer: MEDICARE

## 2024-04-05 ENCOUNTER — HOSPITAL ENCOUNTER (EMERGENCY)
Facility: CLINIC | Age: 49
Discharge: HOME | End: 2024-04-06
Attending: EMERGENCY MEDICINE | Admitting: EMERGENCY MEDICINE
Payer: MEDICARE

## 2024-04-05 ENCOUNTER — APPOINTMENT (OUTPATIENT)
Dept: GENERAL RADIOLOGY | Facility: CLINIC | Age: 49
End: 2024-04-05
Attending: EMERGENCY MEDICINE
Payer: MEDICARE

## 2024-04-05 DIAGNOSIS — S09.90XA INJURY OF HEAD, INITIAL ENCOUNTER: ICD-10-CM

## 2024-04-05 DIAGNOSIS — M54.50 CHRONIC LOW BACK PAIN WITHOUT SCIATICA, UNSPECIFIED BACK PAIN LATERALITY: ICD-10-CM

## 2024-04-05 DIAGNOSIS — G40.919 BREAKTHROUGH SEIZURE (H): ICD-10-CM

## 2024-04-05 DIAGNOSIS — M54.2 NECK PAIN: ICD-10-CM

## 2024-04-05 DIAGNOSIS — G89.29 CHRONIC LOW BACK PAIN WITHOUT SCIATICA, UNSPECIFIED BACK PAIN LATERALITY: ICD-10-CM

## 2024-04-05 LAB
ALBUMIN SERPL BCG-MCNC: 4 G/DL (ref 3.5–5.2)
ALP SERPL-CCNC: 107 U/L (ref 40–150)
ALT SERPL W P-5'-P-CCNC: 27 U/L (ref 0–70)
ANION GAP SERPL CALCULATED.3IONS-SCNC: 11 MMOL/L (ref 7–15)
AST SERPL W P-5'-P-CCNC: 34 U/L (ref 0–45)
BASOPHILS # BLD AUTO: 0.1 10E3/UL (ref 0–0.2)
BASOPHILS NFR BLD AUTO: 1 %
BILIRUB SERPL-MCNC: 0.2 MG/DL
BUN SERPL-MCNC: 12 MG/DL (ref 6–20)
CALCIUM SERPL-MCNC: 9.1 MG/DL (ref 8.6–10)
CHLORIDE SERPL-SCNC: 97 MMOL/L (ref 98–107)
CREAT SERPL-MCNC: 0.64 MG/DL (ref 0.67–1.17)
DEPRECATED HCO3 PLAS-SCNC: 34 MMOL/L (ref 22–29)
EGFRCR SERPLBLD CKD-EPI 2021: >90 ML/MIN/1.73M2
EOSINOPHIL # BLD AUTO: 0.1 10E3/UL (ref 0–0.7)
EOSINOPHIL NFR BLD AUTO: 2 %
ERYTHROCYTE [DISTWIDTH] IN BLOOD BY AUTOMATED COUNT: 12.5 % (ref 10–15)
GLUCOSE BLDC GLUCOMTR-MCNC: 112 MG/DL (ref 70–99)
GLUCOSE SERPL-MCNC: 105 MG/DL (ref 70–99)
HCT VFR BLD AUTO: 43.1 % (ref 40–53)
HGB BLD-MCNC: 13.4 G/DL (ref 13.3–17.7)
IMM GRANULOCYTES # BLD: 0.1 10E3/UL
IMM GRANULOCYTES NFR BLD: 2 %
LYMPHOCYTES # BLD AUTO: 2.3 10E3/UL (ref 0.8–5.3)
LYMPHOCYTES NFR BLD AUTO: 32 %
MCH RBC QN AUTO: 30.2 PG (ref 26.5–33)
MCHC RBC AUTO-ENTMCNC: 31.1 G/DL (ref 31.5–36.5)
MCV RBC AUTO: 97 FL (ref 78–100)
MONOCYTES # BLD AUTO: 0.5 10E3/UL (ref 0–1.3)
MONOCYTES NFR BLD AUTO: 7 %
NEUTROPHILS # BLD AUTO: 4.1 10E3/UL (ref 1.6–8.3)
NEUTROPHILS NFR BLD AUTO: 56 %
NRBC # BLD AUTO: 0 10E3/UL
NRBC BLD AUTO-RTO: 0 /100
PLATELET # BLD AUTO: 266 10E3/UL (ref 150–450)
POTASSIUM SERPL-SCNC: 4 MMOL/L (ref 3.4–5.3)
PROT SERPL-MCNC: 7.1 G/DL (ref 6.4–8.3)
RBC # BLD AUTO: 4.43 10E6/UL (ref 4.4–5.9)
SODIUM SERPL-SCNC: 142 MMOL/L (ref 135–145)
WBC # BLD AUTO: 7.2 10E3/UL (ref 4–11)

## 2024-04-05 PROCEDURE — 80164 ASSAY DIPROPYLACETIC ACD TOT: CPT | Performed by: EMERGENCY MEDICINE

## 2024-04-05 PROCEDURE — 93005 ELECTROCARDIOGRAM TRACING: CPT

## 2024-04-05 PROCEDURE — 72100 X-RAY EXAM L-S SPINE 2/3 VWS: CPT

## 2024-04-05 PROCEDURE — 72125 CT NECK SPINE W/O DYE: CPT | Mod: MA

## 2024-04-05 PROCEDURE — 80053 COMPREHEN METABOLIC PANEL: CPT | Performed by: EMERGENCY MEDICINE

## 2024-04-05 PROCEDURE — 36415 COLL VENOUS BLD VENIPUNCTURE: CPT | Performed by: EMERGENCY MEDICINE

## 2024-04-05 PROCEDURE — 99284 EMERGENCY DEPT VISIT MOD MDM: CPT | Mod: 25

## 2024-04-05 PROCEDURE — 82962 GLUCOSE BLOOD TEST: CPT

## 2024-04-05 PROCEDURE — 70450 CT HEAD/BRAIN W/O DYE: CPT | Mod: MA

## 2024-04-05 PROCEDURE — 80165 DIPROPYLACETIC ACID FREE: CPT | Performed by: EMERGENCY MEDICINE

## 2024-04-05 PROCEDURE — 85025 COMPLETE CBC W/AUTO DIFF WBC: CPT | Performed by: EMERGENCY MEDICINE

## 2024-04-05 ASSESSMENT — COLUMBIA-SUICIDE SEVERITY RATING SCALE - C-SSRS
1. IN THE PAST MONTH, HAVE YOU WISHED YOU WERE DEAD OR WISHED YOU COULD GO TO SLEEP AND NOT WAKE UP?: NO
2. HAVE YOU ACTUALLY HAD ANY THOUGHTS OF KILLING YOURSELF IN THE PAST MONTH?: YES
5. HAVE YOU STARTED TO WORK OUT OR WORKED OUT THE DETAILS OF HOW TO KILL YOURSELF? DO YOU INTEND TO CARRY OUT THIS PLAN?: NO
3. HAVE YOU BEEN THINKING ABOUT HOW YOU MIGHT KILL YOURSELF?: NO
4. HAVE YOU HAD THESE THOUGHTS AND HAD SOME INTENTION OF ACTING ON THEM?: NO
6. HAVE YOU EVER DONE ANYTHING, STARTED TO DO ANYTHING, OR PREPARED TO DO ANYTHING TO END YOUR LIFE?: YES

## 2024-04-05 ASSESSMENT — ACTIVITIES OF DAILY LIVING (ADL)
ADLS_ACUITY_SCORE: 40
ADLS_ACUITY_SCORE: 40

## 2024-04-06 VITALS
TEMPERATURE: 98.1 F | HEART RATE: 87 BPM | OXYGEN SATURATION: 95 % | SYSTOLIC BLOOD PRESSURE: 145 MMHG | DIASTOLIC BLOOD PRESSURE: 95 MMHG | RESPIRATION RATE: 20 BRPM

## 2024-04-06 ASSESSMENT — ACTIVITIES OF DAILY LIVING (ADL)
ADLS_ACUITY_SCORE: 40

## 2024-04-06 NOTE — ED NOTES
Report called to Antonio at [facility]. Pt will transport via wheelchair van to [facility].

## 2024-04-06 NOTE — ED NOTES
I was asked to follow-up imaging results on this 48-year-old gentleman who presented with a breakthrough seizure.  He has baseline COPD with a 3 L oxygen requirement which she has been placed on here.  No intracranial bleed or head trauma.  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  Back imaging was reassuring.  Valproic acid level is pending which can be followed up as an outpatient.  He will be discharged home in stable condition    IMPRESSION:  HEAD CT:  1.  No acute intracranial process.     CERVICAL SPINE CT:  1.  No definite acute fracture.  2.  Probable fracture at the lateral lamina of C7 on the left, less likely a vascular channel; this is similar to prior (series 4 image 80).  3.  Congenitally narrowed canal with superimposed degenerative changes resulting in prominent canal narrowing at multiple levels; as described.     Praveen Alcaraz MD  04/06/24 0034

## 2024-04-06 NOTE — ED PROVIDER NOTES
History   Chief Complaint:  Chief Complaint   Patient presents with    Seizures       HPI       Tre Vazquez is a 48 year old male who presents via EMS from his group home due to fall with hitting his head and concern for possible unwitnessed seizure.  He has a history of seizure disorders for which he is on divalproex based on review of his MAR in the chart.  He denies incontinence.  Denies headache.  Has neck pain for which  is moving all of his extremities.  he does not not allow me to continue with exam when I first pressed.  He also describes pain in his low back but is not describing whether these things are acute or chronic.  Is moving all his extremities.  Denies recent infectious symptoms.  On EMS arrival had sats of 80% but was not on oxygen on at time and is chronically on oxygen.  Now that he is back on oxygen, sats are in the 90s.    Independent Historian: Patient; nurse provided me with EMS report    Review of External Notes: MAR that comes with him, multiple ED visits for seizures and two admissions in past two months - one for somnolence and hypercarbia after not using BiPAP; another for cardiac arrest s/p ROSC secondary to seizures in February     Medications:    acetaminophen (TYLENOL) 325 MG tablet  albuterol (PROAIR HFA/PROVENTIL HFA/VENTOLIN HFA) 108 (90 Base) MCG/ACT inhaler  ammonium lactate (AMLACTIN) 12 % external cream  bacitracin 500 UNIT/GM external ointment  busPIRone (BUSPAR) 15 MG tablet  citalopram (CELEXA) 20 MG tablet  clotrimazole (LOTRIMIN) 1 % external cream  divalproex sodium delayed-release (DEPAKOTE) 500 MG DR tablet  fluticasone (FLONASE) 50 MCG/ACT nasal spray  Fluticasone-Umeclidin-Vilanterol (TRELEGY ELLIPTA) 200-62.5-25 MCG/ACT oral inhaler  LamoTRIgine (LAMICTAL) 200 MG TB24  levETIRAcetam (KEPPRA XR) 500 MG 24 hr tablet  levETIRAcetam (KEPPRA) 1000 MG tablet  levOCARNitine (CARNITOR) 330 MG tablet  methylcellulose POWD powder  multivitamin, therapeutic (THERA-VIT)  TABS tablet  pantoprazole (PROTONIX) 40 MG EC tablet  polyethylene glycol (MIRALAX) 17 GM/Dose powder  prazosin (MINIPRESS) 2 MG capsule  propranolol (INDERAL) 20 MG tablet  QUEtiapine ER (SEROQUEL XR) 300 MG 24 hr tablet  zonisamide (ZONEGRAN) 100 MG capsule        Past Medical History/Problem List:    Past Medical History:   Diagnosis Date    Blindness of left eye     Depression 03/10/2014    Hypertension     Pneumococcal septicemia(038.2) (H) 11/26/2013    Pneumonia, organism unspecified(486) 11/26/2013    Uncomplicated asthma     Unspecified epilepsy without mention of intractable epilepsy        Patient Active Problem List    Diagnosis Date Noted    Advanced directives, counseling/discussion 08/02/2012     Priority: High     Polst completed at [facility] - CPR 7/30/2012      Acute on chronic respiratory failure with hypoxia and hypercapnia (H) 03/07/2024     Priority: Medium    Cardiac arrest (H) 02/22/2024     Priority: Medium    Seizure (H) 02/20/2024     Priority: Medium    Prolonged Q-T interval on ECG 02/20/2024     Priority: Medium    Fall, initial encounter 02/20/2024     Priority: Medium    Altered mental status, unspecified altered mental status type 02/20/2024     Priority: Medium    Bacteremia due to Staphylococcus 12/18/2023     Priority: Medium    Hepatic encephalopathy (H) 11/24/2023     Priority: Medium    Shortness of breath 11/24/2023     Priority: Medium    Non compliance w medication regimen 11/24/2023     Priority: Medium    Acute respiratory failure with hypoxia and hypercarbia (H) 11/24/2023     Priority: Medium    Acute on chronic respiratory failure with hypoxia (H) 05/12/2023     Priority: Medium    Hyperammonemia (H24) 05/12/2023     Priority: Medium    Transaminitis 05/12/2023     Priority: Medium    Contusion of right lower extremity, initial encounter 05/12/2023     Priority: Medium    Toxic metabolic encephalopathy 05/09/2023     Priority: Medium    Hyperammonemic encephalopathy  05/09/2023     Priority: Medium    Obesity hypoventilation syndrome (H) 05/05/2023     Priority: Medium    Oxygen dependent 05/04/2023     Priority: Medium    Acute and chronic respiratory failure with hypercapnia (H) 05/04/2023     Priority: Medium    Generalized muscle weakness 03/25/2023     Priority: Medium    Cerebral palsy, unspecified type (H) 03/25/2023     Priority: Medium    Acute respiratory failure with hypercapnia (H) 03/15/2023     Priority: Medium    Breakthrough seizure (H) 08/30/2021     Priority: Medium    Nonintractable generalized idiopathic epilepsy without status epilepticus (H) 08/30/2021     Priority: Medium    Morbid obesity (H) 06/05/2020     Priority: Medium    Depression 03/10/2014     Priority: Medium    History of sepsis 12/10/2013     Priority: Medium    Septic shock (H) 11/27/2013     Priority: Medium     Problem list name updated by automated process. Provider to review      Pneumococcal septicemia(038.2) (H) 11/27/2013     Priority: Medium    Non-refractory epilepsy (H) 07/18/2012     Priority: Medium    Cerebral palsy (H) 07/18/2012     Priority: Medium    Encephalomalacia 07/18/2012     Priority: Medium    Benign hypertension 07/18/2012     Priority: Medium        Physical Exam   Patient Vitals for the past 24 hrs:   BP Temp Temp src Pulse Resp SpO2   04/05/24 2128 (!) 145/95 98.1  F (36.7  C) Oral 87 20 93 %      Eyes:  Sclera white; Right pupil normal; Left enucleated  ENT:    External ears and nares normal  CV:  Rate as above with regular rhythm   Resp:  Breath sounds clear and equal bilaterally    Non-labored, no retractions or accessory muscle use  GI:  Abdomen is soft, non-tender, non-distended    No rebound tenderness or peritoneal features  MS:  Moves all extremities, I touched his neck once and he told me to stop touching.  Skin:  Warm and dry, visualized neck, chest, arms, feet  Neuro:  Speech is easy to understand      Emergency Department Course   Time: 2133 Vent.  Rate 87 bpm. [place] interval 200. QRS duration 88. QT/QTc 394/474.  Read time: 2142  Interpretation: Normal sinus rhythm, normal ECG  Interpreted by Dr. Hawk.  Agree.  Similar to 3/26/2024.    Imaging:    Head CT w/o contrast    (Results Pending)   CT Cervical Spine w/o Contrast    (Results Pending)   Lumbar spine XR, 2-3 views    (Results Pending)        Laboratory:  Labs Ordered and Resulted from Time of ED Arrival to Time of ED Departure   COMPREHENSIVE METABOLIC PANEL - Abnormal       Result Value    Sodium 142      Potassium 4.0      Carbon Dioxide (CO2) 34 (*)     Anion Gap 11      Urea Nitrogen 12.0      Creatinine 0.64 (*)     GFR Estimate >90      Calcium 9.1      Chloride 97 (*)     Glucose 105 (*)     Alkaline Phosphatase 107      AST 34      ALT 27      Protein Total 7.1      Albumin 4.0      Bilirubin Total 0.2     GLUCOSE BY METER - Abnormal    GLUCOSE BY METER POCT 112 (*)    CBC WITH PLATELETS AND DIFFERENTIAL - Abnormal    WBC Count 7.2      RBC Count 4.43      Hemoglobin 13.4      Hematocrit 43.1      MCV 97      MCH 30.2      MCHC 31.1 (*)     RDW 12.5      Platelet Count 266      % Neutrophils 56      % Lymphocytes 32      % Monocytes 7      % Eosinophils 2      % Basophils 1      % Immature Granulocytes 2      NRBCs per 100 WBC 0      Absolute Neutrophils 4.1      Absolute Lymphocytes 2.3      Absolute Monocytes 0.5      Absolute Eosinophils 0.1      Absolute Basophils 0.1      Absolute Immature Granulocytes 0.1      Absolute NRBCs 0.0     VALPROIC ACID FREE AND TOTAL        Procedures:   No    Emergency Department Course:    Assessments/Consultations/Discussion of Management or Tests :       Independent Interpretation (X-rays, CTs, rhythm strip):  Not yet available at time of sign out    Interventions:  Medications - No data to display     Social Determinants of Health affecting care:   None    Disposition:  Signed out to Dr. Alcaraz    Impression & Plan    Medical Decision Making:  Presentation  sounds more suggestive of a breakthrough seizure with concern for head injury.  He does not appear to be neurologically depressed in any way.  Describes neck pain and does not tolerate exam.  Does not tell me if this is acute or chronic.  Demonstrates movement in all extremities without any evidence of acute weakness or paralysis.  On review of the medication list he came with I saw Depakote.  He has also had previous labs checked for Lamictal and Keppra.  Lamictal was recently therapeutic on chart review.  Given patient's history of somnolence associated with hypercarbia, goal would be to not exceed 94% with the supplemental oxygen.  Labs show no severe electrolyte derangements that could contribute to the seizures.  No COLIN that could result in the need for medication adjustments.  Images are ordered and pending at time of signout.  If they show no change from baseline, then discharge and follow-up with outpatient neurology would be appropriate.    Diagnosis:    ICD-10-CM    1. Breakthrough seizure (H)  G40.919       2. Injury of head, initial encounter  S09.90XA       3. Neck pain  M54.2       4. Chronic low back pain without sciatica, unspecified back pain laterality  M54.50     G89.29               Leela Hawk MD  04/05/24 5051

## 2024-04-06 NOTE — ED TRIAGE NOTES
Pt arrives via EMS, after fall and possible seizure. Fall and possible seizure was unwitnessed, pt was found on floor without his oxygen and sats in low 80's. EMS placed on 3 litres and he recovered to 90's. Pt is somewhat confused. C/o neck pain 8/10     Triage Assessment (Adult)       Row Name 04/05/24 5143          Triage Assessment    Airway WDL WDL        Respiratory WDL    Respiratory WDL WDL        Skin Circulation/Temperature WDL    Skin Circulation/Temperature WDL WDL        Cardiac WDL    Cardiac WDL WDL        Peripheral/Neurovascular WDL    Peripheral Neurovascular WDL WDL        Cognitive/Neuro/Behavioral WDL    Cognitive/Neuro/Behavioral WDL level of consciousness     Level of Consciousness confused

## 2024-04-06 NOTE — ED NOTES
"Attempted to call the [facility] at [phone] no answer and \"Mailbox is full\"    "

## 2024-04-08 ENCOUNTER — TELEPHONE (OUTPATIENT)
Dept: NURSING | Facility: CLINIC | Age: 49
End: 2024-04-08
Payer: MEDICARE

## 2024-04-08 LAB
ATRIAL RATE - MUSE: 87 BPM
DIASTOLIC BLOOD PRESSURE - MUSE: NORMAL MMHG
INTERPRETATION ECG - MUSE: NORMAL
P AXIS - MUSE: 60 DEGREES
PR INTERVAL - MUSE: 200 MS
QRS DURATION - MUSE: 88 MS
QT - MUSE: 394 MS
QTC - MUSE: 474 MS
R AXIS - MUSE: -11 DEGREES
SYSTOLIC BLOOD PRESSURE - MUSE: NORMAL MMHG
T AXIS - MUSE: 69 DEGREES
VALPROATE FREE MFR SERPL: 19 %
VALPROATE FREE SERPL-MCNC: 11 UG/ML
VALPROATE SERPL-MCNC: 61 UG/ML
VENTRICULAR RATE- MUSE: 87 BPM

## 2024-04-11 ENCOUNTER — HOSPITAL ENCOUNTER (INPATIENT)
Facility: CLINIC | Age: 49
LOS: 7 days | Discharge: NURSING, REHAB OR LONG-TERM CARE | DRG: 189 | End: 2024-04-18
Attending: EMERGENCY MEDICINE | Admitting: STUDENT IN AN ORGANIZED HEALTH CARE EDUCATION/TRAINING PROGRAM
Payer: MEDICARE

## 2024-04-11 ENCOUNTER — APPOINTMENT (OUTPATIENT)
Dept: CT IMAGING | Facility: CLINIC | Age: 49
DRG: 189 | End: 2024-04-11
Attending: EMERGENCY MEDICINE
Payer: MEDICARE

## 2024-04-11 ENCOUNTER — APPOINTMENT (OUTPATIENT)
Dept: ULTRASOUND IMAGING | Facility: CLINIC | Age: 49
DRG: 189 | End: 2024-04-11
Attending: EMERGENCY MEDICINE
Payer: MEDICARE

## 2024-04-11 DIAGNOSIS — R10.9 ABDOMINAL PAIN OF UNKNOWN CAUSE: ICD-10-CM

## 2024-04-11 DIAGNOSIS — E87.29 CARBON DIOXIDE RETENTION: ICD-10-CM

## 2024-04-11 DIAGNOSIS — G47.33 OSA (OBSTRUCTIVE SLEEP APNEA): ICD-10-CM

## 2024-04-11 DIAGNOSIS — G40.909 SEIZURE DISORDER (H): ICD-10-CM

## 2024-04-11 DIAGNOSIS — L89.313 PRESSURE INJURY OF RIGHT ISCHIUM, STAGE 3 (H): Primary | ICD-10-CM

## 2024-04-11 DIAGNOSIS — F34.1 PERSISTENT DEPRESSIVE DISORDER: ICD-10-CM
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
## 2024-04-11 DIAGNOSIS — R41.82 ALTERED MENTAL STATUS, UNSPECIFIED ALTERED MENTAL STATUS TYPE: ICD-10-CM

## 2024-04-11 DIAGNOSIS — J96.21 ACUTE ON CHRONIC RESPIRATORY FAILURE WITH HYPOXIA AND HYPERCAPNIA (H): ICD-10-CM

## 2024-04-11 DIAGNOSIS — L89.313 PRESSURE INJURY OF RIGHT BUTTOCK, STAGE 3 (H): ICD-10-CM

## 2024-04-11 DIAGNOSIS — J96.22 ACUTE ON CHRONIC RESPIRATORY FAILURE WITH HYPOXIA AND HYPERCAPNIA (H): ICD-10-CM

## 2024-04-11 LAB
ALBUMIN SERPL BCG-MCNC: 4.2 G/DL (ref 3.5–5.2)
ALBUMIN UR-MCNC: NEGATIVE MG/DL
ALP SERPL-CCNC: 110 U/L (ref 40–150)
ALT SERPL W P-5'-P-CCNC: 28 U/L (ref 0–70)
AMORPH CRY #/AREA URNS HPF: ABNORMAL /HPF
AMPHETAMINES UR QL SCN: ABNORMAL
ANION GAP BLD CALCULATED.3IONS-SCNC: 13 MMOL/L (ref 3–14)
ANION GAP SERPL CALCULATED.3IONS-SCNC: 6 MMOL/L (ref 7–15)
ANION GAP SERPL CALCULATED.3IONS-SCNC: 9 MMOL/L (ref 7–15)
APPEARANCE UR: ABNORMAL
AST SERPL W P-5'-P-CCNC: 28 U/L (ref 0–45)
ATRIAL RATE - MUSE: 73 BPM
B-OH-BUTYR SERPL-SCNC: 0.5 MMOL/L
BARBITURATES UR QL SCN: ABNORMAL
BASE EXCESS BLDV CALC-SCNC: 4.7 MMOL/L (ref -3–3)
BASE EXCESS BLDV CALC-SCNC: 5.2 MMOL/L (ref -3–3)
BASOPHILS # BLD AUTO: 0 10E3/UL (ref 0–0.2)
BASOPHILS NFR BLD AUTO: 0 %
BENZODIAZ UR QL SCN: ABNORMAL
BILIRUB SERPL-MCNC: 0.3 MG/DL
BILIRUB UR QL STRIP: NEGATIVE
BUN SERPL-MCNC: 9 MG/DL (ref 7–30)
BUN SERPL-MCNC: 9.3 MG/DL (ref 6–20)
BUN SERPL-MCNC: 9.5 MG/DL (ref 6–20)
BZE UR QL SCN: ABNORMAL
CA-I BLD-MCNC: 5.2 MG/DL (ref 4.4–5.2)
CALCIUM SERPL-MCNC: 8.5 MG/DL (ref 8.6–10)
CALCIUM SERPL-MCNC: 9.5 MG/DL (ref 8.6–10)
CANNABINOIDS UR QL SCN: ABNORMAL
CHLORIDE BLD-SCNC: 97 MMOL/L (ref 94–109)
CHLORIDE SERPL-SCNC: 101 MMOL/L (ref 98–107)
CHLORIDE SERPL-SCNC: 104 MMOL/L (ref 98–107)
CK SERPL-CCNC: 130 U/L (ref 39–308)
CO2 BLD-SCNC: 38 MMOL/L (ref 20–32)
COLOR UR AUTO: YELLOW
CREAT BLD-MCNC: 1 MG/DL (ref 0.7–1.3)
CREAT SERPL-MCNC: 0.58 MG/DL (ref 0.67–1.17)
CREAT SERPL-MCNC: 0.76 MG/DL (ref 0.67–1.17)
DEPRECATED HCO3 PLAS-SCNC: 33 MMOL/L (ref 22–29)
DEPRECATED HCO3 PLAS-SCNC: 34 MMOL/L (ref 22–29)
DIASTOLIC BLOOD PRESSURE - MUSE: NORMAL MMHG
EGFRCR SERPLBLD CKD-EPI 2021: >90 ML/MIN/1.73M2
EGFRCR SERPLBLD CKD-EPI 2021: >90 ML/MIN/1.73M2
EOSINOPHIL # BLD AUTO: 0.1 10E3/UL (ref 0–0.7)
EOSINOPHIL NFR BLD AUTO: 1 %
ERYTHROCYTE [DISTWIDTH] IN BLOOD BY AUTOMATED COUNT: 12.8 % (ref 10–15)
FENTANYL UR QL: ABNORMAL
GLUCOSE BLD-MCNC: 81 MG/DL (ref 70–99)
GLUCOSE SERPL-MCNC: 105 MG/DL (ref 70–99)
GLUCOSE SERPL-MCNC: 81 MG/DL (ref 70–99)
GLUCOSE UR STRIP-MCNC: NEGATIVE MG/DL
HCO3 BLDV-SCNC: 34 MMOL/L (ref 21–28)
HCO3 BLDV-SCNC: 34 MMOL/L (ref 21–28)
HCO3 BLDV-SCNC: 40 MMOL/L (ref 21–28)
HCT VFR BLD AUTO: 45 % (ref 40–53)
HCT VFR BLD CALC: 46 % (ref 40–53)
HGB BLD-MCNC: 13.9 G/DL (ref 13.3–17.7)
HGB BLD-MCNC: 15.6 G/DL (ref 13.3–17.7)
HGB UR QL STRIP: NEGATIVE
HOLD SPECIMEN: NORMAL
IMM GRANULOCYTES # BLD: 0.1 10E3/UL
IMM GRANULOCYTES NFR BLD: 1 %
INTERPRETATION ECG - MUSE: NORMAL
KETONES UR STRIP-MCNC: 10 MG/DL
LACTATE BLD-SCNC: 0.9 MMOL/L
LACTATE SERPL-SCNC: 0.7 MMOL/L (ref 0.7–2)
LACTATE SERPL-SCNC: 0.8 MMOL/L (ref 0.7–2)
LEUKOCYTE ESTERASE UR QL STRIP: NEGATIVE
LEVETIRACETAM SERPL-MCNC: 26.2 ΜG/ML (ref 10–40)
LIPASE SERPL-CCNC: 16 U/L (ref 13–60)
LYMPHOCYTES # BLD AUTO: 2.4 10E3/UL (ref 0.8–5.3)
LYMPHOCYTES NFR BLD AUTO: 31 %
MCH RBC QN AUTO: 30.2 PG (ref 26.5–33)
MCHC RBC AUTO-ENTMCNC: 30.9 G/DL (ref 31.5–36.5)
MCV RBC AUTO: 98 FL (ref 78–100)
MONOCYTES # BLD AUTO: 0.6 10E3/UL (ref 0–1.3)
MONOCYTES NFR BLD AUTO: 8 %
MUCOUS THREADS #/AREA URNS LPF: PRESENT /LPF
NEUTROPHILS # BLD AUTO: 4.6 10E3/UL (ref 1.6–8.3)
NEUTROPHILS NFR BLD AUTO: 59 %
NITRATE UR QL: NEGATIVE
NRBC # BLD AUTO: 0 10E3/UL
NRBC BLD AUTO-RTO: 0 /100
O2/TOTAL GAS SETTING VFR VENT: 35 %
O2/TOTAL GAS SETTING VFR VENT: 35 %
OPIATES UR QL SCN: ABNORMAL
OXYHGB MFR BLDV: 74 % (ref 70–75)
OXYHGB MFR BLDV: 79 % (ref 70–75)
P AXIS - MUSE: 69 DEGREES
PCO2 BLDV: 73 MM HG (ref 40–50)
PCO2 BLDV: 78 MM HG (ref 40–50)
PCO2 BLDV: 82 MM HG (ref 40–50)
PCP QUAL URINE (ROCHE): ABNORMAL
PH BLDV: 7.25 [PH] (ref 7.32–7.43)
PH BLDV: 7.28 [PH] (ref 7.32–7.43)
PH BLDV: 7.29 [PH] (ref 7.32–7.43)
PH UR STRIP: 8 [PH] (ref 5–7)
PLATELET # BLD AUTO: 227 10E3/UL (ref 150–450)
PO2 BLDV: 27 MM HG (ref 25–47)
PO2 BLDV: 45 MM HG (ref 25–47)
PO2 BLDV: 47 MM HG (ref 25–47)
POTASSIUM BLD-SCNC: 4.6 MMOL/L (ref 3.4–5.3)
POTASSIUM SERPL-SCNC: 4.1 MMOL/L (ref 3.4–5.3)
POTASSIUM SERPL-SCNC: 4.7 MMOL/L (ref 3.4–5.3)
PR INTERVAL - MUSE: 192 MS
PROT SERPL-MCNC: 7.4 G/DL (ref 6.4–8.3)
QRS DURATION - MUSE: 90 MS
QT - MUSE: 388 MS
QTC - MUSE: 427 MS
R AXIS - MUSE: -7 DEGREES
RBC # BLD AUTO: 4.6 10E6/UL (ref 4.4–5.9)
RBC URINE: 1 /HPF
SAO2 % BLDV: 39 % (ref 70–75)
SAO2 % BLDV: 75.6 % (ref 70–75)
SAO2 % BLDV: 80.8 % (ref 70–75)
SODIUM BLD-SCNC: 143 MMOL/L (ref 135–145)
SODIUM SERPL-SCNC: 143 MMOL/L (ref 135–145)
SODIUM SERPL-SCNC: 144 MMOL/L (ref 135–145)
SP GR UR STRIP: 1.02 (ref 1–1.03)
SYSTOLIC BLOOD PRESSURE - MUSE: NORMAL MMHG
T AXIS - MUSE: 68 DEGREES
TROPONIN T SERPL HS-MCNC: 31 NG/L
TROPONIN T SERPL HS-MCNC: 33 NG/L
TSH SERPL DL<=0.005 MIU/L-ACNC: 1.38 UIU/ML (ref 0.3–4.2)
UROBILINOGEN UR STRIP-MCNC: NORMAL MG/DL
VALPROATE SERPL-MCNC: 60.9 UG/ML
VENTRICULAR RATE- MUSE: 73 BPM
WBC # BLD AUTO: 7.8 10E3/UL (ref 4–11)
WBC URINE: 0 /HPF

## 2024-04-11 PROCEDURE — 36415 COLL VENOUS BLD VENIPUNCTURE: CPT | Performed by: EMERGENCY MEDICINE

## 2024-04-11 PROCEDURE — 83605 ASSAY OF LACTIC ACID: CPT | Performed by: EMERGENCY MEDICINE

## 2024-04-11 PROCEDURE — 99223 1ST HOSP IP/OBS HIGH 75: CPT | Mod: AI | Performed by: STUDENT IN AN ORGANIZED HEALTH CARE EDUCATION/TRAINING PROGRAM

## 2024-04-11 PROCEDURE — 93005 ELECTROCARDIOGRAM TRACING: CPT

## 2024-04-11 PROCEDURE — 36415 COLL VENOUS BLD VENIPUNCTURE: CPT | Performed by: PHYSICIAN ASSISTANT

## 2024-04-11 PROCEDURE — 87040 BLOOD CULTURE FOR BACTERIA: CPT | Performed by: EMERGENCY MEDICINE

## 2024-04-11 PROCEDURE — 999N000157 HC STATISTIC RCP TIME EA 10 MIN

## 2024-04-11 PROCEDURE — 250N000013 HC RX MED GY IP 250 OP 250 PS 637: Performed by: STUDENT IN AN ORGANIZED HEALTH CARE EDUCATION/TRAINING PROGRAM

## 2024-04-11 PROCEDURE — 82805 BLOOD GASES W/O2 SATURATION: CPT | Performed by: STUDENT IN AN ORGANIZED HEALTH CARE EDUCATION/TRAINING PROGRAM

## 2024-04-11 PROCEDURE — 82010 KETONE BODYS QUAN: CPT | Performed by: STUDENT IN AN ORGANIZED HEALTH CARE EDUCATION/TRAINING PROGRAM

## 2024-04-11 PROCEDURE — 96374 THER/PROPH/DIAG INJ IV PUSH: CPT | Mod: 59

## 2024-04-11 PROCEDURE — 99285 EMERGENCY DEPT VISIT HI MDM: CPT | Mod: 25

## 2024-04-11 PROCEDURE — 94660 CPAP INITIATION&MGMT: CPT

## 2024-04-11 PROCEDURE — 258N000003 HC RX IP 258 OP 636: Performed by: EMERGENCY MEDICINE

## 2024-04-11 PROCEDURE — 80053 COMPREHEN METABOLIC PANEL: CPT | Performed by: EMERGENCY MEDICINE

## 2024-04-11 PROCEDURE — 82550 ASSAY OF CK (CPK): CPT | Performed by: EMERGENCY MEDICINE

## 2024-04-11 PROCEDURE — 83605 ASSAY OF LACTIC ACID: CPT | Performed by: STUDENT IN AN ORGANIZED HEALTH CARE EDUCATION/TRAINING PROGRAM

## 2024-04-11 PROCEDURE — 250N000011 HC RX IP 250 OP 636

## 2024-04-11 PROCEDURE — 80177 DRUG SCRN QUAN LEVETIRACETAM: CPT | Performed by: EMERGENCY MEDICINE

## 2024-04-11 PROCEDURE — 250N000011 HC RX IP 250 OP 636: Performed by: EMERGENCY MEDICINE

## 2024-04-11 PROCEDURE — 76705 ECHO EXAM OF ABDOMEN: CPT

## 2024-04-11 PROCEDURE — G0378 HOSPITAL OBSERVATION PER HR: HCPCS

## 2024-04-11 PROCEDURE — 96361 HYDRATE IV INFUSION ADD-ON: CPT

## 2024-04-11 PROCEDURE — 84484 ASSAY OF TROPONIN QUANT: CPT | Performed by: EMERGENCY MEDICINE

## 2024-04-11 PROCEDURE — 81001 URINALYSIS AUTO W/SCOPE: CPT | Performed by: EMERGENCY MEDICINE

## 2024-04-11 PROCEDURE — 80047 BASIC METABLC PNL IONIZED CA: CPT

## 2024-04-11 PROCEDURE — G1010 CDSM STANSON: HCPCS

## 2024-04-11 PROCEDURE — 83690 ASSAY OF LIPASE: CPT | Performed by: EMERGENCY MEDICINE

## 2024-04-11 PROCEDURE — 80307 DRUG TEST PRSMV CHEM ANLYZR: CPT | Performed by: EMERGENCY MEDICINE

## 2024-04-11 PROCEDURE — 36415 COLL VENOUS BLD VENIPUNCTURE: CPT | Performed by: STUDENT IN AN ORGANIZED HEALTH CARE EDUCATION/TRAINING PROGRAM

## 2024-04-11 PROCEDURE — 74177 CT ABD & PELVIS W/CONTRAST: CPT | Mod: MG

## 2024-04-11 PROCEDURE — 82803 BLOOD GASES ANY COMBINATION: CPT

## 2024-04-11 PROCEDURE — 120N000013 HC R&B IMCU

## 2024-04-11 PROCEDURE — 70450 CT HEAD/BRAIN W/O DYE: CPT | Mod: MG

## 2024-04-11 PROCEDURE — 80164 ASSAY DIPROPYLACETIC ACD TOT: CPT | Performed by: EMERGENCY MEDICINE

## 2024-04-11 PROCEDURE — 250N000011 HC RX IP 250 OP 636: Performed by: PHYSICIAN ASSISTANT

## 2024-04-11 PROCEDURE — 82805 BLOOD GASES W/O2 SATURATION: CPT | Performed by: PHYSICIAN ASSISTANT

## 2024-04-11 PROCEDURE — 80177 DRUG SCRN QUAN LEVETIRACETAM: CPT | Performed by: PHYSICIAN ASSISTANT

## 2024-04-11 PROCEDURE — 99207 PR APP CREDIT; MD BILLING SHARED VISIT: CPT | Performed by: PHYSICIAN ASSISTANT

## 2024-04-11 PROCEDURE — 85025 COMPLETE CBC W/AUTO DIFF WBC: CPT | Performed by: EMERGENCY MEDICINE

## 2024-04-11 PROCEDURE — 84443 ASSAY THYROID STIM HORMONE: CPT | Performed by: EMERGENCY MEDICINE

## 2024-04-11 PROCEDURE — 5A09357 ASSISTANCE WITH RESPIRATORY VENTILATION, LESS THAN 24 CONSECUTIVE HOURS, CONTINUOUS POSITIVE AIRWAY PRESSURE: ICD-10-PCS | Performed by: STUDENT IN AN ORGANIZED HEALTH CARE EDUCATION/TRAINING PROGRAM

## 2024-04-11 PROCEDURE — 250N000009 HC RX 250: Performed by: EMERGENCY MEDICINE

## 2024-04-11 RX ORDER — ONDANSETRON 4 MG/1
4 TABLET, ORALLY DISINTEGRATING ORAL EVERY 6 HOURS PRN
Status: DISCONTINUED | OUTPATIENT
Start: 2024-04-11 | End: 2024-04-18 | Stop reason: HOSPADM

## 2024-04-11 RX ORDER — ENOXAPARIN SODIUM 100 MG/ML
40 INJECTION SUBCUTANEOUS EVERY 12 HOURS
Status: DISCONTINUED | OUTPATIENT
Start: 2024-04-11 | End: 2024-04-18 | Stop reason: HOSPADM

## 2024-04-11 RX ORDER — ONDANSETRON 4 MG/1
4 TABLET, ORALLY DISINTEGRATING ORAL EVERY 6 HOURS PRN
COMMUNITY

## 2024-04-11 RX ORDER — AMOXICILLIN 250 MG
1 CAPSULE ORAL 2 TIMES DAILY PRN
Status: DISCONTINUED | OUTPATIENT
Start: 2024-04-11 | End: 2024-04-18 | Stop reason: HOSPADM

## 2024-04-11 RX ORDER — LORAZEPAM 2 MG/ML
2 INJECTION INTRAMUSCULAR
Status: DISCONTINUED | OUTPATIENT
Start: 2024-04-11 | End: 2024-04-18 | Stop reason: HOSPADM

## 2024-04-11 RX ORDER — GADOBUTROL 604.72 MG/ML
10 INJECTION INTRAVENOUS ONCE
Status: DISCONTINUED | OUTPATIENT
Start: 2024-04-11 | End: 2024-04-18 | Stop reason: HOSPADM

## 2024-04-11 RX ORDER — AMOXICILLIN 250 MG
2 CAPSULE ORAL 2 TIMES DAILY PRN
Status: DISCONTINUED | OUTPATIENT
Start: 2024-04-11 | End: 2024-04-18 | Stop reason: HOSPADM

## 2024-04-11 RX ORDER — LIDOCAINE 40 MG/G
CREAM TOPICAL
Status: DISCONTINUED | OUTPATIENT
Start: 2024-04-11 | End: 2024-04-18 | Stop reason: HOSPADM

## 2024-04-11 RX ORDER — LEVOCARNITINE 330 MG/1
660 TABLET ORAL 3 TIMES DAILY
Status: DISCONTINUED | OUTPATIENT
Start: 2024-04-11 | End: 2024-04-18 | Stop reason: HOSPADM

## 2024-04-11 RX ORDER — LOPERAMIDE HCL 2 MG
2 CAPSULE ORAL 4 TIMES DAILY PRN
COMMUNITY

## 2024-04-11 RX ORDER — SODIUM CHLORIDE 9 MG/ML
INJECTION, SOLUTION INTRAVENOUS CONTINUOUS
Status: DISCONTINUED | OUTPATIENT
Start: 2024-04-11 | End: 2024-04-12

## 2024-04-11 RX ORDER — DIVALPROEX SODIUM 500 MG/1
500 TABLET, DELAYED RELEASE ORAL 3 TIMES DAILY
Status: ON HOLD | COMMUNITY
End: 2024-04-18

## 2024-04-11 RX ORDER — FENTANYL CITRATE 50 UG/ML
INJECTION, SOLUTION INTRAMUSCULAR; INTRAVENOUS
Status: COMPLETED
Start: 2024-04-11 | End: 2024-04-11

## 2024-04-11 RX ORDER — ONDANSETRON 2 MG/ML
4 INJECTION INTRAMUSCULAR; INTRAVENOUS EVERY 6 HOURS PRN
Status: DISCONTINUED | OUTPATIENT
Start: 2024-04-11 | End: 2024-04-18 | Stop reason: HOSPADM

## 2024-04-11 RX ORDER — LAMOTRIGINE 100 MG/1
200 TABLET, EXTENDED RELEASE ORAL 2 TIMES DAILY
Status: DISCONTINUED | OUTPATIENT
Start: 2024-04-11 | End: 2024-04-18 | Stop reason: HOSPADM

## 2024-04-11 RX ORDER — CALCIUM CARBONATE 500 MG/1
1000 TABLET, CHEWABLE ORAL 4 TIMES DAILY PRN
Status: DISCONTINUED | OUTPATIENT
Start: 2024-04-11 | End: 2024-04-18 | Stop reason: HOSPADM

## 2024-04-11 RX ORDER — LEVETIRACETAM 15 MG/ML
1500 INJECTION INTRAVASCULAR AT BEDTIME
Status: DISCONTINUED | OUTPATIENT
Start: 2024-04-11 | End: 2024-04-12

## 2024-04-11 RX ORDER — LORAZEPAM 2 MG/ML
INJECTION INTRAMUSCULAR
Status: DISCONTINUED
Start: 2024-04-11 | End: 2024-04-11 | Stop reason: WASHOUT

## 2024-04-11 RX ORDER — ZONISAMIDE 100 MG/1
200 CAPSULE ORAL EVERY EVENING
Status: DISCONTINUED | OUTPATIENT
Start: 2024-04-11 | End: 2024-04-18 | Stop reason: HOSPADM

## 2024-04-11 RX ORDER — DIVALPROEX SODIUM 500 MG/1
500 TABLET, DELAYED RELEASE ORAL 3 TIMES DAILY
Status: DISCONTINUED | OUTPATIENT
Start: 2024-04-11 | End: 2024-04-17

## 2024-04-11 RX ORDER — CARBOXYMETHYLCELLULOSE SODIUM 5 MG/ML
1 SOLUTION/ DROPS OPHTHALMIC
Status: DISCONTINUED | OUTPATIENT
Start: 2024-04-11 | End: 2024-04-18 | Stop reason: HOSPADM

## 2024-04-11 RX ORDER — ZONISAMIDE 100 MG/1
100 CAPSULE ORAL EVERY MORNING
Status: DISCONTINUED | OUTPATIENT
Start: 2024-04-12 | End: 2024-04-18 | Stop reason: HOSPADM

## 2024-04-11 RX ORDER — ALBUTEROL SULFATE 90 UG/1
2 AEROSOL, METERED RESPIRATORY (INHALATION) EVERY 4 HOURS PRN
Status: DISCONTINUED | OUTPATIENT
Start: 2024-04-11 | End: 2024-04-18 | Stop reason: HOSPADM

## 2024-04-11 RX ORDER — DEXTROMETHORPHAN HBR. AND GUAIFENESIN 10; 100 MG/5ML; MG/5ML
10 SOLUTION ORAL 4 TIMES DAILY PRN
COMMUNITY

## 2024-04-11 RX ORDER — LEVETIRACETAM 10 MG/ML
1000 INJECTION INTRAVASCULAR EVERY MORNING
Status: DISCONTINUED | OUTPATIENT
Start: 2024-04-12 | End: 2024-04-12

## 2024-04-11 RX ORDER — SODIUM CHLORIDE 9 MG/ML
1000 INJECTION, SOLUTION INTRAVENOUS CONTINUOUS
Status: DISCONTINUED | OUTPATIENT
Start: 2024-04-11 | End: 2024-04-11

## 2024-04-11 RX ORDER — IOPAMIDOL 755 MG/ML
500 INJECTION, SOLUTION INTRAVASCULAR ONCE
Status: COMPLETED | OUTPATIENT
Start: 2024-04-11 | End: 2024-04-11

## 2024-04-11 RX ORDER — LORAZEPAM 2 MG/ML
1 INJECTION INTRAMUSCULAR
Status: DISCONTINUED | OUTPATIENT
Start: 2024-04-11 | End: 2024-04-11

## 2024-04-11 RX ORDER — FENTANYL CITRATE 50 UG/ML
100 INJECTION, SOLUTION INTRAMUSCULAR; INTRAVENOUS ONCE
Status: DISCONTINUED | OUTPATIENT
Start: 2024-04-11 | End: 2024-04-11

## 2024-04-11 RX ADMIN — FENTANYL CITRATE 100 MCG: 50 INJECTION, SOLUTION INTRAMUSCULAR; INTRAVENOUS at 10:09

## 2024-04-11 RX ADMIN — IOPAMIDOL 100 ML: 755 INJECTION, SOLUTION INTRAVENOUS at 10:20

## 2024-04-11 RX ADMIN — ENOXAPARIN SODIUM 40 MG: 40 INJECTION SUBCUTANEOUS at 20:56

## 2024-04-11 RX ADMIN — SODIUM CHLORIDE 1000 ML: 9 INJECTION, SOLUTION INTRAVENOUS at 15:05

## 2024-04-11 RX ADMIN — LAMOTRIGINE 200 MG: 100 TABLET, FILM COATED, EXTENDED RELEASE ORAL at 20:50

## 2024-04-11 RX ADMIN — LEVOCARNITINE 660 MG: 330 TABLET ORAL at 21:06

## 2024-04-11 RX ADMIN — DIVALPROEX SODIUM 500 MG: 500 TABLET, DELAYED RELEASE ORAL at 23:51

## 2024-04-11 RX ADMIN — SODIUM CHLORIDE 1000 ML: 9 INJECTION, SOLUTION INTRAVENOUS at 13:58

## 2024-04-11 RX ADMIN — SODIUM CHLORIDE 65 ML: 9 INJECTION, SOLUTION INTRAVENOUS at 10:20

## 2024-04-11 RX ADMIN — LEVETIRACETAM 1500 MG: 15 INJECTION INTRAVENOUS at 21:48

## 2024-04-11 RX ADMIN — SODIUM CHLORIDE 1000 ML: 9 INJECTION, SOLUTION INTRAVENOUS at 10:35

## 2024-04-11 RX ADMIN — ZONISAMIDE 200 MG: 100 CAPSULE ORAL at 20:50

## 2024-04-11 ASSESSMENT — ACTIVITIES OF DAILY LIVING (ADL)
ADLS_ACUITY_SCORE: 40
ADLS_ACUITY_SCORE: 40
ADLS_ACUITY_SCORE: 45
ADLS_ACUITY_SCORE: 40
ADLS_ACUITY_SCORE: 40
ADLS_ACUITY_SCORE: 45
ADLS_ACUITY_SCORE: 40
ADLS_ACUITY_SCORE: 45
ADLS_ACUITY_SCORE: 40

## 2024-04-11 ASSESSMENT — COLUMBIA-SUICIDE SEVERITY RATING SCALE - C-SSRS: IS THE PATIENT NOT ABLE TO COMPLETE C-SSRS: UNABLE TO VERBALIZE

## 2024-04-11 NOTE — H&P
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     In the he was afebrile 98.7, pulse 73, pressure 139/85 and breathing comfortably on room ar without hypoxia.  Work remarkable for normal BMP except for elevated CO2 of 38, normal LFTs, lactic acid 0.9, high-sensitivity troponin of 33 and TSH of 1.38.  CBC normal.  pH 7.29, pCO2 82 and bicarb elevated at 14.  CT head is negative, CT abdomen/pelvis shows cholelithiasis with mild gallbladder distention, hepatic steatosis and nonobstructing small bilateral renal calculi.  Ultrasound of the abdomen showed cholelithiasis.  Urine drug screen is positive for PCP.  Valproic acid is 60.9.  Patient was given 2 L of IV fluids and placed on BiPAP with [facility] admission      # Acute on chronic hypercapnic respiratory failure chronically on oxygen  #ANA   # Obesity hypoventilation syndrome  -Continue chronic 3 L when not on BiPAP  -Patient is supposed to be on BiPAP at night and with napping but after phone call to his group home it sounds like he is refusing to wear this which is likely the cause of multiple recent admissions one of which was related to cardiac arrest  -Ordered BiPAP and will send to [facility] [place]  -Reassess CO2 at 1800 to determine if further BiPAP as needed  -No eating/drinking while on BIPAP  -Ordered IV Keppra while on BiPAP    # Altered mental status/difficulty to arouse suspect due to hypercapnia but also could be related to seizures  -Patient went to bed at  2030 on 4/10 evening with normal responsiveness but upon this morning staff noted that he was not talking, mumbling more and was not at his mobility baseline  -EMS reported that his right side was more taut while the left side was limp and blood glucose was 87 with blood pressure in the 140s  -He was more talkative in the ED with the provider than he was with me and will not give me any history  -Infectious work up is unremarkable with unremarkable UA, Blood cultures x 2 pending and CT head is negative  -CT abdomen/pelvis shows cholelithiasis but otherwise no evidence of infection  -CO2 is elevated to 82 with VBG and suspect that his altered mental status is related to this rather than postictal period from seizure  -ED ordered MRI brain and MRA head/neck but he can not get this while on BiPAP.  Will discontinue for now in case he clears after improvement of CO2.    -NS at 75 ml/hr  -Treatment as above      #History of seizure activity  #History of epilepsy  -Seizure precaution  -Resume PTA Keppra but will order as IV at this time given he cannot take oral medicines     #Schizoaffective disorder  #Borderline personality disorder  #MDD/anxiety  -Continue PTA seroquel, citalopram, lamotrigine, buspirone and Depakote once able to take orals  -Drug screen is positive for PCP but this might be related to his Lamictal use     #Developmental delay  -Lives in group home, at baseline uses wheelchair  -Supportive care     #Hypertension  -Continue prior to admission propranolol and prazosin once he is able to swallow     #GERD  -Continue Protonix      #Chronic hyperammonemia  -Continue prior to admission levocaritine once he can swallow     #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  Continue supplemental oxygen as needed     # Severe Obesity: Estimated body mass index is 44.09 kg/m  as calculated from the following:    Height as of 3/26/24: 1.727 m (5' 8\").    Weight as of 3/26/24: 131.5 kg (290 lb).       # Financial/Environmental Concerns:           Disposition Plan     Medically Ready for Discharge: Anticipated Tomorrow         The patient's care was discussed with the Patient and ED Consultant(s).    Magaly Arteaga PA-C  Hospitalist Service  [facility]  Securely message with [employer] (more info)  Text page via [facility] Paging/Directory     ______________________________________________________________________    Chief Complaint   Altered mental status    History is obtained from the ED provider    History of Present Illness   Tre Vazquez is a 48 year old male who presents from his group home with alteration in mental status.  They report that he was at his baseline yesterday evening and went to sleep but this morning when he awoke he was unable to pivot as usual and was not talkative.  He was mumbling a lot more than usual.  They otherwise report he has been well without fever, chills, cough, shortness of breath, abdominal pain, nausea, vomiting and diarrhea.  He has been taking his medicines as prescribed.    I called his group home staff to confirm that he had his BiPAP on last night and they reported to me that he refuses to wear this.  Likely this is the reason for frequent admissions with hypercapnia.      Past Medical History    Past Medical History:   Diagnosis Date    Blindness of left eye     Depression 03/10/2014    Hypertension     Pneumococcal septicemia(038.2) (H) 11/26/2013    Hospitalized    Pneumonia, organism unspecified(486) 11/26/2013    Hospitalized    " Uncomplicated asthma     Unspecified epilepsy without mention of intractable epilepsy        Past Surgical History   Past Surgical History:   Procedure Laterality Date    COLONOSCOPY N/A 12/1/2022    Procedure: COLONOSCOPY;  Surgeon: Michael Caldwell MD;  Location:  OR    ESOPHAGOSCOPY, GASTROSCOPY, DUODENOSCOPY (EGD), COMBINED N/A 12/1/2022    Procedure: ESOPHAGOGASTRODUODENOSCOPY with biopsy;  Surgeon: Michael Caldwell MD;  Location:  OR    ORTHOPEDIC SURGERY      pt stated past surgery on both ankles       Prior to Admission Medications   Prior to Admission Medications   Prescriptions Last Dose Informant Patient Reported? Taking?   Fluticasone-Umeclidin-Vilanterol (TRELEGY ELLIPTA) 200-62.5-25 MCG/ACT oral inhaler   No No   Sig: Inhale 1 puff into the lungs daily   LamoTRIgine (LAMICTAL) 200 MG TB24   No No   Sig: Take 200 mg by mouth 2 times daily   QUEtiapine ER (SEROQUEL XR) 300 MG 24 hr tablet   Yes No   Sig: Take 300 mg by mouth at bedtime   acetaminophen (TYLENOL) 325 MG tablet   Yes No   Sig: Take 650 mg by mouth every 4 hours as needed for mild pain   albuterol (PROAIR HFA/PROVENTIL HFA/VENTOLIN HFA) 108 (90 Base) MCG/ACT inhaler   No No   Sig: Inhale 2 puffs into the lungs every 4 hours as needed for shortness of breath, wheezing or cough   ammonium lactate (AMLACTIN) 12 % external cream   Yes No   Sig: Apply topically 2 times daily   bacitracin 500 UNIT/GM external ointment   Yes No   Sig: Apply topically 2 times daily Apply to left arm wound   busPIRone (BUSPAR) 15 MG tablet   Yes No   Sig: Take 15 mg by mouth 2 times daily   citalopram (CELEXA) 20 MG tablet   Yes No   Sig: Take 60 mg by mouth daily   clotrimazole (LOTRIMIN) 1 % external cream   Yes No   Sig: Apply topically 2 times daily Apply to left groin   divalproex sodium delayed-release (DEPAKOTE) 500 MG DR tablet   No No   Sig: Take 1 tablet (500 mg) by mouth 2 times daily   fluticasone (FLONASE) 50 MCG/ACT nasal spray    Yes No   Sig: Spray 1 spray in nostril 2 times daily   levETIRAcetam (KEPPRA XR) 500 MG 24 hr tablet   No No   Sig: Take 3 tablets (1,500 mg) by mouth at bedtime   levETIRAcetam (KEPPRA) 1000 MG tablet   No No   Sig: Take 1 tablet (1,000 mg) by mouth every morning   levOCARNitine (CARNITOR) 330 MG tablet   No No   Sig: Take 2 tablets (660 mg) by mouth 3 times daily   methylcellulose POWD powder   Yes No   Sig: Apply 2 mg topically 2 times daily Mix 1 rounded tablespoon (2 mg) in 8 oz glass of water and take by mouth twice daily   multivitamin, therapeutic (THERA-VIT) TABS tablet   Yes No   Sig: Take 1 tablet by mouth daily   pantoprazole (PROTONIX) 40 MG EC tablet   Yes No   Sig: Take 40 mg by mouth daily   polyethylene glycol (MIRALAX) 17 GM/Dose powder   Yes No   Sig: Take 1 capful. by mouth daily   prazosin (MINIPRESS) 2 MG capsule   Yes No   Sig: Take 2 mg by mouth At Bedtime   propranolol (INDERAL) 20 MG tablet   No No   Sig: Take 1 tablet (20 mg) by mouth 2 times daily   zonisamide (ZONEGRAN) 100 MG capsule   No No   Sig: Take 1 capsule (100 mg) by mouth every morning AND 2 capsules (200 mg) every evening.      Facility-Administered Medications: None          Physical Exam   Vital Signs: Temp: 97.5  F (36.4  C) Temp src: Rectal BP: 91/56 Pulse: 59   Resp: 12 SpO2: 97 % O2 Device: Nasal cannula Oxygen Delivery: 4 LPM  Weight: 0 lbs 0 oz    General Appearance: Sleeping, difficult to arouse but does open his eyes without following commands  Respiratory: Clear to auscultation bilaterally  Cardiovascular: RRR without murmur  GI: Bowel sounds are present without tenderness  Skin: No rashes or open sores are noted      Medical Decision Making       75 MINUTES SPENT BY ME on the date of service doing chart review, history, exam, documentation & further activities per the note.      Data     I have personally reviewed the following data over the past 24 hrs:    7.8  \   15.6   / 227     143 97 9 /  81   4.6 34 (H)  1.0 \     ALT: 28 AST: 28 AP: 110 TBILI: 0.3   ALB: 4.2 TOT PROTEIN: 7.4 LIPASE: 16     Trop: 31 (H) BNP: N/A     TSH: 1.38 T4: N/A A1C: N/A     Procal: N/A CRP: N/A Lactic Acid: 0.8         Imaging results reviewed over the past 24 hrs:   Recent Results (from the past 24 hour(s))   CT Abdomen Pelvis w Contrast    Narrative    CT ABDOMEN PELVIS W CONTRAST 4/11/2024 10:34 AM    CLINICAL HISTORY: abd pain    TECHNIQUE: CT scan of the abdomen and pelvis was performed following  injection of IV contrast. Multiplanar reformats were obtained. Dose  reduction techniques were used.  CONTRAST: 100mL Isovue-370    COMPARISON: 5/12/2023    FINDINGS:   LOWER CHEST: Mild bibasilar atelectasis.    HEPATOBILIARY: Mild hepatic steatosis. Cholelithiasis with mild  gallbladder distention. No biliary ductal dilatation.    PANCREAS: Normal.    SPLEEN: Normal.    ADRENAL GLANDS: Normal.    KIDNEYS/BLADDER: Few tiny nonobstructing bilateral renal calculi.  Decreased size of a left renal cyst or calyceal diverticulum at the  upper pole measuring 2.7 cm with layering calcifications, previously  measuring 3.7 cm. No hydronephrosis or perinephritic stranding  bilaterally. No calculi in the ureters or urinary bladder.    BOWEL: No small bowel or colonic obstruction or pathologic changes.  Normal appendix.    PELVIC ORGANS: No pelvic masses.    ADDITIONAL FINDINGS: No free fluid or fluid collections. No  lymphadenopathy. No abdominal aortic aneurysm.    MUSCULOSKELETAL: Old healed bilateral rib fracture deformities. No  suspicious lesions of the bones.      Impression    IMPRESSION:   1.  Cholelithiasis with mild gallbladder distention. Consider right  upper quadrant ultrasound for further evaluation.  2.  Mild hepatic steatosis.  3.  Nonobstructing small bilateral renal calculi. Decreased size of a  cyst or calyceal diverticulum with layering calcifications in the  upper pole of the left kidney.    PIETER GUILLORY MD         SYSTEM ID:   [id number]   Head CT w/o contrast    Narrative    EXAM: CT HEAD W/O CONTRAST  4/11/2024 10:35 AM     HISTORY:  altered mental status       COMPARISON:  Head CT 4/5/2024    TECHNIQUE: Using multidetector thin collimation helical acquisition  technique, axial, coronal and sagittal CT images from the skull base  to the vertex were obtained without intravenous contrast.   (topogram) image(s) also obtained and reviewed. Dose reduction  techniques were performed.    FINDINGS:  No intracranial hemorrhage, mass effect, or midline shift. No acute  loss of gray-white matter differentiation in the cerebral hemispheres.  Ventricles are proportionate to the cerebral sulci. Clear basal  cisterns. Patchy periventricular hypoattenuation, consistent with  chronic small vessel ischemic disease. Unchanged mild lateral and  third ventriculomegaly.    Hyperostosis frontalis interna. The bony calvaria and the bones of the  skull base are normal. The visualized portions of the paranasal  sinuses and mastoid air cells are clear. Grossly normal orbits.       Impression    IMPRESSION:   1. No acute intracranial pathology.   2. Chronic small vessel ischemic disease and mild diffuse cerebral  volume loss.    JENNIE MEAD MD         SYSTEM ID:  [id number]   US Abdomen Limited    Narrative    US ABDOMEN LIMITED 4/11/2024 11:41 AM    CLINICAL HISTORY: Abdominal pain.    TECHNIQUE: Limited abdominal ultrasound.    COMPARISON: CT abdomen and pelvis 4/11/2024.    FINDINGS:    GALLBLADDER: Cholelithiasis. No significant wall thickening or  pericholecystic fluid.    BILE DUCTS: There is no biliary dilatation. The common duct measures 5  mm.    LIVER: Diffusely echogenic. No focal mass.    RIGHT KIDNEY: No hydronephrosis.    PANCREAS: The visualized portions of the pancreas are normal.    No ascites.      Impression    IMPRESSION:  1.  Cholelithiasis. No convincing sonographic signs to suggest acute  cholecystitis.  2.  Hepatic  steatosis.    NAZ DOVER MD         SYSTEM ID:  [id number]

## 2024-04-11 NOTE — ED NOTES
[facility]  ED Nurse Handoff Report    ED Chief complaint: Altered Mental Status  . ED Diagnosis:   Final diagnoses:   Altered mental status, unspecified altered mental status type   Abdominal pain of unknown cause   Seizure disorder (H)       Allergies:   Allergies   Allergen Reactions    Acetaminophen Unknown    Hydrocodone Bitartrate Er Unknown    Cephalexin Rash     HUT Reaction: Rash; HUT Noted: [id number]      Vicodin [Hydrocodone-Acetaminophen] GI Disturbance       Code Status: Full Code    Activity level - Baseline/Home:  in bed.  Activity Level - Current:   in bed.   Lift room needed: No.   Bariatric: Yes   Needed: No   Isolation: No.   Infection: Not Applicable.     Respiratory status: Nasal cannula    Vital Signs (within 30 minutes):   Vitals:    04/11/24 1220 04/11/24 1235 04/11/24 1250 04/11/24 1258   BP: 116/71 115/71 106/73 91/59   Pulse: 62 61 64 60   Resp: 11 11 14 10   Temp:       TempSrc:       SpO2: 99% (!) 89% (!) 82% 96%       Cardiac Rhythm:  ,      Pain level:    Patient confused: Yes.   Patient Falls Risk: nonskid shoes/slippers when out of bed, arm band in place, and mobility aid in reach.   Elimination Status:  na      Patient Report - Initial Complaint: Tre Vazquez is a 48 year old male with a history of hypertension and epilepsy who presents to the emergency department from his group home for decreased responsiveness. His last known well time was noted to be 2030 yesterday evening when going to bed. This morning, the patient was not talking, mumbling more than usual. He is conversational at baseline. EMS shares that the patient is bedridden and on 3L oxygen. His right side was taught, with the left side more limp upon EMS assessment. Blood glucose was 87 and pressure was in the 140s. The patient shares that he feels cold, endorsing headache and abdominal pain. He attempts to point to areas of concern when prompted though without success. Of note,  nursing staff reports seeing this patient in prior visits to the ED, seen regularly for incidences of seizure, weakness and falls. .   Focused Assessment: Obtunded      Abnormal Results:   Labs Ordered and Resulted from Time of ED Arrival to Time of ED Departure   COMPREHENSIVE METABOLIC PANEL - Abnormal       Result Value    Sodium 144      Potassium 4.7      Carbon Dioxide (CO2) 34 (*)     Anion Gap 9      Urea Nitrogen 9.3      Creatinine 0.76      GFR Estimate >90      Calcium 9.5      Chloride 101      Glucose 81      Alkaline Phosphatase 110      AST 28      ALT 28      Protein Total 7.4      Albumin 4.2      Bilirubin Total 0.3     ROUTINE UA WITH MICROSCOPIC REFLEX TO CULTURE - Abnormal    Color Urine Yellow      Appearance Urine Slightly Cloudy (*)     Glucose Urine Negative      Bilirubin Urine Negative      Ketones Urine 10 (*)     Specific Gravity Urine 1.019      Blood Urine Negative      pH Urine 8.0 (*)     Protein Albumin Urine Negative      Urobilinogen Urine Normal      Nitrite Urine Negative      Leukocyte Esterase Urine Negative      Mucus Urine Present (*)     Amorphous Crystals Urine Few (*)     RBC Urine 1      WBC Urine 0     TROPONIN T, HIGH SENSITIVITY - Abnormal    Troponin T, High Sensitivity 33 (*)    CBC WITH PLATELETS AND DIFFERENTIAL - Abnormal    WBC Count 7.8      RBC Count 4.60      Hemoglobin 13.9      Hematocrit 45.0      MCV 98      MCH 30.2      MCHC 30.9 (*)     RDW 12.8      Platelet Count 227      % Neutrophils 59      % Lymphocytes 31      % Monocytes 8      % Eosinophils 1      % Basophils 0      % Immature Granulocytes 1      NRBCs per 100 WBC 0      Absolute Neutrophils 4.6      Absolute Lymphocytes 2.4      Absolute Monocytes 0.6      Absolute Eosinophils 0.1      Absolute Basophils 0.0      Absolute Immature Granulocytes 0.1      Absolute NRBCs 0.0     ISTAT GASES LACTATE VENOUS POCT - Abnormal    Lactic Acid POCT 0.9      Bicarbonate Venous POCT 40 (*)     O2 Sat,  Venous POCT 39 (*)     pCO2 Venous POCT 82 (*)     pH Venous POCT 7.29 (*)     pO2 Venous POCT 27     ISTAT BASIC CHEM ICA HEMATOCRIT POCT - Abnormal    Chloride POCT 97      Potassium POCT 4.6      Sodium POCT 143      UREA NITROGEN POCT 9      Calcium, Ionized Whole Blood POCT 5.2      Glucose Whole Blood POCT 81      Anion Gap POCT 13.0      Hemoglobin POCT 15.6      Hematocrit POCT 46      Creatinine POCT 1.0      TOTAL CO2 POCT 38 (*)    LACTIC ACID WHOLE BLOOD - Normal    Lactic Acid 0.8     TSH WITH FREE T4 REFLEX - Normal    TSH 1.38     LIPASE - Normal    Lipase 16     CK TOTAL - Normal         BLOOD GAS VENOUS   TROPONIN T, HIGH SENSITIVITY   VALPROIC ACID   KEPPRA (LEVETIRACETAM) LEVEL   URINE DRUG SCREEN PANEL   BLOOD CULTURE   BLOOD CULTURE        US Abdomen Limited   Final Result   IMPRESSION:   1.  Cholelithiasis. No convincing sonographic signs to suggest acute   cholecystitis.   2.  Hepatic steatosis.      NAZ DOVER MD            SYSTEM ID:  [id number]      Head CT w/o contrast   Final Result   IMPRESSION:    1. No acute intracranial pathology.    2. Chronic small vessel ischemic disease and mild diffuse cerebral   volume loss.      JENNIE MEAD MD            SYSTEM ID:  [id number]      CT Abdomen Pelvis w Contrast   Final Result   IMPRESSION:    1.  Cholelithiasis with mild gallbladder distention. Consider right   upper quadrant ultrasound for further evaluation.   2.  Mild hepatic steatosis.   3.  Nonobstructing small bilateral renal calculi. Decreased size of a   cyst or calyceal diverticulum with layering calcifications in the   upper pole of the left kidney.      PIETER GUILLORY MD            SYSTEM ID:  [id number]      MR Brain w/o & w Contrast    (Results Pending)   MRA Angiogram Head w/o Contrast    (Results Pending)   MRA Angiogram Neck w/o & w Contrast    (Results Pending)       Treatments provided: ct xray labs  Family Comments: group home   OBS brochure/video  discussed/provided to patient:  No  ED Medications:   Medications   LORazepam (ATIVAN) injection 1 mg (has no administration in time range)   fentaNYL (PF) (SUBLIMAZE) injection 100 mcg (100 mcg Intravenous $Given 4/11/24 1009)   sodium chloride 0.9% BOLUS 1,000 mL (1,000 mLs Intravenous $New Bag 4/11/24 1035)   CT SCAN FLUSH (65 mLs Intravenous $Given 4/11/24 1020)   iopamidol (ISOVUE-370) solution 500 mL (100 mLs Intravenous $Given 4/11/24 1020)       Drips infusing:  No  For the majority of the shift this patient was Green.   Interventions performed were imaging .    Sepsis treatment initiated: No    Cares/treatment/interventions/medications to be completed following ED care: none currently     ED Nurse Name: Katie Galvin RN  1:13 PM    RECEIVING UNIT ED HANDOFF REVIEW    Above ED Nurse Handoff Report was reviewed: Yes  Reviewed by: Stanley Munroe RN on April 11, 2024 at 5:48 PM   JAQUI Costa called the ED to inform them the note was read: Yes

## 2024-04-11 NOTE — PROGRESS NOTES
[facility] ED RT NOTE:    A BiPAP of 18/8 @ 35% was applied to the pt via the mask for AMS/hypercapnia. The bridge of the nose looks good and remains intact. Pt is tolerating it well. Will continue to monitor and assess the pt's current respiratory status and needs.    /72   Pulse 63   Temp 97  F (36.1  C) (Axillary)   Resp 20   SpO2 97%       Ritchie Guaman, RT

## 2024-04-11 NOTE — ED TRIAGE NOTES
Pt arrives via EMS with c/o AMS. Pt was last known well at 2030 last night. At 0830 pt was woken for breakfast but couldn't stand and pivot like he normally does. Pt grunting and mumbling upon EMS arrival. Posturing for EMS. BG 87

## 2024-04-11 NOTE — ED NOTES
Attempted to call Johanna director of [facility] [phone] for MRI check list no answer and no voicemail to leave messages  is rico [phone]

## 2024-04-11 NOTE — ED PROVIDER NOTES
History     Chief Complaint:  Altered Mental Status       The history is limited by the condition of the patient.      Tre Vazquez is a 48 year old oxygen dependent male with a history of hypertension and epilepsy who presents to the emergency department from his group home for decreased responsiveness. His last known well time was noted to be 2030 yesterday evening when going to bed. This morning, the patient was not talking, mumbling more than usual. He is conversational at baseline. EMS shares that the patient is bedridden and on 3L oxygen. His right side was taught, with the left side more limp upon EMS assessment. Blood glucose was 87 and pressure was in the 140s. The patient shares that he feels cold, endorsing headache and abdominal pain. He attempts to point to areas of concern when prompted though without success. Of note, nursing staff reports seeing this patient in prior visits to the ED, seen regularly for incidences of seizure, weakness and falls.  Per EMS report he is chronically on 3 L of oxygen via nasal cannula.    Independent Historian:   EMS and nursing staff contribute to the history as described above.    Review of External Notes:   Recent ED visit 4/5/2024 reviewed.  Notable ED visits monthly noted.  Typically related to seizure and underlying mobility issues.    Medications:    Celexa  Seroquel  Lamictal  Proair  Buspar  Depakote  Ellipta  Keppra  Carnitor  Protonix  Minipress  Inderal  Zonegran    Past Medical History:    Blindness, left eye  Depression  Hypertension  Pneumococcal septicemia  Asthma  Epilepsy    Past Surgical History:    Colonoscopy  EGD  Orthopedic surgery    Physical Exam   Patient Vitals for the past 24 hrs:   BP Temp Temp src Pulse Resp SpO2   04/11/24 1411 -- -- -- 63 10 95 %   04/11/24 1401 103/64 -- -- 61 12 95 %   04/11/24 1351 -- -- -- 63 11 93 %   04/11/24 1344 106/69 -- -- 62 11 97 %   04/11/24 1329 93/55 -- -- 59 10 97 %   04/11/24 1310 91/56 -- -- 59 12 97 %    04/11/24 1258 91/59 -- -- 60 10 96 %   04/11/24 1250 106/73 -- -- 64 14 (!) 82 %   04/11/24 1235 115/71 -- -- 61 11 (!) 89 %   04/11/24 1220 116/71 -- -- 62 11 99 %   04/11/24 1205 (!) 129/92 -- -- 68 13 100 %   04/11/24 1150 120/80 -- -- 63 14 99 %   04/11/24 1105 130/85 -- -- 67 13 97 %   04/11/24 1050 (!) 122/91 -- -- 65 10 96 %   04/11/24 1048 (!) 122/91 97.5  F (36.4  C) Rectal 68 19 96 %   04/11/24 1033 116/84 -- -- 66 (!) 36 --   04/11/24 1008 139/85 -- -- 71 20 96 %   04/11/24 1003 -- 98.7  F (37.1  C) Temporal 73 17 92 %   04/11/24 1001 (!) 137/114 -- -- 75 -- --        Physical Exam    General: Large adult laying on the stretcher  Eyes: Right pupil round and reactive.    HENT: No palpable scalp hematoma or tenderness appreciated.  Dry mucous membranes, oropharynx clear.   Neck: No rigidity.  CV: Normal S1S2, no murmur, rub or gallop. Regular rate and rhythm  Resp: Clear to auscultation bilaterally, no wheezes, rales or rhonchi. Normal respiratory effort.  GI: Abdomen is soft.  Protuberant and mildly diffusely tender.  No palpable masses. No rebound or guarding.  MSK: Nonpitting edema of the lower extremities.  No apparent tenderness to palpation.  No rigidity but seems to hold arms in awkward positioning when first arrives, then relaxes at his sides.  Skin: Warm and dry. No rashes or lesions or ecchymoses on visible skin.  Neuro: Opens right eye to voice.  Mumbles a single word at a time.  No clonus.  No rigidity.  No obvious asymmetry in event of extremities.  Psych: Unable to assess    Emergency Department Course   ECG  ECG taken at 1005, ECG read at 1013  Normal sinus rhythm  Low voltage QRS  Nonspecific ST abnormality  Abnormal ECG   Rate 73 bpm. MT interval 192 ms. QRS duration 90 ms. QT/QTc 388/427 ms. P-R-T axes 69 -7 68.     Imaging:  US Abdomen Limited   Final Result   IMPRESSION:   1.  Cholelithiasis. No convincing sonographic signs to suggest acute   cholecystitis.   2.  Hepatic steatosis.       NAZ DOVER MD            SYSTEM ID:  [id number]      Head CT w/o contrast   Final Result   IMPRESSION:    1. No acute intracranial pathology.    2. Chronic small vessel ischemic disease and mild diffuse cerebral   volume loss.      JENNIE MEAD MD            SYSTEM ID:  [id number]      CT Abdomen Pelvis w Contrast   Final Result   IMPRESSION:    1.  Cholelithiasis with mild gallbladder distention. Consider right   upper quadrant ultrasound for further evaluation.   2.  Mild hepatic steatosis.   3.  Nonobstructing small bilateral renal calculi. Decreased size of a   cyst or calyceal diverticulum with layering calcifications in the   upper pole of the left kidney.      PIETER GUILLORY MD            SYSTEM ID:  [id number]      MR Brain w/o & w Contrast    (Results Pending)   MRA Angiogram Head w/o Contrast    (Results Pending)   MRA Angiogram Neck w/o & w Contrast    (Results Pending)          Laboratory:  Labs Ordered and Resulted from Time of ED Arrival to Time of ED Departure   CBC WITH PLATELETS AND DIFFERENTIAL - Abnormal       Result Value    WBC Count 7.8      RBC Count 4.60      Hemoglobin 13.9      Hematocrit 45.0      MCV 98      MCH 30.2      MCHC 30.9 (*)     RDW 12.8      Platelet Count 227      % Neutrophils 59      % Lymphocytes 31      % Monocytes 8      % Eosinophils 1      % Basophils 0      % Immature Granulocytes 1      NRBCs per 100 WBC 0      Absolute Neutrophils 4.6      Absolute Lymphocytes 2.4      Absolute Monocytes 0.6      Absolute Eosinophils 0.1      Absolute Basophils 0.0      Absolute Immature Granulocytes 0.1      Absolute NRBCs 0.0     BLOOD GAS VENOUS   LACTIC ACID WHOLE BLOOD   COMPREHENSIVE METABOLIC PANEL   TSH WITH FREE T4 REFLEX   LIPASE   ROUTINE UA WITH MICROSCOPIC REFLEX TO CULTURE   TROPONIN T, HIGH SENSITIVITY   CK TOTAL   BLOOD CULTURE   BLOOD CULTURE            Emergency Department Course & Assessments:    Interventions:  Medications   LORazepam (ATIVAN)  injection 1 mg (has no administration in time range)   sodium chloride 0.9% BOLUS 1,000 mL (1,000 mLs Intravenous $New Bag 4/11/24 1358)   sodium chloride 0.9 % infusion (has no administration in time range)   fentaNYL (PF) (SUBLIMAZE) injection 100 mcg (100 mcg Intravenous $Given 4/11/24 1009)   sodium chloride 0.9% BOLUS 1,000 mL (1,000 mLs Intravenous $New Bag 4/11/24 1035)   CT SCAN FLUSH (65 mLs Intravenous $Given 4/11/24 1020)   iopamidol (ISOVUE-370) solution 500 mL (100 mLs Intravenous $Given 4/11/24 1020)      Independent Interpretation (X-rays, CTs, rhythm strip):  Reviewed the patient's head CT.  I see no evidence of intracranial hemorrhage.    Assessments/Consultations/Discussion of Management or Tests:     ED Course as of 04/11/24 1050   Thu Apr 11, 2024   1004 I saw the patient for an initial evaluation and exam.   I reassessed the patient after CT imaging.  He is more alert, still responding to voice, and seems to respond more to questioning and commands.  He still does not speak in full sentences.  I discussed the patient with [facility], who is accepting for Dr. Ervin at this time, final bed disposition pending MRI/MRA studies..    Social Determinants of Health affecting care:   None    Disposition:  The patient was admitted to the hospital under the care of Dr. Ervin.     Impression & Plan    Medical Decision Making:  Tre Bacon is a 48-year-old male bedbound aside from occasionally banding and transferring with a known seizure disorder for which he is here in the emergency department frequently who presents emergency department for altered mental state today.  This was not clearly preceded by seizure.  There was no known trauma.  He has no signs of infection such as fever on evaluation.  Laboratory evaluation was remarkable for mild acidosis but otherwise not eliciting any cause for his altered mental state.  He can become more and more alert over stay his suggesting perhaps postictal  period.  Initial CT head imaging did not show any acute pathology such as intracranial hemorrhage.  Other etiologies such as toxic or metabolic were also considered.  Seizure precautions were maintained patient did not have any visible seizure activity and remained alert.  He remained hemodynamically stable.  He still seems altered from baseline.  CVA was not entirely excluded and therefore MRI studies were ordered.  He will be admitted for observation and further care.      Diagnosis:    ICD-10-CM    1. Altered mental status, unspecified altered mental status type  R41.82       2. Abdominal pain of unknown cause  R10.9       3. Seizure disorder (H)  G40.909            Scribe Disclosure:  I, Josh Cole, am serving as a scribe at 10:19 AM on 4/11/2024 to document services personally performed by Madalyn Dyer MD based on my observations and the provider's statements to me.      Madalyn Dyer MD  04/11/24 [id number]

## 2024-04-11 NOTE — LETTER
Montse Turner, [facility] KARIN   Inpatient Care Coordination   Supervisor  [facility]  [phone]

## 2024-04-11 NOTE — LETTER
Montse Turner, [facility]   Inpatient Care Coordination   Supervisor  [facility]  [phone]

## 2024-04-11 NOTE — PHARMACY-ADMISSION MEDICATION HISTORY
Pharmacist Admission Medication History    Admission medication history is complete. The information provided in this note is only as accurate as the sources available at the time of the update.    Information Source(s): Facility (U/NH/) medication list/MAR     Pertinent Information: -    Changes made to PTA medication list:  Added: Zofran PRN, MOM  Deleted: None  Changed: Depakote BID -> TID    Allergies reviewed with patient and updates made in EHR: unable to assess    Medication History Completed By: Fidel Maradiaga [facility] 4/11/2024 4:21 PM    PTA Med List   Medication Sig Last Dose    acetaminophen (TYLENOL) 325 MG tablet Take 650 mg by mouth every 4 hours as needed for mild pain Unknown at PRM    albuterol (PROAIR HFA/PROVENTIL HFA/VENTOLIN HFA) 108 (90 Base) MCG/ACT inhaler Inhale 2 puffs into the lungs every 4 hours as needed for shortness of breath, wheezing or cough Unknown at PRN    ammonium lactate (AMLACTIN) 12 % external cream Apply topically 2 times daily 4/11/2024 at x1    bacitracin 500 UNIT/GM external ointment Apply topically 2 times daily Apply to left arm wound 4/11/2024 at x1    busPIRone (BUSPAR) 15 MG tablet Take 15 mg by mouth 2 times daily 4/11/2024 at x1    citalopram (CELEXA) 20 MG tablet Take 60 mg by mouth daily 4/11/2024 at AM    clotrimazole (LOTRIMIN) 1 % external cream Apply topically 2 times daily Apply to left groin 4/11/2024 at X1    dextromethorphan-guaiFENesin (TUSSIN DM)  MG/5ML liquid Take 10 mLs by mouth as needed for cough Unknown at PRN    divalproex sodium delayed-release (DEPAKOTE) 500 MG DR tablet Take 500 mg by mouth 3 times daily 4/11/2024 at X1    fluticasone (FLONASE) 50 MCG/ACT nasal spray Spray 1 spray in nostril 2 times daily 4/11/2024 at x1    Fluticasone-Umeclidin-Vilanterol (TRELEGY ELLIPTA) 200-62.5-25 MCG/ACT oral inhaler Inhale 1 puff into the lungs daily 4/11/2024 at am    LamoTRIgine (LAMICTAL) 200 MG TB24 Take 200 mg by mouth 2 times daily 4/11/2024  at x1    levETIRAcetam (KEPPRA XR) 500 MG 24 hr tablet Take 3 tablets (1,500 mg) by mouth at bedtime 4/10/2024 at hs    levETIRAcetam (KEPPRA) 1000 MG tablet Take 1 tablet (1,000 mg) by mouth every morning 4/11/2024 at am    levOCARNitine (CARNITOR) 330 MG tablet Take 2 tablets (660 mg) by mouth 3 times daily 4/11/2024 at x2    loperamide (IMODIUM) 2 MG capsule Take 2 mg by mouth 4 times daily as needed for diarrhea Unknown at PRN    magnesium hydroxide (MILK OF MAGNESIA) 400 MG/5ML suspension Take 30 mLs by mouth daily as needed for constipation Unknown at PRN    methylcellulose POWD powder Apply 2 mg topically 2 times daily Mix 1 rounded tablespoon (2 mg) in 8 oz glass of water and take by mouth twice daily 4/11/2024 at x1    multivitamin, therapeutic (THERA-VIT) TABS tablet Take 1 tablet by mouth daily 4/11/2024 at am    ondansetron (ZOFRAN ODT) 4 MG ODT tab Take 4 mg by mouth every 6 hours as needed for nausea or vomiting Unknown at PRN    pantoprazole (PROTONIX) 40 MG EC tablet Take 40 mg by mouth daily 4/11/2024 at am    polyethylene glycol (MIRALAX) 17 GM/Dose powder Take 1 capful. by mouth daily 4/11/2024 at am    prazosin (MINIPRESS) 2 MG capsule Take 2 mg by mouth At Bedtime 4/10/2024 at hs    propranolol (INDERAL) 20 MG tablet Take 1 tablet (20 mg) by mouth 2 times daily 4/11/2024 at x1    QUEtiapine ER (SEROQUEL XR) 300 MG 24 hr tablet Take 300 mg by mouth at bedtime 4/10/2024 at hs    sodium chloride (OCEAN) 0.65 % nasal spray Spray 1 spray into both nostrils every hour as needed for congestion Unknown at PRN    zonisamide (ZONEGRAN) 100 MG capsule Take 1 capsule (100 mg) by mouth every morning AND 2 capsules (200 mg) every evening. 4/11/2024 at x1

## 2024-04-12 LAB
ANION GAP SERPL CALCULATED.3IONS-SCNC: 6 MMOL/L (ref 7–15)
BASE EXCESS BLDV CALC-SCNC: 5.9 MMOL/L (ref -3–3)
BUN SERPL-MCNC: 9.2 MG/DL (ref 6–20)
CALCIUM SERPL-MCNC: 8.6 MG/DL (ref 8.6–10)
CHLORIDE SERPL-SCNC: 102 MMOL/L (ref 98–107)
CREAT SERPL-MCNC: 0.57 MG/DL (ref 0.67–1.17)
DEPRECATED HCO3 PLAS-SCNC: 33 MMOL/L (ref 22–29)
EGFRCR SERPLBLD CKD-EPI 2021: >90 ML/MIN/1.73M2
ERYTHROCYTE [DISTWIDTH] IN BLOOD BY AUTOMATED COUNT: 12.7 % (ref 10–15)
GLUCOSE SERPL-MCNC: 77 MG/DL (ref 70–99)
HCO3 BLDV-SCNC: 35 MMOL/L (ref 21–28)
HCT VFR BLD AUTO: 39.8 % (ref 40–53)
HGB BLD-MCNC: 12.1 G/DL (ref 13.3–17.7)
LEVETIRACETAM SERPL-MCNC: 27.8 ΜG/ML (ref 10–40)
MCH RBC QN AUTO: 30 PG (ref 26.5–33)
MCHC RBC AUTO-ENTMCNC: 30.4 G/DL (ref 31.5–36.5)
MCV RBC AUTO: 99 FL (ref 78–100)
O2/TOTAL GAS SETTING VFR VENT: 35 %
OXYHGB MFR BLDV: 70 % (ref 70–75)
PCO2 BLDV: 72 MM HG (ref 40–50)
PH BLDV: 7.29 [PH] (ref 7.32–7.43)
PLATELET # BLD AUTO: 199 10E3/UL (ref 150–450)
PO2 BLDV: 40 MM HG (ref 25–47)
POTASSIUM SERPL-SCNC: 3.9 MMOL/L (ref 3.4–5.3)
RBC # BLD AUTO: 4.04 10E6/UL (ref 4.4–5.9)
SAO2 % BLDV: 71.5 % (ref 70–75)
SODIUM SERPL-SCNC: 141 MMOL/L (ref 135–145)
WBC # BLD AUTO: 5.6 10E3/UL (ref 4–11)

## 2024-04-12 PROCEDURE — 250N000009 HC RX 250: Performed by: STUDENT IN AN ORGANIZED HEALTH CARE EDUCATION/TRAINING PROGRAM

## 2024-04-12 PROCEDURE — 36415 COLL VENOUS BLD VENIPUNCTURE: CPT | Performed by: PHYSICIAN ASSISTANT

## 2024-04-12 PROCEDURE — 250N000013 HC RX MED GY IP 250 OP 250 PS 637: Performed by: STUDENT IN AN ORGANIZED HEALTH CARE EDUCATION/TRAINING PROGRAM

## 2024-04-12 PROCEDURE — 120N000013 HC R&B IMCU

## 2024-04-12 PROCEDURE — 85041 AUTOMATED RBC COUNT: CPT | Performed by: PHYSICIAN ASSISTANT

## 2024-04-12 PROCEDURE — 258N000003 HC RX IP 258 OP 636: Performed by: PHYSICIAN ASSISTANT

## 2024-04-12 PROCEDURE — G0463 HOSPITAL OUTPT CLINIC VISIT: HCPCS

## 2024-04-12 PROCEDURE — 250N000011 HC RX IP 250 OP 636: Performed by: PHYSICIAN ASSISTANT

## 2024-04-12 PROCEDURE — 82805 BLOOD GASES W/O2 SATURATION: CPT | Performed by: STUDENT IN AN ORGANIZED HEALTH CARE EDUCATION/TRAINING PROGRAM

## 2024-04-12 PROCEDURE — 94660 CPAP INITIATION&MGMT: CPT

## 2024-04-12 PROCEDURE — 250N000013 HC RX MED GY IP 250 OP 250 PS 637: Performed by: INTERNAL MEDICINE

## 2024-04-12 PROCEDURE — 80048 BASIC METABOLIC PNL TOTAL CA: CPT | Performed by: PHYSICIAN ASSISTANT

## 2024-04-12 PROCEDURE — 999N000157 HC STATISTIC RCP TIME EA 10 MIN

## 2024-04-12 PROCEDURE — 99232 SBSQ HOSP IP/OBS MODERATE 35: CPT | Performed by: INTERNAL MEDICINE

## 2024-04-12 RX ORDER — QUETIAPINE 300 MG/1
300 TABLET, FILM COATED, EXTENDED RELEASE ORAL AT BEDTIME
Status: DISCONTINUED | OUTPATIENT
Start: 2024-04-12 | End: 2024-04-15

## 2024-04-12 RX ORDER — ACETAMINOPHEN 325 MG/1
650 TABLET ORAL EVERY 4 HOURS PRN
Status: DISCONTINUED | OUTPATIENT
Start: 2024-04-12 | End: 2024-04-18 | Stop reason: HOSPADM

## 2024-04-12 RX ORDER — LOPERAMIDE HCL 2 MG
2 CAPSULE ORAL 4 TIMES DAILY PRN
Status: DISCONTINUED | OUTPATIENT
Start: 2024-04-12 | End: 2024-04-18 | Stop reason: HOSPADM

## 2024-04-12 RX ORDER — GINSENG 100 MG
CAPSULE ORAL 2 TIMES DAILY
Status: DISCONTINUED | OUTPATIENT
Start: 2024-04-12 | End: 2024-04-18 | Stop reason: HOSPADM

## 2024-04-12 RX ORDER — PRAZOSIN HYDROCHLORIDE 1 MG/1
2 CAPSULE ORAL AT BEDTIME
Status: DISCONTINUED | OUTPATIENT
Start: 2024-04-12 | End: 2024-04-18 | Stop reason: HOSPADM

## 2024-04-12 RX ORDER — FLUTICASONE PROPIONATE 50 MCG
1 SPRAY, SUSPENSION (ML) NASAL 2 TIMES DAILY
Status: DISCONTINUED | OUTPATIENT
Start: 2024-04-12 | End: 2024-04-18 | Stop reason: HOSPADM

## 2024-04-12 RX ORDER — FLUTICASONE FUROATE AND VILANTEROL 200; 25 UG/1; UG/1
1 POWDER RESPIRATORY (INHALATION) DAILY
Status: DISCONTINUED | OUTPATIENT
Start: 2024-04-12 | End: 2024-04-18 | Stop reason: HOSPADM

## 2024-04-12 RX ORDER — POLYETHYLENE GLYCOL 3350 17 G/17G
17 POWDER, FOR SOLUTION ORAL DAILY
Status: DISCONTINUED | OUTPATIENT
Start: 2024-04-12 | End: 2024-04-18 | Stop reason: HOSPADM

## 2024-04-12 RX ORDER — BUSPIRONE HYDROCHLORIDE 15 MG/1
15 TABLET ORAL 2 TIMES DAILY
Status: DISCONTINUED | OUTPATIENT
Start: 2024-04-12 | End: 2024-04-18 | Stop reason: HOSPADM

## 2024-04-12 RX ORDER — ONDANSETRON 4 MG/1
4 TABLET, ORALLY DISINTEGRATING ORAL EVERY 6 HOURS PRN
Status: DISCONTINUED | OUTPATIENT
Start: 2024-04-12 | End: 2024-04-12

## 2024-04-12 RX ORDER — CLOTRIMAZOLE 1 %
CREAM (GRAM) TOPICAL 2 TIMES DAILY
Status: DISCONTINUED | OUTPATIENT
Start: 2024-04-12 | End: 2024-04-18 | Stop reason: HOSPADM

## 2024-04-12 RX ORDER — BACITRACIN ZINC 500 [USP'U]/G
OINTMENT TOPICAL 2 TIMES DAILY
Status: DISCONTINUED | OUTPATIENT
Start: 2024-04-12 | End: 2024-04-12

## 2024-04-12 RX ORDER — CITALOPRAM HYDROBROMIDE 20 MG/1
60 TABLET ORAL DAILY
Status: DISCONTINUED | OUTPATIENT
Start: 2024-04-12 | End: 2024-04-18 | Stop reason: HOSPADM

## 2024-04-12 RX ORDER — LEVETIRACETAM 750 MG/1
1500 TABLET, FILM COATED, EXTENDED RELEASE ORAL AT BEDTIME
Status: DISCONTINUED | OUTPATIENT
Start: 2024-04-12 | End: 2024-04-18 | Stop reason: HOSPADM

## 2024-04-12 RX ORDER — PROPRANOLOL HYDROCHLORIDE 20 MG/1
20 TABLET ORAL 2 TIMES DAILY
Status: DISCONTINUED | OUTPATIENT
Start: 2024-04-12 | End: 2024-04-18 | Stop reason: HOSPADM

## 2024-04-12 RX ORDER — PANTOPRAZOLE SODIUM 40 MG/1
40 TABLET, DELAYED RELEASE ORAL DAILY
Status: DISCONTINUED | OUTPATIENT
Start: 2024-04-12 | End: 2024-04-18 | Stop reason: HOSPADM

## 2024-04-12 RX ORDER — LEVETIRACETAM 500 MG/1
1000 TABLET ORAL EVERY MORNING
Status: DISCONTINUED | OUTPATIENT
Start: 2024-04-13 | End: 2024-04-18 | Stop reason: HOSPADM

## 2024-04-12 RX ADMIN — DIVALPROEX SODIUM 500 MG: 500 TABLET, DELAYED RELEASE ORAL at 16:25

## 2024-04-12 RX ADMIN — PROPRANOLOL HYDROCHLORIDE 20 MG: 20 TABLET ORAL at 11:45

## 2024-04-12 RX ADMIN — PROPRANOLOL HYDROCHLORIDE 20 MG: 20 TABLET ORAL at 21:17

## 2024-04-12 RX ADMIN — LAMOTRIGINE 200 MG: 100 TABLET, FILM COATED, EXTENDED RELEASE ORAL at 21:18

## 2024-04-12 RX ADMIN — FLUTICASONE PROPIONATE 1 SPRAY: 50 SPRAY, METERED NASAL at 21:24

## 2024-04-12 RX ADMIN — ZONISAMIDE 100 MG: 100 CAPSULE ORAL at 09:19

## 2024-04-12 RX ADMIN — LAMOTRIGINE 200 MG: 100 TABLET, FILM COATED, EXTENDED RELEASE ORAL at 09:18

## 2024-04-12 RX ADMIN — BUSPIRONE HYDROCHLORIDE 15 MG: 15 TABLET ORAL at 11:45

## 2024-04-12 RX ADMIN — BACITRACIN: 500 OINTMENT TOPICAL at 11:46

## 2024-04-12 RX ADMIN — FLUTICASONE PROPIONATE 1 SPRAY: 50 SPRAY, METERED NASAL at 11:46

## 2024-04-12 RX ADMIN — SODIUM CHLORIDE: 9 INJECTION, SOLUTION INTRAVENOUS at 04:27

## 2024-04-12 RX ADMIN — ENOXAPARIN SODIUM 40 MG: 40 INJECTION SUBCUTANEOUS at 21:18

## 2024-04-12 RX ADMIN — BUSPIRONE HYDROCHLORIDE 15 MG: 15 TABLET ORAL at 21:18

## 2024-04-12 RX ADMIN — POLYETHYLENE GLYCOL 3350 17 G: 17 POWDER, FOR SOLUTION ORAL at 11:47

## 2024-04-12 RX ADMIN — DIVALPROEX SODIUM 500 MG: 500 TABLET, DELAYED RELEASE ORAL at 21:17

## 2024-04-12 RX ADMIN — QUETIAPINE FUMARATE 300 MG: 300 TABLET, EXTENDED RELEASE ORAL at 21:18

## 2024-04-12 RX ADMIN — ZONISAMIDE 200 MG: 100 CAPSULE ORAL at 21:17

## 2024-04-12 RX ADMIN — PRAZOSIN HYDROCHLORIDE 2 MG: 1 CAPSULE ORAL at 21:18

## 2024-04-12 RX ADMIN — LEVOCARNITINE 660 MG: 330 TABLET ORAL at 09:19

## 2024-04-12 RX ADMIN — LEVOCARNITINE 660 MG: 330 TABLET ORAL at 21:17

## 2024-04-12 RX ADMIN — ENOXAPARIN SODIUM 40 MG: 40 INJECTION SUBCUTANEOUS at 09:18

## 2024-04-12 RX ADMIN — PANTOPRAZOLE SODIUM 40 MG: 40 TABLET, DELAYED RELEASE ORAL at 11:44

## 2024-04-12 RX ADMIN — LEVOCARNITINE 660 MG: 330 TABLET ORAL at 16:25

## 2024-04-12 RX ADMIN — METHYLCELLULOSE 500 MG: 500 TABLET ORAL at 11:45

## 2024-04-12 RX ADMIN — DIVALPROEX SODIUM 500 MG: 500 TABLET, DELAYED RELEASE ORAL at 09:47

## 2024-04-12 RX ADMIN — LEVETIRACETAM 1000 MG: 10 INJECTION, SOLUTION INTRAVENOUS at 09:19

## 2024-04-12 RX ADMIN — CLOTRIMAZOLE: 0.01 CREAM TOPICAL at 11:46

## 2024-04-12 RX ADMIN — LEVETIRACETAM 1500 MG: 750 TABLET, FILM COATED, EXTENDED RELEASE ORAL at 21:18

## 2024-04-12 RX ADMIN — CLOTRIMAZOLE: 0.01 CREAM TOPICAL at 21:26

## 2024-04-12 RX ADMIN — CITALOPRAM HYDROBROMIDE 60 MG: 20 TABLET, FILM COATED ORAL at 11:45

## 2024-04-12 ASSESSMENT — ACTIVITIES OF DAILY LIVING (ADL)
ADLS_ACUITY_SCORE: 56
ADLS_ACUITY_SCORE: 48
ADLS_ACUITY_SCORE: 48
DEPENDENT_IADLS:: CLEANING;COOKING;LAUNDRY;SHOPPING;MEAL PREPARATION;MEDICATION MANAGEMENT;MONEY MANAGEMENT;TRANSPORTATION
ADLS_ACUITY_SCORE: 48
ADLS_ACUITY_SCORE: 48
ADLS_ACUITY_SCORE: 59
ADLS_ACUITY_SCORE: 48
ADLS_ACUITY_SCORE: 59
ADLS_ACUITY_SCORE: 48
ADLS_ACUITY_SCORE: 48
ADLS_ACUITY_SCORE: 57
ADLS_ACUITY_SCORE: 55
ADLS_ACUITY_SCORE: 54
ADLS_ACUITY_SCORE: 46
ADLS_ACUITY_SCORE: 54
ADLS_ACUITY_SCORE: 57
ADLS_ACUITY_SCORE: 54
ADLS_ACUITY_SCORE: 59
ADLS_ACUITY_SCORE: 54
ADLS_ACUITY_SCORE: 46
ADLS_ACUITY_SCORE: 48

## 2024-04-12 NOTE — PROVIDER NOTIFICATION
"Provider notified 0913: \"Pt is pulling off BiPAP and screaming that he wants it off. He is definitely more alert than he had been previously. Is it okay to trial it off for a while? Thanks\"    Per provider ok to remove bipap  "

## 2024-04-12 NOTE — PLAN OF CARE
Goal Outcome Evaluation:      Plan of Care Reviewed With: patient    Overall Patient Progress: improvingOverall Patient Progress: improving    Outcome Evaluation: Pt removed from BiPAP per provdier this AM, plan to apply BiPAP at HS, pt remains alert, not lethargic, and answers questions appropriaetly to congition level    VSS. Pt alert to self and year only and has known developmental delay. Pt has remained alert and non lethargic throughout shift. Pt has been off BIPAP since this AM. Plan to put pt on BIPAP at HS and during naps. Pt educated on purpose and importance of BiPAP. Pt agreed to wear it tonight. 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 See orders. Wound care completed. K protocol, recheck in AM. Seizure precautions. Pt had a few loose stools today after receiving miralax. Pt doing well with A1 BGW. PT and OT consulted. Updated .    Problem: Adult Inpatient Plan of Care  Goal: Plan of Care Review  Description: The Plan of Care Review/Shift note should be completed every shift.  The Outcome Evaluation is a brief statement about your assessment that the patient is improving, declining, or no change.  This information will be displayed automatically on your shift  note.  4/12/2024 1729 by Monica Brannon, RN  Outcome: Progressing  Flowsheets (Taken 4/12/2024 1729)  Outcome Evaluation: Pt removed from BiPAP per provdier this AM, plan to apply BiPAP at HS, pt remains alert, not lethargic, and answers questions appropriaetly to congition level  Plan of Care Reviewed With: patient  Overall Patient Progress: improving  4/12/2024 1727 by Monica Brannon, RN  Outcome: Progressing  Flowsheets (Taken 4/12/2024 1727)  Outcome Evaluation: Pt removed from BiPAP per provdier this AM, plan to apply BiPAP at HS, pt remains alert, not lethargic, and answers questions appropriaetly to congition level  Plan of Care Reviewed With: patient  Goal: Patient-Specific Goal  "(Individualized)  Description: You can add care plan individualizations to a care plan. Examples of Individualization might be:  \"Parent requests to be called daily at 9am for status\", \"I have a hard time hearing out of my right ear\", or \"Do not touch me to wake me up as it startles  me\".  4/12/2024 1729 by Monica Brannon RN  Outcome: Progressing  4/12/2024 1727 by Monica Brannon RN  Outcome: Progressing  Goal: Absence of Hospital-Acquired Illness or Injury  4/12/2024 1729 by Monica Brannon RN  Outcome: Progressing  4/12/2024 1727 by Monica Brannon RN  Outcome: Progressing  Intervention: Identify and Manage Fall Risk  Recent Flowsheet Documentation  Taken 4/12/2024 1530 by Monica Brannon RN  Safety Promotion/Fall Prevention:   safety round/check completed   room organization consistent   patient and family education   nonskid shoes/slippers when out of bed   increased rounding and observation   clutter free environment maintained   assistive device/personal items within reach   activity supervised  Taken 4/12/2024 1351 by Monica Brannon RN  Safety Promotion/Fall Prevention:   safety round/check completed   room organization consistent   patient and family education   nonskid shoes/slippers when out of bed   increased rounding and observation   clutter free environment maintained   assistive device/personal items within reach   activity supervised  Taken 4/12/2024 1313 by Monica Brannon RN  Safety Promotion/Fall Prevention:   safety round/check completed   room organization consistent   patient and family education   nonskid shoes/slippers when out of bed   increased rounding and observation   clutter free environment maintained   assistive device/personal items within reach   activity supervised  Taken 4/12/2024 1228 by Monica Brannon RN  Safety Promotion/Fall Prevention:   safety round/check completed   room organization consistent   patient and family education   nonskid shoes/slippers when " out of bed   increased rounding and observation   clutter free environment maintained   assistive device/personal items within reach   activity supervised  Taken 4/12/2024 0930 by Monica Brannon RN  Safety Promotion/Fall Prevention:   safety round/check completed   room organization consistent   patient and family education   nonskid shoes/slippers when out of bed   increased rounding and observation   clutter free environment maintained   assistive device/personal items within reach   activity supervised  Intervention: Prevent Skin Injury  Recent Flowsheet Documentation  Taken 4/12/2024 1351 by Monica Brannon RN  Body Position:   weight shifting   turned   left   heels elevated  Taken 4/12/2024 1124 by Monica Brannon RN  Body Position:   turned   left   heels elevated   weight shifting  Taken 4/12/2024 0930 by Monica Brannon RN  Body Position:   turned   right   heels elevated   weight shifting  Device Skin Pressure Protection: absorbent pad utilized/changed  Intervention: Prevent and Manage VTE (Venous Thromboembolism) Risk  Recent Flowsheet Documentation  Taken 4/12/2024 0930 by Monica Brannon RN  VTE Prevention/Management: (lovenox) other (see comments)  Intervention: Prevent Infection  Recent Flowsheet Documentation  Taken 4/12/2024 0930 by Monica Brannon RN  Infection Prevention:   single patient room provided   personal protective equipment utilized   hand hygiene promoted   equipment surfaces disinfected  Goal: Optimal Comfort and Wellbeing  4/12/2024 1729 by Monica Brannon RN  Outcome: Progressing  4/12/2024 1727 by Monica Brannon RN  Outcome: Progressing  Goal: Readiness for Transition of Care  4/12/2024 1729 by Monica Brannon RN  Outcome: Progressing  4/12/2024 1727 by Monica Brannon RN  Outcome: Progressing  Intervention: Mutually Develop Transition Plan  Recent Flowsheet Documentation  Taken 4/12/2024 1100 by Monica Brannon RN  Equipment Currently Used at Home:    wheelchair, manual   walker, standard     Problem: Gas Exchange Impaired  Goal: Optimal Gas Exchange  4/12/2024 1729 by Monica Brannon RN  Outcome: Progressing  4/12/2024 1727 by Monica Brannon RN  Outcome: Progressing  Intervention: Optimize Oxygenation and Ventilation  Recent Flowsheet Documentation  Taken 4/12/2024 1351 by Monica Brannon RN  Head of Bed (HOB) Positioning: HOB at 20-30 degrees  Taken 4/12/2024 1124 by Monica Brannon RN  Head of Bed (HOB) Positioning: HOB at 20-30 degrees  Taken 4/12/2024 0930 by Monica Brannon RN  Head of Bed (HOB) Positioning: HOB at 20-30 degrees     Problem: Skin Injury Risk Increased  Goal: Skin Health and Integrity  4/12/2024 1729 by Monica Brannon RN  Outcome: Progressing  4/12/2024 1727 by Monica Brannon RN  Outcome: Progressing  Intervention: Plan: Nurse Driven Intervention: Moisture Management  Recent Flowsheet Documentation  Taken 4/12/2024 1530 by Monica Brannon RN  Bathing/Skin Care: incontinence care  Taken 4/12/2024 0930 by Monica Brannon RN  Moisture Interventions:   Incontinence pad   Urinary collection device   No brief in bed  Bathing/Skin Care: incontinence care  Intervention: Plan: Nurse Driven Intervention: Friction and Shear  Recent Flowsheet Documentation  Taken 4/12/2024 0930 by Monica Brannon RN  Friction/Shear Interventions: HOB 30 degrees or less  Intervention: Optimize Skin Protection  Recent Flowsheet Documentation  Taken 4/12/2024 1530 by Monica Brannon RN  Activity Management:   up to bedside commode   up in chair  Taken 4/12/2024 1351 by Monica Brannon RN  Head of Bed (HOB) Positioning: HOB at 20-30 degrees  Taken 4/12/2024 1124 by Monica Brannon RN  Head of Bed (HOB) Positioning: HOB at 20-30 degrees  Taken 4/12/2024 0930 by Monica Brannon RN  Activity Management:   activity adjusted per tolerance   activity encouraged  Head of Bed (HOB) Positioning: HOB at 20-30 degrees     Problem: Comorbidity  Management  Goal: Maintenance of Asthma Control  4/12/2024 1729 by Monica Brannon RN  Outcome: Progressing  4/12/2024 1727 by Monica Brannon RN  Outcome: Progressing  Intervention: Maintain Asthma Symptom Control  Recent Flowsheet Documentation  Taken 4/12/2024 0930 by Monica Brannon RN  Medication Review/Management: medications reviewed  Goal: Maintenance of Behavioral Health Symptom Control  4/12/2024 1729 by Monica Brannon RN  Outcome: Progressing  4/12/2024 1727 by Monica Brannon RN  Outcome: Progressing  Intervention: Maintain Behavioral Health Symptom Control  Recent Flowsheet Documentation  Taken 4/12/2024 0930 by Monica Brannon RN  Medication Review/Management: medications reviewed  Goal: Maintenance of COPD Symptom Control  4/12/2024 1729 by Monica Brannon RN  Outcome: Progressing  4/12/2024 1727 by Monica Brannon RN  Outcome: Progressing  Intervention: Maintain COPD Symptom Control  Recent Flowsheet Documentation  Taken 4/12/2024 0930 by Monica Brannon RN  Medication Review/Management: medications reviewed  Goal: Blood Glucose Levels Within Targeted Range  4/12/2024 1729 by Monica Brannon RN  Outcome: Progressing  4/12/2024 1727 by Monica Brannon RN  Outcome: Progressing  Intervention: Monitor and Manage Glycemia  Recent Flowsheet Documentation  Taken 4/12/2024 0930 by Monica Brannon RN  Medication Review/Management: medications reviewed  Goal: Maintenance of Heart Failure Symptom Control  4/12/2024 1729 by Monica Brannon RN  Outcome: Progressing  4/12/2024 1727 by Monica Brannon RN  Outcome: Progressing  Intervention: Maintain Heart Failure Management  Recent Flowsheet Documentation  Taken 4/12/2024 0930 by Monica Brannon RN  Medication Review/Management: medications reviewed  Goal: Blood Pressure in Desired Range  4/12/2024 1729 by Monica Brannon RN  Outcome: Progressing  4/12/2024 1727 by Monica Brannon RN  Outcome: Progressing  Intervention: Maintain  Blood Pressure Management  Recent Flowsheet Documentation  Taken 4/12/2024 0930 by Monica Brannon RN  Medication Review/Management: medications reviewed  Goal: Maintenance of Osteoarthritis Symptom Control  4/12/2024 1729 by Monica Brannon RN  Outcome: Progressing  4/12/2024 1727 by Monica Brannon RN  Outcome: Progressing  Intervention: Maintain Osteoarthritis Symptom Control  Recent Flowsheet Documentation  Taken 4/12/2024 1530 by Monica Brannon RN  Assistive Device Utilized:   gait belt   walker  Activity Management:   up to bedside commode   up in chair  Taken 4/12/2024 0930 by Monica Brannon RN  Activity Management:   activity adjusted per tolerance   activity encouraged  Medication Review/Management: medications reviewed  Goal: Bariatric Home Regimen Maintained  4/12/2024 1729 by Monica Brannon RN  Outcome: Progressing  4/12/2024 1727 by Monica Brannon RN  Outcome: Progressing  Intervention: Maintain and Manage Postbariatric Surgery Care  Recent Flowsheet Documentation  Taken 4/12/2024 0930 by Monica Brannon RN  Medication Review/Management: medications reviewed  Goal: Maintenance of Seizure Control  4/12/2024 1729 by Monica Brannon RN  Outcome: Progressing  4/12/2024 1727 by Monica Brannon RN  Outcome: Progressing  Intervention: Maintain Seizure Symptom Control  Recent Flowsheet Documentation  Taken 4/12/2024 0930 by Monica Brannon RN  Medication Review/Management: medications reviewed

## 2024-04-12 NOTE — CONSULTS
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     Assessment:      Areas visualized during today's visit: Focused:    Pressure Injury Location: Right IT  Last photo: 4/12/24    Wound type: Pressure Injury     Pressure Injury Stage: 3, present on admission   Wound history/plan of care:   Patient reports wound has been improving lately.  Group home staff has been managing site.    Wound base: 100 % granulation tissue     Palpation of the wound bed: normal      Drainage: moderate     Description of drainage: serosanguinous     Measurements (length x width x depth, in cm) 0.6  x 0.8  x  0.4 cm      Tunneling N/A     Undermining up to 0.5 cm from 4-8 o'clock  Periwound skin: Scar tissue      Color: normal and consistent with surrounding tissue      Temperature: normal   Odor: none  Pain: denies   Pain intervention prior to dressing change: N/A  Treatment goal: Heal   STATUS: initial assessment  Supplies ordered: ordered Aquacel Ag    My PI Risk Assessment     Sensory Perception: 3 - Slightly Limited     Moisture: 3 - Occasionally moist      Activity: 2 - Chairfast     Mobility: 3 - Slightly limited      Nutrition: 3 - Adequate     Friction/Shear: 2 - Potential problem      TOTAL: 16       Treatment Plan:     Right IT wound(s): Every 3 days  Cleanse with wound cleanser and dry  Apply Aquacel Ag (SPD#[id number]) cut to fit to wound  Cover with Mepilex 4x4    Pressure Injury Prevention (PIP) Plan:  If  "patient is declining pressure injury prevention interventions: Explore reason why and address patient's concerns, Educate on pressure injury risk and prevention intervention(s), and If patient is still declining, document \"informed refusal\"   Mattress: Follow bed algorithm, reassess daily and order specialty mattress, if indicated.  HOB: Maintain at or below 30 degrees, unless contraindicated  Repositioning in bed: Every 1-2 hours  and Left/right positioning; avoid supine  Heels: Pillows under calves  Chair positioning: Chair cushion (#[id number])    If patient has a buttock pressure injury, or high risk for PI use chair cushion or SPS.  Moisture Management: Avoid brief in bed  Under Devices: Inspect skin under all medical devices during skin inspection , Ensure tubes are stabilized without tension, and Ensure patient is not lying on medical devices or equipment when repositioned  Ask provider to discontinue device when no longer needed.       Orders: Written    RECOMMEND PRIMARY TEAM ORDER: None, at this time  Education provided: importance of repositioning, plan of care, and Off-loading pressure  Discussed plan of care with: Patient and Nurse  WOC nurse follow-up plan: weekly  Notify WOC if wound(s) deteriorate.  Nursing to notify the Provider(s) and re-consult the WOC Nurse if new skin concern.    DATA:     Current support surface: Standard  Standard gel/foam mattress (IsoFlex, Atmos air, etc)  Containment of urine/stool: Suction based external urinary catheter   BMI: Body mass index is 43.34 kg/m .   Active diet order: Orders Placed This Encounter      Regular Diet Adult     Output: I/O last 3 completed shifts:  In: 400 [P.O.:400]  Out: 400 [Urine:400]     Labs:   Recent Labs   Lab 04/12/24  0745 04/11/24  1011 04/11/24  1007   ALBUMIN  --   --  4.2   HGB 12.1*   < > 13.9   WBC 5.6  --  7.8    < > = values in this interval not displayed.     Pressure injury risk assessment:   Sensory Perception: 3-->slightly " limited  Moisture: 3-->occasionally moist  Activity: 2-->chairfast  Mobility: 3-->slightly limited  Nutrition: 3-->adequate  Friction and Shear: 2-->potential problem  Julio César Score: 16    Martin Bryson RN CWOCN  Contact [facility] Nurse (Marcella)  Dept. Office Number: [phone]

## 2024-04-12 NOTE — PLAN OF CARE
"  Problem: Adult Inpatient Plan of Care  Goal: Plan of Care Review  Description: The Plan of Care Review/Shift note should be completed every shift.  The Outcome Evaluation is a brief statement about your assessment that the patient is improving, declining, or no change.  This information will be displayed automatically on your shift  note.  Outcome: Progressing  Flowsheets (Taken 4/12/2024 0558)  Outcome Evaluation: Remains on continuous BiPAP. 35% fiO2. LS diminished. A2. Turned and repositioned q2h. Moisturizer applied to face and chapstick provided.  Plan of Care Reviewed With: patient  Overall Patient Progress: improving  Goal: Patient-Specific Goal (Individualized)  Description: You can add care plan individualizations to a care plan. Examples of Individualization might be:  \"Parent requests to be called daily at 9am for status\", \"I have a hard time hearing out of my right ear\", or \"Do not touch me to wake me up as it startles  me\".  Outcome: Progressing  Goal: Absence of Hospital-Acquired Illness or Injury  Outcome: Progressing  Intervention: Identify and Manage Fall Risk  Recent Flowsheet Documentation  Taken 4/11/2024 2028 by Lorraine Poole RN  Safety Promotion/Fall Prevention:   treat reversible contributory factors   treat underlying cause   safety round/check completed   room organization consistent  Intervention: Prevent and Manage VTE (Venous Thromboembolism) Risk  Recent Flowsheet Documentation  Taken 4/11/2024 2028 by Lorraine Poole RN  VTE Prevention/Management: (Lovenox) other (see comments)  Goal: Optimal Comfort and Wellbeing  Outcome: Progressing  Goal: Readiness for Transition of Care  Outcome: Progressing     Problem: Gas Exchange Impaired  Goal: Optimal Gas Exchange  Outcome: Progressing     Problem: Skin Injury Risk Increased  Goal: Skin Health and Integrity  Outcome: Progressing  Intervention: Plan: Nurse Driven Intervention: Moisture Management  Recent Flowsheet Documentation  Taken " 4/11/2024 2112 by Lorraine Poole RN  Bathing/Skin Care: moisturizer applied  Taken 4/11/2024 2028 by Lorraine Poole RN  Moisture Interventions:   Urinary collection device   Incontinence pad   No brief in bed  Intervention: Plan: Nurse Driven Intervention: Friction and Shear  Recent Flowsheet Documentation  Taken 4/11/2024 2028 by Lorraine Poole RN  Friction/Shear Interventions: HOB 30 degrees or less   Goal Outcome Evaluation:      Plan of Care Reviewed With: patient    Overall Patient Progress: improvingOverall Patient Progress: improving    Outcome Evaluation: Remains on continuous BiPAP. 35% fiO2. LS diminished. A2. Turned and repositioned q2h. Moisturizer applied to face and chapstick provided.

## 2024-04-12 NOTE — PROVIDER NOTIFICATION
Pt has oral medications due. Diet orders read no exceptions. [employer] notified. MD wants to see if the pt can tolerate being off the BiPAP for oral meds. Will put on NC and closely monitor.

## 2024-04-12 NOTE — DISCHARGE INSTRUCTIONS
Right IT wound(s): Every 3 days  1. Cleanse with wound cleanser and dry  2. Apply Aquacel Ag cut to fit to wound  3. Cover with Mepilex 4x4

## 2024-04-12 NOTE — CONSULTS
Care Management Initial Consult    General Information  Assessment completed with: Patient, Caregiver,         Primary Care Provider verified and updated as needed:     Readmission within the last 30 days: previous discharge plan unsuccessful      Reason for Consult: discharge planning  Advance Care Planning:            Communication Assessment  Patient's communication style: spoken language (English or Bilingual)    Hearing Difficulty or Deaf: no   Wear Glasses or Blind: yes    Cognitive  Cognitive/Neuro/Behavioral: .WDL except  Level of Consciousness: confused  Arousal Level: opens eyes spontaneously  Orientation: disoriented to, place, time, situation (pt knew year but not month or day)     Best Language: 0 - No aphasia  Speech: clear, spontaneous    Living Environment:   People in home: facility resident     Current living Arrangements: group home      Able to return to prior arrangements: yes       Family/Social Support:  Care provided by:    Provides care for: no one, unable/limited ability to care for self                Description of Support System:           Current Resources:   Patient receiving home care services: Yes  Skilled Home Care Services: Skilled Nursing, Physical Therapy, Occupational Therapy  Community Resources: Group Home  Equipment currently used at home: wheelchair, manual, walker, standard  Supplies currently used at home:      Employment/Financial:  Employment Status:          Financial Concerns:             Does the patient's insurance plan have a 3 day qualifying hospital stay waiver?  No    Lifestyle & Psychosocial Needs:  Social Determinants of Health     Food Insecurity: No Food Insecurity (7/10/2023)    Received from [employer] [employer]    Food Insecurity     Worried About Running Out of Food in the Last Year: 1   Depression: Not at risk (2/2/2024)    PHQ-2     PHQ-2 Score: 0   Housing Stability: Low Risk   (7/10/2023)    [facility]    Housing Stability     Unable to Pay for Housing in the Last Year: 1   Tobacco Use: Medium Risk (3/19/2024)    Patient History     Smoking Tobacco Use: Former     Smokeless Tobacco Use: Never     Passive Exposure: Not on file   Financial Resource Strain: Low Risk  (7/10/2023)    [facility] [employer] [facility] [employer] [facility]    Financial Resource Strain     Difficulty of Paying Living Expenses: 3     Difficulty of Paying Living Expenses: Not on file   Alcohol Use: Not At Risk (9/10/2019)    Received from [employer]    AUDIT-C     Frequency of Alcohol Consumption: Never     Average Number of Drinks: Not on file     Frequency of Binge Drinking: Not on file   Transportation Needs: No Transportation Needs (7/10/2023)    [facility] [employer] [facility] [employer] [facility]    Transportation Needs     Lack of Transportation (Medical): 1   Physical Activity: Not on file   Interpersonal Safety: Not on file   Stress: Not on file   Social Connections: Socially Integrated (7/10/2023)    [facility] [employer] [facility]    Social Connections     Frequency of Communication with Friends and Family: 0   Health Literacy: Not on file       Functional Status:  Prior to admission patient needed assistance:   Dependent ADLs:: Ambulation-walker, Wheelchair-independent, Transfers, Toileting, Bathing, Grooming  Dependent IADLs:: Cleaning, Cooking, Laundry, Shopping, Meal Preparation, Medication Management, Money Management, Transportation       Mental Health Status:          Chemical Dependency Status:                Values/Beliefs:  Spiritual, Cultural Beliefs, Uatsdin Practices, Values that affect care:                 Additional  Information:    SW met with pt at the bedside to discuss discharge planning. Pt confirms that all information from the previous admission has remained the same. Pt is from [facility] and provided permission for SW to speak with his .    KARIN spoke with pt  and confirmed the following information:    [facility] contact (name/number):  [phone]  Manager [phone]  Fax #: [phone]  Barriers to pt returning: None  Baseline mobility: Up with assist of 1, pt will ambulate short distances with a walker at times, otherwise uses a wheelchair  Time of preferred return: M-F, before 1500  New meds/prescriptions/Pharmacy: [facility]  Transportation: [facility] Wheelchair     Pt uses 2L O2 at baseline and BIPAP. Pt receives homecare through [facility] RN/PT/OT-KARIN confirmed with liaison.     Riddhi Ervin/SKYLER Miles, LGSW  Inpatient Care Coordination  Emergency Room /Float  [phone]    Riddhi Ervin, SW

## 2024-04-12 NOTE — PROGRESS NOTES
[facility]  Hospitalist Progress Note  Shabbir Vickers M.D., M.B.A.   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      In the he was afebrile 98.7, pulse 73, pressure 139/85 and breathing comfortably on room ar without hypoxia.  Work remarkable for normal BMP except for elevated CO2 of 38, normal LFTs, lactic acid 0.9, high-sensitivity troponin of 33 and TSH of 1.38.  CBC normal.  pH 7.29, pCO2 82 and bicarb elevated at 14.  CT head is negative, CT abdomen/pelvis shows cholelithiasis with mild gallbladder distention, hepatic steatosis and nonobstructing small bilateral renal calculi.  Ultrasound of the abdomen showed cholelithiasis.  Urine drug screen is positive for PCP.  Valproic acid is 60.9.  Patient was given 2 L of IV fluids and placed on BiPAP with C admission    Problem List with Assessment and Plan:      Acute toxic metabolic encephalopathy due to hypercapnia  Presents with increased confusion and failure to stand and pivot which is his baseline.  Likely secondary to noncompliance with BiPAP at night  Patient was admitted and was treated with BiPAP   Currently his mental status improving.  pCO2 venous 72 improving.  But he is declining offer to continue BiPAP support at night.  He was encouraged to use BiPAP.  Continue to monitor for now      Acute on chronic hypoxic and hypercapnic respiratory failure secondary to  obstructive sleep apnea, obesity hypoventilation syndrome    Chronically on nasal supplemental oxygen at 3 L and was supposed to be on BiPAP at night and during nap.  Initial gas showed venous pCO2 of 82, pH of 7.25, venous bicarb of 34.    Patient was treated with BiPAP overnight and his venous pCO2 improved but remained high.  Currently his venous pCO2 is 72, pH of 7.29 slightly improving after he wore his BiPAP overnight.  However he is refusing BiPAP treatment and currently on nasal supplemental oxygen.  He was encouraged to use BiPAP at night and during.  Continue to wean off oxygen as able.      Chronic medical problems:  #History of seizure activity  #History of epilepsy  -Seizure precaution  -Resumed PTA Keppra    #Schizoaffective disorder  #Borderline personality disorder  #MDD/anxiety  -Continue PTA seroquel, citalopram, lamotrigine, buspirone and Depakote   -Drug screen is positive for PCP but this might be related to his Lamictal use     #Developmental delay  -Lives in group home, at baseline uses wheelchair  -Supportive care     #Hypertension  -Continue prior to admission propranolol and prazosin      #GERD  -Continue Protonix      #Chronic hyperammonemia  -Continue prior to admission levocaritine    #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        Interval History (Subjective):        Patient is seen and examined by me today and medical record reviewed.Overnight events noted and care discussed with nursing staff.  Patient is well-known to us from previous recurrent admissions.  He was in fact in ICU with cardiac arrest in the past.  Currently he is awake and alert and expresses frustration with BiPAP.  He was instructed and  "advised to continue to wear BiPAP as needed to help him with his mental status changes.  Care plan was discussed with bedside nurse.                      Physical Exam:        Last Vital Signs:  /73 (BP Location: Right arm)   Pulse 73   Temp 97.9  F (36.6  C) (Oral)   Resp 18   Wt 129.3 kg (285 lb 0.9 oz)   SpO2 93%   BMI 43.34 kg/m      I/O last 3 completed shifts:  In: 400 [P.O.:400]  Out: 400 [Urine:400]    Wt Readings from Last 5 Encounters:   04/12/24 129.3 kg (285 lb 0.9 oz)   03/26/24 131.5 kg (290 lb)   03/19/24 131.5 kg (290 lb)   03/09/24 131.8 kg (290 lb 9.1 oz)   02/28/24 143 kg (315 lb 4.1 oz)        Constitutional: Awake, alert, cooperative, no apparent distress     Respiratory: Clear to auscultation bilaterally, no crackles or wheezing   Cardiovascular: Regular rate and rhythm, normal S1 and S2, and no murmur noted   Abdomen: Normal bowel sounds, soft, non-distended, non-tender   Skin: No new rashes, no cyanosis, dry to touch   Neuro: Alert with  no new focal weakness   Extremities: No edema   Other(s):        All other systems: Negative          Medications:        All current medications were reviewed with changes reflected in problem list.         Data:      All new lab and imaging data was reviewed.      Data reviewed today: I reviewed all new labs and imaging results over the last 24 hours. I personally reviewed no images or EKG's today      Recent Labs   Lab 04/12/24  0745 04/11/24  1011 04/11/24 1007 04/05/24  2201   WBC 5.6  --  7.8 7.2   HGB 12.1* 15.6 13.9 13.4   HCT 39.8* 46 45.0 43.1   MCV 99  --  98 97     --  227 266     No results for input(s): \"CULT\" in the last 168 hours.  Recent Labs   Lab 04/12/24  0745 04/11/24  1929 04/11/24  1011 04/11/24 1007 04/05/24  2201    143 143 144 142   POTASSIUM 3.9 4.1 4.6 4.7 4.0   CHLORIDE 102 104 97 101 97*   CO2 33* 33*  --  34* 34*   ANIONGAP 6* 6*  --  9 11   GLC 77 105* 81 81 105*   BUN 9.2 9.5 9 9.3 12.0   CR 0.57* " "0.58* 1.0 0.76 0.64*   GFRESTIMATED >90 >90  --  >90 >90   ARNOLD 8.6 8.5*  --  9.5 9.1   PROTTOTAL  --   --   --  7.4 7.1   ALBUMIN  --   --   --  4.2 4.0   BILITOTAL  --   --   --  0.3 0.2   ALKPHOS  --   --   --  110 107   AST  --   --   --  28 34   ALT  --   --   --  28 27       Recent Labs   Lab 04/12/24  0745 04/11/24  1929 04/11/24  1011 04/11/24  1007 04/05/24  2201   GLC 77 105* 81 81 105*       No results for input(s): \"INR\" in the last 168 hours.      No results for input(s): \"TROPONIN\", \"TROPI\", \"TROPR\" in the last 168 hours.    Invalid input(s): \"TROP\", \"TROPONINIES\"    Recent Results (from the past 48 hour(s))   CT Abdomen Pelvis w Contrast    Narrative    CT ABDOMEN PELVIS W CONTRAST 4/11/2024 10:34 AM    CLINICAL HISTORY: abd pain    TECHNIQUE: CT scan of the abdomen and pelvis was performed following  injection of IV contrast. Multiplanar reformats were obtained. Dose  reduction techniques were used.  CONTRAST: 100mL Isovue-370    COMPARISON: 5/12/2023    FINDINGS:   LOWER CHEST: Mild bibasilar atelectasis.    HEPATOBILIARY: Mild hepatic steatosis. Cholelithiasis with mild  gallbladder distention. No biliary ductal dilatation.    PANCREAS: Normal.    SPLEEN: Normal.    ADRENAL GLANDS: Normal.    KIDNEYS/BLADDER: Few tiny nonobstructing bilateral renal calculi.  Decreased size of a left renal cyst or calyceal diverticulum at the  upper pole measuring 2.7 cm with layering calcifications, previously  measuring 3.7 cm. No hydronephrosis or perinephritic stranding  bilaterally. No calculi in the ureters or urinary bladder.    BOWEL: No small bowel or colonic obstruction or pathologic changes.  Normal appendix.    PELVIC ORGANS: No pelvic masses.    ADDITIONAL FINDINGS: No free fluid or fluid collections. No  lymphadenopathy. No abdominal aortic aneurysm.    MUSCULOSKELETAL: Old healed bilateral rib fracture deformities. No  suspicious lesions of the bones.      Impression    IMPRESSION:   1.  Cholelithiasis " with mild gallbladder distention. Consider right  upper quadrant ultrasound for further evaluation.  2.  Mild hepatic steatosis.  3.  Nonobstructing small bilateral renal calculi. Decreased size of a  cyst or calyceal diverticulum with layering calcifications in the  upper pole of the left kidney.    PIETER GUILLORY MD         SYSTEM ID:  [id number]   Head CT w/o contrast    Narrative    EXAM: CT HEAD W/O CONTRAST  4/11/2024 10:35 AM     HISTORY:  altered mental status       COMPARISON:  Head CT 4/5/2024    TECHNIQUE: Using multidetector thin collimation helical acquisition  technique, axial, coronal and sagittal CT images from the skull base  to the vertex were obtained without intravenous contrast.   (topogram) image(s) also obtained and reviewed. Dose reduction  techniques were performed.    FINDINGS:  No intracranial hemorrhage, mass effect, or midline shift. No acute  loss of gray-white matter differentiation in the cerebral hemispheres.  Ventricles are proportionate to the cerebral sulci. Clear basal  cisterns. Patchy periventricular hypoattenuation, consistent with  chronic small vessel ischemic disease. Unchanged mild lateral and  third ventriculomegaly.    Hyperostosis frontalis interna. The bony calvaria and the bones of the  skull base are normal. The visualized portions of the paranasal  sinuses and mastoid air cells are clear. Grossly normal orbits.       Impression    IMPRESSION:   1. No acute intracranial pathology.   2. Chronic small vessel ischemic disease and mild diffuse cerebral  volume loss.    JENNIE MEAD MD         SYSTEM ID:  [id number]   US Abdomen Limited    Narrative    US ABDOMEN LIMITED 4/11/2024 11:41 AM    CLINICAL HISTORY: Abdominal pain.    TECHNIQUE: Limited abdominal ultrasound.    COMPARISON: CT abdomen and pelvis 4/11/2024.    FINDINGS:    GALLBLADDER: Cholelithiasis. No significant wall thickening or  pericholecystic fluid.    BILE DUCTS: There is no biliary dilatation. The  common duct measures 5  mm.    LIVER: Diffusely echogenic. No focal mass.    RIGHT KIDNEY: No hydronephrosis.    PANCREAS: The visualized portions of the pancreas are normal.    No ascites.      Impression    IMPRESSION:  1.  Cholelithiasis. No convincing sonographic signs to suggest acute  cholecystitis.  2.  Hepatic steatosis.    NAZ DOVER MD         SYSTEM ID:  [id number]       COVID Status:  COVID-19 PCR Results          6/20/2022    11:20 3/15/2023    13:14 3/25/2023    08:33 5/12/2023    01:20 11/24/2023    10:50 2/6/2024    12:22 2/21/2024    17:30 2/22/2024    06:52   COVID-19 PCR Results   SARS CoV2 PCR Negative  Negative  Negative  Negative  Negative  Negative  Negative  Negative      COVID-19 Antibody Results, Testing for Immunity           No data to display                 Disclaimer: This note consists of symbols derived from keyboarding, dictation and/or voice recognition software. As a result, there may be errors in the script that have gone undetected. Please consider this when interpreting information found in this chart.

## 2024-04-12 NOTE — PLAN OF CARE
"Goal Outcome Evaluation:      Plan of Care Reviewed With: patient    Overall Patient Progress: no changeOverall Patient Progress: no change    Outcome Evaluation: Pt. arrived to unit at 1820.  On continuous BiPaP, PCO2 down to 73 at 1930.      Problem: Adult Inpatient Plan of Care  Goal: Plan of Care Review  Description: The Plan of Care Review/Shift note should be completed every shift.  The Outcome Evaluation is a brief statement about your assessment that the patient is improving, declining, or no change.  This information will be displayed automatically on your shift  note.  Outcome: Not Progressing  Flowsheets (Taken 4/11/2024 1948)  Outcome Evaluation: Pt. arrived to unit at 1820.  On continuous BiPaP, PCO2 down to 73 at 1930.  Plan of Care Reviewed With: patient  Overall Patient Progress: no change  Goal: Patient-Specific Goal (Individualized)  Description: You can add care plan individualizations to a care plan. Examples of Individualization might be:  \"Parent requests to be called daily at 9am for status\", \"I have a hard time hearing out of my right ear\", or \"Do not touch me to wake me up as it startles  me\".  Outcome: Not Progressing  Goal: Absence of Hospital-Acquired Illness or Injury  Outcome: Not Progressing  Intervention: Identify and Manage Fall Risk  Recent Flowsheet Documentation  Taken 4/11/2024 1825 by Stanley Munroe, RN  Safety Promotion/Fall Prevention:   safety round/check completed   room near nurse's station  Goal: Optimal Comfort and Wellbeing  Outcome: Not Progressing  Goal: Readiness for Transition of Care  Outcome: Not Progressing     Problem: Gas Exchange Impaired  Goal: Optimal Gas Exchange  4/11/2024 1950 by Stanley Munroe, RN  Outcome: Not Progressing  4/11/2024 1948 by Stanley Munroe, RN  Outcome: Not Progressing     "

## 2024-04-12 NOTE — PROGRESS NOTES
[facility]    Patient is currently receiving home care services from [facility]. The patient is currently receiving SN PT OT services.  and home health team have been notified of patient admission. [facility] Liaison will continue to follow patient during stay. If appropriate provide orders to resume home care at time of discharge and make sure LUKE date is appropriate, ensuring a safe discharge plan.     Thank you,     DANDRE So, LPN  Home Care Liaison   Cell: [phone]

## 2024-04-13 LAB
ANION GAP SERPL CALCULATED.3IONS-SCNC: 8 MMOL/L (ref 7–15)
BUN SERPL-MCNC: 10.9 MG/DL (ref 6–20)
CALCIUM SERPL-MCNC: 8.3 MG/DL (ref 8.6–10)
CHLORIDE SERPL-SCNC: 103 MMOL/L (ref 98–107)
CREAT SERPL-MCNC: 0.59 MG/DL (ref 0.67–1.17)
DEPRECATED HCO3 PLAS-SCNC: 30 MMOL/L (ref 22–29)
EGFRCR SERPLBLD CKD-EPI 2021: >90 ML/MIN/1.73M2
ERYTHROCYTE [DISTWIDTH] IN BLOOD BY AUTOMATED COUNT: 12.8 % (ref 10–15)
GLUCOSE SERPL-MCNC: 125 MG/DL (ref 70–99)
HCT VFR BLD AUTO: 37 % (ref 40–53)
HGB BLD-MCNC: 11.5 G/DL (ref 13.3–17.7)
MCH RBC QN AUTO: 30.3 PG (ref 26.5–33)
MCHC RBC AUTO-ENTMCNC: 31.1 G/DL (ref 31.5–36.5)
MCV RBC AUTO: 98 FL (ref 78–100)
PLATELET # BLD AUTO: 160 10E3/UL (ref 150–450)
POTASSIUM SERPL-SCNC: 3.8 MMOL/L (ref 3.4–5.3)
RBC # BLD AUTO: 3.79 10E6/UL (ref 4.4–5.9)
SODIUM SERPL-SCNC: 141 MMOL/L (ref 135–145)
WBC # BLD AUTO: 5.4 10E3/UL (ref 4–11)

## 2024-04-13 PROCEDURE — 120N000001 HC R&B MED SURG/OB

## 2024-04-13 PROCEDURE — 80048 BASIC METABOLIC PNL TOTAL CA: CPT | Performed by: PHYSICIAN ASSISTANT

## 2024-04-13 PROCEDURE — 250N000011 HC RX IP 250 OP 636: Performed by: PHYSICIAN ASSISTANT

## 2024-04-13 PROCEDURE — 85027 COMPLETE CBC AUTOMATED: CPT | Performed by: PHYSICIAN ASSISTANT

## 2024-04-13 PROCEDURE — 94660 CPAP INITIATION&MGMT: CPT

## 2024-04-13 PROCEDURE — 36415 COLL VENOUS BLD VENIPUNCTURE: CPT | Performed by: PHYSICIAN ASSISTANT

## 2024-04-13 PROCEDURE — 99232 SBSQ HOSP IP/OBS MODERATE 35: CPT | Performed by: INTERNAL MEDICINE

## 2024-04-13 PROCEDURE — 250N000013 HC RX MED GY IP 250 OP 250 PS 637: Performed by: INTERNAL MEDICINE

## 2024-04-13 PROCEDURE — 999N000157 HC STATISTIC RCP TIME EA 10 MIN

## 2024-04-13 PROCEDURE — 250N000013 HC RX MED GY IP 250 OP 250 PS 637: Performed by: STUDENT IN AN ORGANIZED HEALTH CARE EDUCATION/TRAINING PROGRAM

## 2024-04-13 RX ADMIN — METHYLCELLULOSE 500 MG: 500 TABLET ORAL at 21:47

## 2024-04-13 RX ADMIN — FLUTICASONE PROPIONATE 1 SPRAY: 50 SPRAY, METERED NASAL at 08:31

## 2024-04-13 RX ADMIN — LAMOTRIGINE 200 MG: 100 TABLET, FILM COATED, EXTENDED RELEASE ORAL at 21:48

## 2024-04-13 RX ADMIN — CLOTRIMAZOLE: 0.01 CREAM TOPICAL at 08:30

## 2024-04-13 RX ADMIN — PROPRANOLOL HYDROCHLORIDE 20 MG: 20 TABLET ORAL at 21:49

## 2024-04-13 RX ADMIN — PANTOPRAZOLE SODIUM 40 MG: 40 TABLET, DELAYED RELEASE ORAL at 08:11

## 2024-04-13 RX ADMIN — LEVOCARNITINE 660 MG: 330 TABLET ORAL at 21:47

## 2024-04-13 RX ADMIN — PRAZOSIN HYDROCHLORIDE 2 MG: 1 CAPSULE ORAL at 21:48

## 2024-04-13 RX ADMIN — DIVALPROEX SODIUM 500 MG: 500 TABLET, DELAYED RELEASE ORAL at 16:55

## 2024-04-13 RX ADMIN — LEVOCARNITINE 660 MG: 330 TABLET ORAL at 16:55

## 2024-04-13 RX ADMIN — ZONISAMIDE 100 MG: 100 CAPSULE ORAL at 08:11

## 2024-04-13 RX ADMIN — BUSPIRONE HYDROCHLORIDE 15 MG: 15 TABLET ORAL at 08:11

## 2024-04-13 RX ADMIN — CITALOPRAM HYDROBROMIDE 60 MG: 20 TABLET, FILM COATED ORAL at 08:11

## 2024-04-13 RX ADMIN — FLUTICASONE PROPIONATE 1 SPRAY: 50 SPRAY, METERED NASAL at 21:53

## 2024-04-13 RX ADMIN — PROPRANOLOL HYDROCHLORIDE 20 MG: 20 TABLET ORAL at 08:11

## 2024-04-13 RX ADMIN — ZONISAMIDE 200 MG: 100 CAPSULE ORAL at 21:48

## 2024-04-13 RX ADMIN — QUETIAPINE FUMARATE 300 MG: 300 TABLET, EXTENDED RELEASE ORAL at 21:48

## 2024-04-13 RX ADMIN — DIVALPROEX SODIUM 500 MG: 500 TABLET, DELAYED RELEASE ORAL at 08:11

## 2024-04-13 RX ADMIN — ENOXAPARIN SODIUM 40 MG: 40 INJECTION SUBCUTANEOUS at 21:49

## 2024-04-13 RX ADMIN — METHYLCELLULOSE 500 MG: 500 TABLET ORAL at 08:11

## 2024-04-13 RX ADMIN — LEVOCARNITINE 660 MG: 330 TABLET ORAL at 08:11

## 2024-04-13 RX ADMIN — LEVETIRACETAM 1500 MG: 750 TABLET, FILM COATED, EXTENDED RELEASE ORAL at 21:48

## 2024-04-13 RX ADMIN — LEVETIRACETAM 1000 MG: 500 TABLET, FILM COATED ORAL at 08:11

## 2024-04-13 RX ADMIN — ENOXAPARIN SODIUM 40 MG: 40 INJECTION SUBCUTANEOUS at 08:11

## 2024-04-13 RX ADMIN — BACITRACIN: 500 OINTMENT TOPICAL at 08:31

## 2024-04-13 RX ADMIN — DIVALPROEX SODIUM 500 MG: 500 TABLET, DELAYED RELEASE ORAL at 21:48

## 2024-04-13 RX ADMIN — LAMOTRIGINE 200 MG: 100 TABLET, FILM COATED, EXTENDED RELEASE ORAL at 08:11

## 2024-04-13 RX ADMIN — BUSPIRONE HYDROCHLORIDE 15 MG: 15 TABLET ORAL at 21:48

## 2024-04-13 ASSESSMENT — ACTIVITIES OF DAILY LIVING (ADL)
ADLS_ACUITY_SCORE: 54
ADLS_ACUITY_SCORE: 58
ADLS_ACUITY_SCORE: 57
ADLS_ACUITY_SCORE: 54
ADLS_ACUITY_SCORE: 57
ADLS_ACUITY_SCORE: 54
ADLS_ACUITY_SCORE: 57
ADLS_ACUITY_SCORE: 54
ADLS_ACUITY_SCORE: 54
ADLS_ACUITY_SCORE: 57
ADLS_ACUITY_SCORE: 54
ADLS_ACUITY_SCORE: 57
ADLS_ACUITY_SCORE: 54

## 2024-04-13 NOTE — PROGRESS NOTES
A BiPAP of  20/7 @ 35% was applied to the pt via the mask for overnight use, pt has already removed BIPAP.  The bridge of the nose looks good and remains intact. Pt is tolerating it well. Will continue to monitor and assess the pt's current respiratory status and needs.      Fina Lofton, RT

## 2024-04-13 NOTE — PLAN OF CARE
"  Problem: Adult Inpatient Plan of Care  Goal: Plan of Care Review  Description: The Plan of Care Review/Shift note should be completed every shift.  The Outcome Evaluation is a brief statement about your assessment that the patient is improving, declining, or no change.  This information will be displayed automatically on your shift  note.  Outcome: Progressing  Flowsheets (Taken 4/13/2024 1798)  Outcome Evaluation: Pt tolerated BiPAP for about 6 hours overnight. 3 L O2 NC while off BiPAP  Plan of Care Reviewed With: patient  Overall Patient Progress: improving  Goal: Patient-Specific Goal (Individualized)  Description: You can add care plan individualizations to a care plan. Examples of Individualization might be:  \"Parent requests to be called daily at 9am for status\", \"I have a hard time hearing out of my right ear\", or \"Do not touch me to wake me up as it startles  me\".  Outcome: Progressing  Goal: Absence of Hospital-Acquired Illness or Injury  Outcome: Progressing  Intervention: Identify and Manage Fall Risk  Recent Flowsheet Documentation  Taken 4/13/2024 0420 by Lindy Mclaughlin RN  Safety Promotion/Fall Prevention: safety round/check completed  Taken 4/13/2024 0204 by Lindy Mclaughlin RN  Safety Promotion/Fall Prevention: safety round/check completed  Taken 4/12/2024 2359 by Lindy Mclaughlin RN  Safety Promotion/Fall Prevention: safety round/check completed  Taken 4/12/2024 2120 by Lindy Mclaughlin RN  Safety Promotion/Fall Prevention:   activity supervised   clutter free environment maintained   nonskid shoes/slippers when out of bed   safety round/check completed  Intervention: Prevent Skin Injury  Recent Flowsheet Documentation  Taken 4/13/2024 0204 by Lindy Mclaughlin RN  Body Position: weight shifting  Taken 4/12/2024 2359 by Lindy Mclaughlin RN  Body Position:   weight shifting   refuses positioning  Taken 4/12/2024 2120 by Lindy Mclaughlin RN  Body Position: weight shifting  Intervention: " Prevent and Manage VTE (Venous Thromboembolism) Risk  Recent Flowsheet Documentation  Taken 4/12/2024 2120 by Lindy Mclaughlin RN  VTE Prevention/Management: SCDs (sequential compression devices) off  Intervention: Prevent Infection  Recent Flowsheet Documentation  Taken 4/12/2024 2359 by Lindy Mclaughlin RN  Infection Prevention: rest/sleep promoted  Taken 4/12/2024 2120 by Lindy Mclaughlin RN  Infection Prevention:   rest/sleep promoted   hand hygiene promoted  Goal: Optimal Comfort and Wellbeing  Outcome: Progressing  Goal: Readiness for Transition of Care  Outcome: Progressing   Goal Outcome Evaluation:      Plan of Care Reviewed With: patient    Overall Patient Progress: improvingOverall Patient Progress: improving    Outcome Evaluation: Pt tolerated BiPAP for about 6 hours overnight. 3 L O2 NC while off BiPAP    Alert, oriented to self and place. VSS. Denies pain. LS diminished. Pt wore BiPAP for about 6 hours tonight and has been on and off it since 0300. Otherwise tolerated 3 L O2 NC. Tele SR. Mepilex intact to wound.

## 2024-04-13 NOTE — PROGRESS NOTES
[facility]  Hospitalist Progress Note  Shabbir Vickers M.D., M.B.A.   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      In the he was afebrile 98.7, pulse 73, pressure 139/85 and breathing comfortably on room ar without hypoxia.  Work remarkable for normal BMP except for elevated CO2 of 38, normal LFTs, lactic acid 0.9, high-sensitivity troponin of 33 and TSH of 1.38.  CBC normal.  pH 7.29, pCO2 82 and bicarb elevated at 14.  CT head is negative, CT abdomen/pelvis shows cholelithiasis with mild gallbladder distention, hepatic steatosis and nonobstructing small bilateral renal calculi.  Ultrasound of the abdomen showed cholelithiasis.  Urine drug screen is positive for PCP.  Valproic acid is 60.9.  Patient was given 2 L of IV fluids and placed on BiPAP with C admission    Problem List with Assessment and Plan:      Acute toxic metabolic encephalopathy due to hypercapnia  Presents with increased confusion and failure to stand and pivot which is his baseline.  Likely secondary to noncompliance with BiPAP at night  Patient was admitted and was treated with BiPAP   Patient was able to tolerate about 7 hours of BiPAP last night.  His mental status is improving.  Likely close to baseline.  He is up in chair and eating.  Care plan discussed with bedside nurse.  Plan is to continue nasal supplemental oxygen during the day and BiPAP at  night if he is willing to wear it.  He was advised to comply with our recommendation to prevent complications.  May discontinue IMC bed, get VBG in the morning.  Likely back to his group home tomorrow or Monday.      Acute on chronic hypoxic and hypercapnic respiratory failure secondary to obstructive sleep apnea, obesity hypoventilation syndrome    Chronically on nasal supplemental oxygen at 3 L and was supposed to be on BiPAP at night and during nap.  Initial gas showed venous pCO2 of 82, pH of 7.25, venous bicarb of 34.    Patient was treated with BiPAP overnight and his venous pCO2 improved but remained high.  Currently on 3 L of supplemental oxygen, continue for now.  BiPAP at night      Chronic medical problems:  #History of seizure activity  #History of epilepsy  -Seizure precaution  -Resumed PTA Keppra    #Schizoaffective disorder  #Borderline personality disorder  #MDD/anxiety  -Continue PTA seroquel, citalopram, lamotrigine, buspirone and Depakote   -Drug screen is positive for PCP but this might be related to his Lamictal use     #Developmental delay  -Lives in group home, at baseline uses wheelchair  -Supportive care     #Hypertension  -Continue prior to admission propranolol and prazosin      #GERD  -Continue Protonix      #Chronic hyperammonemia  -Continue prior to admission levocaritine    #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  Patient was able to tolerate BiPAP for about 7 hours per bedside nurse.  He is up in chair and eating.  Denies any new complaint.  " No fever or chills.  He is on 3 L of supplemental oxygen this morning.          Physical Exam:        Last Vital Signs:  /72 (BP Location: Right arm)   Pulse 66   Temp 98.1  F (36.7  C) (Oral)   Resp 20   Wt 129.3 kg (285 lb 0.9 oz)   SpO2 96%   BMI 43.34 kg/m      I/O last 3 completed shifts:  In: 1920 [P.O.:1920]  Out: 1025 [Urine:1025]    Wt Readings from Last 5 Encounters:   04/12/24 129.3 kg (285 lb 0.9 oz)   03/26/24 131.5 kg (290 lb)   03/19/24 131.5 kg (290 lb)   03/09/24 131.8 kg (290 lb 9.1 oz)   02/28/24 143 kg (315 lb 4.1 oz)        Constitutional: Awake, alert, cooperative, no apparent distress     Respiratory: Clear to auscultation bilaterally, no crackles or wheezing   Cardiovascular: Regular rate and rhythm, normal S1 and S2, and no murmur noted   Abdomen: Normal bowel sounds, soft, non-distended, non-tender   Skin: No new rashes, no cyanosis, dry to touch   Neuro: Alert with  no new focal weakness   Extremities: No edema   Other(s):        All other systems: Negative          Medications:        All current medications were reviewed with changes reflected in problem list.         Data:      All new lab and imaging data was reviewed.      Data reviewed today: I reviewed all new labs and imaging results over the last 24 hours. I personally reviewed no images or EKG's today      Recent Labs   Lab 04/13/24  0745 04/12/24  0745 04/11/24 1011 04/11/24  1007   WBC 5.4 5.6  --  7.8   HGB 11.5* 12.1* 15.6 13.9   HCT 37.0* 39.8* 46 45.0   MCV 98 99  --  98    199  --  227     No results for input(s): \"CULT\" in the last 168 hours.  Recent Labs   Lab 04/13/24  0745 04/12/24  0745 04/11/24 1929 04/11/24  1011 04/11/24  1007    141 143   < > 144   POTASSIUM 3.8 3.9 4.1   < > 4.7   CHLORIDE 103 102 104   < > 101   CO2 30* 33* 33*  --  34*   ANIONGAP 8 6* 6*  --  9   * 77 105*   < > 81   BUN 10.9 9.2 9.5   < > 9.3   CR 0.59* 0.57* 0.58*   < > 0.76   GFRESTIMATED >90 >90 >90  --  " ">90   ARNOLD 8.3* 8.6 8.5*  --  9.5   PROTTOTAL  --   --   --   --  7.4   ALBUMIN  --   --   --   --  4.2   BILITOTAL  --   --   --   --  0.3   ALKPHOS  --   --   --   --  110   AST  --   --   --   --  28   ALT  --   --   --   --  28    < > = values in this interval not displayed.       Recent Labs   Lab 04/13/24  0745 04/12/24  0745 04/11/24  1929 04/11/24  1011 04/11/24  1007   * 77 105* 81 81       No results for input(s): \"INR\" in the last 168 hours.      No results for input(s): \"TROPONIN\", \"TROPI\", \"TROPR\" in the last 168 hours.    Invalid input(s): \"TROP\", \"TROPONINIES\"    Recent Results (from the past 48 hour(s))   CT Abdomen Pelvis w Contrast    Narrative    CT ABDOMEN PELVIS W CONTRAST 4/11/2024 10:34 AM    CLINICAL HISTORY: abd pain    TECHNIQUE: CT scan of the abdomen and pelvis was performed following  injection of IV contrast. Multiplanar reformats were obtained. Dose  reduction techniques were used.  CONTRAST: 100mL Isovue-370    COMPARISON: 5/12/2023    FINDINGS:   LOWER CHEST: Mild bibasilar atelectasis.    HEPATOBILIARY: Mild hepatic steatosis. Cholelithiasis with mild  gallbladder distention. No biliary ductal dilatation.    PANCREAS: Normal.    SPLEEN: Normal.    ADRENAL GLANDS: Normal.    KIDNEYS/BLADDER: Few tiny nonobstructing bilateral renal calculi.  Decreased size of a left renal cyst or calyceal diverticulum at the  upper pole measuring 2.7 cm with layering calcifications, previously  measuring 3.7 cm. No hydronephrosis or perinephritic stranding  bilaterally. No calculi in the ureters or urinary bladder.    BOWEL: No small bowel or colonic obstruction or pathologic changes.  Normal appendix.    PELVIC ORGANS: No pelvic masses.    ADDITIONAL FINDINGS: No free fluid or fluid collections. No  lymphadenopathy. No abdominal aortic aneurysm.    MUSCULOSKELETAL: Old healed bilateral rib fracture deformities. No  suspicious lesions of the bones.      Impression    IMPRESSION:   1.  " Cholelithiasis with mild gallbladder distention. Consider right  upper quadrant ultrasound for further evaluation.  2.  Mild hepatic steatosis.  3.  Nonobstructing small bilateral renal calculi. Decreased size of a  cyst or calyceal diverticulum with layering calcifications in the  upper pole of the left kidney.    PIETER GUILLORY MD         SYSTEM ID:  [id number]   Head CT w/o contrast    Narrative    EXAM: CT HEAD W/O CONTRAST  4/11/2024 10:35 AM     HISTORY:  altered mental status       COMPARISON:  Head CT 4/5/2024    TECHNIQUE: Using multidetector thin collimation helical acquisition  technique, axial, coronal and sagittal CT images from the skull base  to the vertex were obtained without intravenous contrast.   (topogram) image(s) also obtained and reviewed. Dose reduction  techniques were performed.    FINDINGS:  No intracranial hemorrhage, mass effect, or midline shift. No acute  loss of gray-white matter differentiation in the cerebral hemispheres.  Ventricles are proportionate to the cerebral sulci. Clear basal  cisterns. Patchy periventricular hypoattenuation, consistent with  chronic small vessel ischemic disease. Unchanged mild lateral and  third ventriculomegaly.    Hyperostosis frontalis interna. The bony calvaria and the bones of the  skull base are normal. The visualized portions of the paranasal  sinuses and mastoid air cells are clear. Grossly normal orbits.       Impression    IMPRESSION:   1. No acute intracranial pathology.   2. Chronic small vessel ischemic disease and mild diffuse cerebral  volume loss.    JENNIE MEAD MD         SYSTEM ID:  [id number]   US Abdomen Limited    Narrative    US ABDOMEN LIMITED 4/11/2024 11:41 AM    CLINICAL HISTORY: Abdominal pain.    TECHNIQUE: Limited abdominal ultrasound.    COMPARISON: CT abdomen and pelvis 4/11/2024.    FINDINGS:    GALLBLADDER: Cholelithiasis. No significant wall thickening or  pericholecystic fluid.    BILE DUCTS: There is no biliary  dilatation. The common duct measures 5  mm.    LIVER: Diffusely echogenic. No focal mass.    RIGHT KIDNEY: No hydronephrosis.    PANCREAS: The visualized portions of the pancreas are normal.    No ascites.      Impression    IMPRESSION:  1.  Cholelithiasis. No convincing sonographic signs to suggest acute  cholecystitis.  2.  Hepatic steatosis.    NAZ DOVER MD         SYSTEM ID:  [id number]       COVID Status:  COVID-19 PCR Results          6/20/2022    11:20 3/15/2023    13:14 3/25/2023    08:33 5/12/2023    01:20 11/24/2023    10:50 2/6/2024    12:22 2/21/2024    17:30 2/22/2024    06:52   COVID-19 PCR Results   SARS CoV2 PCR Negative  Negative  Negative  Negative  Negative  Negative  Negative  Negative      COVID-19 Antibody Results, Testing for Immunity           No data to display                 Disclaimer: This note consists of symbols derived from keyboarding, dictation and/or voice recognition software. As a result, there may be errors in the script that have gone undetected. Please consider this when interpreting information found in this chart.

## 2024-04-13 NOTE — PLAN OF CARE
"Goal Outcome Evaluation:      Plan of Care Reviewed With: patient    Overall Patient Progress: no changeOverall Patient Progress: no change    Outcome Evaluation: Pt went on BiPAP for naps today but has been more somnolent this evening. Currenly on BiPAP at 35% FIO2.    VSS. Alert to self and place only. has known developmental delay. Pt did wear BiPAP for his name today. Pt became more somnolent around 1700. BiPAP placed back on at 35% FiO2. Pt currently wearing BiPAP. LS dim in bases. IMC order discontinued. Pt was up in chair for breakfast but refused to get into chair for lunch. Dressing on wound of R IT intact. Pt did not have BM today. Updated step mother per pt approval today. Plan to check VBGs in morning and possible discharge Monday to group home.    Problem: Adult Inpatient Plan of Care  Goal: Plan of Care Review  Description: The Plan of Care Review/Shift note should be completed every shift.  The Outcome Evaluation is a brief statement about your assessment that the patient is improving, declining, or no change.  This information will be displayed automatically on your shift  note.  Outcome: Progressing  Flowsheets (Taken 4/13/2024 3497)  Outcome Evaluation: Pt went on BiPAP for naps today but has been more somnolent this evening. Currenly on BiPAP at 35% FIO2.  Plan of Care Reviewed With: patient  Overall Patient Progress: no change  Goal: Patient-Specific Goal (Individualized)  Description: You can add care plan individualizations to a care plan. Examples of Individualization might be:  \"Parent requests to be called daily at 9am for status\", \"I have a hard time hearing out of my right ear\", or \"Do not touch me to wake me up as it startles  me\".  Outcome: Progressing  Goal: Absence of Hospital-Acquired Illness or Injury  Outcome: Progressing  Intervention: Identify and Manage Fall Risk  Recent Flowsheet Documentation  Taken 4/13/2024 1700 by Monica Brannon RN  Safety Promotion/Fall Prevention:   " safety round/check completed   room organization consistent   patient and family education   nonskid shoes/slippers when out of bed   clutter free environment maintained   assistive device/personal items within reach   activity supervised  Taken 4/13/2024 1430 by Monica Brannon RN  Safety Promotion/Fall Prevention:   safety round/check completed   room organization consistent   patient and family education   nonskid shoes/slippers when out of bed   clutter free environment maintained   assistive device/personal items within reach   activity supervised  Taken 4/13/2024 1330 by Monica Brannon RN  Safety Promotion/Fall Prevention:   safety round/check completed   room organization consistent   patient and family education   nonskid shoes/slippers when out of bed   clutter free environment maintained   assistive device/personal items within reach   activity supervised  Taken 4/13/2024 1145 by Monica Brannon RN  Safety Promotion/Fall Prevention:   safety round/check completed   room organization consistent   patient and family education   nonskid shoes/slippers when out of bed   clutter free environment maintained   assistive device/personal items within reach   activity supervised  Taken 4/13/2024 0807 by Monica Brannon RN  Safety Promotion/Fall Prevention:   safety round/check completed   room organization consistent   patient and family education   nonskid shoes/slippers when out of bed   clutter free environment maintained   assistive device/personal items within reach   activity supervised  Intervention: Prevent Skin Injury  Recent Flowsheet Documentation  Taken 4/13/2024 1700 by Monica Brannon RN  Body Position:   turned   left   heels elevated   weight shifting  Taken 4/13/2024 1330 by Monica Brannon RN  Body Position: weight shifting  Taken 4/13/2024 0807 by Monica Brannon RN  Device Skin Pressure Protection: absorbent pad utilized/changed  Intervention: Prevent and Manage VTE (Venous  Thromboembolism) Risk  Recent Flowsheet Documentation  Taken 4/13/2024 0807 by Monica Brannon RN  VTE Prevention/Management: SCDs (sequential compression devices) off  Intervention: Prevent Infection  Recent Flowsheet Documentation  Taken 4/13/2024 0807 by Monica Brannon RN  Infection Prevention:   single patient room provided   personal protective equipment utilized   hand hygiene promoted   equipment surfaces disinfected  Goal: Optimal Comfort and Wellbeing  Outcome: Progressing  Goal: Readiness for Transition of Care  Outcome: Progressing     Problem: Gas Exchange Impaired  Goal: Optimal Gas Exchange  Outcome: Progressing     Problem: Skin Injury Risk Increased  Goal: Skin Health and Integrity  Outcome: Progressing  Intervention: Plan: Nurse Driven Intervention: Moisture Management  Recent Flowsheet Documentation  Taken 4/13/2024 0807 by Monica Brannon RN  Moisture Interventions:   Incontinence pad   No brief in bed  Intervention: Plan: Nurse Driven Intervention: Friction and Shear  Recent Flowsheet Documentation  Taken 4/13/2024 0807 by Monica Brannon RN  Friction/Shear Interventions: HOB 30 degrees or less  Intervention: Optimize Skin Protection  Recent Flowsheet Documentation  Taken 4/13/2024 1430 by Monica Brannon RN  Activity Management: patient refuses activity  Taken 4/13/2024 1330 by Monica Brannon RN  Activity Management: back to bed  Taken 4/13/2024 1145 by Monica Brannon RN  Activity Management: up in chair  Taken 4/13/2024 0807 by Monica Brannon RN  Activity Management: up in chair     Problem: Comorbidity Management  Goal: Maintenance of Asthma Control  Outcome: Progressing  Intervention: Maintain Asthma Symptom Control  Recent Flowsheet Documentation  Taken 4/13/2024 0807 by Monica Brannon RN  Medication Review/Management: medications reviewed  Goal: Maintenance of Behavioral Health Symptom Control  Outcome: Progressing  Intervention: Maintain Behavioral Health Symptom  Control  Recent Flowsheet Documentation  Taken 4/13/2024 0807 by Monica Brannon RN  Medication Review/Management: medications reviewed  Goal: Maintenance of COPD Symptom Control  Outcome: Progressing  Intervention: Maintain COPD Symptom Control  Recent Flowsheet Documentation  Taken 4/13/2024 0807 by Monica Brannon RN  Medication Review/Management: medications reviewed  Goal: Blood Glucose Levels Within Targeted Range  Outcome: Progressing  Intervention: Monitor and Manage Glycemia  Recent Flowsheet Documentation  Taken 4/13/2024 0807 by Monica Brannon RN  Medication Review/Management: medications reviewed  Goal: Maintenance of Heart Failure Symptom Control  Outcome: Progressing  Intervention: Maintain Heart Failure Management  Recent Flowsheet Documentation  Taken 4/13/2024 0807 by Monica Brannon RN  Medication Review/Management: medications reviewed  Goal: Blood Pressure in Desired Range  Outcome: Progressing  Intervention: Maintain Blood Pressure Management  Recent Flowsheet Documentation  Taken 4/13/2024 0807 by Monica Brannon RN  Medication Review/Management: medications reviewed  Goal: Maintenance of Osteoarthritis Symptom Control  Outcome: Progressing  Intervention: Maintain Osteoarthritis Symptom Control  Recent Flowsheet Documentation  Taken 4/13/2024 1430 by Monica Brannon RN  Activity Management: patient refuses activity  Taken 4/13/2024 1330 by Monica Brannon RN  Activity Management: back to bed  Taken 4/13/2024 1145 by Monica Brannon RN  Activity Management: up in chair  Taken 4/13/2024 0807 by Monica Brannon RN  Assistive Device Utilized:   gait belt   walker  Activity Management: up in chair  Medication Review/Management: medications reviewed  Goal: Bariatric Home Regimen Maintained  Outcome: Progressing  Intervention: Maintain and Manage Postbariatric Surgery Care  Recent Flowsheet Documentation  Taken 4/13/2024 0807 by Monica Brannon RN  Medication Review/Management:  medications reviewed  Goal: Maintenance of Seizure Control  Outcome: Progressing  Intervention: Maintain Seizure Symptom Control  Recent Flowsheet Documentation  Taken 4/13/2024 0807 by Monica Brannon, RN  Medication Review/Management: medications reviewed

## 2024-04-13 NOTE — PLAN OF CARE
Occupational Therapy: Orders received. Chart reviewed and discussed with care team.? Occupational Therapy not indicated due to patient living in group home, wheelchair for all mobility and assist with ADLs, PT following.? Defer discharge recommendations to PT and medical team.? Will complete orders.

## 2024-04-14 LAB
ANION GAP SERPL CALCULATED.3IONS-SCNC: 11 MMOL/L (ref 7–15)
BASE EXCESS BLDV CALC-SCNC: 5.1 MMOL/L (ref -3–3)
BUN SERPL-MCNC: 11 MG/DL (ref 6–20)
CALCIUM SERPL-MCNC: 8.6 MG/DL (ref 8.6–10)
CHLORIDE SERPL-SCNC: 101 MMOL/L (ref 98–107)
CREAT SERPL-MCNC: 0.6 MG/DL (ref 0.67–1.17)
DEPRECATED HCO3 PLAS-SCNC: 29 MMOL/L (ref 22–29)
EGFRCR SERPLBLD CKD-EPI 2021: >90 ML/MIN/1.73M2
ERYTHROCYTE [DISTWIDTH] IN BLOOD BY AUTOMATED COUNT: 12.8 % (ref 10–15)
GLUCOSE SERPL-MCNC: 112 MG/DL (ref 70–99)
HCO3 BLDV-SCNC: 33 MMOL/L (ref 21–28)
HCT VFR BLD AUTO: 36.6 % (ref 40–53)
HGB BLD-MCNC: 11.2 G/DL (ref 13.3–17.7)
MCH RBC QN AUTO: 30.2 PG (ref 26.5–33)
MCHC RBC AUTO-ENTMCNC: 30.6 G/DL (ref 31.5–36.5)
MCV RBC AUTO: 99 FL (ref 78–100)
O2/TOTAL GAS SETTING VFR VENT: 4 %
OXYHGB MFR BLDV: 96 % (ref 70–75)
PCO2 BLDV: 62 MM HG (ref 40–50)
PH BLDV: 7.33 [PH] (ref 7.32–7.43)
PLATELET # BLD AUTO: 166 10E3/UL (ref 150–450)
PO2 BLDV: 84 MM HG (ref 25–47)
POTASSIUM SERPL-SCNC: 3.8 MMOL/L (ref 3.4–5.3)
RBC # BLD AUTO: 3.71 10E6/UL (ref 4.4–5.9)
SAO2 % BLDV: 97 % (ref 70–75)
SODIUM SERPL-SCNC: 141 MMOL/L (ref 135–145)
WBC # BLD AUTO: 5.8 10E3/UL (ref 4–11)

## 2024-04-14 PROCEDURE — 120N000001 HC R&B MED SURG/OB

## 2024-04-14 PROCEDURE — 85027 COMPLETE CBC AUTOMATED: CPT | Performed by: PHYSICIAN ASSISTANT

## 2024-04-14 PROCEDURE — 250N000013 HC RX MED GY IP 250 OP 250 PS 637: Performed by: STUDENT IN AN ORGANIZED HEALTH CARE EDUCATION/TRAINING PROGRAM

## 2024-04-14 PROCEDURE — 250N000013 HC RX MED GY IP 250 OP 250 PS 637: Performed by: INTERNAL MEDICINE

## 2024-04-14 PROCEDURE — 82805 BLOOD GASES W/O2 SATURATION: CPT | Performed by: INTERNAL MEDICINE

## 2024-04-14 PROCEDURE — 36415 COLL VENOUS BLD VENIPUNCTURE: CPT | Performed by: PHYSICIAN ASSISTANT

## 2024-04-14 PROCEDURE — 99232 SBSQ HOSP IP/OBS MODERATE 35: CPT | Performed by: INTERNAL MEDICINE

## 2024-04-14 PROCEDURE — 80048 BASIC METABOLIC PNL TOTAL CA: CPT | Performed by: PHYSICIAN ASSISTANT

## 2024-04-14 PROCEDURE — 94660 CPAP INITIATION&MGMT: CPT

## 2024-04-14 PROCEDURE — 250N000011 HC RX IP 250 OP 636: Performed by: PHYSICIAN ASSISTANT

## 2024-04-14 PROCEDURE — 999N000157 HC STATISTIC RCP TIME EA 10 MIN

## 2024-04-14 RX ADMIN — LAMOTRIGINE 200 MG: 100 TABLET, FILM COATED, EXTENDED RELEASE ORAL at 10:14

## 2024-04-14 RX ADMIN — PROPRANOLOL HYDROCHLORIDE 20 MG: 20 TABLET ORAL at 21:07

## 2024-04-14 RX ADMIN — LAMOTRIGINE 200 MG: 100 TABLET, FILM COATED, EXTENDED RELEASE ORAL at 21:08

## 2024-04-14 RX ADMIN — FLUTICASONE FUROATE AND VILANTEROL TRIFENATATE 1 PUFF: 200; 25 POWDER RESPIRATORY (INHALATION) at 10:25

## 2024-04-14 RX ADMIN — METHYLCELLULOSE 500 MG: 500 TABLET ORAL at 10:14

## 2024-04-14 RX ADMIN — CITALOPRAM HYDROBROMIDE 60 MG: 20 TABLET, FILM COATED ORAL at 10:13

## 2024-04-14 RX ADMIN — PANTOPRAZOLE SODIUM 40 MG: 40 TABLET, DELAYED RELEASE ORAL at 10:14

## 2024-04-14 RX ADMIN — BUSPIRONE HYDROCHLORIDE 15 MG: 15 TABLET ORAL at 21:07

## 2024-04-14 RX ADMIN — ZONISAMIDE 200 MG: 100 CAPSULE ORAL at 21:07

## 2024-04-14 RX ADMIN — ENOXAPARIN SODIUM 40 MG: 40 INJECTION SUBCUTANEOUS at 21:06

## 2024-04-14 RX ADMIN — PROPRANOLOL HYDROCHLORIDE 20 MG: 20 TABLET ORAL at 10:13

## 2024-04-14 RX ADMIN — LEVOCARNITINE 660 MG: 330 TABLET ORAL at 21:07

## 2024-04-14 RX ADMIN — LEVETIRACETAM 1500 MG: 750 TABLET, FILM COATED, EXTENDED RELEASE ORAL at 21:08

## 2024-04-14 RX ADMIN — FLUTICASONE PROPIONATE 1 SPRAY: 50 SPRAY, METERED NASAL at 10:25

## 2024-04-14 RX ADMIN — QUETIAPINE FUMARATE 300 MG: 300 TABLET, EXTENDED RELEASE ORAL at 21:08

## 2024-04-14 RX ADMIN — DIVALPROEX SODIUM 500 MG: 500 TABLET, DELAYED RELEASE ORAL at 10:14

## 2024-04-14 RX ADMIN — DIVALPROEX SODIUM 500 MG: 500 TABLET, DELAYED RELEASE ORAL at 16:06

## 2024-04-14 RX ADMIN — UMECLIDINIUM 1 PUFF: 62.5 AEROSOL, POWDER ORAL at 10:25

## 2024-04-14 RX ADMIN — METHYLCELLULOSE 500 MG: 500 TABLET ORAL at 21:08

## 2024-04-14 RX ADMIN — ZONISAMIDE 100 MG: 100 CAPSULE ORAL at 10:15

## 2024-04-14 RX ADMIN — DIVALPROEX SODIUM 500 MG: 500 TABLET, DELAYED RELEASE ORAL at 21:09

## 2024-04-14 RX ADMIN — LEVOCARNITINE 660 MG: 330 TABLET ORAL at 16:06

## 2024-04-14 RX ADMIN — BUSPIRONE HYDROCHLORIDE 15 MG: 15 TABLET ORAL at 10:14

## 2024-04-14 RX ADMIN — LEVOCARNITINE 660 MG: 330 TABLET ORAL at 10:14

## 2024-04-14 RX ADMIN — FLUTICASONE PROPIONATE 1 SPRAY: 50 SPRAY, METERED NASAL at 21:12

## 2024-04-14 RX ADMIN — PRAZOSIN HYDROCHLORIDE 2 MG: 1 CAPSULE ORAL at 21:06

## 2024-04-14 RX ADMIN — ENOXAPARIN SODIUM 40 MG: 40 INJECTION SUBCUTANEOUS at 10:26

## 2024-04-14 RX ADMIN — LEVETIRACETAM 1000 MG: 500 TABLET, FILM COATED ORAL at 10:15

## 2024-04-14 ASSESSMENT — ACTIVITIES OF DAILY LIVING (ADL)
ADLS_ACUITY_SCORE: 63
ADLS_ACUITY_SCORE: 63
ADLS_ACUITY_SCORE: 58
ADLS_ACUITY_SCORE: 57
ADLS_ACUITY_SCORE: 58
ADLS_ACUITY_SCORE: 63
ADLS_ACUITY_SCORE: 58
ADLS_ACUITY_SCORE: 58
ADLS_ACUITY_SCORE: 63
ADLS_ACUITY_SCORE: 57
ADLS_ACUITY_SCORE: 63
ADLS_ACUITY_SCORE: 58
ADLS_ACUITY_SCORE: 63
ADLS_ACUITY_SCORE: 58
ADLS_ACUITY_SCORE: 57
ADLS_ACUITY_SCORE: 59
ADLS_ACUITY_SCORE: 59
ADLS_ACUITY_SCORE: 63
ADLS_ACUITY_SCORE: 57

## 2024-04-14 NOTE — PLAN OF CARE
"Goal Outcome Evaluation:    Plan of Care Reviewed With: patient    Overall Patient Progress: no change    Outcome Evaluation: BiPAP off around 0730. On 3L NC during day.    Disoriented to situation. A1-2 w/ walker and gb, pivot to commode. Denies pain. 3L NC, BiPAP at night. LS: dim. No tele. Mild edema. K protocol, recheck in AM. CO2 down to 62. Possible discharge back to  tomorrow.                  Problem: Adult Inpatient Plan of Care  Goal: Plan of Care Review  Description: The Plan of Care Review/Shift note should be completed every shift.  The Outcome Evaluation is a brief statement about your assessment that the patient is improving, declining, or no change.  This information will be displayed automatically on your shift  note.  Outcome: Not Progressing  Flowsheets (Taken 4/14/2024 1239)  Outcome Evaluation: BiPAP off around 0730. On 3L NC during day.  Plan of Care Reviewed With: patient  Overall Patient Progress: no change  Goal: Patient-Specific Goal (Individualized)  Description: You can add care plan individualizations to a care plan. Examples of Individualization might be:  \"Parent requests to be called daily at 9am for status\", \"I have a hard time hearing out of my right ear\", or \"Do not touch me to wake me up as it startles  me\".  Outcome: Not Progressing  Goal: Absence of Hospital-Acquired Illness or Injury  Outcome: Not Progressing  Intervention: Identify and Manage Fall Risk  Recent Flowsheet Documentation  Taken 4/14/2024 1032 by Claribel Garsia, RN  Safety Promotion/Fall Prevention:   activity supervised   assistive device/personal items within reach   clutter free environment maintained   increased rounding and observation   increase visualization of patient   lighting adjusted   nonskid shoes/slippers when out of bed   patient and family education   room near nurse's station   safety round/check completed   supervised activity  Intervention: Prevent Skin Injury  Recent Flowsheet " Documentation  Taken 4/14/2024 1032 by Claribel Garsia RN  Body Position: weight shifting  Goal: Optimal Comfort and Wellbeing  Outcome: Not Progressing  Goal: Readiness for Transition of Care  Outcome: Not Progressing     Problem: Gas Exchange Impaired  Goal: Optimal Gas Exchange  Outcome: Not Progressing  Intervention: Optimize Oxygenation and Ventilation  Recent Flowsheet Documentation  Taken 4/14/2024 1032 by Claribel Garsia RN  Head of Bed (HOB) Positioning: HOB at 20-30 degrees     Problem: Skin Injury Risk Increased  Goal: Skin Health and Integrity  Outcome: Not Progressing  Intervention: Plan: Nurse Driven Intervention: Moisture Management  Recent Flowsheet Documentation  Taken 4/14/2024 1032 by Claribel Garsia RN  Moisture Interventions:   No brief in bed   Encourage regular toileting  Intervention: Plan: Nurse Driven Intervention: Friction and Shear  Recent Flowsheet Documentation  Taken 4/14/2024 1032 by Claribel Garsia RN  Friction/Shear Interventions: HOB 30 degrees or less  Intervention: Optimize Skin Protection  Recent Flowsheet Documentation  Taken 4/14/2024 1032 by Claribel Garsia RN  Head of Bed (HOB) Positioning: HOB at 20-30 degrees     Problem: Comorbidity Management  Goal: Maintenance of Asthma Control  Outcome: Not Progressing  Intervention: Maintain Asthma Symptom Control  Recent Flowsheet Documentation  Taken 4/14/2024 1032 by Claribel Garsia RN  Medication Review/Management: medications reviewed  Goal: Maintenance of Behavioral Health Symptom Control  Outcome: Not Progressing  Intervention: Maintain Behavioral Health Symptom Control  Recent Flowsheet Documentation  Taken 4/14/2024 1032 by Claribel Garsia RN  Medication Review/Management: medications reviewed  Goal: Maintenance of COPD Symptom Control  Outcome: Not Progressing  Intervention: Maintain COPD Symptom Control  Recent Flowsheet Documentation  Taken 4/14/2024 1032 by Claribel Garsia RN  Medication Review/Management:  medications reviewed  Goal: Blood Glucose Levels Within Targeted Range  Outcome: Not Progressing  Intervention: Monitor and Manage Glycemia  Recent Flowsheet Documentation  Taken 4/14/2024 1032 by Claribel Garsia RN  Medication Review/Management: medications reviewed  Goal: Maintenance of Heart Failure Symptom Control  Outcome: Not Progressing  Intervention: Maintain Heart Failure Management  Recent Flowsheet Documentation  Taken 4/14/2024 1032 by Claribel Garsia RN  Medication Review/Management: medications reviewed  Goal: Blood Pressure in Desired Range  Outcome: Not Progressing  Intervention: Maintain Blood Pressure Management  Recent Flowsheet Documentation  Taken 4/14/2024 1032 by Claribel Garsia RN  Medication Review/Management: medications reviewed  Goal: Maintenance of Osteoarthritis Symptom Control  Outcome: Not Progressing  Intervention: Maintain Osteoarthritis Symptom Control  Recent Flowsheet Documentation  Taken 4/14/2024 1032 by Claribel Garsia RN  Medication Review/Management: medications reviewed  Goal: Bariatric Home Regimen Maintained  Outcome: Not Progressing  Intervention: Maintain and Manage Postbariatric Surgery Care  Recent Flowsheet Documentation  Taken 4/14/2024 1032 by Claribel Garsia RN  Medication Review/Management: medications reviewed  Goal: Maintenance of Seizure Control  Outcome: Not Progressing  Intervention: Maintain Seizure Symptom Control  Recent Flowsheet Documentation  Taken 4/14/2024 1032 by Claribel Garsia RN  Seizure Precautions: side rails padded  Medication Review/Management: medications reviewed

## 2024-04-14 NOTE — PLAN OF CARE
"  Problem: Adult Inpatient Plan of Care  Goal: Plan of Care Review  Description: The Plan of Care Review/Shift note should be completed every shift.  The Outcome Evaluation is a brief statement about your assessment that the patient is improving, declining, or no change.  This information will be displayed automatically on your shift  note.  Outcome: Progressing  Flowsheets (Taken 4/14/2024 0312)  Outcome Evaluation: Pt wore BiPAP all night with encouragment  Plan of Care Reviewed With: patient  Overall Patient Progress: improving  Goal: Patient-Specific Goal (Individualized)  Description: You can add care plan individualizations to a care plan. Examples of Individualization might be:  \"Parent requests to be called daily at 9am for status\", \"I have a hard time hearing out of my right ear\", or \"Do not touch me to wake me up as it startles  me\".  Outcome: Progressing  Goal: Absence of Hospital-Acquired Illness or Injury  Outcome: Progressing  Intervention: Identify and Manage Fall Risk  Recent Flowsheet Documentation  Taken 4/14/2024 0131 by Lindy Mclaughlin RN  Safety Promotion/Fall Prevention: safety round/check completed  Taken 4/13/2024 2209 by Lindy Mclaughlin RN  Safety Promotion/Fall Prevention: safety round/check completed  Taken 4/13/2024 1943 by Lindy Mclaughlin RN  Safety Promotion/Fall Prevention:   activity supervised   clutter free environment maintained   nonskid shoes/slippers when out of bed   safety round/check completed  Intervention: Prevent Skin Injury  Recent Flowsheet Documentation  Taken 4/14/2024 0131 by Lindy Mclaughlin RN  Body Position: weight shifting  Taken 4/13/2024 2209 by Lindy Mclaughlin RN  Body Position: turned  Taken 4/13/2024 1943 by Lindy Mclaughlin RN  Body Position: supine, head elevated  Intervention: Prevent and Manage VTE (Venous Thromboembolism) Risk  Recent Flowsheet Documentation  Taken 4/13/2024 1943 by Lindy Mclaughlin RN  VTE Prevention/Management: SCDs " (sequential compression devices) off  Intervention: Prevent Infection  Recent Flowsheet Documentation  Taken 4/13/2024 1943 by Lindy Mclaughlin, RN  Infection Prevention:   rest/sleep promoted   hand hygiene promoted  Goal: Optimal Comfort and Wellbeing  Outcome: Progressing  Goal: Readiness for Transition of Care  Outcome: Progressing   Goal Outcome Evaluation:      Plan of Care Reviewed With: patient    Overall Patient Progress: improvingOverall Patient Progress: improving    Outcome Evaluation: Pt wore BiPAP all night with encouragment    Alert, oriented to self and place. VSS. Denies pain. 3 L O2 NC while awake. Pt wore BiPAP all night. No void this shift- bladder scanned for 239 ml. Plan to discharge back to group home when ready.

## 2024-04-14 NOTE — PROGRESS NOTES
[facility]  Hospitalist Progress Note  Shabbir Vickers M.D., M.B.A.   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      In the he was afebrile 98.7, pulse 73, pressure 139/85 and breathing comfortably on room ar without hypoxia.  Work remarkable for normal BMP except for elevated CO2 of 38, normal LFTs, lactic acid 0.9, high-sensitivity troponin of 33 and TSH of 1.38.  CBC normal.  pH 7.29, pCO2 82 and bicarb elevated at 14.  CT head is negative, CT abdomen/pelvis shows cholelithiasis with mild gallbladder distention, hepatic steatosis and nonobstructing small bilateral renal calculi.  Ultrasound of the abdomen showed cholelithiasis.  Urine drug screen is positive for PCP.  Valproic acid is 60.9.  Patient was given 2 L of IV fluids and placed on BiPAP with C admission    Problem List with Assessment and Plan:      Acute toxic metabolic encephalopathy due to hypercapnia  Presents with increased confusion and failure to stand and pivot which is his baseline.  Likely secondary to noncompliance with BiPAP at night  Patient was admitted and was treated with BiPAP   Patient was able to wear BiPAP last night with a setting of 20/7 at 35% FiO2.  His venous gas is much better this morning.  Continue nasal supplemental oxygen during the day and encourage him to wear BiPAP at night.  Of note patient has BiPAP at his group home but he  was not consistently wearing it    Acute on chronic hypoxic and hypercapnic respiratory failure secondary to obstructive sleep apnea, obesity hypoventilation syndrome    Chronically on nasal supplemental oxygen at 3 L and was supposed to be on BiPAP at night and during nap.  Initial gas showed venous pCO2 of 82, pH of 7.25, venous bicarb of 34.    Patient was treated with BiPAP overnight and his venous pCO2 improved but remained high.  Currently on 3 L of supplemental oxygen, continue for now.  BiPAP at night      Chronic medical problems:  #History of seizure activity  #History of epilepsy  -Seizure precaution  -Resumed PTA Keppra    #Schizoaffective disorder  #Borderline personality disorder  #MDD/anxiety  -Continue PTA seroquel, citalopram, lamotrigine, buspirone and Depakote   -Drug screen is positive for PCP but this might be related to his Lamictal use     #Developmental delay  -Lives in group home, at baseline uses wheelchair  -Supportive care     #Hypertension  -Continue prior to admission propranolol and prazosin      #GERD  -Continue Protonix      #Chronic hyperammonemia  -Continue prior to admission levocaritine    #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      Disposition:  Tre may return to his group home tomorrow as his facility did not accept him today        Interval History (Subjective):        Patient is seen and examined by me today and medical record reviewed.Overnight events noted and care discussed with nursing staff.  He is doing well this morning.  He was able to use BiPAP last night.  He is gases are much better today.  He denies any shortness of breath or chest pain.  I spoke with stepmother Ashlie who is from out of town.  I updated her and her questions and concerns addressed.          Physical  "Exam:        Last Vital Signs:  /60 (BP Location: Right arm)   Pulse 71   Temp 99.1  F (37.3  C) (Oral)   Resp 20   Wt 129.3 kg (285 lb 0.9 oz)   SpO2 97%   BMI 43.34 kg/m      I/O last 3 completed shifts:  In: 720 [P.O.:720]  Out: 625 [Urine:625]    Wt Readings from Last 5 Encounters:   04/12/24 129.3 kg (285 lb 0.9 oz)   03/26/24 131.5 kg (290 lb)   03/19/24 131.5 kg (290 lb)   03/09/24 131.8 kg (290 lb 9.1 oz)   02/28/24 143 kg (315 lb 4.1 oz)        Constitutional: Awake, alert, cooperative, no apparent distress     Respiratory: Clear to auscultation bilaterally, no crackles or wheezing   Cardiovascular: Regular rate and rhythm, normal S1 and S2, and no murmur noted   Abdomen: Normal bowel sounds, soft, non-distended, non-tender   Skin: No new rashes, no cyanosis, dry to touch   Neuro: Alert with  no new focal weakness   Extremities: No edema   Other(s):        All other systems: Negative          Medications:        All current medications were reviewed with changes reflected in problem list.         Data:      All new lab and imaging data was reviewed.      Data reviewed today: I reviewed all new labs and imaging results over the last 24 hours. I personally reviewed no images or EKG's today      Recent Labs   Lab 04/14/24  0659 04/13/24  0745 04/12/24  0745   WBC 5.8 5.4 5.6   HGB 11.2* 11.5* 12.1*   HCT 36.6* 37.0* 39.8*   MCV 99 98 99    160 199     No results for input(s): \"CULT\" in the last 168 hours.  Recent Labs   Lab 04/14/24  0659 04/13/24  0745 04/12/24  0745 04/11/24  1011 04/11/24  1007    141 141   < > 144   POTASSIUM 3.8 3.8 3.9   < > 4.7   CHLORIDE 101 103 102   < > 101   CO2 29 30* 33*   < > 34*   ANIONGAP 11 8 6*   < > 9   * 125* 77   < > 81   BUN 11.0 10.9 9.2   < > 9.3   CR 0.60* 0.59* 0.57*   < > 0.76   GFRESTIMATED >90 >90 >90   < > >90   ARNOLD 8.6 8.3* 8.6   < > 9.5   PROTTOTAL  --   --   --   --  7.4   ALBUMIN  --   --   --   --  4.2   BILITOTAL  --   --   -- " "  --  0.3   ALKPHOS  --   --   --   --  110   AST  --   --   --   --  28   ALT  --   --   --   --  28    < > = values in this interval not displayed.       Recent Labs   Lab 04/14/24  0659 04/13/24  0745 04/12/24  0745 04/11/24  1929 04/11/24  1011   * 125* 77 105* 81       No results for input(s): \"INR\" in the last 168 hours.      No results for input(s): \"TROPONIN\", \"TROPI\", \"TROPR\" in the last 168 hours.    Invalid input(s): \"TROP\", \"TROPONINIES\"    Recent Results (from the past 48 hour(s))   CT Abdomen Pelvis w Contrast    Narrative    CT ABDOMEN PELVIS W CONTRAST 4/11/2024 10:34 AM    CLINICAL HISTORY: abd pain    TECHNIQUE: CT scan of the abdomen and pelvis was performed following  injection of IV contrast. Multiplanar reformats were obtained. Dose  reduction techniques were used.  CONTRAST: 100mL Isovue-370    COMPARISON: 5/12/2023    FINDINGS:   LOWER CHEST: Mild bibasilar atelectasis.    HEPATOBILIARY: Mild hepatic steatosis. Cholelithiasis with mild  gallbladder distention. No biliary ductal dilatation.    PANCREAS: Normal.    SPLEEN: Normal.    ADRENAL GLANDS: Normal.    KIDNEYS/BLADDER: Few tiny nonobstructing bilateral renal calculi.  Decreased size of a left renal cyst or calyceal diverticulum at the  upper pole measuring 2.7 cm with layering calcifications, previously  measuring 3.7 cm. No hydronephrosis or perinephritic stranding  bilaterally. No calculi in the ureters or urinary bladder.    BOWEL: No small bowel or colonic obstruction or pathologic changes.  Normal appendix.    PELVIC ORGANS: No pelvic masses.    ADDITIONAL FINDINGS: No free fluid or fluid collections. No  lymphadenopathy. No abdominal aortic aneurysm.    MUSCULOSKELETAL: Old healed bilateral rib fracture deformities. No  suspicious lesions of the bones.      Impression    IMPRESSION:   1.  Cholelithiasis with mild gallbladder distention. Consider right  upper quadrant ultrasound for further evaluation.  2.  Mild hepatic " steatosis.  3.  Nonobstructing small bilateral renal calculi. Decreased size of a  cyst or calyceal diverticulum with layering calcifications in the  upper pole of the left kidney.    PIETER GUILLORY MD         SYSTEM ID:  [id number]   Head CT w/o contrast    Narrative    EXAM: CT HEAD W/O CONTRAST  4/11/2024 10:35 AM     HISTORY:  altered mental status       COMPARISON:  Head CT 4/5/2024    TECHNIQUE: Using multidetector thin collimation helical acquisition  technique, axial, coronal and sagittal CT images from the skull base  to the vertex were obtained without intravenous contrast.   (topogram) image(s) also obtained and reviewed. Dose reduction  techniques were performed.    FINDINGS:  No intracranial hemorrhage, mass effect, or midline shift. No acute  loss of gray-white matter differentiation in the cerebral hemispheres.  Ventricles are proportionate to the cerebral sulci. Clear basal  cisterns. Patchy periventricular hypoattenuation, consistent with  chronic small vessel ischemic disease. Unchanged mild lateral and  third ventriculomegaly.    Hyperostosis frontalis interna. The bony calvaria and the bones of the  skull base are normal. The visualized portions of the paranasal  sinuses and mastoid air cells are clear. Grossly normal orbits.       Impression    IMPRESSION:   1. No acute intracranial pathology.   2. Chronic small vessel ischemic disease and mild diffuse cerebral  volume loss.    JENNIE MEAD MD         SYSTEM ID:  [id number]   US Abdomen Limited    Narrative    US ABDOMEN LIMITED 4/11/2024 11:41 AM    CLINICAL HISTORY: Abdominal pain.    TECHNIQUE: Limited abdominal ultrasound.    COMPARISON: CT abdomen and pelvis 4/11/2024.    FINDINGS:    GALLBLADDER: Cholelithiasis. No significant wall thickening or  pericholecystic fluid.    BILE DUCTS: There is no biliary dilatation. The common duct measures 5  mm.    LIVER: Diffusely echogenic. No focal mass.    RIGHT KIDNEY: No  hydronephrosis.    PANCREAS: The visualized portions of the pancreas are normal.    No ascites.      Impression    IMPRESSION:  1.  Cholelithiasis. No convincing sonographic signs to suggest acute  cholecystitis.  2.  Hepatic steatosis.    NAZ DOVER MD         SYSTEM ID:  [id number]       COVID Status:  COVID-19 PCR Results          6/20/2022    11:20 3/15/2023    13:14 3/25/2023    08:33 5/12/2023    01:20 11/24/2023    10:50 2/6/2024    12:22 2/21/2024    17:30 2/22/2024    06:52   COVID-19 PCR Results   SARS CoV2 PCR Negative  Negative  Negative  Negative  Negative  Negative  Negative  Negative      COVID-19 Antibody Results, Testing for Immunity           No data to display                 Disclaimer: This note consists of symbols derived from keyboarding, dictation and/or voice recognition software. As a result, there may be errors in the script that have gone undetected. Please consider this when interpreting information found in this chart.

## 2024-04-14 NOTE — PROGRESS NOTES
A BiPAP of  20/7 @ 35% was applied to the pt via the mask for overnight use.  The bridge of the nose looks good and remains intact. Pt is tolerating it well. Will continue to monitor and assess the pt's current respiratory status and needs.      Fina Lofton, RT

## 2024-04-15 ENCOUNTER — APPOINTMENT (OUTPATIENT)
Dept: GENERAL RADIOLOGY | Facility: CLINIC | Age: 49
DRG: 189 | End: 2024-04-15
Attending: INTERNAL MEDICINE
Payer: MEDICARE

## 2024-04-15 LAB
ANION GAP SERPL CALCULATED.3IONS-SCNC: 9 MMOL/L (ref 7–15)
BASE EXCESS BLDV CALC-SCNC: 5.1 MMOL/L (ref -3–3)
BASE EXCESS BLDV CALC-SCNC: 6.3 MMOL/L (ref -3–3)
BASE EXCESS BLDV CALC-SCNC: 8.3 MMOL/L (ref -3–3)
BUN SERPL-MCNC: 12 MG/DL (ref 6–20)
CALCIUM SERPL-MCNC: 8.8 MG/DL (ref 8.6–10)
CHLORIDE SERPL-SCNC: 101 MMOL/L (ref 98–107)
CREAT SERPL-MCNC: 0.63 MG/DL (ref 0.67–1.17)
DEPRECATED HCO3 PLAS-SCNC: 32 MMOL/L (ref 22–29)
EGFRCR SERPLBLD CKD-EPI 2021: >90 ML/MIN/1.73M2
ERYTHROCYTE [DISTWIDTH] IN BLOOD BY AUTOMATED COUNT: 13 % (ref 10–15)
GLUCOSE BLDC GLUCOMTR-MCNC: 109 MG/DL (ref 70–99)
GLUCOSE BLDC GLUCOMTR-MCNC: 83 MG/DL (ref 70–99)
GLUCOSE BLDC GLUCOMTR-MCNC: 91 MG/DL (ref 70–99)
GLUCOSE BLDC GLUCOMTR-MCNC: 95 MG/DL (ref 70–99)
GLUCOSE SERPL-MCNC: 120 MG/DL (ref 70–99)
HBA1C MFR BLD: 4.9 %
HCO3 BLDV-SCNC: 34 MMOL/L (ref 21–28)
HCO3 BLDV-SCNC: 34 MMOL/L (ref 21–28)
HCO3 BLDV-SCNC: 37 MMOL/L (ref 21–28)
HCT VFR BLD AUTO: 39.1 % (ref 40–53)
HGB BLD-MCNC: 11.9 G/DL (ref 13.3–17.7)
MCH RBC QN AUTO: 29.9 PG (ref 26.5–33)
MCHC RBC AUTO-ENTMCNC: 30.4 G/DL (ref 31.5–36.5)
MCV RBC AUTO: 98 FL (ref 78–100)
O2/TOTAL GAS SETTING VFR VENT: 21 %
O2/TOTAL GAS SETTING VFR VENT: 60 %
O2/TOTAL GAS SETTING VFR VENT: 60 %
OXYHGB MFR BLDV: 74 % (ref 70–75)
OXYHGB MFR BLDV: 90 % (ref 70–75)
OXYHGB MFR BLDV: 94 % (ref 70–75)
PCO2 BLDV: 64 MM HG (ref 40–50)
PCO2 BLDV: 72 MM HG (ref 40–50)
PCO2 BLDV: 72 MM HG (ref 40–50)
PH BLDV: 7.28 [PH] (ref 7.32–7.43)
PH BLDV: 7.31 [PH] (ref 7.32–7.43)
PH BLDV: 7.33 [PH] (ref 7.32–7.43)
PLATELET # BLD AUTO: 169 10E3/UL (ref 150–450)
PO2 BLDV: 43 MM HG (ref 25–47)
PO2 BLDV: 64 MM HG (ref 25–47)
PO2 BLDV: 74 MM HG (ref 25–47)
POTASSIUM SERPL-SCNC: 4.1 MMOL/L (ref 3.4–5.3)
RBC # BLD AUTO: 3.98 10E6/UL (ref 4.4–5.9)
SAO2 % BLDV: 75.6 % (ref 70–75)
SAO2 % BLDV: 91.3 % (ref 70–75)
SAO2 % BLDV: 95 % (ref 70–75)
SODIUM SERPL-SCNC: 142 MMOL/L (ref 135–145)
WBC # BLD AUTO: 11.1 10E3/UL (ref 4–11)

## 2024-04-15 PROCEDURE — 83036 HEMOGLOBIN GLYCOSYLATED A1C: CPT | Performed by: INTERNAL MEDICINE

## 2024-04-15 PROCEDURE — 999N000157 HC STATISTIC RCP TIME EA 10 MIN

## 2024-04-15 PROCEDURE — 250N000013 HC RX MED GY IP 250 OP 250 PS 637: Performed by: STUDENT IN AN ORGANIZED HEALTH CARE EDUCATION/TRAINING PROGRAM

## 2024-04-15 PROCEDURE — 71045 X-RAY EXAM CHEST 1 VIEW: CPT

## 2024-04-15 PROCEDURE — 250N000013 HC RX MED GY IP 250 OP 250 PS 637: Performed by: INTERNAL MEDICINE

## 2024-04-15 PROCEDURE — 250N000011 HC RX IP 250 OP 636: Performed by: PHYSICIAN ASSISTANT

## 2024-04-15 PROCEDURE — 82805 BLOOD GASES W/O2 SATURATION: CPT | Performed by: INTERNAL MEDICINE

## 2024-04-15 PROCEDURE — 94640 AIRWAY INHALATION TREATMENT: CPT

## 2024-04-15 PROCEDURE — 250N000009 HC RX 250: Performed by: INTERNAL MEDICINE

## 2024-04-15 PROCEDURE — 36415 COLL VENOUS BLD VENIPUNCTURE: CPT | Performed by: INTERNAL MEDICINE

## 2024-04-15 PROCEDURE — 120N000001 HC R&B MED SURG/OB

## 2024-04-15 PROCEDURE — 99233 SBSQ HOSP IP/OBS HIGH 50: CPT | Performed by: INTERNAL MEDICINE

## 2024-04-15 PROCEDURE — 272N000054 HC CANNULA HIGH FLOW, ADULT

## 2024-04-15 PROCEDURE — 258N000003 HC RX IP 258 OP 636: Performed by: INTERNAL MEDICINE

## 2024-04-15 PROCEDURE — 250N000011 HC RX IP 250 OP 636: Performed by: INTERNAL MEDICINE

## 2024-04-15 PROCEDURE — 36415 COLL VENOUS BLD VENIPUNCTURE: CPT | Performed by: PHYSICIAN ASSISTANT

## 2024-04-15 PROCEDURE — 94660 CPAP INITIATION&MGMT: CPT

## 2024-04-15 PROCEDURE — 85027 COMPLETE CBC AUTOMATED: CPT | Performed by: PHYSICIAN ASSISTANT

## 2024-04-15 PROCEDURE — 80048 BASIC METABOLIC PNL TOTAL CA: CPT | Performed by: PHYSICIAN ASSISTANT

## 2024-04-15 PROCEDURE — 250N000011 HC RX IP 250 OP 636: Mod: JZ | Performed by: INTERNAL MEDICINE

## 2024-04-15 RX ORDER — DIPHENHYDRAMINE HCL 25 MG
25 CAPSULE ORAL EVERY 6 HOURS PRN
Status: DISPENSED | OUTPATIENT
Start: 2024-04-15 | End: 2024-04-15

## 2024-04-15 RX ORDER — LEVETIRACETAM 15 MG/ML
1500 INJECTION INTRAVASCULAR AT BEDTIME
Status: DISCONTINUED | OUTPATIENT
Start: 2024-04-15 | End: 2024-04-17

## 2024-04-15 RX ORDER — LEVETIRACETAM 10 MG/ML
1000 INJECTION INTRAVASCULAR EVERY MORNING
Status: DISCONTINUED | OUTPATIENT
Start: 2024-04-16 | End: 2024-04-17

## 2024-04-15 RX ORDER — DEXTROSE MONOHYDRATE 25 G/50ML
25-50 INJECTION, SOLUTION INTRAVENOUS
Status: DISCONTINUED | OUTPATIENT
Start: 2024-04-15 | End: 2024-04-18 | Stop reason: HOSPADM

## 2024-04-15 RX ORDER — SODIUM CHLORIDE 9 MG/ML
INJECTION, SOLUTION INTRAVENOUS CONTINUOUS
Status: DISCONTINUED | OUTPATIENT
Start: 2024-04-15 | End: 2024-04-17

## 2024-04-15 RX ORDER — NICOTINE POLACRILEX 4 MG
15-30 LOZENGE BUCCAL
Status: DISCONTINUED | OUTPATIENT
Start: 2024-04-15 | End: 2024-04-18 | Stop reason: HOSPADM

## 2024-04-15 RX ORDER — QUETIAPINE 200 MG/1
200 TABLET, FILM COATED, EXTENDED RELEASE ORAL AT BEDTIME
Status: DISCONTINUED | OUTPATIENT
Start: 2024-04-15 | End: 2024-04-18 | Stop reason: HOSPADM

## 2024-04-15 RX ORDER — PIPERACILLIN SODIUM, TAZOBACTAM SODIUM 3; .375 G/15ML; G/15ML
3.38 INJECTION, POWDER, LYOPHILIZED, FOR SOLUTION INTRAVENOUS EVERY 6 HOURS
Status: DISCONTINUED | OUTPATIENT
Start: 2024-04-15 | End: 2024-04-17

## 2024-04-15 RX ADMIN — PIPERACILLIN AND TAZOBACTAM 3.38 G: 3; .375 INJECTION, POWDER, FOR SOLUTION INTRAVENOUS at 09:12

## 2024-04-15 RX ADMIN — LAMOTRIGINE 200 MG: 100 TABLET, FILM COATED, EXTENDED RELEASE ORAL at 09:03

## 2024-04-15 RX ADMIN — POLYETHYLENE GLYCOL 3350 17 G: 17 POWDER, FOR SOLUTION ORAL at 09:01

## 2024-04-15 RX ADMIN — LEVOCARNITINE 660 MG: 330 TABLET ORAL at 09:03

## 2024-04-15 RX ADMIN — DIVALPROEX SODIUM 500 MG: 500 TABLET, DELAYED RELEASE ORAL at 09:03

## 2024-04-15 RX ADMIN — ACETAMINOPHEN 650 MG: 325 TABLET, FILM COATED ORAL at 04:40

## 2024-04-15 RX ADMIN — PROPRANOLOL HYDROCHLORIDE 20 MG: 20 TABLET ORAL at 09:03

## 2024-04-15 RX ADMIN — PIPERACILLIN AND TAZOBACTAM 3.38 G: 3; .375 INJECTION, POWDER, FOR SOLUTION INTRAVENOUS at 03:21

## 2024-04-15 RX ADMIN — PANTOPRAZOLE SODIUM 40 MG: 40 TABLET, DELAYED RELEASE ORAL at 09:02

## 2024-04-15 RX ADMIN — FLUTICASONE PROPIONATE 1 SPRAY: 50 SPRAY, METERED NASAL at 09:16

## 2024-04-15 RX ADMIN — ZONISAMIDE 100 MG: 100 CAPSULE ORAL at 09:02

## 2024-04-15 RX ADMIN — ENOXAPARIN SODIUM 40 MG: 40 INJECTION SUBCUTANEOUS at 09:01

## 2024-04-15 RX ADMIN — BUSPIRONE HYDROCHLORIDE 15 MG: 15 TABLET ORAL at 09:02

## 2024-04-15 RX ADMIN — ENOXAPARIN SODIUM 40 MG: 40 INJECTION SUBCUTANEOUS at 22:08

## 2024-04-15 RX ADMIN — SODIUM CHLORIDE: 9 INJECTION, SOLUTION INTRAVENOUS at 17:23

## 2024-04-15 RX ADMIN — FLUTICASONE FUROATE AND VILANTEROL TRIFENATATE 1 PUFF: 200; 25 POWDER RESPIRATORY (INHALATION) at 08:20

## 2024-04-15 RX ADMIN — PIPERACILLIN AND TAZOBACTAM 3.38 G: 3; .375 INJECTION, POWDER, FOR SOLUTION INTRAVENOUS at 22:07

## 2024-04-15 RX ADMIN — LEVETIRACETAM 1500 MG: 15 INJECTION INTRAVENOUS at 22:07

## 2024-04-15 RX ADMIN — METHYLCELLULOSE 500 MG: 500 TABLET ORAL at 09:02

## 2024-04-15 RX ADMIN — ONDANSETRON 4 MG: 2 INJECTION INTRAMUSCULAR; INTRAVENOUS at 02:18

## 2024-04-15 RX ADMIN — LEVETIRACETAM 1000 MG: 500 TABLET, FILM COATED ORAL at 09:02

## 2024-04-15 RX ADMIN — CITALOPRAM HYDROBROMIDE 60 MG: 20 TABLET, FILM COATED ORAL at 09:02

## 2024-04-15 RX ADMIN — PIPERACILLIN AND TAZOBACTAM 3.38 G: 3; .375 INJECTION, POWDER, FOR SOLUTION INTRAVENOUS at 15:22

## 2024-04-15 RX ADMIN — VALPROATE SODIUM 500 MG: 100 INJECTION, SOLUTION INTRAVENOUS at 17:17

## 2024-04-15 RX ADMIN — UMECLIDINIUM 1 PUFF: 62.5 AEROSOL, POWDER ORAL at 08:20

## 2024-04-15 RX ADMIN — DIPHENHYDRAMINE HYDROCHLORIDE 25 MG: 25 CAPSULE ORAL at 01:01

## 2024-04-15 ASSESSMENT — ACTIVITIES OF DAILY LIVING (ADL)
ADLS_ACUITY_SCORE: 63
ADLS_ACUITY_SCORE: 65
ADLS_ACUITY_SCORE: 69
ADLS_ACUITY_SCORE: 63
ADLS_ACUITY_SCORE: 69
ADLS_ACUITY_SCORE: 63
ADLS_ACUITY_SCORE: 69
ADLS_ACUITY_SCORE: 63
ADLS_ACUITY_SCORE: 65
ADLS_ACUITY_SCORE: 65
ADLS_ACUITY_SCORE: 69
ADLS_ACUITY_SCORE: 65
ADLS_ACUITY_SCORE: 65
ADLS_ACUITY_SCORE: 63
ADLS_ACUITY_SCORE: 65
ADLS_ACUITY_SCORE: 63

## 2024-04-15 NOTE — PROVIDER NOTIFICATION
MD (Dayanna) paged for pt increased somnolence, asking if pt should be placed back on BiPAP w/ sitter. MD would like pt to be placed on BiPAP. Charge RN informed of change and requesting sitter, RT informed of need to place pt on BiPAP once sitter available.

## 2024-04-15 NOTE — PROGRESS NOTES
Pt placed on HFNC 50L 50% after vomiting on BIPAP. Sats low 90's, BS diminished. RR 24. Skin integrity is good/intact. RT will monitor.    Fina Lofton, RT

## 2024-04-15 NOTE — PROGRESS NOTES
Cross Cover    Called for large emesis while on BiPAP     I went to see Joel, off BiPAP and on oxymask requiring 12 L to  keep sats in the mid 90's range.  Here with recurrent hypercapneic respiratory failure 2/2 refusal to use BiPAP.  Now with suspected aspiration event with pneumonitis and at risk for aspiration pneumonia     D/w RT and RN, trial off BiPAP with HFNC  VBG now and in 2 hours.    Would like temporary trial off BiPAP in light of recent nausea and vomiting.       Ck VBG now 7.28/72 (from 7.33/62), repeat in 1 hour to assess response to HFNC   Ck CXR-to my read looks like poor inspiration   Empiric pip-tazo  for now    Suspect aspiration pneumonitis, short course abx if no e/o bacterial infection present    Follow-up  Went to look for follow-up VBG and still not drawn @ 6:15 even though ordered for 5am  -changed vbg to stat draw

## 2024-04-15 NOTE — PROGRESS NOTES
[facility]  Hospitalist Progress Note  04/15/2024    Reason for Stay/active problem list    Acute toxic metabolic encephalopathy  Acute on chronic hypercapnic respiratory failure  Possible aspiration pneumonia as patient vomited with the BiPAP mask on on 4/14/24 overnight         Assessment and Plan:        Summary of Stay:     Tre Vazquez is a 48 year old male who is well-known to the ED staff with frequent visits with history of developmental delay living in group home, seizures, obesity, ANA/OHS on BiPAP at night/while napping, chronic hypercapnia, chronic hyperammoniemia, anemia schizoaffective disorder, borderline personality, depression, anxiety, HTN, 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      In the he was afebrile 98.7, pulse 73, pressure 139/85 and breathing comfortably on room ar without hypoxia.  Work remarkable for normal BMP except for elevated CO2 of 38, normal LFTs, lactic acid 0.9, high-sensitivity troponin of 33 and TSH of 1.38.  CBC normal.  pH 7.29, pCO2 82 and bicarb elevated at 14.  CT head is negative, CT abdomen/pelvis shows cholelithiasis with mild gallbladder distention, hepatic steatosis and nonobstructing small bilateral renal calculi.  Ultrasound of the abdomen showed cholelithiasis.  Urine drug screen is positive for PCP.  Valproic acid is 60.9.  Patient was given 2 L of IV fluids and placed on BiPAP with C admission    Problem List with Assessment and Plan:      Acute toxic metabolic encephalopathy due to hypercapnia     Presents with increased confusion and failure to stand and pivot which is his baseline.  Likely secondary to noncompliance with BiPAP at night  Patient was admitted and was treated with BiPAP   Patient was able to wear BiPAP last night with a setting of 20/7 at 35% FiO2.  His venous gas is much better this morning.  Continue nasal supplemental oxygen during the day and encourage him to wear BiPAP  at night.  Of note patient has BiPAP at his group home but he was not consistently wearing it      Patient vomited on 414 overnight with the BiPAP mask on.  There is concern for aspiration and he was febrile up to 101.  He was started on IV Zosyn.  BiPAP was  removed    He is currently on high flow nasal cannula at 50 L of oxygen    Acute on chronic hypoxic and hypercapnic respiratory failure secondary to obstructive sleep apnea, obesity hypoventilation syndrome  Possible aspiration pneumonia, fever of 101 after he vomited with the BiPAP mask on on 4/14/2024 overnight  Chronically on nasal supplemental oxygen at 3 L and was supposed to be on BiPAP at night and during nap.  Initial gas showed venous pCO2 of 82, pH of 7.25, venous bicarb of 34.    Patient was treated with BiPAP overnight and his venous pCO2 improved but remained high.  Patient vomited on 414 overnight with the BiPAP mask on.  There is concern for aspiration and he was febrile up to 101.  He was started on IV Zosyn.  BiPAP was  removed  Chest x-ray done overnight showed strands of plate like atelectasis in the lower lungs.  Slightly more on the left.  No adenopathy or effusion.  Shallow inspiration accentuates cardiac size.  Pulmonary vascularity is normal.  Anatomic alignment of the bones and joints  He is currently on high flow nasal cannula at 50 L of oxygen.   Patient was started on IV Zosyn for possible aspiration pneumonia  Wean O2 as tolerated  Incentive spirometry, Acapella as tolerated every 2 hours while awake      Chronic medical problems:  #History of seizure activity  #History of epilepsy  -Seizure precaution  -Resumed PTA Keppra      #Schizoaffective disorder  #Borderline personality disorder  #MDD/anxiety  -Continue PTA seroquel, citalopram, lamotrigine, buspirone and Depakote   -Drug screen is positive for PCP but this might be related to his Lamictal use     #Developmental delay  -Lives in group home, at baseline uses  wheelchair  -Supportive care     #Hypertension  -Continue prior to admission propranolol and prazosin      #GERD  -Continue Protonix    Constipation;  On MiraLAX daily last bowel movement was on 4/12/2024  Patient refused to take MiraLAX last couple of days  Took MiraLAX on 4/15/2024      #Chronic hyperammonemia  -Continue prior to admission levocaritine      #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      Disposition: Discharge in the next 4 to 5 days once respiratory status improves and able to be weaned down from high flow nasal cannula.  Tolerance of BiPAP at night prior to discharge    Discussed with bedside RN patient    Paty Alan MD   Page [phone](7AM-6PM)          Interval History (Subjective):        Patient is seen and examined by me today and medical record reviewed.Overnight events noted and care discussed with nursing staff.   Patient had an episode of vomiting with the BiPAP mask on overnight.  There was concern for aspiration event.  BiPAP was removed.  He was febrile up to 101 overnight.  Chest x-ray done showed no clear pneumonia.  Patient was started on IV Zosyn.  Patient is currently on high flow nasal cannula.  Resting in bed.         Physical Exam:        Last Vital Signs:  /68 (BP Location: Right arm, Patient Position: Semi-Chávez's, Cuff Size: Adult Regular)   Pulse 83   Temp 98.6  F (37  C) (Oral)   Resp 24   Wt 129.3 kg (285 lb 0.9 oz)   SpO2 95%   BMI 43.34 kg/m      I/O last 3 completed shifts:  In: 860 [P.O.:860]  Out: 1025 [Urine:925; Emesis/NG output:100]    Wt Readings from Last 5 Encounters:   04/12/24 129.3 kg (285 lb 0.9 oz)   03/26/24 131.5 kg (290 lb)   03/19/24 131.5 kg (290 lb)   03/09/24 131.8 kg (290 lb 9.1 oz)   02/28/24 143 kg (315 lb 4.1 oz)     "    Constitutional: Awake, alert, cooperative, no apparent distress , currently on high flow oxygen     Respiratory: Coarse breath sounds, no crackles or wheezing   Cardiovascular: Regular rate and rhythm, normal S1 and S2, and no murmur noted   Abdomen: Normal bowel sounds, soft, non-distended, non-tender   Skin: No new rashes, no cyanosis, dry to touch   Neuro: Alert with  no new focal weakness   Extremities: No edema   Other(s):        All other systems: Negative          Medications:        All current medications were reviewed with changes reflected in problem list.         Data:      All new lab and imaging data was reviewed.      Data reviewed today: I reviewed all new labs and imaging results over the last 24 hours. I personally reviewed no images or EKG's today      Recent Labs   Lab 04/15/24  0708 04/14/24  0659 04/13/24  0745   WBC 11.1* 5.8 5.4   HGB 11.9* 11.2* 11.5*   HCT 39.1* 36.6* 37.0*   MCV 98 99 98    166 160     No results for input(s): \"CULT\" in the last 168 hours.  Recent Labs   Lab 04/15/24  0824 04/15/24  0708 04/14/24  0659 04/13/24  0745 04/11/24  1011 04/11/24  1007   NA  --  142 141 141   < > 144   POTASSIUM  --  4.1 3.8 3.8   < > 4.7   CHLORIDE  --  101 101 103   < > 101   CO2  --  32* 29 30*   < > 34*   ANIONGAP  --  9 11 8   < > 9   * 120* 112* 125*   < > 81   BUN  --  12.0 11.0 10.9   < > 9.3   CR  --  0.63* 0.60* 0.59*   < > 0.76   GFRESTIMATED  --  >90 >90 >90   < > >90   ARNOLD  --  8.8 8.6 8.3*   < > 9.5   PROTTOTAL  --   --   --   --   --  7.4   ALBUMIN  --   --   --   --   --  4.2   BILITOTAL  --   --   --   --   --  0.3   ALKPHOS  --   --   --   --   --  110   AST  --   --   --   --   --  28   ALT  --   --   --   --   --  28    < > = values in this interval not displayed.       Recent Labs   Lab 04/15/24  0824 04/15/24  0708 04/14/24  0659 04/13/24  0745 04/12/24  0745   * 120* 112* 125* 77       No results for input(s): \"INR\" in the last 168 hours.      No " "results for input(s): \"TROPONIN\", \"TROPI\", \"TROPR\" in the last 168 hours.    Invalid input(s): \"TROP\", \"TROPONINIES\"    Recent Results (from the past 48 hour(s))   CT Abdomen Pelvis w Contrast    Narrative    CT ABDOMEN PELVIS W CONTRAST 4/11/2024 10:34 AM    CLINICAL HISTORY: abd pain    TECHNIQUE: CT scan of the abdomen and pelvis was performed following  injection of IV contrast. Multiplanar reformats were obtained. Dose  reduction techniques were used.  CONTRAST: 100mL Isovue-370    COMPARISON: 5/12/2023    FINDINGS:   LOWER CHEST: Mild bibasilar atelectasis.    HEPATOBILIARY: Mild hepatic steatosis. Cholelithiasis with mild  gallbladder distention. No biliary ductal dilatation.    PANCREAS: Normal.    SPLEEN: Normal.    ADRENAL GLANDS: Normal.    KIDNEYS/BLADDER: Few tiny nonobstructing bilateral renal calculi.  Decreased size of a left renal cyst or calyceal diverticulum at the  upper pole measuring 2.7 cm with layering calcifications, previously  measuring 3.7 cm. No hydronephrosis or perinephritic stranding  bilaterally. No calculi in the ureters or urinary bladder.    BOWEL: No small bowel or colonic obstruction or pathologic changes.  Normal appendix.    PELVIC ORGANS: No pelvic masses.    ADDITIONAL FINDINGS: No free fluid or fluid collections. No  lymphadenopathy. No abdominal aortic aneurysm.    MUSCULOSKELETAL: Old healed bilateral rib fracture deformities. No  suspicious lesions of the bones.      Impression    IMPRESSION:   1.  Cholelithiasis with mild gallbladder distention. Consider right  upper quadrant ultrasound for further evaluation.  2.  Mild hepatic steatosis.  3.  Nonobstructing small bilateral renal calculi. Decreased size of a  cyst or calyceal diverticulum with layering calcifications in the  upper pole of the left kidney.    PIETER GUILLORY MD         SYSTEM ID:  [id number]   Head CT w/o contrast    Narrative    EXAM: CT HEAD W/O CONTRAST  4/11/2024 10:35 AM     HISTORY:  altered mental " status       COMPARISON:  Head CT 4/5/2024    TECHNIQUE: Using multidetector thin collimation helical acquisition  technique, axial, coronal and sagittal CT images from the skull base  to the vertex were obtained without intravenous contrast.   (topogram) image(s) also obtained and reviewed. Dose reduction  techniques were performed.    FINDINGS:  No intracranial hemorrhage, mass effect, or midline shift. No acute  loss of gray-white matter differentiation in the cerebral hemispheres.  Ventricles are proportionate to the cerebral sulci. Clear basal  cisterns. Patchy periventricular hypoattenuation, consistent with  chronic small vessel ischemic disease. Unchanged mild lateral and  third ventriculomegaly.    Hyperostosis frontalis interna. The bony calvaria and the bones of the  skull base are normal. The visualized portions of the paranasal  sinuses and mastoid air cells are clear. Grossly normal orbits.       Impression    IMPRESSION:   1. No acute intracranial pathology.   2. Chronic small vessel ischemic disease and mild diffuse cerebral  volume loss.    JENNIE MEAD MD         SYSTEM ID:  [id number]   US Abdomen Limited    Narrative    US ABDOMEN LIMITED 4/11/2024 11:41 AM    CLINICAL HISTORY: Abdominal pain.    TECHNIQUE: Limited abdominal ultrasound.    COMPARISON: CT abdomen and pelvis 4/11/2024.    FINDINGS:    GALLBLADDER: Cholelithiasis. No significant wall thickening or  pericholecystic fluid.    BILE DUCTS: There is no biliary dilatation. The common duct measures 5  mm.    LIVER: Diffusely echogenic. No focal mass.    RIGHT KIDNEY: No hydronephrosis.    PANCREAS: The visualized portions of the pancreas are normal.    No ascites.      Impression    IMPRESSION:  1.  Cholelithiasis. No convincing sonographic signs to suggest acute  cholecystitis.  2.  Hepatic steatosis.    NAZ DOVER MD         SYSTEM ID:  [id number]       COVID Status:  COVID-19 PCR Results          6/20/2022    11:20  3/15/2023    13:14 3/25/2023    08:33 5/12/2023    01:20 11/24/2023    10:50 2/6/2024    12:22 2/21/2024    17:30 2/22/2024    06:52   COVID-19 PCR Results   SARS CoV2 PCR Negative  Negative  Negative  Negative  Negative  Negative  Negative  Negative      COVID-19 Antibody Results, Testing for Immunity           No data to display                 Disclaimer: This note consists of symbols derived from keyboarding, dictation and/or voice recognition software. As a result, there may be errors in the script that have gone undetected. Please consider this when interpreting information found in this chart.

## 2024-04-15 NOTE — PLAN OF CARE
"Shift summary (0700-1930)    Pt lethargic w/ increased somnolence this morning into early afternoon, BiPAP placed per orders w/ sitter. VSS except soft trending BP's on BiPAP. Denies pain, no e/o pain. PIV x 2 intact, left SL, right infusing w/ NS @ 60 ml/hr. Turn q2h, heels elevated. Wound cares and dsg change provided per orders to right ischial tuberosity wound. Straight cath'd per orders for retention, 500 ml (sterile technique maintained). Seizure precautions maintained. Will continue POC.     Goal Outcome Evaluation:      Plan of Care Reviewed With: patient    Overall Patient Progress: decliningOverall Patient Progress: declining    Outcome Evaluation: Pt placed on BiPAP this afternoon w/ sitter for increased somnolence. Maintains NPO while on BiPAP, IVF initiated.      Problem: Adult Inpatient Plan of Care  Goal: Plan of Care Review  Description: The Plan of Care Review/Shift note should be completed every shift.  The Outcome Evaluation is a brief statement about your assessment that the patient is improving, declining, or no change.  This information will be displayed automatically on your shift  note.  Outcome: Not Progressing  Flowsheets (Taken 4/15/2024 1812)  Outcome Evaluation: Pt placed on BiPAP this afternoon w/ sitter for increased somnolence. Maintains NPO while on BiPAP, IVF initiated.  Plan of Care Reviewed With: patient  Overall Patient Progress: declining  Goal: Patient-Specific Goal (Individualized)  Description: You can add care plan individualizations to a care plan. Examples of Individualization might be:  \"Parent requests to be called daily at 9am for status\", \"I have a hard time hearing out of my right ear\", or \"Do not touch me to wake me up as it startles  me\".  Outcome: Not Progressing  Goal: Absence of Hospital-Acquired Illness or Injury  Outcome: Not Progressing  Intervention: Identify and Manage Fall Risk  Recent Flowsheet Documentation  Taken 4/15/2024 1134 by Lora Fajardo, " RN  Safety Promotion/Fall Prevention: safety round/check completed  Taken 4/15/2024 0909 by Lora Fajardo RN  Safety Promotion/Fall Prevention: (MD at bedside) --  Taken 4/15/2024 0828 by Lora Fajardo RN  Safety Promotion/Fall Prevention:   activity supervised   assistive device/personal items within reach   clutter free environment maintained   increased rounding and observation   increase visualization of patient   lighting adjusted   mobility aid in reach   nonskid shoes/slippers when out of bed   patient and family education   room door open   room near nurse's station   room organization consistent   safety round/check completed   supervised activity   treat reversible contributory factors   treat underlying cause  Taken 4/15/2024 0728 by Lora Fajardo RN  Safety Promotion/Fall Prevention: safety round/check completed  Intervention: Prevent Skin Injury  Recent Flowsheet Documentation  Taken 4/15/2024 1750 by Lora Fajardo RN  Body Position:   turned   side-lying   right   heels elevated  Taken 4/15/2024 1532 by Lora Fajardo RN  Body Position:   turned   side-lying   left   heels elevated  Taken 4/15/2024 1346 by Lora Fajardo, RN  Body Position:   turned   side-lying   right   heels elevated  Taken 4/15/2024 1052 by Lora Fajardo, RN  Body Position:   weight shifting   turned   side-lying   right   heels elevated  Taken 4/15/2024 0828 by Lora Fajardo, RN  Body Position: refuses positioning  Skin Protection:   adhesive use limited   incontinence pads utilized  Device Skin Pressure Protection:   absorbent pad utilized/changed   adhesive use limited   tubing/devices free from skin contact   pressure points protected  Intervention: Prevent and Manage VTE (Venous Thromboembolism) Risk  Recent Flowsheet Documentation  Taken 4/15/2024 0828 by Lora Fajardo RN  VTE Prevention/Management: (SQ lovenox) other (see comments)  Intervention:  Prevent Infection  Recent Flowsheet Documentation  Taken 4/15/2024 0828 by Lora Fajardo, RN  Infection Prevention:   equipment surfaces disinfected   hand hygiene promoted   rest/sleep promoted   personal protective equipment utilized   single patient room provided  Goal: Optimal Comfort and Wellbeing  Outcome: Not Progressing  Goal: Readiness for Transition of Care  Outcome: Not Progressing

## 2024-04-15 NOTE — PLAN OF CARE
"  Problem: Adult Inpatient Plan of Care  Goal: Plan of Care Review  Description: The Plan of Care Review/Shift note should be completed every shift.  The Outcome Evaluation is a brief statement about your assessment that the patient is improving, declining, or no change.  This information will be displayed automatically on your shift  note.  Outcome: Progressing  Flowsheets (Taken 4/15/2024 0107)  Outcome Evaluation: BiPAP in use overnight. Fio2 increased to 60% to maintain O2 sats >90%  Overall Patient Progress: no change  Goal: Patient-Specific Goal (Individualized)  Description: You can add care plan individualizations to a care plan. Examples of Individualization might be:  \"Parent requests to be called daily at 9am for status\", \"I have a hard time hearing out of my right ear\", or \"Do not touch me to wake me up as it startles  me\".  Outcome: Progressing  Goal: Absence of Hospital-Acquired Illness or Injury  Outcome: Progressing  Intervention: Identify and Manage Fall Risk  Recent Flowsheet Documentation  Taken 4/14/2024 2115 by Lindy Mclaughlin RN  Safety Promotion/Fall Prevention: safety round/check completed  Taken 4/14/2024 2028 by Lindy Mclaughlin RN  Safety Promotion/Fall Prevention:   activity supervised   clutter free environment maintained   nonskid shoes/slippers when out of bed   safety round/check completed  Intervention: Prevent Skin Injury  Recent Flowsheet Documentation  Taken 4/14/2024 2115 by Lindy Mclaughlin RN  Body Position:   turned   side-lying   left  Taken 4/14/2024 2028 by Lindy Mclaughlin RN  Body Position: weight shifting  Intervention: Prevent and Manage VTE (Venous Thromboembolism) Risk  Recent Flowsheet Documentation  Taken 4/14/2024 2028 by Lindy Mclaughlin RN  VTE Prevention/Management: SCDs (sequential compression devices) off  Intervention: Prevent Infection  Recent Flowsheet Documentation  Taken 4/14/2024 2028 by Lindy Mclaughlin RN  Infection Prevention: rest/sleep " promoted  Goal: Optimal Comfort and Wellbeing  Outcome: Progressing  Goal: Readiness for Transition of Care  Outcome: Progressing   Goal Outcome Evaluation:      Plan of Care Reviewed With: patient    Overall Patient Progress: no changeOverall Patient Progress: no change    Outcome Evaluation: BiPAP in use overnight. Fio2 increased to 60% to maintain O2 sats >90%    Alert. Disoriented to time. VSS. Denies pain. 3-4 L O2 NC when off BiPAP. pt c/o itching around the mask and needing frequent readjustments. PRN Benadryl given. LS diminished/coarse. Repositioning as pt allows. Plan to discharge back to group home when ready.    0215: Pt vomited large amount while BiPAP in use. Pt able to maintain O2 sats on 12L Oxymask and now having increased work of breathing. Dr Garibay and RT at bedside. Zofran given x1. Chest xray completed. Started on Zosyn. Switched to HFNC 50 L and 50%. Pt had another emesis of about 75 ml.    0430: Pt now with temp 101f. Tylenol given. MD aware.

## 2024-04-16 ENCOUNTER — APPOINTMENT (OUTPATIENT)
Dept: PHYSICAL THERAPY | Facility: CLINIC | Age: 49
DRG: 189 | End: 2024-04-16
Attending: INTERNAL MEDICINE
Payer: MEDICARE

## 2024-04-16 LAB
ANION GAP SERPL CALCULATED.3IONS-SCNC: 7 MMOL/L (ref 7–15)
BACTERIA BLD CULT: NO GROWTH
BACTERIA BLD CULT: NO GROWTH
BUN SERPL-MCNC: 13 MG/DL (ref 6–20)
CALCIUM SERPL-MCNC: 8.4 MG/DL (ref 8.6–10)
CHLORIDE SERPL-SCNC: 104 MMOL/L (ref 98–107)
CREAT SERPL-MCNC: 0.57 MG/DL (ref 0.67–1.17)
DEPRECATED HCO3 PLAS-SCNC: 33 MMOL/L (ref 22–29)
EGFRCR SERPLBLD CKD-EPI 2021: >90 ML/MIN/1.73M2
ERYTHROCYTE [DISTWIDTH] IN BLOOD BY AUTOMATED COUNT: 13.1 % (ref 10–15)
GLUCOSE BLDC GLUCOMTR-MCNC: 106 MG/DL (ref 70–99)
GLUCOSE BLDC GLUCOMTR-MCNC: 113 MG/DL (ref 70–99)
GLUCOSE BLDC GLUCOMTR-MCNC: 128 MG/DL (ref 70–99)
GLUCOSE BLDC GLUCOMTR-MCNC: 144 MG/DL (ref 70–99)
GLUCOSE BLDC GLUCOMTR-MCNC: 77 MG/DL (ref 70–99)
GLUCOSE BLDC GLUCOMTR-MCNC: 79 MG/DL (ref 70–99)
GLUCOSE SERPL-MCNC: 81 MG/DL (ref 70–99)
HCT VFR BLD AUTO: 33.6 % (ref 40–53)
HGB BLD-MCNC: 10.2 G/DL (ref 13.3–17.7)
MCH RBC QN AUTO: 30 PG (ref 26.5–33)
MCHC RBC AUTO-ENTMCNC: 30.4 G/DL (ref 31.5–36.5)
MCV RBC AUTO: 99 FL (ref 78–100)
PLATELET # BLD AUTO: 149 10E3/UL (ref 150–450)
POTASSIUM SERPL-SCNC: 4.2 MMOL/L (ref 3.4–5.3)
RBC # BLD AUTO: 3.4 10E6/UL (ref 4.4–5.9)
SODIUM SERPL-SCNC: 144 MMOL/L (ref 135–145)
WBC # BLD AUTO: 7.6 10E3/UL (ref 4–11)

## 2024-04-16 PROCEDURE — 999N000157 HC STATISTIC RCP TIME EA 10 MIN

## 2024-04-16 PROCEDURE — 99233 SBSQ HOSP IP/OBS HIGH 50: CPT | Performed by: INTERNAL MEDICINE

## 2024-04-16 PROCEDURE — 250N000013 HC RX MED GY IP 250 OP 250 PS 637: Performed by: INTERNAL MEDICINE

## 2024-04-16 PROCEDURE — 250N000011 HC RX IP 250 OP 636: Performed by: PHYSICIAN ASSISTANT

## 2024-04-16 PROCEDURE — 94660 CPAP INITIATION&MGMT: CPT

## 2024-04-16 PROCEDURE — 36415 COLL VENOUS BLD VENIPUNCTURE: CPT | Performed by: PHYSICIAN ASSISTANT

## 2024-04-16 PROCEDURE — 97530 THERAPEUTIC ACTIVITIES: CPT | Mod: GP | Performed by: PHYSICAL THERAPIST

## 2024-04-16 PROCEDURE — 99222 1ST HOSP IP/OBS MODERATE 55: CPT

## 2024-04-16 PROCEDURE — 97161 PT EVAL LOW COMPLEX 20 MIN: CPT | Mod: GP | Performed by: PHYSICAL THERAPIST

## 2024-04-16 PROCEDURE — 120N000001 HC R&B MED SURG/OB

## 2024-04-16 PROCEDURE — 999N000215 HC STATISTIC HFNC ADULT NON-CPAP

## 2024-04-16 PROCEDURE — 258N000003 HC RX IP 258 OP 636: Performed by: INTERNAL MEDICINE

## 2024-04-16 PROCEDURE — 80048 BASIC METABOLIC PNL TOTAL CA: CPT | Performed by: PHYSICIAN ASSISTANT

## 2024-04-16 PROCEDURE — 94640 AIRWAY INHALATION TREATMENT: CPT

## 2024-04-16 PROCEDURE — 85027 COMPLETE CBC AUTOMATED: CPT | Performed by: PHYSICIAN ASSISTANT

## 2024-04-16 PROCEDURE — 250N000011 HC RX IP 250 OP 636: Mod: JZ | Performed by: INTERNAL MEDICINE

## 2024-04-16 PROCEDURE — 250N000009 HC RX 250: Performed by: INTERNAL MEDICINE

## 2024-04-16 PROCEDURE — 250N000013 HC RX MED GY IP 250 OP 250 PS 637: Performed by: STUDENT IN AN ORGANIZED HEALTH CARE EDUCATION/TRAINING PROGRAM

## 2024-04-16 PROCEDURE — 250N000011 HC RX IP 250 OP 636: Performed by: INTERNAL MEDICINE

## 2024-04-16 RX ADMIN — LEVOCARNITINE 660 MG: 330 TABLET ORAL at 09:20

## 2024-04-16 RX ADMIN — PRAZOSIN HYDROCHLORIDE 2 MG: 1 CAPSULE ORAL at 22:23

## 2024-04-16 RX ADMIN — PIPERACILLIN AND TAZOBACTAM 3.38 G: 3; .375 INJECTION, POWDER, FOR SOLUTION INTRAVENOUS at 16:39

## 2024-04-16 RX ADMIN — VALPROATE SODIUM 500 MG: 100 INJECTION, SOLUTION INTRAVENOUS at 01:27

## 2024-04-16 RX ADMIN — FLUTICASONE FUROATE AND VILANTEROL TRIFENATATE 1 PUFF: 200; 25 POWDER RESPIRATORY (INHALATION) at 08:24

## 2024-04-16 RX ADMIN — FLUTICASONE PROPIONATE 1 SPRAY: 50 SPRAY, METERED NASAL at 20:48

## 2024-04-16 RX ADMIN — LAMOTRIGINE 200 MG: 100 TABLET, FILM COATED, EXTENDED RELEASE ORAL at 20:47

## 2024-04-16 RX ADMIN — BUSPIRONE HYDROCHLORIDE 15 MG: 15 TABLET ORAL at 09:21

## 2024-04-16 RX ADMIN — ZONISAMIDE 200 MG: 100 CAPSULE ORAL at 20:47

## 2024-04-16 RX ADMIN — PROPRANOLOL HYDROCHLORIDE 20 MG: 20 TABLET ORAL at 09:21

## 2024-04-16 RX ADMIN — PIPERACILLIN AND TAZOBACTAM 3.38 G: 3; .375 INJECTION, POWDER, FOR SOLUTION INTRAVENOUS at 22:11

## 2024-04-16 RX ADMIN — POLYETHYLENE GLYCOL 3350 17 G: 17 POWDER, FOR SOLUTION ORAL at 09:20

## 2024-04-16 RX ADMIN — PROPRANOLOL HYDROCHLORIDE 20 MG: 20 TABLET ORAL at 20:47

## 2024-04-16 RX ADMIN — VALPROATE SODIUM 500 MG: 100 INJECTION, SOLUTION INTRAVENOUS at 17:15

## 2024-04-16 RX ADMIN — PANTOPRAZOLE SODIUM 40 MG: 40 TABLET, DELAYED RELEASE ORAL at 09:20

## 2024-04-16 RX ADMIN — DEXTROSE 15 G: 15 GEL ORAL at 02:02

## 2024-04-16 RX ADMIN — LEVETIRACETAM 1500 MG: 15 INJECTION INTRAVENOUS at 22:11

## 2024-04-16 RX ADMIN — VALPROATE SODIUM 500 MG: 100 INJECTION, SOLUTION INTRAVENOUS at 08:32

## 2024-04-16 RX ADMIN — LEVOCARNITINE 660 MG: 330 TABLET ORAL at 22:11

## 2024-04-16 RX ADMIN — LAMOTRIGINE 200 MG: 100 TABLET, FILM COATED, EXTENDED RELEASE ORAL at 09:20

## 2024-04-16 RX ADMIN — UMECLIDINIUM 1 PUFF: 62.5 AEROSOL, POWDER ORAL at 08:24

## 2024-04-16 RX ADMIN — SODIUM CHLORIDE: 9 INJECTION, SOLUTION INTRAVENOUS at 10:49

## 2024-04-16 RX ADMIN — LEVETIRACETAM 1000 MG: 10 INJECTION, SOLUTION INTRAVENOUS at 08:13

## 2024-04-16 RX ADMIN — ZONISAMIDE 100 MG: 100 CAPSULE ORAL at 09:21

## 2024-04-16 RX ADMIN — ENOXAPARIN SODIUM 40 MG: 40 INJECTION SUBCUTANEOUS at 20:47

## 2024-04-16 RX ADMIN — LEVOCARNITINE 660 MG: 330 TABLET ORAL at 16:39

## 2024-04-16 RX ADMIN — PIPERACILLIN AND TAZOBACTAM 3.38 G: 3; .375 INJECTION, POWDER, FOR SOLUTION INTRAVENOUS at 02:03

## 2024-04-16 RX ADMIN — METHYLCELLULOSE 500 MG: 500 TABLET ORAL at 09:21

## 2024-04-16 RX ADMIN — BUSPIRONE HYDROCHLORIDE 15 MG: 15 TABLET ORAL at 20:47

## 2024-04-16 RX ADMIN — ENOXAPARIN SODIUM 40 MG: 40 INJECTION SUBCUTANEOUS at 08:34

## 2024-04-16 RX ADMIN — PIPERACILLIN AND TAZOBACTAM 3.38 G: 3; .375 INJECTION, POWDER, FOR SOLUTION INTRAVENOUS at 10:01

## 2024-04-16 RX ADMIN — METHYLCELLULOSE 500 MG: 500 TABLET ORAL at 20:47

## 2024-04-16 RX ADMIN — FLUTICASONE PROPIONATE 1 SPRAY: 50 SPRAY, METERED NASAL at 09:27

## 2024-04-16 RX ADMIN — CITALOPRAM HYDROBROMIDE 60 MG: 20 TABLET, FILM COATED ORAL at 09:20

## 2024-04-16 ASSESSMENT — ACTIVITIES OF DAILY LIVING (ADL)
ADLS_ACUITY_SCORE: 65
ADLS_ACUITY_SCORE: 67
ADLS_ACUITY_SCORE: 69
ADLS_ACUITY_SCORE: 67
ADLS_ACUITY_SCORE: 67
ADLS_ACUITY_SCORE: 69
ADLS_ACUITY_SCORE: 67
ADLS_ACUITY_SCORE: 69
ADLS_ACUITY_SCORE: 61
ADLS_ACUITY_SCORE: 67
ADLS_ACUITY_SCORE: 67
ADLS_ACUITY_SCORE: 65
ADLS_ACUITY_SCORE: 67
ADLS_ACUITY_SCORE: 67
ADLS_ACUITY_SCORE: 61
ADLS_ACUITY_SCORE: 69
ADLS_ACUITY_SCORE: 69
ADLS_ACUITY_SCORE: 67
ADLS_ACUITY_SCORE: 69
ADLS_ACUITY_SCORE: 69
ADLS_ACUITY_SCORE: 67

## 2024-04-16 NOTE — PROGRESS NOTES
[facility]  Hospitalist Progress Note  04/16/2024    Reason for Stay/active problem list    Acute toxic metabolic encephalopathy  Acute on chronic hypercapnic respiratory failure  Possible aspiration pneumonia as patient vomited with the BiPAP mask on on 4/14/24 overnight         Assessment and Plan:        Summary of Stay:     Tre Vazquez is a 48 year old male who is well-known to the ED staff with frequent visits with history of developmental delay living in group home, seizures, obesity, ANA/OHS on BiPAP at night/while napping, chronic hypercapnia, chronic hyperammoniemia, anemia schizoaffective disorder, borderline personality, depression, anxiety, HTN, 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      In the he was afebrile 98.7, pulse 73, pressure 139/85 and breathing comfortably on room ar without hypoxia.  Work remarkable for normal BMP except for elevated CO2 of 38, normal LFTs, lactic acid 0.9, high-sensitivity troponin of 33 and TSH of 1.38.  CBC normal.  pH 7.29, pCO2 82 and bicarb elevated at 14.  CT head is negative, CT abdomen/pelvis shows cholelithiasis with mild gallbladder distention, hepatic steatosis and nonobstructing small bilateral renal calculi.  Ultrasound of the abdomen showed cholelithiasis.  Urine drug screen is positive for PCP.  Valproic acid is 60.9.  Patient was given 2 L of IV fluids and placed on BiPAP with C admission    Problem List with Assessment and Plan:      Acute toxic metabolic encephalopathy due to hypercapnia     Presents with increased confusion and failure to stand and pivot which is his baseline.  Likely secondary to noncompliance with BiPAP at night  Patient was admitted and was treated with BiPAP   Patient  is not compliant with BIPAP. He was encouraged to wear BIPAP at night and nasal oxygn during the day. He was on HFNC this morning  due to concern for emesis , will transition to nasal oxygen about  3 LPM at baseline . Bipap at night and VBG in am     Acute on chronic hypoxic and hypercapnic respiratory failure secondary to obstructive sleep apnea, obesity hypoventilation syndrome  Possible aspiration pneumonia, fever of 101 after he vomited with the BiPAP mask on on 4/14/2024 overnight  Chronically on nasal supplemental oxygen at 3 L and was supposed to be on BiPAP at night and during nap.  Initial gas showed venous pCO2 of 82, pH of 7.25, venous bicarb of 34.    Patient was treated with BiPAP overnight and his venous pCO2 improved but remained high.  Patient vomited on 4/14 overnight with the BiPAP mask on.  There is concern for aspiration and he was febrile up to 101.  He was started on IV Zosyn.  BiPAP was  removed  Chest x-ray done overnight showed strands of plate like atelectasis in the lower lungs.  Slightly more on the left.  No adenopathy or effusion.  Shallow inspiration accentuates cardiac size.  Pulmonary vascularity is normal.  Anatomic alignment of the bones and joints  He is currently on high flow nasal cannula , will transition to NC   Patient was started on IV Zosyn for possible aspiration pneumonia, will continue   Incentive spirometry, Acapella as tolerated every 2 hours while awake      Chronic medical problems:  #History of seizure activity  #History of epilepsy  -Seizure precaution  -Resumed PTA Keppra      #Schizoaffective disorder  #Borderline personality disorder  #MDD/anxiety  -Continue PTA seroquel, citalopram, lamotrigine, buspirone and Depakote   -Drug screen is positive for PCP but this might be related to his Lamictal use     #Developmental delay  -Lives in group home, at baseline uses wheelchair  -Supportive care     #Hypertension  -Continue prior to admission propranolol and prazosin      #GERD  -Continue Protonix    Constipation;  On MiraLAX daily last bowel movement was on 4/12/2024  Patient refused to take MiraLAX last couple of days  Took MiraLAX on 4/15/2024      #Chronic  hyperammonemia  -Continue prior to admission levocaritine      #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      Disposition: Discharge in the next  2-3 days once respiratory status improves and  stable on nocturnal BIPAP and daytime nasal canula oxygen. Of note patient has BIPAP at his group home but is not wearing it consistently.   If discahrge is a challenge , may benefit from pulmonary consult  to consider alternative mask   Discussed with bedside RN patient          Interval History (Subjective):        Patient is seen and examined by me today and medical record reviewed.Overnight events noted and care discussed with nursing staff.   Patient is doing okay with  HFNC. Still complaining about BIPAP . No fever. Seems more alert and interactive       Physical Exam:        Last Vital Signs:  /64 (BP Location: Left arm)   Pulse 76   Temp 97.8  F (36.6  C) (Oral)   Resp 20   Wt 129.3 kg (285 lb 0.9 oz)   SpO2 96%   BMI 43.34 kg/m      I/O last 3 completed shifts:  In: 429 [P.O.:420; I.V.:9]  Out: 1250 [Urine:1250]    Wt Readings from Last 5 Encounters:   04/12/24 129.3 kg (285 lb 0.9 oz)   03/26/24 131.5 kg (290 lb)   03/19/24 131.5 kg (290 lb)   03/09/24 131.8 kg (290 lb 9.1 oz)   02/28/24 143 kg (315 lb 4.1 oz)        Constitutional: Awake, alert, cooperative, no apparent distress , currently on high flow oxygen     Respiratory: Coarse breath sounds, no crackles or wheezing   Cardiovascular: Regular rate and rhythm, normal S1 and S2, and no murmur noted   Abdomen: Normal bowel sounds, soft, non-distended, non-tender   Skin: No new rashes, no cyanosis, dry to touch   Neuro: Alert with  no new focal weakness   Extremities: No edema   Other(s):        All other systems: Negative          Medications:     "    All current medications were reviewed with changes reflected in problem list.         Data:      All new lab and imaging data was reviewed.      Data reviewed today: I reviewed all new labs and imaging results over the last 24 hours. I personally reviewed no images or EKG's today      Recent Labs   Lab 04/16/24  0752 04/15/24  0708 04/14/24  0659   WBC 7.6 11.1* 5.8   HGB 10.2* 11.9* 11.2*   HCT 33.6* 39.1* 36.6*   MCV 99 98 99   * 169 166     No results for input(s): \"CULT\" in the last 168 hours.  Recent Labs   Lab 04/16/24  1214 04/16/24  0849 04/16/24  0752 04/15/24  0824 04/15/24  0708 04/14/24  0659 04/11/24  1011 04/11/24  1007   NA  --   --  144  --  142 141   < > 144   POTASSIUM  --   --  4.2  --  4.1 3.8   < > 4.7   CHLORIDE  --   --  104  --  101 101   < > 101   CO2  --   --  33*  --  32* 29   < > 34*   ANIONGAP  --   --  7  --  9 11   < > 9   * 77 81   < > 120* 112*   < > 81   BUN  --   --  13.0  --  12.0 11.0   < > 9.3   CR  --   --  0.57*  --  0.63* 0.60*   < > 0.76   GFRESTIMATED  --   --  >90  --  >90 >90   < > >90   ARNOLD  --   --  8.4*  --  8.8 8.6   < > 9.5   PROTTOTAL  --   --   --   --   --   --   --  7.4   ALBUMIN  --   --   --   --   --   --   --  4.2   BILITOTAL  --   --   --   --   --   --   --  0.3   ALKPHOS  --   --   --   --   --   --   --  110   AST  --   --   --   --   --   --   --  28   ALT  --   --   --   --   --   --   --  28    < > = values in this interval not displayed.       Recent Labs   Lab 04/16/24  1214 04/16/24  0849 04/16/24  0752 04/16/24  0218 04/16/24  0139   * 77 81 106* 79       No results for input(s): \"INR\" in the last 168 hours.      No results for input(s): \"TROPONIN\", \"TROPI\", \"TROPR\" in the last 168 hours.    Invalid input(s): \"TROP\", \"TROPONINIES\"    Recent Results (from the past 48 hour(s))   CT Abdomen Pelvis w Contrast    Narrative    CT ABDOMEN PELVIS W CONTRAST 4/11/2024 10:34 AM    CLINICAL HISTORY: abd pain    TECHNIQUE: CT scan of " the abdomen and pelvis was performed following  injection of IV contrast. Multiplanar reformats were obtained. Dose  reduction techniques were used.  CONTRAST: 100mL Isovue-370    COMPARISON: 5/12/2023    FINDINGS:   LOWER CHEST: Mild bibasilar atelectasis.    HEPATOBILIARY: Mild hepatic steatosis. Cholelithiasis with mild  gallbladder distention. No biliary ductal dilatation.    PANCREAS: Normal.    SPLEEN: Normal.    ADRENAL GLANDS: Normal.    KIDNEYS/BLADDER: Few tiny nonobstructing bilateral renal calculi.  Decreased size of a left renal cyst or calyceal diverticulum at the  upper pole measuring 2.7 cm with layering calcifications, previously  measuring 3.7 cm. No hydronephrosis or perinephritic stranding  bilaterally. No calculi in the ureters or urinary bladder.    BOWEL: No small bowel or colonic obstruction or pathologic changes.  Normal appendix.    PELVIC ORGANS: No pelvic masses.    ADDITIONAL FINDINGS: No free fluid or fluid collections. No  lymphadenopathy. No abdominal aortic aneurysm.    MUSCULOSKELETAL: Old healed bilateral rib fracture deformities. No  suspicious lesions of the bones.      Impression    IMPRESSION:   1.  Cholelithiasis with mild gallbladder distention. Consider right  upper quadrant ultrasound for further evaluation.  2.  Mild hepatic steatosis.  3.  Nonobstructing small bilateral renal calculi. Decreased size of a  cyst or calyceal diverticulum with layering calcifications in the  upper pole of the left kidney.    PIETER GUILLORY MD         SYSTEM ID:  [id number]   Head CT w/o contrast    Narrative    EXAM: CT HEAD W/O CONTRAST  4/11/2024 10:35 AM     HISTORY:  altered mental status       COMPARISON:  Head CT 4/5/2024    TECHNIQUE: Using multidetector thin collimation helical acquisition  technique, axial, coronal and sagittal CT images from the skull base  to the vertex were obtained without intravenous contrast.   (topogram) image(s) also obtained and reviewed. Dose  reduction  techniques were performed.    FINDINGS:  No intracranial hemorrhage, mass effect, or midline shift. No acute  loss of gray-white matter differentiation in the cerebral hemispheres.  Ventricles are proportionate to the cerebral sulci. Clear basal  cisterns. Patchy periventricular hypoattenuation, consistent with  chronic small vessel ischemic disease. Unchanged mild lateral and  third ventriculomegaly.    Hyperostosis frontalis interna. The bony calvaria and the bones of the  skull base are normal. The visualized portions of the paranasal  sinuses and mastoid air cells are clear. Grossly normal orbits.       Impression    IMPRESSION:   1. No acute intracranial pathology.   2. Chronic small vessel ischemic disease and mild diffuse cerebral  volume loss.    JENNIE MEAD MD         SYSTEM ID:  [id number]   US Abdomen Limited    Narrative    US ABDOMEN LIMITED 4/11/2024 11:41 AM    CLINICAL HISTORY: Abdominal pain.    TECHNIQUE: Limited abdominal ultrasound.    COMPARISON: CT abdomen and pelvis 4/11/2024.    FINDINGS:    GALLBLADDER: Cholelithiasis. No significant wall thickening or  pericholecystic fluid.    BILE DUCTS: There is no biliary dilatation. The common duct measures 5  mm.    LIVER: Diffusely echogenic. No focal mass.    RIGHT KIDNEY: No hydronephrosis.    PANCREAS: The visualized portions of the pancreas are normal.    No ascites.      Impression    IMPRESSION:  1.  Cholelithiasis. No convincing sonographic signs to suggest acute  cholecystitis.  2.  Hepatic steatosis.    NAZ DOVER MD         SYSTEM ID:  [id number]       COVID Status:  COVID-19 PCR Results          6/20/2022    11:20 3/15/2023    13:14 3/25/2023    08:33 5/12/2023    01:20 11/24/2023    10:50 2/6/2024    12:22 2/21/2024    17:30 2/22/2024    06:52   COVID-19 PCR Results   SARS CoV2 PCR Negative  Negative  Negative  Negative  Negative  Negative  Negative  Negative      COVID-19 Antibody Results, Testing for Immunity            No data to display                 Disclaimer: This note consists of symbols derived from keyboarding, dictation and/or voice recognition software. As a result, there may be errors in the script that have gone undetected. Please consider this when interpreting information found in this chart.

## 2024-04-16 NOTE — PROGRESS NOTES
Care Management Follow Up    Length of Stay (days): 5    Expected Discharge Date: 04/19/2024     Concerns to be Addressed: discharge planning     Patient plan of care discussed at interdisciplinary rounds: Yes    Anticipated Discharge Disposition:       Anticipated Discharge Services:    Anticipated Discharge DME: Oxygen    Patient/family educated on Medicare website which has current facility and service quality ratings: yes  Education Provided on the Discharge Plan:    Patient/Family in Agreement with the Plan: yes    Referrals Placed by CM/SW:    Private pay costs discussed: Not applicable    Additional Information:  Sw following for discharge planning.     Pt from . He continues to need higher amounts of oxygen than his baseline. Per most recent MD note pt will likely discharge in 4-5 days. When medically stable pt will return to  with [facility].     Social work will continue to follow and assist with discharge planning as needed.    SKYLER Lewis, [facility]  Inpatient Care Coordination  [facility]  [phone]      SKYLER Lewis

## 2024-04-16 NOTE — PLAN OF CARE
"  Problem: Adult Inpatient Plan of Care  Goal: Plan of Care Review  Description: The Plan of Care Review/Shift note should be completed every shift.  The Outcome Evaluation is a brief statement about your assessment that the patient is improving, declining, or no change.  This information will be displayed automatically on your shift  note.  Outcome: Progressing  Flowsheets (Taken 4/16/2024 0458)  Outcome Evaluation: pt remained on BIPAP, not episode of emesis, pt was alert but confuse, PO night time meds held per MD order.  one episode of hypoglycemia BG 79, oral glucose gel given, . 1:1 sitter maintained. IVF gtt maintained. POC maintained.  Plan of Care Reviewed With: patient  Overall Patient Progress: no change  Goal: Patient-Specific Goal (Individualized)  Description: You can add care plan individualizations to a care plan. Examples of Individualization might be:  \"Parent requests to be called daily at 9am for status\", \"I have a hard time hearing out of my right ear\", or \"Do not touch me to wake me up as it startles  me\".  Outcome: Progressing  Goal: Absence of Hospital-Acquired Illness or Injury  Outcome: Progressing  Intervention: Identify and Manage Fall Risk  Recent Flowsheet Documentation  Taken 4/15/2024 1951 by Laura Savage RN  Safety Promotion/Fall Prevention:   bedside attendant   supervised activity   safety round/check completed   room near nurse's station   room door open  Intervention: Prevent Skin Injury  Recent Flowsheet Documentation  Taken 4/16/2024 0045 by Laura Savage RN  Body Position:   turned   left   heels elevated   legs elevated  Taken 4/15/2024 1951 by Laura Savage RN  Body Position:   supine   heels elevated  Skin Protection:   adhesive use limited   incontinence pads utilized  Device Skin Pressure Protection:   absorbent pad utilized/changed   adhesive use limited   tubing/devices free from skin contact   pressure points protected  Intervention: Prevent and Manage " VTE (Venous Thromboembolism) Risk  Recent Flowsheet Documentation  Taken 4/15/2024 1951 by Laura Savage, RN  VTE Prevention/Management: SCDs (sequential compression devices) off  Intervention: Prevent Infection  Recent Flowsheet Documentation  Taken 4/15/2024 1951 by Laura Savage, RN  Infection Prevention:   environmental surveillance performed   equipment surfaces disinfected   hand hygiene promoted   rest/sleep promoted   single patient room provided  Goal: Optimal Comfort and Wellbeing  Outcome: Progressing  Goal: Readiness for Transition of Care  Outcome: Progressing   Goal Outcome Evaluation:      Plan of Care Reviewed With: patient    Overall Patient Progress: no changeOverall Patient Progress: no change    Outcome Evaluation: pt remained on BIPAP, not episode of emesis, pt was alert but confuse, PO night time meds held per MD order.  one episode of hypoglycemia BG 79, oral glucose gel given, . 1:1 sitter maintained. IVF gtt maintained. POC maintained. IV abx maintained.

## 2024-04-16 NOTE — PLAN OF CARE
"Shift summary (0700-1930)    Pt Ox4, more alert compared to 4/15. Repeated requests and inappropriate w/ female staff. VSS on 4L O2 per NC (BiPAP removed this AM). Denies pain, CP. LPIV intact, SL. RPIV intact, infusing w/ NS @ 60 ml/hr. Mepi to right IT wound CDI. Up to chair for breakfast and bathroom Ax1 GB W. Using urinal in bed at times. Sz precautions maintained. Discharge TBD. Will continue POC.    Goal Outcome Evaluation:      Plan of Care Reviewed With: patient    Overall Patient Progress: improvingOverall Patient Progress: improving    Outcome Evaluation: Pt more alert today. BiPAP removed this AM, stable O2 sats on 4L O2 per NC. Pt encouraged by MD to use BiPAP for sleep, VBG check 4/17 AM.      Problem: Adult Inpatient Plan of Care  Goal: Plan of Care Review  Description: The Plan of Care Review/Shift note should be completed every shift.  The Outcome Evaluation is a brief statement about your assessment that the patient is improving, declining, or no change.  This information will be displayed automatically on your shift  note.  Outcome: Progressing  Flowsheets (Taken 4/16/2024 2070)  Outcome Evaluation: Pt more alert today. BiPAP removed this AM, stable O2 sats on 4L O2 per NC. Pt encouraged by MD to use BiPAP for sleep, VBG check 4/17 AM.  Plan of Care Reviewed With: patient  Overall Patient Progress: improving  Goal: Patient-Specific Goal (Individualized)  Description: You can add care plan individualizations to a care plan. Examples of Individualization might be:  \"Parent requests to be called daily at 9am for status\", \"I have a hard time hearing out of my right ear\", or \"Do not touch me to wake me up as it startles  me\".  Outcome: Progressing  Goal: Absence of Hospital-Acquired Illness or Injury  Outcome: Progressing  Intervention: Identify and Manage Fall Risk  Recent Flowsheet Documentation  Taken 4/16/2024 1118 by Lora Fajardo, RN  Safety Promotion/Fall Prevention: (MD at bedside) " --  Taken 4/16/2024 0819 by Lora Fajardo RN  Safety Promotion/Fall Prevention:   activity supervised   assistive device/personal items within reach   clutter free environment maintained   increased rounding and observation   increase visualization of patient   mobility aid in reach   lighting adjusted   nonskid shoes/slippers when out of bed   patient and family education   room door open   room near nurse's station   room organization consistent   safety round/check completed   supervised activity   treat reversible contributory factors   treat underlying cause   bedside attendant  Taken 4/16/2024 0724 by Lora Fajardo RN  Safety Promotion/Fall Prevention: safety round/check completed  Intervention: Prevent Skin Injury  Recent Flowsheet Documentation  Taken 4/16/2024 1239 by Lora Fajardo RN  Body Position:   weight shifting   turned   side-lying   right   heels elevated  Taken 4/16/2024 0819 by Lora Fajardo RN  Body Position:   position maintained   heels elevated   supine, head elevated  Skin Protection:   adhesive use limited   incontinence pads utilized  Device Skin Pressure Protection:   absorbent pad utilized/changed   adhesive use limited   pressure points protected   tubing/devices free from skin contact  Intervention: Prevent and Manage VTE (Venous Thromboembolism) Risk  Recent Flowsheet Documentation  Taken 4/16/2024 0819 by Lora Fajardo RN  VTE Prevention/Management: (SQ lovenox) other (see comments)  Intervention: Prevent Infection  Recent Flowsheet Documentation  Taken 4/16/2024 0819 by Lora Fajardo RN  Infection Prevention:   equipment surfaces disinfected   hand hygiene promoted   personal protective equipment utilized   rest/sleep promoted   single patient room provided  Goal: Optimal Comfort and Wellbeing  Outcome: Progressing  Goal: Readiness for Transition of Care  Outcome: Progressing

## 2024-04-16 NOTE — CONSULTS
"      Initial Psychiatric Consult   Consult date: April 16, 2024         Reason for Consult, requesting source:    patient on multiple psych meds with somnolence     Requesting source: Victor Manuel Brown    Labs and imaging reviewed. Spoke with RN- Lora. Paged Dr. Vickers with recommendations         HPI:   Per progress note from Dr. 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      In the he was afebrile 98.7, pulse 73, pressure 139/85 and breathing comfortably on room ar without hypoxia.  Work remarkable for normal BMP except for elevated CO2 of 38, normal LFTs, lactic acid 0.9, high-sensitivity troponin of 33 and TSH of 1.38.  CBC normal.  pH 7.29, pCO2 82 and bicarb elevated at 14.  CT head is negative, CT abdomen/pelvis shows cholelithiasis with mild gallbladder distention, hepatic steatosis and nonobstructing small bilateral renal calculi.  Ultrasound of the abdomen showed cholelithiasis.  Urine drug screen is positive for PCP.  Valproic acid is 60.9.  Patient was given 2 L of IV fluids and placed on BiPAP with IMC admission      Psychiatry is asked to consult due to patient being on multiple psychiatric medications and somnolence noted on 4/15/24.Joel was sitting in his chair watching TV when I came to meet with him. He tells me he's \"energetic\" today. He does not like wearing \"the mask\" and says \"it's itchy all night.\" He asks if I can tell the doctor to take it off. He feels less tired today but recalls feeling tired yesterday. When I attempted to discuss mental health he tells me he \"doesn't have a problem " "now.\" I discussed that his Seroquel and Depakote had been held due to sedation concerns. He tells me he's \"been on them forever\" and then asks if he can stop Depakote because he has other seizure medications. He denies SI/HI/AH/VH. RNLora was present in the room for a portion of the assessment. Rn reports that Joel is much more alert and awake today compared to yesterday.     I reviewed last outpatient neurology note ffrom 3/19/24 by Dr. Merchant at [facility] [place]. At time of visit, it is noted that Joel requested to come off of Depakote however it was decided that additional testing would be conducted before stopping it. Additionally this could be providing psychiatric benefit. See medication plan below as noted in last OP neurology note. .     Plan:  --- Depakote 500 mg twice daily  --- Lamotrigine 200 mg twice daily  --- Keppra 1000 mg in the a.m. and 1500 mg in the p.m.  --- Zonisamide 100 mg in the a.m. and 200 mg at night  Labs: Drug levels  --- Take your medications as prescribed, and do not stop taking abruptly.  --- Try to take your anti-seizure medications at the same time every day  --- Avoid common triggers: sleep deprivation, excessive alcohol, stress, flashing lights or patterns, dehydration, recreational drug use, illness and missed medications.  --- Plan on follow up in the Neurology Clinic on 07/03/24  --- Please feel free to reach out if you have any further questions or concerns.  --- Seek immediate medical attention if an emergency arises or if your health becomes progressively worse.         Past Psychiatric History:   Record review indicates past Hx of schizoaffective disorder, borderline personality disorder, MDD, and anxiety         Substance Use and History:   Does not appear to be contributing to current presentation. Record review indicates he was a former smoker         Past Medical History:   PAST MEDICAL HISTORY:   Past Medical History:   Diagnosis Date "    Blindness of left eye     Depression 03/10/2014    Hypertension     Pneumococcal septicemia(038.2) (H) 2013    Hospitalized    Pneumonia, organism unspecified(486) 2013    Hospitalized    Uncomplicated asthma     Unspecified epilepsy without mention of intractable epilepsy        PAST SURGICAL HISTORY:   Past Surgical History:   Procedure Laterality Date    COLONOSCOPY N/A 2022    Procedure: COLONOSCOPY;  Surgeon: Michael Caldwell MD;  Location:  OR    ESOPHAGOSCOPY, GASTROSCOPY, DUODENOSCOPY (EGD), COMBINED N/A 2022    Procedure: ESOPHAGOGASTRODUODENOSCOPY with biopsy;  Surgeon: Michael Caldwell MD;  Location: [facility] OR    ORTHOPEDIC SURGERY      pt stated past surgery on both ankles             Family History:   FAMILY HISTORY: No family history on file.        Social History:   SOCIAL HISTORY:   Social History     Tobacco Use    Smoking status: Former     Current packs/day: 0.00     Types: Cigarettes     Start date:      Quit date:      Years since quittin.2    Smokeless tobacco: Never   Substance Use Topics    Alcohol use: No       Patient resides at group home.          Physical ROS:   The 10 point Review of Systems is negative other than noted in the HPI or here.           Medications:     Current Facility-Administered Medications   Medication Dose Route Frequency Provider Last Rate Last Admin    bacitracin ointment   Topical BID Victor Manuel Brown DO   Given at 24 0831    busPIRone (BUSPAR) tablet 15 mg  15 mg Oral BID Shabbir Vickers MD   15 mg at 04/15/24 0902    citalopram (celeXA) tablet 60 mg  60 mg Oral Daily Shabbir Vickers MD   60 mg at 04/15/24 0902    clotrimazole (LOTRIMIN) 1 % cream   Topical BID Shabbir Vickers MD   Given at 24 0830    [Held by provider] divalproex sodium delayed-release (DEPAKOTE) DR tablet 500 mg  500 mg Oral TID Victor Manuel Brown DO   500 mg at 04/15/24 0903    enoxaparin ANTICOAGULANT (LOVENOX)  injection 40 mg  40 mg Subcutaneous Q12H Magaly Arteaga PA-C   40 mg at 04/16/24 0834    fluticasone (FLONASE) 50 MCG/ACT spray 1 spray  1 spray Nasal BID Shabbir Vickers MD   1 spray at 04/15/24 0916    fluticasone-vilanterol (BREO ELLIPTA) 200-25 MCG/ACT inhaler 1 puff  1 puff Inhalation Daily Shabbir Vickers MD   1 puff at 04/16/24 0824    And    umeclidinium (INCRUSE ELLIPTA) 62.5 MCG/ACT inhaler 1 puff  1 puff Inhalation Daily Shabbir Vickers MD   1 puff at 04/16/24 0824    gadobutrol (GADAVIST) injection 10 mL  10 mL Intravenous Once Magaly Arteaga PA-C        lamoTRIgine (LaMICtal) 24 hr tablet 200 mg  200 mg Oral BID Victor Manuel Brown DO   200 mg at 04/15/24 0903    [Held by provider] levETIRAcetam (KEPPRA XR) 24 hr tablet 1,500 mg  1,500 mg Oral At Bedtime Shabbir Vickers MD   1,500 mg at 04/14/24 2108    levETIRAcetam (KEPPRA) intermittent infusion 1,000 mg  1,000 mg Intravenous QAM Paty Alan MD   1,000 mg at 04/16/24 0813    levETIRAcetam (KEPPRA) intermittent infusion 1,500 mg  1,500 mg Intravenous At Bedtime Paty Alan MD   1,500 mg at 04/15/24 2207    [Held by provider] levETIRAcetam (KEPPRA) tablet 1,000 mg  1,000 mg Oral QAM Shabbir Vikcers MD   1,000 mg at 04/15/24 0902    levOCARNitine (CARNITOR) tablet 660 mg  660 mg Oral TID Victor Manuel Brown DO   660 mg at 04/15/24 0903    methylcellulose (CITRUCEL) tablet 500 mg  500 mg Oral BID Shabbir Vickers MD   500 mg at 04/15/24 0902    pantoprazole (PROTONIX) EC tablet 40 mg  40 mg Oral Daily Shabbir Vcikers MD   40 mg at 04/15/24 0902    piperacillin-tazobactam (ZOSYN) 3.375 g vial to attach to  mL bag  3.375 g Intravenous Q6H Anyi Garibay  mL/hr at 04/15/24 0321 3.375 g at 04/16/24 0203    polyethylene glycol (MIRALAX) Packet 17 g  17 g Oral Daily Shabbir Vickers MD   17 g at 04/15/24 0901    prazosin (MINIPRESS) capsule 2 mg  2 mg Oral At Bedtime Shabbir Vickers MD   2 mg at 04/14/24  2106    propranolol (INDERAL) tablet 20 mg  20 mg Oral BID Shabbir Vickers MD   20 mg at 04/15/24 0903    [Held by provider] QUEtiapine ER (SEROquel XR) 24 hr tablet 200 mg  200 mg Oral At Bedtime Paty Alan MD        sodium chloride (PF) 0.9% PF flush 3 mL  3 mL Intracatheter Q8H Magaly Arteaga PA-C   3 mL at 04/14/24 1836    sodium chloride (PF) 0.9% PF flush 3 mL  3 mL Intracatheter Q8H Magaly Arteaga, PA-C   3 mL at 04/15/24 1753    valproate (DEPACON) 500 mg in sodium chloride 0.9 % 50 mL intermittent infusion  500 mg Intravenous Q8H Paty Alan MD 50 mL/hr at 04/16/24 0832 500 mg at 04/16/24 0832    zonisamide (ZONEGRAN) capsule 100 mg  100 mg Oral QAM Victor Manuel Brown, DO   100 mg at 04/15/24 0902    And    zonisamide (ZONEGRAN) capsule 200 mg  200 mg Oral QPM Victor Manuel Brown, DO   200 mg at 04/14/24 2107              Allergies:     Allergies   Allergen Reactions    Acetaminophen Unknown    Hydrocodone Bitartrate Er Unknown    Cephalexin Rash     HUT Reaction: Rash; HUT Noted: [id number]      Vicodin [Hydrocodone-Acetaminophen] GI Disturbance          Labs:     Recent Results (from the past 48 hour(s))   Blood gas venous    Collection Time: 04/15/24  3:11 AM   Result Value Ref Range    pH Venous 7.28 (L) 7.32 - 7.43    pCO2 Venous 72 (H) 40 - 50 mm Hg    pO2 Venous 43 25 - 47 mm Hg    Bicarbonate Venous 34 (H) 21 - 28 mmol/L    Base Excess/Deficit Venous 5.1 (H) -3.0 - 3.0 mmol/L    FIO2 60     Oxyhemoglobin Venous 74 70 - 75 %    O2 Sat, Venous 75.6 (H) 70.0 - 75.0 %   Basic metabolic panel    Collection Time: 04/15/24  7:08 AM   Result Value Ref Range    Sodium 142 135 - 145 mmol/L    Potassium 4.1 3.4 - 5.3 mmol/L    Chloride 101 98 - 107 mmol/L    Carbon Dioxide (CO2) 32 (H) 22 - 29 mmol/L    Anion Gap 9 7 - 15 mmol/L    Urea Nitrogen 12.0 6.0 - 20.0 mg/dL    Creatinine 0.63 (L) 0.67 - 1.17 mg/dL    GFR Estimate >90 >60 mL/min/1.73m2    Calcium 8.8 8.6 - 10.0 mg/dL    Glucose 120  (H) 70 - 99 mg/dL   CBC with platelets    Collection Time: 04/15/24  7:08 AM   Result Value Ref Range    WBC Count 11.1 (H) 4.0 - 11.0 10e3/uL    RBC Count 3.98 (L) 4.40 - 5.90 10e6/uL    Hemoglobin 11.9 (L) 13.3 - 17.7 g/dL    Hematocrit 39.1 (L) 40.0 - 53.0 %    MCV 98 78 - 100 fL    MCH 29.9 26.5 - 33.0 pg    MCHC 30.4 (L) 31.5 - 36.5 g/dL    RDW 13.0 10.0 - 15.0 %    Platelet Count 169 150 - 450 10e3/uL   Blood gas venous    Collection Time: 04/15/24  7:09 AM   Result Value Ref Range    pH Venous 7.33 7.32 - 7.43    pCO2 Venous 64 (H) 40 - 50 mm Hg    pO2 Venous 64 (H) 25 - 47 mm Hg    Bicarbonate Venous 34 (H) 21 - 28 mmol/L    Base Excess/Deficit Venous 6.3 (H) -3.0 - 3.0 mmol/L    FIO2 21     Oxyhemoglobin Venous 90 (H) 70 - 75 %    O2 Sat, Venous 91.3 (H) 70.0 - 75.0 %   Glucose by meter    Collection Time: 04/15/24  8:24 AM   Result Value Ref Range    GLUCOSE BY METER POCT 109 (H) 70 - 99 mg/dL   Glucose by meter    Collection Time: 04/15/24  3:25 PM   Result Value Ref Range    GLUCOSE BY METER POCT 91 70 - 99 mg/dL   Blood gas venous    Collection Time: 04/15/24  5:04 PM   Result Value Ref Range    pH Venous 7.31 (L) 7.32 - 7.43    pCO2 Venous 72 (H) 40 - 50 mm Hg    pO2 Venous 74 (H) 25 - 47 mm Hg    Bicarbonate Venous 37 (H) 21 - 28 mmol/L    Base Excess/Deficit Venous 8.3 (H) -3.0 - 3.0 mmol/L    FIO2 60     Oxyhemoglobin Venous 94 (H) 70 - 75 %    O2 Sat, Venous 95.0 (H) 70.0 - 75.0 %   Hemoglobin A1c    Collection Time: 04/15/24  5:04 PM   Result Value Ref Range    Hemoglobin A1C 4.9 <5.7 %   Glucose by meter    Collection Time: 04/15/24  5:58 PM   Result Value Ref Range    GLUCOSE BY METER POCT 95 70 - 99 mg/dL   Glucose by meter    Collection Time: 04/15/24 10:26 PM   Result Value Ref Range    GLUCOSE BY METER POCT 83 70 - 99 mg/dL   Glucose by meter    Collection Time: 04/16/24  1:39 AM   Result Value Ref Range    GLUCOSE BY METER POCT 79 70 - 99 mg/dL   Glucose by meter    Collection Time:  "04/16/24  2:18 AM   Result Value Ref Range    GLUCOSE BY METER POCT 106 (H) 70 - 99 mg/dL   Basic metabolic panel    Collection Time: 04/16/24  7:52 AM   Result Value Ref Range    Sodium 144 135 - 145 mmol/L    Potassium 4.2 3.4 - 5.3 mmol/L    Chloride 104 98 - 107 mmol/L    Carbon Dioxide (CO2) 33 (H) 22 - 29 mmol/L    Anion Gap 7 7 - 15 mmol/L    Urea Nitrogen 13.0 6.0 - 20.0 mg/dL    Creatinine 0.57 (L) 0.67 - 1.17 mg/dL    GFR Estimate >90 >60 mL/min/1.73m2    Calcium 8.4 (L) 8.6 - 10.0 mg/dL    Glucose 81 70 - 99 mg/dL   CBC with platelets    Collection Time: 04/16/24  7:52 AM   Result Value Ref Range    WBC Count 7.6 4.0 - 11.0 10e3/uL    RBC Count 3.40 (L) 4.40 - 5.90 10e6/uL    Hemoglobin 10.2 (L) 13.3 - 17.7 g/dL    Hematocrit 33.6 (L) 40.0 - 53.0 %    MCV 99 78 - 100 fL    MCH 30.0 26.5 - 33.0 pg    MCHC 30.4 (L) 31.5 - 36.5 g/dL    RDW 13.1 10.0 - 15.0 %    Platelet Count 149 (L) 150 - 450 10e3/uL          Physical and Psychiatric Examination:     /62 (BP Location: Left arm)   Pulse 68   Temp 99.3  F (37.4  C) (Axillary)   Resp 20   Wt 129.3 kg (285 lb 0.9 oz)   SpO2 95%   BMI 43.34 kg/m    Weight is 285 lbs .88 oz  Body mass index is 43.34 kg/m .    Physical Exam:  I have reviewed the physical exam as documented by by the medical team and agree with findings and assessment and have no additional findings to add at this time.    Mental Status Exam:    Appearance: awake, alert, adequately groomed, dressed in hospital scrubs, and appeared as age stated  Attitude:  somewhat cooperative  Eye Contact:  poor   Mood:   \"I'm fine\"  Affect:  mood congruent  Speech:  clear, coherent and mumbling  Psychomotor Behavior:  no evidence of tardive dyskinesia, dystonia, or tics  Thought Process:   concrete   Associations:  no loose associations  Thought Content:  no evidence of suicidal ideation or homicidal ideation and no evidence of psychotic thought  Insight:  limited  Judgement:  limited  Oriented to:  " "time, person, and place  Attention Span and Concentration:  limited  Recent and Remote Memory:  fair               DSM-5 Diagnosis:     Acute toxic metabolic encephalopathy due to hypercapnia   Schizoaffective disorder  Borderline personality disorder  Developmental delay           Assessment:   Joel\"MAIRA Vazquez is a 48 year old male with past medical history significant for developmental delay (lives in group home), seizures, obesity, ANA/OHS on BiPAP at night and while napping, chronic hypercapnia, chronic hyperammoniemia, anemia, schizoaffective disorder, borderline personality, MDD, anxiety, HTN, and L eye blindness who admitted on 4/11/24 following altered mental status and somnolence.  Patient is reportedly not compliant with BiPAP which could lead to acute toxic metabolic encephalopathy due to hypercapnia. Urine drug screen is positive for PCP, thought to be due to lamictal which can cause false positive as patient resides at group home and denies substance use.  Valproic acid is 60.9.  Psychiatry asked to consult regarding medications given polypharmacy with somnolence. Today, Joel was sitting in a chair watching TV when I met with him. He was awake, alert, and able to engage. He tells me he's \"energetic\" today. He denies all mental health symptoms, denies SI/HI no evidence of psychosis, Seroquel and Depakote held yesterday evening given somnolence. Per nursing staff, patient is much more alert and oriented today. Records reviewed and Depakote appears to be ordered by neurology for seizure control however this could be also providing additional psychiatric benefit. Last neurology notes that this was ordered twice daily however it appears to be currently prescribed three times daily and it is unclear when this was increased. Neurology notes that patient has also asked to decrease this yet given perceived stabilizing effects additional testing was recommended prior to changes being made. Patient does report that " he has been on his current medication regimen for quite some time which does make me think that medications are not the direct cause of somnolence however it cannot be ruled out. Patient is also noted to not be compliant with BiPAP which could be primary contributing factor as well.          Summary of Recommendations:   Pending improvements in somnolence, can resume Seroquel ER 200mg at bedtime for mood stabilization   Last neurology note from 3/19/24 by Dr. Merchant notes Depakote 500mg twice daily, currently ordered for three times daily. Given somnolence yesterday, recommendation to resume twice daily and consultation with neurology for additional dose adjustment given perceived stabilizing benefit.  Defer to neurology for management of ongoing seizures  Please reconsult psychiatry as needed        RON York CNP    Consult/Liaison Psychiatry   [facility]    Contact information available via [facility] Paging/Directory  If I am not available, then [facility] line ([phone]) should know who is covering our consult service.

## 2024-04-16 NOTE — PROGRESS NOTES
04/16/24 1430   Appointment Info   Signing Clinician's Name / Credentials (PT) Malka Chung DPT   Rehab Comments (PT) 4 L O2   Living Environment   People in Home facility resident   Current Living Arrangements group home   Home Accessibility no concerns   Living Environment Comments uses walker and w/c; no stairs   Self-Care   Usual Activity Tolerance fair   Current Activity Tolerance fair   Equipment Currently Used at Home wheelchair, manual;walker, standard   Fall history within last six months yes   Number of times patient has fallen within last six months   (pt unable to provide answer; uses w/c for longer distances for fall prevention at group home)   Activity/Exercise/Self-Care Comment Baseline mobility: Up with assist of 1, pt will ambulate short distances with a walker at times, otherwise uses a wheelchair. Uses 2 L O2; bipap for sleep. Was getting HHPT/OT at home.   General Information   Onset of Illness/Injury or Date of Surgery 04/11/24   Referring Physician Shabbir Vickers MD   Patient/Family Therapy Goals Statement (PT) none stated   Pertinent History of Current Problem (include personal factors and/or comorbidities that impact the POC) Acute toxic metabolic encephalopathy due to hypercapnia   Weight-Bearing Status - LUE full weight-bearing   Weight-Bearing Status - RUE full weight-bearing   Weight-Bearing Status - LLE full weight-bearing   Weight-Bearing Status - RLE full weight-bearing   General Observations on 4 L O2   Cognition   Affect/Mental Status (Cognition) flat/blunted affect;emotionally labile   Orientation Status (Cognition) oriented x 3   Follows Commands (Cognition) follows multi-step commands   Behavioral Issues disinhibition   Pain Assessment   Patient Currently in Pain No   Strength (Manual Muscle Testing)   Strength (Manual Muscle Testing) Deficits observed during functional mobility   Bed Mobility   Comment, (Bed Mobility) SBA supine <> sit   Transfers   Comment, (Transfers)  Min A sit <> stand with FWW   Gait/Stairs (Locomotion)   [place] Level (Gait) supervision   Assistive Device (Gait) walker, front-wheeled   Distance in Feet (Gait) 5'   Comment, (Gait/Stairs) wide LYNN, flexed posture   Balance   Balance Comments fall risk at baseline; uses FWW, A x 1 and wheelchair for longer distances for fall risk prevention   Sensory Examination   Sensory Perception patient reports no sensory changes   Clinical Impression   Criteria for Skilled Therapeutic Intervention Yes, treatment indicated   PT Diagnosis (PT) impaired mobilitiy   Influenced by the following impairments impaired endurance   Functional limitations due to impairments decreased activity tolerance   Clinical Presentation (PT Evaluation Complexity) stable   Clinical Presentation Rationale clinical judgement   Clinical Decision Making (Complexity) low complexity   Planned Therapy Interventions (PT) balance training;bed mobility training;gait training;home exercise program;neuromuscular re-education;patient/family education;transfer training;strengthening   PT Total Evaluation Time   PT Eval, Low Complexity Minutes (76668) 15   Physical Therapy Goals   PT Frequency 3x/week   PT Predicted Duration/Target Date for Goal Attainment 04/23/24   PT Goals Bed Mobility;Transfers;Gait   PT: Transfers Supervision/stand-by assist;Sit to/from stand;Bed to/from chair;Assistive device   PT: Gait Supervision/stand-by assist;Rolling walker;25 feet   Interventions   Interventions Quick Adds Therapeutic Activity   Therapeutic Activity   Therapeutic Activities: dynamic activities to improve functional performance Minutes (04334) 25   Symptoms Noted During/After Treatment Fatigue   Treatment Detail/Skilled Intervention Pt particular about how environment is set up and can be demanding at times with increased irriation. Extra time for communication and education for importance of consistent mobility prior to returning home required. Pt on 4 L O2 and  O2 monitored throughout session, maintains > 93% spO2 and denies shortness of breath. Cues for safe transfer technique; pt does not want PT holding on to gait belt but does require Mod A to stand from low toilet seat with cues for safe set up and use of grab bars to assist. Cued for safe FWW navigation and ambulation back from toilet to EOB for AD proximity and upright posture. Dependent for pericares in bathroom d/t inability to let go of walker and wipe, impaired balance. Pt reports he has this assist at home. Returned supine SBA with momentum used. All needs back in place and bed alarm on, VSS.   PT Discharge Planning   PT Plan OOB mobility if pt agreeable, short distance ambulation with FWW   PT Discharge Recommendation (DC Rec) home with home care physical therapy   PT Rationale for DC Rec Pt close to baseline but needing increased O2 needs. Able to perform mobility A x 1 with FWW which pt will have at . Recommend continuing HHPT to address strength, balance, and activity tolerance limitations in the home setting.   PT Brief overview of current status Ax1 with FWW, ambulates short distances; 4 L O2 today   PT Equipment Needed at Discharge   (has necessary equipment)   Total Session Time   Timed Code Treatment Minutes 25   Total Session Time (sum of timed and untimed services) 40

## 2024-04-16 NOTE — PROGRESS NOTES
A BiPAP of  20/7 @ 60% was applied to the pt via the mask for overnight use, taking breaks on HFNC.  The bridge of the nose looks good and remains intact. Pt is tolerating it well. Will continue to monitor and assess the pt's current respiratory status and needs.      Fina Lofton, RT

## 2024-04-17 LAB
ANION GAP SERPL CALCULATED.3IONS-SCNC: 8 MMOL/L (ref 7–15)
BASE EXCESS BLDV CALC-SCNC: 8.4 MMOL/L (ref -3–3)
BUN SERPL-MCNC: 9 MG/DL (ref 6–20)
CALCIUM SERPL-MCNC: 8 MG/DL (ref 8.6–10)
CHLORIDE SERPL-SCNC: 105 MMOL/L (ref 98–107)
CREAT SERPL-MCNC: 0.57 MG/DL (ref 0.67–1.17)
DEPRECATED HCO3 PLAS-SCNC: 30 MMOL/L (ref 22–29)
EGFRCR SERPLBLD CKD-EPI 2021: >90 ML/MIN/1.73M2
ERYTHROCYTE [DISTWIDTH] IN BLOOD BY AUTOMATED COUNT: 13 % (ref 10–15)
GLUCOSE BLDC GLUCOMTR-MCNC: 114 MG/DL (ref 70–99)
GLUCOSE BLDC GLUCOMTR-MCNC: 120 MG/DL (ref 70–99)
GLUCOSE BLDC GLUCOMTR-MCNC: 139 MG/DL (ref 70–99)
GLUCOSE BLDC GLUCOMTR-MCNC: 83 MG/DL (ref 70–99)
GLUCOSE BLDC GLUCOMTR-MCNC: 91 MG/DL (ref 70–99)
GLUCOSE SERPL-MCNC: 117 MG/DL (ref 70–99)
HCO3 BLDV-SCNC: 37 MMOL/L (ref 21–28)
HCT VFR BLD AUTO: 34.2 % (ref 40–53)
HGB BLD-MCNC: 10.4 G/DL (ref 13.3–17.7)
MCH RBC QN AUTO: 30.1 PG (ref 26.5–33)
MCHC RBC AUTO-ENTMCNC: 30.4 G/DL (ref 31.5–36.5)
MCV RBC AUTO: 99 FL (ref 78–100)
O2/TOTAL GAS SETTING VFR VENT: 21 %
OXYHGB MFR BLDV: 90 % (ref 70–75)
PCO2 BLDV: 70 MM HG (ref 40–50)
PH BLDV: 7.33 [PH] (ref 7.32–7.43)
PLATELET # BLD AUTO: 146 10E3/UL (ref 150–450)
PO2 BLDV: 59 MM HG (ref 25–47)
POTASSIUM SERPL-SCNC: 4.1 MMOL/L (ref 3.4–5.3)
RBC # BLD AUTO: 3.45 10E6/UL (ref 4.4–5.9)
SAO2 % BLDV: 90.5 % (ref 70–75)
SODIUM SERPL-SCNC: 143 MMOL/L (ref 135–145)
WBC # BLD AUTO: 6.1 10E3/UL (ref 4–11)

## 2024-04-17 PROCEDURE — 250N000011 HC RX IP 250 OP 636: Mod: JZ | Performed by: INTERNAL MEDICINE

## 2024-04-17 PROCEDURE — 85041 AUTOMATED RBC COUNT: CPT | Performed by: PHYSICIAN ASSISTANT

## 2024-04-17 PROCEDURE — 82374 ASSAY BLOOD CARBON DIOXIDE: CPT | Performed by: PHYSICIAN ASSISTANT

## 2024-04-17 PROCEDURE — 99233 SBSQ HOSP IP/OBS HIGH 50: CPT | Performed by: INTERNAL MEDICINE

## 2024-04-17 PROCEDURE — 258N000003 HC RX IP 258 OP 636: Performed by: INTERNAL MEDICINE

## 2024-04-17 PROCEDURE — 250N000011 HC RX IP 250 OP 636: Performed by: PHYSICIAN ASSISTANT

## 2024-04-17 PROCEDURE — 82805 BLOOD GASES W/O2 SATURATION: CPT | Performed by: INTERNAL MEDICINE

## 2024-04-17 PROCEDURE — 250N000011 HC RX IP 250 OP 636: Performed by: INTERNAL MEDICINE

## 2024-04-17 PROCEDURE — 250N000013 HC RX MED GY IP 250 OP 250 PS 637: Performed by: INTERNAL MEDICINE

## 2024-04-17 PROCEDURE — 250N000009 HC RX 250: Performed by: INTERNAL MEDICINE

## 2024-04-17 PROCEDURE — 120N000001 HC R&B MED SURG/OB

## 2024-04-17 PROCEDURE — 36415 COLL VENOUS BLD VENIPUNCTURE: CPT | Performed by: INTERNAL MEDICINE

## 2024-04-17 PROCEDURE — 250N000013 HC RX MED GY IP 250 OP 250 PS 637: Performed by: STUDENT IN AN ORGANIZED HEALTH CARE EDUCATION/TRAINING PROGRAM

## 2024-04-17 RX ORDER — DIVALPROEX SODIUM 500 MG/1
500 TABLET, DELAYED RELEASE ORAL 2 TIMES DAILY
Status: DISCONTINUED | OUTPATIENT
Start: 2024-04-18 | End: 2024-04-18 | Stop reason: HOSPADM

## 2024-04-17 RX ORDER — LOPERAMIDE HCL 2 MG
2 CAPSULE ORAL 4 TIMES DAILY PRN
Status: DISCONTINUED | OUTPATIENT
Start: 2024-04-17 | End: 2024-04-17

## 2024-04-17 RX ORDER — CETIRIZINE HYDROCHLORIDE 10 MG/1
10 TABLET ORAL AT BEDTIME
Status: DISCONTINUED | OUTPATIENT
Start: 2024-04-17 | End: 2024-04-18 | Stop reason: HOSPADM

## 2024-04-17 RX ADMIN — BACITRACIN: 500 OINTMENT TOPICAL at 21:21

## 2024-04-17 RX ADMIN — PIPERACILLIN AND TAZOBACTAM 3.38 G: 3; .375 INJECTION, POWDER, FOR SOLUTION INTRAVENOUS at 04:45

## 2024-04-17 RX ADMIN — PRAZOSIN HYDROCHLORIDE 2 MG: 1 CAPSULE ORAL at 21:10

## 2024-04-17 RX ADMIN — LEVOCARNITINE 660 MG: 330 TABLET ORAL at 09:34

## 2024-04-17 RX ADMIN — ENOXAPARIN SODIUM 40 MG: 40 INJECTION SUBCUTANEOUS at 21:13

## 2024-04-17 RX ADMIN — LOPERAMIDE HYDROCHLORIDE 2 MG: 2 CAPSULE ORAL at 12:33

## 2024-04-17 RX ADMIN — VALPROATE SODIUM 500 MG: 100 INJECTION, SOLUTION INTRAVENOUS at 09:46

## 2024-04-17 RX ADMIN — LEVETIRACETAM 1000 MG: 10 INJECTION, SOLUTION INTRAVENOUS at 09:29

## 2024-04-17 RX ADMIN — LEVOCARNITINE 660 MG: 330 TABLET ORAL at 21:12

## 2024-04-17 RX ADMIN — LEVETIRACETAM 1500 MG: 750 TABLET, FILM COATED, EXTENDED RELEASE ORAL at 21:11

## 2024-04-17 RX ADMIN — ZONISAMIDE 200 MG: 100 CAPSULE ORAL at 21:13

## 2024-04-17 RX ADMIN — PROPRANOLOL HYDROCHLORIDE 20 MG: 20 TABLET ORAL at 21:10

## 2024-04-17 RX ADMIN — METHYLCELLULOSE 500 MG: 500 TABLET ORAL at 09:34

## 2024-04-17 RX ADMIN — PROPRANOLOL HYDROCHLORIDE 20 MG: 20 TABLET ORAL at 09:34

## 2024-04-17 RX ADMIN — AMOXICILLIN AND CLAVULANATE POTASSIUM 1 TABLET: 875; 125 TABLET, FILM COATED ORAL at 21:09

## 2024-04-17 RX ADMIN — BUSPIRONE HYDROCHLORIDE 15 MG: 15 TABLET ORAL at 09:34

## 2024-04-17 RX ADMIN — LAMOTRIGINE 200 MG: 100 TABLET, FILM COATED, EXTENDED RELEASE ORAL at 21:12

## 2024-04-17 RX ADMIN — LAMOTRIGINE 200 MG: 100 TABLET, FILM COATED, EXTENDED RELEASE ORAL at 10:18

## 2024-04-17 RX ADMIN — CITALOPRAM HYDROBROMIDE 60 MG: 20 TABLET, FILM COATED ORAL at 09:33

## 2024-04-17 RX ADMIN — DIVALPROEX SODIUM 500 MG: 500 TABLET, DELAYED RELEASE ORAL at 16:06

## 2024-04-17 RX ADMIN — METHYLCELLULOSE 500 MG: 500 TABLET ORAL at 21:11

## 2024-04-17 RX ADMIN — PIPERACILLIN AND TAZOBACTAM 3.38 G: 3; .375 INJECTION, POWDER, FOR SOLUTION INTRAVENOUS at 11:18

## 2024-04-17 RX ADMIN — VALPROATE SODIUM 500 MG: 100 INJECTION, SOLUTION INTRAVENOUS at 00:31

## 2024-04-17 RX ADMIN — CETIRIZINE HYDROCHLORIDE 10 MG: 10 TABLET, FILM COATED ORAL at 21:10

## 2024-04-17 RX ADMIN — UMECLIDINIUM 1 PUFF: 62.5 AEROSOL, POWDER ORAL at 09:48

## 2024-04-17 RX ADMIN — FLUTICASONE PROPIONATE 1 SPRAY: 50 SPRAY, METERED NASAL at 09:33

## 2024-04-17 RX ADMIN — LEVOCARNITINE 660 MG: 330 TABLET ORAL at 16:06

## 2024-04-17 RX ADMIN — ZONISAMIDE 100 MG: 100 CAPSULE ORAL at 09:34

## 2024-04-17 RX ADMIN — QUETIAPINE FUMARATE 200 MG: 200 TABLET, EXTENDED RELEASE ORAL at 21:11

## 2024-04-17 RX ADMIN — BUSPIRONE HYDROCHLORIDE 15 MG: 15 TABLET ORAL at 21:10

## 2024-04-17 RX ADMIN — ENOXAPARIN SODIUM 40 MG: 40 INJECTION SUBCUTANEOUS at 09:33

## 2024-04-17 RX ADMIN — FLUTICASONE PROPIONATE 1 SPRAY: 50 SPRAY, METERED NASAL at 21:15

## 2024-04-17 RX ADMIN — SODIUM CHLORIDE: 9 INJECTION, SOLUTION INTRAVENOUS at 06:21

## 2024-04-17 RX ADMIN — FLUTICASONE FUROATE AND VILANTEROL TRIFENATATE 1 PUFF: 200; 25 POWDER RESPIRATORY (INHALATION) at 09:48

## 2024-04-17 RX ADMIN — PANTOPRAZOLE SODIUM 40 MG: 40 TABLET, DELAYED RELEASE ORAL at 09:34

## 2024-04-17 ASSESSMENT — ACTIVITIES OF DAILY LIVING (ADL)
ADLS_ACUITY_SCORE: 65
ADLS_ACUITY_SCORE: 61
ADLS_ACUITY_SCORE: 59
ADLS_ACUITY_SCORE: 61
ADLS_ACUITY_SCORE: 59
ADLS_ACUITY_SCORE: 64
ADLS_ACUITY_SCORE: 61
ADLS_ACUITY_SCORE: 65
ADLS_ACUITY_SCORE: 65
ADLS_ACUITY_SCORE: 59
ADLS_ACUITY_SCORE: 61
ADLS_ACUITY_SCORE: 64
ADLS_ACUITY_SCORE: 64
ADLS_ACUITY_SCORE: 61
ADLS_ACUITY_SCORE: 65
ADLS_ACUITY_SCORE: 65
ADLS_ACUITY_SCORE: 64
ADLS_ACUITY_SCORE: 59

## 2024-04-17 NOTE — PLAN OF CARE
"  Problem: Adult Inpatient Plan of Care  Goal: Plan of Care Review  Description: The Plan of Care Review/Shift note should be completed every shift.  The Outcome Evaluation is a brief statement about your assessment that the patient is improving, declining, or no change.  This information will be displayed automatically on your shift  note.  Outcome: Progressing  Flowsheets (Taken 4/17/2024 6746)  Outcome Evaluation: pt refused bipap at bedtime, IVF and abx gtt naintained, pt on 4L NC.  Plan of Care Reviewed With: patient  Overall Patient Progress: no change  Goal: Patient-Specific Goal (Individualized)  Description: You can add care plan individualizations to a care plan. Examples of Individualization might be:  \"Parent requests to be called daily at 9am for status\", \"I have a hard time hearing out of my right ear\", or \"Do not touch me to wake me up as it startles  me\".  Outcome: Progressing  Goal: Absence of Hospital-Acquired Illness or Injury  Outcome: Progressing  Intervention: Identify and Manage Fall Risk  Recent Flowsheet Documentation  Taken 4/16/2024 2040 by Laura Savage RN  Safety Promotion/Fall Prevention:   supervised activity   safety round/check completed   room organization consistent   room near nurse's station   room door open   nonskid shoes/slippers when out of bed   mobility aid in reach   clutter free environment maintained  Intervention: Prevent Skin Injury  Recent Flowsheet Documentation  Taken 4/17/2024 0030 by Laura Savage RN  Body Position: position changed independently  Taken 4/16/2024 2040 by Laura Savage RN  Body Position:   weight shifting   heels elevated   foot of bed elevated   log-rolled   supine, head elevated  Skin Protection:   adhesive use limited   incontinence pads utilized  Device Skin Pressure Protection:   absorbent pad utilized/changed   adhesive use limited   pressure points protected   tubing/devices free from skin contact  Intervention: Prevent and Manage " VTE (Venous Thromboembolism) Risk  Recent Flowsheet Documentation  Taken 4/16/2024 2040 by Laura Savage, RN  VTE Prevention/Management: SCDs (sequential compression devices) off  Intervention: Prevent Infection  Recent Flowsheet Documentation  Taken 4/16/2024 2040 by Laura Savage, RN  Infection Prevention:   environmental surveillance performed   equipment surfaces disinfected   hand hygiene promoted   rest/sleep promoted   single patient room provided  Goal: Optimal Comfort and Wellbeing  Outcome: Progressing  Goal: Readiness for Transition of Care  Outcome: Progressing   Goal Outcome Evaluation:      Plan of Care Reviewed With: patient    Overall Patient Progress: no changeOverall Patient Progress: no change    Outcome Evaluation: pt refused bipap at bedtime, IVF and abx gtt naintained, pt on 4L NC.

## 2024-04-17 NOTE — PROGRESS NOTES
Assumed care at 1545. Loose BM X1. Continent of bladder. Up with assist of 1 with gait belt and walker. Coccyx wound treatment completed. Reported off to the  next shift at 1745. Safety precautions and bed alarm intact.

## 2024-04-17 NOTE — PROGRESS NOTES
[facility]  Hospitalist Progress Note  04/17/2024    Reason for Stay/active problem list  Acute toxic metabolic encephalopathy thought due to Acute on chronic hypercapnic respiratory failure  ANA, pt refusing treatment  Possible aspiration pneumonia as patient vomited with the BiPAP mask on on 4/14/24 overnight         Assessment and Plan:        Summary of Stay:   Tre Vazquez is a 48 year old male who is well-known to the ED staff with frequent ED visits and hospitalizations.  His medical history is  significant for developmental delay living in group home, seizures, obesity, ANA/OHS on BiPAP at night/while napping (though non-compliant), chronic hypercapnia, chronic hyperammonemia, anemia, schizoaffective disorder, borderline personality, depression, anxiety, HTN, left eye blindness.  He was again sent to the ED on 4/11/2024 with altered mental status and was found to be hypercapnic with associated narcosis.      In the ED  VS T 98.7, pulse 73, pressure 139/85 and breathing comfortably on room ar without hypoxia.    Labs: remarkable for normal BMP except for elevated CO2 of 38, normal LFTs, lactic acid 0.9, high-sensitivity troponin of 33 and TSH of 1.38.  CBC normal.  pH 7.29, pCO2 82 and bicarb 34.    Imaging: CT head is negative, CT abdomen/pelvis shows cholelithiasis with mild gallbladder distention, hepatic steatosis and nonobstructing small bilateral renal calculi.  Ultrasound of the abdomen showed cholelithiasis.  Urine drug screen is positive for PCP.  Valproic acid is 60.9.  Patient was given 2 L of IV fluids and placed on BiPAP with C admission    Problem List with Assessment and Plan:  Acute metabolic encephalopathy due to hypercapnia; possible contribution of multiple sedating medications also contributing.  Presented with increased confusion and failure to stand and pivot which he is able to do at baseline.  Likely secondary to noncompliance with BiPAP at night.  (He complains  of facial itching with the mask while here, and reports that the staff at his group home does not know how to apply the face mask.)  Patient was admitted and was at least intermittently treated with BiPAP.  He is currently reluctant to try this again due to facial itching. I offered an antihistamine (Cetirizine) for the complaint.   Psychiatry has recommended cutting back on the Depakote (for MH stabilization) to help reduce poss sedating meds. Otherwise, everything is resumed.   Patient  is not compliant with BIPAP. He was encouraged to wear BIPAP at night and nasal oxygn during the day.     Acute on chronic hypoxic and hypercapnic respiratory failure secondary to obstructive sleep apnea, obesity hypoventilation syndrome  Possible aspiration pneumonia, fever of 101 after he vomited with the BiPAP mask on on 4/14/2024 overnight  Chronically on nasal supplemental oxygen at 3 L and was supposed to be on BiPAP at night and during nap.  Initial gas showed venous pCO2 of 82, pH of 7.28, venous bicarb of 34.    Patient was treated with BiPAP overnight and his venous pCO2 improved but remained high.  Patient vomited on 4/14 overnight with the BiPAP mask on.  There was concern for aspiration when he was febrile up to 101.  He was started on IV Zosyn.  Chest x-ray done overnight showed strands of plate like atelectasis in the lower lungs.  Slightly more on the left.  No adenopathy or effusion.  Shallow inspiration accentuates cardiac size.  Pulmonary vascularity is normal.  Anatomic alignment of the bones and joints.  No fever since the single elevated temp on 4/15  Patient was started on IV Zosyn for possible aspiration pneumonia, will continue/switch to augmentin for total of 5 days, unless pt becomes more acutely ill  Incentive spirometry, Acapella as tolerated every 2 hours while awake    Chronic medical problems:  #History of seizure activity  #History of epilepsy  -Seizure precaution  -Resumed PTA Keppra       #Schizoaffective disorder  #Borderline personality disorder  #MDD/anxiety  -Continue PTA seroquel, citalopram, lamotrigine, buspirone and Depakote   -Drug screen is positive for PCP but this thought to be related to his Lamictal use     #Developmental delay  -Lives in group home, at baseline uses wheelchair  -Supportive care     #Hypertension  -Continue prior to admission propranolol and prazosin      #GERD  -Continue Protonix    Constipation;  On MiraLAX daily last bowel movement was on 4/12/2024  Patient refused to take MiraLAX last couple of days  Took MiraLAX on 4/15/2024      #Chronic hyperammonemia  -Continue prior to admission levocarnitine      #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      Disposition: Discharge in the next couple of days once once pt has reached a stable condition.   If discharge is a challenge, may benefit from pulmonary consult to consider alternative mask   Discussed with bedside RN patient          Interval History (Subjective):      Chart reviewed, pt interviewed.      Mr. Vazquez indicates that he is not able to where our mask due to itching and that he has never worn his BiPAP mask at home because the staff there is not adequately trained. He reluctantly consents to a trial of zyrtc for the itching with the mask.     No fever, chills. Non-ambulatory. Alert and conversant.          Physical Exam:        Last Vital Signs:  /56 (BP Location: Left arm)   Pulse 78   Temp 98.5  F (36.9  C) (Oral)   Resp 20   Wt 129.3 kg (285 lb 0.9 oz)   SpO2 96%   BMI 43.34 kg/m      I/O last 3 completed shifts:  In: 985 [P.O.:420; I.V.:565]  Out: 1250 [Urine:1250]    Wt Readings from Last 5 Encounters:   04/12/24 129.3 kg (285 lb 0.9 oz)   03/26/24 131.5 kg (290 lb)   03/19/24 131.5  "kg (290 lb)   03/09/24 131.8 kg (290 lb 9.1 oz)   02/28/24 143 kg (315 lb 4.1 oz)        Constitutional: Awake, alert, cooperative, no apparent distress , currently on high flow oxygen     Respiratory: Coarse breath sounds, no crackles or wheezing   Cardiovascular: Regular rate and rhythm, normal S1 and S2, and no murmur noted   Abdomen: Normal bowel sounds, soft, non-distended, non-tender   Skin: No new rashes, no cyanosis, dry to touch   Neuro: Alert and interactive.     Extremities: No edema   Other(s):        All other systems: Negative          Medications:        All current medications were reviewed with changes reflected in problem list.         Data:      All new lab and imaging data was reviewed.      Data reviewed today: I reviewed all new labs and imaging results over the last 24 hours. I personally reviewed no images or EKG's today      Recent Labs   Lab 04/17/24  0713 04/16/24  0752 04/15/24  0708   WBC 6.1 7.6 11.1*   HGB 10.4* 10.2* 11.9*   HCT 34.2* 33.6* 39.1*   MCV 99 99 98   * 149* 169     No results for input(s): \"CULT\" in the last 168 hours.  Recent Labs   Lab 04/17/24  0713 04/17/24  0211 04/16/24  2137 04/16/24  0849 04/16/24  0752 04/15/24  0824 04/15/24  0708 04/11/24  1011 04/11/24  1007     --   --   --  144  --  142   < > 144   POTASSIUM 4.1  --   --   --  4.2  --  4.1   < > 4.7   CHLORIDE 105  --   --   --  104  --  101   < > 101   CO2 30*  --   --   --  33*  --  32*   < > 34*   ANIONGAP 8  --   --   --  7  --  9   < > 9   * 114* 113*   < > 81   < > 120*   < > 81   BUN 9.0  --   --   --  13.0  --  12.0   < > 9.3   CR 0.57*  --   --   --  0.57*  --  0.63*   < > 0.76   GFRESTIMATED >90  --   --   --  >90  --  >90   < > >90   ARNOLD 8.0*  --   --   --  8.4*  --  8.8   < > 9.5   PROTTOTAL  --   --   --   --   --   --   --   --  7.4   ALBUMIN  --   --   --   --   --   --   --   --  4.2   BILITOTAL  --   --   --   --   --   --   --   --  0.3   ALKPHOS  --   --   --   --   --  " " --   --   --  110   AST  --   --   --   --   --   --   --   --  28   ALT  --   --   --   --   --   --   --   --  28    < > = values in this interval not displayed.       Recent Labs   Lab 04/17/24  0713 04/17/24  0211 04/16/24  2137 04/16/24  1729 04/16/24  1214   * 114* 113* 128* 144*       No results for input(s): \"INR\" in the last 168 hours.      No results for input(s): \"TROPONIN\", \"TROPI\", \"TROPR\" in the last 168 hours.    Invalid input(s): \"TROP\", \"TROPONINIES\"    Recent Results (from the past 48 hour(s))   CT Abdomen Pelvis w Contrast    Narrative    CT ABDOMEN PELVIS W CONTRAST 4/11/2024 10:34 AM    CLINICAL HISTORY: abd pain    TECHNIQUE: CT scan of the abdomen and pelvis was performed following  injection of IV contrast. Multiplanar reformats were obtained. Dose  reduction techniques were used.  CONTRAST: 100mL Isovue-370    COMPARISON: 5/12/2023    FINDINGS:   LOWER CHEST: Mild bibasilar atelectasis.    HEPATOBILIARY: Mild hepatic steatosis. Cholelithiasis with mild  gallbladder distention. No biliary ductal dilatation.    PANCREAS: Normal.    SPLEEN: Normal.    ADRENAL GLANDS: Normal.    KIDNEYS/BLADDER: Few tiny nonobstructing bilateral renal calculi.  Decreased size of a left renal cyst or calyceal diverticulum at the  upper pole measuring 2.7 cm with layering calcifications, previously  measuring 3.7 cm. No hydronephrosis or perinephritic stranding  bilaterally. No calculi in the ureters or urinary bladder.    BOWEL: No small bowel or colonic obstruction or pathologic changes.  Normal appendix.    PELVIC ORGANS: No pelvic masses.    ADDITIONAL FINDINGS: No free fluid or fluid collections. No  lymphadenopathy. No abdominal aortic aneurysm.    MUSCULOSKELETAL: Old healed bilateral rib fracture deformities. No  suspicious lesions of the bones.      Impression    IMPRESSION:   1.  Cholelithiasis with mild gallbladder distention. Consider right  upper quadrant ultrasound for further evaluation.  2. "  Mild hepatic steatosis.  3.  Nonobstructing small bilateral renal calculi. Decreased size of a  cyst or calyceal diverticulum with layering calcifications in the  upper pole of the left kidney.    PIETER GUILLORY MD         SYSTEM ID:  [id number]   Head CT w/o contrast    Narrative    EXAM: CT HEAD W/O CONTRAST  4/11/2024 10:35 AM     HISTORY:  altered mental status       COMPARISON:  Head CT 4/5/2024    TECHNIQUE: Using multidetector thin collimation helical acquisition  technique, axial, coronal and sagittal CT images from the skull base  to the vertex were obtained without intravenous contrast.   (topogram) image(s) also obtained and reviewed. Dose reduction  techniques were performed.    FINDINGS:  No intracranial hemorrhage, mass effect, or midline shift. No acute  loss of gray-white matter differentiation in the cerebral hemispheres.  Ventricles are proportionate to the cerebral sulci. Clear basal  cisterns. Patchy periventricular hypoattenuation, consistent with  chronic small vessel ischemic disease. Unchanged mild lateral and  third ventriculomegaly.    Hyperostosis frontalis interna. The bony calvaria and the bones of the  skull base are normal. The visualized portions of the paranasal  sinuses and mastoid air cells are clear. Grossly normal orbits.       Impression    IMPRESSION:   1. No acute intracranial pathology.   2. Chronic small vessel ischemic disease and mild diffuse cerebral  volume loss.    JENNIE MEAD MD         SYSTEM ID:  [id number]   US Abdomen Limited    Narrative    US ABDOMEN LIMITED 4/11/2024 11:41 AM    CLINICAL HISTORY: Abdominal pain.    TECHNIQUE: Limited abdominal ultrasound.    COMPARISON: CT abdomen and pelvis 4/11/2024.    FINDINGS:    GALLBLADDER: Cholelithiasis. No significant wall thickening or  pericholecystic fluid.    BILE DUCTS: There is no biliary dilatation. The common duct measures 5  mm.    LIVER: Diffusely echogenic. No focal mass.    RIGHT KIDNEY: No  hydronephrosis.    PANCREAS: The visualized portions of the pancreas are normal.    No ascites.      Impression    IMPRESSION:  1.  Cholelithiasis. No convincing sonographic signs to suggest acute  cholecystitis.  2.  Hepatic steatosis.    NAZ DOVER MD         SYSTEM ID:  [id number]       COVID Status:  COVID-19 PCR Results          6/20/2022    11:20 3/15/2023    13:14 3/25/2023    08:33 5/12/2023    01:20 11/24/2023    10:50 2/6/2024    12:22 2/21/2024    17:30 2/22/2024    06:52   COVID-19 PCR Results   SARS CoV2 PCR Negative  Negative  Negative  Negative  Negative  Negative  Negative  Negative      COVID-19 Antibody Results, Testing for Immunity           No data to display                 Disclaimer: This note consists of symbols derived from keyboarding, dictation and/or voice recognition software. As a result, there may be errors in the script that have gone undetected. Please consider this when interpreting information found in this chart.

## 2024-04-18 VITALS
HEART RATE: 80 BPM | WEIGHT: 285.06 LBS | BODY MASS INDEX: 43.34 KG/M2 | RESPIRATION RATE: 18 BRPM | SYSTOLIC BLOOD PRESSURE: 143 MMHG | DIASTOLIC BLOOD PRESSURE: 71 MMHG | OXYGEN SATURATION: 96 % | TEMPERATURE: 99 F

## 2024-04-18 PROBLEM — E87.29 CARBON DIOXIDE RETENTION: Status: ACTIVE | Noted: 2024-04-18

## 2024-04-18 PROBLEM — G93.41 METABOLIC ENCEPHALOPATHY: Status: ACTIVE | Noted: 2023-05-09

## 2024-04-18 PROBLEM — L89.313 PRESSURE INJURY OF RIGHT BUTTOCK, STAGE 3 (H): Status: ACTIVE | Noted: 2024-04-18

## 2024-04-18 PROBLEM — G47.33 OSA (OBSTRUCTIVE SLEEP APNEA): Status: ACTIVE | Noted: 2024-04-18

## 2024-04-18 LAB
ANION GAP SERPL CALCULATED.3IONS-SCNC: 8 MMOL/L (ref 7–15)
BASE EXCESS BLDV CALC-SCNC: 9.1 MMOL/L (ref -3–3)
BUN SERPL-MCNC: 4.5 MG/DL (ref 6–20)
CALCIUM SERPL-MCNC: 8.5 MG/DL (ref 8.6–10)
CHLORIDE SERPL-SCNC: 101 MMOL/L (ref 98–107)
CREAT SERPL-MCNC: 0.58 MG/DL (ref 0.67–1.17)
DEPRECATED HCO3 PLAS-SCNC: 32 MMOL/L (ref 22–29)
EGFRCR SERPLBLD CKD-EPI 2021: >90 ML/MIN/1.73M2
ERYTHROCYTE [DISTWIDTH] IN BLOOD BY AUTOMATED COUNT: 13.2 % (ref 10–15)
GLUCOSE BLDC GLUCOMTR-MCNC: 81 MG/DL (ref 70–99)
GLUCOSE BLDC GLUCOMTR-MCNC: 87 MG/DL (ref 70–99)
GLUCOSE SERPL-MCNC: 81 MG/DL (ref 70–99)
HCO3 BLDV-SCNC: 37 MMOL/L (ref 21–28)
HCT VFR BLD AUTO: 35.3 % (ref 40–53)
HGB BLD-MCNC: 10.6 G/DL (ref 13.3–17.7)
MCH RBC QN AUTO: 29.5 PG (ref 26.5–33)
MCHC RBC AUTO-ENTMCNC: 30 G/DL (ref 31.5–36.5)
MCV RBC AUTO: 98 FL (ref 78–100)
O2/TOTAL GAS SETTING VFR VENT: 5 %
OXYHGB MFR BLDV: 64 % (ref 70–75)
PCO2 BLDV: 71 MM HG (ref 40–50)
PH BLDV: 7.33 [PH] (ref 7.32–7.43)
PLATELET # BLD AUTO: 161 10E3/UL (ref 150–450)
PO2 BLDV: 33 MM HG (ref 25–47)
POTASSIUM SERPL-SCNC: 3.8 MMOL/L (ref 3.4–5.3)
RBC # BLD AUTO: 3.59 10E6/UL (ref 4.4–5.9)
SAO2 % BLDV: 65.4 % (ref 70–75)
SODIUM SERPL-SCNC: 141 MMOL/L (ref 135–145)
WBC # BLD AUTO: 7.8 10E3/UL (ref 4–11)

## 2024-04-18 PROCEDURE — 94660 CPAP INITIATION&MGMT: CPT

## 2024-04-18 PROCEDURE — 250N000011 HC RX IP 250 OP 636: Performed by: PHYSICIAN ASSISTANT

## 2024-04-18 PROCEDURE — 94640 AIRWAY INHALATION TREATMENT: CPT

## 2024-04-18 PROCEDURE — 85027 COMPLETE CBC AUTOMATED: CPT | Performed by: PHYSICIAN ASSISTANT

## 2024-04-18 PROCEDURE — 250N000013 HC RX MED GY IP 250 OP 250 PS 637: Performed by: INTERNAL MEDICINE

## 2024-04-18 PROCEDURE — 80048 BASIC METABOLIC PNL TOTAL CA: CPT | Performed by: PHYSICIAN ASSISTANT

## 2024-04-18 PROCEDURE — 250N000013 HC RX MED GY IP 250 OP 250 PS 637: Performed by: STUDENT IN AN ORGANIZED HEALTH CARE EDUCATION/TRAINING PROGRAM

## 2024-04-18 PROCEDURE — 999N000157 HC STATISTIC RCP TIME EA 10 MIN

## 2024-04-18 PROCEDURE — 82805 BLOOD GASES W/O2 SATURATION: CPT | Performed by: INTERNAL MEDICINE

## 2024-04-18 PROCEDURE — 36415 COLL VENOUS BLD VENIPUNCTURE: CPT | Performed by: PHYSICIAN ASSISTANT

## 2024-04-18 PROCEDURE — 99238 HOSP IP/OBS DSCHRG MGMT 30/<: CPT | Performed by: INTERNAL MEDICINE

## 2024-04-18 RX ORDER — DIVALPROEX SODIUM 500 MG/1
500 TABLET, DELAYED RELEASE ORAL 2 TIMES DAILY
Status: SHIPPED
Start: 2024-04-18 | End: 2024-07-03

## 2024-04-18 RX ORDER — QUETIAPINE 200 MG/1
200 TABLET, FILM COATED, EXTENDED RELEASE ORAL AT BEDTIME
Qty: 30 TABLET | Refills: 0 | Status: SHIPPED | OUTPATIENT
Start: 2024-04-18

## 2024-04-18 RX ADMIN — LAMOTRIGINE 200 MG: 100 TABLET, FILM COATED, EXTENDED RELEASE ORAL at 10:04

## 2024-04-18 RX ADMIN — METHYLCELLULOSE 500 MG: 500 TABLET ORAL at 10:04

## 2024-04-18 RX ADMIN — PROPRANOLOL HYDROCHLORIDE 20 MG: 20 TABLET ORAL at 10:04

## 2024-04-18 RX ADMIN — LEVOCARNITINE 660 MG: 330 TABLET ORAL at 10:04

## 2024-04-18 RX ADMIN — UMECLIDINIUM 1 PUFF: 62.5 AEROSOL, POWDER ORAL at 08:49

## 2024-04-18 RX ADMIN — BUSPIRONE HYDROCHLORIDE 15 MG: 15 TABLET ORAL at 10:04

## 2024-04-18 RX ADMIN — AMOXICILLIN AND CLAVULANATE POTASSIUM 1 TABLET: 875; 125 TABLET, FILM COATED ORAL at 10:05

## 2024-04-18 RX ADMIN — ZONISAMIDE 100 MG: 100 CAPSULE ORAL at 10:04

## 2024-04-18 RX ADMIN — FLUTICASONE FUROATE AND VILANTEROL TRIFENATATE 1 PUFF: 200; 25 POWDER RESPIRATORY (INHALATION) at 08:49

## 2024-04-18 RX ADMIN — DIVALPROEX SODIUM 500 MG: 500 TABLET, DELAYED RELEASE ORAL at 10:04

## 2024-04-18 RX ADMIN — LEVETIRACETAM 1000 MG: 500 TABLET, FILM COATED ORAL at 10:04

## 2024-04-18 RX ADMIN — FLUTICASONE PROPIONATE 1 SPRAY: 50 SPRAY, METERED NASAL at 10:06

## 2024-04-18 RX ADMIN — PANTOPRAZOLE SODIUM 40 MG: 40 TABLET, DELAYED RELEASE ORAL at 10:04

## 2024-04-18 RX ADMIN — CITALOPRAM HYDROBROMIDE 60 MG: 20 TABLET, FILM COATED ORAL at 10:03

## 2024-04-18 RX ADMIN — ENOXAPARIN SODIUM 40 MG: 40 INJECTION SUBCUTANEOUS at 10:05

## 2024-04-18 RX ADMIN — BACITRACIN: 500 OINTMENT TOPICAL at 10:06

## 2024-04-18 RX ADMIN — CLOTRIMAZOLE: 0.01 CREAM TOPICAL at 10:06

## 2024-04-18 ASSESSMENT — ACTIVITIES OF DAILY LIVING (ADL)
ADLS_ACUITY_SCORE: 64
ADLS_ACUITY_SCORE: 62
ADLS_ACUITY_SCORE: 64
ADLS_ACUITY_SCORE: 62
ADLS_ACUITY_SCORE: 62
ADLS_ACUITY_SCORE: 64
ADLS_ACUITY_SCORE: 62
ADLS_ACUITY_SCORE: 62

## 2024-04-18 NOTE — PROGRESS NOTES
Care Management Discharge Note    Discharge Date: 04/18/2024       Discharge Disposition:  Back to his group home    Discharge Services:  Home care RN, PT and OT resumption.    Discharge DME: Oxygen    Discharge Transportation: agency Reviewed out of pocket cost for [facility] transport, $89.98 base and $5.79 per mile to the destination. Spoke with patient, they expressed understanding and are agreeable to this.       Private pay costs discussed: transportation costs    Does the patient's insurance plan have a 3 day qualifying hospital stay waiver?  No    PAS Confirmation Code:  NA  Patient/family educated on Medicare website which has current facility and service quality ratings: yes    Education Provided on the Discharge Plan:  yes  Persons Notified of Discharge Plans: , Noelle and patient.  Patient/Family in Agreement with the Plan: yes    Handoff Referral Completed: No    Additional Information: Patient ready for discharge back to group [place]. Met with patient, he reports that he is ready to go home. Wheelchair transport scheduled set for today.    Updated managerNoelle at [facility]. Orders faxed. Updated the unit.    REGI Beyer   Inpatient Care Coordination   Supervisor  [facility]  [phone]        REGI Mercedes

## 2024-04-18 NOTE — PLAN OF CARE
Physical Therapy Discharge Summary    Reason for therapy discharge:    Discharged to home.    Progress towards therapy goal(s). See goals on Care Plan in [facility] electronic health record for goal details.  Goals partially met.  Barriers to achieving goals:   discharge from facility.    Therapy recommendation(s):    Continued therapy is recommended.  Rationale/Recommendations:  Recommend continued therapy with HHPT to progress independence with mobility and strength.

## 2024-04-18 NOTE — DISCHARGE SUMMARY
[facility] Discharge Summary    Tre Vazquez MRN# [id number]   Age: 48 year old YOB: 1975     Date of Admission:  4/11/2024  Date of Discharge::  4/18/2024  Admitting Physician:  Victor Manuel Brown DO  Discharge Physician:  Wesley Bhatia MD     Home clinic: [facility], [place]          Admission Diagnoses:   Seizure disorder (H) [G40.909]  Abdominal pain of unknown cause [R10.9]  Altered mental status, unspecified altered mental status type [R41.82]          Discharge Diagnosis:     Principal Problem:    Metabolic encephalopathy  Active Problems:    Altered mental status, unspecified altered mental status type    Acute on chronic respiratory failure with hypoxia and hypercapnia (H)    Seizure disorder (H)    Abdominal pain of unknown cause    Carbon dioxide retention    ANA (obstructive sleep apnea)    Pressure injury of right buttock, stage 3 (H)           Procedures:   CT Abdomen and pelvis with contrast  CT head without contrast  US Abdomen limited  CXR          Medications Prior to Admission:     Medications Prior to Admission   Medication Sig Dispense Refill Last Dose    acetaminophen (TYLENOL) 325 MG tablet Take 650 mg by mouth every 4 hours as needed for mild pain   Unknown at PRM    albuterol (PROAIR HFA/PROVENTIL HFA/VENTOLIN HFA) 108 (90 Base) MCG/ACT inhaler Inhale 2 puffs into the lungs every 4 hours as needed for shortness of breath, wheezing or cough 18 g 6 Unknown at PRN    ammonium lactate (AMLACTIN) 12 % external cream Apply topically 2 times daily   4/11/2024 at x1    bacitracin 500 UNIT/GM external ointment Apply topically 2 times daily Apply to left arm wound   4/11/2024 at x1    busPIRone (BUSPAR) 15 MG tablet Take 15 mg by mouth 2 times daily   4/11/2024 at x1    citalopram (CELEXA) 20 MG tablet Take 60 mg by mouth daily   4/11/2024 at AM    clotrimazole (LOTRIMIN) 1 % external cream Apply topically 2 times daily Apply to left groin   4/11/2024 at X1     dextromethorphan-guaiFENesin (TUSSIN DM)  MG/5ML liquid Take 10 mLs by mouth as needed for cough   Unknown at PRN    divalproex sodium delayed-release (DEPAKOTE) 500 MG DR tablet Take 500 mg by mouth 3 times daily   4/11/2024 at X1    fluticasone (FLONASE) 50 MCG/ACT nasal spray Spray 1 spray in nostril 2 times daily   4/11/2024 at x1    Fluticasone-Umeclidin-Vilanterol (TRELEGY ELLIPTA) 200-62.5-25 MCG/ACT oral inhaler Inhale 1 puff into the lungs daily 1 each 6 4/11/2024 at am    LamoTRIgine (LAMICTAL) 200 MG TB24 Take 200 mg by mouth 2 times daily 60 tablet 5 4/11/2024 at x1    levETIRAcetam (KEPPRA XR) 500 MG 24 hr tablet Take 3 tablets (1,500 mg) by mouth at bedtime 90 tablet 5 4/10/2024 at hs    levETIRAcetam (KEPPRA) 1000 MG tablet Take 1 tablet (1,000 mg) by mouth every morning 30 tablet 5 4/11/2024 at am    levOCARNitine (CARNITOR) 330 MG tablet Take 2 tablets (660 mg) by mouth 3 times daily 180 tablet 11 4/11/2024 at x2    loperamide (IMODIUM) 2 MG capsule Take 2 mg by mouth 4 times daily as needed for diarrhea   Unknown at PRN    magnesium hydroxide (MILK OF MAGNESIA) 400 MG/5ML suspension Take 30 mLs by mouth daily as needed for constipation   Unknown at PRN    methylcellulose POWD powder Apply 2 mg topically 2 times daily Mix 1 rounded tablespoon (2 mg) in 8 oz glass of water and take by mouth twice daily   4/11/2024 at x1    multivitamin, therapeutic (THERA-VIT) TABS tablet Take 1 tablet by mouth daily   4/11/2024 at am    ondansetron (ZOFRAN ODT) 4 MG ODT tab Take 4 mg by mouth every 6 hours as needed for nausea or vomiting   Unknown at PRN    pantoprazole (PROTONIX) 40 MG EC tablet Take 40 mg by mouth daily   4/11/2024 at am    polyethylene glycol (MIRALAX) 17 GM/Dose powder Take 1 capful. by mouth daily   4/11/2024 at am    prazosin (MINIPRESS) 2 MG capsule Take 2 mg by mouth At Bedtime   4/10/2024 at hs    propranolol (INDERAL) 20 MG tablet Take 1 tablet (20 mg) by mouth 2 times daily    4/11/2024 at x1    QUEtiapine ER (SEROQUEL XR) 300 MG 24 hr tablet Take 300 mg by mouth at bedtime   4/10/2024 at hs    sodium chloride (OCEAN) 0.65 % nasal spray Spray 1 spray into both nostrils every hour as needed for congestion   Unknown at PRN    zonisamide (ZONEGRAN) 100 MG capsule Take 1 capsule (100 mg) by mouth every morning AND 2 capsules (200 mg) every evening. 90 capsule 5 4/11/2024 at x1             Discharge Medications:     Discharge Medication List as of 4/18/2024 12:14 PM        CONTINUE these medications which have CHANGED    Details   divalproex sodium delayed-release (DEPAKOTE) 500 MG DR tablet Take 1 tablet (500 mg) by mouth 2 times daily, No Print Out      QUEtiapine ER (SEROQUEL XR) 200 MG 24 hr tablet Take 1 tablet (200 mg) by mouth at bedtime, Disp-30 tablet, R-0, E-Prescribe           CONTINUE these medications which have NOT CHANGED    Details   acetaminophen (TYLENOL) 325 MG tablet Take 650 mg by mouth every 4 hours as needed for mild pain, Historical      albuterol (PROAIR HFA/PROVENTIL HFA/VENTOLIN HFA) 108 (90 Base) MCG/ACT inhaler Inhale 2 puffs into the lungs every 4 hours as needed for shortness of breath, wheezing or cough, Disp-18 g, R-6, E-PrescribePharmacy may dispense brand covered by insurance (Proair, or proventil or ventolin or generic albuterol inhaler)      ammonium lactate (AMLACTIN) 12 % external cream Apply topically 2 times dailyHistorical      bacitracin 500 UNIT/GM external ointment Apply topically 2 times daily Apply to left arm woundHistorical      busPIRone (BUSPAR) 15 MG tablet Take 15 mg by mouth 2 times daily, Historical      citalopram (CELEXA) 20 MG tablet Take 60 mg by mouth daily, Historical      clotrimazole (LOTRIMIN) 1 % external cream Apply topically 2 times daily Apply to left groinHistorical      dextromethorphan-guaiFENesin (TUSSIN DM)  MG/5ML liquid Take 10 mLs by mouth as needed for cough, Historical      fluticasone (FLONASE) 50 MCG/ACT  nasal spray Spray 1 spray in nostril 2 times daily, Historical      Fluticasone-Umeclidin-Vilanterol (TRELEGY ELLIPTA) 200-62.5-25 MCG/ACT oral inhaler Inhale 1 puff into the lungs daily, Disp-1 each, R-6, E-Prescribe      LamoTRIgine (LAMICTAL) 200 MG TB24 Take 200 mg by mouth 2 times daily, Disp-60 tablet, R-5, E-Prescribe      levETIRAcetam (KEPPRA XR) 500 MG 24 hr tablet Take 3 tablets (1,500 mg) by mouth at bedtime, Disp-90 tablet, R-5, E-Prescribe      levETIRAcetam (KEPPRA) 1000 MG tablet Take 1 tablet (1,000 mg) by mouth every morning, Disp-30 tablet, R-5, E-Prescribe      levOCARNitine (CARNITOR) 330 MG tablet Take 2 tablets (660 mg) by mouth 3 times daily, Disp-180 tablet, R-11, E-Prescribe      loperamide (IMODIUM) 2 MG capsule Take 2 mg by mouth 4 times daily as needed for diarrhea, Historical      magnesium hydroxide (MILK OF MAGNESIA) 400 MG/5ML suspension Take 30 mLs by mouth daily as needed for constipation, Historical      methylcellulose POWD powder Apply 2 mg topically 2 times daily Mix 1 rounded tablespoon (2 mg) in 8 oz glass of water and take by mouth twice daily, Historical      multivitamin, therapeutic (THERA-VIT) TABS tablet Take 1 tablet by mouth daily, Historical      ondansetron (ZOFRAN ODT) 4 MG ODT tab Take 4 mg by mouth every 6 hours as needed for nausea or vomiting, Historical      pantoprazole (PROTONIX) 40 MG EC tablet Take 40 mg by mouth daily, Historical      polyethylene glycol (MIRALAX) 17 GM/Dose powder Take 1 capful. by mouth daily, Historical      prazosin (MINIPRESS) 2 MG capsule Take 2 mg by mouth At Bedtime, Historical      propranolol (INDERAL) 20 MG tablet Take 1 tablet (20 mg) by mouth 2 times daily, No Print Out      sodium chloride (OCEAN) 0.65 % nasal spray Spray 1 spray into both nostrils every hour as needed for congestion, Historical      zonisamide (ZONEGRAN) 100 MG capsule Take 1 capsule (100 mg) by mouth every morning AND 2 capsules (200 mg) every evening.,  Disp-90 capsule, R-5, E-Prescribe                   Consultations:   Consultation during this admission received from Wound Care Service          Hospital Course:     Tre Vazquez is a 48 year old male who is well-known to the ED staff with frequent ED visits and hospitalizations.  His medical history is  significant for developmental delay living in group home, seizures, obesity, ANA/OHS on BiPAP at night/while napping (though non-compliant), chronic hypercapnia, chronic hyperammonemia, anemia, schizoaffective disorder, borderline personality, depression, anxiety, HTN, left eye blindness.  He was again sent to the ED on 4/11/2024 with altered mental status and was found to be hypercapnic with associated narcosis.      In the ED  VS T 98.7, pulse 73, pressure 139/85 and breathing comfortably on room ar without hypoxia.    Labs: remarkable for normal BMP except for elevated CO2 of 38, normal LFTs, lactic acid 0.9, high-sensitivity troponin of 33 and TSH of 1.38.  CBC normal.  pH 7.29, pCO2 82 and bicarb 34.    Imaging: CT head is negative, CT abdomen/pelvis shows cholelithiasis with mild gallbladder distention, hepatic steatosis and nonobstructing small bilateral renal calculi.  Ultrasound of the abdomen showed cholelithiasis.  Urine drug screen was positive for PCP but this was thought due to Lamictal use.  Valproic acid level was 60.9.  Patient was given 2 L of IV fluids and placed on BiPAP with IMC admission     Mr. Vazquez was admitted due to weakness and confusion thought due to hyercapnic respiratory failure. He was admitted to C on BiPAP and although he permitted staff to place the BiPAP mask for a couple of days, as he became more alert, he started to refuse the use of the mask altogether. Ultimately, on the date of admission, at the recommendation of his step-mother, I spoke directly with him about the importance of him wearing the BiPAP.  I specifically told him that it was because of his non-compliance  that he had to return to the hospital.     He was seen during the hospital stay by Psychiatry due to somnolence that was thought possible partly due to his psychiatric medications. It was recommended that he continue with his PTA medication regimen, but to reduce the frequency in his Depakote dose from TID to BID, as had apparently been recommended by his Nuerologist, Dr. Merchant on 3/19/24.     His hospital stay was complicated by suspected pneumonia following and episode of apparently vomiting into his BiPAP mask.  He had a single fever spike and was treated empirically with IV antibiotics for suspected aspiration pneumonia, and fortunately did not become more sick. He completed 3 days of IV Zosyn which was then switched to Augmentin, with a plan to complete a total of about 5 days of empiric abx.    BP (!) 143/71 (BP Location: Left arm)   Pulse 80   Temp 99  F (37.2  C) (Oral)   Resp 18   Wt 129.3 kg (285 lb 0.9 oz)   SpO2 96%   BMI 43.34 kg/m    At the time of discharge, Mr. Vazquez is alert and comfortable.  He is fully aware of the situation and appears to understand   HEENT: left eye enucleation noted. Otherwise with mild facial asymmetry, but no clear muscular weakness.  Chest: No increased WOB, clear throughout without rales or wheeze  COR: well-perfused. Normal distal pulses.  RRR without murmur  Abd: Soft, obese, NTND.  Extrem: no edema    On the date of discharger, I spent about 15 minutes explaining to this patient in terms I believe he could understand, that he needs to wear the BiPAP in order to avoid returning to the hospital for the same problem (carbon dioxide retention related to inadequate breathing).  I told him specifically that the BiPAP is the last and best option to help him with removing the carbon dioxide.  Otherwise, he could be offered a tracheostomy, which would be more unpleasant, I believe, but an option nevertheless. Increasing the Oxygen, paradoxically, causes WORSE carbon  dioxide retention and should be avoided.            Discharge Instructions and Follow-Up:     Discharge diet: Regular   Discharge activity: Activity as tolerated   Discharge follow-up: As needed with PMD.           Discharge Disposition:     Discharged to home      Attestation:  I have reviewed today's vital signs, notes, medications, labs and imaging.    Wesley Bhatia MD

## 2024-04-18 NOTE — PROGRESS NOTES
Pt discharged at 1303 with EMT  Writer went over discharge summary with pt. All concerns addressed. Pt verbalized understanding of discharge summary. Pt discharge with all belong. Discharge paper filed in chart.

## 2024-04-18 NOTE — PLAN OF CARE
Goal Outcome Evaluation:       Pt alert and orientated x3. Pt is forgetful at times. On K+ protocol. BG checks done, no insulin needed, gave apple juice as BG was low. No IV access. Up with assist of 1 gb/walker. Pt is able to roll. Using bed pan, with liquid brown stool. Pt is blind in left eye. Takes meds with apple juice. Pt needs Bipap - but not compliant.  On regular diet.     Problem: Comorbidity Management  Goal: Blood Glucose Levels Within Targeted Range  Intervention: Monitor and Manage Glycemia  Recent Flowsheet Documentation  Taken 4/17/2024 1800 by Kelton Gill, RN  Glycemic Management: blood glucose monitored

## 2024-04-18 NOTE — PROGRESS NOTES
Oxygen Documentation  I certify that this patient, Tre Vazquez has been under my care (or a nurse practitioner or physican's assistant working with me). This is the face-to-face encounter for oxygen medical necessity.      At the time of this encounter, I have reviewed the qualifying testing and have determined that supplemental oxygen is reasonable and necessary and is expected to improve the patient's condition in a home setting.         Patient has continued oxygen desaturation due to Chronic Respiratory Failure with Hypoxia J96.11.    If portability is ordered, is the patient mobile within the home? no    Was this visit performed as a telehealth visit: No

## 2024-04-18 NOTE — PLAN OF CARE
"Goal Outcome Evaluation: Uses bed pan. Denies pain/SOB. BIPAP at bedtime.      Plan of Care Reviewed With: patient    Overall Patient Progress: no changeOverall Patient Progress: no change           Problem: Adult Inpatient Plan of Care  Goal: Plan of Care Review  Description: The Plan of Care Review/Shift note should be completed every shift.  The Outcome Evaluation is a brief statement about your assessment that the patient is improving, declining, or no change.  This information will be displayed automatically on your shift  note.  4/18/2024 0618 by Jason Mackenzie RN  Outcome: Progressing  Flowsheets (Taken 4/18/2024 0618)  Plan of Care Reviewed With: patient  Overall Patient Progress: no change  4/18/2024 0618 by Jason Mackenzie RN  Outcome: Progressing  Flowsheets (Taken 4/18/2024 0618)  Plan of Care Reviewed With: patient  Overall Patient Progress: no change  Goal: Patient-Specific Goal (Individualized)  Description: You can add care plan individualizations to a care plan. Examples of Individualization might be:  \"Parent requests to be called daily at 9am for status\", \"I have a hard time hearing out of my right ear\", or \"Do not touch me to wake me up as it startles  me\".  4/18/2024 0618 by Jason Mackenzie RN  Outcome: Progressing  4/18/2024 0618 by Jason Mackenzie RN  Outcome: Progressing  Goal: Absence of Hospital-Acquired Illness or Injury  4/18/2024 0618 by Jason Mackenzie RN  Outcome: Progressing  4/18/2024 0618 by Jason Mackenzie RN  Outcome: Progressing  Intervention: Identify and Manage Fall Risk  Recent Flowsheet Documentation  Taken 4/17/2024 2349 by Jason Mackenzie RN  Safety Promotion/Fall Prevention: safety round/check completed  Intervention: Prevent Skin Injury  Recent Flowsheet Documentation  Taken 4/17/2024 2349 by Jason Mackenzie RN  Body Position:   weight shifting   turned   side-lying   left   heels elevated  Intervention: Prevent Infection  Recent Flowsheet Documentation  Taken 4/17/2024 2349 by " Gurdeep, Doyin N, RN  Infection Prevention: equipment surfaces disinfected  Goal: Optimal Comfort and Wellbeing  4/18/2024 0618 by Jason Mackenzie RN  Outcome: Progressing  4/18/2024 0618 by Jason Mackenzie RN  Outcome: Progressing  Goal: Readiness for Transition of Care  4/18/2024 0618 by Jason Mackenzie RN  Outcome: Progressing  4/18/2024 0618 by Jason Mackenzie RN  Outcome: Progressing     Problem: Gas Exchange Impaired  Goal: Optimal Gas Exchange  4/18/2024 0618 by Jason Mackenzie RN  Outcome: Progressing  4/18/2024 0618 by Jason Mackenzie RN  Outcome: Progressing  Intervention: Optimize Oxygenation and Ventilation  Recent Flowsheet Documentation  Taken 4/17/2024 2349 by Jason Mackenzie RN  Head of Bed (HOB) Positioning: HOB at 20-30 degrees     Problem: Skin Injury Risk Increased  Goal: Skin Health and Integrity  4/18/2024 0618 by Jason Mackenzie RN  Outcome: Progressing  4/18/2024 0618 by Jason Mackenzie RN  Outcome: Progressing  Intervention: Plan: Nurse Driven Intervention: Moisture Management  Recent Flowsheet Documentation  Taken 4/17/2024 2349 by Jason Mackenzie RN  Moisture Interventions: Encourage regular toileting  Bathing/Skin Care:   incontinence care   linen changed  Intervention: Plan: Nurse Driven Intervention: Friction and Shear  Recent Flowsheet Documentation  Taken 4/17/2024 2349 by Jason Mackenzie RN  Friction/Shear Interventions: HOB 30 degrees or less  Intervention: Optimize Skin Protection  Recent Flowsheet Documentation  Taken 4/17/2024 2349 by Jason Mackenzie RN  Activity Management:   activity adjusted per tolerance   activity encouraged  Head of Bed (HOB) Positioning: HOB at 20-30 degrees     Problem: Comorbidity Management  Goal: Maintenance of Asthma Control  4/18/2024 0618 by Jason Mackenzie RN  Outcome: Progressing  4/18/2024 0618 by Jason Mackenzie RN  Outcome: Progressing  Intervention: Maintain Asthma Symptom Control  Recent Flowsheet Documentation  Taken 4/17/2024 2349 by Jason Mackenzie  RN  Medication Review/Management: medications reviewed  Goal: Maintenance of Behavioral Health Symptom Control  4/18/2024 0618 by Jason Mackenzie RN  Outcome: Progressing  4/18/2024 0618 by Jason Mackenzie RN  Outcome: Progressing  Intervention: Maintain Behavioral Health Symptom Control  Recent Flowsheet Documentation  Taken 4/17/2024 2349 by Jason Mackenzie RN  Medication Review/Management: medications reviewed  Goal: Maintenance of COPD Symptom Control  4/18/2024 0618 by Jason Mackenzie RN  Outcome: Progressing  4/18/2024 0618 by Jason Mackenzie RN  Outcome: Progressing  Intervention: Maintain COPD Symptom Control  Recent Flowsheet Documentation  Taken 4/17/2024 2349 by Jason Mackenzie RN  Medication Review/Management: medications reviewed  Goal: Blood Glucose Levels Within Targeted Range  4/18/2024 0618 by Jason Mackenzie RN  Outcome: Progressing  4/18/2024 0618 by Jason Mackenzie RN  Outcome: Progressing  Intervention: Monitor and Manage Glycemia  Recent Flowsheet Documentation  Taken 4/17/2024 2349 by Jason Mackenzie RN  Glycemic Management: blood glucose monitored  Medication Review/Management: medications reviewed  Goal: Maintenance of Heart Failure Symptom Control  4/18/2024 0618 by Jason Mackenzie RN  Outcome: Progressing  4/18/2024 0618 by Jason Mackenzie RN  Outcome: Progressing  Intervention: Maintain Heart Failure Management  Recent Flowsheet Documentation  Taken 4/17/2024 2349 by Jason Mackenzie RN  Medication Review/Management: medications reviewed  Goal: Blood Pressure in Desired Range  4/18/2024 0618 by Jason Mackenzie RN  Outcome: Progressing  4/18/2024 0618 by Jason Mackenzie RN  Outcome: Progressing  Intervention: Maintain Blood Pressure Management  Recent Flowsheet Documentation  Taken 4/17/2024 2349 by Jason Mackenzie RN  Medication Review/Management: medications reviewed  Goal: Maintenance of Osteoarthritis Symptom Control  4/18/2024 0618 by Jason Mackenzie RN  Outcome: Progressing  4/18/2024 0618 by Gurdeep  Jason NOBLE RN  Outcome: Progressing  Intervention: Maintain Osteoarthritis Symptom Control  Recent Flowsheet Documentation  Taken 4/17/2024 2349 by Jason Mackenzie RN  Assistive Device Utilized:   gait belt   walker  Activity Management:   activity adjusted per tolerance   activity encouraged  Medication Review/Management: medications reviewed  Goal: Bariatric Home Regimen Maintained  4/18/2024 0618 by Jason Mackenzie, MARYJANE  Outcome: Progressing  4/18/2024 0618 by Jason Mackenzie RN  Outcome: Progressing  Intervention: Maintain and Manage Postbariatric Surgery Care  Recent Flowsheet Documentation  Taken 4/17/2024 2349 by Jason Mackenzie, MARYJANE  Medication Review/Management: medications reviewed  Goal: Maintenance of Seizure Control  4/18/2024 0618 by Jason Mackenzie RN  Outcome: Progressing  4/18/2024 0618 by Jason Mackenzie, MARYJANE  Outcome: Progressing  Intervention: Maintain Seizure Symptom Control  Recent Flowsheet Documentation  Taken 4/17/2024 2349 by Jason Mackenzie, MARYJANE  Medication Review/Management: medications reviewed

## 2024-04-26 ENCOUNTER — TELEPHONE (OUTPATIENT)
Dept: NEUROLOGY | Facility: CLINIC | Age: 49
End: 2024-04-26
Payer: MEDICARE

## 2024-04-26 DIAGNOSIS — G40.909 SEIZURE DISORDER (H): Primary | ICD-10-CM

## 2024-04-26 NOTE — TELEPHONE ENCOUNTER
[facility] Call Center    Phone Message    May a detailed message be left on voicemail: yes     Reason for Call: Order(s): Home Care Orders: Skilled Nursinx a week for 2 weeks    Vanessa, Clinical Supervisor, [facility], calling to request order for home nursing visits, as noted above, be verbally called in to [phone] or faxed to [phone].    Vanessa states that the patient was recently discharged from the hospital and while in the hospital the orders for home care were stopped and that the provider he saw was not going to reinstate the orders. Vanessa is requesting Lisa Lundricardo place the new orders if she is willing to do so.   Vanessa may be called with questions, [phone]        Action Taken: Message routed to:  Other: [facility]    Travel Screening: Not Applicable

## 2024-04-30 NOTE — TELEPHONE ENCOUNTER
[facility]    Phone Message    May a detailed message be left on voicemail: yes     Reason for Call:     Faina ramirez [facility] home care called to speak to carfe team, wondering if Lisa Merchant will be following patient for home care orders and will be signing for orders? Please call her back at [phone]     Action Taken: Other: [facility]    Travel Screening: Not Applicable

## 2024-04-30 NOTE — TELEPHONE ENCOUNTER
Called Faina with [facility] care home care, relayed that Lisa Merchant NP placed orders (Home Care Orders: Skilled Nursinx a week for 2 weeks).     She verbalizes understanding.     Tito Hathaway RN, BSN  [facility]

## 2024-05-06 NOTE — TELEPHONE ENCOUNTER
[facility]    Phone Message    May a detailed message be left on voicemail: yes     Reason for Call: Order(s): Home Care Orders: Skilled Nursinx week for 4 weeks, then 1x every other week for 4 weeks    Maryann from [facility] calling to request order for skilled nursing to continue on schedule noted above.   May call Maryann back at [phone]    Action Taken: Message routed to:  Other: [facility]    Travel Screening: Not Applicable

## 2024-05-07 NOTE — TELEPHONE ENCOUNTER
Called and LDM for Maryann at [facility], acknowledging request for extension of skilled nursing care, providing verbal approval.    Asked for call back as needed (or if previous order needed to be modified and sent back via fax).    Charles Sommer RN, BSN  [facility]

## 2024-06-13 ENCOUNTER — HOSPITAL ENCOUNTER (EMERGENCY)
Facility: CLINIC | Age: 49
Discharge: HOME | End: 2024-06-13
Payer: MEDICARE

## 2024-06-13 ENCOUNTER — MEDICAL CORRESPONDENCE (OUTPATIENT)
Dept: HEALTH INFORMATION MANAGEMENT | Facility: CLINIC | Age: 49
End: 2024-06-13
Payer: MEDICARE

## 2024-06-13 VITALS
DIASTOLIC BLOOD PRESSURE: 96 MMHG | SYSTOLIC BLOOD PRESSURE: 119 MMHG | OXYGEN SATURATION: 94 % | RESPIRATION RATE: 22 BRPM | TEMPERATURE: 98.4 F | HEART RATE: 74 BPM

## 2024-06-13 DIAGNOSIS — H92.02 LEFT EAR PAIN: ICD-10-CM

## 2024-06-13 DIAGNOSIS — S01.90XA CHRONIC WOUND OF HEAD: ICD-10-CM

## 2024-06-13 DIAGNOSIS — L73.9 FOLLICULITIS: ICD-10-CM

## 2024-06-13 PROCEDURE — 99283 EMERGENCY DEPT VISIT LOW MDM: CPT

## 2024-06-13 RX ORDER — CLINDAMYCIN PHOSPHATE 10 UG/ML
LOTION TOPICAL 2 TIMES DAILY
Qty: 60 ML | Refills: 0 | Status: SHIPPED | OUTPATIENT
Start: 2024-06-13

## 2024-06-13 ASSESSMENT — ACTIVITIES OF DAILY LIVING (ADL)
ADLS_ACUITY_SCORE: 38
ADLS_ACUITY_SCORE: 38
ADLS_ACUITY_SCORE: 40

## 2024-06-13 NOTE — ED TRIAGE NOTES
Pt presents via EMS from  with pain and redness to left ear where O2 tubing sits and tenderness to the area around that side of his head.   Pt off O2 on triage. Sats 86%. O2 replaced. Sats up to 96%. Denies SOB.   Gauze applied under tubing for comfort.

## 2024-06-13 NOTE — ED PROVIDER NOTES
Emergency Department Note      History of Present Illness     Chief Complaint  Ear Pain    HPI  History limited secondary to patient's cognition.     Tre Vazquez is a 48 year old male with a history as noted below who presents to the emergency department from his group home for ear pain. The patient states that he is experiencing pain, tenderness, and redness to the top of his left ear where his O2 tubing rests. He also notes that he is also experiencing irritation to his left parietal scalp. He adds that weeks ago at his group home, staff ran out of flexible oxygen cannulas. Denies any right-sided issues. Denies any shortness of breath.    Independent Historian  None    Review of External Notes  Discharge summary from 4/18/24    Past Medical History   Medical History and Problem List  Blindness L eye  Depression  Hypertension  Pneumonia  Asthma  Epilepsy  Paraplegia  Cerebral Palsy  Pulmonary hypertension  Psychotic disorder  Psychosis  GERD  Prolonged QT  Acute respiratory failure with hypoxia  Cardiac arrest  Pressure injury to right buttock, stage 3  Hyperammonemic encephalopathy   Septic shock    Medications  albuterol (PROAIR HFA/PROVENTIL HFA/VENTOLIN HFA) 108 (90 Base) MCG/ACT inhaler  busPIRone (BUSPAR) 15 MG tablet  citalopram (CELEXA) 20 MG tablet  Fluticasone-Umeclidin-Vilanterol (TRELEGY ELLIPTA) 200-62.5-25 MCG/ACT oral inhaler  LamoTRIgine (LAMICTAL) 200 MG TB24  levETIRAcetam (KEPPRA) 1000 MG tablet  levOCARNitine (CARNITOR) 330 MG tablet  loperamide (IMODIUM) 2 MG capsule  ondansetron (ZOFRAN ODT) 4 MG ODT tab  pantoprazole (PROTONIX) 40 MG EC tablet  prazosin (MINIPRESS) 2 MG capsule  propranolol (INDERAL) 20 MG tablet  QUEtiapine ER (SEROQUEL XR) 200 MG 24 hr tablet  zonisamide (ZONEGRAN) 100 MG capsule    Surgical History   Procedure, both ankles  Heel lengthening  Removal of left iris    Physical Exam   Patient Vitals for the past 24 hrs:   BP Temp Temp src Pulse Resp SpO2   06/13/24 1730  -- -- -- -- -- 94 %   06/13/24 1356 -- 98.4  F (36.9  C) Temporal -- -- 92 %   06/13/24 1352 (!) 119/96 -- -- 74 22 (!) 86 %     Physical Exam  BP (!) 119/96   Pulse 74   Temp 98.4  F (36.9  C) (Temporal)   Resp 22   SpO2 94%    General: Appears older than stated age. Examined in room 43.  In wheelchair.  Head: Atraumatic.   EENT: Moist mucus membranes. TMs normal bilaterally.  No ear canal drainage or edema.  There is a chronic appearing laceration at the auricle of the ear attaching the pinna to the scalp.  There is no surrounding erythema, warmth or edema.  There is no drainage.  There are scattered pustules on the scalp with central hair follicles.  CV: Regular rate.  Respiratory: Breathing comfortably on room air.  Normal work of breathing.  Msk: No obvious deformity.   Skin: Warm and dry. No rashes.  Neuro: Awake, alert, and conversant.  Psych: Appropriate mood and affect.    Diagnostics   Lab Results   None    Imaging  None    Independent Interpretation  None  ED Course    Medications Administered  None    Discussion of Management  None    Social Determinants of Health adding to complexity of care  None    ED Course  ED Course as of 06/13/24 1939 Thu Jun 13, 2024 1720 I obtained history and examined the patient as noted above.      1754 I discussed findings and discharge with the patient. All questions answered.      1810 I attempted to speak with the patient's group home several times, no answer. I left a message.     1830 I spoke with Ashlie Vazquez, patient's stepmother. She reports that the patient can be discharged back to his group home. She notes that there is always someone available there to let him back in.       Medical Decision Making / Diagnosis   CMS Diagnoses: None    MIPS  None    [facility]  Tre Vazquez is a 48 year old male who presents for evaluation of raised bumps on his left parietal scalp. This is consistent with folliculitis. There is no lesion amenable to I&D. No laceration amenable  "to laceration repair.  Additionally, he is a chronic appearing wound on the left ear from friction of the nasal cannula tubing.  He is here requesting new tubing.  He was given an extra tube, but was informed that he would need to speak to his primary about getting medical supplies for home should he require more of these \"soft\" tubes.  Based on presentation, history, and lack of red flags, patient will start Clindamycin lotion for treatment. There is no evidence of otitis media or otitis externa.  Doubt other serious etiologies such as herpetic lesions, abscess, necrotizing fascitis, etc. Patient also has a small chronic wound just superior to his left ear as well as left ear tenderness/irritation from his nasal cannula tubing. Nasal cannula was replaced and Mepilex was applied to the chronic wound. All questions answered. Patient discharged back to his group home.     Disposition  The patient was discharged.     ICD-10 Codes:    ICD-10-CM    1. Chronic wound of head  S01.90XA       2. Left ear pain  H92.02       3. Folliculitis  L73.9          Discharge Medications  New Prescriptions    CLINDAMYCIN (CLEOCIN T) 1 % EXTERNAL LOTION    Apply topically 2 times daily     Scribe Disclosure:  Ba NAILS, am serving as a scribe at 5:15 PM on 6/13/2024 to document services personally performed by Johanna Moses PA-C based on my observations and the provider's statements to me.        Johanna Moses PA-C  06/13/24 3655    "

## 2024-06-13 NOTE — DISCHARGE INSTRUCTIONS
Clindamycin lotion to scalp once daily  Consider getting Mepilex bandage for ears to help off load pressure  Follow up with primary care provider  Additional nasal canula given  No signs of ear infection

## 2024-06-14 NOTE — ED NOTES
Multiple attempts were made to contact pt group home by myself, ERT, and PA. Contact was eventually made w/ pt's step-mom who confirms pt is okay to return to group home as it is staffed 24/7. Stretcher transport arranged due to O2 needs.

## 2024-06-14 NOTE — ED NOTES
Pt has been up to void in the urinal. Remains on O2. Pt requests this writer to buy him a soda from the vending machine. Pt is provided w/ a box meal, water, and a mat. Awaits EMS transport home.

## 2024-06-20 ENCOUNTER — HOSPITAL ENCOUNTER (EMERGENCY)
Facility: CLINIC | Age: 49
Discharge: HOME | End: 2024-06-21
Attending: EMERGENCY MEDICINE | Admitting: EMERGENCY MEDICINE
Payer: MEDICARE

## 2024-06-20 VITALS
RESPIRATION RATE: 18 BRPM | SYSTOLIC BLOOD PRESSURE: 130 MMHG | TEMPERATURE: 97.8 F | DIASTOLIC BLOOD PRESSURE: 77 MMHG | OXYGEN SATURATION: 95 % | HEART RATE: 85 BPM
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
## 2024-06-20 DIAGNOSIS — S09.90XA CLOSED HEAD INJURY, INITIAL ENCOUNTER: ICD-10-CM

## 2024-06-20 DIAGNOSIS — R25.1 SPELLS OF TREMBLING: ICD-10-CM

## 2024-06-20 PROCEDURE — 99284 EMERGENCY DEPT VISIT MOD MDM: CPT | Mod: 25

## 2024-06-20 ASSESSMENT — COLUMBIA-SUICIDE SEVERITY RATING SCALE - C-SSRS
6. HAVE YOU EVER DONE ANYTHING, STARTED TO DO ANYTHING, OR PREPARED TO DO ANYTHING TO END YOUR LIFE?: NO
1. IN THE PAST MONTH, HAVE YOU WISHED YOU WERE DEAD OR WISHED YOU COULD GO TO SLEEP AND NOT WAKE UP?: NO
2. HAVE YOU ACTUALLY HAD ANY THOUGHTS OF KILLING YOURSELF IN THE PAST MONTH?: NO

## 2024-06-21 ENCOUNTER — APPOINTMENT (OUTPATIENT)
Dept: CT IMAGING | Facility: CLINIC | Age: 49
End: 2024-06-21
Attending: EMERGENCY MEDICINE
Payer: MEDICARE

## 2024-06-21 LAB
ANION GAP SERPL CALCULATED.3IONS-SCNC: 10 MMOL/L (ref 7–15)
BASOPHILS # BLD AUTO: 0.1 10E3/UL (ref 0–0.2)
BASOPHILS NFR BLD AUTO: 1 %
BUN SERPL-MCNC: 9.1 MG/DL (ref 6–20)
CALCIUM SERPL-MCNC: 9.1 MG/DL (ref 8.6–10)
CHLORIDE SERPL-SCNC: 98 MMOL/L (ref 98–107)
CREAT SERPL-MCNC: 0.68 MG/DL (ref 0.67–1.17)
DEPRECATED HCO3 PLAS-SCNC: 31 MMOL/L (ref 22–29)
EGFRCR SERPLBLD CKD-EPI 2021: >90 ML/MIN/1.73M2
EOSINOPHIL # BLD AUTO: 0.1 10E3/UL (ref 0–0.7)
EOSINOPHIL NFR BLD AUTO: 2 %
ERYTHROCYTE [DISTWIDTH] IN BLOOD BY AUTOMATED COUNT: 13.9 % (ref 10–15)
GLUCOSE SERPL-MCNC: 100 MG/DL (ref 70–99)
HCT VFR BLD AUTO: 41.4 % (ref 40–53)
HGB BLD-MCNC: 12.8 G/DL (ref 13.3–17.7)
IMM GRANULOCYTES # BLD: 0.2 10E3/UL
IMM GRANULOCYTES NFR BLD: 2 %
LEVETIRACETAM SERPL-MCNC: 23.6 ΜG/ML (ref 10–40)
LYMPHOCYTES # BLD AUTO: 2.6 10E3/UL (ref 0.8–5.3)
LYMPHOCYTES NFR BLD AUTO: 36 %
MCH RBC QN AUTO: 28.9 PG (ref 26.5–33)
MCHC RBC AUTO-ENTMCNC: 30.9 G/DL (ref 31.5–36.5)
MCV RBC AUTO: 94 FL (ref 78–100)
MONOCYTES # BLD AUTO: 0.7 10E3/UL (ref 0–1.3)
MONOCYTES NFR BLD AUTO: 10 %
NEUTROPHILS # BLD AUTO: 3.6 10E3/UL (ref 1.6–8.3)
NEUTROPHILS NFR BLD AUTO: 50 %
NRBC # BLD AUTO: 0 10E3/UL
NRBC BLD AUTO-RTO: 0 /100
PLATELET # BLD AUTO: 245 10E3/UL (ref 150–450)
POTASSIUM SERPL-SCNC: 4.1 MMOL/L (ref 3.4–5.3)
RBC # BLD AUTO: 4.43 10E6/UL (ref 4.4–5.9)
SODIUM SERPL-SCNC: 139 MMOL/L (ref 135–145)
VALPROATE SERPL-MCNC: 33.6 UG/ML
WBC # BLD AUTO: 7.3 10E3/UL (ref 4–11)

## 2024-06-21 PROCEDURE — 80048 BASIC METABOLIC PNL TOTAL CA: CPT | Performed by: EMERGENCY MEDICINE

## 2024-06-21 PROCEDURE — 80177 DRUG SCRN QUAN LEVETIRACETAM: CPT | Performed by: EMERGENCY MEDICINE

## 2024-06-21 PROCEDURE — 36415 COLL VENOUS BLD VENIPUNCTURE: CPT | Performed by: EMERGENCY MEDICINE

## 2024-06-21 PROCEDURE — 85025 COMPLETE CBC W/AUTO DIFF WBC: CPT | Performed by: EMERGENCY MEDICINE

## 2024-06-21 PROCEDURE — 80175 DRUG SCREEN QUAN LAMOTRIGINE: CPT | Performed by: EMERGENCY MEDICINE

## 2024-06-21 PROCEDURE — 80164 ASSAY DIPROPYLACETIC ACD TOT: CPT | Performed by: EMERGENCY MEDICINE

## 2024-06-21 PROCEDURE — 70450 CT HEAD/BRAIN W/O DYE: CPT | Mod: ME

## 2024-06-21 ASSESSMENT — ACTIVITIES OF DAILY LIVING (ADL)
ADLS_ACUITY_SCORE: 40
ADLS_ACUITY_SCORE: 40

## 2024-06-21 NOTE — ED TRIAGE NOTES
"  Pt to ER with c/o \"twitching\" pt states that he has been having twitching to upper and lower extrem, feels like a spasm, pt did have a fall earlier has abrasion to right top of head . Pt also states some mild HA      "

## 2024-06-22 LAB — LAMOTRIGINE SERPL-MCNC: 15.7 UG/ML

## 2024-07-01 ENCOUNTER — TELEPHONE (OUTPATIENT)
Dept: NEUROLOGY | Facility: CLINIC | Age: 49
End: 2024-07-01
Payer: MEDICARE

## 2024-07-01 NOTE — TELEPHONE ENCOUNTER
[facility]    Phone Message    May a detailed message be left on voicemail: yes     Reason for Call: Order(s): Home Care Orders: Skilled Nursing:  Every other week for 8 weeks.    group [facility] home nurse says the   Pt has not been using his BiPAP, says it blowing hot air, and the pt also had a fall and was seen in the ED had some brusing  on his head.    Please call Maryann at [phone] to discuss further.    Action Taken: Message routed to:  Other: [facility]    Travel Screening: Not Applicable     Date of Service:

## 2024-07-02 NOTE — TELEPHONE ENCOUNTER
RN returned call to [facility], she works with [employer] care. They have referral for home care services from neurology but need verbal approval every 8 weeks for continuation of services.     Patient has been having issues with bipap and had 2 recent ER visits and falls, there is continued need for home services. RN discussed with Lisa Merchant NP who ordered home care in April, she gave RN verbal ok for continued services.     RN relayed this to [facility] and they will continue cares.     Pt to follow up tomorrow with Dr. Griggs as scheduled.     Tito Hathaway, RN, BSN  [facility]

## 2024-07-03 ENCOUNTER — OFFICE VISIT (OUTPATIENT)
Dept: NEUROLOGY | Facility: CLINIC | Age: 49
End: 2024-07-03
Payer: MEDICARE

## 2024-07-03 VITALS — WEIGHT: 258 LBS | BODY MASS INDEX: 39.1 KG/M2 | HEIGHT: 68 IN

## 2024-07-03 DIAGNOSIS — Z79.899 ENCOUNTER FOR LONG-TERM (CURRENT) USE OF MEDICATIONS: ICD-10-CM

## 2024-07-03 DIAGNOSIS — G40.309 NONINTRACTABLE GENERALIZED IDIOPATHIC EPILEPSY WITHOUT STATUS EPILEPTICUS (H): Primary | ICD-10-CM

## 2024-07-03 DIAGNOSIS — F34.1 PERSISTENT DEPRESSIVE DISORDER: ICD-10-CM

## 2024-07-03 DIAGNOSIS — R41.82 ALTERED MENTAL STATUS, UNSPECIFIED ALTERED MENTAL STATUS TYPE: ICD-10-CM

## 2024-07-03 PROCEDURE — 99213 OFFICE O/P EST LOW 20 MIN: CPT | Performed by: PSYCHIATRY & NEUROLOGY

## 2024-07-03 RX ORDER — DIVALPROEX SODIUM 500 MG/1
500 TABLET, DELAYED RELEASE ORAL 2 TIMES DAILY
Qty: 60 TABLET | Refills: 1 | Status: SHIPPED | OUTPATIENT
Start: 2024-07-03 | End: 2024-09-04

## 2024-07-03 RX ORDER — ZONISAMIDE 100 MG/1
CAPSULE ORAL
Qty: 90 CAPSULE | Refills: 5 | Status: SHIPPED | OUTPATIENT
Start: 2024-07-03

## 2024-07-03 RX ORDER — LEVETIRACETAM 1000 MG/1
1000 TABLET ORAL EVERY MORNING
Qty: 30 TABLET | Refills: 5 | Status: SHIPPED | OUTPATIENT
Start: 2024-07-03

## 2024-07-03 RX ORDER — LAMOTRIGINE 200 MG/1
200 TABLET, EXTENDED RELEASE ORAL 2 TIMES DAILY
Qty: 60 TABLET | Refills: 5 | Status: SHIPPED | OUTPATIENT
Start: 2024-07-03

## 2024-07-03 RX ORDER — LEVETIRACETAM 500 MG/1
1500 TABLET, FILM COATED, EXTENDED RELEASE ORAL AT BEDTIME
Qty: 90 TABLET | Refills: 5 | Status: SHIPPED | OUTPATIENT
Start: 2024-07-03

## 2024-07-03 NOTE — NURSING NOTE
Chief Complaint   Patient presents with    Seizures     Patient states he has a seizure 2-3 weeks ago.     Magaly Bustillo on 7/3/2024 at 9:50 AM

## 2024-07-03 NOTE — LETTER
7/3/2024      Tre Vazquez  [address] [place] [address]      Dear Colleague,    Thank you for referring your patient, Tre Vazquez, to the [facility] [place]. Please see a copy of my visit note below.    ESTABLISHED PATIENT NEUROLOGY NOTE    DATE OF VISIT: 7/3/2024  MRN: [id number]  PATIENT NAME: Tre Vazquez  YOB: 1975    Chief Complaint   Patient presents with     Seizures     Patient states he has a seizure 2-3 weeks ago.     SUBJECTIVE:                                                      HISTORY OF PRESENT ILLNESS:  Tre is here for follow up regarding epilepsy.  I met him for initial consultation almost 15 months ago.  He has history of cerebral palsy with mild spastic paraparesis, intellectual disability, congenital left eye blindness history of right Bell's, mood disturbance and epilepsy disorder for which he has been on for AEDs.  He had a breakthrough seizure in August/September 2021 for which she was hospitalized at [facility].  He told me that prior to that event he had been seizure-free for about 10 years.  Seizures are consistent with generalized tonic-clonic events.  He does not have a history of other seizure types.  Known ventriculomegaly.  EEG from 2012 showed bilateral theta slowing.  He had a passing out episode just prior to our visit in April 2022.  I recommended we get labs to check his AED levels as well as an updated EEG.    He saw our nurse practitioner in the interim, just about 4 months ago.  At that time there was some concern about confusion with his medication dosing.  Plan was to continue Depakote: 500mg twice daily, lamotrigine: 200mg twice daily, Keppra: 1000mg/1500mg and zonisamide: 100mg/200mg.    Most recent drug levels included:   Keppra: 23.6  Lamotrigine 15.7 (Slightly high)  VPA: 33.6 (low)              CURRENT MEDICATIONS:   Current Outpatient Medications   Medication Sig Dispense Refill     albuterol (PROAIR HFA/PROVENTIL  HFA/VENTOLIN HFA) 108 (90 Base) MCG/ACT inhaler Inhale 2 puffs into the lungs every 4 hours as needed for shortness of breath, wheezing or cough 18 g 6     ammonium lactate (AMLACTIN) 12 % external cream Apply topically 2 times daily       bacitracin 500 UNIT/GM external ointment Apply topically 2 times daily Apply to left arm wound       busPIRone (BUSPAR) 15 MG tablet Take 15 mg by mouth 2 times daily       citalopram (CELEXA) 20 MG tablet Take 60 mg by mouth daily       clindamycin (CLEOCIN T) 1 % external lotion Apply topically 2 times daily 60 mL 0     clotrimazole (LOTRIMIN) 1 % external cream Apply topically 2 times daily Apply to left groin       dextromethorphan-guaiFENesin (TUSSIN DM)  MG/5ML liquid Take 10 mLs by mouth 4 times daily as needed for cough       divalproex sodium delayed-release (DEPAKOTE) 500 MG DR tablet Take 1 tablet (500 mg) by mouth 2 times daily 60 tablet 1     fluticasone (FLONASE) 50 MCG/ACT nasal spray Spray 1 spray in nostril 2 times daily       Fluticasone-Umeclidin-Vilanterol (TRELEGY ELLIPTA) 200-62.5-25 MCG/ACT oral inhaler Inhale 1 puff into the lungs daily 1 each 6     LamoTRIgine (LAMICTAL) 200 MG TB24 Take 200 mg by mouth 2 times daily 60 tablet 5     levETIRAcetam (KEPPRA XR) 500 MG 24 hr tablet Take 3 tablets (1,500 mg) by mouth at bedtime 90 tablet 5     levETIRAcetam (KEPPRA) 1000 MG tablet Take 1 tablet (1,000 mg) by mouth every morning 30 tablet 5     levOCARNitine (CARNITOR) 330 MG tablet Take 2 tablets (660 mg) by mouth 3 times daily 180 tablet 11     loperamide (IMODIUM) 2 MG capsule Take 2 mg by mouth 4 times daily as needed for diarrhea       magnesium hydroxide (MILK OF MAGNESIA) 400 MG/5ML suspension Take 30 mLs by mouth daily as needed for constipation       methylcellulose POWD powder Apply 2 mg topically 2 times daily Mix 1 rounded tablespoon (2 mg) in 8 oz glass of water and take by mouth twice daily       multivitamin, therapeutic (THERA-VIT) TABS  "tablet Take 1 tablet by mouth daily       ondansetron (ZOFRAN ODT) 4 MG ODT tab Take 4 mg by mouth every 6 hours as needed for nausea or vomiting       pantoprazole (PROTONIX) 40 MG EC tablet Take 40 mg by mouth daily       polyethylene glycol (MIRALAX) 17 GM/Dose powder Take 1 capful. by mouth daily       prazosin (MINIPRESS) 2 MG capsule Take 2 mg by mouth At Bedtime       propranolol (INDERAL) 20 MG tablet Take 1 tablet (20 mg) by mouth 2 times daily       QUEtiapine ER (SEROQUEL XR) 200 MG 24 hr tablet Take 1 tablet (200 mg) by mouth at bedtime 30 tablet 0     sodium chloride (OCEAN) 0.65 % nasal spray Spray 1 spray into both nostrils every hour as needed for congestion       zonisamide (ZONEGRAN) 100 MG capsule Take 1 capsule (100 mg) by mouth every morning AND 2 capsules (200 mg) every evening. 90 capsule 5     No current facility-administered medications for this visit.       RECENT DIAGNOSTIC STUDIES:   Labs: No results found for any visits on 07/03/24.    Imaging:   Head CT (6.21.24):  IMPRESSION:  1.  No CT evidence for acute intracranial process.  2.  Chronic volume loss with ventriculomegaly similar to prior.    Electroencephalogram  (12.2023):  IMPRESSION OF VIDEO EEG DAY #3   This is abnormal awake, drowsy and asleep prolonged video EEG recording due to the findings of intermittent right centro-parietal, left frontal and generalized spikes.   No clinical events were captured, no electrographic seizures were seen.   This abnormal prolonged video EEG monitoring is supportive of diagnosis of Multifocal Epilepsy.    REVIEW OF SYSTEMS:                                                      10-point review of systems is negative except as mentioned above in HPI.    EXAM:                                                      Physical Exam:   Vitals: Ht 1.727 m (5' 8\")   Wt 117 kg (258 lb)   BMI 39.23 kg/m    BMI= Body mass index is 39.23 kg/m .  GENERAL: NAD.  HEENT: NC/AT.  CV: RRR. S1, S2.   NECK: No " bruits.  PULM: Non-labored breathing.   Neurologic:  MENTAL STATUS: Alert, attentive. Speech is fluent. Normal comprehension.  Normal concentration. Adequate fund of knowledge.   CRANIAL NERVES: Right disc is difficult to visualize, left eye is without pupil/blind.  Slight facial symmetry due to previous Bell's.  EOM full. Hearing intact to conversation. Trapezius strength intact. Palate moves symmetrically. Tongue midline.  MOTOR: 5/5 in proximal and distal muscle groups of upper and lower extremities with brief testing. Tone and bulk normal.   SENSATION: Normal light touch throughout.  COORDINATION: Normal fine motor movements of the hands.  STATION AND GAIT: Wheelchair bound.    ASSESSMENT and PLAN:                                                      Assessment:    ICD-10-CM    1. Nonintractable generalized idiopathic epilepsy without status epilepticus (H)  G40.309 LamoTRIgine (LAMICTAL) 200 MG TB24     levETIRAcetam (KEPPRA XR) 500 MG 24 hr tablet     levETIRAcetam (KEPPRA) 1000 MG tablet     zonisamide (ZONEGRAN) 100 MG capsule     Zonisamide Level Quantitative     Valproic Acid Free & Total     Keppra (Levetiracetam) Level     Lamotrigine Level     Ammonia      2. Altered mental status, unspecified altered mental status type  R41.82 divalproex sodium delayed-release (DEPAKOTE) 500 MG DR tablet      3. Persistent depressive disorder  F34.1 divalproex sodium delayed-release (DEPAKOTE) 500 MG DR tablet      4. Encounter for long-term (current) use of medications  Z79.899           Mr. Vazquez is a 48-year-old man with CP, intellectual disability congenital left eye blindness here for follow-up regarding epilepsy.  Seizures are somewhat difficult to control but there could be an issue with medication compliance.  Joel himself is convinced that the Depakote is not helpful drug for him so he is asking about tapering off of this.  Given that his level has been low, I think this might be a reasonable approach though  I would like to recheck all of his levels first.  Prolonged EEG last year did show evidence of breakthrough seizure activity.    Plan:  Continue current doses of Depakote, lamotrigine, Keppra and zonisamide.  We will check levels and then decide about whether it would be appropriate to start to taper you off of the Depakote.  I will let you know.  Return to neurology clinic in 3-4 months (with either myself or Lisa).  Please let us know if any breakthrough seizures occur in the meantime.    Total Time: 25 minutes were spent with the patient and in chart review/documentation (as described above in Assessment and Plan)/coordinating the care on date of service.    Jojo Griggs MD  Neurology    Dragon software used in the dictation of this note.                    Again, thank you for allowing me to participate in the care of your patient.        Sincerely,        Jojo Griggs MD

## 2024-07-03 NOTE — PROGRESS NOTES
ESTABLISHED PATIENT NEUROLOGY NOTE    DATE OF VISIT: 7/3/2024  MRN: [id number]  PATIENT NAME: Tre Vazquez  YOB: 1975    Chief Complaint   Patient presents with    Seizures     Patient states he has a seizure 2-3 weeks ago.     SUBJECTIVE:                                                      HISTORY OF PRESENT ILLNESS:  Tre is here for follow up regarding epilepsy.  I met him for initial consultation almost 15 months ago.  He has history of cerebral palsy with mild spastic paraparesis, intellectual disability, congenital left eye blindness history of right Bell's, mood disturbance and epilepsy disorder for which he has been on for AEDs.  He had a breakthrough seizure in August/September 2021 for which she was hospitalized at [facility].  He told me that prior to that event he had been seizure-free for about 10 years.  Seizures are consistent with generalized tonic-clonic events.  He does not have a history of other seizure types.  Known ventriculomegaly.  EEG from 2012 showed bilateral theta slowing.  He had a passing out episode just prior to our visit in April 2022.  I recommended we get labs to check his AED levels as well as an updated EEG.    He saw our nurse practitioner in the interim, just about 4 months ago.  At that time there was some concern about confusion with his medication dosing.  Plan was to continue Depakote: 500mg twice daily, lamotrigine: 200mg twice daily, Keppra: 1000mg/1500mg and zonisamide: 100mg/200mg.    Most recent drug levels included:   Keppra: 23.6  Lamotrigine 15.7 (Slightly high)  VPA: 33.6 (low)              CURRENT MEDICATIONS:   Current Outpatient Medications   Medication Sig Dispense Refill    albuterol (PROAIR HFA/PROVENTIL HFA/VENTOLIN HFA) 108 (90 Base) MCG/ACT inhaler Inhale 2 puffs into the lungs every 4 hours as needed for shortness of breath, wheezing or cough 18 g 6    ammonium lactate (AMLACTIN) 12 % external cream Apply topically 2 times daily       bacitracin 500 UNIT/GM external ointment Apply topically 2 times daily Apply to left arm wound      busPIRone (BUSPAR) 15 MG tablet Take 15 mg by mouth 2 times daily      citalopram (CELEXA) 20 MG tablet Take 60 mg by mouth daily      clindamycin (CLEOCIN T) 1 % external lotion Apply topically 2 times daily 60 mL 0    clotrimazole (LOTRIMIN) 1 % external cream Apply topically 2 times daily Apply to left groin      dextromethorphan-guaiFENesin (TUSSIN DM)  MG/5ML liquid Take 10 mLs by mouth 4 times daily as needed for cough      divalproex sodium delayed-release (DEPAKOTE) 500 MG DR tablet Take 1 tablet (500 mg) by mouth 2 times daily 60 tablet 1    fluticasone (FLONASE) 50 MCG/ACT nasal spray Spray 1 spray in nostril 2 times daily      Fluticasone-Umeclidin-Vilanterol (TRELEGY ELLIPTA) 200-62.5-25 MCG/ACT oral inhaler Inhale 1 puff into the lungs daily 1 each 6    LamoTRIgine (LAMICTAL) 200 MG TB24 Take 200 mg by mouth 2 times daily 60 tablet 5    levETIRAcetam (KEPPRA XR) 500 MG 24 hr tablet Take 3 tablets (1,500 mg) by mouth at bedtime 90 tablet 5    levETIRAcetam (KEPPRA) 1000 MG tablet Take 1 tablet (1,000 mg) by mouth every morning 30 tablet 5    levOCARNitine (CARNITOR) 330 MG tablet Take 2 tablets (660 mg) by mouth 3 times daily 180 tablet 11    loperamide (IMODIUM) 2 MG capsule Take 2 mg by mouth 4 times daily as needed for diarrhea      magnesium hydroxide (MILK OF MAGNESIA) 400 MG/5ML suspension Take 30 mLs by mouth daily as needed for constipation      methylcellulose POWD powder Apply 2 mg topically 2 times daily Mix 1 rounded tablespoon (2 mg) in 8 oz glass of water and take by mouth twice daily      multivitamin, therapeutic (THERA-VIT) TABS tablet Take 1 tablet by mouth daily      ondansetron (ZOFRAN ODT) 4 MG ODT tab Take 4 mg by mouth every 6 hours as needed for nausea or vomiting      pantoprazole (PROTONIX) 40 MG EC tablet Take 40 mg by mouth daily      polyethylene glycol (MIRALAX) 17  "GM/Dose powder Take 1 capful. by mouth daily      prazosin (MINIPRESS) 2 MG capsule Take 2 mg by mouth At Bedtime      propranolol (INDERAL) 20 MG tablet Take 1 tablet (20 mg) by mouth 2 times daily      QUEtiapine ER (SEROQUEL XR) 200 MG 24 hr tablet Take 1 tablet (200 mg) by mouth at bedtime 30 tablet 0    sodium chloride (OCEAN) 0.65 % nasal spray Spray 1 spray into both nostrils every hour as needed for congestion      zonisamide (ZONEGRAN) 100 MG capsule Take 1 capsule (100 mg) by mouth every morning AND 2 capsules (200 mg) every evening. 90 capsule 5     No current facility-administered medications for this visit.       RECENT DIAGNOSTIC STUDIES:   Labs: No results found for any visits on 07/03/24.    Imaging:   Head CT (6.21.24):  IMPRESSION:  1.  No CT evidence for acute intracranial process.  2.  Chronic volume loss with ventriculomegaly similar to prior.    Electroencephalogram  (12.2023):  IMPRESSION OF VIDEO EEG DAY #3   This is abnormal awake, drowsy and asleep prolonged video EEG recording due to the findings of intermittent right centro-parietal, left frontal and generalized spikes.   No clinical events were captured, no electrographic seizures were seen.   This abnormal prolonged video EEG monitoring is supportive of diagnosis of Multifocal Epilepsy.    REVIEW OF SYSTEMS:                                                      10-point review of systems is negative except as mentioned above in HPI.    EXAM:                                                      Physical Exam:   Vitals: Ht 1.727 m (5' 8\")   Wt 117 kg (258 lb)   BMI 39.23 kg/m    BMI= Body mass index is 39.23 kg/m .  GENERAL: NAD.  HEENT: NC/AT.  CV: RRR. S1, S2.   NECK: No bruits.  PULM: Non-labored breathing.   Neurologic:  MENTAL STATUS: Alert, attentive. Speech is fluent. Normal comprehension.  Normal concentration. Adequate fund of knowledge.   CRANIAL NERVES: Right disc is difficult to visualize, left eye is without pupil/blind.  " Slight facial symmetry due to previous Bell's.  EOM full. Hearing intact to conversation. Trapezius strength intact. Palate moves symmetrically. Tongue midline.  MOTOR: 5/5 in proximal and distal muscle groups of upper and lower extremities with brief testing. Tone and bulk normal.   SENSATION: Normal light touch throughout.  COORDINATION: Normal fine motor movements of the hands.  STATION AND GAIT: Wheelchair bound.    ASSESSMENT and PLAN:                                                      Assessment:    ICD-10-CM    1. Nonintractable generalized idiopathic epilepsy without status epilepticus (H)  G40.309 LamoTRIgine (LAMICTAL) 200 MG TB24     levETIRAcetam (KEPPRA XR) 500 MG 24 hr tablet     levETIRAcetam (KEPPRA) 1000 MG tablet     zonisamide (ZONEGRAN) 100 MG capsule     Zonisamide Level Quantitative     Valproic Acid Free & Total     Keppra (Levetiracetam) Level     Lamotrigine Level     Ammonia      2. Altered mental status, unspecified altered mental status type  R41.82 divalproex sodium delayed-release (DEPAKOTE) 500 MG DR tablet      3. Persistent depressive disorder  F34.1 divalproex sodium delayed-release (DEPAKOTE) 500 MG DR tablet      4. Encounter for long-term (current) use of medications  Z79.899           Mr. Vazquez is a 48-year-old man with CP, intellectual disability congenital left eye blindness here for follow-up regarding epilepsy.  Seizures are somewhat difficult to control but there could be an issue with medication compliance.  Joel himself is convinced that the Depakote is not helpful drug for him so he is asking about tapering off of this.  Given that his level has been low, I think this might be a reasonable approach though I would like to recheck all of his levels first.  Prolonged EEG last year did show evidence of breakthrough seizure activity.    Plan:  Continue current doses of Depakote, lamotrigine, Keppra and zonisamide.  We will check levels and then decide about whether it  would be appropriate to start to taper you off of the Depakote.  I will let you know.  Return to neurology clinic in 3-4 months (with either myself or Lisa).  Please let us know if any breakthrough seizures occur in the meantime.    Total Time: 25 minutes were spent with the patient and in chart review/documentation (as described above in Assessment and Plan)/coordinating the care on date of service.    Jojo Griggs MD  Neurology    [employer] software used in the dictation of this note.

## 2024-07-03 NOTE — PATIENT INSTRUCTIONS
Plan:  Continue current doses of Depakote, lamotrigine, Keppra and zonisamide.  We will check levels and then decide about whether it would be appropriate to start to taper you off of the Depakote.  I will let you know.  Return to neurology clinic in 3-4 months (with either myself or Lisa).  Please let us know if any breakthrough seizures occur in the meantime.

## 2024-08-08 ENCOUNTER — HOSPITAL ENCOUNTER (EMERGENCY)
Facility: CLINIC | Age: 49
Discharge: HOME | End: 2024-08-08
Attending: STUDENT IN AN ORGANIZED HEALTH CARE EDUCATION/TRAINING PROGRAM | Admitting: STUDENT IN AN ORGANIZED HEALTH CARE EDUCATION/TRAINING PROGRAM
Payer: MEDICARE

## 2024-08-08 ENCOUNTER — APPOINTMENT (OUTPATIENT)
Dept: GENERAL RADIOLOGY | Facility: CLINIC | Age: 49
End: 2024-08-08
Attending: STUDENT IN AN ORGANIZED HEALTH CARE EDUCATION/TRAINING PROGRAM
Payer: MEDICARE

## 2024-08-08 VITALS
DIASTOLIC BLOOD PRESSURE: 75 MMHG | OXYGEN SATURATION: 97 % | SYSTOLIC BLOOD PRESSURE: 130 MMHG | RESPIRATION RATE: 18 BRPM | HEART RATE: 73 BPM

## 2024-08-08 DIAGNOSIS — M25.561 ACUTE PAIN OF RIGHT KNEE: Primary | ICD-10-CM

## 2024-08-08 PROCEDURE — 250N000013 HC RX MED GY IP 250 OP 250 PS 637: Performed by: STUDENT IN AN ORGANIZED HEALTH CARE EDUCATION/TRAINING PROGRAM

## 2024-08-08 PROCEDURE — 99283 EMERGENCY DEPT VISIT LOW MDM: CPT

## 2024-08-08 PROCEDURE — 73560 X-RAY EXAM OF KNEE 1 OR 2: CPT | Mod: RT

## 2024-08-08 RX ORDER — NAPROXEN 250 MG/1
500 TABLET ORAL ONCE
Status: COMPLETED | OUTPATIENT
Start: 2024-08-08 | End: 2024-08-08

## 2024-08-08 RX ADMIN — NAPROXEN 500 MG: 250 TABLET ORAL at 19:04

## 2024-08-08 ASSESSMENT — ACTIVITIES OF DAILY LIVING (ADL)
ADLS_ACUITY_SCORE: 40

## 2024-08-08 ASSESSMENT — COLUMBIA-SUICIDE SEVERITY RATING SCALE - C-SSRS
2. HAVE YOU ACTUALLY HAD ANY THOUGHTS OF KILLING YOURSELF IN THE PAST MONTH?: NO
6. HAVE YOU EVER DONE ANYTHING, STARTED TO DO ANYTHING, OR PREPARED TO DO ANYTHING TO END YOUR LIFE?: NO
1. IN THE PAST MONTH, HAVE YOU WISHED YOU WERE DEAD OR WISHED YOU COULD GO TO SLEEP AND NOT WAKE UP?: NO

## 2024-08-08 NOTE — ED TRIAGE NOTES
Pt states he was transferring from his wheelchair and he thinks he twisted his R knee. He denies taking pain medication for this pain.    Triage Assessment (Adult)       Row Name 08/08/24 0699          Triage Assessment    Airway WDL WDL        Respiratory WDL    Respiratory WDL WDL        Skin Circulation/Temperature WDL    Skin Circulation/Temperature WDL WDL        Cardiac WDL    Cardiac WDL WDL        Peripheral/Neurovascular WDL    Peripheral Neurovascular WDL WDL        Cognitive/Neuro/Behavioral WDL    Cognitive/Neuro/Behavioral WDL WDL

## 2024-08-08 NOTE — ED NOTES
Bed: ED21  Expected date: 8/8/24  Expected time: 5:53 PM  Means of arrival: Ambulance  Comments:  BV 2 48 M knee pain

## 2024-08-08 NOTE — ED PROVIDER NOTES
"  Emergency Department Note      History of Present Illness     Chief Complaint   Knee Pain      HPI   Tre Vazquez is a 48 year old male presenting with right knee pain. The patient reports that he developed right knee pain last night that has persisted through today. Tre explains that it \"feels like something is about to explode out of\" his right knee. He denies fever, chills, ankle pain, and hip pain. Of note, patient lives at group home facility.  Of note, patient uses oxygen at baseline for chronic hypoxic respiratory failure.    Independent Historian   None    Review of External Notes   I personally reviewed notes from the patient's progress note dated  7/15/2024 . This provided me with information regarding patient's baseline medical problems.     I personally reviewed the patient's chart, including available medication list and available past medical history, past surgical history, family history, and social history.    Physical Exam     Patient Vitals for the past 24 hrs:   BP Pulse Resp SpO2   08/08/24 2115 130/75 73 -- 97 %   08/08/24 1806 -- -- 18 98 %   08/08/24 1800 (!) 144/70 91 -- --      Physical Exam  Vitals and nursing note reviewed.   Constitutional:       Appearance: He is obese. He is not ill-appearing or diaphoretic.   Cardiovascular:      Rate and Rhythm: Normal rate and regular rhythm.   Musculoskeletal:         General: Tenderness (Patient has diffuse tenderness in the right lower extremity around the knee and also proximal and distal to it) present. No swelling, deformity or signs of injury.      Right lower leg: No edema.   Skin:     General: Skin is warm and dry.      Capillary Refill: Capillary refill takes less than 2 seconds.      Findings: No erythema, lesion or rash.   Neurological:      Mental Status: He is alert.      Sensory: No sensory deficit.      Motor: No weakness.          Diagnostics     Lab Results   Labs Ordered and Resulted from Time of ED Arrival to Time of ED " Departure - No data to display    Imaging   XR Knee Right 1/2 Views   Final Result   IMPRESSION: The right knee is negative for fracture or significant compartmental narrowing. No effusion.          EKG  No ECG performed.     Independent Interpretation   See ED Course below    ED Course      Medications Administered   Medications   naproxen (NAPROSYN) tablet 500 mg (500 mg Oral $Given 8/8/24 9041)       Procedures   Procedures   None performed    Discussion of Management   See ED Course below    Social Determinants of Health adding to complexity of care   None.      ED Course   Independent Interpretation / Discussion of Management / Repeat Assessments  ED Course as of 08/08/24 2321   Thu Aug 08, 2024   1816 I obtained history and examined the patient as noted above     2141 XR Knee Right 1/2 Views  I independently interpreted the patient's right knee x-ray; reassuring against fracture, dislocation, or effusion.          Medical Decision Making / Diagnosis     CMS Diagnoses: None    MIPS       None    MDM   Patient presenting with right knee pain.  Physical exam is unremarkable.  There is no clear mechanism for traumatic injury.  There is skin changes to suggest cellulitis.  There is no warmth, erythema, fever to suggest septic joint or crystal arthropathy.  There is no leg swelling or erythema to suggest DVT.  I did evaluate with an x-ray and this was reassuring against any fracture, dislocation or effusion.  I do not feel that additional workup is indicated at this time.  Will recommend patient return to his group home and follow-up with his primary clinician for reevaluation as needed, taking NSAIDs as needed for pain until then. Return precautions provided.         Disposition   The patient was discharged.     Diagnosis     ICD-10-CM    1. Acute pain of right knee  M25.561            Discharge Medications   Discharge Medication List as of 8/8/2024  7:19 PM             Chucky Hudson MD  08/08/24 7336

## 2024-08-09 ENCOUNTER — TELEPHONE (OUTPATIENT)
Dept: NEUROLOGY | Facility: CLINIC | Age: 49
End: 2024-08-09
Payer: MEDICARE

## 2024-08-09 NOTE — TELEPHONE ENCOUNTER
[facility] Call Center    Phone Message    May a detailed message be left on voicemail: yes     Reason for Call:     Michelle from [facility] called to follow up on a home care services form, requesting if this was received on 7/26 and again on 8/2? Please fax back to   # [phone], if any questions please call Michelle at [phone]     Action Taken: Other: [facility]    Travel Screening: Not Applicable     Date of Service:

## 2024-08-12 NOTE — TELEPHONE ENCOUNTER
CLAUDINE Ortiz to call me or refax. Also informed that , per Lisa, these forms are to go to PCP moving forward.

## 2024-08-30 ENCOUNTER — TELEPHONE (OUTPATIENT)
Dept: NEUROLOGY | Facility: CLINIC | Age: 49
End: 2024-08-30
Payer: MEDICARE

## 2024-08-30 NOTE — TELEPHONE ENCOUNTER
[facility] Call Center    Phone Message    May a detailed message be left on voicemail: yes     Reason for Call: Order(s): Home Care Orders: Skilled Nursinx in two week    Pollo, Home Care RN, requesting order for skilled nursing 1x in two weeks, then she states the patient will be discharged.   Call back #[phone]      Action Taken: Message routed to:  Other: [facility]    Travel Screening: Not Applicable

## 2024-09-02 ENCOUNTER — MEDICAL CORRESPONDENCE (OUTPATIENT)
Dept: HEALTH INFORMATION MANAGEMENT | Facility: CLINIC | Age: 49
End: 2024-09-02

## 2024-09-03 DIAGNOSIS — F34.1 PERSISTENT DEPRESSIVE DISORDER: ICD-10-CM

## 2024-09-03 DIAGNOSIS — R41.82 ALTERED MENTAL STATUS, UNSPECIFIED ALTERED MENTAL STATUS TYPE: ICD-10-CM

## 2024-09-03 NOTE — TELEPHONE ENCOUNTER
Called and spoke with Maryann, providing requested verbal acknowledgement of skilled home care nursing orders (after which patient will be discharged).

## 2024-09-04 RX ORDER — DIVALPROEX SODIUM 500 MG/1
500 TABLET, DELAYED RELEASE ORAL 2 TIMES DAILY
Qty: 60 TABLET | Refills: 3 | Status: SHIPPED | OUTPATIENT
Start: 2024-09-04

## 2024-09-04 NOTE — TELEPHONE ENCOUNTER
Refill request for: divalproex sodium delayed-release (DEPAKOTE) 500 MG DR tablet    Directions: Take 1 tablet (500 mg) by mouth 2 times daily     LOV: 07/03/24  NOV: 12/11/24    Last OV note on 7/3/24 states:    Continue current doses of Depakote, lamotrigine, Keppra and zonisamide.  We will check levels and then decide about whether it would be appropriate to start to taper you off of the Depakote.  I will let you know.    Will send to provider to review if refill is appropriate.    Marcella Castillo LPN on 9/4/2024 at 11:43 AM

## 2024-09-11 ENCOUNTER — APPOINTMENT (OUTPATIENT)
Dept: GENERAL RADIOLOGY | Facility: CLINIC | Age: 49
End: 2024-09-11
Attending: EMERGENCY MEDICINE
Payer: MEDICARE

## 2024-09-11 ENCOUNTER — HOSPITAL ENCOUNTER (EMERGENCY)
Facility: CLINIC | Age: 49
Discharge: NURSING, REHAB OR LONG-TERM CARE | End: 2024-09-11
Attending: EMERGENCY MEDICINE | Admitting: EMERGENCY MEDICINE
Payer: MEDICARE

## 2024-09-11 VITALS
HEIGHT: 67 IN | DIASTOLIC BLOOD PRESSURE: 78 MMHG | RESPIRATION RATE: 20 BRPM | HEART RATE: 66 BPM | TEMPERATURE: 97.9 F | BODY MASS INDEX: 40.02 KG/M2 | WEIGHT: 255 LBS | OXYGEN SATURATION: 97 % | SYSTOLIC BLOOD PRESSURE: 130 MMHG
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
## 2024-09-11 DIAGNOSIS — M25.511 ACUTE PAIN OF RIGHT SHOULDER: ICD-10-CM

## 2024-09-11 PROCEDURE — 73030 X-RAY EXAM OF SHOULDER: CPT | Mod: RT

## 2024-09-11 PROCEDURE — 99283 EMERGENCY DEPT VISIT LOW MDM: CPT | Mod: 25

## 2024-09-11 PROCEDURE — 71045 X-RAY EXAM CHEST 1 VIEW: CPT

## 2024-09-11 ASSESSMENT — ACTIVITIES OF DAILY LIVING (ADL)
ADLS_ACUITY_SCORE: 40

## 2024-09-11 ASSESSMENT — COLUMBIA-SUICIDE SEVERITY RATING SCALE - C-SSRS
1. IN THE PAST MONTH, HAVE YOU WISHED YOU WERE DEAD OR WISHED YOU COULD GO TO SLEEP AND NOT WAKE UP?: NO
6. HAVE YOU EVER DONE ANYTHING, STARTED TO DO ANYTHING, OR PREPARED TO DO ANYTHING TO END YOUR LIFE?: NO
2. HAVE YOU ACTUALLY HAD ANY THOUGHTS OF KILLING YOURSELF IN THE PAST MONTH?: NO
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
## 2024-09-11 NOTE — ED TRIAGE NOTES
Arrived via EMS from . States had a fall around 2045 from standing height. C/O right shoulder/upper arm pain. 10/10 Has minor abrasion right shoulder. CMS intact. No obvious signs of deformity. Pt states wanted to be seen earlier but  staff ignored him. Pt states he called 911 on his cell phone.  VSS.     Triage Assessment (Adult)       Row Name 09/11/24 0246          Triage Assessment    Airway WDL WDL        Respiratory WDL    Respiratory WDL WDL        Skin Circulation/Temperature WDL    Skin Circulation/Temperature WDL WDL        Cardiac WDL    Cardiac WDL WDL        Peripheral/Neurovascular WDL    Peripheral Neurovascular WDL WDL        Cognitive/Neuro/Behavioral WDL    Cognitive/Neuro/Behavioral WDL WDL

## 2024-09-11 NOTE — ED NOTES
Bed: [facility]  Expected date:   Expected time:   Means of arrival:   Comments:  BV 6

## 2024-09-28 ENCOUNTER — APPOINTMENT (OUTPATIENT)
Dept: GENERAL RADIOLOGY | Facility: CLINIC | Age: 49
End: 2024-09-28
Attending: PHYSICIAN ASSISTANT
Payer: MEDICARE

## 2024-09-28 ENCOUNTER — HOSPITAL ENCOUNTER (EMERGENCY)
Facility: CLINIC | Age: 49
Discharge: HOME | End: 2024-09-28
Attending: PHYSICIAN ASSISTANT | Admitting: PHYSICIAN ASSISTANT
Payer: MEDICARE

## 2024-09-28 VITALS
TEMPERATURE: 97.7 F | SYSTOLIC BLOOD PRESSURE: 161 MMHG | OXYGEN SATURATION: 89 % | DIASTOLIC BLOOD PRESSURE: 98 MMHG | HEART RATE: 74 BPM | RESPIRATION RATE: 18 BRPM

## 2024-09-28 DIAGNOSIS — M25.511 ACUTE PAIN OF RIGHT SHOULDER: ICD-10-CM

## 2024-09-28 PROCEDURE — 73030 X-RAY EXAM OF SHOULDER: CPT | Mod: RT

## 2024-09-28 PROCEDURE — 250N000013 HC RX MED GY IP 250 OP 250 PS 637: Performed by: PHYSICIAN ASSISTANT

## 2024-09-28 PROCEDURE — 99283 EMERGENCY DEPT VISIT LOW MDM: CPT

## 2024-09-28 RX ORDER — IBUPROFEN 200 MG
400 TABLET ORAL ONCE
Status: COMPLETED | OUTPATIENT
Start: 2024-09-28 | End: 2024-09-28

## 2024-09-28 RX ADMIN — IBUPROFEN 400 MG: 200 TABLET, FILM COATED ORAL at 17:14

## 2024-09-28 ASSESSMENT — COLUMBIA-SUICIDE SEVERITY RATING SCALE - C-SSRS
2. HAVE YOU ACTUALLY HAD ANY THOUGHTS OF KILLING YOURSELF IN THE PAST MONTH?: NO
1. IN THE PAST MONTH, HAVE YOU WISHED YOU WERE DEAD OR WISHED YOU COULD GO TO SLEEP AND NOT WAKE UP?: NO
6. HAVE YOU EVER DONE ANYTHING, STARTED TO DO ANYTHING, OR PREPARED TO DO ANYTHING TO END YOUR LIFE?: NO

## 2024-09-28 ASSESSMENT — ACTIVITIES OF DAILY LIVING (ADL)
ADLS_ACUITY_SCORE: 40

## 2024-09-28 NOTE — ED TRIAGE NOTES
Patient presents with complaints of right shoulder pain which is intermittent shoulder pain. Patient was seen here two weeks ago for a fall on same shoulder but denies any new injury ABC intact without need for intervention at this time.
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
## 2024-09-28 NOTE — DISCHARGE INSTRUCTIONS
Wear sling for comfort.  Follow up with orthopedics with persistent pain.  Use Tylenol and ibuprofen for pain, as well as ice and rest.

## 2024-10-09 ENCOUNTER — HOSPITAL ENCOUNTER (EMERGENCY)
Facility: CLINIC | Age: 49
Discharge: HOME | End: 2024-10-10
Attending: EMERGENCY MEDICINE | Admitting: EMERGENCY MEDICINE
Payer: MEDICARE

## 2024-10-09 DIAGNOSIS — E83.42 HYPOMAGNESEMIA: ICD-10-CM

## 2024-10-09 PROCEDURE — 80175 DRUG SCREEN QUAN LAMOTRIGINE: CPT | Performed by: EMERGENCY MEDICINE

## 2024-10-09 PROCEDURE — 80164 ASSAY DIPROPYLACETIC ACD TOT: CPT | Performed by: EMERGENCY MEDICINE

## 2024-10-09 PROCEDURE — 99284 EMERGENCY DEPT VISIT MOD MDM: CPT

## 2024-10-09 PROCEDURE — 85025 COMPLETE CBC W/AUTO DIFF WBC: CPT | Performed by: EMERGENCY MEDICINE

## 2024-10-09 PROCEDURE — 80177 DRUG SCRN QUAN LEVETIRACETAM: CPT | Performed by: EMERGENCY MEDICINE

## 2024-10-09 PROCEDURE — 83735 ASSAY OF MAGNESIUM: CPT | Performed by: EMERGENCY MEDICINE

## 2024-10-09 PROCEDURE — 80053 COMPREHEN METABOLIC PANEL: CPT | Performed by: EMERGENCY MEDICINE

## 2024-10-09 PROCEDURE — 93005 ELECTROCARDIOGRAM TRACING: CPT

## 2024-10-09 PROCEDURE — 36415 COLL VENOUS BLD VENIPUNCTURE: CPT | Performed by: EMERGENCY MEDICINE

## 2024-10-09 ASSESSMENT — ACTIVITIES OF DAILY LIVING (ADL): ADLS_ACUITY_SCORE: 40

## 2024-10-10 VITALS
RESPIRATION RATE: 18 BRPM | SYSTOLIC BLOOD PRESSURE: 137 MMHG | OXYGEN SATURATION: 95 % | DIASTOLIC BLOOD PRESSURE: 74 MMHG | HEART RATE: 73 BPM

## 2024-10-10 LAB
ALBUMIN SERPL BCG-MCNC: 4.1 G/DL (ref 3.5–5.2)
ALP SERPL-CCNC: 80 U/L (ref 40–150)
ALT SERPL W P-5'-P-CCNC: 21 U/L (ref 0–70)
ANION GAP SERPL CALCULATED.3IONS-SCNC: 9 MMOL/L (ref 7–15)
AST SERPL W P-5'-P-CCNC: 19 U/L (ref 0–45)
ATRIAL RATE - MUSE: 70 BPM
BASOPHILS # BLD AUTO: 0.1 10E3/UL (ref 0–0.2)
BASOPHILS NFR BLD AUTO: 1 %
BILIRUB SERPL-MCNC: 0.2 MG/DL
BUN SERPL-MCNC: 20.6 MG/DL (ref 6–20)
CALCIUM SERPL-MCNC: 9.1 MG/DL (ref 8.8–10.4)
CHLORIDE SERPL-SCNC: 100 MMOL/L (ref 98–107)
CREAT SERPL-MCNC: 0.77 MG/DL (ref 0.67–1.17)
DIASTOLIC BLOOD PRESSURE - MUSE: NORMAL MMHG
EGFRCR SERPLBLD CKD-EPI 2021: >90 ML/MIN/1.73M2
EOSINOPHIL # BLD AUTO: 0.1 10E3/UL (ref 0–0.7)
EOSINOPHIL NFR BLD AUTO: 1 %
ERYTHROCYTE [DISTWIDTH] IN BLOOD BY AUTOMATED COUNT: 13.5 % (ref 10–15)
GLUCOSE SERPL-MCNC: 102 MG/DL (ref 70–99)
HCO3 SERPL-SCNC: 33 MMOL/L (ref 22–29)
HCT VFR BLD AUTO: 38.2 % (ref 40–53)
HGB BLD-MCNC: 11.7 G/DL (ref 13.3–17.7)
HOLD SPECIMEN: NORMAL
IMM GRANULOCYTES # BLD: 0.1 10E3/UL
IMM GRANULOCYTES NFR BLD: 2 %
INTERPRETATION ECG - MUSE: NORMAL
LEVETIRACETAM SERPL-MCNC: 23.6 ÂΜG/ML (ref 10–40)
LYMPHOCYTES # BLD AUTO: 2.1 10E3/UL (ref 0.8–5.3)
LYMPHOCYTES NFR BLD AUTO: 30 %
MAGNESIUM SERPL-MCNC: 1.6 MG/DL (ref 1.7–2.3)
MCH RBC QN AUTO: 29.1 PG (ref 26.5–33)
MCHC RBC AUTO-ENTMCNC: 30.6 G/DL (ref 31.5–36.5)
MCV RBC AUTO: 95 FL (ref 78–100)
MONOCYTES # BLD AUTO: 0.7 10E3/UL (ref 0–1.3)
MONOCYTES NFR BLD AUTO: 11 %
NEUTROPHILS # BLD AUTO: 3.8 10E3/UL (ref 1.6–8.3)
NEUTROPHILS NFR BLD AUTO: 56 %
NRBC # BLD AUTO: 0 10E3/UL
NRBC BLD AUTO-RTO: 0 /100
P AXIS - MUSE: 62 DEGREES
PLATELET # BLD AUTO: 216 10E3/UL (ref 150–450)
POTASSIUM SERPL-SCNC: 4.2 MMOL/L (ref 3.4–5.3)
PR INTERVAL - MUSE: 198 MS
PROT SERPL-MCNC: 6.5 G/DL (ref 6.4–8.3)
QRS DURATION - MUSE: 88 MS
QT - MUSE: 434 MS
QTC - MUSE: 468 MS
R AXIS - MUSE: 43 DEGREES
RBC # BLD AUTO: 4.02 10E6/UL (ref 4.4–5.9)
SODIUM SERPL-SCNC: 142 MMOL/L (ref 135–145)
SYSTOLIC BLOOD PRESSURE - MUSE: NORMAL MMHG
T AXIS - MUSE: 61 DEGREES
VALPROATE SERPL-MCNC: 40.7 UG/ML
VENTRICULAR RATE- MUSE: 70 BPM
WBC # BLD AUTO: 6.9 10E3/UL (ref 4–11)

## 2024-10-10 PROCEDURE — 250N000013 HC RX MED GY IP 250 OP 250 PS 637: Performed by: EMERGENCY MEDICINE

## 2024-10-10 RX ORDER — MAGNESIUM OXIDE 400 MG/1
400 TABLET ORAL ONCE
Status: COMPLETED | OUTPATIENT
Start: 2024-10-10 | End: 2024-10-10

## 2024-10-10 RX ADMIN — Medication 400 MG: at 00:54

## 2024-10-10 ASSESSMENT — ACTIVITIES OF DAILY LIVING (ADL)
ADLS_ACUITY_SCORE: 40
ADLS_ACUITY_SCORE: 40
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
## 2024-10-10 NOTE — ED NOTES
Report called to Brayden at the [facility] [phone]. Updates given, pt to discharge by wheelchair van

## 2024-10-10 NOTE — DISCHARGE INSTRUCTIONS
Please have your magnesium checked by your primary care physician next week.    Please make sure you go to your neurology appointment this week.     Levels of your seizure medication is still pending. You can review this with your neurologist at your appointment.

## 2024-10-10 NOTE — ED TRIAGE NOTES
"Pt BIBA after calling EMS for feeling \"off\" and states this is how he usually feels prior to having a seizure. Pt has taken all of his scheduled medications today. Pt arrived on 2LNC which he wears at baseline and has sleep apnea.  en route. VSS on arrival. Hx includes epilepsy, bipolar, and depression.      Triage Assessment (Adult)       Row Name 10/09/24 4307          Triage Assessment    Airway WDL WDL        Respiratory WDL    Respiratory WDL X  2LNC        Skin Circulation/Temperature WDL    Skin Circulation/Temperature WDL WDL        Cardiac WDL    Cardiac WDL WDL        Peripheral/Neurovascular WDL    Peripheral Neurovascular WDL WDL        Cognitive/Neuro/Behavioral WDL    Cognitive/Neuro/Behavioral WDL X                     "

## 2024-10-11 LAB — LAMOTRIGINE SERPL-MCNC: 12 UG/ML

## 2024-10-18 ENCOUNTER — HOSPITAL ENCOUNTER (EMERGENCY)
Facility: CLINIC | Age: 49
Discharge: HOME | End: 2024-10-19
Attending: EMERGENCY MEDICINE | Admitting: EMERGENCY MEDICINE
Payer: MEDICARE

## 2024-10-18 DIAGNOSIS — E83.42 HYPOMAGNESEMIA: ICD-10-CM

## 2024-10-18 DIAGNOSIS — R10.84 DIFFUSE ABDOMINAL PAIN: ICD-10-CM

## 2024-10-18 DIAGNOSIS — K92.0 HEMATEMESIS, UNSPECIFIED WHETHER NAUSEA PRESENT: ICD-10-CM

## 2024-10-18 PROCEDURE — 99285 EMERGENCY DEPT VISIT HI MDM: CPT | Mod: 25

## 2024-10-19 ENCOUNTER — APPOINTMENT (OUTPATIENT)
Dept: CT IMAGING | Facility: CLINIC | Age: 49
End: 2024-10-19
Attending: EMERGENCY MEDICINE
Payer: MEDICARE

## 2024-10-19 VITALS
OXYGEN SATURATION: 91 % | DIASTOLIC BLOOD PRESSURE: 86 MMHG | TEMPERATURE: 98.6 F | BODY MASS INDEX: 45.17 KG/M2 | WEIGHT: 298.06 LBS | HEART RATE: 90 BPM | HEIGHT: 68 IN | RESPIRATION RATE: 20 BRPM | SYSTOLIC BLOOD PRESSURE: 150 MMHG

## 2024-10-19 LAB
ALBUMIN SERPL BCG-MCNC: 4.2 G/DL (ref 3.5–5.2)
ALBUMIN UR-MCNC: NEGATIVE MG/DL
ALP SERPL-CCNC: 89 U/L (ref 40–150)
ALT SERPL W P-5'-P-CCNC: 26 U/L (ref 0–70)
ANION GAP SERPL CALCULATED.3IONS-SCNC: 12 MMOL/L (ref 7–15)
APPEARANCE UR: CLEAR
AST SERPL W P-5'-P-CCNC: 26 U/L (ref 0–45)
BASOPHILS # BLD AUTO: 0.1 10E3/UL (ref 0–0.2)
BASOPHILS NFR BLD AUTO: 1 %
BILIRUB SERPL-MCNC: 0.2 MG/DL
BILIRUB UR QL STRIP: NEGATIVE
BUN SERPL-MCNC: 18.3 MG/DL (ref 6–20)
CALCIUM SERPL-MCNC: 9 MG/DL (ref 8.8–10.4)
CHLORIDE SERPL-SCNC: 103 MMOL/L (ref 98–107)
COLOR UR AUTO: ABNORMAL
CREAT SERPL-MCNC: 0.66 MG/DL (ref 0.67–1.17)
EGFRCR SERPLBLD CKD-EPI 2021: >90 ML/MIN/1.73M2
EOSINOPHIL # BLD AUTO: 0 10E3/UL (ref 0–0.7)
EOSINOPHIL NFR BLD AUTO: 0 %
ERYTHROCYTE [DISTWIDTH] IN BLOOD BY AUTOMATED COUNT: 13.4 % (ref 10–15)
GLUCOSE SERPL-MCNC: 164 MG/DL (ref 70–99)
GLUCOSE UR STRIP-MCNC: NEGATIVE MG/DL
HCO3 SERPL-SCNC: 26 MMOL/L (ref 22–29)
HCT VFR BLD AUTO: 41.8 % (ref 40–53)
HGB BLD-MCNC: 13.1 G/DL (ref 13.3–17.7)
HGB BLD-MCNC: 13.2 G/DL (ref 13.3–17.7)
HGB UR QL STRIP: NEGATIVE
IMM GRANULOCYTES # BLD: 0.3 10E3/UL
IMM GRANULOCYTES NFR BLD: 2 %
KETONES UR STRIP-MCNC: ABNORMAL MG/DL
LEUKOCYTE ESTERASE UR QL STRIP: NEGATIVE
LYMPHOCYTES # BLD AUTO: 1.4 10E3/UL (ref 0.8–5.3)
LYMPHOCYTES NFR BLD AUTO: 12 %
MAGNESIUM SERPL-MCNC: 1.4 MG/DL (ref 1.7–2.3)
MCH RBC QN AUTO: 29.2 PG (ref 26.5–33)
MCHC RBC AUTO-ENTMCNC: 31.3 G/DL (ref 31.5–36.5)
MCV RBC AUTO: 93 FL (ref 78–100)
MONOCYTES # BLD AUTO: 0.7 10E3/UL (ref 0–1.3)
MONOCYTES NFR BLD AUTO: 6 %
MUCOUS THREADS #/AREA URNS LPF: PRESENT /LPF
NEUTROPHILS # BLD AUTO: 9.6 10E3/UL (ref 1.6–8.3)
NEUTROPHILS NFR BLD AUTO: 80 %
NITRATE UR QL: NEGATIVE
NRBC # BLD AUTO: 0 10E3/UL
NRBC BLD AUTO-RTO: 0 /100
PH UR STRIP: 7 [PH] (ref 5–7)
PLATELET # BLD AUTO: 224 10E3/UL (ref 150–450)
POTASSIUM SERPL-SCNC: 4.5 MMOL/L (ref 3.4–5.3)
PROT SERPL-MCNC: 7.2 G/DL (ref 6.4–8.3)
RBC # BLD AUTO: 4.48 10E6/UL (ref 4.4–5.9)
RBC URINE: 2 /HPF
SODIUM SERPL-SCNC: 141 MMOL/L (ref 135–145)
SP GR UR STRIP: 1.02 (ref 1–1.03)
UROBILINOGEN UR STRIP-MCNC: NORMAL MG/DL
WBC # BLD AUTO: 12.1 10E3/UL (ref 4–11)
WBC URINE: <1 /HPF

## 2024-10-19 PROCEDURE — 250N000011 HC RX IP 250 OP 636: Performed by: EMERGENCY MEDICINE

## 2024-10-19 PROCEDURE — 96365 THER/PROPH/DIAG IV INF INIT: CPT | Mod: 59

## 2024-10-19 PROCEDURE — 93005 ELECTROCARDIOGRAM TRACING: CPT

## 2024-10-19 PROCEDURE — 81001 URINALYSIS AUTO W/SCOPE: CPT | Performed by: EMERGENCY MEDICINE

## 2024-10-19 PROCEDURE — 80053 COMPREHEN METABOLIC PANEL: CPT | Performed by: EMERGENCY MEDICINE

## 2024-10-19 PROCEDURE — 85025 COMPLETE CBC W/AUTO DIFF WBC: CPT | Performed by: EMERGENCY MEDICINE

## 2024-10-19 PROCEDURE — 85018 HEMOGLOBIN: CPT | Performed by: EMERGENCY MEDICINE

## 2024-10-19 PROCEDURE — G1010 CDSM STANSON: HCPCS

## 2024-10-19 PROCEDURE — 36415 COLL VENOUS BLD VENIPUNCTURE: CPT | Performed by: EMERGENCY MEDICINE

## 2024-10-19 PROCEDURE — 250N000009 HC RX 250: Performed by: EMERGENCY MEDICINE

## 2024-10-19 PROCEDURE — 74177 CT ABD & PELVIS W/CONTRAST: CPT | Mod: MG

## 2024-10-19 PROCEDURE — 83735 ASSAY OF MAGNESIUM: CPT | Performed by: EMERGENCY MEDICINE

## 2024-10-19 PROCEDURE — 96375 TX/PRO/DX INJ NEW DRUG ADDON: CPT

## 2024-10-19 RX ORDER — MAGNESIUM SULFATE HEPTAHYDRATE 40 MG/ML
2 INJECTION, SOLUTION INTRAVENOUS ONCE
Status: COMPLETED | OUTPATIENT
Start: 2024-10-19 | End: 2024-10-19

## 2024-10-19 RX ORDER — IOPAMIDOL 755 MG/ML
500 INJECTION, SOLUTION INTRAVASCULAR ONCE
Status: COMPLETED | OUTPATIENT
Start: 2024-10-19 | End: 2024-10-19

## 2024-10-19 RX ADMIN — PANTOPRAZOLE SODIUM 40 MG: 40 INJECTION, POWDER, FOR SOLUTION INTRAVENOUS at 01:46

## 2024-10-19 RX ADMIN — MAGNESIUM SULFATE HEPTAHYDRATE 2 G: 40 INJECTION, SOLUTION INTRAVENOUS at 01:48

## 2024-10-19 RX ADMIN — IOPAMIDOL 100 ML: 755 INJECTION, SOLUTION INTRAVENOUS at 03:03

## 2024-10-19 RX ADMIN — SODIUM CHLORIDE 65 ML: 9 INJECTION, SOLUTION INTRAVENOUS at 03:03

## 2024-10-19 ASSESSMENT — ACTIVITIES OF DAILY LIVING (ADL)
ADLS_ACUITY_SCORE: 40

## 2024-10-19 NOTE — DISCHARGE INSTRUCTIONS
Discharge Instructions  Gastrointestinal (GI) Bleeding    You have been seen today because of gastrointestinal (GI) bleeding, bleeding somewhere along your digestive tract.  Most common symptoms are blood in the stool or when you have a bowel movement.  The most common causes of minor GI bleeding are ulcers and hemorrhoids.  Other conditions that cause bleeding include abnormal blood vessels in your GI tract, diverticulosis, inflammatory bowel disease, and cancer.  Fortunately, many cases of GI bleeding are not immediately life-threatening and it does not appear that your bleeding is serious enough to require admission to the hospital.    Generally, every Emergency Department visit should have a follow-up clinic visit with either a primary or a specialty clinic/provider. Please follow-up as instructed by your emergency provider today.    Return to the Emergency Department right away if you:  Develop fever with a temperature above 100.4 F.  Vomit (throw up) blood or something that looks like coffee grounds.  Have a bowel movement that looks like tar or has a large amount of blood in it.  Feel weak, light-headed, or faint.  Have a racing heartbeat.  Have abdominal (belly) pain that is new or increasing.  Have new symptoms that worry you.    At your follow up, your regular provider or GI specialist may order further testing such as:  Blood and stool tests.  Endoscopy and/or colonoscopy, where a tube with a camera is used to look at your digestive tract.  Other very specialized tests depending on your symptoms.    What can I do to help myself?  Take any acid reducing medication prescribed by your provider.  Avoid over the counter medications such as aspirin and Advil , Motrin  (ibuprofen) that can thin your blood or irritate your stomach, making ulcers worse.  If you were given a prescription for medicine here today, be sure to read all of the information (including the package insert) that comes with your prescription.   This will include important information about the medicine, its side effects, and any warnings that you need to know about.  The pharmacist who fills the prescription can provide more information and answer questions you may have about the medicine.  If you have questions or concerns that the pharmacist cannot address, please call or return to the Emergency Department.   Remember that you can always come back to the Emergency Department if you are not able to see your regular provider in the amount of time listed above, if you get any new symptoms, or if there is anything that worries you.     Discharge Instructions  Abdominal Pain    Abdominal pain (belly pain) can be caused by many things. Your evaluation today does not show the exact cause for your pain. Your provider today has decided that it is unlikely your pain is due to a life threatening problem, or a problem requiring surgery or hospital admission. Sometimes those problems cannot be found right away, so it is very important that you follow up as directed.  Sometimes only the changes which occur over time allow the cause of your pain to be found.    Generally, every Emergency Department visit should have a follow-up clinic visit with either a primary or a specialty clinic/provider. Please follow-up as instructed by your emergency provider today. With abdominal pain, we often recommend very close follow-up, such as the following day.    ADULTS:  Return to the Emergency Department right away if:    You get an oral temperature above 102oF or as directed by your provider.  You have blood in your stools. This may be bright red or appear as black, tarry stools.    You keep vomiting (throwing up) or cannot drink liquids.  You see blood when you vomit.   You cannot have a bowel movement or you cannot pass gas.  Your stomach gets bloated or bigger.  Your skin or the whites of your eyes look yellow.  You faint.  You have bloody, frequent or painful urination  (peeing).  You have new symptoms or anything that worries you.    CHILDREN:  Return to the Emergency Department right away if your child has any of the above-listed symptoms or the following:    Pushes your hand away or screams/cries when his/her belly is touched.  You notice your child is very fussy or weak.  Your child is very tired and is too tired to eat or drink.  Your child is dehydrated.  Signs of dehydration can be:  Significant change in the amount of wet diapers/urine.  Your infant or child starts to have dry mouth and lips, or no saliva (spit) or tears.    PREGNANT WOMEN:  Return to the Emergency Department right away if you have any of the above-listed symptoms or the following:    You have bleeding, leaking fluid or passing tissue from the vagina.  You have worse pain or cramping, or pain in your shoulder or back.  You have vomiting that will not stop.  You have a temperature of 100oF or more.  Your baby is not moving as much as usual.  You faint.  You get a bad headache with or without eye problems and abdominal pain.  You have a seizure.  You have unusual discharge from your vagina and abdominal pain.    Abdominal pain is pretty common during pregnancy.  Your pain may or may not be related to your pregnancy. You should follow-up closely with your OB provider so they can evaluate you and your baby.  Until you follow-up with your regular provider, do the following:     Avoid sex and do not put anything in your vagina.  Drink clear fluids.  Only take medications approved by your provider.    MORE INFORMATION:    Appendicitis:  A possible cause of abdominal pain in any person who still has their appendix is acute appendicitis. Appendicitis is often hard to diagnose.  Testing does not always rule out early appendicitis or other causes of abdominal pain. Close follow-up with your provider and re-evaluations may be needed to figure out the reason for your abdominal pain.    Follow-up:  It is very important  "that you make an appointment with your clinic and go to the appointment.  If you do not follow-up with your primary provider, it may result in missing an important development which could result in permanent injury or disability and/or lasting pain.  If there is any problem keeping your appointment, call your provider or return to the Emergency Department.    Medications:  Take your medications as directed by your provider today.  Before using over-the-counter medications, ask your provider and make sure to take the medications as directed.  If you have any questions about medications, ask your provider.    Diet:  Resume your normal diet as much as possible, but do not eat fried, fatty or spicy foods while you have pain.  Do not drink alcohol or have caffeine.  Do not smoke tobacco.    Probiotics: If you have been given an antibiotic, you may want to also take a probiotic pill or eat yogurt with live cultures. Probiotics have \"good bacteria\" to help your intestines stay healthy. Studies have shown that probiotics help prevent diarrhea (loose stools) and other intestine problems (including C. diff infection) when you take antibiotics. You can buy these without a prescription in the pharmacy section of the store.     If you were given a prescription for medicine here today, be sure to read all of the information (including the package insert) that comes with your prescription.  This will include important information about the medicine, its side effects, and any warnings that you need to know about.  The pharmacist who fills the prescription can provide more information and answer questions you may have about the medicine.  If you have questions or concerns that the pharmacist cannot address, please call or return to the Emergency Department.       Remember that you can always come back to the Emergency Department if you are not able to see your regular provider in the amount of time listed above, if you get any new " symptoms, or if there is anything that worries you.     Your magnesium continues to be low. Get repeat testing and discuss this further with your primary care provider.

## 2024-10-19 NOTE — ED PROVIDER NOTES
"  Emergency Department Note      History of Present Illness     Chief Complaint   Emesis    HPI   Tre Vazquez is a 49 year old male with history of hypertension, seizures, and encephalopathy who presents to the ED via EMS  for evaluation of emesis. The patient reports that he vomited blood twice tonight. He describes a \"medium thickness\" and is unsure of the volume. He reports this has happened before and that he presented to the ED for bloody emesis last month. He endorses a novel, tight, and general abdominal pain but denies any nausea, black or bloody stool, or abnormal bleeding or bruising. He also mentions having regular seizure activity, as well as new premunitions. Denies blood thinner use or daily alcohol use. Patient uses a wheelchair to ambulate.    Independent Historian   None    Review of External Notes   None    Past Medical History     Medical History and Problem List   Acute chronic respiratory failure with hypoxia and hypercapnia  Asthma  Bacteremia  Bipolar depression   Blindness of left eye  Borderline personality disorder  Bronchitis   Cardiac arrest  Cellulitis of right lower extremity   Cerebral palsy  Cerebral ventriculomegaly   Chronic hypercapnic respiratory failure  Depression  Dysthymia   Encephalomalacia   Epilepsy  Explosive personality disorder  GERD  Hepatic encephalopathy  Hypertension  Hyperammonemia  Hyperammonemic encephalopathy  Lumbar disc disease  Metabolic encephalopathy   Mild intellectual disability   Morbid obesity  Mood disorder  Obstructive sleep apnea  Obesity hypoventilation syndrome  Oppositional defiant disorder  Paraplegia   Psychotic disorder  Pneumococcal septicemia  Prediabetes  QT prolongation  Respiratory acidosis   Right lower lobe pulmonary infiltrate  Schizoaffective disorder  Septic shock  Sepsis; chronic  Seizure disorder  Spastic diplegia   Transaminitis    Medications   Albuterol   Buspar  Celexa  Depakote  Duoneb " "  Inderal  Imodium  Keppra  Lamictal  Levocarnitine   Olanzapine  Prazosin   Protonix  Risperdal   Seroquel  Trelegy Ellipta  Zofran  Zonisamide     Surgical History   Bilateral ankle surgery, heel lengthening  Colonoscopy x2  EGD with biopsy  Removal of iris of left eye    Physical Exam     Patient Vitals for the past 24 hrs:   BP Temp Temp src Pulse Resp SpO2 Height Weight   10/19/24 0026 -- -- -- -- -- 91 % -- --   10/19/24 0025 -- -- -- -- -- 91 % -- --   10/19/24 0023 (!) 150/86 -- -- 90 -- -- -- --   10/18/24 2335 (!) 161/112 98.6  F (37  C) Oral 94 20 (!) 88 % 1.727 m (5' 8\") 135.2 kg (298 lb 1 oz)     Physical Exam  General: Adult sitting upright  Eyes: Right pupil round and reactive.  Conjunctive within normal limits  ENT: Moist mucous membranes, oropharynx clear.   CV: Normal S1S2, no murmur, rub or gallop. Regular rate and rhythm  Resp: Clear to auscultation bilaterally, no wheezes, rales or rhonchi. Normal respiratory effort.  GI: Diffuse abdominal tenderness to palpation.  Abdomen is soft and nondistended.  No palpable masses. No rebound or guarding.  MSK: Nontender.  Skin: Warm and dry. No rashes or lesions or ecchymoses on visible skin.  No petechiae or purpura.  Neuro: Alert and oriented. Responds appropriately to all questions and commands.  Speech is normal. Normal muscle tone.   Psych: Appropriate mood and affect.    Diagnostics     Lab Results   Labs Ordered and Resulted from Time of ED Arrival to Time of ED Departure   COMPREHENSIVE METABOLIC PANEL - Abnormal       Result Value    Sodium 141      Potassium 4.5      Carbon Dioxide (CO2) 26      Anion Gap 12      Urea Nitrogen 18.3      Creatinine 0.66 (*)     GFR Estimate >90      Calcium 9.0      Chloride 103      Glucose 164 (*)     Alkaline Phosphatase 89      AST 26      ALT 26      Protein Total 7.2      Albumin 4.2      Bilirubin Total 0.2     CBC WITH PLATELETS AND DIFFERENTIAL - Abnormal    WBC Count 12.1 (*)     RBC Count 4.48      " Hemoglobin 13.1 (*)     Hematocrit 41.8      MCV 93      MCH 29.2      MCHC 31.3 (*)     RDW 13.4      Platelet Count 224      % Neutrophils 80      % Lymphocytes 12      % Monocytes 6      % Eosinophils 0      % Basophils 1      % Immature Granulocytes 2      NRBCs per 100 WBC 0      Absolute Neutrophils 9.6 (*)     Absolute Lymphocytes 1.4      Absolute Monocytes 0.7      Absolute Eosinophils 0.0      Absolute Basophils 0.1      Absolute Immature Granulocytes 0.3      Absolute NRBCs 0.0     MAGNESIUM - Abnormal    Magnesium 1.4 (*)    ROUTINE UA WITH MICROSCOPIC REFLEX TO CULTURE - Abnormal    Color Urine Light Yellow      Appearance Urine Clear      Glucose Urine Negative      Bilirubin Urine Negative      Ketones Urine Trace (*)     Specific Gravity Urine 1.020      Blood Urine Negative      pH Urine 7.0      Protein Albumin Urine Negative      Urobilinogen Urine Normal      Nitrite Urine Negative      Leukocyte Esterase Urine Negative      Mucus Urine Present (*)     RBC Urine 2      WBC Urine <1     HEMOGLOBIN - Abnormal    Hemoglobin 13.2 (*)      Imaging   CT Abdomen Pelvis w Contrast   Final Result   IMPRESSION:    1.  Cholelithiasis and bilateral nephrolithiasis. No acute abnormality identified.        EKG   ECG results from 10/18/24   EKG 12-lead, tracing only     Value    Systolic Blood Pressure     Diastolic Blood Pressure     Ventricular Rate 92    Atrial Rate 92    VA Interval 204    QRS Duration 88        QTc 487    P Axis 62    R AXIS 39    T Axis 93    Interpretation ECG      Sinus rhythm  Nonspecific T wave abnormality  QTcB >= 480 msec  Abnormal ECG  When compared with ECG of 09-Oct-2024 23:32,  No significant change was found        Independent Interpretation   None    ED Course      Medications Administered   Medications   pantoprazole (PROTONIX) IV push injection 40 mg (40 mg Intravenous $Given 10/19/24 0146)   magnesium sulfate 2 g in 50 mL sterile water intermittent infusion (0 g  Intravenous Stopped 10/19/24 0250)   CT scan flush (65 mLs Intravenous $Given 10/19/24 0303)   iopamidol (ISOVUE-370) solution 500 mL (100 mLs Intravenous $Given 10/19/24 0303)     Discussion of Management   None    ED Course   ED Course as of 10/19/24 0135   Sat Oct 19, 2024   0025 I obtained history and examined the patient as noted above.    I reassessed the patient. He denies new symptoms or concerns.     Additional Documentation  None    Medical Decision Making / Diagnosis     MDM   Tre Vazquez is a 49 year old male with a history of seizure disorder, hypertension, and multiple mental health issues, who presents emergency department with concerns for personal report of blood in his vomit.  Apparently staff at the [facility] home did not appreciate this.  The patient also has diffuse abdominal pain with no peritoneal findings.  He is hypertensive on arrival with no other concerning vital sign abnormalities.  Initial hypoxia was not replicated, remained normoxic throughout the rest of his stay.  He was not anemic nor having any vomiting here.  Due to abdominal pain in the setting of hematemesis, Protonix was administered and CT scan was obtained.  The diffuse abdominal pain was somewhat not consistent with concerns for something like gastritis or an ulcer.  Fortunately all his lab tests were reassuring with low magnesium noted which was supplemented and noted to be abnormal on past ED visit, and slight leukocytosis which is nonspecific.  He has no fever.  There is some reports in triage about memory issues, patient no focal neurologic deficits here.  No concern for acute CVA or acute neurologic emergency.  At this time I think he is safe for discharge home.  Follow-up with primary care provider within the next 2 to 3 days for reassessment, returning to the emergency department with any visible blood in the vomit, fevers, increasing pain or any other emergent concerns.    Disposition   The patient was discharged.      Diagnosis     ICD-10-CM    1. Hematemesis, unspecified whether nausea present  K92.0       2. Diffuse abdominal pain  R10.84       3. Hypomagnesemia  E83.42            Discharge Medications   Discharge Medication List as of 10/19/2024  7:07 AM        START taking these medications    Details   omeprazole (PRILOSEC) 20 MG DR capsule Take 1 capsule (20 mg) by mouth daily., Disp-30 capsule, R-0, E-Prescribe           Scribe Disclosure:  I, Praveen Li, am serving as a scribe at 12:47 AM on 10/19/2024 to document services personally performed by Madalyn Dyer MD based on my observations and the provider's statements to me.     Scribe Disclosure:  I, DEREK ALDANA, am serving as a scribe  at 1:25 AM on 10/19/2024 to document services personally performed by Madalyn Dyer MD based on my observations and the provider's statements to me.      Madalyn Dyer MD  10/20/24 0418

## 2024-10-19 NOTE — ED TRIAGE NOTES
BIBA from . Pt reports bloody emesis x 2 and memory issues. Pt states he only knows his first name and does not know the date. Per pt vomited twice. Per  staff, no emesis. Noted to be hypertensive for EMS and sats in low 90s on RA.     Triage Assessment (Adult)       Row Name 10/18/24 4391          Triage Assessment    Airway WDL X        Respiratory WDL    Respiratory WDL X        Skin Circulation/Temperature WDL    Skin Circulation/Temperature WDL WDL        Cardiac WDL    Cardiac WDL WDL        Peripheral/Neurovascular WDL    Peripheral Neurovascular WDL WDL        Cognitive/Neuro/Behavioral WDL    Cognitive/Neuro/Behavioral WDL X     Level of Consciousness confused     Arousal Level opens eyes spontaneously     Orientation disoriented to;person;place        Pupils (CN II)    Pupil PERRLA yes     Pupil Size Left 2 mm     Pupil Size Right 2 mm

## 2024-10-20 ENCOUNTER — HOSPITAL ENCOUNTER (EMERGENCY)
Facility: CLINIC | Age: 49
Discharge: NURSING, REHAB OR LONG-TERM CARE | DRG: 417 | End: 2024-10-20
Attending: EMERGENCY MEDICINE | Admitting: EMERGENCY MEDICINE
Payer: MEDICARE

## 2024-10-20 ENCOUNTER — APPOINTMENT (OUTPATIENT)
Dept: GENERAL RADIOLOGY | Facility: CLINIC | Age: 49
DRG: 417 | End: 2024-10-20
Attending: EMERGENCY MEDICINE
Payer: MEDICARE

## 2024-10-20 VITALS
HEART RATE: 96 BPM | DIASTOLIC BLOOD PRESSURE: 69 MMHG | RESPIRATION RATE: 22 BRPM | SYSTOLIC BLOOD PRESSURE: 112 MMHG | OXYGEN SATURATION: 93 % | TEMPERATURE: 98.8 F

## 2024-10-20 DIAGNOSIS — R10.84 GENERALIZED ABDOMINAL PAIN: ICD-10-CM

## 2024-10-20 LAB
ALBUMIN SERPL BCG-MCNC: 4 G/DL (ref 3.5–5.2)
ALP SERPL-CCNC: 90 U/L (ref 40–150)
ALT SERPL W P-5'-P-CCNC: 37 U/L (ref 0–70)
ANION GAP SERPL CALCULATED.3IONS-SCNC: 11 MMOL/L (ref 7–15)
AST SERPL W P-5'-P-CCNC: 29 U/L (ref 0–45)
ATRIAL RATE - MUSE: 92 BPM
BASOPHILS # BLD AUTO: 0 10E3/UL (ref 0–0.2)
BASOPHILS NFR BLD AUTO: 0 %
BILIRUB SERPL-MCNC: 0.7 MG/DL
BUN SERPL-MCNC: 12.8 MG/DL (ref 6–20)
CALCIUM SERPL-MCNC: 9 MG/DL (ref 8.8–10.4)
CHLORIDE SERPL-SCNC: 98 MMOL/L (ref 98–107)
CREAT SERPL-MCNC: 0.81 MG/DL (ref 0.67–1.17)
DIASTOLIC BLOOD PRESSURE - MUSE: NORMAL MMHG
EGFRCR SERPLBLD CKD-EPI 2021: >90 ML/MIN/1.73M2
EOSINOPHIL # BLD AUTO: 0 10E3/UL (ref 0–0.7)
EOSINOPHIL NFR BLD AUTO: 0 %
ERYTHROCYTE [DISTWIDTH] IN BLOOD BY AUTOMATED COUNT: 14 % (ref 10–15)
GLUCOSE SERPL-MCNC: 100 MG/DL (ref 70–99)
HCO3 SERPL-SCNC: 28 MMOL/L (ref 22–29)
HCT VFR BLD AUTO: 39.4 % (ref 40–53)
HGB BLD-MCNC: 12.4 G/DL (ref 13.3–17.7)
IMM GRANULOCYTES # BLD: 0.2 10E3/UL
IMM GRANULOCYTES NFR BLD: 1 %
INTERPRETATION ECG - MUSE: NORMAL
LIPASE SERPL-CCNC: 11 U/L (ref 13–60)
LYMPHOCYTES # BLD AUTO: 2.2 10E3/UL (ref 0.8–5.3)
LYMPHOCYTES NFR BLD AUTO: 11 %
MCH RBC QN AUTO: 29.2 PG (ref 26.5–33)
MCHC RBC AUTO-ENTMCNC: 31.5 G/DL (ref 31.5–36.5)
MCV RBC AUTO: 93 FL (ref 78–100)
MONOCYTES # BLD AUTO: 2.3 10E3/UL (ref 0–1.3)
MONOCYTES NFR BLD AUTO: 11 %
NEUTROPHILS # BLD AUTO: 15.3 10E3/UL (ref 1.6–8.3)
NEUTROPHILS NFR BLD AUTO: 77 %
NRBC # BLD AUTO: 0 10E3/UL
NRBC BLD AUTO-RTO: 0 /100
P AXIS - MUSE: 62 DEGREES
PLAT MORPH BLD: NORMAL
PLATELET # BLD AUTO: 174 10E3/UL (ref 150–450)
POTASSIUM SERPL-SCNC: 4.2 MMOL/L (ref 3.4–5.3)
PR INTERVAL - MUSE: 204 MS
PROT SERPL-MCNC: 7 G/DL (ref 6.4–8.3)
QRS DURATION - MUSE: 88 MS
QT - MUSE: 394 MS
QTC - MUSE: 487 MS
R AXIS - MUSE: 39 DEGREES
RBC # BLD AUTO: 4.24 10E6/UL (ref 4.4–5.9)
RBC MORPH BLD: NORMAL
SODIUM SERPL-SCNC: 137 MMOL/L (ref 135–145)
SYSTOLIC BLOOD PRESSURE - MUSE: NORMAL MMHG
T AXIS - MUSE: 93 DEGREES
VENTRICULAR RATE- MUSE: 92 BPM
WBC # BLD AUTO: 20 10E3/UL (ref 4–11)

## 2024-10-20 PROCEDURE — 85025 COMPLETE CBC W/AUTO DIFF WBC: CPT | Performed by: EMERGENCY MEDICINE

## 2024-10-20 PROCEDURE — 80053 COMPREHEN METABOLIC PANEL: CPT | Performed by: EMERGENCY MEDICINE

## 2024-10-20 PROCEDURE — 74018 RADEX ABDOMEN 1 VIEW: CPT

## 2024-10-20 PROCEDURE — 250N000013 HC RX MED GY IP 250 OP 250 PS 637: Performed by: EMERGENCY MEDICINE

## 2024-10-20 PROCEDURE — 99284 EMERGENCY DEPT VISIT MOD MDM: CPT

## 2024-10-20 PROCEDURE — 36415 COLL VENOUS BLD VENIPUNCTURE: CPT | Performed by: EMERGENCY MEDICINE

## 2024-10-20 PROCEDURE — 83690 ASSAY OF LIPASE: CPT | Performed by: EMERGENCY MEDICINE

## 2024-10-20 RX ORDER — IBUPROFEN 600 MG/1
600 TABLET, FILM COATED ORAL ONCE
Status: COMPLETED | OUTPATIENT
Start: 2024-10-20 | End: 2024-10-20

## 2024-10-20 RX ADMIN — IBUPROFEN 600 MG: 600 TABLET, FILM COATED ORAL at 10:23

## 2024-10-20 ASSESSMENT — ACTIVITIES OF DAILY LIVING (ADL)
ADLS_ACUITY_SCORE: 40

## 2024-10-20 NOTE — ED NOTES
RN called pt's group home to notify them that pt will be discharged home. Report given. Spoke with Corine. Staff questions answered by RN. Per staff, pt usually returns home from ED via stretcher. Group home address confirmed by RN.

## 2024-10-20 NOTE — ED TRIAGE NOTES
Pt arrives via EMS from group generalized pain x 4 days. Denies n/v/d or dysuria. Pt on 4 L NC at baseline. ABc intact. A&Ox4.

## 2024-10-20 NOTE — ED PROVIDER NOTES
Emergency Department Note      History of Present Illness     Chief Complaint   Abdominal Pain      HPI   Tre Vazquez is a 49 year old male with a history of hypertension, seizures, and encephalopathy who presents to the emergency department via EMS for evaluation of abdominal pain. The patient was recently seen in the ED on 10/18 for emesis and had a abdominal CT done. He reports that last night he began to have diffuse abdominal pain that has persisted until now. He also notes that he has not had a bowel movement for some time and believes he may be constipated.    Independent Historian   None    Review of External Notes   I reviewed ED note from 10/18/24.    Past Medical History     Medical History and Problem List   Acute chronic respiratory failure with hypoxia and hypercapnia  Asthma  Bacteremia  Bipolar depression   Blindness of left eye  Borderline personality disorder  Bronchitis   Cardiac arrest  Cellulitis of right lower extremity   Cerebral palsy  Cerebral ventriculomegaly   Chronic hypercapnic respiratory failure  Depression  Dysthymia   Encephalomalacia   Epilepsy  Explosive personality disorder  GERD  Hepatic encephalopathy  Hypertension  Hyperammonemia  Hyperammonemic encephalopathy  Lumbar disc disease  Metabolic encephalopathy   Mild intellectual disability   Morbid obesity  Mood disorder  Obstructive sleep apnea  Obesity hypoventilation syndrome  Oppositional defiant disorder  Paraplegia   Psychotic disorder  Pneumococcal septicemia  Prediabetes  QT prolongation  Respiratory acidosis   Right lower lobe pulmonary infiltrate  Schizoaffective disorder  Septic shock  Sepsis; chronic  Seizure disorder  Spastic diplegia   Transaminitis    Medications   Albuterol   Buspar  Celexa  Depakote  Duoneb   Inderal  Imodium  Keppra  Lamictal  Levocarnitine   Olanzapine  Prazosin   Protonix  Risperdal   Seroquel  Trelegy Ellipta  Zofran  Zonisamide     Surgical History   Bilateral ankle surgery, heel  lengthening  Colonoscopy x2  EGD with biopsy  Removal of iris of left eye    Physical Exam     No data found.    Physical Exam  General: Patient is alert and cooperative. Overweight.   HENT:  Normal nose, oropharynx. Moist oral mucosa.  Eyes: EOMI. Normal conjunctiva.  Neck:  Normal range of motion and appearance.   Cardiovascular:  Normal rate.   Pulmonary/Chest:  Effort normal.  Abdominal: Soft. No distension; mild diffuse tenderness, no localization.   Musculoskeletal: Normal range of motion. No edema or tenderness.   Neurological: oriented, normal strength, sensation, and coordination.   Skin: Warm and dry. No rash or bruising.       Diagnostics     Lab Results   Labs Ordered and Resulted from Time of ED Arrival to Time of ED Departure   COMPREHENSIVE METABOLIC PANEL - Abnormal       Result Value    Sodium 137      Potassium 4.2      Carbon Dioxide (CO2) 28      Anion Gap 11      Urea Nitrogen 12.8      Creatinine 0.81      GFR Estimate >90      Calcium 9.0      Chloride 98      Glucose 100 (*)     Alkaline Phosphatase 90      AST 29      ALT 37      Protein Total 7.0      Albumin 4.0      Bilirubin Total 0.7     LIPASE - Abnormal    Lipase 11 (*)    CBC WITH PLATELETS AND DIFFERENTIAL - Abnormal    WBC Count 20.0 (*)     RBC Count 4.24 (*)     Hemoglobin 12.4 (*)     Hematocrit 39.4 (*)     MCV 93      MCH 29.2      MCHC 31.5      RDW 14.0      Platelet Count 174      % Neutrophils 77      % Lymphocytes 11      % Monocytes 11      % Eosinophils 0      % Basophils 0      % Immature Granulocytes 1      NRBCs per 100 WBC 0      Absolute Neutrophils 15.3 (*)     Absolute Lymphocytes 2.2      Absolute Monocytes 2.3 (*)     Absolute Eosinophils 0.0      Absolute Basophils 0.0      Absolute Immature Granulocytes 0.2      Absolute NRBCs 0.0     RBC AND PLATELET MORPHOLOGY    RBC Morphology Confirmed RBC Indices      Platelet Assessment        Value: Automated Count Confirmed. Platelet morphology is normal.        Imaging   XR Abdomen 1 View   Final Result   IMPRESSION: The bowel gas pattern is nonobstructive with a small stool burden in the colon. Stable small bilateral nephroliths.        Independent Interpretation   None    ED Course      Medications Administered   Medications   ibuprofen (ADVIL/MOTRIN) tablet 600 mg (600 mg Oral $Given 10/20/24 1023)       Procedures   Procedures     Discussion of Management   None    ED Course   ED Course as of 10/22/24 1125   Sun Oct 20, 2024   8219 I initially assessed the patient and obtained the above history and physical exam.         Additional Documentation  None    Medical Decision Making / Diagnosis     CMS Diagnoses: None    MIPS       None    MDM   Tre Vazquez is a 49 year old male group home resident returns to ED with complaints of abdominal pain.  Seen 10/19 with same, underwent CT abdomen/pelvis, showed no acute findings.  Labs and x-ray today neg; low suspicion for intra-abdominal or inflammatory process. Will not repeat CT; advised stool regimen, follow up .    Disposition   The patient was discharged.     Diagnosis     ICD-10-CM    1. Generalized abdominal pain  R10.84            Discharge Medications   Discharge Medication List as of 10/20/2024 11:46 AM            Scribe Disclosure:  I, Lisbet Cardona, am serving as a scribe at 8:43 AM on 10/20/2024 to document services personally performed by Kvng Singer MD based on my observations and the provider's statements to me.        Kvng Singer MD  10/22/24 1125

## 2024-10-22 ENCOUNTER — APPOINTMENT (OUTPATIENT)
Dept: GENERAL RADIOLOGY | Facility: CLINIC | Age: 49
DRG: 417 | End: 2024-10-22
Attending: EMERGENCY MEDICINE
Payer: MEDICARE

## 2024-10-22 ENCOUNTER — APPOINTMENT (OUTPATIENT)
Dept: CT IMAGING | Facility: CLINIC | Age: 49
DRG: 417 | End: 2024-10-22
Attending: INTERNAL MEDICINE
Payer: MEDICARE

## 2024-10-22 ENCOUNTER — HOSPITAL ENCOUNTER (INPATIENT)
Facility: CLINIC | Age: 49
LOS: 3 days | Discharge: NURSING, REHAB OR LONG-TERM CARE | DRG: 417 | End: 2024-10-25
Attending: EMERGENCY MEDICINE | Admitting: INTERNAL MEDICINE
Payer: MEDICARE

## 2024-10-22 ENCOUNTER — APPOINTMENT (OUTPATIENT)
Dept: CT IMAGING | Facility: CLINIC | Age: 49
DRG: 417 | End: 2024-10-22
Attending: EMERGENCY MEDICINE
Payer: MEDICARE

## 2024-10-22 DIAGNOSIS — E66.2 OBESITY HYPOVENTILATION SYNDROME (H): ICD-10-CM

## 2024-10-22 DIAGNOSIS — J96.01 ACUTE RESPIRATORY FAILURE WITH HYPOXIA AND HYPERCAPNIA (H): ICD-10-CM

## 2024-10-22 DIAGNOSIS — L89.813: Primary | ICD-10-CM

## 2024-10-22 DIAGNOSIS — D72.829 LEUKOCYTOSIS, UNSPECIFIED TYPE: ICD-10-CM

## 2024-10-22 DIAGNOSIS — K80.20 GALLSTONES: ICD-10-CM

## 2024-10-22 DIAGNOSIS — J96.02 ACUTE RESPIRATORY FAILURE WITH HYPOXIA AND HYPERCAPNIA (H): ICD-10-CM

## 2024-10-22 DIAGNOSIS — K59.00 CONSTIPATION, UNSPECIFIED CONSTIPATION TYPE: ICD-10-CM

## 2024-10-22 LAB
ALBUMIN SERPL BCG-MCNC: 3.9 G/DL (ref 3.5–5.2)
ALBUMIN UR-MCNC: 70 MG/DL
ALLEN'S TEST: YES
ALP SERPL-CCNC: 170 U/L (ref 40–150)
ALT SERPL W P-5'-P-CCNC: 47 U/L (ref 0–70)
AMMONIA PLAS-SCNC: 44 UMOL/L (ref 16–60)
ANION GAP SERPL CALCULATED.3IONS-SCNC: 12 MMOL/L (ref 7–15)
APPEARANCE UR: CLEAR
AST SERPL W P-5'-P-CCNC: 45 U/L (ref 0–45)
ATRIAL RATE - MUSE: 84 BPM
BASE EXCESS BLDA CALC-SCNC: 3.9 MMOL/L (ref -3–3)
BASE EXCESS BLDV CALC-SCNC: 2.6 MMOL/L (ref -3–3)
BASOPHILS # BLD AUTO: 0 10E3/UL (ref 0–0.2)
BASOPHILS NFR BLD AUTO: 0 %
BILIRUB SERPL-MCNC: 0.4 MG/DL
BILIRUB UR QL STRIP: NEGATIVE
BUN SERPL-MCNC: 24.9 MG/DL (ref 6–20)
CALCIUM SERPL-MCNC: 9.5 MG/DL (ref 8.8–10.4)
CHLORIDE SERPL-SCNC: 97 MMOL/L (ref 98–107)
COHGB MFR BLD: 97.3 % (ref 96–97)
COLOR UR AUTO: YELLOW
CREAT SERPL-MCNC: 0.88 MG/DL (ref 0.67–1.17)
DIASTOLIC BLOOD PRESSURE - MUSE: NORMAL MMHG
EGFRCR SERPLBLD CKD-EPI 2021: >90 ML/MIN/1.73M2
EOSINOPHIL # BLD AUTO: 0 10E3/UL (ref 0–0.7)
EOSINOPHIL NFR BLD AUTO: 0 %
ERYTHROCYTE [DISTWIDTH] IN BLOOD BY AUTOMATED COUNT: 14.2 % (ref 10–15)
FLUAV RNA SPEC QL NAA+PROBE: NEGATIVE
FLUBV RNA RESP QL NAA+PROBE: NEGATIVE
GLUCOSE BLDC GLUCOMTR-MCNC: 105 MG/DL (ref 70–99)
GLUCOSE BLDC GLUCOMTR-MCNC: 90 MG/DL (ref 70–99)
GLUCOSE BLDC GLUCOMTR-MCNC: 93 MG/DL (ref 70–99)
GLUCOSE BLDC GLUCOMTR-MCNC: 97 MG/DL (ref 70–99)
GLUCOSE SERPL-MCNC: 108 MG/DL (ref 70–99)
GLUCOSE UR STRIP-MCNC: NEGATIVE MG/DL
HCO3 BLD-SCNC: 32 MMOL/L (ref 21–28)
HCO3 BLDV-SCNC: 31 MMOL/L (ref 21–28)
HCO3 BLDV-SCNC: 34 MMOL/L (ref 21–28)
HCO3 BLDV-SCNC: 34 MMOL/L (ref 21–28)
HCO3 SERPL-SCNC: 27 MMOL/L (ref 22–29)
HCT VFR BLD AUTO: 37.6 % (ref 40–53)
HGB BLD-MCNC: 11.6 G/DL (ref 13.3–17.7)
HGB UR QL STRIP: ABNORMAL
HOLD SPECIMEN: NORMAL
HOLD SPECIMEN: NORMAL
IMM GRANULOCYTES # BLD: 0.2 10E3/UL
IMM GRANULOCYTES NFR BLD: 1 %
INTERPRETATION ECG - MUSE: NORMAL
KETONES UR STRIP-MCNC: 40 MG/DL
LACTATE BLD-SCNC: 0.5 MMOL/L
LACTATE BLD-SCNC: 0.6 MMOL/L
LEUKOCYTE ESTERASE UR QL STRIP: NEGATIVE
LYMPHOCYTES # BLD AUTO: 1.3 10E3/UL (ref 0.8–5.3)
LYMPHOCYTES NFR BLD AUTO: 8 %
MAGNESIUM SERPL-MCNC: 2 MG/DL (ref 1.7–2.3)
MCH RBC QN AUTO: 29 PG (ref 26.5–33)
MCHC RBC AUTO-ENTMCNC: 30.9 G/DL (ref 31.5–36.5)
MCV RBC AUTO: 94 FL (ref 78–100)
MONOCYTES # BLD AUTO: 1.7 10E3/UL (ref 0–1.3)
MONOCYTES NFR BLD AUTO: 10 %
MUCOUS THREADS #/AREA URNS LPF: PRESENT /LPF
NEUTROPHILS # BLD AUTO: 13.1 10E3/UL (ref 1.6–8.3)
NEUTROPHILS NFR BLD AUTO: 80 %
NITRATE UR QL: NEGATIVE
NRBC # BLD AUTO: 0 10E3/UL
NRBC BLD AUTO-RTO: 0 /100
NT-PROBNP SERPL-MCNC: 369 PG/ML (ref 0–900)
O2/TOTAL GAS SETTING VFR VENT: 40 %
O2/TOTAL GAS SETTING VFR VENT: 5 %
OXYHGB MFR BLDV: 92 % (ref 70–75)
P AXIS - MUSE: 55 DEGREES
PCO2 BLD: 70 MM HG (ref 35–45)
PCO2 BLDV: 68 MM HG (ref 40–50)
PCO2 BLDV: 80 MM HG (ref 40–50)
PCO2 BLDV: 80 MM HG (ref 40–50)
PEEP: 0 CM H2O
PH BLD: 7.28 [PH] (ref 7.35–7.45)
PH BLDV: 7.23 [PH] (ref 7.32–7.43)
PH BLDV: 7.24 [PH] (ref 7.32–7.43)
PH BLDV: 7.27 [PH] (ref 7.32–7.43)
PH UR STRIP: 6 [PH] (ref 5–7)
PLAT MORPH BLD: NORMAL
PLATELET # BLD AUTO: 184 10E3/UL (ref 150–450)
PO2 BLD: 96 MM HG (ref 80–105)
PO2 BLDV: 36 MM HG (ref 25–47)
PO2 BLDV: 40 MM HG (ref 25–47)
PO2 BLDV: 77 MM HG (ref 25–47)
POTASSIUM SERPL-SCNC: 4.1 MMOL/L (ref 3.4–5.3)
PR INTERVAL - MUSE: 180 MS
PROCALCITONIN SERPL IA-MCNC: 0.26 NG/ML
PROT SERPL-MCNC: 7.6 G/DL (ref 6.4–8.3)
QRS DURATION - MUSE: 88 MS
QT - MUSE: 376 MS
QTC - MUSE: 444 MS
R AXIS - MUSE: 6 DEGREES
RBC # BLD AUTO: 4 10E6/UL (ref 4.4–5.9)
RBC MORPH BLD: NORMAL
RBC URINE: 1 /HPF
RSV RNA SPEC NAA+PROBE: NEGATIVE
SAO2 % BLDA: 96 % (ref 92–100)
SAO2 % BLDV: 54 % (ref 70–75)
SAO2 % BLDV: 62 % (ref 70–75)
SAO2 % BLDV: 93.9 % (ref 70–75)
SARS-COV-2 RNA RESP QL NAA+PROBE: NEGATIVE
SODIUM SERPL-SCNC: 136 MMOL/L (ref 135–145)
SP GR UR STRIP: 1.02 (ref 1–1.03)
SYSTOLIC BLOOD PRESSURE - MUSE: NORMAL MMHG
T AXIS - MUSE: 39 DEGREES
UROBILINOGEN UR STRIP-MCNC: NORMAL MG/DL
VENTRICULAR RATE- MUSE: 84 BPM
WBC # BLD AUTO: 16.3 10E3/UL (ref 4–11)
WBC URINE: 4 /HPF

## 2024-10-22 PROCEDURE — 999N000157 HC STATISTIC RCP TIME EA 10 MIN

## 2024-10-22 PROCEDURE — 999N000185 HC STATISTIC TRANSPORT TIME EA 15 MIN

## 2024-10-22 PROCEDURE — 87040 BLOOD CULTURE FOR BACTERIA: CPT | Performed by: EMERGENCY MEDICINE

## 2024-10-22 PROCEDURE — 87637 SARSCOV2&INF A&B&RSV AMP PRB: CPT | Performed by: EMERGENCY MEDICINE

## 2024-10-22 PROCEDURE — 36415 COLL VENOUS BLD VENIPUNCTURE: CPT | Performed by: EMERGENCY MEDICINE

## 2024-10-22 PROCEDURE — 250N000009 HC RX 250: Performed by: EMERGENCY MEDICINE

## 2024-10-22 PROCEDURE — 93005 ELECTROCARDIOGRAM TRACING: CPT

## 2024-10-22 PROCEDURE — 70450 CT HEAD/BRAIN W/O DYE: CPT | Mod: MA

## 2024-10-22 PROCEDURE — 82140 ASSAY OF AMMONIA: CPT | Performed by: EMERGENCY MEDICINE

## 2024-10-22 PROCEDURE — 272N000272 HC CONTINUOUS NEBULIZER MICRO PUMP

## 2024-10-22 PROCEDURE — G1010 CDSM STANSON: HCPCS

## 2024-10-22 PROCEDURE — 94640 AIRWAY INHALATION TREATMENT: CPT | Mod: 76

## 2024-10-22 PROCEDURE — 82962 GLUCOSE BLOOD TEST: CPT

## 2024-10-22 PROCEDURE — 80053 COMPREHEN METABOLIC PANEL: CPT | Performed by: EMERGENCY MEDICINE

## 2024-10-22 PROCEDURE — 82805 BLOOD GASES W/O2 SATURATION: CPT | Performed by: EMERGENCY MEDICINE

## 2024-10-22 PROCEDURE — 83880 ASSAY OF NATRIURETIC PEPTIDE: CPT | Performed by: INTERNAL MEDICINE

## 2024-10-22 PROCEDURE — 84145 PROCALCITONIN (PCT): CPT | Performed by: ANESTHESIOLOGY

## 2024-10-22 PROCEDURE — 82805 BLOOD GASES W/O2 SATURATION: CPT | Performed by: INTERNAL MEDICINE

## 2024-10-22 PROCEDURE — 99291 CRITICAL CARE FIRST HOUR: CPT | Mod: 25

## 2024-10-22 PROCEDURE — 71275 CT ANGIOGRAPHY CHEST: CPT | Mod: MF

## 2024-10-22 PROCEDURE — 94660 CPAP INITIATION&MGMT: CPT

## 2024-10-22 PROCEDURE — 71045 X-RAY EXAM CHEST 1 VIEW: CPT

## 2024-10-22 PROCEDURE — 5A09357 ASSISTANCE WITH RESPIRATORY VENTILATION, LESS THAN 24 CONSECUTIVE HOURS, CONTINUOUS POSITIVE AIRWAY PRESSURE: ICD-10-PCS | Performed by: INTERNAL MEDICINE

## 2024-10-22 PROCEDURE — 84460 ALANINE AMINO (ALT) (SGPT): CPT | Performed by: EMERGENCY MEDICINE

## 2024-10-22 PROCEDURE — 82803 BLOOD GASES ANY COMBINATION: CPT

## 2024-10-22 PROCEDURE — 250N000011 HC RX IP 250 OP 636: Performed by: INTERNAL MEDICINE

## 2024-10-22 PROCEDURE — 36600 WITHDRAWAL OF ARTERIAL BLOOD: CPT

## 2024-10-22 PROCEDURE — 250N000009 HC RX 250: Performed by: INTERNAL MEDICINE

## 2024-10-22 PROCEDURE — 94640 AIRWAY INHALATION TREATMENT: CPT

## 2024-10-22 PROCEDURE — 87086 URINE CULTURE/COLONY COUNT: CPT | Performed by: EMERGENCY MEDICINE

## 2024-10-22 PROCEDURE — 81001 URINALYSIS AUTO W/SCOPE: CPT | Performed by: EMERGENCY MEDICINE

## 2024-10-22 PROCEDURE — 200N000001 HC R&B ICU

## 2024-10-22 PROCEDURE — 83735 ASSAY OF MAGNESIUM: CPT | Performed by: EMERGENCY MEDICINE

## 2024-10-22 PROCEDURE — 85025 COMPLETE CBC W/AUTO DIFF WBC: CPT | Performed by: EMERGENCY MEDICINE

## 2024-10-22 PROCEDURE — 99223 1ST HOSP IP/OBS HIGH 75: CPT | Mod: AI | Performed by: INTERNAL MEDICINE

## 2024-10-22 RX ORDER — IPRATROPIUM BROMIDE AND ALBUTEROL SULFATE 2.5; .5 MG/3ML; MG/3ML
3 SOLUTION RESPIRATORY (INHALATION) EVERY 4 HOURS PRN
Status: DISCONTINUED | OUTPATIENT
Start: 2024-10-22 | End: 2024-10-24

## 2024-10-22 RX ORDER — ONDANSETRON 2 MG/ML
4 INJECTION INTRAMUSCULAR; INTRAVENOUS EVERY 6 HOURS PRN
Status: DISCONTINUED | OUTPATIENT
Start: 2024-10-22 | End: 2024-10-25 | Stop reason: HOSPADM

## 2024-10-22 RX ORDER — ONDANSETRON 4 MG/1
4 TABLET, ORALLY DISINTEGRATING ORAL EVERY 6 HOURS PRN
Status: DISCONTINUED | OUTPATIENT
Start: 2024-10-22 | End: 2024-10-25 | Stop reason: HOSPADM

## 2024-10-22 RX ORDER — IPRATROPIUM BROMIDE AND ALBUTEROL SULFATE 2.5; .5 MG/3ML; MG/3ML
3 SOLUTION RESPIRATORY (INHALATION)
Status: DISCONTINUED | OUTPATIENT
Start: 2024-10-22 | End: 2024-10-25 | Stop reason: HOSPADM

## 2024-10-22 RX ORDER — DEXTROSE MONOHYDRATE 25 G/50ML
25-50 INJECTION, SOLUTION INTRAVENOUS
Status: DISCONTINUED | OUTPATIENT
Start: 2024-10-22 | End: 2024-10-23

## 2024-10-22 RX ORDER — LEVETIRACETAM 10 MG/ML
1000 INJECTION INTRAVASCULAR EVERY 24 HOURS
Status: DISCONTINUED | OUTPATIENT
Start: 2024-10-23 | End: 2024-10-23

## 2024-10-22 RX ORDER — IPRATROPIUM BROMIDE AND ALBUTEROL SULFATE 2.5; .5 MG/3ML; MG/3ML
3 SOLUTION RESPIRATORY (INHALATION) ONCE
Status: COMPLETED | OUTPATIENT
Start: 2024-10-22 | End: 2024-10-22

## 2024-10-22 RX ORDER — ACETAMINOPHEN 325 MG/1
325 TABLET ORAL EVERY 6 HOURS PRN
COMMUNITY

## 2024-10-22 RX ORDER — METHYLPREDNISOLONE SODIUM SUCCINATE 40 MG/ML
40 INJECTION INTRAMUSCULAR; INTRAVENOUS EVERY 12 HOURS
Status: DISCONTINUED | OUTPATIENT
Start: 2024-10-22 | End: 2024-10-25

## 2024-10-22 RX ORDER — ACETAMINOPHEN 500 MG
500 TABLET ORAL EVERY 6 HOURS PRN
COMMUNITY

## 2024-10-22 RX ORDER — IOPAMIDOL 755 MG/ML
85 INJECTION, SOLUTION INTRAVASCULAR ONCE
Status: COMPLETED | OUTPATIENT
Start: 2024-10-22 | End: 2024-10-22

## 2024-10-22 RX ORDER — ZONISAMIDE 100 MG/1
200 CAPSULE ORAL EVERY EVENING
COMMUNITY

## 2024-10-22 RX ORDER — AMMONIUM LACTATE 12 G/100G
CREAM TOPICAL DAILY PRN
COMMUNITY

## 2024-10-22 RX ORDER — NICOTINE POLACRILEX 4 MG
15-30 LOZENGE BUCCAL
Status: DISCONTINUED | OUTPATIENT
Start: 2024-10-22 | End: 2024-10-23

## 2024-10-22 RX ORDER — QUETIAPINE FUMARATE 300 MG/1
300 TABLET, FILM COATED ORAL AT BEDTIME
COMMUNITY

## 2024-10-22 RX ORDER — DEXTROSE MONOHYDRATE 25 G/50ML
25-50 INJECTION, SOLUTION INTRAVENOUS
Status: DISCONTINUED | OUTPATIENT
Start: 2024-10-22 | End: 2024-10-25 | Stop reason: HOSPADM

## 2024-10-22 RX ORDER — NICOTINE POLACRILEX 4 MG
15-30 LOZENGE BUCCAL
Status: DISCONTINUED | OUTPATIENT
Start: 2024-10-22 | End: 2024-10-25 | Stop reason: HOSPADM

## 2024-10-22 RX ORDER — CITALOPRAM HYDROBROMIDE 40 MG/1
40 TABLET ORAL DAILY
COMMUNITY

## 2024-10-22 RX ORDER — LEVETIRACETAM 15 MG/ML
1500 INJECTION INTRAVASCULAR EVERY 24 HOURS
Status: DISCONTINUED | OUTPATIENT
Start: 2024-10-22 | End: 2024-10-23

## 2024-10-22 RX ORDER — PIPERACILLIN SODIUM, TAZOBACTAM SODIUM 4; .5 G/20ML; G/20ML
4.5 INJECTION, POWDER, LYOPHILIZED, FOR SOLUTION INTRAVENOUS EVERY 6 HOURS
Status: DISCONTINUED | OUTPATIENT
Start: 2024-10-22 | End: 2024-10-23

## 2024-10-22 RX ORDER — LIDOCAINE HYDROCHLORIDE 20 MG/ML
6 JELLY TOPICAL ONCE
Status: DISCONTINUED | OUTPATIENT
Start: 2024-10-22 | End: 2024-10-25 | Stop reason: HOSPADM

## 2024-10-22 RX ORDER — ZONISAMIDE 100 MG/1
100 CAPSULE ORAL EVERY MORNING
COMMUNITY

## 2024-10-22 RX ADMIN — METHYLPREDNISOLONE SODIUM SUCCINATE 40 MG: 40 INJECTION, POWDER, FOR SOLUTION INTRAMUSCULAR; INTRAVENOUS at 16:08

## 2024-10-22 RX ADMIN — IPRATROPIUM BROMIDE AND ALBUTEROL SULFATE 3 ML: .5; 3 SOLUTION RESPIRATORY (INHALATION) at 23:32

## 2024-10-22 RX ADMIN — PIPERACILLIN AND TAZOBACTAM 4.5 G: 4; .5 INJECTION, POWDER, FOR SOLUTION INTRAVENOUS at 20:27

## 2024-10-22 RX ADMIN — LEVETIRACETAM 1500 MG: 15 INJECTION INTRAVENOUS at 22:33

## 2024-10-22 RX ADMIN — IOPAMIDOL 85 ML: 755 INJECTION, SOLUTION INTRAVENOUS at 14:15

## 2024-10-22 RX ADMIN — IPRATROPIUM BROMIDE AND ALBUTEROL SULFATE 3 ML: .5; 3 SOLUTION RESPIRATORY (INHALATION) at 18:52

## 2024-10-22 RX ADMIN — PIPERACILLIN AND TAZOBACTAM 4.5 G: 4; .5 INJECTION, POWDER, FOR SOLUTION INTRAVENOUS at 14:38

## 2024-10-22 RX ADMIN — IPRATROPIUM BROMIDE AND ALBUTEROL SULFATE 3 ML: .5; 3 SOLUTION RESPIRATORY (INHALATION) at 11:08

## 2024-10-22 RX ADMIN — IPRATROPIUM BROMIDE AND ALBUTEROL SULFATE 3 ML: .5; 3 SOLUTION RESPIRATORY (INHALATION) at 15:45

## 2024-10-22 ASSESSMENT — ACTIVITIES OF DAILY LIVING (ADL)
ADLS_ACUITY_SCORE: 40
ADLS_ACUITY_SCORE: 55
ADLS_ACUITY_SCORE: 40
ADLS_ACUITY_SCORE: 53
ADLS_ACUITY_SCORE: 55
ADLS_ACUITY_SCORE: 55
ADLS_ACUITY_SCORE: 40
ADLS_ACUITY_SCORE: 53
ADLS_ACUITY_SCORE: 40
ADLS_ACUITY_SCORE: 40
ADLS_ACUITY_SCORE: 53
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
## 2024-10-22 NOTE — PLAN OF CARE
ICU End of Shift Summary.  For vital signs and complete assessments, please see documentation flowsheets.        Pertinent assessments:   Neuro: Alert to self, refusing to answer orientation questions as pt is somnolent. C/o pain generalized in abd only with palpation. T-max 99.8  Cardiac: SR, BP WDL  Resp: Bipap 40% 18/7, Tolerating well. LS clear bilaterally diminished.   GI: BS active, pain with palpation, all quadrants. Unsure of last BM.   : No voiding this shift. Bladder scanned at 1600 and 1800.  Lines: 2 PIVs  Drips:     Major Shift Events:    ~1500 admission to ICU    Plan (Upcoming Events): Remain on bipap, continuing to check blood gases and labs. Receiving abx and steroids.   Discharge/Transfer Needs: TBD     Bedside Shift Report Completed : Yes  Bedside Safety Check Completed: Yes     Goal Outcome Evaluation:      Plan of Care Reviewed With: patient    Overall Patient Progress: no changeOverall Patient Progress: no change    Outcome Evaluation: Admitted to ICU around 1500, on bipap at 40%. Woke up during admission for short time, c/o pain throughout abdomen. Pt able to fall back asleep quickly, somnolent, refusing to answer orientation questions. VSS. Bladder scanned for <300 mL.      Problem: Adult Inpatient Plan of Care  Goal: Plan of Care Review  Description: The Plan of Care Review/Shift note should be completed every shift.  The Outcome Evaluation is a brief statement about your assessment that the patient is improving, declining, or no change.  This information will be displayed automatically on your shift  note.  Outcome: Progressing  Flowsheets (Taken 10/22/2024 3298)  Outcome Evaluation: Admitted to ICU around 1500, on bipap at 40%. Woke up during admission for short time, c/o pain throughout abdomen. Pt able to fall back asleep quickly, somnolent, refusing to answer orientation questions. VSS. Bladder scanned for <300 mL.  Plan of Care Reviewed With: patient  Overall Patient Progress: no  "change  Goal: Patient-Specific Goal (Individualized)  Description: You can add care plan individualizations to a care plan. Examples of Individualization might be:  \"Parent requests to be called daily at 9am for status\", \"I have a hard time hearing out of my right ear\", or \"Do not touch me to wake me up as it startles  me\".  Outcome: Progressing  Goal: Absence of Hospital-Acquired Illness or Injury  Outcome: Progressing  Intervention: Prevent Skin Injury  Recent Flowsheet Documentation  Taken 10/22/2024 1545 by Daphnie Melendez RN  Body Position:   weight shifting   tilted   right   foot of bed elevated   heels elevated   upper extremity elevated  Taken 10/22/2024 1515 by Daphnie Melendez RN  Body Position:   supine, head elevated   side-lying 90 degrees   heels elevated   legs elevated   upper extremity elevated  Goal: Optimal Comfort and Wellbeing  Outcome: Progressing  Intervention: Provide Person-Centered Care  Recent Flowsheet Documentation  Taken 10/22/2024 1515 by Daphnie Melendez RN  Trust Relationship/Rapport:   care explained   choices provided   emotional support provided   empathic listening provided   questions answered   questions encouraged   reassurance provided   thoughts/feelings acknowledged  Goal: Readiness for Transition of Care  Outcome: Progressing  Flowsheets (Taken 10/22/2024 1654)  Anticipated Changes Related to Illness: inability to care for self  Transportation Anticipated: health plan transportation  Concerns to be Addressed:   compliance issue   discharge planning  Barriers to Discharge: On bipap, remains somnolent.  Intervention: Mutually Develop Transition Plan  Recent Flowsheet Documentation  Taken 10/22/2024 1654 by Daphnie Melendez RN  Anticipated Changes Related to Illness: inability to care for self  Transportation Anticipated: health plan transportation  Concerns to be Addressed:   compliance issue   discharge planning     Problem: Fall Injury Risk  Goal: Absence of Fall and Fall-Related " Injury  Outcome: Progressing     Problem: Pain Acute  Goal: Optimal Pain Control and Function  Outcome: Progressing     Problem: Gas Exchange Impaired  Goal: Optimal Gas Exchange  Outcome: Progressing  Intervention: Optimize Oxygenation and Ventilation  Recent Flowsheet Documentation  Taken 10/22/2024 1545 by Daphnie Melendez, MARYJANE  Head of Bed ([facility] [place]) Positioning: HOB at 20-30 degrees  Taken 10/22/2024 1515 by Daphnie Melendez RN  Head of Bed ([facility] [place]) Positioning: HOB at 20-30 degrees

## 2024-10-22 NOTE — ED TRIAGE NOTES
Pt arrives via EMS from group home d/t fever and increased AMS today, unsure of time. BG 89. Pt on 2 L NC at baseline. 20 g L AC. Pt sounds wheezy. Pt responds to questions appropriately, appears sleepy/drowsy. Denies getting meds for fever this morning. Denies CP, reports chronic SOB. ABC intact.

## 2024-10-22 NOTE — PHARMACY-ADMISSION MEDICATION HISTORY
Pharmacist Admission Medication History    Admission medication history is complete. The information provided in this note is only as accurate as the sources available at the time of the update.    Information Source(s): Facility ([facility]/) medication list/MAR via phone and sent paperwork    Pertinent Information: Patient was started on omeprazole but was already on pantoprazole therefore is not currently taking omeprazole    Changes made to PTA medication list:  Added: acetaminophen as needed, ammonium lactate as needed  Deleted: None  Changed: Buspar from BID to TID    Allergies reviewed with patient and updates made in EHR: unable to assess    Medication History Completed By: Kathy Faulkner [facility] 10/22/2024 3:00 PM    PTA Med List   Medication Sig Last Dose    acetaminophen (TYLENOL) 325 MG tablet Take 325 mg by mouth every 6 hours as needed for pain. prn at prn    acetaminophen (TYLENOL) 500 MG tablet Take 500 mg by mouth every 6 hours as needed for mild pain. prn at prn    albuterol (PROAIR HFA/PROVENTIL HFA/VENTOLIN HFA) 108 (90 Base) MCG/ACT inhaler Inhale 2 puffs into the lungs every 4 hours as needed for shortness of breath, wheezing or cough prn at prn    ammonium lactate (AMLACTIN) 12 % external cream Apply topically daily as needed for dry skin. Apply to feet prn at prn    ammonium lactate (AMLACTIN) 12 % external cream Apply topically 2 times daily 10/22/2024 at 8 am    bacitracin 500 UNIT/GM external ointment Apply topically 2 times daily Apply to left arm wound     busPIRone (BUSPAR) 15 MG tablet Take 15 mg by mouth 3 times daily. 10/22/2024 at 8 am    citalopram (CELEXA) 20 MG tablet Take 20 mg by mouth daily. Take with 40 mg tablet for a total of 60 mg 10/22/2024 at am    citalopram (CELEXA) 40 MG tablet Take 40 mg by mouth daily. Take with 20 mg tablet for a total of 60 mg 10/22/2024 at am    clindamycin (CLEOCIN T) 1 % external lotion Apply topically 2 times daily 10/22/2024 at am    clotrimazole  (LOTRIMIN) 1 % external cream Apply topically 2 times daily. Apply to groin 10/22/2024 at am    dextromethorphan-guaiFENesin (TUSSIN DM)  MG/5ML liquid Take by mouth as needed for cough. prn at prn    divalproex sodium delayed-release (DEPAKOTE) 500 MG DR tablet TAKE 1 TABLET BY MOUTH TWICE DAILY 10/22/2024 at am    fluticasone (FLONASE) 50 MCG/ACT nasal spray Spray 1 spray in nostril 2 times daily 10/22/2024 at am    Fluticasone-Umeclidin-Vilanterol (TRELEGY ELLIPTA) 200-62.5-25 MCG/ACT oral inhaler Inhale 1 puff into the lungs daily 10/22/2024 at am    LamoTRIgine (LAMICTAL) 200 MG TB24 Take 200 mg by mouth 2 times daily 10/22/2024 at am    levETIRAcetam (KEPPRA XR) 500 MG 24 hr tablet Take 3 tablets (1,500 mg) by mouth at bedtime 10/21/2024 at pm    levETIRAcetam (KEPPRA) 1000 MG tablet Take 1 tablet (1,000 mg) by mouth every morning 10/22/2024 at am    levOCARNitine (CARNITOR) 330 MG tablet Take 2 tablets (660 mg) by mouth 3 times daily 10/22/2024 at am    loperamide (IMODIUM) 2 MG capsule Take 2 mg by mouth 4 times daily as needed for diarrhea. 4 mg after first loose stool then 2 mg after each subsequent loose stool prn at prn    magnesium hydroxide (MILK OF MAGNESIA) 400 MG/5ML suspension Take 30 mLs by mouth daily as needed for constipation prn at prn    methylcellulose (CITRUCEL) powder Take by mouth 2 times daily. 1 tablespoon 10/22/2024 at am    multivitamin, therapeutic (THERA-VIT) TABS tablet Take 1 tablet by mouth daily 10/22/2024 at am    ondansetron (ZOFRAN ODT) 4 MG ODT tab Take 4 mg by mouth every 6 hours as needed for nausea or vomiting prn at prn    pantoprazole (PROTONIX) 40 MG EC tablet Take 40 mg by mouth daily 10/22/2024 at am    polyethylene glycol (MIRALAX) 17 GM/Dose powder Take 1 capful. by mouth daily 10/22/2024 at am    prazosin (MINIPRESS) 2 MG capsule Take 2 mg by mouth At Bedtime 10/21/2024 at pm    propranolol (INDERAL) 20 MG tablet Take 1 tablet (20 mg) by mouth 2 times daily  10/22/2024 at am    QUEtiapine (SEROQUEL) 300 MG tablet Take 300 mg by mouth at bedtime. 10/21/2024 at pm    sodium chloride (OCEAN) 0.65 % nasal spray Spray 1 spray into both nostrils every hour as needed for congestion prn at prn    zonisamide (ZONEGRAN) 100 MG capsule Take 100 mg by mouth every morning. 10/22/2024 at am    zonisamide (ZONEGRAN) 100 MG capsule Take 200 mg by mouth every evening. 10/21/2024 at pm

## 2024-10-22 NOTE — PROGRESS NOTES
"         [facility]  Respiratory Care Note      An ABG was completed on the pt's right radial@ 1512 on an FIO2 of 40% via the BiPAP.  Pressure was held until bleeding stopped. No complications noted during the procedure. RT will continue to monitor and assess the pt's current respiratory status and needs.       Vital signs:  Temp: 99.8  F (37.7  C) Temp src: Axillary BP: 110/54 Pulse: 75   Resp: 18 SpO2: 98 % O2 Device: BiPAP/CPAP Oxygen Delivery: 5 LPM   Weight: 130.3 kg (287 lb 4.2 oz)  Estimated body mass index is 43.68 kg/m  as calculated from the following:    Height as of 10/18/24: 1.727 m (5' 8\").    Weight as of this encounter: 130.3 kg (287 lb 4.2 oz).      Recent Labs   Lab 10/22/24  1512 10/22/24  1048   PH 7.28*  --    PCO2 70*  --    PO2 96  --    HCO3 32*  --    O2PER 40 5     [facility]  10/22/2024      "

## 2024-10-22 NOTE — ED PROVIDER NOTES
Emergency Department Note      Code Status: Full Code    History of Present Illness     Chief Complaint:  Fever       HPI   Tre Vazquez is a 49 year old male who presents from a group home due to concern for fever.  Reportedly the patient has had increasing mental status changes today as well.  The patient answers questions though appears sleepy.    Independent Historian:    None    Review of External Notes  10/19/24 ED provider note: emesis. Possible hemetemesis. Cholelithiasis on CT. Hypomagnesemia  10/20/24 ED provider note: Abd pain. Abd XR without obstruction    Past Medical History   Medical History, Surgical History, Problem List, and Medications  Reviewed in Epic    Physical Exam   Patient Vitals for the past 24 hrs:   BP Temp Temp src Pulse Resp SpO2   10/22/24 1345 (!) 143/85 -- -- 80 15 96 %   10/22/24 1315 123/73 -- -- 76 17 96 %   10/22/24 1300 129/78 -- -- 78 17 98 %   10/22/24 1256 -- -- -- 78 15 99 %   10/22/24 1245 133/86 -- -- 80 16 98 %   10/22/24 1230 117/76 -- -- 75 16 92 %   10/22/24 1229 -- -- -- 75 19 92 %   10/22/24 1215 124/79 -- -- 78 19 95 %   10/22/24 1200 134/84 -- -- 80 23 97 %   10/22/24 1145 112/63 -- -- 82 16 96 %   10/22/24 1137 -- -- -- 82 30 99 %   10/22/24 1130 (!) 142/82 -- -- 82 19 100 %   10/22/24 1100 (!) 155/88 -- -- 85 26 (!) 84 %   10/22/24 1045 (!) 149/92 -- -- 86 23 91 %   10/22/24 1043 (!) 149/89 -- -- 88 -- 92 %   10/22/24 1042 -- 99.7  F (37.6  C) Oral -- 20 (!) 88 %       Physical Exam  Constitutional: Vital signs reviewed as above.   Eyes: PEERL, EOMI B/L  Neck: No JVD noted. FROM   Cardiovascular: normal rate, Regular rhythm and normal heart sounds.  No murmur heard. Equal B/L peripheral pulses.  Pulmonary/Chest: Mild tachypnea. Patient has B/L expiratory wheezes. Patient has no rales.   Gastrointestinal: Soft. There is no tenderness.   Musculoskeletal/Extremities: No pitting edema noted. Normal tone.  Skin: Skin is warm and dry. There is no diaphoresis noted.    Psychiatric: The patient appears calm.     Diagnostics     Laboratory: Imaging:   Labs Ordered and Resulted from Time of ED Arrival to Time of ED Departure   BLOOD GAS VENOUS - Abnormal       Result Value    pH Venous 7.27 (*)     pCO2 Venous 68 (*)     pO2 Venous 77 (*)     Bicarbonate Venous 31 (*)     Base Excess/Deficit Venous 2.6      FIO2 5      Oxyhemoglobin Venous 92 (*)     O2 Sat, Venous 93.9 (*)    CBC WITH PLATELETS AND DIFFERENTIAL - Abnormal    WBC Count 16.3 (*)     RBC Count 4.00 (*)     Hemoglobin 11.6 (*)     Hematocrit 37.6 (*)     MCV 94      MCH 29.0      MCHC 30.9 (*)     RDW 14.2      Platelet Count 184      % Neutrophils 80      % Lymphocytes 8      % Monocytes 10      % Eosinophils 0      % Basophils 0      % Immature Granulocytes 1      NRBCs per 100 WBC 0      Absolute Neutrophils 13.1 (*)     Absolute Lymphocytes 1.3      Absolute Monocytes 1.7 (*)     Absolute Eosinophils 0.0      Absolute Basophils 0.0      Absolute Immature Granulocytes 0.2      Absolute NRBCs 0.0     COMPREHENSIVE METABOLIC PANEL - Abnormal    Sodium 136      Potassium 4.1      Carbon Dioxide (CO2) 27      Anion Gap 12      Urea Nitrogen 24.9 (*)     Creatinine 0.88      GFR Estimate >90      Calcium 9.5      Chloride 97 (*)     Glucose 108 (*)     Alkaline Phosphatase 170 (*)     AST 45      ALT 47      Protein Total 7.6      Albumin 3.9      Bilirubin Total 0.4     ROUTINE UA WITH MICROSCOPIC - Abnormal    Color Urine Yellow      Appearance Urine Clear      Glucose Urine Negative      Bilirubin Urine Negative      Ketones Urine 40 (*)     Specific Gravity Urine 1.025      Blood Urine Trace (*)     pH Urine 6.0      Protein Albumin Urine 70 (*)     Urobilinogen Urine Normal      Nitrite Urine Negative      Leukocyte Esterase Urine Negative      Mucus Urine Present (*)     RBC Urine 1      WBC Urine 4     ISTAT GASES LACTATE VENOUS POCT - Abnormal    Lactic Acid POCT 0.5      Bicarbonate Venous POCT 34 (*)     O2  Sat, Venous POCT 62 (*)     pCO2 Venous POCT 80 (*)     pH Venous POCT 7.24 (*)     pO2 Venous POCT 40     ISTAT GASES LACTATE VENOUS POCT - Abnormal    Lactic Acid POCT 0.6      Bicarbonate Venous POCT 34 (*)     O2 Sat, Venous POCT 54 (*)     pCO2 Venous POCT 80 (*)     pH Venous POCT 7.23 (*)     pO2 Venous POCT 36     INFLUENZA A/B, RSV, & SARS-COV2 PCR - Normal    Influenza A PCR Negative      Influenza B PCR Negative      RSV PCR Negative      SARS CoV2 PCR Negative     MAGNESIUM - Normal    Magnesium 2.0     AMMONIA - Normal    Ammonia 44     GLUCOSE BY METER - Normal    GLUCOSE BY METER POCT 97     RBC AND PLATELET MORPHOLOGY    RBC Morphology Confirmed RBC Indices      Platelet Assessment        Value: Automated Count Confirmed. Platelet morphology is normal.   NT PROBNP INPATIENT   GLUCOSE MONITOR NURSING POCT   GLUCOSE MONITOR NURSING POCT   GLUCOSE MONITOR NURSING POCT   BLOOD GAS VENOUS   BLOOD CULTURE   URINE CULTURE     XR Chest Port 1 View   Preliminary Result   IMPRESSION: No acute disease.      Head CT w/o contrast    (Results Pending)   CT Chest Pulmonary Embolism w Contrast    (Results Pending)         EKG   ECG taken at 1047, ECG read at 1102  NSR  Normal ECG     When compared with prior ECG dated 10/18/2024, the QTc has shortened today.  There is otherwise no significant change.   Rate 84 bpm. NY interval 180 ms. QRS duration 88 ms. QT/QTc 376/444 ms. P-R-T axes 55 6 39.    Independent Interpretation  See ED course    ED Course    Medications Administered  Medications   sodium chloride (PF) 0.9% PF flush 3 mL (has no administration in time range)   sodium chloride (PF) 0.9% PF flush 3 mL (3 mLs Intracatheter $Given 10/22/24 1112)   lidocaine (XYLOCAINE) 2 % external gel 6 mL (has no administration in time range)   glucose gel 15-30 g (has no administration in time range)     Or   dextrose 50 % injection 25-50 mL (has no administration in time range)     Or   glucagon injection 1 mg (has no  administration in time range)   piperacillin-tazobactam (ZOSYN) 4.5 g vial to attach to  mL bag (has no administration in time range)   ondansetron (ZOFRAN ODT) ODT tab 4 mg (has no administration in time range)     Or   ondansetron (ZOFRAN) injection 4 mg (has no administration in time range)   ipratropium - albuterol 0.5 mg/2.5 mg/3 mL (DUONEB) neb solution 3 mL (has no administration in time range)   ipratropium - albuterol 0.5 mg/2.5 mg/3 mL (DUONEB) neb solution 3 mL (has no administration in time range)   methylPREDNISolone sodium succinate (SOLU-MEDROL) injection 40 mg (has no administration in time range)   glucose gel 15-30 g (has no administration in time range)     Or   dextrose 50 % injection 25-50 mL (has no administration in time range)     Or   glucagon injection 1 mg (has no administration in time range)   insulin aspart (NovoLOG) injection (RAPID ACTING) (has no administration in time range)   ipratropium - albuterol 0.5 mg/2.5 mg/3 mL (DUONEB) neb solution 3 mL (3 mLs Nebulization $Given 10/22/24 1108)   iopamidol (ISOVUE-370) solution 85 mL (85 mLs Intravenous $Given 10/22/24 1415)   sodium chloride (PF) 0.9% PF flush 90 mL (90 mLs Intravenous $Given 10/22/24 1416)       Procedures  Procedures     Discussion of Management  See ED Course    ED Course  ED Course as of 10/22/24 1420   Tue Oct 22, 2024   1047 Initial evaluation.   1149 VBG noted to be acidotic with an elevated PCO2. Will place patient on BiPap   1151 Nursing notes that they received a call from the patient's care facility stating that the initial reason for EMS transport was left arm pain.  The patient's did not mention that to us.  They also note that the patient has not been using his CPAP.   1255 D/W Dr. Alvarez. Ok for ICU. I will plan to repeat VBG 1hr after BiPap initiation.   1326 Still somnolent despite BiPap.   1401 Repeat VBG without change. D/W RT. IPAP and RR increased.       Optional/Additional  Documentation: No clear source of sepsis suspected    Medical Decision Making / Diagnosis     MIPS   None (CT PE ordered by hospitalist)    Medical Decision Making:  This 49 year old male presents to the ED due to Fever  . Please see the HPI and exam for specifics. A broad differential was considered including, but not limited to, infection, hypercapnic respiratory failure, asthma, hyperammonemia, etc.    Based on the differential, exam, and any decision tools, the above workup was undertaken. Lab and/or imaging results were reviewed by me and are notable for leukocytosis with a reassuring chest x-ray and urinalysis.  Patient's lactic acid is normal.  Venous blood gas does show respiratory acidosis.  Ammonia level is normal.    The blood gas did not improve appreciably despite being on BiPAP for 1 hour, so settings were adjusted and response to this will be followed..    Based on this, I felt that the most likely etiology of their symptoms is some degree of possible obesity hypoventilation and/for respiratory failure.  I discussed the case with the hospitalist and we will admit the patient to the ICU.    Antibiotics and further imaging including a CT of the patient's chest (PE protocol) were ordered by the hospitalist.    Critical Care:      Organ systems at risk for life threatening failure: Respiratory  Associated problems: Acidosis, Hypercarbia/Hypercapnia, and Hypoxia  Critical Interventions: Assisted Ventilation and Inhaled Bronchodilators    Total Time: 35 minutes (excluding separately billed procedures)     Includes: Bedside management, Case discussion related to critical care, Documentation, Multiple re-evaluations, Record review, and Test review. Additionally, this includes CXR Interpretation     Excludes: EKG Interpretation         Disposition:  See ED Course and MDM    ICD-10 Codes:    ICD-10-CM    1. Acute respiratory failure with hypoxia and hypercapnia (H)  J96.01     J96.02       2. Leukocytosis,  unspecified type  D72.829            Discharge Medications:  New Prescriptions    No medications on file        10/22/2024   Gerry Powell DO     [employer]                    Gerry Powell DO  10/22/24 1423

## 2024-10-22 NOTE — ED NOTES
Spoke with Noelle Garcia [phone] reports pt has been complaining of left arm pain which was the reason for EMS transport. Phones are down at [facility] call Noelle with updates, also requested pt be education on cipap that he hasn't been compliant with use
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
## 2024-10-22 NOTE — H&P
INTENSIVIST FACULTY NOTE:  49M hx seizure disorder, psychiatric issues, ANA, morbid obesity, on O2 2-4L during the day but noncompliant with his home Trilogy vent  Recently presented with nausea / vomiting    Today presents with confusion and acute on chronic hypercapneic respiratory failure.    Exam morbidly obese  Somnolent, needs occasional sternal rub  No chuck rash anywhere    Labs notable for a venous pCO2 of 80, unchagned during his ER stay, ad acutually maybe a ltitle worse than when he first presented  Electrolytes and renal function unrmeakrable; BNP wnl  There is a decent leukocytosis int he context of mild anemia, with a left-shift  Urinalysis benign other than some ketones (note that there is no hyperglycemia or anion gap acidosis on his BMP)  Procalcitonin pending    Official read on CT chest pending, but I see no PE.  There is, however, some trace effusions / consolidation at the base, especially on the right, potentialyl consistent with aspiration.  The head CT kind of looks like a drinker, with a lot of encephalomalacia for a young rahul, and near hydrocephalus exvacuo.    This is a 49M hx seizure disorder, psychiatric issues, ANA, morbid obesity, on O2 2-4L during the day but noncompliant with his home Trilogy vent, who presents with acute on chronic hypercarbic / hypoxic respiraotry failure.    Will cover him for an aspiration pneumonia (use Zosyn since he is from a group home and has health care exposure given reent ED evaluation for nausea) and provide BIPAP for now; he may eventually require intubation.     METABOLIC ENCEPHALOPATHY / DELIRIUM:  -due to hypercarbia, potentially infection; monitoring with clinical exam  -nothing for sleep    SEIZURE DISORDER:  -home regimen is keppra 1g qam and 1.5g qpm and lamotrigine 200 bid.  Will give IV antiepliptics here until able to tolerate enteral intake. Also has levocarnitine apparently    DEPRESSION / ANXIETY / PSYCHIATRIC ISSUES:  -buspar, celexa,  depakote  are home meds that will be held until off BIPAP    ACUTE HYPERCARBIC RESPIRATORY FAILURE  -baseline serum bicarb is only around 27-28, so I doubt his baseline CO2 is much higher than 60  -secondary to presumed infection (aspiration pneumonia?)  -empiric antibiotics with zosyn given that he lives in a group home  -BIPAP  -may be a candidate for steroids as per the [facility] trial of community-aquired pnajaymonia, [place]23, and steroids are probably fine given his asthma anyways.  He was started on relatively low dose methylpred 40bid  -no current need for pharmacologic diuresis     REACTIVE AIRWAY DISEASE:  -bronchodilators; methylpred as above    MORBID OBESITY:  -BMI >40.  Has required additional support staff for mobilization / positioning  -NTD acutely; will assess for possible ANA / role for CPAP overnight  -may benefit from lifestyle counseling after discharge / recovery    MISC:  -Code status is presumptive full code  -family updated by hospitalist  -lovenox dosed for weight; PPI is a home med  -lines: peripheral only  -collazo not necessary  -anticipate discharge to his group home in a few days    Billing statement: 32min of critical care time; spent in an initial review of imaging, labs, physical exam, and discussion of the patient with my own team and the extended care team including the primary service.   Based on this patient's presentation / recent intervention and my bedside assessment, I felt there was or is a reasonably high probability of imminent or life-threatening deterioration today or tonight for respiratory reasons.   My overall critical care time, as described in detail above, includes such things as coordination of care, arrhythmia and hemodynamics management with infusions of medicines, respiratory management, fluid therapy including fluid boluses, and pain and sedation therapy. This time excludes time I spent personally performing or supervising procedures for this patient.    COSME  Karma Ackerman MD  Clinical   Anesthesia / Critical Care  *[phone]

## 2024-10-23 ENCOUNTER — APPOINTMENT (OUTPATIENT)
Dept: ULTRASOUND IMAGING | Facility: CLINIC | Age: 49
DRG: 417 | End: 2024-10-23
Attending: INTERNAL MEDICINE
Payer: MEDICARE

## 2024-10-23 LAB
ALBUMIN SERPL BCG-MCNC: 3.6 G/DL (ref 3.5–5.2)
ALP SERPL-CCNC: 171 U/L (ref 40–150)
ALT SERPL W P-5'-P-CCNC: 48 U/L (ref 0–70)
ANION GAP SERPL CALCULATED.3IONS-SCNC: 13 MMOL/L (ref 7–15)
AST SERPL W P-5'-P-CCNC: 36 U/L (ref 0–45)
BASE EXCESS BLDV CALC-SCNC: 5.8 MMOL/L (ref -3–3)
BILIRUB DIRECT SERPL-MCNC: <0.2 MG/DL (ref 0–0.3)
BILIRUB SERPL-MCNC: 0.4 MG/DL
BUN SERPL-MCNC: 23.5 MG/DL (ref 6–20)
CALCIUM SERPL-MCNC: 9.3 MG/DL (ref 8.8–10.4)
CHLORIDE SERPL-SCNC: 101 MMOL/L (ref 98–107)
CREAT SERPL-MCNC: 0.67 MG/DL (ref 0.67–1.17)
EGFRCR SERPLBLD CKD-EPI 2021: >90 ML/MIN/1.73M2
ERYTHROCYTE [DISTWIDTH] IN BLOOD BY AUTOMATED COUNT: 14.3 % (ref 10–15)
GLUCOSE BLDC GLUCOMTR-MCNC: 120 MG/DL (ref 70–99)
GLUCOSE BLDC GLUCOMTR-MCNC: 126 MG/DL (ref 70–99)
GLUCOSE BLDC GLUCOMTR-MCNC: 131 MG/DL (ref 70–99)
GLUCOSE BLDC GLUCOMTR-MCNC: 135 MG/DL (ref 70–99)
GLUCOSE BLDC GLUCOMTR-MCNC: 136 MG/DL (ref 70–99)
GLUCOSE BLDC GLUCOMTR-MCNC: 138 MG/DL (ref 70–99)
GLUCOSE SERPL-MCNC: 130 MG/DL (ref 70–99)
HCO3 BLDV-SCNC: 33 MMOL/L (ref 21–28)
HCO3 SERPL-SCNC: 27 MMOL/L (ref 22–29)
HCT VFR BLD AUTO: 35.6 % (ref 40–53)
HGB BLD-MCNC: 11.1 G/DL (ref 13.3–17.7)
MCH RBC QN AUTO: 29.4 PG (ref 26.5–33)
MCHC RBC AUTO-ENTMCNC: 31.2 G/DL (ref 31.5–36.5)
MCV RBC AUTO: 94 FL (ref 78–100)
O2/TOTAL GAS SETTING VFR VENT: 30 %
OXYHGB MFR BLDV: 93 % (ref 70–75)
PCO2 BLDV: 59 MM HG (ref 40–50)
PH BLDV: 7.35 [PH] (ref 7.32–7.43)
PLATELET # BLD AUTO: 190 10E3/UL (ref 150–450)
PO2 BLDV: 70 MM HG (ref 25–47)
POTASSIUM SERPL-SCNC: 4.1 MMOL/L (ref 3.4–5.3)
PROT SERPL-MCNC: 7.3 G/DL (ref 6.4–8.3)
RADIOLOGIST FLAGS: ABNORMAL
RADIOLOGIST FLAGS: ABNORMAL
RBC # BLD AUTO: 3.77 10E6/UL (ref 4.4–5.9)
SAO2 % BLDV: 94.4 % (ref 70–75)
SODIUM SERPL-SCNC: 141 MMOL/L (ref 135–145)
WBC # BLD AUTO: 12.4 10E3/UL (ref 4–11)

## 2024-10-23 PROCEDURE — 85027 COMPLETE CBC AUTOMATED: CPT | Performed by: INTERNAL MEDICINE

## 2024-10-23 PROCEDURE — G0463 HOSPITAL OUTPT CLINIC VISIT: HCPCS

## 2024-10-23 PROCEDURE — 36415 COLL VENOUS BLD VENIPUNCTURE: CPT | Performed by: INTERNAL MEDICINE

## 2024-10-23 PROCEDURE — 94640 AIRWAY INHALATION TREATMENT: CPT | Mod: 76

## 2024-10-23 PROCEDURE — 99233 SBSQ HOSP IP/OBS HIGH 50: CPT | Performed by: ANESTHESIOLOGY

## 2024-10-23 PROCEDURE — 80053 COMPREHEN METABOLIC PANEL: CPT | Performed by: INTERNAL MEDICINE

## 2024-10-23 PROCEDURE — 94640 AIRWAY INHALATION TREATMENT: CPT

## 2024-10-23 PROCEDURE — 80048 BASIC METABOLIC PNL TOTAL CA: CPT | Performed by: INTERNAL MEDICINE

## 2024-10-23 PROCEDURE — 250N000011 HC RX IP 250 OP 636: Performed by: INTERNAL MEDICINE

## 2024-10-23 PROCEDURE — 99233 SBSQ HOSP IP/OBS HIGH 50: CPT | Performed by: INTERNAL MEDICINE

## 2024-10-23 PROCEDURE — 76705 ECHO EXAM OF ABDOMEN: CPT

## 2024-10-23 PROCEDURE — 999N000157 HC STATISTIC RCP TIME EA 10 MIN

## 2024-10-23 PROCEDURE — 200N000001 HC R&B ICU

## 2024-10-23 PROCEDURE — 250N000009 HC RX 250: Performed by: INTERNAL MEDICINE

## 2024-10-23 PROCEDURE — 82248 BILIRUBIN DIRECT: CPT | Performed by: INTERNAL MEDICINE

## 2024-10-23 PROCEDURE — 99222 1ST HOSP IP/OBS MODERATE 55: CPT | Mod: 57 | Performed by: SURGERY

## 2024-10-23 PROCEDURE — 250N000013 HC RX MED GY IP 250 OP 250 PS 637: Performed by: ANESTHESIOLOGY

## 2024-10-23 PROCEDURE — 94660 CPAP INITIATION&MGMT: CPT

## 2024-10-23 PROCEDURE — 82805 BLOOD GASES W/O2 SATURATION: CPT | Performed by: INTERNAL MEDICINE

## 2024-10-23 RX ORDER — LEVETIRACETAM 750 MG/1
1500 TABLET, FILM COATED, EXTENDED RELEASE ORAL AT BEDTIME
Status: DISCONTINUED | OUTPATIENT
Start: 2024-10-23 | End: 2024-10-25 | Stop reason: HOSPADM

## 2024-10-23 RX ORDER — PRAZOSIN HYDROCHLORIDE 1 MG/1
2 CAPSULE ORAL AT BEDTIME
Status: DISCONTINUED | OUTPATIENT
Start: 2024-10-23 | End: 2024-10-25 | Stop reason: HOSPADM

## 2024-10-23 RX ORDER — PROPRANOLOL HCL 20 MG
20 TABLET ORAL 2 TIMES DAILY
Status: DISCONTINUED | OUTPATIENT
Start: 2024-10-23 | End: 2024-10-25 | Stop reason: HOSPADM

## 2024-10-23 RX ORDER — NALOXONE HYDROCHLORIDE 0.4 MG/ML
0.4 INJECTION, SOLUTION INTRAMUSCULAR; INTRAVENOUS; SUBCUTANEOUS
Status: DISCONTINUED | OUTPATIENT
Start: 2024-10-23 | End: 2024-10-25 | Stop reason: HOSPADM

## 2024-10-23 RX ORDER — DIVALPROEX SODIUM 500 MG/1
500 TABLET, DELAYED RELEASE ORAL 2 TIMES DAILY
Status: DISCONTINUED | OUTPATIENT
Start: 2024-10-23 | End: 2024-10-25 | Stop reason: HOSPADM

## 2024-10-23 RX ORDER — LEVETIRACETAM 500 MG/1
1000 TABLET ORAL EVERY MORNING
Status: DISCONTINUED | OUTPATIENT
Start: 2024-10-23 | End: 2024-10-25 | Stop reason: HOSPADM

## 2024-10-23 RX ORDER — CLOTRIMAZOLE 1 %
CREAM (GRAM) TOPICAL 2 TIMES DAILY
Status: DISCONTINUED | OUTPATIENT
Start: 2024-10-23 | End: 2024-10-25 | Stop reason: HOSPADM

## 2024-10-23 RX ORDER — CITALOPRAM HYDROBROMIDE 20 MG/1
40 TABLET ORAL DAILY
Status: DISCONTINUED | OUTPATIENT
Start: 2024-10-23 | End: 2024-10-25 | Stop reason: HOSPADM

## 2024-10-23 RX ORDER — CITALOPRAM HYDROBROMIDE 20 MG/1
20 TABLET ORAL DAILY
Status: DISCONTINUED | OUTPATIENT
Start: 2024-10-23 | End: 2024-10-25 | Stop reason: HOSPADM

## 2024-10-23 RX ORDER — NALOXONE HYDROCHLORIDE 0.4 MG/ML
0.2 INJECTION, SOLUTION INTRAMUSCULAR; INTRAVENOUS; SUBCUTANEOUS
Status: DISCONTINUED | OUTPATIENT
Start: 2024-10-23 | End: 2024-10-25 | Stop reason: HOSPADM

## 2024-10-23 RX ORDER — POLYETHYLENE GLYCOL 3350 17 G/17G
17 POWDER, FOR SOLUTION ORAL DAILY
Status: DISCONTINUED | OUTPATIENT
Start: 2024-10-23 | End: 2024-10-25 | Stop reason: HOSPADM

## 2024-10-23 RX ORDER — OXYCODONE HYDROCHLORIDE 5 MG/1
5-10 TABLET ORAL
Status: DISCONTINUED | OUTPATIENT
Start: 2024-10-23 | End: 2024-10-25 | Stop reason: HOSPADM

## 2024-10-23 RX ORDER — CLINDAMYCIN PHOSPHATE 10 UG/ML
LOTION TOPICAL 2 TIMES DAILY
Status: DISCONTINUED | OUTPATIENT
Start: 2024-10-23 | End: 2024-10-23

## 2024-10-23 RX ORDER — LOPERAMIDE HYDROCHLORIDE 2 MG/1
2 CAPSULE ORAL 4 TIMES DAILY PRN
Status: DISCONTINUED | OUTPATIENT
Start: 2024-10-23 | End: 2024-10-25 | Stop reason: HOSPADM

## 2024-10-23 RX ORDER — PANTOPRAZOLE SODIUM 40 MG/1
40 TABLET, DELAYED RELEASE ORAL DAILY
Status: DISCONTINUED | OUTPATIENT
Start: 2024-10-23 | End: 2024-10-25 | Stop reason: HOSPADM

## 2024-10-23 RX ORDER — LEVOCARNITINE 330 MG/1
660 TABLET ORAL 3 TIMES DAILY
Status: DISCONTINUED | OUTPATIENT
Start: 2024-10-23 | End: 2024-10-25 | Stop reason: HOSPADM

## 2024-10-23 RX ORDER — LAMOTRIGINE 100 MG/1
200 TABLET, EXTENDED RELEASE ORAL 2 TIMES DAILY
Status: DISCONTINUED | OUTPATIENT
Start: 2024-10-23 | End: 2024-10-25 | Stop reason: HOSPADM

## 2024-10-23 RX ORDER — MULTIVITAMIN,THERAPEUTIC
1 TABLET ORAL DAILY
Status: DISCONTINUED | OUTPATIENT
Start: 2024-10-23 | End: 2024-10-25 | Stop reason: HOSPADM

## 2024-10-23 RX ORDER — HYDROMORPHONE HCL IN WATER/PF 6 MG/30 ML
.2-.3 PATIENT CONTROLLED ANALGESIA SYRINGE INTRAVENOUS
Status: DISCONTINUED | OUTPATIENT
Start: 2024-10-23 | End: 2024-10-25 | Stop reason: HOSPADM

## 2024-10-23 RX ORDER — ZONISAMIDE 100 MG/1
100 CAPSULE ORAL EVERY MORNING
Status: DISCONTINUED | OUTPATIENT
Start: 2024-10-23 | End: 2024-10-25 | Stop reason: HOSPADM

## 2024-10-23 RX ORDER — PIPERACILLIN SODIUM, TAZOBACTAM SODIUM 3; .375 G/15ML; G/15ML
3.38 INJECTION, POWDER, LYOPHILIZED, FOR SOLUTION INTRAVENOUS EVERY 6 HOURS
Status: DISCONTINUED | OUTPATIENT
Start: 2024-10-23 | End: 2024-10-25

## 2024-10-23 RX ORDER — ZONISAMIDE 100 MG/1
200 CAPSULE ORAL EVERY EVENING
Status: DISCONTINUED | OUTPATIENT
Start: 2024-10-23 | End: 2024-10-25 | Stop reason: HOSPADM

## 2024-10-23 RX ORDER — BUSPIRONE HYDROCHLORIDE 5 MG/1
15 TABLET ORAL 3 TIMES DAILY
Status: DISCONTINUED | OUTPATIENT
Start: 2024-10-23 | End: 2024-10-25 | Stop reason: HOSPADM

## 2024-10-23 RX ADMIN — LAMOTRIGINE 200 MG: 100 TABLET, FILM COATED, EXTENDED RELEASE ORAL at 20:30

## 2024-10-23 RX ADMIN — ZONISAMIDE 100 MG: 100 CAPSULE ORAL at 11:34

## 2024-10-23 RX ADMIN — IPRATROPIUM BROMIDE AND ALBUTEROL SULFATE 3 ML: .5; 3 SOLUTION RESPIRATORY (INHALATION) at 07:36

## 2024-10-23 RX ADMIN — METHYLPREDNISOLONE SODIUM SUCCINATE 40 MG: 40 INJECTION, POWDER, FOR SOLUTION INTRAMUSCULAR; INTRAVENOUS at 02:50

## 2024-10-23 RX ADMIN — IPRATROPIUM BROMIDE AND ALBUTEROL SULFATE 3 ML: .5; 3 SOLUTION RESPIRATORY (INHALATION) at 11:54

## 2024-10-23 RX ADMIN — LEVETIRACETAM 1500 MG: 750 TABLET, FILM COATED, EXTENDED RELEASE ORAL at 21:53

## 2024-10-23 RX ADMIN — DIVALPROEX SODIUM 500 MG: 500 TABLET, DELAYED RELEASE ORAL at 20:29

## 2024-10-23 RX ADMIN — ZONISAMIDE 200 MG: 100 CAPSULE ORAL at 20:32

## 2024-10-23 RX ADMIN — CITALOPRAM HYDROBROMIDE 20 MG: 20 TABLET ORAL at 11:29

## 2024-10-23 RX ADMIN — CLOTRIMAZOLE: 1 CREAM TOPICAL at 11:36

## 2024-10-23 RX ADMIN — PIPERACILLIN AND TAZOBACTAM 3.38 G: 3; .375 INJECTION, POWDER, FOR SOLUTION INTRAVENOUS at 20:30

## 2024-10-23 RX ADMIN — POLYETHYLENE GLYCOL 3350 17 G: 17 POWDER, FOR SOLUTION ORAL at 11:28

## 2024-10-23 RX ADMIN — OXYCODONE HYDROCHLORIDE 5 MG: 5 TABLET ORAL at 12:39

## 2024-10-23 RX ADMIN — PANTOPRAZOLE SODIUM 40 MG: 40 TABLET, DELAYED RELEASE ORAL at 11:30

## 2024-10-23 RX ADMIN — BUSPIRONE HYDROCHLORIDE 15 MG: 5 TABLET ORAL at 15:32

## 2024-10-23 RX ADMIN — METHYLCELLULOSE 500 MG: 500 TABLET ORAL at 11:32

## 2024-10-23 RX ADMIN — IPRATROPIUM BROMIDE AND ALBUTEROL SULFATE 3 ML: .5; 3 SOLUTION RESPIRATORY (INHALATION) at 23:25

## 2024-10-23 RX ADMIN — METHYLCELLULOSE 500 MG: 500 TABLET ORAL at 20:31

## 2024-10-23 RX ADMIN — PROPRANOLOL HYDROCHLORIDE 20 MG: 20 TABLET ORAL at 11:31

## 2024-10-23 RX ADMIN — CITALOPRAM HYDROBROMIDE 40 MG: 20 TABLET ORAL at 11:29

## 2024-10-23 RX ADMIN — PIPERACILLIN AND TAZOBACTAM 3.38 G: 3; .375 INJECTION, POWDER, FOR SOLUTION INTRAVENOUS at 15:33

## 2024-10-23 RX ADMIN — BUSPIRONE HYDROCHLORIDE 15 MG: 5 TABLET ORAL at 21:52

## 2024-10-23 RX ADMIN — IPRATROPIUM BROMIDE AND ALBUTEROL SULFATE 3 ML: .5; 3 SOLUTION RESPIRATORY (INHALATION) at 03:16

## 2024-10-23 RX ADMIN — PRAZOSIN HYDROCHLORIDE 2 MG: 1 CAPSULE ORAL at 21:53

## 2024-10-23 RX ADMIN — METHYLPREDNISOLONE SODIUM SUCCINATE 40 MG: 40 INJECTION, POWDER, FOR SOLUTION INTRAMUSCULAR; INTRAVENOUS at 15:33

## 2024-10-23 RX ADMIN — PIPERACILLIN AND TAZOBACTAM 4.5 G: 4; .5 INJECTION, POWDER, FOR SOLUTION INTRAVENOUS at 09:26

## 2024-10-23 RX ADMIN — PROPRANOLOL HYDROCHLORIDE 20 MG: 20 TABLET ORAL at 20:34

## 2024-10-23 RX ADMIN — LEVOCARNITINE 660 MG: 330 TABLET ORAL at 11:33

## 2024-10-23 RX ADMIN — LEVOCARNITINE 660 MG: 330 TABLET ORAL at 21:52

## 2024-10-23 RX ADMIN — DIVALPROEX SODIUM 500 MG: 500 TABLET, DELAYED RELEASE ORAL at 11:34

## 2024-10-23 RX ADMIN — THERA TABS 1 TABLET: TAB at 11:30

## 2024-10-23 RX ADMIN — LAMOTRIGINE 200 MG: 100 TABLET, FILM COATED, EXTENDED RELEASE ORAL at 11:33

## 2024-10-23 RX ADMIN — BUSPIRONE HYDROCHLORIDE 15 MG: 5 TABLET ORAL at 11:39

## 2024-10-23 RX ADMIN — PIPERACILLIN AND TAZOBACTAM 4.5 G: 4; .5 INJECTION, POWDER, FOR SOLUTION INTRAVENOUS at 02:50

## 2024-10-23 RX ADMIN — LEVETIRACETAM 1000 MG: 500 TABLET, FILM COATED ORAL at 11:39

## 2024-10-23 RX ADMIN — CLOTRIMAZOLE: 1 CREAM TOPICAL at 20:29

## 2024-10-23 RX ADMIN — LEVOCARNITINE 660 MG: 330 TABLET ORAL at 15:33

## 2024-10-23 RX ADMIN — IPRATROPIUM BROMIDE AND ALBUTEROL SULFATE 3 ML: .5; 3 SOLUTION RESPIRATORY (INHALATION) at 15:38

## 2024-10-23 RX ADMIN — QUETIAPINE FUMARATE 300 MG: 200 TABLET ORAL at 21:51

## 2024-10-23 ASSESSMENT — ACTIVITIES OF DAILY LIVING (ADL)
ADLS_ACUITY_SCORE: 0
ADLS_ACUITY_SCORE: 55
ADLS_ACUITY_SCORE: 0

## 2024-10-23 NOTE — PROGRESS NOTES
INTENSIVIST FACULTY NOTE:  Improving neurologic status with BIPAP ovrernight  Required straight cath overnight    Currently awake, cheerful, complaining about the BIPAP and hungry for breakfast  Thick neck  No chuck rash anywhere  Has some redness /wounds over ears where oxygen tubing was    Labs unremarkable this morning, ith most recent PCO2 on VBG 59 and a bicarb of 27, so that's probably about where he lives  CBC shows improving leukocytosis  Interestingly, procalcitonin yesterday came back negative    Formal read on abdominal ultrasound still pending, but I see large stones and potentially some wall thickening.    This is a 49M hx seizure disorder, psychiatric issues, ANA, morbid obesity, on O2 2-4L during the day but noncompliant with his home Trilogy vent, who presents with acute on chronic hypercarbic / hypoxic respiraotry failure.    I had originally been concerned about an aspiration pneumonia given the leukocytosis and CT findings, but with a negative procalcitonin and lack of purulent cough, I think we can instead attribute his decompensation to reactive airway disease exacerbation and discontinue antibiotics.      My interpretation of his adominal ultrasound (formal radiology read still pending) is that he has cholelithiasis, but no definite signs of cholecystitis.  I think it possible that biliary colic has been contributing to his intermittent nausea and abdominal pain.  Will consider a surgical consult after formal radiology read results.    The ICU service will sign off today given his stability on nasal cannula.    METABOLIC ENCEPHALOPATHY / DELIRIUM:  -due to hypercarbia, no longer concerned for infection; monitoring with clinical exam  -restart home sleep meds    SEIZURE DISORDER:  -home regimen is keppra 1g qam and 1.5g qpm and lamotrigine 200 bid.  Restart home regimen.    DEPRESSION / ANXIETY / PSYCHIATRIC ISSUES:  -restart home buspar, celexa, depakote      ACUTE HYPERCARBIC RESPIRATORY  FAILURE  -baseline serum bicarb is only around 27-28, so I doubt his baseline CO2 is much higher than 60  -with lack of secretions and negative procalcitonin, no longer concerned about an aspiration pneumonia.  Discontinue zosyn  -BIPAP can be discontinued (should continue to get his home Trilogy with sleep)  -methylpred 40bid for another day, then discontinue  -no current need for pharmacologic diuresis     REACTIVE AIRWAY DISEASE:  -bronchodilators; methylpred as above    MORBID OBESITY:  -BMI >40.  Has required additional support staff for mobilization / positioning  -NTD acutely; will assess for possible ANA / role for CPAP overnight  -may benefit from lifestyle counseling after discharge / recovery    CHOLELITHIASIS:  -alk phos 170, but normal bilirubin yesterday.  To my eye, no clear signs of cholecystitis.  Consider surgical consult for elective cholecystectomy for possible biliary colic depending on what formal read on abdominal ultrasound says.    MISC:  -Code status is full code  -family updated by phone  -lovenox dosed for weight; PPI is a home med  -lines: peripheral only  -collazo not necessary.  Has alpha blocker for urinary retention  -anticipate discharge to his group home in a few days    Billing statement: 35min of E&M time.    COSME Ackerman MD  Clinical   Anesthesia / Critical Care  *[phone]

## 2024-10-23 NOTE — PROGRESS NOTES
A BiPAP of  18/7 @ 30% was applied to the pt via the mask for an increase in WOB and SOB.  The skin in contact with the mask and straps looks good and intact. Pt is tolerating it well. RT will continue to monitor and assess the pt's respiratory status and needs.    Tico Dewey, RT, RT  10/23/2024 5:15 AM

## 2024-10-23 NOTE — CONSULTS
[facility] Nurse Inpatient Assessment     Consulted for: Bilateral ears    Patient History (according to provider note(s):      49M with hx of chronic hypoxic hypercapnic respiratory failure 2/2 morbid obesity BMI 44 c/b ANA/OHS on 2-4L O2 during the days and trilogy vent at bedtime, seizure disorder, and multiple psych diagnoses presents with fever and confusion and found to have acute on chronic hypoxic hypercapnic respiratory failure suspect secondary to decompensated ANA/OHS in setting of home ventilator noncompliance although also concern for aspiration pneumonia given leukocytosis and recent vomiting episode.  Will cover with Zosyn and send patient for CTA of the chest for further workup.     Assessment:      Areas visualized during today's visit: Focused:    Pressure Injury Location: Left ear  Last photo: 10/23/24    Wound type: Pressure Injury     Pressure Injury Stage: 3, present on admission      This is a Medical Device Related Pressure Injury (MDRPI) due to oxygen tubing  Wound history/plan of care:   Patient with chronic wound due to oxygen tubing.  Patient is normally on oxygen during the day and trilogy vent at night.  Patient likes to have his oxygen tubing very snug which contributes to slow wound healing.  Educated patient on not having tubing too tight.    Wound base: 100 %  Red     Palpation of the wound bed: normal      Drainage: small     Description of drainage: bloody     Measurements (length x width x depth, in cm) 1  x 0.2  x  0.2 cm      Tunneling N/A     Undermining N/A  Periwound skin: Scar tissue      Color: normal and consistent with surrounding tissue      Temperature: normal   Odor: none  Pain: moderate with palplation  Pain intervention prior to dressing change: slow and gentle cares   Treatment goal: Heal  and Protection  STATUS: initial assessment  Supplies ordered: gathered    My PI Risk Assessment     Sensory Perception: 4 - No impairment      "Moisture: 3 - Occasionally moist      Activity: 2 - Chairfast     Mobility: 3 - Slightly limited      Nutrition: 3 - Adequate     Friction/Shear: 2 - Potential problem      TOTAL: 17       Right ear is intact, no open areas noted.      Treatment Plan:     Left ear wound(s): Daily   Cleanse with wound cleanser and dry  Apply Sween cream to wound  Cover with Mepilex lite (cut to fit)  Continue foam pads over oxygen tubing    Pressure Injury Prevention (PIP) Plan:  If patient is declining pressure injury prevention interventions: Explore reason why and address patient's concerns, Educate on pressure injury risk and prevention intervention(s), and If patient is still declining, document \"informed refusal\"   Mattress: Follow bed algorithm, add Low Air Loss (Air+) mattress pump if skin is very moist or constantly moist.   HOB: Maintain at or below 30 degrees, unless contraindicated  Repositioning in bed: Every 1-2 hours  and Left/right positioning; avoid supine  Heels: Pillows under calves  Protective Dressing: Sacral Mepilex for prevention (#[id number]),  especially for the agitated patient   Chair positioning: Chair cushion (#[id number])    If patient has a buttock pressure injury, or high risk for PI use chair cushion or SPS.  Moisture Management: Avoid brief in bed  Under Devices: Inspect skin under all medical devices during skin inspection , Ensure tubes are stabilized without tension, and Ensure patient is not lying on medical devices or equipment when repositioned  Ask provider to discontinue device when no longer needed.       Orders: Written    RECOMMEND PRIMARY TEAM ORDER: None, at this time  Education provided: importance of repositioning, plan of care, and Off-loading pressure  Discussed plan of care with: Patient and Nurse  WOC nurse follow-up plan: weekly  Notify WOC if wound(s) deteriorate.  Nursing to notify the Provider(s) and re-consult the WOC Nurse if new skin concern.    DATA:     Current support surface: " Standard  Low air loss (LIZZY pump, Isolibrium, Pulsate)  BMI: Body mass index is 44.62 kg/m .   Active diet order: Orders Placed This Encounter      NPO for Medical/Clinical Reasons Except for: Meds, Ice Chips     Output: I/O last 3 completed shifts:  In: 300 [I.V.:300]  Out: 700 [Urine:700]     Labs:   Recent Labs   Lab 10/23/24  0525   ALBUMIN 3.6   HGB 11.1*   WBC 12.4*     Pressure injury risk assessment:   Sensory Perception: 4-->no impairment  Moisture: 3-->occasionally moist  Activity: 1-->bedfast  Mobility: 2-->very limited  Nutrition: 3-->adequate  Friction and Shear: 3-->no apparent problem  Julio César Score: 16    Martin Bryson RN CWOCN  Contact Via [facility] Nurse (Marcella)  Dept. Office Number: [phone]

## 2024-10-23 NOTE — PROGRESS NOTES
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  He presents  with fever and confusion and found to have acute on chronic hypoxic hypercapnic respiratory failure suspect secondary to decompensated ANA/OHS in setting of home ventilator noncompliance, possible aspiration pneumonia.    Acute on Chronic Hypoxic and Hypercapnic Respiratory Failure  Decompensated ANA/OHS  Concern for Aspiration Pneumonia.  Asthma exacerbation.  -At baseline on 2-4L O2 daytime, and on Trilogy at bedtime.  -Required BiPAP on admission, was able to come off BiPAP this morning,  -This was suspected due to decompensated ANA/OHS in setting of home ventilator noncompliance  -Aspiration pneumonitis was also considered due to fever and leukocytosis and recent vomiting episode  -Will continue antibiotic as it also covers intra-abdominal infection.  -CTA chest PE protocol negative for PE.  -Follow-up cultures.  -Continue steroids, bronchodilators and nebulizer..  -Monitor for fever, trend WBC.    Cholelithiasis with acute cholecystitis.  -Patient with episode of vomiting, fever and leukocytosis.  -On exam has right upper quadrant abdominal pain.  -Order right upper quadrant ultrasound which came back positive for cholelithiasis versus cholecystitis.  -Initially considered stopping antibiotic but given this finding we will continue Zosyn.  -Liver enzymes were normal yesterday except for slightly elevated alkaline phosphatase.  -Will add LFT to the morning  "lab.  -N.p.o..  -Continue IV Zosyn.  -Surgery consult pending.  -I discussed with Myrna Duran the plan, patient will likely have surgery, Lap pancho tomorrow.  -NPO after midnight.  -He should remain in ICU at least for a day after surgery as he is it increased risk of significant respiratory failure from baseline.      Chronic medical conditions.  -Morbid Obesity BMI 45: Complicates all aspects of care  -Seizure Disorder: Continue PTA home medications  -Schizoaffective Disorder, Borderline Personality Disorder, BETSEY, MDD: Home medications once able to take po  -Essential HTN: Continue PTA medications.          Diet: NPO for Medical/Clinical Reasons Except for: Meds, Ice Chips    DVT Prophylaxis: Pneumatic Compression Devices  Reid Catheter: Not present  Lines: None     Cardiac Monitoring: ACTIVE order. Indication: ICU  Code Status: Full Code      Clinically Significant Risk Factors Present on Admission          # Hypochloremia: Lowest Cl = 97 mmol/L in last 2 days, will monitor as appropriate          # Hypertension: Noted on problem list   # Acute Hypercapnic Respiratory Failure: based on arterial blood gas results.  Continue supplemental oxygen and ventilatory support as indicated.  # Acute Hypercapnic Respiratory Failure: based on venous blood gas results.  Continue supplemental oxygen and ventilatory support as indicated.        # Severe Obesity: Estimated body mass index is 44.62 kg/m  as calculated from the following:    Height as of 10/18/24: 1.727 m (5' 8\").    Weight as of this encounter: 133.1 kg (293 lb 6.9 oz).       # Financial/Environmental Concerns:           Disposition Plan     Medically Ready for Discharge: Anticipated in 2-4 Days    55 MINUTES SPENT BY ME on the date of service doing chart review, history, exam, documentation & further activities per the note.       Reyes Fontaine MD  Hospitalist Service  [facility]  Securely message with [employer] Top-Level Domain (more info)  Text page " via [facility] Paging/Directory   ______________________________________________________________________    Interval History   Patient seen and examined, assumed care today, labs, medications, overnight events reviewed.  She has been on BiPAP until this morning, now on 4 L nasal cannula saturating 94%.    Physical Exam   Vital Signs: Temp: 98.9  F (37.2  C) Temp src: Oral BP: (!) 143/76 Pulse: 89   Resp: 24 SpO2: 94 % O2 Device: Nasal cannula Oxygen Delivery: 4 LPM  Weight: 293 lbs 6.92 oz    General Appearance: Awake and alert, comfortable, not in distress  Respiratory: Good air entry bilaterally, no wheezing, crackles or rales.  Cardiovascular: S1 and S2 regular, no gallop or murmur  GI: Obese, soft, right upper quadrant tenderness positive Marin's sign.  Skin: No rash or exanthems      Medical Decision Making       65 MINUTES SPENT BY ME on the date of service doing chart review, history, exam, documentation & further activities per the note.      Data   Imaging results reviewed over the past 24 hrs:   Recent Results (from the past 24 hours)   XR Chest Port 1 View    Narrative    CHEST ONE VIEW  10/22/2024 11:54 AM     HISTORY: Fever.    COMPARISON: September 11, 2024      Impression    IMPRESSION: No acute disease.    JONATHAN ROD MD         SYSTEM ID:  [id number]   Head CT w/o contrast    Narrative    CT OF THE HEAD WITHOUT CONTRAST 10/22/2024 2:19 PM     COMPARISON: Head CT 6/21/2024.    HISTORY: Altered mental status.    TECHNIQUE: 5 mm thick axial CT images of the head were acquired  without IV contrast material.    FINDINGS: There is moderate diffuse cerebral volume loss. There are  subtle patchy areas of decreased density in the cerebral white matter  bilaterally that are consistent with sequela of chronic small vessel  ischemic disease.    The ventricles and basal cisterns are within normal limits in  configuration given the degree of cerebral volume loss. There is no  midline shift. There are no extra-axial  fluid collections.    No intracranial hemorrhage, mass or recent infarct.    The visualized paranasal sinuses are well-aerated. There is no  mastoiditis. There are no fractures of the visualized bones.      Impression    IMPRESSION: Diffuse cerebral volume loss and cerebral white matter  changes consistent with chronic small vessel ischemic disease. No  evidence for acute intracranial pathology.      Radiation dose for this scan was reduced using automated exposure  control, adjustment of the mA and/or kV according to patient size, or  iterative reconstruction technique    TAMIKA MULLINS MD         SYSTEM ID:  [id number]   CT Chest Pulmonary Embolism w Contrast    Narrative    CT CHEST PULMONARY EMBOLISM W CONTRAST 10/22/2024 2:22 PM    CLINICAL HISTORY: Respiratory failure.  TECHNIQUE: CT angiogram chest during arterial phase injection IV  contrast. 2D and 3D MIP reconstructions were performed by the CT  technologist. Dose reduction techniques were used.   CONTRAST: 85mL [id number]-[id number]  COMPARISON: Chest CT 3/7/2024.    FINDINGS:  ANGIOGRAM CHEST: Pulmonary arteries are normal caliber and negative  for pulmonary emboli. No evidence for thoracic aortic aneurysm.  Thoracic aorta is negative for dissection.     LUNGS AND PLEURA: Consolidation at both lung bases posteriorly,  greater on the right, is likely dependent atelectasis. Trace amount of  pleural fluid on the right. No pneumothorax.    MEDIASTINUM/AXILLAE: No lymphadenopathy in the chest. No pericardial  effusion.    CORONARY ARTERY CALCIFICATION: None.    UPPER ABDOMEN: There is elevation of the right hemidiaphragm,  increased since the previous exam.    MUSCULOSKELETAL: Unremarkable.      Impression    IMPRESSION:  1.  No evidence for pulmonary embolism.  2.  Consolidation at both lung bases posteriorly, greater on the  right, is likely dependent atelectasis, although a pneumonia cannot be  excluded on the right.  3.  Trace amount pleural fluid on the right.  4.   Elevation of the right hemidiaphragm has increased since the  previous exam.    TITI FELDER MD         SYSTEM ID:  [id number]   US Abdomen Limited   Result Value    Radiologist flags See above ______ impression 1. (Urgent)    Narrative    US ABDOMEN LIMITED 10/23/2024 9:20 AM    CLINICAL HISTORY: RUQ pain and tenderness.    TECHNIQUE: Limited abdominal ultrasound.    COMPARISON: None.    FINDINGS:    GALLBLADDER: Positive sonographic Marin sign. Cholelithiasis.  Gallbladder wall measures up to 5 mm.    BILE DUCTS: There is no biliary dilatation. The common duct measures  7mm.    LIVER: Fatty liver. Liver is 16 cm. No focal lesion.    RIGHT KIDNEY: No hydronephrosis.    PANCREAS: The visualized portions of the pancreas are normal.    No ascites.      Impression    IMPRESSION:  1.  Positive sonographic Marin sign and gallstones identified. Acute  cholecystitis is possible. Mild gallbladder wall thickening.  2.  Common duct is mildly ectatic measuring 7 mm.  3.  Hepatomegaly and fatty liver.    [Access Center: See above ______ impression 1.]    This report will be copied to the [facility] to ensure a  provider acknowledges the finding. Access Center is available Monday  through Friday 8am-3:30 pm.      Recent Labs   Lab 10/23/24  0806 10/23/24  0525 10/23/24  0407 10/22/24  1334 10/22/24  1048 10/20/24  0952   WBC  --  12.4*  --   --  16.3* 20.0*   HGB  --  11.1*  --   --  11.6* 12.4*   MCV  --  94  --   --  94 93   PLT  --  190  --   --  184 174   NA  --  141  --   --  136 137   POTASSIUM  --  4.1  --   --  4.1 4.2   CHLORIDE  --  101  --   --  97* 98   CO2  --  27  --   --  27 28   BUN  --  23.5*  --   --  24.9* 12.8   CR  --  0.67  --   --  0.88 0.81   ANIONGAP  --  13  --   --  12 11   ARNOLD  --  9.3  --   --  9.5 9.0   * 130* 135*   < > 108* 100*   ALBUMIN  --   --   --   --  3.9 4.0   PROTTOTAL  --   --   --   --  7.6 7.0   BILITOTAL  --   --   --   --  0.4 0.7   ALKPHOS  --   --   --   --   170* 90   ALT  --   --   --   --  47 37   AST  --   --   --   --  45 29   LIPASE  --   --   --   --   --  11*    < > = values in this interval not displayed.

## 2024-10-23 NOTE — PLAN OF CARE
"ICU End of Shift Summary.  For vital signs and complete assessments, please see documentation flowsheets.     Pertinent assessments: disoriented to time, situation. Tele SR. On 3L NC. LS dim. Abd tenderness. Voided via urinal. On zosyn.   Lines: 2x L PIV  Major Shift Events: -abd ultrasound  - oxy given once for abd pain.   - woc following for L ear wounds  - surgery following   Plan (Upcoming Events): surgery- lap pancho  Discharge/Transfer Needs: TBD    Bedside Shift Report Completed : Y  Bedside Safety Check Completed: Y        Goal Outcome Evaluation:      Plan of Care Reviewed With: patient, parent    Overall Patient Progress: no changeOverall Patient Progress: no change    Outcome Evaluation: .      Problem: Adult Inpatient Plan of Care  Goal: Plan of Care Review  Description: The Plan of Care Review/Shift note should be completed every shift.  The Outcome Evaluation is a brief statement about your assessment that the patient is improving, declining, or no change.  This information will be displayed automatically on your shift  note.  Outcome: Progressing  Flowsheets (Taken 10/23/2024 1700)  Outcome Evaluation: .  Plan of Care Reviewed With:   patient   parent  Overall Patient Progress: no change  Goal: Patient-Specific Goal (Individualized)  Description: You can add care plan individualizations to a care plan. Examples of Individualization might be:  \"Parent requests to be called daily at 9am for status\", \"I have a hard time hearing out of my right ear\", or \"Do not touch me to wake me up as it startles  me\".  Outcome: Progressing  Goal: Absence of Hospital-Acquired Illness or Injury  Outcome: Progressing  Intervention: Identify and Manage Fall Risk  Recent Flowsheet Documentation  Taken 10/23/2024 1200 by Adina Gaines RN  Safety Promotion/Fall Prevention:   activity supervised   assistive device/personal items within reach   clutter free environment maintained   increased rounding and observation   " increase visualization of patient   lighting adjusted   mobility aid in reach   patient and family education   room near nurse's station   room organization consistent   safety round/check completed   treat reversible contributory factors   treat underlying cause   supervised activity   room door open  Taken 10/23/2024 0900 by Adina Gaines RN  Safety Promotion/Fall Prevention:   activity supervised   assistive device/personal items within reach   clutter free environment maintained   increased rounding and observation   increase visualization of patient   lighting adjusted   mobility aid in reach   patient and family education   room near nurse's station   room organization consistent   safety round/check completed   treat reversible contributory factors   treat underlying cause   supervised activity   room door open  Intervention: Prevent Skin Injury  Recent Flowsheet Documentation  Taken 10/23/2024 1530 by Adina Gaines RN  Body Position: weight shifting  Taken 10/23/2024 1200 by Adina Gaines RN  Body Position: weight shifting  Taken 10/23/2024 0900 by Adina Gaines RN  Body Position: weight shifting  Intervention: Prevent Infection  Recent Flowsheet Documentation  Taken 10/23/2024 1200 by Adina Gaines RN  Infection Prevention:   environmental surveillance performed   equipment surfaces disinfected   hand hygiene promoted   personal protective equipment utilized   rest/sleep promoted   single patient room provided  Taken 10/23/2024 0900 by Adina Gaines RN  Infection Prevention:   environmental surveillance performed   equipment surfaces disinfected   hand hygiene promoted   personal protective equipment utilized   rest/sleep promoted   single patient room provided  Goal: Optimal Comfort and Wellbeing  Outcome: Progressing  Intervention: Provide Person-Centered Care  Recent Flowsheet Documentation  Taken 10/23/2024 1200 by Adina Gaines RN  Trust Relationship/Rapport:   care explained    choices provided   emotional support provided   questions answered  Taken 10/23/2024 0900 by Adina Gaines RN  Trust Relationship/Rapport:   care explained   choices provided   emotional support provided   questions answered  Goal: Readiness for Transition of Care  Outcome: Progressing     Problem: Fall Injury Risk  Goal: Absence of Fall and Fall-Related Injury  Outcome: Progressing  Intervention: Identify and Manage Contributors  Recent Flowsheet Documentation  Taken 10/23/2024 1200 by Adina Gaines RN  Medication Review/Management: medications reviewed  Taken 10/23/2024 0900 by Adina Gaines RN  Medication Review/Management: medications reviewed  Intervention: Promote Injury-Free Environment  Recent Flowsheet Documentation  Taken 10/23/2024 1200 by Adina Gaines RN  Safety Promotion/Fall Prevention:   activity supervised   assistive device/personal items within reach   clutter free environment maintained   increased rounding and observation   increase visualization of patient   lighting adjusted   mobility aid in reach   patient and family education   room near nurse's station   room organization consistent   safety round/check completed   treat reversible contributory factors   treat underlying cause   supervised activity   room door open  Taken 10/23/2024 0900 by Adina Gaines RN  Safety Promotion/Fall Prevention:   activity supervised   assistive device/personal items within reach   clutter free environment maintained   increased rounding and observation   increase visualization of patient   lighting adjusted   mobility aid in reach   patient and family education   room near nurse's station   room organization consistent   safety round/check completed   treat reversible contributory factors   treat underlying cause   supervised activity   room door open     Problem: Pain Acute  Goal: Optimal Pain Control and Function  Outcome: Progressing  Intervention: Prevent or Manage Pain  Recent Flowsheet  Documentation  Taken 10/23/2024 1200 by Adina Gaines RN  Medication Review/Management: medications reviewed  Taken 10/23/2024 0900 by Adina Gaines RN  Medication Review/Management: medications reviewed     Problem: Gas Exchange Impaired  Goal: Optimal Gas Exchange  Outcome: Progressing  Intervention: Optimize Oxygenation and Ventilation  Recent Flowsheet Documentation  Taken 10/23/2024 1530 by Adina Gaines RN  Head of Bed (HOB) Positioning: HOB at 30-45 degrees  Taken 10/23/2024 1200 by Adina Gaines RN  Head of Bed (HOB) Positioning: HOB at 30-45 degrees  Taken 10/23/2024 0900 by Adina Gaines RN  Head of Bed (HOB) Positioning: HOB at 30-45 degrees

## 2024-10-23 NOTE — PLAN OF CARE
"Goal Outcome Evaluation:      Plan of Care Reviewed With: patient    Overall Patient Progress: improvingOverall Patient Progress: improving    Outcome Evaluation: .    ICU End of Shift Summary.  For vital signs and complete assessments, please see documentation flowsheets.     Pertinent assessments: VSS on BiPAP 30%. Tele: SR, prolong Qtc. Clear, dim lung sounds. Lethargic, becoming more alert as night progressed. NPO. Distended, tender abdomen. Bladder scanned per order. PIV x2. IV zosyn, soulmedrol, and Keppra.     Plan (Upcoming Events): TBD  Discharge/Transfer Needs: TBD    Bedside Shift Report Completed : Yes  Bedside Safety Check Completed: Yes          Problem: Adult Inpatient Plan of Care  Goal: Plan of Care Review  Description: The Plan of Care Review/Shift note should be completed every shift.  The Outcome Evaluation is a brief statement about your assessment that the patient is improving, declining, or no change.  This information will be displayed automatically on your shift  note.  Outcome: Progressing  Flowsheets (Taken 10/23/2024 0034)  Outcome Evaluation: .  Plan of Care Reviewed With: patient  Overall Patient Progress: improving  Goal: Patient-Specific Goal (Individualized)  Description: You can add care plan individualizations to a care plan. Examples of Individualization might be:  \"Parent requests to be called daily at 9am for status\", \"I have a hard time hearing out of my right ear\", or \"Do not touch me to wake me up as it startles  me\".  Outcome: Progressing  Goal: Absence of Hospital-Acquired Illness or Injury  Outcome: Progressing  Intervention: Identify and Manage Fall Risk  Recent Flowsheet Documentation  Taken 10/23/2024 0000 by Ranulfo Rosales RN  Safety Promotion/Fall Prevention:   activity supervised   assistive device/personal items within reach   clutter free environment maintained   increased rounding and observation   increase visualization of patient   lighting adjusted   mobility " aid in reach   patient and family education   room near nurse's station   room organization consistent   safety round/check completed   treat reversible contributory factors   treat underlying cause   supervised activity   room door open  Taken 10/22/2024 2000 by Ranulfo Rosales RN  Safety Promotion/Fall Prevention:   activity supervised   assistive device/personal items within reach   clutter free environment maintained   increased rounding and observation   increase visualization of patient   lighting adjusted   mobility aid in reach   patient and family education   room near nurse's station   room organization consistent   safety round/check completed   treat reversible contributory factors   treat underlying cause   supervised activity   room door open  Intervention: Prevent Skin Injury  Recent Flowsheet Documentation  Taken 10/23/2024 0000 by Ranulfo Rosales RN  Body Position: position maintained  Skin Protection:   adhesive use limited   incontinence pads utilized   skin to device areas padded   skin to skin areas padded  Device Skin Pressure Protection:   absorbent pad utilized/changed   adhesive use limited   positioning supports utilized   pressure points protected   skin-to-device areas padded   skin-to-skin areas padded  Taken 10/22/2024 2200 by Ranulfo Rosales RN  Body Position:   turned   left   heels elevated   log-rolled  Taken 10/22/2024 2000 by Ranulfo Rosales RN  Body Position: turned  Skin Protection:   adhesive use limited   incontinence pads utilized   skin to device areas padded   skin to skin areas padded  Device Skin Pressure Protection:   absorbent pad utilized/changed   adhesive use limited   positioning supports utilized   pressure points protected   skin-to-device areas padded   skin-to-skin areas padded  Intervention: Prevent and Manage VTE (Venous Thromboembolism) Risk  Recent Flowsheet Documentation  Taken 10/23/2024 0000 by Ranulfo Rosales RN  VTE Prevention/Management: SCDs on (sequential  compression devices)  Taken 10/22/2024 2000 by Ranulfo Rosales RN  VTE Prevention/Management: SCDs on (sequential compression devices)  Intervention: Prevent Infection  Recent Flowsheet Documentation  Taken 10/23/2024 0000 by Ranulfo Rosales RN  Infection Prevention:   environmental surveillance performed   equipment surfaces disinfected   hand hygiene promoted   personal protective equipment utilized   rest/sleep promoted   single patient room provided  Taken 10/22/2024 2000 by Ranulfo Rosales RN  Infection Prevention:   environmental surveillance performed   equipment surfaces disinfected   hand hygiene promoted   personal protective equipment utilized   rest/sleep promoted   single patient room provided  Goal: Optimal Comfort and Wellbeing  Outcome: Progressing  Intervention: Provide Person-Centered Care  Recent Flowsheet Documentation  Taken 10/23/2024 0000 by Ranulfo Rosales RN  Trust Relationship/Rapport:   care explained   choices provided   emotional support provided   questions answered   empathic listening provided   questions encouraged   reassurance provided   thoughts/feelings acknowledged  Taken 10/22/2024 2000 by Ranulfo Rosales RN  Trust Relationship/Rapport:   care explained   choices provided   emotional support provided   questions answered   empathic listening provided   questions encouraged   reassurance provided   thoughts/feelings acknowledged  Goal: Readiness for Transition of Care  Outcome: Progressing     Problem: Fall Injury Risk  Goal: Absence of Fall and Fall-Related Injury  Outcome: Progressing  Intervention: Identify and Manage Contributors  Recent Flowsheet Documentation  Taken 10/23/2024 0000 by Ranulfo Rosales RN  Medication Review/Management: medications reviewed  Taken 10/22/2024 2000 by Ranulfo Rosales RN  Medication Review/Management: medications reviewed  Intervention: Promote Injury-Free Environment  Recent Flowsheet Documentation  Taken 10/23/2024 0000 by Ranulfo Rosales, RN  Safety  Promotion/Fall Prevention:   activity supervised   assistive device/personal items within reach   clutter free environment maintained   increased rounding and observation   increase visualization of patient   lighting adjusted   mobility aid in reach   patient and family education   room near nurse's station   room organization consistent   safety round/check completed   treat reversible contributory factors   treat underlying cause   supervised activity   room door open  Taken 10/22/2024 2000 by Ranulfo Rosales, RN  Safety Promotion/Fall Prevention:   activity supervised   assistive device/personal items within reach   clutter free environment maintained   increased rounding and observation   increase visualization of patient   lighting adjusted   mobility aid in reach   patient and family education   room near nurse's station   room organization consistent   safety round/check completed   treat reversible contributory factors   treat underlying cause   supervised activity   room door open     Problem: Pain Acute  Goal: Optimal Pain Control and Function  Outcome: Progressing  Intervention: Prevent or Manage Pain  Recent Flowsheet Documentation  Taken 10/23/2024 0000 by Ranulfo Rosales, RN  Sensory Stimulation Regulation:   care clustered   lighting decreased  Medication Review/Management: medications reviewed  Taken 10/22/2024 2000 by Ranulfo Rosales, RN  Sensory Stimulation Regulation:   care clustered   lighting decreased  Medication Review/Management: medications reviewed     Problem: Gas Exchange Impaired  Goal: Optimal Gas Exchange  Outcome: Progressing  Intervention: Optimize Oxygenation and Ventilation  Recent Flowsheet Documentation  Taken 10/22/2024 2200 by Ranulfo Rosales, RN  Head of Bed (HOB) Positioning: HOB at 30 degrees
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
## 2024-10-24 ENCOUNTER — ANESTHESIA (OUTPATIENT)
Dept: SURGERY | Facility: CLINIC | Age: 49
DRG: 417 | End: 2024-10-24
Payer: MEDICARE

## 2024-10-24 ENCOUNTER — APPOINTMENT (OUTPATIENT)
Dept: SURGERY | Facility: PHYSICIAN GROUP | Age: 49
End: 2024-10-24
Payer: MEDICARE

## 2024-10-24 ENCOUNTER — ANESTHESIA EVENT (OUTPATIENT)
Dept: SURGERY | Facility: CLINIC | Age: 49
DRG: 417 | End: 2024-10-24
Payer: MEDICARE

## 2024-10-24 LAB
ALBUMIN SERPL BCG-MCNC: 3.4 G/DL (ref 3.5–5.2)
ALP SERPL-CCNC: 170 U/L (ref 40–150)
ALT SERPL W P-5'-P-CCNC: 56 U/L (ref 0–70)
ANION GAP SERPL CALCULATED.3IONS-SCNC: 9 MMOL/L (ref 7–15)
AST SERPL W P-5'-P-CCNC: 36 U/L (ref 0–45)
BACTERIA SPEC CULT: ABNORMAL
BACTERIA UR CULT: NO GROWTH
BILIRUB SERPL-MCNC: 0.3 MG/DL
BUN SERPL-MCNC: 25.1 MG/DL (ref 6–20)
CALCIUM SERPL-MCNC: 9.2 MG/DL (ref 8.8–10.4)
CHLORIDE SERPL-SCNC: 100 MMOL/L (ref 98–107)
CREAT SERPL-MCNC: 0.62 MG/DL (ref 0.67–1.17)
EGFRCR SERPLBLD CKD-EPI 2021: >90 ML/MIN/1.73M2
ERYTHROCYTE [DISTWIDTH] IN BLOOD BY AUTOMATED COUNT: 14.6 % (ref 10–15)
GLUCOSE BLDC GLUCOMTR-MCNC: 119 MG/DL (ref 70–99)
GLUCOSE BLDC GLUCOMTR-MCNC: 120 MG/DL (ref 70–99)
GLUCOSE BLDC GLUCOMTR-MCNC: 124 MG/DL (ref 70–99)
GLUCOSE BLDC GLUCOMTR-MCNC: 140 MG/DL (ref 70–99)
GLUCOSE BLDC GLUCOMTR-MCNC: 144 MG/DL (ref 70–99)
GLUCOSE BLDC GLUCOMTR-MCNC: 146 MG/DL (ref 70–99)
GLUCOSE SERPL-MCNC: 132 MG/DL (ref 70–99)
GRAM STAIN RESULT: ABNORMAL
GRAM STAIN RESULT: ABNORMAL
HCO3 SERPL-SCNC: 31 MMOL/L (ref 22–29)
HCT VFR BLD AUTO: 33.4 % (ref 40–53)
HGB BLD-MCNC: 10.5 G/DL (ref 13.3–17.7)
MCH RBC QN AUTO: 28.9 PG (ref 26.5–33)
MCHC RBC AUTO-ENTMCNC: 31.4 G/DL (ref 31.5–36.5)
MCV RBC AUTO: 92 FL (ref 78–100)
PLATELET # BLD AUTO: 223 10E3/UL (ref 150–450)
POTASSIUM SERPL-SCNC: 4.1 MMOL/L (ref 3.4–5.3)
PROT SERPL-MCNC: 7 G/DL (ref 6.4–8.3)
RBC # BLD AUTO: 3.63 10E6/UL (ref 4.4–5.9)
SODIUM SERPL-SCNC: 140 MMOL/L (ref 135–145)
WBC # BLD AUTO: 12.4 10E3/UL (ref 4–11)

## 2024-10-24 PROCEDURE — 99232 SBSQ HOSP IP/OBS MODERATE 35: CPT | Performed by: INTERNAL MEDICINE

## 2024-10-24 PROCEDURE — 250N000011 HC RX IP 250 OP 636: Performed by: SURGERY

## 2024-10-24 PROCEDURE — 250N000025 HC SEVOFLURANE, PER MIN: Performed by: SURGERY

## 2024-10-24 PROCEDURE — 87070 CULTURE OTHR SPECIMN AEROBIC: CPT | Performed by: SURGERY

## 2024-10-24 PROCEDURE — 250N000011 HC RX IP 250 OP 636: Performed by: NURSE ANESTHETIST, CERTIFIED REGISTERED

## 2024-10-24 PROCEDURE — 120N000013 HC R&B IMCU

## 2024-10-24 PROCEDURE — 250N000011 HC RX IP 250 OP 636: Performed by: INTERNAL MEDICINE

## 2024-10-24 PROCEDURE — 999N000185 HC STATISTIC TRANSPORT TIME EA 15 MIN

## 2024-10-24 PROCEDURE — 87077 CULTURE AEROBIC IDENTIFY: CPT | Performed by: SURGERY

## 2024-10-24 PROCEDURE — 250N000009 HC RX 250: Performed by: INTERNAL MEDICINE

## 2024-10-24 PROCEDURE — 88304 TISSUE EXAM BY PATHOLOGIST: CPT | Mod: TC | Performed by: SURGERY

## 2024-10-24 PROCEDURE — 258N000001 HC RX 258: Performed by: SURGERY

## 2024-10-24 PROCEDURE — 250N000009 HC RX 250: Performed by: SURGERY

## 2024-10-24 PROCEDURE — 370N000017 HC ANESTHESIA TECHNICAL FEE, PER MIN: Performed by: SURGERY

## 2024-10-24 PROCEDURE — 360N000076 HC SURGERY LEVEL 3, PER MIN: Performed by: SURGERY

## 2024-10-24 PROCEDURE — 47562 LAPAROSCOPIC CHOLECYSTECTOMY: CPT | Performed by: SURGERY

## 2024-10-24 PROCEDURE — 999N000157 HC STATISTIC RCP TIME EA 10 MIN

## 2024-10-24 PROCEDURE — 250N000012 HC RX MED GY IP 250 OP 636 PS 637: Performed by: SURGERY

## 2024-10-24 PROCEDURE — 85027 COMPLETE CBC AUTOMATED: CPT | Performed by: ANESTHESIOLOGY

## 2024-10-24 PROCEDURE — 0FT44ZZ RESECTION OF GALLBLADDER, PERCUTANEOUS ENDOSCOPIC APPROACH: ICD-10-PCS | Performed by: SURGERY

## 2024-10-24 PROCEDURE — 87186 SC STD MICRODIL/AGAR DIL: CPT | Performed by: SURGERY

## 2024-10-24 PROCEDURE — 250N000011 HC RX IP 250 OP 636: Performed by: ANESTHESIOLOGY

## 2024-10-24 PROCEDURE — 47562 LAPAROSCOPIC CHOLECYSTECTOMY: CPT | Mod: AS | Performed by: PHYSICIAN ASSISTANT

## 2024-10-24 PROCEDURE — 272N000001 HC OR GENERAL SUPPLY STERILE: Performed by: SURGERY

## 2024-10-24 PROCEDURE — 87075 CULTR BACTERIA EXCEPT BLOOD: CPT | Performed by: SURGERY

## 2024-10-24 PROCEDURE — 80053 COMPREHEN METABOLIC PANEL: CPT | Performed by: ANESTHESIOLOGY

## 2024-10-24 PROCEDURE — 258N000003 HC RX IP 258 OP 636: Performed by: NURSE ANESTHETIST, CERTIFIED REGISTERED

## 2024-10-24 PROCEDURE — 710N000010 HC RECOVERY PHASE 1, LEVEL 2, PER MIN: Performed by: SURGERY

## 2024-10-24 PROCEDURE — 87205 SMEAR GRAM STAIN: CPT | Performed by: SURGERY

## 2024-10-24 PROCEDURE — 36415 COLL VENOUS BLD VENIPUNCTURE: CPT | Performed by: ANESTHESIOLOGY

## 2024-10-24 PROCEDURE — 94660 CPAP INITIATION&MGMT: CPT

## 2024-10-24 PROCEDURE — 250N000013 HC RX MED GY IP 250 OP 250 PS 637: Performed by: SURGERY

## 2024-10-24 PROCEDURE — 250N000009 HC RX 250: Performed by: NURSE ANESTHETIST, CERTIFIED REGISTERED

## 2024-10-24 PROCEDURE — 94640 AIRWAY INHALATION TREATMENT: CPT | Mod: 76

## 2024-10-24 PROCEDURE — 999N000141 HC STATISTIC PRE-PROCEDURE NURSING ASSESSMENT: Performed by: SURGERY

## 2024-10-24 RX ORDER — BUPIVACAINE HYDROCHLORIDE 5 MG/ML
INJECTION, SOLUTION EPIDURAL; INTRACAUDAL PRN
Status: DISCONTINUED | OUTPATIENT
Start: 2024-10-24 | End: 2024-10-24 | Stop reason: HOSPADM

## 2024-10-24 RX ORDER — NALOXONE HYDROCHLORIDE 0.4 MG/ML
0.1 INJECTION, SOLUTION INTRAMUSCULAR; INTRAVENOUS; SUBCUTANEOUS
Status: DISCONTINUED | OUTPATIENT
Start: 2024-10-24 | End: 2024-10-24 | Stop reason: HOSPADM

## 2024-10-24 RX ORDER — CLINDAMYCIN PHOSPHATE 900 MG/50ML
900 INJECTION, SOLUTION INTRAVENOUS SEE ADMIN INSTRUCTIONS
Status: DISCONTINUED | OUTPATIENT
Start: 2024-10-24 | End: 2024-10-24

## 2024-10-24 RX ORDER — HYDROMORPHONE HCL IN WATER/PF 6 MG/30 ML
0.2 PATIENT CONTROLLED ANALGESIA SYRINGE INTRAVENOUS EVERY 5 MIN PRN
Status: DISCONTINUED | OUTPATIENT
Start: 2024-10-24 | End: 2024-10-24 | Stop reason: HOSPADM

## 2024-10-24 RX ORDER — ONDANSETRON 4 MG/1
4 TABLET, ORALLY DISINTEGRATING ORAL EVERY 30 MIN PRN
Status: DISCONTINUED | OUTPATIENT
Start: 2024-10-24 | End: 2024-10-24 | Stop reason: HOSPADM

## 2024-10-24 RX ORDER — INDOCYANINE GREEN AND WATER 25 MG
2.5 KIT INJECTION ONCE
Status: COMPLETED | OUTPATIENT
Start: 2024-10-24 | End: 2024-10-24

## 2024-10-24 RX ORDER — ONDANSETRON 2 MG/ML
4 INJECTION INTRAMUSCULAR; INTRAVENOUS EVERY 30 MIN PRN
Status: DISCONTINUED | OUTPATIENT
Start: 2024-10-24 | End: 2024-10-24 | Stop reason: HOSPADM

## 2024-10-24 RX ORDER — NEOSTIGMINE METHYLSULFATE 1 MG/ML
VIAL (ML) INJECTION PRN
Status: DISCONTINUED | OUTPATIENT
Start: 2024-10-24 | End: 2024-10-24

## 2024-10-24 RX ORDER — CEFAZOLIN SODIUM/WATER 3 G/30 ML
3 SYRINGE (ML) INTRAVENOUS SEE ADMIN INSTRUCTIONS
Status: DISCONTINUED | OUTPATIENT
Start: 2024-10-24 | End: 2024-10-24 | Stop reason: HOSPADM

## 2024-10-24 RX ORDER — DEXAMETHASONE SODIUM PHOSPHATE 4 MG/ML
4 INJECTION, SOLUTION INTRA-ARTICULAR; INTRALESIONAL; INTRAMUSCULAR; INTRAVENOUS; SOFT TISSUE
Status: DISCONTINUED | OUTPATIENT
Start: 2024-10-24 | End: 2024-10-24 | Stop reason: HOSPADM

## 2024-10-24 RX ORDER — LIDOCAINE HYDROCHLORIDE 20 MG/ML
INJECTION, SOLUTION INFILTRATION; PERINEURAL PRN
Status: DISCONTINUED | OUTPATIENT
Start: 2024-10-24 | End: 2024-10-24

## 2024-10-24 RX ORDER — ONDANSETRON 2 MG/ML
INJECTION INTRAMUSCULAR; INTRAVENOUS PRN
Status: DISCONTINUED | OUTPATIENT
Start: 2024-10-24 | End: 2024-10-24

## 2024-10-24 RX ORDER — HYDROMORPHONE HCL IN WATER/PF 6 MG/30 ML
0.4 PATIENT CONTROLLED ANALGESIA SYRINGE INTRAVENOUS EVERY 5 MIN PRN
Status: DISCONTINUED | OUTPATIENT
Start: 2024-10-24 | End: 2024-10-24 | Stop reason: HOSPADM

## 2024-10-24 RX ORDER — FENTANYL CITRATE 50 UG/ML
25 INJECTION, SOLUTION INTRAMUSCULAR; INTRAVENOUS EVERY 5 MIN PRN
Status: DISCONTINUED | OUTPATIENT
Start: 2024-10-24 | End: 2024-10-24 | Stop reason: HOSPADM

## 2024-10-24 RX ORDER — FENTANYL CITRATE 50 UG/ML
INJECTION, SOLUTION INTRAMUSCULAR; INTRAVENOUS PRN
Status: DISCONTINUED | OUTPATIENT
Start: 2024-10-24 | End: 2024-10-24

## 2024-10-24 RX ORDER — OXYCODONE HYDROCHLORIDE 5 MG/1
5 TABLET ORAL
Status: DISCONTINUED | OUTPATIENT
Start: 2024-10-24 | End: 2024-10-24 | Stop reason: HOSPADM

## 2024-10-24 RX ORDER — ALBUTEROL SULFATE 0.83 MG/ML
2.5 SOLUTION RESPIRATORY (INHALATION)
Status: DISCONTINUED | OUTPATIENT
Start: 2024-10-24 | End: 2024-10-25 | Stop reason: HOSPADM

## 2024-10-24 RX ORDER — SODIUM CHLORIDE, SODIUM LACTATE, POTASSIUM CHLORIDE, CALCIUM CHLORIDE 600; 310; 30; 20 MG/100ML; MG/100ML; MG/100ML; MG/100ML
INJECTION, SOLUTION INTRAVENOUS CONTINUOUS PRN
Status: DISCONTINUED | OUTPATIENT
Start: 2024-10-24 | End: 2024-10-24

## 2024-10-24 RX ORDER — CEFAZOLIN SODIUM/WATER 3 G/30 ML
3 SYRINGE (ML) INTRAVENOUS
Status: COMPLETED | OUTPATIENT
Start: 2024-10-24 | End: 2024-10-24

## 2024-10-24 RX ORDER — SODIUM CHLORIDE, SODIUM LACTATE, POTASSIUM CHLORIDE, CALCIUM CHLORIDE 600; 310; 30; 20 MG/100ML; MG/100ML; MG/100ML; MG/100ML
INJECTION, SOLUTION INTRAVENOUS CONTINUOUS
Status: DISCONTINUED | OUTPATIENT
Start: 2024-10-24 | End: 2024-10-24 | Stop reason: HOSPADM

## 2024-10-24 RX ORDER — LABETALOL HYDROCHLORIDE 5 MG/ML
INJECTION, SOLUTION INTRAVENOUS PRN
Status: DISCONTINUED | OUTPATIENT
Start: 2024-10-24 | End: 2024-10-24

## 2024-10-24 RX ORDER — LABETALOL HYDROCHLORIDE 5 MG/ML
10 INJECTION, SOLUTION INTRAVENOUS
Status: DISCONTINUED | OUTPATIENT
Start: 2024-10-24 | End: 2024-10-24 | Stop reason: HOSPADM

## 2024-10-24 RX ORDER — DEXAMETHASONE SODIUM PHOSPHATE 4 MG/ML
INJECTION, SOLUTION INTRA-ARTICULAR; INTRALESIONAL; INTRAMUSCULAR; INTRAVENOUS; SOFT TISSUE PRN
Status: DISCONTINUED | OUTPATIENT
Start: 2024-10-24 | End: 2024-10-24

## 2024-10-24 RX ORDER — FENTANYL CITRATE 50 UG/ML
50 INJECTION, SOLUTION INTRAMUSCULAR; INTRAVENOUS EVERY 5 MIN PRN
Status: DISCONTINUED | OUTPATIENT
Start: 2024-10-24 | End: 2024-10-24 | Stop reason: HOSPADM

## 2024-10-24 RX ORDER — GLYCOPYRROLATE 0.2 MG/ML
INJECTION, SOLUTION INTRAMUSCULAR; INTRAVENOUS PRN
Status: DISCONTINUED | OUTPATIENT
Start: 2024-10-24 | End: 2024-10-24

## 2024-10-24 RX ORDER — GLYCINE 1.5 G/100ML
SOLUTION IRRIGATION PRN
Status: DISCONTINUED | OUTPATIENT
Start: 2024-10-24 | End: 2024-10-24 | Stop reason: HOSPADM

## 2024-10-24 RX ORDER — PROPOFOL 10 MG/ML
INJECTION, EMULSION INTRAVENOUS PRN
Status: DISCONTINUED | OUTPATIENT
Start: 2024-10-24 | End: 2024-10-24

## 2024-10-24 RX ORDER — CLINDAMYCIN PHOSPHATE 900 MG/50ML
900 INJECTION, SOLUTION INTRAVENOUS
Status: DISCONTINUED | OUTPATIENT
Start: 2024-10-24 | End: 2024-10-24

## 2024-10-24 RX ADMIN — LEVOCARNITINE 660 MG: 330 TABLET ORAL at 21:23

## 2024-10-24 RX ADMIN — FENTANYL CITRATE 50 MCG: 0.05 INJECTION, SOLUTION INTRAMUSCULAR; INTRAVENOUS at 12:11

## 2024-10-24 RX ADMIN — METHYLPREDNISOLONE SODIUM SUCCINATE 40 MG: 40 INJECTION, POWDER, FOR SOLUTION INTRAMUSCULAR; INTRAVENOUS at 02:13

## 2024-10-24 RX ADMIN — FENTANYL CITRATE 50 MCG: 50 INJECTION INTRAMUSCULAR; INTRAVENOUS at 09:58

## 2024-10-24 RX ADMIN — INSULIN ASPART 1 UNITS: 100 INJECTION, SOLUTION INTRAVENOUS; SUBCUTANEOUS at 21:12

## 2024-10-24 RX ADMIN — SODIUM CHLORIDE, POTASSIUM CHLORIDE, SODIUM LACTATE AND CALCIUM CHLORIDE: 600; 310; 30; 20 INJECTION, SOLUTION INTRAVENOUS at 09:14

## 2024-10-24 RX ADMIN — Medication 3 G: at 09:14

## 2024-10-24 RX ADMIN — DIVALPROEX SODIUM 500 MG: 500 TABLET, DELAYED RELEASE ORAL at 21:23

## 2024-10-24 RX ADMIN — PIPERACILLIN AND TAZOBACTAM 3.38 G: 3; .375 INJECTION, POWDER, FOR SOLUTION INTRAVENOUS at 02:13

## 2024-10-24 RX ADMIN — IPRATROPIUM BROMIDE AND ALBUTEROL SULFATE 3 ML: .5; 3 SOLUTION RESPIRATORY (INHALATION) at 23:31

## 2024-10-24 RX ADMIN — PROPRANOLOL HYDROCHLORIDE 20 MG: 20 TABLET ORAL at 21:21

## 2024-10-24 RX ADMIN — PIPERACILLIN AND TAZOBACTAM 3.38 G: 3; .375 INJECTION, POWDER, FOR SOLUTION INTRAVENOUS at 21:14

## 2024-10-24 RX ADMIN — METHYLCELLULOSE 500 MG: 500 TABLET ORAL at 21:23

## 2024-10-24 RX ADMIN — FENTANYL CITRATE 50 MCG: 50 INJECTION INTRAMUSCULAR; INTRAVENOUS at 09:55

## 2024-10-24 RX ADMIN — IPRATROPIUM BROMIDE AND ALBUTEROL SULFATE 3 ML: .5; 3 SOLUTION RESPIRATORY (INHALATION) at 15:32

## 2024-10-24 RX ADMIN — QUETIAPINE FUMARATE 300 MG: 200 TABLET ORAL at 21:14

## 2024-10-24 RX ADMIN — PIPERACILLIN AND TAZOBACTAM 3.38 G: 3; .375 INJECTION, POWDER, FOR SOLUTION INTRAVENOUS at 15:35

## 2024-10-24 RX ADMIN — LAMOTRIGINE 200 MG: 100 TABLET, FILM COATED, EXTENDED RELEASE ORAL at 21:24

## 2024-10-24 RX ADMIN — IPRATROPIUM BROMIDE AND ALBUTEROL SULFATE 3 ML: .5; 3 SOLUTION RESPIRATORY (INHALATION) at 03:30

## 2024-10-24 RX ADMIN — IPRATROPIUM BROMIDE AND ALBUTEROL SULFATE 3 ML: .5; 3 SOLUTION RESPIRATORY (INHALATION) at 07:39

## 2024-10-24 RX ADMIN — GLYCOPYRROLATE 0.4 MG: 0.2 INJECTION, SOLUTION INTRAMUSCULAR; INTRAVENOUS at 11:19

## 2024-10-24 RX ADMIN — BUSPIRONE HYDROCHLORIDE 15 MG: 5 TABLET ORAL at 21:14

## 2024-10-24 RX ADMIN — ROCURONIUM BROMIDE 40 MG: 50 INJECTION, SOLUTION INTRAVENOUS at 09:32

## 2024-10-24 RX ADMIN — LABETALOL HYDROCHLORIDE 5 MG: 5 INJECTION, SOLUTION INTRAVENOUS at 10:20

## 2024-10-24 RX ADMIN — ZONISAMIDE 200 MG: 100 CAPSULE ORAL at 21:15

## 2024-10-24 RX ADMIN — HYDROMORPHONE HYDROCHLORIDE 0.2 MG: 0.2 INJECTION, SOLUTION INTRAMUSCULAR; INTRAVENOUS; SUBCUTANEOUS at 13:20

## 2024-10-24 RX ADMIN — METHYLPREDNISOLONE SODIUM SUCCINATE 40 MG: 40 INJECTION, POWDER, FOR SOLUTION INTRAMUSCULAR; INTRAVENOUS at 15:35

## 2024-10-24 RX ADMIN — DEXAMETHASONE SODIUM PHOSPHATE 8 MG: 4 INJECTION, SOLUTION INTRA-ARTICULAR; INTRALESIONAL; INTRAMUSCULAR; INTRAVENOUS; SOFT TISSUE at 09:23

## 2024-10-24 RX ADMIN — LEVETIRACETAM 1500 MG: 750 TABLET, FILM COATED, EXTENDED RELEASE ORAL at 21:23

## 2024-10-24 RX ADMIN — LABETALOL HYDROCHLORIDE 5 MG: 5 INJECTION, SOLUTION INTRAVENOUS at 11:38

## 2024-10-24 RX ADMIN — ONDANSETRON 4 MG: 2 INJECTION INTRAMUSCULAR; INTRAVENOUS at 13:20

## 2024-10-24 RX ADMIN — CLOTRIMAZOLE: 1 CREAM TOPICAL at 21:25

## 2024-10-24 RX ADMIN — BUSPIRONE HYDROCHLORIDE 15 MG: 5 TABLET ORAL at 15:36

## 2024-10-24 RX ADMIN — PRAZOSIN HYDROCHLORIDE 2 MG: 1 CAPSULE ORAL at 21:21

## 2024-10-24 RX ADMIN — LEVOCARNITINE 660 MG: 330 TABLET ORAL at 15:35

## 2024-10-24 RX ADMIN — LIDOCAINE HYDROCHLORIDE 50 MG: 20 INJECTION, SOLUTION INFILTRATION; PERINEURAL at 09:23

## 2024-10-24 RX ADMIN — NEOSTIGMINE METHYLSULFATE 3 MG: 1 INJECTION, SOLUTION INTRAVENOUS at 11:19

## 2024-10-24 RX ADMIN — PROPOFOL 200 MG: 10 INJECTION, EMULSION INTRAVENOUS at 09:23

## 2024-10-24 RX ADMIN — Medication 2.5 MG: at 08:13

## 2024-10-24 RX ADMIN — ONDANSETRON 4 MG: 2 INJECTION INTRAMUSCULAR; INTRAVENOUS at 10:52

## 2024-10-24 RX ADMIN — FENTANYL CITRATE 100 MCG: 50 INJECTION INTRAMUSCULAR; INTRAVENOUS at 09:23

## 2024-10-24 RX ADMIN — OXYCODONE HYDROCHLORIDE 10 MG: 5 TABLET ORAL at 15:39

## 2024-10-24 RX ADMIN — OXYCODONE HYDROCHLORIDE 10 MG: 5 TABLET ORAL at 19:58

## 2024-10-24 RX ADMIN — ROCURONIUM BROMIDE 10 MG: 50 INJECTION, SOLUTION INTRAVENOUS at 09:47

## 2024-10-24 RX ADMIN — Medication 100 MG: at 09:23

## 2024-10-24 ASSESSMENT — ENCOUNTER SYMPTOMS: SEIZURES: 1

## 2024-10-24 NOTE — PROVIDER NOTIFICATION
Pt wore BiPAP last night without issue. Skin looked good and intact, mask alternated Q4. See pt settings and parameters below:       10/23/24 2325 10/24/24 0330   CPAP/BiPAP/Settings   IPAP/EPAP (cmH2O) 18/7 18/7   Rate (breaths/min) 22 22   Oxygen (%) 30 30   Timed Inspiration (sec) 0.75 0.75   IPAP RISE  Settings (V60) 2 2   CPAP/BiPAP Patient Parameters   IPAP (cm H2O) 18 cmH2O 18 cmH2O   EPAP (cm H2O) 7 cmH2O 7 cmH2O   Pressure Support (cm H2O) 11 cmH2O 11 cmH2O   RR Total (breaths/min) 24 breaths/min 24 breaths/min   Vt (mL) 408 mL 349 mL   Minute Ventilation (L/min) 10.5 L/min 7.9 L/min   Peak Inspiratory Pressure (cm H2O) 18 cmH2O 18 cmH2O   Pt.  Leak (L/min) 9 L/min 29 L/min

## 2024-10-24 NOTE — PLAN OF CARE
"ICU End of Shift Summary.  For vital signs and complete assessments, please see documentation flowsheets.     Pertinent assessments: A&Ox3-4. Tele SR. NC 4L. LS dim. Tolerating clear liquids. 4 lap sites CDI. Due to void, bladder scanned for 2ml around 1630.  Lines: PIV x2.   Major Shift Events: -successful lap pancho.   -IV diludid and oxy given for pain with little relief.     Plan (Upcoming Events): TBD   Discharge/Transfer Needs: TBD    Bedside Shift Report Completed : Y  Bedside Safety Check Completed: Y        Goal Outcome Evaluation:      Plan of Care Reviewed With: patient    Overall Patient Progress: improvingOverall Patient Progress: improving    Outcome Evaluation: .      Problem: Adult Inpatient Plan of Care  Goal: Plan of Care Review  Description: The Plan of Care Review/Shift note should be completed every shift.  The Outcome Evaluation is a brief statement about your assessment that the patient is improving, declining, or no change.  This information will be displayed automatically on your shift  note.  Outcome: Progressing  Flowsheets (Taken 10/24/2024 1718)  Outcome Evaluation: .  Plan of Care Reviewed With: patient  Overall Patient Progress: improving  Goal: Patient-Specific Goal (Individualized)  Description: You can add care plan individualizations to a care plan. Examples of Individualization might be:  \"Parent requests to be called daily at 9am for status\", \"I have a hard time hearing out of my right ear\", or \"Do not touch me to wake me up as it startles  me\".  Outcome: Progressing  Goal: Absence of Hospital-Acquired Illness or Injury  Outcome: Progressing  Intervention: Identify and Manage Fall Risk  Recent Flowsheet Documentation  Taken 10/24/2024 1530 by Adina Gaines RN  Safety Promotion/Fall Prevention:   clutter free environment maintained   increased rounding and observation   increase visualization of patient   lighting adjusted   nonskid shoes/slippers when out of bed   patient and " family education   room door open   room near nurse's station   safety round/check completed   supervised activity  Taken 10/24/2024 1300 by Adina Gaines RN  Safety Promotion/Fall Prevention:   clutter free environment maintained   increased rounding and observation   increase visualization of patient   lighting adjusted   nonskid shoes/slippers when out of bed   patient and family education   room door open   room near nurse's station   safety round/check completed   supervised activity  Intervention: Prevent Skin Injury  Recent Flowsheet Documentation  Taken 10/24/2024 1530 by Adina Gaines RN  Body Position: supine, head elevated  Taken 10/24/2024 1300 by Adina Gaines RN  Body Position: supine, head elevated  Intervention: Prevent and Manage VTE (Venous Thromboembolism) Risk  Recent Flowsheet Documentation  Taken 10/24/2024 1530 by Adina Gaines RN  VTE Prevention/Management: SCDs on (sequential compression devices)  Taken 10/24/2024 1300 by Adina Gaines RN  VTE Prevention/Management: SCDs on (sequential compression devices)  Intervention: Prevent Infection  Recent Flowsheet Documentation  Taken 10/24/2024 1530 by Adina Gaines RN  Infection Prevention:   environmental surveillance performed   equipment surfaces disinfected   hand hygiene promoted   personal protective equipment utilized   rest/sleep promoted   single patient room provided  Taken 10/24/2024 1300 by Adina Gaines RN  Infection Prevention:   environmental surveillance performed   equipment surfaces disinfected   hand hygiene promoted   personal protective equipment utilized   rest/sleep promoted   single patient room provided  Goal: Optimal Comfort and Wellbeing  Outcome: Progressing  Intervention: Provide Person-Centered Care  Recent Flowsheet Documentation  Taken 10/24/2024 1530 by Adina Gaines RN  Trust Relationship/Rapport:   care explained   choices provided   questions answered   reassurance provided  Taken  10/24/2024 1300 by Adina Gaines RN  Trust Relationship/Rapport:   care explained   choices provided   questions answered   reassurance provided  Goal: Readiness for Transition of Care  Outcome: Progressing  Intervention: Mutually Develop Transition Plan  Recent Flowsheet Documentation  Taken 10/24/2024 1300 by Adina Gaines RN  Equipment Currently Used at Home: wheelchair, manual     Problem: Fall Injury Risk  Goal: Absence of Fall and Fall-Related Injury  Outcome: Progressing  Intervention: Identify and Manage Contributors  Recent Flowsheet Documentation  Taken 10/24/2024 1530 by Adina Gaines RN  Medication Review/Management: medications reviewed  Taken 10/24/2024 1300 by Adina Gaines RN  Medication Review/Management: medications reviewed  Intervention: Promote Injury-Free Environment  Recent Flowsheet Documentation  Taken 10/24/2024 1530 by Adina Gaines RN  Safety Promotion/Fall Prevention:   clutter free environment maintained   increased rounding and observation   increase visualization of patient   lighting adjusted   nonskid shoes/slippers when out of bed   patient and family education   room door open   room near nurse's station   safety round/check completed   supervised activity  Taken 10/24/2024 1300 by Adina Gaines RN  Safety Promotion/Fall Prevention:   clutter free environment maintained   increased rounding and observation   increase visualization of patient   lighting adjusted   nonskid shoes/slippers when out of bed   patient and family education   room door open   room near nurse's station   safety round/check completed   supervised activity     Problem: Pain Acute  Goal: Optimal Pain Control and Function  Outcome: Progressing  Intervention: Prevent or Manage Pain  Recent Flowsheet Documentation  Taken 10/24/2024 1530 by Adina Gaines RN  Medication Review/Management: medications reviewed  Taken 10/24/2024 1300 by Adina Gaines RN  Medication Review/Management:  medications reviewed     Problem: Gas Exchange Impaired  Goal: Optimal Gas Exchange  Outcome: Progressing  Intervention: Optimize Oxygenation and Ventilation  Recent Flowsheet Documentation  Taken 10/24/2024 1530 by Adina Gaines, RN  Head of Bed (\[facility]\"") Positioning: HOB at 30-45 degrees  Taken 10/24/2024 1300 by Adina Gaines, RN  Head of Bed (\[facility]\"") Positioning: HOB at 20-30 degrees

## 2024-10-24 NOTE — OP NOTE
General Surgery Operative Note    PREOPERATIVE DIAGNOSIS:  Gallstones [K80.20]    POSTOPERATIVE DIAGNOSIS:  Same, Acute cholecystitis     PROCEDURE:  Laparoscopic Cholecystectomy    ANESTHESIA:  General    PREOPERATIVE MEDICATIONS:  Ancef IV.    SURGEON:  Bethany Torres MD    ASSISTANT:  Dinorah Bashir PA-C  The Physician Assistant was medically necessary for their expertise in prepping, camera management, suctioning, suturing and retraction.    ESTIMATED BLOOD LOSS:  300 ml    INDICATIONS:  Tre Vazquez is a 49 year old male who has been experiencing fevers, confusion, shortness of breath, and vague abdominal pain for the past several days.  Abdominal imaging has revealed gallstones and gallbladder wall thickening.  He has a palpable, firm gallbladder on physical exam. He now presents for laparoscopic cholecystectomy after having risks and benefits reviewed in detail.    PROCEDURE:   A supraumbilical incision was made and the abdomen was entered using a Bebeto trocar technique.  Secondary trocars were placed under laparoscopic guidance.  We proceeded in the usual fashion first finding the gallbladder neck cystic duct junction. The gallbladder was too taut to grasp, so a needle aspirator was used to withdraw ~ 120 ml of brown, cloudy bile. The bile was sent for culture.     The cystic artery was then identified and cleared of inflammatory adhesions. It was crossing diagonally over the cystic duct. The cystic duct was similarly identified.  Once a critical window of safety was achieved, the cystic duct was triply clipped and divided.  The cystic artery was also triply clipped and divided. The gallbladder was removed from the gallbladder bed using cautery.  This was performed with some difficulty as the gallbladder wall was markedly thickened.  There was fairly significant blood loss as a generalized ooze from the liver bed throughout the dissection. Once free, the gallbladder was placed in an EndoCatch bag.  The  liver bed was inspected for hemostasis, which was ensured with cautery and surgicel powder. The gallbladder was extracted piecemeal from the supraumbilical site after crushing the very large stones which were present in the gallbladder.  Trocar sites were all infiltrated with 0.5% Marcaine plain. The supraumbilical fascial opening was closed with interrupted 0 Vicryl.  The supraumbilical port site was thoroughly irrigated with Irricept solution. The skin was closed using 4-0 subcuticular vicryl. Steri-Strips were placed on the incisions. The patient was transferred to recovery in good condition.        INTRAOPERATIVE FINDINGS: Markedly inflamed gallbladder with large gallstones and sludgy bile.    Specimens:   ID Type Source Tests Collected by Time Destination   1 : GALLBLADDER AND CONTENTS Tissue Gallbladder SURGICAL PATHOLOGY EXAM Bethany Torres MD 10/24/2024 11:03 AM    A : BILE Body fluid, unsp Gallbladder ANAEROBIC BACTERIAL CULTURE ROUTINE, GRAM STAIN, AEROBIC BACTERIAL CULTURE ROUTINE Bethany Torres MD 10/24/2024 11:19 AM        Bethany Torres MD

## 2024-10-24 NOTE — ANESTHESIA PROCEDURE NOTES
Airway       Patient location during procedure: OR       Procedure Start/Stop Times: 10/24/2024 9:26 AM  Staff -        Anesthesiologist:  Elicia Esteves MD       CRNA: Severson, Dean Dennis, APRN CRNA       Performed By: CRNA  Consent for Airway        Urgency: elective  Indications and Patient Condition       Indications for airway management: armida-procedural and airway protection       Induction type:intravenous       Mask difficulty assessment: 1 - vent by mask    Final Airway Details       Final airway type: endotracheal airway       Successful airway: ETT - single  Endotracheal Airway Details        ETT size (mm): 8.0       Cuffed: yes       Successful intubation technique: video laryngoscopy       VL Blade Size: Glidescope 4       Grade View of Cords: 1       Adjucts: stylet       Position: Center       Measured from: lips       Secured at (cm): 24       Bite block used: Soft    Post intubation assessment        Placement verified by: capnometry, equal breath sounds and chest rise        Number of attempts at approach: 1       Number of other approaches attempted: 0       Secured with: tape       Ease of procedure: easy       Dentition: Intact and Unchanged    Medication(s) Administered   Medication Administration Time: 10/24/2024 9:26 AM

## 2024-10-24 NOTE — ANESTHESIA CARE TRANSFER NOTE
Patient: Tre Vazquez    Procedure: Procedure(s):  LAPAROSCOPIC CHOLECYSTECTOMY (no cholangiogram)       Diagnosis: Gallstones [K80.20]  Diagnosis Additional Information: No value filed.    Anesthesia Type:   General     Note:    Oropharynx: oropharynx clear of all foreign objects, ventilatory support and CPAP/BIPAP  Level of Consciousness: drowsy    Supplemental oxygen: BIPAP 60% FiO2.  Independent Airway: airway patency satisfactory and stable (with Bipap)  Dentition: dentition unchanged  Vital Signs Stable: post-procedure vital signs reviewed and stable  Report to RN Given: handoff report given  Patient transferred to: PACU    Handoff Report: Identifed the Patient, Identified the Reponsible Provider, Reviewed the pertinent medical history, Discussed the surgical course, Reviewed Intra-OP anesthesia mangement and issues during anesthesia, Set expectations for post-procedure period and Allowed opportunity for questions and acknowledgement of understanding      Vitals:  Vitals Value Taken Time   /91 10/24/24 1158   Temp     Pulse 71 10/24/24 1201   Resp 25 10/24/24 1201   SpO2 91 % 10/24/24 1201   Vitals shown include unfiled device data.    Electronically Signed By: Dean Dennis Severson, APRN CRNA  October 24, 2024  12:02 PM

## 2024-10-24 NOTE — PLAN OF CARE
"Goal Outcome Evaluation:      Plan of Care Reviewed With: patient    Overall Patient Progress: improvingOverall Patient Progress: improving    Outcome Evaluation: .    ICU End of Shift Summary.  For vital signs and complete assessments, please see documentation flowsheets.     Pertinent assessments: VSS on BiPAP 30%. Tele: SR. Clear, dim lung sounds. A/O x3, disoriented to time. NPO. Distended, tender abdomen. Patient voiding as needed with urinal. Multiple loose bowel movements. L ear wound from NC. WOC following. PIV x2. IV zosyn, solumedrol.   Major Shift Events:   - Multiple bowel movements. Order for rectal tube obtained.   - NPO since midnight.   Plan (Upcoming Events): Surgery in AM.   Discharge/Transfer Needs: TBD    Bedside Shift Report Completed : Yes  Bedside Safety Check Completed: Yes          Problem: Adult Inpatient Plan of Care  Goal: Plan of Care Review  Description: The Plan of Care Review/Shift note should be completed every shift.  The Outcome Evaluation is a brief statement about your assessment that the patient is improving, declining, or no change.  This information will be displayed automatically on your shift  note.  Outcome: Progressing  Flowsheets (Taken 10/24/2024 0136)  Outcome Evaluation: .  Plan of Care Reviewed With: patient  Overall Patient Progress: improving  Goal: Patient-Specific Goal (Individualized)  Description: You can add care plan individualizations to a care plan. Examples of Individualization might be:  \"Parent requests to be called daily at 9am for status\", \"I have a hard time hearing out of my right ear\", or \"Do not touch me to wake me up as it startles  me\".  Outcome: Progressing  Goal: Absence of Hospital-Acquired Illness or Injury  Outcome: Progressing  Intervention: Identify and Manage Fall Risk  Recent Flowsheet Documentation  Taken 10/24/2024 0000 by Ranulfo Rosales, RN  Safety Promotion/Fall Prevention:   activity supervised   assistive device/personal items within " reach   clutter free environment maintained   increased rounding and observation   increase visualization of patient   lighting adjusted   mobility aid in reach   patient and family education   room door open   room near nurse's station   room organization consistent   safety round/check completed   supervised activity   treat reversible contributory factors   treat underlying cause  Taken 10/23/2024 2000 by Ranulfo Rosales RN  Safety Promotion/Fall Prevention:   activity supervised   assistive device/personal items within reach   clutter free environment maintained   increased rounding and observation   increase visualization of patient   lighting adjusted   mobility aid in reach   patient and family education   room door open   room near nurse's station   room organization consistent   safety round/check completed   supervised activity   treat reversible contributory factors   treat underlying cause  Intervention: Prevent Skin Injury  Recent Flowsheet Documentation  Taken 10/24/2024 0000 by Ranulfo Rosales RN  Body Position:   turned   left   heels elevated   log-rolled  Skin Protection:   adhesive use limited   incontinence pads utilized   skin to device areas padded   skin to skin areas padded  Taken 10/23/2024 2200 by Ranulfo Rosales RN  Body Position:   turned   right   heels elevated   log-rolled  Taken 10/23/2024 2000 by Ranulfo Rosales RN  Body Position:   turned   left   heels elevated   log-rolled  Skin Protection:   adhesive use limited   incontinence pads utilized   skin to device areas padded   skin to skin areas padded  Intervention: Prevent and Manage VTE (Venous Thromboembolism) Risk  Recent Flowsheet Documentation  Taken 10/24/2024 0000 by Ranulfo Rosales RN  VTE Prevention/Management: SCDs off (sequential compression devices)  Taken 10/23/2024 2000 by Ranulfo Rosales RN  VTE Prevention/Management: SCDs off (sequential compression devices)  Intervention: Prevent Infection  Recent Flowsheet Documentation  Taken  10/24/2024 0000 by Ranulfo Rosales, RN  Infection Prevention:   environmental surveillance performed   equipment surfaces disinfected   hand hygiene promoted   personal protective equipment utilized   rest/sleep promoted   single patient room provided  Taken 10/23/2024 2000 by Ranulfo Rosales RN  Infection Prevention:   environmental surveillance performed   equipment surfaces disinfected   hand hygiene promoted   personal protective equipment utilized   rest/sleep promoted   single patient room provided  Goal: Optimal Comfort and Wellbeing  Outcome: Progressing  Intervention: Monitor Pain and Promote Comfort  Recent Flowsheet Documentation  Taken 10/23/2024 2000 by Ranulfo Rosales RN  Pain Management Interventions: relaxation techniques promoted  Intervention: Provide Person-Centered Care  Recent Flowsheet Documentation  Taken 10/24/2024 0000 by Ranulfo Rosales RN  Trust Relationship/Rapport:   care explained   choices provided   emotional support provided   empathic listening provided   questions answered   questions encouraged   reassurance provided   thoughts/feelings acknowledged  Taken 10/23/2024 2000 by Ranulfo Rosales RN  Trust Relationship/Rapport:   care explained   choices provided   emotional support provided   empathic listening provided   questions answered   questions encouraged   reassurance provided   thoughts/feelings acknowledged  Goal: Readiness for Transition of Care  Outcome: Progressing     Problem: Fall Injury Risk  Goal: Absence of Fall and Fall-Related Injury  Outcome: Progressing  Intervention: Identify and Manage Contributors  Recent Flowsheet Documentation  Taken 10/24/2024 0000 by Ranulfo Rosales, RN  Medication Review/Management: medications reviewed  Taken 10/23/2024 2000 by Ranulfo Rosales, RN  Medication Review/Management: medications reviewed  Intervention: Promote Injury-Free Environment  Recent Flowsheet Documentation  Taken 10/24/2024 0000 by Ranulfo Rosales, RN  Safety Promotion/Fall Prevention:    activity supervised   assistive device/personal items within reach   clutter free environment maintained   increased rounding and observation   increase visualization of patient   lighting adjusted   mobility aid in reach   patient and family education   room door open   room near nurse's station   room organization consistent   safety round/check completed   supervised activity   treat reversible contributory factors   treat underlying cause  Taken 10/23/2024 2000 by Ranulfo Rosales, RN  Safety Promotion/Fall Prevention:   activity supervised   assistive device/personal items within reach   clutter free environment maintained   increased rounding and observation   increase visualization of patient   lighting adjusted   mobility aid in reach   patient and family education   room door open   room near nurses station   room organization consistent   safety round/check completed   supervised activity   treat reversible contributory factors   treat underlying cause     Problem: Pain Acute  Goal: Optimal Pain Control and Function  Outcome: Progressing  Intervention: Develop Pain Management Plan  Recent Flowsheet Documentation  Taken 10/23/2024 2000 by Ranulfo Rosales RN  Pain Management Interventions: relaxation techniques promoted  Intervention: Prevent or Manage Pain  Recent Flowsheet Documentation  Taken 10/24/2024 0000 by Ranulfo Rosales, RN  Sensory Stimulation Regulation:   care clustered   lighting decreased   auditory stimulation minimized  Sleep/Rest Enhancement: relaxation techniques promoted  Bowel Elimination Promotion: privacy promoted  Medication Review/Management: medications reviewed  Taken 10/23/2024 2000 by Ranulfo Rosales, RN  Sensory Stimulation Regulation:   care clustered   lighting decreased   auditory stimulation minimized  Sleep/Rest Enhancement: relaxation techniques promoted  Bowel Elimination Promotion: privacy promoted  Medication Review/Management: medications reviewed     Problem: Gas Exchange  Impaired  Goal: Optimal Gas Exchange  Outcome: Progressing  Intervention: Optimize Oxygenation and Ventilation  Recent Flowsheet Documentation  Taken 10/24/2024 0000 by Ranulfo Rosales, RN  Airway/Ventilation Management:   airway patency maintained   calming measures promoted   oxygen therapy provided   pulmonary hygiene promoted  Head of Bed (HOB) Positioning: HOB at 30 degrees  Taken 10/23/2024 2200 by Ranulfo Rosales, RN  Head of Bed (HOB) Positioning: HOB at 30 degrees  Taken 10/23/2024 2000 by Ranulfo Rosales, RN  Airway/Ventilation Management:   airway patency maintained   calming measures promoted   oxygen therapy provided   pulmonary hygiene promoted  Head of Bed (HOB) Positioning: HOB at 30 degrees

## 2024-10-24 NOTE — ANESTHESIA POSTPROCEDURE EVALUATION
Patient: Tre Vazquez    Procedure: Procedure(s):  LAPAROSCOPIC CHOLECYSTECTOMY (no cholangiogram)       Anesthesia Type:  General    Note:  Disposition: ICU   Postop Pain Control:    PONV: No   Neuro/Psych: Uneventful            Sign Out: Acceptable/Baseline neuro status   Airway/Respiratory: Uneventful            Sign Out: On BiPAP, planned pre-op.   CV/Hemodynamics: Uneventful            Sign Out: Acceptable CV status; No obvious hypovolemia; No obvious fluid overload   Other NRE:    DID A NON-ROUTINE EVENT OCCUR? No           Last vitals:  Vitals Value Taken Time   /86 10/24/24 1232   Temp 97  F (36.1  C) 10/24/24 1159   Pulse 80 10/24/24 1236   Resp 21 10/24/24 1232   SpO2 95% 10/24/24 1232   Vitals shown include unfiled device data.    Electronically Signed By: Elicia Esteves MD  October 24, 2024  12:36 PM
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
## 2024-10-24 NOTE — PROGRESS NOTES
RT note: placed pt on bipap in PACU after surgery and transported pt back to ICU this afternoon. Currently on 4 lpm NC. BS dim, continues to receive duoneb Q4.

## 2024-10-24 NOTE — PROGRESS NOTES
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  He presents  with fever and confusion and found to have acute on chronic hypoxic hypercapnic respiratory failure suspect secondary to decompensated ANA/OHS in setting of home ventilator noncompliance, possible aspiration pneumonia.     Acute on Chronic Hypoxic and Hypercapnic Respiratory Failure  Decompensated ANA/OHS  Concern for Aspiration Pneumonia.  Asthma exacerbation.  -At baseline, on 2-4L supplemental oxygen during daytime and on Trilogy while sleeping.  -Required BiPAP on admission.  -Slowly improving.  -Supplemental oxygen as needed.  -BiPAP while sleeping.  -This was suspected due to decompensated ANA/OHS in setting of home ventilator noncompliance  -Aspiration pneumonitis was also considered due to fever and leukocytosis and recent vomiting episode  -Continue Zosyn.  -CTA chest PE protocol negative for PE.  -Follow-up cultures.  -Continue methylprednisolone 40 mg IV every 12 hours.  -Continue scheduled DuoNebs.    -Additional albuterol as needed.  -Monitor for fever, trend WBC.    Seizure disorder.  -Continue Keppra 1000 mg every morning and 1500 mg at bedtime.  -Continue lamotrigine 200 mg twice a day.  -Continue divalproex sodium 500 mg twice a day.  -Continue zonisamide 100 mg every morning and 200 mg every evening.     Cholelithiasis with acute cholecystitis.  -Appreciate surgery input.  -Continue IV Zosyn.  -Underwent laparoscopic cholecystectomy on  "10/24.    GERD.  -Continue pantoprazole 40 mg a day.    Hypertension.  -Continue propranolol 20 mg twice a day.  -Continue prazosin 2 mg a day.    Morbid obesity.    -Complicates care.    Anxiety.  Depression.    Schizoaffective disorder.  Borderline personality disorder.  -Continue buspirone.  -Continue citalopram.  -Continue quetiapine.              Diet: Advance Diet as Tolerated: Clear Liquid Diet; Regular Diet Adult    DVT Prophylaxis: Pneumatic Compression Devices  Reid Catheter: Not present  Lines: None     Cardiac Monitoring: None  Code Status: Full Code      Clinically Significant Risk Factors               # Hypoalbuminemia: Lowest albumin = 3.4 g/dL at 10/24/2024  5:22 AM, will monitor as appropriate     # Hypertension: Noted on problem list    # Acute Hypercapnic Respiratory Failure: based on arterial blood gas results.  Continue supplemental oxygen and ventilatory support as indicated.  # Acute Hypercapnic Respiratory Failure: based on venous blood gas results.  Continue supplemental oxygen and ventilatory support as indicated.         # Severe Obesity: Estimated body mass index is 44.68 kg/m  as calculated from the following:    Height as of 10/18/24: 1.727 m (5' 8\").    Weight as of this encounter: 133.3 kg (293 lb 14 oz)., PRESENT ON ADMISSION     # Financial/Environmental Concerns:           Disposition Plan     Medically Ready for Discharge: Anticipated in 2-4 Days             Balbir Jules DO  Hospitalist Service  [facility]  Securely message with [employer] (more info)  Text page via [facility] Paging/Directory   ______________________________________________________________________    Interval History   Having some abdominal pain after surgery.  Radiates to his right shoulder.  Some nausea.  Denies chest pain, fevers, chills, shortness of breath.    Physical Exam   Vital Signs: Temp: 98.5  F (36.9  C) Temp src: Axillary BP: (!) 149/89 Pulse: 87   Resp: 15 SpO2: 92 % O2 " Device: Nasal cannula Oxygen Delivery: 4 LPM  Weight: 293 lbs 13.97 oz    Gen:  NAD, A&Ox2 to person and place.  Mild trouble with time.  Eyes:  PERRL, sclera anicteric.  OP:  MMM, no lesions.  Neck:  Supple.  CV:  Regular, no murmurs.  Lung:  CTA b/l, normal effort.  Ab:  +BS, soft.  Skin:  Warm, dry to touch.  No rash.  Ext:  No pitting edema LE b/l.      Medical Decision Making       40 MINUTES SPENT BY ME on the date of service doing chart review, history, exam, documentation & further activities per the note.      Data     I have personally reviewed the following data over the past 24 hrs:    12.4 (H)  \   10.5 (L)   / 223     140 100 25.1 (H) /  146 (H)   4.1 31 (H) 0.62 (L) \     ALT: 56 AST: 36 AP: 170 (H) TBILI: 0.3   ALB: 3.4 (L) TOT PROTEIN: 7.0 LIPASE: N/A       Imaging results reviewed over the past 24 hrs:   No results found for this or any previous visit (from the past 24 hours).

## 2024-10-25 VITALS
OXYGEN SATURATION: 98 % | TEMPERATURE: 97.9 F | RESPIRATION RATE: 24 BRPM | HEART RATE: 87 BPM | WEIGHT: 260.14 LBS | DIASTOLIC BLOOD PRESSURE: 85 MMHG | BODY MASS INDEX: 39.55 KG/M2 | SYSTOLIC BLOOD PRESSURE: 126 MMHG

## 2024-10-25 LAB
ALBUMIN SERPL BCG-MCNC: 3 G/DL (ref 3.5–5.2)
ALP SERPL-CCNC: 149 U/L (ref 40–150)
ALT SERPL W P-5'-P-CCNC: 42 U/L (ref 0–70)
ANION GAP SERPL CALCULATED.3IONS-SCNC: 9 MMOL/L (ref 7–15)
AST SERPL W P-5'-P-CCNC: 30 U/L (ref 0–45)
BILIRUB SERPL-MCNC: 0.2 MG/DL
BUN SERPL-MCNC: 24 MG/DL (ref 6–20)
CALCIUM SERPL-MCNC: 8.5 MG/DL (ref 8.8–10.4)
CHLORIDE SERPL-SCNC: 101 MMOL/L (ref 98–107)
CREAT SERPL-MCNC: 0.65 MG/DL (ref 0.67–1.17)
EGFRCR SERPLBLD CKD-EPI 2021: >90 ML/MIN/1.73M2
ERYTHROCYTE [DISTWIDTH] IN BLOOD BY AUTOMATED COUNT: 14.8 % (ref 10–15)
GLUCOSE BLDC GLUCOMTR-MCNC: 128 MG/DL (ref 70–99)
GLUCOSE BLDC GLUCOMTR-MCNC: 132 MG/DL (ref 70–99)
GLUCOSE BLDC GLUCOMTR-MCNC: 155 MG/DL (ref 70–99)
GLUCOSE BLDC GLUCOMTR-MCNC: 162 MG/DL (ref 70–99)
GLUCOSE SERPL-MCNC: 129 MG/DL (ref 70–99)
HCO3 SERPL-SCNC: 31 MMOL/L (ref 22–29)
HCT VFR BLD AUTO: 33.5 % (ref 40–53)
HGB BLD-MCNC: 10.4 G/DL (ref 13.3–17.7)
MCH RBC QN AUTO: 29.1 PG (ref 26.5–33)
MCHC RBC AUTO-ENTMCNC: 31 G/DL (ref 31.5–36.5)
MCV RBC AUTO: 94 FL (ref 78–100)
PATH REPORT.COMMENTS IMP SPEC: NORMAL
PATH REPORT.COMMENTS IMP SPEC: NORMAL
PATH REPORT.FINAL DX SPEC: NORMAL
PATH REPORT.GROSS SPEC: NORMAL
PATH REPORT.MICROSCOPIC SPEC OTHER STN: NORMAL
PATH REPORT.RELEVANT HX SPEC: NORMAL
PHOTO IMAGE: NORMAL
PLATELET # BLD AUTO: 243 10E3/UL (ref 150–450)
POTASSIUM SERPL-SCNC: 4.6 MMOL/L (ref 3.4–5.3)
PROT SERPL-MCNC: 6.2 G/DL (ref 6.4–8.3)
RBC # BLD AUTO: 3.58 10E6/UL (ref 4.4–5.9)
SODIUM SERPL-SCNC: 141 MMOL/L (ref 135–145)
WBC # BLD AUTO: 9.6 10E3/UL (ref 4–11)

## 2024-10-25 PROCEDURE — 250N000011 HC RX IP 250 OP 636: Performed by: SURGERY

## 2024-10-25 PROCEDURE — 85014 HEMATOCRIT: CPT | Performed by: INTERNAL MEDICINE

## 2024-10-25 PROCEDURE — 82040 ASSAY OF SERUM ALBUMIN: CPT | Performed by: INTERNAL MEDICINE

## 2024-10-25 PROCEDURE — 250N000013 HC RX MED GY IP 250 OP 250 PS 637: Performed by: SURGERY

## 2024-10-25 PROCEDURE — 94640 AIRWAY INHALATION TREATMENT: CPT | Mod: 76

## 2024-10-25 PROCEDURE — 85041 AUTOMATED RBC COUNT: CPT | Performed by: INTERNAL MEDICINE

## 2024-10-25 PROCEDURE — 99239 HOSP IP/OBS DSCHRG MGMT >30: CPT | Performed by: INTERNAL MEDICINE

## 2024-10-25 PROCEDURE — 999N000157 HC STATISTIC RCP TIME EA 10 MIN

## 2024-10-25 PROCEDURE — 82247 BILIRUBIN TOTAL: CPT | Performed by: INTERNAL MEDICINE

## 2024-10-25 PROCEDURE — 88304 TISSUE EXAM BY PATHOLOGIST: CPT | Mod: 26 | Performed by: PATHOLOGY

## 2024-10-25 PROCEDURE — 36415 COLL VENOUS BLD VENIPUNCTURE: CPT | Performed by: INTERNAL MEDICINE

## 2024-10-25 PROCEDURE — 250N000009 HC RX 250: Performed by: SURGERY

## 2024-10-25 PROCEDURE — 80048 BASIC METABOLIC PNL TOTAL CA: CPT | Performed by: INTERNAL MEDICINE

## 2024-10-25 PROCEDURE — 94660 CPAP INITIATION&MGMT: CPT

## 2024-10-25 PROCEDURE — 94640 AIRWAY INHALATION TREATMENT: CPT

## 2024-10-25 RX ORDER — POLYETHYLENE GLYCOL 3350 17 G/17G
17 POWDER, FOR SOLUTION ORAL DAILY PRN
Qty: 510 G | Refills: 0 | Status: SHIPPED | OUTPATIENT
Start: 2024-10-25

## 2024-10-25 RX ORDER — OXYCODONE HYDROCHLORIDE 5 MG/1
5 TABLET ORAL EVERY 6 HOURS PRN
Qty: 10 TABLET | Refills: 0 | Status: SHIPPED | OUTPATIENT
Start: 2024-10-25

## 2024-10-25 RX ORDER — POLYETHYLENE GLYCOL 3350 17 G/17G
17 POWDER, FOR SOLUTION ORAL DAILY PRN
COMMUNITY
Start: 2024-10-25 | End: 2024-10-25

## 2024-10-25 RX ADMIN — PIPERACILLIN AND TAZOBACTAM 3.38 G: 3; .375 INJECTION, POWDER, FOR SOLUTION INTRAVENOUS at 08:06

## 2024-10-25 RX ADMIN — ZONISAMIDE 100 MG: 100 CAPSULE ORAL at 08:05

## 2024-10-25 RX ADMIN — LEVOCARNITINE 660 MG: 330 TABLET ORAL at 08:03

## 2024-10-25 RX ADMIN — BUSPIRONE HYDROCHLORIDE 15 MG: 5 TABLET ORAL at 08:00

## 2024-10-25 RX ADMIN — PANTOPRAZOLE SODIUM 40 MG: 40 TABLET, DELAYED RELEASE ORAL at 08:01

## 2024-10-25 RX ADMIN — DIVALPROEX SODIUM 500 MG: 500 TABLET, DELAYED RELEASE ORAL at 08:02

## 2024-10-25 RX ADMIN — PROPRANOLOL HYDROCHLORIDE 20 MG: 20 TABLET ORAL at 08:05

## 2024-10-25 RX ADMIN — LAMOTRIGINE 200 MG: 100 TABLET, FILM COATED, EXTENDED RELEASE ORAL at 08:03

## 2024-10-25 RX ADMIN — METHYLPREDNISOLONE SODIUM SUCCINATE 40 MG: 40 INJECTION, POWDER, FOR SOLUTION INTRAMUSCULAR; INTRAVENOUS at 02:09

## 2024-10-25 RX ADMIN — CLOTRIMAZOLE: 1 CREAM TOPICAL at 08:15

## 2024-10-25 RX ADMIN — LEVETIRACETAM 1000 MG: 500 TABLET, FILM COATED ORAL at 08:00

## 2024-10-25 RX ADMIN — IPRATROPIUM BROMIDE AND ALBUTEROL SULFATE 3 ML: .5; 3 SOLUTION RESPIRATORY (INHALATION) at 07:51

## 2024-10-25 RX ADMIN — IPRATROPIUM BROMIDE AND ALBUTEROL SULFATE 3 ML: .5; 3 SOLUTION RESPIRATORY (INHALATION) at 03:00

## 2024-10-25 RX ADMIN — PIPERACILLIN AND TAZOBACTAM 3.38 G: 3; .375 INJECTION, POWDER, FOR SOLUTION INTRAVENOUS at 02:10

## 2024-10-25 RX ADMIN — METHYLCELLULOSE 500 MG: 500 TABLET ORAL at 08:04

## 2024-10-25 RX ADMIN — THERA TABS 1 TABLET: TAB at 08:01

## 2024-10-25 RX ADMIN — LOPERAMIDE HYDROCHLORIDE 2 MG: 2 CAPSULE ORAL at 07:59

## 2024-10-25 RX ADMIN — OXYCODONE HYDROCHLORIDE 10 MG: 5 TABLET ORAL at 07:59

## 2024-10-25 RX ADMIN — INSULIN ASPART 1 UNITS: 100 INJECTION, SOLUTION INTRAVENOUS; SUBCUTANEOUS at 12:48

## 2024-10-25 RX ADMIN — CITALOPRAM HYDROBROMIDE 40 MG: 20 TABLET ORAL at 09:52

## 2024-10-25 RX ADMIN — IPRATROPIUM BROMIDE AND ALBUTEROL SULFATE 3 ML: .5; 3 SOLUTION RESPIRATORY (INHALATION) at 11:52

## 2024-10-25 RX ADMIN — INSULIN ASPART 1 UNITS: 100 INJECTION, SOLUTION INTRAVENOUS; SUBCUTANEOUS at 00:37

## 2024-10-25 RX ADMIN — CITALOPRAM HYDROBROMIDE 20 MG: 20 TABLET ORAL at 08:01

## 2024-10-25 ASSESSMENT — ACTIVITIES OF DAILY LIVING (ADL)
ADLS_ACUITY_SCORE: 0
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
## 2024-10-25 NOTE — PLAN OF CARE
ICU End of Shift Summary.  For vital signs and complete assessments, please see documentation flowsheets.     Pertinent assessments: Patient A&O with some forgetfulness, remains on 4L NC during the day and BiPAP at NOC per baseline, tolerating clear liquids, x3 watery incontinent stools, st cath for 800 ml, lap pancho 10/24/24 with x4 lap sites.   Major Shift Events: BM x 4  Plan (Upcoming Events):  TBD  Discharge/Transfer Needs: Return to group home when able.    Bedside Shift Report Completed :   Bedside Safety Check Completed:       Problem: Adult Inpatient Plan of Care  Goal: Absence of Hospital-Acquired Illness or Injury  Intervention: Identify and Manage Fall Risk  Recent Flowsheet Documentation  Taken 10/25/2024 0400 by Akosua Rubi RN  Safety Promotion/Fall Prevention: safety round/check completed  Taken 10/25/2024 0000 by Akosua Rubi RN  Safety Promotion/Fall Prevention: safety round/check completed  Taken 10/24/2024 2100 by Akosua Rubi RN  Safety Promotion/Fall Prevention: safety round/check completed  Intervention: Prevent Skin Injury  Recent Flowsheet Documentation  Taken 10/25/2024 0400 by Akosua Rubi RN  Body Position:   turned   upper extremity elevated   lower extremity elevated   foot of bed elevated  Taken 10/25/2024 0000 by Akosua Rubi RN  Body Position:   turned   upper extremity elevated   lower extremity elevated   foot of bed elevated  Taken 10/24/2024 2100 by Akosua Rubi RN  Body Position:   turned   upper extremity elevated   lower extremity elevated   foot of bed elevated  Intervention: Prevent and Manage VTE (Venous Thromboembolism) Risk  Recent Flowsheet Documentation  Taken 10/25/2024 0400 by Akosua Rubi RN  VTE Prevention/Management: SCDs on (sequential compression devices)  Taken 10/25/2024 0000 by Akosua Rubi RN  VTE Prevention/Management: SCDs on (sequential compression devices)  Taken 10/24/2024 2100 by Akosua Rubi RN  VTE Prevention/Management: SCDs on  (sequential compression devices)  Intervention: Prevent Infection  Recent Flowsheet Documentation  Taken 10/25/2024 0400 by Akosua Rubi RN  Infection Prevention:   environmental surveillance performed   equipment surfaces disinfected   hand hygiene promoted   personal protective equipment utilized   rest/sleep promoted   single patient room provided  Taken 10/25/2024 0000 by Akosua Rubi RN  Infection Prevention:   environmental surveillance performed   equipment surfaces disinfected   hand hygiene promoted   personal protective equipment utilized   rest/sleep promoted   single patient room provided  Taken 10/24/2024 2100 by Akosua Rubi RN  Infection Prevention:   environmental surveillance performed   equipment surfaces disinfected   hand hygiene promoted   personal protective equipment utilized   rest/sleep promoted   single patient room provided  Goal: Optimal Comfort and Wellbeing  Intervention: Provide Person-Centered Care  Recent Flowsheet Documentation  Taken 10/25/2024 0400 by Akosua Rubi RN  Trust Relationship/Rapport:   care explained   choices provided   questions answered   reassurance provided  Taken 10/25/2024 0000 by Akosua Rubi RN  Trust Relationship/Rapport:   care explained   choices provided   questions answered   reassurance provided  Taken 10/24/2024 2100 by Akosua Rubi RN  Trust Relationship/Rapport:   care explained   choices provided   questions answered   reassurance provided  Goal: Readiness for Transition of Care  Flowsheets (Taken 10/25/2024 0530)  Barriers to Discharge: IV antibiotics, pain managment     Problem: Fall Injury Risk  Goal: Absence of Fall and Fall-Related Injury  Intervention: Identify and Manage Contributors  Recent Flowsheet Documentation  Taken 10/25/2024 0400 by Akosua Rubi RN  Medication Review/Management: medications reviewed  Taken 10/25/2024 0000 by Akosua Rubi RN  Medication Review/Management: medications reviewed  Taken 10/24/2024 2100 by  Akosua Rubi RN  Medication Review/Management: medications reviewed  Intervention: Promote Injury-Free Environment  Recent Flowsheet Documentation  Taken 10/25/2024 0400 by Akosua Rubi RN  Safety Promotion/Fall Prevention: safety round/check completed  Taken 10/25/2024 0000 by Akosua Rubi RN  Safety Promotion/Fall Prevention: safety round/check completed  Taken 10/24/2024 2100 by Akosua Rubi RN  Safety Promotion/Fall Prevention: safety round/check completed     Problem: Pain Acute  Goal: Optimal Pain Control and Function  Intervention: Prevent or Manage Pain  Recent Flowsheet Documentation  Taken 10/25/2024 0400 by Akosua Rubi RN  Sleep/Rest Enhancement: relaxation techniques promoted  Medication Review/Management: medications reviewed  Taken 10/25/2024 0000 by Akosua Rubi RN  Sleep/Rest Enhancement: relaxation techniques promoted  Medication Review/Management: medications reviewed  Taken 10/24/2024 2100 by Akosua Rubi RN  Sleep/Rest Enhancement: relaxation techniques promoted  Medication Review/Management: medications reviewed     Problem: Gas Exchange Impaired  Goal: Optimal Gas Exchange  Intervention: Optimize Oxygenation and Ventilation  Recent Flowsheet Documentation  Taken 10/25/2024 0400 by Akosua Rubi RN  Head of Bed (HOB) Positioning: HOB at 30-45 degrees  Taken 10/25/2024 0000 by Akosua Rubi RN  Head of Bed (HOB) Positioning: HOB at 30-45 degrees  Taken 10/24/2024 2100 by Akosua Rubi RN  Head of Bed (HOB) Positioning: HOB at 30-45 degrees   Goal Outcome Evaluation:

## 2024-10-25 NOTE — CONSULTS
Care Management Discharge Note    Discharge Date: 10/25/2024       Discharge Disposition:  Returning to his group home the [facility].    Discharge Services:  All care is done by the group home staff    Discharge DME:  none    Discharge Transportation: health plan transportation. Reviewed out of pocket cost for [facility] stretcher transport, $1117.00 for base rate and $26.06 per mile to the destination. Pt/family expressed understanding and are agreeable to this.      Patient requires stretcher transportation due to unable to manage his own O2, no self preservation skills to maintain own safe and safety concerns.    Stretcher transportation has been arranged for [phone]. Patient and bedside nurse notified of transportation time.    Ambulance PCS form completed and placed in patient chart.   Private pay costs discussed: Not applicable    Does the patient's insurance plan have a 3 day qualifying hospital stay waiver?  No    PAS Confirmation Code:  Not applicable  Patient/family educated on Medicare website which has current facility and service quality ratings:  Not applicable    Education Provided on the Discharge Plan:  yes  Persons Notified of Discharge Plans: patient, group home and family  Patient/Family in Agreement with the Plan:  yes    Handoff Referral Completed: No, handoff not indicated or clinically appropriate    Additional Information:  Patient is discharging back to his group home. KARIN spoke with Massiel the manager at [facility]. Orders sent and confirmed she received them. Update the RN at facility, [facility] and spoke with step-mother.    REGI Beyer   Inpatient Care Coordination   Supervisor  [facility]  [phone]      REGI Mercedes

## 2024-10-25 NOTE — PROGRESS NOTES
A BiPAP of  16/8 @ 50% was applied to the pt via the mask for an increase in WOB and SOB.  The skin in contact with the mask and straps looks good and intact. Pt is tolerating it well. RT will continue to monitor and assess the pt's respiratory status and needs.      Tico Dewey, RT, RT  10/25/2024 5:09 AM

## 2024-10-25 NOTE — PROGRESS NOTES
General Surgery Progress Note    S: Feeling better than yesterday. No nausea, tolerating diet.    O: incisions intact, no erythema, minimal abdominal pain    A: POD#1 s/p laparoscopic cholecystectomy for acute cholecystitis. Recovering well.    P:   - Diet as tolerated  - Stable for discharge from surgical standpoint.       Bethany Torres MD

## 2024-10-27 LAB — BACTERIA BLD CULT: NO GROWTH

## 2024-10-28 LAB — BACTERIA FLD CULT: ABNORMAL

## 2024-10-31 LAB — BACTERIA FLD CULT: NORMAL

## 2024-11-08 ENCOUNTER — TELEPHONE (OUTPATIENT)
Dept: SURGERY | Facility: CLINIC | Age: 49
End: 2024-11-08
Payer: MEDICARE

## 2024-11-08 NOTE — CONFIDENTIAL NOTE
Attempted to call patient for post op check. Group home staff answered and instructed me to call a different number ([phone]) but no answer. Did not leave voicemail.    Dinorah Bashir PA-C

## 2024-12-01 ENCOUNTER — HOSPITAL ENCOUNTER (EMERGENCY)
Facility: CLINIC | Age: 49
Discharge: HOME | End: 2024-12-02
Attending: EMERGENCY MEDICINE | Admitting: EMERGENCY MEDICINE
Payer: MEDICARE

## 2024-12-01 DIAGNOSIS — R25.3 TWITCHING: ICD-10-CM

## 2024-12-01 DIAGNOSIS — E83.42 HYPOMAGNESEMIA: ICD-10-CM

## 2024-12-01 PROCEDURE — 99284 EMERGENCY DEPT VISIT MOD MDM: CPT | Mod: 25

## 2024-12-01 PROCEDURE — 96365 THER/PROPH/DIAG IV INF INIT: CPT

## 2024-12-01 PROCEDURE — 93005 ELECTROCARDIOGRAM TRACING: CPT

## 2024-12-02 VITALS
DIASTOLIC BLOOD PRESSURE: 82 MMHG | WEIGHT: 265 LBS | RESPIRATION RATE: 14 BRPM | HEART RATE: 77 BPM | BODY MASS INDEX: 41.59 KG/M2 | OXYGEN SATURATION: 95 % | SYSTOLIC BLOOD PRESSURE: 132 MMHG | TEMPERATURE: 98.9 F | HEIGHT: 67 IN

## 2024-12-02 LAB
ALBUMIN SERPL BCG-MCNC: 4.2 G/DL (ref 3.5–5.2)
ALP SERPL-CCNC: 80 U/L (ref 40–150)
ALT SERPL W P-5'-P-CCNC: 23 U/L (ref 0–70)
ANION GAP SERPL CALCULATED.3IONS-SCNC: 12 MMOL/L (ref 7–15)
AST SERPL W P-5'-P-CCNC: 19 U/L (ref 0–45)
ATRIAL RATE - MUSE: 74 BPM
BASOPHILS # BLD AUTO: 0 10E3/UL (ref 0–0.2)
BASOPHILS NFR BLD AUTO: 0 %
BILIRUB SERPL-MCNC: <0.2 MG/DL
BUN SERPL-MCNC: 21.6 MG/DL (ref 6–20)
CALCIUM SERPL-MCNC: 9.1 MG/DL (ref 8.8–10.4)
CHLORIDE SERPL-SCNC: 100 MMOL/L (ref 98–107)
CREAT SERPL-MCNC: 0.88 MG/DL (ref 0.67–1.17)
DIASTOLIC BLOOD PRESSURE - MUSE: NORMAL MMHG
EGFRCR SERPLBLD CKD-EPI 2021: >90 ML/MIN/1.73M2
EOSINOPHIL # BLD AUTO: 0.1 10E3/UL (ref 0–0.7)
EOSINOPHIL NFR BLD AUTO: 1 %
ERYTHROCYTE [DISTWIDTH] IN BLOOD BY AUTOMATED COUNT: 14.1 % (ref 10–15)
GLUCOSE SERPL-MCNC: 96 MG/DL (ref 70–99)
HCO3 SERPL-SCNC: 31 MMOL/L (ref 22–29)
HCT VFR BLD AUTO: 39.2 % (ref 40–53)
HGB BLD-MCNC: 11.9 G/DL (ref 13.3–17.7)
HOLD SPECIMEN: NORMAL
IMM GRANULOCYTES # BLD: 0.1 10E3/UL
IMM GRANULOCYTES NFR BLD: 1 %
INTERPRETATION ECG - MUSE: NORMAL
LEVETIRACETAM SERPL-MCNC: 28.4 ÂΜG/ML (ref 10–40)
LYMPHOCYTES # BLD AUTO: 2.6 10E3/UL (ref 0.8–5.3)
LYMPHOCYTES NFR BLD AUTO: 36 %
MAGNESIUM SERPL-MCNC: 1.5 MG/DL (ref 1.7–2.3)
MCH RBC QN AUTO: 28.9 PG (ref 26.5–33)
MCHC RBC AUTO-ENTMCNC: 30.4 G/DL (ref 31.5–36.5)
MCV RBC AUTO: 95 FL (ref 78–100)
MONOCYTES # BLD AUTO: 0.6 10E3/UL (ref 0–1.3)
MONOCYTES NFR BLD AUTO: 8 %
NEUTROPHILS # BLD AUTO: 3.9 10E3/UL (ref 1.6–8.3)
NEUTROPHILS NFR BLD AUTO: 54 %
NRBC # BLD AUTO: 0 10E3/UL
NRBC BLD AUTO-RTO: 0 /100
P AXIS - MUSE: 65 DEGREES
PLATELET # BLD AUTO: 207 10E3/UL (ref 150–450)
POTASSIUM SERPL-SCNC: 4.4 MMOL/L (ref 3.4–5.3)
PR INTERVAL - MUSE: 194 MS
PROT SERPL-MCNC: 6.8 G/DL (ref 6.4–8.3)
QRS DURATION - MUSE: 90 MS
QT - MUSE: 422 MS
QTC - MUSE: 468 MS
R AXIS - MUSE: 28 DEGREES
RBC # BLD AUTO: 4.12 10E6/UL (ref 4.4–5.9)
SODIUM SERPL-SCNC: 143 MMOL/L (ref 135–145)
SYSTOLIC BLOOD PRESSURE - MUSE: NORMAL MMHG
T AXIS - MUSE: 59 DEGREES
VALPROATE SERPL-MCNC: 55.3 UG/ML
VENTRICULAR RATE- MUSE: 74 BPM
WBC # BLD AUTO: 7.2 10E3/UL (ref 4–11)

## 2024-12-02 PROCEDURE — 80053 COMPREHEN METABOLIC PANEL: CPT | Performed by: EMERGENCY MEDICINE

## 2024-12-02 PROCEDURE — 250N000011 HC RX IP 250 OP 636: Performed by: EMERGENCY MEDICINE

## 2024-12-02 PROCEDURE — 80177 DRUG SCRN QUAN LEVETIRACETAM: CPT | Performed by: EMERGENCY MEDICINE

## 2024-12-02 PROCEDURE — 85041 AUTOMATED RBC COUNT: CPT | Performed by: EMERGENCY MEDICINE

## 2024-12-02 PROCEDURE — 36415 COLL VENOUS BLD VENIPUNCTURE: CPT | Performed by: EMERGENCY MEDICINE

## 2024-12-02 PROCEDURE — 80164 ASSAY DIPROPYLACETIC ACD TOT: CPT | Performed by: EMERGENCY MEDICINE

## 2024-12-02 PROCEDURE — 83735 ASSAY OF MAGNESIUM: CPT | Performed by: EMERGENCY MEDICINE

## 2024-12-02 PROCEDURE — 85004 AUTOMATED DIFF WBC COUNT: CPT | Performed by: EMERGENCY MEDICINE

## 2024-12-02 RX ORDER — MAGNESIUM SULFATE HEPTAHYDRATE 40 MG/ML
2 INJECTION, SOLUTION INTRAVENOUS ONCE
Status: COMPLETED | OUTPATIENT
Start: 2024-12-02 | End: 2024-12-02

## 2024-12-02 RX ADMIN — MAGNESIUM SULFATE HEPTAHYDRATE 2 G: 2 INJECTION, SOLUTION INTRAVENOUS at 01:08

## 2024-12-02 ASSESSMENT — ACTIVITIES OF DAILY LIVING (ADL)
ADLS_ACUITY_SCORE: 63

## 2024-12-02 NOTE — ED TRIAGE NOTES
BIBA for Jerking/twitching motions since thurs, getting worse. Reports taking medications as prescribed. Pt aox4 VSS ambulated to bed.

## 2024-12-02 NOTE — ED PROVIDER NOTES
"  Emergency Department Note      History of Present Illness     Chief Complaint   twitching      HPI   Tre Vazquez is a 49 year old male with history of hypertension, schizoaffective disorder, and seizures amongst other medical problems who presents to the ED via EMS for evaluation of twitching. The patient reports that he has been having twitching activity for the past 3 days. He states that his entire body has been twitching and jerking though he notes presenting at this time as the episodes are becoming frustrating to him. He reports that he has episodes where he drops his phone or Nintendo Switch due to twitching and this upsets him the most.  The twitching seems to be when he is holding onto something for \"along time.\"  He cannot provide any more details regarding precipitating circumstances. He reports compliance with his keppra and depakote. He states his seizures are not typically like this. Denies headache, LOC, fever, chest pain, shortness of breath, focal weakness, paresthesias, vomiting, diarrhea or other symptoms.  Denies any new medications or medication changes. Denies any drug or alcohol use. Patient presents from a group home.     Independent Historian   None    Review of External Notes   11/7/24 office visit    Past Medical History     Medical History and Problem List   Acute chronic respiratory failure with hypoxia and hypercapnia  Asthma  Bacteremia  Bipolar depression   Blindness of left eye  Borderline personality disorder  Bronchitis   Cardiac arrest  Cellulitis of right lower extremity   Cerebral palsy  Cerebral ventriculomegaly   Chronic hypercapnic respiratory failure  Depression  Dysthymia   Encephalomalacia   Epilepsy  Explosive personality disorder  GERD  Hepatic encephalopathy  Hypertension  Hyperammonemia  Hyperammonemic encephalopathy  Lumbar disc disease  Metabolic encephalopathy   Mild intellectual disability   Morbid obesity  Mood disorder  Obstructive sleep apnea  Obesity " "hypoventilation syndrome  Oppositional defiant disorder  Paraplegia   Psychotic disorder  Pneumococcal septicemia  Prediabetes  QT prolongation  Respiratory acidosis   Right lower lobe pulmonary infiltrate  Schizoaffective disorder  Septic shock  Sepsis; chronic  Seizure disorder  Spastic diplegia   Transaminiti    Medications   Albuterol   Buspar  Celexa  Depakote  Duoneb   Inderal  Imodium  Keppra  Lamictal  Levocarnitine   Olanzapine  Prazosin   Protonix  Risperdal   Seroquel  Trelegy Ellipta  Zofran  Zonisamide    Surgical History   Bilateral ankle surgery, heel lengthening  Removal of iris of left eye    Physical Exam     Patient Vitals for the past 24 hrs:   BP Temp Temp src Pulse Resp SpO2 Height Weight   12/01/24 2352 -- 98.9  F (37.2  C) Oral -- -- -- -- --   12/01/24 2349 137/79 -- -- 77 14 99 % 1.702 m (5' 7\") 120.2 kg (265 lb)     Physical Exam  General: Well-nourished, nontoxic  Eyes: PERRL in R. Eye; L. Eye blind at baseline.  No scleral icterus   ENT:  Moist mucus membranes, posterior oropharynx clear without erythema or exudates. TM normal bilaterally  Neck: Supple with full range of motion  Respiratory:  Lungs clear to auscultation bilaterally  CV: Normal rate and rhythm  GI:  Abdomen soft and non-distended.  No tenderness, guarding or rebound  Skin: Warm, dry.  No rashes or petechiae  MSK: No peripheral edema or calf tenderness  Neuro: Alert and oriented to person/place/time.  No facial droop/dysarthria.  Tongue midline, normal strength at SCM/trapezius/BUE/BLE.  Normal finger to nose. Sensation intact over face/BUE/BLE. No obvious twitching/tremor identified when having patient grab/hold objects  Psychiatric: Normal affect      Diagnostics     Lab Results   Labs Ordered and Resulted from Time of ED Arrival to Time of ED Departure   COMPREHENSIVE METABOLIC PANEL - Abnormal       Result Value    Sodium 143      Potassium 4.4      Carbon Dioxide (CO2) 31 (*)     Anion Gap 12      Urea Nitrogen 21.6 " (*)     Creatinine 0.88      GFR Estimate >90      Calcium 9.1      Chloride 100      Glucose 96      Alkaline Phosphatase 80      AST 19      ALT 23      Protein Total 6.8      Albumin 4.2      Bilirubin Total <0.2     MAGNESIUM - Abnormal    Magnesium 1.5 (*)    CBC WITH PLATELETS AND DIFFERENTIAL - Abnormal    WBC Count 7.2      RBC Count 4.12 (*)     Hemoglobin 11.9 (*)     Hematocrit 39.2 (*)     MCV 95      MCH 28.9      MCHC 30.4 (*)     RDW 14.1      Platelet Count 207      % Neutrophils 54      % Lymphocytes 36      % Monocytes 8      % Eosinophils 1      % Basophils 0      % Immature Granulocytes 1      NRBCs per 100 WBC 0      Absolute Neutrophils 3.9      Absolute Lymphocytes 2.6      Absolute Monocytes 0.6      Absolute Eosinophils 0.1      Absolute Basophils 0.0      Absolute Immature Granulocytes 0.1      Absolute NRBCs 0.0     VALPROIC ACID - Normal    Valproic acid 55.3     KEPPRA (LEVETIRACETAM) LEVEL       Imaging   No orders to display       Independent Interpretation   None    ED Course      Medications Administered   Medications - No data to display    Procedures   Procedures     Discussion of Management   None    ED Course   ED Course as of 12/02/24 0145   Sun Dec 01, 2024   4668 I obtained history and examined the patient as noted above.        Additional Documentation  None    Medical Decision Making / Diagnosis     CMS Diagnoses: None    MIPS   None    MDM   Tre Vazquez is a 49 year old male presenting with reported twitching episodes.  Patient is nontoxic, in no significant distress on arrival.  I did not appreciate any of these episodes on multiple assessments during his time in the ED after trying to be intentional about eliciting them.  He is overall without obvious focal neurologic deficits.  Labs without profound anemia or significant electrolyte than hypomagnesemia, replacement given.  His antiepileptic levels were sent today.  Valproic acid appears to be therapeutic.  Keppra  pending.  He denies any concerning headache or other worrisome symptomatology.  I do not feel advanced neuroimaging is warranted at this point in time.  Presentation does not appear consistent with seizure activity.  I did discuss with patient quite possible hypomagnesemia may be contributing to his presentation.  Will plan for close outpatient follow-up.  Return precautions given.    Disposition   The patient was discharged.     Diagnosis     ICD-10-CM    1. Twitching  R25.3       2. Hypomagnesemia  E83.42            Discharge Medications   New Prescriptions    No medications on file         Scribe Disclosure:  I, Teddy Interiano, am serving as a scribe at 11:55 PM on 12/1/2024 to document services personally performed by Lillian Alexander DO based on my observations and the provider's statements to me.        Lillian Alexander DO  12/02/24 0222

## 2024-12-02 NOTE — DISCHARGE INSTRUCTIONS
Strong suspicion your low magnesium may have been contributing to your presentation. Monitor for worsening headache, weakness

## 2024-12-11 ENCOUNTER — OFFICE VISIT (OUTPATIENT)
Dept: NEUROLOGY | Facility: CLINIC | Age: 49
End: 2024-12-11
Payer: MEDICARE

## 2024-12-11 DIAGNOSIS — Z79.899 ENCOUNTER FOR LONG-TERM (CURRENT) USE OF MEDICATIONS: ICD-10-CM

## 2024-12-11 DIAGNOSIS — G40.309 NONINTRACTABLE GENERALIZED IDIOPATHIC EPILEPSY WITHOUT STATUS EPILEPTICUS (H): Primary | ICD-10-CM

## 2024-12-11 PROCEDURE — 99213 OFFICE O/P EST LOW 20 MIN: CPT | Performed by: PSYCHIATRY & NEUROLOGY

## 2024-12-11 NOTE — PATIENT INSTRUCTIONS
Plan:  Continue current doses of Depakote, lamotrigine, zonisamide and Keppra for now.  Would like to check some additional lab work and have you come back in for an updated EEG at your convenience.  Based on the findings of the above tests, we will decide about the appropriateness of tapering you off of the Depakote per your preference.  Return to neurology clinic in 6 months

## 2024-12-11 NOTE — PROGRESS NOTES
ESTABLISHED PATIENT NEUROLOGY NOTE    DATE OF VISIT: 12/11/2024  MRN: [id number]  PATIENT NAME: Tre Vazquez  YOB: 1975    Chief Complaint   Patient presents with    Seizures     Patient had another seizure; the week before thanksgiving. And was in the ER early December for body twitching.      SUBJECTIVE:                                                      HISTORY OF PRESENT ILLNESS:  Tre is here for follow up regarding  epilepsy.      History as previously documented by me (7.3.24):  I met him for initial consultation almost 15 months ago.  He has history of cerebral palsy with mild spastic paraparesis, intellectual disability, congenital left eye blindness history of right Bell's, mood disturbance and epilepsy disorder for which he has been on for AEDs.  He had a breakthrough seizure in August/September 2021 for which she was hospitalized at [facility].  He told me that prior to that event he had been seizure-free for about 10 years.  Seizures are consistent with generalized tonic-clonic events.  He does not have a history of other seizure types.  Known ventriculomegaly.  EEG from 2012 showed bilateral theta slowing.  He had a passing out episode just prior to our visit in April 2022.  I recommended we get labs to check his AED levels as well as an updated EEG.     He saw our nurse practitioner in the interim, just about 4 months ago.  At that time there was some concern about confusion with his medication dosing.  Plan was to continue Depakote: 500mg twice daily, lamotrigine: 200mg twice daily, Keppra: 1000mg/1500mg and zonisamide: 100mg/200mg.     Most recent drug levels included:   Keppra: 23.6  Lamotrigine 15.7 (Slightly high)  VPA: 33.6 (low)    Joel expressed a desire to get off of the Depakote.  Our plan was to continue medications without change until updated labs were reviewed.    He was in the emergency room about 10 days ago for twitching.  At that time his Keppra level was checked and  was therapeutic at 28.4.  Depakote level was also therapeutic at 55.3.  Magnesium was a little low at 1.5.  He has chronically low hemoglobin.  He denied typical seizure activity when he was in the emergency room.  He reported dropping his phone and the Nintendo switch control because of the twitching at times.  The ED provider did not see any abnormal movements upon multiple examinations.  They felt that the hypomagnesemia was possibly contributing to his symptoms.    Joel is here with a caregiver.   He says that he had some petit mal seizures the week of Thanksgiving. He has some twitching auras. No full blown seizures.  He says he feels like his eyes rolled back.  He zones out.  Caregiver reports that there have not been any witnessed abnormal episodes in [place].  He has been doing okay since the ED visit.  Joel expresses that he still would like to taper off of the Depakote, or at least switch it from twice daily dosing to once daily.    He walks a little at home. No recent falls.     CURRENT MEDICATIONS:   Current Outpatient Medications   Medication Sig Dispense Refill    acetaminophen (TYLENOL) 325 MG tablet Take 325 mg by mouth every 6 hours as needed for pain.      acetaminophen (TYLENOL) 500 MG tablet Take 500 mg by mouth every 6 hours as needed for mild pain.      albuterol (PROAIR HFA/PROVENTIL HFA/VENTOLIN HFA) 108 (90 Base) MCG/ACT inhaler Inhale 2 puffs into the lungs every 4 hours as needed for shortness of breath, wheezing or cough 18 g 6    ammonium lactate (AMLACTIN) 12 % external cream Apply topically daily as needed for dry skin. Apply to feet      ammonium lactate (AMLACTIN) 12 % external cream Apply topically 2 times daily      bacitracin 500 UNIT/GM external ointment Apply topically 2 times daily.      busPIRone (BUSPAR) 15 MG tablet Take 15 mg by mouth 3 times daily.      citalopram (CELEXA) 20 MG tablet Take 20 mg by mouth daily. Take with 40 mg tablet for a total of 60 mg      citalopram  (CELEXA) 40 MG tablet Take 40 mg by mouth daily. Take with 20 mg tablet for a total of 60 mg      clindamycin (CLEOCIN T) 1 % external lotion Apply topically 2 times daily 60 mL 0    clotrimazole (LOTRIMIN) 1 % external cream Apply topically 2 times daily. Apply to groin      dextromethorphan-guaiFENesin (TUSSIN DM)  MG/5ML liquid Take by mouth as needed for cough.      divalproex sodium delayed-release (DEPAKOTE) 500 MG DR tablet TAKE 1 TABLET BY MOUTH TWICE DAILY 60 tablet 3    fluticasone (FLONASE) 50 MCG/ACT nasal spray Spray 1 spray in nostril 2 times daily      Fluticasone-Umeclidin-Vilanterol (TRELEGY ELLIPTA) 200-62.5-25 MCG/ACT oral inhaler Inhale 1 puff into the lungs daily 1 each 6    LamoTRIgine (LAMICTAL) 200 MG TB24 Take 200 mg by mouth 2 times daily 60 tablet 5    levETIRAcetam (KEPPRA XR) 500 MG 24 hr tablet Take 3 tablets (1,500 mg) by mouth at bedtime 90 tablet 5    levETIRAcetam (KEPPRA) 1000 MG tablet Take 1 tablet (1,000 mg) by mouth every morning 30 tablet 5    levOCARNitine (CARNITOR) 330 MG tablet Take 2 tablets (660 mg) by mouth 3 times daily 180 tablet 11    loperamide (IMODIUM) 2 MG capsule Take 2 mg by mouth 4 times daily as needed for diarrhea. 4 mg after first loose stool then 2 mg after each subsequent loose stool      magnesium hydroxide (MILK OF MAGNESIA) 400 MG/5ML suspension Take 30 mLs by mouth daily as needed for constipation      methylcellulose (CITRUCEL) powder Take by mouth 2 times daily. 1 tablespoon      multivitamin, therapeutic (THERA-VIT) TABS tablet Take 1 tablet by mouth daily      ondansetron (ZOFRAN ODT) 4 MG ODT tab Take 4 mg by mouth every 6 hours as needed for nausea or vomiting      oxyCODONE (ROXICODONE) 5 MG tablet Take 1 tablet (5 mg) by mouth every 6 hours as needed for moderate pain. 10 tablet 0    pantoprazole (PROTONIX) 40 MG EC tablet Take 40 mg by mouth daily      polyethylene glycol (MIRALAX) 17 GM/Dose powder Take 17 g by mouth daily as needed  for constipation. Hold for loose stools 510 g 0    prazosin (MINIPRESS) 2 MG capsule Take 2 mg by mouth At Bedtime      propranolol (INDERAL) 20 MG tablet Take 1 tablet (20 mg) by mouth 2 times daily      QUEtiapine (SEROQUEL) 300 MG tablet Take 300 mg by mouth at bedtime.      sodium chloride (OCEAN) 0.65 % nasal spray Spray 1 spray into both nostrils every hour as needed for congestion      zonisamide (ZONEGRAN) 100 MG capsule Take 100 mg by mouth every morning.      zonisamide (ZONEGRAN) 100 MG capsule Take 200 mg by mouth every evening.       No current facility-administered medications for this visit.       RECENT DIAGNOSTIC STUDIES:   Labs: No results found for any visits on 12/11/24.    Imaging:   Head CT (10.22.24):  IMPRESSION: Diffuse cerebral volume loss and cerebral white matter  changes consistent with chronic small vessel ischemic disease. No  evidence for acute intracranial pathology.    Imaging reviewed independently by me. Agree with Radiology.    REVIEW OF SYSTEMS:                                                      10-point review of systems is negative except as mentioned above in HPI.    EXAM:                                                      Physical Exam:   Vitals: There were no vitals taken for this visit.  BMI= There is no height or weight on file to calculate BMI.  GENERAL: NAD.  HEENT: NC/AT.  CV: RRR. S1, S2.   NECK: No bruits.  PULM: Non-labored breathing.   Neurologic:  MENTAL STATUS: Alert, attentive. Speech is fluent. Normal comprehension.  Normal concentration. Adequate fund of knowledge.   CRANIAL NERVES: Right disc is difficult to visualize, left eye is without pupil/blind.  Slight facial symmetry due to previous Bell's.  EOM full. Hearing intact to conversation. Trapezius strength intact. Palate moves symmetrically. Tongue midline.  MOTOR: 5/5 in proximal and distal muscle groups of upper and lower extremities with brief testing. Tone and bulk normal.   SENSATION: Normal light  touch throughout.  COORDINATION: Normal fine motor movements of the hands.  STATION AND GAIT: Wheelchair bound.    ASSESSMENT and PLAN:                                                      Assessment:    ICD-10-CM    1. Nonintractable generalized idiopathic epilepsy without status epilepticus (H)  G40.309 EEG      2. Encounter for long-term (current) use of medications  Z79.899 Zonisamide Level Quantitative     Lamotrigine Level     Ammonia          Plan:  Continue current doses of Depakote, lamotrigine, zonisamide and Keppra for now.  Would like to check some additional lab work and have you come back in for an updated EEG at your convenience.  Based on the findings of the above tests, we will decide about the appropriateness of tapering you off of the Depakote per your preference.  Return to neurology clinic in 6 months.    Total Time: 25 minutes were spent with the patient and in chart review/documentation (as described above in Assessment and Plan)/coordinating the care on date of service.    Jojo Griggs MD  Neurology    Dragon software used in the dictation of this note.

## 2024-12-11 NOTE — LETTER
12/11/2024      Tre Vazquez  [address]  [facility] [address]      Dear Colleague,    Thank you for referring your patient, Tre Vazquez, to the [facility] [place]. Please see a copy of my visit note below.    ESTABLISHED PATIENT NEUROLOGY NOTE    DATE OF VISIT: 12/11/2024  MRN: [id number]  PATIENT NAME: Tre Vazquez  YOB: 1975    Chief Complaint   Patient presents with     Seizures     Patient had another seizure; the week before thanksgiving. And was in the ER early December for body twitching.      SUBJECTIVE:                                                      HISTORY OF PRESENT ILLNESS:  Tre is here for follow up regarding  epilepsy.      History as previously documented by me (7.3.24):  I met him for initial consultation almost 15 months ago.  He has history of cerebral palsy with mild spastic paraparesis, intellectual disability, congenital left eye blindness history of right Bell's, mood disturbance and epilepsy disorder for which he has been on for AEDs.  He had a breakthrough seizure in August/September 2021 for which she was hospitalized at [facility].  He told me that prior to that event he had been seizure-free for about 10 years.  Seizures are consistent with generalized tonic-clonic events.  He does not have a history of other seizure types.  Known ventriculomegaly.  EEG from 2012 showed bilateral theta slowing.  He had a passing out episode just prior to our visit in April 2022.  I recommended we get labs to check his AED levels as well as an updated EEG.     He saw our nurse practitioner in the interim, just about 4 months ago.  At that time there was some concern about confusion with his medication dosing.  Plan was to continue Depakote: 500mg twice daily, lamotrigine: 200mg twice daily, Keppra: 1000mg/1500mg and zonisamide: 100mg/200mg.     Most recent drug levels included:   Keppra: 23.6  Lamotrigine 15.7 (Slightly high)  VPA: 33.6  (low)    Joel expressed a desire to get off of the Depakote.  Our plan was to continue medications without change until updated labs were reviewed.    He was in the emergency room about 10 days ago for twitching.  At that time his Keppra level was checked and was therapeutic at 28.4.  Depakote level was also therapeutic at 55.3.  Magnesium was a little low at 1.5.  He has chronically low hemoglobin.  He denied typical seizure activity when he was in the emergency room.  He reported dropping his phone and the Freeze Tag switch control because of the twitching at times.  The ED provider did not see any abnormal movements upon multiple examinations.  They felt that the hypomagnesemia was possibly contributing to his symptoms.    Joel is here with a caregiver.   He says that he had some petit mal seizures the week of Thanksgiving. He has some twitching auras. No full blown seizures.  He says he feels like his eyes rolled back.  He zones out.  Caregiver reports that there have not been any witnessed abnormal episodes in [place].  He has been doing okay since the ED visit.  Joel expresses that he still would like to taper off of the Depakote, or at least switch it from twice daily dosing to once daily.    He walks a little at home. No recent falls.     CURRENT MEDICATIONS:   Current Outpatient Medications   Medication Sig Dispense Refill     acetaminophen (TYLENOL) 325 MG tablet Take 325 mg by mouth every 6 hours as needed for pain.       acetaminophen (TYLENOL) 500 MG tablet Take 500 mg by mouth every 6 hours as needed for mild pain.       albuterol (PROAIR HFA/PROVENTIL HFA/VENTOLIN HFA) 108 (90 Base) MCG/ACT inhaler Inhale 2 puffs into the lungs every 4 hours as needed for shortness of breath, wheezing or cough 18 g 6     ammonium lactate (AMLACTIN) 12 % external cream Apply topically daily as needed for dry skin. Apply to feet       ammonium lactate (AMLACTIN) 12 % external cream Apply topically 2 times daily        bacitracin 500 UNIT/GM external ointment Apply topically 2 times daily.       busPIRone (BUSPAR) 15 MG tablet Take 15 mg by mouth 3 times daily.       citalopram (CELEXA) 20 MG tablet Take 20 mg by mouth daily. Take with 40 mg tablet for a total of 60 mg       citalopram (CELEXA) 40 MG tablet Take 40 mg by mouth daily. Take with 20 mg tablet for a total of 60 mg       clindamycin (CLEOCIN T) 1 % external lotion Apply topically 2 times daily 60 mL 0     clotrimazole (LOTRIMIN) 1 % external cream Apply topically 2 times daily. Apply to groin       dextromethorphan-guaiFENesin (TUSSIN DM)  MG/5ML liquid Take by mouth as needed for cough.       divalproex sodium delayed-release (DEPAKOTE) 500 MG DR tablet TAKE 1 TABLET BY MOUTH TWICE DAILY 60 tablet 3     fluticasone (FLONASE) 50 MCG/ACT nasal spray Spray 1 spray in nostril 2 times daily       Fluticasone-Umeclidin-Vilanterol (TRELEGY ELLIPTA) 200-62.5-25 MCG/ACT oral inhaler Inhale 1 puff into the lungs daily 1 each 6     LamoTRIgine (LAMICTAL) 200 MG TB24 Take 200 mg by mouth 2 times daily 60 tablet 5     levETIRAcetam (KEPPRA XR) 500 MG 24 hr tablet Take 3 tablets (1,500 mg) by mouth at bedtime 90 tablet 5     levETIRAcetam (KEPPRA) 1000 MG tablet Take 1 tablet (1,000 mg) by mouth every morning 30 tablet 5     levOCARNitine (CARNITOR) 330 MG tablet Take 2 tablets (660 mg) by mouth 3 times daily 180 tablet 11     loperamide (IMODIUM) 2 MG capsule Take 2 mg by mouth 4 times daily as needed for diarrhea. 4 mg after first loose stool then 2 mg after each subsequent loose stool       magnesium hydroxide (MILK OF MAGNESIA) 400 MG/5ML suspension Take 30 mLs by mouth daily as needed for constipation       methylcellulose (CITRUCEL) powder Take by mouth 2 times daily. 1 tablespoon       multivitamin, therapeutic (THERA-VIT) TABS tablet Take 1 tablet by mouth daily       ondansetron (ZOFRAN ODT) 4 MG ODT tab Take 4 mg by mouth every 6 hours as needed for nausea or  vomiting       oxyCODONE (ROXICODONE) 5 MG tablet Take 1 tablet (5 mg) by mouth every 6 hours as needed for moderate pain. 10 tablet 0     pantoprazole (PROTONIX) 40 MG EC tablet Take 40 mg by mouth daily       polyethylene glycol (MIRALAX) 17 GM/Dose powder Take 17 g by mouth daily as needed for constipation. Hold for loose stools 510 g 0     prazosin (MINIPRESS) 2 MG capsule Take 2 mg by mouth At Bedtime       propranolol (INDERAL) 20 MG tablet Take 1 tablet (20 mg) by mouth 2 times daily       QUEtiapine (SEROQUEL) 300 MG tablet Take 300 mg by mouth at bedtime.       sodium chloride (OCEAN) 0.65 % nasal spray Spray 1 spray into both nostrils every hour as needed for congestion       zonisamide (ZONEGRAN) 100 MG capsule Take 100 mg by mouth every morning.       zonisamide (ZONEGRAN) 100 MG capsule Take 200 mg by mouth every evening.       No current facility-administered medications for this visit.       RECENT DIAGNOSTIC STUDIES:   Labs: No results found for any visits on 12/11/24.    Imaging:   Head CT (10.22.24):  IMPRESSION: Diffuse cerebral volume loss and cerebral white matter  changes consistent with chronic small vessel ischemic disease. No  evidence for acute intracranial pathology.    Imaging reviewed independently by me. Agree with Radiology.    REVIEW OF SYSTEMS:                                                      10-point review of systems is negative except as mentioned above in HPI.    EXAM:                                                      Physical Exam:   Vitals: There were no vitals taken for this visit.  BMI= There is no height or weight on file to calculate BMI.  GENERAL: NAD.  HEENT: NC/AT.  CV: RRR. S1, S2.   NECK: No bruits.  PULM: Non-labored breathing.   Neurologic:  MENTAL STATUS: Alert, attentive. Speech is fluent. Normal comprehension.  Normal concentration. Adequate fund of knowledge.   CRANIAL NERVES: Right disc is difficult to visualize, left eye is without pupil/blind.  Slight  facial symmetry due to previous Bell's.  EOM full. Hearing intact to conversation. Trapezius strength intact. Palate moves symmetrically. Tongue midline.  MOTOR: 5/5 in proximal and distal muscle groups of upper and lower extremities with brief testing. Tone and bulk normal.   SENSATION: Normal light touch throughout.  COORDINATION: Normal fine motor movements of the hands.  STATION AND GAIT: Wheelchair bound.    ASSESSMENT and PLAN:                                                      Assessment:    ICD-10-CM    1. Nonintractable generalized idiopathic epilepsy without status epilepticus (H)  G40.309 EEG      2. Encounter for long-term (current) use of medications  Z79.899 Zonisamide Level Quantitative     Lamotrigine Level     Ammonia          Plan:  Continue current doses of Depakote, lamotrigine, zonisamide and Keppra for now.  Would like to check some additional lab work and have you come back in for an updated EEG at your convenience.  Based on the findings of the above tests, we will decide about the appropriateness of tapering you off of the Depakote per your preference.  Return to neurology clinic in 6 months.    Total Time: 25 minutes were spent with the patient and in chart review/documentation (as described above in Assessment and Plan)/coordinating the care on date of service.    Jojo Griggs MD  Neurology    Dragon software used in the dictation of this note.                    Again, thank you for allowing me to participate in the care of your patient.        Sincerely,        Jojo Griggs MD

## 2025-01-06 DIAGNOSIS — G40.309 NONINTRACTABLE GENERALIZED IDIOPATHIC EPILEPSY WITHOUT STATUS EPILEPTICUS (H): ICD-10-CM

## 2025-01-06 NOTE — TELEPHONE ENCOUNTER
Refill request for: levETIRAcetam (KEPPRA) 1000 MG tablet    Directions: Take 1 tablet (1,000 mg) by mouth every morning     Refill request for: levETIRAcetam (KEPPRA XR) 500 MG 24 hr tablet    Directions: Take 3 tablets (1,500 mg) by mouth at bedtime     Refill request for: zonisamide   Directions: Take 100 mg by mouth every morning, and 200mg in the evening    LOV: 12/11/24  NOV: 06/11/25    30 day supply with 5 refills Medication T'd for review and signature    Marcella Castillo LPN on 1/6/2025 at 4:15 PM

## 2025-01-07 DIAGNOSIS — G40.309 NONINTRACTABLE GENERALIZED IDIOPATHIC EPILEPSY WITHOUT STATUS EPILEPTICUS (H): ICD-10-CM

## 2025-01-07 RX ORDER — ZONISAMIDE 100 MG/1
CAPSULE ORAL
Qty: 90 CAPSULE | Refills: 5 | Status: SHIPPED | OUTPATIENT
Start: 2025-01-07

## 2025-01-07 RX ORDER — LEVETIRACETAM 1000 MG/1
1000 TABLET ORAL EVERY MORNING
Qty: 30 TABLET | Refills: 5 | Status: SHIPPED | OUTPATIENT
Start: 2025-01-07

## 2025-01-07 RX ORDER — LEVETIRACETAM 500 MG/1
TABLET, FILM COATED, EXTENDED RELEASE ORAL
Qty: 90 TABLET | Refills: 5 | Status: SHIPPED | OUTPATIENT
Start: 2025-01-07

## 2025-01-07 NOTE — TELEPHONE ENCOUNTER
Refill request for: LamoTRIgine (LAMICTAL) 200 MG TB24    Directions: Take 200 mg by mouth 2 times daily     LOV: 12/11/24  NOV: 06/11/25    30 day supply with 5 refills Medication T'd for review and signature    Marcella Castillo LPN on 1/7/2025 at 1:21 PM

## 2025-01-08 RX ORDER — LAMOTRIGINE 200 MG/1
1 TABLET, EXTENDED RELEASE ORAL 2 TIMES DAILY
Qty: 60 TABLET | Refills: 5 | Status: SHIPPED | OUTPATIENT
Start: 2025-01-08

## 2025-01-16 DIAGNOSIS — G40.309 NONINTRACTABLE GENERALIZED IDIOPATHIC EPILEPSY WITHOUT STATUS EPILEPTICUS (H): ICD-10-CM

## 2025-01-16 RX ORDER — LEVOCARNITINE 330 MG/1
660 TABLET ORAL 3 TIMES DAILY
Qty: 180 TABLET | Refills: 5 | Status: SHIPPED | OUTPATIENT
Start: 2025-01-16

## 2025-01-16 NOTE — TELEPHONE ENCOUNTER
Refill request for: levOCARNitine (CARNITOR) 330 MG tablet    Directions: Take 2 tablets (660 mg) by mouth 3 times daily     LOV: 12/11/24  NOV: 06/11/25    Pt of Dr Griggs. She is out of the office until 1/20/25. Can you refill in her absence?    30 day supply with 5 refills Medication T'd for review and signature    Marcella Castillo LPN on 1/16/2025 at 2:34 PM

## 2025-01-29 ENCOUNTER — HOSPITAL ENCOUNTER (EMERGENCY)
Facility: CLINIC | Age: 50
Discharge: HOME | End: 2025-01-29
Attending: EMERGENCY MEDICINE | Admitting: EMERGENCY MEDICINE
Payer: MEDICARE

## 2025-01-29 ENCOUNTER — TELEPHONE (OUTPATIENT)
Dept: NEUROLOGY | Facility: CLINIC | Age: 50
End: 2025-01-29

## 2025-01-29 VITALS
RESPIRATION RATE: 20 BRPM | HEART RATE: 89 BPM | TEMPERATURE: 98.2 F | OXYGEN SATURATION: 95 % | SYSTOLIC BLOOD PRESSURE: 133 MMHG | DIASTOLIC BLOOD PRESSURE: 84 MMHG

## 2025-01-29 DIAGNOSIS — G40.909 SEIZURE DISORDER (H): ICD-10-CM

## 2025-01-29 LAB
ALBUMIN SERPL BCG-MCNC: 4.3 G/DL (ref 3.5–5.2)
ALBUMIN UR-MCNC: NEGATIVE MG/DL
ALP SERPL-CCNC: 90 U/L (ref 40–150)
ALT SERPL W P-5'-P-CCNC: 29 U/L (ref 0–70)
ANION GAP SERPL CALCULATED.3IONS-SCNC: 11 MMOL/L (ref 7–15)
APPEARANCE UR: CLEAR
AST SERPL W P-5'-P-CCNC: 26 U/L (ref 0–45)
BACTERIA #/AREA URNS HPF: ABNORMAL /HPF
BASOPHILS # BLD AUTO: 0.1 10E3/UL (ref 0–0.2)
BASOPHILS NFR BLD AUTO: 1 %
BILIRUB SERPL-MCNC: 0.2 MG/DL
BILIRUB UR QL STRIP: NEGATIVE
BUN SERPL-MCNC: 13.5 MG/DL (ref 6–20)
CALCIUM SERPL-MCNC: 9.4 MG/DL (ref 8.8–10.4)
CHLORIDE SERPL-SCNC: 102 MMOL/L (ref 98–107)
COLOR UR AUTO: YELLOW
CREAT SERPL-MCNC: 0.71 MG/DL (ref 0.67–1.17)
EGFRCR SERPLBLD CKD-EPI 2021: >90 ML/MIN/1.73M2
EOSINOPHIL # BLD AUTO: 0.1 10E3/UL (ref 0–0.7)
EOSINOPHIL NFR BLD AUTO: 1 %
ERYTHROCYTE [DISTWIDTH] IN BLOOD BY AUTOMATED COUNT: 14.5 % (ref 10–15)
GLUCOSE SERPL-MCNC: 105 MG/DL (ref 70–99)
GLUCOSE UR STRIP-MCNC: NEGATIVE MG/DL
HCO3 SERPL-SCNC: 30 MMOL/L (ref 22–29)
HCT VFR BLD AUTO: 42.2 % (ref 40–53)
HGB BLD-MCNC: 12.8 G/DL (ref 13.3–17.7)
HGB UR QL STRIP: ABNORMAL
HOLD SPECIMEN: NORMAL
IMM GRANULOCYTES # BLD: 0.1 10E3/UL
IMM GRANULOCYTES NFR BLD: 2 %
KETONES UR STRIP-MCNC: NEGATIVE MG/DL
LACTATE SERPL-SCNC: 2 MMOL/L (ref 0.7–2)
LACTATE SERPL-SCNC: 3 MMOL/L (ref 0.7–2)
LEUKOCYTE ESTERASE UR QL STRIP: NEGATIVE
LEVETIRACETAM SERPL-MCNC: 34.8 ÂΜG/ML (ref 10–40)
LYMPHOCYTES # BLD AUTO: 2.6 10E3/UL (ref 0.8–5.3)
LYMPHOCYTES NFR BLD AUTO: 31 %
MAGNESIUM SERPL-MCNC: 1.6 MG/DL (ref 1.7–2.3)
MCH RBC QN AUTO: 28.2 PG (ref 26.5–33)
MCHC RBC AUTO-ENTMCNC: 30.3 G/DL (ref 31.5–36.5)
MCV RBC AUTO: 93 FL (ref 78–100)
MONOCYTES # BLD AUTO: 0.7 10E3/UL (ref 0–1.3)
MONOCYTES NFR BLD AUTO: 9 %
NEUTROPHILS # BLD AUTO: 4.9 10E3/UL (ref 1.6–8.3)
NEUTROPHILS NFR BLD AUTO: 57 %
NITRATE UR QL: NEGATIVE
NRBC # BLD AUTO: 0 10E3/UL
NRBC BLD AUTO-RTO: 0 /100
PH UR STRIP: 6.5 [PH] (ref 5–7)
PLATELET # BLD AUTO: 212 10E3/UL (ref 150–450)
POTASSIUM SERPL-SCNC: 4.2 MMOL/L (ref 3.4–5.3)
PROT SERPL-MCNC: 7.2 G/DL (ref 6.4–8.3)
RBC # BLD AUTO: 4.54 10E6/UL (ref 4.4–5.9)
RBC URINE: 1 /HPF
SODIUM SERPL-SCNC: 143 MMOL/L (ref 135–145)
SP GR UR STRIP: 1.01 (ref 1–1.03)
SQUAMOUS EPITHELIAL: 1 /HPF
UROBILINOGEN UR STRIP-MCNC: NORMAL MG/DL
VALPROATE SERPL-MCNC: 54.3 UG/ML
WBC # BLD AUTO: 8.5 10E3/UL (ref 4–11)
WBC URINE: 2 /HPF

## 2025-01-29 PROCEDURE — 80203 DRUG SCREEN QUANT ZONISAMIDE: CPT | Performed by: EMERGENCY MEDICINE

## 2025-01-29 PROCEDURE — 85041 AUTOMATED RBC COUNT: CPT | Performed by: EMERGENCY MEDICINE

## 2025-01-29 PROCEDURE — 99285 EMERGENCY DEPT VISIT HI MDM: CPT | Mod: 25

## 2025-01-29 PROCEDURE — 83605 ASSAY OF LACTIC ACID: CPT | Performed by: EMERGENCY MEDICINE

## 2025-01-29 PROCEDURE — 96366 THER/PROPH/DIAG IV INF ADDON: CPT

## 2025-01-29 PROCEDURE — 85004 AUTOMATED DIFF WBC COUNT: CPT | Performed by: EMERGENCY MEDICINE

## 2025-01-29 PROCEDURE — 250N000011 HC RX IP 250 OP 636: Performed by: EMERGENCY MEDICINE

## 2025-01-29 PROCEDURE — 83735 ASSAY OF MAGNESIUM: CPT | Performed by: EMERGENCY MEDICINE

## 2025-01-29 PROCEDURE — 96365 THER/PROPH/DIAG IV INF INIT: CPT

## 2025-01-29 PROCEDURE — 36415 COLL VENOUS BLD VENIPUNCTURE: CPT | Performed by: EMERGENCY MEDICINE

## 2025-01-29 PROCEDURE — 258N000003 HC RX IP 258 OP 636: Performed by: EMERGENCY MEDICINE

## 2025-01-29 PROCEDURE — 250N000013 HC RX MED GY IP 250 OP 250 PS 637: Performed by: EMERGENCY MEDICINE

## 2025-01-29 PROCEDURE — 96375 TX/PRO/DX INJ NEW DRUG ADDON: CPT

## 2025-01-29 PROCEDURE — 80175 DRUG SCREEN QUAN LAMOTRIGINE: CPT | Performed by: EMERGENCY MEDICINE

## 2025-01-29 PROCEDURE — 80177 DRUG SCRN QUAN LEVETIRACETAM: CPT | Performed by: EMERGENCY MEDICINE

## 2025-01-29 PROCEDURE — 85014 HEMATOCRIT: CPT | Performed by: EMERGENCY MEDICINE

## 2025-01-29 PROCEDURE — 80164 ASSAY DIPROPYLACETIC ACD TOT: CPT | Performed by: EMERGENCY MEDICINE

## 2025-01-29 PROCEDURE — 96361 HYDRATE IV INFUSION ADD-ON: CPT

## 2025-01-29 PROCEDURE — 80053 COMPREHEN METABOLIC PANEL: CPT | Performed by: EMERGENCY MEDICINE

## 2025-01-29 PROCEDURE — 81001 URINALYSIS AUTO W/SCOPE: CPT | Performed by: EMERGENCY MEDICINE

## 2025-01-29 RX ORDER — MAGNESIUM SULFATE HEPTAHYDRATE 40 MG/ML
2 INJECTION, SOLUTION INTRAVENOUS ONCE
Status: COMPLETED | OUTPATIENT
Start: 2025-01-29 | End: 2025-01-29

## 2025-01-29 RX ORDER — LORAZEPAM 2 MG/ML
0.5 INJECTION INTRAMUSCULAR ONCE
Status: COMPLETED | OUTPATIENT
Start: 2025-01-29 | End: 2025-01-29

## 2025-01-29 RX ORDER — DIVALPROEX SODIUM 500 MG/1
500 TABLET, DELAYED RELEASE ORAL ONCE
Status: COMPLETED | OUTPATIENT
Start: 2025-01-29 | End: 2025-01-29

## 2025-01-29 RX ADMIN — SODIUM CHLORIDE 1000 ML: 9 INJECTION, SOLUTION INTRAVENOUS at 09:57

## 2025-01-29 RX ADMIN — MAGNESIUM SULFATE HEPTAHYDRATE 2 G: 40 INJECTION, SOLUTION INTRAVENOUS at 11:57

## 2025-01-29 RX ADMIN — SODIUM CHLORIDE 1000 ML: 9 INJECTION, SOLUTION INTRAVENOUS at 11:57

## 2025-01-29 RX ADMIN — LORAZEPAM 0.5 MG: 2 INJECTION INTRAMUSCULAR; INTRAVENOUS at 10:14

## 2025-01-29 RX ADMIN — DIVALPROEX SODIUM 500 MG: 500 TABLET, DELAYED RELEASE ORAL at 12:29

## 2025-01-29 ASSESSMENT — ACTIVITIES OF DAILY LIVING (ADL)
ADLS_ACUITY_SCORE: 63

## 2025-01-29 NOTE — ED NOTES
Bed: [facility]  Expected date:   Expected time:   Means of arrival:   Comments:  TO TRIAGE- BV4

## 2025-01-29 NOTE — ED TRIAGE NOTES
"Pt arrives via ems- alerted group home staff this am that he had a seizure- stated \"he had a lot of jerks\"- did not fall- not on thinners. pt awake and alert on arrival . Denies numbness/tingling. Pt VSS      Triage Assessment (Adult)       Row Name 01/29/25 0924          Triage Assessment    Airway WDL WDL        Respiratory WDL    Respiratory WDL WDL        Cardiac WDL    Cardiac WDL WDL        Cognitive/Neuro/Behavioral WDL    Cognitive/Neuro/Behavioral WDL WDL                     "
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
## 2025-01-29 NOTE — DISCHARGE INSTRUCTIONS
You do not have a urinary tract infection.  Continue taking all your medication as prescribed.  We recommend you follow-up with your primary care provider.  Return the emergency department if you develop any new or concerning symptoms.

## 2025-01-29 NOTE — TELEPHONE ENCOUNTER
Called by [facility] ED about patient with seizure.  Reportedly is doing well.  Was hoping to get in touch with Dr. Viviane Griggs.  Explained I am on call for [employer] available to assist as able, but they stated first they would like to try to get a hold of patients neurologist as patient is doing well and no major emergent concerns.  Will call back if questions.        Gino Dominguez, DO

## 2025-02-03 ENCOUNTER — TELEPHONE (OUTPATIENT)
Dept: NEUROLOGY | Facility: CLINIC | Age: 50
End: 2025-02-03
Payer: MEDICARE

## 2025-02-03 NOTE — TELEPHONE ENCOUNTER
----- Message from Jojo Griggs sent at 2/2/2025  2:25 PM CST -----  Was this addressed? I am not too worried about the high lamotrigine, but let me know if ED made any dose changes/ Thanks!    [facility] [place]  ----- Message -----  From: Sandy Lazo RN  Sent: 1/31/2025   3:01 PM CST  To: Jojo Griggs MD    ED results routed KONG

## 2025-02-03 NOTE — TELEPHONE ENCOUNTER
Per RN review of ER notes:     I spoke with his neurologist, Dr. Viveros.  He recommended an extra dose of Depakote today and actually had the patient already scheduled for an EEG tomorrow.  So at that appointment they will make further recommendations for his medication regimen.  He received the Depakote here and has not had any further episodes.     Please advise if you want to make any medication changes, none were made in ER. Please also review EEG completed 1/30.     Tito TY RN, BSN  [facility]

## 2025-02-05 ENCOUNTER — TELEPHONE (OUTPATIENT)
Dept: NEUROLOGY | Facility: CLINIC | Age: 50
End: 2025-02-05
Payer: MEDICARE

## 2025-02-05 NOTE — TELEPHONE ENCOUNTER
----- Message from Jojo Griggs sent at 2/5/2025  7:34 AM CST -----  Please let Joel and his caregivers know that his electroencephalogram shows continued risk for seizures. I think we should hold off on making changes in medications for now. I know Joel wants to taper the Depakote but I would like to wait on this for now. If they would like to discuss further before his appointment in June, we can set them up with a sooner appointment with myself or Lisa.    Thank you,  Dr. Griggs

## 2025-02-05 NOTE — TELEPHONE ENCOUNTER
RN called Joel, [facility]. Called to relay the following message from Dr. Griggs:    Please let Joel and his caregivers know that his electroencephalogram shows continued risk for seizures. I think we should hold off on making changes in medications for now. I know Joel wants to taper the Depakote but I would like to wait on this for now. If they would like to discuss further before his appointment in June, we can set them up with a sooner appointment with myself or Lisa.     Federica Mendiola RN, BSN  [facility]

## 2025-02-06 NOTE — TELEPHONE ENCOUNTER
RN called Joel's group home, talked with group home staff member Nadine. Relayed the following message from Dr. Griggs:    Please let Joel and his caregivers know that his electroencephalogram shows continued risk for seizures. I think we should hold off on making changes in medications for now. I know Joel wants to taper the Depakote but I would like to wait on this for now. If they would like to discuss further before his appointment in June, we can set them up with a sooner appointment with myself or Lisa.     Nadine expressed understanding and said she would relay the message to Joel.    Federica Mendiola RN, BSN  [facility]

## 2025-03-29 ENCOUNTER — APPOINTMENT (OUTPATIENT)
Dept: GENERAL RADIOLOGY | Facility: CLINIC | Age: 50
End: 2025-03-29
Attending: EMERGENCY MEDICINE
Payer: MEDICARE

## 2025-03-29 ENCOUNTER — HOSPITAL ENCOUNTER (OUTPATIENT)
Facility: CLINIC | Age: 50
Setting detail: OBSERVATION
Discharge: NURSING, REHAB OR LONG-TERM CARE | End: 2025-03-31
Attending: EMERGENCY MEDICINE | Admitting: INTERNAL MEDICINE
Payer: MEDICARE

## 2025-03-29 DIAGNOSIS — E83.42 HYPOMAGNESEMIA: ICD-10-CM

## 2025-03-29 DIAGNOSIS — J96.22 ACUTE ON CHRONIC RESPIRATORY FAILURE WITH HYPERCAPNIA (H): ICD-10-CM

## 2025-03-29 DIAGNOSIS — T14.8XXA ABRASION: ICD-10-CM

## 2025-03-29 DIAGNOSIS — Z99.81 OXYGEN DEPENDENT: ICD-10-CM
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
## 2025-03-29 DIAGNOSIS — E66.01 MORBID OBESITY (H): Primary | ICD-10-CM

## 2025-03-29 LAB
ALBUMIN SERPL BCG-MCNC: 4.4 G/DL (ref 3.5–5.2)
ALBUMIN UR-MCNC: 10 MG/DL
ALP SERPL-CCNC: 96 U/L (ref 40–150)
ALT SERPL W P-5'-P-CCNC: 27 U/L (ref 0–70)
ANION GAP SERPL CALCULATED.3IONS-SCNC: 7 MMOL/L (ref 7–15)
APPEARANCE UR: CLEAR
AST SERPL W P-5'-P-CCNC: 25 U/L (ref 0–45)
BASOPHILS # BLD AUTO: 0 10E3/UL (ref 0–0.2)
BASOPHILS NFR BLD AUTO: 1 %
BILIRUB SERPL-MCNC: 0.2 MG/DL
BILIRUB UR QL STRIP: NEGATIVE
BUN SERPL-MCNC: 11 MG/DL (ref 6–20)
CALCIUM SERPL-MCNC: 9.5 MG/DL (ref 8.8–10.4)
CHLORIDE SERPL-SCNC: 97 MMOL/L (ref 98–107)
COLOR UR AUTO: ABNORMAL
CREAT SERPL-MCNC: 0.77 MG/DL (ref 0.67–1.17)
D DIMER PPP FEU-MCNC: 0.34 UG/ML FEU (ref 0–0.5)
EGFRCR SERPLBLD CKD-EPI 2021: >90 ML/MIN/1.73M2
EOSINOPHIL # BLD AUTO: 0.1 10E3/UL (ref 0–0.7)
EOSINOPHIL NFR BLD AUTO: 1 %
ERYTHROCYTE [DISTWIDTH] IN BLOOD BY AUTOMATED COUNT: 14.6 % (ref 10–15)
FLUAV RNA SPEC QL NAA+PROBE: NEGATIVE
FLUBV RNA RESP QL NAA+PROBE: NEGATIVE
GLUCOSE BLDC GLUCOMTR-MCNC: 114 MG/DL (ref 70–99)
GLUCOSE SERPL-MCNC: 115 MG/DL (ref 70–99)
GLUCOSE UR STRIP-MCNC: NEGATIVE MG/DL
HCO3 BLDV-SCNC: 40 MMOL/L (ref 21–28)
HCO3 SERPL-SCNC: 37 MMOL/L (ref 22–29)
HCT VFR BLD AUTO: 40.8 % (ref 40–53)
HGB BLD-MCNC: 12.4 G/DL (ref 13.3–17.7)
HGB UR QL STRIP: NEGATIVE
HOLD SPECIMEN: NORMAL
HYALINE CASTS: 1 /LPF
IMM GRANULOCYTES # BLD: 0.1 10E3/UL
IMM GRANULOCYTES NFR BLD: 2 %
KETONES UR STRIP-MCNC: NEGATIVE MG/DL
LACTATE BLD-SCNC: 1.5 MMOL/L (ref 0.7–2)
LACTATE SERPL-SCNC: 1.5 MMOL/L (ref 0.7–2)
LEUKOCYTE ESTERASE UR QL STRIP: NEGATIVE
LYMPHOCYTES # BLD AUTO: 1.9 10E3/UL (ref 0.8–5.3)
LYMPHOCYTES NFR BLD AUTO: 27 %
MAGNESIUM SERPL-MCNC: 1.5 MG/DL (ref 1.7–2.3)
MCH RBC QN AUTO: 28.1 PG (ref 26.5–33)
MCHC RBC AUTO-ENTMCNC: 30.4 G/DL (ref 31.5–36.5)
MCV RBC AUTO: 92 FL (ref 78–100)
MONOCYTES # BLD AUTO: 0.7 10E3/UL (ref 0–1.3)
MONOCYTES NFR BLD AUTO: 10 %
MUCOUS THREADS #/AREA URNS LPF: PRESENT /LPF
NEUTROPHILS # BLD AUTO: 4 10E3/UL (ref 1.6–8.3)
NEUTROPHILS NFR BLD AUTO: 59 %
NITRATE UR QL: NEGATIVE
NRBC # BLD AUTO: 0 10E3/UL
NRBC BLD AUTO-RTO: 0 /100
NT-PROBNP SERPL-MCNC: 53 PG/ML (ref 0–900)
PCO2 BLDV: 84 MM HG (ref 40–50)
PH BLDV: 7.29 [PH] (ref 7.32–7.43)
PH UR STRIP: 7 [PH] (ref 5–7)
PLATELET # BLD AUTO: 199 10E3/UL (ref 150–450)
PO2 BLDV: 27 MM HG (ref 25–47)
POTASSIUM SERPL-SCNC: 4.3 MMOL/L (ref 3.4–5.3)
PROT SERPL-MCNC: 7 G/DL (ref 6.4–8.3)
RBC # BLD AUTO: 4.42 10E6/UL (ref 4.4–5.9)
RBC URINE: 2 /HPF
RSV RNA SPEC NAA+PROBE: NEGATIVE
SAO2 % BLDV: 40 % (ref 70–75)
SARS-COV-2 RNA RESP QL NAA+PROBE: NEGATIVE
SODIUM SERPL-SCNC: 141 MMOL/L (ref 135–145)
SP GR UR STRIP: 1.02 (ref 1–1.03)
TROPONIN T SERPL HS-MCNC: 24 NG/L
UROBILINOGEN UR STRIP-MCNC: NORMAL MG/DL
WBC # BLD AUTO: 6.8 10E3/UL (ref 4–11)
WBC URINE: 3 /HPF

## 2025-03-29 PROCEDURE — 85014 HEMATOCRIT: CPT | Performed by: EMERGENCY MEDICINE

## 2025-03-29 PROCEDURE — 83735 ASSAY OF MAGNESIUM: CPT | Performed by: EMERGENCY MEDICINE

## 2025-03-29 PROCEDURE — 83605 ASSAY OF LACTIC ACID: CPT

## 2025-03-29 PROCEDURE — 250N000011 HC RX IP 250 OP 636: Performed by: EMERGENCY MEDICINE

## 2025-03-29 PROCEDURE — 82310 ASSAY OF CALCIUM: CPT | Performed by: EMERGENCY MEDICINE

## 2025-03-29 PROCEDURE — 99291 CRITICAL CARE FIRST HOUR: CPT | Mod: 25

## 2025-03-29 PROCEDURE — 82962 GLUCOSE BLOOD TEST: CPT

## 2025-03-29 PROCEDURE — 36415 COLL VENOUS BLD VENIPUNCTURE: CPT | Performed by: EMERGENCY MEDICINE

## 2025-03-29 PROCEDURE — 82803 BLOOD GASES ANY COMBINATION: CPT

## 2025-03-29 PROCEDURE — 85025 COMPLETE CBC W/AUTO DIFF WBC: CPT | Performed by: EMERGENCY MEDICINE

## 2025-03-29 PROCEDURE — 71045 X-RAY EXAM CHEST 1 VIEW: CPT

## 2025-03-29 PROCEDURE — 84484 ASSAY OF TROPONIN QUANT: CPT | Performed by: EMERGENCY MEDICINE

## 2025-03-29 PROCEDURE — 96365 THER/PROPH/DIAG IV INF INIT: CPT

## 2025-03-29 PROCEDURE — 999N000157 HC STATISTIC RCP TIME EA 10 MIN

## 2025-03-29 PROCEDURE — 83605 ASSAY OF LACTIC ACID: CPT | Performed by: EMERGENCY MEDICINE

## 2025-03-29 PROCEDURE — 83880 ASSAY OF NATRIURETIC PEPTIDE: CPT | Performed by: EMERGENCY MEDICINE

## 2025-03-29 PROCEDURE — 87637 SARSCOV2&INF A&B&RSV AMP PRB: CPT | Performed by: EMERGENCY MEDICINE

## 2025-03-29 PROCEDURE — 250N000009 HC RX 250: Performed by: EMERGENCY MEDICINE

## 2025-03-29 PROCEDURE — 85379 FIBRIN DEGRADATION QUANT: CPT | Performed by: EMERGENCY MEDICINE

## 2025-03-29 PROCEDURE — 93005 ELECTROCARDIOGRAM TRACING: CPT

## 2025-03-29 PROCEDURE — 82040 ASSAY OF SERUM ALBUMIN: CPT | Performed by: EMERGENCY MEDICINE

## 2025-03-29 PROCEDURE — 94640 AIRWAY INHALATION TREATMENT: CPT

## 2025-03-29 PROCEDURE — 94660 CPAP INITIATION&MGMT: CPT

## 2025-03-29 PROCEDURE — 81001 URINALYSIS AUTO W/SCOPE: CPT | Performed by: EMERGENCY MEDICINE

## 2025-03-29 RX ORDER — IPRATROPIUM BROMIDE AND ALBUTEROL SULFATE 2.5; .5 MG/3ML; MG/3ML
3 SOLUTION RESPIRATORY (INHALATION) ONCE
Status: COMPLETED | OUTPATIENT
Start: 2025-03-29 | End: 2025-03-29

## 2025-03-29 RX ORDER — MAGNESIUM SULFATE HEPTAHYDRATE 40 MG/ML
2 INJECTION, SOLUTION INTRAVENOUS ONCE
Status: COMPLETED | OUTPATIENT
Start: 2025-03-29 | End: 2025-03-30

## 2025-03-29 RX ADMIN — IPRATROPIUM BROMIDE AND ALBUTEROL SULFATE 3 ML: .5; 3 SOLUTION RESPIRATORY (INHALATION) at 23:18

## 2025-03-29 RX ADMIN — MAGNESIUM SULFATE HEPTAHYDRATE 2 G: 40 INJECTION, SOLUTION INTRAVENOUS at 23:18

## 2025-03-29 ASSESSMENT — ACTIVITIES OF DAILY LIVING (ADL)
ADLS_ACUITY_SCORE: 63
ADLS_ACUITY_SCORE: 63

## 2025-03-30 ENCOUNTER — APPOINTMENT (OUTPATIENT)
Dept: CT IMAGING | Facility: CLINIC | Age: 50
End: 2025-03-30
Attending: EMERGENCY MEDICINE
Payer: MEDICARE

## 2025-03-30 ENCOUNTER — APPOINTMENT (OUTPATIENT)
Dept: GENERAL RADIOLOGY | Facility: CLINIC | Age: 50
End: 2025-03-30
Attending: INTERNAL MEDICINE
Payer: MEDICARE

## 2025-03-30 PROBLEM — E83.42 HYPOMAGNESEMIA: Status: ACTIVE | Noted: 2025-03-30

## 2025-03-30 PROBLEM — J96.22 ACUTE ON CHRONIC RESPIRATORY FAILURE WITH HYPERCAPNIA (H): Status: ACTIVE | Noted: 2025-03-30

## 2025-03-30 LAB
ANION GAP SERPL CALCULATED.3IONS-SCNC: 6 MMOL/L (ref 7–15)
BASE EXCESS BLDV CALC-SCNC: 10.7 MMOL/L (ref -3–3)
BUN SERPL-MCNC: 12.5 MG/DL (ref 6–20)
CALCIUM SERPL-MCNC: 8.8 MG/DL (ref 8.8–10.4)
CHLORIDE SERPL-SCNC: 96 MMOL/L (ref 98–107)
CREAT SERPL-MCNC: 0.73 MG/DL (ref 0.67–1.17)
EGFRCR SERPLBLD CKD-EPI 2021: >90 ML/MIN/1.73M2
ERYTHROCYTE [DISTWIDTH] IN BLOOD BY AUTOMATED COUNT: 14.7 % (ref 10–15)
GLUCOSE SERPL-MCNC: 82 MG/DL (ref 70–99)
HCO3 BLDV-SCNC: 38 MMOL/L (ref 21–28)
HCO3 BLDV-SCNC: 40 MMOL/L (ref 21–28)
HCO3 SERPL-SCNC: 36 MMOL/L (ref 22–29)
HCT VFR BLD AUTO: 37.3 % (ref 40–53)
HGB BLD-MCNC: 11.4 G/DL (ref 13.3–17.7)
LACTATE BLD-SCNC: 1.3 MMOL/L (ref 0.7–2)
MAGNESIUM SERPL-MCNC: 1.7 MG/DL (ref 1.7–2.3)
MCH RBC QN AUTO: 28.1 PG (ref 26.5–33)
MCHC RBC AUTO-ENTMCNC: 30.6 G/DL (ref 31.5–36.5)
MCV RBC AUTO: 92 FL (ref 78–100)
O2/TOTAL GAS SETTING VFR VENT: 30 %
OXYHGB MFR BLDV: 76 % (ref 70–75)
PCO2 BLDV: 78 MM HG (ref 40–50)
PCO2 BLDV: 79 MM HG (ref 40–50)
PH BLDV: 7.29 [PH] (ref 7.32–7.43)
PH BLDV: 7.31 [PH] (ref 7.32–7.43)
PLATELET # BLD AUTO: 176 10E3/UL (ref 150–450)
PO2 BLDV: 46 MM HG (ref 25–47)
PO2 BLDV: 49 MM HG (ref 25–47)
POTASSIUM SERPL-SCNC: 3.9 MMOL/L (ref 3.4–5.3)
RBC # BLD AUTO: 4.05 10E6/UL (ref 4.4–5.9)
SAO2 % BLDV: 77 % (ref 70–75)
SAO2 % BLDV: 77.9 % (ref 70–75)
SODIUM SERPL-SCNC: 138 MMOL/L (ref 135–145)
TROPONIN T SERPL HS-MCNC: 22 NG/L
WBC # BLD AUTO: 7.4 10E3/UL (ref 4–11)

## 2025-03-30 PROCEDURE — 82805 BLOOD GASES W/O2 SATURATION: CPT | Performed by: INTERNAL MEDICINE

## 2025-03-30 PROCEDURE — 83605 ASSAY OF LACTIC ACID: CPT

## 2025-03-30 PROCEDURE — 99222 1ST HOSP IP/OBS MODERATE 55: CPT | Mod: AI | Performed by: INTERNAL MEDICINE

## 2025-03-30 PROCEDURE — 999N000157 HC STATISTIC RCP TIME EA 10 MIN

## 2025-03-30 PROCEDURE — 36415 COLL VENOUS BLD VENIPUNCTURE: CPT | Performed by: INTERNAL MEDICINE

## 2025-03-30 PROCEDURE — 82310 ASSAY OF CALCIUM: CPT | Performed by: INTERNAL MEDICINE

## 2025-03-30 PROCEDURE — 82803 BLOOD GASES ANY COMBINATION: CPT

## 2025-03-30 PROCEDURE — 80048 BASIC METABOLIC PNL TOTAL CA: CPT | Performed by: INTERNAL MEDICINE

## 2025-03-30 PROCEDURE — G0378 HOSPITAL OBSERVATION PER HR: HCPCS

## 2025-03-30 PROCEDURE — 250N000013 HC RX MED GY IP 250 OP 250 PS 637: Performed by: INTERNAL MEDICINE

## 2025-03-30 PROCEDURE — 83735 ASSAY OF MAGNESIUM: CPT | Performed by: INTERNAL MEDICINE

## 2025-03-30 PROCEDURE — 999N000185 HC STATISTIC TRANSPORT TIME EA 15 MIN

## 2025-03-30 PROCEDURE — 85014 HEMATOCRIT: CPT | Performed by: INTERNAL MEDICINE

## 2025-03-30 PROCEDURE — 99207 PR NO BILLABLE SERVICE THIS VISIT: CPT | Performed by: HOSPITALIST

## 2025-03-30 PROCEDURE — 94660 CPAP INITIATION&MGMT: CPT

## 2025-03-30 PROCEDURE — 70450 CT HEAD/BRAIN W/O DYE: CPT

## 2025-03-30 PROCEDURE — 84484 ASSAY OF TROPONIN QUANT: CPT | Performed by: EMERGENCY MEDICINE

## 2025-03-30 PROCEDURE — 36415 COLL VENOUS BLD VENIPUNCTURE: CPT | Performed by: EMERGENCY MEDICINE

## 2025-03-30 PROCEDURE — 73620 X-RAY EXAM OF FOOT: CPT | Mod: RT

## 2025-03-30 RX ORDER — PRAZOSIN HYDROCHLORIDE 1 MG/1
2 CAPSULE ORAL AT BEDTIME
Status: DISCONTINUED | OUTPATIENT
Start: 2025-03-30 | End: 2025-03-31 | Stop reason: HOSPADM

## 2025-03-30 RX ORDER — ZONISAMIDE 100 MG/1
100 CAPSULE ORAL EVERY MORNING
Status: DISCONTINUED | OUTPATIENT
Start: 2025-03-30 | End: 2025-03-31 | Stop reason: HOSPADM

## 2025-03-30 RX ORDER — LOPERAMIDE HYDROCHLORIDE 2 MG/1
2 CAPSULE ORAL 4 TIMES DAILY PRN
Status: DISCONTINUED | OUTPATIENT
Start: 2025-03-30 | End: 2025-03-31 | Stop reason: HOSPADM

## 2025-03-30 RX ORDER — LAMOTRIGINE 100 MG/1
200 TABLET, EXTENDED RELEASE ORAL 2 TIMES DAILY
Status: DISCONTINUED | OUTPATIENT
Start: 2025-03-30 | End: 2025-03-31 | Stop reason: HOSPADM

## 2025-03-30 RX ORDER — LEVETIRACETAM 500 MG/1
1000 TABLET ORAL EVERY MORNING
Status: DISCONTINUED | OUTPATIENT
Start: 2025-03-30 | End: 2025-03-31 | Stop reason: HOSPADM

## 2025-03-30 RX ORDER — ONDANSETRON 2 MG/ML
4 INJECTION INTRAMUSCULAR; INTRAVENOUS EVERY 6 HOURS PRN
Status: DISCONTINUED | OUTPATIENT
Start: 2025-03-30 | End: 2025-03-31 | Stop reason: HOSPADM

## 2025-03-30 RX ORDER — PROCHLORPERAZINE MALEATE 5 MG/1
10 TABLET ORAL EVERY 6 HOURS PRN
Status: DISCONTINUED | OUTPATIENT
Start: 2025-03-30 | End: 2025-03-31 | Stop reason: HOSPADM

## 2025-03-30 RX ORDER — ZONISAMIDE 100 MG/1
200 CAPSULE ORAL EVERY EVENING
Status: DISCONTINUED | OUTPATIENT
Start: 2025-03-30 | End: 2025-03-31 | Stop reason: HOSPADM

## 2025-03-30 RX ORDER — CITALOPRAM HYDROBROMIDE 20 MG/1
60 TABLET ORAL DAILY
Status: DISCONTINUED | OUTPATIENT
Start: 2025-03-30 | End: 2025-03-31 | Stop reason: HOSPADM

## 2025-03-30 RX ORDER — AMOXICILLIN 250 MG
1 CAPSULE ORAL 2 TIMES DAILY PRN
Status: DISCONTINUED | OUTPATIENT
Start: 2025-03-30 | End: 2025-03-31 | Stop reason: HOSPADM

## 2025-03-30 RX ORDER — ACETAMINOPHEN 325 MG/1
650 TABLET ORAL EVERY 4 HOURS PRN
Status: DISCONTINUED | OUTPATIENT
Start: 2025-03-30 | End: 2025-03-31 | Stop reason: HOSPADM

## 2025-03-30 RX ORDER — LEVOCARNITINE 330 MG/1
660 TABLET ORAL 3 TIMES DAILY
Status: DISCONTINUED | OUTPATIENT
Start: 2025-03-30 | End: 2025-03-31 | Stop reason: HOSPADM

## 2025-03-30 RX ORDER — LEVETIRACETAM 750 MG/1
1500 TABLET, FILM COATED, EXTENDED RELEASE ORAL AT BEDTIME
Status: DISCONTINUED | OUTPATIENT
Start: 2025-03-30 | End: 2025-03-31 | Stop reason: HOSPADM

## 2025-03-30 RX ORDER — DIVALPROEX SODIUM 500 MG/1
500 TABLET, DELAYED RELEASE ORAL 2 TIMES DAILY
Status: DISCONTINUED | OUTPATIENT
Start: 2025-03-30 | End: 2025-03-31 | Stop reason: HOSPADM

## 2025-03-30 RX ORDER — AMOXICILLIN 250 MG
2 CAPSULE ORAL 2 TIMES DAILY PRN
Status: DISCONTINUED | OUTPATIENT
Start: 2025-03-30 | End: 2025-03-31 | Stop reason: HOSPADM

## 2025-03-30 RX ORDER — PROPRANOLOL HCL 20 MG
20 TABLET ORAL 2 TIMES DAILY
Status: DISCONTINUED | OUTPATIENT
Start: 2025-03-30 | End: 2025-03-31 | Stop reason: HOSPADM

## 2025-03-30 RX ORDER — PANTOPRAZOLE SODIUM 40 MG/1
40 TABLET, DELAYED RELEASE ORAL DAILY PRN
Status: DISCONTINUED | OUTPATIENT
Start: 2025-03-30 | End: 2025-03-31 | Stop reason: HOSPADM

## 2025-03-30 RX ORDER — ONDANSETRON 4 MG/1
4 TABLET, ORALLY DISINTEGRATING ORAL EVERY 6 HOURS PRN
Status: DISCONTINUED | OUTPATIENT
Start: 2025-03-30 | End: 2025-03-31 | Stop reason: HOSPADM

## 2025-03-30 RX ORDER — ACETAMINOPHEN 650 MG/1
650 SUPPOSITORY RECTAL EVERY 4 HOURS PRN
Status: DISCONTINUED | OUTPATIENT
Start: 2025-03-30 | End: 2025-03-31 | Stop reason: HOSPADM

## 2025-03-30 RX ADMIN — CITALOPRAM HYDROBROMIDE 60 MG: 20 TABLET ORAL at 10:30

## 2025-03-30 RX ADMIN — LEVOCARNITINE 660 MG: 330 TABLET ORAL at 13:12

## 2025-03-30 RX ADMIN — BUSPIRONE HYDROCHLORIDE 15 MG: 10 TABLET ORAL at 13:12

## 2025-03-30 RX ADMIN — PROPRANOLOL HYDROCHLORIDE 20 MG: 20 TABLET ORAL at 21:52

## 2025-03-30 RX ADMIN — ZONISAMIDE 100 MG: 100 CAPSULE ORAL at 13:12

## 2025-03-30 RX ADMIN — PROPRANOLOL HYDROCHLORIDE 20 MG: 20 TABLET ORAL at 13:13

## 2025-03-30 RX ADMIN — BUSPIRONE HYDROCHLORIDE 15 MG: 10 TABLET ORAL at 21:51

## 2025-03-30 RX ADMIN — LAMOTRIGINE 200 MG: 100 TABLET, FILM COATED, EXTENDED RELEASE ORAL at 13:13

## 2025-03-30 RX ADMIN — DIVALPROEX SODIUM 500 MG: 500 TABLET, DELAYED RELEASE ORAL at 13:12

## 2025-03-30 RX ADMIN — QUETIAPINE FUMARATE 300 MG: 200 TABLET ORAL at 21:52

## 2025-03-30 RX ADMIN — LAMOTRIGINE 200 MG: 100 TABLET, FILM COATED, EXTENDED RELEASE ORAL at 21:54

## 2025-03-30 RX ADMIN — DIVALPROEX SODIUM 500 MG: 500 TABLET, DELAYED RELEASE ORAL at 21:56

## 2025-03-30 RX ADMIN — ZONISAMIDE 200 MG: 100 CAPSULE ORAL at 21:55

## 2025-03-30 RX ADMIN — Medication 5 MG: at 21:52

## 2025-03-30 RX ADMIN — PRAZOSIN HYDROCHLORIDE 2 MG: 1 CAPSULE ORAL at 21:54

## 2025-03-30 RX ADMIN — LEVETIRACETAM 1500 MG: 750 TABLET, FILM COATED, EXTENDED RELEASE ORAL at 21:54

## 2025-03-30 RX ADMIN — LEVETIRACETAM 1000 MG: 500 TABLET, FILM COATED ORAL at 12:01

## 2025-03-30 RX ADMIN — LEVOCARNITINE 660 MG: 330 TABLET ORAL at 21:53

## 2025-03-30 ASSESSMENT — ACTIVITIES OF DAILY LIVING (ADL)
ADLS_ACUITY_SCORE: 66
ADLS_ACUITY_SCORE: 63
ADLS_ACUITY_SCORE: 69
ADLS_ACUITY_SCORE: 63
ADLS_ACUITY_SCORE: 66
ADLS_ACUITY_SCORE: 68
ADLS_ACUITY_SCORE: 68
ADLS_ACUITY_SCORE: 65
ADLS_ACUITY_SCORE: 68
DEPENDENT_IADLS:: CLEANING;COOKING;LAUNDRY;SHOPPING;MEAL PREPARATION;MEDICATION MANAGEMENT;MONEY MANAGEMENT;TRANSPORTATION
ADLS_ACUITY_SCORE: 69
ADLS_ACUITY_SCORE: 63
ADLS_ACUITY_SCORE: 63
ADLS_ACUITY_SCORE: 69
ADLS_ACUITY_SCORE: 63
ADLS_ACUITY_SCORE: 69
ADLS_ACUITY_SCORE: 69
ADLS_ACUITY_SCORE: 63
ADLS_ACUITY_SCORE: 69
ADLS_ACUITY_SCORE: 74
ADLS_ACUITY_SCORE: 65
ADLS_ACUITY_SCORE: 63
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
## 2025-03-30 NOTE — PROGRESS NOTES
A BiPAP of  16/6 @ 40% was applied to the pt via the mask for an increase in WOB and SOB.  The skin in contact with the mask and straps looks good and intact. Pt is tolerating it well. RT will continue to monitor and assess the pt's respiratory status and needs.  RT/RN huddle to discuss contraindications prior to placement? Y/N? Y, discussed with patient    Fina Lofton, RT

## 2025-03-30 NOTE — ED NOTES
Patient rolled for incontinence care, MD at bedside for skin assessment.  Abrasion across bilateral upper back noted.  Abrasion bilateral upper thigh with open excoriated areas, mepilex applied to both sides.

## 2025-03-30 NOTE — PLAN OF CARE
"End of shift note: Aox4, denies numbness or tingling. Pt 2 assist w/ lift. Tele SR. Plan to use BIPAP with sleeping/napping.    Goal Outcome Evaluation:      Plan of Care Reviewed With: patient    Overall Patient Progress: improvingOverall Patient Progress: improving    Outcome Evaluation: OBS status. Denies pain. VSS, afebrile, sating well on 3 L NC (baseline). No seizure activity.    PRIMARY DIAGNOSIS: GENERALIZED WEAKNESS    OUTPATIENT/OBSERVATION GOALS TO BE MET BEFORE DISCHARGE  1. Orthostatic performed: No    2. Tolerating PO medications:  Have not given any PO medicaitons    3. Return to near baseline physical activity: No    4. Cleared for discharge by consultants (if involved): No    Discharge Planner Nurse   Safe discharge environment identified: No  Barriers to discharge: Yes       Entered by: Debo Dotson RN 03/30/2025 6:49 PM     Please review provider order for any additional goals.   Nurse to notify provider when observation goals have been met and patient is ready for discharge.      Problem: Adult Inpatient Plan of Care  Goal: Plan of Care Review  Description: The Plan of Care Review/Shift note should be completed every shift.  The Outcome Evaluation is a brief statement about your assessment that the patient is improving, declining, or no change.  This information will be displayed automatically on your shiftnote.  Outcome: Progressing  Flowsheets (Taken 3/30/2025 1847)  Outcome Evaluation: OBS status. Denies pain. VSS, afebrile, sating well on 3 L NC (baseline). No seizure activity.  Plan of Care Reviewed With: patient  Overall Patient Progress: improving  Goal: Patient-Specific Goal (Individualized)  Description: You can add care plan individualizations to a care plan. Examples of Individualization might be:  \"Parent requests to be called daily at 9am for status\", \"I have a hard time hearing out of my right ear\", or \"Do not touch me to wake me up as it startlesme\".  Outcome: " Progressing  Goal: Absence of Hospital-Acquired Illness or Injury  Outcome: Progressing  Intervention: Identify and Manage Fall Risk  Recent Flowsheet Documentation  Taken 3/30/2025 1821 by Debo Dotson RN  Safety Promotion/Fall Prevention: safety round/check completed  Taken 3/30/2025 1726 by Debo Dotson RN  Safety Promotion/Fall Prevention: safety round/check completed  Taken 3/30/2025 1640 by Debo Dotson RN  Safety Promotion/Fall Prevention: safety round/check completed  Taken 3/30/2025 1503 by Debo Dotson RN  Safety Promotion/Fall Prevention: safety round/check completed  Intervention: Prevent Skin Injury  Recent Flowsheet Documentation  Taken 3/30/2025 1503 by Debo Dotson RN  Body Position:   supine, head elevated   supine, legs elevated  Intervention: Prevent and Manage VTE (Venous Thromboembolism) Risk  Recent Flowsheet Documentation  Taken 3/30/2025 1503 by Debo Dotson RN  VTE Prevention/Management: SCDs off (sequential compression devices)  Intervention: Prevent Infection  Recent Flowsheet Documentation  Taken 3/30/2025 1503 by Debo Dotson RN  Infection Prevention:   rest/sleep promoted   single patient room provided   hand hygiene promoted  Goal: Optimal Comfort and Wellbeing  Outcome: Progressing  Intervention: Monitor Pain and Promote Comfort  Recent Flowsheet Documentation  Taken 3/30/2025 1503 by Debo Dotson RN  Pain Management Interventions: declines  Goal: Readiness for Transition of Care  Outcome: Progressing     Problem: Comorbidity Management  Goal: Blood Pressure in Desired Range  Outcome: Progressing  Intervention: Maintain Blood Pressure Management  Recent Flowsheet Documentation  Taken 3/30/2025 1503 by Debo Dotson RN  Medication Review/Management: medications reviewed  Goal: Maintenance of Seizure Control  Outcome: Progressing  Intervention: Maintain Seizure Symptom Control  Recent Flowsheet Documentation  Taken 3/30/2025 1503 by Mauri  MARYJANE Edmondson  Medication Review/Management: medications reviewed     Problem: Pain Acute  Goal: Optimal Pain Control and Function  Outcome: Progressing  Intervention: Develop Pain Management Plan  Recent Flowsheet Documentation  Taken 3/30/2025 1503 by Debo Dotson RN  Pain Management Interventions: declines  Intervention: Prevent or Manage Pain  Recent Flowsheet Documentation  Taken 3/30/2025 1503 by Debo Dotson RN  Medication Review/Management: medications reviewed     Problem: Fall Injury Risk  Goal: Absence of Fall and Fall-Related Injury  Outcome: Progressing  Intervention: Identify and Manage Contributors  Recent Flowsheet Documentation  Taken 3/30/2025 1503 by Debo Dotson RN  Medication Review/Management: medications reviewed  Intervention: Promote Injury-Free Environment  Recent Flowsheet Documentation  Taken 3/30/2025 1821 by Debo Dotson RN  Safety Promotion/Fall Prevention: safety round/check completed  Taken 3/30/2025 1726 by Debo Dotson RN  Safety Promotion/Fall Prevention: safety round/check completed  Taken 3/30/2025 1640 by Debo Dotson RN  Safety Promotion/Fall Prevention: safety round/check completed  Taken 3/30/2025 1503 by Debo Dotson RN  Safety Promotion/Fall Prevention: safety round/check completed     Problem: Gas Exchange Impaired  Goal: Optimal Gas Exchange  Outcome: Progressing  Intervention: Optimize Oxygenation and Ventilation  Recent Flowsheet Documentation  Taken 3/30/2025 1503 by Debo Dotson RN  Head of Bed (HOB) Positioning: HOB at 30 degrees
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
## 2025-03-30 NOTE — PHARMACY-ADMISSION MEDICATION HISTORY
Pharmacist Admission Medication History    Admission medication history is complete. The information provided in this note is only as accurate as the sources available at the time of the update.    Information Source(s): Patient via in-person    Pertinent Information: None    Changes made to PTA medication list:  Added: None  Deleted: duplicate Tylenol, Zofran, oxycodone, duplicate zonisamide  Changed: Protonix to prn    Allergies reviewed with patient and updates made in EHR: yes    Medication History Completed By: Yudelka Paez RPH 3/30/2025 9:14 AM    PTA Med List   Medication Sig Last Dose/Taking    acetaminophen (TYLENOL) 325 MG tablet Take 325 mg by mouth every 6 hours as needed for pain. Taking As Needed    albuterol (PROAIR HFA/PROVENTIL HFA/VENTOLIN HFA) 108 (90 Base) MCG/ACT inhaler Inhale 2 puffs into the lungs every 4 hours as needed for shortness of breath, wheezing or cough Taking As Needed    ammonium lactate (AMLACTIN) 12 % external cream Apply topically daily as needed for dry skin. Apply to feet Taking As Needed    ammonium lactate (AMLACTIN) 12 % external cream Apply topically 2 times daily 3/29/2025 Morning    bacitracin 500 UNIT/GM external ointment Apply topically 2 times daily. 3/29/2025 Morning    busPIRone (BUSPAR) 15 MG tablet Take 15 mg by mouth 3 times daily. 3/29/2025 Noon    citalopram (CELEXA) 20 MG tablet Take 20 mg by mouth daily. Take with 40 mg tablet for a total of 60 mg 3/29/2025 Morning    citalopram (CELEXA) 40 MG tablet Take 40 mg by mouth daily. Take with 20 mg tablet for a total of 60 mg 3/29/2025 Morning    clindamycin (CLEOCIN T) 1 % external lotion Apply topically 2 times daily 3/29/2025 Morning    clotrimazole (LOTRIMIN) 1 % external cream Apply topically 2 times daily. Apply to groin 3/29/2025 Morning    dextromethorphan-guaiFENesin (TUSSIN DM)  MG/5ML liquid Take by mouth as needed for cough. Taking As Needed    divalproex sodium delayed-release (DEPAKOTE) 500  MG DR tablet TAKE 1 TABLET BY MOUTH TWICE DAILY 3/29/2025 Morning    fluticasone (FLONASE) 50 MCG/ACT nasal spray Spray 1 spray in nostril 2 times daily 3/29/2025 Morning    Fluticasone-Umeclidin-Vilanterol (TRELEGY ELLIPTA) 200-62.5-25 MCG/ACT oral inhaler Inhale 1 puff into the lungs daily 3/29/2025 Morning    LamoTRIgine (LAMICTAL) 200 MG TB24 TAKE 1 TABLET BY MOUTH TWICE DAILY 3/29/2025 Morning    levETIRAcetam (KEPPRA XR) 500 MG 24 hr tablet TAKE 3 TABLETS (1500MG) BY MOUTH AT BEDTIME. 3/28/2025 Bedtime    levETIRAcetam (KEPPRA) 1000 MG tablet TAKE 1 TABLET BY MOUTH EVERY MORNING. 3/29/2025 Morning    levOCARNitine (CARNITOR) 330 MG tablet Take 2 tablets (660 mg) by mouth 3 times daily. 3/29/2025 Noon    loperamide (IMODIUM) 2 MG capsule Take 2 mg by mouth 4 times daily as needed for diarrhea. 4 mg after first loose stool then 2 mg after each subsequent loose stool Taking As Needed    magnesium hydroxide (MILK OF MAGNESIA) 400 MG/5ML suspension Take 30 mLs by mouth daily as needed for constipation Taking As Needed    methylcellulose (CITRUCEL) powder Take by mouth 2 times daily. 1 tablespoon 3/29/2025 Morning    multivitamin, therapeutic (THERA-VIT) TABS tablet Take 1 tablet by mouth daily 3/29/2025 Morning    pantoprazole (PROTONIX) 40 MG EC tablet Take 40 mg by mouth daily as needed for heartburn. Taking As Needed    polyethylene glycol (MIRALAX) 17 GM/Dose powder Take 17 g by mouth daily as needed for constipation. Hold for loose stools Taking As Needed    prazosin (MINIPRESS) 2 MG capsule Take 2 mg by mouth At Bedtime 3/28/2025 Bedtime    propranolol (INDERAL) 20 MG tablet Take 1 tablet (20 mg) by mouth 2 times daily 3/29/2025 Morning    QUEtiapine (SEROQUEL) 300 MG tablet Take 300 mg by mouth at bedtime. 3/28/2025 Bedtime    sodium chloride (OCEAN) 0.65 % nasal spray Spray 1 spray into both nostrils every hour as needed for congestion Taking As Needed    zonisamide (ZONEGRAN) 100 MG capsule TAKE 1 CAPSULE  BY MOUTH EVERY MORNING;TAKE 2 CAPSULES (200MG) BY MOUTH EVERY EVENING. 3/29/2025 Morning

## 2025-03-30 NOTE — ED TRIAGE NOTES
Patient comes to ED via EMS from snf for evaluation after 3 falls tonight. 1 fall witnessed in common area, the other 2 unwitnessed in patients bedroom and bathroom. Patient at cognitive baseline per staff but is more lethargic and slow to follow commands/answer questions. On arrival, patient reports no pain, does have abrasion/bruising to top of head, and right knee.  Alert and oriented x 4, no complaints of pain at this time.      Triage Assessment (Adult)       Row Name 03/29/25 0963          Triage Assessment    Airway WDL WDL        Respiratory WDL    Respiratory WDL WDL        Skin Circulation/Temperature WDL    Skin Circulation/Temperature WDL WDL        Cardiac WDL    Cardiac WDL WDL        Peripheral/Neurovascular WDL    Peripheral Neurovascular WDL WDL        Cognitive/Neuro/Behavioral WDL    Cognitive/Neuro/Behavioral WDL X

## 2025-03-30 NOTE — ED NOTES
[facility]  ED Nurse Handoff Report    ED Chief complaint: Fall and Generalized Weakness  . ED Diagnosis:   Final diagnoses:   None       Allergies:   Allergies   Allergen Reactions    Acetaminophen Unknown    Hydrocodone Bitartrate Er Unknown    Cephalexin Rash     HUT Reaction: Rash; HUT Noted: [id number]         Code Status: Full Code    Activity level - Baseline/Home:  walker.  Activity Level - Current:   assist of 2.   Lift room needed: No.   Bariatric: No   Needed: No   Isolation: No.   Infection: Not Applicable.     Respiratory status: BiPap    Vital Signs (within 30 minutes):   Vitals:    03/29/25 2201 03/29/25 2215 03/29/25 2230 03/29/25 2344   BP: (!) 140/88 (!) 148/86 (!) 148/91 139/85   Pulse: 74 69  67   Resp: 20 25 19   Temp: 97.4  F (36.3  C)      TempSrc: Oral      SpO2: 95% 96% 96% 93%   Weight: (!) 140.2 kg (309 lb 1.4 oz)          Cardiac Rhythm:  ,      Pain level:    Patient confused: Yes.   Patient Falls Risk: activity supervised and mobility aid in reach.   Elimination Status: Has voided     Patient Report - Initial Complaint: Pt to ER with c/o fall x3 and possible seizure activity   Focused Assessment: Pt with mult falls at group home tonight , pt states possible seizure activity, pt has abrasions to upper back and seems more confused than usual, [facility] for elevated CO2 levels    Abnormal Results:   Labs Ordered and Resulted from Time of ED Arrival to Time of ED Departure   ROUTINE UA WITH MICROSCOPIC REFLEX TO CULTURE - Abnormal       Result Value    Color Urine Light Yellow      Appearance Urine Clear      Glucose Urine Negative      Bilirubin Urine Negative      Ketones Urine Negative      Specific Gravity Urine 1.020      Blood Urine Negative      pH Urine 7.0      Protein Albumin Urine 10 (*)     Urobilinogen Urine Normal      Nitrite Urine Negative      Leukocyte Esterase Urine Negative      Mucus Urine  Present (*)     RBC Urine 2      WBC Urine 3      Hyaline Casts Urine 1     COMPREHENSIVE METABOLIC PANEL - Abnormal    Sodium 141      Potassium 4.3      Carbon Dioxide (CO2) 37 (*)     Anion Gap 7      Urea Nitrogen 11.0      Creatinine 0.77      GFR Estimate >90      Calcium 9.5      Chloride 97 (*)     Glucose 115 (*)     Alkaline Phosphatase 96      AST 25      ALT 27      Protein Total 7.0      Albumin 4.4      Bilirubin Total 0.2     MAGNESIUM - Abnormal    Magnesium 1.5 (*)    CBC WITH PLATELETS AND DIFFERENTIAL - Abnormal    WBC Count 6.8      RBC Count 4.42      Hemoglobin 12.4 (*)     Hematocrit 40.8      MCV 92      MCH 28.1      MCHC 30.4 (*)     RDW 14.6      Platelet Count 199      % Neutrophils 59      % Lymphocytes 27      % Monocytes 10      % Eosinophils 1      % Basophils 1      % Immature Granulocytes 2      NRBCs per 100 WBC 0      Absolute Neutrophils 4.0      Absolute Lymphocytes 1.9      Absolute Monocytes 0.7      Absolute Eosinophils 0.1      Absolute Basophils 0.0      Absolute Immature Granulocytes 0.1      Absolute NRBCs 0.0     TROPONIN T, HIGH SENSITIVITY - Abnormal    Troponin T, High Sensitivity 24 (*)    GLUCOSE BY METER - Abnormal    GLUCOSE BY METER POCT 114 (*)    ISTAT GASES LACTATE VENOUS POCT - Abnormal    Lactic Acid POCT 1.5      Bicarbonate Venous POCT 40 (*)     O2 Sat, Venous POCT 40 (*)     pCO2 Venous POCT 84 (*)     pH Venous POCT 7.29 (*)     pO2 Venous POCT 27     INFLUENZA A/B, RSV AND SARS-COV2 PCR - Normal    Influenza A PCR Negative      Influenza B PCR Negative      RSV PCR Negative      SARS CoV2 PCR Negative     LACTIC ACID WHOLE BLOOD WITH 1X REPEAT IN 2 HR WHEN >2 - Normal    Lactic Acid, Initial 1.5     D DIMER QUANTITATIVE - Normal    D-Dimer Quantitative 0.34     NT PROBNP INPATIENT - Normal    N terminal Pro BNP Inpatient 53     GLUCOSE MONITOR NURSING POCT   TROPONIN T, HIGH SENSITIVITY        XR Chest Port 1 View   Final Result   IMPRESSION: Cardiac  silhouette mildly enlarged. Mild elevation of the right hemidiaphragm. No focal airspace consolidation or heart failure. No visible pneumothorax.      Head CT w/o contrast    (Results Pending)       Treatments provided: Medluis alberto SHORTAP  Family Comments: Pt here by hoimself  OBS brochure/video discussed/provided to patient:  No  ED Medications:   Medications   magnesium sulfate 2 g in 50 mL sterile water intermittent infusion (2 g Intravenous $New Bag 3/29/25 3792)   ipratropium - albuterol 0.5 mg/2.5 mg/3 mL (DUONEB) neb solution 3 mL (3 mLs Nebulization $Given 3/29/25 1955)       Drips infusing:  No  For the majority of the shift this patient was Green.   Interventions performed wereN/A.    Sepsis treatment initiated: No    Cares/treatment/interventions/medications to be completed following ED care:See Psychiatric    ED Nurse Name: Evangelina Arteaga RN  11:49 PM
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
## 2025-03-31 VITALS
WEIGHT: 307.32 LBS | SYSTOLIC BLOOD PRESSURE: 97 MMHG | HEIGHT: 67 IN | TEMPERATURE: 98.3 F | OXYGEN SATURATION: 94 % | HEART RATE: 66 BPM | DIASTOLIC BLOOD PRESSURE: 45 MMHG | BODY MASS INDEX: 48.24 KG/M2 | RESPIRATION RATE: 16 BRPM

## 2025-03-31 LAB
ATRIAL RATE - MUSE: 72 BPM
DIASTOLIC BLOOD PRESSURE - MUSE: NORMAL MMHG
INTERPRETATION ECG - MUSE: NORMAL
P AXIS - MUSE: 32 DEGREES
PR INTERVAL - MUSE: 162 MS
QRS DURATION - MUSE: 88 MS
QT - MUSE: 412 MS
QTC - MUSE: 451 MS
R AXIS - MUSE: 8 DEGREES
SYSTOLIC BLOOD PRESSURE - MUSE: NORMAL MMHG
T AXIS - MUSE: 56 DEGREES
VENTRICULAR RATE- MUSE: 72 BPM

## 2025-03-31 PROCEDURE — 999N000157 HC STATISTIC RCP TIME EA 10 MIN

## 2025-03-31 PROCEDURE — 250N000013 HC RX MED GY IP 250 OP 250 PS 637: Performed by: INTERNAL MEDICINE

## 2025-03-31 PROCEDURE — 94660 CPAP INITIATION&MGMT: CPT

## 2025-03-31 PROCEDURE — 99239 HOSP IP/OBS DSCHRG MGMT >30: CPT | Performed by: HOSPITALIST

## 2025-03-31 PROCEDURE — G0378 HOSPITAL OBSERVATION PER HR: HCPCS

## 2025-03-31 RX ADMIN — LAMOTRIGINE 200 MG: 100 TABLET, FILM COATED, EXTENDED RELEASE ORAL at 10:00

## 2025-03-31 RX ADMIN — CITALOPRAM HYDROBROMIDE 60 MG: 20 TABLET ORAL at 09:59

## 2025-03-31 RX ADMIN — BUSPIRONE HYDROCHLORIDE 15 MG: 10 TABLET ORAL at 09:59

## 2025-03-31 RX ADMIN — LEVOCARNITINE 660 MG: 330 TABLET ORAL at 10:00

## 2025-03-31 RX ADMIN — ZONISAMIDE 100 MG: 100 CAPSULE ORAL at 10:02

## 2025-03-31 RX ADMIN — DIVALPROEX SODIUM 500 MG: 500 TABLET, DELAYED RELEASE ORAL at 10:01

## 2025-03-31 RX ADMIN — LEVETIRACETAM 1000 MG: 500 TABLET, FILM COATED ORAL at 09:59

## 2025-03-31 ASSESSMENT — ACTIVITIES OF DAILY LIVING (ADL)
ADLS_ACUITY_SCORE: 74
DEPENDENT_IADLS:: CLEANING;COOKING;LAUNDRY;SHOPPING;MEAL PREPARATION;MEDICATION MANAGEMENT;MONEY MANAGEMENT;TRANSPORTATION
ADLS_ACUITY_SCORE: 74
ADLS_ACUITY_SCORE: 76
ADLS_ACUITY_SCORE: 74
ADLS_ACUITY_SCORE: 76
ADLS_ACUITY_SCORE: 74

## 2025-03-31 NOTE — PROGRESS NOTES
Care Management Discharge Note    Discharge Date: 03/31/2025       Discharge Disposition: Group Home    Discharge Services: Transportation Services    Discharge DME: Oxygen    Discharge Transportation: agency    Private pay costs discussed: transportation costs    Does the patient's insurance plan have a 3 day qualifying hospital stay waiver?  No    PAS Confirmation Code:    Patient/family educated on Medicare website which has current facility and service quality ratings:      Education Provided on the Discharge Plan: Yes  Persons Notified of Discharge Plans: pt and Sandy from   Patient/Family in Agreement with the Plan:  yes    Handoff Referral Completed: No, handoff not indicated or clinically appropriate    Additional Information:  Pt is medically ready for discharge and set to return to [facility]. KARIN spoke with Sandy ([phone]) at the  to confirm his ability to return today. Sandy was in agreement with his return and requested the discharge orders to be sent with pt.     Reviewed potential out of pocket cost for [facility] stretcher transport. Current base rate is $1228.70 and $28.67 per mile to the destination. Pt/family expressed understanding and are agreeable to this.      Patient requires stretcher transportation due to history of seizures and poor trunk support.    Stretcher transportation has been arranged for 3/31/2025 12:39-1:24. Patient and bedside nurse notified of transportation time.    Ambulance PCS form completed and placed in patient chart. Ambulance PCS form should be given to transportation team.     Sandy at  updated with pt's transportation time.     SKYLER Burkett, BENY FRAZIER Care Coordinator/ Med Surg [address] [place] [address]  [phone]

## 2025-03-31 NOTE — PROGRESS NOTES
"PRIMARY DIAGNOSIS: \"GENERIC\" NURSING  OUTPATIENT/OBSERVATION GOALS TO BE MET BEFORE DISCHARGE:  ADLs back to baseline: Yes    Activity and level of assistance: baseline of Ax2 lift     Pain status: Pain free.    Return to near baseline physical activity: Yes     Discharge Planner Nurse   Safe discharge environment identified: Yes  Barriers to discharge: No       Entered by: Lora Fajardo RN 03/31/2025 12:17 PM     Please review provider order for any additional goals.   Nurse to notify provider when observation goals have been met and patient is ready for discharge.  "

## 2025-03-31 NOTE — DISCHARGE SUMMARY
[facility]    Discharge Summary  Hospitalist    Date of Admission:  3/29/2025  Date of Discharge:  3/31/2025  Discharging Provider: Ubaldo Bell MD  Date of Service (when I saw the patient): 03/31/25    Discharge Diagnoses   Multiple falls  Generalized weakness with lethargy  Chronic mixed respiratory failure  ANA  Obesity hypoventilation syndrome  Morbid obesity  Seizure disorder  Cerebral palsy  Developmental delay  Mild spastic paresis  Chronic hyperammonemia  Hypomagnesemia  MDD  Anxiety  Schizoaffective disorder  Borderline personality disorder  HTN  GERD  Mild chronic anemia  History left IT pressure injury  History of Staph aureus bacteremia    Hospital Course   Tre Vazquez is a 49 year old male with PMH including cerebral palsy, developmental delay, mild spastic paresis, morbid obesity, ANA and obesity hypoventilation syndrome on 3 L O2 and trilogy BiPAP machine at night with poor adherence to this, seizure disorder on multiple antiepileptics, hyperammonemia, left eye blindness, MDD, anxiety, borderline personality , schizoaffective sorter, HTN, GERD, anemia, and Staph aureus bacteremia who presents from his group home via EMS due to lethargy and having 3 falls today     #Generalized weakness with lethargy and chronic mixed respiratory failure secondary to undertreated ANA/OHS due to trelegy noncompliance: Patient has ANA and obesity hypoventilation syndrome and is supposed to be using his BiPAP/AVAPS trilogy machine, but has chronic issues with adherence to this plan.  He says his machine is broken and he has not used it for maybe a few days or week.  He is on 3 L O2 during the day which he says he has been using.  He had 2 unwitnessed falls and 1 witnessed fall in the common room at the group home today, no seizure activity was noted although this remains in differential given his history.  He was reported to be lethargic, but otherwise at baseline mental status and he  reports just some generalized weakness.  He did report some right fourth toe pain since the fall and there is a significant bruise.  He denies any fevers, chills, cough, nausea, vomiting, dysuria.  He does report some diarrhea although on chart review he has had loose stools with his other admissions here.    -Normal vital signs in the ER on 3 L O2.  WBC 6.8, hemoglobin 12.4.  His bicarb is 37 and BMP otherwise WNL.  Glucose 115.  Lactic acid 1.5.  D-dimer normal 0.34.  COVID-19, influenza A/B, RSV PCR negative.  UA does not show any pyuria.  Troponin was 24 with repeat 22 and he has no chest pain.  EKG NSR with no ST elevation or depression.  NT-proBNP 53.  VBG shows more chronic hypercapnic respiratory failure picture with relatively nl pH.  Chest x-ray shows  a mildly enlarged cardiac silhouette and mild right hemidiaphragm elevation, but no infiltrate.  Noncontrast head CT shows his known mild ventriculomegaly.    -Suspect his weakness and lethargy is due to his hypercapnia from not using his trilogy machine.    -His device company went to his group home and ensured his trelegy machine was functioning properly.  VBG stable here in hospital.   -XR of foot negative for fx.  -KARIN/ROX consulted.  He made accusations against his group home that they didn't give him meds or bipap.  He has reportedly made these accusations in past per his step-mom Ashlie.  Ashlie notes that Joel has attention-seeking behavior that has led to these things in past.  He has been at same group home for 3-4 years.  [facility] filed by KARIN/ROX.    -Ashlie tells me that he simply refuses to use his trelegy machine on regular basis.       #Seizure disorder.  Cerebral palsy.  Developmental delay.  Mild spastic paresis.  Chronic hyperammonemia: On multiple antiepileptic medications.  Seen in the ER December and January for some myoclonic jerks that could have been from seizure.  Follows with Dr. Griggs through [facility] neurology clinic and they are  waiting to reschedule appointment that he missed 3/6 as the ride fell through.  As above he had 3 falls and only one of them was witnessed so breakthrough seizure is possible, although all symptoms could be explained by his hypercapnia due to not wearing his trilogy machine.  -Check ammonia level in the morning  -Resume PTA Keppra 1000 mg daily in the morning and Keppra XR 1500 mg at bedtime, zonisamide 100 mg in the morning and 200 mg at bedtime, Depakote  g twice daily, lamotrigine 20 mg twice daily, and levocarnitine 660 mg 3 times daily.  -Seizure precautions     #Hypomagnesemia: Magnesium mildly low 1.5.  No vomiting, but had some looser stools which is likely etiology.  Received 2 g IV magnesium here.  -Repeat magnesium level in the morning     #MDD, Anxiety, Schizoaffective disorder, Borderline personality disorder: Resume PTA buspirone 15 mg 3 times daily, citalopram 60 mg daily, and quetiapine 300 mg at bedtime but hold dose night of admission.     #HTN: Resume PTA prazosin 2 mg at bedtime and propranolol 20 twice daily although not sure if these medications may impart be for his mental health.     #GERD: Continue pantoprazole 40 mg daily.     #Mild chronic anemia: Hemoglobin at baseline of 12.     #History left IT pressure injury: Will have nursing team do skin assessment on admission.     #History of Staph aureus bacteremia: Hospitalized for Staph aureus bacteremia and completed 4 weeks of antibiotics and December 2023.  Likely secondary to wound.  No sign for infection at this time    Ubaldo Bell MD    Code Status   Full Code       Primary Care Physician   Siobhan Mayo    Physical Exam   Temp: 98.3  F (36.8  C) Temp src: Oral BP: 97/45 Pulse: 66   Resp: 16 SpO2: 93 % O2 Device: BiPAP/CPAP (pt sleeping) Oxygen Delivery: 3 LPM  Vitals:    03/29/25 2201 03/30/25 1503   Weight: (!) 140.2 kg (309 lb 1.4 oz) (!) 139.4 kg (307 lb 5.1 oz)     Vital Signs with Ranges  Temp:  [97.2  F (36.2  C)-98.3  F (36.8   C)] 98.3  F (36.8  C)  Pulse:  [66-76] 66  Resp:  [14-18] 16  BP: ()/(45-65) 97/45  FiO2 (%):  [30 %] 30 %  SpO2:  [92 %-97 %] 93 %  I/O last 3 completed shifts:  In: 360 [P.O.:360]  Out: 425 [Urine:425]    Constitutional: Awake and alert.  On bipap this AM.  Answers fully appropriately.   HEENT:  MMM, cannot appreciate JVD due to body habitus.    Respiratory: Appears comfortable on BiPAP.  No anterior wheezing or crackles.  Cardiovascular: Distant, regular, no murmur  GI: Obese, soft non-tender, not distended, bowel sounds present  Lymph/Hematologic: no cervical, axillary, inguinal adenopathy  Skin/musculoskeletal/extremities: 1+ bilateral lower extremity edema.  Ecchymoses and tenderness to right fourth toe.  Neurologic: Alert, slow speech but answering questions appropriately.  Psychiatric: calm, cooperative    Discharge Disposition   Discharged to home  Condition at discharge: Stable    Consultations This Hospital Stay   PHYSICAL THERAPY ADULT IP CONSULT  CARE MANAGEMENT / SOCIAL WORK IP CONSULT  CARE MANAGEMENT / SOCIAL WORK IP CONSULT  CARE MANAGEMENT / SOCIAL WORK IP CONSULT  CARE MANAGEMENT / SOCIAL WORK IP CONSULT    Time Spent on this Encounter   I, Ubaldo Bell MD, personally saw the patient today and spent greater than 30 minutes discharging this patient.    Discharge Orders      Med Therapy Management Referral      Reason for your hospital stay    You were hospitalized for generalized weakness due to trelegy noncompliance.  You need to use your trelegy machine anytime you are sleeping including with naps.  [employer] did evaluate your equipment and confirmed it was working properly.  You should follow up with your PCP next week for hospital follow up.     Activity    Your activity upon discharge: activity as tolerated     BiPAP - Continue Home BiPAP    Continue Home BiPAP per home equipment settings.     Full Code     Oxygen Adult/Peds    Oxygen Documentation  I certify that this patient, Tre RAO  Noahedwins has been under my care (or a nurse practitioner or physican's assistant working with me). This is the face-to-face encounter for oxygen medical necessity.      At the time of this encounter, I have reviewed the qualifying testing and have determined that supplemental oxygen is reasonable and necessary and is expected to improve the patient's condition in a home setting.         Patient has continued oxygen desaturation due to Obesity hypoventilation syndrome.    If portability is ordered, is the patient mobile within the home? yes    Was this visit performed as a telehealth visit: No     Diet    Follow this diet upon discharge: Current Diet:Orders Placed This Encounter      Regular Diet Adult     Hospital Follow-up with Existing Primary Care Provider (PCP)    Please see details below          Discharge Medications   Current Discharge Medication List        CONTINUE these medications which have NOT CHANGED    Details   acetaminophen (TYLENOL) 325 MG tablet Take 325 mg by mouth every 6 hours as needed for pain.      albuterol (PROAIR HFA/PROVENTIL HFA/VENTOLIN HFA) 108 (90 Base) MCG/ACT inhaler Inhale 2 puffs into the lungs every 4 hours as needed for shortness of breath, wheezing or cough  Qty: 18 g, Refills: 6    Comments: Pharmacy may dispense brand covered by insurance (Proair, or proventil or ventolin or generic albuterol inhaler)  Associated Diagnoses: Mixed simple and mucopurulent chronic bronchitis (H)      !! ammonium lactate (AMLACTIN) 12 % external cream Apply topically daily as needed for dry skin. Apply to feet      !! ammonium lactate (AMLACTIN) 12 % external cream Apply topically 2 times daily      bacitracin 500 UNIT/GM external ointment Apply topically 2 times daily.      busPIRone (BUSPAR) 15 MG tablet Take 15 mg by mouth 3 times daily.      !! citalopram (CELEXA) 20 MG tablet Take 20 mg by mouth daily. Take with 40 mg tablet for a total of 60 mg      !! citalopram (CELEXA) 40 MG tablet Take  40 mg by mouth daily. Take with 20 mg tablet for a total of 60 mg      clindamycin (CLEOCIN T) 1 % external lotion Apply topically 2 times daily  Qty: 60 mL, Refills: 0      clotrimazole (LOTRIMIN) 1 % external cream Apply topically 2 times daily. Apply to groin      dextromethorphan-guaiFENesin (TUSSIN DM)  MG/5ML liquid Take by mouth as needed for cough.      divalproex sodium delayed-release (DEPAKOTE) 500 MG DR tablet TAKE 1 TABLET BY MOUTH TWICE DAILY  Qty: 60 tablet, Refills: 3    Comments: RX AUDIT  Associated Diagnoses: Altered mental status, unspecified altered mental status type; Persistent depressive disorder      fluticasone (FLONASE) 50 MCG/ACT nasal spray Spray 1 spray in nostril 2 times daily      Fluticasone-Umeclidin-Vilanterol (TRELEGY ELLIPTA) 200-62.5-25 MCG/ACT oral inhaler Inhale 1 puff into the lungs daily  Qty: 1 each, Refills: 6    Associated Diagnoses: Mixed simple and mucopurulent chronic bronchitis (H)      LamoTRIgine (LAMICTAL) 200 MG TB24 TAKE 1 TABLET BY MOUTH TWICE DAILY  Qty: 60 tablet, Refills: 5    Associated Diagnoses: Nonintractable generalized idiopathic epilepsy without status epilepticus (H)      levETIRAcetam (KEPPRA XR) 500 MG 24 hr tablet TAKE 3 TABLETS (1500MG) BY MOUTH AT BEDTIME.  Qty: 90 tablet, Refills: 5    Comments: RX AUDIT  Associated Diagnoses: Nonintractable generalized idiopathic epilepsy without status epilepticus (H)      levETIRAcetam (KEPPRA) 1000 MG tablet TAKE 1 TABLET BY MOUTH EVERY MORNING.  Qty: 30 tablet, Refills: 5    Comments: RX AUDIT  Associated Diagnoses: Nonintractable generalized idiopathic epilepsy without status epilepticus (H)      levOCARNitine (CARNITOR) 330 MG tablet Take 2 tablets (660 mg) by mouth 3 times daily.  Qty: 180 tablet, Refills: 5    Associated Diagnoses: Nonintractable generalized idiopathic epilepsy without status epilepticus (H)      loperamide (IMODIUM) 2 MG capsule Take 2 mg by mouth 4 times daily as needed for  diarrhea. 4 mg after first loose stool then 2 mg after each subsequent loose stool      magnesium hydroxide (MILK OF MAGNESIA) 400 MG/5ML suspension Take 30 mLs by mouth daily as needed for constipation      methylcellulose (CITRUCEL) powder Take by mouth 2 times daily. 1 tablespoon      multivitamin, therapeutic (THERA-VIT) TABS tablet Take 1 tablet by mouth daily      pantoprazole (PROTONIX) 40 MG EC tablet Take 40 mg by mouth daily as needed for heartburn.      polyethylene glycol (MIRALAX) 17 GM/Dose powder Take 17 g by mouth daily as needed for constipation. Hold for loose stools  Qty: 510 g, Refills: 0    Associated Diagnoses: Constipation, unspecified constipation type      prazosin (MINIPRESS) 2 MG capsule Take 2 mg by mouth At Bedtime      propranolol (INDERAL) 20 MG tablet Take 1 tablet (20 mg) by mouth 2 times daily    Associated Diagnoses: Benign hypertension      QUEtiapine (SEROQUEL) 300 MG tablet Take 300 mg by mouth at bedtime.      sodium chloride (OCEAN) 0.65 % nasal spray Spray 1 spray into both nostrils every hour as needed for congestion      zonisamide (ZONEGRAN) 100 MG capsule TAKE 1 CAPSULE BY MOUTH EVERY MORNING;TAKE 2 CAPSULES (200MG) BY MOUTH EVERY EVENING.  Qty: 90 capsule, Refills: 5    Comments: RX AUDIT  Associated Diagnoses: Nonintractable generalized idiopathic epilepsy without status epilepticus (H)       !! - Potential duplicate medications found. Please discuss with provider.        Allergies   Allergies   Allergen Reactions    Hydrocodone Bitartrate Er Unknown    Cephalexin Rash     HUT Reaction: Rash; HUT Noted: [id number]       Data   Most Recent 3 CBC's:  Recent Labs   Lab Test 03/30/25  0807 03/29/25 2215 01/29/25  0928   WBC 7.4 6.8 8.5   HGB 11.4* 12.4* 12.8*   MCV 92 92 93    199 212      Most Recent 3 BMP's:  Recent Labs   Lab Test 03/30/25  0807 03/29/25 2235 03/29/25 2215 01/29/25  0928     --  141 143   POTASSIUM 3.9  --  4.3 4.2   CHLORIDE 96*  --  97*  102   CO2 36*  --  37* 30*   BUN 12.5  --  11.0 13.5   CR 0.73  --  0.77 0.71   ANIONGAP 6*  --  7 11   ARNOLD 8.8  --  9.5 9.4   GLC 82 114* 115* 105*     Most Recent 2 LFT's:  Recent Labs   Lab Test 03/29/25  2215 01/29/25  0928   AST 25 26   ALT 27 29   ALKPHOS 96 90   BILITOTAL 0.2 0.2     Most Recent INR's and Anticoagulation Dosing History:  Anticoagulation Dose History          Latest Ref Rng & Units 3/8/2011 11/21/2013 5/6/2023 2/22/2024   Recent Dosing and Labs   INR 0.85 - 1.15 1.05  1.23  0.99  0.96      Most Recent 3 Troponin's:No lab results found.  Most Recent Cholesterol Panel:  Recent Labs   Lab Test 03/18/23  0543   TRIG 146     Most Recent 6 Bacteria Isolates From Any Culture (See EPIC Reports for Culture Details):No lab results found.  Most Recent TSH, T4 and A1c Labs:  Recent Labs   Lab Test 04/15/24  1704 04/11/24  1007 02/22/24  0632   TSH  --  1.38 4.91*   T4  --   --  0.67*   A1C 4.9  --   --

## 2025-03-31 NOTE — PLAN OF CARE
PRIMARY DIAGNOSIS: Respiratory Failure  OUTPATIENT/OBSERVATION GOALS TO BE MET BEFORE DISCHARGE:  ADLs back to baseline: No    Activity and level of assistance: Up with maximum assistance. Consider SW and/or PT evaluation.     Pain status: Pain free.    Return to near baseline physical activity: Yes     Discharge Planner Nurse   Safe discharge environment identified: No  Barriers to discharge: Yes, SW working on placement       Entered by: Pita Soriano RN 03/31/2025 3:16 AM     Please review provider order for any additional goals.   Nurse to notify provider when observation goals have been met and patient is ready for discharge.    Goal Outcome Evaluation:      Plan of Care Reviewed With: patient    Overall Patient Progress: no changeOverall Patient Progress: no change    Outcome Evaluation: OBS status. Bipap on while sleeping.

## 2025-03-31 NOTE — DISCHARGE SUMMARY
AVS packet printed and sent w/ HE staff to give to  staff. All questions answered. Pt denies any further questions or concerns. PIV removed, no complications. Telemetry monitor removed. All belongings returned. Pt escorted off unit via HE stretcher, transport back to .

## 2025-03-31 NOTE — PLAN OF CARE
"Pt. A&Ox4, up with assist of 2, lift, Tele: SR, continues on Bipap overnight. Sz precautions, side rails padded. Pt. Denies any needs at this time. Will continue with POC.    Goal Outcome Evaluation:      Plan of Care Reviewed With: patient    Overall Patient Progress: no changeOverall Patient Progress: no change    Outcome Evaluation: Obs status, Bipap on while sleeping.      Problem: Adult Inpatient Plan of Care  Goal: Plan of Care Review  Description: The Plan of Care Review/Shift note should be completed every shift.  The Outcome Evaluation is a brief statement about your assessment that the patient is improving, declining, or no change.  This information will be displayed automatically on your shiftnote.  3/31/2025 0436 by Pita Soriano RN  Outcome: Progressing  Flowsheets (Taken 3/31/2025 0436)  Outcome Evaluation: Obs status, Bipap on while sleeping.  Plan of Care Reviewed With: patient  Overall Patient Progress: no change  3/31/2025 0314 by Pita Soriano RN  Outcome: Progressing  Flowsheets (Taken 3/31/2025 0100)  Outcome Evaluation: OBS status. Bipap on while sleeping.  Plan of Care Reviewed With: patient  Overall Patient Progress: no change  Goal: Patient-Specific Goal (Individualized)  Description: You can add care plan individualizations to a care plan. Examples of Individualization might be:  \"Parent requests to be called daily at 9am for status\", \"I have a hard time hearing out of my right ear\", or \"Do not touch me to wake me up as it startlesme\".  3/31/2025 0436 by Pita Soriano RN  Outcome: Progressing  3/31/2025 0314 by Pita Soriano RN  Outcome: Progressing  Goal: Absence of Hospital-Acquired Illness or Injury  3/31/2025 0436 by Pita Soriano RN  Outcome: Progressing  3/31/2025 0314 by Pita Soriano RN  Outcome: Progressing  Intervention: Identify and Manage Fall Risk  Recent Flowsheet Documentation  Taken 3/31/2025 0105 by Pita Soriano RN  Safety Promotion/Fall Prevention:   " nonskid shoes/slippers when out of bed   lighting adjusted   increase visualization of patient   increased rounding and observation   clutter free environment maintained   activity supervised   safety round/check completed  Intervention: Prevent Skin Injury  Recent Flowsheet Documentation  Taken 3/31/2025 0105 by Pita Soriano RN  Body Position:   legs elevated   weight shifting  Intervention: Prevent and Manage VTE (Venous Thromboembolism) Risk  Recent Flowsheet Documentation  Taken 3/31/2025 0105 by Pita Soriano RN  VTE Prevention/Management: SCDs off (sequential compression devices)  Intervention: Prevent Infection  Recent Flowsheet Documentation  Taken 3/31/2025 0105 by Pita Soriano RN  Infection Prevention:   rest/sleep promoted   single patient room provided   hand hygiene promoted  Goal: Optimal Comfort and Wellbeing  3/31/2025 0436 by Pita Soriano RN  Outcome: Progressing  3/31/2025 0314 by Pita Soriano RN  Outcome: Progressing  Goal: Readiness for Transition of Care  3/31/2025 0436 by Pita Soriano RN  Outcome: Progressing  3/31/2025 0314 by Pita Soriano RN  Outcome: Progressing     Problem: Comorbidity Management  Goal: Blood Pressure in Desired Range  3/31/2025 0436 by Pita Soriano RN  Outcome: Progressing  3/31/2025 0314 by Pita Soriano RN  Outcome: Progressing  Intervention: Maintain Blood Pressure Management  Recent Flowsheet Documentation  Taken 3/31/2025 0105 by Pita Soriano RN  Medication Review/Management: medications reviewed  Goal: Maintenance of Seizure Control  3/31/2025 0436 by Pita Soriano RN  Outcome: Progressing  3/31/2025 0314 by Pita Soriano RN  Outcome: Progressing  Intervention: Maintain Seizure Symptom Control  Recent Flowsheet Documentation  Taken 3/31/2025 0105 by Pita Soriano RN  Medication Review/Management: medications reviewed     Problem: Pain Acute  Goal: Optimal Pain Control and Function  3/31/2025 0436 by Pita Soriano  RN  Outcome: Progressing  3/31/2025 0314 by Pita Soriano RN  Outcome: Progressing  Intervention: Prevent or Manage Pain  Recent Flowsheet Documentation  Taken 3/31/2025 0105 by Pita Soriano RN  Sleep/Rest Enhancement: awakenings minimized  Medication Review/Management: medications reviewed     Problem: Fall Injury Risk  Goal: Absence of Fall and Fall-Related Injury  3/31/2025 0436 by Pita Soriano RN  Outcome: Progressing  3/31/2025 0314 by Pita Soriano RN  Outcome: Progressing  Intervention: Identify and Manage Contributors  Recent Flowsheet Documentation  Taken 3/31/2025 0105 by Pita Soriano RN  Medication Review/Management: medications reviewed  Intervention: Promote Injury-Free Environment  Recent Flowsheet Documentation  Taken 3/31/2025 0105 by Pita Soriano RN  Safety Promotion/Fall Prevention:   nonskid shoes/slippers when out of bed   lighting adjusted   increase visualization of patient   increased rounding and observation   clutter free environment maintained   activity supervised   safety round/check completed     Problem: Gas Exchange Impaired  Goal: Optimal Gas Exchange  3/31/2025 0436 by Pita Soriano RN  Outcome: Progressing  3/31/2025 0314 by Pita Soriano RN  Outcome: Progressing  Intervention: Optimize Oxygenation and Ventilation  Recent Flowsheet Documentation  Taken 3/31/2025 0105 by Pita Soriano RN  Head of Bed (HOB) Positioning: HOB at 20-30 degrees

## 2025-04-09 ENCOUNTER — HOSPITAL ENCOUNTER (EMERGENCY)
Facility: CLINIC | Age: 50
Discharge: NURSING, REHAB OR LONG-TERM CARE | End: 2025-04-09
Attending: EMERGENCY MEDICINE | Admitting: EMERGENCY MEDICINE
Payer: MEDICARE

## 2025-04-09 VITALS
DIASTOLIC BLOOD PRESSURE: 62 MMHG | OXYGEN SATURATION: 99 % | RESPIRATION RATE: 24 BRPM | TEMPERATURE: 97.5 F | HEART RATE: 68 BPM | SYSTOLIC BLOOD PRESSURE: 106 MMHG

## 2025-04-09 DIAGNOSIS — M79.604 RIGHT LEG PAIN: ICD-10-CM

## 2025-04-09 PROCEDURE — 99284 EMERGENCY DEPT VISIT MOD MDM: CPT | Mod: 25

## 2025-04-09 ASSESSMENT — ACTIVITIES OF DAILY LIVING (ADL)
ADLS_ACUITY_SCORE: 61
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
## 2025-04-09 NOTE — ED TRIAGE NOTES
Brought in by ems from [facility]. Here for concern of right ankle pain radiating up to right thigh area started about 3-4 days ago. Unsure if he injured it, but denies any fall. [facility] staff contact number [phone]. Stated is on oxygen when walking or during night time. O2 by ems 90% on room air. ABCs intact.      Triage Assessment (Adult)       Row Name 04/09/25 0127          Triage Assessment    Airway WDL WDL        Respiratory WDL    Respiratory WDL WDL        Cardiac WDL    Cardiac WDL WDL

## 2025-04-09 NOTE — ED NOTES
Spoke with Guerline from the [facility] group a staff member who clarified that patient did not have a fall tonight. She stated that she was not aware that patient had been in pain today and was in a room with a different resident and when she came out of the room the patient was already on the phone calling 911.

## 2025-04-09 NOTE — ED NOTES
Patient signed out to me at shift change by Dr. Hansen.  Please see her documentation for further details.  Here with right leg pain.  At the time of signout an ultrasound to rule out DVT as well as x-rays to evaluate for possible fracture were ordered.  No recent trauma so low suspicion for fracture.  Exam reported to be reassuring without concern for a cutaneous infectious cause.  Vitals are reassuring.  Recent admission to the hospital from March 29 through the 31st.  Here with essentially obesity hypoventilation syndrome now on chronic nasal cannula oxygen.  Will plan to follow-up imaging studies and reassess.    0300 x-rays are negative for fracture.  Ultrasound negative for DVT.  Discussed findings with patient.  Recommended following up with his regular physician.  This does not appear to represent a limb or life-threatening emergency at this time    Patient Vitals for the past 24 hrs:   BP Temp Temp src Pulse Resp SpO2   04/09/25 0145 109/73 -- -- 67 -- 97 %   04/09/25 0128 115/88 97.5  F (36.4  C) Temporal 70 24 92 %     Provisional diagnosis.    (M79.604) Right leg pain           Praveen Alcaraz MD  04/09/25 0305

## 2025-04-13 ENCOUNTER — HOSPITAL ENCOUNTER (EMERGENCY)
Facility: CLINIC | Age: 50
Discharge: HOME | End: 2025-04-13
Attending: EMERGENCY MEDICINE | Admitting: EMERGENCY MEDICINE
Payer: MEDICARE

## 2025-04-13 ENCOUNTER — APPOINTMENT (OUTPATIENT)
Dept: GENERAL RADIOLOGY | Facility: CLINIC | Age: 50
End: 2025-04-13
Attending: EMERGENCY MEDICINE
Payer: MEDICARE

## 2025-04-13 VITALS
TEMPERATURE: 97.9 F | OXYGEN SATURATION: 95 % | SYSTOLIC BLOOD PRESSURE: 110 MMHG | DIASTOLIC BLOOD PRESSURE: 84 MMHG | RESPIRATION RATE: 18 BRPM | HEART RATE: 78 BPM

## 2025-04-13 DIAGNOSIS — D64.9 CHRONIC ANEMIA: ICD-10-CM

## 2025-04-13 DIAGNOSIS — R07.9 CHEST PAIN, UNSPECIFIED TYPE: ICD-10-CM

## 2025-04-13 DIAGNOSIS — R05.9 COUGH, UNSPECIFIED TYPE: ICD-10-CM

## 2025-04-13 LAB
ANION GAP SERPL CALCULATED.3IONS-SCNC: 9 MMOL/L (ref 7–15)
BASOPHILS # BLD AUTO: 0.1 10E3/UL (ref 0–0.2)
BASOPHILS NFR BLD AUTO: 1 %
BUN SERPL-MCNC: 12.8 MG/DL (ref 6–20)
CALCIUM SERPL-MCNC: 8.7 MG/DL (ref 8.8–10.4)
CHLORIDE SERPL-SCNC: 100 MMOL/L (ref 98–107)
CREAT SERPL-MCNC: 0.7 MG/DL (ref 0.67–1.17)
EGFRCR SERPLBLD CKD-EPI 2021: >90 ML/MIN/1.73M2
EOSINOPHIL # BLD AUTO: 0.1 10E3/UL (ref 0–0.7)
EOSINOPHIL NFR BLD AUTO: 1 %
ERYTHROCYTE [DISTWIDTH] IN BLOOD BY AUTOMATED COUNT: 15.1 % (ref 10–15)
FLUAV RNA SPEC QL NAA+PROBE: NEGATIVE
FLUBV RNA RESP QL NAA+PROBE: NEGATIVE
GLUCOSE SERPL-MCNC: 123 MG/DL (ref 70–99)
HCO3 SERPL-SCNC: 31 MMOL/L (ref 22–29)
HCT VFR BLD AUTO: 37.9 % (ref 40–53)
HGB BLD-MCNC: 11.7 G/DL (ref 13.3–17.7)
IMM GRANULOCYTES # BLD: 0.2 10E3/UL
IMM GRANULOCYTES NFR BLD: 3 %
LYMPHOCYTES # BLD AUTO: 2.8 10E3/UL (ref 0.8–5.3)
LYMPHOCYTES NFR BLD AUTO: 33 %
MCH RBC QN AUTO: 28.7 PG (ref 26.5–33)
MCHC RBC AUTO-ENTMCNC: 30.9 G/DL (ref 31.5–36.5)
MCV RBC AUTO: 93 FL (ref 78–100)
MONOCYTES # BLD AUTO: 0.8 10E3/UL (ref 0–1.3)
MONOCYTES NFR BLD AUTO: 10 %
NEUTROPHILS # BLD AUTO: 4.4 10E3/UL (ref 1.6–8.3)
NEUTROPHILS NFR BLD AUTO: 52 %
NRBC # BLD AUTO: 0 10E3/UL
NRBC BLD AUTO-RTO: 0 /100
NT-PROBNP SERPL-MCNC: 82 PG/ML (ref 0–900)
PLATELET # BLD AUTO: 240 10E3/UL (ref 150–450)
POTASSIUM SERPL-SCNC: 3.7 MMOL/L (ref 3.4–5.3)
RBC # BLD AUTO: 4.08 10E6/UL (ref 4.4–5.9)
RSV RNA SPEC NAA+PROBE: NEGATIVE
SARS-COV-2 RNA RESP QL NAA+PROBE: NEGATIVE
SODIUM SERPL-SCNC: 140 MMOL/L (ref 135–145)
TROPONIN T SERPL HS-MCNC: 18 NG/L
WBC # BLD AUTO: 8.4 10E3/UL (ref 4–11)

## 2025-04-13 PROCEDURE — 85004 AUTOMATED DIFF WBC COUNT: CPT | Performed by: EMERGENCY MEDICINE

## 2025-04-13 PROCEDURE — 36415 COLL VENOUS BLD VENIPUNCTURE: CPT | Performed by: EMERGENCY MEDICINE

## 2025-04-13 PROCEDURE — 93005 ELECTROCARDIOGRAM TRACING: CPT

## 2025-04-13 PROCEDURE — 99285 EMERGENCY DEPT VISIT HI MDM: CPT | Mod: 25

## 2025-04-13 PROCEDURE — 87637 SARSCOV2&INF A&B&RSV AMP PRB: CPT | Performed by: EMERGENCY MEDICINE

## 2025-04-13 PROCEDURE — 83880 ASSAY OF NATRIURETIC PEPTIDE: CPT | Performed by: EMERGENCY MEDICINE

## 2025-04-13 PROCEDURE — 80048 BASIC METABOLIC PNL TOTAL CA: CPT | Performed by: EMERGENCY MEDICINE

## 2025-04-13 PROCEDURE — 84484 ASSAY OF TROPONIN QUANT: CPT | Performed by: EMERGENCY MEDICINE

## 2025-04-13 PROCEDURE — 71046 X-RAY EXAM CHEST 2 VIEWS: CPT

## 2025-04-13 ASSESSMENT — ACTIVITIES OF DAILY LIVING (ADL)
ADLS_ACUITY_SCORE: 61
ADLS_ACUITY_SCORE: 61

## 2025-04-13 NOTE — ED TRIAGE NOTES
"Arrives via EMS from group home. Complains of chest pain. Feels like a \"volcano.\" 10/10 pain. Pt has hx GERD. Also states had a bunch of white castle earlier tonight. Has PIV left AC 18 G.  No meds given.      Triage Assessment (Adult)       Row Name 04/13/25 0220          Triage Assessment    Airway WDL WDL        Respiratory WDL    Respiratory WDL X;cough     Cough Frequency frequent     Cough Type dry        Skin Circulation/Temperature WDL    Skin Circulation/Temperature WDL WDL        Cardiac WDL    Cardiac WDL X;chest pain        Chest Pain Assessment    Chest Pain Location midsternal     Character burning     Precipitating Factors eating     Chest Pain Intervention 12-lead ECG obtained;oxygen applied  oxygen at baseline 3 L's NC        Peripheral/Neurovascular WDL    Peripheral Neurovascular WDL WDL        Cognitive/Neuro/Behavioral WDL    Cognitive/Neuro/Behavioral WDL WDL                     "
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
## 2025-04-14 LAB
ATRIAL RATE - MUSE: 79 BPM
DIASTOLIC BLOOD PRESSURE - MUSE: NORMAL MMHG
INTERPRETATION ECG - MUSE: NORMAL
P AXIS - MUSE: 31 DEGREES
PR INTERVAL - MUSE: 200 MS
QRS DURATION - MUSE: 90 MS
QT - MUSE: 542 MS
QTC - MUSE: 621 MS
R AXIS - MUSE: 7 DEGREES
SYSTOLIC BLOOD PRESSURE - MUSE: NORMAL MMHG
T AXIS - MUSE: 34 DEGREES
VENTRICULAR RATE- MUSE: 79 BPM
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
## 2025-05-13 ENCOUNTER — OFFICE VISIT (OUTPATIENT)
Dept: NEUROLOGY | Facility: CLINIC | Age: 50
End: 2025-05-13
Payer: MEDICARE

## 2025-05-13 DIAGNOSIS — G40.909 SEIZURE DISORDER (H): ICD-10-CM

## 2025-05-13 DIAGNOSIS — G40.309 NONINTRACTABLE GENERALIZED IDIOPATHIC EPILEPSY WITHOUT STATUS EPILEPTICUS (H): ICD-10-CM

## 2025-05-13 DIAGNOSIS — Z79.899 ENCOUNTER FOR LONG-TERM (CURRENT) USE OF MEDICATIONS: Primary | ICD-10-CM

## 2025-05-13 PROCEDURE — 99213 OFFICE O/P EST LOW 20 MIN: CPT
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
## 2025-05-13 NOTE — PATIENT INSTRUCTIONS
Plan:  --- Depakote 500 mg twice daily  --- Lamotrigine 200 mg twice daily  --- Keppra 1000 mg in the a.m. and 1500 mg in the p.m.  --- Zonisamide 100 mg in the a.m. and 200 mg at night  Labs: Drug levels  --- Take your medications as prescribed, and do not stop taking abruptly.  --- Try to take your anti-seizure medications at the same time every day  --- Avoid common triggers: sleep deprivation, excessive alcohol, stress, flashing lights or patterns, dehydration, recreational drug use, illness and missed medications.  --- Plan on follow up in the Neurology Clinic on 06/11/25 with Dr. Griggs  --- Please feel free to reach out if you have any further questions or concerns.  --- Seek immediate medical attention if an emergency arises or if your health becomes progressively worse.     It was a pleasure to see you today!

## 2025-05-13 NOTE — NURSING NOTE
"Tre Vazquez is a 49 year old male who presents for:  Chief Complaint   Patient presents with    Seizures     Possible seizure about a few month? Been having flinches/muscle spasms; daily.         Initial Vitals:  There were no vitals taken for this visit. Estimated body mass index is 48.13 kg/m  as calculated from the following:    Height as of 3/30/25: 1.702 m (5' 7\").    Weight as of 3/30/25: 139.4 kg (307 lb 5.1 oz).. There is no height or weight on file to calculate BSA. BP completed using cuff size: wrist cuff    Alphonso DELORES Cooper  "

## 2025-07-08 DIAGNOSIS — G40.309 NONINTRACTABLE GENERALIZED IDIOPATHIC EPILEPSY WITHOUT STATUS EPILEPTICUS (H): ICD-10-CM

## 2025-07-08 NOTE — TELEPHONE ENCOUNTER
Refill request for: levETIRAcetam (KEPPRA) 1000 MG tablet     Directions: TAKE 1 TABLET BY MOUTH EVERY MORNING. - Oral     Refill request for:  levETIRAcetam (KEPPRA XR) 500 MG 24 hr tablet     Directions:  TAKE 3 TABLETS (1500MG) BY MOUTH AT BEDTIME.     Refill request for:  zonisamide (ZONEGRAN) 100 MG capsule     Directions:  TAKE 1 CAPSULE BY MOUTH EVERY MORNING;TAKE 2 CAPSULES (200MG) BY MOUTH EVERY EVENING.     Refill request for:  LamoTRIgine (LAMICTAL) 200 MG TB24     Directions:  TAKE 1 TABLET BY MOUTH TWICE DAILY - Oral     LOV: 05/13/2025  NOV: 09/05/2025    30 day supply with 1 refills Medication T'd for review and signature    Keily Lowe CMA on 7/8/2025 at 4:06 PM

## 2025-07-09 RX ORDER — LAMOTRIGINE 200 MG/1
1 TABLET, EXTENDED RELEASE ORAL 2 TIMES DAILY
Qty: 62 TABLET | Refills: 1 | Status: SHIPPED | OUTPATIENT
Start: 2025-07-09

## 2025-07-09 RX ORDER — LEVETIRACETAM 1000 MG/1
1000 TABLET ORAL EVERY MORNING
Qty: 31 TABLET | Refills: 1 | Status: SHIPPED | OUTPATIENT
Start: 2025-07-09

## 2025-07-09 RX ORDER — LEVETIRACETAM 500 MG/1
TABLET, FILM COATED, EXTENDED RELEASE ORAL
Qty: 93 TABLET | Refills: 1 | Status: SHIPPED | OUTPATIENT
Start: 2025-07-09

## 2025-07-09 RX ORDER — ZONISAMIDE 100 MG/1
CAPSULE ORAL
Qty: 93 CAPSULE | Refills: 1 | Status: SHIPPED | OUTPATIENT
Start: 2025-07-09

## 2025-07-15 ENCOUNTER — APPOINTMENT (OUTPATIENT)
Dept: CT IMAGING | Facility: CLINIC | Age: 50
End: 2025-07-15
Attending: STUDENT IN AN ORGANIZED HEALTH CARE EDUCATION/TRAINING PROGRAM
Payer: MEDICARE

## 2025-07-15 ENCOUNTER — HOSPITAL ENCOUNTER (EMERGENCY)
Facility: CLINIC | Age: 50
Discharge: HOME | End: 2025-07-15
Attending: STUDENT IN AN ORGANIZED HEALTH CARE EDUCATION/TRAINING PROGRAM
Payer: MEDICARE

## 2025-07-15 VITALS
WEIGHT: 285 LBS | TEMPERATURE: 98.6 F | HEART RATE: 69 BPM | RESPIRATION RATE: 18 BRPM | DIASTOLIC BLOOD PRESSURE: 77 MMHG | SYSTOLIC BLOOD PRESSURE: 122 MMHG | OXYGEN SATURATION: 95 % | BODY MASS INDEX: 44.64 KG/M2

## 2025-07-15 DIAGNOSIS — R10.31 RLQ ABDOMINAL PAIN: Primary | ICD-10-CM

## 2025-07-15 DIAGNOSIS — G80.9 CEREBRAL PALSY, UNSPECIFIED TYPE (H): ICD-10-CM

## 2025-07-15 DIAGNOSIS — G80.1 SPASTIC DIPLEGIA (H): ICD-10-CM

## 2025-07-15 DIAGNOSIS — E66.01 MORBID OBESITY (H): ICD-10-CM

## 2025-07-15 PROBLEM — E87.29 RESPIRATORY ACIDOSIS: Status: ACTIVE | Noted: 2019-06-25

## 2025-07-15 PROBLEM — G82.20 PARAPLEGIA, UNSPECIFIED (H): Status: ACTIVE | Noted: 2021-09-09

## 2025-07-15 PROBLEM — R53.81 PHYSICAL DECONDITIONING: Status: ACTIVE | Noted: 2017-10-23

## 2025-07-15 PROBLEM — T50.901A ACCIDENTAL MEDICATION OVERDOSE: Status: ACTIVE | Noted: 2019-06-25

## 2025-07-15 PROBLEM — R40.0 SOMNOLENCE: Status: ACTIVE | Noted: 2019-06-25

## 2025-07-15 PROBLEM — M51.9 LUMBAR DISC DISEASE: Status: ACTIVE | Noted: 2018-02-16

## 2025-07-15 PROBLEM — F31.9 BIPOLAR DISORDER WITH DEPRESSION (H): Status: ACTIVE | Noted: 2019-06-25

## 2025-07-15 PROBLEM — M25.579 CHRONIC ANKLE PAIN: Status: ACTIVE | Noted: 2017-07-21

## 2025-07-15 PROBLEM — Z62.810 HISTORY OF SEXUAL ABUSE IN CHILDHOOD: Status: ACTIVE | Noted: 2017-03-20

## 2025-07-15 PROBLEM — I27.20 PULMONARY HYPERTENSION (H): Status: ACTIVE | Noted: 2023-04-17

## 2025-07-15 PROBLEM — G89.29 CHRONIC ANKLE PAIN: Status: ACTIVE | Noted: 2017-07-21

## 2025-07-15 PROBLEM — R73.03 PREDIABETES: Status: ACTIVE | Noted: 2019-06-03

## 2025-07-15 PROBLEM — R91.8 RIGHT LOWER LOBE PULMONARY INFILTRATE: Status: ACTIVE | Noted: 2020-01-24

## 2025-07-15 PROBLEM — L03.115 CELLULITIS OF RIGHT LOWER EXTREMITY: Status: ACTIVE | Noted: 2019-06-25

## 2025-07-15 LAB
ALBUMIN SERPL BCG-MCNC: 4.2 G/DL (ref 3.5–5.2)
ALBUMIN UR-MCNC: NEGATIVE MG/DL
ALP SERPL-CCNC: 84 U/L (ref 40–150)
ALT SERPL W P-5'-P-CCNC: 24 U/L (ref 0–70)
ANION GAP SERPL CALCULATED.3IONS-SCNC: 9 MMOL/L (ref 7–15)
APPEARANCE UR: CLEAR
AST SERPL W P-5'-P-CCNC: 18 U/L (ref 0–45)
BASOPHILS # BLD AUTO: 0 10E3/UL (ref 0–0.2)
BASOPHILS NFR BLD AUTO: 1 %
BILIRUB SERPL-MCNC: 0.3 MG/DL
BILIRUB UR QL STRIP: NEGATIVE
BUN SERPL-MCNC: 17.8 MG/DL (ref 6–20)
CALCIUM SERPL-MCNC: 8.9 MG/DL (ref 8.8–10.4)
CHLORIDE SERPL-SCNC: 102 MMOL/L (ref 98–107)
COLOR UR AUTO: YELLOW
CREAT SERPL-MCNC: 0.75 MG/DL (ref 0.67–1.17)
EGFRCR SERPLBLD CKD-EPI 2021: >90 ML/MIN/1.73M2
EOSINOPHIL # BLD AUTO: 0.1 10E3/UL (ref 0–0.7)
EOSINOPHIL NFR BLD AUTO: 1 %
ERYTHROCYTE [DISTWIDTH] IN BLOOD BY AUTOMATED COUNT: 13.7 % (ref 10–15)
GLUCOSE SERPL-MCNC: 84 MG/DL (ref 70–99)
GLUCOSE UR STRIP-MCNC: NEGATIVE MG/DL
HCO3 SERPL-SCNC: 34 MMOL/L (ref 22–29)
HCT VFR BLD AUTO: 39.9 % (ref 40–53)
HGB BLD-MCNC: 12.2 G/DL (ref 13.3–17.7)
HGB UR QL STRIP: NEGATIVE
IMM GRANULOCYTES # BLD: 0.1 10E3/UL
IMM GRANULOCYTES NFR BLD: 1 %
KETONES UR STRIP-MCNC: NEGATIVE MG/DL
LEUKOCYTE ESTERASE UR QL STRIP: NEGATIVE
LIPASE SERPL-CCNC: 18 U/L (ref 13–60)
LYMPHOCYTES # BLD AUTO: 2.1 10E3/UL (ref 0.8–5.3)
LYMPHOCYTES NFR BLD AUTO: 28 %
MCH RBC QN AUTO: 29.3 PG (ref 26.5–33)
MCHC RBC AUTO-ENTMCNC: 30.6 G/DL (ref 31.5–36.5)
MCV RBC AUTO: 96 FL (ref 78–100)
MONOCYTES # BLD AUTO: 0.7 10E3/UL (ref 0–1.3)
MONOCYTES NFR BLD AUTO: 10 %
MUCOUS THREADS #/AREA URNS LPF: PRESENT /LPF
NEUTROPHILS # BLD AUTO: 4.5 10E3/UL (ref 1.6–8.3)
NEUTROPHILS NFR BLD AUTO: 60 %
NITRATE UR QL: NEGATIVE
NRBC # BLD AUTO: 0 10E3/UL
NRBC BLD AUTO-RTO: 0 /100
PH UR STRIP: 6.5 [PH] (ref 5–7)
PLATELET # BLD AUTO: 197 10E3/UL (ref 150–450)
POTASSIUM SERPL-SCNC: 4.1 MMOL/L (ref 3.4–5.3)
PROT SERPL-MCNC: 6.8 G/DL (ref 6.4–8.3)
RBC # BLD AUTO: 4.17 10E6/UL (ref 4.4–5.9)
RBC URINE: 2 /HPF
SODIUM SERPL-SCNC: 145 MMOL/L (ref 135–145)
SP GR UR STRIP: 1.01 (ref 1–1.03)
UROBILINOGEN UR STRIP-MCNC: NORMAL MG/DL
WBC # BLD AUTO: 7.6 10E3/UL (ref 4–11)
WBC URINE: 3 /HPF

## 2025-07-15 PROCEDURE — 83690 ASSAY OF LIPASE: CPT | Performed by: STUDENT IN AN ORGANIZED HEALTH CARE EDUCATION/TRAINING PROGRAM

## 2025-07-15 PROCEDURE — 84155 ASSAY OF PROTEIN SERUM: CPT | Performed by: STUDENT IN AN ORGANIZED HEALTH CARE EDUCATION/TRAINING PROGRAM

## 2025-07-15 PROCEDURE — 250N000011 HC RX IP 250 OP 636: Performed by: STUDENT IN AN ORGANIZED HEALTH CARE EDUCATION/TRAINING PROGRAM

## 2025-07-15 PROCEDURE — 81001 URINALYSIS AUTO W/SCOPE: CPT | Performed by: STUDENT IN AN ORGANIZED HEALTH CARE EDUCATION/TRAINING PROGRAM

## 2025-07-15 PROCEDURE — 74177 CT ABD & PELVIS W/CONTRAST: CPT

## 2025-07-15 PROCEDURE — 250N000009 HC RX 250: Performed by: STUDENT IN AN ORGANIZED HEALTH CARE EDUCATION/TRAINING PROGRAM

## 2025-07-15 PROCEDURE — 99285 EMERGENCY DEPT VISIT HI MDM: CPT | Mod: 25 | Performed by: STUDENT IN AN ORGANIZED HEALTH CARE EDUCATION/TRAINING PROGRAM

## 2025-07-15 PROCEDURE — 85004 AUTOMATED DIFF WBC COUNT: CPT | Performed by: STUDENT IN AN ORGANIZED HEALTH CARE EDUCATION/TRAINING PROGRAM

## 2025-07-15 PROCEDURE — 36415 COLL VENOUS BLD VENIPUNCTURE: CPT | Performed by: STUDENT IN AN ORGANIZED HEALTH CARE EDUCATION/TRAINING PROGRAM

## 2025-07-15 RX ORDER — HYDROMORPHONE HYDROCHLORIDE 1 MG/ML
0.5 INJECTION, SOLUTION INTRAMUSCULAR; INTRAVENOUS; SUBCUTANEOUS
Refills: 0 | Status: COMPLETED | OUTPATIENT
Start: 2025-07-15 | End: 2025-07-15

## 2025-07-15 RX ORDER — IOPAMIDOL 755 MG/ML
500 INJECTION, SOLUTION INTRAVASCULAR ONCE
Status: COMPLETED | OUTPATIENT
Start: 2025-07-15 | End: 2025-07-15

## 2025-07-15 RX ORDER — MORPHINE SULFATE 4 MG/ML
4 INJECTION, SOLUTION INTRAMUSCULAR; INTRAVENOUS
Refills: 0 | Status: COMPLETED | OUTPATIENT
Start: 2025-07-15 | End: 2025-07-15

## 2025-07-15 RX ADMIN — SODIUM CHLORIDE 100 ML: 9 INJECTION, SOLUTION INTRAVENOUS at 07:36

## 2025-07-15 RX ADMIN — IOPAMIDOL 100 ML: 755 INJECTION, SOLUTION INTRAVENOUS at 07:36

## 2025-07-15 RX ADMIN — HYDROMORPHONE HYDROCHLORIDE 0.5 MG: 1 INJECTION, SOLUTION INTRAMUSCULAR; INTRAVENOUS; SUBCUTANEOUS at 06:50

## 2025-07-15 ASSESSMENT — ACTIVITIES OF DAILY LIVING (ADL)
ADLS_ACUITY_SCORE: 61
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
## 2025-07-15 NOTE — ED TRIAGE NOTES
BIBA from  for RLQ abdominal pain for 4 days, no vomiting, Last BM yesterday 7/14      Triage Assessment (Adult)       Row Name 07/15/25 0609          Triage Assessment    Airway WDL WDL        Respiratory WDL    Respiratory WDL WDL        Skin Circulation/Temperature WDL    Skin Circulation/Temperature WDL WDL        Cardiac WDL    Cardiac WDL WDL        Peripheral/Neurovascular WDL    Peripheral Neurovascular WDL WDL        Cognitive/Neuro/Behavioral WDL    Cognitive/Neuro/Behavioral WDL WDL

## 2025-07-30 ENCOUNTER — TELEPHONE (OUTPATIENT)
Dept: NEUROLOGY | Facility: CLINIC | Age: 50
End: 2025-07-30
Payer: MEDICARE

## 2025-07-30 NOTE — TELEPHONE ENCOUNTER
[facility] Call Center    Phone Message    May a detailed message be left on voicemail: yes     Reason for Call: Symptoms or Concerns     If patient has red-flag symptoms, warm transfer to triage line    Current symptom or concern: Recently patient has  had an episode of not knowing where he is.  Also, patient is weak and having trouble urinating and bowel control.    Symptoms have been present for:  1 week(s)    Has patient previously been seen for this? Yes    Ken Pan,      Are there any new or worsening symptoms? Yes:     Action Taken: [facility]    Travel Screening: Not Applicable     Date of Service:
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
## 2025-07-31 NOTE — TELEPHONE ENCOUNTER
Returned call to Remberto. Informed her that patient was scheduled for appointment on 8/4 to address altered mental status concerns. No further questions or concerns at this time.    Daphnie TY RN  [facility]

## 2025-07-31 NOTE — TELEPHONE ENCOUNTER
Attempted to call Remberto from [facility] - no answer, VM box is full. Will attempt to call at a later time.    Daphnie TY RN  [facility]

## 2025-07-31 NOTE — TELEPHONE ENCOUNTER
[facility] Call [facility]    Phone Message    May a detailed message be left on voicemail: yes     Reason for Call:     Darwin is returning a missing call from nurse, asking for a call bacl to her cell at [phone]     Action Taken: Other: [facility] neurolog[facility]    Travel Screening: Not Applicable     Date of Service:

## 2025-08-04 ENCOUNTER — OFFICE VISIT (OUTPATIENT)
Dept: NEUROLOGY | Facility: CLINIC | Age: 50
End: 2025-08-04
Payer: MEDICARE

## 2025-08-04 VITALS — SYSTOLIC BLOOD PRESSURE: 108 MMHG | DIASTOLIC BLOOD PRESSURE: 67 MMHG | HEART RATE: 79 BPM

## 2025-08-04 DIAGNOSIS — Z79.899 ENCOUNTER FOR LONG-TERM (CURRENT) USE OF MEDICATIONS: ICD-10-CM

## 2025-08-04 DIAGNOSIS — G40.309 NONINTRACTABLE GENERALIZED IDIOPATHIC EPILEPSY WITHOUT STATUS EPILEPTICUS (H): Primary | ICD-10-CM

## 2025-08-04 DIAGNOSIS — E07.9 THYROID DYSFUNCTION: ICD-10-CM

## 2025-08-04 DIAGNOSIS — R41.89 COGNITIVE CHANGE: ICD-10-CM

## 2025-08-04 DIAGNOSIS — G25.5 MUSCLE, JERKY MOVEMENTS (UNCONTROLLED): ICD-10-CM

## 2025-08-04 PROCEDURE — 3078F DIAST BP <80 MM HG: CPT | Performed by: PSYCHIATRY & NEUROLOGY

## 2025-08-04 PROCEDURE — 99213 OFFICE O/P EST LOW 20 MIN: CPT | Performed by: PSYCHIATRY & NEUROLOGY

## 2025-08-04 PROCEDURE — 3074F SYST BP LT 130 MM HG: CPT | Performed by: PSYCHIATRY & NEUROLOGY

## 2025-08-04 RX ORDER — LAMOTRIGINE 200 MG/1
1 TABLET, EXTENDED RELEASE ORAL 2 TIMES DAILY
Qty: 180 TABLET | Refills: 1 | Status: SHIPPED | OUTPATIENT
Start: 2025-08-04

## 2025-08-21 ENCOUNTER — LAB (OUTPATIENT)
Dept: LAB | Facility: CLINIC | Age: 50
End: 2025-08-21
Payer: MEDICARE

## 2025-08-21 DIAGNOSIS — R41.89 COGNITIVE CHANGE: ICD-10-CM

## 2025-08-21 DIAGNOSIS — E07.9 THYROID DYSFUNCTION: ICD-10-CM

## 2025-08-21 DIAGNOSIS — G40.909 SEIZURE DISORDER (H): ICD-10-CM

## 2025-08-21 DIAGNOSIS — Z79.899 ENCOUNTER FOR LONG-TERM (CURRENT) USE OF MEDICATIONS: ICD-10-CM

## 2025-08-21 LAB
AMMONIA PLAS-SCNC: 58 UMOL/L (ref 16–60)
FOLATE SERPL-MCNC: 20.5 NG/ML (ref 4.6–34.8)
LEVETIRACETAM SERPL-MCNC: 41.5 ÂΜG/ML (ref 10–40)

## 2025-08-23 ENCOUNTER — HOSPITAL ENCOUNTER (EMERGENCY)
Facility: CLINIC | Age: 50
Discharge: NURSING, REHAB OR LONG-TERM CARE | End: 2025-08-23
Attending: EMERGENCY MEDICINE | Admitting: EMERGENCY MEDICINE
Payer: MEDICARE

## 2025-08-23 VITALS
WEIGHT: 315 LBS | DIASTOLIC BLOOD PRESSURE: 87 MMHG | BODY MASS INDEX: 47.74 KG/M2 | TEMPERATURE: 99 F | SYSTOLIC BLOOD PRESSURE: 138 MMHG | HEART RATE: 74 BPM | OXYGEN SATURATION: 97 % | HEIGHT: 68 IN | RESPIRATION RATE: 10 BRPM

## 2025-08-23 DIAGNOSIS — R56.9 SEIZURE (H): Primary | ICD-10-CM

## 2025-08-23 LAB
ANION GAP SERPL CALCULATED.3IONS-SCNC: 7 MMOL/L (ref 7–15)
BASOPHILS # BLD AUTO: 0.04 10E3/UL (ref 0–0.2)
BASOPHILS NFR BLD AUTO: 0.5 %
BUN SERPL-MCNC: 16.1 MG/DL (ref 6–20)
CALCIUM SERPL-MCNC: 9.2 MG/DL (ref 8.8–10.4)
CHLORIDE SERPL-SCNC: 99 MMOL/L (ref 98–107)
CREAT SERPL-MCNC: 0.7 MG/DL (ref 0.67–1.17)
EGFRCR SERPLBLD CKD-EPI 2021: >90 ML/MIN/1.73M2
EOSINOPHIL # BLD AUTO: 0.06 10E3/UL (ref 0–0.7)
EOSINOPHIL NFR BLD AUTO: 0.8 %
ERYTHROCYTE [DISTWIDTH] IN BLOOD BY AUTOMATED COUNT: 14.2 % (ref 10–15)
GLUCOSE SERPL-MCNC: 94 MG/DL (ref 70–99)
HCO3 SERPL-SCNC: 35 MMOL/L (ref 22–29)
HCT VFR BLD AUTO: 40.6 % (ref 40–53)
HGB BLD-MCNC: 12.3 G/DL (ref 13.3–17.7)
HOLD SPECIMEN: NORMAL
IMM GRANULOCYTES # BLD: 0.22 10E3/UL
IMM GRANULOCYTES NFR BLD: 3 %
LAMOTRIGINE SERPL-MCNC: 15.7 UG/ML
LYMPHOCYTES # BLD AUTO: 2.09 10E3/UL (ref 0.8–5.3)
LYMPHOCYTES NFR BLD AUTO: 28.6 %
MCH RBC QN AUTO: 29.2 PG (ref 26.5–33)
MCHC RBC AUTO-ENTMCNC: 30.3 G/DL (ref 31.5–36.5)
MCV RBC AUTO: 96.4 FL (ref 78–100)
MONOCYTES # BLD AUTO: 0.64 10E3/UL (ref 0–1.3)
MONOCYTES NFR BLD AUTO: 8.7 %
NEUTROPHILS # BLD AUTO: 4.27 10E3/UL (ref 1.6–8.3)
NEUTROPHILS NFR BLD AUTO: 58.4 %
NRBC # BLD AUTO: <0.03 10E3/UL
NRBC BLD AUTO-RTO: 0 /100
PLATELET # BLD AUTO: 196 10E3/UL (ref 150–450)
POTASSIUM SERPL-SCNC: 4.4 MMOL/L (ref 3.4–5.3)
RBC # BLD AUTO: 4.21 10E6/UL (ref 4.4–5.9)
SODIUM SERPL-SCNC: 141 MMOL/L (ref 135–145)
WBC # BLD AUTO: 7.32 10E3/UL (ref 4–11)
ZONISAMIDE SERPL-MCNC: 13 UG/ML

## 2025-08-23 PROCEDURE — 99291 CRITICAL CARE FIRST HOUR: CPT | Mod: 25 | Performed by: EMERGENCY MEDICINE

## 2025-08-23 PROCEDURE — 36415 COLL VENOUS BLD VENIPUNCTURE: CPT | Performed by: EMERGENCY MEDICINE

## 2025-08-23 PROCEDURE — 99284 EMERGENCY DEPT VISIT MOD MDM: CPT | Mod: 25

## 2025-08-23 PROCEDURE — 80048 BASIC METABOLIC PNL TOTAL CA: CPT | Performed by: EMERGENCY MEDICINE

## 2025-08-23 PROCEDURE — 96375 TX/PRO/DX INJ NEW DRUG ADDON: CPT

## 2025-08-23 PROCEDURE — 250N000011 HC RX IP 250 OP 636: Performed by: EMERGENCY MEDICINE

## 2025-08-23 PROCEDURE — 85025 COMPLETE CBC W/AUTO DIFF WBC: CPT | Performed by: EMERGENCY MEDICINE

## 2025-08-23 PROCEDURE — 96365 THER/PROPH/DIAG IV INF INIT: CPT

## 2025-08-23 RX ORDER — LEVETIRACETAM 10 MG/ML
1000 INJECTION INTRAVASCULAR ONCE
Status: COMPLETED | OUTPATIENT
Start: 2025-08-23 | End: 2025-08-23

## 2025-08-23 RX ADMIN — MIDAZOLAM 1 MG: 1 INJECTION INTRAMUSCULAR; INTRAVENOUS at 18:13

## 2025-08-23 RX ADMIN — LEVETIRACETAM 1000 MG: 10 INJECTION INTRAVENOUS at 19:12

## 2025-08-23 ASSESSMENT — ACTIVITIES OF DAILY LIVING (ADL)
ADLS_ACUITY_SCORE: 61

## 2025-08-24 LAB
VALPROATE FREE MFR SERPL: 17 %
VALPROATE FREE SERPL-MCNC: 11 UG/ML
VALPROATE SERPL-MCNC: 67 UG/ML

## 2025-08-25 LAB
ATRIAL RATE - MUSE: 74 BPM
DIASTOLIC BLOOD PRESSURE - MUSE: NORMAL MMHG
INTERPRETATION ECG - MUSE: NORMAL
P AXIS - MUSE: 54 DEGREES
PR INTERVAL - MUSE: 198 MS
QRS DURATION - MUSE: 82 MS
QT - MUSE: 428 MS
QTC - MUSE: 475 MS
R AXIS - MUSE: 19 DEGREES
SYSTOLIC BLOOD PRESSURE - MUSE: NORMAL MMHG
T AXIS - MUSE: 64 DEGREES
VENTRICULAR RATE- MUSE: 74 BPM

## 2025-09-02 LAB — METHYLMALONATE SERPL-SCNC: 0.33 UMOL/L (ref 0–0.4)

## (undated) DEVICE — DRAPE X-RAY TUBE 00-901169-01-OEC

## (undated) DEVICE — SUCTION CANISTER MEDIVAC LINER 3000ML W/LID 65651-530

## (undated) DEVICE — ENDO TROCAR SLEEVE KII Z-THREADED 05X100MM CTS02

## (undated) DEVICE — Device

## (undated) DEVICE — SU VICRYL 4-0 PS-2 18" UND J496H

## (undated) DEVICE — ENDO POUCH UNIV RETRIEVAL SYSTEM INZII 10MM CD001

## (undated) DEVICE — CONNECTOR STOPCOCK 3 WAY MALE LL HI-FLO MX9311L

## (undated) DEVICE — KIT PROCEDURE W/CLEAN-A-SCOPE LINERS V2 200800

## (undated) DEVICE — LINEN POUCH DBL 5427

## (undated) DEVICE — TUBING SUCTION 12"X1/4" N612

## (undated) DEVICE — LINEN TOWEL PACK X10 5473

## (undated) DEVICE — SPONGE RAY-TEC 4X8" 7318

## (undated) DEVICE — TUBING SUCTION MEDI-VAC SOFT 3/16"X20' N520A

## (undated) DEVICE — LINEN FULL SHEET 5511

## (undated) DEVICE — ENDO FORCEP SPIKED SERRATED SHAFT JUMBO 239CM G56998

## (undated) DEVICE — DRAPE LEGGINGS 8421

## (undated) DEVICE — ESU GROUND PAD ADULT W/CORD E7507

## (undated) DEVICE — APPLICATOR ENDOSCOPIC 5 SURGICEL POWDER 3123SPEA

## (undated) DEVICE — ESU ELEC BLADE 2.75" COATED/INSULATED E1455

## (undated) DEVICE — GLOVE BIOGEL PI MICRO INDICATOR UNDERGLOVE SZ 6.5 48965

## (undated) DEVICE — SYR 50ML LL W/O NDL 309653

## (undated) DEVICE — GLOVE BIOGEL PI MICRO SZ 6.5 48565

## (undated) DEVICE — SOL NACL 0.9% INJ 1000ML BAG 2B1324X

## (undated) DEVICE — ENDO TROCAR BLUNT TIP KII BALLOON 12X100MM C0R47

## (undated) DEVICE — DECANTER VIAL 2006S

## (undated) DEVICE — EVAC SYSTEM CLEAR FLOW SC082500

## (undated) DEVICE — TUBING IV EXTENSION SET ANESTHESIA 34" MLL 2C6227

## (undated) DEVICE — SUCTION IRR STRYKERFLOW II W/TIP 250-070-520

## (undated) DEVICE — CATH CHOLANGIOGRAM KUMAR CC-019

## (undated) DEVICE — SYR 30ML LL W/O NDL 302832

## (undated) DEVICE — SUCTION MANIFOLD NEPTUNE 2 SYS 4 PORT 0702-020-000

## (undated) DEVICE — LINEN HALF SHEET 5512

## (undated) DEVICE — DECANTER BAG 2002S

## (undated) DEVICE — CLEANSER JET LAVAGE IRRISEPT 0.05% CHG IRRISEPT45USA

## (undated) DEVICE — SOL NACL 0.9% INJ 250ML BAG 2B1322Q

## (undated) DEVICE — BAG CLEAR TRASH 1.3M 39X33" P4040C

## (undated) DEVICE — ESU CORD MONOPOLAR 10'  E0510

## (undated) DEVICE — SU VICRYL+ 0 27 UR6 VLT VCP603H

## (undated) DEVICE — GLOVE BIOGEL PI MICRO SZ 7.5 48575

## (undated) DEVICE — SUCTION TIP YANKAUER W/O VENT K86

## (undated) DEVICE — BAG RED BIOHAZARD 37X50" 40GAL A7450PR

## (undated) DEVICE — ESU PENCIL W/HOLSTER E2350H

## (undated) DEVICE — TRAY CATH SURESTEP OD14 FR INTERMITTENT STER LTX INTS14

## (undated) DEVICE — CLIP APPLIER ENDO 5MM M/L LIGAMAX EL5ML

## (undated) DEVICE — SOL NACL 0.9% IRRIG 3000ML BAG 2B7477

## (undated) DEVICE — SURGICEL POWDER ABSORBABLE HEMOSTAT 3GM 3013SP

## (undated) DEVICE — ENDO TROCAR FIRST ENTRY KII FIOS Z-THRD 05X100MM CTF03

## (undated) DEVICE — GOWN XLG DISP 9545

## (undated) DEVICE — PAD CHUX UNDERPAD 30X36" P3036C

## (undated) RX ORDER — CEFAZOLIN SODIUM/WATER 3 G/30 ML
SYRINGE (ML) INTRAVENOUS
Status: DISPENSED
Start: 2024-10-24

## (undated) RX ORDER — GLYCOPYRROLATE 0.2 MG/ML
INJECTION, SOLUTION INTRAMUSCULAR; INTRAVENOUS
Status: DISPENSED
Start: 2024-10-24

## (undated) RX ORDER — LIDOCAINE HYDROCHLORIDE 10 MG/ML
INJECTION, SOLUTION EPIDURAL; INFILTRATION; INTRACAUDAL; PERINEURAL
Status: DISPENSED
Start: 2024-10-24

## (undated) RX ORDER — INDOCYANINE GREEN AND WATER 25 MG
KIT INJECTION
Status: DISPENSED
Start: 2024-10-24

## (undated) RX ORDER — DEXAMETHASONE SODIUM PHOSPHATE 4 MG/ML
INJECTION, SOLUTION INTRA-ARTICULAR; INTRALESIONAL; INTRAMUSCULAR; INTRAVENOUS; SOFT TISSUE
Status: DISPENSED
Start: 2024-10-24

## (undated) RX ORDER — PROPOFOL 10 MG/ML
INJECTION, EMULSION INTRAVENOUS
Status: DISPENSED
Start: 2022-12-01

## (undated) RX ORDER — LABETALOL HYDROCHLORIDE 5 MG/ML
INJECTION, SOLUTION INTRAVENOUS
Status: DISPENSED
Start: 2024-10-24

## (undated) RX ORDER — FENTANYL CITRATE 50 UG/ML
INJECTION, SOLUTION INTRAMUSCULAR; INTRAVENOUS
Status: DISPENSED
Start: 2024-10-24

## (undated) RX ORDER — NEOSTIGMINE METHYLSULFATE 1 MG/ML
VIAL (ML) INJECTION
Status: DISPENSED
Start: 2024-10-24

## (undated) RX ORDER — ONDANSETRON 2 MG/ML
INJECTION INTRAMUSCULAR; INTRAVENOUS
Status: DISPENSED
Start: 2024-10-24

## (undated) RX ORDER — PROPOFOL 10 MG/ML
INJECTION, EMULSION INTRAVENOUS
Status: DISPENSED
Start: 2024-10-24

## (undated) RX ORDER — FENTANYL CITRATE 50 UG/ML
INJECTION, SOLUTION INTRAMUSCULAR; INTRAVENOUS
Status: DISPENSED
Start: 2022-12-01

## (undated) RX ORDER — BUPIVACAINE HYDROCHLORIDE 5 MG/ML
INJECTION, SOLUTION EPIDURAL; INTRACAUDAL
Status: DISPENSED
Start: 2024-10-24

## (undated) RX ORDER — CLINDAMYCIN PHOSPHATE 900 MG/50ML
INJECTION, SOLUTION INTRAVENOUS
Status: DISPENSED
Start: 2024-10-24